# Patient Record
Sex: MALE | Race: BLACK OR AFRICAN AMERICAN | NOT HISPANIC OR LATINO | Employment: OTHER | URBAN - METROPOLITAN AREA
[De-identification: names, ages, dates, MRNs, and addresses within clinical notes are randomized per-mention and may not be internally consistent; named-entity substitution may affect disease eponyms.]

---

## 2019-01-02 PROBLEM — C91.10 CLL (CHRONIC LYMPHOCYTIC LEUKEMIA): Status: ACTIVE | Noted: 2019-01-02

## 2019-01-02 NOTE — PROGRESS NOTES
[facility] Hematolgy/Oncology  History & Physical    Subjective:      Patient ID:   NAME: Conrad Kuhn : 1940     78 y.o. male    Referring Doc: Dr Rony Victoria  Other Physicians: Kacey; David Cortes        Chief Complaint: CLL      HPI:  78 y.o. male with diagnosis of CLL who has been referred by Dr Rony Victoria for continuation of care by medical hematology/oncology. He had been on Ibrutinib and has been off therapy for several months. He had a bone marrow biopsy on 10/26/2018. His latest WBC was 4.6  With 39% lymphocytes on 2018. He is here by himself. He is feeling ok. He denies any CP, SOB, HA's or N/V. No current LN swelling. He was awaiting to have a procedure of a cervical disc but it has been cancelled. He is followed by Dr Ryan oquendo with pain management.               ROS:   GEN: normal without any fever, night sweats or weight loss  HEENT: normal with no HA's, sore throat, stiff neck, changes in vision  CV: normal with no CP, SOB, PND, MAYER or orthopnea  PULM: normal with no SOB, cough, hemoptysis, sputum or pleuritic pain  GI: normal with no abdominal pain, nausea, vomiting, constipation, diarrhea, melanotic stools, BRBPR, or hematemesis  : normal with no hematuria, dysuria  BREAST: normal with no mass, discharge, pain  SKIN: normal with no rash, erythema, bruising, or swelling       Past Medical/Surgical History:  Past Medical History:   Diagnosis Date    CLL (chronic lymphocytic leukemia) 2019     No past surgical history on file.      Allergies:  Review of patient's allergies indicates:  Allergies not on file    Social/Family History:  Social History     Socioeconomic History    Marital status:      Spouse name: Not on file    Number of children: Not on file    Years of education: Not on file    Highest education level: Not on file   Social Needs    Financial resource strain: Not on file    Food insecurity - worry: Not on file    Food insecurity -  inability: Not on file    Transportation needs - medical: Not on file    Transportation needs - non-medical: Not on file   Occupational History    Not on file   Tobacco Use    Smoking status: Not on file   Substance and Sexual Activity    Alcohol use: Not on file    Drug use: Not on file    Sexual activity: Not on file   Other Topics Concern    Not on file   Social History Narrative    Not on file     No family history on file.      Medications:  No current outpatient medications on file.      Pathology:  Cancer Staging  No matching staging information was found for the patient.      Objective:   Vitals:  There were no vitals taken for this visit.    Physical Examination:   GEN: no apparent distress, comfortable; AAOx3  HEAD: atraumatic and normocephalic  EYES: no pallor, no icterus, PERRLA  ENT: OMM, no pharyngeal erythema, external ears WNL; no nasal discharge; no thrush  NECK: no masses, thyroid normal, trachea midline, no LAD/LN's, supple  CV: RRR with no murmur; normal pulse; normal S1 and S2; no pedal edema  CHEST: Normal respiratory effort; CTAB; normal breath sounds; no wheeze or crackles  ABDOM: nontender and nondistended; soft; normal bowel sounds; no rebound/guarding  MUSC/Skeletal: ROM normal; no crepitus; joints normal; no deformities or arthropathy  EXTREM: no clubbing, cyanosis, inflammation or swelling  SKIN: no rashes, lesions, ulcers, petechiae or subcutaneous nodules  : no keith  NEURO: grossly intact; motor/sensory WNL; AAOx3; no tremors  PSYCH: normal mood, affect and behavior  LYMPH: normal cervical, supraclavicular, axillary and groin LN's      Labs:   12/12/2018 on chart    Radiology/Diagnostic Studies:          All lab results and imaging results have been reviewed and discussed with the patient    Assessment:   (1) 78 y.o. male diagnosis of CLL who has been referred by Dr Rony Victoria for continuation of care by medical hematology/oncology.   - BM biopsy  12/4/2015 with CLL  -  diagnosis of SLL in 2004 and s/p FCR x6 in 2007    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  - followed  By Dr Kee    (3) Iron deficiency anemia s/p IV iron couple weeks ago    (4) HTN - on BP meds    (6) Chronic neck and back issues - followed by Dr Ryan Rivero    (7) DM - currently off all meds    (8) Hypercholesterolemia - on meds        VISIT DIAGNOSES:              CLL (chronic lymphocytic leukemia)            Plan:     PLAN:  1. Check up to date labs incl. CEA and iron panel  2. F/u with PCP, pain management  3. CT Chest, Abdom/pelvis  4.f/u with PCP, Pulm, etc  RTC in 3-4 weeks   Fax note to Vidhya Victoria Culasso, Joel Berry        I have explained and the patient understands all of  the current recommendation(s). I have answered all of their questions to the best of my ability and to their complete satisfaction.             Thank you for allowing me to participate in this patient's care. Please call with any questions or concerns.    Electronically signed Drew Bai MD

## 2019-01-03 ENCOUNTER — OFFICE VISIT (OUTPATIENT)
Dept: HEMATOLOGY/ONCOLOGY | Facility: CLINIC | Age: 79
End: 2019-01-03
Payer: MEDICARE

## 2019-01-03 VITALS
HEART RATE: 70 BPM | RESPIRATION RATE: 20 BRPM | DIASTOLIC BLOOD PRESSURE: 76 MMHG | SYSTOLIC BLOOD PRESSURE: 171 MMHG | HEIGHT: 72 IN | BODY MASS INDEX: 29.78 KG/M2 | TEMPERATURE: 98 F | WEIGHT: 219.88 LBS

## 2019-01-03 DIAGNOSIS — Z85.118 HISTORY OF LUNG CANCER: ICD-10-CM

## 2019-01-03 DIAGNOSIS — D50.9 IRON DEFICIENCY ANEMIA, UNSPECIFIED IRON DEFICIENCY ANEMIA TYPE: Primary | ICD-10-CM

## 2019-01-03 DIAGNOSIS — C83.00 LYMPHOMA, SMALL LYMPHOCYTIC: ICD-10-CM

## 2019-01-03 DIAGNOSIS — C91.10 CLL (CHRONIC LYMPHOCYTIC LEUKEMIA): ICD-10-CM

## 2019-01-03 PROCEDURE — 99204 PR OFFICE/OUTPT VISIT, NEW, LEVL IV, 45-59 MIN: ICD-10-PCS | Mod: ,,, | Performed by: INTERNAL MEDICINE

## 2019-01-03 PROCEDURE — 99204 OFFICE O/P NEW MOD 45 MIN: CPT | Mod: ,,, | Performed by: INTERNAL MEDICINE

## 2019-01-03 RX ORDER — VALACYCLOVIR HYDROCHLORIDE 500 MG/1
1000 TABLET, FILM COATED ORAL 2 TIMES DAILY
COMMUNITY
End: 2019-10-21 | Stop reason: SDUPTHER

## 2019-01-03 RX ORDER — METOPROLOL SUCCINATE 100 MG/1
100 TABLET, EXTENDED RELEASE ORAL DAILY
COMMUNITY
End: 2019-07-22

## 2019-01-03 NOTE — LETTER
January 3, 2019      Johnson Erazo MD  [address] [facility]  [place] [address]           [facility] - Hematology Oncology  [address] [facility]  [address]  [place] [address]  Phone: [phone]  Fax: [phone]          Patient: Conrad Kuhn   MR Number: [id number]   YOB: 1940   Date of Visit: 1/3/2019       Dear Dr. Johnson Erazo:    Thank you for referring Conrad Kuhn to me for evaluation. Attached you will find relevant portions of my assessment and plan of care.    If you have questions, please do not hesitate to call me. I look forward to following Conrad Kuhn along with you.    Sincerely,    Drew Bai MD    Enclosure  CC:  No Recipients    If you would like to receive this communication electronically, please contact [email or web address] or [phone] to request more information on [employer] access.    For providers and/or their staff who would like to refer a patient to [facility], please contact us through our one-stop-shop provider referral line, [facility], at [phone].    If you feel you have received this communication in error or would no longer like to receive these types of communications, please e-mail [email or web address]

## 2019-01-15 ENCOUNTER — TELEPHONE (OUTPATIENT)
Dept: HEMATOLOGY/ONCOLOGY | Facility: CLINIC | Age: 79
End: 2019-01-15

## 2019-01-15 LAB — ISTAT CREATININE: 0.9 MG/DL (ref 0.6–1.4)

## 2019-01-15 NOTE — TELEPHONE ENCOUNTER
Patient coming in 01/31/2019    ----- Message from Drew Bai MD sent at 1/15/2019  8:24 AM CST -----  Call patient with good report

## 2019-01-30 NOTE — PROGRESS NOTES
[facility] Hematology/Oncology  PROGRESS NOTE - 2nd Follow-up Visit      Subjective:       Patient ID:   NAME: Conrad Kuhn : 1940     78 y.o. male    Referring Doc: Julien  Other Physicians: Ced Erazo Joubert, Srinivas    Chief Complaint:  CLL f/u    History of Present Illness:     Patient returns today for a 2nd regularly scheduled follow-up visit.  The patient is here today to go over the results of the recently ordered labs, tests and studies. He had some recent blood work and also had follow-up CT scan. He is here by himself. He has jack having some increase urin frequency, so I will refer him to urology and he is to talk to his PCP.             ROS:   GEN: normal without any fever, night sweats or weight loss  HEENT: normal with no HA's, sore throat, stiff neck, changes in vision  CV: normal with no CP, SOB, PND, MAYER or orthopnea  PULM: normal with no SOB, cough, hemoptysis, sputum or pleuritic pain  GI: normal with no abdominal pain, nausea, vomiting, constipation, diarrhea, melanotic stools, BRBPR, or hematemesis  : increase urine frequency  BREAST: normal with no mass, discharge, pain  SKIN: normal with no rash, erythema, bruising, or swelling    Allergies:  Review of patient's allergies indicates:  No Known Allergies    Medications:    Current Outpatient Medications:     metoprolol succinate (TOPROL-XL) 100 MG 24 hr tablet, Take 100 mg by mouth once daily., Disp: , Rfl:     valACYclovir (VALTREX) 1000 MG tablet, Take 1,000 mg by mouth 2 (two) times daily., Disp: , Rfl:     PMHx/PSHx Updates:  See patient's last visit with me on 1/3/2019.  See H&P on 1/3/2019        Pathology:  Cancer Staging  No matching staging information was found for the patient.          Objective:     Vitals:  Blood pressure (!) 175/112, pulse 74, temperature 98 °F (36.7 °C), resp. rate 20, weight 99.8 kg (220 lb 1.6 oz).    Physical Examination:   GEN: no apparent distress, comfortable; AAOx3  HEAD: atraumatic and  normocephalic  EYES: no pallor, no icterus, PERRLA  ENT: OMM, no pharyngeal erythema, external ears WNL; no nasal discharge; no thrush  NECK: no masses, thyroid normal, trachea midline, no LAD/LN's, supple  CV: RRR with no murmur; normal pulse; normal S1 and S2; no pedal edema  CHEST: Normal respiratory effort; CTAB; normal breath sounds; no wheeze or crackles  ABDOM: nontender and nondistended; soft; normal bowel sounds; no rebound/guarding  MUSC/Skeletal: ROM normal; no crepitus; joints normal; no deformities or arthropathy  EXTREM: no clubbing, cyanosis, inflammation or swelling  SKIN: no rashes, lesions, ulcers, petechiae or subcutaneous nodules  : no keith  NEURO: grossly intact; motor/sensory WNL; AAOx3; no tremors  PSYCH: normal mood, affect and behavior  LYMPH: normal cervical, supraclavicular, axillary and groin LN's            Labs:   1/28/2019  Lab Results   Component Value Date    WBC 3.2 (L) 01/28/2019    HGB 15.2 01/28/2019    HCT 43.7 01/28/2019     (L) 01/28/2019     BMP  Lab Results   Component Value Date     01/28/2019    K 4.2 01/28/2019    CL 97 (L) 01/28/2019    CO2 28.4 01/28/2019    BUN 12 01/28/2019    CREATININE 0.93 01/28/2019    CALCIUM 9.2 01/28/2019     Lab Results   Component Value Date    AST 29 01/28/2019    ALKPHOS 59 01/28/2019    BILITOT 0.9 01/28/2019     Lab Results   Component Value Date    CEA 1.2 01/28/2019     Lab Results   Component Value Date    IRON 125 01/28/2019    TIBC 331 01/28/2019    FERRITIN 55 01/28/2019             Radiology/Diagnostic Studies:    [facility] Unknown Rad Eap    Result Date: 1/14/2019  CMS MANDATED QUALITY DATA - CT RADIATION - 436 All CT scans at this facility utilize dose modulation, iterative reconstruction, and/or weight based dosing when appropriate to reduce radiation dose to as low as reasonably achievable. Reason:Lymphoid leukemia TECHNIQUE: CT thorax with, CT abdomen  with, and CT pelvis with 100 mL Omnipaque 350. COMPARISON: CT  chest dated 05/19/2018 and CT abdomen and pelvis dated 02/08/2017 CT THORAX: There is stable mediastinal, hilar and axillary adenopathy. There is coronary artery calcification. There is resolution of the groundglass opacities throughout the lungs. There are no confluent infiltrates, pulmonary nodules or pleural effusions. There are stable subcentimeter nodules in the left lobe of the thyroid gland. There are degenerative changes of the spine. CT ABDOMEN: The liver, pancreas, adrenal glands and gallbladder are normal. The spleen is enlarged. There is mesenteric and retroperitoneal adenopathy which is slightly smaller in size. -There is a portacaval node measuring 37 x 21 mm and previously measured 38 x 27 mm. -Periportal lymph node measuring 41 x 18 mm, previously measured 48 x 29 mm. -Left periaortic adenopathy measuring 34 x 23 mm and previously measured 40 x 42 mm- The kidneys enhance symmetrically without hydronephrosis or calculi. There are no thick-walled or dilated bowel loops. There is vascular calcification of aorta. There is a 3.0 x 3.0 cm infrarenal abdominal aortic aneurysm. CT PELVIS: There is adenopathy along the pelvic sidewalls bilaterally which has decreased in size. -The left common iliac adenopathy measuring 36 x 11 mm, previously measured 46 x 14 mm -the right common iliac adenopathy measuring 46 x 10 mm. Previously measured 53 x 15 mm. The bladder is normal. The prostate gland is enlarged. There are degenerative changes of the spine. There is grade 1 anterolisthesis of L5 on S1 with bilateral pars defects.     IMPRESSION: Stable mediastinal, hilar and axillary adenopathy with resolution of the airspace disease within the lungs Decrease in size of the mesenteric, retroperitoneal and pelvic adenopathy. Stable infrarenal abdominal aortic aneurysm Splenomegaly     Read and electronically signed by: Jeniffer Zhang MD on 1/14/2019 9:14 AM CST JENIFFER ZHANG MD      I have reviewed all available  lab results and radiology reports.    Assessment/Plan:   (1) 78 y.o. male with  diagnosis of CLL who has been referred by Dr Rony Victoria for continuation of care by medical hematology/oncology.   - BM biopsy  12/4/2015 with CLL  - diagnosis of SLL in 2004 and s/p FCR x6 in 2007  - s/p imbruvica in past (stopped in May 2018)  - latest wbc at 3.2; lymph 49%  - latest CT with decrease in LAD     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  - followed  By Dr Kee  - CEA 1.2  - CT scans on 1/14/2019 stable     (3) Iron deficiency anemia s/p IV iron couple weeks ago  - hgb 15.2 and iron panel currently wnl     (4) HTN - on BP meds     (6) Chronic neck and back issues - followed by Dr Ryan Rivero     (7) DM - currently off all meds     (8) Hypercholesterolemia - on meds        VISIT DIAGNOSES:      Lymphoma, small lymphocytic (2004)    Iron deficiency anemia, unspecified iron deficiency anemia type    History of lung cancer - ANDRES NSCLC 2004    CLL (chronic lymphocytic leukemia)          PLAN:  1. Check up to date labs incl. Iron monthly  2. F/u with PCP, pain management  3. refer to urology  4. f/u with PCP, Pulm, etc  RTC in 6-8 weeks   Fax note to Vidhya Victoria Culasso, Joel Berry        Discussion:       I spent over 25 mins of time with the patient. Reviewed results of the recently ordered labs, tests and studies; made directives with regards to the results. Over half of this time was spent couseling and coordinating care.    I have explained all of the above in detail and the patient understands all of the current recommendation(s). I have answered all of their questions to the best of my ability and to their complete satisfaction.   The patient is to continue with the current management plan.            Electronically signed by Drew Bai MD

## 2019-01-31 ENCOUNTER — OFFICE VISIT (OUTPATIENT)
Dept: HEMATOLOGY/ONCOLOGY | Facility: CLINIC | Age: 79
End: 2019-01-31
Payer: MEDICARE

## 2019-01-31 VITALS
HEART RATE: 74 BPM | SYSTOLIC BLOOD PRESSURE: 175 MMHG | RESPIRATION RATE: 20 BRPM | BODY MASS INDEX: 29.85 KG/M2 | TEMPERATURE: 98 F | DIASTOLIC BLOOD PRESSURE: 112 MMHG | WEIGHT: 220.13 LBS

## 2019-01-31 DIAGNOSIS — C91.10 CLL (CHRONIC LYMPHOCYTIC LEUKEMIA): ICD-10-CM

## 2019-01-31 DIAGNOSIS — C83.00 LYMPHOMA, SMALL LYMPHOCYTIC: Primary | ICD-10-CM

## 2019-01-31 DIAGNOSIS — Z85.118 HISTORY OF LUNG CANCER: ICD-10-CM

## 2019-01-31 DIAGNOSIS — D50.9 IRON DEFICIENCY ANEMIA, UNSPECIFIED IRON DEFICIENCY ANEMIA TYPE: ICD-10-CM

## 2019-01-31 DIAGNOSIS — R35.0 URINE FREQUENCY: ICD-10-CM

## 2019-01-31 PROCEDURE — 99215 PR OFFICE/OUTPT VISIT, EST, LEVL V, 40-54 MIN: ICD-10-PCS | Mod: ,,, | Performed by: INTERNAL MEDICINE

## 2019-01-31 PROCEDURE — 99215 OFFICE O/P EST HI 40 MIN: CPT | Mod: ,,, | Performed by: INTERNAL MEDICINE

## 2019-02-08 ENCOUNTER — TELEPHONE (OUTPATIENT)
Dept: UROLOGY | Facility: CLINIC | Age: 79
End: 2019-02-08

## 2019-02-08 NOTE — TELEPHONE ENCOUNTER
Spoke w pt regarding past urological hx pt stated that he has seen someone in the past but it was a long time ago 10- 15 yrs ago. Doesn't think the physician is in practice anymore that it has been to long.

## 2019-02-25 ENCOUNTER — OFFICE VISIT (OUTPATIENT)
Dept: UROLOGY | Facility: CLINIC | Age: 79
End: 2019-02-25
Payer: MEDICARE

## 2019-02-25 VITALS
HEART RATE: 79 BPM | SYSTOLIC BLOOD PRESSURE: 156 MMHG | DIASTOLIC BLOOD PRESSURE: 85 MMHG | HEIGHT: 72 IN | BODY MASS INDEX: 28.97 KG/M2 | WEIGHT: 213.88 LBS | RESPIRATION RATE: 18 BRPM

## 2019-02-25 DIAGNOSIS — R35.0 URINARY FREQUENCY: Primary | ICD-10-CM

## 2019-02-25 LAB
BILIRUB SERPL-MCNC: ABNORMAL MG/DL
BLOOD URINE, POC: ABNORMAL
COLOR, POC UA: YELLOW
GLUCOSE UR QL STRIP: 500
KETONES UR QL STRIP: ABNORMAL
LEUKOCYTE ESTERASE URINE, POC: ABNORMAL
NITRITE, POC UA: ABNORMAL
PH, POC UA: 7.5
PROTEIN, POC: ABNORMAL
SPECIFIC GRAVITY, POC UA: 1.01
UROBILINOGEN, POC UA: 0.2

## 2019-02-25 PROCEDURE — 99213 OFFICE O/P EST LOW 20 MIN: CPT | Mod: PBBFAC,PN | Performed by: UROLOGY

## 2019-02-25 PROCEDURE — 81002 URINALYSIS NONAUTO W/O SCOPE: CPT | Mod: PBBFAC,PN | Performed by: UROLOGY

## 2019-02-25 PROCEDURE — 99999 PR PBB SHADOW E&M-EST. PATIENT-LVL III: ICD-10-PCS | Mod: PBBFAC,,, | Performed by: UROLOGY

## 2019-02-25 PROCEDURE — 99204 PR OFFICE/OUTPT VISIT, NEW, LEVL IV, 45-59 MIN: ICD-10-PCS | Mod: S$PBB,,, | Performed by: UROLOGY

## 2019-02-25 PROCEDURE — 99999 PR PBB SHADOW E&M-EST. PATIENT-LVL III: CPT | Mod: PBBFAC,,, | Performed by: UROLOGY

## 2019-02-25 PROCEDURE — 99204 OFFICE O/P NEW MOD 45 MIN: CPT | Mod: S$PBB,,, | Performed by: UROLOGY

## 2019-02-25 RX ORDER — AZELASTINE 1 MG/ML
1 SPRAY, METERED NASAL 2 TIMES DAILY
Status: ON HOLD | COMMUNITY
Start: 2018-01-09 | End: 2023-01-01

## 2019-02-25 RX ORDER — FERROUS SULFATE 325(65) MG
325 TABLET ORAL
COMMUNITY
End: 2019-06-17 | Stop reason: SDUPTHER

## 2019-02-25 RX ORDER — LIDOCAINE HYDROCHLORIDE 20 MG/ML
SOLUTION OROPHARYNGEAL
COMMUNITY
Start: 2018-06-11 | End: 2019-07-22

## 2019-02-25 RX ORDER — HYDROCODONE BITARTRATE AND ACETAMINOPHEN 10; 325 MG/1; MG/1
1 TABLET ORAL EVERY 8 HOURS PRN
Refills: 0 | COMMUNITY
Start: 2019-02-18 | End: 2022-11-04

## 2019-02-25 RX ORDER — ATORVASTATIN CALCIUM 20 MG/1
20 TABLET, FILM COATED ORAL DAILY
COMMUNITY
Start: 2017-08-29 | End: 2022-06-22 | Stop reason: CLARIF

## 2019-02-25 RX ORDER — BUDESONIDE AND FORMOTEROL FUMARATE DIHYDRATE 160; 4.5 UG/1; UG/1
2 AEROSOL RESPIRATORY (INHALATION)
COMMUNITY
End: 2019-07-22

## 2019-02-25 RX ORDER — INSULIN PUMP SYRINGE, 3 ML
EACH MISCELLANEOUS
Status: ON HOLD | COMMUNITY
Start: 2018-08-22 | End: 2023-01-01

## 2019-02-25 RX ORDER — METOPROLOL SUCCINATE 25 MG/1
25 TABLET, EXTENDED RELEASE ORAL 2 TIMES DAILY
COMMUNITY
Start: 2017-08-30 | End: 2022-04-18

## 2019-02-25 RX ORDER — GABAPENTIN 250 MG/5ML
300 SOLUTION ORAL 2 TIMES DAILY
Refills: 0 | COMMUNITY
Start: 2019-02-19 | End: 2022-11-04

## 2019-02-25 RX ORDER — LEVALBUTEROL INHALATION SOLUTION 1.25 MG/3ML
3 SOLUTION RESPIRATORY (INHALATION)
COMMUNITY
Start: 2017-11-07 | End: 2023-01-01

## 2019-02-25 RX ORDER — TRAZODONE HYDROCHLORIDE 100 MG/1
TABLET ORAL
Refills: 0 | COMMUNITY
Start: 2019-02-19 | End: 2019-11-08 | Stop reason: SDUPTHER

## 2019-02-25 NOTE — LETTER
March 5, 2019        Johnson Erazo MD  [address]  [place] [address]             [place] [facility]  [address] [place] [address] [address]  [place] [address]  Phone: [phone]  Fax: [phone]   Patient: Conrad Kuhn   MR Number: [id number]   YOB: 1940   Date of Visit: 2/25/2019       Dear Dr. Erazo:    I had the pleasure of seeing your patient Conrad Kuhn today for evaluation of his urinary frequency. Attached you will find relevant portions of my assessment and plan of care.    Please follow-up with patient regarding glucose control given his recent elevated blood sugar of 280 on BMP 1/28/19, and his glucosuria on evaluation today, as well as his admittence to eating a lot of ice cream and cookies which I counseled him to stop    If you have questions, please do not hesitate to call me. I look forward to following Conrad Kuhn along with you.    Sincerely,        Carlin Whitney MD            CC  No Recipients    Enclosure

## 2019-02-25 NOTE — PROGRESS NOTES
[facility] Urology New Patient/H&P:    Conrad Kuhn is a 78 y.o. male who presents for evaluation of urinary frequency    Being actively treated for CLL (Dr Bai), and has hx of SCC lung with lobectomy in 2004, DM, HTN, chronic neck and back pain followed by pain mgmt.    He is concerned about increasing urinary frequency and urgency.  He does drink a yeti of water every day  He has 2 large cups of coffee, equivalent to 4 cups in the morning, then drinks milk, and does not drink much still 3:00 p.m..  He does drink a lot of water, an occ coke zero, and 3:00 p.m. Cocktail.  Daytime frequency 5 times, nighttime frequency 1-2 times, but he reports that he is not sleeping.  He did discontinue his evening whiskey. + spicy foods  Now he does not let get to the point of urgency and if he feels an early sensation of need to void he will go and not wait.  His hemoglobin A1c is down to 6 off of metformin and glimepiride.  He does put two scoops of sugar in his coffee and enjoys ice cream and cookies.  On 1/28 labs his gluc was 280  Denies urge incontinence or accidents.  Does have occasional backaches radiating to the groin.  No specific groin pain.    He does have cervical spine compression and L5 spondylolisthesis.  He had a cyst removed from this region of his lumbar spine 5/14/18 and had immediate relief from back pain, though his back pain has returned for 2 months.  Previously has had 50 years of lower back issues with radiation to the groin but have worsened recently.  He has an MRI pending from Monday ordered by pain management physician, Dr. Ryan Rivero with [facility].  He did have issues after anesthesia with pnia  He denies hematuria and dysuria.  He does take chronic pain medication though has regular daily bowel movements.    He reports a prostate biopsy at [facility] 6-7 years ago which was negative.  He has 2 family members with prostate cancer, 1 brother who was diagnosed in their 70s and 1 brother  who was diagnosed closer to 80.    The gentleman his 70s had surgery and the older gentleman was observed because he was on healthy and in his 80s and also had colon cancer.    AUA symptom score:  12/3, mixed (4:  Urgency; 3:  Emptying, frequency; 2:  Sleeping)  Urinalysis dipstick is negative except for trace protein and 500 of glucose.  Postvoid residual is 0 cc by bladder scan      Past Medical History:   Diagnosis Date    CLL (chronic lymphocytic leukemia) 1/2/2019    History of lung cancer - ANDRES NSCLC 2004 1/3/2019    Lymphoma, small lymphocytic (2004) 1/3/2019       History reviewed. No pertinent surgical history.    History reviewed. No pertinent family history.    Social History     Socioeconomic History    Marital status:      Spouse name: Not on file    Number of children: Not on file    Years of education: Not on file    Highest education level: Not on file   Social Needs    Financial resource strain: Not on file    Food insecurity - worry: Not on file    Food insecurity - inability: Not on file    Transportation needs - medical: Not on file    Transportation needs - non-medical: Not on file   Occupational History    Not on file   Tobacco Use    Smoking status: Never Smoker   Substance and Sexual Activity    Alcohol use: Yes    Drug use: No    Sexual activity: Not on file   Other Topics Concern    Not on file   Social History Narrative    Not on file       Review of patient's allergies indicates:  No Known Allergies    Medications Reviewed: see MAR    ROS:    Constitutional: denies fevers, chills, night sweats, fatigue, malaise  Respiratory: negative for cough, shortness of breath, wheezing, dyspnea.  Cardiovascular: + for high blood pressure, negative for chest pain, varicose veins, ankle swelling, palpitations, syncope.  GI: negative for abdominal pain, heartburn, indigestion, nausea, vomiting, constipation, diarrhea, blood in stool.   Urology: as noted above in  HPI  Endocrinology: negative for cold intolerance, excessive thirst, not feeling tired/sluggish, no heat intolerance.   Hematology/Lymph: negative for easy bleeding, easy bruising, swollen glands.  Musculoskeletal: + back pain, negative for joint pain, joint swelling  Allergy-Immunology: negative for seasonal allergies, negative for unusual infections.   Skin: negative for boils, breast lumps, hives, itching, rash.   Neurology: negative for, dizziness, headache, tingling/numbness, tremors.   Psych: satisfied with life; negative for, anxiety, depression, suicidal thoughts.     PHYSICAL EXAM:    Vitals:    02/25/19 1023   BP: (!) 156/85   Pulse: 79   Resp: 18     Body mass index is 29 kg/m². Weight: 97 kg (213 lb 13.5 oz) Height: 6' (182.9 cm)       General: Alert, cooperative, no distress, appears stated age  Head: Normocephalic, without obvious abnormality, atraumatic  Neck: no masses, no thyromegaly, no lymphadenopathy  Eyes: PERRL, conjunctiva/corneas clear  Lungs: Respirations unlabored, normal effort, no accessory muscle use  CV: Warm and well perfused extremities  Abdomen: Soft, non-tender, no CVA tenderness, no hepatosplenomegaly, no hernia  Penis: phallus normal, uncircumcised, well cared for, no plaques or lesions.   Scrotum: no cysts, no lesions, no rash, no hydrocele.   Epididymes: normal, nontender, symmetrical, no masses or cysts.   Testes: normal, both descended, no masses.   Urethra: palpably normal with orthotopic meatus of normal size    MATTY: normal sphincter tone, no masses, no hemmorrhoids   PROSTATE: 30g, no discreet nodules, though asymmetric L>R and mildly firm   Extremities: Extremities normal, atraumatic, no cyanosis or edema  Skin: Normal color, texture, and turgor, no rashes or lesions  Psych: Appropriate, well oriented, normal affect, normal mood  Neuro: Non-focal    LABS:    Recent Results (from the past 336 hour(s))   POCT URINE DIPSTICK WITHOUT MICROSCOPE    Collection Time: 02/25/19  10:29 AM   Result Value Ref Range    Color, UA yellow     Spec Grav UA 1.015     pH, UA 7.5     WBC, UA neg     Nitrite, UA neg     Protein trace     Glucose,      Ketones, UA neg     Urobilinogen, UA 0.2     Bilirubin neg     Blood, UA neg          Assessment/Diagnosis:    1. Urinary frequency  POCT URINE DIPSTICK WITHOUT MICROSCOPE    POCT Bladder Scan       Plans:  He has minimal prostate enlargement and an essential absence of bothersome obstructive symptoms.  His irritative symptoms are predominant with urgency and frequency.  Discussed conservative measures to control urgency and frequency including avoiding bladder irritants, timed voiding, not postponing voiding, and bowel regimen (as distended bowel has extrinsic compressive effect on bladder. Discussed bladder irritants include coffe (even decaf), tea, alcohol, soda, spicy foods, acidic juices (orange, tomato), vinegar, and artificial sweeteners.  Also discussed a the contribution to urgency and frequency of glucosuria in the importance of glucose control and diabetic control.  Part of this is dietary and he should very much limit his intake of dessert foods.  Also discussed limiting fluid intake 2 hr before bed.    Though urgency and frequency can be potentially the by longstanding prostatic obstruction, in the absence of obstructive urinary symptoms, deferred alpha-blocker therapy at this time.  As well, his significant history of back problems lumbar spine issues in surgery, may be contributing to urgency and frequency from underlying detrusor overactivity.  Will focus on conservative measures for urgency and frequency and follow-up with our nurse practitioner in 4 months for re-evaluation of lower urinary tract symptoms.  Could consider medical management at that time, and given age near 79 would avoid anticholinergics given the risk of dementia, and would use beta 3 agonist such as Myrbetriq.    Alternatively, if any obstructive urinary symptoms  are bothersome at that time could start with alpha-blocker and see if combination alpha-blocker and conservative measures for urgency and frequency provide better relief before considering OAB meds.  Should his irritative and OAB symptoms remain predominant, and medical therapy started, should return to see nurse practitioner 3-4 months after starting new medical regimen for re-evaluation, as if combination Myrbetriq and conservative measures do not control his urgency and frequency he may need urodynamic evaluation given his possible neurogenic etiology with his chronic back pain and back surgeries.    In the meantime, I will cc his primary care physician to re-evaluate his glucose control given his recently elevated blood sugar on lab work, glucosuria present today on exam.   the patient on dietary modifications and eliminating sugar in his diet.    45 mins spent in encounter, over half in counseling.  All questions patient had were answered and he is agreeable to treatment plan.

## 2019-02-25 NOTE — PATIENT INSTRUCTIONS
Discussed conservative measures to control urgency and frequency including   1. avoiding bladder irritants (see below) and INCREASE water intake    2. timed voiding - empty on a schedule (approx ~2-3 hours) in spite of need to urinate    3. dont postponing voiding - dont hold it on purpose     4. bowel regimen as distended bowel has extrinsic compressive effect on bladder.   - any or all of the following in any combination, titrate to soft daily bowel movement without pushing or straining  - colace/stool softener capsule - once to twice daily  - miralax - 1 capful daily to start, can increase to 2x daily (or decrease to 1/2 cap daily)  - increase dietary fibery  - fiber supplements, such as metamucil    Discussed bladder irritants include coffe (even decaf), tea, alcohol, soda, spicy foods, acidic juices (orange, tomato), vinegar, and artificial sweeteners/sugary beverages.

## 2019-03-13 LAB
% SATURATION: 54 %
ALBUMIN SERPL-MCNC: 3.9 G/DL (ref 3.1–4.7)
ALP SERPL-CCNC: 57 IU/L (ref 40–104)
ALT (SGPT): 27 IU/L (ref 3–33)
AST SERPL-CCNC: 23 IU/L (ref 10–40)
BASOPHILS NFR BLD: 0 K/UL (ref 0–0.2)
BASOPHILS NFR BLD: 0.2 %
BILIRUB SERPL-MCNC: 0.8 MG/DL (ref 0.3–1)
BUN SERPL-MCNC: 13 MG/DL (ref 8–20)
CALCIUM SERPL-MCNC: 8.9 MG/DL (ref 7.7–10.4)
CHLORIDE: 97 MMOL/L (ref 98–110)
CO2 SERPL-SCNC: 30.1 MMOL/L (ref 22.8–31.6)
CREATININE: 0.9 MG/DL (ref 0.6–1.4)
EOSINOPHIL NFR BLD: 0.1 K/UL (ref 0–0.7)
EOSINOPHIL NFR BLD: 1.4 %
ERYTHROCYTE [DISTWIDTH] IN BLOOD BY AUTOMATED COUNT: 13.8 % (ref 12.5–14.5)
FERRITIN SERPL-MCNC: 62 NG/ML (ref 37–201)
GLUCOSE: 282 MG/DL (ref 70–99)
GRAN #: 1.8 K/UL (ref 1.4–6.5)
GRAN%: 43.1 %
HCT VFR BLD AUTO: 44.2 % (ref 39–55)
HGB BLD-MCNC: 15.4 G/DL (ref 14–16)
IMMATURE GRANS (ABS): 0 K/UL (ref 0–1)
IMMATURE GRANULOCYTES: 0.2 %
IRON: 179 MCG/DL (ref 32–176)
LYMPH #: 1.6 K/UL (ref 1.2–3.4)
LYMPH%: 39.3 %
MCH RBC QN AUTO: 30.9 PG (ref 25–35)
MCHC RBC AUTO-ENTMCNC: 34.8 G/DL (ref 31–36)
MCV RBC AUTO: 88.6 FL (ref 80–100)
MONO #: 0.7 K/UL (ref 0.1–0.6)
MONO%: 15.8 %
NUCLEATED RBCS: 0 %
NUCLEATED RED BLOOD CELLS: 0 /100 WBC
PERFORMED BY:: ABNORMAL
PERFORMED BY:: NORMAL
PLATELET # BLD AUTO: 122 K/UL (ref 140–440)
PMV BLD AUTO: 10.4 FL (ref 8.8–12.7)
POTASSIUM SERPL-SCNC: 4.1 MMOL/L (ref 3.5–5)
PROT SERPL-MCNC: 6.9 G/DL (ref 6–8.2)
RBC # BLD AUTO: 4.99 M/UL (ref 4.3–5.9)
SODIUM: 134 MMOL/L (ref 134–144)
TOTAL IRON BINDING CAPACITY: 333 MCG/DL (ref 177–435)
WBC # BLD: 4.2 K/UL (ref 5–10)

## 2019-03-13 NOTE — PROGRESS NOTES
[facility] Hematology/Oncology  PROGRESS NOTE - Follow-up Visit      Subjective:       Patient ID:   NAME: Conrad Kuhn : 1940     78 y.o. male    Referring Doc: Julien  Other Physicians: Ced Erazo Joubert, Srinivas, Pinsky    Chief Complaint:  CLL f/u    History of Present Illness:     Patient returns today for a 3rd regularly scheduled follow-up visit.  The patient is here today to go over the results of the recently ordered labs, tests and studies.. He is here by himself. He previously had been having some increase urine frequency.he saw Dr Whitney with . He had back injection the other day.             ROS:   GEN: normal without any fever, night sweats or weight loss  HEENT: normal with no HA's, sore throat, stiff neck, changes in vision  CV: normal with no CP, SOB, PND, MAYER or orthopnea  PULM: normal with no SOB, cough, hemoptysis, sputum or pleuritic pain  GI: normal with no abdominal pain, nausea, vomiting, constipation, diarrhea, melanotic stools, BRBPR, or hematemesis  : increase urine frequency  BREAST: normal with no mass, discharge, pain  SKIN: normal with no rash, erythema, bruising, or swelling    Allergies:  Review of patient's allergies indicates:  No Known Allergies    Medications:    Current Outpatient Medications:     atorvastatin (LIPITOR) 20 MG tablet, TK 1 T PO QD, Disp: , Rfl:     azelastine (ASTELIN) 137 mcg (0.1 %) nasal spray, 1 spray by Nasal route., Disp: , Rfl:     blood sugar diagnostic (FREESTYLE LITE STRIPS) Strp, by Other route., Disp: , Rfl:     blood-glucose meter (FREESTYLE LITE METER) kit, Use as instructed, Disp: , Rfl:     budesonide-formoterol 160-4.5 mcg (SYMBICORT) 160-4.5 mcg/actuation HFAA, Inhale 2 puffs into the lungs., Disp: , Rfl:     ferrous sulfate (FEOSOL) 325 mg (65 mg iron) Tab tablet, Take 325 mg by mouth., Disp: , Rfl:     gabapentin (NEURONTIN) 300 MG capsule, TK ONE C PO  BID, Disp: , Rfl: 0    HYDROcodone-acetaminophen (NORCO)   mg per tablet, TK 1 T PO Q 8 H PRN P, Disp: , Rfl: 0    levalbuterol (XOPENEX) 1.25 mg/3 mL nebulizer solution, 3 mLs., Disp: , Rfl:     lidocaine HCl 2% (LIDOCAINE VISCOUS) 2 % Soln, TAKE 15 ML BY MOUTH EVERY 3 HOURS AS NEEDED, Disp: , Rfl:     metoprolol succinate (TOPROL-XL) 100 MG 24 hr tablet, Take 100 mg by mouth once daily., Disp: , Rfl:     metoprolol succinate (TOPROL-XL) 25 MG 24 hr tablet, TK 1 T PO BID, Disp: , Rfl:     traZODone (DESYREL) 100 MG tablet, TK 1 T PO  QHS, Disp: , Rfl: 0    valACYclovir (VALTREX) 1000 MG tablet, Take 1,000 mg by mouth 2 (two) times daily., Disp: , Rfl:     valacyclovir HCl (VALTREX ORAL), valacyclovir  1000mg, Disp: , Rfl:     PMHx/PSHx Updates:  See patient's last visit with me on 1/31/2019.  See H&P on 1/3/2019        Pathology:  Cancer Staging  No matching staging information was found for the patient.          Objective:     Vitals:  Blood pressure (!) 178/85, pulse (!) 58, temperature 97.9 °F (36.6 °C), resp. rate 20, weight 97.1 kg (214 lb).    Physical Examination:   GEN: no apparent distress, comfortable; AAOx3  HEAD: atraumatic and normocephalic  EYES: no pallor, no icterus, PERRLA  ENT: OMM, no pharyngeal erythema, external ears WNL; no nasal discharge; no thrush  NECK: no masses, thyroid normal, trachea midline, no LAD/LN's, supple  CV: RRR with no murmur; normal pulse; normal S1 and S2; no pedal edema  CHEST: Normal respiratory effort; CTAB; normal breath sounds; no wheeze or crackles  ABDOM: nontender and nondistended; soft; normal bowel sounds; no rebound/guarding  MUSC/Skeletal: ROM normal; no crepitus; joints normal; no deformities or arthropathy  EXTREM: no clubbing, cyanosis, inflammation or swelling  SKIN: no rashes, lesions, ulcers, petechiae or subcutaneous nodules  : no keith  NEURO: grossly intact; motor/sensory WNL; AAOx3; no tremors  PSYCH: normal mood, affect and behavior  LYMPH: normal cervical, supraclavicular, axillary and groin  LN's            Labs:   3/13/2019  Lab Results   Component Value Date    WBC 4.2 (L) 03/13/2019    HGB 15.4 03/13/2019    HCT 44.2 03/13/2019     (L) 03/13/2019     BMP  Lab Results   Component Value Date     03/13/2019    K 4.1 03/13/2019    CL 97 (L) 03/13/2019    CO2 30.1 03/13/2019    BUN 13 03/13/2019    CREATININE 0.90 03/13/2019    CALCIUM 8.9 03/13/2019     Lab Results   Component Value Date    AST 23 03/13/2019    ALKPHOS 57 03/13/2019    BILITOT 0.8 03/13/2019     Lab Results   Component Value Date    CEA 1.2 01/28/2019     Lab Results   Component Value Date    IRON 179 (H) 03/13/2019    TIBC 333 03/13/2019    FERRITIN 62 03/13/2019             Radiology/Diagnostic Studies:    [facility] Unknown Rad Eap    Result Date: 1/14/2019  CMS MANDATED QUALITY DATA - CT RADIATION - 436 All CT scans at this facility utilize dose modulation, iterative reconstruction, and/or weight based dosing when appropriate to reduce radiation dose to as low as reasonably achievable. Reason:Lymphoid leukemia TECHNIQUE: CT thorax with, CT abdomen  with, and CT pelvis with 100 mL Omnipaque 350. COMPARISON: CT chest dated 05/19/2018 and CT abdomen and pelvis dated 02/08/2017 CT THORAX: There is stable mediastinal, hilar and axillary adenopathy. There is coronary artery calcification. There is resolution of the groundglass opacities throughout the lungs. There are no confluent infiltrates, pulmonary nodules or pleural effusions. There are stable subcentimeter nodules in the left lobe of the thyroid gland. There are degenerative changes of the spine. CT ABDOMEN: The liver, pancreas, adrenal glands and gallbladder are normal. The spleen is enlarged. There is mesenteric and retroperitoneal adenopathy which is slightly smaller in size. -There is a portacaval node measuring 37 x 21 mm and previously measured 38 x 27 mm. -Periportal lymph node measuring 41 x 18 mm, previously measured 48 x 29 mm. -Left periaortic adenopathy measuring  34 x 23 mm and previously measured 40 x 42 mm- The kidneys enhance symmetrically without hydronephrosis or calculi. There are no thick-walled or dilated bowel loops. There is vascular calcification of aorta. There is a 3.0 x 3.0 cm infrarenal abdominal aortic aneurysm. CT PELVIS: There is adenopathy along the pelvic sidewalls bilaterally which has decreased in size. -The left common iliac adenopathy measuring 36 x 11 mm, previously measured 46 x 14 mm -the right common iliac adenopathy measuring 46 x 10 mm. Previously measured 53 x 15 mm. The bladder is normal. The prostate gland is enlarged. There are degenerative changes of the spine. There is grade 1 anterolisthesis of L5 on S1 with bilateral pars defects.     IMPRESSION: Stable mediastinal, hilar and axillary adenopathy with resolution of the airspace disease within the lungs Decrease in size of the mesenteric, retroperitoneal and pelvic adenopathy. Stable infrarenal abdominal aortic aneurysm Splenomegaly     Read and electronically signed by: Ivanna Arredondo MD on 1/14/2019 9:14 AM CST IVANNA ARREDONDO MD      I have reviewed all available lab results and radiology reports.    Assessment/Plan:   (1) 78 y.o. male with  diagnosis of CLL who has been referred by Dr Rony Victoria for continuation of care by medical hematology/oncology.   - BM biopsy  12/4/2015 with CLL  - diagnosis of SLL in 2004 and s/p FCR x6 in 2007  - s/p imbruvica in past (stopped in May 2018)  - latest wbc at 4.2; lymph 39.3%  - latest CT with decrease in LAD  - platelets are 122,000     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  - followed  By Dr Kee  - CEA 1.2  - CT scans on 1/14/2019 stable     (3) Iron deficiency anemia s/p IV iron couple weeks ago  - hgb 15.4 and adequate, and the iron panel currently wnl     (4) HTN - on BP meds     (6) Chronic neck and back issues - followed by Dr Ryan Rivero     (7) DM - currently off all meds     (8)  Hypercholesterolemia - on meds    (9) recommended urology evaluation at last visit and he saw Dr Whitney        VISIT DIAGNOSES:      Lymphoma, small lymphocytic (2004)    Iron deficiency anemia, unspecified iron deficiency anemia type    History of lung cancer - ANDRES NSCLC 2004    CLL (chronic lymphocytic leukemia)          PLAN:  1. Check up to date labs incl. Iron every other month  2. F/u with PCP, pain management  3. F/u with PCP, Pulm, , etc  RTC in 3-4 months  Fax note to Vidhya Victoria Culasso, Joel Berry, Pinsky        Discussion:       I spent over 25 mins of time with the patient. Reviewed results of the recently ordered labs, tests and studies; made directives with regards to the results. Over half of this time was spent couseling and coordinating care.    I have explained all of the above in detail and the patient understands all of the current recommendation(s). I have answered all of their questions to the best of my ability and to their complete satisfaction.   The patient is to continue with the current management plan.            Electronically signed by Drew Bai MD

## 2019-03-14 ENCOUNTER — OFFICE VISIT (OUTPATIENT)
Dept: HEMATOLOGY/ONCOLOGY | Facility: CLINIC | Age: 79
End: 2019-03-14
Payer: MEDICARE

## 2019-03-14 VITALS
DIASTOLIC BLOOD PRESSURE: 67 MMHG | WEIGHT: 214 LBS | RESPIRATION RATE: 20 BRPM | BODY MASS INDEX: 29.02 KG/M2 | HEART RATE: 58 BPM | SYSTOLIC BLOOD PRESSURE: 146 MMHG | TEMPERATURE: 98 F

## 2019-03-14 DIAGNOSIS — D50.9 IRON DEFICIENCY ANEMIA, UNSPECIFIED IRON DEFICIENCY ANEMIA TYPE: ICD-10-CM

## 2019-03-14 DIAGNOSIS — C83.00 LYMPHOMA, SMALL LYMPHOCYTIC: Primary | ICD-10-CM

## 2019-03-14 DIAGNOSIS — C91.10 CLL (CHRONIC LYMPHOCYTIC LEUKEMIA): ICD-10-CM

## 2019-03-14 DIAGNOSIS — Z85.118 HISTORY OF LUNG CANCER: ICD-10-CM

## 2019-03-14 PROCEDURE — 99214 PR OFFICE/OUTPT VISIT, EST, LEVL IV, 30-39 MIN: ICD-10-PCS | Mod: ,,, | Performed by: INTERNAL MEDICINE

## 2019-03-14 PROCEDURE — 99214 OFFICE O/P EST MOD 30 MIN: CPT | Mod: ,,, | Performed by: INTERNAL MEDICINE

## 2019-03-14 NOTE — LETTER
March 14, 2019      Johnson Erazo MD  [address] [facility]  [place] [address]           [facility] - Hematology Oncology  [address] [facility]  [address]  [place] [address]  Phone: [phone]  Fax: [phone]          Patient: Conrad Kuhn   MR Number: [id number]   YOB: 1940   Date of Visit: 3/14/2019       Dear Dr. Johnson Erazo:    Thank you for referring Conrad Kuhn to me for evaluation. Attached you will find relevant portions of my assessment and plan of care.    If you have questions, please do not hesitate to call me. I look forward to following Conrad Kuhn along with you.    Sincerely,    Drew Bai MD    Enclosure  CC:  No Recipients    If you would like to receive this communication electronically, please contact [email or web address] or [phone] to request more information on [employer] Link access.    For providers and/or their staff who would like to refer a patient to [facility], please contact us through our one-stop-shop provider referral line, [facility], at [phone].    If you feel you have received this communication in error or would no longer like to receive these types of communications, please e-mail [email or web address]

## 2019-06-12 LAB
% SATURATION: 24 %
ALBUMIN SERPL-MCNC: 3.9 G/DL (ref 3.1–4.7)
ALP SERPL-CCNC: 54 IU/L (ref 40–104)
ALT (SGPT): 20 IU/L (ref 3–33)
AST SERPL-CCNC: 25 IU/L (ref 10–40)
BASOPHILS NFR BLD: 0 K/UL (ref 0–0.2)
BASOPHILS NFR BLD: 0.3 %
BILIRUB SERPL-MCNC: 1.1 MG/DL (ref 0.3–1)
BUN SERPL-MCNC: 14 MG/DL (ref 8–20)
CALCIUM SERPL-MCNC: 9.3 MG/DL (ref 7.7–10.4)
CHLORIDE: 99 MMOL/L (ref 98–110)
CO2 SERPL-SCNC: 29.7 MMOL/L (ref 22.8–31.6)
CREATININE: 0.95 MG/DL (ref 0.6–1.4)
EOSINOPHIL NFR BLD: 0.1 K/UL (ref 0–0.7)
EOSINOPHIL NFR BLD: 1.9 %
ERYTHROCYTE [DISTWIDTH] IN BLOOD BY AUTOMATED COUNT: 13.2 % (ref 11.7–14.9)
FERRITIN SERPL-MCNC: 27 NG/ML (ref 37–201)
GLUCOSE: 179 MG/DL (ref 70–99)
GRAN #: 1.2 K/UL (ref 1.4–6.5)
GRAN%: 33.2 %
HCT VFR BLD AUTO: 41.5 % (ref 39–55)
HGB BLD-MCNC: 14.5 G/DL (ref 14–16)
IMMATURE GRANS (ABS): 0 K/UL (ref 0–1)
IMMATURE GRANULOCYTES: 0 %
IRON: 99 MCG/DL (ref 32–176)
LYMPH #: 1.8 K/UL (ref 1.2–3.4)
LYMPH%: 49.6 %
MCH RBC QN AUTO: 31 PG (ref 25–35)
MCHC RBC AUTO-ENTMCNC: 34.9 G/DL (ref 31–36)
MCV RBC AUTO: 88.9 FL (ref 80–100)
MONO #: 0.5 K/UL (ref 0.1–0.6)
MONO%: 15 %
NUCLEATED RBCS: 0 %
PLATELET # BLD AUTO: 132 K/UL (ref 140–440)
PMV BLD AUTO: 10.8 FL (ref 8.8–12.7)
POTASSIUM SERPL-SCNC: 3.9 MMOL/L (ref 3.5–5)
PROT SERPL-MCNC: 7.1 G/DL (ref 6–8.2)
RBC # BLD AUTO: 4.67 M/UL (ref 4.3–5.9)
SODIUM: 138 MMOL/L (ref 134–144)
TOTAL IRON BINDING CAPACITY: 410 MCG/DL (ref 177–435)
WBC # BLD AUTO: 3.6 K/UL (ref 5–10)

## 2019-06-17 ENCOUNTER — OFFICE VISIT (OUTPATIENT)
Dept: HEMATOLOGY/ONCOLOGY | Facility: CLINIC | Age: 79
End: 2019-06-17
Payer: MEDICARE

## 2019-06-17 VITALS
RESPIRATION RATE: 20 BRPM | SYSTOLIC BLOOD PRESSURE: 134 MMHG | DIASTOLIC BLOOD PRESSURE: 73 MMHG | WEIGHT: 212.13 LBS | TEMPERATURE: 98 F | BODY MASS INDEX: 28.77 KG/M2 | HEART RATE: 79 BPM

## 2019-06-17 DIAGNOSIS — Z85.118 HISTORY OF LUNG CANCER: ICD-10-CM

## 2019-06-17 DIAGNOSIS — D50.9 IRON DEFICIENCY ANEMIA, UNSPECIFIED IRON DEFICIENCY ANEMIA TYPE: ICD-10-CM

## 2019-06-17 DIAGNOSIS — C91.10 CLL (CHRONIC LYMPHOCYTIC LEUKEMIA): Primary | ICD-10-CM

## 2019-06-17 DIAGNOSIS — C83.00 LYMPHOMA, SMALL LYMPHOCYTIC: ICD-10-CM

## 2019-06-17 DIAGNOSIS — D50.8 IRON DEFICIENCY ANEMIA DUE TO DIETARY CAUSES: Primary | ICD-10-CM

## 2019-06-17 PROCEDURE — 99214 OFFICE O/P EST MOD 30 MIN: CPT | Mod: ,,, | Performed by: INTERNAL MEDICINE

## 2019-06-17 PROCEDURE — 99214 PR OFFICE/OUTPT VISIT, EST, LEVL IV, 30-39 MIN: ICD-10-PCS | Mod: ,,, | Performed by: INTERNAL MEDICINE

## 2019-06-17 RX ORDER — NEOMYCIN SULFATE, POLYMYXIN B SULFATE, AND DEXAMETHASONE 3.5; 10000; 1 MG/G; [USP'U]/G; MG/G
OINTMENT OPHTHALMIC DAILY PRN
COMMUNITY
Start: 2019-03-06 | End: 2021-06-29

## 2019-06-17 RX ORDER — LANCETS 28 GAUGE
EACH MISCELLANEOUS
Refills: 1 | COMMUNITY
Start: 2019-05-11 | End: 2022-08-03

## 2019-06-17 RX ORDER — MELOXICAM 15 MG/1
TABLET ORAL
Refills: 1 | COMMUNITY
Start: 2019-06-07 | End: 2019-07-22

## 2019-06-17 RX ORDER — FERROUS SULFATE 325(65) MG
325 TABLET ORAL DAILY
COMMUNITY
Start: 2019-06-17 | End: 2022-06-06 | Stop reason: SDUPTHER

## 2019-06-17 RX ORDER — METFORMIN HYDROCHLORIDE 500 MG/1
500 TABLET ORAL 2 TIMES DAILY WITH MEALS
Refills: 2 | COMMUNITY
Start: 2019-04-12 | End: 2022-07-02

## 2019-06-17 NOTE — LETTER
June 17, 2019      Johnson Erazo MD  [address] [facility]  [place] [address]           [facility] - Hematology Oncology  [address] [facility]  [address]  [place] [address]  Phone: [phone]  Fax: [phone]          Patient: Conrad Kuhn   MR Number: [id number]   YOB: 1940   Date of Visit: 6/17/2019       Dear Dr. Johnson Erazo:    Thank you for referring Conrad uKhn to me for evaluation. Attached you will find relevant portions of my assessment and plan of care.    If you have questions, please do not hesitate to call me. I look forward to following Conrad Kuhn along with you.    Sincerely,    Drew Bai MD    Enclosure  CC:  No Recipients    If you would like to receive this communication electronically, please contact [email or web address] or [phone] to request more information on [employer] Link access.    For providers and/or their staff who would like to refer a patient to [facility], please contact us through our one-stop-shop provider referral line, [facility], at [phone].    If you feel you have received this communication in error or would no longer like to receive these types of communications, please e-mail [email or web address]

## 2019-06-17 NOTE — TELEPHONE ENCOUNTER
----- Message from Cate Saab sent at 6/17/2019 10:59 AM CDT -----  The patient called and said he needs a new prescription for his iron 325mg. He went to the pharmacy but he doesn't have any refills left. Please call in to [facility] on [address]. Please let the patient know when done at [phone].

## 2019-06-17 NOTE — PROGRESS NOTES
[facility] Hematology/Oncology  PROGRESS NOTE - Follow-up Visit      Subjective:       Patient ID:   NAME: Conrad Kuhn : 1940     78 y.o. male    Referring Doc: Julien  Other Physicians: Ced Erazo Joubert, Srinivas, Pinsky    Chief Complaint:  CLL f/u    History of Present Illness:     Patient returns today for a regularly scheduled follow-up visit.  The patient is here today to go over the results of the recently ordered labs, tests and studies.. He is here by himself. He previously had been having some increase urine frequency for which he saw Dr Whitney with  and it has since resolved. He injured his LUE couple weeks ago and bruised it fairly good. He saw Dr Erazo and Dr Acosta with ortho for the arm and it has been improving.             ROS:   GEN: normal without any fever, night sweats or weight loss  HEENT: normal with no HA's, sore throat, stiff neck, changes in vision  CV: normal with no CP, SOB, PND, MAYER or orthopnea  PULM: normal with no SOB, cough, hemoptysis, sputum or pleuritic pain  GI: normal with no abdominal pain, nausea, vomiting, constipation, diarrhea, melanotic stools, BRBPR, or hematemesis  : increase urine frequency  BREAST: normal with no mass, discharge, pain  SKIN: normal with no rash, erythema, bruising, or swelling    Allergies:  Review of patient's allergies indicates:  No Known Allergies    Medications:    Current Outpatient Medications:     atorvastatin (LIPITOR) 20 MG tablet, TK 1 T PO QD, Disp: , Rfl:     azelastine (ASTELIN) 137 mcg (0.1 %) nasal spray, 1 spray by Nasal route., Disp: , Rfl:     blood sugar diagnostic (FREESTYLE LITE STRIPS) Strp, by Other route., Disp: , Rfl:     blood-glucose meter (FREESTYLE LITE METER) kit, Use as instructed, Disp: , Rfl:     budesonide-formoterol 160-4.5 mcg (SYMBICORT) 160-4.5 mcg/actuation HFAA, Inhale 2 puffs into the lungs., Disp: , Rfl:     ferrous sulfate (FEOSOL) 325 mg (65 mg iron) Tab tablet, Take 325 mg by  mouth., Disp: , Rfl:     FREESTYLE LANCETS 28 gauge lancets, TEST TWICE DAILY, Disp: , Rfl: 1    gabapentin (NEURONTIN) 300 MG capsule, TK ONE C PO  BID, Disp: , Rfl: 0    HYDROcodone-acetaminophen (NORCO)  mg per tablet, TK 1 T PO Q 8 H PRN P, Disp: , Rfl: 0    levalbuterol (XOPENEX) 1.25 mg/3 mL nebulizer solution, 3 mLs., Disp: , Rfl:     lidocaine HCl 2% (LIDOCAINE VISCOUS) 2 % Soln, TAKE 15 ML BY MOUTH EVERY 3 HOURS AS NEEDED, Disp: , Rfl:     meloxicam (MOBIC) 15 MG tablet, TK 1 T PO ON LEFT ARM D PRF PAIN, Disp: , Rfl: 1    metFORMIN (GLUCOPHAGE) 500 MG tablet, TK 1 T PO BID WC, Disp: , Rfl: 2    metoprolol succinate (TOPROL-XL) 100 MG 24 hr tablet, Take 100 mg by mouth once daily., Disp: , Rfl:     metoprolol succinate (TOPROL-XL) 25 MG 24 hr tablet, TK 1 T PO BID, Disp: , Rfl:     neomycin-polymyxin-dexamethasone (DEXACINE) 3.5 mg/g-10,000 unit/g-0.1 % Oint, Apply to eye daily as needed., Disp: , Rfl:     traZODone (DESYREL) 100 MG tablet, TK 1 T PO  QHS, Disp: , Rfl: 0    valACYclovir (VALTREX) 1000 MG tablet, Take 1,000 mg by mouth 2 (two) times daily., Disp: , Rfl:     valacyclovir HCl (VALTREX ORAL), valacyclovir  1000mg, Disp: , Rfl:     PMHx/PSHx Updates:  See patient's last visit with me on 3/14/2019.  See H&P on 1/3/2019        Pathology:  Cancer Staging  No matching staging information was found for the patient.          Objective:     Vitals:  Blood pressure 134/73, pulse 79, temperature 97.8 °F (36.6 °C), resp. rate 20, weight 96.2 kg (212 lb 1.6 oz).    Physical Examination:   GEN: no apparent distress, comfortable; AAOx3  HEAD: atraumatic and normocephalic  EYES: no pallor, no icterus, PERRLA  ENT: OMM, no pharyngeal erythema, external ears WNL; no nasal discharge; no thrush  NECK: no masses, thyroid normal, trachea midline, no LAD/LN's, supple  CV: RRR with no murmur; normal pulse; normal S1 and S2; no pedal edema  CHEST: Normal respiratory effort; CTAB; normal breath  sounds; no wheeze or crackles  ABDOM: nontender and nondistended; soft; normal bowel sounds; no rebound/guarding  MUSC/Skeletal: ROM normal; no crepitus; joints normal; no deformities or arthropathy  EXTREM: no clubbing, cyanosis, inflammation or swelling; bruise on LUE - improving  SKIN: no rashes, lesions, ulcers, petechiae or subcutaneous nodules  : no keith  NEURO: grossly intact; motor/sensory WNL; AAOx3; no tremors  PSYCH: normal mood, affect and behavior  LYMPH: normal cervical, supraclavicular, axillary and groin LN's            Labs:   6/12/2019  Lab Results   Component Value Date    WBC 3.6 (L) 06/12/2019    HGB 14.5 06/12/2019    HCT 41.5 06/12/2019    MCV 88.9 06/12/2019     (L) 06/12/2019     Gran% % 33.2  40.8    Lymph% % 49.6  37.9    Mono% % 15.0  19.7    Eosinophil% % 1.9  1.2    Basophil% % 0.3  0.2    Gran # (ANC) 1.4 - 6.5 K/uL 1.2Low   2.4 R   Lymph # 1.2 - 3.4 K/uL 1.8  2.2 R   Mono # 0.1 - 0.6 K/uL 0.5  1.2High  R   Eos # 0.0 - 0.7 K/uL 0.1  0.1 R   Baso # 0.0 - 0.2 K/uL 0.0  0.0 R           BMP  Lab Results   Component Value Date     06/12/2019    K 3.9 06/12/2019    CL 99 06/12/2019    CO2 29.7 06/12/2019    BUN 14 06/12/2019    CREATININE 0.95 06/12/2019    CALCIUM 9.3 06/12/2019     Lab Results   Component Value Date    AST 25 06/12/2019    ALKPHOS 54 06/12/2019    BILITOT 1.1 (H) 06/12/2019     Lab Results   Component Value Date    CEA 1.2 01/28/2019     Lab Results   Component Value Date    IRON 99 06/12/2019    TIBC 410 06/12/2019    FERRITIN 27 (L) 06/12/2019             Radiology/Diagnostic Studies:    [facility] Unknown Rad Eap    Result Date: 1/14/2019  CMS MANDATED QUALITY DATA - CT RADIATION - 436 All CT scans at this facility utilize dose modulation, iterative reconstruction, and/or weight based dosing when appropriate to reduce radiation dose to as low as reasonably achievable. Reason:Lymphoid leukemia TECHNIQUE: CT thorax with, CT abdomen  with, and CT pelvis with 100  mL Omnipaque 350. COMPARISON: CT chest dated 05/19/2018 and CT abdomen and pelvis dated 02/08/2017 CT THORAX: There is stable mediastinal, hilar and axillary adenopathy. There is coronary artery calcification. There is resolution of the groundglass opacities throughout the lungs. There are no confluent infiltrates, pulmonary nodules or pleural effusions. There are stable subcentimeter nodules in the left lobe of the thyroid gland. There are degenerative changes of the spine. CT ABDOMEN: The liver, pancreas, adrenal glands and gallbladder are normal. The spleen is enlarged. There is mesenteric and retroperitoneal adenopathy which is slightly smaller in size. -There is a portacaval node measuring 37 x 21 mm and previously measured 38 x 27 mm. -Periportal lymph node measuring 41 x 18 mm, previously measured 48 x 29 mm. -Left periaortic adenopathy measuring 34 x 23 mm and previously measured 40 x 42 mm- The kidneys enhance symmetrically without hydronephrosis or calculi. There are no thick-walled or dilated bowel loops. There is vascular calcification of aorta. There is a 3.0 x 3.0 cm infrarenal abdominal aortic aneurysm. CT PELVIS: There is adenopathy along the pelvic sidewalls bilaterally which has decreased in size. -The left common iliac adenopathy measuring 36 x 11 mm, previously measured 46 x 14 mm -the right common iliac adenopathy measuring 46 x 10 mm. Previously measured 53 x 15 mm. The bladder is normal. The prostate gland is enlarged. There are degenerative changes of the spine. There is grade 1 anterolisthesis of L5 on S1 with bilateral pars defects.     IMPRESSION: Stable mediastinal, hilar and axillary adenopathy with resolution of the airspace disease within the lungs Decrease in size of the mesenteric, retroperitoneal and pelvic adenopathy. Stable infrarenal abdominal aortic aneurysm Splenomegaly     Read and electronically signed by: Jeniffer Zhang MD on 1/14/2019 9:14 AM CST JENIFFER ZHANG   MD      I have reviewed all available lab results and radiology reports.    Assessment/Plan:   (1) 78 y.o. male with  diagnosis of CLL who has been referred by Dr Rony Victoria for continuation of care by medical hematology/oncology.   - BM biopsy  12/4/2015 with CLL  - diagnosis of SLL in 2004 and s/p FCR x6 in 2007  - s/p imbruvica in past (stopped in May 2018)  - latest wbc at 4.6; lymph 49.6%  - latest CT on 1/14/2019 with decrease in LAD  - platelets are 132,000     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  - followed  By Dr Kee  - CEA 1.2  - CT scans on 1/14/2019 stable     (3) Iron deficiency anemia s/p IV iron couple weeks ago  - hgb 14.5 and adequate, and the iron panel currently wnl     (4) HTN - on BP meds     (6) Chronic neck and back issues - followed by Dr Ryan Rivero     (7) DM - currently off all meds     (8) Hypercholesterolemia - on meds    (9) recommended urology evaluation at last visit and he saw Dr Whitney        VISIT DIAGNOSES:      CLL (chronic lymphocytic leukemia)    History of lung cancer - ANDRES NSCLC 2004    Lymphoma, small lymphocytic (2004)    Iron deficiency anemia, unspecified iron deficiency anemia type          PLAN:  1. Check up to date labs incl. iron every 3 months  2. F/u with PCP, pain management  3. F/u with PCP, Pulm, , etc  4. Schedule repeat CT scans  5. Resume oral iron  RTC in 3-4 months  Fax note to Kacey Kee Joel Berry, Pinsky, Devraj        Discussion:       I spent over 25 mins of time with the patient. Reviewed results of the recently ordered labs, tests and studies; made directives with regards to the results. Over half of this time was spent couseling and coordinating care.    I have explained all of the above in detail and the patient understands all of the current recommendation(s). I have answered all of their questions to the best of my ability and to their complete satisfaction.   The patient is to continue with the  current management plan.            Electronically signed by Drew Bai MD

## 2019-06-18 ENCOUNTER — TELEPHONE (OUTPATIENT)
Dept: HEMATOLOGY/ONCOLOGY | Facility: CLINIC | Age: 79
End: 2019-06-18

## 2019-06-18 DIAGNOSIS — D50.8 IRON DEFICIENCY ANEMIA DUE TO DIETARY CAUSES: ICD-10-CM

## 2019-07-05 ENCOUNTER — TELEPHONE (OUTPATIENT)
Dept: HEMATOLOGY/ONCOLOGY | Facility: CLINIC | Age: 79
End: 2019-07-05

## 2019-07-05 NOTE — TELEPHONE ENCOUNTER
Called patient let him know that prescription was called in   ----- Message from María Ho MA sent at 7/5/2019  2:37 PM CDT -----  Regarding: nausea  Contact: [phone]  Mr Kuhn would like a RX called in for his nausea.     # [phone]    Thank you    María

## 2019-07-22 ENCOUNTER — OFFICE VISIT (OUTPATIENT)
Dept: INFECTIOUS DISEASES | Facility: CLINIC | Age: 79
End: 2019-07-22
Payer: MEDICARE

## 2019-07-22 VITALS
OXYGEN SATURATION: 95 % | TEMPERATURE: 98 F | BODY MASS INDEX: 28.85 KG/M2 | WEIGHT: 213 LBS | DIASTOLIC BLOOD PRESSURE: 78 MMHG | SYSTOLIC BLOOD PRESSURE: 128 MMHG | HEIGHT: 72 IN | HEART RATE: 83 BPM

## 2019-07-22 DIAGNOSIS — C83.00 LYMPHOCYTIC LYMPHOSARCOMA: ICD-10-CM

## 2019-07-22 DIAGNOSIS — Z85.118 PERSONAL HISTORY OF MALIGNANT NEOPLASM OF BRONCHUS AND LUNG: ICD-10-CM

## 2019-07-22 DIAGNOSIS — D50.9 IRON DEFICIENCY ANEMIA, UNSPECIFIED: ICD-10-CM

## 2019-07-22 DIAGNOSIS — H69.90 DYSFUNCTION OF EUSTACHIAN TUBE, UNSPECIFIED LATERALITY: Primary | ICD-10-CM

## 2019-07-22 DIAGNOSIS — C91.10 CLL (CHRONIC LYMPHOCYTIC LEUKEMIA): ICD-10-CM

## 2019-07-22 DIAGNOSIS — R42 VERTIGO: ICD-10-CM

## 2019-07-22 DIAGNOSIS — C91.90 LEUKEMIA, LYMPHOCYTIC: Primary | ICD-10-CM

## 2019-07-22 PROCEDURE — 99214 PR OFFICE/OUTPT VISIT, EST, LEVL IV, 30-39 MIN: ICD-10-PCS | Mod: ,,, | Performed by: INTERNAL MEDICINE

## 2019-07-22 PROCEDURE — 99214 OFFICE O/P EST MOD 30 MIN: CPT | Mod: ,,, | Performed by: INTERNAL MEDICINE

## 2019-07-22 RX ORDER — ONDANSETRON 8 MG/1
TABLET, ORALLY DISINTEGRATING ORAL
Refills: 10 | COMMUNITY
Start: 2019-07-05 | End: 2021-06-29

## 2019-07-22 RX ORDER — PROMETHAZINE HYDROCHLORIDE 25 MG/1
TABLET ORAL
Refills: 10 | COMMUNITY
Start: 2019-07-05 | End: 2020-11-25

## 2019-07-22 NOTE — PATIENT INSTRUCTIONS
Would resume flonase(fluticasone) one spray in each nostril twice a day for 3-4 days then 1 spray in each nostril daily. Try to let it run down the back of your throat.     Please take a claritin (or zyrtec or allegra) once a day until your ears are no longer stuffy and then as needed.

## 2019-07-22 NOTE — PROGRESS NOTES
Subjective:       Patient ID: Conrad Kuhn is a 78 y.o. male.    Chief Complaint:: Otalgia    HPI urgent visit  Last seen 2018. He could not get into see Dr. Erazo.   He has stuffy ears, mild sore throat, no fever. Had a little vertigo last week as well. Stopped flonase about 3 months ago. No purulent nasal discharge, no significant cough, shortness of breath, sputum production.    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     Right-sided Bell's palsy 2009    Spondylolisthesis     Varicose veins of legs     Venous insufficiency      Past Surgical History:   Procedure Laterality Date    Athroscopic surgery      On Knee    BIOPSY OF TISSUE OF NECK Left 2015    Recuurence CLL/small cell lyphoma    ESOPHAGEAL DILATION      Hemorrhoid resection  1979    OTHER SURGICAL HISTORY  2006    Chemotherapy s/p lyphoma        Portacath implantation and removal       Social History     Tobacco Use    Smoking status: Never Smoker    Smokeless tobacco: Never Used   Substance Use Topics    Alcohol use: Yes     Social History     Occupational History    Not on file     Family History   Problem Relation Age of Onset    Stomach cancer Mother 80         at 95    Colon cancer Father          Review of Systems    Constitutional: No fever, chills, weight is stable    Eyes: No change in vision,      ENT: see HPI    Cardiovascular: No chest pain, MAYER, or increase in pedal edema    Respiratory: No shortness of breath, MAYER, cough, wheeze, sputum, or hemoptysis    Gastrointestinal: No abdominal  pain, but has intermittent nausea, no vomiting, diarrhea, . He takes zofran and promethazine prn, but about three times a week. He does not recognize if there is a medication causing his nausea    Genitourinary: No dysuria,   Musculoskeletal: No new pain, joint swelling, or injuries    Integumentary: No new rashes, skin lesions, or wounds    Neurological: no unusual headaches, neuropathy, loss of vision, falls    Psychiatric: No anxiety, depression    Endocrine:  BS had been high (220) and he resumed metformin for 8 months and most recently abou t 140    Lymphatic:  He has not required any treatment for his CLL/lymphoma in quite some time. He is now followed by Dr. Bai    VAD:     Objective:      Blood pressure 128/78, pulse 83, temperature 97.8 °F (36.6 °C), temperature source Temporal, height 6' (1.829 m), weight 96.6 kg (213 lb), SpO2 95 %. Body mass index is 28.89 kg/m².  Physical Exam      General: Alert and attentive, cooperative and in no distress    Eyes: Pupils equal, round, reactive to light, anicteric, EOMI    Neck: Supple, non-tender, no thyromegaly or masses    ENT: EAC patent, TM normal, nares patent, no oral lesions, edentulous, no thrush. Uvula is little bit red and puffy    Cardiovascular: Regular rate and rhythm, no murmurs, rubs, or gallop    Respiratory: Lungs clear without wheezes, rales, rubs or rhonci    Gastrointestinal:     Genitourinary:     Integumentary: Skin without rashes, lesions, or wounds       Vascular: Mild peripheral edema with severe varicosities bilateral lower extremities    Musculoskeletal: Ambulates without difficulty, no acute arthritis, synovitis or myositis.     Lymphatic: One small right posterior cervical node, shoddy nodes right axilla    Neurological: Normal LOC,  speech,   gait. Facial asymmetry since Bell's palsy \  Psychiatric: Normal mood, speech,  demeanor     Wound:    VAD:        Recent Diagnostics: Reviewed most recent CBC and CMP          Assessment and  Plan:           Dysfunction of Eustachian tube, unspecified laterality    Vertigo    CLL (chronic lymphocytic leukemia)      Would resume flonase(fluticasone) one spray in each nostril twice a day for 3-4 days then 1 spray in each nostril daily. Try to let it run down the back of your throat.     Please take a claritin (or zyrtec or allegra) once a day until your ears are no longer stuffy and then as needed.     This note was created using Dragon voice recognition software that occasionally misinterpreted phrases or words.

## 2019-08-07 ENCOUNTER — HOSPITAL ENCOUNTER (OUTPATIENT)
Dept: RADIOLOGY | Facility: HOSPITAL | Age: 79
Discharge: HOME | End: 2019-08-07
Attending: INTERNAL MEDICINE
Payer: MEDICARE

## 2019-08-07 DIAGNOSIS — Z85.118 PERSONAL HISTORY OF MALIGNANT NEOPLASM OF BRONCHUS AND LUNG: ICD-10-CM

## 2019-08-07 DIAGNOSIS — C83.00 LYMPHOCYTIC LYMPHOSARCOMA: ICD-10-CM

## 2019-08-07 DIAGNOSIS — C91.90 LEUKEMIA, LYMPHOCYTIC: ICD-10-CM

## 2019-08-07 DIAGNOSIS — D50.9 IRON DEFICIENCY ANEMIA, UNSPECIFIED: ICD-10-CM

## 2019-08-07 LAB
CREAT SERPL-MCNC: 1 MG/DL (ref 0.5–1.4)
SAMPLE: NORMAL

## 2019-08-07 PROCEDURE — 25500020 PHARM REV CODE 255: Performed by: INTERNAL MEDICINE

## 2019-08-07 PROCEDURE — 74177 CT ABD & PELVIS W/CONTRAST: CPT | Mod: TC

## 2019-08-07 RX ADMIN — IOHEXOL 100 ML: 350 INJECTION, SOLUTION INTRAVENOUS at 08:08

## 2019-09-04 ENCOUNTER — LAB VISIT (OUTPATIENT)
Dept: LAB | Facility: HOSPITAL | Age: 79
End: 2019-09-04
Attending: INTERNAL MEDICINE
Payer: MEDICARE

## 2019-09-04 DIAGNOSIS — D50.9 IRON DEFICIENCY ANEMIA, UNSPECIFIED IRON DEFICIENCY ANEMIA TYPE: ICD-10-CM

## 2019-09-04 DIAGNOSIS — C83.00 LYMPHOMA, SMALL LYMPHOCYTIC: ICD-10-CM

## 2019-09-04 DIAGNOSIS — Z85.118 HISTORY OF LUNG CANCER: ICD-10-CM

## 2019-09-04 DIAGNOSIS — C91.10 CLL (CHRONIC LYMPHOCYTIC LEUKEMIA): ICD-10-CM

## 2019-09-04 LAB
ALBUMIN SERPL BCP-MCNC: 4.1 G/DL (ref 3.5–5.2)
ALP SERPL-CCNC: 48 U/L (ref 55–135)
ALT SERPL W/O P-5'-P-CCNC: 28 U/L (ref 10–44)
ANION GAP SERPL CALC-SCNC: 8 MMOL/L (ref 8–16)
AST SERPL-CCNC: 29 U/L (ref 10–40)
BASOPHILS # BLD AUTO: 0.02 K/UL (ref 0–0.2)
BASOPHILS NFR BLD: 0.4 % (ref 0–1.9)
BILIRUB SERPL-MCNC: 0.9 MG/DL (ref 0.1–1)
BUN SERPL-MCNC: 17 MG/DL (ref 8–23)
CALCIUM SERPL-MCNC: 9.1 MG/DL (ref 8.7–10.5)
CHLORIDE SERPL-SCNC: 102 MMOL/L (ref 95–110)
CO2 SERPL-SCNC: 29 MMOL/L (ref 23–29)
CREAT SERPL-MCNC: 1 MG/DL (ref 0.5–1.4)
DIFFERENTIAL METHOD: ABNORMAL
EOSINOPHIL # BLD AUTO: 0.1 K/UL (ref 0–0.5)
EOSINOPHIL NFR BLD: 1 % (ref 0–8)
ERYTHROCYTE [DISTWIDTH] IN BLOOD BY AUTOMATED COUNT: 14 % (ref 11.5–14.5)
EST. GFR  (AFRICAN AMERICAN): >60 ML/MIN/1.73 M^2
EST. GFR  (NON AFRICAN AMERICAN): >60 ML/MIN/1.73 M^2
FERRITIN SERPL-MCNC: 41 NG/ML (ref 20–300)
GLUCOSE SERPL-MCNC: 120 MG/DL (ref 70–110)
HCT VFR BLD AUTO: 41 % (ref 40–54)
HGB BLD-MCNC: 14.6 G/DL (ref 14–18)
IMM GRANULOCYTES # BLD AUTO: 0.01 K/UL (ref 0–0.04)
IMM GRANULOCYTES NFR BLD AUTO: 0.2 % (ref 0–0.5)
IRON SERPL-MCNC: 165 UG/DL (ref 45–160)
LYMPHOCYTES # BLD AUTO: 2.7 K/UL (ref 1–4.8)
LYMPHOCYTES NFR BLD: 56.3 % (ref 18–48)
MCH RBC QN AUTO: 31.3 PG (ref 27–31)
MCHC RBC AUTO-ENTMCNC: 35.6 G/DL (ref 32–36)
MCV RBC AUTO: 88 FL (ref 82–98)
MONOCYTES # BLD AUTO: 0.6 K/UL (ref 0.3–1)
MONOCYTES NFR BLD: 13.3 % (ref 4–15)
NEUTROPHILS # BLD AUTO: 1.4 K/UL (ref 1.8–7.7)
NEUTROPHILS NFR BLD: 28.8 % (ref 38–73)
NRBC BLD-RTO: 0 /100 WBC
PLATELET # BLD AUTO: 111 K/UL (ref 150–350)
PMV BLD AUTO: 9.8 FL (ref 9.2–12.9)
POTASSIUM SERPL-SCNC: 4.4 MMOL/L (ref 3.5–5.1)
PROT SERPL-MCNC: 6.9 G/DL (ref 6–8.4)
RBC # BLD AUTO: 4.67 M/UL (ref 4.6–6.2)
SATURATED IRON: 46 % (ref 20–50)
SODIUM SERPL-SCNC: 139 MMOL/L (ref 136–145)
TOTAL IRON BINDING CAPACITY: 360 UG/DL (ref 250–450)
TRANSFERRIN SERPL-MCNC: 257 MG/DL (ref 200–375)
WBC # BLD AUTO: 4.8 K/UL (ref 3.9–12.7)

## 2019-09-04 PROCEDURE — 82728 ASSAY OF FERRITIN: CPT

## 2019-09-04 PROCEDURE — 80053 COMPREHEN METABOLIC PANEL: CPT

## 2019-09-04 PROCEDURE — 85025 COMPLETE CBC W/AUTO DIFF WBC: CPT

## 2019-09-04 PROCEDURE — 83540 ASSAY OF IRON: CPT

## 2019-09-09 NOTE — PROGRESS NOTES
[facility] Hematology/Oncology  PROGRESS NOTE - Follow-up Visit      Subjective:       Patient ID:   NAME: Conrad Kuhn : 1940     78 y.o. male    Referring Doc: Julien  Other Physicians: Ced Erazo Joubert, Srinivas, Pinsky    Chief Complaint:  CLL f/u    History of Present Illness:     Patient returns today for a regularly scheduled follow-up visit.  The patient is here today to go over the results of the recently ordered labs, tests and studies.. He is here by himself today. He is breathing ok. He denies any CP, SOB, HA's or N/V. He has had more fatigue. He is having some dysphagia. He had recent CT scan which is showing some increase in his LAD.           ROS:   GEN: normal without any fever, night sweats or weight loss  HEENT: normal with no HA's, sore throat, stiff neck, changes in vision  CV: normal with no CP, SOB, PND, MAYER or orthopnea  PULM: normal with no SOB, cough, hemoptysis, sputum or pleuritic pain  GI: normal with no abdominal pain, nausea, vomiting, constipation, diarrhea, melanotic stools, BRBPR, or hematemesis; some dysphagia  : increase urine frequency but stable  BREAST: normal with no mass, discharge, pain  SKIN: normal with no rash, erythema, bruising, or swelling    Allergies:  Review of patient's allergies indicates:  No Known Allergies    Medications:    Current Outpatient Medications:     atorvastatin (LIPITOR) 20 MG tablet, TK 1 T PO QD, Disp: , Rfl:     azelastine (ASTELIN) 137 mcg (0.1 %) nasal spray, 1 spray by Nasal route., Disp: , Rfl:     blood sugar diagnostic (FREESTYLE LITE STRIPS) Strp, by Other route., Disp: , Rfl:     ferrous sulfate (FEOSOL) 325 mg (65 mg iron) Tab tablet, Take 1 tablet (325 mg total) by mouth once daily., Disp: , Rfl:     FREESTYLE LANCETS 28 gauge lancets, TEST TWICE DAILY, Disp: , Rfl: 1    gabapentin (NEURONTIN) 300 MG capsule, TK ONE C PO  BID, Disp: , Rfl: 0    HYDROcodone-acetaminophen (NORCO)  mg per tablet, TK 1 T PO Q 8 H  PRN P, Disp: , Rfl: 0    levalbuterol (XOPENEX) 1.25 mg/3 mL nebulizer solution, 3 mLs., Disp: , Rfl:     metFORMIN (GLUCOPHAGE) 500 MG tablet, TK 1 T PO BID WC, Disp: , Rfl: 2    metoprolol succinate (TOPROL-XL) 25 MG 24 hr tablet, TK 1 T PO BID, Disp: , Rfl:     neomycin-polymyxin-dexamethasone (DEXACINE) 3.5 mg/g-10,000 unit/g-0.1 % Oint, Apply to eye daily as needed., Disp: , Rfl:     ondansetron (ZOFRAN-ODT) 8 MG TbDL, DIS ONE T PO Q 8 H PRN N, Disp: , Rfl: 10    promethazine (PHENERGAN) 25 MG tablet, TK 1 T PO Q 6 H PRN N, Disp: , Rfl: 10    traZODone (DESYREL) 100 MG tablet, TK 1 T PO  QHS, Disp: , Rfl: 0    valACYclovir (VALTREX) 1000 MG tablet, Take 1,000 mg by mouth 2 (two) times daily., Disp: , Rfl:     blood-glucose meter (FREESTYLE LITE METER) kit, Use as instructed, Disp: , Rfl:     PMHx/PSHx Updates:  See patient's last visit with me on 6/17/2019.  See H&P on 1/3/2019        Pathology:  Cancer Staging  No matching staging information was found for the patient.          Objective:     Vitals:  Blood pressure (!) 141/83, pulse 80, temperature 98.1 °F (36.7 °C), temperature source Oral, resp. rate 20, weight 97.2 kg (214 lb 3.2 oz).    Physical Examination:   GEN: no apparent distress, comfortable; AAOx3  HEAD: atraumatic and normocephalic  EYES: no pallor, no icterus, PERRLA  ENT: OMM, no pharyngeal erythema, external ears WNL; no nasal discharge; no thrush  NECK: no masses, thyroid normal, trachea midline, , supple; increase LN's on right neck  CV: RRR with no murmur; normal pulse; normal S1 and S2; no pedal edema  CHEST: Normal respiratory effort; CTAB; normal breath sounds; no wheeze or crackles  ABDOM: nontender and nondistended; soft; normal bowel sounds; no rebound/guarding  MUSC/Skeletal: ROM normal; no crepitus; joints normal; no deformities or arthropathy  EXTREM: no clubbing, cyanosis, inflammation or swelling; bruise on LUE resolved  SKIN: no rashes, lesions, ulcers, petechiae or  subcutaneous nodules  : no keith  NEURO: grossly intact; motor/sensory WNL; AAOx3; no tremors  PSYCH: normal mood, affect and behavior  LYMPH: normal cervical, supraclavicular, axillary and groin LN's            Labs:   9/4/2019  Lab Results   Component Value Date    WBC 4.80 09/04/2019    HGB 14.6 09/04/2019    HCT 41.0 09/04/2019    MCV 88 09/04/2019     (L) 09/04/2019     Gran # (ANC) 1.8 - 7.7 K/uL 1.4Low   1.2Low  R   Immature Grans (Abs) 0.00 - 0.04 K/uL 0.01  0.0 R   Comment: Mild elevation in immature granulocytes is non specific and   can be seen in a variety of conditions including stress response,   acute inflammation, trauma and pregnancy. Correlation with other   laboratory and clinical findings is essential.    Lymph # 1.0 - 4.8 K/uL 2.7  1.8 R   Mono # 0.3 - 1.0 K/uL 0.6  0.5 R   Eos # 0.0 - 0.5 K/uL 0.1  0.1 R   Baso # 0.00 - 0.20 K/uL 0.02  0.0 R   nRBC 0 /100 WBC 0     Gran% 38.0 - 73.0 % 28.8Low   33.2 R   Lymph% 18.0 - 48.0 % 56.3High   49.6 R   Mono% 4.0 - 15.0 % 13.3  15.0 R   Eosinophil% 0.0 - 8.0 % 1.0  1.9 R   Basophil% 0.0 - 1.9 % 0.4            BMP  Lab Results   Component Value Date     09/04/2019    K 4.4 09/04/2019     09/04/2019    CO2 29 09/04/2019    BUN 17 09/04/2019    CREATININE 1.0 09/04/2019    CALCIUM 9.1 09/04/2019    ANIONGAP 8 09/04/2019    ESTGFRAFRICA >60.0 09/04/2019    EGFRNONAA >60.0 09/04/2019     Lab Results   Component Value Date    ALT 28 09/04/2019    AST 29 09/04/2019    ALKPHOS 48 (L) 09/04/2019    BILITOT 0.9 09/04/2019     Lab Results   Component Value Date    IRON 165 (H) 09/04/2019    TIBC 360 09/04/2019    FERRITIN 41 09/04/2019             Radiology/Diagnostic Studies:    CT abdom/pelvis 8/7/2019:        Impression       1. Multifocal lymphadenopathy in the chest, abdomen, and pelvis compatible with provided clinical history of lymphocytic leukemia.  There is mixed interval change when compared to prior studies.  Pathologic  lymphadenopathy in the chest has increased significantly when compared to a CTA of the chest from May 2018.  Pathologic retroperitoneal lymphadenopathy has improved slightly when compared to a previous abdominal CT from February 2017.  Please see above details.  2. Mild aneurysmal dilation of the infrarenal abdominal aorta, with maximal transverse diameter of 3.7 cm.  Maximal transverse diameter on a prior study from 2017 was 2.5 cm.  3. Additional findings as above           [facility] Unknown Rad Eap    Result Date: 1/14/2019  CMS MANDATED QUALITY DATA - CT RADIATION - 436 All CT scans at this facility utilize dose modulation, iterative reconstruction, and/or weight based dosing when appropriate to reduce radiation dose to as low as reasonably achievable. Reason:Lymphoid leukemia TECHNIQUE: CT thorax with, CT abdomen  with, and CT pelvis with 100 mL Omnipaque 350. COMPARISON: CT chest dated 05/19/2018 and CT abdomen and pelvis dated 02/08/2017 CT THORAX: There is stable mediastinal, hilar and axillary adenopathy. There is coronary artery calcification. There is resolution of the groundglass opacities throughout the lungs. There are no confluent infiltrates, pulmonary nodules or pleural effusions. There are stable subcentimeter nodules in the left lobe of the thyroid gland. There are degenerative changes of the spine. CT ABDOMEN: The liver, pancreas, adrenal glands and gallbladder are normal. The spleen is enlarged. There is mesenteric and retroperitoneal adenopathy which is slightly smaller in size. -There is a portacaval node measuring 37 x 21 mm and previously measured 38 x 27 mm. -Periportal lymph node measuring 41 x 18 mm, previously measured 48 x 29 mm. -Left periaortic adenopathy measuring 34 x 23 mm and previously measured 40 x 42 mm- The kidneys enhance symmetrically without hydronephrosis or calculi. There are no thick-walled or dilated bowel loops. There is vascular calcification of aorta. There is a 3.0 x 3.0  cm infrarenal abdominal aortic aneurysm. CT PELVIS: There is adenopathy along the pelvic sidewalls bilaterally which has decreased in size. -The left common iliac adenopathy measuring 36 x 11 mm, previously measured 46 x 14 mm -the right common iliac adenopathy measuring 46 x 10 mm. Previously measured 53 x 15 mm. The bladder is normal. The prostate gland is enlarged. There are degenerative changes of the spine. There is grade 1 anterolisthesis of L5 on S1 with bilateral pars defects.     IMPRESSION: Stable mediastinal, hilar and axillary adenopathy with resolution of the airspace disease within the lungs Decrease in size of the mesenteric, retroperitoneal and pelvic adenopathy. Stable infrarenal abdominal aortic aneurysm Splenomegaly     Read and electronically signed by: Ivanna Arredondo MD on 1/14/2019 9:14 AM CST IVANNA ARREDONDO MD      I have reviewed all available lab results and radiology reports.    Assessment/Plan:   (1) 78 y.o. male with  diagnosis of CLL who has been referred by Dr Rony Victoria for continuation of care by medical hematology/oncology.   - BM biopsy  12/4/2015 with CLL  - diagnosis of SLL in 2004 and s/p FCR x6 in 2007  - s/p imbruvica in past (stopped in May 2018)  - latest wbc at 4.8; lymph 56%  - latest CT on 8/7/2019 showing increase in the LAD  - platelets are 111,000     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  - followed  By Dr Kee  - CEA 1.2  - CT scans on 1/14/2019 stable     (3) Iron deficiency anemia s/p IV iron couple weeks ago  - hgb 14.6 and adequate, and the iron panel currently wnl     (4) HTN - on BP meds     (6) Chronic neck and back issues - followed by Dr Ryan Rivero     (7) DM - currently off all meds     (8) Hypercholesterolemia - on meds    (9)  - followed by Dr Whitney        VISIT DIAGNOSES:      CLL (chronic lymphocytic leukemia)    History of lung cancer - ANDRES NSCLC 2004    Iron deficiency anemia, unspecified iron deficiency  anemia type    Lymphoma, small lymphocytic (2004)          PLAN:  1. Continue to check up to date labs incl. iron every 3 months  2. F/u with PCP, pain management  3. F/u with PCP, Pulm, , etc  4. Refer to Dr Don at [facility] for evaluation of the CT findings and CLL and recommendations for therapy options - possibly can just resume Imbruvica  5. Continue oral iron  6. Refer to Dr Yee about the dysphagia  RTC in 3-4 weeks  Fax note to Kacey Kee, Chelo Florence Devraj, Joselito Don        Discussion:       I spent over 25 mins of time with the patient. Reviewed results of the recently ordered labs, tests and studies; made directives with regards to the results. Over half of this time was spent couseling and coordinating care.    I have explained all of the above in detail and the patient understands all of the current recommendation(s). I have answered all of their questions to the best of my ability and to their complete satisfaction.   The patient is to continue with the current management plan.            Electronically signed by Drew Bai MD

## 2019-09-10 ENCOUNTER — OFFICE VISIT (OUTPATIENT)
Dept: HEMATOLOGY/ONCOLOGY | Facility: CLINIC | Age: 79
End: 2019-09-10
Payer: MEDICARE

## 2019-09-10 VITALS
DIASTOLIC BLOOD PRESSURE: 83 MMHG | RESPIRATION RATE: 20 BRPM | BODY MASS INDEX: 29.05 KG/M2 | TEMPERATURE: 98 F | WEIGHT: 214.19 LBS | HEART RATE: 80 BPM | SYSTOLIC BLOOD PRESSURE: 141 MMHG

## 2019-09-10 DIAGNOSIS — R13.10 DYSPHAGIA, UNSPECIFIED TYPE: ICD-10-CM

## 2019-09-10 DIAGNOSIS — C91.10 CLL (CHRONIC LYMPHOCYTIC LEUKEMIA): Primary | ICD-10-CM

## 2019-09-10 DIAGNOSIS — C83.00 LYMPHOMA, SMALL LYMPHOCYTIC: ICD-10-CM

## 2019-09-10 DIAGNOSIS — Z85.118 HISTORY OF LUNG CANCER: ICD-10-CM

## 2019-09-10 DIAGNOSIS — D50.9 IRON DEFICIENCY ANEMIA, UNSPECIFIED IRON DEFICIENCY ANEMIA TYPE: ICD-10-CM

## 2019-09-10 PROCEDURE — 99214 PR OFFICE/OUTPT VISIT, EST, LEVL IV, 30-39 MIN: ICD-10-PCS | Mod: S$GLB,,, | Performed by: INTERNAL MEDICINE

## 2019-09-10 PROCEDURE — 99214 OFFICE O/P EST MOD 30 MIN: CPT | Mod: S$GLB,,, | Performed by: INTERNAL MEDICINE

## 2019-09-30 ENCOUNTER — TELEPHONE (OUTPATIENT)
Dept: HEMATOLOGY/ONCOLOGY | Facility: CLINIC | Age: 79
End: 2019-09-30

## 2019-09-30 ENCOUNTER — LAB VISIT (OUTPATIENT)
Dept: LAB | Facility: HOSPITAL | Age: 79
End: 2019-09-30
Attending: INTERNAL MEDICINE
Payer: MEDICARE

## 2019-09-30 DIAGNOSIS — C91.10 CLL (CHRONIC LYMPHOCYTIC LEUKEMIA): Primary | ICD-10-CM

## 2019-09-30 DIAGNOSIS — C91.10 CLL (CHRONIC LYMPHOCYTIC LEUKEMIA): ICD-10-CM

## 2019-09-30 LAB
ALBUMIN SERPL BCP-MCNC: 4 G/DL (ref 3.5–5.2)
ALP SERPL-CCNC: 44 U/L (ref 55–135)
ALT SERPL W/O P-5'-P-CCNC: 20 U/L (ref 10–44)
ANION GAP SERPL CALC-SCNC: 6 MMOL/L (ref 8–16)
AST SERPL-CCNC: 21 U/L (ref 10–40)
BASOPHILS # BLD AUTO: 0.01 K/UL (ref 0–0.2)
BASOPHILS NFR BLD: 0.1 % (ref 0–1.9)
BILIRUB SERPL-MCNC: 0.7 MG/DL (ref 0.1–1)
BUN SERPL-MCNC: 17 MG/DL (ref 8–23)
CALCIUM SERPL-MCNC: 9.1 MG/DL (ref 8.7–10.5)
CHLORIDE SERPL-SCNC: 100 MMOL/L (ref 95–110)
CO2 SERPL-SCNC: 31 MMOL/L (ref 23–29)
CREAT SERPL-MCNC: 1 MG/DL (ref 0.5–1.4)
DIFFERENTIAL METHOD: ABNORMAL
EOSINOPHIL # BLD AUTO: 0.1 K/UL (ref 0–0.5)
EOSINOPHIL NFR BLD: 0.7 % (ref 0–8)
ERYTHROCYTE [DISTWIDTH] IN BLOOD BY AUTOMATED COUNT: 13.7 % (ref 11.5–14.5)
EST. GFR  (AFRICAN AMERICAN): >60 ML/MIN/1.73 M^2
EST. GFR  (NON AFRICAN AMERICAN): >60 ML/MIN/1.73 M^2
GLUCOSE SERPL-MCNC: 133 MG/DL (ref 70–110)
HCT VFR BLD AUTO: 41.5 % (ref 40–54)
HGB BLD-MCNC: 14.8 G/DL (ref 14–18)
IMM GRANULOCYTES # BLD AUTO: 0.01 K/UL (ref 0–0.04)
IMM GRANULOCYTES NFR BLD AUTO: 0.1 % (ref 0–0.5)
LYMPHOCYTES # BLD AUTO: 3.5 K/UL (ref 1–4.8)
LYMPHOCYTES NFR BLD: 49.2 % (ref 18–48)
MCH RBC QN AUTO: 31.4 PG (ref 27–31)
MCHC RBC AUTO-ENTMCNC: 35.7 G/DL (ref 32–36)
MCV RBC AUTO: 88 FL (ref 82–98)
MONOCYTES # BLD AUTO: 1.9 K/UL (ref 0.3–1)
MONOCYTES NFR BLD: 27.2 % (ref 4–15)
NEUTROPHILS # BLD AUTO: 1.6 K/UL (ref 1.8–7.7)
NEUTROPHILS NFR BLD: 22.7 % (ref 38–73)
NRBC BLD-RTO: 0 /100 WBC
PLATELET # BLD AUTO: 107 K/UL (ref 150–350)
PMV BLD AUTO: 9.8 FL (ref 9.2–12.9)
POTASSIUM SERPL-SCNC: 4.1 MMOL/L (ref 3.5–5.1)
PROT SERPL-MCNC: 7.3 G/DL (ref 6–8.4)
RBC # BLD AUTO: 4.72 M/UL (ref 4.6–6.2)
SODIUM SERPL-SCNC: 137 MMOL/L (ref 136–145)
WBC # BLD AUTO: 7.14 K/UL (ref 3.9–12.7)

## 2019-09-30 PROCEDURE — 85025 COMPLETE CBC W/AUTO DIFF WBC: CPT

## 2019-09-30 PROCEDURE — 80053 COMPREHEN METABOLIC PANEL: CPT

## 2019-09-30 NOTE — TELEPHONE ENCOUNTER
Returned call and the patient was out. Spoke with his wife and he has not been feeling good. He had swollen lymph nodes and has has night sweats and an upset stomach. Denies being sick or having sick exposure no fevers. Orders place for a cbc, cmp for  Patient to have drawn in the morning. He will call me after having labs drawn for results.

## 2019-09-30 NOTE — TELEPHONE ENCOUNTER
----- Message from Olimpia Denton sent at 9/30/2019  9:43 AM CDT -----  Pt called and stated his lymph nodes in his neck were swollen a couple nights ago and they have went down, but now he is dizzy and weak.  He has been having night sweats, no fever.  He is requesting an appt with Dr. Bai.    # [phone]

## 2019-10-01 ENCOUNTER — TELEPHONE (OUTPATIENT)
Dept: HEMATOLOGY/ONCOLOGY | Facility: CLINIC | Age: 79
End: 2019-10-01

## 2019-10-03 DIAGNOSIS — C83.00 LYMPHOMA, SMALL LYMPHOCYTIC: Primary | ICD-10-CM

## 2019-10-03 RX ORDER — SODIUM CHLORIDE 0.9 % (FLUSH) 0.9 %
10 SYRINGE (ML) INJECTION
Status: CANCELLED | OUTPATIENT
Start: 2019-10-07

## 2019-10-03 RX ORDER — HEPARIN 100 UNIT/ML
500 SYRINGE INTRAVENOUS
Status: CANCELLED | OUTPATIENT
Start: 2019-10-07

## 2019-10-03 RX ORDER — ACETAMINOPHEN 325 MG/1
650 TABLET ORAL
Status: CANCELLED | OUTPATIENT
Start: 2019-10-07

## 2019-10-03 RX ORDER — FAMOTIDINE 10 MG/ML
20 INJECTION INTRAVENOUS
Status: CANCELLED | OUTPATIENT
Start: 2019-10-07

## 2019-10-03 RX ORDER — MEPERIDINE HYDROCHLORIDE 50 MG/ML
25 INJECTION INTRAMUSCULAR; INTRAVENOUS; SUBCUTANEOUS
Status: CANCELLED | OUTPATIENT
Start: 2019-10-07

## 2019-10-03 NOTE — PROGRESS NOTES
Hematology/Oncology physician Note:    I communicated with Dr Wheeler today; patient is still inpatient at [facility] plans to get LN biopsy and will then be discharged. She wants us to start him on rituximab this Monday in the meantime.

## 2019-10-06 NOTE — PROGRESS NOTES
[facility] Hematology/Oncology  PROGRESS NOTE - Follow-up Visit      Subjective:       Patient ID:   NAME: Conrad Kuhn : 1940     78 y.o. male    Referring Doc: Julien  Other Physicians: Ced Erazo Joubert, Srinivas, Pinsky    Chief Complaint:  CLL f/u    History of Present Illness:     Patient returns today for a regularly scheduled follow-up visit.  The patient is here today to go over the results of the recently ordered labs, tests and studies.. He is here by himself today. He is breathing ok. He denies any CP, SOB, HA's or N/V. He has had more fatigue. He is having some dysphagia. He had recent CT scan which is showing some increase in his LAD. He was recently hospitalized at [facility] and seen by Dr Wheeler. Dr Wheeler has recommended Rituximab. He is starting tomorrow.           ROS:   GEN: normal without any fever, night sweats or weight loss  HEENT: normal with no HA's, sore throat, stiff neck, changes in vision  CV: normal with no CP, SOB, PND, MAYER or orthopnea  PULM: normal with no SOB, cough, hemoptysis, sputum or pleuritic pain  GI: normal with no abdominal pain, nausea, vomiting, constipation, diarrhea, melanotic stools, BRBPR, or hematemesis; some dysphagia  : increase urine frequency but stable  BREAST: normal with no mass, discharge, pain  SKIN: normal with no rash, erythema, bruising, or swelling    Allergies:  Review of patient's allergies indicates:  No Known Allergies    Medications:    Current Outpatient Medications:     atorvastatin (LIPITOR) 20 MG tablet, TK 1 T PO QD, Disp: , Rfl:     azelastine (ASTELIN) 137 mcg (0.1 %) nasal spray, 1 spray by Nasal route., Disp: , Rfl:     blood sugar diagnostic (FREESTYLE LITE STRIPS) Strp, by Other route., Disp: , Rfl:     ferrous sulfate (FEOSOL) 325 mg (65 mg iron) Tab tablet, Take 1 tablet (325 mg total) by mouth once daily., Disp: , Rfl:     FREESTYLE LANCETS 28 gauge lancets, TEST TWICE DAILY, Disp: , Rfl: 1    gabapentin (NEURONTIN) 300  MG capsule, TK ONE C PO  BID, Disp: , Rfl: 0    HYDROcodone-acetaminophen (NORCO)  mg per tablet, TK 1 T PO Q 8 H PRN P, Disp: , Rfl: 0    levalbuterol (XOPENEX) 1.25 mg/3 mL nebulizer solution, 3 mLs., Disp: , Rfl:     metFORMIN (GLUCOPHAGE) 500 MG tablet, TK 1 T PO BID WC, Disp: , Rfl: 2    metoprolol succinate (TOPROL-XL) 25 MG 24 hr tablet, TK 1 T PO BID, Disp: , Rfl:     neomycin-polymyxin-dexamethasone (DEXACINE) 3.5 mg/g-10,000 unit/g-0.1 % Oint, Apply to eye daily as needed., Disp: , Rfl:     ondansetron (ZOFRAN-ODT) 8 MG TbDL, DIS ONE T PO Q 8 H PRN N, Disp: , Rfl: 10    promethazine (PHENERGAN) 25 MG tablet, TK 1 T PO Q 6 H PRN N, Disp: , Rfl: 10    traZODone (DESYREL) 100 MG tablet, TK 1 T PO  QHS, Disp: , Rfl: 0    valACYclovir (VALTREX) 1000 MG tablet, Take 1,000 mg by mouth 2 (two) times daily., Disp: , Rfl:     blood-glucose meter (FREESTYLE LITE METER) kit, Use as instructed, Disp: , Rfl:     PMHx/PSHx Updates:  See patient's last visit with me on 6/17/2019.  See H&P on 1/3/2019        Pathology:  Cancer Staging  No matching staging information was found for the patient.          Objective:     Vitals:  Blood pressure (!) 90/57, pulse 81, temperature 97.9 °F (36.6 °C), resp. rate 20, weight 95 kg (209 lb 6.4 oz).    Physical Examination:   GEN: no apparent distress, comfortable; AAOx3  HEAD: atraumatic and normocephalic  EYES: no pallor, no icterus, PERRLA  ENT: OMM, no pharyngeal erythema, external ears WNL; no nasal discharge; no thrush  NECK: no masses, thyroid normal, trachea midline, , supple; increase LN's on right neck  CV: RRR with no murmur; normal pulse; normal S1 and S2; no pedal edema  CHEST: Normal respiratory effort; CTAB; normal breath sounds; no wheeze or crackles  ABDOM: nontender and nondistended; soft; normal bowel sounds; no rebound/guarding  MUSC/Skeletal: ROM normal; no crepitus; joints normal; no deformities or arthropathy  EXTREM: no clubbing, cyanosis,  inflammation or swelling; bruise on LUE resolved  SKIN: no rashes, lesions, ulcers, petechiae or subcutaneous nodules  : no keith  NEURO: grossly intact; motor/sensory WNL; AAOx3; no tremors  PSYCH: normal mood, affect and behavior  LYMPH: normal cervical, supraclavicular, axillary and groin LN's            Labs:   9/30/2019  Lab Results   Component Value Date    WBC 7.14 09/30/2019    HGB 14.8 09/30/2019    HCT 41.5 09/30/2019    MCV 88 09/30/2019     (L) 09/30/2019       BMP  Lab Results   Component Value Date     09/30/2019    K 4.1 09/30/2019     09/30/2019    CO2 31 (H) 09/30/2019    BUN 17 09/30/2019    CREATININE 1.0 09/30/2019    CALCIUM 9.1 09/30/2019    ANIONGAP 6 (L) 09/30/2019    ESTGFRAFRICA >60.0 09/30/2019    EGFRNONAA >60.0 09/30/2019     Lab Results   Component Value Date    ALT 20 09/30/2019    AST 21 09/30/2019    ALKPHOS 44 (L) 09/30/2019    BILITOT 0.7 09/30/2019     Lab Results   Component Value Date    IRON 165 (H) 09/04/2019    TIBC 360 09/04/2019    FERRITIN 41 09/04/2019             Radiology/Diagnostic Studies:    CT abdom/pelvis 8/7/2019:        Impression       1. Multifocal lymphadenopathy in the chest, abdomen, and pelvis compatible with provided clinical history of lymphocytic leukemia.  There is mixed interval change when compared to prior studies.  Pathologic lymphadenopathy in the chest has increased significantly when compared to a CTA of the chest from May 2018.  Pathologic retroperitoneal lymphadenopathy has improved slightly when compared to a previous abdominal CT from February 2017.  Please see above details.  2. Mild aneurysmal dilation of the infrarenal abdominal aorta, with maximal transverse diameter of 3.7 cm.  Maximal transverse diameter on a prior study from 2017 was 2.5 cm.  3. Additional findings as above           [facility] Unknown Rad Eap    Result Date: 1/14/2019  CMS MANDATED QUALITY DATA - CT RADIATION - 436 All CT scans at this facility utilize  dose modulation, iterative reconstruction, and/or weight based dosing when appropriate to reduce radiation dose to as low as reasonably achievable. Reason:Lymphoid leukemia TECHNIQUE: CT thorax with, CT abdomen  with, and CT pelvis with 100 mL Omnipaque 350. COMPARISON: CT chest dated 05/19/2018 and CT abdomen and pelvis dated 02/08/2017 CT THORAX: There is stable mediastinal, hilar and axillary adenopathy. There is coronary artery calcification. There is resolution of the groundglass opacities throughout the lungs. There are no confluent infiltrates, pulmonary nodules or pleural effusions. There are stable subcentimeter nodules in the left lobe of the thyroid gland. There are degenerative changes of the spine. CT ABDOMEN: The liver, pancreas, adrenal glands and gallbladder are normal. The spleen is enlarged. There is mesenteric and retroperitoneal adenopathy which is slightly smaller in size. -There is a portacaval node measuring 37 x 21 mm and previously measured 38 x 27 mm. -Periportal lymph node measuring 41 x 18 mm, previously measured 48 x 29 mm. -Left periaortic adenopathy measuring 34 x 23 mm and previously measured 40 x 42 mm- The kidneys enhance symmetrically without hydronephrosis or calculi. There are no thick-walled or dilated bowel loops. There is vascular calcification of aorta. There is a 3.0 x 3.0 cm infrarenal abdominal aortic aneurysm. CT PELVIS: There is adenopathy along the pelvic sidewalls bilaterally which has decreased in size. -The left common iliac adenopathy measuring 36 x 11 mm, previously measured 46 x 14 mm -the right common iliac adenopathy measuring 46 x 10 mm. Previously measured 53 x 15 mm. The bladder is normal. The prostate gland is enlarged. There are degenerative changes of the spine. There is grade 1 anterolisthesis of L5 on S1 with bilateral pars defects.     IMPRESSION: Stable mediastinal, hilar and axillary adenopathy with resolution of the airspace disease within the lungs  Decrease in size of the mesenteric, retroperitoneal and pelvic adenopathy. Stable infrarenal abdominal aortic aneurysm Splenomegaly     Read and electronically signed by: Ivanna Arredondo MD on 1/14/2019 9:14 AM CST IVANNA ARREDONDO MD      I have reviewed all available lab results and radiology reports.    Assessment/Plan:   (1) 78 y.o. male with  diagnosis of CLL who has been referred by Dr Rony Victoria for continuation of care by medical hematology/oncology.   - BM biopsy  12/4/2015 with CLL  - diagnosis of SLL in 2004 and s/p FCR x6 in 2007  - s/p imbruvica in past (stopped in May 2018)  - latest wbc at 4.8; lymph 56%  - latest CT on 8/7/2019 showing increase in the LAD  - platelets are 111,000  - He was recently hospitalized at [facility] and seen by Dr Wheeler. Dr Wheeler has recommended Rituximab. He is starting tomorrow.   - discussed the rituximab regimen and the potential side-effect profile; he is getting chemotherapy school today with [employer] [facility]  - he plans to see Dr Don on Oct 21st for follow-up visit       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  - followed  By Dr Kee  - CEA 1.2  - CT scans on 1/14/2019 stable     (3) Iron deficiency anemia s/p IV iron couple weeks ago  - hgb 14.6 and adequate, and the iron panel currently wnl     (4) HTN - on BP meds     (6) Chronic neck and back issues - followed by Dr Ryan Rivero     (7) DM - currently off all meds     (8) Hypercholesterolemia - on meds    (9)  - followed by Dr Whitney        VISIT DIAGNOSES:      CLL (chronic lymphocytic leukemia)    History of lung cancer - ANDRES NSCLC 2004    Iron deficiency anemia, unspecified iron deficiency anemia type    Lymphoma, small lymphocytic (2004)          PLAN:  1. check labs weekly while on therapy  2. F/u with PCP, pain management  3. F/u with PCP, Pulm, , etc  4. F/u with Dr Don at [facility] on Oct 21st  5. Continue oral iron  6. F/u with Dr Yee about the dysphagia  7. Starting  rituximab tomorrow as per Dr Wheeler's instructions; chemotherapy school is today  RTC in 3-4 weeks  Fax note to Kacey Kee, Chelo Florence Devraj, Saba/Joselito Wheeler        Discussion:       I spent over 25 mins of time with the patient. Reviewed results of the recently ordered labs, tests and studies; made directives with regards to the results. Over half of this time was spent couseling and coordinating care.    I have explained all of the above in detail and the patient understands all of the current recommendation(s). I have answered all of their questions to the best of my ability and to their complete satisfaction.   The patient is to continue with the current management plan.        Chemotherapy Discussion:      I discussed the available treatment option(s) in accordance with the latest/current national evidence-based guidelines (NCCN, UpToDate, NCI, ASCO, etc where applicable), their overall age/condition and their co-morbidities. I also went over the risks and benefits of the chemotherapy with regard to their particular cancer type, their cancer stage, their age/condition, and their co-morbidities. I provided literature on the chemotherapy regimen and discussed the chemotherapy side-effect profiles of the drug(s). I discussed the importance of compliance with obtaining and monitoring weekly lab work, and went over the potential hematopathology issues and risks with anemia, leucopenia and thrombocytopenia that can occur with chemotherapy. I discussed the potential risks of liver and kidney damage, which could be permanent and could necessitate dialysis long-term if kidney failure developed. I discussed the emetic and/or diarrheal potential of the regimen and the potential need for use of antiemetic and anti-diarrheal medications. I discussed the risk for development of anaphylactic shock, bronchospasm, dysrhythmia, and respiratory/cardiovascular arrest and/or failure. I discussed the potential  risks for development of alopecia, cold sensory issues, ringing in ears, vertigo, cataracts, glaucoma, and neuropathy, all of which could end up being chronic and life-long. Some chemotherpyI discussed the risks of hand-foot syndrome and rashes, and development of other autoimmune mediated processes such as pneumonitis, hepatitis, and colitis which could be life threatening. I discussed the risks of the potential development of a rare but fatal viral mediated disease known as PML (Progressive Multifocal Leukoencephalopathy), and risk of future development of leukemia and/or lymphoma from use of certain chemotherapy agents. I discussed the need for neutropenic precautions, basic hygiene/sanitation behaviors and dietary restrictions.    The patient's consent has been obtained to proceed with the chemotherapy.The patient will be referred to Chemotherapy School /[facility] for training and education on chemotherapy, use of antiemetics and/or anti-diarrheals, use of NSAID's, potential chemotherapy side-effects, and any specific recommendations and precautions with the particular chemotherapy agents.       Immunologic Therapy Discussion:    I discussed the available treatment option(s) in accordance with the latest/current national evidence-based guidelines (NCCN, UpToDate, NCI, ASCO, etc where applicable), their overall age/condition and their co-morbidities. I went over the risks and benefits of the immunotherapy with regard to their particular cancer type, their cancer stage, their age/condition, and their co-morbidities. I provided literature on the immunotherapy regimen and discussed the immunotherapy side-effect profiles of the drug(s). I discussed the importance of compliance with obtaining and monitoring weekly lab work, and went over the potential hematopathology issues and risks of hemato-pathologic issues with anemia, leucopenia and/or thrombocytopenia and effects on thyroid function that can occur with  immunotherapy. The patient will most likely need to have there thyroid functions monitored by their PCP and may need to take thyroid medication while on the immunotherapy. I discussed the potential risks of liver and kidney damage, which could be permanent and could necessitate dialysis long-term if kidney failure developed. I discussed the emetic and/or diarrheal potential of the regimen and the potential need for use of antiemetic and anti-diarrheal medications. I discussed the risk for development of anaphylactic shock, bronchospasm, dysrhythmia, and respiratory/cardiovascular arrest and/or failure. I discussed the potential risks for development of alopecia, cold sensory issues, ringing in ears, vertigo and neuropathy, all of which could end up being chronic and life-long. I discussed the risks of hand-foot syndrome and rashes, and development of other autoimmune mediated processes such as pneumonitis, hepatitis and colitis which could potentially be life threatening. I discussed the risks of the potential development of a rare but fatal viral mediated disease known as PML (Progressive Multifocal Leukoencephalopathy), and risk of future development of leukemia and/or lymphoma from use of certain immunotherapy agents. I discussed the possibility that immunologic therapy cold worsen or promote progression of any underlying autoimmune diseases such as sarcoidosis, ulcerative colitis, Crohn's disease, psoriasis, rheumatoid disorders, scleroderma, autoimmune nephritis disorders, Hashimoto's thyroiditis, and Lupus among others. I discussed the need for neutropenic precautions, basic hygiene/sanitation behaviors and dietary restrictions.    The patient's consent has been obtained to proceed with the immunotherapy.The patient will be referred to Immunotherapy School /[facility] for training and education on immunotherapy, use of antiemetics and/or anti-diarrheals, use of NSAID's, potential immunotherapy  side-effects, and any specific recommendations and precautions with the particular immunotherapy agents.      I answered all of the patient's (and family's, if applicable) questions to the best of my ability and to their complete satisfaction. The patient acknowledged full understanding of the risks, recommendations and plan(s).       I answered all of the patient's (and family's, if applicable) questions to the best of my ability and to their complete satisfaction. The patient acknowledged full understanding of the risks, recommendations and plan(s).         Electronically signed by Drew Bai MD

## 2019-10-07 ENCOUNTER — LAB VISIT (OUTPATIENT)
Dept: LAB | Facility: HOSPITAL | Age: 79
End: 2019-10-07
Attending: INTERNAL MEDICINE
Payer: MEDICARE

## 2019-10-07 ENCOUNTER — OFFICE VISIT (OUTPATIENT)
Dept: HEMATOLOGY/ONCOLOGY | Facility: CLINIC | Age: 79
End: 2019-10-07
Payer: MEDICARE

## 2019-10-07 ENCOUNTER — DOCUMENTATION ONLY (OUTPATIENT)
Dept: HEMATOLOGY/ONCOLOGY | Facility: CLINIC | Age: 79
End: 2019-10-07

## 2019-10-07 ENCOUNTER — TELEPHONE (OUTPATIENT)
Dept: HEMATOLOGY/ONCOLOGY | Facility: CLINIC | Age: 79
End: 2019-10-07

## 2019-10-07 ENCOUNTER — CLINICAL SUPPORT (OUTPATIENT)
Dept: HEMATOLOGY/ONCOLOGY | Facility: CLINIC | Age: 79
End: 2019-10-07
Payer: MEDICARE

## 2019-10-07 VITALS
WEIGHT: 209.38 LBS | HEART RATE: 81 BPM | SYSTOLIC BLOOD PRESSURE: 90 MMHG | BODY MASS INDEX: 28.4 KG/M2 | RESPIRATION RATE: 20 BRPM | TEMPERATURE: 98 F | DIASTOLIC BLOOD PRESSURE: 57 MMHG

## 2019-10-07 DIAGNOSIS — D50.9 IRON DEFICIENCY ANEMIA, UNSPECIFIED IRON DEFICIENCY ANEMIA TYPE: ICD-10-CM

## 2019-10-07 DIAGNOSIS — C83.00 LYMPHOMA, SMALL LYMPHOCYTIC: ICD-10-CM

## 2019-10-07 DIAGNOSIS — C91.10 CLL (CHRONIC LYMPHOCYTIC LEUKEMIA): Primary | ICD-10-CM

## 2019-10-07 DIAGNOSIS — Z85.118 HISTORY OF LUNG CANCER: ICD-10-CM

## 2019-10-07 DIAGNOSIS — C91.10 CLL (CHRONIC LYMPHOCYTIC LEUKEMIA): ICD-10-CM

## 2019-10-07 LAB
ALBUMIN SERPL BCP-MCNC: 4 G/DL (ref 3.5–5.2)
ALP SERPL-CCNC: 55 U/L (ref 55–135)
ALT SERPL W/O P-5'-P-CCNC: 17 U/L (ref 10–44)
ANION GAP SERPL CALC-SCNC: 5 MMOL/L (ref 8–16)
ANISOCYTOSIS BLD QL SMEAR: SLIGHT
AST SERPL-CCNC: 18 U/L (ref 10–40)
BASOPHILS NFR BLD: 0 % (ref 0–1.9)
BILIRUB SERPL-MCNC: 0.8 MG/DL (ref 0.1–1)
BLASTS NFR BLD MANUAL: 0 %
BUN SERPL-MCNC: 19 MG/DL (ref 8–23)
CALCIUM SERPL-MCNC: 9 MG/DL (ref 8.7–10.5)
CHLORIDE SERPL-SCNC: 101 MMOL/L (ref 95–110)
CO2 SERPL-SCNC: 29 MMOL/L (ref 23–29)
CREAT SERPL-MCNC: 1.2 MG/DL (ref 0.5–1.4)
DIFFERENTIAL METHOD: ABNORMAL
EOSINOPHIL NFR BLD: 0 % (ref 0–8)
ERYTHROCYTE [DISTWIDTH] IN BLOOD BY AUTOMATED COUNT: 13.6 % (ref 11.5–14.5)
EST. GFR  (AFRICAN AMERICAN): >60 ML/MIN/1.73 M^2
EST. GFR  (NON AFRICAN AMERICAN): 57.2 ML/MIN/1.73 M^2
FERRITIN SERPL-MCNC: 48 NG/ML (ref 20–300)
GLUCOSE SERPL-MCNC: 284 MG/DL (ref 70–110)
HCT VFR BLD AUTO: 41.4 % (ref 40–54)
HGB BLD-MCNC: 14.9 G/DL (ref 14–18)
IMM GRANULOCYTES # BLD AUTO: ABNORMAL K/UL (ref 0–0.04)
IMM GRANULOCYTES NFR BLD AUTO: ABNORMAL % (ref 0–0.5)
IRON SERPL-MCNC: 143 UG/DL (ref 45–160)
LYMPHOCYTES NFR BLD: 57 % (ref 18–48)
MCH RBC QN AUTO: 31.4 PG (ref 27–31)
MCHC RBC AUTO-ENTMCNC: 36 G/DL (ref 32–36)
MCV RBC AUTO: 87 FL (ref 82–98)
METAMYELOCYTES NFR BLD MANUAL: 0 %
MONOCYTES NFR BLD: 26 % (ref 4–15)
MYELOCYTES NFR BLD MANUAL: 0 %
NEUTROPHILS NFR BLD: 17 % (ref 38–73)
NEUTS BAND NFR BLD MANUAL: 0 %
NRBC BLD-RTO: 0 /100 WBC
PLATELET # BLD AUTO: 154 K/UL (ref 150–350)
PMV BLD AUTO: 10 FL (ref 9.2–12.9)
POTASSIUM SERPL-SCNC: 4.5 MMOL/L (ref 3.5–5.1)
PROMYELOCYTES NFR BLD MANUAL: 0 %
PROT SERPL-MCNC: 7 G/DL (ref 6–8.4)
RBC # BLD AUTO: 4.74 M/UL (ref 4.6–6.2)
SATURATED IRON: 39 % (ref 20–50)
SODIUM SERPL-SCNC: 135 MMOL/L (ref 136–145)
TOTAL IRON BINDING CAPACITY: 365 UG/DL (ref 250–450)
TRANSFERRIN SERPL-MCNC: 261 MG/DL (ref 200–375)
WBC # BLD AUTO: 17.94 K/UL (ref 3.9–12.7)

## 2019-10-07 PROCEDURE — 82728 ASSAY OF FERRITIN: CPT

## 2019-10-07 PROCEDURE — 99214 PR OFFICE/OUTPT VISIT, EST, LEVL IV, 30-39 MIN: ICD-10-PCS | Mod: S$GLB,,, | Performed by: INTERNAL MEDICINE

## 2019-10-07 PROCEDURE — 80053 COMPREHEN METABOLIC PANEL: CPT

## 2019-10-07 PROCEDURE — 83540 ASSAY OF IRON: CPT

## 2019-10-07 PROCEDURE — 85027 COMPLETE CBC AUTOMATED: CPT

## 2019-10-07 PROCEDURE — 85007 BL SMEAR W/DIFF WBC COUNT: CPT

## 2019-10-07 PROCEDURE — 99214 OFFICE O/P EST MOD 30 MIN: CPT | Mod: S$GLB,,, | Performed by: INTERNAL MEDICINE

## 2019-10-07 NOTE — PROGRESS NOTES
Conrad Kuhn  [id number]    [facility]    TITLE: PLAN OF CARE FOR THE CHEMOTHERAPY PATIENT / TEACHING PROTOCOL    PURPOSE: To involve the patient / significant other in the plan of care and to provide teaching to the significant other & patient receiving chemotherapy.    LEVEL: Independent.    CONTENT: The Plan of Care for the chemotherapy patient is individualized and appropriate to the patients needs, strengths, limitations, & goals.  Education includes information regarding chemotherapy side effects, the treatment itself, and self-care  Activities.    GOAL / OUTCOME STANDARDS    PHYSIOLOGIC: The client will remain free or experience minimal side effects or toxicities throughout the chemotherapy treatment period.     PSYCHOLOGIC: The client/significant others will demonstrate positive coping mechanisms in relation to chemotherapy and its side effects.      COGINITIVE: The client/significant others will verbalize understanding of self-care measure to avoid/minimize side effects of the chemotherapy regime.    EVALUATION / COMMENT KEY:    V = Audiovisual/Video  S = Successfully meets outcome  N = Needs further instruction  NA = Not applicable to the patient  P = Previous knowledge  U = Unable to comprehend  * = See progress notes          PLAN OF CARE  INFORMATION TO BE DELIVERED / NURSING INTERVENTIONS DATE EVALUATION   1. Assessment of client/caregiver,         knowledge of cancer diagnosis,         and chemotherapy as a treatment. 1a. Evaluate patient/caregiver learning ability    b. Plan teaching sessions with patient/caregiver according to needs and present anxiety level/ability to learn.    c. Provide Chemotherapy Education Packet,        Mouth Care Protocol,         Specific Patient Education Sheets. 10/07/2019 S   2. Individual chemotherapy treatment         plan. 2a. Review of Chemotherapy Education handout from [employer]            10/07/2019   S   3. Knowledge Deficit &  Self-Management of general side effects common to all chemotherapy:  a. Nausea/Vomiting  b.   Diarrhea  c. Mouth Care  d. Dental care  e. Constipation  f. Hair Loss  g. Potential for infection  h. Fatigue   3a. Reinforce that the majority of side effects from chemotherapy are reversible and are  controlled both in the hospital and at home        (blood counts recover, hair grows back).   b.  Refer to the following for reinforcement of         information post-treatment:  1. Mouth Care Protocol.  2. Bowel Protocol for constipation or diarrhea.  3.  Drug Specific Chemotherapy Information Sheets for each medication patient receiving.    10/07/2019     S     PLAN OF CARE  INFORMATION TO BE DELIVERED / NURSING INTERVENTIONS DATE EVALUATION   h. Potential for bleeding         i. Potential anemia/fatigue         j. Potential sunburn         k. Birth control measures  l. Safety measures post treatment 4.  Chemotherapy Home Care Instruction  and Safety Information Sheet.  A. patient/caregivers to thoroughly cook shellfish (shrimp, crab, etc) to decrease the chance of infection.    B.  Use sunscreen and protective clothing while in the sun.   10/07/2019      4. Knowledge deficit & Self Management of Drug Specific  Side Effects.    a. BLADDER EFFECTS        (Hemorrhagic Cystitis)                  Preventable with adequate hydration; occurs 2-3 days or more post treatment.   1.  Instruct patient to:  a.   Void at least every 2 hours; increase intake.  b.   DO NOT hold urine; go when urge is felt.  c.    Empty bladder at bedtime and on         awakening.  d.   Observe for color changes (red to tea           colored), amount and frequency changes.  e.   Notify oncologist of any abnormalities           in urine or voiding or if you cannot               drink adequate fluids.   10/07/2019   S   b.   CHANGES IN URINE        COLOR:      1.   Instruct patient:  a.   Most evident in first 2-3 voidings after            administration.  b. Lasts less than 24 hours.  c. If urine is discolored 2 or more days post- treatment, notify oncologist.      10/07/2019 S   c.    KIDNEY EFFECTS           (Nephrotoxicity)   1.  Instruct patient to:  a.   Drink 8-16 glasses of fluid/day the day   pre-treatment and 3-4 days post-treatment to maintain hydration; the best way to minimize kidney problems.  b.   Notify oncologist immediately if unable to drink fluids or if changes are noted in urinary elimination.     10/07/2019   S   a. PULMONARY TOXICITY    1. Instruct patient to report symptoms such as shortness of breath, chest pain, shallow breathing, or chest wall discomfort to physician.  2. Reinforce preventative measures used by the health care team.  a. Baseline and periodic PFT and chest x-ray.   10/07/2019   S     PLAN OF CARE INFORMATION TO BE DELIVERED / NURSING INTERVENTIONS DATE EVALUATION   b. NERVE & MUSCLE EFFECTS (neurotoxocity; neuropathy, possible visual/hearing changes)        3. Instruct patient to:    a. Report numbness or tingling of the hands/feet, loss of fine motor movement (buttoning shirt, tying shoelaces), or gait changes to your oncologist.  b. If numbness/tingling are present:  1. protect feet with shoes at all times.  2. Use gloves for washing dishes/gardening & potholders in kitchen.       10/07/2019   S   c. CARDIOTOXICITY  Decreased effectiveness of             cardiac function. Effective are                  cumulative and irreversible.                                    CARDIAC ARRYTHMIAS              4   Instruct:  a. Heart function may be tested before treatment and perdiocally during treatment.  b. Notify oncologist of irregular pulse, palpitations, shortness of breath, or swelling in lower extremities/feet.          Taxol and Taxotere can cause arrhythmias on infusion that resolve once infusion discontinued. Instruct nurse if any irregularity felt.    10/07/2019   S   d. EXTRAVASTION  Occurs when vesicants  leak outside of vein and cause damage to the skin and underlying tissues.   1. Reinforce preventive measures used to avoid complications.  a. Fresh IV site or central line monitored continuously with vesicant IVP.  b. Continuous infusion via central line site and blood return monitored periodically around the clock.  2. Instruct to:  a. Notify nurse of any discomfort, burning, stinging, etc. at IV site during chemotherapy administration.  b. Notify oncologist of any redness, pain, or swelling at IV site after discharge from hospital.   10/07/2019   S   e. HYPERSENSITIVITY can happen with any medication.   1. Instruct patient:  a. Nurse is with them during the initial part of treatment and will be close by to monitor.  b. Pre-medication ordered by the oncologist must be taken on time. If doses are missed, treatment will need to be re-scheduled.  c. Skin redness, itching, or hives appearing after discharge should be reported to oncologist. 10/07/2019   S       PLAN OF CARE INFORMATION TO BE DELIVERED / NURSING INTERVENTIONS DATE EVALUATION   f. FLU-LIKE SYNDROME      1. Instruct patient symptoms are hard to prevent and may include fever, shaking chills, muscle and body aches.  a. Taking prescribed medications from physician if needed.  b. Adequate fluids are important.    2. Reinforce the need to call if temperature is         elevated to 100.4 or more  10/07/2019   S   g. HAND-FOOT SYNDROME  causes painful, symmetric swelling and redness of palms and soles                  5. Instruct patient to report any numbness or tingling in the hands or feet.  6. Explain prevention techniques, such as     a. Use heavy moisturizers to lessen skin dryness and itching, but to avoid if skin is cracked or broken  b. Bathe in tepid water, use non-perfumed soap, and wash gently. Baths with oatmeal or diluted baking soda may be soothing.  c. Avoid tight fitting shoes and repetitive actions, such as rubbing hands or applying pressure to  hands/feet.  7. Review measures to take should syndrome occur:  a. Cold compresses and elevation for          edema  b. Pain medications and other measures as ordered by oncologist.   4.   Syndrome resolves few weeks after therapy. 10/07/2019   S   5. DISCHARGE PLANNING /        EDUCATION 1.    Explain importance of compliance with follow- up  tests (CBC, CMP).  2.    Verify patient/caregiver know:  a.    Oncologists office phone number.  b.    Dates of follow-up appointments.  c.    Prescriptions given for nausea  3.   Review side effects to monitor and notify          oncologist about.  4.   Reinforce the need for patient and caregivers to:  a.    Review information given.  b.    Call oncologists office with questions          or symptoms  5.   Provide [facility] [employer] make referrals if needed for financial or .   10/07/2019   S     PROGRESS NOTES:      I met with the patient and spouse today for chemotherapy education. he will be starting treatment with Rituxan . We discussed the mechanism of action, potential side effects of this treatment as well as ways he can manage them at home. Some of these side effects include but or not limited to fever, nausea, vomiting, decreased appetite, fatigue, weakness, cytopenias, myalgia/arthralgia, constipation, diarrhea, bleeding, headache, shortness of breath, nail changes, taste change, hair thinning/loss, mood disturbances, or edema. We also discussed dietary modifications he should make although this will be discussed in more detail with the dietician. he was provided with anti-emetic medication, a copy of all of the information we discussed today as well as our contact information. he will be provided a schedule on his first day of treatment. We will obtain labs on a weekly basis and the patient will follow-up with the physician for toxicity monitoring throughout treatment. All questions were answered and an informed consent was obtained.  he was reminded to certainly contact us sooner if needed.  Attached to the patients folder and discussed with the patient the 24 hour/ 7 days a week after hours telephone number for the physician.  Patient notified to call anytime 24/7 because their is a physician on call for any problems that may arise.  Patient also notified to report to [facility] if they can not get in touch with a physician after hours.  Discussed the five wishes booklet with the patient and their family.

## 2019-10-07 NOTE — PROGRESS NOTES
CHEMO SCHOOL- NUTRITION    Mr. 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 He will be receiving rituximab. I met with pt & his wife for chemo school today. CW: 209#. Pt reported no nutrition-related concerns at this time. Pt had previously met with RD during last round of chemotherapy; he did not have any questions regarding diet currently.     Plan:   1. Provided copy of chemo school packet.   2. Left pt with RD contact info & encouraged him to call with any questions/concerns.   3. Will follow-up prn.

## 2019-10-08 ENCOUNTER — INFUSION (OUTPATIENT)
Dept: INFUSION THERAPY | Facility: HOSPITAL | Age: 79
End: 2019-10-08
Attending: INTERNAL MEDICINE
Payer: MEDICARE

## 2019-10-08 VITALS
HEART RATE: 91 BPM | HEIGHT: 74 IN | OXYGEN SATURATION: 95 % | WEIGHT: 209.19 LBS | SYSTOLIC BLOOD PRESSURE: 110 MMHG | BODY MASS INDEX: 26.85 KG/M2 | RESPIRATION RATE: 20 BRPM | DIASTOLIC BLOOD PRESSURE: 68 MMHG | TEMPERATURE: 97 F

## 2019-10-08 DIAGNOSIS — C91.10 CLL (CHRONIC LYMPHOCYTIC LEUKEMIA): Primary | ICD-10-CM

## 2019-10-08 PROCEDURE — 63600175 PHARM REV CODE 636 W HCPCS: Mod: JW,JG | Performed by: INTERNAL MEDICINE

## 2019-10-08 PROCEDURE — 96413 CHEMO IV INFUSION 1 HR: CPT

## 2019-10-08 PROCEDURE — 96367 TX/PROPH/DG ADDL SEQ IV INF: CPT

## 2019-10-08 PROCEDURE — S0028 INJECTION, FAMOTIDINE, 20 MG: HCPCS | Performed by: INTERNAL MEDICINE

## 2019-10-08 PROCEDURE — 96375 TX/PRO/DX INJ NEW DRUG ADDON: CPT

## 2019-10-08 PROCEDURE — 25000003 PHARM REV CODE 250: Performed by: INTERNAL MEDICINE

## 2019-10-08 PROCEDURE — 96415 CHEMO IV INFUSION ADDL HR: CPT

## 2019-10-08 RX ORDER — FAMOTIDINE 10 MG/ML
20 INJECTION INTRAVENOUS
Status: COMPLETED | OUTPATIENT
Start: 2019-10-08 | End: 2019-10-08

## 2019-10-08 RX ORDER — HEPARIN 100 UNIT/ML
500 SYRINGE INTRAVENOUS
Status: DISCONTINUED | OUTPATIENT
Start: 2019-10-08 | End: 2019-10-08 | Stop reason: HOSPADM

## 2019-10-08 RX ORDER — MEPERIDINE HYDROCHLORIDE 25 MG/ML
25 INJECTION INTRAMUSCULAR; INTRAVENOUS; SUBCUTANEOUS
Status: DISCONTINUED | OUTPATIENT
Start: 2019-10-08 | End: 2019-10-08 | Stop reason: HOSPADM

## 2019-10-08 RX ORDER — SODIUM CHLORIDE 0.9 % (FLUSH) 0.9 %
10 SYRINGE (ML) INJECTION
Status: DISCONTINUED | OUTPATIENT
Start: 2019-10-08 | End: 2019-10-08 | Stop reason: HOSPADM

## 2019-10-08 RX ORDER — ACETAMINOPHEN 325 MG/1
650 TABLET ORAL
Status: COMPLETED | OUTPATIENT
Start: 2019-10-08 | End: 2019-10-08

## 2019-10-08 RX ADMIN — MEPERIDINE HYDROCHLORIDE 25 MG: 25 INJECTION INTRAMUSCULAR; INTRAVENOUS; SUBCUTANEOUS at 01:10

## 2019-10-08 RX ADMIN — RITUXIMAB 833 MG: 10 INJECTION, SOLUTION INTRAVENOUS at 08:10

## 2019-10-08 RX ADMIN — ACETAMINOPHEN 650 MG: 325 TABLET ORAL at 07:10

## 2019-10-08 RX ADMIN — DIPHENHYDRAMINE HYDROCHLORIDE 50 MG: 50 INJECTION INTRAMUSCULAR; INTRAVENOUS at 07:10

## 2019-10-08 RX ADMIN — FAMOTIDINE 20 MG: 10 INJECTION, SOLUTION INTRAVENOUS at 07:10

## 2019-10-08 NOTE — NURSING
1302- developed chill Dr Bai notified rituxan stopped, instructed to give another dose of demerol with relief of chill

## 2019-10-11 RX ORDER — SODIUM CHLORIDE 0.9 % (FLUSH) 0.9 %
10 SYRINGE (ML) INJECTION
Status: CANCELLED | OUTPATIENT
Start: 2019-10-14

## 2019-10-11 RX ORDER — FAMOTIDINE 10 MG/ML
20 INJECTION INTRAVENOUS
Status: CANCELLED | OUTPATIENT
Start: 2019-10-14

## 2019-10-11 RX ORDER — HEPARIN 100 UNIT/ML
500 SYRINGE INTRAVENOUS
Status: CANCELLED | OUTPATIENT
Start: 2019-10-14

## 2019-10-11 RX ORDER — ACETAMINOPHEN 325 MG/1
650 TABLET ORAL
Status: CANCELLED | OUTPATIENT
Start: 2019-10-14

## 2019-10-11 RX ORDER — MEPERIDINE HYDROCHLORIDE 50 MG/ML
25 INJECTION INTRAMUSCULAR; INTRAVENOUS; SUBCUTANEOUS
Status: CANCELLED | OUTPATIENT
Start: 2019-10-14

## 2019-10-14 RX ORDER — SODIUM CHLORIDE 0.9 % (FLUSH) 0.9 %
10 SYRINGE (ML) INJECTION
Status: CANCELLED | OUTPATIENT
Start: 2019-10-22

## 2019-10-14 RX ORDER — HEPARIN 100 UNIT/ML
500 SYRINGE INTRAVENOUS
Status: CANCELLED | OUTPATIENT
Start: 2019-10-22

## 2019-10-14 RX ORDER — FAMOTIDINE 10 MG/ML
20 INJECTION INTRAVENOUS
Status: CANCELLED | OUTPATIENT
Start: 2019-10-22

## 2019-10-14 RX ORDER — ACETAMINOPHEN 325 MG/1
650 TABLET ORAL
Status: CANCELLED | OUTPATIENT
Start: 2019-10-22

## 2019-10-14 RX ORDER — MEPERIDINE HYDROCHLORIDE 50 MG/ML
25 INJECTION INTRAMUSCULAR; INTRAVENOUS; SUBCUTANEOUS
Status: CANCELLED | OUTPATIENT
Start: 2019-10-22

## 2019-10-15 ENCOUNTER — TELEPHONE (OUTPATIENT)
Dept: HEMATOLOGY/ONCOLOGY | Facility: CLINIC | Age: 79
End: 2019-10-15

## 2019-10-15 ENCOUNTER — INFUSION (OUTPATIENT)
Dept: INFUSION THERAPY | Facility: HOSPITAL | Age: 79
End: 2019-10-15
Attending: INTERNAL MEDICINE
Payer: MEDICARE

## 2019-10-15 VITALS
RESPIRATION RATE: 18 BRPM | HEART RATE: 61 BPM | BODY MASS INDEX: 27.22 KG/M2 | DIASTOLIC BLOOD PRESSURE: 76 MMHG | WEIGHT: 212.13 LBS | TEMPERATURE: 98 F | SYSTOLIC BLOOD PRESSURE: 132 MMHG | OXYGEN SATURATION: 97 % | HEIGHT: 74 IN

## 2019-10-15 DIAGNOSIS — C91.10 CLL (CHRONIC LYMPHOCYTIC LEUKEMIA): Primary | ICD-10-CM

## 2019-10-15 PROCEDURE — 96375 TX/PRO/DX INJ NEW DRUG ADDON: CPT

## 2019-10-15 PROCEDURE — 96367 TX/PROPH/DG ADDL SEQ IV INF: CPT

## 2019-10-15 PROCEDURE — 96415 CHEMO IV INFUSION ADDL HR: CPT

## 2019-10-15 PROCEDURE — S0028 INJECTION, FAMOTIDINE, 20 MG: HCPCS | Performed by: INTERNAL MEDICINE

## 2019-10-15 PROCEDURE — 25000003 PHARM REV CODE 250: Performed by: INTERNAL MEDICINE

## 2019-10-15 PROCEDURE — 96413 CHEMO IV INFUSION 1 HR: CPT

## 2019-10-15 PROCEDURE — 63600175 PHARM REV CODE 636 W HCPCS: Performed by: INTERNAL MEDICINE

## 2019-10-15 PROCEDURE — 96376 TX/PRO/DX INJ SAME DRUG ADON: CPT

## 2019-10-15 RX ORDER — FAMOTIDINE 10 MG/ML
20 INJECTION INTRAVENOUS
Status: COMPLETED | OUTPATIENT
Start: 2019-10-15 | End: 2019-10-15

## 2019-10-15 RX ORDER — MEPERIDINE HYDROCHLORIDE 25 MG/ML
25 INJECTION INTRAMUSCULAR; INTRAVENOUS; SUBCUTANEOUS
Status: DISCONTINUED | OUTPATIENT
Start: 2019-10-15 | End: 2019-10-15 | Stop reason: HOSPADM

## 2019-10-15 RX ORDER — ACETAMINOPHEN 325 MG/1
650 TABLET ORAL
Status: COMPLETED | OUTPATIENT
Start: 2019-10-15 | End: 2019-10-15

## 2019-10-15 RX ORDER — AMOXICILLIN 875 MG/1
875 TABLET, FILM COATED ORAL 2 TIMES DAILY
COMMUNITY
End: 2020-07-26

## 2019-10-15 RX ORDER — HEPARIN 100 UNIT/ML
500 SYRINGE INTRAVENOUS
Status: DISCONTINUED | OUTPATIENT
Start: 2019-10-15 | End: 2019-10-15 | Stop reason: HOSPADM

## 2019-10-15 RX ORDER — SODIUM CHLORIDE 0.9 % (FLUSH) 0.9 %
10 SYRINGE (ML) INJECTION
Status: DISCONTINUED | OUTPATIENT
Start: 2019-10-15 | End: 2019-10-15 | Stop reason: HOSPADM

## 2019-10-15 RX ORDER — AMOXICILLIN 875 MG/1
875 TABLET, FILM COATED ORAL 2 TIMES DAILY
Qty: 20 TABLET | Refills: 3 | Status: CANCELLED | OUTPATIENT
Start: 2019-10-15 | End: 2019-10-25

## 2019-10-15 RX ADMIN — DIPHENHYDRAMINE HYDROCHLORIDE 50 MG: 50 INJECTION INTRAMUSCULAR; INTRAVENOUS at 07:10

## 2019-10-15 RX ADMIN — ACETAMINOPHEN 650 MG: 325 TABLET ORAL at 07:10

## 2019-10-15 RX ADMIN — FAMOTIDINE 20 MG: 10 INJECTION, SOLUTION INTRAVENOUS at 07:10

## 2019-10-15 RX ADMIN — RITUXIMAB 833 MG: 10 INJECTION, SOLUTION INTRAVENOUS at 07:10

## 2019-10-15 NOTE — TELEPHONE ENCOUNTER
RN called in Amoxicillin 875mg Bid for 10days, with 3 refills, and magic mouthwash per Dr. GONSALVES

## 2019-10-21 ENCOUNTER — LAB VISIT (OUTPATIENT)
Dept: LAB | Facility: HOSPITAL | Age: 79
End: 2019-10-21
Attending: INTERNAL MEDICINE
Payer: MEDICARE

## 2019-10-21 ENCOUNTER — TELEPHONE (OUTPATIENT)
Dept: HEMATOLOGY/ONCOLOGY | Facility: CLINIC | Age: 79
End: 2019-10-21

## 2019-10-21 DIAGNOSIS — C83.00 LYMPHOCYTIC LYMPHOSARCOMA: ICD-10-CM

## 2019-10-21 DIAGNOSIS — D50.9 IRON DEFICIENCY ANEMIA, UNSPECIFIED: ICD-10-CM

## 2019-10-21 DIAGNOSIS — Z85.118 PERSONAL HISTORY OF MALIGNANT NEOPLASM OF BRONCHUS AND LUNG: ICD-10-CM

## 2019-10-21 DIAGNOSIS — B00.9 HSV (HERPES SIMPLEX VIRUS) INFECTION: Primary | ICD-10-CM

## 2019-10-21 DIAGNOSIS — C91.10 LEUKEMIA, LYMPHOCYTIC, CHRONIC: Primary | ICD-10-CM

## 2019-10-21 LAB
ALBUMIN SERPL BCP-MCNC: 4.1 G/DL (ref 3.5–5.2)
ALP SERPL-CCNC: 63 U/L (ref 55–135)
ALT SERPL W/O P-5'-P-CCNC: 17 U/L (ref 10–44)
ANION GAP SERPL CALC-SCNC: 10 MMOL/L (ref 8–16)
AST SERPL-CCNC: 18 U/L (ref 10–40)
BASOPHILS # BLD AUTO: 0.01 K/UL (ref 0–0.2)
BASOPHILS NFR BLD: 0.4 % (ref 0–1.9)
BILIRUB SERPL-MCNC: 0.8 MG/DL (ref 0.1–1)
BUN SERPL-MCNC: 17 MG/DL (ref 8–23)
CALCIUM SERPL-MCNC: 8.8 MG/DL (ref 8.7–10.5)
CEA SERPL-MCNC: 1.4 NG/ML (ref 0–5)
CHLORIDE SERPL-SCNC: 96 MMOL/L (ref 95–110)
CO2 SERPL-SCNC: 28 MMOL/L (ref 23–29)
CREAT SERPL-MCNC: 1 MG/DL (ref 0.5–1.4)
DIFFERENTIAL METHOD: ABNORMAL
EOSINOPHIL # BLD AUTO: 0 K/UL (ref 0–0.5)
EOSINOPHIL NFR BLD: 0.7 % (ref 0–8)
ERYTHROCYTE [DISTWIDTH] IN BLOOD BY AUTOMATED COUNT: 13.2 % (ref 11.5–14.5)
EST. GFR  (AFRICAN AMERICAN): >60 ML/MIN/1.73 M^2
EST. GFR  (NON AFRICAN AMERICAN): >60 ML/MIN/1.73 M^2
FERRITIN SERPL-MCNC: 49 NG/ML (ref 20–300)
GLUCOSE SERPL-MCNC: 243 MG/DL (ref 70–110)
HCT VFR BLD AUTO: 42.4 % (ref 40–54)
HGB BLD-MCNC: 15.1 G/DL (ref 14–18)
IMM GRANULOCYTES # BLD AUTO: 0.01 K/UL (ref 0–0.04)
IMM GRANULOCYTES NFR BLD AUTO: 0.4 % (ref 0–0.5)
IRON SERPL-MCNC: 121 UG/DL (ref 45–160)
LYMPHOCYTES # BLD AUTO: 1.3 K/UL (ref 1–4.8)
LYMPHOCYTES NFR BLD: 47.4 % (ref 18–48)
MCH RBC QN AUTO: 31.1 PG (ref 27–31)
MCHC RBC AUTO-ENTMCNC: 35.6 G/DL (ref 32–36)
MCV RBC AUTO: 87 FL (ref 82–98)
MONOCYTES # BLD AUTO: 0.3 K/UL (ref 0.3–1)
MONOCYTES NFR BLD: 11.9 % (ref 4–15)
NEUTROPHILS # BLD AUTO: 1.1 K/UL (ref 1.8–7.7)
NEUTROPHILS NFR BLD: 39.2 % (ref 38–73)
NRBC BLD-RTO: 0 /100 WBC
PLATELET # BLD AUTO: 124 K/UL (ref 150–350)
PMV BLD AUTO: 10 FL (ref 9.2–12.9)
POTASSIUM SERPL-SCNC: 4.5 MMOL/L (ref 3.5–5.1)
PROT SERPL-MCNC: 7.3 G/DL (ref 6–8.4)
RBC # BLD AUTO: 4.85 M/UL (ref 4.6–6.2)
SATURATED IRON: 31 % (ref 20–50)
SODIUM SERPL-SCNC: 134 MMOL/L (ref 136–145)
TOTAL IRON BINDING CAPACITY: 388 UG/DL (ref 250–450)
TRANSFERRIN SERPL-MCNC: 277 MG/DL (ref 200–375)
WBC # BLD AUTO: 2.68 K/UL (ref 3.9–12.7)

## 2019-10-21 PROCEDURE — 82728 ASSAY OF FERRITIN: CPT

## 2019-10-21 PROCEDURE — 85025 COMPLETE CBC W/AUTO DIFF WBC: CPT

## 2019-10-21 PROCEDURE — 82378 CARCINOEMBRYONIC ANTIGEN: CPT

## 2019-10-21 PROCEDURE — 83540 ASSAY OF IRON: CPT

## 2019-10-21 PROCEDURE — 36415 COLL VENOUS BLD VENIPUNCTURE: CPT

## 2019-10-21 PROCEDURE — 80053 COMPREHEN METABOLIC PANEL: CPT

## 2019-10-21 NOTE — TELEPHONE ENCOUNTER
Says he is having difficulty swallowing the larger pills and wants to know if you can reduce him to the 500 mg tablets instead sent to [facility]

## 2019-10-21 NOTE — TELEPHONE ENCOUNTER
----- Message from Drew Bai MD sent at 10/21/2019  8:16 AM CDT -----  Glucose is high, please bring to attention of his PcP        Lab results sent to Dr. Erazo

## 2019-10-22 ENCOUNTER — INFUSION (OUTPATIENT)
Dept: INFUSION THERAPY | Facility: HOSPITAL | Age: 79
End: 2019-10-22
Attending: INTERNAL MEDICINE
Payer: MEDICARE

## 2019-10-22 VITALS
DIASTOLIC BLOOD PRESSURE: 76 MMHG | HEIGHT: 74 IN | RESPIRATION RATE: 18 BRPM | BODY MASS INDEX: 27.22 KG/M2 | OXYGEN SATURATION: 96 % | HEART RATE: 70 BPM | SYSTOLIC BLOOD PRESSURE: 144 MMHG | TEMPERATURE: 98 F | WEIGHT: 212.06 LBS

## 2019-10-22 DIAGNOSIS — C91.10 CLL (CHRONIC LYMPHOCYTIC LEUKEMIA): Primary | ICD-10-CM

## 2019-10-22 PROCEDURE — 25000003 PHARM REV CODE 250: Performed by: INTERNAL MEDICINE

## 2019-10-22 PROCEDURE — 96413 CHEMO IV INFUSION 1 HR: CPT

## 2019-10-22 PROCEDURE — 96375 TX/PRO/DX INJ NEW DRUG ADDON: CPT

## 2019-10-22 PROCEDURE — S0028 INJECTION, FAMOTIDINE, 20 MG: HCPCS | Performed by: INTERNAL MEDICINE

## 2019-10-22 PROCEDURE — 96415 CHEMO IV INFUSION ADDL HR: CPT

## 2019-10-22 PROCEDURE — 63600175 PHARM REV CODE 636 W HCPCS: Performed by: INTERNAL MEDICINE

## 2019-10-22 PROCEDURE — 96367 TX/PROPH/DG ADDL SEQ IV INF: CPT

## 2019-10-22 RX ORDER — ACETAMINOPHEN 325 MG/1
650 TABLET ORAL
Status: COMPLETED | OUTPATIENT
Start: 2019-10-22 | End: 2019-10-22

## 2019-10-22 RX ORDER — HEPARIN 100 UNIT/ML
500 SYRINGE INTRAVENOUS
Status: DISCONTINUED | OUTPATIENT
Start: 2019-10-22 | End: 2019-10-22 | Stop reason: HOSPADM

## 2019-10-22 RX ORDER — MEPERIDINE HYDROCHLORIDE 25 MG/ML
25 INJECTION INTRAMUSCULAR; INTRAVENOUS; SUBCUTANEOUS
Status: DISCONTINUED | OUTPATIENT
Start: 2019-10-22 | End: 2019-10-22 | Stop reason: HOSPADM

## 2019-10-22 RX ORDER — FAMOTIDINE 10 MG/ML
20 INJECTION INTRAVENOUS
Status: COMPLETED | OUTPATIENT
Start: 2019-10-22 | End: 2019-10-22

## 2019-10-22 RX ORDER — VALACYCLOVIR HYDROCHLORIDE 500 MG/1
1000 TABLET, FILM COATED ORAL 2 TIMES DAILY
Qty: 120 TABLET | Refills: 6 | Status: ON HOLD | OUTPATIENT
Start: 2019-10-22 | End: 2021-07-01 | Stop reason: SDUPTHER

## 2019-10-22 RX ORDER — SODIUM CHLORIDE 0.9 % (FLUSH) 0.9 %
10 SYRINGE (ML) INJECTION
Status: DISCONTINUED | OUTPATIENT
Start: 2019-10-22 | End: 2019-10-22 | Stop reason: HOSPADM

## 2019-10-22 RX ADMIN — SODIUM CHLORIDE: 0.9 INJECTION, SOLUTION INTRAVENOUS at 07:10

## 2019-10-22 RX ADMIN — ACETAMINOPHEN 650 MG: 325 TABLET ORAL at 07:10

## 2019-10-22 RX ADMIN — FAMOTIDINE 20 MG: 10 INJECTION, SOLUTION INTRAVENOUS at 07:10

## 2019-10-22 RX ADMIN — DIPHENHYDRAMINE HYDROCHLORIDE 50 MG: 50 INJECTION INTRAMUSCULAR; INTRAVENOUS at 07:10

## 2019-10-22 RX ADMIN — RITUXIMAB 833 MG: 10 INJECTION, SOLUTION INTRAVENOUS at 08:10

## 2019-10-22 NOTE — PLAN OF CARE
Problem: Fatigue  Goal: Improved Activity Tolerance  Outcome: Ongoing, Progressing  Intervention: Promote Energy Conservation  Flowsheets (Taken 10/22/2019 0753)  Fatigue Management: frequent rest breaks encouraged; activity assistance provided; fatigue-related activity identified; paced activity encouraged; activity schedule adjusted  Sleep/Rest Enhancement: consistent schedule promoted; family presence promoted; relaxation techniques promoted; therapeutic touch utilized; regular sleep/rest pattern promoted  Activity Management: activity clustered for rest period; activity encouraged

## 2019-10-23 NOTE — PROGRESS NOTES
[facility] Hematology/Oncology  PROGRESS NOTE - Follow-up Visit      Subjective:       Patient ID:   NAME: Conrad Kuhn : 1940     79 y.o. male    Referring Doc: Julien  Other Physicians: Ced Erazo Joubert, Srinivas, Pinsky    Chief Complaint:  CLL f/u    History of Present Illness:     Patient returns today for a regularly scheduled follow-up visit.  The patient is here today to go over the results of the recently ordered labs, tests and studies.. He is here by himself today. He is breathing ok. He denies any CP, SOB, HA's or N/V. He saw Dr Don at [facility] on 10/21/2019. He has been on the weekly rituximab and has had 3 weeks so far. His neck swelling and LAD has improved and reduced in size.         ROS:   GEN: normal without any fever, night sweats or weight loss  HEENT: normal with no HA's, sore throat, stiff neck, changes in vision  CV: normal with no CP, SOB, PND, MAYER or orthopnea  PULM: normal with no SOB, cough, hemoptysis, sputum or pleuritic pain  GI: normal with no abdominal pain, nausea, vomiting, constipation, diarrhea, melanotic stools, BRBPR, or hematemesis; some dysphagia  : increase urine frequency but stable  BREAST: normal with no mass, discharge, pain  SKIN: normal with no rash, erythema, bruising, or swelling    Allergies:  Review of patient's allergies indicates:  No Known Allergies    Medications:    Current Outpatient Medications:     amoxicillin (AMOXIL) 875 MG tablet, Take 875 mg by mouth 2 (two) times daily., Disp: , Rfl:     atorvastatin (LIPITOR) 20 MG tablet, TK 1 T PO QD, Disp: , Rfl:     azelastine (ASTELIN) 137 mcg (0.1 %) nasal spray, 1 spray by Nasal route., Disp: , Rfl:     blood sugar diagnostic (FREESTYLE LITE STRIPS) Strp, by Other route., Disp: , Rfl:     blood-glucose meter (FREESTYLE LITE METER) kit, Use as instructed, Disp: , Rfl:     ferrous sulfate (FEOSOL) 325 mg (65 mg iron) Tab tablet, Take 1 tablet (325 mg total) by mouth once daily., Disp: , Rfl:      FREESTYLE LANCETS 28 gauge lancets, TEST TWICE DAILY, Disp: , Rfl: 1    gabapentin (NEURONTIN) 300 MG capsule, TK ONE C PO  BID, Disp: , Rfl: 0    HYDROcodone-acetaminophen (NORCO)  mg per tablet, TK 1 T PO Q 8 H PRN P, Disp: , Rfl: 0    levalbuterol (XOPENEX) 1.25 mg/3 mL nebulizer solution, 3 mLs., Disp: , Rfl:     metFORMIN (GLUCOPHAGE) 500 MG tablet, TK 1 T PO BID WC, Disp: , Rfl: 2    metoprolol succinate (TOPROL-XL) 25 MG 24 hr tablet, TK 1 T PO BID, Disp: , Rfl:     neomycin-polymyxin-dexamethasone (DEXACINE) 3.5 mg/g-10,000 unit/g-0.1 % Oint, Apply to eye daily as needed., Disp: , Rfl:     ondansetron (ZOFRAN-ODT) 8 MG TbDL, DIS ONE T PO Q 8 H PRN N, Disp: , Rfl: 10    promethazine (PHENERGAN) 25 MG tablet, TK 1 T PO Q 6 H PRN N, Disp: , Rfl: 10    traZODone (DESYREL) 100 MG tablet, TK 1 T PO  QHS, Disp: , Rfl: 0    valACYclovir (VALTREX) 500 MG tablet, Take 2 tablets (1,000 mg total) by mouth 2 (two) times daily., Disp: 120 tablet, Rfl: 6    water Liqd 150 mL with MILK OF MAGNESIA 400 mg/5 mL Susp 400 mg, diphenhydrAMINE 12.5 mg/5 mL Elix 60 mg, nystatin 100,000 unit/mL Susp 500,000 Units, SWISH AND SPIT 30 ML PO Q 4 H PRN, Disp: , Rfl: 0    PMHx/PSHx Updates:  See patient's last visit with me on 10/7/2019.  See H&P on 1/3/2019        Pathology:  Cancer Staging  No matching staging information was found for the patient.          Objective:     Vitals:  Blood pressure 121/68, pulse 76, temperature 97.7 °F (36.5 °C), resp. rate 18, weight 97.3 kg (214 lb 9.6 oz).    Physical Examination:   GEN: no apparent distress, comfortable; AAOx3  HEAD: atraumatic and normocephalic  EYES: no pallor, no icterus, PERRLA  ENT: OMM, no pharyngeal erythema, external ears WNL; no nasal discharge; no thrush  NECK: no masses, thyroid normal, trachea midline, , supple; increase LN's on right neck reduced in size  CV: RRR with no murmur; normal pulse; normal S1 and S2; no pedal edema  CHEST: Normal  respiratory effort; CTAB; normal breath sounds; no wheeze or crackles  ABDOM: nontender and nondistended; soft; normal bowel sounds; no rebound/guarding  MUSC/Skeletal: ROM normal; no crepitus; joints normal; no deformities or arthropathy  EXTREM: no clubbing, cyanosis, inflammation or swelling; bruise on LUE resolved  SKIN: no rashes, lesions, ulcers, petechiae or subcutaneous nodules  : no keith  NEURO: grossly intact; motor/sensory WNL; AAOx3; no tremors  PSYCH: normal mood, affect and behavior  LYMPH: normal cervical, supraclavicular, axillary and groin LN's            Labs:   10/21/2019  Lab Results   Component Value Date    WBC 2.68 (L) 10/21/2019    HGB 15.1 10/21/2019    HCT 42.4 10/21/2019    MCV 87 10/21/2019     (L) 10/21/2019       BMP  Lab Results   Component Value Date     (L) 10/21/2019    K 4.5 10/21/2019    CL 96 10/21/2019    CO2 28 10/21/2019    BUN 17 10/21/2019    CREATININE 1.0 10/21/2019    CALCIUM 8.8 10/21/2019    ANIONGAP 10 10/21/2019    ESTGFRAFRICA >60.0 10/21/2019    EGFRNONAA >60.0 10/21/2019     Lab Results   Component Value Date    ALT 17 10/21/2019    AST 18 10/21/2019    ALKPHOS 63 10/21/2019    BILITOT 0.8 10/21/2019     Lab Results   Component Value Date    IRON 121 10/21/2019    TIBC 388 10/21/2019    FERRITIN 49 10/21/2019             Radiology/Diagnostic Studies:    CT abdom/pelvis 8/7/2019:        Impression       1. Multifocal lymphadenopathy in the chest, abdomen, and pelvis compatible with provided clinical history of lymphocytic leukemia.  There is mixed interval change when compared to prior studies.  Pathologic lymphadenopathy in the chest has increased significantly when compared to a CTA of the chest from May 2018.  Pathologic retroperitoneal lymphadenopathy has improved slightly when compared to a previous abdominal CT from February 2017.  Please see above details.  2. Mild aneurysmal dilation of the infrarenal abdominal aorta, with maximal transverse  diameter of 3.7 cm.  Maximal transverse diameter on a prior study from 2017 was 2.5 cm.  3. Additional findings as above           [facility] Unknown Rad Eap    Result Date: 1/14/2019  CMS MANDATED QUALITY DATA - CT RADIATION - 436 All CT scans at this facility utilize dose modulation, iterative reconstruction, and/or weight based dosing when appropriate to reduce radiation dose to as low as reasonably achievable. Reason:Lymphoid leukemia TECHNIQUE: CT thorax with, CT abdomen  with, and CT pelvis with 100 mL Omnipaque 350. COMPARISON: CT chest dated 05/19/2018 and CT abdomen and pelvis dated 02/08/2017 CT THORAX: There is stable mediastinal, hilar and axillary adenopathy. There is coronary artery calcification. There is resolution of the groundglass opacities throughout the lungs. There are no confluent infiltrates, pulmonary nodules or pleural effusions. There are stable subcentimeter nodules in the left lobe of the thyroid gland. There are degenerative changes of the spine. CT ABDOMEN: The liver, pancreas, adrenal glands and gallbladder are normal. The spleen is enlarged. There is mesenteric and retroperitoneal adenopathy which is slightly smaller in size. -There is a portacaval node measuring 37 x 21 mm and previously measured 38 x 27 mm. -Periportal lymph node measuring 41 x 18 mm, previously measured 48 x 29 mm. -Left periaortic adenopathy measuring 34 x 23 mm and previously measured 40 x 42 mm- The kidneys enhance symmetrically without hydronephrosis or calculi. There are no thick-walled or dilated bowel loops. There is vascular calcification of aorta. There is a 3.0 x 3.0 cm infrarenal abdominal aortic aneurysm. CT PELVIS: There is adenopathy along the pelvic sidewalls bilaterally which has decreased in size. -The left common iliac adenopathy measuring 36 x 11 mm, previously measured 46 x 14 mm -the right common iliac adenopathy measuring 46 x 10 mm. Previously measured 53 x 15 mm. The bladder is normal. The  prostate gland is enlarged. There are degenerative changes of the spine. There is grade 1 anterolisthesis of L5 on S1 with bilateral pars defects.     IMPRESSION: Stable mediastinal, hilar and axillary adenopathy with resolution of the airspace disease within the lungs Decrease in size of the mesenteric, retroperitoneal and pelvic adenopathy. Stable infrarenal abdominal aortic aneurysm Splenomegaly     Read and electronically signed by: Ivanna Arredondo MD on 1/14/2019 9:14 AM CST IVANNA ARREDONDO MD      I have reviewed all available lab results and radiology reports.    Assessment/Plan:   (1) 79 y.o. male with  diagnosis of CLL who has been referred by Dr Rony Victoria for continuation of care by medical hematology/oncology.   - BM biopsy  12/4/2015 with CLL  - diagnosis of SLL in 2004 and s/p FCR x6 in 2007  - s/p imbruvica in past (stopped in May 2018)  - latest wbc at 4.8; lymph 56%  - latest CT on 8/7/2019 showing increase in the LAD  - platelets are 111,000  - He was recently hospitalized at [facility] and seen by Dr Wheeler. Dr Wheeler has recommended Rituximab. He is starting tomorrow.   - discussed the rituximab regimen and the potential side-effect profile; he is getting chemotherapy school today with [facility]  - he saw Dr Don on Oct 21st 2019  - he has had 3 of the 4 weeks of rituximab so far; Dr Glass recommends him to eventually get rituximab monthly x6 with daily oral Venetoclax for two years total.   - next rituximab (4th) week of rituximab is on Oct 29th 2019       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  - followed  By Dr Kee  - CEA 1.2  - CT scans on 1/14/2019 stable     (3) Iron deficiency anemia s/p IV iron couple weeks ago  - hgb 14.6 and adequate, and the iron panel currently wnl     (4) HTN - on BP meds     (6) Chronic neck and back issues - followed by Dr Ryan Rivero     (7) DM - currently off all meds     (8) Hypercholesterolemia - on meds    (9)  - followed by   Chelo        VISIT DIAGNOSES:      CLL (chronic lymphocytic leukemia)    History of lung cancer - ADNRES NSCLC 2004    Iron deficiency anemia, unspecified iron deficiency anemia type    Lymphoma, small lymphocytic (2004)          PLAN:  1. check labs weekly while on therapy  2. F/u with PCP, pain management  3. F/u with PCP, Pulm, , etc  4. F/u with Dr Don at [facility] in Dec 2019  5. Continue oral iron  6. F/u with Dr Yee about the dysphagia  7. Week #4 of rituximab on 10/29 and then will change to monthly rituximab with daily Venetoclax  RTC on oct 29th    Fax note to Kacey Kee, Chelo Florence, Florencia Acosta/Joselito Wheeler        Discussion:       I spent over 25 mins of time with the patient. Reviewed results of the recently ordered labs, tests and studies; made directives with regards to the results. Over half of this time was spent couseling and coordinating care.    I have explained all of the above in detail and the patient understands all of the current recommendation(s). I have answered all of their questions to the best of my ability and to their complete satisfaction.   The patient is to continue with the current management plan.        Chemotherapy Discussion:      I discussed the available treatment option(s) in accordance with the latest/current national evidence-based guidelines (NCCN, UpToDate, NCI, ASCO, etc where applicable), their overall age/condition and their co-morbidities. I also went over the risks and benefits of the chemotherapy with regard to their particular cancer type, their cancer stage, their age/condition, and their co-morbidities. I provided literature on the chemotherapy regimen and discussed the chemotherapy side-effect profiles of the drug(s). I discussed the importance of compliance with obtaining and monitoring weekly lab work, and went over the potential hematopathology issues and risks with anemia, leucopenia and thrombocytopenia that can occur with  chemotherapy. I discussed the potential risks of liver and kidney damage, which could be permanent and could necessitate dialysis long-term if kidney failure developed. I discussed the emetic and/or diarrheal potential of the regimen and the potential need for use of antiemetic and anti-diarrheal medications. I discussed the risk for development of anaphylactic shock, bronchospasm, dysrhythmia, and respiratory/cardiovascular arrest and/or failure. I discussed the potential risks for development of alopecia, cold sensory issues, ringing in ears, vertigo, cataracts, glaucoma, and neuropathy, all of which could end up being chronic and life-long. Some chemotherpyI discussed the risks of hand-foot syndrome and rashes, and development of other autoimmune mediated processes such as pneumonitis, hepatitis, and colitis which could be life threatening. I discussed the risks of the potential development of a rare but fatal viral mediated disease known as PML (Progressive Multifocal Leukoencephalopathy), and risk of future development of leukemia and/or lymphoma from use of certain chemotherapy agents. I discussed the need for neutropenic precautions, basic hygiene/sanitation behaviors and dietary restrictions.    The patient's consent has been obtained to proceed with the chemotherapy.The patient will be referred to Chemotherapy School /[facility] for training and education on chemotherapy, use of antiemetics and/or anti-diarrheals, use of NSAID's, potential chemotherapy side-effects, and any specific recommendations and precautions with the particular chemotherapy agents.       Immunologic Therapy Discussion:    I discussed the available treatment option(s) in accordance with the latest/current national evidence-based guidelines (NCCN, UpToDate, NCI, ASCO, etc where applicable), their overall age/condition and their co-morbidities. I went over the risks and benefits of the immunotherapy with regard to their  particular cancer type, their cancer stage, their age/condition, and their co-morbidities. I provided literature on the immunotherapy regimen and discussed the immunotherapy side-effect profiles of the drug(s). I discussed the importance of compliance with obtaining and monitoring weekly lab work, and went over the potential hematopathology issues and risks of hemato-pathologic issues with anemia, leucopenia and/or thrombocytopenia and effects on thyroid function that can occur with immunotherapy. The patient will most likely need to have there thyroid functions monitored by their PCP and may need to take thyroid medication while on the immunotherapy. I discussed the potential risks of liver and kidney damage, which could be permanent and could necessitate dialysis long-term if kidney failure developed. I discussed the emetic and/or diarrheal potential of the regimen and the potential need for use of antiemetic and anti-diarrheal medications. I discussed the risk for development of anaphylactic shock, bronchospasm, dysrhythmia, and respiratory/cardiovascular arrest and/or failure. I discussed the potential risks for development of alopecia, cold sensory issues, ringing in ears, vertigo and neuropathy, all of which could end up being chronic and life-long. I discussed the risks of hand-foot syndrome and rashes, and development of other autoimmune mediated processes such as pneumonitis, hepatitis and colitis which could potentially be life threatening. I discussed the risks of the potential development of a rare but fatal viral mediated disease known as PML (Progressive Multifocal Leukoencephalopathy), and risk of future development of leukemia and/or lymphoma from use of certain immunotherapy agents. I discussed the possibility that immunologic therapy cold worsen or promote progression of any underlying autoimmune diseases such as sarcoidosis, ulcerative colitis, Crohn's disease, psoriasis, rheumatoid disorders,  scleroderma, autoimmune nephritis disorders, Hashimoto's thyroiditis, and Lupus among others. I discussed the need for neutropenic precautions, basic hygiene/sanitation behaviors and dietary restrictions.    The patient's consent has been obtained to proceed with the immunotherapy.The patient will be referred to Immunotherapy School /[facility] for training and education on immunotherapy, use of antiemetics and/or anti-diarrheals, use of NSAID's, potential immunotherapy side-effects, and any specific recommendations and precautions with the particular immunotherapy agents.      I answered all of the patient's (and family's, if applicable) questions to the best of my ability and to their complete satisfaction. The patient acknowledged full understanding of the risks, recommendations and plan(s).       I answered all of the patient's (and family's, if applicable) questions to the best of my ability and to their complete satisfaction. The patient acknowledged full understanding of the risks, recommendations and plan(s).         Electronically signed by Drew Bai MD

## 2019-10-24 ENCOUNTER — OFFICE VISIT (OUTPATIENT)
Dept: HEMATOLOGY/ONCOLOGY | Facility: CLINIC | Age: 79
End: 2019-10-24
Payer: MEDICARE

## 2019-10-24 VITALS
SYSTOLIC BLOOD PRESSURE: 121 MMHG | HEART RATE: 76 BPM | RESPIRATION RATE: 18 BRPM | BODY MASS INDEX: 27.55 KG/M2 | DIASTOLIC BLOOD PRESSURE: 68 MMHG | WEIGHT: 214.63 LBS | TEMPERATURE: 98 F

## 2019-10-24 DIAGNOSIS — D50.9 IRON DEFICIENCY ANEMIA, UNSPECIFIED IRON DEFICIENCY ANEMIA TYPE: ICD-10-CM

## 2019-10-24 DIAGNOSIS — Z85.118 HISTORY OF LUNG CANCER: ICD-10-CM

## 2019-10-24 DIAGNOSIS — C91.10 CLL (CHRONIC LYMPHOCYTIC LEUKEMIA): Primary | ICD-10-CM

## 2019-10-24 DIAGNOSIS — C83.00 LYMPHOMA, SMALL LYMPHOCYTIC: ICD-10-CM

## 2019-10-24 PROCEDURE — 99214 PR OFFICE/OUTPT VISIT, EST, LEVL IV, 30-39 MIN: ICD-10-PCS | Mod: S$GLB,,, | Performed by: INTERNAL MEDICINE

## 2019-10-24 PROCEDURE — 99214 OFFICE O/P EST MOD 30 MIN: CPT | Mod: S$GLB,,, | Performed by: INTERNAL MEDICINE

## 2019-10-27 RX ORDER — ACETAMINOPHEN 325 MG/1
650 TABLET ORAL
Status: CANCELLED | OUTPATIENT
Start: 2019-10-29

## 2019-10-27 RX ORDER — SODIUM CHLORIDE 0.9 % (FLUSH) 0.9 %
10 SYRINGE (ML) INJECTION
Status: CANCELLED | OUTPATIENT
Start: 2019-10-29

## 2019-10-27 RX ORDER — HEPARIN 100 UNIT/ML
500 SYRINGE INTRAVENOUS
Status: CANCELLED | OUTPATIENT
Start: 2019-10-29

## 2019-10-27 RX ORDER — MEPERIDINE HYDROCHLORIDE 50 MG/ML
25 INJECTION INTRAMUSCULAR; INTRAVENOUS; SUBCUTANEOUS
Status: CANCELLED | OUTPATIENT
Start: 2019-10-29

## 2019-10-27 RX ORDER — FAMOTIDINE 10 MG/ML
20 INJECTION INTRAVENOUS
Status: CANCELLED | OUTPATIENT
Start: 2019-10-29

## 2019-10-28 ENCOUNTER — LAB VISIT (OUTPATIENT)
Dept: LAB | Facility: HOSPITAL | Age: 79
End: 2019-10-28
Attending: INTERNAL MEDICINE
Payer: MEDICARE

## 2019-10-28 DIAGNOSIS — C91.10 CLL (CHRONIC LYMPHOCYTIC LEUKEMIA): ICD-10-CM

## 2019-10-28 DIAGNOSIS — C83.00 LYMPHOMA, SMALL LYMPHOCYTIC: ICD-10-CM

## 2019-10-28 LAB
ALBUMIN SERPL BCP-MCNC: 3.9 G/DL (ref 3.5–5.2)
ALP SERPL-CCNC: 53 U/L (ref 55–135)
ALT SERPL W/O P-5'-P-CCNC: 16 U/L (ref 10–44)
ANION GAP SERPL CALC-SCNC: 10 MMOL/L (ref 8–16)
AST SERPL-CCNC: 18 U/L (ref 10–40)
BASOPHILS # BLD AUTO: 0.01 K/UL (ref 0–0.2)
BASOPHILS NFR BLD: 0.4 % (ref 0–1.9)
BILIRUB SERPL-MCNC: 0.9 MG/DL (ref 0.1–1)
BUN SERPL-MCNC: 13 MG/DL (ref 8–23)
CALCIUM SERPL-MCNC: 8.9 MG/DL (ref 8.7–10.5)
CHLORIDE SERPL-SCNC: 97 MMOL/L (ref 95–110)
CO2 SERPL-SCNC: 28 MMOL/L (ref 23–29)
CREAT SERPL-MCNC: 1.1 MG/DL (ref 0.5–1.4)
DIFFERENTIAL METHOD: ABNORMAL
EOSINOPHIL # BLD AUTO: 0 K/UL (ref 0–0.5)
EOSINOPHIL NFR BLD: 1.5 % (ref 0–8)
ERYTHROCYTE [DISTWIDTH] IN BLOOD BY AUTOMATED COUNT: 12.7 % (ref 11.5–14.5)
EST. GFR  (AFRICAN AMERICAN): >60 ML/MIN/1.73 M^2
EST. GFR  (NON AFRICAN AMERICAN): >60 ML/MIN/1.73 M^2
GLUCOSE SERPL-MCNC: 260 MG/DL (ref 70–110)
HCT VFR BLD AUTO: 40.4 % (ref 40–54)
HGB BLD-MCNC: 14.6 G/DL (ref 14–18)
IMM GRANULOCYTES # BLD AUTO: 0.02 K/UL (ref 0–0.04)
IMM GRANULOCYTES NFR BLD AUTO: 0.7 % (ref 0–0.5)
LYMPHOCYTES # BLD AUTO: 1.1 K/UL (ref 1–4.8)
LYMPHOCYTES NFR BLD: 41.1 % (ref 18–48)
MCH RBC QN AUTO: 31.1 PG (ref 27–31)
MCHC RBC AUTO-ENTMCNC: 36.1 G/DL (ref 32–36)
MCV RBC AUTO: 86 FL (ref 82–98)
MONOCYTES # BLD AUTO: 0.5 K/UL (ref 0.3–1)
MONOCYTES NFR BLD: 18.5 % (ref 4–15)
NEUTROPHILS # BLD AUTO: 1 K/UL (ref 1.8–7.7)
NEUTROPHILS NFR BLD: 37.8 % (ref 38–73)
NRBC BLD-RTO: 0 /100 WBC
PLATELET # BLD AUTO: 140 K/UL (ref 150–350)
PMV BLD AUTO: 9.5 FL (ref 9.2–12.9)
POTASSIUM SERPL-SCNC: 4.3 MMOL/L (ref 3.5–5.1)
PROT SERPL-MCNC: 7 G/DL (ref 6–8.4)
RBC # BLD AUTO: 4.7 M/UL (ref 4.6–6.2)
SODIUM SERPL-SCNC: 135 MMOL/L (ref 136–145)
WBC # BLD AUTO: 2.7 K/UL (ref 3.9–12.7)

## 2019-10-28 PROCEDURE — 85025 COMPLETE CBC W/AUTO DIFF WBC: CPT

## 2019-10-28 PROCEDURE — 80053 COMPREHEN METABOLIC PANEL: CPT

## 2019-10-28 NOTE — PROGRESS NOTES
[facility] Hematology/Oncology  PROGRESS NOTE - Follow-up Visit      Subjective:       Patient ID:   NAME: Conrad Kuhn : 1940     79 y.o. male    Referring Doc: Julien  Other Physicians: Ced Erazo Joubert, Srinivas, Pinsky    Chief Complaint:  CLL f/u    History of Present Illness:     Patient returns today for a regularly scheduled follow-up visit.  The patient is here today to go over the results of the recently ordered labs, tests and studies.. He is here by himself today. He is breathing ok. He denies any CP, SOB, HA's or N/V. He saw Dr Don at [facility] on 10/21/2019. He has been on the weekly rituximab and is on the 4th week today. His neck swelling and LAD has improved and reduced in size.         ROS:   GEN: normal without any fever, night sweats or weight loss  HEENT: normal with no HA's, sore throat, stiff neck, changes in vision  CV: normal with no CP, SOB, PND, MAYER or orthopnea  PULM: normal with no SOB, cough, hemoptysis, sputum or pleuritic pain  GI: normal with no abdominal pain, nausea, vomiting, constipation, diarrhea, melanotic stools, BRBPR, or hematemesis; no dysphagia  : urine frequency stable  BREAST: normal with no mass, discharge, pain  SKIN: normal with no rash, erythema, bruising, or swelling    Allergies:  Review of patient's allergies indicates:  No Known Allergies    Medications:    Current Outpatient Medications:     amoxicillin (AMOXIL) 875 MG tablet, Take 875 mg by mouth 2 (two) times daily., Disp: , Rfl:     atorvastatin (LIPITOR) 20 MG tablet, TK 1 T PO QD, Disp: , Rfl:     azelastine (ASTELIN) 137 mcg (0.1 %) nasal spray, 1 spray by Nasal route., Disp: , Rfl:     blood sugar diagnostic (FREESTYLE LITE STRIPS) Strp, by Other route., Disp: , Rfl:     blood-glucose meter (FREESTYLE LITE METER) kit, Use as instructed, Disp: , Rfl:     ferrous sulfate (FEOSOL) 325 mg (65 mg iron) Tab tablet, Take 1 tablet (325 mg total) by mouth once daily., Disp: , Rfl:      FREESTYLE LANCETS 28 gauge lancets, TEST TWICE DAILY, Disp: , Rfl: 1    gabapentin (NEURONTIN) 300 MG capsule, TK ONE C PO  BID, Disp: , Rfl: 0    HYDROcodone-acetaminophen (NORCO)  mg per tablet, TK 1 T PO Q 8 H PRN P, Disp: , Rfl: 0    levalbuterol (XOPENEX) 1.25 mg/3 mL nebulizer solution, 3 mLs., Disp: , Rfl:     metFORMIN (GLUCOPHAGE) 500 MG tablet, TK 1 T PO BID WC, Disp: , Rfl: 2    metoprolol succinate (TOPROL-XL) 25 MG 24 hr tablet, TK 1 T PO BID, Disp: , Rfl:     neomycin-polymyxin-dexamethasone (DEXACINE) 3.5 mg/g-10,000 unit/g-0.1 % Oint, Apply to eye daily as needed., Disp: , Rfl:     ondansetron (ZOFRAN-ODT) 8 MG TbDL, DIS ONE T PO Q 8 H PRN N, Disp: , Rfl: 10    promethazine (PHENERGAN) 25 MG tablet, TK 1 T PO Q 6 H PRN N, Disp: , Rfl: 10    traZODone (DESYREL) 100 MG tablet, TK 1 T PO  QHS, Disp: , Rfl: 0    valACYclovir (VALTREX) 500 MG tablet, Take 2 tablets (1,000 mg total) by mouth 2 (two) times daily., Disp: 120 tablet, Rfl: 6    water Liqd 150 mL with MILK OF MAGNESIA 400 mg/5 mL Susp 400 mg, diphenhydrAMINE 12.5 mg/5 mL Elix 60 mg, nystatin 100,000 unit/mL Susp 500,000 Units, SWISH AND SPIT 30 ML PO Q 4 H PRN, Disp: , Rfl: 0  No current facility-administered medications for this visit.     Facility-Administered Medications Ordered in Other Visits:     alteplase injection 2 mg, 2 mg, Intra-Catheter, PRN, Drew Bai MD    heparin, porcine (PF) 100 unit/mL injection flush 500 Units, 500 Units, Intravenous, PRN, Drew Bai MD    meperidine (PF) injection 25 mg, 25 mg, Intravenous, PRN, Drew Bai MD    riTUXimab (RITUXAN) 375 mg/m2 = 833 mg in sodium chloride 0.9% 833 mL infusion (conc: 1 mg/mL), 375 mg/m2 (Treatment Plan Recorded), Intravenous, 1 time in Clinic/HOD, Drew Bai MD, 833 mg at 10/29/19 0800    sodium chloride 0.9% flush 10 mL, 10 mL, Intravenous, PRN, Drew Bai MD    PMHx/PSHx Updates:  See patient's last visit with  me on 10/24/2019.  See H&P on 1/3/2019        Pathology:  Cancer Staging  No matching staging information was found for the patient.          Objective:     Vitals:  Blood pressure (!) 158/78, pulse 71, temperature 97.9 °F (36.6 °C), resp. rate 18, weight 97.1 kg (214 lb).    Physical Examination:   GEN: no apparent distress, comfortable; AAOx3  HEAD: atraumatic and normocephalic  EYES: no pallor, no icterus, PERRLA  ENT: OMM, no pharyngeal erythema, external ears WNL; no nasal discharge; no thrush  NECK: no masses, thyroid normal, trachea midline, , supple; increase LN's on right neck reduced in size  CV: RRR with no murmur; normal pulse; normal S1 and S2; no pedal edema  CHEST: Normal respiratory effort; CTAB; normal breath sounds; no wheeze or crackles  ABDOM: nontender and nondistended; soft; normal bowel sounds; no rebound/guarding  MUSC/Skeletal: ROM normal; no crepitus; joints normal; no deformities or arthropathy  EXTREM: no clubbing, cyanosis, inflammation or swelling; bruise on LUE resolved  SKIN: no rashes, lesions, ulcers, petechiae or subcutaneous nodules  : no keith  NEURO: grossly intact; motor/sensory WNL; AAOx3; no tremors  PSYCH: normal mood, affect and behavior  LYMPH: normal cervical, supraclavicular, axillary and groin LN's            Labs:   10/28/2019  Lab Results   Component Value Date    WBC 2.70 (L) 10/28/2019    HGB 14.6 10/28/2019    HCT 40.4 10/28/2019    MCV 86 10/28/2019     (L) 10/28/2019       BMP  Lab Results   Component Value Date     (L) 10/28/2019    K 4.3 10/28/2019    CL 97 10/28/2019    CO2 28 10/28/2019    BUN 13 10/28/2019    CREATININE 1.1 10/28/2019    CALCIUM 8.9 10/28/2019    ANIONGAP 10 10/28/2019    ESTGFRAFRICA >60.0 10/28/2019    EGFRNONAA >60.0 10/28/2019     Lab Results   Component Value Date    ALT 16 10/28/2019    AST 18 10/28/2019    ALKPHOS 53 (L) 10/28/2019    BILITOT 0.9 10/28/2019     Lab Results   Component Value Date    IRON 121 10/21/2019     TIBC 388 10/21/2019    FERRITIN 49 10/21/2019             Radiology/Diagnostic Studies:    CT abdom/pelvis 8/7/2019:        Impression       1. Multifocal lymphadenopathy in the chest, abdomen, and pelvis compatible with provided clinical history of lymphocytic leukemia.  There is mixed interval change when compared to prior studies.  Pathologic lymphadenopathy in the chest has increased significantly when compared to a CTA of the chest from May 2018.  Pathologic retroperitoneal lymphadenopathy has improved slightly when compared to a previous abdominal CT from February 2017.  Please see above details.  2. Mild aneurysmal dilation of the infrarenal abdominal aorta, with maximal transverse diameter of 3.7 cm.  Maximal transverse diameter on a prior study from 2017 was 2.5 cm.  3. Additional findings as above           [facility] Unknown Rad Eap    Result Date: 1/14/2019  CMS MANDATED QUALITY DATA - CT RADIATION - 436 All CT scans at this facility utilize dose modulation, iterative reconstruction, and/or weight based dosing when appropriate to reduce radiation dose to as low as reasonably achievable. Reason:Lymphoid leukemia TECHNIQUE: CT thorax with, CT abdomen  with, and CT pelvis with 100 mL Omnipaque 350. COMPARISON: CT chest dated 05/19/2018 and CT abdomen and pelvis dated 02/08/2017 CT THORAX: There is stable mediastinal, hilar and axillary adenopathy. There is coronary artery calcification. There is resolution of the groundglass opacities throughout the lungs. There are no confluent infiltrates, pulmonary nodules or pleural effusions. There are stable subcentimeter nodules in the left lobe of the thyroid gland. There are degenerative changes of the spine. CT ABDOMEN: The liver, pancreas, adrenal glands and gallbladder are normal. The spleen is enlarged. There is mesenteric and retroperitoneal adenopathy which is slightly smaller in size. -There is a portacaval node measuring 37 x 21 mm and previously measured 38 x  27 mm. -Periportal lymph node measuring 41 x 18 mm, previously measured 48 x 29 mm. -Left periaortic adenopathy measuring 34 x 23 mm and previously measured 40 x 42 mm- The kidneys enhance symmetrically without hydronephrosis or calculi. There are no thick-walled or dilated bowel loops. There is vascular calcification of aorta. There is a 3.0 x 3.0 cm infrarenal abdominal aortic aneurysm. CT PELVIS: There is adenopathy along the pelvic sidewalls bilaterally which has decreased in size. -The left common iliac adenopathy measuring 36 x 11 mm, previously measured 46 x 14 mm -the right common iliac adenopathy measuring 46 x 10 mm. Previously measured 53 x 15 mm. The bladder is normal. The prostate gland is enlarged. There are degenerative changes of the spine. There is grade 1 anterolisthesis of L5 on S1 with bilateral pars defects.     IMPRESSION: Stable mediastinal, hilar and axillary adenopathy with resolution of the airspace disease within the lungs Decrease in size of the mesenteric, retroperitoneal and pelvic adenopathy. Stable infrarenal abdominal aortic aneurysm Splenomegaly     Read and electronically signed by: Jeniffer Arredondo MD on 1/14/2019 9:14 AM CST JENIFFER ARREDONDO MD      I have reviewed all available lab results and radiology reports.    Assessment/Plan:   (1) 79 y.o. male with  diagnosis of CLL who has been referred by Dr Rony Victoria for continuation of care by medical hematology/oncology.   - BM biopsy  12/4/2015 with CLL  - diagnosis of SLL in 2004 and s/p FCR x6 in 2007  - s/p imbruvica in past (stopped in May 2018)  - latest wbc at 4.8; lymph 56%  - latest CT on 8/7/2019 showing increase in the LAD  - platelets are 111,000  - He was recently hospitalized at [facility] and seen by Dr Wheeler. Dr Wheeler has recommended Rituximab. He is starting tomorrow.   - discussed the rituximab regimen and the potential side-effect profile; he is getting chemotherapy school today with [facility]  - he saw Dr Don on  Oct 21st 2019  - he has had 3 of the 4 weeks of rituximab so far; Dr Don recommends him to eventually get rituximab monthly x6 with daily oral Venetoclax for two years total.   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  - followed  By Dr Kee  - CEA 1.2  - CT scans on 1/14/2019 stable     (3) Iron deficiency anemia s/p IV iron couple weeks ago  - hgb 14.6 and adequate, and the iron panel currently wnl     (4) HTN - on BP meds     (6) Chronic neck and back issues - followed by Dr Ryan Rivero     (7) DM - currently off all meds     (8) Hypercholesterolemia - on meds    (9)  - followed by Dr Whitney        VISIT DIAGNOSES:      Lymphoma, small lymphocytic (2004)    Iron deficiency anemia, unspecified iron deficiency anemia type    History of lung cancer - ANDRES NSCLC 2004    CLL (chronic lymphocytic leukemia)          PLAN:  1. check labs weekly while on therapy  2. F/u with PCP, pain management  3. F/u with PCP, Pulm, , etc  4. F/u with Dr Don at [facility] in Dec 2019  5. Continue oral iron  6. F/u with Dr Yee about the dysphagia  7. Week #4 of rituximab today 10/29 and then will change to monthly rituximab with daily Venetoclax  8. Set up chemotherapy school and training on the Venetoclax    RTC in 2-3 weeks    Fax note to Kacey Kee Joel Berry, Pinsky, Florencia Acosta/Joselito Wheeler        Discussion:       I spent over 25 mins of time with the patient. Reviewed results of the recently ordered labs, tests and studies; made directives with regards to the results. Over half of this time was spent couseling and coordinating care.    I have explained all of the above in detail and the patient understands all of the current recommendation(s). I have answered all of their questions to the best of my ability and to their complete satisfaction.   The patient is to continue with the current management plan.        Chemotherapy  Discussion:      I discussed the available treatment option(s) in accordance with the latest/current national evidence-based guidelines (NCCN, UpToDate, NCI, ASCO, etc where applicable), their overall age/condition and their co-morbidities. I also went over the risks and benefits of the chemotherapy with regard to their particular cancer type, their cancer stage, their age/condition, and their co-morbidities. I provided literature on the chemotherapy regimen and discussed the chemotherapy side-effect profiles of the drug(s). I discussed the importance of compliance with obtaining and monitoring weekly lab work, and went over the potential hematopathology issues and risks with anemia, leucopenia and thrombocytopenia that can occur with chemotherapy. I discussed the potential risks of liver and kidney damage, which could be permanent and could necessitate dialysis long-term if kidney failure developed. I discussed the emetic and/or diarrheal potential of the regimen and the potential need for use of antiemetic and anti-diarrheal medications. I discussed the risk for development of anaphylactic shock, bronchospasm, dysrhythmia, and respiratory/cardiovascular arrest and/or failure. I discussed the potential risks for development of alopecia, cold sensory issues, ringing in ears, vertigo, cataracts, glaucoma, and neuropathy, all of which could end up being chronic and life-long. Some chemotherpyI discussed the risks of hand-foot syndrome and rashes, and development of other autoimmune mediated processes such as pneumonitis, hepatitis, and colitis which could be life threatening. I discussed the risks of the potential development of a rare but fatal viral mediated disease known as PML (Progressive Multifocal Leukoencephalopathy), and risk of future development of leukemia and/or lymphoma from use of certain chemotherapy agents. I discussed the need for neutropenic precautions, basic hygiene/sanitation behaviors and  dietary restrictions.    The patient's consent has been obtained to proceed with the chemotherapy.The patient will be referred to Chemotherapy School /[facility] for training and education on chemotherapy, use of antiemetics and/or anti-diarrheals, use of NSAID's, potential chemotherapy side-effects, and any specific recommendations and precautions with the particular chemotherapy agents.       Immunologic Therapy Discussion:    I discussed the available treatment option(s) in accordance with the latest/current national evidence-based guidelines (NCCN, UpToDate, NCI, ASCO, etc where applicable), their overall age/condition and their co-morbidities. I went over the risks and benefits of the immunotherapy with regard to their particular cancer type, their cancer stage, their age/condition, and their co-morbidities. I provided literature on the immunotherapy regimen and discussed the immunotherapy side-effect profiles of the drug(s). I discussed the importance of compliance with obtaining and monitoring weekly lab work, and went over the potential hematopathology issues and risks of hemato-pathologic issues with anemia, leucopenia and/or thrombocytopenia and effects on thyroid function that can occur with immunotherapy. The patient will most likely need to have there thyroid functions monitored by their PCP and may need to take thyroid medication while on the immunotherapy. I discussed the potential risks of liver and kidney damage, which could be permanent and could necessitate dialysis long-term if kidney failure developed. I discussed the emetic and/or diarrheal potential of the regimen and the potential need for use of antiemetic and anti-diarrheal medications. I discussed the risk for development of anaphylactic shock, bronchospasm, dysrhythmia, and respiratory/cardiovascular arrest and/or failure. I discussed the potential risks for development of alopecia, cold sensory issues, ringing in ears, vertigo  and neuropathy, all of which could end up being chronic and life-long. I discussed the risks of hand-foot syndrome and rashes, and development of other autoimmune mediated processes such as pneumonitis, hepatitis and colitis which could potentially be life threatening. I discussed the risks of the potential development of a rare but fatal viral mediated disease known as PML (Progressive Multifocal Leukoencephalopathy), and risk of future development of leukemia and/or lymphoma from use of certain immunotherapy agents. I discussed the possibility that immunologic therapy cold worsen or promote progression of any underlying autoimmune diseases such as sarcoidosis, ulcerative colitis, Crohn's disease, psoriasis, rheumatoid disorders, scleroderma, autoimmune nephritis disorders, Hashimoto's thyroiditis, and Lupus among others. I discussed the need for neutropenic precautions, basic hygiene/sanitation behaviors and dietary restrictions.    The patient's consent has been obtained to proceed with the immunotherapy.The patient will be referred to Immunotherapy School /[facility] for training and education on immunotherapy, use of antiemetics and/or anti-diarrheals, use of NSAID's, potential immunotherapy side-effects, and any specific recommendations and precautions with the particular immunotherapy agents.      I answered all of the patient's (and family's, if applicable) questions to the best of my ability and to their complete satisfaction. The patient acknowledged full understanding of the risks, recommendations and plan(s).       I answered all of the patient's (and family's, if applicable) questions to the best of my ability and to their complete satisfaction. The patient acknowledged full understanding of the risks, recommendations and plan(s).         Electronically signed by Drew Bai MD

## 2019-10-29 ENCOUNTER — INFUSION (OUTPATIENT)
Dept: INFUSION THERAPY | Facility: HOSPITAL | Age: 79
End: 2019-10-29
Attending: INTERNAL MEDICINE
Payer: MEDICARE

## 2019-10-29 ENCOUNTER — OFFICE VISIT (OUTPATIENT)
Dept: HEMATOLOGY/ONCOLOGY | Facility: CLINIC | Age: 79
End: 2019-10-29
Payer: MEDICARE

## 2019-10-29 VITALS
WEIGHT: 214.5 LBS | HEART RATE: 60 BPM | BODY MASS INDEX: 27.53 KG/M2 | TEMPERATURE: 98 F | DIASTOLIC BLOOD PRESSURE: 65 MMHG | RESPIRATION RATE: 18 BRPM | OXYGEN SATURATION: 97 % | SYSTOLIC BLOOD PRESSURE: 137 MMHG | HEIGHT: 74 IN

## 2019-10-29 VITALS
HEART RATE: 71 BPM | SYSTOLIC BLOOD PRESSURE: 158 MMHG | WEIGHT: 214 LBS | RESPIRATION RATE: 18 BRPM | BODY MASS INDEX: 27.48 KG/M2 | TEMPERATURE: 98 F | DIASTOLIC BLOOD PRESSURE: 78 MMHG

## 2019-10-29 DIAGNOSIS — C83.00 LYMPHOMA, SMALL LYMPHOCYTIC: Primary | ICD-10-CM

## 2019-10-29 DIAGNOSIS — C91.10 CLL (CHRONIC LYMPHOCYTIC LEUKEMIA): Primary | ICD-10-CM

## 2019-10-29 DIAGNOSIS — D50.9 IRON DEFICIENCY ANEMIA, UNSPECIFIED IRON DEFICIENCY ANEMIA TYPE: ICD-10-CM

## 2019-10-29 DIAGNOSIS — Z85.118 HISTORY OF LUNG CANCER: ICD-10-CM

## 2019-10-29 DIAGNOSIS — C91.10 CLL (CHRONIC LYMPHOCYTIC LEUKEMIA): ICD-10-CM

## 2019-10-29 PROCEDURE — 99214 OFFICE O/P EST MOD 30 MIN: CPT | Mod: S$GLB,,, | Performed by: INTERNAL MEDICINE

## 2019-10-29 PROCEDURE — 99214 PR OFFICE/OUTPT VISIT, EST, LEVL IV, 30-39 MIN: ICD-10-PCS | Mod: S$GLB,,, | Performed by: INTERNAL MEDICINE

## 2019-10-29 PROCEDURE — 25000003 PHARM REV CODE 250: Performed by: INTERNAL MEDICINE

## 2019-10-29 PROCEDURE — 63600175 PHARM REV CODE 636 W HCPCS: Performed by: INTERNAL MEDICINE

## 2019-10-29 PROCEDURE — 96375 TX/PRO/DX INJ NEW DRUG ADDON: CPT

## 2019-10-29 PROCEDURE — 96413 CHEMO IV INFUSION 1 HR: CPT

## 2019-10-29 PROCEDURE — 96367 TX/PROPH/DG ADDL SEQ IV INF: CPT

## 2019-10-29 PROCEDURE — S0028 INJECTION, FAMOTIDINE, 20 MG: HCPCS | Performed by: INTERNAL MEDICINE

## 2019-10-29 PROCEDURE — A4216 STERILE WATER/SALINE, 10 ML: HCPCS | Performed by: INTERNAL MEDICINE

## 2019-10-29 PROCEDURE — 96415 CHEMO IV INFUSION ADDL HR: CPT

## 2019-10-29 RX ORDER — SODIUM CHLORIDE 0.9 % (FLUSH) 0.9 %
10 SYRINGE (ML) INJECTION
Status: DISCONTINUED | OUTPATIENT
Start: 2019-10-29 | End: 2019-10-29 | Stop reason: HOSPADM

## 2019-10-29 RX ORDER — FAMOTIDINE 10 MG/ML
20 INJECTION INTRAVENOUS
Status: COMPLETED | OUTPATIENT
Start: 2019-10-29 | End: 2019-10-29

## 2019-10-29 RX ORDER — MEPERIDINE HYDROCHLORIDE 25 MG/ML
25 INJECTION INTRAMUSCULAR; INTRAVENOUS; SUBCUTANEOUS
Status: DISCONTINUED | OUTPATIENT
Start: 2019-10-29 | End: 2019-10-29 | Stop reason: HOSPADM

## 2019-10-29 RX ORDER — HEPARIN 100 UNIT/ML
500 SYRINGE INTRAVENOUS
Status: DISCONTINUED | OUTPATIENT
Start: 2019-10-29 | End: 2019-10-29 | Stop reason: HOSPADM

## 2019-10-29 RX ORDER — ACETAMINOPHEN 325 MG/1
650 TABLET ORAL
Status: COMPLETED | OUTPATIENT
Start: 2019-10-29 | End: 2019-10-29

## 2019-10-29 RX ADMIN — SODIUM CHLORIDE: 0.9 INJECTION, SOLUTION INTRAVENOUS at 07:10

## 2019-10-29 RX ADMIN — ACETAMINOPHEN 325 MG: 325 TABLET ORAL at 07:10

## 2019-10-29 RX ADMIN — DIPHENHYDRAMINE HYDROCHLORIDE 50 MG: 50 INJECTION INTRAMUSCULAR; INTRAVENOUS at 07:10

## 2019-10-29 RX ADMIN — RITUXIMAB 833 MG: 10 INJECTION, SOLUTION INTRAVENOUS at 08:10

## 2019-10-29 RX ADMIN — FAMOTIDINE 20 MG: 10 INJECTION, SOLUTION INTRAVENOUS at 07:10

## 2019-10-29 RX ADMIN — SODIUM CHLORIDE, PRESERVATIVE FREE 10 ML: 5 INJECTION INTRAVENOUS at 11:10

## 2019-11-04 ENCOUNTER — DOCUMENTATION ONLY (OUTPATIENT)
Dept: HEMATOLOGY/ONCOLOGY | Facility: CLINIC | Age: 79
End: 2019-11-04

## 2019-11-04 ENCOUNTER — TELEPHONE (OUTPATIENT)
Dept: HEMATOLOGY/ONCOLOGY | Facility: CLINIC | Age: 79
End: 2019-11-04

## 2019-11-04 NOTE — PROGRESS NOTES
Spoke to patient and spouse regarding new oral chemo Venetoclax. Pt states that have not heard from any pharmacy regarding ship date. Side effect profile as well as safe handling of chemo pills discussed with pt and spouse. Both verbalized understanding.  Also discussed ramp up dosing on medication. This nurse requested call back from pt/spouse once they hear from pharmacy regarding shipping date of med. Both verbalized understanding, call back number given.

## 2019-11-08 ENCOUNTER — TELEPHONE (OUTPATIENT)
Dept: HEMATOLOGY/ONCOLOGY | Facility: CLINIC | Age: 79
End: 2019-11-08

## 2019-11-08 DIAGNOSIS — G47.00 INSOMNIA, UNSPECIFIED TYPE: Primary | ICD-10-CM

## 2019-11-08 NOTE — TELEPHONE ENCOUNTER
----- Message from Cate Saab sent at 11/8/2019 10:31 AM CST -----  The patient called to ask if Dr Bai would write him a prescription for Trazadone 300mg because Dr Erazo usually writes the prescription for him [place] the patient. Please give him a call back at [phone].

## 2019-11-08 NOTE — TELEPHONE ENCOUNTER
Patient notified that we will call in a 1 month supply of his Trazodone.  We called [facility] to verify the dose.  He is taking Trazodone 100 mg PO every HS # 30 called in with no refills.

## 2019-11-11 RX ORDER — TRAZODONE HYDROCHLORIDE 100 MG/1
TABLET ORAL
Qty: 90 TABLET | Refills: 0 | Status: SHIPPED | OUTPATIENT
Start: 2019-11-11 | End: 2022-11-04

## 2019-11-12 ENCOUNTER — TELEPHONE (OUTPATIENT)
Dept: HEMATOLOGY/ONCOLOGY | Facility: CLINIC | Age: 79
End: 2019-11-12

## 2019-11-12 NOTE — TELEPHONE ENCOUNTER
Spoke to wife Peri, medication Venetoclax is scheduled to arrive to patient tomorrow. Wife requested call back tomorrow afternoon to discuss dosing.  Venetoclax side effects, as well as when to seek medical attention have been discussed with patient on previous calls.  This Nurse will reinforce on tomorrow's call.      ----- Message from Niki Islas sent at 11/12/2019 10:12 AM CST -----  Rosemary,    Patient called this morning with questions on Venetoclax- he states he doesn't remember getting chemo school for this medication and is requesting a call back after 12:30pm so his wife is present as he is hard of hearing. Thank you.

## 2019-11-13 ENCOUNTER — TELEPHONE (OUTPATIENT)
Dept: HEMATOLOGY/ONCOLOGY | Facility: CLINIC | Age: 79
End: 2019-11-13

## 2019-11-13 NOTE — TELEPHONE ENCOUNTER
Spoke to patient and spouse. They have received oral chemo Venetoclax today.  Wife says ramp-up  dosing instructions are included with meds. Reinforcement given on side-effects as well as contining to have labs draw. Both Patient and spouse verbalized understanding

## 2019-11-15 ENCOUNTER — TELEPHONE (OUTPATIENT)
Dept: HEMATOLOGY/ONCOLOGY | Facility: CLINIC | Age: 79
End: 2019-11-15

## 2019-11-15 NOTE — TELEPHONE ENCOUNTER
Called patient about the starting of Venclexta.  He is taking his Rituxan on Tuesday.  Instructed him that he needs to have labs drawn 2 times a week.  He will come in on Monday and Friday to have his blood work done.  Instructed the patient to drink lots of fluids.

## 2019-11-19 ENCOUNTER — TELEPHONE (OUTPATIENT)
Dept: HEMATOLOGY/ONCOLOGY | Facility: CLINIC | Age: 79
End: 2019-11-19

## 2019-11-19 DIAGNOSIS — C91.10 CLL (CHRONIC LYMPHOCYTIC LEUKEMIA): Primary | ICD-10-CM

## 2019-11-19 RX ORDER — ALLOPURINOL 100 MG/1
100 TABLET ORAL DAILY
Qty: 30 TABLET | Refills: 5 | Status: SHIPPED | OUTPATIENT
Start: 2019-11-19 | End: 2020-11-18

## 2019-11-19 NOTE — TELEPHONE ENCOUNTER
Notified pt that Alopurinol has been called into his pharmacy. He is to start that today or tomorrow. He was also informed to the the Venetaclax on the same day of his next chemo treatment which is 11/26/19. Pt verbalized understanding.

## 2019-11-25 ENCOUNTER — TELEPHONE (OUTPATIENT)
Dept: HEMATOLOGY/ONCOLOGY | Facility: CLINIC | Age: 79
End: 2019-11-25

## 2019-11-25 ENCOUNTER — LAB VISIT (OUTPATIENT)
Dept: LAB | Facility: HOSPITAL | Age: 79
End: 2019-11-25
Attending: INTERNAL MEDICINE
Payer: MEDICARE

## 2019-11-25 DIAGNOSIS — C83.00 LYMPHOMA, SMALL LYMPHOCYTIC: ICD-10-CM

## 2019-11-25 DIAGNOSIS — Z85.118 HISTORY OF LUNG CANCER: ICD-10-CM

## 2019-11-25 DIAGNOSIS — D50.9 IRON DEFICIENCY ANEMIA, UNSPECIFIED IRON DEFICIENCY ANEMIA TYPE: ICD-10-CM

## 2019-11-25 DIAGNOSIS — C91.10 CLL (CHRONIC LYMPHOCYTIC LEUKEMIA): ICD-10-CM

## 2019-11-25 LAB
ALBUMIN SERPL BCP-MCNC: 4 G/DL (ref 3.5–5.2)
ALP SERPL-CCNC: 39 U/L (ref 55–135)
ALT SERPL W/O P-5'-P-CCNC: 18 U/L (ref 10–44)
ANION GAP SERPL CALC-SCNC: 9 MMOL/L (ref 8–16)
AST SERPL-CCNC: 19 U/L (ref 10–40)
BASOPHILS # BLD AUTO: 0.01 K/UL (ref 0–0.2)
BASOPHILS NFR BLD: 0.3 % (ref 0–1.9)
BILIRUB SERPL-MCNC: 1 MG/DL (ref 0.1–1)
BUN SERPL-MCNC: 13 MG/DL (ref 8–23)
CALCIUM SERPL-MCNC: 8.9 MG/DL (ref 8.7–10.5)
CEA SERPL-MCNC: 1.1 NG/ML (ref 0–5)
CHLORIDE SERPL-SCNC: 99 MMOL/L (ref 95–110)
CO2 SERPL-SCNC: 27 MMOL/L (ref 23–29)
CREAT SERPL-MCNC: 1 MG/DL (ref 0.5–1.4)
DIFFERENTIAL METHOD: ABNORMAL
EOSINOPHIL # BLD AUTO: 0.1 K/UL (ref 0–0.5)
EOSINOPHIL NFR BLD: 1.5 % (ref 0–8)
ERYTHROCYTE [DISTWIDTH] IN BLOOD BY AUTOMATED COUNT: 13.2 % (ref 11.5–14.5)
EST. GFR  (AFRICAN AMERICAN): >60 ML/MIN/1.73 M^2
EST. GFR  (NON AFRICAN AMERICAN): >60 ML/MIN/1.73 M^2
FERRITIN SERPL-MCNC: 34 NG/ML (ref 20–300)
GLUCOSE SERPL-MCNC: 196 MG/DL (ref 70–110)
HCT VFR BLD AUTO: 41.5 % (ref 40–54)
HGB BLD-MCNC: 15 G/DL (ref 14–18)
IMM GRANULOCYTES # BLD AUTO: 0.01 K/UL (ref 0–0.04)
IMM GRANULOCYTES NFR BLD AUTO: 0.3 % (ref 0–0.5)
IRON SERPL-MCNC: 167 UG/DL (ref 45–160)
LYMPHOCYTES # BLD AUTO: 1.3 K/UL (ref 1–4.8)
LYMPHOCYTES NFR BLD: 38.4 % (ref 18–48)
MCH RBC QN AUTO: 31.2 PG (ref 27–31)
MCHC RBC AUTO-ENTMCNC: 36.1 G/DL (ref 32–36)
MCV RBC AUTO: 86 FL (ref 82–98)
MONOCYTES # BLD AUTO: 0.5 K/UL (ref 0.3–1)
MONOCYTES NFR BLD: 15.4 % (ref 4–15)
NEUTROPHILS # BLD AUTO: 1.5 K/UL (ref 1.8–7.7)
NEUTROPHILS NFR BLD: 44.1 % (ref 38–73)
NRBC BLD-RTO: 0 /100 WBC
PLATELET # BLD AUTO: 130 K/UL (ref 150–350)
PMV BLD AUTO: 9.9 FL (ref 9.2–12.9)
POTASSIUM SERPL-SCNC: 4 MMOL/L (ref 3.5–5.1)
PROT SERPL-MCNC: 7.4 G/DL (ref 6–8.4)
RBC # BLD AUTO: 4.81 M/UL (ref 4.6–6.2)
SATURATED IRON: 44 % (ref 20–50)
SODIUM SERPL-SCNC: 135 MMOL/L (ref 136–145)
TOTAL IRON BINDING CAPACITY: 381 UG/DL (ref 250–450)
TRANSFERRIN SERPL-MCNC: 272 MG/DL (ref 200–375)
WBC # BLD AUTO: 3.44 K/UL (ref 3.9–12.7)

## 2019-11-25 PROCEDURE — 82378 CARCINOEMBRYONIC ANTIGEN: CPT

## 2019-11-25 PROCEDURE — 36415 COLL VENOUS BLD VENIPUNCTURE: CPT

## 2019-11-25 PROCEDURE — 80053 COMPREHEN METABOLIC PANEL: CPT

## 2019-11-25 PROCEDURE — 82728 ASSAY OF FERRITIN: CPT

## 2019-11-25 PROCEDURE — 83540 ASSAY OF IRON: CPT

## 2019-11-25 PROCEDURE — 85025 COMPLETE CBC W/AUTO DIFF WBC: CPT

## 2019-11-25 NOTE — TELEPHONE ENCOUNTER
Called patient and spoke with wife Peri.  Instructed her that Mr Kuhn will have his chemo tomorrow.  Make sure he takes his Allopurinol and Venoclux tomorrow.  Instructed her that Dr. aBi will see patient while he is in the  infusion clinic

## 2019-11-25 NOTE — PROGRESS NOTES
[facility]ology/On[facility]  PROGRESS NOTE - Follow-up Visit      Subjective:       Patient ID:   NAME: Conrad Kuhn : 1940     79 y.o. male    Referring Doc: Julien  Other Physicians: Ced Erazo Joubert, Srinivas, Pinsky    Chief Complaint:  CLL f/u    History of Present Illness:     Patient returns today for a regularly scheduled follow-up visit.  The patient is here today to go over the results of the recently ordered labs, tests and studies.. He is here by himself today. He is breathing ok. He denies any CP, SOB, HA's or N/V. He last saw Dr Don at [facility] on 10/21/2019. He has been on the weekly rituximab and is now switching to the rituximab monthly with oral Venetoclax . His neck swelling and LAD has improved and reduced in size.         ROS:   GEN: normal without any fever, night sweats or weight loss  HEENT: normal with no HA's, sore throat, stiff neck, changes in vision  CV: normal with no CP, SOB, PND, MAYER or orthopnea  PULM: normal with no SOB, cough, hemoptysis, sputum or pleuritic pain  GI: normal with no abdominal pain, nausea, vomiting, constipation, diarrhea, melanotic stools, BRBPR, or hematemesis; no dysphagia  : urine frequency stable  BREAST: normal with no mass, discharge, pain  SKIN: normal with no rash, erythema, bruising, or swelling    Allergies:  Review of patient's allergies indicates:  No Known Allergies    Medications:    Current Outpatient Medications:     allopurinol (ZYLOPRIM) 100 MG tablet, Take 1 tablet (100 mg total) by mouth once daily., Disp: 30 tablet, Rfl: 5    amoxicillin (AMOXIL) 875 MG tablet, Take 875 mg by mouth 2 (two) times daily., Disp: , Rfl:     atorvastatin (LIPITOR) 20 MG tablet, TK 1 T PO QD, Disp: , Rfl:     azelastine (ASTELIN) 137 mcg (0.1 %) nasal spray, 1 spray by Nasal route., Disp: , Rfl:     blood sugar diagnostic (FREESTYLE LITE STRIPS) Strp, by Other route., Disp: , Rfl:     blood-glucose meter (FREESTYLE LITE METER) kit, Use as  instructed, Disp: , Rfl:     ferrous sulfate (FEOSOL) 325 mg (65 mg iron) Tab tablet, Take 1 tablet (325 mg total) by mouth once daily., Disp: , Rfl:     FREESTYLE LANCETS 28 gauge lancets, TEST TWICE DAILY, Disp: , Rfl: 1    gabapentin (NEURONTIN) 300 MG capsule, TK ONE C PO  BID, Disp: , Rfl: 0    HYDROcodone-acetaminophen (NORCO)  mg per tablet, TK 1 T PO Q 8 H PRN P, Disp: , Rfl: 0    levalbuterol (XOPENEX) 1.25 mg/3 mL nebulizer solution, 3 mLs., Disp: , Rfl:     metFORMIN (GLUCOPHAGE) 500 MG tablet, TK 1 T PO BID WC, Disp: , Rfl: 2    metoprolol succinate (TOPROL-XL) 25 MG 24 hr tablet, TK 1 T PO BID, Disp: , Rfl:     neomycin-polymyxin-dexamethasone (DEXACINE) 3.5 mg/g-10,000 unit/g-0.1 % Oint, Apply to eye daily as needed., Disp: , Rfl:     ondansetron (ZOFRAN-ODT) 8 MG TbDL, DIS ONE T PO Q 8 H PRN N, Disp: , Rfl: 10    promethazine (PHENERGAN) 25 MG tablet, TK 1 T PO Q 6 H PRN N, Disp: , Rfl: 10    traZODone (DESYREL) 100 MG tablet, TAKE 1 TABLET BY MOUTH EVERY NIGHT AT BEDTIME, Disp: 90 tablet, Rfl: 0    valACYclovir (VALTREX) 500 MG tablet, Take 2 tablets (1,000 mg total) by mouth 2 (two) times daily., Disp: 120 tablet, Rfl: 6    water Liqd 150 mL with MILK OF MAGNESIA 400 mg/5 mL Susp 400 mg, diphenhydrAMINE 12.5 mg/5 mL Elix 60 mg, nystatin 100,000 unit/mL Susp 500,000 Units, SWISH AND SPIT 30 ML PO Q 4 H PRN, Disp: , Rfl: 0  No current facility-administered medications for this visit.     Facility-Administered Medications Ordered in Other Visits:     meperidine (PF) injection 25 mg, 25 mg, Intravenous, PRN, Drew Bai MD    riTUXimab (RITUXAN) 375 mg/m2 = 833 mg in sodium chloride 0.9% 833 mL infusion (conc: 1 mg/mL), 375 mg/m2 (Treatment Plan Recorded), Intravenous, 1 time in Clinic/HOD, Drew Bai MD    sodium chloride 0.9% 250 mL flush bag, , Intravenous, 1 time in Clinic/HOD, Drew Bai MD    sodium chloride 0.9% flush 10 mL, 10 mL, Intravenous,  PRN, Drew Bai MD    PMHx/PSHx Updates:  See patient's last visit with me on 10/29/2019.  See H&P on 1/3/2019        Pathology:  Cancer Staging  No matching staging information was found for the patient.          Objective:     Vitals:  Blood pressure (!) 155/80, pulse 68, temperature 97.7 °F (36.5 °C), resp. rate 18, weight 98 kg (216 lb 0.8 oz).    Physical Examination:   GEN: no apparent distress, comfortable; AAOx3  HEAD: atraumatic and normocephalic  EYES: no pallor, no icterus, PERRLA  ENT: OMM, no pharyngeal erythema, external ears WNL; no nasal discharge; no thrush  NECK: no masses, thyroid normal, trachea midline, , supple; increase LN's on right neck reduced in size  CV: RRR with no murmur; normal pulse; normal S1 and S2; no pedal edema  CHEST: Normal respiratory effort; CTAB; normal breath sounds; no wheeze or crackles  ABDOM: nontender and nondistended; soft; normal bowel sounds; no rebound/guarding  MUSC/Skeletal: ROM normal; no crepitus; joints normal; no deformities or arthropathy  EXTREM: no clubbing, cyanosis, inflammation or swelling; bruise on LUE resolved  SKIN: no rashes, lesions, ulcers, petechiae or subcutaneous nodules  : no keith  NEURO: grossly intact; motor/sensory WNL; AAOx3; no tremors  PSYCH: normal mood, affect and behavior  LYMPH: normal cervical, supraclavicular, axillary and groin LN's            Labs:   11/25/2019  Lab Results   Component Value Date    WBC 3.44 (L) 11/25/2019    HGB 15.0 11/25/2019    HCT 41.5 11/25/2019    MCV 86 11/25/2019     (L) 11/25/2019       BMP  Lab Results   Component Value Date     (L) 11/25/2019    K 4.0 11/25/2019    CL 99 11/25/2019    CO2 27 11/25/2019    BUN 13 11/25/2019    CREATININE 1.0 11/25/2019    CALCIUM 8.9 11/25/2019    ANIONGAP 9 11/25/2019    ESTGFRAFRICA >60.0 11/25/2019    EGFRNONAA >60.0 11/25/2019     Lab Results   Component Value Date    ALT 18 11/25/2019    AST 19 11/25/2019    ALKPHOS 39 (L) 11/25/2019     BILITOT 1.0 11/25/2019     Lab Results   Component Value Date    IRON 167 (H) 11/25/2019    TIBC 381 11/25/2019    FERRITIN 34 11/25/2019             Radiology/Diagnostic Studies:    CT abdom/pelvis 8/7/2019:        Impression       1. Multifocal lymphadenopathy in the chest, abdomen, and pelvis compatible with provided clinical history of lymphocytic leukemia.  There is mixed interval change when compared to prior studies.  Pathologic lymphadenopathy in the chest has increased significantly when compared to a CTA of the chest from May 2018.  Pathologic retroperitoneal lymphadenopathy has improved slightly when compared to a previous abdominal CT from February 2017.  Please see above details.  2. Mild aneurysmal dilation of the infrarenal abdominal aorta, with maximal transverse diameter of 3.7 cm.  Maximal transverse diameter on a prior study from 2017 was 2.5 cm.  3. Additional findings as above           [facility] Unknown Rad Eap    Result Date: 1/14/2019  CMS MANDATED QUALITY DATA - CT RADIATION - 436 All CT scans at this facility utilize dose modulation, iterative reconstruction, and/or weight based dosing when appropriate to reduce radiation dose to as low as reasonably achievable. Reason:Lymphoid leukemia TECHNIQUE: CT thorax with, CT abdomen  with, and CT pelvis with 100 mL Omnipaque 350. COMPARISON: CT chest dated 05/19/2018 and CT abdomen and pelvis dated 02/08/2017 CT THORAX: There is stable mediastinal, hilar and axillary adenopathy. There is coronary artery calcification. There is resolution of the groundglass opacities throughout the lungs. There are no confluent infiltrates, pulmonary nodules or pleural effusions. There are stable subcentimeter nodules in the left lobe of the thyroid gland. There are degenerative changes of the spine. CT ABDOMEN: The liver, pancreas, adrenal glands and gallbladder are normal. The spleen is enlarged. There is mesenteric and retroperitoneal adenopathy which is slightly  smaller in size. -There is a portacaval node measuring 37 x 21 mm and previously measured 38 x 27 mm. -Periportal lymph node measuring 41 x 18 mm, previously measured 48 x 29 mm. -Left periaortic adenopathy measuring 34 x 23 mm and previously measured 40 x 42 mm- The kidneys enhance symmetrically without hydronephrosis or calculi. There are no thick-walled or dilated bowel loops. There is vascular calcification of aorta. There is a 3.0 x 3.0 cm infrarenal abdominal aortic aneurysm. CT PELVIS: There is adenopathy along the pelvic sidewalls bilaterally which has decreased in size. -The left common iliac adenopathy measuring 36 x 11 mm, previously measured 46 x 14 mm -the right common iliac adenopathy measuring 46 x 10 mm. Previously measured 53 x 15 mm. The bladder is normal. The prostate gland is enlarged. There are degenerative changes of the spine. There is grade 1 anterolisthesis of L5 on S1 with bilateral pars defects.     IMPRESSION: Stable mediastinal, hilar and axillary adenopathy with resolution of the airspace disease within the lungs Decrease in size of the mesenteric, retroperitoneal and pelvic adenopathy. Stable infrarenal abdominal aortic aneurysm Splenomegaly     Read and electronically signed by: Jeniffer Zhang MD on 1/14/2019 9:14 AM CST JENIFFER ZHANG MD      I have reviewed all available lab results and radiology reports.    Assessment/Plan:   (1) 79 y.o. male with  diagnosis of CLL who has been referred by Dr Rony Victoria for continuation of care by medical hematology/oncology.   - BM biopsy  12/4/2015 with CLL  - diagnosis of SLL in 2004 and s/p FCR x6 in 2007  - s/p imbruvica in past (stopped in May 2018)  - latest wbc at 4.8; lymph 56%  - latest CT on 8/7/2019 showing increase in the LAD  - platelets are 111,000  - He was recently hospitalized at [facility] and seen by Dr Wheeler. Dr Wheeler has recommended Rituximab. He is starting tomorrow.   - discussed the rituximab regimen and the potential  side-effect profile; he is getting chemotherapy school today with [facility]  - he saw Dr Don on Oct 21st 2019  - he has had 3 of the 4 weeks of rituximab so far; Dr Don recommends him to eventually get rituximab monthly x6 with daily oral Venetoclax for two years total.   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  - followed  By Dr Kee  - CEA 1.2  - CT scans on 1/14/2019 stable     (3) Iron deficiency anemia s/p IV iron couple weeks ago  - hgb 14.6 and adequate, and the iron panel currently wnl     (4) HTN - on BP meds     (6) Chronic neck and back issues - followed by Dr Ryan Rivero     (7) DM - currently off all meds     (8) Hypercholesterolemia - on meds    (9)  - followed by Dr Whitney        VISIT DIAGNOSES:      Lymphoma, small lymphocytic (2004)    Iron deficiency anemia, unspecified iron deficiency anemia type    History of lung cancer - ANDRES NSCLC 2004    CLL (chronic lymphocytic leukemia)          PLAN:  1. check labs weekly while on therapy  2. F/u with PCP, pain management  3. F/u with PCP, Pulm, , etc  4. F/u with Dr Don at [facility] in Dec 2019  5. Continue oral iron  6. F/u with Dr Yee about the dysphagia  7. Proceed with monthly rituximab with daily Venetoclax  8. S/p chemotherapy school and training on the Venetoclax    RTC in 3-4 weeks    Fax note to Kacey Kee Joel Berry, Pinsky, Devraj, Saba/Joselito Wheeler        Discussion:       I spent over 25 mins of time with the patient. Reviewed results of the recently ordered labs, tests and studies; made directives with regards to the results. Over half of this time was spent couseling and coordinating care.    I have explained all of the above in detail and the patient understands all of the current recommendation(s). I have answered all of their questions to the best of my ability and to their  complete satisfaction.   The patient is to continue with the current management plan.        Chemotherapy Discussion:      I discussed the available treatment option(s) in accordance with the latest/current national evidence-based guidelines (NCCN, UpToDate, NCI, ASCO, etc where applicable), their overall age/condition and their co-morbidities. I also went over the risks and benefits of the chemotherapy with regard to their particular cancer type, their cancer stage, their age/condition, and their co-morbidities. I provided literature on the chemotherapy regimen and discussed the chemotherapy side-effect profiles of the drug(s). I discussed the importance of compliance with obtaining and monitoring weekly lab work, and went over the potential hematopathology issues and risks with anemia, leucopenia and thrombocytopenia that can occur with chemotherapy. I discussed the potential risks of liver and kidney damage, which could be permanent and could necessitate dialysis long-term if kidney failure developed. I discussed the emetic and/or diarrheal potential of the regimen and the potential need for use of antiemetic and anti-diarrheal medications. I discussed the risk for development of anaphylactic shock, bronchospasm, dysrhythmia, and respiratory/cardiovascular arrest and/or failure. I discussed the potential risks for development of alopecia, cold sensory issues, ringing in ears, vertigo, cataracts, glaucoma, and neuropathy, all of which could end up being chronic and life-long. Some chemotherpyI discussed the risks of hand-foot syndrome and rashes, and development of other autoimmune mediated processes such as pneumonitis, hepatitis, and colitis which could be life threatening. I discussed the risks of the potential development of a rare but fatal viral mediated disease known as PML (Progressive Multifocal Leukoencephalopathy), and risk of future development of leukemia and/or lymphoma from use of certain  chemotherapy agents. I discussed the need for neutropenic precautions, basic hygiene/sanitation behaviors and dietary restrictions.    The patient's consent has been obtained to proceed with the chemotherapy.The patient will be referred to Chemotherapy School [facility] for training and education on chemotherapy, use of antiemetics and/or anti-diarrheals, use of NSAID's, potential chemotherapy side-effects, and any specific recommendations and precautions with the particular chemotherapy agents.       Immunologic Therapy Discussion:    I discussed the available treatment option(s) in accordance with the latest/current national evidence-based guidelines (NCCN, UpToDate, NCI, ASCO, etc where applicable), their overall age/condition and their co-morbidities. I went over the risks and benefits of the immunotherapy with regard to their particular cancer type, their cancer stage, their age/condition, and their co-morbidities. I provided literature on the immunotherapy regimen and discussed the immunotherapy side-effect profiles of the drug(s). I discussed the importance of compliance with obtaining and monitoring weekly lab work, and went over the potential hematopathology issues and risks of hemato-pathologic issues with anemia, leucopenia and/or thrombocytopenia and effects on thyroid function that can occur with immunotherapy. The patient will most likely need to have there thyroid functions monitored by their PCP and may need to take thyroid medication while on the immunotherapy. I discussed the potential risks of liver and kidney damage, which could be permanent and could necessitate dialysis long-term if kidney failure developed. I discussed the emetic and/or diarrheal potential of the regimen and the potential need for use of antiemetic and anti-diarrheal medications. I discussed the risk for development of anaphylactic shock, bronchospasm, dysrhythmia, and respiratory/cardiovascular arrest and/or failure.  I discussed the potential risks for development of alopecia, cold sensory issues, ringing in ears, vertigo and neuropathy, all of which could end up being chronic and life-long. I discussed the risks of hand-foot syndrome and rashes, and development of other autoimmune mediated processes such as pneumonitis, hepatitis and colitis which could potentially be life threatening. I discussed the risks of the potential development of a rare but fatal viral mediated disease known as PML (Progressive Multifocal Leukoencephalopathy), and risk of future development of leukemia and/or lymphoma from use of certain immunotherapy agents. I discussed the possibility that immunologic therapy cold worsen or promote progression of any underlying autoimmune diseases such as sarcoidosis, ulcerative colitis, Crohn's disease, psoriasis, rheumatoid disorders, scleroderma, autoimmune nephritis disorders, Hashimoto's thyroiditis, and Lupus among others. I discussed the need for neutropenic precautions, basic hygiene/sanitation behaviors and dietary restrictions.    The patient's consent has been obtained to proceed with the immunotherapy.The patient will be referred to Immunotherapy School /[facility] for training and education on immunotherapy, use of antiemetics and/or anti-diarrheals, use of NSAID's, potential immunotherapy side-effects, and any specific recommendations and precautions with the particular immunotherapy agents.      I answered all of the patient's (and family's, if applicable) questions to the best of my ability and to their complete satisfaction. The patient acknowledged full understanding of the risks, recommendations and plan(s).       I answered all of the patient's (and family's, if applicable) questions to the best of my ability and to their complete satisfaction. The patient acknowledged full understanding of the risks, recommendations and plan(s).         Electronically signed by Drew Bai  MD

## 2019-11-26 ENCOUNTER — INFUSION (OUTPATIENT)
Dept: INFUSION THERAPY | Facility: HOSPITAL | Age: 79
End: 2019-11-26
Attending: INTERNAL MEDICINE
Payer: MEDICARE

## 2019-11-26 ENCOUNTER — OFFICE VISIT (OUTPATIENT)
Dept: HEMATOLOGY/ONCOLOGY | Facility: CLINIC | Age: 79
End: 2019-11-26
Payer: MEDICARE

## 2019-11-26 VITALS
WEIGHT: 216.06 LBS | DIASTOLIC BLOOD PRESSURE: 80 MMHG | HEART RATE: 68 BPM | TEMPERATURE: 98 F | SYSTOLIC BLOOD PRESSURE: 155 MMHG | BODY MASS INDEX: 27.74 KG/M2 | RESPIRATION RATE: 18 BRPM

## 2019-11-26 VITALS
HEIGHT: 74 IN | SYSTOLIC BLOOD PRESSURE: 158 MMHG | OXYGEN SATURATION: 96 % | WEIGHT: 216.06 LBS | TEMPERATURE: 98 F | HEART RATE: 77 BPM | BODY MASS INDEX: 27.73 KG/M2 | RESPIRATION RATE: 18 BRPM | DIASTOLIC BLOOD PRESSURE: 72 MMHG

## 2019-11-26 DIAGNOSIS — C83.00 LYMPHOMA, SMALL LYMPHOCYTIC: Primary | ICD-10-CM

## 2019-11-26 DIAGNOSIS — D50.9 IRON DEFICIENCY ANEMIA, UNSPECIFIED IRON DEFICIENCY ANEMIA TYPE: ICD-10-CM

## 2019-11-26 DIAGNOSIS — C91.10 CLL (CHRONIC LYMPHOCYTIC LEUKEMIA): Primary | ICD-10-CM

## 2019-11-26 DIAGNOSIS — C91.10 CLL (CHRONIC LYMPHOCYTIC LEUKEMIA): ICD-10-CM

## 2019-11-26 DIAGNOSIS — Z85.118 HISTORY OF LUNG CANCER: ICD-10-CM

## 2019-11-26 PROCEDURE — 96367 TX/PROPH/DG ADDL SEQ IV INF: CPT

## 2019-11-26 PROCEDURE — 96413 CHEMO IV INFUSION 1 HR: CPT

## 2019-11-26 PROCEDURE — 25000003 PHARM REV CODE 250: Performed by: INTERNAL MEDICINE

## 2019-11-26 PROCEDURE — 1159F PR MEDICATION LIST DOCUMENTED IN MEDICAL RECORD: ICD-10-PCS | Mod: S$GLB,,, | Performed by: INTERNAL MEDICINE

## 2019-11-26 PROCEDURE — S0028 INJECTION, FAMOTIDINE, 20 MG: HCPCS | Performed by: INTERNAL MEDICINE

## 2019-11-26 PROCEDURE — 99214 PR OFFICE/OUTPT VISIT, EST, LEVL IV, 30-39 MIN: ICD-10-PCS | Mod: S$GLB,,, | Performed by: INTERNAL MEDICINE

## 2019-11-26 PROCEDURE — 63600175 PHARM REV CODE 636 W HCPCS: Mod: JW,JG | Performed by: INTERNAL MEDICINE

## 2019-11-26 PROCEDURE — 99214 OFFICE O/P EST MOD 30 MIN: CPT | Mod: S$GLB,,, | Performed by: INTERNAL MEDICINE

## 2019-11-26 PROCEDURE — 1159F MED LIST DOCD IN RCRD: CPT | Mod: S$GLB,,, | Performed by: INTERNAL MEDICINE

## 2019-11-26 PROCEDURE — 96375 TX/PRO/DX INJ NEW DRUG ADDON: CPT

## 2019-11-26 PROCEDURE — A4216 STERILE WATER/SALINE, 10 ML: HCPCS | Performed by: INTERNAL MEDICINE

## 2019-11-26 PROCEDURE — 96415 CHEMO IV INFUSION ADDL HR: CPT

## 2019-11-26 RX ORDER — SODIUM CHLORIDE 0.9 % (FLUSH) 0.9 %
10 SYRINGE (ML) INJECTION
Status: DISCONTINUED | OUTPATIENT
Start: 2019-11-26 | End: 2019-11-26 | Stop reason: HOSPADM

## 2019-11-26 RX ORDER — ACETAMINOPHEN 325 MG/1
650 TABLET ORAL
Status: COMPLETED | OUTPATIENT
Start: 2019-11-26 | End: 2019-11-26

## 2019-11-26 RX ORDER — HEPARIN 100 UNIT/ML
500 SYRINGE INTRAVENOUS
Status: CANCELLED | OUTPATIENT
Start: 2019-11-26

## 2019-11-26 RX ORDER — SODIUM CHLORIDE 0.9 % (FLUSH) 0.9 %
10 SYRINGE (ML) INJECTION
Status: CANCELLED | OUTPATIENT
Start: 2019-11-26

## 2019-11-26 RX ORDER — MEPERIDINE HYDROCHLORIDE 25 MG/ML
25 INJECTION INTRAMUSCULAR; INTRAVENOUS; SUBCUTANEOUS
Status: CANCELLED | OUTPATIENT
Start: 2019-11-26

## 2019-11-26 RX ORDER — MEPERIDINE HYDROCHLORIDE 25 MG/ML
25 INJECTION INTRAMUSCULAR; INTRAVENOUS; SUBCUTANEOUS
Status: DISCONTINUED | OUTPATIENT
Start: 2019-11-26 | End: 2019-11-26 | Stop reason: HOSPADM

## 2019-11-26 RX ORDER — ACETAMINOPHEN 325 MG/1
650 TABLET ORAL
Status: CANCELLED | OUTPATIENT
Start: 2019-11-26

## 2019-11-26 RX ORDER — FAMOTIDINE 10 MG/ML
20 INJECTION INTRAVENOUS
Status: CANCELLED | OUTPATIENT
Start: 2019-11-26

## 2019-11-26 RX ORDER — FAMOTIDINE 10 MG/ML
20 INJECTION INTRAVENOUS
Status: COMPLETED | OUTPATIENT
Start: 2019-11-26 | End: 2019-11-26

## 2019-11-26 RX ADMIN — RITUXIMAB 833 MG: 10 INJECTION, SOLUTION INTRAVENOUS at 08:11

## 2019-11-26 RX ADMIN — SODIUM CHLORIDE, PRESERVATIVE FREE 10 ML: 5 INJECTION INTRAVENOUS at 11:11

## 2019-11-26 RX ADMIN — ACETAMINOPHEN 650 MG: 325 TABLET ORAL at 07:11

## 2019-11-26 RX ADMIN — FAMOTIDINE 20 MG: 10 INJECTION, SOLUTION INTRAVENOUS at 07:11

## 2019-11-26 RX ADMIN — DIPHENHYDRAMINE HYDROCHLORIDE 50 MG: 50 INJECTION INTRAMUSCULAR; INTRAVENOUS at 07:11

## 2019-12-02 ENCOUNTER — LAB VISIT (OUTPATIENT)
Dept: LAB | Facility: HOSPITAL | Age: 79
End: 2019-12-02
Attending: INTERNAL MEDICINE
Payer: MEDICARE

## 2019-12-02 DIAGNOSIS — C91.10 LEUKEMIA, LYMPHOCYTIC, CHRONIC: Primary | ICD-10-CM

## 2019-12-02 DIAGNOSIS — Z85.118 PERSONAL HISTORY OF MALIGNANT NEOPLASM OF BRONCHUS AND LUNG: ICD-10-CM

## 2019-12-02 DIAGNOSIS — C83.00 LYMPHOCYTIC LYMPHOSARCOMA: ICD-10-CM

## 2019-12-02 DIAGNOSIS — D50.9 IRON DEFICIENCY ANEMIA, UNSPECIFIED: ICD-10-CM

## 2019-12-02 LAB
ALBUMIN SERPL BCP-MCNC: 4 G/DL (ref 3.5–5.2)
ALP SERPL-CCNC: 58 U/L (ref 55–135)
ALT SERPL W/O P-5'-P-CCNC: 16 U/L (ref 10–44)
ANION GAP SERPL CALC-SCNC: 9 MMOL/L (ref 8–16)
AST SERPL-CCNC: 18 U/L (ref 10–40)
BASOPHILS # BLD AUTO: 0.01 K/UL (ref 0–0.2)
BASOPHILS NFR BLD: 0.4 % (ref 0–1.9)
BILIRUB SERPL-MCNC: 0.7 MG/DL (ref 0.1–1)
BUN SERPL-MCNC: 15 MG/DL (ref 8–23)
CALCIUM SERPL-MCNC: 8.8 MG/DL (ref 8.7–10.5)
CEA SERPL-MCNC: 1.1 NG/ML (ref 0–5)
CHLORIDE SERPL-SCNC: 98 MMOL/L (ref 95–110)
CO2 SERPL-SCNC: 29 MMOL/L (ref 23–29)
CREAT SERPL-MCNC: 1 MG/DL (ref 0.5–1.4)
DIFFERENTIAL METHOD: ABNORMAL
EOSINOPHIL # BLD AUTO: 0 K/UL (ref 0–0.5)
EOSINOPHIL NFR BLD: 1.3 % (ref 0–8)
ERYTHROCYTE [DISTWIDTH] IN BLOOD BY AUTOMATED COUNT: 13.2 % (ref 11.5–14.5)
EST. GFR  (AFRICAN AMERICAN): >60 ML/MIN/1.73 M^2
EST. GFR  (NON AFRICAN AMERICAN): >60 ML/MIN/1.73 M^2
FERRITIN SERPL-MCNC: 34 NG/ML (ref 20–300)
GLUCOSE SERPL-MCNC: 318 MG/DL (ref 70–110)
HCT VFR BLD AUTO: 39.6 % (ref 40–54)
HGB BLD-MCNC: 14 G/DL (ref 14–18)
IMM GRANULOCYTES # BLD AUTO: 0 K/UL (ref 0–0.04)
IMM GRANULOCYTES NFR BLD AUTO: 0 % (ref 0–0.5)
IRON SERPL-MCNC: 99 UG/DL (ref 45–160)
LYMPHOCYTES # BLD AUTO: 0.8 K/UL (ref 1–4.8)
LYMPHOCYTES NFR BLD: 35.5 % (ref 18–48)
MCH RBC QN AUTO: 31 PG (ref 27–31)
MCHC RBC AUTO-ENTMCNC: 35.4 G/DL (ref 32–36)
MCV RBC AUTO: 88 FL (ref 82–98)
MONOCYTES # BLD AUTO: 0.4 K/UL (ref 0.3–1)
MONOCYTES NFR BLD: 15.8 % (ref 4–15)
NEUTROPHILS # BLD AUTO: 1.1 K/UL (ref 1.8–7.7)
NEUTROPHILS NFR BLD: 47 % (ref 38–73)
NRBC BLD-RTO: 0 /100 WBC
PLATELET # BLD AUTO: 135 K/UL (ref 150–350)
PMV BLD AUTO: 10.4 FL (ref 9.2–12.9)
POTASSIUM SERPL-SCNC: 4.4 MMOL/L (ref 3.5–5.1)
PROT SERPL-MCNC: 6.8 G/DL (ref 6–8.4)
RBC # BLD AUTO: 4.52 M/UL (ref 4.6–6.2)
SATURATED IRON: 27 % (ref 20–50)
SODIUM SERPL-SCNC: 136 MMOL/L (ref 136–145)
TOTAL IRON BINDING CAPACITY: 363 UG/DL (ref 250–450)
TRANSFERRIN SERPL-MCNC: 259 MG/DL (ref 200–375)
WBC # BLD AUTO: 2.28 K/UL (ref 3.9–12.7)

## 2019-12-02 PROCEDURE — 85025 COMPLETE CBC W/AUTO DIFF WBC: CPT

## 2019-12-02 PROCEDURE — 80053 COMPREHEN METABOLIC PANEL: CPT

## 2019-12-02 PROCEDURE — 83540 ASSAY OF IRON: CPT

## 2019-12-02 PROCEDURE — 82728 ASSAY OF FERRITIN: CPT

## 2019-12-02 PROCEDURE — 82378 CARCINOEMBRYONIC ANTIGEN: CPT

## 2019-12-02 PROCEDURE — 36415 COLL VENOUS BLD VENIPUNCTURE: CPT

## 2019-12-04 ENCOUNTER — TELEPHONE (OUTPATIENT)
Dept: HEMATOLOGY/ONCOLOGY | Facility: CLINIC | Age: 79
End: 2019-12-04

## 2019-12-04 NOTE — TELEPHONE ENCOUNTER
Called patient about his glucose is 318.  Patient's wife instructed me that he has been eating a lot of sweets because of the holiday and he is taking his medication.  Instructed her to tell him that he needs to watch what he is eating so his glucose level will come down.

## 2019-12-09 ENCOUNTER — LAB VISIT (OUTPATIENT)
Dept: LAB | Facility: HOSPITAL | Age: 79
End: 2019-12-09
Attending: INTERNAL MEDICINE
Payer: MEDICARE

## 2019-12-09 DIAGNOSIS — C83.00 LYMPHOMA, SMALL LYMPHOCYTIC: ICD-10-CM

## 2019-12-09 DIAGNOSIS — C91.10 CLL (CHRONIC LYMPHOCYTIC LEUKEMIA): ICD-10-CM

## 2019-12-09 LAB
ALBUMIN SERPL BCP-MCNC: 3.8 G/DL (ref 3.5–5.2)
ALP SERPL-CCNC: 38 U/L (ref 55–135)
ALT SERPL W/O P-5'-P-CCNC: 15 U/L (ref 10–44)
ANION GAP SERPL CALC-SCNC: 8 MMOL/L (ref 8–16)
AST SERPL-CCNC: 17 U/L (ref 10–40)
BASOPHILS # BLD AUTO: 0.01 K/UL (ref 0–0.2)
BASOPHILS NFR BLD: 0.4 % (ref 0–1.9)
BILIRUB SERPL-MCNC: 1 MG/DL (ref 0.1–1)
BUN SERPL-MCNC: 17 MG/DL (ref 8–23)
CALCIUM SERPL-MCNC: 8.9 MG/DL (ref 8.7–10.5)
CHLORIDE SERPL-SCNC: 100 MMOL/L (ref 95–110)
CO2 SERPL-SCNC: 29 MMOL/L (ref 23–29)
CREAT SERPL-MCNC: 1 MG/DL (ref 0.5–1.4)
DIFFERENTIAL METHOD: ABNORMAL
EOSINOPHIL # BLD AUTO: 0.1 K/UL (ref 0–0.5)
EOSINOPHIL NFR BLD: 2.5 % (ref 0–8)
ERYTHROCYTE [DISTWIDTH] IN BLOOD BY AUTOMATED COUNT: 13.1 % (ref 11.5–14.5)
EST. GFR  (AFRICAN AMERICAN): >60 ML/MIN/1.73 M^2
EST. GFR  (NON AFRICAN AMERICAN): >60 ML/MIN/1.73 M^2
GLUCOSE SERPL-MCNC: 188 MG/DL (ref 70–110)
HCT VFR BLD AUTO: 39.8 % (ref 40–54)
HGB BLD-MCNC: 14 G/DL (ref 14–18)
IMM GRANULOCYTES # BLD AUTO: 0.01 K/UL (ref 0–0.04)
IMM GRANULOCYTES NFR BLD AUTO: 0.4 % (ref 0–0.5)
LYMPHOCYTES # BLD AUTO: 1 K/UL (ref 1–4.8)
LYMPHOCYTES NFR BLD: 40.8 % (ref 18–48)
MCH RBC QN AUTO: 30.6 PG (ref 27–31)
MCHC RBC AUTO-ENTMCNC: 35.2 G/DL (ref 32–36)
MCV RBC AUTO: 87 FL (ref 82–98)
MONOCYTES # BLD AUTO: 0.4 K/UL (ref 0.3–1)
MONOCYTES NFR BLD: 17.5 % (ref 4–15)
NEUTROPHILS # BLD AUTO: 0.9 K/UL (ref 1.8–7.7)
NEUTROPHILS NFR BLD: 38.4 % (ref 38–73)
NRBC BLD-RTO: 0 /100 WBC
PLATELET # BLD AUTO: 153 K/UL (ref 150–350)
PMV BLD AUTO: 10.2 FL (ref 9.2–12.9)
POTASSIUM SERPL-SCNC: 4.4 MMOL/L (ref 3.5–5.1)
PROT SERPL-MCNC: 7.1 G/DL (ref 6–8.4)
RBC # BLD AUTO: 4.57 M/UL (ref 4.6–6.2)
SODIUM SERPL-SCNC: 137 MMOL/L (ref 136–145)
WBC # BLD AUTO: 2.4 K/UL (ref 3.9–12.7)

## 2019-12-09 PROCEDURE — 80053 COMPREHEN METABOLIC PANEL: CPT

## 2019-12-09 PROCEDURE — 36415 COLL VENOUS BLD VENIPUNCTURE: CPT

## 2019-12-09 PROCEDURE — 85025 COMPLETE CBC W/AUTO DIFF WBC: CPT

## 2019-12-15 NOTE — PROGRESS NOTES
[facility] Hematology/Oncology  PROGRESS NOTE - Follow-up Visit      Subjective:       Patient ID:   NAME: Conrad Kuhn : 1940     79 y.o. male    Referring Doc: Julien  Other Physicians: Ced Erazo Joubert, Srinivas, Pinsky    Chief Complaint:  CLL f/u    History of Present Illness:     Patient returns today for a regularly scheduled follow-up visit.  The patient is here today to go over the results of the recently ordered labs, tests and studies.. He is here by himself today. He is breathing ok. He denies any CP, SOB, HA's or N/V.     He sees Dr Don at [facility] again on 2019. He has been on the weekly rituximab and is now switched to the rituximab monthly with oral Venetoclax . His neck swelling and LAD has improved and reduced in size. He is doing ok except for a mouth sore.         ROS:   GEN: normal without any fever, night sweats or weight loss  HEENT: normal with no HA's, sore throat, stiff neck, changes in vision  CV: normal with no CP, SOB, PND, MAYER or orthopnea  PULM: normal with no SOB, cough, hemoptysis, sputum or pleuritic pain  GI: normal with no abdominal pain, nausea, vomiting, constipation, diarrhea, melanotic stools, BRBPR, or hematemesis; no dysphagia  : urine frequency stable  BREAST: normal with no mass, discharge, pain  SKIN: normal with no rash, erythema, bruising, or swelling    Allergies:  Review of patient's allergies indicates:  No Known Allergies    Medications:    Current Outpatient Medications:     allopurinol (ZYLOPRIM) 100 MG tablet, Take 1 tablet (100 mg total) by mouth once daily., Disp: 30 tablet, Rfl: 5    amoxicillin (AMOXIL) 875 MG tablet, Take 875 mg by mouth 2 (two) times daily., Disp: , Rfl:     atorvastatin (LIPITOR) 20 MG tablet, TK 1 T PO QD, Disp: , Rfl:     azelastine (ASTELIN) 137 mcg (0.1 %) nasal spray, 1 spray by Nasal route., Disp: , Rfl:     blood sugar diagnostic (FREESTYLE LITE STRIPS) Strp, by Other route., Disp: , Rfl:     ferrous  sulfate (FEOSOL) 325 mg (65 mg iron) Tab tablet, Take 1 tablet (325 mg total) by mouth once daily., Disp: , Rfl:     FREESTYLE LANCETS 28 gauge lancets, TEST TWICE DAILY, Disp: , Rfl: 1    gabapentin (NEURONTIN) 300 MG capsule, TK ONE C PO  BID, Disp: , Rfl: 0    glimepiride (AMARYL) 1 MG tablet, TK 1 T PO ONCE D WITH BIBI OR THE FIRST MAIN MEAL OF THE DAY, Disp: , Rfl: 0    HYDROcodone-acetaminophen (NORCO)  mg per tablet, TK 1 T PO Q 8 H PRN P, Disp: , Rfl: 0    levalbuterol (XOPENEX) 1.25 mg/3 mL nebulizer solution, 3 mLs., Disp: , Rfl:     metFORMIN (GLUCOPHAGE) 500 MG tablet, TK 1 T PO BID WC, Disp: , Rfl: 2    metoprolol succinate (TOPROL-XL) 25 MG 24 hr tablet, TK 1 T PO BID, Disp: , Rfl:     neomycin-polymyxin-dexamethasone (DEXACINE) 3.5 mg/g-10,000 unit/g-0.1 % Oint, Apply to eye daily as needed., Disp: , Rfl:     ondansetron (ZOFRAN-ODT) 8 MG TbDL, DIS ONE T PO Q 8 H PRN N, Disp: , Rfl: 10    promethazine (PHENERGAN) 25 MG tablet, TK 1 T PO Q 6 H PRN N, Disp: , Rfl: 10    traZODone (DESYREL) 100 MG tablet, TAKE 1 TABLET BY MOUTH EVERY NIGHT AT BEDTIME, Disp: 90 tablet, Rfl: 0    valACYclovir (VALTREX) 500 MG tablet, Take 2 tablets (1,000 mg total) by mouth 2 (two) times daily., Disp: 120 tablet, Rfl: 6    VENCLEXTA STARTING PACK 10 mg-50 mg- 100 mg DsPk, , Disp: , Rfl:     water Liqd 150 mL with MILK OF MAGNESIA 400 mg/5 mL Susp 400 mg, diphenhydrAMINE 12.5 mg/5 mL Elix 60 mg, nystatin 100,000 unit/mL Susp 500,000 Units, SWISH AND SPIT 30 ML PO Q 4 H PRN, Disp: , Rfl: 0    blood-glucose meter (FREESTYLE LITE METER) kit, Use as instructed, Disp: , Rfl:     PMHx/PSHx Updates:  See patient's last visit with me on 11/26/2019.  See H&P on 1/3/2019        Pathology:  Cancer Staging  No matching staging information was found for the patient.          Objective:     Vitals:  Blood pressure 135/66, pulse 76, temperature 98 °F (36.7 °C), resp. rate 18, weight 97.1 kg (214 lb).    Physical  Examination:   GEN: no apparent distress, comfortable; AAOx3  HEAD: atraumatic and normocephalic  EYES: no pallor, no icterus, PERRLA  ENT: OMM, no pharyngeal erythema, external ears WNL; no nasal discharge; no thrush  NECK: no masses, thyroid normal, trachea midline, , supple; LAD and LN's on right neck reduced in size  CV: RRR with no murmur; normal pulse; normal S1 and S2; no pedal edema  CHEST: Normal respiratory effort; CTAB; normal breath sounds; no wheeze or crackles  ABDOM: nontender and nondistended; soft; normal bowel sounds; no rebound/guarding  MUSC/Skeletal: ROM normal; no crepitus; joints normal; no deformities or arthropathy  EXTREM: no clubbing, cyanosis, inflammation or swelling; bruise on LUE resolved  SKIN: no rashes, lesions, ulcers, petechiae or subcutaneous nodules  : no keith  NEURO: grossly intact; motor/sensory WNL; AAOx3; no tremors  PSYCH: normal mood, affect and behavior  LYMPH: normal cervical, supraclavicular, axillary and groin LN's            Labs:   12/16/2019  Lab Results   Component Value Date    WBC 2.64 (L) 12/16/2019    HGB 14.1 12/16/2019    HCT 40.6 12/16/2019    MCV 87 12/16/2019     12/16/2019       BMP  Lab Results   Component Value Date     12/16/2019    K 4.1 12/16/2019     12/16/2019    CO2 29 12/16/2019    BUN 13 12/16/2019    CREATININE 1.0 12/16/2019    CALCIUM 9.0 12/16/2019    ANIONGAP 7 (L) 12/16/2019    ESTGFRAFRICA >60.0 12/16/2019    EGFRNONAA >60.0 12/16/2019     Lab Results   Component Value Date    ALT 16 12/16/2019    AST 19 12/16/2019    ALKPHOS 42 (L) 12/16/2019    BILITOT 1.0 12/16/2019     Lab Results   Component Value Date    IRON 99 12/02/2019    TIBC 363 12/02/2019    FERRITIN 34 12/02/2019             Radiology/Diagnostic Studies:    CT abdom/pelvis 8/7/2019:        Impression       1. Multifocal lymphadenopathy in the chest, abdomen, and pelvis compatible with provided clinical history of lymphocytic leukemia.  There is mixed  interval change when compared to prior studies.  Pathologic lymphadenopathy in the chest has increased significantly when compared to a CTA of the chest from May 2018.  Pathologic retroperitoneal lymphadenopathy has improved slightly when compared to a previous abdominal CT from February 2017.  Please see above details.  2. Mild aneurysmal dilation of the infrarenal abdominal aorta, with maximal transverse diameter of 3.7 cm.  Maximal transverse diameter on a prior study from 2017 was 2.5 cm.  3. Additional findings as above           [facility] Unknown Rad Eap    Result Date: 1/14/2019  CMS MANDATED QUALITY DATA - CT RADIATION - 436 All CT scans at this facility utilize dose modulation, iterative reconstruction, and/or weight based dosing when appropriate to reduce radiation dose to as low as reasonably achievable. Reason:Lymphoid leukemia TECHNIQUE: CT thorax with, CT abdomen  with, and CT pelvis with 100 mL Omnipaque 350. COMPARISON: CT chest dated 05/19/2018 and CT abdomen and pelvis dated 02/08/2017 CT THORAX: There is stable mediastinal, hilar and axillary adenopathy. There is coronary artery calcification. There is resolution of the groundglass opacities throughout the lungs. There are no confluent infiltrates, pulmonary nodules or pleural effusions. There are stable subcentimeter nodules in the left lobe of the thyroid gland. There are degenerative changes of the spine. CT ABDOMEN: The liver, pancreas, adrenal glands and gallbladder are normal. The spleen is enlarged. There is mesenteric and retroperitoneal adenopathy which is slightly smaller in size. -There is a portacaval node measuring 37 x 21 mm and previously measured 38 x 27 mm. -Periportal lymph node measuring 41 x 18 mm, previously measured 48 x 29 mm. -Left periaortic adenopathy measuring 34 x 23 mm and previously measured 40 x 42 mm- The kidneys enhance symmetrically without hydronephrosis or calculi. There are no thick-walled or dilated bowel loops.  There is vascular calcification of aorta. There is a 3.0 x 3.0 cm infrarenal abdominal aortic aneurysm. CT PELVIS: There is adenopathy along the pelvic sidewalls bilaterally which has decreased in size. -The left common iliac adenopathy measuring 36 x 11 mm, previously measured 46 x 14 mm -the right common iliac adenopathy measuring 46 x 10 mm. Previously measured 53 x 15 mm. The bladder is normal. The prostate gland is enlarged. There are degenerative changes of the spine. There is grade 1 anterolisthesis of L5 on S1 with bilateral pars defects.     IMPRESSION: Stable mediastinal, hilar and axillary adenopathy with resolution of the airspace disease within the lungs Decrease in size of the mesenteric, retroperitoneal and pelvic adenopathy. Stable infrarenal abdominal aortic aneurysm Splenomegaly     Read and electronically signed by: Jeniffer Arredondo MD on 1/14/2019 9:14 AM CST JENIFFER ARREDONDO MD      I have reviewed all available lab results and radiology reports.    Assessment/Plan:   (1) 79 y.o. male with  diagnosis of CLL who has been referred by Dr Rony Victoria for continuation of care by medical hematology/oncology.   - BM biopsy  12/4/2015 with CLL  - diagnosis of SLL in 2004 and s/p FCR x6 in 2007  - s/p imbruvica in past (stopped in May 2018)  - latest wbc at 4.8; lymph 56%  - latest CT on 8/7/2019 showing increase in the LAD  - platelets are 111,000  - He was recently hospitalized at [facility] and seen by Dr Wheeler. Dr Wheeler has recommended Rituximab. He is starting tomorrow.   - discussed the rituximab regimen and the potential side-effect profile; he is getting chemotherapy school today with [facility]  - he saw Dr Don on Oct 21st 2019  - he has had 3 of the 4 weeks of rituximab so far; Dr Don recommends him to eventually get rituximab monthly x6 with daily oral Venetoclax for two years total.   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  - followed  By Dr Kee  - CEA 1.2  - CT scans on 1/14/2019 stable     (3) Iron deficiency anemia s/p IV iron couple weeks ago  - hgb 14.6 and adequate, and the iron panel currently wnl     (4) HTN - on BP meds     (6) Chronic neck and back issues - followed by Dr Ryan Rivero     (7) DM - currently off all meds     (8) Hypercholesterolemia - on meds    (9)  - followed by Dr Whitney        VISIT DIAGNOSES:      Lymphoma, small lymphocytic (2004)    Iron deficiency anemia, unspecified iron deficiency anemia type    CLL (chronic lymphocytic leukemia)    History of lung cancer - ANDRES NSCLC 2004          PLAN:  1. check labs weekly while on therapy  2. F/u with PCP, pain management  3. F/u with PCP, Pulm, , etc  4. F/u with Dr Don at [facility] on Dec23rd 2019  5. Continue oral iron  6. F/u with Dr Yee about the dysphagia  7. continue with monthly rituximab with daily Venetoclax       RTC in 3-4 weeks    Fax note to Kacey Kee, Chelo Florence, Florencia Acosta/Joselito Wheeler        Discussion:       I spent over 25 mins of time with the patient. Reviewed results of the recently ordered labs, tests and studies; made directives with regards to the results. Over half of this time was spent couseling and coordinating care.    I have explained all of the above in detail and the patient understands all of the current recommendation(s). I have answered all of their questions to the best of my ability and to their complete satisfaction.   The patient is to continue with the current management plan.        Chemotherapy Discussion:      I discussed the available treatment option(s) in accordance with the latest/current national evidence-based guidelines (NCCN, UpToDate, NCI, ASCO, etc where applicable), their overall age/condition and their co-morbidities. I also went over the risks and benefits of the  chemotherapy with regard to their particular cancer type, their cancer stage, their age/condition, and their co-morbidities. I provided literature on the chemotherapy regimen and discussed the chemotherapy side-effect profiles of the drug(s). I discussed the importance of compliance with obtaining and monitoring weekly lab work, and went over the potential hematopathology issues and risks with anemia, leucopenia and thrombocytopenia that can occur with chemotherapy. I discussed the potential risks of liver and kidney damage, which could be permanent and could necessitate dialysis long-term if kidney failure developed. I discussed the emetic and/or diarrheal potential of the regimen and the potential need for use of antiemetic and anti-diarrheal medications. I discussed the risk for development of anaphylactic shock, bronchospasm, dysrhythmia, and respiratory/cardiovascular arrest and/or failure. I discussed the potential risks for development of alopecia, cold sensory issues, ringing in ears, vertigo, cataracts, glaucoma, and neuropathy, all of which could end up being chronic and life-long. Some chemotherpyI discussed the risks of hand-foot syndrome and rashes, and development of other autoimmune mediated processes such as pneumonitis, hepatitis, and colitis which could be life threatening. I discussed the risks of the potential development of a rare but fatal viral mediated disease known as PML (Progressive Multifocal Leukoencephalopathy), and risk of future development of leukemia and/or lymphoma from use of certain chemotherapy agents. I discussed the need for neutropenic precautions, basic hygiene/sanitation behaviors and dietary restrictions.    The patient's consent has been obtained to proceed with the chemotherapy.The patient will be referred to Chemotherapy School /[facility] for training and education on chemotherapy, use of antiemetics and/or anti-diarrheals, use of NSAID's, potential  chemotherapy side-effects, and any specific recommendations and precautions with the particular chemotherapy agents.       Immunologic Therapy Discussion:    I discussed the available treatment option(s) in accordance with the latest/current national evidence-based guidelines (NCCN, UpToDate, NCI, ASCO, etc where applicable), their overall age/condition and their co-morbidities. I went over the risks and benefits of the immunotherapy with regard to their particular cancer type, their cancer stage, their age/condition, and their co-morbidities. I provided literature on the immunotherapy regimen and discussed the immunotherapy side-effect profiles of the drug(s). I discussed the importance of compliance with obtaining and monitoring weekly lab work, and went over the potential hematopathology issues and risks of hemato-pathologic issues with anemia, leucopenia and/or thrombocytopenia and effects on thyroid function that can occur with immunotherapy. The patient will most likely need to have there thyroid functions monitored by their PCP and may need to take thyroid medication while on the immunotherapy. I discussed the potential risks of liver and kidney damage, which could be permanent and could necessitate dialysis long-term if kidney failure developed. I discussed the emetic and/or diarrheal potential of the regimen and the potential need for use of antiemetic and anti-diarrheal medications. I discussed the risk for development of anaphylactic shock, bronchospasm, dysrhythmia, and respiratory/cardiovascular arrest and/or failure. I discussed the potential risks for development of alopecia, cold sensory issues, ringing in ears, vertigo and neuropathy, all of which could end up being chronic and life-long. I discussed the risks of hand-foot syndrome and rashes, and development of other autoimmune mediated processes such as pneumonitis, hepatitis and colitis which could potentially be life threatening. I discussed the  risks of the potential development of a rare but fatal viral mediated disease known as PML (Progressive Multifocal Leukoencephalopathy), and risk of future development of leukemia and/or lymphoma from use of certain immunotherapy agents. I discussed the possibility that immunologic therapy cold worsen or promote progression of any underlying autoimmune diseases such as sarcoidosis, ulcerative colitis, Crohn's disease, psoriasis, rheumatoid disorders, scleroderma, autoimmune nephritis disorders, Hashimoto's thyroiditis, and Lupus among others. I discussed the need for neutropenic precautions, basic hygiene/sanitation behaviors and dietary restrictions.    The patient's consent has been obtained to proceed with the immunotherapy.The patient will be referred to Immunotherapy School /[facility] for training and education on immunotherapy, use of antiemetics and/or anti-diarrheals, use of NSAID's, potential immunotherapy side-effects, and any specific recommendations and precautions with the particular immunotherapy agents.      I answered all of the patient's (and family's, if applicable) questions to the best of my ability and to their complete satisfaction. The patient acknowledged full understanding of the risks, recommendations and plan(s).       I answered all of the patient's (and family's, if applicable) questions to the best of my ability and to their complete satisfaction. The patient acknowledged full understanding of the risks, recommendations and plan(s).         Electronically signed by Drew Bai MD

## 2019-12-16 ENCOUNTER — OFFICE VISIT (OUTPATIENT)
Dept: HEMATOLOGY/ONCOLOGY | Facility: CLINIC | Age: 79
End: 2019-12-16
Payer: MEDICARE

## 2019-12-16 ENCOUNTER — LAB VISIT (OUTPATIENT)
Dept: LAB | Facility: HOSPITAL | Age: 79
End: 2019-12-16
Attending: INTERNAL MEDICINE
Payer: MEDICARE

## 2019-12-16 VITALS
SYSTOLIC BLOOD PRESSURE: 135 MMHG | DIASTOLIC BLOOD PRESSURE: 66 MMHG | WEIGHT: 214 LBS | BODY MASS INDEX: 27.48 KG/M2 | RESPIRATION RATE: 18 BRPM | HEART RATE: 76 BPM | TEMPERATURE: 98 F

## 2019-12-16 DIAGNOSIS — C91.10 CLL (CHRONIC LYMPHOCYTIC LEUKEMIA): ICD-10-CM

## 2019-12-16 DIAGNOSIS — Z85.118 HISTORY OF LUNG CANCER: ICD-10-CM

## 2019-12-16 DIAGNOSIS — C83.00 LYMPHOMA, SMALL LYMPHOCYTIC: ICD-10-CM

## 2019-12-16 DIAGNOSIS — C83.00 LYMPHOMA, SMALL LYMPHOCYTIC: Primary | ICD-10-CM

## 2019-12-16 DIAGNOSIS — D50.9 IRON DEFICIENCY ANEMIA, UNSPECIFIED IRON DEFICIENCY ANEMIA TYPE: ICD-10-CM

## 2019-12-16 LAB
ALBUMIN SERPL BCP-MCNC: 3.9 G/DL (ref 3.5–5.2)
ALP SERPL-CCNC: 42 U/L (ref 55–135)
ALT SERPL W/O P-5'-P-CCNC: 16 U/L (ref 10–44)
ANION GAP SERPL CALC-SCNC: 7 MMOL/L (ref 8–16)
AST SERPL-CCNC: 19 U/L (ref 10–40)
BASOPHILS # BLD AUTO: 0 K/UL (ref 0–0.2)
BASOPHILS NFR BLD: 0 % (ref 0–1.9)
BILIRUB SERPL-MCNC: 1 MG/DL (ref 0.1–1)
BUN SERPL-MCNC: 13 MG/DL (ref 8–23)
CALCIUM SERPL-MCNC: 9 MG/DL (ref 8.7–10.5)
CHLORIDE SERPL-SCNC: 100 MMOL/L (ref 95–110)
CO2 SERPL-SCNC: 29 MMOL/L (ref 23–29)
CREAT SERPL-MCNC: 1 MG/DL (ref 0.5–1.4)
DIFFERENTIAL METHOD: ABNORMAL
EOSINOPHIL # BLD AUTO: 0 K/UL (ref 0–0.5)
EOSINOPHIL NFR BLD: 1.1 % (ref 0–8)
ERYTHROCYTE [DISTWIDTH] IN BLOOD BY AUTOMATED COUNT: 13.2 % (ref 11.5–14.5)
EST. GFR  (AFRICAN AMERICAN): >60 ML/MIN/1.73 M^2
EST. GFR  (NON AFRICAN AMERICAN): >60 ML/MIN/1.73 M^2
GLUCOSE SERPL-MCNC: 198 MG/DL (ref 70–110)
HCT VFR BLD AUTO: 40.6 % (ref 40–54)
HGB BLD-MCNC: 14.1 G/DL (ref 14–18)
IMM GRANULOCYTES # BLD AUTO: 0 K/UL (ref 0–0.04)
IMM GRANULOCYTES NFR BLD AUTO: 0 % (ref 0–0.5)
LYMPHOCYTES # BLD AUTO: 1.1 K/UL (ref 1–4.8)
LYMPHOCYTES NFR BLD: 40.2 % (ref 18–48)
MCH RBC QN AUTO: 30.1 PG (ref 27–31)
MCHC RBC AUTO-ENTMCNC: 34.7 G/DL (ref 32–36)
MCV RBC AUTO: 87 FL (ref 82–98)
MONOCYTES # BLD AUTO: 0.4 K/UL (ref 0.3–1)
MONOCYTES NFR BLD: 16.7 % (ref 4–15)
NEUTROPHILS # BLD AUTO: 1.1 K/UL (ref 1.8–7.7)
NEUTROPHILS NFR BLD: 42 % (ref 38–73)
NRBC BLD-RTO: 0 /100 WBC
PLATELET # BLD AUTO: 160 K/UL (ref 150–350)
PMV BLD AUTO: 10.5 FL (ref 9.2–12.9)
POTASSIUM SERPL-SCNC: 4.1 MMOL/L (ref 3.5–5.1)
PROT SERPL-MCNC: 7.1 G/DL (ref 6–8.4)
RBC # BLD AUTO: 4.68 M/UL (ref 4.6–6.2)
SODIUM SERPL-SCNC: 136 MMOL/L (ref 136–145)
WBC # BLD AUTO: 2.64 K/UL (ref 3.9–12.7)

## 2019-12-16 PROCEDURE — 85025 COMPLETE CBC W/AUTO DIFF WBC: CPT

## 2019-12-16 PROCEDURE — 99214 OFFICE O/P EST MOD 30 MIN: CPT | Mod: S$GLB,,, | Performed by: INTERNAL MEDICINE

## 2019-12-16 PROCEDURE — 1159F PR MEDICATION LIST DOCUMENTED IN MEDICAL RECORD: ICD-10-PCS | Mod: S$GLB,,, | Performed by: INTERNAL MEDICINE

## 2019-12-16 PROCEDURE — 99214 PR OFFICE/OUTPT VISIT, EST, LEVL IV, 30-39 MIN: ICD-10-PCS | Mod: S$GLB,,, | Performed by: INTERNAL MEDICINE

## 2019-12-16 PROCEDURE — 1159F MED LIST DOCD IN RCRD: CPT | Mod: S$GLB,,, | Performed by: INTERNAL MEDICINE

## 2019-12-16 PROCEDURE — 36415 COLL VENOUS BLD VENIPUNCTURE: CPT

## 2019-12-16 PROCEDURE — 80053 COMPREHEN METABOLIC PANEL: CPT

## 2019-12-16 RX ORDER — VENETOCLAX 10-50-100
KIT ORAL
COMMUNITY
Start: 2019-11-11 | End: 2019-12-18

## 2019-12-16 RX ORDER — GLIMEPIRIDE 2 MG/1
2 TABLET ORAL
Refills: 0 | COMMUNITY
Start: 2019-12-09 | End: 2022-08-11 | Stop reason: SDUPTHER

## 2019-12-18 ENCOUNTER — TELEPHONE (OUTPATIENT)
Dept: HEMATOLOGY/ONCOLOGY | Facility: CLINIC | Age: 79
End: 2019-12-18

## 2019-12-18 DIAGNOSIS — C91.10 CLL (CHRONIC LYMPHOCYTIC LEUKEMIA): Primary | ICD-10-CM

## 2019-12-18 DIAGNOSIS — K13.79 MOUTH SORES: Primary | ICD-10-CM

## 2019-12-18 NOTE — TELEPHONE ENCOUNTER
----- Message from Niki Islas sent at 12/18/2019  3:03 PM CST -----  Patient called to see if he can get a refill on Lidocaine and magic mouthwash. He has some mouth sores and he is going through it really fast. Please send rx to [facility].    CB # [phone]    Thank you

## 2019-12-20 NOTE — LETTER
January 31, 2019      Johnson Erazo MD  [address]  [place] [address]           [facility] - Hematology Oncology  [address]  [address]  [place] [address]  Phone: [phone]  Fax: [phone]          Patient: Conrad Kuhn   MR Number: [id number]   YOB: 1940   Date of Visit: 1/31/2019       Dear Dr. Johnson Erazo:    Thank you for referring Conrad Kuhn to me for evaluation. Attached you will find relevant portions of my assessment and plan of care.    If you have questions, please do not hesitate to call me. I look forward to following Conrad Kuhn along with you.    Sincerely,    Drew Bai MD    Enclosure  CC:  No Recipients    If you would like to receive this communication electronically, please contact [email or web address] or [phone] to request more information on [employer] Link access.    For providers and/or their staff who would like to refer a patient to [facility], please contact us through our one-stop-shop provider referral line, [facility], at [phone].    If you feel you have received this communication in error or would no longer like to receive these types of communications, please e-mail [email or web address]          Last refill: 11/19/19  Last OV: 12/5/19  Upcoming appointment:  none  Please advise on refill and sign if ok.

## 2019-12-22 RX ORDER — ACETAMINOPHEN 325 MG/1
650 TABLET ORAL
Status: CANCELLED | OUTPATIENT
Start: 2019-12-24

## 2019-12-22 RX ORDER — SODIUM CHLORIDE 0.9 % (FLUSH) 0.9 %
10 SYRINGE (ML) INJECTION
Status: CANCELLED | OUTPATIENT
Start: 2019-12-24

## 2019-12-22 RX ORDER — FAMOTIDINE 10 MG/ML
20 INJECTION INTRAVENOUS
Status: CANCELLED | OUTPATIENT
Start: 2019-12-24

## 2019-12-22 RX ORDER — MEPERIDINE HYDROCHLORIDE 25 MG/ML
25 INJECTION INTRAMUSCULAR; INTRAVENOUS; SUBCUTANEOUS
Status: CANCELLED | OUTPATIENT
Start: 2019-12-24

## 2019-12-22 RX ORDER — HEPARIN 100 UNIT/ML
500 SYRINGE INTRAVENOUS
Status: CANCELLED | OUTPATIENT
Start: 2019-12-24

## 2019-12-23 ENCOUNTER — LAB VISIT (OUTPATIENT)
Dept: LAB | Facility: HOSPITAL | Age: 79
End: 2019-12-23
Attending: INTERNAL MEDICINE
Payer: MEDICARE

## 2019-12-23 DIAGNOSIS — C91.10 CLL (CHRONIC LYMPHOCYTIC LEUKEMIA): ICD-10-CM

## 2019-12-23 DIAGNOSIS — C83.00 LYMPHOMA, SMALL LYMPHOCYTIC: ICD-10-CM

## 2019-12-23 LAB
ALBUMIN SERPL BCP-MCNC: 3.9 G/DL (ref 3.5–5.2)
ALP SERPL-CCNC: 42 U/L (ref 55–135)
ALT SERPL W/O P-5'-P-CCNC: 15 U/L (ref 10–44)
ANION GAP SERPL CALC-SCNC: 8 MMOL/L (ref 8–16)
AST SERPL-CCNC: 17 U/L (ref 10–40)
BASOPHILS # BLD AUTO: 0.01 K/UL (ref 0–0.2)
BASOPHILS NFR BLD: 0.5 % (ref 0–1.9)
BILIRUB SERPL-MCNC: 0.7 MG/DL (ref 0.1–1)
BUN SERPL-MCNC: 17 MG/DL (ref 8–23)
CALCIUM SERPL-MCNC: 8.9 MG/DL (ref 8.7–10.5)
CHLORIDE SERPL-SCNC: 100 MMOL/L (ref 95–110)
CO2 SERPL-SCNC: 29 MMOL/L (ref 23–29)
CREAT SERPL-MCNC: 0.9 MG/DL (ref 0.5–1.4)
DIFFERENTIAL METHOD: ABNORMAL
EOSINOPHIL # BLD AUTO: 0 K/UL (ref 0–0.5)
EOSINOPHIL NFR BLD: 0.5 % (ref 0–8)
ERYTHROCYTE [DISTWIDTH] IN BLOOD BY AUTOMATED COUNT: 13.2 % (ref 11.5–14.5)
EST. GFR  (AFRICAN AMERICAN): >60 ML/MIN/1.73 M^2
EST. GFR  (NON AFRICAN AMERICAN): >60 ML/MIN/1.73 M^2
GLUCOSE SERPL-MCNC: 191 MG/DL (ref 70–110)
HCT VFR BLD AUTO: 40 % (ref 40–54)
HGB BLD-MCNC: 13.8 G/DL (ref 14–18)
IMM GRANULOCYTES # BLD AUTO: 0 K/UL (ref 0–0.04)
IMM GRANULOCYTES NFR BLD AUTO: 0 % (ref 0–0.5)
LYMPHOCYTES # BLD AUTO: 1.1 K/UL (ref 1–4.8)
LYMPHOCYTES NFR BLD: 49.5 % (ref 18–48)
MCH RBC QN AUTO: 30.7 PG (ref 27–31)
MCHC RBC AUTO-ENTMCNC: 34.5 G/DL (ref 32–36)
MCV RBC AUTO: 89 FL (ref 82–98)
MONOCYTES # BLD AUTO: 0.4 K/UL (ref 0.3–1)
MONOCYTES NFR BLD: 19.3 % (ref 4–15)
NEUTROPHILS # BLD AUTO: 0.7 K/UL (ref 1.8–7.7)
NEUTROPHILS NFR BLD: 30.2 % (ref 38–73)
NRBC BLD-RTO: 0 /100 WBC
PLATELET # BLD AUTO: 124 K/UL (ref 150–350)
PMV BLD AUTO: 10.6 FL (ref 9.2–12.9)
POTASSIUM SERPL-SCNC: 4.2 MMOL/L (ref 3.5–5.1)
PROT SERPL-MCNC: 7 G/DL (ref 6–8.4)
RBC # BLD AUTO: 4.5 M/UL (ref 4.6–6.2)
SODIUM SERPL-SCNC: 137 MMOL/L (ref 136–145)
WBC # BLD AUTO: 2.18 K/UL (ref 3.9–12.7)

## 2019-12-23 PROCEDURE — 80053 COMPREHEN METABOLIC PANEL: CPT

## 2019-12-23 PROCEDURE — 85025 COMPLETE CBC W/AUTO DIFF WBC: CPT

## 2019-12-23 PROCEDURE — 36415 COLL VENOUS BLD VENIPUNCTURE: CPT

## 2019-12-26 ENCOUNTER — INFUSION (OUTPATIENT)
Dept: INFUSION THERAPY | Facility: HOSPITAL | Age: 79
End: 2019-12-26
Attending: INTERNAL MEDICINE
Payer: MEDICARE

## 2019-12-26 VITALS
TEMPERATURE: 98 F | OXYGEN SATURATION: 95 % | WEIGHT: 212.94 LBS | RESPIRATION RATE: 18 BRPM | DIASTOLIC BLOOD PRESSURE: 71 MMHG | BODY MASS INDEX: 27.33 KG/M2 | HEIGHT: 74 IN | SYSTOLIC BLOOD PRESSURE: 141 MMHG | HEART RATE: 63 BPM

## 2019-12-26 DIAGNOSIS — D50.8 IRON DEFICIENCY ANEMIA DUE TO DIETARY CAUSES: Primary | ICD-10-CM

## 2019-12-26 DIAGNOSIS — C91.10 CLL (CHRONIC LYMPHOCYTIC LEUKEMIA): Primary | ICD-10-CM

## 2019-12-26 PROCEDURE — 25000003 PHARM REV CODE 250: Performed by: INTERNAL MEDICINE

## 2019-12-26 PROCEDURE — 63600175 PHARM REV CODE 636 W HCPCS: Performed by: INTERNAL MEDICINE

## 2019-12-26 PROCEDURE — 96367 TX/PROPH/DG ADDL SEQ IV INF: CPT

## 2019-12-26 PROCEDURE — 96413 CHEMO IV INFUSION 1 HR: CPT

## 2019-12-26 PROCEDURE — 96375 TX/PRO/DX INJ NEW DRUG ADDON: CPT

## 2019-12-26 PROCEDURE — 96415 CHEMO IV INFUSION ADDL HR: CPT

## 2019-12-26 PROCEDURE — A4216 STERILE WATER/SALINE, 10 ML: HCPCS | Performed by: INTERNAL MEDICINE

## 2019-12-26 PROCEDURE — S0028 INJECTION, FAMOTIDINE, 20 MG: HCPCS | Performed by: INTERNAL MEDICINE

## 2019-12-26 RX ORDER — MEPERIDINE HYDROCHLORIDE 25 MG/ML
25 INJECTION INTRAMUSCULAR; INTRAVENOUS; SUBCUTANEOUS
Status: DISCONTINUED | OUTPATIENT
Start: 2019-12-26 | End: 2019-12-26 | Stop reason: HOSPADM

## 2019-12-26 RX ORDER — SODIUM CHLORIDE 0.9 % (FLUSH) 0.9 %
10 SYRINGE (ML) INJECTION
Status: DISCONTINUED | OUTPATIENT
Start: 2019-12-26 | End: 2019-12-26 | Stop reason: HOSPADM

## 2019-12-26 RX ORDER — FAMOTIDINE 10 MG/ML
20 INJECTION INTRAVENOUS
Status: COMPLETED | OUTPATIENT
Start: 2019-12-26 | End: 2019-12-26

## 2019-12-26 RX ORDER — ACETAMINOPHEN 325 MG/1
650 TABLET ORAL
Status: COMPLETED | OUTPATIENT
Start: 2019-12-26 | End: 2019-12-26

## 2019-12-26 RX ADMIN — FAMOTIDINE 20 MG: 10 INJECTION, SOLUTION INTRAVENOUS at 07:12

## 2019-12-26 RX ADMIN — ACETAMINOPHEN 650 MG: 325 TABLET ORAL at 07:12

## 2019-12-26 RX ADMIN — RITUXIMAB 1110 MG: 10 INJECTION, SOLUTION INTRAVENOUS at 07:12

## 2019-12-26 RX ADMIN — SODIUM CHLORIDE, PRESERVATIVE FREE 10 ML: 5 INJECTION INTRAVENOUS at 12:12

## 2019-12-26 RX ADMIN — DIPHENHYDRAMINE HYDROCHLORIDE 50 MG: 50 INJECTION INTRAMUSCULAR; INTRAVENOUS at 07:12

## 2019-12-30 ENCOUNTER — LAB VISIT (OUTPATIENT)
Dept: LAB | Facility: HOSPITAL | Age: 79
End: 2019-12-30
Attending: INTERNAL MEDICINE
Payer: MEDICARE

## 2019-12-30 DIAGNOSIS — C83.00 LYMPHOMA, SMALL LYMPHOCYTIC: ICD-10-CM

## 2019-12-30 DIAGNOSIS — C91.10 CLL (CHRONIC LYMPHOCYTIC LEUKEMIA): ICD-10-CM

## 2019-12-30 LAB
ALBUMIN SERPL BCP-MCNC: 3.7 G/DL (ref 3.5–5.2)
ALP SERPL-CCNC: 36 U/L (ref 55–135)
ALT SERPL W/O P-5'-P-CCNC: 17 U/L (ref 10–44)
ANION GAP SERPL CALC-SCNC: 7 MMOL/L (ref 8–16)
AST SERPL-CCNC: 22 U/L (ref 10–40)
BASOPHILS # BLD AUTO: 0 K/UL (ref 0–0.2)
BASOPHILS NFR BLD: 0 % (ref 0–1.9)
BILIRUB SERPL-MCNC: 0.8 MG/DL (ref 0.1–1)
BUN SERPL-MCNC: 12 MG/DL (ref 8–23)
CALCIUM SERPL-MCNC: 8.8 MG/DL (ref 8.7–10.5)
CHLORIDE SERPL-SCNC: 102 MMOL/L (ref 95–110)
CO2 SERPL-SCNC: 29 MMOL/L (ref 23–29)
CREAT SERPL-MCNC: 0.9 MG/DL (ref 0.5–1.4)
DIFFERENTIAL METHOD: ABNORMAL
EOSINOPHIL # BLD AUTO: 0 K/UL (ref 0–0.5)
EOSINOPHIL NFR BLD: 0 % (ref 0–8)
ERYTHROCYTE [DISTWIDTH] IN BLOOD BY AUTOMATED COUNT: 13.5 % (ref 11.5–14.5)
EST. GFR  (AFRICAN AMERICAN): >60 ML/MIN/1.73 M^2
EST. GFR  (NON AFRICAN AMERICAN): >60 ML/MIN/1.73 M^2
GLUCOSE SERPL-MCNC: 157 MG/DL (ref 70–110)
HCT VFR BLD AUTO: 41.1 % (ref 40–54)
HGB BLD-MCNC: 14.4 G/DL (ref 14–18)
IMM GRANULOCYTES # BLD AUTO: 0 K/UL (ref 0–0.04)
IMM GRANULOCYTES NFR BLD AUTO: 0 % (ref 0–0.5)
LYMPHOCYTES # BLD AUTO: 0.8 K/UL (ref 1–4.8)
LYMPHOCYTES NFR BLD: 37.8 % (ref 18–48)
MCH RBC QN AUTO: 30.7 PG (ref 27–31)
MCHC RBC AUTO-ENTMCNC: 35 G/DL (ref 32–36)
MCV RBC AUTO: 88 FL (ref 82–98)
MONOCYTES # BLD AUTO: 0.5 K/UL (ref 0.3–1)
MONOCYTES NFR BLD: 23.4 % (ref 4–15)
NEUTROPHILS # BLD AUTO: 0.8 K/UL (ref 1.8–7.7)
NEUTROPHILS NFR BLD: 38.8 % (ref 38–73)
NRBC BLD-RTO: 0 /100 WBC
PLATELET # BLD AUTO: 115 K/UL (ref 150–350)
PMV BLD AUTO: 9.7 FL (ref 9.2–12.9)
POTASSIUM SERPL-SCNC: 3.8 MMOL/L (ref 3.5–5.1)
PROT SERPL-MCNC: 7 G/DL (ref 6–8.4)
RBC # BLD AUTO: 4.69 M/UL (ref 4.6–6.2)
SODIUM SERPL-SCNC: 138 MMOL/L (ref 136–145)
WBC # BLD AUTO: 2.01 K/UL (ref 3.9–12.7)

## 2019-12-30 PROCEDURE — 80053 COMPREHEN METABOLIC PANEL: CPT

## 2019-12-30 PROCEDURE — 85025 COMPLETE CBC W/AUTO DIFF WBC: CPT

## 2019-12-30 PROCEDURE — 36415 COLL VENOUS BLD VENIPUNCTURE: CPT

## 2020-01-06 ENCOUNTER — LAB VISIT (OUTPATIENT)
Dept: LAB | Facility: HOSPITAL | Age: 80
End: 2020-01-06
Attending: INTERNAL MEDICINE
Payer: MEDICARE

## 2020-01-06 DIAGNOSIS — C91.10 LEUKEMIA, LYMPHOCYTIC, CHRONIC: Primary | ICD-10-CM

## 2020-01-06 LAB
ALBUMIN SERPL BCP-MCNC: 3.9 G/DL (ref 3.5–5.2)
ALP SERPL-CCNC: 46 U/L (ref 55–135)
ALT SERPL W/O P-5'-P-CCNC: 18 U/L (ref 10–44)
ANION GAP SERPL CALC-SCNC: 8 MMOL/L (ref 8–16)
AST SERPL-CCNC: 20 U/L (ref 10–40)
BASOPHILS # BLD AUTO: 0 K/UL (ref 0–0.2)
BASOPHILS NFR BLD: 0 % (ref 0–1.9)
BILIRUB SERPL-MCNC: 0.8 MG/DL (ref 0.1–1)
BUN SERPL-MCNC: 16 MG/DL (ref 8–23)
CALCIUM SERPL-MCNC: 9.3 MG/DL (ref 8.7–10.5)
CHLORIDE SERPL-SCNC: 102 MMOL/L (ref 95–110)
CO2 SERPL-SCNC: 29 MMOL/L (ref 23–29)
CREAT SERPL-MCNC: 1.1 MG/DL (ref 0.5–1.4)
DIFFERENTIAL METHOD: ABNORMAL
EOSINOPHIL # BLD AUTO: 0 K/UL (ref 0–0.5)
EOSINOPHIL NFR BLD: 0 % (ref 0–8)
ERYTHROCYTE [DISTWIDTH] IN BLOOD BY AUTOMATED COUNT: 13.5 % (ref 11.5–14.5)
EST. GFR  (AFRICAN AMERICAN): >60 ML/MIN/1.73 M^2
EST. GFR  (NON AFRICAN AMERICAN): >60 ML/MIN/1.73 M^2
GLUCOSE SERPL-MCNC: 87 MG/DL (ref 70–110)
HCT VFR BLD AUTO: 40.7 % (ref 40–54)
HGB BLD-MCNC: 14.4 G/DL (ref 14–18)
IMM GRANULOCYTES # BLD AUTO: 0.01 K/UL (ref 0–0.04)
IMM GRANULOCYTES NFR BLD AUTO: 0.3 % (ref 0–0.5)
LYMPHOCYTES # BLD AUTO: 1 K/UL (ref 1–4.8)
LYMPHOCYTES NFR BLD: 31.3 % (ref 18–48)
MCH RBC QN AUTO: 30.8 PG (ref 27–31)
MCHC RBC AUTO-ENTMCNC: 35.4 G/DL (ref 32–36)
MCV RBC AUTO: 87 FL (ref 82–98)
MONOCYTES # BLD AUTO: 0.6 K/UL (ref 0.3–1)
MONOCYTES NFR BLD: 19.1 % (ref 4–15)
NEUTROPHILS # BLD AUTO: 1.6 K/UL (ref 1.8–7.7)
NEUTROPHILS NFR BLD: 49.3 % (ref 38–73)
NRBC BLD-RTO: 0 /100 WBC
PLATELET # BLD AUTO: 101 K/UL (ref 150–350)
PMV BLD AUTO: 9.7 FL (ref 9.2–12.9)
POTASSIUM SERPL-SCNC: 4.5 MMOL/L (ref 3.5–5.1)
PROT SERPL-MCNC: 7.3 G/DL (ref 6–8.4)
RBC # BLD AUTO: 4.68 M/UL (ref 4.6–6.2)
SODIUM SERPL-SCNC: 139 MMOL/L (ref 136–145)
WBC # BLD AUTO: 3.19 K/UL (ref 3.9–12.7)

## 2020-01-06 PROCEDURE — 80053 COMPREHEN METABOLIC PANEL: CPT

## 2020-01-06 PROCEDURE — 36415 COLL VENOUS BLD VENIPUNCTURE: CPT

## 2020-01-06 PROCEDURE — 85025 COMPLETE CBC W/AUTO DIFF WBC: CPT

## 2020-01-14 ENCOUNTER — LAB VISIT (OUTPATIENT)
Dept: LAB | Facility: HOSPITAL | Age: 80
End: 2020-01-14
Attending: INTERNAL MEDICINE
Payer: MEDICARE

## 2020-01-14 DIAGNOSIS — D50.9 IRON DEFICIENCY ANEMIA, UNSPECIFIED IRON DEFICIENCY ANEMIA TYPE: ICD-10-CM

## 2020-01-14 DIAGNOSIS — C83.00 LYMPHOMA, SMALL LYMPHOCYTIC: ICD-10-CM

## 2020-01-14 DIAGNOSIS — C91.10 CLL (CHRONIC LYMPHOCYTIC LEUKEMIA): ICD-10-CM

## 2020-01-14 DIAGNOSIS — Z85.118 HISTORY OF LUNG CANCER: ICD-10-CM

## 2020-01-14 LAB
ALBUMIN SERPL BCP-MCNC: 4 G/DL (ref 3.5–5.2)
ALP SERPL-CCNC: 37 U/L (ref 55–135)
ALT SERPL W/O P-5'-P-CCNC: 20 U/L (ref 10–44)
ANION GAP SERPL CALC-SCNC: 9 MMOL/L (ref 8–16)
AST SERPL-CCNC: 21 U/L (ref 10–40)
BASOPHILS # BLD AUTO: 0 K/UL (ref 0–0.2)
BASOPHILS NFR BLD: 0 % (ref 0–1.9)
BILIRUB SERPL-MCNC: 1 MG/DL (ref 0.1–1)
BUN SERPL-MCNC: 13 MG/DL (ref 8–23)
CALCIUM SERPL-MCNC: 8.9 MG/DL (ref 8.7–10.5)
CEA SERPL-MCNC: 1.1 NG/ML (ref 0–5)
CHLORIDE SERPL-SCNC: 97 MMOL/L (ref 95–110)
CO2 SERPL-SCNC: 30 MMOL/L (ref 23–29)
CREAT SERPL-MCNC: 0.9 MG/DL (ref 0.5–1.4)
DIFFERENTIAL METHOD: ABNORMAL
EOSINOPHIL # BLD AUTO: 0 K/UL (ref 0–0.5)
EOSINOPHIL NFR BLD: 0 % (ref 0–8)
ERYTHROCYTE [DISTWIDTH] IN BLOOD BY AUTOMATED COUNT: 13.8 % (ref 11.5–14.5)
EST. GFR  (AFRICAN AMERICAN): >60 ML/MIN/1.73 M^2
EST. GFR  (NON AFRICAN AMERICAN): >60 ML/MIN/1.73 M^2
GLUCOSE SERPL-MCNC: 198 MG/DL (ref 70–110)
HCT VFR BLD AUTO: 39.9 % (ref 40–54)
HGB BLD-MCNC: 13.8 G/DL (ref 14–18)
IMM GRANULOCYTES # BLD AUTO: 0 K/UL (ref 0–0.04)
IMM GRANULOCYTES NFR BLD AUTO: 0 % (ref 0–0.5)
LYMPHOCYTES # BLD AUTO: 0.9 K/UL (ref 1–4.8)
LYMPHOCYTES NFR BLD: 46 % (ref 18–48)
MCH RBC QN AUTO: 30.7 PG (ref 27–31)
MCHC RBC AUTO-ENTMCNC: 34.6 G/DL (ref 32–36)
MCV RBC AUTO: 89 FL (ref 82–98)
MONOCYTES # BLD AUTO: 0.4 K/UL (ref 0.3–1)
MONOCYTES NFR BLD: 21 % (ref 4–15)
NEUTROPHILS # BLD AUTO: 0.7 K/UL (ref 1.8–7.7)
NEUTROPHILS NFR BLD: 33 % (ref 38–73)
NRBC BLD-RTO: 0 /100 WBC
PLATELET # BLD AUTO: 85 K/UL (ref 150–350)
PMV BLD AUTO: 10.8 FL (ref 9.2–12.9)
POTASSIUM SERPL-SCNC: 3.8 MMOL/L (ref 3.5–5.1)
PROT SERPL-MCNC: 7.1 G/DL (ref 6–8.4)
RBC # BLD AUTO: 4.49 M/UL (ref 4.6–6.2)
SODIUM SERPL-SCNC: 136 MMOL/L (ref 136–145)
WBC # BLD AUTO: 2 K/UL (ref 3.9–12.7)

## 2020-01-14 PROCEDURE — 36415 COLL VENOUS BLD VENIPUNCTURE: CPT

## 2020-01-14 PROCEDURE — 80053 COMPREHEN METABOLIC PANEL: CPT

## 2020-01-14 PROCEDURE — 82378 CARCINOEMBRYONIC ANTIGEN: CPT

## 2020-01-14 PROCEDURE — 85025 COMPLETE CBC W/AUTO DIFF WBC: CPT

## 2020-01-15 ENCOUNTER — OFFICE VISIT (OUTPATIENT)
Dept: HEMATOLOGY/ONCOLOGY | Facility: CLINIC | Age: 80
End: 2020-01-15
Payer: MEDICARE

## 2020-01-15 VITALS
HEART RATE: 83 BPM | RESPIRATION RATE: 18 BRPM | DIASTOLIC BLOOD PRESSURE: 74 MMHG | BODY MASS INDEX: 28.31 KG/M2 | TEMPERATURE: 98 F | WEIGHT: 220.5 LBS | SYSTOLIC BLOOD PRESSURE: 153 MMHG

## 2020-01-15 DIAGNOSIS — Z85.118 HISTORY OF LUNG CANCER: ICD-10-CM

## 2020-01-15 DIAGNOSIS — C83.00 LYMPHOMA, SMALL LYMPHOCYTIC: Primary | ICD-10-CM

## 2020-01-15 DIAGNOSIS — C91.10 CLL (CHRONIC LYMPHOCYTIC LEUKEMIA): ICD-10-CM

## 2020-01-15 DIAGNOSIS — D50.9 IRON DEFICIENCY ANEMIA, UNSPECIFIED IRON DEFICIENCY ANEMIA TYPE: ICD-10-CM

## 2020-01-15 PROCEDURE — 1125F AMNT PAIN NOTED PAIN PRSNT: CPT | Mod: S$GLB,,, | Performed by: INTERNAL MEDICINE

## 2020-01-15 PROCEDURE — 1159F MED LIST DOCD IN RCRD: CPT | Mod: S$GLB,,, | Performed by: INTERNAL MEDICINE

## 2020-01-15 PROCEDURE — 99214 OFFICE O/P EST MOD 30 MIN: CPT | Mod: S$GLB,,, | Performed by: INTERNAL MEDICINE

## 2020-01-15 PROCEDURE — 1159F PR MEDICATION LIST DOCUMENTED IN MEDICAL RECORD: ICD-10-PCS | Mod: S$GLB,,, | Performed by: INTERNAL MEDICINE

## 2020-01-15 PROCEDURE — 99214 PR OFFICE/OUTPT VISIT, EST, LEVL IV, 30-39 MIN: ICD-10-PCS | Mod: S$GLB,,, | Performed by: INTERNAL MEDICINE

## 2020-01-15 PROCEDURE — 1125F PR PAIN SEVERITY QUANTIFIED, PAIN PRESENT: ICD-10-PCS | Mod: S$GLB,,, | Performed by: INTERNAL MEDICINE

## 2020-01-15 NOTE — PROGRESS NOTES
[facility] Hematology/Oncology  PROGRESS NOTE - Follow-up Visit      Subjective:       Patient ID:   NAME: Conrad Kuhn : 1940     79 y.o. male    Referring Doc: Julien  Other Physicians: Ced Erazo Joubert, Srinivas, Pinsky    Chief Complaint:  CLL f/u    History of Present Illness:     Patient returns today for a regularly scheduled follow-up visit.  The patient is here today to go over the results of the recently ordered labs, tests and studies.. He is here by himself today. He is breathing ok. He denies any CP, SOB, HA's or N/V.     He saw Dr Don at [facility] again on 2019. he is now on rituximab monthly with oral Venetoclax but at 2 pills daily . His neck swelling and LAD has resolved.           ROS:   GEN: normal without any fever, night sweats or weight loss  HEENT: normal with no HA's, sore throat, stiff neck, changes in vision  CV: normal with no CP, SOB, PND, MAYER or orthopnea  PULM: normal with no SOB, cough, hemoptysis, sputum or pleuritic pain  GI: normal with no abdominal pain, nausea, vomiting, constipation, diarrhea, melanotic stools, BRBPR, or hematemesis; no dysphagia  : urine frequency stable  BREAST: normal with no mass, discharge, pain  SKIN: normal with no rash, erythema, bruising, or swelling    Allergies:  Review of patient's allergies indicates:  No Known Allergies    Medications:    Current Outpatient Medications:     allopurinol (ZYLOPRIM) 100 MG tablet, Take 1 tablet (100 mg total) by mouth once daily., Disp: 30 tablet, Rfl: 5    amoxicillin (AMOXIL) 875 MG tablet, Take 875 mg by mouth 2 (two) times daily., Disp: , Rfl:     atorvastatin (LIPITOR) 20 MG tablet, TK 1 T PO QD, Disp: , Rfl:     azelastine (ASTELIN) 137 mcg (0.1 %) nasal spray, 1 spray by Nasal route., Disp: , Rfl:     blood sugar diagnostic (FREESTYLE LITE STRIPS) Strp, by Other route., Disp: , Rfl:     diphenhydrAMINE-aluminum-magnesium hydroxide-simethicone-lidocaine HCl 2%, Swish and spit 15 mLs  every 4 (four) hours as needed., Disp: 240 mL, Rfl: 6    ferrous sulfate (FEOSOL) 325 mg (65 mg iron) Tab tablet, Take 1 tablet (325 mg total) by mouth once daily., Disp: , Rfl:     FREESTYLE LANCETS 28 gauge lancets, TEST TWICE DAILY, Disp: , Rfl: 1    gabapentin (NEURONTIN) 300 MG capsule, TK ONE C PO  BID, Disp: , Rfl: 0    glimepiride (AMARYL) 1 MG tablet, TK 1 T PO ONCE D WITH BIBI OR THE FIRST MAIN MEAL OF THE DAY, Disp: , Rfl: 0    HYDROcodone-acetaminophen (NORCO)  mg per tablet, TK 1 T PO Q 8 H PRN P, Disp: , Rfl: 0    levalbuterol (XOPENEX) 1.25 mg/3 mL nebulizer solution, 3 mLs., Disp: , Rfl:     metFORMIN (GLUCOPHAGE) 500 MG tablet, TK 1 T PO BID WC, Disp: , Rfl: 2    metoprolol succinate (TOPROL-XL) 25 MG 24 hr tablet, TK 1 T PO BID, Disp: , Rfl:     neomycin-polymyxin-dexamethasone (DEXACINE) 3.5 mg/g-10,000 unit/g-0.1 % Oint, Apply to eye daily as needed., Disp: , Rfl:     ondansetron (ZOFRAN-ODT) 8 MG TbDL, DIS ONE T PO Q 8 H PRN N, Disp: , Rfl: 10    promethazine (PHENERGAN) 25 MG tablet, TK 1 T PO Q 6 H PRN N, Disp: , Rfl: 10    traZODone (DESYREL) 100 MG tablet, TAKE 1 TABLET BY MOUTH EVERY NIGHT AT BEDTIME, Disp: 90 tablet, Rfl: 0    valACYclovir (VALTREX) 500 MG tablet, Take 2 tablets (1,000 mg total) by mouth 2 (two) times daily., Disp: 120 tablet, Rfl: 6    venetoclax (VENCLEXTA) 100 mg Tab, Take 400 mg by mouth once daily., Disp: 120 tablet, Rfl: 5    water Liqd 150 mL with MILK OF MAGNESIA 400 mg/5 mL Susp 400 mg, diphenhydrAMINE 12.5 mg/5 mL Elix 60 mg, nystatin 100,000 unit/mL Susp 500,000 Units, SWISH AND SPIT 30 ML PO Q 4 H PRN, Disp: , Rfl: 0    blood-glucose meter (FREESTYLE LITE METER) kit, Use as instructed, Disp: , Rfl:     PMHx/PSHx Updates:  See patient's last visit with me on 12/16/2019.  See H&P on 1/3/2019        Pathology:  Cancer Staging  No matching staging information was found for the patient.          Objective:     Vitals:  Blood pressure (!) 153/74,  pulse 83, temperature 97.9 °F (36.6 °C), temperature source Oral, resp. rate 18, weight 100 kg (220 lb 8 oz).    Physical Examination:   GEN: no apparent distress, comfortable; AAOx3  HEAD: atraumatic and normocephalic  EYES: no pallor, no icterus, PERRLA  ENT: OMM, no pharyngeal erythema, external ears WNL; no nasal discharge; no thrush  NECK: no masses, thyroid normal, trachea midline, , supple; LAD and LN's on right neck reduced in size  CV: RRR with no murmur; normal pulse; normal S1 and S2; no pedal edema  CHEST: Normal respiratory effort; CTAB; normal breath sounds; no wheeze or crackles  ABDOM: nontender and nondistended; soft; normal bowel sounds; no rebound/guarding  MUSC/Skeletal: ROM normal; no crepitus; joints normal; no deformities or arthropathy  EXTREM: no clubbing, cyanosis, inflammation or swelling; bruise on LUE resolved  SKIN: no rashes, lesions, ulcers, petechiae or subcutaneous nodules  : no keith  NEURO: grossly intact; motor/sensory WNL; AAOx3; no tremors  PSYCH: normal mood, affect and behavior  LYMPH: normal cervical, supraclavicular, axillary and groin LN's            Labs:   1/14/2020  Lab Results   Component Value Date    WBC 2.00 (L) 01/14/2020    HGB 13.8 (L) 01/14/2020    HCT 39.9 (L) 01/14/2020    MCV 89 01/14/2020    PLT 85 (L) 01/14/2020       BMP  Lab Results   Component Value Date     01/14/2020    K 3.8 01/14/2020    CL 97 01/14/2020    CO2 30 (H) 01/14/2020    BUN 13 01/14/2020    CREATININE 0.9 01/14/2020    CALCIUM 8.9 01/14/2020    ANIONGAP 9 01/14/2020    ESTGFRAFRICA >60.0 01/14/2020    EGFRNONAA >60.0 01/14/2020     Lab Results   Component Value Date    ALT 20 01/14/2020    AST 21 01/14/2020    ALKPHOS 37 (L) 01/14/2020    BILITOT 1.0 01/14/2020     Lab Results   Component Value Date    IRON 99 12/02/2019    TIBC 363 12/02/2019    FERRITIN 34 12/02/2019       CEA 0.0 - 5.0 ng/mL 1.1            Radiology/Diagnostic Studies:    CT abdom/pelvis  8/7/2019:        Impression       1. Multifocal lymphadenopathy in the chest, abdomen, and pelvis compatible with provided clinical history of lymphocytic leukemia.  There is mixed interval change when compared to prior studies.  Pathologic lymphadenopathy in the chest has increased significantly when compared to a CTA of the chest from May 2018.  Pathologic retroperitoneal lymphadenopathy has improved slightly when compared to a previous abdominal CT from February 2017.  Please see above details.  2. Mild aneurysmal dilation of the infrarenal abdominal aorta, with maximal transverse diameter of 3.7 cm.  Maximal transverse diameter on a prior study from 2017 was 2.5 cm.  3. Additional findings as above           [facility] Unknown Rad Eap    Result Date: 1/14/2019  CMS MANDATED QUALITY DATA - CT RADIATION - 436 All CT scans at this facility utilize dose modulation, iterative reconstruction, and/or weight based dosing when appropriate to reduce radiation dose to as low as reasonably achievable. Reason:Lymphoid leukemia TECHNIQUE: CT thorax with, CT abdomen  with, and CT pelvis with 100 mL Omnipaque 350. COMPARISON: CT chest dated 05/19/2018 and CT abdomen and pelvis dated 02/08/2017 CT THORAX: There is stable mediastinal, hilar and axillary adenopathy. There is coronary artery calcification. There is resolution of the groundglass opacities throughout the lungs. There are no confluent infiltrates, pulmonary nodules or pleural effusions. There are stable subcentimeter nodules in the left lobe of the thyroid gland. There are degenerative changes of the spine. CT ABDOMEN: The liver, pancreas, adrenal glands and gallbladder are normal. The spleen is enlarged. There is mesenteric and retroperitoneal adenopathy which is slightly smaller in size. -There is a portacaval node measuring 37 x 21 mm and previously measured 38 x 27 mm. -Periportal lymph node measuring 41 x 18 mm, previously measured 48 x 29 mm. -Left periaortic  adenopathy measuring 34 x 23 mm and previously measured 40 x 42 mm- The kidneys enhance symmetrically without hydronephrosis or calculi. There are no thick-walled or dilated bowel loops. There is vascular calcification of aorta. There is a 3.0 x 3.0 cm infrarenal abdominal aortic aneurysm. CT PELVIS: There is adenopathy along the pelvic sidewalls bilaterally which has decreased in size. -The left common iliac adenopathy measuring 36 x 11 mm, previously measured 46 x 14 mm -the right common iliac adenopathy measuring 46 x 10 mm. Previously measured 53 x 15 mm. The bladder is normal. The prostate gland is enlarged. There are degenerative changes of the spine. There is grade 1 anterolisthesis of L5 on S1 with bilateral pars defects.     IMPRESSION: Stable mediastinal, hilar and axillary adenopathy with resolution of the airspace disease within the lungs Decrease in size of the mesenteric, retroperitoneal and pelvic adenopathy. Stable infrarenal abdominal aortic aneurysm Splenomegaly     Read and electronically signed by: Jeniffer Arredondo MD on 1/14/2019 9:14 AM CST JENIFFER ARREDONDO MD      I have reviewed all available lab results and radiology reports.    Assessment/Plan:   (1) 79 y.o. male with  diagnosis of CLL who has been referred by Dr Rony Victoria for continuation of care by medical hematology/oncology.   - BM biopsy  12/4/2015 with CLL  - diagnosis of SLL in 2004 and s/p FCR x6 in 2007  - s/p imbruvica in past (stopped in May 2018)  - latest wbc at 4.8; lymph 56%  - latest CT on 8/7/2019 showing increase in the LAD  - platelets are 111,000  - He was recently hospitalized at [facility] and seen by Dr Wheeler. Dr Wheeler has recommended Rituximab. He is starting tomorrow.   - discussed the rituximab regimen and the potential side-effect profile; he is getting chemotherapy school today with [facility]  - he saw Dr Don on Oct 21st 2019  - he has had 3 of the 4 weeks of rituximab so far; Dr Don recommends him to eventually  get rituximab monthly x6 with daily oral Venetoclax for two years total.   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  - followed  By Dr Kee  - CEA 1.2  - CT scans on 1/14/2019 stable     (3) Iron deficiency anemia s/p IV iron couple weeks ago  - hgb 14.6 and adequate, and the iron panel currently wnl     (4) HTN - on BP meds     (6) Chronic neck and back issues - followed by Dr Ryan Rivero     (7) DM - currently off all meds     (8) Hypercholesterolemia - on meds    (9)  - followed by Dr Whitney        VISIT DIAGNOSES:      Lymphoma, small lymphocytic (2004)    Iron deficiency anemia, unspecified iron deficiency anemia type    CLL (chronic lymphocytic leukemia)    History of lung cancer - ANDRES NSCLC 2004          PLAN:  1. check labs every 2 weeks while on therapy  2. F/u with PCP, pain management  3. F/u with PCP, Pulm, , etc  4. F/u with Dr Don at [facility] in March 2020  5. Continue oral iron  6. continue with monthly rituximab with daily Venetoclax (taking only 2 pills daily due to counts)  7. schedule f/u PET       RTC in  4 weeks    Fax note to Kacey Kee, Chelo Florence, Florencia Acosta/Joselito Wheeler        Discussion:       I spent over 25 mins of time with the patient. Reviewed results of the recently ordered labs, tests and studies; made directives with regards to the results. Over half of this time was spent couseling and coordinating care.    I have explained all of the above in detail and the patient understands all of the current recommendation(s). I have answered all of their questions to the best of my ability and to their complete satisfaction.   The patient is to continue with the current management plan.        Chemotherapy  Discussion:      I discussed the available treatment option(s) in accordance with the latest/current national evidence-based guidelines (NCCN, UpToDate, NCI, ASCO, etc where applicable), their overall age/condition and their co-morbidities. I also went over the risks and benefits of the chemotherapy with regard to their particular cancer type, their cancer stage, their age/condition, and their co-morbidities. I provided literature on the chemotherapy regimen and discussed the chemotherapy side-effect profiles of the drug(s). I discussed the importance of compliance with obtaining and monitoring weekly lab work, and went over the potential hematopathology issues and risks with anemia, leucopenia and thrombocytopenia that can occur with chemotherapy. I discussed the potential risks of liver and kidney damage, which could be permanent and could necessitate dialysis long-term if kidney failure developed. I discussed the emetic and/or diarrheal potential of the regimen and the potential need for use of antiemetic and anti-diarrheal medications. I discussed the risk for development of anaphylactic shock, bronchospasm, dysrhythmia, and respiratory/cardiovascular arrest and/or failure. I discussed the potential risks for development of alopecia, cold sensory issues, ringing in ears, vertigo, cataracts, glaucoma, and neuropathy, all of which could end up being chronic and life-long. Some chemotherpyI discussed the risks of hand-foot syndrome and rashes, and development of other autoimmune mediated processes such as pneumonitis, hepatitis, and colitis which could be life threatening. I discussed the risks of the potential development of a rare but fatal viral mediated disease known as PML (Progressive Multifocal Leukoencephalopathy), and risk of future development of leukemia and/or lymphoma from use of certain chemotherapy agents. I discussed the need for neutropenic precautions, basic hygiene/sanitation behaviors and  dietary restrictions.    The patient's consent has been obtained to proceed with the chemotherapy.The patient will be referred to Chemotherapy School /[facility] for training and education on chemotherapy, use of antiemetics and/or anti-diarrheals, use of NSAID's, potential chemotherapy side-effects, and any specific recommendations and precautions with the particular chemotherapy agents.       Immunologic Therapy Discussion:    I discussed the available treatment option(s) in accordance with the latest/current national evidence-based guidelines (NCCN, UpToDate, NCI, ASCO, etc where applicable), their overall age/condition and their co-morbidities. I went over the risks and benefits of the immunotherapy with regard to their particular cancer type, their cancer stage, their age/condition, and their co-morbidities. I provided literature on the immunotherapy regimen and discussed the immunotherapy side-effect profiles of the drug(s). I discussed the importance of compliance with obtaining and monitoring weekly lab work, and went over the potential hematopathology issues and risks of hemato-pathologic issues with anemia, leucopenia and/or thrombocytopenia and effects on thyroid function that can occur with immunotherapy. The patient will most likely need to have there thyroid functions monitored by their PCP and may need to take thyroid medication while on the immunotherapy. I discussed the potential risks of liver and kidney damage, which could be permanent and could necessitate dialysis long-term if kidney failure developed. I discussed the emetic and/or diarrheal potential of the regimen and the potential need for use of antiemetic and anti-diarrheal medications. I discussed the risk for development of anaphylactic shock, bronchospasm, dysrhythmia, and respiratory/cardiovascular arrest and/or failure. I discussed the potential risks for development of alopecia, cold sensory issues, ringing in ears, vertigo  and neuropathy, all of which could end up being chronic and life-long. I discussed the risks of hand-foot syndrome and rashes, and development of other autoimmune mediated processes such as pneumonitis, hepatitis and colitis which could potentially be life threatening. I discussed the risks of the potential development of a rare but fatal viral mediated disease known as PML (Progressive Multifocal Leukoencephalopathy), and risk of future development of leukemia and/or lymphoma from use of certain immunotherapy agents. I discussed the possibility that immunologic therapy cold worsen or promote progression of any underlying autoimmune diseases such as sarcoidosis, ulcerative colitis, Crohn's disease, psoriasis, rheumatoid disorders, scleroderma, autoimmune nephritis disorders, Hashimoto's thyroiditis, and Lupus among others. I discussed the need for neutropenic precautions, basic hygiene/sanitation behaviors and dietary restrictions.    The patient's consent has been obtained to proceed with the immunotherapy.The patient will be referred to Immunotherapy School /[facility] for training and education on immunotherapy, use of antiemetics and/or anti-diarrheals, use of NSAID's, potential immunotherapy side-effects, and any specific recommendations and precautions with the particular immunotherapy agents.      I answered all of the patient's (and family's, if applicable) questions to the best of my ability and to their complete satisfaction. The patient acknowledged full understanding of the risks, recommendations and plan(s).       I answered all of the patient's (and family's, if applicable) questions to the best of my ability and to their complete satisfaction. The patient acknowledged full understanding of the risks, recommendations and plan(s).         Electronically signed by Drew Bai MD

## 2020-01-17 RX ORDER — FAMOTIDINE 10 MG/ML
20 INJECTION INTRAVENOUS
Status: CANCELLED | OUTPATIENT
Start: 2020-01-23

## 2020-01-17 RX ORDER — MEPERIDINE HYDROCHLORIDE 25 MG/ML
25 INJECTION INTRAMUSCULAR; INTRAVENOUS; SUBCUTANEOUS
Status: CANCELLED | OUTPATIENT
Start: 2020-01-23

## 2020-01-17 RX ORDER — SODIUM CHLORIDE 0.9 % (FLUSH) 0.9 %
10 SYRINGE (ML) INJECTION
Status: CANCELLED | OUTPATIENT
Start: 2020-01-23

## 2020-01-17 RX ORDER — HEPARIN 100 UNIT/ML
500 SYRINGE INTRAVENOUS
Status: CANCELLED | OUTPATIENT
Start: 2020-01-23

## 2020-01-17 RX ORDER — ACETAMINOPHEN 325 MG/1
650 TABLET ORAL
Status: CANCELLED | OUTPATIENT
Start: 2020-01-23

## 2020-01-20 ENCOUNTER — LAB VISIT (OUTPATIENT)
Dept: LAB | Facility: HOSPITAL | Age: 80
End: 2020-01-20
Attending: INTERNAL MEDICINE
Payer: MEDICARE

## 2020-01-20 DIAGNOSIS — C91.10 CLL (CHRONIC LYMPHOCYTIC LEUKEMIA): ICD-10-CM

## 2020-01-20 DIAGNOSIS — C83.00 LYMPHOMA, SMALL LYMPHOCYTIC: ICD-10-CM

## 2020-01-20 LAB
ALBUMIN SERPL BCP-MCNC: 4.2 G/DL (ref 3.5–5.2)
ALP SERPL-CCNC: 40 U/L (ref 55–135)
ALT SERPL W/O P-5'-P-CCNC: 21 U/L (ref 10–44)
ANION GAP SERPL CALC-SCNC: 10 MMOL/L (ref 8–16)
AST SERPL-CCNC: 24 U/L (ref 10–40)
BASOPHILS # BLD AUTO: 0 K/UL (ref 0–0.2)
BASOPHILS NFR BLD: 0 % (ref 0–1.9)
BILIRUB SERPL-MCNC: 0.7 MG/DL (ref 0.1–1)
BUN SERPL-MCNC: 12 MG/DL (ref 8–23)
CALCIUM SERPL-MCNC: 9 MG/DL (ref 8.7–10.5)
CHLORIDE SERPL-SCNC: 98 MMOL/L (ref 95–110)
CO2 SERPL-SCNC: 28 MMOL/L (ref 23–29)
CREAT SERPL-MCNC: 0.8 MG/DL (ref 0.5–1.4)
DIFFERENTIAL METHOD: ABNORMAL
EOSINOPHIL # BLD AUTO: 0 K/UL (ref 0–0.5)
EOSINOPHIL NFR BLD: 0.5 % (ref 0–8)
ERYTHROCYTE [DISTWIDTH] IN BLOOD BY AUTOMATED COUNT: 14.1 % (ref 11.5–14.5)
EST. GFR  (AFRICAN AMERICAN): >60 ML/MIN/1.73 M^2
EST. GFR  (NON AFRICAN AMERICAN): >60 ML/MIN/1.73 M^2
GLUCOSE SERPL-MCNC: 140 MG/DL (ref 70–110)
HCT VFR BLD AUTO: 40.5 % (ref 40–54)
HGB BLD-MCNC: 14.4 G/DL (ref 14–18)
IMM GRANULOCYTES # BLD AUTO: 0.01 K/UL (ref 0–0.04)
IMM GRANULOCYTES NFR BLD AUTO: 0.5 % (ref 0–0.5)
LYMPHOCYTES # BLD AUTO: 0.8 K/UL (ref 1–4.8)
LYMPHOCYTES NFR BLD: 39.8 % (ref 18–48)
MCH RBC QN AUTO: 31.3 PG (ref 27–31)
MCHC RBC AUTO-ENTMCNC: 35.6 G/DL (ref 32–36)
MCV RBC AUTO: 88 FL (ref 82–98)
MONOCYTES # BLD AUTO: 0.5 K/UL (ref 0.3–1)
MONOCYTES NFR BLD: 25.2 % (ref 4–15)
NEUTROPHILS # BLD AUTO: 0.7 K/UL (ref 1.8–7.7)
NEUTROPHILS NFR BLD: 34 % (ref 38–73)
NRBC BLD-RTO: 0 /100 WBC
PLATELET # BLD AUTO: 101 K/UL (ref 150–350)
PMV BLD AUTO: 9.4 FL (ref 9.2–12.9)
POTASSIUM SERPL-SCNC: 3.7 MMOL/L (ref 3.5–5.1)
PROT SERPL-MCNC: 7.5 G/DL (ref 6–8.4)
RBC # BLD AUTO: 4.6 M/UL (ref 4.6–6.2)
SODIUM SERPL-SCNC: 136 MMOL/L (ref 136–145)
WBC # BLD AUTO: 2.06 K/UL (ref 3.9–12.7)

## 2020-01-20 PROCEDURE — 36415 COLL VENOUS BLD VENIPUNCTURE: CPT

## 2020-01-20 PROCEDURE — 85025 COMPLETE CBC W/AUTO DIFF WBC: CPT

## 2020-01-20 PROCEDURE — 80053 COMPREHEN METABOLIC PANEL: CPT

## 2020-01-23 ENCOUNTER — INFUSION (OUTPATIENT)
Dept: INFUSION THERAPY | Facility: HOSPITAL | Age: 80
End: 2020-01-23
Attending: INTERNAL MEDICINE
Payer: MEDICARE

## 2020-01-23 ENCOUNTER — TELEPHONE (OUTPATIENT)
Dept: HEMATOLOGY/ONCOLOGY | Facility: CLINIC | Age: 80
End: 2020-01-23

## 2020-01-23 VITALS
RESPIRATION RATE: 18 BRPM | DIASTOLIC BLOOD PRESSURE: 76 MMHG | TEMPERATURE: 98 F | BODY MASS INDEX: 28.12 KG/M2 | OXYGEN SATURATION: 98 % | HEIGHT: 74 IN | WEIGHT: 219.13 LBS | HEART RATE: 65 BPM | SYSTOLIC BLOOD PRESSURE: 155 MMHG

## 2020-01-23 DIAGNOSIS — C91.10 CLL (CHRONIC LYMPHOCYTIC LEUKEMIA): Primary | ICD-10-CM

## 2020-01-23 PROCEDURE — 25000003 PHARM REV CODE 250: Performed by: INTERNAL MEDICINE

## 2020-01-23 PROCEDURE — 96367 TX/PROPH/DG ADDL SEQ IV INF: CPT

## 2020-01-23 PROCEDURE — 96375 TX/PRO/DX INJ NEW DRUG ADDON: CPT

## 2020-01-23 PROCEDURE — S0028 INJECTION, FAMOTIDINE, 20 MG: HCPCS | Performed by: INTERNAL MEDICINE

## 2020-01-23 PROCEDURE — 96413 CHEMO IV INFUSION 1 HR: CPT

## 2020-01-23 PROCEDURE — 63600175 PHARM REV CODE 636 W HCPCS: Performed by: INTERNAL MEDICINE

## 2020-01-23 PROCEDURE — 96415 CHEMO IV INFUSION ADDL HR: CPT

## 2020-01-23 RX ORDER — FAMOTIDINE 10 MG/ML
20 INJECTION INTRAVENOUS
Status: COMPLETED | OUTPATIENT
Start: 2020-01-23 | End: 2020-01-23

## 2020-01-23 RX ORDER — SODIUM CHLORIDE 0.9 % (FLUSH) 0.9 %
10 SYRINGE (ML) INJECTION
Status: DISCONTINUED | OUTPATIENT
Start: 2020-01-23 | End: 2020-01-23 | Stop reason: HOSPADM

## 2020-01-23 RX ORDER — ACETAMINOPHEN 325 MG/1
650 TABLET ORAL
Status: COMPLETED | OUTPATIENT
Start: 2020-01-23 | End: 2020-01-23

## 2020-01-23 RX ORDER — MEPERIDINE HYDROCHLORIDE 25 MG/ML
25 INJECTION INTRAMUSCULAR; INTRAVENOUS; SUBCUTANEOUS
Status: DISCONTINUED | OUTPATIENT
Start: 2020-01-23 | End: 2020-01-23 | Stop reason: HOSPADM

## 2020-01-23 RX ADMIN — RITUXIMAB 1110 MG: 10 INJECTION, SOLUTION INTRAVENOUS at 08:01

## 2020-01-23 RX ADMIN — FAMOTIDINE 20 MG: 10 INJECTION, SOLUTION INTRAVENOUS at 07:01

## 2020-01-23 RX ADMIN — DIPHENHYDRAMINE HYDROCHLORIDE 50 MG: 50 INJECTION INTRAMUSCULAR; INTRAVENOUS at 07:01

## 2020-01-23 RX ADMIN — ACETAMINOPHEN 650 MG: 325 TABLET ORAL at 07:01

## 2020-01-23 NOTE — TELEPHONE ENCOUNTER
New prescription for Venclexta 200 mg po daily sent to [facility].    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 He is now here for a xolair injection and is plagued with sinus issues. \par \par His shortness of breath is felt to be multifactorial due to:\par - Chronic sinusitis \par -asthma\par -poor breathing mechanics \par -cardiac disease\par -anemia\par -out of shape\par -overweight\par -TBM\par \par problem 1: asthma\par -can continue to use albuterol by the nebulizer up to QID though should use it first thing in the morning and PRN \par -continue to use Breo Ellipta 200 at 1 inhalation QD\par -continue to use Spiriva Handihaler 1 inhalation QD \par -continue to use QVar 80 2 puffs BID\par -continue Singulair 10 mg QHS \par -continue to use rescue inhaler, Ventolin, PRN and before exercise\par \par Asthma is believed to be caused by inherited (genetic) and environmental factor, but its exact cause is unknown. Asthma may be triggered by allergens, lung infections, or irritants in the air. Asthma triggers are different for each person \par -Inhaler technique reviewed as well as oral hygiene techniques reviewed with patient. Avoidance of cold air, extremes of temperature, rescue inhaler should be used before exercise. Order of medication reviewed with patient. Recommended use of a cool mist humidifier in the bedroom \par You have a clinical scenario most c/w acute bronchitis the etiology of which is unknown but empiric antibiotics are indicated. Hydration, mucolytics including Mucinex, Robitussin and the like are indicated. Cough controlling agents will be needed. \par \par problem 2: tracheomalacia (s/p PNA)\par -CTSX f/u with Dr. Bustos and Dr. Verde, f/u in 5 months\par -continue amitriptyline 10mg Q8H PRN\par Tracheomalacia is usually acquired in adults and common causes include damage by tracheostomy or endotracheal intubation damaging the tracheal cartilage with increase risk with multiple intubations, prolonged intubation, and concurrent high dose steroid therapy; external chest wall trauma and surgery; chronic compression of the trachea by benign etiologies (eg, benign mediastinal goiter) or malignancy; relapsing polychondritis; or recurrent infection. Tracheomalacia can be asymptomatic, however signs or symptoms can develop as the severity of the airway narrowing progresses with major symptoms include dyspnea, cough, and sputum retention. Other symptoms include severe paroxysms of coughing, wheezing or stridor, barking cough and may be exacerbated by forced expiration, cough, and Valsalva maneuver. Tracheomalacia is diagnosed by a bronchoscopic visualization of dynamic airway collapse on dynamic chest CT. Therapy is warranted in symptomatic patients with severe tracheomalacia and includes surgical repair as tracheobronchoplasty. The patient was referred to Dr. Everardo Jones or Dr. Johan Verde, at [facility] for a surgical consult.\par \par problem 3: allergic rhinitis\par - Add Pulmicort 0.5 mg BID\par -recommended to use "Navage" nasal rinse device \par -Olopatadine .6% 1 sniff/nostril BID\par -Clarinex 5 mg QD at breakfast \par -s/p allergic profile: blood work to include IgE level(+), eosinophil level(-), vitamin D level(-), asthma profile(-), and food IgE level (-)\par \par -Environmental measures for allergies were encouraged including mattress and pillow cover, air purifier, and environmental controls. \par \par problem 4:  GERD\par -continue to use Nexium 40 mg before breakfast\par -continue to use Zantac 300 mg QHS \par \par -Rule of 2s: avoid eating too much, eating too late, eating too spicy, eating two hours before bed\par -Things to avoid including overeating, spicy foods, tight clothing, eating within three hours of bed, this list is not all inclusive. \par -For treatment of reflux, possible options discussed including diet control, H2 blockers, PPIs, as well as coating motility agents discussed as treatment options. Timing of meals and proximity of last meal to sleep were discussed. If symptoms persist, a formal gastrointestinal evaluation is needed. 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 Xolair is produced by a Chinese hamster ovary cell suspension culture in nutrient medium containing the antibiotic gentamicin. Gentamicin is not detectable in the final product. Xolair is a sterile, white, preservative free, lyophilized powder contained in a single use vial that is reconstituted with sterile water for suspension. Side effects include: wheezing, tightness of the chest, trouble breathing, hives, skin rash, feeling anxious or light-headed, fainting, warmth or tingling under skin, or swelling of face, lips, or tongue \par \par problem 6: obesity\par -Weight loss, exercise, and diet control were discussed and are highly encouraged. Treatment options were given such as, aqua therapy, and contacting a nutritionist. Recommended to use the elliptical, stationary bike, less use of treadmill.  Obesity is associated with worsening asthma, shortness of breath, and potential for cardiac disease, diabetes, and other underlying medical conditions. \par \par problem 7: cardiac component\par -recommended to continue to f/u with cardiologist Dr. Jed Wilks\par \par problem 8: poor breathing mechanics\par -Proper breathing techniques were reviewed with an emphasis of exhalation. Patient instructed to breath in for 1 second and out for four seconds. Patient was encouraged to not talk while walking. \par \par problem 9: no OSAS\par -based on his fatigue, EDS, snoring, elevated mallampati class, increased leg size, and changes in memory\par -he is s/p home sleep study\par -recommended Provent in the meantime \par -Sleep apnea is associated with adverse clinical consequences which an affect most organ systems.  Cardiovascular disease risk includes arrhythmias, atrial fibrillation, hypertension, coronary artery disease, and stroke. Metabolic disorders include diabetes type 2, non-alcoholic fatty liver disease. Mood disorder especially depression; and cognitive decline especially in the elderly. Associations with  chronic reflux/Rodríguez’s esophagus some but not all inclusive. \par -Reasons to assess this include arousal consistent with hypopnea; respiratory events most prominent in REM sleep or supine position; therefore sleep staging and body position are important for accurate diagnosis and estimation of AHI.\par \par problem 10: abnormal chest CT\par -c/w TBM and PNA\par -repeat chest CT in 10/19\par \par problem 11: r/o immunodeficiency\par -s/p blood work: quantitative immunoglobulins, IgG subsets, strep pneumonia titers (all normal)\par \par -Due to the fact that this pt has had more infections than would be expected and immunological blood work is indicated this would include: IgG subclasses, quantitative immunoglobulins, Strep pneumoniae titers as well as Vitamin D levels. Based on this blood work we will be able to decide where the pt needs additional pneumococcal vaccine either Prevnar 13 or pneumovax. Immunology evaluation will also be potentially indicated.\par  \par problem 12: health maintenance\par - Recommended him to have a neurological evaluation with Dr. Jerrell Moore regarding his frequent falling. \par -recommended a yearly flu shot after October 15 -refused\par -recommended strep pneumonia vaccines: Prevnar-13 vaccine, followed by Pneumo vaccine 23 on year following\par -recommended early intervention for URIs\par -recommended osteoporosis evaluations\par -recommended early dermatological evaluations\par -recommended after the age of 50 to the age of 70, colonoscopy every 5 years\par \par F/U in 4 months\par He is encouraged to call with any changes, concerns, or questions.

## 2020-01-23 NOTE — TELEPHONE ENCOUNTER
----- Message from Cate Saab sent at 1/23/2020  2:31 PM CST -----  Evelyn from [facility] called and asked for an updated prescription for Venclexta for the patient. Please call her back at [phone].

## 2020-01-24 ENCOUNTER — HOSPITAL ENCOUNTER (OUTPATIENT)
Dept: RADIOLOGY | Facility: HOSPITAL | Age: 80
Discharge: HOME | End: 2020-01-24
Attending: INTERNAL MEDICINE
Payer: MEDICARE

## 2020-01-24 VITALS — HEIGHT: 74 IN | BODY MASS INDEX: 28.23 KG/M2 | WEIGHT: 220 LBS

## 2020-01-24 DIAGNOSIS — Z85.118 HISTORY OF LUNG CANCER: ICD-10-CM

## 2020-01-24 DIAGNOSIS — C83.00 LYMPHOMA, SMALL LYMPHOCYTIC: ICD-10-CM

## 2020-01-24 DIAGNOSIS — C91.10 CLL (CHRONIC LYMPHOCYTIC LEUKEMIA): ICD-10-CM

## 2020-01-24 LAB — GLUCOSE SERPL-MCNC: 131 MG/DL (ref 70–110)

## 2020-01-24 PROCEDURE — A9552 F18 FDG: HCPCS | Mod: PO

## 2020-01-24 PROCEDURE — 78815 PET IMAGE W/CT SKULL-THIGH: CPT | Mod: TC,PO

## 2020-02-03 ENCOUNTER — LAB VISIT (OUTPATIENT)
Dept: LAB | Facility: HOSPITAL | Age: 80
End: 2020-02-03
Attending: INTERNAL MEDICINE
Payer: MEDICARE

## 2020-02-03 DIAGNOSIS — C91.10 CLL (CHRONIC LYMPHOCYTIC LEUKEMIA): ICD-10-CM

## 2020-02-03 DIAGNOSIS — C83.00 LYMPHOMA, SMALL LYMPHOCYTIC: ICD-10-CM

## 2020-02-03 LAB
ALBUMIN SERPL BCP-MCNC: 4 G/DL (ref 3.5–5.2)
ALP SERPL-CCNC: 42 U/L (ref 55–135)
ALT SERPL W/O P-5'-P-CCNC: 20 U/L (ref 10–44)
ANION GAP SERPL CALC-SCNC: 8 MMOL/L (ref 8–16)
AST SERPL-CCNC: 22 U/L (ref 10–40)
BASOPHILS # BLD AUTO: 0.01 K/UL (ref 0–0.2)
BASOPHILS NFR BLD: 0.4 % (ref 0–1.9)
BILIRUB SERPL-MCNC: 0.9 MG/DL (ref 0.1–1)
BUN SERPL-MCNC: 14 MG/DL (ref 8–23)
CALCIUM SERPL-MCNC: 9.3 MG/DL (ref 8.7–10.5)
CHLORIDE SERPL-SCNC: 102 MMOL/L (ref 95–110)
CO2 SERPL-SCNC: 30 MMOL/L (ref 23–29)
CREAT SERPL-MCNC: 1.1 MG/DL (ref 0.5–1.4)
DIFFERENTIAL METHOD: ABNORMAL
EOSINOPHIL # BLD AUTO: 0 K/UL (ref 0–0.5)
EOSINOPHIL NFR BLD: 0 % (ref 0–8)
ERYTHROCYTE [DISTWIDTH] IN BLOOD BY AUTOMATED COUNT: 14.4 % (ref 11.5–14.5)
EST. GFR  (AFRICAN AMERICAN): >60 ML/MIN/1.73 M^2
EST. GFR  (NON AFRICAN AMERICAN): >60 ML/MIN/1.73 M^2
GLUCOSE SERPL-MCNC: 109 MG/DL (ref 70–110)
HCT VFR BLD AUTO: 39.4 % (ref 40–54)
HGB BLD-MCNC: 14.1 G/DL (ref 14–18)
IMM GRANULOCYTES # BLD AUTO: 0.01 K/UL (ref 0–0.04)
IMM GRANULOCYTES NFR BLD AUTO: 0.4 % (ref 0–0.5)
LYMPHOCYTES # BLD AUTO: 0.8 K/UL (ref 1–4.8)
LYMPHOCYTES NFR BLD: 33.9 % (ref 18–48)
MCH RBC QN AUTO: 31.3 PG (ref 27–31)
MCHC RBC AUTO-ENTMCNC: 35.8 G/DL (ref 32–36)
MCV RBC AUTO: 87 FL (ref 82–98)
MONOCYTES # BLD AUTO: 0.6 K/UL (ref 0.3–1)
MONOCYTES NFR BLD: 26.7 % (ref 4–15)
NEUTROPHILS # BLD AUTO: 0.9 K/UL (ref 1.8–7.7)
NEUTROPHILS NFR BLD: 38.6 % (ref 38–73)
NRBC BLD-RTO: 0 /100 WBC
PLATELET # BLD AUTO: 106 K/UL (ref 150–350)
PMV BLD AUTO: 10.4 FL (ref 9.2–12.9)
POTASSIUM SERPL-SCNC: 3.9 MMOL/L (ref 3.5–5.1)
PROT SERPL-MCNC: 7.2 G/DL (ref 6–8.4)
RBC # BLD AUTO: 4.51 M/UL (ref 4.6–6.2)
SODIUM SERPL-SCNC: 140 MMOL/L (ref 136–145)
WBC # BLD AUTO: 2.36 K/UL (ref 3.9–12.7)

## 2020-02-03 PROCEDURE — 85025 COMPLETE CBC W/AUTO DIFF WBC: CPT

## 2020-02-03 PROCEDURE — 80053 COMPREHEN METABOLIC PANEL: CPT

## 2020-02-03 PROCEDURE — 36415 COLL VENOUS BLD VENIPUNCTURE: CPT

## 2020-02-17 ENCOUNTER — LAB VISIT (OUTPATIENT)
Dept: LAB | Facility: HOSPITAL | Age: 80
End: 2020-02-17
Attending: INTERNAL MEDICINE
Payer: MEDICARE

## 2020-02-17 DIAGNOSIS — C91.10 CLL (CHRONIC LYMPHOCYTIC LEUKEMIA): ICD-10-CM

## 2020-02-17 DIAGNOSIS — C83.00 LYMPHOMA, SMALL LYMPHOCYTIC: ICD-10-CM

## 2020-02-17 LAB
ALBUMIN SERPL BCP-MCNC: 4.2 G/DL (ref 3.5–5.2)
ALP SERPL-CCNC: 42 U/L (ref 55–135)
ALT SERPL W/O P-5'-P-CCNC: 20 U/L (ref 10–44)
ANION GAP SERPL CALC-SCNC: 8 MMOL/L (ref 8–16)
AST SERPL-CCNC: 20 U/L (ref 10–40)
BASOPHILS # BLD AUTO: 0.01 K/UL (ref 0–0.2)
BASOPHILS NFR BLD: 0.5 % (ref 0–1.9)
BILIRUB SERPL-MCNC: 0.7 MG/DL (ref 0.1–1)
BUN SERPL-MCNC: 15 MG/DL (ref 8–23)
CALCIUM SERPL-MCNC: 9.2 MG/DL (ref 8.7–10.5)
CHLORIDE SERPL-SCNC: 99 MMOL/L (ref 95–110)
CO2 SERPL-SCNC: 30 MMOL/L (ref 23–29)
CREAT SERPL-MCNC: 1 MG/DL (ref 0.5–1.4)
DIFFERENTIAL METHOD: ABNORMAL
EOSINOPHIL # BLD AUTO: 0 K/UL (ref 0–0.5)
EOSINOPHIL NFR BLD: 0.5 % (ref 0–8)
ERYTHROCYTE [DISTWIDTH] IN BLOOD BY AUTOMATED COUNT: 14.1 % (ref 11.5–14.5)
EST. GFR  (AFRICAN AMERICAN): >60 ML/MIN/1.73 M^2
EST. GFR  (NON AFRICAN AMERICAN): >60 ML/MIN/1.73 M^2
GLUCOSE SERPL-MCNC: 198 MG/DL (ref 70–110)
HCT VFR BLD AUTO: 40.8 % (ref 40–54)
HGB BLD-MCNC: 14.6 G/DL (ref 14–18)
IMM GRANULOCYTES # BLD AUTO: 0 K/UL (ref 0–0.04)
IMM GRANULOCYTES NFR BLD AUTO: 0 % (ref 0–0.5)
LYMPHOCYTES # BLD AUTO: 0.7 K/UL (ref 1–4.8)
LYMPHOCYTES NFR BLD: 36.5 % (ref 18–48)
MCH RBC QN AUTO: 31.2 PG (ref 27–31)
MCHC RBC AUTO-ENTMCNC: 35.8 G/DL (ref 32–36)
MCV RBC AUTO: 87 FL (ref 82–98)
MONOCYTES # BLD AUTO: 0.4 K/UL (ref 0.3–1)
MONOCYTES NFR BLD: 21.2 % (ref 4–15)
NEUTROPHILS # BLD AUTO: 0.8 K/UL (ref 1.8–7.7)
NEUTROPHILS NFR BLD: 41.3 % (ref 38–73)
NRBC BLD-RTO: 0 /100 WBC
PLATELET # BLD AUTO: 105 K/UL (ref 150–350)
PMV BLD AUTO: 9.8 FL (ref 9.2–12.9)
POTASSIUM SERPL-SCNC: 4 MMOL/L (ref 3.5–5.1)
PROT SERPL-MCNC: 7.4 G/DL (ref 6–8.4)
RBC # BLD AUTO: 4.68 M/UL (ref 4.6–6.2)
SODIUM SERPL-SCNC: 137 MMOL/L (ref 136–145)
WBC # BLD AUTO: 2.03 K/UL (ref 3.9–12.7)

## 2020-02-17 PROCEDURE — 80053 COMPREHEN METABOLIC PANEL: CPT

## 2020-02-17 PROCEDURE — 36415 COLL VENOUS BLD VENIPUNCTURE: CPT

## 2020-02-17 PROCEDURE — 85025 COMPLETE CBC W/AUTO DIFF WBC: CPT

## 2020-02-17 NOTE — PROGRESS NOTES
[facility] Hematology/Oncology  PROGRESS NOTE - Follow-up Visit      Subjective:       Patient ID:   NAME: Conrad Kuhn : 1940     79 y.o. male    Referring Doc: Julien  Other Physicians: Ced Erazo Joubert, Srinivas, Pinsky    Chief Complaint:  CLL f/u    History of Present Illness:     Patient returns today for a regularly scheduled follow-up visit.  The patient is here today to go over the results of the recently ordered labs, tests and studies.. He is here with his wife today. He is breathing ok. He denies any CP, SOB, HA's or N/V.     He sees Dr Don at [facility] again on 3/16/2020. he is now on rituximab monthly with oral Venetoclax but at 2 pills daily . His neck swelling and LAD has resolved. Recent PEt on 2020 looks really good.     He plans to go on cruise in 2020              ROS:   GEN: normal without any fever, night sweats or weight loss  HEENT: normal with no HA's, sore throat, stiff neck, changes in vision  CV: normal with no CP, SOB, PND, MAYER or orthopnea  PULM: normal with no SOB, cough, hemoptysis, sputum or pleuritic pain  GI: normal with no abdominal pain, nausea, vomiting, constipation, diarrhea, melanotic stools, BRBPR, or hematemesis; no dysphagia  : urine frequency stable  BREAST: normal with no mass, discharge, pain  SKIN: normal with no rash, erythema, bruising, or swelling    Allergies:  Review of patient's allergies indicates:  No Known Allergies    Medications:    Current Outpatient Medications:     allopurinol (ZYLOPRIM) 100 MG tablet, Take 1 tablet (100 mg total) by mouth once daily., Disp: 30 tablet, Rfl: 5    amoxicillin (AMOXIL) 875 MG tablet, Take 875 mg by mouth 2 (two) times daily., Disp: , Rfl:     atorvastatin (LIPITOR) 20 MG tablet, TK 1 T PO QD, Disp: , Rfl:     azelastine (ASTELIN) 137 mcg (0.1 %) nasal spray, 1 spray by Nasal route., Disp: , Rfl:     blood sugar diagnostic (FREESTYLE LITE STRIPS) Strp, by Other route., Disp: , Rfl:      diphenhydrAMINE-aluminum-magnesium hydroxide-simethicone-lidocaine HCl 2%, Swish and spit 15 mLs every 4 (four) hours as needed., Disp: 240 mL, Rfl: 6    ferrous sulfate (FEOSOL) 325 mg (65 mg iron) Tab tablet, Take 1 tablet (325 mg total) by mouth once daily., Disp: , Rfl:     FREESTYLE LANCETS 28 gauge lancets, TEST TWICE DAILY, Disp: , Rfl: 1    gabapentin (NEURONTIN) 300 MG capsule, TK ONE C PO  BID, Disp: , Rfl: 0    glimepiride (AMARYL) 1 MG tablet, TK 1 T PO ONCE D WITH BIBI OR THE FIRST MAIN MEAL OF THE DAY, Disp: , Rfl: 0    HYDROcodone-acetaminophen (NORCO)  mg per tablet, TK 1 T PO Q 8 H PRN P, Disp: , Rfl: 0    levalbuterol (XOPENEX) 1.25 mg/3 mL nebulizer solution, 3 mLs., Disp: , Rfl:     metFORMIN (GLUCOPHAGE) 500 MG tablet, TK 1 T PO BID WC, Disp: , Rfl: 2    metoprolol succinate (TOPROL-XL) 25 MG 24 hr tablet, TK 1 T PO BID, Disp: , Rfl:     neomycin-polymyxin-dexamethasone (DEXACINE) 3.5 mg/g-10,000 unit/g-0.1 % Oint, Apply to eye daily as needed., Disp: , Rfl:     ondansetron (ZOFRAN-ODT) 8 MG TbDL, DIS ONE T PO Q 8 H PRN N, Disp: , Rfl: 10    promethazine (PHENERGAN) 25 MG tablet, TK 1 T PO Q 6 H PRN N, Disp: , Rfl: 10    traZODone (DESYREL) 100 MG tablet, TAKE 1 TABLET BY MOUTH EVERY NIGHT AT BEDTIME, Disp: 90 tablet, Rfl: 0    valACYclovir (VALTREX) 500 MG tablet, Take 2 tablets (1,000 mg total) by mouth 2 (two) times daily., Disp: 120 tablet, Rfl: 6    venetoclax (VENCLEXTA) 100 mg Tab, Take 200 mg by mouth once daily., Disp: 60 tablet, Rfl: 5    water Liqd 150 mL with MILK OF MAGNESIA 400 mg/5 mL Susp 400 mg, diphenhydrAMINE 12.5 mg/5 mL Elix 60 mg, nystatin 100,000 unit/mL Susp 500,000 Units, SWISH AND SPIT 30 ML PO Q 4 H PRN, Disp: , Rfl: 0    blood-glucose meter (FREESTYLE LITE METER) kit, Use as instructed, Disp: , Rfl:     PMHx/PSHx Updates:  See patient's last visit with me on 1/15/2020  See H&P on 1/3/2019        Pathology:  Cancer Staging  No matching staging  information was found for the patient.          Objective:     Vitals:  Blood pressure (!) 165/74, pulse 65, temperature 97.6 °F (36.4 °C), temperature source Oral, resp. rate 19, weight 100.6 kg (221 lb 11.2 oz).    Physical Examination:   GEN: no apparent distress, comfortable; AAOx3  HEAD: atraumatic and normocephalic  EYES: no pallor, no icterus, PERRLA  ENT: OMM, no pharyngeal erythema, external ears WNL; no nasal discharge; no thrush  NECK: no masses, thyroid normal, trachea midline, , supple; LAD and LN's on right neck reduced in size  CV: RRR with no murmur; normal pulse; normal S1 and S2; no pedal edema  CHEST: Normal respiratory effort; CTAB; normal breath sounds; no wheeze or crackles  ABDOM: nontender and nondistended; soft; normal bowel sounds; no rebound/guarding  MUSC/Skeletal: ROM normal; no crepitus; joints normal; no deformities or arthropathy  EXTREM: no clubbing, cyanosis, inflammation or swelling; bruise on LUE resolved  SKIN: no rashes, lesions, ulcers, petechiae or subcutaneous nodules  : no keith  NEURO: grossly intact; motor/sensory WNL; AAOx3; no tremors  PSYCH: normal mood, affect and behavior  LYMPH: normal cervical, supraclavicular, axillary and groin LN's            Labs:   2/17/2020  Lab Results   Component Value Date    WBC 2.03 (L) 02/17/2020    HGB 14.6 02/17/2020    HCT 40.8 02/17/2020    MCV 87 02/17/2020     (L) 02/17/2020       BMP  Lab Results   Component Value Date     02/17/2020    K 4.0 02/17/2020    CL 99 02/17/2020    CO2 30 (H) 02/17/2020    BUN 15 02/17/2020    CREATININE 1.0 02/17/2020    CALCIUM 9.2 02/17/2020    ANIONGAP 8 02/17/2020    ESTGFRAFRICA >60.0 02/17/2020    EGFRNONAA >60.0 02/17/2020     Lab Results   Component Value Date    ALT 20 02/17/2020    AST 20 02/17/2020    ALKPHOS 42 (L) 02/17/2020    BILITOT 0.7 02/17/2020     Lab Results   Component Value Date    IRON 99 12/02/2019    TIBC 363 12/02/2019    FERRITIN 34 12/02/2019          Radiology/Diagnostic Studies:    PET  1/24/2020:    Impression       Moderate decrease in size of the adenopathy within the neck, chest, abdomen and pelvis.  The spleen is smaller in size.  There is no significant FDG activity.    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 Multifocal lymphadenopathy in the chest, abdomen, and pelvis compatible with provided clinical history of lymphocytic leukemia.  There is mixed interval change when compared to prior studies.  Pathologic lymphadenopathy in the chest has increased significantly when compared to a CTA of the chest from May 2018.  Pathologic retroperitoneal lymphadenopathy has improved slightly when compared to a previous abdominal CT from February 2017.  Please see above details.  2. Mild aneurysmal dilation of the infrarenal abdominal aorta, with maximal transverse diameter of 3.7 cm.  Maximal transverse diameter on a prior study from 2017 was 2.5 cm.  3. Additional findings as above           hc Unknown Rad Eap    Result Date: 1/14/2019  CMS MANDATED QUALITY DATA - CT RADIATION - 436 All CT scans at this facility utilize dose modulation, iterative reconstruction, and/or weight based dosing when appropriate to reduce radiation dose to as low as reasonably achievable. Reason:Lymphoid leukemia TECHNIQUE: CT thorax with, CT abdomen  with, and CT pelvis with 100 mL Omnipaque 350. COMPARISON: CT chest dated 05/19/2018 and CT abdomen and pelvis dated 02/08/2017 CT THORAX: There is stable mediastinal, hilar and axillary adenopathy. There is coronary artery calcification. There is resolution of the groundglass opacities throughout the lungs. There are no confluent infiltrates, pulmonary nodules or pleural effusions. There are stable subcentimeter nodules in the left lobe of the thyroid gland. There are degenerative changes of the spine. CT ABDOMEN: The liver, pancreas, adrenal glands  and gallbladder are normal. The spleen is enlarged. There is mesenteric and retroperitoneal adenopathy which is slightly smaller in size. -There is a portacaval node measuring 37 x 21 mm and previously measured 38 x 27 mm. -Periportal lymph node measuring 41 x 18 mm, previously measured 48 x 29 mm. -Left periaortic adenopathy measuring 34 x 23 mm and previously measured 40 x 42 mm- The kidneys enhance symmetrically without hydronephrosis or calculi. There are no thick-walled or dilated bowel loops. There is vascular calcification of aorta. There is a 3.0 x 3.0 cm infrarenal abdominal aortic aneurysm. CT PELVIS: There is adenopathy along the pelvic sidewalls bilaterally which has decreased in size. -The left common iliac adenopathy measuring 36 x 11 mm, previously measured 46 x 14 mm -the right common iliac adenopathy measuring 46 x 10 mm. Previously measured 53 x 15 mm. The bladder is normal. The prostate gland is enlarged. There are degenerative changes of the spine. There is grade 1 anterolisthesis of L5 on S1 with bilateral pars defects.     IMPRESSION: Stable mediastinal, hilar and axillary adenopathy with resolution of the airspace disease within the lungs Decrease in size of the mesenteric, retroperitoneal and pelvic adenopathy. Stable infrarenal abdominal aortic aneurysm Splenomegaly     Read and electronically signed by: Jeniffer Zhang MD on 1/14/2019 9:14 AM CST JENIFFER ZHANG MD      I have reviewed all available lab results and radiology reports.    Assessment/Plan:   (1) 79 y.o. male with  diagnosis of CLL who has been referred by Dr Rony Victoria for continuation of care by medical hematology/oncology.   - BM biopsy  12/4/2015 with CLL  - diagnosis of SLL in 2004 and s/p FCR x6 in 2007  - s/p imbruvica in past (stopped in May 2018)  - latest wbc at 4.8; lymph 56%  - latest CT on 8/7/2019 showing increase in the LAD  - platelets are 111,000  - He was recently hospitalized at [facility] and seen by   Liam. Dr Wheeler has recommended Rituximab. He is starting tomorrow.   - discussed the rituximab regimen and the potential side-effect profile; he is getting chemotherapy school today with [facility]  - he saw Dr Don on Oct 21st 2019  - he has had 3 of the 4 weeks of rituximab so far; Dr Don recommends him to eventually get rituximab monthly x6 with daily oral Venetoclax for two years total.   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  - followed  By Dr Kee  - CEA 1.2  - CT scans on 1/14/2019 stable     (3) Iron deficiency anemia s/p IV iron couple weeks ago  - hgb 14.6 and adequate, and the iron panel currently wnl     (4) HTN - on BP meds     (6) Chronic neck and back issues - followed by Dr Ryan Rivero     (7) DM - currently off all meds     (8) Hypercholesterolemia - on meds    (9)  - followed by Dr Whitney        VISIT DIAGNOSES:      Lymphoma, small lymphocytic (2004)    Iron deficiency anemia, unspecified iron deficiency anemia type    History of lung cancer - ANDRES NSCLC 2004    CLL (chronic lymphocytic leukemia)          PLAN:  1. check labs every 2 weeks while on therapy  2. F/u with PCP, pain management  3. F/u with PCP, Pulm, , etc  4. F/u with Dr Don at [facility] in March 16th 2020  5. Continue oral iron  6. continue with monthly rituximab with daily Venetoclax (taking only 2 pills daily due to counts)  7. Repeat PEt in 6 months       RTC in  2 months    Fax note to Kacey Kee, Ryan Rivero, [place], Florencia Acosta/Joselito Wheeler        Discussion:       I spent over 25 mins of time with the patient. Reviewed results of the recently ordered labs, tests and studies; made directives with regards to the  results. Over half of this time was spent couseling and coordinating care.    I have explained all of the above in detail and the patient understands all of the current recommendation(s). I have answered all of their questions to the best of my ability and to their complete satisfaction.   The patient is to continue with the current management plan.        Chemotherapy Discussion:      I discussed the available treatment option(s) in accordance with the latest/current national evidence-based guidelines (NCCN, UpToDate, NCI, ASCO, etc where applicable), their overall age/condition and their co-morbidities. I also went over the risks and benefits of the chemotherapy with regard to their particular cancer type, their cancer stage, their age/condition, and their co-morbidities. I provided literature on the chemotherapy regimen and discussed the chemotherapy side-effect profiles of the drug(s). I discussed the importance of compliance with obtaining and monitoring weekly lab work, and went over the potential hematopathology issues and risks with anemia, leucopenia and thrombocytopenia that can occur with chemotherapy. I discussed the potential risks of liver and kidney damage, which could be permanent and could necessitate dialysis long-term if kidney failure developed. I discussed the emetic and/or diarrheal potential of the regimen and the potential need for use of antiemetic and anti-diarrheal medications. I discussed the risk for development of anaphylactic shock, bronchospasm, dysrhythmia, and respiratory/cardiovascular arrest and/or failure. I discussed the potential risks for development of alopecia, cold sensory issues, ringing in ears, vertigo, cataracts, glaucoma, and neuropathy, all of which could end up being chronic and life-long. Some chemotherpyI discussed the risks of hand-foot syndrome and rashes, and development of other autoimmune mediated processes such as pneumonitis, hepatitis, and colitis which  could be life threatening. I discussed the risks of the potential development of a rare but fatal viral mediated disease known as PML (Progressive Multifocal Leukoencephalopathy), and risk of future development of leukemia and/or lymphoma from use of certain chemotherapy agents. I discussed the need for neutropenic precautions, basic hygiene/sanitation behaviors and dietary restrictions.    The patient's consent has been obtained to proceed with the chemotherapy.The patient will be referred to Chemotherapy School [facility] for training and education on chemotherapy, use of antiemetics and/or anti-diarrheals, use of NSAID's, potential chemotherapy side-effects, and any specific recommendations and precautions with the particular chemotherapy agents.       Immunologic Therapy Discussion:    I discussed the available treatment option(s) in accordance with the latest/current national evidence-based guidelines (NCCN, UpToDate, NCI, ASCO, etc where applicable), their overall age/condition and their co-morbidities. I went over the risks and benefits of the immunotherapy with regard to their particular cancer type, their cancer stage, their age/condition, and their co-morbidities. I provided literature on the immunotherapy regimen and discussed the immunotherapy side-effect profiles of the drug(s). I discussed the importance of compliance with obtaining and monitoring weekly lab work, and went over the potential hematopathology issues and risks of hemato-pathologic issues with anemia, leucopenia and/or thrombocytopenia and effects on thyroid function that can occur with immunotherapy. The patient will most likely need to have there thyroid functions monitored by their PCP and may need to take thyroid medication while on the immunotherapy. I discussed the potential risks of liver and kidney damage, which could be permanent and could necessitate dialysis long-term if kidney failure developed. I discussed the emetic  and/or diarrheal potential of the regimen and the potential need for use of antiemetic and anti-diarrheal medications. I discussed the risk for development of anaphylactic shock, bronchospasm, dysrhythmia, and respiratory/cardiovascular arrest and/or failure. I discussed the potential risks for development of alopecia, cold sensory issues, ringing in ears, vertigo and neuropathy, all of which could end up being chronic and life-long. I discussed the risks of hand-foot syndrome and rashes, and development of other autoimmune mediated processes such as pneumonitis, hepatitis and colitis which could potentially be life threatening. I discussed the risks of the potential development of a rare but fatal viral mediated disease known as PML (Progressive Multifocal Leukoencephalopathy), and risk of future development of leukemia and/or lymphoma from use of certain immunotherapy agents. I discussed the possibility that immunologic therapy cold worsen or promote progression of any underlying autoimmune diseases such as sarcoidosis, ulcerative colitis, Crohn's disease, psoriasis, rheumatoid disorders, scleroderma, autoimmune nephritis disorders, Hashimoto's thyroiditis, and Lupus among others. I discussed the need for neutropenic precautions, basic hygiene/sanitation behaviors and dietary restrictions.    The patient's consent has been obtained to proceed with the immunotherapy.The patient will be referred to Immunotherapy School /[facility] for training and education on immunotherapy, use of antiemetics and/or anti-diarrheals, use of NSAID's, potential immunotherapy side-effects, and any specific recommendations and precautions with the particular immunotherapy agents.      I answered all of the patient's (and family's, if applicable) questions to the best of my ability and to their complete satisfaction. The patient acknowledged full understanding of the risks, recommendations and plan(s).       I answered all of the  patient's (and family's, if applicable) questions to the best of my ability and to their complete satisfaction. The patient acknowledged full understanding of the risks, recommendations and plan(s).         Electronically signed by Drew Bai MD

## 2020-02-18 ENCOUNTER — OFFICE VISIT (OUTPATIENT)
Dept: HEMATOLOGY/ONCOLOGY | Facility: CLINIC | Age: 80
End: 2020-02-18
Payer: MEDICARE

## 2020-02-18 VITALS
TEMPERATURE: 98 F | SYSTOLIC BLOOD PRESSURE: 165 MMHG | DIASTOLIC BLOOD PRESSURE: 74 MMHG | WEIGHT: 221.69 LBS | BODY MASS INDEX: 28.46 KG/M2 | RESPIRATION RATE: 19 BRPM | HEART RATE: 65 BPM

## 2020-02-18 DIAGNOSIS — Z85.118 HISTORY OF LUNG CANCER: ICD-10-CM

## 2020-02-18 DIAGNOSIS — C91.10 CLL (CHRONIC LYMPHOCYTIC LEUKEMIA): ICD-10-CM

## 2020-02-18 DIAGNOSIS — C83.00 LYMPHOMA, SMALL LYMPHOCYTIC: Primary | ICD-10-CM

## 2020-02-18 DIAGNOSIS — D50.9 IRON DEFICIENCY ANEMIA, UNSPECIFIED IRON DEFICIENCY ANEMIA TYPE: ICD-10-CM

## 2020-02-18 PROCEDURE — 99214 OFFICE O/P EST MOD 30 MIN: CPT | Mod: S$GLB,,, | Performed by: INTERNAL MEDICINE

## 2020-02-18 PROCEDURE — 99214 PR OFFICE/OUTPT VISIT, EST, LEVL IV, 30-39 MIN: ICD-10-PCS | Mod: S$GLB,,, | Performed by: INTERNAL MEDICINE

## 2020-02-20 ENCOUNTER — INFUSION (OUTPATIENT)
Dept: INFUSION THERAPY | Facility: HOSPITAL | Age: 80
End: 2020-02-20
Attending: INTERNAL MEDICINE
Payer: MEDICARE

## 2020-02-20 VITALS
BODY MASS INDEX: 28.32 KG/M2 | DIASTOLIC BLOOD PRESSURE: 75 MMHG | RESPIRATION RATE: 18 BRPM | HEART RATE: 58 BPM | OXYGEN SATURATION: 96 % | TEMPERATURE: 98 F | HEIGHT: 74 IN | WEIGHT: 220.69 LBS | SYSTOLIC BLOOD PRESSURE: 156 MMHG

## 2020-02-20 DIAGNOSIS — C91.10 CLL (CHRONIC LYMPHOCYTIC LEUKEMIA): Primary | ICD-10-CM

## 2020-02-20 PROCEDURE — 96367 TX/PROPH/DG ADDL SEQ IV INF: CPT

## 2020-02-20 PROCEDURE — 96413 CHEMO IV INFUSION 1 HR: CPT

## 2020-02-20 PROCEDURE — 63600175 PHARM REV CODE 636 W HCPCS: Performed by: INTERNAL MEDICINE

## 2020-02-20 PROCEDURE — 96415 CHEMO IV INFUSION ADDL HR: CPT

## 2020-02-20 PROCEDURE — 96375 TX/PRO/DX INJ NEW DRUG ADDON: CPT

## 2020-02-20 PROCEDURE — S0028 INJECTION, FAMOTIDINE, 20 MG: HCPCS | Performed by: INTERNAL MEDICINE

## 2020-02-20 PROCEDURE — 63600175 PHARM REV CODE 636 W HCPCS: Mod: JG | Performed by: NURSE PRACTITIONER

## 2020-02-20 PROCEDURE — 25000003 PHARM REV CODE 250: Performed by: INTERNAL MEDICINE

## 2020-02-20 RX ORDER — SODIUM CHLORIDE 0.9 % (FLUSH) 0.9 %
10 SYRINGE (ML) INJECTION
Status: DISCONTINUED | OUTPATIENT
Start: 2020-02-20 | End: 2020-02-20 | Stop reason: HOSPADM

## 2020-02-20 RX ORDER — FAMOTIDINE 10 MG/ML
20 INJECTION INTRAVENOUS
Status: CANCELLED | OUTPATIENT
Start: 2020-02-20

## 2020-02-20 RX ORDER — FAMOTIDINE 10 MG/ML
20 INJECTION INTRAVENOUS
Status: COMPLETED | OUTPATIENT
Start: 2020-02-20 | End: 2020-02-20

## 2020-02-20 RX ORDER — MEPERIDINE HYDROCHLORIDE 25 MG/ML
25 INJECTION INTRAMUSCULAR; INTRAVENOUS; SUBCUTANEOUS
Status: DISCONTINUED | OUTPATIENT
Start: 2020-02-20 | End: 2020-02-20 | Stop reason: HOSPADM

## 2020-02-20 RX ORDER — ACETAMINOPHEN 325 MG/1
650 TABLET ORAL
Status: CANCELLED | OUTPATIENT
Start: 2020-02-20

## 2020-02-20 RX ORDER — HEPARIN 100 UNIT/ML
500 SYRINGE INTRAVENOUS
Status: CANCELLED | OUTPATIENT
Start: 2020-02-20

## 2020-02-20 RX ORDER — SODIUM CHLORIDE 0.9 % (FLUSH) 0.9 %
10 SYRINGE (ML) INJECTION
Status: CANCELLED | OUTPATIENT
Start: 2020-02-20

## 2020-02-20 RX ORDER — ACETAMINOPHEN 325 MG/1
650 TABLET ORAL
Status: COMPLETED | OUTPATIENT
Start: 2020-02-20 | End: 2020-02-20

## 2020-02-20 RX ORDER — HEPARIN 100 UNIT/ML
500 SYRINGE INTRAVENOUS
Status: DISCONTINUED | OUTPATIENT
Start: 2020-02-20 | End: 2020-02-20 | Stop reason: HOSPADM

## 2020-02-20 RX ORDER — MEPERIDINE HYDROCHLORIDE 25 MG/ML
25 INJECTION INTRAMUSCULAR; INTRAVENOUS; SUBCUTANEOUS
Status: CANCELLED | OUTPATIENT
Start: 2020-02-20

## 2020-02-20 RX ADMIN — ACETAMINOPHEN 650 MG: 325 TABLET ORAL at 07:02

## 2020-02-20 RX ADMIN — DIPHENHYDRAMINE HYDROCHLORIDE 50 MG: 50 INJECTION INTRAMUSCULAR; INTRAVENOUS at 07:02

## 2020-02-20 RX ADMIN — RITUXIMAB 1110 MG: 10 INJECTION, SOLUTION INTRAVENOUS at 09:02

## 2020-02-20 RX ADMIN — SODIUM CHLORIDE: 0.9 INJECTION, SOLUTION INTRAVENOUS at 07:02

## 2020-02-20 RX ADMIN — MEPERIDINE HYDROCHLORIDE 25 MG: 25 INJECTION INTRAMUSCULAR; INTRAVENOUS; SUBCUTANEOUS at 09:02

## 2020-02-20 RX ADMIN — FAMOTIDINE 20 MG: 10 INJECTION, SOLUTION INTRAVENOUS at 07:02

## 2020-02-20 NOTE — NURSING
At 0955, pt stated that he was starting to feel cold and shaky.  Infusion was stopped and vitals at this time were /91, HR 66 and temp 97.4.  Osvaldo Garcia, RN manager notified and per the orders in Epic for hypersensitivity reaction, 25mg of Demerol was released.  25mg of Demerol via IVP administered at 1000.  At 1001 am, /76 and HR 58.  At 1020, pt stated that he was feeling better and was back to normal.  /71, HR 62.  Pt observed for an additional 10 min and vital check at this time was /69 and HR 61.  Infusion was restarted at 100 cc/hr and no additional reactions were noted.

## 2020-02-28 ENCOUNTER — TELEPHONE (OUTPATIENT)
Dept: HEMATOLOGY/ONCOLOGY | Facility: CLINIC | Age: 80
End: 2020-02-28

## 2020-02-28 NOTE — TELEPHONE ENCOUNTER
----- Message from Niki Islas sent at 2/28/2020  8:18 AM CST -----      ----- Message -----  From: Cate Saab  Sent: 2/27/2020   2:47 PM CST  To: Bhumika Russell Staff    The patient's wife called and wants to know if they can get a prescription for face mask for the patient to wear. [phone]

## 2020-02-28 NOTE — TELEPHONE ENCOUNTER
Patient's wife is requesting a mask for the patient to wear when he goes out in public.  Instructed her that she can pick one up at [employer], [employer] or any medical supply store.

## 2020-03-02 NOTE — PROGRESS NOTES
Patient had 4 weekly Rituxan's (1 at [facility]) and has had 5 monthly cycles of Rituxan. Due for Cycle #6 of the monthly dose on 3/19/2020. Patient is going out of town and requested to move to 3/17/2020.   normal... Well appearing, well nourished, awake, alert, oriented to person, place, time/situation and in no apparent distress.

## 2020-03-03 ENCOUNTER — LAB VISIT (OUTPATIENT)
Dept: LAB | Facility: HOSPITAL | Age: 80
End: 2020-03-03
Attending: INTERNAL MEDICINE
Payer: MEDICARE

## 2020-03-03 DIAGNOSIS — C91.10 CLL (CHRONIC LYMPHOCYTIC LEUKEMIA): ICD-10-CM

## 2020-03-03 DIAGNOSIS — R73.09 IMPAIRED GLUCOSE TOLERANCE TEST: Primary | ICD-10-CM

## 2020-03-03 DIAGNOSIS — C83.00 LYMPHOMA, SMALL LYMPHOCYTIC: ICD-10-CM

## 2020-03-03 LAB
ALBUMIN SERPL BCP-MCNC: 4.3 G/DL (ref 3.5–5.2)
ALP SERPL-CCNC: 43 U/L (ref 55–135)
ALT SERPL W/O P-5'-P-CCNC: 22 U/L (ref 10–44)
ANION GAP SERPL CALC-SCNC: 6 MMOL/L (ref 8–16)
AST SERPL-CCNC: 22 U/L (ref 10–40)
BASOPHILS # BLD AUTO: 0.01 K/UL (ref 0–0.2)
BASOPHILS NFR BLD: 0.4 % (ref 0–1.9)
BILIRUB SERPL-MCNC: 0.8 MG/DL (ref 0.1–1)
BUN SERPL-MCNC: 19 MG/DL (ref 8–23)
CALCIUM SERPL-MCNC: 9.2 MG/DL (ref 8.7–10.5)
CHLORIDE SERPL-SCNC: 100 MMOL/L (ref 95–110)
CO2 SERPL-SCNC: 31 MMOL/L (ref 23–29)
CREAT SERPL-MCNC: 1 MG/DL (ref 0.5–1.4)
DIFFERENTIAL METHOD: ABNORMAL
EOSINOPHIL # BLD AUTO: 0 K/UL (ref 0–0.5)
EOSINOPHIL NFR BLD: 0.4 % (ref 0–8)
ERYTHROCYTE [DISTWIDTH] IN BLOOD BY AUTOMATED COUNT: 13.9 % (ref 11.5–14.5)
EST. GFR  (AFRICAN AMERICAN): >60 ML/MIN/1.73 M^2
EST. GFR  (NON AFRICAN AMERICAN): >60 ML/MIN/1.73 M^2
GLUCOSE SERPL-MCNC: 131 MG/DL (ref 70–110)
HCT VFR BLD AUTO: 40 % (ref 40–54)
HGB BLD-MCNC: 14.3 G/DL (ref 14–18)
IMM GRANULOCYTES # BLD AUTO: 0.01 K/UL (ref 0–0.04)
IMM GRANULOCYTES NFR BLD AUTO: 0.4 % (ref 0–0.5)
LYMPHOCYTES # BLD AUTO: 1 K/UL (ref 1–4.8)
LYMPHOCYTES NFR BLD: 36.4 % (ref 18–48)
MCH RBC QN AUTO: 31.3 PG (ref 27–31)
MCHC RBC AUTO-ENTMCNC: 35.8 G/DL (ref 32–36)
MCV RBC AUTO: 88 FL (ref 82–98)
MONOCYTES # BLD AUTO: 0.6 K/UL (ref 0.3–1)
MONOCYTES NFR BLD: 20.5 % (ref 4–15)
NEUTROPHILS # BLD AUTO: 1.2 K/UL (ref 1.8–7.7)
NEUTROPHILS NFR BLD: 41.9 % (ref 38–73)
NRBC BLD-RTO: 0 /100 WBC
PLATELET # BLD AUTO: 100 K/UL (ref 150–350)
PMV BLD AUTO: 8.3 FL (ref 9.2–12.9)
POTASSIUM SERPL-SCNC: 4.1 MMOL/L (ref 3.5–5.1)
PROT SERPL-MCNC: 7.8 G/DL (ref 6–8.4)
RBC # BLD AUTO: 4.57 M/UL (ref 4.6–6.2)
SODIUM SERPL-SCNC: 137 MMOL/L (ref 136–145)
WBC # BLD AUTO: 2.83 K/UL (ref 3.9–12.7)

## 2020-03-03 PROCEDURE — 80053 COMPREHEN METABOLIC PANEL: CPT

## 2020-03-03 PROCEDURE — 83036 HEMOGLOBIN GLYCOSYLATED A1C: CPT

## 2020-03-03 PROCEDURE — 85025 COMPLETE CBC W/AUTO DIFF WBC: CPT

## 2020-03-03 PROCEDURE — 36415 COLL VENOUS BLD VENIPUNCTURE: CPT

## 2020-03-04 LAB
ESTIMATED AVG GLUCOSE: 134 MG/DL (ref 68–131)
HBA1C MFR BLD HPLC: 6.3 % (ref 4.5–6.2)

## 2020-03-09 ENCOUNTER — TELEPHONE (OUTPATIENT)
Dept: HEMATOLOGY/ONCOLOGY | Facility: CLINIC | Age: 80
End: 2020-03-09

## 2020-03-09 ENCOUNTER — LAB VISIT (OUTPATIENT)
Dept: LAB | Facility: HOSPITAL | Age: 80
End: 2020-03-09
Attending: INTERNAL MEDICINE
Payer: MEDICARE

## 2020-03-09 DIAGNOSIS — C83.00 LYMPHOMA, SMALL LYMPHOCYTIC: ICD-10-CM

## 2020-03-09 DIAGNOSIS — C91.10 CLL (CHRONIC LYMPHOCYTIC LEUKEMIA): ICD-10-CM

## 2020-03-09 LAB
ALBUMIN SERPL BCP-MCNC: 4.1 G/DL (ref 3.5–5.2)
ALP SERPL-CCNC: 49 U/L (ref 55–135)
ALT SERPL W/O P-5'-P-CCNC: 22 U/L (ref 10–44)
ANION GAP SERPL CALC-SCNC: 9 MMOL/L (ref 8–16)
AST SERPL-CCNC: 22 U/L (ref 10–40)
BASOPHILS # BLD AUTO: 0 K/UL (ref 0–0.2)
BASOPHILS NFR BLD: 0 % (ref 0–1.9)
BILIRUB SERPL-MCNC: 0.8 MG/DL (ref 0.1–1)
BUN SERPL-MCNC: 14 MG/DL (ref 8–23)
CALCIUM SERPL-MCNC: 9.1 MG/DL (ref 8.7–10.5)
CHLORIDE SERPL-SCNC: 100 MMOL/L (ref 95–110)
CO2 SERPL-SCNC: 28 MMOL/L (ref 23–29)
CREAT SERPL-MCNC: 1 MG/DL (ref 0.5–1.4)
DIFFERENTIAL METHOD: ABNORMAL
EOSINOPHIL # BLD AUTO: 0 K/UL (ref 0–0.5)
EOSINOPHIL NFR BLD: 0.4 % (ref 0–8)
ERYTHROCYTE [DISTWIDTH] IN BLOOD BY AUTOMATED COUNT: 14 % (ref 11.5–14.5)
EST. GFR  (AFRICAN AMERICAN): >60 ML/MIN/1.73 M^2
EST. GFR  (NON AFRICAN AMERICAN): >60 ML/MIN/1.73 M^2
GLUCOSE SERPL-MCNC: 134 MG/DL (ref 70–110)
HCT VFR BLD AUTO: 41.2 % (ref 40–54)
HGB BLD-MCNC: 14.5 G/DL (ref 14–18)
IMM GRANULOCYTES # BLD AUTO: 0 K/UL (ref 0–0.04)
IMM GRANULOCYTES NFR BLD AUTO: 0 % (ref 0–0.5)
LYMPHOCYTES # BLD AUTO: 0.6 K/UL (ref 1–4.8)
LYMPHOCYTES NFR BLD: 24.2 % (ref 18–48)
MCH RBC QN AUTO: 31 PG (ref 27–31)
MCHC RBC AUTO-ENTMCNC: 35.2 G/DL (ref 32–36)
MCV RBC AUTO: 88 FL (ref 82–98)
MONOCYTES # BLD AUTO: 0.6 K/UL (ref 0.3–1)
MONOCYTES NFR BLD: 22.3 % (ref 4–15)
NEUTROPHILS # BLD AUTO: 1.4 K/UL (ref 1.8–7.7)
NEUTROPHILS NFR BLD: 53.1 % (ref 38–73)
NRBC BLD-RTO: 0 /100 WBC
PLATELET # BLD AUTO: 113 K/UL (ref 150–350)
PMV BLD AUTO: 9.9 FL (ref 9.2–12.9)
POTASSIUM SERPL-SCNC: 3.9 MMOL/L (ref 3.5–5.1)
PROT SERPL-MCNC: 7.4 G/DL (ref 6–8.4)
RBC # BLD AUTO: 4.68 M/UL (ref 4.6–6.2)
SODIUM SERPL-SCNC: 137 MMOL/L (ref 136–145)
WBC # BLD AUTO: 2.6 K/UL (ref 3.9–12.7)

## 2020-03-09 PROCEDURE — 85025 COMPLETE CBC W/AUTO DIFF WBC: CPT

## 2020-03-09 PROCEDURE — 80053 COMPREHEN METABOLIC PANEL: CPT

## 2020-03-09 PROCEDURE — 36415 COLL VENOUS BLD VENIPUNCTURE: CPT

## 2020-03-09 NOTE — TELEPHONE ENCOUNTER
----- Message from Cate Saab sent at 3/6/2020  2:02 PM CST -----  Peri the patient's wife called and asked if the patient can fly to [place] on March 21st. She said it is a one hour flight and that she has masks for him. Please call her back at [phone] or cell [phone].

## 2020-03-11 RX ORDER — HEPARIN 100 UNIT/ML
500 SYRINGE INTRAVENOUS
Status: CANCELLED | OUTPATIENT
Start: 2020-03-17

## 2020-03-11 RX ORDER — FAMOTIDINE 10 MG/ML
20 INJECTION INTRAVENOUS
Status: CANCELLED | OUTPATIENT
Start: 2020-03-17

## 2020-03-11 RX ORDER — ACETAMINOPHEN 325 MG/1
650 TABLET ORAL
Status: CANCELLED | OUTPATIENT
Start: 2020-03-17

## 2020-03-11 RX ORDER — SODIUM CHLORIDE 0.9 % (FLUSH) 0.9 %
10 SYRINGE (ML) INJECTION
Status: CANCELLED | OUTPATIENT
Start: 2020-03-17

## 2020-03-11 RX ORDER — MEPERIDINE HYDROCHLORIDE 25 MG/ML
25 INJECTION INTRAMUSCULAR; INTRAVENOUS; SUBCUTANEOUS
Status: CANCELLED | OUTPATIENT
Start: 2020-03-17

## 2020-03-17 ENCOUNTER — TELEPHONE (OUTPATIENT)
Dept: INFUSION THERAPY | Facility: HOSPITAL | Age: 80
End: 2020-03-17

## 2020-03-17 ENCOUNTER — INFUSION (OUTPATIENT)
Dept: INFUSION THERAPY | Facility: HOSPITAL | Age: 80
End: 2020-03-17
Attending: INTERNAL MEDICINE
Payer: MEDICARE

## 2020-03-17 VITALS
TEMPERATURE: 98 F | BODY MASS INDEX: 28.29 KG/M2 | OXYGEN SATURATION: 97 % | DIASTOLIC BLOOD PRESSURE: 80 MMHG | HEART RATE: 65 BPM | HEIGHT: 74 IN | SYSTOLIC BLOOD PRESSURE: 174 MMHG | WEIGHT: 220.44 LBS | RESPIRATION RATE: 18 BRPM

## 2020-03-17 DIAGNOSIS — C91.10 CLL (CHRONIC LYMPHOCYTIC LEUKEMIA): Primary | ICD-10-CM

## 2020-03-17 PROCEDURE — S0028 INJECTION, FAMOTIDINE, 20 MG: HCPCS | Performed by: INTERNAL MEDICINE

## 2020-03-17 PROCEDURE — 96413 CHEMO IV INFUSION 1 HR: CPT

## 2020-03-17 PROCEDURE — 96375 TX/PRO/DX INJ NEW DRUG ADDON: CPT

## 2020-03-17 PROCEDURE — 25000003 PHARM REV CODE 250: Performed by: INTERNAL MEDICINE

## 2020-03-17 PROCEDURE — 96415 CHEMO IV INFUSION ADDL HR: CPT

## 2020-03-17 PROCEDURE — 63600175 PHARM REV CODE 636 W HCPCS: Performed by: INTERNAL MEDICINE

## 2020-03-17 PROCEDURE — 96367 TX/PROPH/DG ADDL SEQ IV INF: CPT

## 2020-03-17 RX ORDER — HEPARIN 100 UNIT/ML
500 SYRINGE INTRAVENOUS
Status: DISCONTINUED | OUTPATIENT
Start: 2020-03-17 | End: 2020-03-17 | Stop reason: HOSPADM

## 2020-03-17 RX ORDER — FAMOTIDINE 10 MG/ML
20 INJECTION INTRAVENOUS
Status: COMPLETED | OUTPATIENT
Start: 2020-03-17 | End: 2020-03-17

## 2020-03-17 RX ORDER — MEPERIDINE HYDROCHLORIDE 25 MG/ML
25 INJECTION INTRAMUSCULAR; INTRAVENOUS; SUBCUTANEOUS
Status: DISCONTINUED | OUTPATIENT
Start: 2020-03-17 | End: 2020-03-17 | Stop reason: HOSPADM

## 2020-03-17 RX ORDER — SODIUM CHLORIDE 0.9 % (FLUSH) 0.9 %
10 SYRINGE (ML) INJECTION
Status: DISCONTINUED | OUTPATIENT
Start: 2020-03-17 | End: 2020-03-17 | Stop reason: HOSPADM

## 2020-03-17 RX ORDER — ACETAMINOPHEN 325 MG/1
650 TABLET ORAL
Status: COMPLETED | OUTPATIENT
Start: 2020-03-17 | End: 2020-03-17

## 2020-03-17 RX ADMIN — DIPHENHYDRAMINE HYDROCHLORIDE 50 MG: 50 INJECTION INTRAMUSCULAR; INTRAVENOUS at 07:03

## 2020-03-17 RX ADMIN — FAMOTIDINE 20 MG: 10 INJECTION, SOLUTION INTRAVENOUS at 07:03

## 2020-03-17 RX ADMIN — SODIUM CHLORIDE: 0.9 INJECTION, SOLUTION INTRAVENOUS at 07:03

## 2020-03-17 RX ADMIN — RITUXIMAB 1110 MG: 10 INJECTION, SOLUTION INTRAVENOUS at 07:03

## 2020-03-17 RX ADMIN — ACETAMINOPHEN 650 MG: 325 TABLET ORAL at 07:03

## 2020-04-01 ENCOUNTER — LAB VISIT (OUTPATIENT)
Dept: LAB | Facility: HOSPITAL | Age: 80
End: 2020-04-01
Attending: INTERNAL MEDICINE
Payer: MEDICARE

## 2020-04-01 DIAGNOSIS — C83.00 LYMPHOMA, SMALL LYMPHOCYTIC: ICD-10-CM

## 2020-04-01 DIAGNOSIS — C91.10 CLL (CHRONIC LYMPHOCYTIC LEUKEMIA): ICD-10-CM

## 2020-04-01 LAB
ALBUMIN SERPL BCP-MCNC: 4.3 G/DL (ref 3.5–5.2)
ALP SERPL-CCNC: 60 U/L (ref 55–135)
ALT SERPL W/O P-5'-P-CCNC: 22 U/L (ref 10–44)
ANION GAP SERPL CALC-SCNC: 12 MMOL/L (ref 8–16)
AST SERPL-CCNC: 25 U/L (ref 10–40)
BASOPHILS # BLD AUTO: 0 K/UL (ref 0–0.2)
BASOPHILS NFR BLD: 0 % (ref 0–1.9)
BILIRUB SERPL-MCNC: 0.8 MG/DL (ref 0.1–1)
BUN SERPL-MCNC: 19 MG/DL (ref 8–23)
CALCIUM SERPL-MCNC: 9.3 MG/DL (ref 8.7–10.5)
CHLORIDE SERPL-SCNC: 103 MMOL/L (ref 95–110)
CO2 SERPL-SCNC: 23 MMOL/L (ref 23–29)
CREAT SERPL-MCNC: 1.1 MG/DL (ref 0.5–1.4)
DIFFERENTIAL METHOD: ABNORMAL
EOSINOPHIL # BLD AUTO: 0 K/UL (ref 0–0.5)
EOSINOPHIL NFR BLD: 0 % (ref 0–8)
ERYTHROCYTE [DISTWIDTH] IN BLOOD BY AUTOMATED COUNT: 13.4 % (ref 11.5–14.5)
EST. GFR  (AFRICAN AMERICAN): >60 ML/MIN/1.73 M^2
EST. GFR  (NON AFRICAN AMERICAN): >60 ML/MIN/1.73 M^2
GLUCOSE SERPL-MCNC: 191 MG/DL (ref 70–110)
HCT VFR BLD AUTO: 41.6 % (ref 40–54)
HGB BLD-MCNC: 14.8 G/DL (ref 14–18)
IMM GRANULOCYTES # BLD AUTO: 0.01 K/UL (ref 0–0.04)
IMM GRANULOCYTES NFR BLD AUTO: 0.4 % (ref 0–0.5)
LYMPHOCYTES # BLD AUTO: 0.7 K/UL (ref 1–4.8)
LYMPHOCYTES NFR BLD: 28.9 % (ref 18–48)
MCH RBC QN AUTO: 31 PG (ref 27–31)
MCHC RBC AUTO-ENTMCNC: 35.6 G/DL (ref 32–36)
MCV RBC AUTO: 87 FL (ref 82–98)
MONOCYTES # BLD AUTO: 0.5 K/UL (ref 0.3–1)
MONOCYTES NFR BLD: 19.3 % (ref 4–15)
NEUTROPHILS # BLD AUTO: 1.3 K/UL (ref 1.8–7.7)
NEUTROPHILS NFR BLD: 51.4 % (ref 38–73)
NRBC BLD-RTO: 0 /100 WBC
PLATELET # BLD AUTO: 118 K/UL (ref 150–350)
PMV BLD AUTO: 9.2 FL (ref 9.2–12.9)
POTASSIUM SERPL-SCNC: 4.1 MMOL/L (ref 3.5–5.1)
PROT SERPL-MCNC: 7.5 G/DL (ref 6–8.4)
RBC # BLD AUTO: 4.77 M/UL (ref 4.6–6.2)
SODIUM SERPL-SCNC: 138 MMOL/L (ref 136–145)
WBC # BLD AUTO: 2.49 K/UL (ref 3.9–12.7)

## 2020-04-01 PROCEDURE — 80053 COMPREHEN METABOLIC PANEL: CPT

## 2020-04-01 PROCEDURE — 85025 COMPLETE CBC W/AUTO DIFF WBC: CPT

## 2020-04-01 PROCEDURE — 36415 COLL VENOUS BLD VENIPUNCTURE: CPT

## 2020-04-17 ENCOUNTER — PATIENT MESSAGE (OUTPATIENT)
Dept: HEMATOLOGY/ONCOLOGY | Facility: CLINIC | Age: 80
End: 2020-04-17

## 2020-04-21 ENCOUNTER — TELEPHONE (OUTPATIENT)
Dept: HEMATOLOGY/ONCOLOGY | Facility: CLINIC | Age: 80
End: 2020-04-21

## 2020-04-21 ENCOUNTER — LAB VISIT (OUTPATIENT)
Dept: LAB | Facility: HOSPITAL | Age: 80
End: 2020-04-21
Attending: INTERNAL MEDICINE
Payer: MEDICARE

## 2020-04-21 DIAGNOSIS — C83.00 LYMPHOMA, SMALL LYMPHOCYTIC: Primary | ICD-10-CM

## 2020-04-21 DIAGNOSIS — C83.00 LYMPHOMA, SMALL LYMPHOCYTIC: ICD-10-CM

## 2020-04-21 LAB
ALBUMIN SERPL BCP-MCNC: 4.2 G/DL (ref 3.5–5.2)
ALP SERPL-CCNC: 49 U/L (ref 55–135)
ALT SERPL W/O P-5'-P-CCNC: 28 U/L (ref 10–44)
ANION GAP SERPL CALC-SCNC: 8 MMOL/L (ref 8–16)
AST SERPL-CCNC: 26 U/L (ref 10–40)
BASOPHILS # BLD AUTO: 0 K/UL (ref 0–0.2)
BASOPHILS NFR BLD: 0 % (ref 0–1.9)
BILIRUB SERPL-MCNC: 1.3 MG/DL (ref 0.1–1)
BUN SERPL-MCNC: 15 MG/DL (ref 8–23)
CALCIUM SERPL-MCNC: 9.2 MG/DL (ref 8.7–10.5)
CHLORIDE SERPL-SCNC: 103 MMOL/L (ref 95–110)
CO2 SERPL-SCNC: 26 MMOL/L (ref 23–29)
CREAT SERPL-MCNC: 1.1 MG/DL (ref 0.5–1.4)
DIFFERENTIAL METHOD: ABNORMAL
EOSINOPHIL # BLD AUTO: 0 K/UL (ref 0–0.5)
EOSINOPHIL NFR BLD: 0 % (ref 0–8)
ERYTHROCYTE [DISTWIDTH] IN BLOOD BY AUTOMATED COUNT: 13.4 % (ref 11.5–14.5)
EST. GFR  (AFRICAN AMERICAN): >60 ML/MIN/1.73 M^2
EST. GFR  (NON AFRICAN AMERICAN): >60 ML/MIN/1.73 M^2
GLUCOSE SERPL-MCNC: 217 MG/DL (ref 70–110)
HCT VFR BLD AUTO: 39.5 % (ref 40–54)
HGB BLD-MCNC: 14.1 G/DL (ref 14–18)
IMM GRANULOCYTES # BLD AUTO: 0.01 K/UL (ref 0–0.04)
IMM GRANULOCYTES NFR BLD AUTO: 0.4 % (ref 0–0.5)
LYMPHOCYTES # BLD AUTO: 0.6 K/UL (ref 1–4.8)
LYMPHOCYTES NFR BLD: 24.2 % (ref 18–48)
MCH RBC QN AUTO: 31.2 PG (ref 27–31)
MCHC RBC AUTO-ENTMCNC: 35.7 G/DL (ref 32–36)
MCV RBC AUTO: 87 FL (ref 82–98)
MONOCYTES # BLD AUTO: 0.5 K/UL (ref 0.3–1)
MONOCYTES NFR BLD: 21.2 % (ref 4–15)
NEUTROPHILS # BLD AUTO: 1.3 K/UL (ref 1.8–7.7)
NEUTROPHILS NFR BLD: 54.2 % (ref 38–73)
NRBC BLD-RTO: 0 /100 WBC
PLATELET # BLD AUTO: 123 K/UL (ref 150–350)
PMV BLD AUTO: 10.2 FL (ref 9.2–12.9)
POTASSIUM SERPL-SCNC: 3.8 MMOL/L (ref 3.5–5.1)
PROT SERPL-MCNC: 7.4 G/DL (ref 6–8.4)
RBC # BLD AUTO: 4.52 M/UL (ref 4.6–6.2)
SODIUM SERPL-SCNC: 137 MMOL/L (ref 136–145)
WBC # BLD AUTO: 2.31 K/UL (ref 3.9–12.7)

## 2020-04-21 PROCEDURE — 36415 COLL VENOUS BLD VENIPUNCTURE: CPT

## 2020-04-21 PROCEDURE — 85025 COMPLETE CBC W/AUTO DIFF WBC: CPT

## 2020-04-21 PROCEDURE — 80053 COMPREHEN METABOLIC PANEL: CPT

## 2020-05-04 DIAGNOSIS — C91.10 CLL (CHRONIC LYMPHOCYTIC LEUKEMIA): ICD-10-CM

## 2020-05-04 DIAGNOSIS — C83.00 LYMPHOMA, SMALL LYMPHOCYTIC: Primary | ICD-10-CM

## 2020-05-05 ENCOUNTER — LAB VISIT (OUTPATIENT)
Dept: LAB | Facility: HOSPITAL | Age: 80
End: 2020-05-05
Attending: INTERNAL MEDICINE
Payer: MEDICARE

## 2020-05-05 DIAGNOSIS — C83.00 LYMPHOMA, SMALL LYMPHOCYTIC: ICD-10-CM

## 2020-05-05 DIAGNOSIS — C91.10 CLL (CHRONIC LYMPHOCYTIC LEUKEMIA): ICD-10-CM

## 2020-05-05 LAB
ALBUMIN SERPL BCP-MCNC: 3.9 G/DL (ref 3.5–5.2)
ALP SERPL-CCNC: 52 U/L (ref 55–135)
ALT SERPL W/O P-5'-P-CCNC: 23 U/L (ref 10–44)
ANION GAP SERPL CALC-SCNC: 8 MMOL/L (ref 8–16)
AST SERPL-CCNC: 22 U/L (ref 10–40)
BASOPHILS # BLD AUTO: 0 K/UL (ref 0–0.2)
BASOPHILS NFR BLD: 0 % (ref 0–1.9)
BILIRUB SERPL-MCNC: 0.8 MG/DL (ref 0.1–1)
BUN SERPL-MCNC: 20 MG/DL (ref 8–23)
CALCIUM SERPL-MCNC: 9 MG/DL (ref 8.7–10.5)
CHLORIDE SERPL-SCNC: 100 MMOL/L (ref 95–110)
CO2 SERPL-SCNC: 29 MMOL/L (ref 23–29)
CREAT SERPL-MCNC: 1.2 MG/DL (ref 0.5–1.4)
DIFFERENTIAL METHOD: ABNORMAL
EOSINOPHIL # BLD AUTO: 0 K/UL (ref 0–0.5)
EOSINOPHIL NFR BLD: 0 % (ref 0–8)
ERYTHROCYTE [DISTWIDTH] IN BLOOD BY AUTOMATED COUNT: 13.6 % (ref 11.5–14.5)
EST. GFR  (AFRICAN AMERICAN): >60 ML/MIN/1.73 M^2
EST. GFR  (NON AFRICAN AMERICAN): 56.8 ML/MIN/1.73 M^2
GLUCOSE SERPL-MCNC: 168 MG/DL (ref 70–110)
HCT VFR BLD AUTO: 38.3 % (ref 40–54)
HGB BLD-MCNC: 13.5 G/DL (ref 14–18)
IMM GRANULOCYTES # BLD AUTO: 0.01 K/UL (ref 0–0.04)
IMM GRANULOCYTES NFR BLD AUTO: 0.4 % (ref 0–0.5)
LYMPHOCYTES # BLD AUTO: 0.7 K/UL (ref 1–4.8)
LYMPHOCYTES NFR BLD: 28.1 % (ref 18–48)
MCH RBC QN AUTO: 31.5 PG (ref 27–31)
MCHC RBC AUTO-ENTMCNC: 35.2 G/DL (ref 32–36)
MCV RBC AUTO: 90 FL (ref 82–98)
MONOCYTES # BLD AUTO: 0.6 K/UL (ref 0.3–1)
MONOCYTES NFR BLD: 22.3 % (ref 4–15)
NEUTROPHILS # BLD AUTO: 1.3 K/UL (ref 1.8–7.7)
NEUTROPHILS NFR BLD: 49.2 % (ref 38–73)
NRBC BLD-RTO: 0 /100 WBC
PLATELET # BLD AUTO: 113 K/UL (ref 150–350)
PMV BLD AUTO: 10.5 FL (ref 9.2–12.9)
POTASSIUM SERPL-SCNC: 4.2 MMOL/L (ref 3.5–5.1)
PROT SERPL-MCNC: 7.4 G/DL (ref 6–8.4)
RBC # BLD AUTO: 4.28 M/UL (ref 4.6–6.2)
SODIUM SERPL-SCNC: 137 MMOL/L (ref 136–145)
WBC # BLD AUTO: 2.6 K/UL (ref 3.9–12.7)

## 2020-05-05 PROCEDURE — 85025 COMPLETE CBC W/AUTO DIFF WBC: CPT

## 2020-05-05 PROCEDURE — 36415 COLL VENOUS BLD VENIPUNCTURE: CPT

## 2020-05-05 PROCEDURE — 80053 COMPREHEN METABOLIC PANEL: CPT

## 2020-05-26 ENCOUNTER — LAB VISIT (OUTPATIENT)
Dept: LAB | Facility: HOSPITAL | Age: 80
End: 2020-05-26
Attending: INTERNAL MEDICINE
Payer: MEDICARE

## 2020-05-26 DIAGNOSIS — C83.00 LYMPHOMA, SMALL LYMPHOCYTIC: ICD-10-CM

## 2020-05-26 DIAGNOSIS — C91.10 CLL (CHRONIC LYMPHOCYTIC LEUKEMIA): ICD-10-CM

## 2020-05-26 LAB
ALBUMIN SERPL BCP-MCNC: 4 G/DL (ref 3.5–5.2)
ALP SERPL-CCNC: 50 U/L (ref 55–135)
ALT SERPL W/O P-5'-P-CCNC: 21 U/L (ref 10–44)
ANION GAP SERPL CALC-SCNC: 9 MMOL/L (ref 8–16)
AST SERPL-CCNC: 19 U/L (ref 10–40)
BASOPHILS # BLD AUTO: 0.01 K/UL (ref 0–0.2)
BASOPHILS NFR BLD: 0.4 % (ref 0–1.9)
BILIRUB SERPL-MCNC: 0.7 MG/DL (ref 0.1–1)
BUN SERPL-MCNC: 15 MG/DL (ref 8–23)
CALCIUM SERPL-MCNC: 9.3 MG/DL (ref 8.7–10.5)
CHLORIDE SERPL-SCNC: 103 MMOL/L (ref 95–110)
CO2 SERPL-SCNC: 28 MMOL/L (ref 23–29)
CREAT SERPL-MCNC: 1 MG/DL (ref 0.5–1.4)
DIFFERENTIAL METHOD: ABNORMAL
EOSINOPHIL # BLD AUTO: 0 K/UL (ref 0–0.5)
EOSINOPHIL NFR BLD: 0 % (ref 0–8)
ERYTHROCYTE [DISTWIDTH] IN BLOOD BY AUTOMATED COUNT: 13.5 % (ref 11.5–14.5)
EST. GFR  (AFRICAN AMERICAN): >60 ML/MIN/1.73 M^2
EST. GFR  (NON AFRICAN AMERICAN): >60 ML/MIN/1.73 M^2
GLUCOSE SERPL-MCNC: 151 MG/DL (ref 70–110)
HCT VFR BLD AUTO: 40.2 % (ref 40–54)
HGB BLD-MCNC: 14.2 G/DL (ref 14–18)
IMM GRANULOCYTES # BLD AUTO: 0.01 K/UL (ref 0–0.04)
IMM GRANULOCYTES NFR BLD AUTO: 0.4 % (ref 0–0.5)
LYMPHOCYTES # BLD AUTO: 0.7 K/UL (ref 1–4.8)
LYMPHOCYTES NFR BLD: 24.3 % (ref 18–48)
MCH RBC QN AUTO: 31.1 PG (ref 27–31)
MCHC RBC AUTO-ENTMCNC: 35.3 G/DL (ref 32–36)
MCV RBC AUTO: 88 FL (ref 82–98)
MONOCYTES # BLD AUTO: 0.5 K/UL (ref 0.3–1)
MONOCYTES NFR BLD: 16.4 % (ref 4–15)
NEUTROPHILS # BLD AUTO: 1.6 K/UL (ref 1.8–7.7)
NEUTROPHILS NFR BLD: 58.5 % (ref 38–73)
NRBC BLD-RTO: 0 /100 WBC
PLATELET # BLD AUTO: 107 K/UL (ref 150–350)
PMV BLD AUTO: 9.5 FL (ref 9.2–12.9)
POTASSIUM SERPL-SCNC: 4 MMOL/L (ref 3.5–5.1)
PROT SERPL-MCNC: 7 G/DL (ref 6–8.4)
RBC # BLD AUTO: 4.57 M/UL (ref 4.6–6.2)
SODIUM SERPL-SCNC: 140 MMOL/L (ref 136–145)
WBC # BLD AUTO: 2.8 K/UL (ref 3.9–12.7)

## 2020-05-26 PROCEDURE — 80053 COMPREHEN METABOLIC PANEL: CPT

## 2020-05-26 PROCEDURE — 36415 COLL VENOUS BLD VENIPUNCTURE: CPT

## 2020-05-26 PROCEDURE — 85025 COMPLETE CBC W/AUTO DIFF WBC: CPT

## 2020-05-26 NOTE — PROGRESS NOTES
[facility] Hematology/Oncology  PROGRESS NOTE - Telemedicine Visit      Subjective:       Patient ID:   NAME: Conrad Kuhn : 1940     79 y.o. male    Referring Doc: Julien  Other Physicians: Ced Erazo Joubert, Srinivas, Pinsky    Chief Complaint:  CLL f/u    History of Present Illness:     Patient is being seen today via a telemedicine follow-up visit in lieu of a normal in-person visit due to the recent COVID19 outbreak. The patient is currently located at home. This visit type is a virtual visit with synchronous audio without video.        The patient is on today to go over the results of the recently ordered labs, tests and studies.. He is on with his wife today. He is breathing ok. He denies any CP, SOB, HA's or N/V.     He did telemed visit with Dr Don at [facility] on 3/16/2020. he is now on rituximab monthly with oral Venetoclax but at 2 pills daily . His neck swelling and LAD has resolved. Recent PEt on 2020 looks really good.     Discussed Covid19 precautions              ROS:   GEN: normal without any fever, night sweats or weight loss  HEENT: normal with no HA's, sore throat, stiff neck, changes in vision  CV: normal with no CP, SOB, PND, MAYER or orthopnea  PULM: normal with no SOB, cough, hemoptysis, sputum or pleuritic pain  GI: normal with no abdominal pain, nausea, vomiting, constipation, diarrhea, melanotic stools, BRBPR, or hematemesis; no dysphagia  : urine frequency stable  BREAST: normal with no mass, discharge, pain  SKIN: normal with no rash, erythema, bruising, or swelling    Allergies:  Review of patient's allergies indicates:  No Known Allergies    Medications:    Current Outpatient Medications:     allopurinol (ZYLOPRIM) 100 MG tablet, Take 1 tablet (100 mg total) by mouth once daily., Disp: 30 tablet, Rfl: 5    amoxicillin (AMOXIL) 875 MG tablet, Take 875 mg by mouth 2 (two) times daily., Disp: , Rfl:     atorvastatin (LIPITOR) 20 MG tablet, TK 1 T PO QD, Disp: , Rfl:      azelastine (ASTELIN) 137 mcg (0.1 %) nasal spray, 1 spray by Nasal route., Disp: , Rfl:     blood sugar diagnostic (FREESTYLE LITE STRIPS) Strp, by Other route., Disp: , Rfl:     blood-glucose meter (FREESTYLE LITE METER) kit, Use as instructed, Disp: , Rfl:     diphenhydrAMINE-aluminum-magnesium hydroxide-simethicone-lidocaine HCl 2%, Swish and spit 15 mLs every 4 (four) hours as needed., Disp: 240 mL, Rfl: 6    ferrous sulfate (FEOSOL) 325 mg (65 mg iron) Tab tablet, Take 1 tablet (325 mg total) by mouth once daily., Disp: , Rfl:     FREESTYLE LANCETS 28 gauge lancets, TEST TWICE DAILY, Disp: , Rfl: 1    gabapentin (NEURONTIN) 300 MG capsule, TK ONE C PO  BID, Disp: , Rfl: 0    glimepiride (AMARYL) 1 MG tablet, TK 1 T PO ONCE D WITH BIBI OR THE FIRST MAIN MEAL OF THE DAY, Disp: , Rfl: 0    HYDROcodone-acetaminophen (NORCO)  mg per tablet, TK 1 T PO Q 8 H PRN P, Disp: , Rfl: 0    levalbuterol (XOPENEX) 1.25 mg/3 mL nebulizer solution, 3 mLs., Disp: , Rfl:     metFORMIN (GLUCOPHAGE) 500 MG tablet, TK 1 T PO BID WC, Disp: , Rfl: 2    metoprolol succinate (TOPROL-XL) 25 MG 24 hr tablet, TK 1 T PO BID, Disp: , Rfl:     neomycin-polymyxin-dexamethasone (DEXACINE) 3.5 mg/g-10,000 unit/g-0.1 % Oint, Apply to eye daily as needed., Disp: , Rfl:     ondansetron (ZOFRAN-ODT) 8 MG TbDL, DIS ONE T PO Q 8 H PRN N, Disp: , Rfl: 10    promethazine (PHENERGAN) 25 MG tablet, TK 1 T PO Q 6 H PRN N, Disp: , Rfl: 10    traZODone (DESYREL) 100 MG tablet, TAKE 1 TABLET BY MOUTH EVERY NIGHT AT BEDTIME, Disp: 90 tablet, Rfl: 0    valACYclovir (VALTREX) 500 MG tablet, Take 2 tablets (1,000 mg total) by mouth 2 (two) times daily., Disp: 120 tablet, Rfl: 6    venetoclax (VENCLEXTA) 100 mg Tab, Take 200 mg by mouth once daily., Disp: 60 tablet, Rfl: 5    water Liqd 150 mL with MILK OF MAGNESIA 400 mg/5 mL Susp 400 mg, diphenhydrAMINE 12.5 mg/5 mL Elix 60 mg, nystatin 100,000 unit/mL Susp 500,000 Units, SWISH AND  SPIT 30 ML PO Q 4 H PRN, Disp: , Rfl: 0    PMHx/PSHx Updates:  See patient's last visit with me on 2/18/2020  See H&P on 1/3/2019        Pathology:  Cancer Staging  No matching staging information was found for the patient.          Objective:     Vitals:  afebrile    Physical Examination:   GEN: no apparent distress, comfortable; AAOx3  HEAD: atraumatic and normocephalic  EYES: no conjunctival pallor or muddiness, no icterus; normal pupil reaction to ambient light  ENT: OMM, no pharyngeal erythema, external bilateral ears WNL; no visible thrush or ulcers  NECK: no masses or swelling, trachea midline, no visible LAD/LN's ; LAD and LN's on right neck reduced in size  CV: no palpitations; no pedal edema; no noticeable JVD or neck vein distension  CHEST: Normal respiratory effort; chest wall breath movements symmetrical; no audible wheezing  ABDOM: non-distended; no bloating  MUSC/Skeletal: ROM normal; joints visibly normal; no deformities or arthropathy  EXTREM: no clubbing, cyanosis, inflammation or swelling  SKIN: no rashes, lesions, ulcers, petechiae or subcutaneous nodules  : no keith  NEURO: moving all 4 extremities; AAOx3; no tremors  PSYCH: normal mood, affect and behavior  LYMPH: no visible LN's or LAD; LAD and LN's on right neck reduced in size              Labs:   5/26/2020  Lab Results   Component Value Date    WBC 2.80 (L) 05/26/2020    HGB 14.2 05/26/2020    HCT 40.2 05/26/2020    MCV 88 05/26/2020     (L) 05/26/2020           BMP  Lab Results   Component Value Date     05/26/2020    K 4.0 05/26/2020     05/26/2020    CO2 28 05/26/2020    BUN 15 05/26/2020    CREATININE 1.0 05/26/2020    CALCIUM 9.3 05/26/2020    ANIONGAP 9 05/26/2020    ESTGFRAFRICA >60.0 05/26/2020    EGFRNONAA >60.0 05/26/2020     Lab Results   Component Value Date    ALT 21 05/26/2020    AST 19 05/26/2020    ALKPHOS 50 (L) 05/26/2020    BILITOT 0.7 05/26/2020     Lab Results   Component Value Date    IRON 99  12/02/2019    [facility] 363 12/02/2019    FERRITIN 34 12/02/2019         Radiology/Diagnostic Studies:    PET  1/24/2020:    Impression       Moderate decrease in size of the adenopathy within the neck, chest, abdomen and pelvis.  The spleen is smaller in size.  There is no significant FDG activity.    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 Multifocal lymphadenopathy in the chest, abdomen, and pelvis compatible with provided clinical history of lymphocytic leukemia.  There is mixed interval change when compared to prior studies.  Pathologic lymphadenopathy in the chest has increased significantly when compared to a CTA of the chest from May 2018.  Pathologic retroperitoneal lymphadenopathy has improved slightly when compared to a previous abdominal CT from February 2017.  Please see above details.  2. Mild aneurysmal dilation of the infrarenal abdominal aorta, with maximal transverse diameter of 3.7 cm.  Maximal transverse diameter on a prior study from 2017 was 2.5 cm.  3. Additional findings as above           [facility] Unknown Rad Eap    Result Date: 1/14/2019  CMS MANDATED QUALITY DATA - CT RADIATION - 436 All CT scans at this facility utilize dose modulation, iterative reconstruction, and/or weight based dosing when appropriate to reduce radiation dose to as low as reasonably achievable. Reason:Lymphoid leukemia TECHNIQUE: CT thorax with, CT abdomen  with, and CT pelvis with 100 mL Omnipaque 350. COMPARISON: CT chest dated 05/19/2018 and CT abdomen and pelvis dated 02/08/2017 CT THORAX: There is stable mediastinal, hilar and axillary adenopathy. There is coronary artery calcification. There is resolution of the groundglass opacities throughout the lungs. There are no confluent infiltrates, pulmonary nodules or pleural effusions. There are stable subcentimeter nodules in the left lobe of the thyroid gland. There are degenerative changes of  the spine. CT ABDOMEN: The liver, pancreas, adrenal glands and gallbladder are normal. The spleen is enlarged. There is mesenteric and retroperitoneal adenopathy which is slightly smaller in size. -There is a portacaval node measuring 37 x 21 mm and previously measured 38 x 27 mm. -Periportal lymph node measuring 41 x 18 mm, previously measured 48 x 29 mm. -Left periaortic adenopathy measuring 34 x 23 mm and previously measured 40 x 42 mm- The kidneys enhance symmetrically without hydronephrosis or calculi. There are no thick-walled or dilated bowel loops. There is vascular calcification of aorta. There is a 3.0 x 3.0 cm infrarenal abdominal aortic aneurysm. CT PELVIS: There is adenopathy along the pelvic sidewalls bilaterally which has decreased in size. -The left common iliac adenopathy measuring 36 x 11 mm, previously measured 46 x 14 mm -the right common iliac adenopathy measuring 46 x 10 mm. Previously measured 53 x 15 mm. The bladder is normal. The prostate gland is enlarged. There are degenerative changes of the spine. There is grade 1 anterolisthesis of L5 on S1 with bilateral pars defects.     IMPRESSION: Stable mediastinal, hilar and axillary adenopathy with resolution of the airspace disease within the lungs Decrease in size of the mesenteric, retroperitoneal and pelvic adenopathy. Stable infrarenal abdominal aortic aneurysm Splenomegaly     Read and electronically signed by: Jeniffer Zhang MD on 1/14/2019 9:14 AM CST JENIFFER ZHANG MD      I have reviewed all available lab results and radiology reports.    Assessment/Plan:   (1) 79 y.o. male with  diagnosis of CLL who has been referred by Dr Rony Victoria for continuation of care by medical hematology/oncology.   - BM biopsy  12/4/2015 with CLL  - diagnosis of SLL in 2004 and s/p FCR x6 in 2007  - s/p imbruvica in past (stopped in May 2018)  - latest wbc at 4.8; lymph 56%  - latest CT on 8/7/2019 showing increase in the LAD  - platelets are  111,000  - He was recently hospitalized at [facility] and seen by Dr Wheeler. Dr Wheeler has recommended Rituximab. He is starting tomorrow.   - discussed the rituximab regimen and the potential side-effect profile; he is getting chemotherapy school today with [facility]  - he saw Dr Don on Oct 21st 2019  - he has had 3 of the 4 weeks of rituximab so far; Dr Don recommends him to eventually get rituximab monthly x6 with daily oral Venetoclax for two years total.   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  - followed  By Dr Kee  - CEA 1.2  - CT scans on 1/14/2019 stable     (3) Iron deficiency anemia s/p IV iron couple weeks ago  - hgb 14.2 and adequate, and the iron panel currently wnl     (4) HTN - on BP meds     (6) Chronic neck and back issues - followed by Dr Ryan Rivero     (7) DM - currently off all meds     (8) Hypercholesterolemia - on meds    (9)  - followed by Dr Whitney    (10) Leucopenia - WBC 2.8 secondary to the current oral chemo        VISIT DIAGNOSES:      Lymphoma, small lymphocytic (2004)    Iron deficiency anemia, unspecified iron deficiency anemia type    History of lung cancer - ANDRES NSCLC 2004    CLL (chronic lymphocytic leukemia)          PLAN:  1. check labs every 2 weeks while on therapy  2. F/u with PCP, pain management  3. F/u with PCP, Pulm, , etc  4. F/u with Dr Don at [facility] June/july 2020  5. Continue oral iron  6. continue with daily Venetoclax (taking only 2 pills daily due to counts)  7. Repeat PEt in June/July 2020       RTC in  2-3 months in person    Fax note to Kacey Kee, Ryan Rivero, [place], Florencia Acosta/Joselito Wheeler        Discussion:        Total Time spent on patient:    I spent over 25 mins of time with the patient. Reviewed results of the recently ordered labs, tests, reports and studies; made directives with regards to the results. Over half of this time was spent couseling and coordinating care, making treatment and analytical decisions; ordering necessary labs, tests and studies; and discussing treatment options and setting up treatment plan(s) if indicated.        COVID-19 Discussion:    I had long discussion with patient and any applicable family about the COVID-19 coronavirus epidemic and the recommended precautions with regard to cancer and/or hematology patients. I have re-iterated the CDC recommendations for adequate hand washing, use of hand -like products, and coughing into elbow, etc. In addition, especially for our patients who are on chemotherapy and/or our otherwise immunocompromised patients, I have recommended avoidance of crowds, including movie theaters, restaurants, churches, etc. I have recommended avoidance of any sick or symptomatic family members and/or friends. I have also recommended avoidance of any raw and unwashed food products, and general avoidance of food items that have not been prepared by themselves. The patient has been asked to call us immediately with any symptom developments, issues, questions or other general concerns.       Telemedicine Statement:    The patient acknowledged and agreed to the audio/video encounter and the patient who is being provided medical services by telemedicine is:  (1) informed of the relationship between the physician and patient and the respective role of any other health care provider with respect to management of the patient; and (2) notified that he or she may decline to receive medical services by telemedicine and may withdraw from such care at any time.      I have explained all of the above in detail and the patient understands all of the current  recommendation(s). I have answered all of their questions to the best of my ability and to their complete satisfaction.   The patient is to continue with the current management plan.        Chemotherapy Discussion:      I discussed the available treatment option(s) in accordance with the latest/current national evidence-based guidelines (NCCN, UpToDate, NCI, ASCO, etc where applicable), their overall age/condition and their co-morbidities. I also went over the risks and benefits of the chemotherapy with regard to their particular cancer type, their cancer stage, their age/condition, and their co-morbidities. I provided literature on the chemotherapy regimen and discussed the chemotherapy side-effect profiles of the drug(s). I discussed the importance of compliance with obtaining and monitoring weekly lab work, and went over the potential hematopathology issues and risks with anemia, leucopenia and thrombocytopenia that can occur with chemotherapy. I discussed the potential risks of liver and kidney damage, which could be permanent and could necessitate dialysis long-term if kidney failure developed. I discussed the emetic and/or diarrheal potential of the regimen and the potential need for use of antiemetic and anti-diarrheal medications. I discussed the risk for development of anaphylactic shock, bronchospasm, dysrhythmia, and respiratory/cardiovascular arrest and/or failure. I discussed the potential risks for development of alopecia, cold sensory issues, ringing in ears, vertigo, cataracts, glaucoma, and neuropathy, all of which could end up being chronic and life-long. Some chemotherpyI discussed the risks of hand-foot syndrome and rashes, and development of other autoimmune mediated processes such as pneumonitis, hepatitis, and colitis which could be life threatening. I discussed the risks of the potential development of a rare but fatal viral mediated disease known as PML (Progressive Multifocal  Leukoencephalopathy), and risk of future development of leukemia and/or lymphoma from use of certain chemotherapy agents. I discussed the need for neutropenic precautions, basic hygiene/sanitation behaviors and dietary restrictions.    The patient's consent has been obtained to proceed with the chemotherapy.The patient will be referred to Chemotherapy School /[facility] for training and education on chemotherapy, use of antiemetics and/or anti-diarrheals, use of NSAID's, potential chemotherapy side-effects, and any specific recommendations and precautions with the particular chemotherapy agents.       Immunologic Therapy Discussion:    I discussed the available treatment option(s) in accordance with the latest/current national evidence-based guidelines (NCCN, UpToDate, NCI, ASCO, etc where applicable), their overall age/condition and their co-morbidities. I went over the risks and benefits of the immunotherapy with regard to their particular cancer type, their cancer stage, their age/condition, and their co-morbidities. I provided literature on the immunotherapy regimen and discussed the immunotherapy side-effect profiles of the drug(s). I discussed the importance of compliance with obtaining and monitoring weekly lab work, and went over the potential hematopathology issues and risks of hemato-pathologic issues with anemia, leucopenia and/or thrombocytopenia and effects on thyroid function that can occur with immunotherapy. The patient will most likely need to have there thyroid functions monitored by their PCP and may need to take thyroid medication while on the immunotherapy. I discussed the potential risks of liver and kidney damage, which could be permanent and could necessitate dialysis long-term if kidney failure developed. I discussed the emetic and/or diarrheal potential of the regimen and the potential need for use of antiemetic and anti-diarrheal medications. I discussed the risk for development of  anaphylactic shock, bronchospasm, dysrhythmia, and respiratory/cardiovascular arrest and/or failure. I discussed the potential risks for development of alopecia, cold sensory issues, ringing in ears, vertigo and neuropathy, all of which could end up being chronic and life-long. I discussed the risks of hand-foot syndrome and rashes, and development of other autoimmune mediated processes such as pneumonitis, hepatitis and colitis which could potentially be life threatening. I discussed the risks of the potential development of a rare but fatal viral mediated disease known as PML (Progressive Multifocal Leukoencephalopathy), and risk of future development of leukemia and/or lymphoma from use of certain immunotherapy agents. I discussed the possibility that immunologic therapy cold worsen or promote progression of any underlying autoimmune diseases such as sarcoidosis, ulcerative colitis, Crohn's disease, psoriasis, rheumatoid disorders, scleroderma, autoimmune nephritis disorders, Hashimoto's thyroiditis, and Lupus among others. I discussed the need for neutropenic precautions, basic hygiene/sanitation behaviors and dietary restrictions.    The patient's consent has been obtained to proceed with the immunotherapy.The patient will be referred to Immunotherapy School /[facility] for training and education on immunotherapy, use of antiemetics and/or anti-diarrheals, use of NSAID's, potential immunotherapy side-effects, and any specific recommendations and precautions with the particular immunotherapy agents.      I answered all of the patient's (and family's, if applicable) questions to the best of my ability and to their complete satisfaction. The patient acknowledged full understanding of the risks, recommendations and plan(s).       I answered all of the patient's (and family's, if applicable) questions to the best of my ability and to their complete satisfaction. The patient acknowledged full understanding of  the risks, recommendations and plan(s).         Electronically signed by Drew Bai MD        Answers for HPI/ROS submitted by the patient on 5/26/2020   appetite change : No  unexpected weight change: No  visual disturbance: Yes  cough: No  shortness of breath: No  chest pain: No  abdominal pain: No  diarrhea: No  frequency: No  back pain: Yes  rash: No  headaches: No  adenopathy: No  nervous/ anxious: No

## 2020-05-27 ENCOUNTER — OFFICE VISIT (OUTPATIENT)
Dept: HEMATOLOGY/ONCOLOGY | Facility: CLINIC | Age: 80
End: 2020-05-27
Payer: MEDICARE

## 2020-05-27 DIAGNOSIS — D50.9 IRON DEFICIENCY ANEMIA, UNSPECIFIED IRON DEFICIENCY ANEMIA TYPE: ICD-10-CM

## 2020-05-27 DIAGNOSIS — Z85.118 HISTORY OF LUNG CANCER: ICD-10-CM

## 2020-05-27 DIAGNOSIS — C91.10 CLL (CHRONIC LYMPHOCYTIC LEUKEMIA): ICD-10-CM

## 2020-05-27 DIAGNOSIS — C83.00 LYMPHOMA, SMALL LYMPHOCYTIC: Primary | ICD-10-CM

## 2020-05-27 PROCEDURE — 99443 PR PHYSICIAN TELEPHONE EVALUATION 21-30 MIN: CPT | Mod: 95,,, | Performed by: INTERNAL MEDICINE

## 2020-05-27 PROCEDURE — 99443 PR PHYSICIAN TELEPHONE EVALUATION 21-30 MIN: ICD-10-PCS | Mod: 95,,, | Performed by: INTERNAL MEDICINE

## 2020-06-15 ENCOUNTER — LAB VISIT (OUTPATIENT)
Dept: LAB | Facility: HOSPITAL | Age: 80
End: 2020-06-15
Attending: INTERNAL MEDICINE
Payer: MEDICARE

## 2020-06-15 DIAGNOSIS — C83.00 LYMPHOMA, SMALL LYMPHOCYTIC: ICD-10-CM

## 2020-06-15 DIAGNOSIS — C91.10 CLL (CHRONIC LYMPHOCYTIC LEUKEMIA): ICD-10-CM

## 2020-06-15 LAB
ALBUMIN SERPL BCP-MCNC: 3.9 G/DL (ref 3.5–5.2)
ALP SERPL-CCNC: 56 U/L (ref 55–135)
ALT SERPL W/O P-5'-P-CCNC: 22 U/L (ref 10–44)
ANION GAP SERPL CALC-SCNC: 11 MMOL/L (ref 8–16)
AST SERPL-CCNC: 19 U/L (ref 10–40)
BASOPHILS # BLD AUTO: 0.01 K/UL (ref 0–0.2)
BASOPHILS NFR BLD: 0.3 % (ref 0–1.9)
BILIRUB SERPL-MCNC: 0.8 MG/DL (ref 0.1–1)
BUN SERPL-MCNC: 16 MG/DL (ref 8–23)
CALCIUM SERPL-MCNC: 8.8 MG/DL (ref 8.7–10.5)
CHLORIDE SERPL-SCNC: 98 MMOL/L (ref 95–110)
CO2 SERPL-SCNC: 27 MMOL/L (ref 23–29)
CREAT SERPL-MCNC: 1.1 MG/DL (ref 0.5–1.4)
DIFFERENTIAL METHOD: ABNORMAL
EOSINOPHIL # BLD AUTO: 0 K/UL (ref 0–0.5)
EOSINOPHIL NFR BLD: 0.3 % (ref 0–8)
ERYTHROCYTE [DISTWIDTH] IN BLOOD BY AUTOMATED COUNT: 13.3 % (ref 11.5–14.5)
EST. GFR  (AFRICAN AMERICAN): >60 ML/MIN/1.73 M^2
EST. GFR  (NON AFRICAN AMERICAN): >60 ML/MIN/1.73 M^2
GLUCOSE SERPL-MCNC: 145 MG/DL (ref 70–110)
HCT VFR BLD AUTO: 38.5 % (ref 40–54)
HGB BLD-MCNC: 14 G/DL (ref 14–18)
IMM GRANULOCYTES # BLD AUTO: 0.01 K/UL (ref 0–0.04)
IMM GRANULOCYTES NFR BLD AUTO: 0.3 % (ref 0–0.5)
LYMPHOCYTES # BLD AUTO: 0.7 K/UL (ref 1–4.8)
LYMPHOCYTES NFR BLD: 23.7 % (ref 18–48)
MCH RBC QN AUTO: 31.6 PG (ref 27–31)
MCHC RBC AUTO-ENTMCNC: 36.4 G/DL (ref 32–36)
MCV RBC AUTO: 87 FL (ref 82–98)
MONOCYTES # BLD AUTO: 0.5 K/UL (ref 0.3–1)
MONOCYTES NFR BLD: 17.8 % (ref 4–15)
NEUTROPHILS # BLD AUTO: 1.7 K/UL (ref 1.8–7.7)
NEUTROPHILS NFR BLD: 57.6 % (ref 38–73)
NRBC BLD-RTO: 0 /100 WBC
PLATELET # BLD AUTO: 108 K/UL (ref 150–350)
PMV BLD AUTO: 10.1 FL (ref 9.2–12.9)
POTASSIUM SERPL-SCNC: 4.1 MMOL/L (ref 3.5–5.1)
PROT SERPL-MCNC: 7 G/DL (ref 6–8.4)
RBC # BLD AUTO: 4.43 M/UL (ref 4.6–6.2)
SODIUM SERPL-SCNC: 136 MMOL/L (ref 136–145)
WBC # BLD AUTO: 2.87 K/UL (ref 3.9–12.7)

## 2020-06-15 PROCEDURE — 85025 COMPLETE CBC W/AUTO DIFF WBC: CPT

## 2020-06-15 PROCEDURE — 36415 COLL VENOUS BLD VENIPUNCTURE: CPT

## 2020-06-15 PROCEDURE — 80053 COMPREHEN METABOLIC PANEL: CPT

## 2020-06-24 ENCOUNTER — HOSPITAL ENCOUNTER (OUTPATIENT)
Dept: RADIOLOGY | Facility: HOSPITAL | Age: 80
Discharge: HOME | End: 2020-06-24
Attending: INTERNAL MEDICINE
Payer: MEDICARE

## 2020-06-24 VITALS — HEIGHT: 74 IN | WEIGHT: 215 LBS | BODY MASS INDEX: 27.59 KG/M2

## 2020-06-24 DIAGNOSIS — Z85.118 HISTORY OF LUNG CANCER: ICD-10-CM

## 2020-06-24 DIAGNOSIS — C91.10 CLL (CHRONIC LYMPHOCYTIC LEUKEMIA): ICD-10-CM

## 2020-06-24 DIAGNOSIS — C83.00 LYMPHOMA, SMALL LYMPHOCYTIC: ICD-10-CM

## 2020-06-24 LAB — GLUCOSE SERPL-MCNC: 149 MG/DL (ref 70–110)

## 2020-06-24 PROCEDURE — 78815 PET IMAGE W/CT SKULL-THIGH: CPT | Mod: TC,PO,PS

## 2020-06-24 PROCEDURE — A9552 F18 FDG: HCPCS | Mod: PO

## 2020-06-25 ENCOUNTER — TELEPHONE (OUTPATIENT)
Dept: HEMATOLOGY/ONCOLOGY | Facility: CLINIC | Age: 80
End: 2020-06-25

## 2020-07-10 ENCOUNTER — LAB VISIT (OUTPATIENT)
Dept: LAB | Facility: HOSPITAL | Age: 80
End: 2020-07-10
Attending: INTERNAL MEDICINE
Payer: MEDICARE

## 2020-07-10 DIAGNOSIS — C91.10 CLL (CHRONIC LYMPHOCYTIC LEUKEMIA): ICD-10-CM

## 2020-07-10 DIAGNOSIS — C83.00 LYMPHOCYTIC LYMPHOSARCOMA: Primary | ICD-10-CM

## 2020-07-10 DIAGNOSIS — C91.10 LEUKEMIA, LYMPHOCYTIC, CHRONIC: ICD-10-CM

## 2020-07-10 DIAGNOSIS — C83.00 LYMPHOMA, SMALL LYMPHOCYTIC: ICD-10-CM

## 2020-07-10 LAB
ALBUMIN SERPL BCP-MCNC: 4 G/DL (ref 3.5–5.2)
ALP SERPL-CCNC: 54 U/L (ref 55–135)
ALT SERPL W/O P-5'-P-CCNC: 23 U/L (ref 10–44)
ANION GAP SERPL CALC-SCNC: 9 MMOL/L (ref 8–16)
AST SERPL-CCNC: 21 U/L (ref 10–40)
BASOPHILS # BLD AUTO: 0.01 K/UL (ref 0–0.2)
BASOPHILS NFR BLD: 0.4 % (ref 0–1.9)
BILIRUB SERPL-MCNC: 0.9 MG/DL (ref 0.1–1)
BUN SERPL-MCNC: 14 MG/DL (ref 8–23)
CALCIUM SERPL-MCNC: 8.9 MG/DL (ref 8.7–10.5)
CHLORIDE SERPL-SCNC: 101 MMOL/L (ref 95–110)
CO2 SERPL-SCNC: 26 MMOL/L (ref 23–29)
CREAT SERPL-MCNC: 0.9 MG/DL (ref 0.5–1.4)
DIFFERENTIAL METHOD: ABNORMAL
EOSINOPHIL # BLD AUTO: 0 K/UL (ref 0–0.5)
EOSINOPHIL NFR BLD: 0.4 % (ref 0–8)
ERYTHROCYTE [DISTWIDTH] IN BLOOD BY AUTOMATED COUNT: 13.2 % (ref 11.5–14.5)
EST. GFR  (AFRICAN AMERICAN): >60 ML/MIN/1.73 M^2
EST. GFR  (NON AFRICAN AMERICAN): >60 ML/MIN/1.73 M^2
GLUCOSE SERPL-MCNC: 187 MG/DL (ref 70–110)
HCT VFR BLD AUTO: 39.5 % (ref 40–54)
HGB BLD-MCNC: 14.4 G/DL (ref 14–18)
IMM GRANULOCYTES # BLD AUTO: 0.02 K/UL (ref 0–0.04)
IMM GRANULOCYTES NFR BLD AUTO: 0.8 % (ref 0–0.5)
LYMPHOCYTES # BLD AUTO: 0.5 K/UL (ref 1–4.8)
LYMPHOCYTES NFR BLD: 17.4 % (ref 18–48)
MCH RBC QN AUTO: 31.2 PG (ref 27–31)
MCHC RBC AUTO-ENTMCNC: 36.5 G/DL (ref 32–36)
MCV RBC AUTO: 86 FL (ref 82–98)
MONOCYTES # BLD AUTO: 0.4 K/UL (ref 0.3–1)
MONOCYTES NFR BLD: 15.8 % (ref 4–15)
NEUTROPHILS # BLD AUTO: 1.7 K/UL (ref 1.8–7.7)
NEUTROPHILS NFR BLD: 65.2 % (ref 38–73)
NRBC BLD-RTO: 0 /100 WBC
PLATELET # BLD AUTO: 114 K/UL (ref 150–350)
PMV BLD AUTO: 10.9 FL (ref 9.2–12.9)
POTASSIUM SERPL-SCNC: 3.8 MMOL/L (ref 3.5–5.1)
PROT SERPL-MCNC: 6.9 G/DL (ref 6–8.4)
RBC # BLD AUTO: 4.62 M/UL (ref 4.6–6.2)
SODIUM SERPL-SCNC: 136 MMOL/L (ref 136–145)
WBC # BLD AUTO: 2.65 K/UL (ref 3.9–12.7)

## 2020-07-10 PROCEDURE — 85025 COMPLETE CBC W/AUTO DIFF WBC: CPT

## 2020-07-10 PROCEDURE — 36415 COLL VENOUS BLD VENIPUNCTURE: CPT

## 2020-07-10 PROCEDURE — 80053 COMPREHEN METABOLIC PANEL: CPT

## 2020-08-06 DIAGNOSIS — C91.10 CLL (CHRONIC LYMPHOCYTIC LEUKEMIA): ICD-10-CM

## 2020-08-11 ENCOUNTER — LAB VISIT (OUTPATIENT)
Dept: LAB | Facility: HOSPITAL | Age: 80
End: 2020-08-11
Attending: PAIN MEDICINE
Payer: MEDICARE

## 2020-08-11 DIAGNOSIS — C91.90 LEUKEMIA, LYMPHOCYTIC: Primary | ICD-10-CM

## 2020-08-11 LAB
BASOPHILS # BLD AUTO: 0 K/UL (ref 0–0.2)
BASOPHILS NFR BLD: 0 % (ref 0–1.9)
DIFFERENTIAL METHOD: ABNORMAL
EOSINOPHIL # BLD AUTO: 0 K/UL (ref 0–0.5)
EOSINOPHIL NFR BLD: 0 % (ref 0–8)
ERYTHROCYTE [DISTWIDTH] IN BLOOD BY AUTOMATED COUNT: 13.9 % (ref 11.5–14.5)
HCT VFR BLD AUTO: 39.3 % (ref 40–54)
HGB BLD-MCNC: 14 G/DL (ref 14–18)
IMM GRANULOCYTES # BLD AUTO: 0.02 K/UL (ref 0–0.04)
IMM GRANULOCYTES NFR BLD AUTO: 0.8 % (ref 0–0.5)
LYMPHOCYTES # BLD AUTO: 0.7 K/UL (ref 1–4.8)
LYMPHOCYTES NFR BLD: 25.5 % (ref 18–48)
MCH RBC QN AUTO: 31 PG (ref 27–31)
MCHC RBC AUTO-ENTMCNC: 35.6 G/DL (ref 32–36)
MCV RBC AUTO: 87 FL (ref 82–98)
MONOCYTES # BLD AUTO: 0.4 K/UL (ref 0.3–1)
MONOCYTES NFR BLD: 16.2 % (ref 4–15)
NEUTROPHILS # BLD AUTO: 1.5 K/UL (ref 1.8–7.7)
NEUTROPHILS NFR BLD: 57.5 % (ref 38–73)
NRBC BLD-RTO: 0 /100 WBC
PLATELET # BLD AUTO: 98 K/UL (ref 150–350)
PMV BLD AUTO: 8.9 FL (ref 9.2–12.9)
RBC # BLD AUTO: 4.51 M/UL (ref 4.6–6.2)
WBC # BLD AUTO: 2.59 K/UL (ref 3.9–12.7)

## 2020-08-11 PROCEDURE — 85025 COMPLETE CBC W/AUTO DIFF WBC: CPT

## 2020-08-11 PROCEDURE — 36415 COLL VENOUS BLD VENIPUNCTURE: CPT

## 2020-08-25 ENCOUNTER — LAB VISIT (OUTPATIENT)
Dept: LAB | Facility: HOSPITAL | Age: 80
End: 2020-08-25
Attending: INTERNAL MEDICINE
Payer: MEDICARE

## 2020-08-25 DIAGNOSIS — C83.00 LYMPHOCYTIC LYMPHOSARCOMA: ICD-10-CM

## 2020-08-25 DIAGNOSIS — C91.10 LEUKEMIA, LYMPHOCYTIC, CHRONIC: ICD-10-CM

## 2020-08-25 DIAGNOSIS — C91.10 CLL (CHRONIC LYMPHOCYTIC LEUKEMIA): ICD-10-CM

## 2020-08-25 DIAGNOSIS — C83.00 LYMPHOMA, SMALL LYMPHOCYTIC: ICD-10-CM

## 2020-08-25 LAB
ALBUMIN SERPL BCP-MCNC: 4.3 G/DL (ref 3.5–5.2)
ALP SERPL-CCNC: 55 U/L (ref 55–135)
ALT SERPL W/O P-5'-P-CCNC: 27 U/L (ref 10–44)
ANION GAP SERPL CALC-SCNC: 10 MMOL/L (ref 8–16)
AST SERPL-CCNC: 24 U/L (ref 10–40)
BASOPHILS # BLD AUTO: 0.01 K/UL (ref 0–0.2)
BASOPHILS NFR BLD: 0.4 % (ref 0–1.9)
BILIRUB SERPL-MCNC: 0.9 MG/DL (ref 0.1–1)
BUN SERPL-MCNC: 15 MG/DL (ref 8–23)
CALCIUM SERPL-MCNC: 9 MG/DL (ref 8.7–10.5)
CHLORIDE SERPL-SCNC: 99 MMOL/L (ref 95–110)
CO2 SERPL-SCNC: 25 MMOL/L (ref 23–29)
CREAT SERPL-MCNC: 1 MG/DL (ref 0.5–1.4)
DIFFERENTIAL METHOD: ABNORMAL
EOSINOPHIL # BLD AUTO: 0 K/UL (ref 0–0.5)
EOSINOPHIL NFR BLD: 0.4 % (ref 0–8)
ERYTHROCYTE [DISTWIDTH] IN BLOOD BY AUTOMATED COUNT: 13.8 % (ref 11.5–14.5)
EST. GFR  (AFRICAN AMERICAN): >60 ML/MIN/1.73 M^2
EST. GFR  (NON AFRICAN AMERICAN): >60 ML/MIN/1.73 M^2
GLUCOSE SERPL-MCNC: 171 MG/DL (ref 70–110)
HCT VFR BLD AUTO: 38.9 % (ref 40–54)
HGB BLD-MCNC: 14.1 G/DL (ref 14–18)
IMM GRANULOCYTES # BLD AUTO: 0.01 K/UL (ref 0–0.04)
IMM GRANULOCYTES NFR BLD AUTO: 0.4 % (ref 0–0.5)
LYMPHOCYTES # BLD AUTO: 0.6 K/UL (ref 1–4.8)
LYMPHOCYTES NFR BLD: 23.7 % (ref 18–48)
MCH RBC QN AUTO: 31.4 PG (ref 27–31)
MCHC RBC AUTO-ENTMCNC: 36.2 G/DL (ref 32–36)
MCV RBC AUTO: 87 FL (ref 82–98)
MONOCYTES # BLD AUTO: 0.4 K/UL (ref 0.3–1)
MONOCYTES NFR BLD: 16.8 % (ref 4–15)
NEUTROPHILS # BLD AUTO: 1.5 K/UL (ref 1.8–7.7)
NEUTROPHILS NFR BLD: 58.3 % (ref 38–73)
NRBC BLD-RTO: 0 /100 WBC
PLATELET # BLD AUTO: 100 K/UL (ref 150–350)
PMV BLD AUTO: 9.2 FL (ref 9.2–12.9)
POTASSIUM SERPL-SCNC: 3.7 MMOL/L (ref 3.5–5.1)
PROT SERPL-MCNC: 7.3 G/DL (ref 6–8.4)
RBC # BLD AUTO: 4.49 M/UL (ref 4.6–6.2)
SODIUM SERPL-SCNC: 134 MMOL/L (ref 136–145)
WBC # BLD AUTO: 2.62 K/UL (ref 3.9–12.7)

## 2020-08-25 PROCEDURE — 36415 COLL VENOUS BLD VENIPUNCTURE: CPT

## 2020-08-25 PROCEDURE — 85025 COMPLETE CBC W/AUTO DIFF WBC: CPT

## 2020-08-25 PROCEDURE — 80053 COMPREHEN METABOLIC PANEL: CPT

## 2020-08-25 NOTE — PROGRESS NOTES
[facility] Hematology/Oncology  PROGRESS NOTE - Follow-up Visit      Subjective:       Patient ID:   NAME: Conrad Kuhn : 1940     79 y.o. male    Referring Doc: Julien  Other Physicians: Ced Erazo Joubert, Srinivas, Pinsky    Chief Complaint:  CLL f/u    History of Present Illness:     Patient is being seen today         The patient is on today to go over the results of the recently ordered labs, tests and studies.. He is here by himself. He is breathing ok. He denies any CP, SOB, HA's or N/V. He has been having some chronic back issues and is having procedure tomorrow with Dr Ryan Rivero.     He saw Dr Don at [facility] in . He is now off rituximab monthly and remains only on the oral Venetoclax but at 2 pills daily . His neck swelling and LAD has resolved. Recent PEt on 2020 looks stable.     Discussed Covid19 precautions              ROS:   GEN: normal without any fever, night sweats or weight loss  HEENT: normal with no HA's, sore throat, stiff neck, changes in vision  CV: normal with no CP, SOB, PND, MAYER or orthopnea  PULM: normal with no SOB, cough, hemoptysis, sputum or pleuritic pain  GI: normal with no abdominal pain, nausea, vomiting, constipation, diarrhea, melanotic stools, BRBPR, or hematemesis; no dysphagia  : urine frequency stable  BREAST: normal with no mass, discharge, pain  SKIN: normal with no rash, erythema, bruising, or swelling    Allergies:  Review of patient's allergies indicates:  No Known Allergies    Medications:    Current Outpatient Medications:     allopurinol (ZYLOPRIM) 100 MG tablet, Take 1 tablet (100 mg total) by mouth once daily., Disp: 30 tablet, Rfl: 5    amoxicillin (AMOXIL) 875 MG tablet, TAKE 1 TABLET BY MOUTH TWICE DAILY FOR 10 DAYS, Disp: 20 tablet, Rfl: 0    atorvastatin (LIPITOR) 20 MG tablet, TK 1 T PO QD, Disp: , Rfl:     azelastine (ASTELIN) 137 mcg (0.1 %) nasal spray, 1 spray by Nasal route., Disp: , Rfl:     blood sugar diagnostic  (FREESTYLE LITE STRIPS) Strp, by Other route., Disp: , Rfl:     blood-glucose meter (FREESTYLE LITE METER) kit, Use as instructed, Disp: , Rfl:     diphenhydrAMINE-aluminum-magnesium hydroxide-simethicone-lidocaine HCl 2%, Swish and spit 15 mLs every 4 (four) hours as needed., Disp: 240 mL, Rfl: 6    ferrous sulfate (FEOSOL) 325 mg (65 mg iron) Tab tablet, Take 1 tablet (325 mg total) by mouth once daily., Disp: , Rfl:     FREESTYLE LANCETS 28 gauge lancets, TEST TWICE DAILY, Disp: , Rfl: 1    gabapentin (NEURONTIN) 300 MG capsule, TK ONE C PO  BID, Disp: , Rfl: 0    glimepiride (AMARYL) 1 MG tablet, TK 1 T PO ONCE D WITH BIBI OR THE FIRST MAIN MEAL OF THE DAY, Disp: , Rfl: 0    HYDROcodone-acetaminophen (NORCO)  mg per tablet, TK 1 T PO Q 8 H PRN P, Disp: , Rfl: 0    levalbuterol (XOPENEX) 1.25 mg/3 mL nebulizer solution, 3 mLs., Disp: , Rfl:     metFORMIN (GLUCOPHAGE) 500 MG tablet, TK 1 T PO BID WC, Disp: , Rfl: 2    metoprolol succinate (TOPROL-XL) 25 MG 24 hr tablet, TK 1 T PO BID, Disp: , Rfl:     neomycin-polymyxin-dexamethasone (DEXACINE) 3.5 mg/g-10,000 unit/g-0.1 % Oint, Apply to eye daily as needed., Disp: , Rfl:     ondansetron (ZOFRAN-ODT) 8 MG TbDL, DIS ONE T PO Q 8 H PRN N, Disp: , Rfl: 10    promethazine (PHENERGAN) 25 MG tablet, TK 1 T PO Q 6 H PRN N, Disp: , Rfl: 10    traZODone (DESYREL) 100 MG tablet, TAKE 1 TABLET BY MOUTH EVERY NIGHT AT BEDTIME, Disp: 90 tablet, Rfl: 0    valACYclovir (VALTREX) 500 MG tablet, Take 2 tablets (1,000 mg total) by mouth 2 (two) times daily., Disp: 120 tablet, Rfl: 6    venetoclax (VENCLEXTA) 100 mg Tab, Take 200 mg by mouth once daily., Disp: 60 tablet, Rfl: 5    water Liqd 150 mL with MILK OF MAGNESIA 400 mg/5 mL Susp 400 mg, diphenhydrAMINE 12.5 mg/5 mL Elix 60 mg, nystatin 100,000 unit/mL Susp 500,000 Units, SWISH AND SPIT 30 ML PO Q 4 H PRN, Disp: , Rfl: 0    PMHx/PSHx Updates:  See patient's last visit with me on 2/18/2020  See H&P on  1/3/2019        Pathology:  Cancer Staging  No matching staging information was found for the patient.          Objective:     Vitals:  Blood pressure (!) 186/71, pulse 64, temperature 97.5 °F (36.4 °C), resp. rate (!) 22, weight 102.2 kg (225 lb 3.2 oz).        Physical Examination:   GEN: no apparent distress, comfortable; AAOx3  HEAD: atraumatic and normocephalic  EYES: no conjunctival pallor or muddiness, no icterus; normal pupil reaction to ambient light  ENT: OMM, no pharyngeal erythema, external bilateral ears WNL; no visible thrush or ulcers  NECK: no masses or swelling, trachea midline, no visible LAD/LN's ; LAD and LN's on right neck reduced in size  CV: no palpitations; no pedal edema; no noticeable JVD or neck vein distension  CHEST: Normal respiratory effort; chest wall breath movements symmetrical; no audible wheezing  ABDOM: non-distended; no bloating  MUSC/Skeletal: ROM normal; joints visibly normal; no deformities or arthropathy  EXTREM: no clubbing, cyanosis, inflammation or swelling  SKIN: no rashes, lesions, ulcers, petechiae or subcutaneous nodules  : no keith  NEURO: moving all 4 extremities; AAOx3; no tremors  PSYCH: normal mood, affect and behavior  LYMPH: no visible LN's or LAD; LAD and LN's on right neck reduced in size              Labs:   Lab Results   Component Value Date    WBC 2.62 (L) 08/25/2020    HGB 14.1 08/25/2020    HCT 38.9 (L) 08/25/2020    MCV 87 08/25/2020     (L) 08/25/2020     CMP  Sodium   Date Value Ref Range Status   08/25/2020 134 (L) 136 - 145 mmol/L Final   06/12/2019 138 134 - 144 mmol/L      Potassium   Date Value Ref Range Status   08/25/2020 3.7 3.5 - 5.1 mmol/L Final     Chloride   Date Value Ref Range Status   08/25/2020 99 95 - 110 mmol/L Final   06/12/2019 99 98 - 110 mmol/L      CO2   Date Value Ref Range Status   08/25/2020 25 23 - 29 mmol/L Final     Glucose   Date Value Ref Range Status   08/25/2020 171 (H) 70 - 110 mg/dL Final   06/12/2019 179  (H) 70 - 99 mg/dL      BUN, Bld   Date Value Ref Range Status   08/25/2020 15 8 - 23 mg/dL Final     Creatinine   Date Value Ref Range Status   08/25/2020 1.0 0.5 - 1.4 mg/dL Final   06/12/2019 0.95 0.60 - 1.40 mg/dL      Calcium   Date Value Ref Range Status   08/25/2020 9.0 8.7 - 10.5 mg/dL Final     Total Protein   Date Value Ref Range Status   08/25/2020 7.3 6.0 - 8.4 g/dL Final     Albumin   Date Value Ref Range Status   08/25/2020 4.3 3.5 - 5.2 g/dL Final   06/12/2019 3.9 3.1 - 4.7 g/dL      Total Bilirubin   Date Value Ref Range Status   08/25/2020 0.9 0.1 - 1.0 mg/dL Final     Comment:     For infants and newborns, interpretation of results should be based  on gestational age, weight and in agreement with clinical  observations.  Premature Infant recommended reference ranges:  Up to 24 hours.............<8.0 mg/dL  Up to 48 hours............<12.0 mg/dL  3-5 days..................<15.0 mg/dL  6-29 days.................<15.0 mg/dL       Alkaline Phosphatase   Date Value Ref Range Status   08/25/2020 55 55 - 135 U/L Final     AST   Date Value Ref Range Status   08/25/2020 24 10 - 40 U/L Final     ALT   Date Value Ref Range Status   08/25/2020 27 10 - 44 U/L Final     Anion Gap   Date Value Ref Range Status   08/25/2020 10 8 - 16 mmol/L Final     eGFR if    Date Value Ref Range Status   08/25/2020 >60.0 >60 mL/min/1.73 m^2 Final     eGFR if non    Date Value Ref Range Status   08/25/2020 >60.0 >60 mL/min/1.73 m^2 Final     Comment:     Calculation used to obtain the estimated glomerular filtration  rate (eGFR) is the CKD-EPI equation.              Lab Results   Component Value Date    IRON 99 12/02/2019    TIBC 363 12/02/2019    FERRITIN 34 12/02/2019         Radiology/Diagnostic Studies:    PET  6/24/2020:    Impression:     1. No findings of active lymphoproliferative disease.  2. Additional observations as above.      PET  1/24/2020:    Impression       Moderate decrease in  size of the adenopathy within the neck, chest, abdomen and pelvis.  The spleen is smaller in size.  There is no significant FDG activity.    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 Multifocal lymphadenopathy in the chest, abdomen, and pelvis compatible with provided clinical history of lymphocytic leukemia.  There is mixed interval change when compared to prior studies.  Pathologic lymphadenopathy in the chest has increased significantly when compared to a CTA of the chest from May 2018.  Pathologic retroperitoneal lymphadenopathy has improved slightly when compared to a previous abdominal CT from February 2017.  Please see above details.  2. Mild aneurysmal dilation of the infrarenal abdominal aorta, with maximal transverse diameter of 3.7 cm.  Maximal transverse diameter on a prior study from 2017 was 2.5 cm.  3. Additional findings as above           [facility] Unknown Rad Eap    Result Date: 1/14/2019  CMS MANDATED QUALITY DATA - CT RADIATION - 436 All CT scans at this facility utilize dose modulation, iterative reconstruction, and/or weight based dosing when appropriate to reduce radiation dose to as low as reasonably achievable. Reason:Lymphoid leukemia TECHNIQUE: CT thorax with, CT abdomen  with, and CT pelvis with 100 mL Omnipaque 350. COMPARISON: CT chest dated 05/19/2018 and CT abdomen and pelvis dated 02/08/2017 CT THORAX: There is stable mediastinal, hilar and axillary adenopathy. There is coronary artery calcification. There is resolution of the groundglass opacities throughout the lungs. There are no confluent infiltrates, pulmonary nodules or pleural effusions. There are stable subcentimeter nodules in the left lobe of the thyroid gland. There are degenerative changes of the spine. CT ABDOMEN: The liver, pancreas, adrenal glands and gallbladder are normal. The spleen is enlarged. There is mesenteric and retroperitoneal  adenopathy which is slightly smaller in size. -There is a portacaval node measuring 37 x 21 mm and previously measured 38 x 27 mm. -Periportal lymph node measuring 41 x 18 mm, previously measured 48 x 29 mm. -Left periaortic adenopathy measuring 34 x 23 mm and previously measured 40 x 42 mm- The kidneys enhance symmetrically without hydronephrosis or calculi. There are no thick-walled or dilated bowel loops. There is vascular calcification of aorta. There is a 3.0 x 3.0 cm infrarenal abdominal aortic aneurysm. CT PELVIS: There is adenopathy along the pelvic sidewalls bilaterally which has decreased in size. -The left common iliac adenopathy measuring 36 x 11 mm, previously measured 46 x 14 mm -the right common iliac adenopathy measuring 46 x 10 mm. Previously measured 53 x 15 mm. The bladder is normal. The prostate gland is enlarged. There are degenerative changes of the spine. There is grade 1 anterolisthesis of L5 on S1 with bilateral pars defects.     IMPRESSION: Stable mediastinal, hilar and axillary adenopathy with resolution of the airspace disease within the lungs Decrease in size of the mesenteric, retroperitoneal and pelvic adenopathy. Stable infrarenal abdominal aortic aneurysm Splenomegaly     Read and electronically signed by: Jeniffer Zhang MD on 1/14/2019 9:14 AM CST JENIFFER ZHANG MD      I have reviewed all available lab results and radiology reports.    Assessment/Plan:   (1) 79 y.o. male with  diagnosis of CLL who has been referred by Dr Rony Victoria for continuation of care by medical hematology/oncology.   - BM biopsy  12/4/2015 with CLL  - diagnosis of SLL in 2004 and s/p FCR x6 in 2007  - s/p imbruvica in past (stopped in May 2018)  - latest wbc at 4.8; lymph 56%  - latest CT on 8/7/2019 showing increase in the LAD  - platelets are 111,000  - He was recently hospitalized at [facility] and seen by Dr Wheeler. Dr Wheeler has recommended Rituximab. He is starting tomorrow.   - discussed the rituximab  regimen and the potential side-effect profile; he is getting chemotherapy school today with [facility]  - he saw Dr Don on Oct 21st 2019  - he has had 3 of the 4 weeks of rituximab so far; Dr Don recommends him to eventually get rituximab monthly x6 with daily oral Venetoclax for two years total.   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  - followed  By Dr Kee  - CEA 1.1  - CT scans on 1/14/2019 stable     (3) Iron deficiency anemia s/p IV iron couple weeks ago  - hgb 14.1 and adequate, and the iron panel currently wnl     (4) HTN - on BP meds     (6) Chronic neck and back issues - followed by Dr Ryan Rivero     (7) DM - currently off all meds     (8) Hypercholesterolemia - on meds    (9)  - followed by Dr Whitney    (10) Leucopenia - WBC 2.62 secondary to the current oral chemo        VISIT DIAGNOSES:      Lymphoma, small lymphocytic (2004)    Iron deficiency anemia, unspecified iron deficiency anemia type    History of lung cancer - ANDRES NSCLC 2004    CLL (chronic lymphocytic leukemia)          PLAN:  1. check labs every 2 weeks while on therapy  2. F/u with PCP, pain management  3. F/u with PCP, Pulm, , etc  4. F/u with Dr Don at [facility] Oct 2020  5. Continue oral iron  6. continue with daily Venetoclax (taking only 2 pills daily due to counts)  7. Repeat PEt in Dec 2020     RTC in  2-3 months in person    Fax note to Kacey Kee, Ryan Rivero, [place], Karla,  Florencia/Joselito Wheeler        Discussion:       Total Time spent on patient:    I spent over 25 mins of time with the patient. Reviewed results of the recently ordered labs, tests, reports and studies; made directives with regards to the results. Over half of this time was spent couseling and coordinating care, making treatment and analytical decisions; ordering necessary labs, tests and studies; and discussing treatment options and setting up treatment plan(s) if indicated.        COVID-19 Discussion:    I had long discussion with patient and any applicable family about the COVID-19 coronavirus epidemic and the recommended precautions with regard to cancer and/or hematology patients. I have re-iterated the CDC recommendations for adequate hand washing, use of hand -like products, and coughing into elbow, etc. In addition, especially for our patients who are on chemotherapy and/or our otherwise immunocompromised patients, I have recommended avoidance of crowds, including movie theaters, restaurants, churches, etc. I have recommended avoidance of any sick or symptomatic family members and/or friends. I have also recommended avoidance of any raw and unwashed food products, and general avoidance of food items that have not been prepared by themselves. The patient has been asked to call us immediately with any symptom developments, issues, questions or other general concerns.          I have explained all of the above in detail and the patient understands all of the current recommendation(s). I have answered all of their questions to the best of my ability and to their complete satisfaction.   The patient is to continue with the current management plan.        Chemotherapy Discussion:      I discussed the available treatment option(s) in accordance with the latest/current national evidence-based guidelines (NCCN, UpToDate, NCI, ASCO, etc where applicable), their overall age/condition and their co-morbidities. I  also went over the risks and benefits of the chemotherapy with regard to their particular cancer type, their cancer stage, their age/condition, and their co-morbidities. I provided literature on the chemotherapy regimen and discussed the chemotherapy side-effect profiles of the drug(s). I discussed the importance of compliance with obtaining and monitoring weekly lab work, and went over the potential hematopathology issues and risks with anemia, leucopenia and thrombocytopenia that can occur with chemotherapy. I discussed the potential risks of liver and kidney damage, which could be permanent and could necessitate dialysis long-term if kidney failure developed. I discussed the emetic and/or diarrheal potential of the regimen and the potential need for use of antiemetic and anti-diarrheal medications. I discussed the risk for development of anaphylactic shock, bronchospasm, dysrhythmia, and respiratory/cardiovascular arrest and/or failure. I discussed the potential risks for development of alopecia, cold sensory issues, ringing in ears, vertigo, cataracts, glaucoma, and neuropathy, all of which could end up being chronic and life-long. Some chemotherpyI discussed the risks of hand-foot syndrome and rashes, and development of other autoimmune mediated processes such as pneumonitis, hepatitis, and colitis which could be life threatening. I discussed the risks of the potential development of a rare but fatal viral mediated disease known as PML (Progressive Multifocal Leukoencephalopathy), and risk of future development of leukemia and/or lymphoma from use of certain chemotherapy agents. I discussed the need for neutropenic precautions, basic hygiene/sanitation behaviors and dietary restrictions.    The patient's consent has been obtained to proceed with the chemotherapy.The patient will be referred to Chemotherapy School /[facility] for training and education on chemotherapy, use of antiemetics and/or  anti-diarrheals, use of NSAID's, potential chemotherapy side-effects, and any specific recommendations and precautions with the particular chemotherapy agents.       Immunologic Therapy Discussion:    I discussed the available treatment option(s) in accordance with the latest/current national evidence-based guidelines (NCCN, UpToDate, NCI, ASCO, etc where applicable), their overall age/condition and their co-morbidities. I went over the risks and benefits of the immunotherapy with regard to their particular cancer type, their cancer stage, their age/condition, and their co-morbidities. I provided literature on the immunotherapy regimen and discussed the immunotherapy side-effect profiles of the drug(s). I discussed the importance of compliance with obtaining and monitoring weekly lab work, and went over the potential hematopathology issues and risks of hemato-pathologic issues with anemia, leucopenia and/or thrombocytopenia and effects on thyroid function that can occur with immunotherapy. The patient will most likely need to have there thyroid functions monitored by their PCP and may need to take thyroid medication while on the immunotherapy. I discussed the potential risks of liver and kidney damage, which could be permanent and could necessitate dialysis long-term if kidney failure developed. I discussed the emetic and/or diarrheal potential of the regimen and the potential need for use of antiemetic and anti-diarrheal medications. I discussed the risk for development of anaphylactic shock, bronchospasm, dysrhythmia, and respiratory/cardiovascular arrest and/or failure. I discussed the potential risks for development of alopecia, cold sensory issues, ringing in ears, vertigo and neuropathy, all of which could end up being chronic and life-long. I discussed the risks of hand-foot syndrome and rashes, and development of other autoimmune mediated processes such as pneumonitis, hepatitis and colitis which could  potentially be life threatening. I discussed the risks of the potential development of a rare but fatal viral mediated disease known as PML (Progressive Multifocal Leukoencephalopathy), and risk of future development of leukemia and/or lymphoma from use of certain immunotherapy agents. I discussed the possibility that immunologic therapy cold worsen or promote progression of any underlying autoimmune diseases such as sarcoidosis, ulcerative colitis, Crohn's disease, psoriasis, rheumatoid disorders, scleroderma, autoimmune nephritis disorders, Hashimoto's thyroiditis, and Lupus among others. I discussed the need for neutropenic precautions, basic hygiene/sanitation behaviors and dietary restrictions.    The patient's consent has been obtained to proceed with the immunotherapy.The patient will be referred to Immunotherapy School /[facility] for training and education on immunotherapy, use of antiemetics and/or anti-diarrheals, use of NSAID's, potential immunotherapy side-effects, and any specific recommendations and precautions with the particular immunotherapy agents.      I answered all of the patient's (and family's, if applicable) questions to the best of my ability and to their complete satisfaction. The patient acknowledged full understanding of the risks, recommendations and plan(s).       I answered all of the patient's (and family's, if applicable) questions to the best of my ability and to their complete satisfaction. The patient acknowledged full understanding of the risks, recommendations and plan(s).         Electronically signed by Drew Bai MD

## 2020-08-26 ENCOUNTER — HOSPITAL ENCOUNTER (OUTPATIENT)
Dept: RADIOLOGY | Facility: HOSPITAL | Age: 80
Discharge: HOME | End: 2020-08-26
Attending: INTERNAL MEDICINE
Payer: MEDICARE

## 2020-08-26 DIAGNOSIS — R06.00 DYSPNEA: ICD-10-CM

## 2020-08-26 DIAGNOSIS — R06.00 DYSPNEA: Primary | ICD-10-CM

## 2020-08-26 PROCEDURE — 71046 X-RAY EXAM CHEST 2 VIEWS: CPT | Mod: TC,PO

## 2020-08-27 ENCOUNTER — TELEPHONE (OUTPATIENT)
Dept: HEMATOLOGY/ONCOLOGY | Facility: CLINIC | Age: 80
End: 2020-08-27

## 2020-08-27 ENCOUNTER — OFFICE VISIT (OUTPATIENT)
Dept: HEMATOLOGY/ONCOLOGY | Facility: CLINIC | Age: 80
End: 2020-08-27
Payer: MEDICARE

## 2020-08-27 VITALS
SYSTOLIC BLOOD PRESSURE: 186 MMHG | RESPIRATION RATE: 22 BRPM | WEIGHT: 225.19 LBS | TEMPERATURE: 98 F | HEART RATE: 64 BPM | BODY MASS INDEX: 28.91 KG/M2 | DIASTOLIC BLOOD PRESSURE: 71 MMHG

## 2020-08-27 DIAGNOSIS — D50.9 IRON DEFICIENCY ANEMIA, UNSPECIFIED IRON DEFICIENCY ANEMIA TYPE: ICD-10-CM

## 2020-08-27 DIAGNOSIS — C83.00 LYMPHOMA, SMALL LYMPHOCYTIC: Primary | ICD-10-CM

## 2020-08-27 DIAGNOSIS — C91.10 CLL (CHRONIC LYMPHOCYTIC LEUKEMIA): ICD-10-CM

## 2020-08-27 DIAGNOSIS — Z85.118 HISTORY OF LUNG CANCER: ICD-10-CM

## 2020-08-27 PROCEDURE — 99214 PR OFFICE/OUTPT VISIT, EST, LEVL IV, 30-39 MIN: ICD-10-PCS | Mod: S$GLB,,, | Performed by: INTERNAL MEDICINE

## 2020-08-27 PROCEDURE — 99214 OFFICE O/P EST MOD 30 MIN: CPT | Mod: S$GLB,,, | Performed by: INTERNAL MEDICINE

## 2020-08-27 NOTE — TELEPHONE ENCOUNTER
Called into the patient's pharmacy [facility] [address] Amoxicillin 875 mg take 1 tablet PO BID #20 with no refills.

## 2020-09-23 ENCOUNTER — LAB VISIT (OUTPATIENT)
Dept: LAB | Facility: HOSPITAL | Age: 80
End: 2020-09-23
Attending: INTERNAL MEDICINE
Payer: MEDICARE

## 2020-09-23 DIAGNOSIS — D50.9 IRON DEFICIENCY ANEMIA, UNSPECIFIED IRON DEFICIENCY ANEMIA TYPE: ICD-10-CM

## 2020-09-23 DIAGNOSIS — C83.00 LYMPHOMA, SMALL LYMPHOCYTIC: ICD-10-CM

## 2020-09-23 LAB
ALBUMIN SERPL BCP-MCNC: 4.2 G/DL (ref 3.5–5.2)
ALP SERPL-CCNC: 55 U/L (ref 55–135)
ALT SERPL W/O P-5'-P-CCNC: 26 U/L (ref 10–44)
ANION GAP SERPL CALC-SCNC: 12 MMOL/L (ref 8–16)
AST SERPL-CCNC: 22 U/L (ref 10–40)
BASOPHILS # BLD AUTO: 0.01 K/UL (ref 0–0.2)
BASOPHILS NFR BLD: 0.2 % (ref 0–1.9)
BILIRUB SERPL-MCNC: 0.7 MG/DL (ref 0.1–1)
BUN SERPL-MCNC: 23 MG/DL (ref 8–23)
CALCIUM SERPL-MCNC: 9.7 MG/DL (ref 8.7–10.5)
CHLORIDE SERPL-SCNC: 97 MMOL/L (ref 95–110)
CO2 SERPL-SCNC: 27 MMOL/L (ref 23–29)
CREAT SERPL-MCNC: 1.1 MG/DL (ref 0.5–1.4)
DIFFERENTIAL METHOD: ABNORMAL
EOSINOPHIL # BLD AUTO: 0 K/UL (ref 0–0.5)
EOSINOPHIL NFR BLD: 0.2 % (ref 0–8)
ERYTHROCYTE [DISTWIDTH] IN BLOOD BY AUTOMATED COUNT: 12.9 % (ref 11.5–14.5)
EST. GFR  (AFRICAN AMERICAN): >60 ML/MIN/1.73 M^2
EST. GFR  (NON AFRICAN AMERICAN): >60 ML/MIN/1.73 M^2
FERRITIN SERPL-MCNC: 79 NG/ML (ref 20–300)
GLUCOSE SERPL-MCNC: 122 MG/DL (ref 70–110)
HCT VFR BLD AUTO: 37.3 % (ref 40–54)
HGB BLD-MCNC: 13.6 G/DL (ref 14–18)
IMM GRANULOCYTES # BLD AUTO: 0.02 K/UL (ref 0–0.04)
IMM GRANULOCYTES NFR BLD AUTO: 0.5 % (ref 0–0.5)
IRON SERPL-MCNC: 128 UG/DL (ref 45–160)
LYMPHOCYTES # BLD AUTO: 1 K/UL (ref 1–4.8)
LYMPHOCYTES NFR BLD: 25.1 % (ref 18–48)
MCH RBC QN AUTO: 31.6 PG (ref 27–31)
MCHC RBC AUTO-ENTMCNC: 36.5 G/DL (ref 32–36)
MCV RBC AUTO: 87 FL (ref 82–98)
MONOCYTES # BLD AUTO: 0.6 K/UL (ref 0.3–1)
MONOCYTES NFR BLD: 13.9 % (ref 4–15)
NEUTROPHILS # BLD AUTO: 2.4 K/UL (ref 1.8–7.7)
NEUTROPHILS NFR BLD: 60.1 % (ref 38–73)
NRBC BLD-RTO: 0 /100 WBC
PLATELET # BLD AUTO: 111 K/UL (ref 150–350)
PMV BLD AUTO: 10.1 FL (ref 9.2–12.9)
POTASSIUM SERPL-SCNC: 4 MMOL/L (ref 3.5–5.1)
PROT SERPL-MCNC: 7.6 G/DL (ref 6–8.4)
RBC # BLD AUTO: 4.31 M/UL (ref 4.6–6.2)
SATURATED IRON: 31 % (ref 20–50)
SODIUM SERPL-SCNC: 136 MMOL/L (ref 136–145)
TOTAL IRON BINDING CAPACITY: 407 UG/DL (ref 250–450)
TRANSFERRIN SERPL-MCNC: 291 MG/DL (ref 200–375)
WBC # BLD AUTO: 4.02 K/UL (ref 3.9–12.7)

## 2020-09-23 PROCEDURE — 36415 COLL VENOUS BLD VENIPUNCTURE: CPT

## 2020-09-23 PROCEDURE — 85025 COMPLETE CBC W/AUTO DIFF WBC: CPT

## 2020-09-23 PROCEDURE — 82728 ASSAY OF FERRITIN: CPT

## 2020-09-23 PROCEDURE — 83540 ASSAY OF IRON: CPT

## 2020-09-23 PROCEDURE — 80053 COMPREHEN METABOLIC PANEL: CPT

## 2020-10-20 ENCOUNTER — LAB VISIT (OUTPATIENT)
Dept: LAB | Facility: HOSPITAL | Age: 80
End: 2020-10-20
Attending: INTERNAL MEDICINE
Payer: MEDICARE

## 2020-10-20 DIAGNOSIS — D50.9 IRON DEFICIENCY ANEMIA, UNSPECIFIED IRON DEFICIENCY ANEMIA TYPE: ICD-10-CM

## 2020-10-20 DIAGNOSIS — C83.00 LYMPHOMA, SMALL LYMPHOCYTIC: ICD-10-CM

## 2020-10-20 LAB
ALBUMIN SERPL BCP-MCNC: 4.3 G/DL (ref 3.5–5.2)
ALP SERPL-CCNC: 48 U/L (ref 55–135)
ALT SERPL W/O P-5'-P-CCNC: 25 U/L (ref 10–44)
ANION GAP SERPL CALC-SCNC: 12 MMOL/L (ref 8–16)
AST SERPL-CCNC: 24 U/L (ref 10–40)
BASOPHILS # BLD AUTO: 0.01 K/UL (ref 0–0.2)
BASOPHILS NFR BLD: 0.3 % (ref 0–1.9)
BILIRUB SERPL-MCNC: 0.7 MG/DL (ref 0.1–1)
BUN SERPL-MCNC: 19 MG/DL (ref 8–23)
CALCIUM SERPL-MCNC: 9.6 MG/DL (ref 8.7–10.5)
CHLORIDE SERPL-SCNC: 99 MMOL/L (ref 95–110)
CO2 SERPL-SCNC: 29 MMOL/L (ref 23–29)
CREAT SERPL-MCNC: 0.9 MG/DL (ref 0.5–1.4)
DIFFERENTIAL METHOD: ABNORMAL
EOSINOPHIL # BLD AUTO: 0 K/UL (ref 0–0.5)
EOSINOPHIL NFR BLD: 0 % (ref 0–8)
ERYTHROCYTE [DISTWIDTH] IN BLOOD BY AUTOMATED COUNT: 13.6 % (ref 11.5–14.5)
EST. GFR  (AFRICAN AMERICAN): >60 ML/MIN/1.73 M^2
EST. GFR  (NON AFRICAN AMERICAN): >60 ML/MIN/1.73 M^2
FERRITIN SERPL-MCNC: 51 NG/ML (ref 20–300)
GLUCOSE SERPL-MCNC: 94 MG/DL (ref 70–110)
HCT VFR BLD AUTO: 37.3 % (ref 40–54)
HGB BLD-MCNC: 13.4 G/DL (ref 14–18)
IMM GRANULOCYTES # BLD AUTO: 0.03 K/UL (ref 0–0.04)
IMM GRANULOCYTES NFR BLD AUTO: 0.9 % (ref 0–0.5)
IRON SERPL-MCNC: 105 UG/DL (ref 45–160)
LYMPHOCYTES # BLD AUTO: 0.7 K/UL (ref 1–4.8)
LYMPHOCYTES NFR BLD: 21.8 % (ref 18–48)
MCH RBC QN AUTO: 31.9 PG (ref 27–31)
MCHC RBC AUTO-ENTMCNC: 35.9 G/DL (ref 32–36)
MCV RBC AUTO: 89 FL (ref 82–98)
MONOCYTES # BLD AUTO: 0.5 K/UL (ref 0.3–1)
MONOCYTES NFR BLD: 15.6 % (ref 4–15)
NEUTROPHILS # BLD AUTO: 2 K/UL (ref 1.8–7.7)
NEUTROPHILS NFR BLD: 61.4 % (ref 38–73)
NRBC BLD-RTO: 0 /100 WBC
PLATELET # BLD AUTO: 104 K/UL (ref 150–350)
PMV BLD AUTO: 9.6 FL (ref 9.2–12.9)
POTASSIUM SERPL-SCNC: 4.1 MMOL/L (ref 3.5–5.1)
PROT SERPL-MCNC: 7.3 G/DL (ref 6–8.4)
RBC # BLD AUTO: 4.2 M/UL (ref 4.6–6.2)
SATURATED IRON: 29 % (ref 20–50)
SODIUM SERPL-SCNC: 140 MMOL/L (ref 136–145)
TOTAL IRON BINDING CAPACITY: 360 UG/DL (ref 250–450)
TRANSFERRIN SERPL-MCNC: 257 MG/DL (ref 200–375)
WBC # BLD AUTO: 3.26 K/UL (ref 3.9–12.7)

## 2020-10-20 PROCEDURE — 83540 ASSAY OF IRON: CPT

## 2020-10-20 PROCEDURE — 82728 ASSAY OF FERRITIN: CPT

## 2020-10-20 PROCEDURE — 36415 COLL VENOUS BLD VENIPUNCTURE: CPT

## 2020-10-20 PROCEDURE — 85025 COMPLETE CBC W/AUTO DIFF WBC: CPT

## 2020-10-20 PROCEDURE — 80053 COMPREHEN METABOLIC PANEL: CPT

## 2020-10-22 ENCOUNTER — TELEPHONE (OUTPATIENT)
Dept: HEMATOLOGY/ONCOLOGY | Facility: CLINIC | Age: 80
End: 2020-10-22

## 2020-10-22 NOTE — TELEPHONE ENCOUNTER
----- Message from Cate Saab sent at 10/21/2020  1:47 PM CDT -----  The patient called to ask Dr. Bai if he would call out some amoxicillin for him. He said his back has been hurting a lot and he thinks he has an infection in his back and that he has a sore in his mouth. He also said he got the flu shot yesterday and was running a low grade fever last night. Please call in to [facility] on [address] and call him back at [phone].

## 2020-10-22 NOTE — TELEPHONE ENCOUNTER
Called the patient and instructed him that he will have to see his PCP for this. I instructed him to try to get an appointment with his PCP.  Patient verbalized understanding.

## 2020-11-16 ENCOUNTER — LAB VISIT (OUTPATIENT)
Dept: LAB | Facility: HOSPITAL | Age: 80
End: 2020-11-16
Attending: INTERNAL MEDICINE
Payer: MEDICARE

## 2020-11-16 DIAGNOSIS — D50.9 IRON DEFICIENCY ANEMIA, UNSPECIFIED IRON DEFICIENCY ANEMIA TYPE: ICD-10-CM

## 2020-11-16 DIAGNOSIS — C83.00 LYMPHOMA, SMALL LYMPHOCYTIC: ICD-10-CM

## 2020-11-16 LAB
ALBUMIN SERPL BCP-MCNC: 4.3 G/DL (ref 3.5–5.2)
ALP SERPL-CCNC: 75 U/L (ref 55–135)
ALT SERPL W/O P-5'-P-CCNC: 25 U/L (ref 10–44)
ANION GAP SERPL CALC-SCNC: 12 MMOL/L (ref 8–16)
AST SERPL-CCNC: 24 U/L (ref 10–40)
BASOPHILS # BLD AUTO: 0 K/UL (ref 0–0.2)
BASOPHILS NFR BLD: 0 % (ref 0–1.9)
BILIRUB SERPL-MCNC: 1 MG/DL (ref 0.1–1)
BUN SERPL-MCNC: 16 MG/DL (ref 8–23)
CALCIUM SERPL-MCNC: 9.7 MG/DL (ref 8.7–10.5)
CHLORIDE SERPL-SCNC: 100 MMOL/L (ref 95–110)
CO2 SERPL-SCNC: 27 MMOL/L (ref 23–29)
CREAT SERPL-MCNC: 0.9 MG/DL (ref 0.5–1.4)
DIFFERENTIAL METHOD: ABNORMAL
EOSINOPHIL # BLD AUTO: 0 K/UL (ref 0–0.5)
EOSINOPHIL NFR BLD: 0 % (ref 0–8)
ERYTHROCYTE [DISTWIDTH] IN BLOOD BY AUTOMATED COUNT: 13.7 % (ref 11.5–14.5)
EST. GFR  (AFRICAN AMERICAN): >60 ML/MIN/1.73 M^2
EST. GFR  (NON AFRICAN AMERICAN): >60 ML/MIN/1.73 M^2
FERRITIN SERPL-MCNC: 44 NG/ML (ref 20–300)
GLUCOSE SERPL-MCNC: 202 MG/DL (ref 70–110)
HCT VFR BLD AUTO: 38 % (ref 40–54)
HGB BLD-MCNC: 13.9 G/DL (ref 14–18)
IMM GRANULOCYTES # BLD AUTO: 0.03 K/UL (ref 0–0.04)
IMM GRANULOCYTES NFR BLD AUTO: 1.2 % (ref 0–0.5)
IRON SERPL-MCNC: 109 UG/DL (ref 45–160)
LYMPHOCYTES # BLD AUTO: 0.7 K/UL (ref 1–4.8)
LYMPHOCYTES NFR BLD: 27.8 % (ref 18–48)
MCH RBC QN AUTO: 32 PG (ref 27–31)
MCHC RBC AUTO-ENTMCNC: 36.6 G/DL (ref 32–36)
MCV RBC AUTO: 87 FL (ref 82–98)
MONOCYTES # BLD AUTO: 0.3 K/UL (ref 0.3–1)
MONOCYTES NFR BLD: 13.3 % (ref 4–15)
NEUTROPHILS # BLD AUTO: 1.4 K/UL (ref 1.8–7.7)
NEUTROPHILS NFR BLD: 57.7 % (ref 38–73)
NRBC BLD-RTO: 0 /100 WBC
PLATELET # BLD AUTO: 98 K/UL (ref 150–350)
PMV BLD AUTO: 10 FL (ref 9.2–12.9)
POTASSIUM SERPL-SCNC: 3.9 MMOL/L (ref 3.5–5.1)
PROT SERPL-MCNC: 7.3 G/DL (ref 6–8.4)
RBC # BLD AUTO: 4.35 M/UL (ref 4.6–6.2)
SATURATED IRON: 30 % (ref 20–50)
SODIUM SERPL-SCNC: 139 MMOL/L (ref 136–145)
TOTAL IRON BINDING CAPACITY: 367 UG/DL (ref 250–450)
TRANSFERRIN SERPL-MCNC: 262 MG/DL (ref 200–375)
WBC # BLD AUTO: 2.48 K/UL (ref 3.9–12.7)

## 2020-11-16 PROCEDURE — 36415 COLL VENOUS BLD VENIPUNCTURE: CPT

## 2020-11-16 PROCEDURE — 83540 ASSAY OF IRON: CPT

## 2020-11-16 PROCEDURE — 82728 ASSAY OF FERRITIN: CPT

## 2020-11-16 PROCEDURE — 85025 COMPLETE CBC W/AUTO DIFF WBC: CPT

## 2020-11-16 PROCEDURE — 80053 COMPREHEN METABOLIC PANEL: CPT

## 2020-11-17 NOTE — PROGRESS NOTES
[facility] Hematology/Oncology  PROGRESS NOTE - Follow-up Visit      Subjective:       Patient ID:   NAME: Conrad Kuhn : 1940     80 y.o. male    Referring Doc: Julien  Other Physicians: Ced Erazo Joubert, Srinivas, Pinsky    Chief Complaint:  CLL f/u    History of Present Illness:     Patient is being seen today in person in clinic. He is here by himself.       The patient is on today to go over the results of the recently ordered labs, tests and studies.. He is here by himself. He is breathing ok. He denies any CP, SOB, HA's or N/V.     He has been having some chronic back issues and had a procedure with Dr Ryan Rivero.     He saw Dr Don at [facility] in . He is now off rituximab monthly and remains only on the oral Venetoclax but at 2 pills daily . He sees Dr Don again on Dec 23rd 2020.     Recent PEt on 2020 looks stable.     Discussed Covid19 precautions              ROS:   GEN: normal without any fever, night sweats or weight loss  HEENT: normal with no HA's, sore throat, stiff neck, changes in vision  CV: normal with no CP, SOB, PND, MAYER or orthopnea  PULM: normal with no SOB, cough, hemoptysis, sputum or pleuritic pain  GI: normal with no abdominal pain, nausea, vomiting, constipation, diarrhea, melanotic stools, BRBPR, or hematemesis; no dysphagia  : urine frequency stable  BREAST: normal with no mass, discharge, pain  SKIN: normal with no rash, erythema, bruising, or swelling    Allergies:  Review of patient's allergies indicates:  No Known Allergies    Medications:    Current Outpatient Medications:     allopurinol (ZYLOPRIM) 100 MG tablet, Take 1 tablet (100 mg total) by mouth once daily., Disp: 30 tablet, Rfl: 5    atorvastatin (LIPITOR) 20 MG tablet, TK 1 T PO QD, Disp: , Rfl:     azelastine (ASTELIN) 137 mcg (0.1 %) nasal spray, 1 spray by Nasal route., Disp: , Rfl:     blood sugar diagnostic (FREESTYLE LITE STRIPS) Strp, by Other route., Disp: , Rfl:      diphenhydrAMINE-aluminum-magnesium hydroxide-simethicone-lidocaine HCl 2%, Swish and spit 15 mLs every 4 (four) hours as needed., Disp: 240 mL, Rfl: 6    ferrous sulfate (FEOSOL) 325 mg (65 mg iron) Tab tablet, Take 1 tablet (325 mg total) by mouth once daily., Disp: , Rfl:     FREESTYLE LANCETS 28 gauge lancets, TEST TWICE DAILY, Disp: , Rfl: 1    gabapentin (NEURONTIN) 300 MG capsule, TK ONE C PO  BID, Disp: , Rfl: 0    glimepiride (AMARYL) 1 MG tablet, TK 1 T PO ONCE D WITH BIBI OR THE FIRST MAIN MEAL OF THE DAY, Disp: , Rfl: 0    HYDROcodone-acetaminophen (NORCO)  mg per tablet, TK 1 T PO Q 8 H PRN P, Disp: , Rfl: 0    levalbuterol (XOPENEX) 1.25 mg/3 mL nebulizer solution, 3 mLs., Disp: , Rfl:     metFORMIN (GLUCOPHAGE) 500 MG tablet, TK 1 T PO BID WC, Disp: , Rfl: 2    metoprolol succinate (TOPROL-XL) 25 MG 24 hr tablet, TK 1 T PO BID, Disp: , Rfl:     neomycin-polymyxin-dexamethasone (DEXACINE) 3.5 mg/g-10,000 unit/g-0.1 % Oint, Apply to eye daily as needed., Disp: , Rfl:     ondansetron (ZOFRAN-ODT) 8 MG TbDL, DIS ONE T PO Q 8 H PRN N, Disp: , Rfl: 10    promethazine (PHENERGAN) 25 MG tablet, TK 1 T PO Q 6 H PRN N, Disp: , Rfl: 10    traZODone (DESYREL) 100 MG tablet, TAKE 1 TABLET BY MOUTH EVERY NIGHT AT BEDTIME, Disp: 90 tablet, Rfl: 0    valACYclovir (VALTREX) 500 MG tablet, Take 2 tablets (1,000 mg total) by mouth 2 (two) times daily., Disp: 120 tablet, Rfl: 6    venetoclax (VENCLEXTA) 100 mg Tab, Take 200 mg by mouth once daily., Disp: 60 tablet, Rfl: 5    amoxicillin (AMOXIL) 875 MG tablet, TAKE 1 TABLET BY MOUTH TWICE DAILY FOR 10 DAYS, Disp: 20 tablet, Rfl: 0    blood-glucose meter (FREESTYLE LITE METER) kit, Use as instructed, Disp: , Rfl:     water Liqd 150 mL with MILK OF MAGNESIA 400 mg/5 mL Susp 400 mg, diphenhydrAMINE 12.5 mg/5 mL Elix 60 mg, nystatin 100,000 unit/mL Susp 500,000 Units, SWISH AND SPIT 30 ML PO Q 4 H PRN, Disp: , Rfl: 0    PMHx/PSHx Updates:  See patient's  last visit with me on 8/27/2020  See H&P on 1/3/2019        Pathology:  Cancer Staging  No matching staging information was found for the patient.          Objective:     Vitals:  Blood pressure (!) 180/77, pulse 71, temperature 97.8 °F (36.6 °C), resp. rate 16, weight 101.6 kg (224 lb).        Physical Examination:   GEN: no apparent distress, comfortable; AAOx3  HEAD: atraumatic and normocephalic  EYES: no conjunctival pallor or muddiness, no icterus; normal pupil reaction to ambient light  ENT: OMM, no pharyngeal erythema, external bilateral ears WNL; no visible thrush or ulcers  NECK: no masses or swelling, trachea midline, no visible LAD/LN's ; LAD and LN's on right neck reduced in size  CV: no palpitations; no pedal edema; no noticeable JVD or neck vein distension  CHEST: Normal respiratory effort; chest wall breath movements symmetrical; no audible wheezing  ABDOM: non-distended; no bloating  MUSC/Skeletal: ROM normal; joints visibly normal; no deformities or arthropathy  EXTREM: no clubbing, cyanosis, inflammation or swelling  SKIN: no rashes, lesions, ulcers, petechiae or subcutaneous nodules  : no keith  NEURO: moving all 4 extremities; AAOx3; no tremors  PSYCH: normal mood, affect and behavior  LYMPH: no visible LN's or LAD; LAD and LN's on right neck reduced in size              Labs:   Lab Results   Component Value Date    WBC 2.48 (L) 11/16/2020    HGB 13.9 (L) 11/16/2020    HCT 38.0 (L) 11/16/2020    MCV 87 11/16/2020    PLT 98 (L) 11/16/2020     CMP  Sodium   Date Value Ref Range Status   11/16/2020 139 136 - 145 mmol/L Final   06/12/2019 138 134 - 144 mmol/L      Potassium   Date Value Ref Range Status   11/16/2020 3.9 3.5 - 5.1 mmol/L Final     Chloride   Date Value Ref Range Status   11/16/2020 100 95 - 110 mmol/L Final   06/12/2019 99 98 - 110 mmol/L      CO2   Date Value Ref Range Status   11/16/2020 27 23 - 29 mmol/L Final     Glucose   Date Value Ref Range Status   11/16/2020 202 (H) 70 -  110 mg/dL Final   06/12/2019 179 (H) 70 - 99 mg/dL      BUN   Date Value Ref Range Status   11/16/2020 16 8 - 23 mg/dL Final     Creatinine   Date Value Ref Range Status   11/16/2020 0.9 0.5 - 1.4 mg/dL Final   06/12/2019 0.95 0.60 - 1.40 mg/dL      Calcium   Date Value Ref Range Status   11/16/2020 9.7 8.7 - 10.5 mg/dL Final     Total Protein   Date Value Ref Range Status   11/16/2020 7.3 6.0 - 8.4 g/dL Final     Albumin   Date Value Ref Range Status   11/16/2020 4.3 3.5 - 5.2 g/dL Final   06/12/2019 3.9 3.1 - 4.7 g/dL      Total Bilirubin   Date Value Ref Range Status   11/16/2020 1.0 0.1 - 1.0 mg/dL Final     Comment:     For infants and newborns, interpretation of results should be based  on gestational age, weight and in agreement with clinical  observations.  Premature Infant recommended reference ranges:  Up to 24 hours.............<8.0 mg/dL  Up to 48 hours............<12.0 mg/dL  3-5 days..................<15.0 mg/dL  6-29 days.................<15.0 mg/dL       Alkaline Phosphatase   Date Value Ref Range Status   11/16/2020 75 55 - 135 U/L Final     AST   Date Value Ref Range Status   11/16/2020 24 10 - 40 U/L Final     ALT   Date Value Ref Range Status   11/16/2020 25 10 - 44 U/L Final     Anion Gap   Date Value Ref Range Status   11/16/2020 12 8 - 16 mmol/L Final     eGFR if    Date Value Ref Range Status   11/16/2020 >60.0 >60 mL/min/1.73 m^2 Final     eGFR if non    Date Value Ref Range Status   11/16/2020 >60.0 >60 mL/min/1.73 m^2 Final     Comment:     Calculation used to obtain the estimated glomerular filtration  rate (eGFR) is the CKD-EPI equation.              Lab Results   Component Value Date    IRON 109 11/16/2020    TIBC 367 11/16/2020    FERRITIN 44 11/16/2020         Radiology/Diagnostic Studies:    PET  6/24/2020:    Impression:     1. No findings of active lymphoproliferative disease.  2. Additional observations as above.      PET  1/24/2020:    Impression        Moderate decrease in size of the adenopathy within the neck, chest, abdomen and pelvis.  The spleen is smaller in size.  There is no significant FDG activity.    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 Multifocal lymphadenopathy in the chest, abdomen, and pelvis compatible with provided clinical history of lymphocytic leukemia.  There is mixed interval change when compared to prior studies.  Pathologic lymphadenopathy in the chest has increased significantly when compared to a CTA of the chest from May 2018.  Pathologic retroperitoneal lymphadenopathy has improved slightly when compared to a previous abdominal CT from February 2017.  Please see above details.  2. Mild aneurysmal dilation of the infrarenal abdominal aorta, with maximal transverse diameter of 3.7 cm.  Maximal transverse diameter on a prior study from 2017 was 2.5 cm.  3. Additional findings as above           [facility] Unknown Rad Eap    Result Date: 1/14/2019  CMS MANDATED QUALITY DATA - CT RADIATION - 436 All CT scans at this facility utilize dose modulation, iterative reconstruction, and/or weight based dosing when appropriate to reduce radiation dose to as low as reasonably achievable. Reason:Lymphoid leukemia TECHNIQUE: CT thorax with, CT abdomen  with, and CT pelvis with 100 mL Omnipaque 350. COMPARISON: CT chest dated 05/19/2018 and CT abdomen and pelvis dated 02/08/2017 CT THORAX: There is stable mediastinal, hilar and axillary adenopathy. There is coronary artery calcification. There is resolution of the groundglass opacities throughout the lungs. There are no confluent infiltrates, pulmonary nodules or pleural effusions. There are stable subcentimeter nodules in the left lobe of the thyroid gland. There are degenerative changes of the spine. CT ABDOMEN: The liver, pancreas, adrenal glands and gallbladder are normal. The spleen is enlarged. There is mesenteric  and retroperitoneal adenopathy which is slightly smaller in size. -There is a portacaval node measuring 37 x 21 mm and previously measured 38 x 27 mm. -Periportal lymph node measuring 41 x 18 mm, previously measured 48 x 29 mm. -Left periaortic adenopathy measuring 34 x 23 mm and previously measured 40 x 42 mm- The kidneys enhance symmetrically without hydronephrosis or calculi. There are no thick-walled or dilated bowel loops. There is vascular calcification of aorta. There is a 3.0 x 3.0 cm infrarenal abdominal aortic aneurysm. CT PELVIS: There is adenopathy along the pelvic sidewalls bilaterally which has decreased in size. -The left common iliac adenopathy measuring 36 x 11 mm, previously measured 46 x 14 mm -the right common iliac adenopathy measuring 46 x 10 mm. Previously measured 53 x 15 mm. The bladder is normal. The prostate gland is enlarged. There are degenerative changes of the spine. There is grade 1 anterolisthesis of L5 on S1 with bilateral pars defects.     IMPRESSION: Stable mediastinal, hilar and axillary adenopathy with resolution of the airspace disease within the lungs Decrease in size of the mesenteric, retroperitoneal and pelvic adenopathy. Stable infrarenal abdominal aortic aneurysm Splenomegaly     Read and electronically signed by: Jeniffer Zhang MD on 1/14/2019 9:14 AM CST JENIFFER ZHANG MD      I have reviewed all available lab results and radiology reports.    Assessment/Plan:   (1) 80 y.o. male with  diagnosis of CLL who has been referred by Dr Rony Victoria for continuation of care by medical hematology/oncology.   - BM biopsy  12/4/2015 with CLL  - diagnosis of SLL in 2004 and s/p FCR x6 in 2007  - s/p imbruvica in past (stopped in May 2018)  - latest wbc at 4.8; lymph 56%  - latest CT on 8/7/2019 showing increase in the LAD  - platelets are 111,000  - He was recently hospitalized at [facility] and seen by Dr Wheeler. Dr Wheeler has recommended Rituximab. He is starting tomorrow.   -  discussed the rituximab regimen and the potential side-effect profile; he is getting chemotherapy school today with [facility]  - he saw Dr Don on Oct 21st 2019  - he has had 3 of the 4 weeks of rituximab so far; Dr Don recommends him to eventually get rituximab monthly x6 with daily oral Venetoclax for two years total.   - completed rituximab (4th) week of rituximab and  S/p 6 monthly rituximab with Venetoclax oral but is taking only 2 pills daily due to the counts  - he is now just on the venetclax  - he saw Dr Don again on Dec 23rd 2019 and sees him again in March 16th 2020  - latest PEt on 1/24/2020 looks really good    8/27/2020:  - he saw Dr oDn last month at [facility]  - latest PEt from 6/24/2020 looks good  - he remains on just the oral venetoclax at 2 pills per day  - he sees Dr Don again in about 3 months (Oct 2020)    11/18/2020:  - he is doing well with the Venetoclax  - due for repeat PEt in Dec 2020  - he sees Dr Don again on Dec 23rd 2020     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  - followed  By Dr Kee  - CEA 1.1  - CT scans on 1/14/2019 stable     (3) Iron deficiency anemia s/p IV iron couple weeks ago  - hgb 13.9 and adequate, and the iron panel currently wnl     (4) HTN - on BP meds     (6) Chronic neck and back issues - followed by Dr Ryan Rivero     (7) DM - currently off all meds     (8) Hypercholesterolemia - on meds    (9)  - followed by Dr Whitney    (10) Leucopenia - WBC 2.48 secondary to the current oral chemo        VISIT DIAGNOSES:      Lymphoma, small lymphocytic (2004)    Iron deficiency anemia, unspecified iron deficiency anemia type    History of lung cancer - ANDRES NSCLC 2004    CLL (chronic lymphocytic leukemia)          PLAN:  1. check labs every 4 weeks while on therapy  2. F/u with PCP, pain management  3. F/u with PCP, Pulm, , etc  4. F/u with Dr Don at [facility] - Dec 23rd 2020  5. Continue oral iron  6. continue with daily Venetoclax (taking  only 2 pills daily due to counts)  7. Repeat PEt in Dec 2020     RTC in  2-3 months in person    Fax note to Kacey Kee, Chelo Florence Devraj, Saba/Joselito Wheeler        Discussion:       Total Time spent on patient:    I spent over 25 mins of time with the patient. Reviewed results of the recently ordered labs, tests, reports and studies; made directives with regards to the results. Over half of this time was spent couseling and coordinating care, making treatment and analytical decisions; ordering necessary labs, tests and studies; and discussing treatment options and setting up treatment plan(s) if indicated.        COVID-19 Discussion:    I had long discussion with patient and any applicable family about the COVID-19 coronavirus epidemic and the recommended precautions with regard to cancer and/or hematology patients. I have re-iterated the CDC recommendations for adequate hand washing, use of hand -like products, and coughing into elbow, etc. In addition, especially for our patients who are on chemotherapy and/or our otherwise immunocompromised patients, I have recommended avoidance of crowds, including movie theaters, restaurants, churches, etc. I have recommended avoidance of any sick or symptomatic family members and/or friends. I have also recommended avoidance of any raw and unwashed food products, and general avoidance of food items that have not been prepared by themselves. The patient has been asked to call us immediately with any symptom developments, issues, questions or other general concerns.          I have explained all of the above in detail and the patient understands all of the current recommendation(s). I have answered all of their questions to the best of my ability and to their complete satisfaction.   The patient is to continue with the current management plan.        Chemotherapy Discussion:      I discussed the available treatment option(s) in accordance with the  latest/current national evidence-based guidelines (NCCN, UpToDate, NCI, ASCO, etc where applicable), their overall age/condition and their co-morbidities. I also went over the risks and benefits of the chemotherapy with regard to their particular cancer type, their cancer stage, their age/condition, and their co-morbidities. I provided literature on the chemotherapy regimen and discussed the chemotherapy side-effect profiles of the drug(s). I discussed the importance of compliance with obtaining and monitoring weekly lab work, and went over the potential hematopathology issues and risks with anemia, leucopenia and thrombocytopenia that can occur with chemotherapy. I discussed the potential risks of liver and kidney damage, which could be permanent and could necessitate dialysis long-term if kidney failure developed. I discussed the emetic and/or diarrheal potential of the regimen and the potential need for use of antiemetic and anti-diarrheal medications. I discussed the risk for development of anaphylactic shock, bronchospasm, dysrhythmia, and respiratory/cardiovascular arrest and/or failure. I discussed the potential risks for development of alopecia, cold sensory issues, ringing in ears, vertigo, cataracts, glaucoma, and neuropathy, all of which could end up being chronic and life-long. Some chemotherpyI discussed the risks of hand-foot syndrome and rashes, and development of other autoimmune mediated processes such as pneumonitis, hepatitis, and colitis which could be life threatening. I discussed the risks of the potential development of a rare but fatal viral mediated disease known as PML (Progressive Multifocal Leukoencephalopathy), and risk of future development of leukemia and/or lymphoma from use of certain chemotherapy agents. I discussed the need for neutropenic precautions, basic hygiene/sanitation behaviors and dietary restrictions.    The patient's consent has been obtained to proceed with the  chemotherapy.The patient will be referred to Chemotherapy School [facility] for training and education on chemotherapy, use of antiemetics and/or anti-diarrheals, use of NSAID's, potential chemotherapy side-effects, and any specific recommendations and precautions with the particular chemotherapy agents.       Immunologic Therapy Discussion:    I discussed the available treatment option(s) in accordance with the latest/current national evidence-based guidelines (NCCN, UpToDate, NCI, ASCO, etc where applicable), their overall age/condition and their co-morbidities. I went over the risks and benefits of the immunotherapy with regard to their particular cancer type, their cancer stage, their age/condition, and their co-morbidities. I provided literature on the immunotherapy regimen and discussed the immunotherapy side-effect profiles of the drug(s). I discussed the importance of compliance with obtaining and monitoring weekly lab work, and went over the potential hematopathology issues and risks of hemato-pathologic issues with anemia, leucopenia and/or thrombocytopenia and effects on thyroid function that can occur with immunotherapy. The patient will most likely need to have there thyroid functions monitored by their PCP and may need to take thyroid medication while on the immunotherapy. I discussed the potential risks of liver and kidney damage, which could be permanent and could necessitate dialysis long-term if kidney failure developed. I discussed the emetic and/or diarrheal potential of the regimen and the potential need for use of antiemetic and anti-diarrheal medications. I discussed the risk for development of anaphylactic shock, bronchospasm, dysrhythmia, and respiratory/cardiovascular arrest and/or failure. I discussed the potential risks for development of alopecia, cold sensory issues, ringing in ears, vertigo and neuropathy, all of which could end up being chronic and life-long. I discussed the  risks of hand-foot syndrome and rashes, and development of other autoimmune mediated processes such as pneumonitis, hepatitis and colitis which could potentially be life threatening. I discussed the risks of the potential development of a rare but fatal viral mediated disease known as PML (Progressive Multifocal Leukoencephalopathy), and risk of future development of leukemia and/or lymphoma from use of certain immunotherapy agents. I discussed the possibility that immunologic therapy cold worsen or promote progression of any underlying autoimmune diseases such as sarcoidosis, ulcerative colitis, Crohn's disease, psoriasis, rheumatoid disorders, scleroderma, autoimmune nephritis disorders, Hashimoto's thyroiditis, and Lupus among others. I discussed the need for neutropenic precautions, basic hygiene/sanitation behaviors and dietary restrictions.    The patient's consent has been obtained to proceed with the immunotherapy.The patient will be referred to Immunotherapy School /[facility] for training and education on immunotherapy, use of antiemetics and/or anti-diarrheals, use of NSAID's, potential immunotherapy side-effects, and any specific recommendations and precautions with the particular immunotherapy agents.      I answered all of the patient's (and family's, if applicable) questions to the best of my ability and to their complete satisfaction. The patient acknowledged full understanding of the risks, recommendations and plan(s).       I answered all of the patient's (and family's, if applicable) questions to the best of my ability and to their complete satisfaction. The patient acknowledged full understanding of the risks, recommendations and plan(s).         Electronically signed by Drew Bai MD

## 2020-11-18 ENCOUNTER — OFFICE VISIT (OUTPATIENT)
Dept: HEMATOLOGY/ONCOLOGY | Facility: CLINIC | Age: 80
End: 2020-11-18
Payer: MEDICARE

## 2020-11-18 VITALS
WEIGHT: 224 LBS | SYSTOLIC BLOOD PRESSURE: 180 MMHG | HEART RATE: 71 BPM | BODY MASS INDEX: 28.76 KG/M2 | TEMPERATURE: 98 F | DIASTOLIC BLOOD PRESSURE: 77 MMHG | RESPIRATION RATE: 16 BRPM

## 2020-11-18 DIAGNOSIS — D50.9 IRON DEFICIENCY ANEMIA, UNSPECIFIED IRON DEFICIENCY ANEMIA TYPE: ICD-10-CM

## 2020-11-18 DIAGNOSIS — C91.10 CLL (CHRONIC LYMPHOCYTIC LEUKEMIA): ICD-10-CM

## 2020-11-18 DIAGNOSIS — C83.00 LYMPHOMA, SMALL LYMPHOCYTIC: Primary | ICD-10-CM

## 2020-11-18 DIAGNOSIS — Z85.118 HISTORY OF LUNG CANCER: ICD-10-CM

## 2020-11-18 DIAGNOSIS — R53.83 OTHER FATIGUE: ICD-10-CM

## 2020-11-18 PROCEDURE — 99214 OFFICE O/P EST MOD 30 MIN: CPT | Mod: S$GLB,,, | Performed by: INTERNAL MEDICINE

## 2020-11-18 PROCEDURE — 99214 PR OFFICE/OUTPT VISIT, EST, LEVL IV, 30-39 MIN: ICD-10-PCS | Mod: S$GLB,,, | Performed by: INTERNAL MEDICINE

## 2020-12-02 ENCOUNTER — TELEPHONE (OUTPATIENT)
Dept: HEMATOLOGY/ONCOLOGY | Facility: CLINIC | Age: 80
End: 2020-12-02

## 2020-12-02 NOTE — TELEPHONE ENCOUNTER
Called the patient and he instructed me that he needs his Zofran called into [facility].  I called in Zofran 8 mg take 1 tablet by mouth every 8 hours prn n/v #30 with 3 refills.

## 2020-12-02 NOTE — TELEPHONE ENCOUNTER
----- Message from Lore Suarez, Patient Care Assistant sent at 12/1/2020 11:23 AM CST -----  Regarding: Rx Request  Patient wife (Peri) called in for a prescription refill= Zofran 8 mg . She can be reached at [phone]

## 2020-12-18 DIAGNOSIS — C83.00 LYMPHOMA, SMALL LYMPHOCYTIC: Primary | ICD-10-CM

## 2020-12-18 RX ORDER — ALLOPURINOL 100 MG/1
100 TABLET ORAL DAILY
Qty: 30 TABLET | Refills: 2 | Status: SHIPPED | OUTPATIENT
Start: 2020-12-18 | End: 2021-12-18

## 2020-12-22 ENCOUNTER — LAB VISIT (OUTPATIENT)
Dept: LAB | Facility: HOSPITAL | Age: 80
End: 2020-12-22
Attending: INTERNAL MEDICINE
Payer: MEDICARE

## 2020-12-22 DIAGNOSIS — Z85.118 HISTORY OF LUNG CANCER: ICD-10-CM

## 2020-12-22 DIAGNOSIS — D50.9 IRON DEFICIENCY ANEMIA, UNSPECIFIED IRON DEFICIENCY ANEMIA TYPE: ICD-10-CM

## 2020-12-22 DIAGNOSIS — R53.83 OTHER FATIGUE: ICD-10-CM

## 2020-12-22 DIAGNOSIS — C83.00 LYMPHOMA, SMALL LYMPHOCYTIC: ICD-10-CM

## 2020-12-22 DIAGNOSIS — E11.42 DIABETIC POLYNEUROPATHY: Primary | ICD-10-CM

## 2020-12-22 DIAGNOSIS — C91.10 CLL (CHRONIC LYMPHOCYTIC LEUKEMIA): ICD-10-CM

## 2020-12-22 LAB
ALBUMIN SERPL BCP-MCNC: 4.6 G/DL (ref 3.5–5.2)
ALP SERPL-CCNC: 57 U/L (ref 55–135)
ALT SERPL W/O P-5'-P-CCNC: 21 U/L (ref 10–44)
ANION GAP SERPL CALC-SCNC: 9 MMOL/L (ref 8–16)
AST SERPL-CCNC: 22 U/L (ref 10–40)
BASOPHILS # BLD AUTO: 0.01 K/UL (ref 0–0.2)
BASOPHILS NFR BLD: 0.4 % (ref 0–1.9)
BILIRUB SERPL-MCNC: 0.8 MG/DL (ref 0.1–1)
BUN SERPL-MCNC: 16 MG/DL (ref 8–23)
CALCIUM SERPL-MCNC: 9.3 MG/DL (ref 8.7–10.5)
CEA SERPL-MCNC: 1.2 NG/ML (ref 0–5)
CHLORIDE SERPL-SCNC: 100 MMOL/L (ref 95–110)
CO2 SERPL-SCNC: 29 MMOL/L (ref 23–29)
CREAT SERPL-MCNC: 0.9 MG/DL (ref 0.5–1.4)
DIFFERENTIAL METHOD: ABNORMAL
EOSINOPHIL # BLD AUTO: 0 K/UL (ref 0–0.5)
EOSINOPHIL NFR BLD: 0.4 % (ref 0–8)
ERYTHROCYTE [DISTWIDTH] IN BLOOD BY AUTOMATED COUNT: 13.2 % (ref 11.5–14.5)
EST. GFR  (AFRICAN AMERICAN): >60 ML/MIN/1.73 M^2
EST. GFR  (NON AFRICAN AMERICAN): >60 ML/MIN/1.73 M^2
ESTIMATED AVG GLUCOSE: 143 MG/DL (ref 68–131)
FERRITIN SERPL-MCNC: 50 NG/ML (ref 20–300)
GLUCOSE SERPL-MCNC: 94 MG/DL (ref 70–110)
HBA1C MFR BLD HPLC: 6.6 % (ref 4.5–6.2)
HCT VFR BLD AUTO: 39.1 % (ref 40–54)
HGB BLD-MCNC: 13.9 G/DL (ref 14–18)
IMM GRANULOCYTES # BLD AUTO: 0.04 K/UL (ref 0–0.04)
IMM GRANULOCYTES NFR BLD AUTO: 1.5 % (ref 0–0.5)
IRON SERPL-MCNC: 89 UG/DL (ref 45–160)
LYMPHOCYTES # BLD AUTO: 0.7 K/UL (ref 1–4.8)
LYMPHOCYTES NFR BLD: 27.4 % (ref 18–48)
MCH RBC QN AUTO: 31.4 PG (ref 27–31)
MCHC RBC AUTO-ENTMCNC: 35.5 G/DL (ref 32–36)
MCV RBC AUTO: 88 FL (ref 82–98)
MONOCYTES # BLD AUTO: 0.5 K/UL (ref 0.3–1)
MONOCYTES NFR BLD: 20.5 % (ref 4–15)
NEUTROPHILS # BLD AUTO: 1.3 K/UL (ref 1.8–7.7)
NEUTROPHILS NFR BLD: 49.8 % (ref 38–73)
NRBC BLD-RTO: 0 /100 WBC
PLATELET # BLD AUTO: 93 K/UL (ref 150–350)
PMV BLD AUTO: 9.6 FL (ref 9.2–12.9)
POTASSIUM SERPL-SCNC: 3.8 MMOL/L (ref 3.5–5.1)
PROT SERPL-MCNC: 7.7 G/DL (ref 6–8.4)
RBC # BLD AUTO: 4.43 M/UL (ref 4.6–6.2)
SATURATED IRON: 23 % (ref 20–50)
SODIUM SERPL-SCNC: 138 MMOL/L (ref 136–145)
TOTAL IRON BINDING CAPACITY: 386 UG/DL (ref 250–450)
TRANSFERRIN SERPL-MCNC: 276 MG/DL (ref 200–375)
TSH SERPL DL<=0.005 MIU/L-ACNC: 1.05 UIU/ML (ref 0.34–5.6)
WBC # BLD AUTO: 2.63 K/UL (ref 3.9–12.7)

## 2020-12-22 PROCEDURE — 82378 CARCINOEMBRYONIC ANTIGEN: CPT

## 2020-12-22 PROCEDURE — 84443 ASSAY THYROID STIM HORMONE: CPT

## 2020-12-22 PROCEDURE — 85025 COMPLETE CBC W/AUTO DIFF WBC: CPT

## 2020-12-22 PROCEDURE — 36415 COLL VENOUS BLD VENIPUNCTURE: CPT

## 2020-12-22 PROCEDURE — 80053 COMPREHEN METABOLIC PANEL: CPT

## 2020-12-22 PROCEDURE — 83540 ASSAY OF IRON: CPT

## 2020-12-22 PROCEDURE — 83036 HEMOGLOBIN GLYCOSYLATED A1C: CPT

## 2020-12-22 PROCEDURE — 82728 ASSAY OF FERRITIN: CPT

## 2021-01-05 ENCOUNTER — HOSPITAL ENCOUNTER (OUTPATIENT)
Dept: RADIOLOGY | Facility: HOSPITAL | Age: 81
Discharge: HOME | End: 2021-01-05
Attending: FAMILY MEDICINE
Payer: MEDICARE

## 2021-01-05 DIAGNOSIS — M12.811 ROTATOR CUFF ARTHROPATHY, RIGHT: Primary | ICD-10-CM

## 2021-01-05 DIAGNOSIS — M12.811 ROTATOR CUFF ARTHROPATHY, RIGHT: ICD-10-CM

## 2021-01-05 PROCEDURE — 73030 X-RAY EXAM OF SHOULDER: CPT | Mod: TC,PO,RT

## 2021-01-12 ENCOUNTER — TELEPHONE (OUTPATIENT)
Dept: HEMATOLOGY/ONCOLOGY | Facility: CLINIC | Age: 81
End: 2021-01-12

## 2021-01-12 DIAGNOSIS — M12.811 ROTATOR CUFF ARTHROPATHY OF RIGHT SHOULDER: Primary | ICD-10-CM

## 2021-01-18 ENCOUNTER — HOSPITAL ENCOUNTER (OUTPATIENT)
Dept: RADIOLOGY | Facility: HOSPITAL | Age: 81
Discharge: HOME | End: 2021-01-18
Attending: INTERNAL MEDICINE
Payer: MEDICARE

## 2021-01-18 VITALS — HEIGHT: 74 IN | WEIGHT: 225 LBS | BODY MASS INDEX: 28.88 KG/M2

## 2021-01-18 DIAGNOSIS — D50.9 IRON DEFICIENCY ANEMIA, UNSPECIFIED IRON DEFICIENCY ANEMIA TYPE: ICD-10-CM

## 2021-01-18 DIAGNOSIS — Z85.118 HISTORY OF LUNG CANCER: ICD-10-CM

## 2021-01-18 DIAGNOSIS — C83.00 LYMPHOMA, SMALL LYMPHOCYTIC: ICD-10-CM

## 2021-01-18 DIAGNOSIS — C91.10 CLL (CHRONIC LYMPHOCYTIC LEUKEMIA): ICD-10-CM

## 2021-01-18 LAB — GLUCOSE SERPL-MCNC: 154 MG/DL (ref 70–110)

## 2021-01-18 PROCEDURE — 78815 PET IMAGE W/CT SKULL-THIGH: CPT | Mod: TC,PO,PS

## 2021-01-19 ENCOUNTER — CLINICAL SUPPORT (OUTPATIENT)
Dept: REHABILITATION | Facility: HOSPITAL | Age: 81
End: 2021-01-19
Attending: FAMILY MEDICINE
Payer: MEDICARE

## 2021-01-19 ENCOUNTER — TELEPHONE (OUTPATIENT)
Dept: HEMATOLOGY/ONCOLOGY | Facility: CLINIC | Age: 81
End: 2021-01-19

## 2021-01-19 DIAGNOSIS — M25.519 PAIN IN JOINT, SHOULDER REGION: Primary | ICD-10-CM

## 2021-01-19 DIAGNOSIS — M25.511 PAIN IN JOINT OF RIGHT SHOULDER: Primary | ICD-10-CM

## 2021-01-19 DIAGNOSIS — I71.40 ABDOMINAL AORTIC ANEURYSM (AAA) WITHOUT RUPTURE: ICD-10-CM

## 2021-01-19 DIAGNOSIS — C91.10 CLL (CHRONIC LYMPHOCYTIC LEUKEMIA): Primary | ICD-10-CM

## 2021-01-19 DIAGNOSIS — I71.43 ANEURYSM OF INFRARENAL ABDOMINAL AORTA: ICD-10-CM

## 2021-01-19 PROCEDURE — 97165 OT EVAL LOW COMPLEX 30 MIN: CPT

## 2021-01-20 ENCOUNTER — IMMUNIZATION (OUTPATIENT)
Dept: PRIMARY CARE CLINIC | Facility: CLINIC | Age: 81
End: 2021-01-20
Payer: MEDICARE

## 2021-01-20 ENCOUNTER — OFFICE VISIT (OUTPATIENT)
Dept: HEMATOLOGY/ONCOLOGY | Facility: CLINIC | Age: 81
End: 2021-01-20
Payer: MEDICARE

## 2021-01-20 VITALS
SYSTOLIC BLOOD PRESSURE: 171 MMHG | RESPIRATION RATE: 17 BRPM | DIASTOLIC BLOOD PRESSURE: 78 MMHG | BODY MASS INDEX: 28.22 KG/M2 | HEART RATE: 69 BPM | TEMPERATURE: 99 F | WEIGHT: 219.81 LBS

## 2021-01-20 DIAGNOSIS — Z23 NEED FOR VACCINATION: Primary | ICD-10-CM

## 2021-01-20 DIAGNOSIS — D50.9 IRON DEFICIENCY ANEMIA, UNSPECIFIED IRON DEFICIENCY ANEMIA TYPE: Primary | ICD-10-CM

## 2021-01-20 DIAGNOSIS — Z85.118 HISTORY OF LUNG CANCER: ICD-10-CM

## 2021-01-20 DIAGNOSIS — C91.10 CLL (CHRONIC LYMPHOCYTIC LEUKEMIA): ICD-10-CM

## 2021-01-20 PROCEDURE — 91300 COVID-19, MRNA, LNP-S, PF, 30 MCG/0.3 ML DOSE VACCINE: ICD-10-PCS | Mod: S$GLB,,, | Performed by: FAMILY MEDICINE

## 2021-01-20 PROCEDURE — 0001A COVID-19, MRNA, LNP-S, PF, 30 MCG/0.3 ML DOSE VACCINE: CPT | Mod: S$GLB,,, | Performed by: FAMILY MEDICINE

## 2021-01-20 PROCEDURE — 0001A COVID-19, MRNA, LNP-S, PF, 30 MCG/0.3 ML DOSE VACCINE: ICD-10-PCS | Mod: S$GLB,,, | Performed by: FAMILY MEDICINE

## 2021-01-20 PROCEDURE — 91300 COVID-19, MRNA, LNP-S, PF, 30 MCG/0.3 ML DOSE VACCINE: CPT | Mod: S$GLB,,, | Performed by: FAMILY MEDICINE

## 2021-01-20 PROCEDURE — 99214 PR OFFICE/OUTPT VISIT, EST, LEVL IV, 30-39 MIN: ICD-10-PCS | Mod: S$GLB,,, | Performed by: INTERNAL MEDICINE

## 2021-01-20 PROCEDURE — 99214 OFFICE O/P EST MOD 30 MIN: CPT | Mod: S$GLB,,, | Performed by: INTERNAL MEDICINE

## 2021-01-21 ENCOUNTER — CLINICAL SUPPORT (OUTPATIENT)
Dept: REHABILITATION | Facility: HOSPITAL | Age: 81
End: 2021-01-21
Attending: FAMILY MEDICINE
Payer: MEDICARE

## 2021-01-21 DIAGNOSIS — M25.511 PAIN IN JOINT OF RIGHT SHOULDER: Primary | ICD-10-CM

## 2021-01-21 PROCEDURE — 97110 THERAPEUTIC EXERCISES: CPT

## 2021-01-22 ENCOUNTER — HOSPITAL ENCOUNTER (OUTPATIENT)
Dept: RADIOLOGY | Facility: HOSPITAL | Age: 81
Discharge: HOME | End: 2021-01-22
Attending: PAIN MEDICINE
Payer: MEDICARE

## 2021-01-22 DIAGNOSIS — M25.519 PAIN IN JOINT, SHOULDER REGION: ICD-10-CM

## 2021-01-27 ENCOUNTER — CLINICAL SUPPORT (OUTPATIENT)
Dept: REHABILITATION | Facility: HOSPITAL | Age: 81
End: 2021-01-27
Attending: FAMILY MEDICINE
Payer: MEDICARE

## 2021-01-27 DIAGNOSIS — M25.511 PAIN IN JOINT OF RIGHT SHOULDER: Primary | ICD-10-CM

## 2021-01-27 PROCEDURE — 97110 THERAPEUTIC EXERCISES: CPT

## 2021-01-29 ENCOUNTER — CLINICAL SUPPORT (OUTPATIENT)
Dept: REHABILITATION | Facility: HOSPITAL | Age: 81
End: 2021-01-29
Attending: FAMILY MEDICINE
Payer: MEDICARE

## 2021-01-29 DIAGNOSIS — M25.511 PAIN IN JOINT OF RIGHT SHOULDER: Primary | ICD-10-CM

## 2021-01-29 PROCEDURE — 97110 THERAPEUTIC EXERCISES: CPT

## 2021-02-02 ENCOUNTER — CLINICAL SUPPORT (OUTPATIENT)
Dept: REHABILITATION | Facility: HOSPITAL | Age: 81
End: 2021-02-02
Attending: FAMILY MEDICINE
Payer: MEDICARE

## 2021-02-02 DIAGNOSIS — M25.511 PAIN IN JOINT OF RIGHT SHOULDER: Primary | ICD-10-CM

## 2021-02-02 PROCEDURE — 97110 THERAPEUTIC EXERCISES: CPT

## 2021-02-04 ENCOUNTER — CLINICAL SUPPORT (OUTPATIENT)
Dept: REHABILITATION | Facility: HOSPITAL | Age: 81
End: 2021-02-04
Attending: FAMILY MEDICINE
Payer: MEDICARE

## 2021-02-04 DIAGNOSIS — M25.511 PAIN IN JOINT OF RIGHT SHOULDER: Primary | ICD-10-CM

## 2021-02-04 PROCEDURE — 97110 THERAPEUTIC EXERCISES: CPT

## 2021-02-08 DIAGNOSIS — C91.10 CLL (CHRONIC LYMPHOCYTIC LEUKEMIA): Primary | ICD-10-CM

## 2021-02-08 RX ORDER — AMOXICILLIN AND CLAVULANATE POTASSIUM 875; 125 MG/1; MG/1
1 TABLET, FILM COATED ORAL 2 TIMES DAILY
Qty: 20 TABLET | Refills: 0 | Status: CANCELLED | OUTPATIENT
Start: 2021-02-08

## 2021-02-08 RX ORDER — VALACYCLOVIR HYDROCHLORIDE 1 G/1
TABLET, FILM COATED ORAL
Qty: 60 TABLET | Refills: 6 | Status: SHIPPED | OUTPATIENT
Start: 2021-02-08 | End: 2021-06-29

## 2021-02-08 RX ORDER — AMOXICILLIN 500 MG/1
500 TABLET, FILM COATED ORAL 3 TIMES DAILY
Qty: 30 TABLET | Refills: 1 | Status: SHIPPED | OUTPATIENT
Start: 2021-02-08 | End: 2021-06-29

## 2021-02-09 ENCOUNTER — CLINICAL SUPPORT (OUTPATIENT)
Dept: REHABILITATION | Facility: HOSPITAL | Age: 81
End: 2021-02-09
Attending: FAMILY MEDICINE
Payer: MEDICARE

## 2021-02-09 DIAGNOSIS — M25.511 PAIN IN JOINT OF RIGHT SHOULDER: Primary | ICD-10-CM

## 2021-02-09 PROCEDURE — 97110 THERAPEUTIC EXERCISES: CPT

## 2021-02-11 ENCOUNTER — IMMUNIZATION (OUTPATIENT)
Dept: PRIMARY CARE CLINIC | Facility: CLINIC | Age: 81
End: 2021-02-11
Payer: MEDICARE

## 2021-02-11 DIAGNOSIS — Z23 NEED FOR VACCINATION: Primary | ICD-10-CM

## 2021-02-11 PROCEDURE — 91300 COVID-19, MRNA, LNP-S, PF, 30 MCG/0.3 ML DOSE VACCINE: CPT | Mod: S$GLB,,, | Performed by: FAMILY MEDICINE

## 2021-02-11 PROCEDURE — 0002A COVID-19, MRNA, LNP-S, PF, 30 MCG/0.3 ML DOSE VACCINE: CPT | Mod: S$GLB,,, | Performed by: FAMILY MEDICINE

## 2021-02-11 PROCEDURE — 0002A COVID-19, MRNA, LNP-S, PF, 30 MCG/0.3 ML DOSE VACCINE: ICD-10-PCS | Mod: S$GLB,,, | Performed by: FAMILY MEDICINE

## 2021-02-11 PROCEDURE — 91300 COVID-19, MRNA, LNP-S, PF, 30 MCG/0.3 ML DOSE VACCINE: ICD-10-PCS | Mod: S$GLB,,, | Performed by: FAMILY MEDICINE

## 2021-02-22 ENCOUNTER — LAB VISIT (OUTPATIENT)
Dept: LAB | Facility: HOSPITAL | Age: 81
End: 2021-02-22
Attending: INTERNAL MEDICINE
Payer: MEDICARE

## 2021-02-22 DIAGNOSIS — Z85.118 HISTORY OF LUNG CANCER: ICD-10-CM

## 2021-02-22 DIAGNOSIS — C83.00 LYMPHOMA, SMALL LYMPHOCYTIC: ICD-10-CM

## 2021-02-22 DIAGNOSIS — C91.10 CLL (CHRONIC LYMPHOCYTIC LEUKEMIA): ICD-10-CM

## 2021-02-22 DIAGNOSIS — R53.83 OTHER FATIGUE: ICD-10-CM

## 2021-02-22 DIAGNOSIS — D50.9 IRON DEFICIENCY ANEMIA, UNSPECIFIED IRON DEFICIENCY ANEMIA TYPE: ICD-10-CM

## 2021-02-22 LAB
ALBUMIN SERPL BCP-MCNC: 4.1 G/DL (ref 3.5–5.2)
ALP SERPL-CCNC: 60 U/L (ref 55–135)
ALT SERPL W/O P-5'-P-CCNC: 24 U/L (ref 10–44)
ANION GAP SERPL CALC-SCNC: 8 MMOL/L (ref 8–16)
AST SERPL-CCNC: 18 U/L (ref 10–40)
BASOPHILS # BLD AUTO: 0.01 K/UL (ref 0–0.2)
BASOPHILS NFR BLD: 0.3 % (ref 0–1.9)
BILIRUB SERPL-MCNC: 0.4 MG/DL (ref 0.1–1)
BUN SERPL-MCNC: 21 MG/DL (ref 8–23)
CALCIUM SERPL-MCNC: 9.2 MG/DL (ref 8.7–10.5)
CHLORIDE SERPL-SCNC: 101 MMOL/L (ref 95–110)
CO2 SERPL-SCNC: 27 MMOL/L (ref 23–29)
CREAT SERPL-MCNC: 1 MG/DL (ref 0.5–1.4)
DIFFERENTIAL METHOD: ABNORMAL
EOSINOPHIL # BLD AUTO: 0 K/UL (ref 0–0.5)
EOSINOPHIL NFR BLD: 0.3 % (ref 0–8)
ERYTHROCYTE [DISTWIDTH] IN BLOOD BY AUTOMATED COUNT: 13.6 % (ref 11.5–14.5)
EST. GFR  (AFRICAN AMERICAN): >60 ML/MIN/1.73 M^2
EST. GFR  (NON AFRICAN AMERICAN): >60 ML/MIN/1.73 M^2
FERRITIN SERPL-MCNC: 68 NG/ML (ref 20–300)
GLUCOSE SERPL-MCNC: 127 MG/DL (ref 70–110)
HCT VFR BLD AUTO: 36 % (ref 40–54)
HGB BLD-MCNC: 12.8 G/DL (ref 14–18)
IMM GRANULOCYTES # BLD AUTO: 0.05 K/UL (ref 0–0.04)
IMM GRANULOCYTES NFR BLD AUTO: 1.5 % (ref 0–0.5)
IRON SERPL-MCNC: 107 UG/DL (ref 45–160)
LYMPHOCYTES # BLD AUTO: 0.9 K/UL (ref 1–4.8)
LYMPHOCYTES NFR BLD: 26.5 % (ref 18–48)
MCH RBC QN AUTO: 31.8 PG (ref 27–31)
MCHC RBC AUTO-ENTMCNC: 35.6 G/DL (ref 32–36)
MCV RBC AUTO: 89 FL (ref 82–98)
MONOCYTES # BLD AUTO: 0.6 K/UL (ref 0.3–1)
MONOCYTES NFR BLD: 16.3 % (ref 4–15)
NEUTROPHILS # BLD AUTO: 1.9 K/UL (ref 1.8–7.7)
NEUTROPHILS NFR BLD: 55.1 % (ref 38–73)
NRBC BLD-RTO: 0 /100 WBC
PLATELET # BLD AUTO: 90 K/UL (ref 150–350)
PMV BLD AUTO: 9.9 FL (ref 9.2–12.9)
POTASSIUM SERPL-SCNC: 3.8 MMOL/L (ref 3.5–5.1)
PROT SERPL-MCNC: 7.1 G/DL (ref 6–8.4)
RBC # BLD AUTO: 4.03 M/UL (ref 4.6–6.2)
SATURATED IRON: 29 % (ref 20–50)
SODIUM SERPL-SCNC: 136 MMOL/L (ref 136–145)
TOTAL IRON BINDING CAPACITY: 364 UG/DL (ref 250–450)
TRANSFERRIN SERPL-MCNC: 260 MG/DL (ref 200–375)
TSH SERPL DL<=0.005 MIU/L-ACNC: 0.73 UIU/ML (ref 0.34–5.6)
WBC # BLD AUTO: 3.43 K/UL (ref 3.9–12.7)

## 2021-02-22 PROCEDURE — 84443 ASSAY THYROID STIM HORMONE: CPT

## 2021-02-22 PROCEDURE — 85025 COMPLETE CBC W/AUTO DIFF WBC: CPT

## 2021-02-22 PROCEDURE — 80053 COMPREHEN METABOLIC PANEL: CPT

## 2021-02-22 PROCEDURE — 36415 COLL VENOUS BLD VENIPUNCTURE: CPT

## 2021-02-22 PROCEDURE — 83540 ASSAY OF IRON: CPT

## 2021-02-22 PROCEDURE — 82728 ASSAY OF FERRITIN: CPT

## 2021-02-25 ENCOUNTER — TELEPHONE (OUTPATIENT)
Dept: HEMATOLOGY/ONCOLOGY | Facility: CLINIC | Age: 81
End: 2021-02-25

## 2021-02-25 DIAGNOSIS — C91.10 CLL (CHRONIC LYMPHOCYTIC LEUKEMIA): ICD-10-CM

## 2021-02-26 ENCOUNTER — OFFICE VISIT (OUTPATIENT)
Dept: HEMATOLOGY/ONCOLOGY | Facility: CLINIC | Age: 81
End: 2021-02-26
Payer: MEDICARE

## 2021-02-26 VITALS
SYSTOLIC BLOOD PRESSURE: 156 MMHG | HEART RATE: 78 BPM | WEIGHT: 220 LBS | TEMPERATURE: 98 F | RESPIRATION RATE: 19 BRPM | BODY MASS INDEX: 28.25 KG/M2 | DIASTOLIC BLOOD PRESSURE: 74 MMHG

## 2021-02-26 DIAGNOSIS — S49.91XD INJURY OF RIGHT SHOULDER, SUBSEQUENT ENCOUNTER: ICD-10-CM

## 2021-02-26 DIAGNOSIS — D50.9 IRON DEFICIENCY ANEMIA, UNSPECIFIED IRON DEFICIENCY ANEMIA TYPE: ICD-10-CM

## 2021-02-26 DIAGNOSIS — K13.79 MOUTH SORES: ICD-10-CM

## 2021-02-26 DIAGNOSIS — C91.10 CLL (CHRONIC LYMPHOCYTIC LEUKEMIA): Primary | ICD-10-CM

## 2021-02-26 PROCEDURE — 99213 PR OFFICE/OUTPT VISIT, EST, LEVL III, 20-29 MIN: ICD-10-PCS | Mod: S$GLB,,, | Performed by: NURSE PRACTITIONER

## 2021-02-26 PROCEDURE — 99213 OFFICE O/P EST LOW 20 MIN: CPT | Mod: S$GLB,,, | Performed by: NURSE PRACTITIONER

## 2021-03-04 ENCOUNTER — TELEPHONE (OUTPATIENT)
Dept: HEMATOLOGY/ONCOLOGY | Facility: CLINIC | Age: 81
End: 2021-03-04

## 2021-03-10 DIAGNOSIS — Z01.818 PRE-OP EVALUATION: Primary | ICD-10-CM

## 2021-03-10 DIAGNOSIS — I10 HYPERTENSION, ESSENTIAL: ICD-10-CM

## 2021-03-10 DIAGNOSIS — Z01.818 PRE-OP EVALUATION: ICD-10-CM

## 2021-03-10 DIAGNOSIS — R94.31 ABNORMAL ELECTROCARDIOGRAM: Primary | ICD-10-CM

## 2021-03-16 ENCOUNTER — TELEPHONE (OUTPATIENT)
Dept: CARDIOLOGY | Facility: HOSPITAL | Age: 81
End: 2021-03-16

## 2021-03-17 ENCOUNTER — HOSPITAL ENCOUNTER (OUTPATIENT)
Dept: RADIOLOGY | Facility: HOSPITAL | Age: 81
Discharge: HOME | End: 2021-03-17
Attending: FAMILY MEDICINE
Payer: MEDICARE

## 2021-03-17 ENCOUNTER — CLINICAL SUPPORT (OUTPATIENT)
Dept: CARDIOLOGY | Facility: HOSPITAL | Age: 81
End: 2021-03-17
Attending: FAMILY MEDICINE
Payer: MEDICARE

## 2021-03-17 DIAGNOSIS — R94.31 ABNORMAL ELECTROCARDIOGRAM: ICD-10-CM

## 2021-03-17 DIAGNOSIS — I10 HYPERTENSION, ESSENTIAL: ICD-10-CM

## 2021-03-17 DIAGNOSIS — Z01.818 PRE-OP EVALUATION: ICD-10-CM

## 2021-03-17 LAB
CV PHARM DOSE: 0.4 MG
CV STRESS BASE HR: 62 BPM
DIASTOLIC BLOOD PRESSURE: 82 MMHG
OHS CV CPX 1 MINUTE RECOVERY HEART RATE: 79 BPM
OHS CV CPX 85 PERCENT MAX PREDICTED HEART RATE MALE: 118
OHS CV CPX MAX PREDICTED HEART RATE: 139
OHS CV CPX PATIENT IS FEMALE: 0
OHS CV CPX PATIENT IS MALE: 1
OHS CV CPX PEAK DIASTOLIC BLOOD PRESSURE: 72 MMHG
OHS CV CPX PEAK HEAR RATE: 81 BPM
OHS CV CPX PEAK RATE PRESSURE PRODUCT: NORMAL
OHS CV CPX PEAK SYSTOLIC BLOOD PRESSURE: 205 MMHG
OHS CV CPX PERCENT MAX PREDICTED HEART RATE ACHIEVED: 58
OHS CV CPX RATE PRESSURE PRODUCT PRESENTING: NORMAL
SYSTOLIC BLOOD PRESSURE: 163 MMHG

## 2021-03-17 PROCEDURE — A9502 TC99M TETROFOSMIN: HCPCS

## 2021-03-17 PROCEDURE — 93017 CV STRESS TEST TRACING ONLY: CPT

## 2021-03-17 PROCEDURE — 63600175 PHARM REV CODE 636 W HCPCS: Performed by: FAMILY MEDICINE

## 2021-03-17 PROCEDURE — 78452 HT MUSCLE IMAGE SPECT MULT: CPT | Mod: TC

## 2021-03-17 RX ORDER — REGADENOSON 0.08 MG/ML
0.4 INJECTION, SOLUTION INTRAVENOUS ONCE
Status: COMPLETED | OUTPATIENT
Start: 2021-03-17 | End: 2021-03-17

## 2021-03-17 RX ADMIN — REGADENOSON 0.4 MG: 0.08 INJECTION, SOLUTION INTRAVENOUS at 11:03

## 2021-04-08 ENCOUNTER — LAB VISIT (OUTPATIENT)
Dept: LAB | Facility: HOSPITAL | Age: 81
End: 2021-04-08
Attending: INTERNAL MEDICINE
Payer: MEDICARE

## 2021-04-08 DIAGNOSIS — C91.10 CLL (CHRONIC LYMPHOCYTIC LEUKEMIA): ICD-10-CM

## 2021-04-08 DIAGNOSIS — R53.83 OTHER FATIGUE: ICD-10-CM

## 2021-04-08 DIAGNOSIS — Z85.118 HISTORY OF LUNG CANCER: ICD-10-CM

## 2021-04-08 DIAGNOSIS — D50.9 IRON DEFICIENCY ANEMIA, UNSPECIFIED IRON DEFICIENCY ANEMIA TYPE: ICD-10-CM

## 2021-04-08 DIAGNOSIS — C83.00 LYMPHOMA, SMALL LYMPHOCYTIC: ICD-10-CM

## 2021-04-08 LAB
ALBUMIN SERPL BCP-MCNC: 3.6 G/DL (ref 3.5–5.2)
ALP SERPL-CCNC: 52 U/L (ref 55–135)
ALT SERPL W/O P-5'-P-CCNC: 17 U/L (ref 10–44)
ANION GAP SERPL CALC-SCNC: 12 MMOL/L (ref 8–16)
AST SERPL-CCNC: 17 U/L (ref 10–40)
BASOPHILS # BLD AUTO: 0.01 K/UL (ref 0–0.2)
BASOPHILS NFR BLD: 0.4 % (ref 0–1.9)
BILIRUB SERPL-MCNC: 1 MG/DL (ref 0.1–1)
BUN SERPL-MCNC: 14 MG/DL (ref 8–23)
CALCIUM SERPL-MCNC: 9.1 MG/DL (ref 8.7–10.5)
CEA SERPL-MCNC: 1.4 NG/ML (ref 0–5)
CHLORIDE SERPL-SCNC: 99 MMOL/L (ref 95–110)
CO2 SERPL-SCNC: 27 MMOL/L (ref 23–29)
CREAT SERPL-MCNC: 0.9 MG/DL (ref 0.5–1.4)
DIFFERENTIAL METHOD: ABNORMAL
EOSINOPHIL # BLD AUTO: 0.1 K/UL (ref 0–0.5)
EOSINOPHIL NFR BLD: 3.6 % (ref 0–8)
ERYTHROCYTE [DISTWIDTH] IN BLOOD BY AUTOMATED COUNT: 13.2 % (ref 11.5–14.5)
EST. GFR  (AFRICAN AMERICAN): >60 ML/MIN/1.73 M^2
EST. GFR  (NON AFRICAN AMERICAN): >60 ML/MIN/1.73 M^2
FERRITIN SERPL-MCNC: 95 NG/ML (ref 20–300)
GLUCOSE SERPL-MCNC: 143 MG/DL (ref 70–110)
HCT VFR BLD AUTO: 31.8 % (ref 40–54)
HGB BLD-MCNC: 11.3 G/DL (ref 14–18)
IMM GRANULOCYTES # BLD AUTO: 0.08 K/UL (ref 0–0.04)
IMM GRANULOCYTES NFR BLD AUTO: 2.9 % (ref 0–0.5)
IRON SERPL-MCNC: 84 UG/DL (ref 45–160)
LYMPHOCYTES # BLD AUTO: 0.5 K/UL (ref 1–4.8)
LYMPHOCYTES NFR BLD: 18.5 % (ref 18–48)
MCH RBC QN AUTO: 31.3 PG (ref 27–31)
MCHC RBC AUTO-ENTMCNC: 35.5 G/DL (ref 32–36)
MCV RBC AUTO: 88 FL (ref 82–98)
MONOCYTES # BLD AUTO: 0.4 K/UL (ref 0.3–1)
MONOCYTES NFR BLD: 16 % (ref 4–15)
NEUTROPHILS # BLD AUTO: 1.6 K/UL (ref 1.8–7.7)
NEUTROPHILS NFR BLD: 58.6 % (ref 38–73)
NRBC BLD-RTO: 0 /100 WBC
PLATELET # BLD AUTO: 121 K/UL (ref 150–450)
PMV BLD AUTO: 10.6 FL (ref 9.2–12.9)
POTASSIUM SERPL-SCNC: 4.1 MMOL/L (ref 3.5–5.1)
PROT SERPL-MCNC: 6.5 G/DL (ref 6–8.4)
RBC # BLD AUTO: 3.61 M/UL (ref 4.6–6.2)
SATURATED IRON: 26 % (ref 20–50)
SODIUM SERPL-SCNC: 138 MMOL/L (ref 136–145)
TOTAL IRON BINDING CAPACITY: 325 UG/DL (ref 250–450)
TRANSFERRIN SERPL-MCNC: 232 MG/DL (ref 200–375)
TSH SERPL DL<=0.005 MIU/L-ACNC: 0.44 UIU/ML (ref 0.34–5.6)
WBC # BLD AUTO: 2.75 K/UL (ref 3.9–12.7)

## 2021-04-08 PROCEDURE — 85025 COMPLETE CBC W/AUTO DIFF WBC: CPT | Performed by: INTERNAL MEDICINE

## 2021-04-08 PROCEDURE — 36415 COLL VENOUS BLD VENIPUNCTURE: CPT | Performed by: INTERNAL MEDICINE

## 2021-04-08 PROCEDURE — 83540 ASSAY OF IRON: CPT | Performed by: INTERNAL MEDICINE

## 2021-04-08 PROCEDURE — 84443 ASSAY THYROID STIM HORMONE: CPT | Performed by: INTERNAL MEDICINE

## 2021-04-08 PROCEDURE — 82378 CARCINOEMBRYONIC ANTIGEN: CPT | Performed by: INTERNAL MEDICINE

## 2021-04-08 PROCEDURE — 80053 COMPREHEN METABOLIC PANEL: CPT | Performed by: INTERNAL MEDICINE

## 2021-04-08 PROCEDURE — 82728 ASSAY OF FERRITIN: CPT | Performed by: INTERNAL MEDICINE

## 2021-04-20 ENCOUNTER — OFFICE VISIT (OUTPATIENT)
Dept: HEMATOLOGY/ONCOLOGY | Facility: CLINIC | Age: 81
End: 2021-04-20
Payer: MEDICARE

## 2021-04-20 VITALS
HEART RATE: 67 BPM | SYSTOLIC BLOOD PRESSURE: 141 MMHG | WEIGHT: 218 LBS | BODY MASS INDEX: 27.98 KG/M2 | RESPIRATION RATE: 20 BRPM | DIASTOLIC BLOOD PRESSURE: 65 MMHG | HEIGHT: 74 IN

## 2021-04-20 DIAGNOSIS — D50.9 IRON DEFICIENCY ANEMIA, UNSPECIFIED IRON DEFICIENCY ANEMIA TYPE: ICD-10-CM

## 2021-04-20 DIAGNOSIS — Z85.118 HISTORY OF LUNG CANCER: ICD-10-CM

## 2021-04-20 DIAGNOSIS — C91.10 CLL (CHRONIC LYMPHOCYTIC LEUKEMIA): Primary | ICD-10-CM

## 2021-04-20 PROCEDURE — 99214 OFFICE O/P EST MOD 30 MIN: CPT | Mod: S$GLB,,, | Performed by: INTERNAL MEDICINE

## 2021-04-20 PROCEDURE — 99214 PR OFFICE/OUTPT VISIT, EST, LEVL IV, 30-39 MIN: ICD-10-PCS | Mod: S$GLB,,, | Performed by: INTERNAL MEDICINE

## 2021-06-08 ENCOUNTER — TELEPHONE (OUTPATIENT)
Dept: HEMATOLOGY/ONCOLOGY | Facility: CLINIC | Age: 81
End: 2021-06-08

## 2021-06-08 ENCOUNTER — LAB VISIT (OUTPATIENT)
Dept: LAB | Facility: HOSPITAL | Age: 81
End: 2021-06-08
Attending: NURSE PRACTITIONER
Payer: MEDICARE

## 2021-06-08 DIAGNOSIS — R53.83 FATIGUE, UNSPECIFIED TYPE: ICD-10-CM

## 2021-06-08 DIAGNOSIS — D50.8 IRON DEFICIENCY ANEMIA DUE TO DIETARY CAUSES: ICD-10-CM

## 2021-06-08 DIAGNOSIS — C91.10 CLL (CHRONIC LYMPHOCYTIC LEUKEMIA): Primary | ICD-10-CM

## 2021-06-08 DIAGNOSIS — C91.10 CLL (CHRONIC LYMPHOCYTIC LEUKEMIA): ICD-10-CM

## 2021-06-08 LAB
ALBUMIN SERPL BCP-MCNC: 4.2 G/DL (ref 3.5–5.2)
ALP SERPL-CCNC: 69 U/L (ref 55–135)
ALT SERPL W/O P-5'-P-CCNC: 19 U/L (ref 10–44)
ANION GAP SERPL CALC-SCNC: 10 MMOL/L (ref 8–16)
AST SERPL-CCNC: 20 U/L (ref 10–40)
BASOPHILS # BLD AUTO: 0.01 K/UL (ref 0–0.2)
BASOPHILS NFR BLD: 0.4 % (ref 0–1.9)
BILIRUB SERPL-MCNC: 0.9 MG/DL (ref 0.1–1)
BUN SERPL-MCNC: 17 MG/DL (ref 8–23)
CALCIUM SERPL-MCNC: 9.1 MG/DL (ref 8.7–10.5)
CHLORIDE SERPL-SCNC: 100 MMOL/L (ref 95–110)
CO2 SERPL-SCNC: 28 MMOL/L (ref 23–29)
CREAT SERPL-MCNC: 1 MG/DL (ref 0.5–1.4)
DIFFERENTIAL METHOD: ABNORMAL
EOSINOPHIL # BLD AUTO: 0.1 K/UL (ref 0–0.5)
EOSINOPHIL NFR BLD: 4.7 % (ref 0–8)
ERYTHROCYTE [DISTWIDTH] IN BLOOD BY AUTOMATED COUNT: 14.4 % (ref 11.5–14.5)
EST. GFR  (AFRICAN AMERICAN): >60 ML/MIN/1.73 M^2
EST. GFR  (NON AFRICAN AMERICAN): >60 ML/MIN/1.73 M^2
FERRITIN SERPL-MCNC: 28 NG/ML (ref 20–300)
GLUCOSE SERPL-MCNC: 215 MG/DL (ref 70–110)
HCT VFR BLD AUTO: 37.2 % (ref 40–54)
HGB BLD-MCNC: 13.1 G/DL (ref 14–18)
IMM GRANULOCYTES # BLD AUTO: 0.03 K/UL (ref 0–0.04)
IMM GRANULOCYTES NFR BLD AUTO: 1.3 % (ref 0–0.5)
IRON SERPL-MCNC: 96 UG/DL (ref 45–160)
LYMPHOCYTES # BLD AUTO: 0.5 K/UL (ref 1–4.8)
LYMPHOCYTES NFR BLD: 20.5 % (ref 18–48)
MCH RBC QN AUTO: 29.8 PG (ref 27–31)
MCHC RBC AUTO-ENTMCNC: 35.2 G/DL (ref 32–36)
MCV RBC AUTO: 85 FL (ref 82–98)
MONOCYTES # BLD AUTO: 0.3 K/UL (ref 0.3–1)
MONOCYTES NFR BLD: 14.1 % (ref 4–15)
NEUTROPHILS # BLD AUTO: 1.4 K/UL (ref 1.8–7.7)
NEUTROPHILS NFR BLD: 59 % (ref 38–73)
NRBC BLD-RTO: 0 /100 WBC
PLATELET # BLD AUTO: 97 K/UL (ref 150–450)
PLATELET BLD QL SMEAR: ABNORMAL
PMV BLD AUTO: 10.5 FL (ref 9.2–12.9)
POTASSIUM SERPL-SCNC: 4.1 MMOL/L (ref 3.5–5.1)
PROT SERPL-MCNC: 7.1 G/DL (ref 6–8.4)
RBC # BLD AUTO: 4.39 M/UL (ref 4.6–6.2)
SATURATED IRON: 22 % (ref 20–50)
SODIUM SERPL-SCNC: 138 MMOL/L (ref 136–145)
TOTAL IRON BINDING CAPACITY: 445 UG/DL (ref 250–450)
TRANSFERRIN SERPL-MCNC: 318 MG/DL (ref 200–375)
TSH SERPL DL<=0.005 MIU/L-ACNC: 0.55 UIU/ML (ref 0.34–5.6)
WBC # BLD AUTO: 2.34 K/UL (ref 3.9–12.7)

## 2021-06-08 PROCEDURE — 84443 ASSAY THYROID STIM HORMONE: CPT | Performed by: NURSE PRACTITIONER

## 2021-06-08 PROCEDURE — 80053 COMPREHEN METABOLIC PANEL: CPT | Performed by: NURSE PRACTITIONER

## 2021-06-08 PROCEDURE — 82728 ASSAY OF FERRITIN: CPT | Performed by: NURSE PRACTITIONER

## 2021-06-08 PROCEDURE — 36415 COLL VENOUS BLD VENIPUNCTURE: CPT | Performed by: NURSE PRACTITIONER

## 2021-06-08 PROCEDURE — 83540 ASSAY OF IRON: CPT | Performed by: NURSE PRACTITIONER

## 2021-06-08 PROCEDURE — 85025 COMPLETE CBC W/AUTO DIFF WBC: CPT | Performed by: NURSE PRACTITIONER

## 2021-06-29 ENCOUNTER — HOSPITAL ENCOUNTER (INPATIENT)
Facility: HOSPITAL | Age: 81
LOS: 2 days | Discharge: HOME | DRG: 871 | End: 2021-07-01
Attending: EMERGENCY MEDICINE | Admitting: INTERNAL MEDICINE
Payer: MEDICARE

## 2021-06-29 DIAGNOSIS — D61.818 PANCYTOPENIA: ICD-10-CM

## 2021-06-29 DIAGNOSIS — D70.9 FEBRILE NEUTROPENIA: ICD-10-CM

## 2021-06-29 DIAGNOSIS — A41.9 SEVERE SEPSIS: ICD-10-CM

## 2021-06-29 DIAGNOSIS — C91.10 CLL (CHRONIC LYMPHOCYTIC LEUKEMIA): ICD-10-CM

## 2021-06-29 DIAGNOSIS — J18.9 PNEUMONIA OF LEFT LOWER LOBE DUE TO INFECTIOUS ORGANISM: ICD-10-CM

## 2021-06-29 DIAGNOSIS — R50.81 FEBRILE NEUTROPENIA: ICD-10-CM

## 2021-06-29 DIAGNOSIS — B00.2 HERPETIC GINGIVOSTOMATITIS: ICD-10-CM

## 2021-06-29 DIAGNOSIS — B00.9 HSV (HERPES SIMPLEX VIRUS) INFECTION: ICD-10-CM

## 2021-06-29 DIAGNOSIS — R41.0 DISORIENTATION: ICD-10-CM

## 2021-06-29 DIAGNOSIS — R65.20 SEVERE SEPSIS: ICD-10-CM

## 2021-06-29 DIAGNOSIS — A41.9 SEPSIS, DUE TO UNSPECIFIED ORGANISM, UNSPECIFIED WHETHER ACUTE ORGAN DYSFUNCTION PRESENT: Primary | ICD-10-CM

## 2021-06-29 DIAGNOSIS — R53.1 WEAKNESS: ICD-10-CM

## 2021-06-29 DIAGNOSIS — R07.9 CHEST PAIN: ICD-10-CM

## 2021-06-29 PROBLEM — G93.41 ENCEPHALOPATHY, METABOLIC: Status: ACTIVE | Noted: 2021-06-29

## 2021-06-29 LAB
ALBUMIN SERPL BCP-MCNC: 3.5 G/DL (ref 3.5–5.2)
ALP SERPL-CCNC: 52 U/L (ref 55–135)
ALT SERPL W/O P-5'-P-CCNC: 15 U/L (ref 10–44)
ANION GAP SERPL CALC-SCNC: 8 MMOL/L (ref 8–16)
AST SERPL-CCNC: 15 U/L (ref 10–40)
BACTERIA #/AREA URNS HPF: NEGATIVE /HPF
BASOPHILS # BLD AUTO: 0.01 K/UL (ref 0–0.2)
BASOPHILS NFR BLD: 0.4 % (ref 0–1.9)
BILIRUB SERPL-MCNC: 1.3 MG/DL (ref 0.1–1)
BILIRUB UR QL STRIP: NEGATIVE
BUN SERPL-MCNC: 11 MG/DL (ref 8–23)
CALCIUM SERPL-MCNC: 7.9 MG/DL (ref 8.7–10.5)
CHLORIDE SERPL-SCNC: 104 MMOL/L (ref 95–110)
CLARITY UR: CLEAR
CO2 SERPL-SCNC: 25 MMOL/L (ref 23–29)
COLOR UR: YELLOW
CREAT SERPL-MCNC: 0.8 MG/DL (ref 0.5–1.4)
DIFFERENTIAL METHOD: ABNORMAL
EOSINOPHIL # BLD AUTO: 0.2 K/UL (ref 0–0.5)
EOSINOPHIL NFR BLD: 7.7 % (ref 0–8)
ERYTHROCYTE [DISTWIDTH] IN BLOOD BY AUTOMATED COUNT: 15.1 % (ref 11.5–14.5)
EST. GFR  (AFRICAN AMERICAN): >60 ML/MIN/1.73 M^2
EST. GFR  (NON AFRICAN AMERICAN): >60 ML/MIN/1.73 M^2
GLUCOSE SERPL-MCNC: 173 MG/DL (ref 70–110)
GLUCOSE UR QL STRIP: ABNORMAL
HCT VFR BLD AUTO: 34.8 % (ref 40–54)
HGB BLD-MCNC: 12.2 G/DL (ref 14–18)
HGB UR QL STRIP: NEGATIVE
HYALINE CASTS #/AREA URNS LPF: 1 /LPF
IMM GRANULOCYTES # BLD AUTO: 0.04 K/UL (ref 0–0.04)
IMM GRANULOCYTES NFR BLD AUTO: 1.7 % (ref 0–0.5)
KETONES UR QL STRIP: NEGATIVE
LACTATE SERPL-SCNC: 1.8 MMOL/L (ref 0.5–1.9)
LEUKOCYTE ESTERASE UR QL STRIP: NEGATIVE
LIPASE SERPL-CCNC: 24 U/L (ref 4–60)
LYMPHOCYTES # BLD AUTO: 0.2 K/UL (ref 1–4.8)
LYMPHOCYTES NFR BLD: 6.4 % (ref 18–48)
MCH RBC QN AUTO: 29.9 PG (ref 27–31)
MCHC RBC AUTO-ENTMCNC: 35.1 G/DL (ref 32–36)
MCV RBC AUTO: 85 FL (ref 82–98)
MICROSCOPIC COMMENT: NORMAL
MONOCYTES # BLD AUTO: 0.3 K/UL (ref 0.3–1)
MONOCYTES NFR BLD: 14 % (ref 4–15)
NEUTROPHILS # BLD AUTO: 1.6 K/UL (ref 1.8–7.7)
NEUTROPHILS NFR BLD: 69.8 % (ref 38–73)
NITRITE UR QL STRIP: NEGATIVE
NRBC BLD-RTO: 0 /100 WBC
PH UR STRIP: 7 [PH] (ref 5–8)
PLATELET # BLD AUTO: 66 K/UL (ref 150–450)
PMV BLD AUTO: 11.1 FL (ref 9.2–12.9)
POTASSIUM SERPL-SCNC: 3.7 MMOL/L (ref 3.5–5.1)
PROT SERPL-MCNC: 6.1 G/DL (ref 6–8.4)
PROT UR QL STRIP: ABNORMAL
RBC # BLD AUTO: 4.08 M/UL (ref 4.6–6.2)
RBC #/AREA URNS HPF: 1 /HPF (ref 0–4)
SARS-COV-2 RDRP RESP QL NAA+PROBE: NEGATIVE
SODIUM SERPL-SCNC: 137 MMOL/L (ref 136–145)
SP GR UR STRIP: 1.02 (ref 1–1.03)
SQUAMOUS #/AREA URNS HPF: 1 /HPF
TROPONIN I SERPL DL<=0.01 NG/ML-MCNC: <0.03 NG/ML
URN SPEC COLLECT METH UR: ABNORMAL
UROBILINOGEN UR STRIP-ACNC: NEGATIVE EU/DL
WBC # BLD AUTO: 2.35 K/UL (ref 3.9–12.7)
WBC #/AREA URNS HPF: 0 /HPF (ref 0–5)
YEAST URNS QL MICRO: NORMAL

## 2021-06-29 PROCEDURE — U0002 COVID-19 LAB TEST NON-CDC: HCPCS | Performed by: INTERNAL MEDICINE

## 2021-06-29 PROCEDURE — 25000003 PHARM REV CODE 250: Performed by: INTERNAL MEDICINE

## 2021-06-29 PROCEDURE — 99223 PR INITIAL HOSPITAL CARE,LEVL III: ICD-10-PCS | Mod: ,,, | Performed by: INTERNAL MEDICINE

## 2021-06-29 PROCEDURE — 83605 ASSAY OF LACTIC ACID: CPT | Performed by: EMERGENCY MEDICINE

## 2021-06-29 PROCEDURE — 85025 COMPLETE CBC W/AUTO DIFF WBC: CPT | Performed by: EMERGENCY MEDICINE

## 2021-06-29 PROCEDURE — 63600175 PHARM REV CODE 636 W HCPCS: Performed by: INTERNAL MEDICINE

## 2021-06-29 PROCEDURE — 93005 ELECTROCARDIOGRAM TRACING: CPT | Performed by: INTERNAL MEDICINE

## 2021-06-29 PROCEDURE — 93010 ELECTROCARDIOGRAM REPORT: CPT | Mod: ,,, | Performed by: INTERNAL MEDICINE

## 2021-06-29 PROCEDURE — 96374 THER/PROPH/DIAG INJ IV PUSH: CPT | Mod: 59

## 2021-06-29 PROCEDURE — 80053 COMPREHEN METABOLIC PANEL: CPT | Performed by: EMERGENCY MEDICINE

## 2021-06-29 PROCEDURE — 36415 COLL VENOUS BLD VENIPUNCTURE: CPT | Performed by: EMERGENCY MEDICINE

## 2021-06-29 PROCEDURE — 25000003 PHARM REV CODE 250: Performed by: EMERGENCY MEDICINE

## 2021-06-29 PROCEDURE — 99223 1ST HOSP IP/OBS HIGH 75: CPT | Mod: ,,, | Performed by: INTERNAL MEDICINE

## 2021-06-29 PROCEDURE — 83690 ASSAY OF LIPASE: CPT | Performed by: INTERNAL MEDICINE

## 2021-06-29 PROCEDURE — 63600175 PHARM REV CODE 636 W HCPCS: Performed by: EMERGENCY MEDICINE

## 2021-06-29 PROCEDURE — 12000002 HC ACUTE/MED SURGE SEMI-PRIVATE ROOM

## 2021-06-29 PROCEDURE — 84484 ASSAY OF TROPONIN QUANT: CPT | Performed by: EMERGENCY MEDICINE

## 2021-06-29 PROCEDURE — 99291 CRITICAL CARE FIRST HOUR: CPT

## 2021-06-29 PROCEDURE — 87040 BLOOD CULTURE FOR BACTERIA: CPT | Mod: 59 | Performed by: EMERGENCY MEDICINE

## 2021-06-29 PROCEDURE — 93010 EKG 12-LEAD: ICD-10-PCS | Mod: ,,, | Performed by: INTERNAL MEDICINE

## 2021-06-29 PROCEDURE — 36415 COLL VENOUS BLD VENIPUNCTURE: CPT | Performed by: INTERNAL MEDICINE

## 2021-06-29 PROCEDURE — 96375 TX/PRO/DX INJ NEW DRUG ADDON: CPT

## 2021-06-29 PROCEDURE — 81001 URINALYSIS AUTO W/SCOPE: CPT | Performed by: EMERGENCY MEDICINE

## 2021-06-29 RX ORDER — IBUPROFEN 600 MG/1
600 TABLET ORAL EVERY 6 HOURS PRN
Status: ON HOLD | COMMUNITY
End: 2022-04-10

## 2021-06-29 RX ORDER — LOSARTAN POTASSIUM 25 MG/1
25 TABLET ORAL NIGHTLY
COMMUNITY
Start: 2021-01-24 | End: 2022-04-18

## 2021-06-29 RX ORDER — ALLOPURINOL 100 MG/1
100 TABLET ORAL DAILY
Status: DISCONTINUED | OUTPATIENT
Start: 2021-06-29 | End: 2021-07-01 | Stop reason: HOSPADM

## 2021-06-29 RX ORDER — METOPROLOL SUCCINATE 25 MG/1
25 TABLET, EXTENDED RELEASE ORAL 2 TIMES DAILY
Status: DISCONTINUED | OUTPATIENT
Start: 2021-06-29 | End: 2021-07-01 | Stop reason: HOSPADM

## 2021-06-29 RX ORDER — VANCOMYCIN HCL IN 5 % DEXTROSE 1G/250ML
1000 PLASTIC BAG, INJECTION (ML) INTRAVENOUS ONCE
Status: COMPLETED | OUTPATIENT
Start: 2021-06-29 | End: 2021-06-29

## 2021-06-29 RX ORDER — LOSARTAN POTASSIUM 25 MG/1
25 TABLET ORAL NIGHTLY
Status: DISCONTINUED | OUTPATIENT
Start: 2021-06-29 | End: 2021-07-01 | Stop reason: HOSPADM

## 2021-06-29 RX ORDER — SODIUM CHLORIDE, SODIUM LACTATE, POTASSIUM CHLORIDE, CALCIUM CHLORIDE 600; 310; 30; 20 MG/100ML; MG/100ML; MG/100ML; MG/100ML
INJECTION, SOLUTION INTRAVENOUS CONTINUOUS
Status: DISCONTINUED | OUTPATIENT
Start: 2021-06-29 | End: 2021-06-29

## 2021-06-29 RX ORDER — CEFEPIME HYDROCHLORIDE 1 G/50ML
1 INJECTION, SOLUTION INTRAVENOUS
Status: DISCONTINUED | OUTPATIENT
Start: 2021-06-29 | End: 2021-06-29

## 2021-06-29 RX ORDER — CELECOXIB 200 MG/1
200 CAPSULE ORAL 2 TIMES DAILY
Status: ON HOLD | COMMUNITY
Start: 2021-05-24 | End: 2022-04-10

## 2021-06-29 RX ORDER — VALACYCLOVIR HYDROCHLORIDE 500 MG/1
1000 TABLET, FILM COATED ORAL 2 TIMES DAILY
Status: DISCONTINUED | OUTPATIENT
Start: 2021-06-29 | End: 2021-06-29

## 2021-06-29 RX ORDER — LEVOFLOXACIN 5 MG/ML
750 INJECTION, SOLUTION INTRAVENOUS
Status: COMPLETED | OUTPATIENT
Start: 2021-06-29 | End: 2021-06-29

## 2021-06-29 RX ORDER — ATORVASTATIN CALCIUM 20 MG/1
20 TABLET, FILM COATED ORAL NIGHTLY
Status: DISCONTINUED | OUTPATIENT
Start: 2021-06-29 | End: 2021-07-01 | Stop reason: HOSPADM

## 2021-06-29 RX ORDER — ONDANSETRON 2 MG/ML
4 INJECTION INTRAMUSCULAR; INTRAVENOUS EVERY 8 HOURS PRN
Status: DISCONTINUED | OUTPATIENT
Start: 2021-06-29 | End: 2021-07-01 | Stop reason: HOSPADM

## 2021-06-29 RX ORDER — ONDANSETRON HYDROCHLORIDE 8 MG/1
TABLET, FILM COATED ORAL
COMMUNITY
Start: 2021-05-13 | End: 2021-06-29

## 2021-06-29 RX ORDER — ACETAMINOPHEN 500 MG
1000 TABLET ORAL
Status: COMPLETED | OUTPATIENT
Start: 2021-06-29 | End: 2021-06-29

## 2021-06-29 RX ORDER — ACETAMINOPHEN 325 MG/1
650 TABLET ORAL EVERY 6 HOURS PRN
Status: DISCONTINUED | OUTPATIENT
Start: 2021-06-29 | End: 2021-07-01 | Stop reason: HOSPADM

## 2021-06-29 RX ORDER — VANCOMYCIN HCL IN 5 % DEXTROSE 1G/250ML
1000 PLASTIC BAG, INJECTION (ML) INTRAVENOUS ONCE
Status: DISCONTINUED | OUTPATIENT
Start: 2021-06-29 | End: 2021-07-01 | Stop reason: HOSPADM

## 2021-06-29 RX ORDER — SODIUM CHLORIDE 9 MG/ML
INJECTION, SOLUTION INTRAVENOUS
Status: COMPLETED | OUTPATIENT
Start: 2021-06-29 | End: 2021-06-29

## 2021-06-29 RX ORDER — FERROUS SULFATE 325(65) MG
325 TABLET ORAL DAILY
Status: DISCONTINUED | OUTPATIENT
Start: 2021-06-29 | End: 2021-07-01 | Stop reason: HOSPADM

## 2021-06-29 RX ORDER — LEVALBUTEROL 1.25 MG/.5ML
3 SOLUTION, CONCENTRATE RESPIRATORY (INHALATION) EVERY 6 HOURS PRN
Status: DISCONTINUED | OUTPATIENT
Start: 2021-06-29 | End: 2021-07-01 | Stop reason: HOSPADM

## 2021-06-29 RX ADMIN — LOSARTAN POTASSIUM 25 MG: 25 TABLET, FILM COATED ORAL at 09:06

## 2021-06-29 RX ADMIN — ACETAMINOPHEN 650 MG: 325 TABLET ORAL at 05:06

## 2021-06-29 RX ADMIN — VANCOMYCIN HYDROCHLORIDE 1000 MG: 1 INJECTION, POWDER, LYOPHILIZED, FOR SOLUTION INTRAVENOUS at 11:06

## 2021-06-29 RX ADMIN — SODIUM CHLORIDE, SODIUM LACTATE, POTASSIUM CHLORIDE, AND CALCIUM CHLORIDE: .6; .31; .03; .02 INJECTION, SOLUTION INTRAVENOUS at 05:06

## 2021-06-29 RX ADMIN — FERROUS SULFATE TAB 325 MG (65 MG ELEMENTAL FE) 325 MG: 325 (65 FE) TAB at 05:06

## 2021-06-29 RX ADMIN — ATORVASTATIN CALCIUM 20 MG: 20 TABLET, FILM COATED ORAL at 09:06

## 2021-06-29 RX ADMIN — PIPERACILLIN AND TAZOBACTAM 4.5 G: 4; .5 INJECTION, POWDER, LYOPHILIZED, FOR SOLUTION INTRAVENOUS; PARENTERAL at 11:06

## 2021-06-29 RX ADMIN — LEVOFLOXACIN 750 MG: 750 INJECTION, SOLUTION INTRAVENOUS at 11:06

## 2021-06-29 RX ADMIN — PIPERACILLIN AND TAZOBACTAM 4.5 G: 4; .5 INJECTION, POWDER, LYOPHILIZED, FOR SOLUTION INTRAVENOUS; PARENTERAL at 09:06

## 2021-06-29 RX ADMIN — ACYCLOVIR SODIUM 410 MG: 500 INJECTION, SOLUTION INTRAVENOUS at 09:06

## 2021-06-29 RX ADMIN — ALLOPURINOL 100 MG: 100 TABLET ORAL at 05:06

## 2021-06-29 RX ADMIN — ACETAMINOPHEN 1000 MG: 500 TABLET, FILM COATED ORAL at 10:06

## 2021-06-29 RX ADMIN — ACETAMINOPHEN 650 MG: 325 TABLET ORAL at 11:06

## 2021-06-29 RX ADMIN — METOPROLOL SUCCINATE 25 MG: 25 TABLET, EXTENDED RELEASE ORAL at 09:06

## 2021-06-29 RX ADMIN — SODIUM CHLORIDE: 0.9 INJECTION, SOLUTION INTRAVENOUS at 11:06

## 2021-06-30 PROBLEM — R50.81 FEBRILE NEUTROPENIA: Status: ACTIVE | Noted: 2021-06-30

## 2021-06-30 PROBLEM — G93.41 ENCEPHALOPATHY, METABOLIC: Status: RESOLVED | Noted: 2021-06-29 | Resolved: 2021-06-30

## 2021-06-30 PROBLEM — D70.9 FEBRILE NEUTROPENIA: Status: ACTIVE | Noted: 2021-06-30

## 2021-06-30 LAB
ANION GAP SERPL CALC-SCNC: 12 MMOL/L (ref 8–16)
ANISOCYTOSIS BLD QL SMEAR: SLIGHT
BASOPHILS NFR BLD: 0 % (ref 0–1.9)
BUN SERPL-MCNC: 10 MG/DL (ref 8–23)
CALCIUM SERPL-MCNC: 9.1 MG/DL (ref 8.7–10.5)
CHLORIDE SERPL-SCNC: 97 MMOL/L (ref 95–110)
CO2 SERPL-SCNC: 28 MMOL/L (ref 23–29)
CREAT SERPL-MCNC: 0.9 MG/DL (ref 0.5–1.4)
DIFFERENTIAL METHOD: ABNORMAL
EOSINOPHIL NFR BLD: 2 % (ref 0–8)
ERYTHROCYTE [DISTWIDTH] IN BLOOD BY AUTOMATED COUNT: 15.4 % (ref 11.5–14.5)
EST. GFR  (AFRICAN AMERICAN): >60 ML/MIN/1.73 M^2
EST. GFR  (NON AFRICAN AMERICAN): >60 ML/MIN/1.73 M^2
GLUCOSE SERPL-MCNC: 184 MG/DL (ref 70–110)
HCT VFR BLD AUTO: 39.7 % (ref 40–54)
HGB BLD-MCNC: 13.7 G/DL (ref 14–18)
IMM GRANULOCYTES # BLD AUTO: ABNORMAL K/UL (ref 0–0.04)
IMM GRANULOCYTES NFR BLD AUTO: ABNORMAL % (ref 0–0.5)
LYMPHOCYTES NFR BLD: 16 % (ref 18–48)
MAGNESIUM SERPL-MCNC: 1.6 MG/DL (ref 1.6–2.6)
MCH RBC QN AUTO: 29.5 PG (ref 27–31)
MCHC RBC AUTO-ENTMCNC: 34.5 G/DL (ref 32–36)
MCV RBC AUTO: 85 FL (ref 82–98)
MONOCYTES NFR BLD: 10 % (ref 4–15)
NEUTROPHILS NFR BLD: 70 % (ref 38–73)
NEUTS BAND NFR BLD MANUAL: 2 %
NRBC BLD-RTO: 0 /100 WBC
PLATELET # BLD AUTO: 67 K/UL (ref 150–450)
PMV BLD AUTO: 11.3 FL (ref 9.2–12.9)
POTASSIUM SERPL-SCNC: 3.4 MMOL/L (ref 3.5–5.1)
POTASSIUM SERPL-SCNC: 3.9 MMOL/L (ref 3.5–5.1)
RBC # BLD AUTO: 4.65 M/UL (ref 4.6–6.2)
SODIUM SERPL-SCNC: 137 MMOL/L (ref 136–145)
WBC # BLD AUTO: 1.72 K/UL (ref 3.9–12.7)

## 2021-06-30 PROCEDURE — 63600175 PHARM REV CODE 636 W HCPCS: Performed by: INTERNAL MEDICINE

## 2021-06-30 PROCEDURE — 83735 ASSAY OF MAGNESIUM: CPT | Performed by: INTERNAL MEDICINE

## 2021-06-30 PROCEDURE — 99900035 HC TECH TIME PER 15 MIN (STAT)

## 2021-06-30 PROCEDURE — 25000003 PHARM REV CODE 250: Performed by: INTERNAL MEDICINE

## 2021-06-30 PROCEDURE — 12000002 HC ACUTE/MED SURGE SEMI-PRIVATE ROOM

## 2021-06-30 PROCEDURE — 84132 ASSAY OF SERUM POTASSIUM: CPT | Performed by: INTERNAL MEDICINE

## 2021-06-30 PROCEDURE — 99232 SBSQ HOSP IP/OBS MODERATE 35: CPT | Mod: ,,, | Performed by: INTERNAL MEDICINE

## 2021-06-30 PROCEDURE — 94760 N-INVAS EAR/PLS OXIMETRY 1: CPT

## 2021-06-30 PROCEDURE — 99232 PR SUBSEQUENT HOSPITAL CARE,LEVL II: ICD-10-PCS | Mod: ,,, | Performed by: INTERNAL MEDICINE

## 2021-06-30 PROCEDURE — 36415 COLL VENOUS BLD VENIPUNCTURE: CPT | Performed by: INTERNAL MEDICINE

## 2021-06-30 PROCEDURE — 80048 BASIC METABOLIC PNL TOTAL CA: CPT | Performed by: INTERNAL MEDICINE

## 2021-06-30 PROCEDURE — 99900031 HC PATIENT EDUCATION (STAT)

## 2021-06-30 PROCEDURE — 85007 BL SMEAR W/DIFF WBC COUNT: CPT | Performed by: INTERNAL MEDICINE

## 2021-06-30 PROCEDURE — 94761 N-INVAS EAR/PLS OXIMETRY MLT: CPT

## 2021-06-30 PROCEDURE — 85027 COMPLETE CBC AUTOMATED: CPT | Performed by: INTERNAL MEDICINE

## 2021-06-30 RX ORDER — POTASSIUM CHLORIDE 7.45 MG/ML
40 INJECTION INTRAVENOUS
Status: DISCONTINUED | OUTPATIENT
Start: 2021-06-30 | End: 2021-07-01 | Stop reason: HOSPADM

## 2021-06-30 RX ORDER — POTASSIUM CHLORIDE 20 MEQ/1
40 TABLET, EXTENDED RELEASE ORAL
Status: DISCONTINUED | OUTPATIENT
Start: 2021-06-30 | End: 2021-07-01 | Stop reason: HOSPADM

## 2021-06-30 RX ORDER — POTASSIUM CHLORIDE 7.45 MG/ML
20 INJECTION INTRAVENOUS
Status: DISCONTINUED | OUTPATIENT
Start: 2021-06-30 | End: 2021-07-01 | Stop reason: HOSPADM

## 2021-06-30 RX ORDER — HYDROCODONE BITARTRATE AND ACETAMINOPHEN 5; 325 MG/1; MG/1
1 TABLET ORAL EVERY 8 HOURS PRN
Status: DISCONTINUED | OUTPATIENT
Start: 2021-06-30 | End: 2021-07-01 | Stop reason: HOSPADM

## 2021-06-30 RX ORDER — LANOLIN ALCOHOL/MO/W.PET/CERES
800 CREAM (GRAM) TOPICAL
Status: DISCONTINUED | OUTPATIENT
Start: 2021-06-30 | End: 2021-07-01 | Stop reason: HOSPADM

## 2021-06-30 RX ORDER — MAGNESIUM SULFATE HEPTAHYDRATE 40 MG/ML
2 INJECTION, SOLUTION INTRAVENOUS
Status: DISCONTINUED | OUTPATIENT
Start: 2021-06-30 | End: 2021-07-01 | Stop reason: HOSPADM

## 2021-06-30 RX ORDER — POTASSIUM CHLORIDE 20 MEQ/1
20 TABLET, EXTENDED RELEASE ORAL
Status: DISCONTINUED | OUTPATIENT
Start: 2021-06-30 | End: 2021-07-01 | Stop reason: HOSPADM

## 2021-06-30 RX ORDER — MAGNESIUM SULFATE HEPTAHYDRATE 40 MG/ML
4 INJECTION, SOLUTION INTRAVENOUS
Status: DISCONTINUED | OUTPATIENT
Start: 2021-06-30 | End: 2021-07-01 | Stop reason: HOSPADM

## 2021-06-30 RX ORDER — MAGNESIUM SULFATE 1 G/100ML
1 INJECTION INTRAVENOUS
Status: DISCONTINUED | OUTPATIENT
Start: 2021-06-30 | End: 2021-07-01 | Stop reason: HOSPADM

## 2021-06-30 RX ORDER — TRAZODONE HYDROCHLORIDE 50 MG/1
50 TABLET ORAL NIGHTLY
Status: DISCONTINUED | OUTPATIENT
Start: 2021-06-30 | End: 2021-07-01 | Stop reason: HOSPADM

## 2021-06-30 RX ADMIN — MAGNESIUM OXIDE 800 MG: 400 TABLET ORAL at 01:06

## 2021-06-30 RX ADMIN — ONDANSETRON 4 MG: 2 INJECTION INTRAMUSCULAR; INTRAVENOUS at 04:06

## 2021-06-30 RX ADMIN — ALUMINUM HYDROXIDE, MAGNESIUM HYDROXIDE, AND DIMETHICONE 10 ML: 400; 400; 40 SUSPENSION ORAL at 01:06

## 2021-06-30 RX ADMIN — ACYCLOVIR SODIUM 410 MG: 500 INJECTION, SOLUTION INTRAVENOUS at 12:06

## 2021-06-30 RX ADMIN — ALUMINUM HYDROXIDE, MAGNESIUM HYDROXIDE, AND DIMETHICONE 10 ML: 400; 400; 40 SUSPENSION ORAL at 08:06

## 2021-06-30 RX ADMIN — PIPERACILLIN AND TAZOBACTAM 4.5 G: 4; .5 INJECTION, POWDER, LYOPHILIZED, FOR SOLUTION INTRAVENOUS; PARENTERAL at 01:06

## 2021-06-30 RX ADMIN — PIPERACILLIN AND TAZOBACTAM 4.5 G: 4; .5 INJECTION, POWDER, LYOPHILIZED, FOR SOLUTION INTRAVENOUS; PARENTERAL at 09:06

## 2021-06-30 RX ADMIN — MAGNESIUM OXIDE 800 MG: 400 TABLET ORAL at 11:06

## 2021-06-30 RX ADMIN — ALUMINUM HYDROXIDE, MAGNESIUM HYDROXIDE, AND DIMETHICONE 10 ML: 400; 400; 40 SUSPENSION ORAL at 11:06

## 2021-06-30 RX ADMIN — PIPERACILLIN AND TAZOBACTAM 4.5 G: 4; .5 INJECTION, POWDER, LYOPHILIZED, FOR SOLUTION INTRAVENOUS; PARENTERAL at 03:06

## 2021-06-30 RX ADMIN — ATORVASTATIN CALCIUM 20 MG: 20 TABLET, FILM COATED ORAL at 10:06

## 2021-06-30 RX ADMIN — TRAZODONE HYDROCHLORIDE 50 MG: 50 TABLET ORAL at 10:06

## 2021-06-30 RX ADMIN — ACETAMINOPHEN 650 MG: 325 TABLET ORAL at 05:06

## 2021-06-30 RX ADMIN — ACYCLOVIR SODIUM 410 MG: 500 INJECTION, SOLUTION INTRAVENOUS at 05:06

## 2021-06-30 RX ADMIN — ALLOPURINOL 100 MG: 100 TABLET ORAL at 08:06

## 2021-06-30 RX ADMIN — FERROUS SULFATE TAB 325 MG (65 MG ELEMENTAL FE) 325 MG: 325 (65 FE) TAB at 08:06

## 2021-06-30 RX ADMIN — METOPROLOL SUCCINATE 25 MG: 25 TABLET, EXTENDED RELEASE ORAL at 08:06

## 2021-06-30 RX ADMIN — HYDROCODONE BITARTRATE AND ACETAMINOPHEN 1 TABLET: 5; 325 TABLET ORAL at 11:06

## 2021-06-30 RX ADMIN — LOSARTAN POTASSIUM 25 MG: 25 TABLET, FILM COATED ORAL at 10:06

## 2021-06-30 RX ADMIN — METOPROLOL SUCCINATE 25 MG: 25 TABLET, EXTENDED RELEASE ORAL at 10:06

## 2021-06-30 RX ADMIN — ALUMINUM HYDROXIDE, MAGNESIUM HYDROXIDE, AND DIMETHICONE 10 ML: 400; 400; 40 SUSPENSION ORAL at 06:06

## 2021-06-30 RX ADMIN — ACYCLOVIR SODIUM 410 MG: 500 INJECTION, SOLUTION INTRAVENOUS at 10:06

## 2021-06-30 RX ADMIN — POTASSIUM CHLORIDE 40 MEQ: 20 TABLET, EXTENDED RELEASE ORAL at 11:06

## 2021-07-01 ENCOUNTER — TELEPHONE (OUTPATIENT)
Dept: HEMATOLOGY/ONCOLOGY | Facility: CLINIC | Age: 81
End: 2021-07-01

## 2021-07-01 VITALS
DIASTOLIC BLOOD PRESSURE: 92 MMHG | BODY MASS INDEX: 27.39 KG/M2 | WEIGHT: 213.38 LBS | HEIGHT: 74 IN | HEART RATE: 97 BPM | RESPIRATION RATE: 21 BRPM | TEMPERATURE: 98 F | SYSTOLIC BLOOD PRESSURE: 141 MMHG | OXYGEN SATURATION: 95 %

## 2021-07-01 PROBLEM — R65.20 SEVERE SEPSIS: Status: RESOLVED | Noted: 2021-06-29 | Resolved: 2021-07-01

## 2021-07-01 PROBLEM — A41.9 SEVERE SEPSIS: Status: RESOLVED | Noted: 2021-06-29 | Resolved: 2021-07-01

## 2021-07-01 PROBLEM — D70.9 FEBRILE NEUTROPENIA: Status: RESOLVED | Noted: 2021-06-30 | Resolved: 2021-07-01

## 2021-07-01 PROBLEM — R50.81 FEBRILE NEUTROPENIA: Status: RESOLVED | Noted: 2021-06-30 | Resolved: 2021-07-01

## 2021-07-01 PROBLEM — A41.9 SEPSIS: Status: RESOLVED | Noted: 2021-06-29 | Resolved: 2021-07-01

## 2021-07-01 PROBLEM — J18.9 LEFT LOWER LOBE PNEUMONIA: Status: RESOLVED | Noted: 2021-06-29 | Resolved: 2021-07-01

## 2021-07-01 LAB
ANION GAP SERPL CALC-SCNC: 8 MMOL/L (ref 8–16)
BASOPHILS # BLD AUTO: 0 K/UL (ref 0–0.2)
BASOPHILS NFR BLD: 0 % (ref 0–1.9)
BUN SERPL-MCNC: 12 MG/DL (ref 8–23)
CALCIUM SERPL-MCNC: 8.7 MG/DL (ref 8.7–10.5)
CHLORIDE SERPL-SCNC: 99 MMOL/L (ref 95–110)
CO2 SERPL-SCNC: 28 MMOL/L (ref 23–29)
CREAT SERPL-MCNC: 1 MG/DL (ref 0.5–1.4)
DIFFERENTIAL METHOD: ABNORMAL
EOSINOPHIL # BLD AUTO: 0.2 K/UL (ref 0–0.5)
EOSINOPHIL NFR BLD: 11.2 % (ref 0–8)
ERYTHROCYTE [DISTWIDTH] IN BLOOD BY AUTOMATED COUNT: 15 % (ref 11.5–14.5)
EST. GFR  (AFRICAN AMERICAN): >60 ML/MIN/1.73 M^2
EST. GFR  (NON AFRICAN AMERICAN): >60 ML/MIN/1.73 M^2
GLUCOSE SERPL-MCNC: 178 MG/DL (ref 70–110)
HCT VFR BLD AUTO: 36.6 % (ref 40–54)
HGB BLD-MCNC: 12.7 G/DL (ref 14–18)
IMM GRANULOCYTES # BLD AUTO: 0.04 K/UL (ref 0–0.04)
IMM GRANULOCYTES NFR BLD AUTO: 1.9 % (ref 0–0.5)
LYMPHOCYTES # BLD AUTO: 0.4 K/UL (ref 1–4.8)
LYMPHOCYTES NFR BLD: 20.5 % (ref 18–48)
MAGNESIUM SERPL-MCNC: 1.9 MG/DL (ref 1.6–2.6)
MCH RBC QN AUTO: 29.5 PG (ref 27–31)
MCHC RBC AUTO-ENTMCNC: 34.7 G/DL (ref 32–36)
MCV RBC AUTO: 85 FL (ref 82–98)
MONOCYTES # BLD AUTO: 0.6 K/UL (ref 0.3–1)
MONOCYTES NFR BLD: 28.8 % (ref 4–15)
NEUTROPHILS # BLD AUTO: 0.8 K/UL (ref 1.8–7.7)
NEUTROPHILS NFR BLD: 37.6 % (ref 38–73)
NRBC BLD-RTO: 0 /100 WBC
PLATELET # BLD AUTO: 66 K/UL (ref 150–450)
PMV BLD AUTO: 10.8 FL (ref 9.2–12.9)
POTASSIUM SERPL-SCNC: 3.8 MMOL/L (ref 3.5–5.1)
RBC # BLD AUTO: 4.31 M/UL (ref 4.6–6.2)
SODIUM SERPL-SCNC: 135 MMOL/L (ref 136–145)
WBC # BLD AUTO: 2.1 K/UL (ref 3.9–12.7)

## 2021-07-01 PROCEDURE — 25000003 PHARM REV CODE 250: Performed by: INTERNAL MEDICINE

## 2021-07-01 PROCEDURE — 85025 COMPLETE CBC W/AUTO DIFF WBC: CPT | Performed by: INTERNAL MEDICINE

## 2021-07-01 PROCEDURE — 87070 CULTURE OTHR SPECIMN AEROBIC: CPT | Performed by: INTERNAL MEDICINE

## 2021-07-01 PROCEDURE — 99900031 HC PATIENT EDUCATION (STAT)

## 2021-07-01 PROCEDURE — 87205 SMEAR GRAM STAIN: CPT | Performed by: INTERNAL MEDICINE

## 2021-07-01 PROCEDURE — 27000221 HC OXYGEN, UP TO 24 HOURS

## 2021-07-01 PROCEDURE — 99232 SBSQ HOSP IP/OBS MODERATE 35: CPT | Mod: ,,, | Performed by: INTERNAL MEDICINE

## 2021-07-01 PROCEDURE — 82784 ASSAY IGA/IGD/IGG/IGM EACH: CPT | Performed by: INTERNAL MEDICINE

## 2021-07-01 PROCEDURE — 63600175 PHARM REV CODE 636 W HCPCS: Performed by: INTERNAL MEDICINE

## 2021-07-01 PROCEDURE — 94761 N-INVAS EAR/PLS OXIMETRY MLT: CPT

## 2021-07-01 PROCEDURE — 99232 PR SUBSEQUENT HOSPITAL CARE,LEVL II: ICD-10-PCS | Mod: ,,, | Performed by: INTERNAL MEDICINE

## 2021-07-01 PROCEDURE — 99900035 HC TECH TIME PER 15 MIN (STAT)

## 2021-07-01 PROCEDURE — 80048 BASIC METABOLIC PNL TOTAL CA: CPT | Performed by: INTERNAL MEDICINE

## 2021-07-01 PROCEDURE — 82784 ASSAY IGA/IGD/IGG/IGM EACH: CPT | Mod: 91 | Performed by: INTERNAL MEDICINE

## 2021-07-01 PROCEDURE — 83735 ASSAY OF MAGNESIUM: CPT | Performed by: INTERNAL MEDICINE

## 2021-07-01 RX ORDER — VALACYCLOVIR HYDROCHLORIDE 1 G/1
TABLET, FILM COATED ORAL
Qty: 374 TABLET | Refills: 0 | Status: SHIPPED | OUTPATIENT
Start: 2021-07-01 | End: 2022-04-18

## 2021-07-01 RX ORDER — AMOXICILLIN AND CLAVULANATE POTASSIUM 500; 125 MG/1; MG/1
1 TABLET, FILM COATED ORAL 2 TIMES DAILY
Qty: 14 TABLET | Refills: 0 | Status: SHIPPED | OUTPATIENT
Start: 2021-07-01 | End: 2021-07-08

## 2021-07-01 RX ADMIN — ACYCLOVIR SODIUM 410 MG: 500 INJECTION, SOLUTION INTRAVENOUS at 05:07

## 2021-07-01 RX ADMIN — ALLOPURINOL 100 MG: 100 TABLET ORAL at 08:07

## 2021-07-01 RX ADMIN — PIPERACILLIN AND TAZOBACTAM 4.5 G: 4; .5 INJECTION, POWDER, LYOPHILIZED, FOR SOLUTION INTRAVENOUS; PARENTERAL at 11:07

## 2021-07-01 RX ADMIN — ACYCLOVIR SODIUM 410 MG: 500 INJECTION, SOLUTION INTRAVENOUS at 03:07

## 2021-07-01 RX ADMIN — ALUMINUM HYDROXIDE, MAGNESIUM HYDROXIDE, AND DIMETHICONE 10 ML: 400; 400; 40 SUSPENSION ORAL at 03:07

## 2021-07-01 RX ADMIN — PIPERACILLIN AND TAZOBACTAM 4.5 G: 4; .5 INJECTION, POWDER, LYOPHILIZED, FOR SOLUTION INTRAVENOUS; PARENTERAL at 03:07

## 2021-07-01 RX ADMIN — ALUMINUM HYDROXIDE, MAGNESIUM HYDROXIDE, AND DIMETHICONE 10 ML: 400; 400; 40 SUSPENSION ORAL at 01:07

## 2021-07-01 RX ADMIN — ALUMINUM HYDROXIDE, MAGNESIUM HYDROXIDE, AND DIMETHICONE 10 ML: 400; 400; 40 SUSPENSION ORAL at 08:07

## 2021-07-01 RX ADMIN — ACETAMINOPHEN 650 MG: 325 TABLET ORAL at 09:07

## 2021-07-01 RX ADMIN — FERROUS SULFATE TAB 325 MG (65 MG ELEMENTAL FE) 325 MG: 325 (65 FE) TAB at 08:07

## 2021-07-01 RX ADMIN — HYDROCODONE BITARTRATE AND ACETAMINOPHEN 1 TABLET: 5; 325 TABLET ORAL at 01:07

## 2021-07-01 RX ADMIN — ONDANSETRON 4 MG: 2 INJECTION INTRAMUSCULAR; INTRAVENOUS at 05:07

## 2021-07-01 RX ADMIN — ALUMINUM HYDROXIDE, MAGNESIUM HYDROXIDE, AND DIMETHICONE 10 ML: 400; 400; 40 SUSPENSION ORAL at 11:07

## 2021-07-01 RX ADMIN — METOPROLOL SUCCINATE 25 MG: 25 TABLET, EXTENDED RELEASE ORAL at 08:07

## 2021-07-02 LAB — IGA SERPL-MCNC: 134 MG/DL (ref 61–437)

## 2021-07-03 LAB
BACTERIA SPEC AEROBE CULT: NORMAL
GRAM STN SPEC: NORMAL

## 2021-07-04 LAB
BACTERIA BLD CULT: NORMAL
BACTERIA BLD CULT: NORMAL
IGG SERPL-MCNC: 817 MG/DL (ref 603–1613)
IGG SERPL-MCNC: 817 MG/DL (ref 603–1613)
IGG1 SER-MCNC: 513 MG/DL (ref 248–810)
IGG2 SER-MCNC: 181 MG/DL (ref 130–555)
IGG3 SER-MCNC: 57 MG/DL (ref 15–102)
IGG4 SER-MCNC: 8 MG/DL (ref 2–96)

## 2021-07-19 ENCOUNTER — HOSPITAL ENCOUNTER (OUTPATIENT)
Dept: RADIOLOGY | Facility: HOSPITAL | Age: 81
Discharge: HOME | End: 2021-07-19
Attending: INTERNAL MEDICINE
Payer: MEDICARE

## 2021-07-19 VITALS — WEIGHT: 215 LBS | HEIGHT: 74 IN | BODY MASS INDEX: 27.59 KG/M2

## 2021-07-19 DIAGNOSIS — Z85.118 HISTORY OF LUNG CANCER: ICD-10-CM

## 2021-07-19 DIAGNOSIS — C91.10 CLL (CHRONIC LYMPHOCYTIC LEUKEMIA): ICD-10-CM

## 2021-07-19 LAB — GLUCOSE SERPL-MCNC: 141 MG/DL (ref 70–110)

## 2021-07-19 PROCEDURE — 82962 GLUCOSE BLOOD TEST: CPT | Mod: PO

## 2021-07-19 PROCEDURE — 78815 PET IMAGE W/CT SKULL-THIGH: CPT | Mod: PS,TC,PO

## 2021-07-20 ENCOUNTER — OFFICE VISIT (OUTPATIENT)
Dept: HEMATOLOGY/ONCOLOGY | Facility: CLINIC | Age: 81
End: 2021-07-20
Payer: MEDICARE

## 2021-07-20 ENCOUNTER — LAB VISIT (OUTPATIENT)
Dept: LAB | Facility: HOSPITAL | Age: 81
End: 2021-07-20
Attending: INTERNAL MEDICINE
Payer: MEDICARE

## 2021-07-20 VITALS
HEIGHT: 74 IN | BODY MASS INDEX: 27.59 KG/M2 | DIASTOLIC BLOOD PRESSURE: 70 MMHG | SYSTOLIC BLOOD PRESSURE: 146 MMHG | HEART RATE: 63 BPM | RESPIRATION RATE: 18 BRPM | WEIGHT: 215 LBS

## 2021-07-20 DIAGNOSIS — D50.9 IRON DEFICIENCY ANEMIA, UNSPECIFIED IRON DEFICIENCY ANEMIA TYPE: ICD-10-CM

## 2021-07-20 DIAGNOSIS — Z85.118 HISTORY OF LUNG CANCER: ICD-10-CM

## 2021-07-20 DIAGNOSIS — C91.10 CLL (CHRONIC LYMPHOCYTIC LEUKEMIA): Primary | ICD-10-CM

## 2021-07-20 DIAGNOSIS — C91.10 CLL (CHRONIC LYMPHOCYTIC LEUKEMIA): ICD-10-CM

## 2021-07-20 DIAGNOSIS — D61.818 PANCYTOPENIA: ICD-10-CM

## 2021-07-20 DIAGNOSIS — R53.83 OTHER FATIGUE: ICD-10-CM

## 2021-07-20 DIAGNOSIS — C83.00 LYMPHOMA, SMALL LYMPHOCYTIC: ICD-10-CM

## 2021-07-20 LAB
ALBUMIN SERPL BCP-MCNC: 3.9 G/DL (ref 3.5–5.2)
ALP SERPL-CCNC: 53 U/L (ref 55–135)
ALT SERPL W/O P-5'-P-CCNC: 17 U/L (ref 10–44)
ANION GAP SERPL CALC-SCNC: 11 MMOL/L (ref 8–16)
AST SERPL-CCNC: 17 U/L (ref 10–40)
BASOPHILS # BLD AUTO: 0.01 K/UL (ref 0–0.2)
BASOPHILS NFR BLD: 0.5 % (ref 0–1.9)
BILIRUB SERPL-MCNC: 1.1 MG/DL (ref 0.1–1)
BUN SERPL-MCNC: 15 MG/DL (ref 8–23)
CALCIUM SERPL-MCNC: 9.1 MG/DL (ref 8.7–10.5)
CEA SERPL-MCNC: 1.2 NG/ML (ref 0–5)
CHLORIDE SERPL-SCNC: 99 MMOL/L (ref 95–110)
CO2 SERPL-SCNC: 26 MMOL/L (ref 23–29)
CREAT SERPL-MCNC: 1 MG/DL (ref 0.5–1.4)
DIFFERENTIAL METHOD: ABNORMAL
EOSINOPHIL # BLD AUTO: 0 K/UL (ref 0–0.5)
EOSINOPHIL NFR BLD: 1.5 % (ref 0–8)
ERYTHROCYTE [DISTWIDTH] IN BLOOD BY AUTOMATED COUNT: 15.7 % (ref 11.5–14.5)
EST. GFR  (AFRICAN AMERICAN): >60 ML/MIN/1.73 M^2
EST. GFR  (NON AFRICAN AMERICAN): >60 ML/MIN/1.73 M^2
FERRITIN SERPL-MCNC: 52 NG/ML (ref 20–300)
GLUCOSE SERPL-MCNC: 220 MG/DL (ref 70–110)
HCT VFR BLD AUTO: 35.9 % (ref 40–54)
HGB BLD-MCNC: 12.5 G/DL (ref 14–18)
IMM GRANULOCYTES # BLD AUTO: 0.04 K/UL (ref 0–0.04)
IMM GRANULOCYTES NFR BLD AUTO: 2 % (ref 0–0.5)
IRON SERPL-MCNC: 87 UG/DL (ref 45–160)
LYMPHOCYTES # BLD AUTO: 0.7 K/UL (ref 1–4.8)
LYMPHOCYTES NFR BLD: 35 % (ref 18–48)
MCH RBC QN AUTO: 30 PG (ref 27–31)
MCHC RBC AUTO-ENTMCNC: 34.8 G/DL (ref 32–36)
MCV RBC AUTO: 86 FL (ref 82–98)
MONOCYTES # BLD AUTO: 0.4 K/UL (ref 0.3–1)
MONOCYTES NFR BLD: 18.2 % (ref 4–15)
NEUTROPHILS # BLD AUTO: 0.9 K/UL (ref 1.8–7.7)
NEUTROPHILS NFR BLD: 42.8 % (ref 38–73)
NRBC BLD-RTO: 0 /100 WBC
PLATELET # BLD AUTO: 86 K/UL (ref 150–450)
PMV BLD AUTO: 11.3 FL (ref 9.2–12.9)
POTASSIUM SERPL-SCNC: 3.7 MMOL/L (ref 3.5–5.1)
PROT SERPL-MCNC: 6.7 G/DL (ref 6–8.4)
RBC # BLD AUTO: 4.17 M/UL (ref 4.6–6.2)
SATURATED IRON: 23 % (ref 20–50)
SODIUM SERPL-SCNC: 136 MMOL/L (ref 136–145)
TOTAL IRON BINDING CAPACITY: 377 UG/DL (ref 250–450)
TRANSFERRIN SERPL-MCNC: 269 MG/DL (ref 200–375)
TSH SERPL DL<=0.005 MIU/L-ACNC: 1.05 UIU/ML (ref 0.34–5.6)
WBC # BLD AUTO: 2.03 K/UL (ref 3.9–12.7)

## 2021-07-20 PROCEDURE — 85025 COMPLETE CBC W/AUTO DIFF WBC: CPT | Performed by: INTERNAL MEDICINE

## 2021-07-20 PROCEDURE — 99214 PR OFFICE/OUTPT VISIT, EST, LEVL IV, 30-39 MIN: ICD-10-PCS | Mod: S$GLB,,, | Performed by: INTERNAL MEDICINE

## 2021-07-20 PROCEDURE — 82728 ASSAY OF FERRITIN: CPT | Performed by: INTERNAL MEDICINE

## 2021-07-20 PROCEDURE — 84443 ASSAY THYROID STIM HORMONE: CPT | Performed by: INTERNAL MEDICINE

## 2021-07-20 PROCEDURE — 36415 COLL VENOUS BLD VENIPUNCTURE: CPT | Performed by: INTERNAL MEDICINE

## 2021-07-20 PROCEDURE — 80053 COMPREHEN METABOLIC PANEL: CPT | Performed by: INTERNAL MEDICINE

## 2021-07-20 PROCEDURE — 99214 OFFICE O/P EST MOD 30 MIN: CPT | Mod: S$GLB,,, | Performed by: INTERNAL MEDICINE

## 2021-07-20 PROCEDURE — 82378 CARCINOEMBRYONIC ANTIGEN: CPT | Performed by: INTERNAL MEDICINE

## 2021-07-20 PROCEDURE — 83540 ASSAY OF IRON: CPT | Performed by: INTERNAL MEDICINE

## 2021-08-27 ENCOUNTER — HOSPITAL ENCOUNTER (OUTPATIENT)
Dept: RADIOLOGY | Facility: HOSPITAL | Age: 81
Discharge: HOME | End: 2021-08-27
Attending: NURSE PRACTITIONER
Payer: MEDICARE

## 2021-08-27 ENCOUNTER — OFFICE VISIT (OUTPATIENT)
Dept: HEMATOLOGY/ONCOLOGY | Facility: CLINIC | Age: 81
End: 2021-08-27
Payer: MEDICARE

## 2021-08-27 ENCOUNTER — TELEPHONE (OUTPATIENT)
Dept: HEMATOLOGY/ONCOLOGY | Facility: CLINIC | Age: 81
End: 2021-08-27

## 2021-08-27 VITALS
SYSTOLIC BLOOD PRESSURE: 156 MMHG | RESPIRATION RATE: 18 BRPM | WEIGHT: 219.13 LBS | TEMPERATURE: 98 F | DIASTOLIC BLOOD PRESSURE: 84 MMHG | HEART RATE: 80 BPM | BODY MASS INDEX: 28.13 KG/M2

## 2021-08-27 DIAGNOSIS — C91.10 CLL (CHRONIC LYMPHOCYTIC LEUKEMIA): Primary | ICD-10-CM

## 2021-08-27 DIAGNOSIS — G44.201 ACUTE INTRACTABLE TENSION-TYPE HEADACHE: ICD-10-CM

## 2021-08-27 DIAGNOSIS — R51.9 ACUTE NONINTRACTABLE HEADACHE, UNSPECIFIED HEADACHE TYPE: ICD-10-CM

## 2021-08-27 DIAGNOSIS — G51.0 BELL'S PALSY: ICD-10-CM

## 2021-08-27 PROCEDURE — 70450 CT HEAD/BRAIN W/O DYE: CPT | Mod: TC

## 2021-08-27 PROCEDURE — 99214 PR OFFICE/OUTPT VISIT, EST, LEVL IV, 30-39 MIN: ICD-10-PCS | Mod: S$GLB,,, | Performed by: NURSE PRACTITIONER

## 2021-08-27 PROCEDURE — 99214 OFFICE O/P EST MOD 30 MIN: CPT | Mod: S$GLB,,, | Performed by: NURSE PRACTITIONER

## 2021-08-27 RX ORDER — BUTALBITAL, ACETAMINOPHEN AND CAFFEINE 50; 325; 40 MG/1; MG/1; MG/1
1 TABLET ORAL EVERY 6 HOURS PRN
Qty: 30 TABLET | Refills: 0 | Status: SHIPPED | OUTPATIENT
Start: 2021-08-27 | End: 2021-09-26

## 2021-08-27 RX ORDER — VALACYCLOVIR HYDROCHLORIDE 1 G/1
1000 TABLET, FILM COATED ORAL 3 TIMES DAILY
Qty: 15 TABLET | Refills: 0 | Status: SHIPPED | OUTPATIENT
Start: 2021-08-27 | End: 2021-09-01

## 2021-08-27 RX ORDER — PREDNISONE 10 MG/1
TABLET ORAL
Qty: 50 TABLET | Refills: 0 | Status: ON HOLD | OUTPATIENT
Start: 2021-08-27 | End: 2022-04-10

## 2021-09-02 ENCOUNTER — TELEPHONE (OUTPATIENT)
Dept: HEMATOLOGY/ONCOLOGY | Facility: CLINIC | Age: 81
End: 2021-09-02

## 2021-09-03 ENCOUNTER — TELEPHONE (OUTPATIENT)
Dept: HEMATOLOGY/ONCOLOGY | Facility: CLINIC | Age: 81
End: 2021-09-03

## 2021-09-15 ENCOUNTER — IMMUNIZATION (OUTPATIENT)
Dept: PRIMARY CARE CLINIC | Facility: CLINIC | Age: 81
End: 2021-09-15
Payer: MEDICARE

## 2021-09-15 DIAGNOSIS — Z23 NEED FOR VACCINATION: Primary | ICD-10-CM

## 2021-09-15 PROCEDURE — 0003A COVID-19, MRNA, LNP-S, PF, 30 MCG/0.3 ML DOSE VACCINE: CPT | Mod: CV19,S$GLB,, | Performed by: FAMILY MEDICINE

## 2021-09-15 PROCEDURE — 91300 COVID-19, MRNA, LNP-S, PF, 30 MCG/0.3 ML DOSE VACCINE: CPT | Mod: S$GLB,,, | Performed by: FAMILY MEDICINE

## 2021-09-15 PROCEDURE — 91300 COVID-19, MRNA, LNP-S, PF, 30 MCG/0.3 ML DOSE VACCINE: ICD-10-PCS | Mod: S$GLB,,, | Performed by: FAMILY MEDICINE

## 2021-09-15 PROCEDURE — 0003A COVID-19, MRNA, LNP-S, PF, 30 MCG/0.3 ML DOSE VACCINE: ICD-10-PCS | Mod: CV19,S$GLB,, | Performed by: FAMILY MEDICINE

## 2021-09-20 ENCOUNTER — LAB VISIT (OUTPATIENT)
Dept: LAB | Facility: HOSPITAL | Age: 81
End: 2021-09-20
Attending: INTERNAL MEDICINE
Payer: MEDICARE

## 2021-09-20 DIAGNOSIS — R53.83 OTHER FATIGUE: ICD-10-CM

## 2021-09-20 DIAGNOSIS — C83.00 LYMPHOMA, SMALL LYMPHOCYTIC: ICD-10-CM

## 2021-09-20 DIAGNOSIS — D50.9 IRON DEFICIENCY ANEMIA, UNSPECIFIED IRON DEFICIENCY ANEMIA TYPE: ICD-10-CM

## 2021-09-20 DIAGNOSIS — C91.10 CLL (CHRONIC LYMPHOCYTIC LEUKEMIA): ICD-10-CM

## 2021-09-20 DIAGNOSIS — Z85.118 HISTORY OF LUNG CANCER: ICD-10-CM

## 2021-09-20 LAB
ALBUMIN SERPL BCP-MCNC: 3.7 G/DL (ref 3.5–5.2)
ALP SERPL-CCNC: 53 U/L (ref 55–135)
ALT SERPL W/O P-5'-P-CCNC: 18 U/L (ref 10–44)
ANION GAP SERPL CALC-SCNC: 12 MMOL/L (ref 8–16)
AST SERPL-CCNC: 19 U/L (ref 10–40)
BASOPHILS # BLD AUTO: 0.01 K/UL (ref 0–0.2)
BASOPHILS NFR BLD: 0.5 % (ref 0–1.9)
BILIRUB SERPL-MCNC: 0.7 MG/DL (ref 0.1–1)
BUN SERPL-MCNC: 16 MG/DL (ref 8–23)
CALCIUM SERPL-MCNC: 8.8 MG/DL (ref 8.7–10.5)
CHLORIDE SERPL-SCNC: 98 MMOL/L (ref 95–110)
CO2 SERPL-SCNC: 26 MMOL/L (ref 23–29)
CREAT SERPL-MCNC: 1 MG/DL (ref 0.5–1.4)
DIFFERENTIAL METHOD: ABNORMAL
EOSINOPHIL # BLD AUTO: 0 K/UL (ref 0–0.5)
EOSINOPHIL NFR BLD: 0.9 % (ref 0–8)
ERYTHROCYTE [DISTWIDTH] IN BLOOD BY AUTOMATED COUNT: 14.6 % (ref 11.5–14.5)
EST. GFR  (AFRICAN AMERICAN): >60 ML/MIN/1.73 M^2
EST. GFR  (NON AFRICAN AMERICAN): >60 ML/MIN/1.73 M^2
FERRITIN SERPL-MCNC: 72 NG/ML (ref 20–300)
GLUCOSE SERPL-MCNC: 205 MG/DL (ref 70–110)
HCT VFR BLD AUTO: 33.4 % (ref 40–54)
HGB BLD-MCNC: 11.7 G/DL (ref 14–18)
IMM GRANULOCYTES # BLD AUTO: 0.07 K/UL (ref 0–0.04)
IMM GRANULOCYTES NFR BLD AUTO: 3.2 % (ref 0–0.5)
IRON SERPL-MCNC: 39 UG/DL (ref 45–160)
LYMPHOCYTES # BLD AUTO: 0.6 K/UL (ref 1–4.8)
LYMPHOCYTES NFR BLD: 26 % (ref 18–48)
MCH RBC QN AUTO: 30.6 PG (ref 27–31)
MCHC RBC AUTO-ENTMCNC: 35 G/DL (ref 32–36)
MCV RBC AUTO: 87 FL (ref 82–98)
MONOCYTES # BLD AUTO: 0.3 K/UL (ref 0.3–1)
MONOCYTES NFR BLD: 14.6 % (ref 4–15)
NEUTROPHILS # BLD AUTO: 1.2 K/UL (ref 1.8–7.7)
NEUTROPHILS NFR BLD: 54.8 % (ref 38–73)
NRBC BLD-RTO: 0 /100 WBC
PLATELET # BLD AUTO: 73 K/UL (ref 150–450)
PLATELET BLD QL SMEAR: ABNORMAL
PMV BLD AUTO: 11.2 FL (ref 9.2–12.9)
POTASSIUM SERPL-SCNC: 4 MMOL/L (ref 3.5–5.1)
PROT SERPL-MCNC: 7 G/DL (ref 6–8.4)
RBC # BLD AUTO: 3.82 M/UL (ref 4.6–6.2)
SATURATED IRON: 12 % (ref 20–50)
SODIUM SERPL-SCNC: 136 MMOL/L (ref 136–145)
TOTAL IRON BINDING CAPACITY: 332 UG/DL (ref 250–450)
TRANSFERRIN SERPL-MCNC: 237 MG/DL (ref 200–375)
TSH SERPL DL<=0.005 MIU/L-ACNC: 0.91 UIU/ML (ref 0.34–5.6)
WBC # BLD AUTO: 2.19 K/UL (ref 3.9–12.7)

## 2021-09-20 PROCEDURE — 84466 ASSAY OF TRANSFERRIN: CPT | Performed by: INTERNAL MEDICINE

## 2021-09-20 PROCEDURE — 80053 COMPREHEN METABOLIC PANEL: CPT | Performed by: INTERNAL MEDICINE

## 2021-09-20 PROCEDURE — 85025 COMPLETE CBC W/AUTO DIFF WBC: CPT | Performed by: INTERNAL MEDICINE

## 2021-09-20 PROCEDURE — 36415 COLL VENOUS BLD VENIPUNCTURE: CPT | Performed by: INTERNAL MEDICINE

## 2021-09-20 PROCEDURE — 82728 ASSAY OF FERRITIN: CPT | Performed by: INTERNAL MEDICINE

## 2021-09-20 PROCEDURE — 84443 ASSAY THYROID STIM HORMONE: CPT | Performed by: INTERNAL MEDICINE

## 2021-09-21 ENCOUNTER — OFFICE VISIT (OUTPATIENT)
Dept: HEMATOLOGY/ONCOLOGY | Facility: CLINIC | Age: 81
End: 2021-09-21
Payer: MEDICARE

## 2021-09-21 VITALS
WEIGHT: 220 LBS | HEART RATE: 78 BPM | SYSTOLIC BLOOD PRESSURE: 167 MMHG | RESPIRATION RATE: 20 BRPM | HEIGHT: 74 IN | BODY MASS INDEX: 28.23 KG/M2 | DIASTOLIC BLOOD PRESSURE: 72 MMHG

## 2021-09-21 DIAGNOSIS — C91.10 CLL (CHRONIC LYMPHOCYTIC LEUKEMIA): Primary | ICD-10-CM

## 2021-09-21 DIAGNOSIS — D50.9 IRON DEFICIENCY ANEMIA, UNSPECIFIED IRON DEFICIENCY ANEMIA TYPE: ICD-10-CM

## 2021-09-21 DIAGNOSIS — Z85.118 HISTORY OF LUNG CANCER: ICD-10-CM

## 2021-09-21 DIAGNOSIS — D61.818 PANCYTOPENIA: ICD-10-CM

## 2021-09-21 PROCEDURE — 99214 OFFICE O/P EST MOD 30 MIN: CPT | Mod: S$GLB,,, | Performed by: INTERNAL MEDICINE

## 2021-09-21 PROCEDURE — 99214 PR OFFICE/OUTPT VISIT, EST, LEVL IV, 30-39 MIN: ICD-10-PCS | Mod: S$GLB,,, | Performed by: INTERNAL MEDICINE

## 2021-09-21 RX ORDER — HEPARIN 100 UNIT/ML
5 SYRINGE INTRAVENOUS
Status: CANCELLED | OUTPATIENT
Start: 2021-09-28

## 2021-09-21 RX ORDER — METHYLPREDNISOLONE SOD SUCC 125 MG
125 VIAL (EA) INJECTION ONCE AS NEEDED
Status: CANCELLED | OUTPATIENT
Start: 2021-09-28

## 2021-09-21 RX ORDER — EPINEPHRINE 0.3 MG/.3ML
0.3 INJECTION SUBCUTANEOUS ONCE AS NEEDED
Status: CANCELLED | OUTPATIENT
Start: 2021-09-28

## 2021-09-21 RX ORDER — DIPHENHYDRAMINE HYDROCHLORIDE 50 MG/ML
25 INJECTION INTRAMUSCULAR; INTRAVENOUS
Status: CANCELLED
Start: 2021-09-28

## 2021-09-21 RX ORDER — DIPHENHYDRAMINE HYDROCHLORIDE 50 MG/ML
50 INJECTION INTRAMUSCULAR; INTRAVENOUS ONCE AS NEEDED
Status: CANCELLED | OUTPATIENT
Start: 2021-09-28

## 2021-09-21 RX ORDER — SODIUM CHLORIDE 0.9 % (FLUSH) 0.9 %
10 SYRINGE (ML) INJECTION
Status: CANCELLED | OUTPATIENT
Start: 2021-09-28

## 2021-09-21 RX ORDER — SODIUM CHLORIDE 9 MG/ML
INJECTION, SOLUTION INTRAVENOUS CONTINUOUS
Status: CANCELLED | OUTPATIENT
Start: 2021-09-28

## 2021-09-23 ENCOUNTER — INFUSION (OUTPATIENT)
Dept: INFUSION THERAPY | Facility: HOSPITAL | Age: 81
End: 2021-09-23
Attending: INTERNAL MEDICINE
Payer: MEDICARE

## 2021-09-23 ENCOUNTER — TELEPHONE (OUTPATIENT)
Dept: INFECTIOUS DISEASES | Facility: CLINIC | Age: 81
End: 2021-09-23

## 2021-09-23 VITALS
HEIGHT: 74 IN | TEMPERATURE: 97 F | RESPIRATION RATE: 18 BRPM | SYSTOLIC BLOOD PRESSURE: 141 MMHG | OXYGEN SATURATION: 98 % | BODY MASS INDEX: 27.96 KG/M2 | DIASTOLIC BLOOD PRESSURE: 73 MMHG | WEIGHT: 217.88 LBS | HEART RATE: 62 BPM

## 2021-09-23 DIAGNOSIS — D50.9 IRON DEFICIENCY ANEMIA, UNSPECIFIED IRON DEFICIENCY ANEMIA TYPE: Primary | ICD-10-CM

## 2021-09-23 PROCEDURE — 25000003 PHARM REV CODE 250: Performed by: INTERNAL MEDICINE

## 2021-09-23 PROCEDURE — 63600175 PHARM REV CODE 636 W HCPCS: Mod: JG | Performed by: INTERNAL MEDICINE

## 2021-09-23 PROCEDURE — 96365 THER/PROPH/DIAG IV INF INIT: CPT

## 2021-09-23 PROCEDURE — 96367 TX/PROPH/DG ADDL SEQ IV INF: CPT

## 2021-09-23 RX ORDER — SODIUM CHLORIDE 0.9 % (FLUSH) 0.9 %
10 SYRINGE (ML) INJECTION
Status: CANCELLED | OUTPATIENT
Start: 2021-09-30

## 2021-09-23 RX ORDER — SODIUM CHLORIDE 9 MG/ML
INJECTION, SOLUTION INTRAVENOUS CONTINUOUS
Status: CANCELLED | OUTPATIENT
Start: 2021-09-30

## 2021-09-23 RX ORDER — METHYLPREDNISOLONE SOD SUCC 125 MG
125 VIAL (EA) INJECTION ONCE AS NEEDED
Status: CANCELLED | OUTPATIENT
Start: 2021-09-30

## 2021-09-23 RX ORDER — SODIUM CHLORIDE 9 MG/ML
INJECTION, SOLUTION INTRAVENOUS CONTINUOUS
Status: DISCONTINUED | OUTPATIENT
Start: 2021-09-23 | End: 2021-09-23 | Stop reason: HOSPADM

## 2021-09-23 RX ORDER — DIPHENHYDRAMINE HYDROCHLORIDE 50 MG/ML
25 INJECTION INTRAMUSCULAR; INTRAVENOUS
Status: CANCELLED
Start: 2021-09-30

## 2021-09-23 RX ORDER — EPINEPHRINE 0.3 MG/.3ML
0.3 INJECTION SUBCUTANEOUS ONCE AS NEEDED
Status: CANCELLED | OUTPATIENT
Start: 2021-09-30

## 2021-09-23 RX ORDER — DIPHENHYDRAMINE HYDROCHLORIDE 50 MG/ML
50 INJECTION INTRAMUSCULAR; INTRAVENOUS ONCE AS NEEDED
Status: CANCELLED | OUTPATIENT
Start: 2021-09-30

## 2021-09-23 RX ORDER — AMOXICILLIN 500 MG/1
500 CAPSULE ORAL 3 TIMES DAILY
Qty: 30 CAPSULE | Refills: 0 | Status: SHIPPED | OUTPATIENT
Start: 2021-09-23 | End: 2021-10-03

## 2021-09-23 RX ORDER — HEPARIN 100 UNIT/ML
5 SYRINGE INTRAVENOUS
Status: CANCELLED | OUTPATIENT
Start: 2021-09-30

## 2021-09-23 RX ADMIN — DIPHENHYDRAMINE HYDROCHLORIDE 25 MG: 50 INJECTION INTRAMUSCULAR; INTRAVENOUS at 02:09

## 2021-09-23 RX ADMIN — FERRIC CARBOXYMALTOSE INJECTION 750 MG: 50 INJECTION, SOLUTION INTRAVENOUS at 02:09

## 2021-09-30 ENCOUNTER — INFUSION (OUTPATIENT)
Dept: INFUSION THERAPY | Facility: HOSPITAL | Age: 81
End: 2021-09-30
Attending: INTERNAL MEDICINE
Payer: MEDICARE

## 2021-09-30 VITALS
RESPIRATION RATE: 18 BRPM | HEART RATE: 68 BPM | HEIGHT: 74 IN | BODY MASS INDEX: 27.53 KG/M2 | SYSTOLIC BLOOD PRESSURE: 133 MMHG | TEMPERATURE: 97 F | OXYGEN SATURATION: 96 % | DIASTOLIC BLOOD PRESSURE: 66 MMHG | WEIGHT: 214.5 LBS

## 2021-09-30 DIAGNOSIS — M54.81 BILATERAL OCCIPITAL NEURALGIA: Primary | ICD-10-CM

## 2021-09-30 DIAGNOSIS — D50.9 IRON DEFICIENCY ANEMIA, UNSPECIFIED IRON DEFICIENCY ANEMIA TYPE: Primary | ICD-10-CM

## 2021-09-30 PROCEDURE — 25000003 PHARM REV CODE 250: Performed by: INTERNAL MEDICINE

## 2021-09-30 PROCEDURE — 96367 TX/PROPH/DG ADDL SEQ IV INF: CPT

## 2021-09-30 PROCEDURE — 96365 THER/PROPH/DIAG IV INF INIT: CPT

## 2021-09-30 PROCEDURE — 63600175 PHARM REV CODE 636 W HCPCS: Mod: JG | Performed by: INTERNAL MEDICINE

## 2021-09-30 RX ORDER — METHYLPREDNISOLONE SOD SUCC 125 MG
125 VIAL (EA) INJECTION ONCE AS NEEDED
Status: CANCELLED | OUTPATIENT
Start: 2021-10-07

## 2021-09-30 RX ORDER — SODIUM CHLORIDE 0.9 % (FLUSH) 0.9 %
10 SYRINGE (ML) INJECTION
Status: CANCELLED | OUTPATIENT
Start: 2021-10-07

## 2021-09-30 RX ORDER — DIPHENHYDRAMINE HYDROCHLORIDE 50 MG/ML
50 INJECTION INTRAMUSCULAR; INTRAVENOUS ONCE AS NEEDED
Status: CANCELLED | OUTPATIENT
Start: 2021-10-07

## 2021-09-30 RX ORDER — EPINEPHRINE 0.3 MG/.3ML
0.3 INJECTION SUBCUTANEOUS ONCE AS NEEDED
Status: CANCELLED | OUTPATIENT
Start: 2021-10-07

## 2021-09-30 RX ORDER — SODIUM CHLORIDE 9 MG/ML
INJECTION, SOLUTION INTRAVENOUS CONTINUOUS
Status: CANCELLED | OUTPATIENT
Start: 2021-10-07

## 2021-09-30 RX ORDER — HEPARIN 100 UNIT/ML
5 SYRINGE INTRAVENOUS
Status: CANCELLED | OUTPATIENT
Start: 2021-10-07

## 2021-09-30 RX ORDER — DIPHENHYDRAMINE HYDROCHLORIDE 50 MG/ML
25 INJECTION INTRAMUSCULAR; INTRAVENOUS
Status: CANCELLED
Start: 2021-10-07

## 2021-09-30 RX ADMIN — FERRIC CARBOXYMALTOSE INJECTION 750 MG: 50 INJECTION, SOLUTION INTRAVENOUS at 02:09

## 2021-09-30 RX ADMIN — DIPHENHYDRAMINE HYDROCHLORIDE 25 MG: 50 INJECTION INTRAMUSCULAR; INTRAVENOUS at 02:09

## 2021-10-01 ENCOUNTER — HOSPITAL ENCOUNTER (OUTPATIENT)
Dept: RADIOLOGY | Facility: HOSPITAL | Age: 81
Discharge: HOME | End: 2021-10-01
Attending: INTERNAL MEDICINE
Payer: MEDICARE

## 2021-10-01 DIAGNOSIS — M54.81 BILATERAL OCCIPITAL NEURALGIA: ICD-10-CM

## 2021-10-01 PROCEDURE — A9585 GADOBUTROL INJECTION: HCPCS | Performed by: INTERNAL MEDICINE

## 2021-10-01 PROCEDURE — 72141 MRI NECK SPINE W/O DYE: CPT | Mod: TC

## 2021-10-01 PROCEDURE — 70553 MRI BRAIN STEM W/O & W/DYE: CPT | Mod: TC

## 2021-10-01 PROCEDURE — 25500020 PHARM REV CODE 255: Performed by: INTERNAL MEDICINE

## 2021-10-01 RX ORDER — GADOBUTROL 604.72 MG/ML
10 INJECTION INTRAVENOUS
Status: COMPLETED | OUTPATIENT
Start: 2021-10-01 | End: 2021-10-01

## 2021-10-01 RX ADMIN — GADOBUTROL 10 ML: 604.72 INJECTION INTRAVENOUS at 04:10

## 2021-11-05 ENCOUNTER — LAB VISIT (OUTPATIENT)
Dept: LAB | Facility: HOSPITAL | Age: 81
End: 2021-11-05
Attending: INTERNAL MEDICINE
Payer: MEDICARE

## 2021-11-05 DIAGNOSIS — C91.10 CLL (CHRONIC LYMPHOCYTIC LEUKEMIA): ICD-10-CM

## 2021-11-05 DIAGNOSIS — R53.83 OTHER FATIGUE: ICD-10-CM

## 2021-11-05 DIAGNOSIS — D50.9 IRON DEFICIENCY ANEMIA, UNSPECIFIED IRON DEFICIENCY ANEMIA TYPE: ICD-10-CM

## 2021-11-05 DIAGNOSIS — Z85.118 HISTORY OF LUNG CANCER: ICD-10-CM

## 2021-11-05 DIAGNOSIS — C83.00 LYMPHOMA, SMALL LYMPHOCYTIC: ICD-10-CM

## 2021-11-05 LAB
FERRITIN SERPL-MCNC: 175 NG/ML (ref 20–300)
IRON SERPL-MCNC: 161 UG/DL (ref 45–160)
SATURATED IRON: 48 % (ref 20–50)
TOTAL IRON BINDING CAPACITY: 336 UG/DL (ref 250–450)
TRANSFERRIN SERPL-MCNC: 240 MG/DL (ref 200–375)
TSH SERPL DL<=0.005 MIU/L-ACNC: 1.08 UIU/ML (ref 0.34–5.6)

## 2021-11-05 PROCEDURE — 84466 ASSAY OF TRANSFERRIN: CPT | Performed by: INTERNAL MEDICINE

## 2021-11-05 PROCEDURE — 84443 ASSAY THYROID STIM HORMONE: CPT | Performed by: INTERNAL MEDICINE

## 2021-11-05 PROCEDURE — 36415 COLL VENOUS BLD VENIPUNCTURE: CPT | Performed by: INTERNAL MEDICINE

## 2021-11-05 PROCEDURE — 82728 ASSAY OF FERRITIN: CPT | Performed by: INTERNAL MEDICINE

## 2021-12-01 ENCOUNTER — LAB VISIT (OUTPATIENT)
Dept: LAB | Facility: HOSPITAL | Age: 81
End: 2021-12-01
Attending: INTERNAL MEDICINE
Payer: MEDICARE

## 2021-12-01 DIAGNOSIS — D50.9 IRON DEFICIENCY ANEMIA, UNSPECIFIED IRON DEFICIENCY ANEMIA TYPE: ICD-10-CM

## 2021-12-01 DIAGNOSIS — C83.00 LYMPHOMA, SMALL LYMPHOCYTIC: ICD-10-CM

## 2021-12-01 DIAGNOSIS — C91.10 CLL (CHRONIC LYMPHOCYTIC LEUKEMIA): ICD-10-CM

## 2021-12-01 DIAGNOSIS — Z85.118 HISTORY OF LUNG CANCER: ICD-10-CM

## 2021-12-01 LAB
ALBUMIN SERPL BCP-MCNC: 3.9 G/DL (ref 3.5–5.2)
ALP SERPL-CCNC: 49 U/L (ref 55–135)
ALT SERPL W/O P-5'-P-CCNC: 16 U/L (ref 10–44)
ANION GAP SERPL CALC-SCNC: 8 MMOL/L (ref 8–16)
AST SERPL-CCNC: 17 U/L (ref 10–40)
BASOPHILS # BLD AUTO: 0.02 K/UL (ref 0–0.2)
BASOPHILS NFR BLD: 0.6 % (ref 0–1.9)
BILIRUB SERPL-MCNC: 1.2 MG/DL (ref 0.1–1)
BUN SERPL-MCNC: 14 MG/DL (ref 8–23)
CALCIUM SERPL-MCNC: 9.1 MG/DL (ref 8.7–10.5)
CEA SERPL-MCNC: 0.9 NG/ML (ref 0–5)
CHLORIDE SERPL-SCNC: 99 MMOL/L (ref 95–110)
CO2 SERPL-SCNC: 28 MMOL/L (ref 23–29)
CREAT SERPL-MCNC: 0.8 MG/DL (ref 0.5–1.4)
DIFFERENTIAL METHOD: ABNORMAL
EOSINOPHIL # BLD AUTO: 0.2 K/UL (ref 0–0.5)
EOSINOPHIL NFR BLD: 4.8 % (ref 0–8)
ERYTHROCYTE [DISTWIDTH] IN BLOOD BY AUTOMATED COUNT: 14.1 % (ref 11.5–14.5)
EST. GFR  (AFRICAN AMERICAN): >60 ML/MIN/1.73 M^2
EST. GFR  (NON AFRICAN AMERICAN): >60 ML/MIN/1.73 M^2
GLUCOSE SERPL-MCNC: 117 MG/DL (ref 70–110)
HCT VFR BLD AUTO: 35.6 % (ref 40–54)
HGB BLD-MCNC: 12.5 G/DL (ref 14–18)
IMM GRANULOCYTES # BLD AUTO: 0.16 K/UL (ref 0–0.04)
IMM GRANULOCYTES NFR BLD AUTO: 4.8 % (ref 0–0.5)
LYMPHOCYTES # BLD AUTO: 0.7 K/UL (ref 1–4.8)
LYMPHOCYTES NFR BLD: 19.6 % (ref 18–48)
MCH RBC QN AUTO: 31.3 PG (ref 27–31)
MCHC RBC AUTO-ENTMCNC: 35.1 G/DL (ref 32–36)
MCV RBC AUTO: 89 FL (ref 82–98)
MONOCYTES # BLD AUTO: 0.5 K/UL (ref 0.3–1)
MONOCYTES NFR BLD: 14.8 % (ref 4–15)
NEUTROPHILS # BLD AUTO: 1.8 K/UL (ref 1.8–7.7)
NEUTROPHILS NFR BLD: 55.4 % (ref 38–73)
NRBC BLD-RTO: 0 /100 WBC
PLATELET # BLD AUTO: 87 K/UL (ref 150–450)
PLATELET BLD QL SMEAR: ABNORMAL
PMV BLD AUTO: 10.4 FL (ref 9.2–12.9)
POTASSIUM SERPL-SCNC: 3.9 MMOL/L (ref 3.5–5.1)
PROT SERPL-MCNC: 7.3 G/DL (ref 6–8.4)
RBC # BLD AUTO: 4 M/UL (ref 4.6–6.2)
SODIUM SERPL-SCNC: 135 MMOL/L (ref 136–145)
WBC # BLD AUTO: 3.32 K/UL (ref 3.9–12.7)

## 2021-12-01 PROCEDURE — 36415 COLL VENOUS BLD VENIPUNCTURE: CPT | Performed by: INTERNAL MEDICINE

## 2021-12-01 PROCEDURE — 80053 COMPREHEN METABOLIC PANEL: CPT | Performed by: INTERNAL MEDICINE

## 2021-12-01 PROCEDURE — 85025 COMPLETE CBC W/AUTO DIFF WBC: CPT | Performed by: INTERNAL MEDICINE

## 2021-12-01 PROCEDURE — 82378 CARCINOEMBRYONIC ANTIGEN: CPT | Performed by: INTERNAL MEDICINE

## 2021-12-15 ENCOUNTER — PATIENT MESSAGE (OUTPATIENT)
Dept: HEMATOLOGY/ONCOLOGY | Facility: CLINIC | Age: 81
End: 2021-12-15
Payer: MEDICARE

## 2022-01-01 ENCOUNTER — CLINICAL SUPPORT (OUTPATIENT)
Dept: FAMILY MEDICINE | Facility: CLINIC | Age: 82
End: 2022-01-01
Payer: MEDICARE

## 2022-01-01 ENCOUNTER — PATIENT MESSAGE (OUTPATIENT)
Dept: ADMINISTRATIVE | Facility: OTHER | Age: 82
End: 2022-01-01
Payer: MEDICARE

## 2022-01-01 ENCOUNTER — TELEPHONE (OUTPATIENT)
Dept: HEMATOLOGY/ONCOLOGY | Facility: CLINIC | Age: 82
End: 2022-01-01

## 2022-01-01 DIAGNOSIS — R05.9 COUGH: Primary | ICD-10-CM

## 2022-01-01 LAB — SARS-COV-2 RDRP RESP QL NAA+PROBE: NEGATIVE

## 2022-01-01 PROCEDURE — U0002 COVID-19 LAB TEST NON-CDC: HCPCS | Performed by: FAMILY MEDICINE

## 2022-01-01 NOTE — PROGRESS NOTES
Rapid COVID-19 test collected. Patient tolerated with no issues.  Test will be sent to [facility] lab to process.

## 2022-01-04 ENCOUNTER — CLINICAL SUPPORT (OUTPATIENT)
Dept: FAMILY MEDICINE | Facility: CLINIC | Age: 82
End: 2022-01-04
Payer: MEDICARE

## 2022-01-04 ENCOUNTER — TELEPHONE (OUTPATIENT)
Dept: FAMILY MEDICINE | Facility: CLINIC | Age: 82
End: 2022-01-04

## 2022-01-04 ENCOUNTER — PATIENT MESSAGE (OUTPATIENT)
Dept: FAMILY MEDICINE | Facility: CLINIC | Age: 82
End: 2022-01-04

## 2022-01-04 DIAGNOSIS — R05.9 COUGH: Primary | ICD-10-CM

## 2022-01-04 DIAGNOSIS — U07.1 COVID-19: Primary | ICD-10-CM

## 2022-01-04 LAB — SARS-COV-2 RDRP RESP QL NAA+PROBE: POSITIVE

## 2022-01-04 PROCEDURE — U0002 COVID-19 LAB TEST NON-CDC: HCPCS | Performed by: FAMILY MEDICINE

## 2022-01-04 NOTE — PROGRESS NOTES
Rapid COVID-19 test collected. Patient tolerated with no issues.  Test will be sent to [facility] lab to process.

## 2022-01-04 NOTE — TELEPHONE ENCOUNTER
Attempted to reach patient with positive Covid test result. No answer, left voice message that he will be called to schedule infusion.

## 2022-01-06 ENCOUNTER — INFUSION (OUTPATIENT)
Dept: INFECTIOUS DISEASES | Facility: HOSPITAL | Age: 82
End: 2022-01-06
Attending: NURSE PRACTITIONER
Payer: MEDICARE

## 2022-01-06 VITALS
DIASTOLIC BLOOD PRESSURE: 59 MMHG | HEART RATE: 75 BPM | RESPIRATION RATE: 18 BRPM | TEMPERATURE: 98 F | OXYGEN SATURATION: 95 % | SYSTOLIC BLOOD PRESSURE: 122 MMHG

## 2022-01-06 DIAGNOSIS — U07.1 COVID-19: Primary | ICD-10-CM

## 2022-01-06 PROCEDURE — 25000003 PHARM REV CODE 250: Performed by: NURSE PRACTITIONER

## 2022-01-06 PROCEDURE — M0243 CASIRIVI AND IMDEVI INFUSION: HCPCS | Performed by: NURSE PRACTITIONER

## 2022-01-06 PROCEDURE — 63600175 PHARM REV CODE 636 W HCPCS: Performed by: NURSE PRACTITIONER

## 2022-01-06 RX ORDER — ONDANSETRON 4 MG/1
4 TABLET, ORALLY DISINTEGRATING ORAL ONCE AS NEEDED
Status: DISCONTINUED | OUTPATIENT
Start: 2022-01-06 | End: 2022-04-18 | Stop reason: HOSPADM

## 2022-01-06 RX ORDER — EPINEPHRINE 0.3 MG/.3ML
0.3 INJECTION SUBCUTANEOUS
Status: DISCONTINUED | OUTPATIENT
Start: 2022-01-06 | End: 2022-11-29

## 2022-01-06 RX ORDER — ACETAMINOPHEN 325 MG/1
650 TABLET ORAL ONCE AS NEEDED
Status: DISCONTINUED | OUTPATIENT
Start: 2022-01-06 | End: 2022-04-18 | Stop reason: HOSPADM

## 2022-01-06 RX ORDER — ALBUTEROL SULFATE 90 UG/1
2 AEROSOL, METERED RESPIRATORY (INHALATION)
Status: DISCONTINUED | OUTPATIENT
Start: 2022-01-06 | End: 2022-04-18 | Stop reason: HOSPADM

## 2022-01-06 RX ORDER — DIPHENHYDRAMINE HYDROCHLORIDE 50 MG/ML
25 INJECTION INTRAMUSCULAR; INTRAVENOUS ONCE AS NEEDED
Status: DISCONTINUED | OUTPATIENT
Start: 2022-01-06 | End: 2022-04-18 | Stop reason: HOSPADM

## 2022-01-06 RX ORDER — SODIUM CHLORIDE 0.9 % (FLUSH) 0.9 %
10 SYRINGE (ML) INJECTION
Status: DISCONTINUED | OUTPATIENT
Start: 2022-01-06 | End: 2022-04-18 | Stop reason: HOSPADM

## 2022-01-06 RX ADMIN — CASIRIVIMAB AND IMDEVIMAB 600 MG: 600; 600 INJECTION, SOLUTION, CONCENTRATE INTRAVENOUS at 12:01

## 2022-01-06 RX ADMIN — SODIUM CHLORIDE: 0.9 INJECTION, SOLUTION INTRAVENOUS at 12:01

## 2022-01-21 ENCOUNTER — LAB VISIT (OUTPATIENT)
Dept: LAB | Facility: HOSPITAL | Age: 82
End: 2022-01-21
Attending: NURSE PRACTITIONER
Payer: MEDICARE

## 2022-01-21 DIAGNOSIS — C91.10 CLL (CHRONIC LYMPHOCYTIC LEUKEMIA): Primary | ICD-10-CM

## 2022-01-21 DIAGNOSIS — C91.10 CLL (CHRONIC LYMPHOCYTIC LEUKEMIA): ICD-10-CM

## 2022-01-21 LAB
ALBUMIN SERPL BCP-MCNC: 4.1 G/DL (ref 3.5–5.2)
ALP SERPL-CCNC: 52 U/L (ref 55–135)
ALT SERPL W/O P-5'-P-CCNC: 17 U/L (ref 10–44)
ANION GAP SERPL CALC-SCNC: 8 MMOL/L (ref 8–16)
ANISOCYTOSIS BLD QL SMEAR: SLIGHT
AST SERPL-CCNC: 18 U/L (ref 10–40)
BASOPHILS NFR BLD: 0 % (ref 0–1.9)
BILIRUB SERPL-MCNC: 0.8 MG/DL (ref 0.1–1)
BUN SERPL-MCNC: 18 MG/DL (ref 8–23)
CALCIUM SERPL-MCNC: 8.9 MG/DL (ref 8.7–10.5)
CHLORIDE SERPL-SCNC: 100 MMOL/L (ref 95–110)
CO2 SERPL-SCNC: 27 MMOL/L (ref 23–29)
CREAT SERPL-MCNC: 1 MG/DL (ref 0.5–1.4)
DIFFERENTIAL METHOD: ABNORMAL
EOSINOPHIL NFR BLD: 0 % (ref 0–8)
ERYTHROCYTE [DISTWIDTH] IN BLOOD BY AUTOMATED COUNT: 14.6 % (ref 11.5–14.5)
EST. GFR  (AFRICAN AMERICAN): >60 ML/MIN/1.73 M^2
EST. GFR  (NON AFRICAN AMERICAN): >60 ML/MIN/1.73 M^2
FERRITIN SERPL-MCNC: 157 NG/ML (ref 20–300)
GLUCOSE SERPL-MCNC: 283 MG/DL (ref 70–110)
HCT VFR BLD AUTO: 32.9 % (ref 40–54)
HGB BLD-MCNC: 11.4 G/DL (ref 14–18)
IMM GRANULOCYTES # BLD AUTO: ABNORMAL K/UL (ref 0–0.04)
IMM GRANULOCYTES NFR BLD AUTO: ABNORMAL % (ref 0–0.5)
IRON SERPL-MCNC: 141 UG/DL (ref 45–160)
LYMPHOCYTES NFR BLD: 13 % (ref 18–48)
MCH RBC QN AUTO: 31.3 PG (ref 27–31)
MCHC RBC AUTO-ENTMCNC: 34.7 G/DL (ref 32–36)
MCV RBC AUTO: 90 FL (ref 82–98)
MONOCYTES NFR BLD: 12 % (ref 4–15)
NEUTROPHILS NFR BLD: 75 % (ref 38–73)
NRBC BLD-RTO: 0 /100 WBC
PLATELET # BLD AUTO: 92 K/UL (ref 150–450)
PLATELET BLD QL SMEAR: ABNORMAL
PMV BLD AUTO: 12.1 FL (ref 9.2–12.9)
POIKILOCYTOSIS BLD QL SMEAR: SLIGHT
POTASSIUM SERPL-SCNC: 4.1 MMOL/L (ref 3.5–5.1)
PROT SERPL-MCNC: 7.5 G/DL (ref 6–8.4)
RBC # BLD AUTO: 3.64 M/UL (ref 4.6–6.2)
SATURATED IRON: 44 % (ref 20–50)
SODIUM SERPL-SCNC: 135 MMOL/L (ref 136–145)
TOTAL IRON BINDING CAPACITY: 321 UG/DL (ref 250–450)
TRANSFERRIN SERPL-MCNC: 229 MG/DL (ref 200–375)
WBC # BLD AUTO: 5.84 K/UL (ref 3.9–12.7)

## 2022-01-21 PROCEDURE — 85007 BL SMEAR W/DIFF WBC COUNT: CPT | Performed by: NURSE PRACTITIONER

## 2022-01-21 PROCEDURE — 80053 COMPREHEN METABOLIC PANEL: CPT | Performed by: NURSE PRACTITIONER

## 2022-01-21 PROCEDURE — 85027 COMPLETE CBC AUTOMATED: CPT | Performed by: NURSE PRACTITIONER

## 2022-01-21 PROCEDURE — 82728 ASSAY OF FERRITIN: CPT | Performed by: NURSE PRACTITIONER

## 2022-01-21 PROCEDURE — 36415 COLL VENOUS BLD VENIPUNCTURE: CPT | Performed by: NURSE PRACTITIONER

## 2022-01-21 PROCEDURE — 84466 ASSAY OF TRANSFERRIN: CPT | Performed by: NURSE PRACTITIONER

## 2022-02-16 NOTE — PROGRESS NOTES
[facility] Hematology/Oncology  PROGRESS NOTE - Follow-up Visit      Subjective:       Patient ID:   NAME: Conrad Kuhn : 1940     81 y.o. male    Referring Doc: Julien  Other Physicians: Ced Erazo Joubert, Srinivas, Pinsky    Chief Complaint:  CLL f/u    History of Present Illness:     Patient is being seen today in person in clinic for a regular follow-up viasit. He is here by himself.  The patient is on today to go over the results of the recently ordered labs, tests and studies. He is here by himself today. He last showed for oncology appointment in 2021    He had covid in 2022 and he received the infusion but did not require hospitalization    He saw Dr Erazo couple of weeks ago    He is planning to have cervical spine surgery with Dr Mai in the near future.     He is breathing ok. He denies any CP, SOB, or N/V.       He sees Dr Don at [facility] this coming Monday. He is now off rituximab monthly and he is also now off the oral Venetoclax (expect he will be getting a repeat bone marrow biopsy)      Discussed Covid19 precautions; he had his vaccinations              ROS:   GEN: normal without any fever, night sweats or weight loss  HEENT: normal with no HA's, sore throat, stiff neck, changes in vision  CV: normal with no CP, SOB, PND, MAYER or orthopnea  PULM: normal with no SOB, cough, hemoptysis, sputum or pleuritic pain  GI: normal with no abdominal pain, nausea, vomiting, constipation, diarrhea, melanotic stools, BRBPR, or hematemesis; no dysphagia  : urine frequency stable  BREAST: normal with no mass, discharge, pain  SKIN: normal with no rash, erythema, bruising, or swelling    Allergies:  Review of patient's allergies indicates:  No Known Allergies    Medications:    Current Outpatient Medications:     atorvastatin (LIPITOR) 20 MG tablet, Take 20 mg by mouth once daily. , Disp: , Rfl:     azelastine (ASTELIN) 137 mcg (0.1 %) nasal spray, 1 spray by Nasal route 2 (two) times  daily. , Disp: , Rfl:     blood sugar diagnostic Strp, by Other route., Disp: , Rfl:     celecoxib (CELEBREX) 200 MG capsule, Take 200 mg by mouth 2 (two) times daily., Disp: , Rfl:     duke's soln (benadryl 30 mL, mylanta 30 mL, LIDOcaine 30 mL, nystatin 30 mL) 120mL, Take 10 mLs by mouth every 4 (four) hours as needed (mouth sores)., Disp: 120 mL, Rfl: 0    ferrous sulfate (FEOSOL) 325 mg (65 mg iron) Tab tablet, Take 1 tablet (325 mg total) by mouth once daily., Disp: , Rfl:     FREESTYLE LANCETS 28 gauge lancets, TEST TWICE DAILY, Disp: , Rfl: 1    gabapentin (NEURONTIN) 300 MG capsule, Take 300 mg by mouth 2 (two) times daily. , Disp: , Rfl: 0    glimepiride (AMARYL) 1 MG tablet, Take 1 mg by mouth daily with breakfast. , Disp: , Rfl: 0    HYDROcodone-acetaminophen (NORCO)  mg per tablet, Take 1 tablet by mouth every 8 (eight) hours as needed. , Disp: , Rfl: 0    ibuprofen (ADVIL,MOTRIN) 600 MG tablet, Take 600 mg by mouth every 6 (six) hours as needed for Pain., Disp: , Rfl:     levalbuterol (XOPENEX) 1.25 mg/3 mL nebulizer solution, Take 3 mLs by nebulization every 4 to 6 hours as needed. , Disp: , Rfl:     losartan (COZAAR) 25 MG tablet, Take 25 mg by mouth every evening., Disp: , Rfl:     metFORMIN (GLUCOPHAGE) 500 MG tablet, Take 500 mg by mouth 2 (two) times daily with meals. , Disp: , Rfl: 2    metoprolol succinate (TOPROL-XL) 25 MG 24 hr tablet, Take 25 mg by mouth 2 (two) times daily. , Disp: , Rfl:     predniSONE (DELTASONE) 10 MG tablet, Take 60mg daily for five days, then 50mg for day 6, 40mg for day 7, 30mg day 8, 20mg day 9, and 10mg day 10., Disp: 50 tablet, Rfl: 0    traZODone (DESYREL) 100 MG tablet, TAKE 1 TABLET BY MOUTH EVERY NIGHT AT BEDTIME (Patient taking differently: Take 100 mg by mouth every evening.), Disp: 90 tablet, Rfl: 0    valACYclovir (VALTREX) 1000 MG tablet, Take 1 tablet (1,000 mg total) by mouth 2 (two) times daily for 7 days, THEN 1 tablet (1,000 mg  "total) once daily., Disp: 374 tablet, Rfl: 0    venetoclax (VENCLEXTA) 100 mg Tab, Take 200 mg by mouth once daily., Disp: 60 tablet, Rfl: 8    blood-glucose meter (FREESTYLE LITE METER) kit, Use as instructed, Disp: , Rfl:     Current Facility-Administered Medications:     acetaminophen tablet 650 mg, 650 mg, Oral, Once PRN, Kathy Strong NP-FATOUMATA    albuterol inhaler 2 puff, 2 puff, Inhalation, Q20 Min PRN, Kathy Strong NP-FATOUMATA    diphenhydrAMINE injection 25 mg, 25 mg, Intravenous, Once PRN, Kathy Strong NP-FATOUMATA    EPINEPHrine (EPIPEN) 0.3 mg/0.3 mL pen injection 0.3 mg, 0.3 mg, Intramuscular, PRN, Kathy Strong NP-FATOUMATA    methylPREDNISolone sodium succinate injection 40 mg, 40 mg, Intravenous, Once PRN, Kathy Strong NP-FATOUMATA    ondansetron disintegrating tablet 4 mg, 4 mg, Oral, Once PRN, Kathy Strong NP-FATOUMATA    sodium chloride 0.9% 500 mL flush bag, , Intravenous, PRN, MELLO Page, Stopped at 01/06/22 1330    sodium chloride 0.9% flush 10 mL, 10 mL, Intravenous, PRN, MELLO Page    PMHx/PSHx Updates:  See patient's last visit with me on 9/21/2021  See H&P on 1/3/2019        Pathology:  Cancer Staging  No matching staging information was found for the patient.          Objective:     Vitals:  Blood pressure (!) 168/68, pulse 66, temperature 98.3 °F (36.8 °C), resp. rate 20, height 6' 2" (1.88 m), weight 95.7 kg (211 lb).        Physical Examination:   GEN: no apparent distress, comfortable; AAOx3  HEAD: atraumatic and normocephalic  EYES: no conjunctival pallor or muddiness, no icterus; normal pupil reaction to ambient light  ENT: OMM, no pharyngeal erythema, external bilateral ears WNL; no visible thrush or ulcers  NECK: no masses or swelling, trachea midline, no visible LAD/LN's ; LAD and LN's on right neck reduced in size  CV: no palpitations; no pedal edema; no noticeable JVD or neck vein distension  CHEST: Normal respiratory effort; chest wall breath movements symmetrical; no audible " wheezing  ABDOM: non-distended; no bloating  MUSC/Skeletal: ROM normal; joints visibly normal; no deformities or arthropathy  EXTREM: no clubbing, cyanosis, inflammation or swelling; right arm in sling s/p right shoulder surgery  SKIN: no rashes, lesions, ulcers, petechiae or subcutaneous nodules  : no keith  NEURO: moving all 4 extremities; AAOx3; no tremors  PSYCH: normal mood, affect and behavior  LYMPH: no visible LN's or LAD; LAD and LN's on right neck reduced in size              Labs:   Lab Results   Component Value Date    WBC 5.84 01/21/2022    HGB 11.4 (L) 01/21/2022    HCT 32.9 (L) 01/21/2022    MCV 90 01/21/2022    PLT 92 (L) 01/21/2022     CMP  Sodium   Date Value Ref Range Status   01/21/2022 135 (L) 136 - 145 mmol/L Final   06/12/2019 138 134 - 144 mmol/L      Potassium   Date Value Ref Range Status   01/21/2022 4.1 3.5 - 5.1 mmol/L Final     Chloride   Date Value Ref Range Status   01/21/2022 100 95 - 110 mmol/L Final   06/12/2019 99 98 - 110 mmol/L      CO2   Date Value Ref Range Status   01/21/2022 27 23 - 29 mmol/L Final     Glucose   Date Value Ref Range Status   01/21/2022 283 (H) 70 - 110 mg/dL Final   06/12/2019 179 (H) 70 - 99 mg/dL      BUN   Date Value Ref Range Status   01/21/2022 18 8 - 23 mg/dL Final     Creatinine   Date Value Ref Range Status   01/21/2022 1.0 0.5 - 1.4 mg/dL Final   06/12/2019 0.95 0.60 - 1.40 mg/dL      Calcium   Date Value Ref Range Status   01/21/2022 8.9 8.7 - 10.5 mg/dL Final     Total Protein   Date Value Ref Range Status   01/21/2022 7.5 6.0 - 8.4 g/dL Final     Albumin   Date Value Ref Range Status   01/21/2022 4.1 3.5 - 5.2 g/dL Final   06/12/2019 3.9 3.1 - 4.7 g/dL      Total Bilirubin   Date Value Ref Range Status   01/21/2022 0.8 0.1 - 1.0 mg/dL Final     Comment:     For infants and newborns, interpretation of results should be based  on gestational age, weight and in agreement with clinical  observations.    Premature Infant recommended reference  ranges:  Up to 24 hours.............<8.0 mg/dL  Up to 48 hours............<12.0 mg/dL  3-5 days..................<15.0 mg/dL  6-29 days.................<15.0 mg/dL       Alkaline Phosphatase   Date Value Ref Range Status   01/21/2022 52 (L) 55 - 135 U/L Final     AST   Date Value Ref Range Status   01/21/2022 18 10 - 40 U/L Final     ALT   Date Value Ref Range Status   01/21/2022 17 10 - 44 U/L Final     Anion Gap   Date Value Ref Range Status   01/21/2022 8 8 - 16 mmol/L Final     eGFR if    Date Value Ref Range Status   01/21/2022 >60.0 >60 mL/min/1.73 m^2 Final     eGFR if non    Date Value Ref Range Status   01/21/2022 >60.0 >60 mL/min/1.73 m^2 Final     Comment:     Calculation used to obtain the estimated glomerular filtration  rate (eGFR) is the CKD-EPI equation.              Lab Results   Component Value Date    IRON 141 01/21/2022    TIBC 321 01/21/2022    FERRITIN 157 01/21/2022         Radiology/Diagnostic Studies:    MRI cervical spine  10/1/2021:    IMPRESSION:     Loss of normal cervical lordosis with mild kyphotic curvature.     Advanced multilevel cervical spondylosis, as outlined above. Spinal canal narrowing and foraminal narrowing is most severe at C5-6. 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     Congenital narrowing of cervical spinal canal, which exacerbates cervical spondylosis        CT head  8/27/2021:  IMPRESSION:     No CT evidence of acute intracranial pathology.     Stable senescent changes as above.         PET  7/19/2021:    IMPRESSION:  Prior left upper lobectomy without evidence of recurrent or metastatic disease  - 3 cm infrarenal abdominal aortic aneurysm      CT head 6/29/2021:  IMPRESSION:     No CT evidence of acute intracranial pathology      PET  1/18/2021:    Impression:     No evidence of FDG avid lymphoproliferative disease.     Aneurysmal dilation of infrarenal abdominal aorta  (3.4 cm) as well as aneurysmal dilation of right common iliac artery.     Additional incidental findings as above        PET  6/24/2020:    Impression:     1. No findings of active lymphoproliferative disease.  2. Additional observations as above.      PET  1/24/2020:    Impression       Moderate decrease in size of the adenopathy within the neck, chest, abdomen and pelvis.  The spleen is smaller in size.  There is no significant FDG activity.    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 Multifocal lymphadenopathy in the chest, abdomen, and pelvis compatible with provided clinical history of lymphocytic leukemia.  There is mixed interval change when compared to prior studies.  Pathologic lymphadenopathy in the chest has increased significantly when compared to a CTA of the chest from May 2018.  Pathologic retroperitoneal lymphadenopathy has improved slightly when compared to a previous abdominal CT from February 2017.  Please see above details.  2. Mild aneurysmal dilation of the infrarenal abdominal aorta, with maximal transverse diameter of 3.7 cm.  Maximal transverse diameter on a prior study from 2017 was 2.5 cm.  3. Additional findings as above           [facility] Unknown Rad Eap    Result Date: 1/14/2019  CMS MANDATED QUALITY DATA - CT RADIATION - 436 All CT scans at this facility utilize dose modulation, iterative reconstruction, and/or weight based dosing when appropriate to reduce radiation dose to as low as reasonably achievable. Reason:Lymphoid leukemia TECHNIQUE: CT thorax with, CT abdomen  with, and CT pelvis with 100 mL Omnipaque 350. COMPARISON: CT chest dated 05/19/2018 and CT abdomen and pelvis dated 02/08/2017 CT THORAX: There is stable mediastinal, hilar and axillary adenopathy. There is coronary artery calcification. There is resolution of the groundglass opacities throughout the lungs. There are no confluent infiltrates,  pulmonary nodules or pleural effusions. There are stable subcentimeter nodules in the left lobe of the thyroid gland. There are degenerative changes of the spine. CT ABDOMEN: The liver, pancreas, adrenal glands and gallbladder are normal. The spleen is enlarged. There is mesenteric and retroperitoneal adenopathy which is slightly smaller in size. -There is a portacaval node measuring 37 x 21 mm and previously measured 38 x 27 mm. -Periportal lymph node measuring 41 x 18 mm, previously measured 48 x 29 mm. -Left periaortic adenopathy measuring 34 x 23 mm and previously measured 40 x 42 mm- The kidneys enhance symmetrically without hydronephrosis or calculi. There are no thick-walled or dilated bowel loops. There is vascular calcification of aorta. There is a 3.0 x 3.0 cm infrarenal abdominal aortic aneurysm. CT PELVIS: There is adenopathy along the pelvic sidewalls bilaterally which has decreased in size. -The left common iliac adenopathy measuring 36 x 11 mm, previously measured 46 x 14 mm -the right common iliac adenopathy measuring 46 x 10 mm. Previously measured 53 x 15 mm. The bladder is normal. The prostate gland is enlarged. There are degenerative changes of the spine. There is grade 1 anterolisthesis of L5 on S1 with bilateral pars defects.     IMPRESSION: Stable mediastinal, hilar and axillary adenopathy with resolution of the airspace disease within the lungs Decrease in size of the mesenteric, retroperitoneal and pelvic adenopathy. Stable infrarenal abdominal aortic aneurysm Splenomegaly     Read and electronically signed by: Jeniffer Zhang MD on 1/14/2019 9:14 AM CST JENIFFER ZHANG MD      I have reviewed all available lab results and radiology reports.    Assessment/Plan:   (1) 81 y.o. male with  diagnosis of CLL who has been referred by Dr Rony Victoria for continuation of care by medical hematology/oncology.   - BM biopsy  12/4/2015 with CLL  - diagnosis of SLL in 2004 and s/p FCR x6 in 2007  - s/p  imbruvica in past (stopped in May 2018)  - latest wbc at 4.8; lymph 56%  - latest CT on 8/7/2019 showing increase in the LAD  - platelets are 111,000  - He was recently hospitalized at [facility] and seen by Dr Wheeler. Dr Wheeler has recommended Rituximab. He is starting tomorrow.   - discussed the rituximab regimen and the potential side-effect profile; he is getting chemotherapy school today with [facility]  - he saw Dr Don on Oct 21st 2019  - he has had 3 of the 4 weeks of rituximab so far; Dr Don recommends him to eventually get rituximab monthly x6 with daily oral Venetoclax for two years total.   - completed rituximab (4th) week of rituximab and  S/p 6 monthly rituximab with Venetoclax oral but is taking only 2 pills daily due to the counts  - he is now just on the venetclax  - he saw Dr Don again on Dec 23rd 2019 and sees him again in March 16th 2020  - latest PEt on 1/24/2020 looks really good    8/27/2020:  - he saw Dr Don last month at [facility]  - latest PEt from 6/24/2020 looks good  - he remains on just the oral venetoclax at 2 pills per day  - he sees Dr Don again in about 3 months (Oct 2020)    11/18/2020:  - he is doing well with the Venetoclax  - due for repeat PEt in Dec 2020  - he sees Dr Don again on Dec 23rd 2020    1/20/2021:  - he is doing well with the Venetoclax  - He saw Dr Don at [facility] in Dec 2020. He is now off rituximab monthly and remains only on the oral Venetoclax but at 2 pills daily . He sees Dr Don again in feb 2021  - Recent PEt on  1/18/2021 looks stable except for incidental aneurysm in aorta; so we are referring him to Dr Kapadia with vascualr    4/20/2021:  - he saw Dr Don in Feb 2021 and sees him again in Nov 2021  - repeat PEt in June/july 2021  - continued on Venetoclax and regular blood checks    7/20/2021:  - he continues on the Venetoclax  - mild leucopenia from the medication  - PEt on 7/19/2021 seems to be stable    9/21/2021:  - doing ok overall with some occasional HA's  and general lack of energy  - he sees Dr Don again on nov 1st and hopefully he can discontinue the venetoclax thereafter  - Head CT on 8/27/2021 was relatively negative    2/17/2022:  -  He last showed for oncology appointment in Sept 2021  - He had covid in Jan 2022 and he received the infusion but did not require hospitalization  - He saw Dr Erazo couple of weeks ago  - He is planning to have cervical spine surgery with Dr Mai in the near future.  - He sees Dr Don at [facility] this coming Monday. He is now off rituximab monthly and he is also now off the oral Venetoclax (expect he will be getting a repeat bone marrow biopsy)           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  - followed  By Dr Kee  - CEA 1.4 on 4/8/2021  - CT scans on 1/14/2019 stable  - PET done in jan 2021 on chart     (3) Iron deficiency anemia s/p IV iron couple weeks ago  - hgb 11.7  - iron 39 and a little lower  - set up two more IV irons    2/17/2022:  - latest hgb at 11.4 and iron panel is adequate     (4) HTN - on BP meds     (6) Chronic neck and back issues - followed by Dr Ryan Rivero     (7) DM - currently off all meds     (8) Hypercholesterolemia - on meds    (9)  - followed by Dr Whitney    (10) Chronic Leucopenia and thrombocytopenia - due to chemotherapy agents in past    2/17/2022:  - latest WBC at 5.84 and WNL  - latest plat at 92,000 and adequate and better since being off therapy        VISIT DIAGNOSES:      Encounter Diagnoses   Name Primary?    CLL (chronic lymphocytic leukemia) Yes    History of lung cancer - ANDRES NSCLC 2004     Iron deficiency anemia, unspecified iron deficiency anemia type     Pancytopenia            PLAN:  1. check labs every 4 weeks   2. F/u with PCP, pain management  3. F/u with PCP, Pulm, , etc  4. F/u with Dr Don at [facility] - next Monday  5. Set up PEt scan  6. Expect he will be getting BM biopsy at [facility] in near future  7. F/u with Dr Kapadia with vascular for  the aortic aneurysm       RTC in  1-2 months  Fax note to Kacey Kee, Chelo Florence Devraj, Saba/Kang Wheeler        Discussion:       Total Time spent on patient:    I spent over 25 mins of time with the patient. Reviewed results of the recently ordered labs, tests, reports and studies; made directives with regards to the results. Over half of this time was spent couseling and coordinating care, making treatment and analytical decisions; ordering necessary labs, tests and studies; and discussing treatment options and setting up treatment plan(s) if indicated.        COVID-19 Discussion:    I had long discussion with patient and any applicable family about the COVID-19 coronavirus epidemic and the recommended precautions with regard to cancer and/or hematology patients. I have re-iterated the CDC recommendations for adequate hand washing, use of hand -like products, and coughing into elbow, etc. In addition, especially for our patients who are on chemotherapy and/or our otherwise immunocompromised patients, I have recommended avoidance of crowds, including movie theaters, restaurants, churches, etc. I have recommended avoidance of any sick or symptomatic family members and/or friends. I have also recommended avoidance of any raw and unwashed food products, and general avoidance of food items that have not been prepared by themselves. The patient has been asked to call us immediately with any symptom developments, issues, questions or other general concerns.          I have explained all of the above in detail and the patient understands all of the current recommendation(s). I have answered all of their questions to the best of my ability and to their complete satisfaction.   The patient is to continue with the current management plan.        Chemotherapy Discussion:      I discussed the available treatment option(s) in accordance with the latest/current national evidence-based guidelines (NCCN,  UpToDate, NCI, ASCO, etc where applicable), their overall age/condition and their co-morbidities. I also went over the risks and benefits of the chemotherapy with regard to their particular cancer type, their cancer stage, their age/condition, and their co-morbidities. I provided literature on the chemotherapy regimen and discussed the chemotherapy side-effect profiles of the drug(s). I discussed the importance of compliance with obtaining and monitoring weekly lab work, and went over the potential hematopathology issues and risks with anemia, leucopenia and thrombocytopenia that can occur with chemotherapy. I discussed the potential risks of liver and kidney damage, which could be permanent and could necessitate dialysis long-term if kidney failure developed. I discussed the emetic and/or diarrheal potential of the regimen and the potential need for use of antiemetic and anti-diarrheal medications. I discussed the risk for development of anaphylactic shock, bronchospasm, dysrhythmia, and respiratory/cardiovascular arrest and/or failure. I discussed the potential risks for development of alopecia, cold sensory issues, ringing in ears, vertigo, cataracts, glaucoma, and neuropathy, all of which could end up being chronic and life-long. Some chemotherpyI discussed the risks of hand-foot syndrome and rashes, and development of other autoimmune mediated processes such as pneumonitis, hepatitis, and colitis which could be life threatening. I discussed the risks of the potential development of a rare but fatal viral mediated disease known as PML (Progressive Multifocal Leukoencephalopathy), and risk of future development of leukemia and/or lymphoma from use of certain chemotherapy agents. I discussed the need for neutropenic precautions, basic hygiene/sanitation behaviors and dietary restrictions.    The patient's consent has been obtained to proceed with the chemotherapy.The patient will be referred to Chemotherapy School  /[facility] for training and education on chemotherapy, use of antiemetics and/or anti-diarrheals, use of NSAID's, potential chemotherapy side-effects, and any specific recommendations and precautions with the particular chemotherapy agents.       Immunologic Therapy Discussion:    I discussed the available treatment option(s) in accordance with the latest/current national evidence-based guidelines (NCCN, UpToDate, NCI, ASCO, etc where applicable), their overall age/condition and their co-morbidities. I went over the risks and benefits of the immunotherapy with regard to their particular cancer type, their cancer stage, their age/condition, and their co-morbidities. I provided literature on the immunotherapy regimen and discussed the immunotherapy side-effect profiles of the drug(s). I discussed the importance of compliance with obtaining and monitoring weekly lab work, and went over the potential hematopathology issues and risks of hemato-pathologic issues with anemia, leucopenia and/or thrombocytopenia and effects on thyroid function that can occur with immunotherapy. The patient will most likely need to have there thyroid functions monitored by their PCP and may need to take thyroid medication while on the immunotherapy. I discussed the potential risks of liver and kidney damage, which could be permanent and could necessitate dialysis long-term if kidney failure developed. I discussed the emetic and/or diarrheal potential of the regimen and the potential need for use of antiemetic and anti-diarrheal medications. I discussed the risk for development of anaphylactic shock, bronchospasm, dysrhythmia, and respiratory/cardiovascular arrest and/or failure. I discussed the potential risks for development of alopecia, cold sensory issues, ringing in ears, vertigo and neuropathy, all of which could end up being chronic and life-long. I discussed the risks of hand-foot syndrome and rashes, and development of other  autoimmune mediated processes such as pneumonitis, hepatitis and colitis which could potentially be life threatening. I discussed the risks of the potential development of a rare but fatal viral mediated disease known as PML (Progressive Multifocal Leukoencephalopathy), and risk of future development of leukemia and/or lymphoma from use of certain immunotherapy agents. I discussed the possibility that immunologic therapy cold worsen or promote progression of any underlying autoimmune diseases such as sarcoidosis, ulcerative colitis, Crohn's disease, psoriasis, rheumatoid disorders, scleroderma, autoimmune nephritis disorders, Hashimoto's thyroiditis, and Lupus among others. I discussed the need for neutropenic precautions, basic hygiene/sanitation behaviors and dietary restrictions.    The patient's consent has been obtained to proceed with the immunotherapy.The patient will be referred to [facility] for training and education on immunotherapy, use of antiemetics and/or anti-diarrheals, use of NSAID's, potential immunotherapy side-effects, and any specific recommendations and precautions with the particular immunotherapy agents.      I answered all of the patient's (and family's, if applicable) questions to the best of my ability and to their complete satisfaction. The patient acknowledged full understanding of the risks, recommendations and plan(s).      Iron Infusion Therapy Discussion:     I provided literature/learning materials on the particular IV iron regimen and discussed the potential side-effect profiles of the drug(s). I discussed the importance of compliance with obtaining and monitoring requested lab work, and went over the potential risk for the development of anaphylactic shock, bronchospasm, dysrhythmia, liver and/or kidney damage, and respiratory/cardiovascular arrest and/or failure. I discussed the potential risks for development of alopecia, fevers, itching, chills  and/or rigors, cold sensory issues, ringing in ears, vertigo and neuropathy, all of which are usually acute but sometimes could end up being chronic and life-long. I discussed the risks of hand-foot syndrome and rashes, and development of other autoimmune mediated processes such as pneumonitis and colitis which could be life threatening.     The patient's consent has been obtained to proceed with the IV iron therapy.The patient will be referred to [facility] for training and education on IV iron therapy, use of antiemetics and/or anti-diarrheals, use of NSAID's, potential IV iron therapy side-effects, and any specific recommendations and precautions with the particular IV iron agents.      I answered all of the patient's (and family's, if applicable) questions to the best of my ability and to their complete satisfaction. The patient acknowledged full understanding of the risks, recommendations and plan(s).          I answered all of the patient's (and family's, if applicable) questions to the best of my ability and to their complete satisfaction. The patient acknowledged full understanding of the risks, recommendations and plan(s).         Electronically signed by Drew Bai MD

## 2022-02-17 ENCOUNTER — OFFICE VISIT (OUTPATIENT)
Dept: HEMATOLOGY/ONCOLOGY | Facility: CLINIC | Age: 82
End: 2022-02-17
Payer: MEDICARE

## 2022-02-17 VITALS
BODY MASS INDEX: 27.08 KG/M2 | TEMPERATURE: 98 F | DIASTOLIC BLOOD PRESSURE: 68 MMHG | WEIGHT: 211 LBS | HEIGHT: 74 IN | SYSTOLIC BLOOD PRESSURE: 168 MMHG | RESPIRATION RATE: 20 BRPM | HEART RATE: 66 BPM

## 2022-02-17 DIAGNOSIS — Z85.118 HISTORY OF LUNG CANCER: ICD-10-CM

## 2022-02-17 DIAGNOSIS — C91.10 CLL (CHRONIC LYMPHOCYTIC LEUKEMIA): Primary | ICD-10-CM

## 2022-02-17 DIAGNOSIS — D50.9 IRON DEFICIENCY ANEMIA, UNSPECIFIED IRON DEFICIENCY ANEMIA TYPE: ICD-10-CM

## 2022-02-17 DIAGNOSIS — D61.818 PANCYTOPENIA: ICD-10-CM

## 2022-02-17 PROCEDURE — 99214 OFFICE O/P EST MOD 30 MIN: CPT | Mod: S$GLB,,, | Performed by: INTERNAL MEDICINE

## 2022-02-17 PROCEDURE — 99214 PR OFFICE/OUTPT VISIT, EST, LEVL IV, 30-39 MIN: ICD-10-PCS | Mod: S$GLB,,, | Performed by: INTERNAL MEDICINE

## 2022-02-22 DIAGNOSIS — D84.9 IMMUNOSUPPRESSED STATUS: ICD-10-CM

## 2022-02-28 ENCOUNTER — TELEPHONE (OUTPATIENT)
Dept: HEMATOLOGY/ONCOLOGY | Facility: CLINIC | Age: 82
End: 2022-02-28
Payer: MEDICARE

## 2022-02-28 NOTE — TELEPHONE ENCOUNTER
Spoke to wife and informed her that imaging has been trying to contact patient to schedule PET but have been unsuccessful in reaching patient. Verbalized understanding and imaging contact given to wife to call to schedule appt. Scheduling made aware.

## 2022-03-07 ENCOUNTER — LAB VISIT (OUTPATIENT)
Dept: LAB | Facility: HOSPITAL | Age: 82
End: 2022-03-07
Attending: INTERNAL MEDICINE
Payer: MEDICARE

## 2022-03-07 ENCOUNTER — PATIENT MESSAGE (OUTPATIENT)
Dept: HEMATOLOGY/ONCOLOGY | Facility: CLINIC | Age: 82
End: 2022-03-07
Payer: MEDICARE

## 2022-03-07 ENCOUNTER — TELEPHONE (OUTPATIENT)
Dept: HEMATOLOGY/ONCOLOGY | Facility: CLINIC | Age: 82
End: 2022-03-07
Payer: MEDICARE

## 2022-03-07 DIAGNOSIS — Z85.118 HISTORY OF LUNG CANCER: ICD-10-CM

## 2022-03-07 DIAGNOSIS — C91.10 CLL (CHRONIC LYMPHOCYTIC LEUKEMIA): ICD-10-CM

## 2022-03-07 LAB
ALBUMIN SERPL BCP-MCNC: 4 G/DL (ref 3.5–5.2)
ALP SERPL-CCNC: 43 U/L (ref 55–135)
ALT SERPL W/O P-5'-P-CCNC: 10 U/L (ref 10–44)
ANION GAP SERPL CALC-SCNC: 11 MMOL/L (ref 8–16)
AST SERPL-CCNC: 17 U/L (ref 10–40)
BASOPHILS # BLD AUTO: 0.01 K/UL (ref 0–0.2)
BASOPHILS NFR BLD: 0.3 % (ref 0–1.9)
BILIRUB SERPL-MCNC: 1.2 MG/DL (ref 0.1–1)
BUN SERPL-MCNC: 15 MG/DL (ref 8–23)
CALCIUM SERPL-MCNC: 8.9 MG/DL (ref 8.7–10.5)
CHLORIDE SERPL-SCNC: 100 MMOL/L (ref 95–110)
CO2 SERPL-SCNC: 25 MMOL/L (ref 23–29)
CREAT SERPL-MCNC: 1.1 MG/DL (ref 0.5–1.4)
DIFFERENTIAL METHOD: ABNORMAL
EOSINOPHIL # BLD AUTO: 0.1 K/UL (ref 0–0.5)
EOSINOPHIL NFR BLD: 1.5 % (ref 0–8)
ERYTHROCYTE [DISTWIDTH] IN BLOOD BY AUTOMATED COUNT: 14.2 % (ref 11.5–14.5)
EST. GFR  (AFRICAN AMERICAN): >60 ML/MIN/1.73 M^2
EST. GFR  (NON AFRICAN AMERICAN): >60 ML/MIN/1.73 M^2
GLUCOSE SERPL-MCNC: 162 MG/DL (ref 70–110)
HCT VFR BLD AUTO: 31.5 % (ref 40–54)
HGB BLD-MCNC: 11.4 G/DL (ref 14–18)
IMM GRANULOCYTES # BLD AUTO: 0.13 K/UL (ref 0–0.04)
IMM GRANULOCYTES NFR BLD AUTO: 3.9 % (ref 0–0.5)
IRON SERPL-MCNC: 118 UG/DL (ref 45–160)
LYMPHOCYTES # BLD AUTO: 0.9 K/UL (ref 1–4.8)
LYMPHOCYTES NFR BLD: 28.3 % (ref 18–48)
MCH RBC QN AUTO: 32.7 PG (ref 27–31)
MCHC RBC AUTO-ENTMCNC: 36.2 G/DL (ref 32–36)
MCV RBC AUTO: 90 FL (ref 82–98)
MONOCYTES # BLD AUTO: 0.4 K/UL (ref 0.3–1)
MONOCYTES NFR BLD: 13 % (ref 4–15)
NEUTROPHILS # BLD AUTO: 1.8 K/UL (ref 1.8–7.7)
NEUTROPHILS NFR BLD: 53 % (ref 38–73)
NRBC BLD-RTO: 1 /100 WBC
PLATELET # BLD AUTO: 72 K/UL (ref 150–450)
PMV BLD AUTO: 10.9 FL (ref 9.2–12.9)
POTASSIUM SERPL-SCNC: 3.9 MMOL/L (ref 3.5–5.1)
PROT SERPL-MCNC: 6.7 G/DL (ref 6–8.4)
RBC # BLD AUTO: 3.49 M/UL (ref 4.6–6.2)
SATURATED IRON: 38 % (ref 20–50)
SODIUM SERPL-SCNC: 136 MMOL/L (ref 136–145)
TOTAL IRON BINDING CAPACITY: 307 UG/DL (ref 250–450)
TRANSFERRIN SERPL-MCNC: 219 MG/DL (ref 200–375)
WBC # BLD AUTO: 3.32 K/UL (ref 3.9–12.7)

## 2022-03-07 PROCEDURE — 80053 COMPREHEN METABOLIC PANEL: CPT | Performed by: INTERNAL MEDICINE

## 2022-03-07 PROCEDURE — 85025 COMPLETE CBC W/AUTO DIFF WBC: CPT | Performed by: INTERNAL MEDICINE

## 2022-03-07 PROCEDURE — 36415 COLL VENOUS BLD VENIPUNCTURE: CPT | Performed by: INTERNAL MEDICINE

## 2022-03-07 PROCEDURE — 84466 ASSAY OF TRANSFERRIN: CPT | Performed by: INTERNAL MEDICINE

## 2022-03-07 NOTE — TELEPHONE ENCOUNTER
Spoke to patient's wife and she states that the patient has been feeling more fatigued. Informed her we will wait for his lab results and the order IV iron if needed.

## 2022-03-07 NOTE — TELEPHONE ENCOUNTER
----- Message from Zhanna Holt sent at 3/7/2022  1:44 PM CST -----  Pt. Wanted to let you know he is feeling very weak and having dark stools. He was going to have lab work.     # [phone] wife cell  [phone]

## 2022-03-14 ENCOUNTER — HOSPITAL ENCOUNTER (OUTPATIENT)
Dept: RADIOLOGY | Facility: HOSPITAL | Age: 82
Discharge: HOME | End: 2022-03-14
Attending: INTERNAL MEDICINE
Payer: MEDICARE

## 2022-03-14 VITALS — WEIGHT: 212 LBS | BODY MASS INDEX: 27.21 KG/M2 | HEIGHT: 74 IN

## 2022-03-14 DIAGNOSIS — C91.10 CLL (CHRONIC LYMPHOCYTIC LEUKEMIA): ICD-10-CM

## 2022-03-14 DIAGNOSIS — Z85.118 HISTORY OF LUNG CANCER: ICD-10-CM

## 2022-03-14 LAB — GLUCOSE SERPL-MCNC: 158 MG/DL (ref 70–110)

## 2022-03-14 PROCEDURE — 78815 PET IMAGE W/CT SKULL-THIGH: CPT | Mod: TC,PO,PS

## 2022-03-18 ENCOUNTER — TELEPHONE (OUTPATIENT)
Dept: HEMATOLOGY/ONCOLOGY | Facility: CLINIC | Age: 82
End: 2022-03-18
Payer: MEDICARE

## 2022-03-18 NOTE — TELEPHONE ENCOUNTER
----- Message from Drew Bai MD sent at 3/14/2022 12:32 PM CDT -----  Please call him with the good report

## 2022-03-30 NOTE — PROGRESS NOTES
[facility] Hematology/Oncology  PROGRESS NOTE - Follow-up Visit      Subjective:       Patient ID:   NAME: Conrad Kuhn : 1940     81 y.o. male    Referring Doc: Julien  Other Physicians: Ced Erazo Joubert, Srinivas, Pinsky    Chief Complaint:  CLL f/u    History of Present Illness:     Patient is being seen today in person in clinic for a regular follow-up viasit. He is here by himself.  The patient is on today to go over the results of the recently ordered labs, tests and studies. He is here by himself today.    He had covid in 2022 and he received the infusion but did not require hospitalization    He is planning to have cervical spine surgery with Dr Mai in the near future.     He is breathing ok. He denies any CP, SOB, or N/V.       He had recent telemed visit Dr Don at [facility] and they were pleased with the latest bone marrow biopsy. He is now off rituximab monthly and he is also now off the oral Venetoclax        Discussed Covid19 precautions; he had his vaccinations              ROS:   GEN: normal without any fever, night sweats or weight loss  HEENT: normal with no HA's, sore throat, stiff neck, changes in vision  CV: normal with no CP, SOB, PND, MAYER or orthopnea  PULM: normal with no SOB, cough, hemoptysis, sputum or pleuritic pain  GI: normal with no abdominal pain, nausea, vomiting, constipation, diarrhea, melanotic stools, BRBPR, or hematemesis; no dysphagia  : urine frequency stable  BREAST: normal with no mass, discharge, pain  SKIN: normal with no rash, erythema, bruising, or swelling    Allergies:  Review of patient's allergies indicates:  No Known Allergies    Medications:    Current Outpatient Medications:     atorvastatin (LIPITOR) 20 MG tablet, Take 20 mg by mouth once daily. , Disp: , Rfl:     azelastine (ASTELIN) 137 mcg (0.1 %) nasal spray, 1 spray by Nasal route 2 (two) times daily. , Disp: , Rfl:     blood sugar diagnostic Strp, by Other route., Disp: , Rfl:      celecoxib (CELEBREX) 200 MG capsule, Take 200 mg by mouth 2 (two) times daily., Disp: , Rfl:     duke's soln (benadryl 30 mL, mylanta 30 mL, LIDOcaine 30 mL, nystatin 30 mL) 120mL, Take 10 mLs by mouth every 4 (four) hours as needed (mouth sores)., Disp: 120 mL, Rfl: 0    ferrous sulfate (FEOSOL) 325 mg (65 mg iron) Tab tablet, Take 1 tablet (325 mg total) by mouth once daily., Disp: , Rfl:     FREESTYLE LANCETS 28 gauge lancets, TEST TWICE DAILY, Disp: , Rfl: 1    gabapentin (NEURONTIN) 300 MG capsule, Take 300 mg by mouth 2 (two) times daily. , Disp: , Rfl: 0    glimepiride (AMARYL) 1 MG tablet, Take 1 mg by mouth daily with breakfast. , Disp: , Rfl: 0    HYDROcodone-acetaminophen (NORCO)  mg per tablet, Take 1 tablet by mouth every 8 (eight) hours as needed. , Disp: , Rfl: 0    ibuprofen (ADVIL,MOTRIN) 600 MG tablet, Take 600 mg by mouth every 6 (six) hours as needed for Pain., Disp: , Rfl:     levalbuterol (XOPENEX) 1.25 mg/3 mL nebulizer solution, Take 3 mLs by nebulization every 4 to 6 hours as needed. , Disp: , Rfl:     losartan (COZAAR) 25 MG tablet, Take 25 mg by mouth every evening., Disp: , Rfl:     metFORMIN (GLUCOPHAGE) 500 MG tablet, Take 500 mg by mouth 2 (two) times daily with meals. , Disp: , Rfl: 2    metoprolol succinate (TOPROL-XL) 25 MG 24 hr tablet, Take 25 mg by mouth 2 (two) times daily. , Disp: , Rfl:     predniSONE (DELTASONE) 10 MG tablet, Take 60mg daily for five days, then 50mg for day 6, 40mg for day 7, 30mg day 8, 20mg day 9, and 10mg day 10., Disp: 50 tablet, Rfl: 0    promethazine (PHENERGAN) 25 MG tablet, TAKE 1 TABLET BY MOUTH EVERY 6 HOURS AS NEEDED FOR NAUSEA, Disp: 30 tablet, Rfl: 3    traZODone (DESYREL) 100 MG tablet, TAKE 1 TABLET BY MOUTH EVERY NIGHT AT BEDTIME (Patient taking differently: Take 100 mg by mouth every evening.), Disp: 90 tablet, Rfl: 0    valACYclovir (VALTREX) 1000 MG tablet, Take 1 tablet (1,000 mg total) by mouth 2 (two) times daily for  "7 days, THEN 1 tablet (1,000 mg total) once daily., Disp: 374 tablet, Rfl: 0    venetoclax (VENCLEXTA) 100 mg Tab, Take 200 mg by mouth once daily., Disp: 60 tablet, Rfl: 8    blood-glucose meter (FREESTYLE LITE METER) kit, Use as instructed, Disp: , Rfl:     Current Facility-Administered Medications:     acetaminophen tablet 650 mg, 650 mg, Oral, Once PRN, MELLO Page    albuterol inhaler 2 puff, 2 puff, Inhalation, Q20 Min PRN, MELLO Page    diphenhydrAMINE injection 25 mg, 25 mg, Intravenous, Once PRN, MELLO Page    EPINEPHrine (EPIPEN) 0.3 mg/0.3 mL pen injection 0.3 mg, 0.3 mg, Intramuscular, PRN, MELLO Page    methylPREDNISolone sodium succinate injection 40 mg, 40 mg, Intravenous, Once PRN, MELLO Page    ondansetron disintegrating tablet 4 mg, 4 mg, Oral, Once PRN, MELLO Page    sodium chloride 0.9% 500 mL flush bag, , Intravenous, PRN, MELLO Page, Stopped at 01/06/22 1330    sodium chloride 0.9% flush 10 mL, 10 mL, Intravenous, PRN, MELLO Page    PMHx/PSHx Updates:  See patient's last visit with me on 2/17/2022  See H&P on 1/3/2019        Pathology:  Cancer Staging  No matching staging information was found for the patient.          Objective:     Vitals:  Blood pressure (!) 162/70, pulse 63, temperature 97.5 °F (36.4 °C), resp. rate 18, height 6' 2" (1.88 m), weight 95.7 kg (211 lb).        Physical Examination:   GEN: no apparent distress, comfortable; AAOx3  HEAD: atraumatic and normocephalic  EYES: no conjunctival pallor or muddiness, no icterus; normal pupil reaction to ambient light  ENT: OMM, no pharyngeal erythema, external bilateral ears WNL; no visible thrush or ulcers  NECK: no masses or swelling, trachea midline, no visible LAD/LN's ; LAD and LN's on right neck reduced in size  CV: no palpitations; no pedal edema; no noticeable JVD or neck vein distension  CHEST: Normal respiratory effort; chest wall breath " movements symmetrical; no audible wheezing  ABDOM: non-distended; no bloating  MUSC/Skeletal: ROM normal; joints visibly normal; no deformities or arthropathy  EXTREM: no clubbing, cyanosis, inflammation or swelling; right arm in sling s/p right shoulder surgery  SKIN: no rashes, lesions, ulcers, petechiae or subcutaneous nodules  : no keith  NEURO: moving all 4 extremities; AAOx3; no tremors  PSYCH: normal mood, affect and behavior  LYMPH: no visible LN's or LAD; LAD and LN's on right neck reduced in size              Labs:   Lab Results   Component Value Date    WBC 2.95 (L) 03/31/2022    HGB 12.4 (L) 03/31/2022    HCT 35.4 (L) 03/31/2022    MCV 91 03/31/2022    PLT 69 (L) 03/31/2022     CMP  Sodium   Date Value Ref Range Status   03/31/2022 138 136 - 145 mmol/L Final   06/12/2019 138 134 - 144 mmol/L      Potassium   Date Value Ref Range Status   03/31/2022 4.2 3.5 - 5.1 mmol/L Final     Chloride   Date Value Ref Range Status   03/31/2022 100 95 - 110 mmol/L Final   06/12/2019 99 98 - 110 mmol/L      CO2   Date Value Ref Range Status   03/31/2022 30 (H) 23 - 29 mmol/L Final     Glucose   Date Value Ref Range Status   03/31/2022 180 (H) 70 - 110 mg/dL Final   06/12/2019 179 (H) 70 - 99 mg/dL      BUN   Date Value Ref Range Status   03/31/2022 18 8 - 23 mg/dL Final     Creatinine   Date Value Ref Range Status   03/31/2022 1.2 0.5 - 1.4 mg/dL Final   06/12/2019 0.95 0.60 - 1.40 mg/dL      Calcium   Date Value Ref Range Status   03/31/2022 9.2 8.7 - 10.5 mg/dL Final     Total Protein   Date Value Ref Range Status   03/31/2022 7.2 6.0 - 8.4 g/dL Final     Albumin   Date Value Ref Range Status   03/31/2022 4.2 3.5 - 5.2 g/dL Final   06/12/2019 3.9 3.1 - 4.7 g/dL      Total Bilirubin   Date Value Ref Range Status   03/31/2022 1.2 (H) 0.1 - 1.0 mg/dL Final     Comment:     For infants and newborns, interpretation of results should be based  on gestational age, weight and in agreement with  clinical  observations.    Premature Infant recommended reference ranges:  Up to 24 hours.............<8.0 mg/dL  Up to 48 hours............<12.0 mg/dL  3-5 days..................<15.0 mg/dL  6-29 days.................<15.0 mg/dL       Alkaline Phosphatase   Date Value Ref Range Status   03/31/2022 49 (L) 55 - 135 U/L Final     AST   Date Value Ref Range Status   03/31/2022 15 10 - 40 U/L Final     ALT   Date Value Ref Range Status   03/31/2022 13 10 - 44 U/L Final     Anion Gap   Date Value Ref Range Status   03/31/2022 8 8 - 16 mmol/L Final     eGFR if    Date Value Ref Range Status   03/31/2022 >60.0 >60 mL/min/1.73 m^2 Final     eGFR if non    Date Value Ref Range Status   03/31/2022 56.0 (A) >60 mL/min/1.73 m^2 Final     Comment:     Calculation used to obtain the estimated glomerular filtration  rate (eGFR) is the CKD-EPI equation.              Lab Results   Component Value Date    IRON 100 03/31/2022    TIBC 361 03/31/2022    FERRITIN 54 03/31/2022         Radiology/Diagnostic Studies:    PET  3/14/2022:    IMPRESSION:  Left upper lobectomy without evidence of recurrent or metastatic disease      MRI cervical spine  10/1/2021:    IMPRESSION:     Loss of normal cervical lordosis with mild kyphotic curvature.     Advanced multilevel cervical spondylosis, as outlined above. Spinal canal narrowing and foraminal narrowing is most severe at C5-6. 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     Congenital narrowing of cervical spinal canal, which exacerbates cervical spondylosis        CT head  8/27/2021:  IMPRESSION:     No CT evidence of acute intracranial pathology.     Stable senescent changes as above.         PET  7/19/2021:    IMPRESSION:  Prior left upper lobectomy without evidence of recurrent or metastatic disease  - 3 cm infrarenal abdominal aortic aneurysm      CT head 6/29/2021:  IMPRESSION:     No CT evidence of  acute intracranial pathology      PET  1/18/2021:    Impression:     No evidence of FDG avid lymphoproliferative disease.     Aneurysmal dilation of infrarenal abdominal aorta (3.4 cm) as well as aneurysmal dilation of right common iliac artery.     Additional incidental findings as above        PET  6/24/2020:    Impression:     1. No findings of active lymphoproliferative disease.  2. Additional observations as above.      PET  1/24/2020:    Impression       Moderate decrease in size of the adenopathy within the neck, chest, abdomen and pelvis.  The spleen is smaller in size.  There is no significant FDG activity.    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 Multifocal lymphadenopathy in the chest, abdomen, and pelvis compatible with provided clinical history of lymphocytic leukemia.  There is mixed interval change when compared to prior studies.  Pathologic lymphadenopathy in the chest has increased significantly when compared to a CTA of the chest from May 2018.  Pathologic retroperitoneal lymphadenopathy has improved slightly when compared to a previous abdominal CT from February 2017.  Please see above details.  2. Mild aneurysmal dilation of the infrarenal abdominal aorta, with maximal transverse diameter of 3.7 cm.  Maximal transverse diameter on a prior study from 2017 was 2.5 cm.  3. Additional findings as above           [facility] Unknown Rad Eap    Result Date: 1/14/2019  CMS MANDATED QUALITY DATA - CT RADIATION - 436 All CT scans at this facility utilize dose modulation, iterative reconstruction, and/or weight based dosing when appropriate to reduce radiation dose to as low as reasonably achievable. Reason:Lymphoid leukemia TECHNIQUE: CT thorax with, CT abdomen  with, and CT pelvis with 100 mL Omnipaque 350. COMPARISON: CT chest dated 05/19/2018 and CT abdomen and pelvis dated 02/08/2017 CT THORAX: There is stable mediastinal,  hilar and axillary adenopathy. There is coronary artery calcification. There is resolution of the groundglass opacities throughout the lungs. There are no confluent infiltrates, pulmonary nodules or pleural effusions. There are stable subcentimeter nodules in the left lobe of the thyroid gland. There are degenerative changes of the spine. CT ABDOMEN: The liver, pancreas, adrenal glands and gallbladder are normal. The spleen is enlarged. There is mesenteric and retroperitoneal adenopathy which is slightly smaller in size. -There is a portacaval node measuring 37 x 21 mm and previously measured 38 x 27 mm. -Periportal lymph node measuring 41 x 18 mm, previously measured 48 x 29 mm. -Left periaortic adenopathy measuring 34 x 23 mm and previously measured 40 x 42 mm- The kidneys enhance symmetrically without hydronephrosis or calculi. There are no thick-walled or dilated bowel loops. There is vascular calcification of aorta. There is a 3.0 x 3.0 cm infrarenal abdominal aortic aneurysm. CT PELVIS: There is adenopathy along the pelvic sidewalls bilaterally which has decreased in size. -The left common iliac adenopathy measuring 36 x 11 mm, previously measured 46 x 14 mm -the right common iliac adenopathy measuring 46 x 10 mm. Previously measured 53 x 15 mm. The bladder is normal. The prostate gland is enlarged. There are degenerative changes of the spine. There is grade 1 anterolisthesis of L5 on S1 with bilateral pars defects.     IMPRESSION: Stable mediastinal, hilar and axillary adenopathy with resolution of the airspace disease within the lungs Decrease in size of the mesenteric, retroperitoneal and pelvic adenopathy. Stable infrarenal abdominal aortic aneurysm Splenomegaly     Read and electronically signed by: Jeniffer Zhang MD on 1/14/2019 9:14 AM CST JENIFFER ZHANG MD      I have reviewed all available lab results and radiology reports.    Assessment/Plan:   (1) 81 y.o. male with  diagnosis of CLL who has  been referred by Dr Rony iVctoria for continuation of care by medical hematology/oncology.   - BM biopsy  12/4/2015 with CLL  - diagnosis of SLL in 2004 and s/p FCR x6 in 2007  - s/p imbruvica in past (stopped in May 2018)  - latest wbc at 4.8; lymph 56%  - latest CT on 8/7/2019 showing increase in the LAD  - platelets are 111,000  - He was recently hospitalized at [facility] and seen by Dr Wheeler. Dr Wheeler has recommended Rituximab. He is starting tomorrow.   - discussed the rituximab regimen and the potential side-effect profile; he is getting chemotherapy school today with [facility]  - he saw Dr Don on Oct 21st 2019  - he has had 3 of the 4 weeks of rituximab so far; Dr Don recommends him to eventually get rituximab monthly x6 with daily oral Venetoclax for two years total.   - completed rituximab (4th) week of rituximab and  S/p 6 monthly rituximab with Venetoclax oral but is taking only 2 pills daily due to the counts  - he is now just on the venetclax  - he saw Dr Don again on Dec 23rd 2019 and sees him again in March 16th 2020  - latest PEt on 1/24/2020 looks really good    8/27/2020:  - he saw Dr Don last month at [facility]  - latest PEt from 6/24/2020 looks good  - he remains on just the oral venetoclax at 2 pills per day  - he sees Dr Don again in about 3 months (Oct 2020)    11/18/2020:  - he is doing well with the Venetoclax  - due for repeat PEt in Dec 2020  - he sees Dr Don again on Dec 23rd 2020    1/20/2021:  - he is doing well with the Venetoclax  - He saw Dr Don at [facility] in Dec 2020. He is now off rituximab monthly and remains only on the oral Venetoclax but at 2 pills daily . He sees Dr Don again in feb 2021  - Recent PEt on  1/18/2021 looks stable except for incidental aneurysm in aorta; so we are referring him to Dr Kapadia with vascualr    4/20/2021:  - he saw Dr Don in Feb 2021 and sees him again in Nov 2021  - repeat PEt in Iris/july 2021  - continued on Venetoclax and regular blood  checks    7/20/2021:  - he continues on the Venetoclax  - mild leucopenia from the medication  - PEt on 7/19/2021 seems to be stable    9/21/2021:  - doing ok overall with some occasional HA's and general lack of energy  - he sees Dr Don again on nov 1st and hopefully he can discontinue the venetoclax thereafter  - Head CT on 8/27/2021 was relatively negative    2/17/2022:  -  He last showed for oncology appointment in Sept 2021  - He had covid in Jan 2022 and he received the infusion but did not require hospitalization  - He saw Dr Erazo couple of weeks ago  - He is planning to have cervical spine surgery with Dr Mai in the near future.  - He sees Dr Don at [facility] this coming Monday. He is now off rituximab monthly and he is also now off the oral Venetoclax (expect he will be getting a repeat bone marrow biopsy)    3/31/2022:  - He had recent telemed visit Dr Don at [facility] and they were pleased with the latest bone marrow biopsy. He is now off rituximab monthly and he is also now off the oral Venetoclax             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  - followed  By Dr Kee  - CEA 1.4 on 4/8/2021  - CT scans on 1/14/2019 stable  - PET done in jan 2021 on chart    3/31/2022:  - CEA at 0.8  - PET on 3/14/2022 negative for recurrence     (3) Iron deficiency anemia s/p IV iron couple weeks ago  - hgb 11.7  - iron 39 and a little lower  - set up two more IV irons    2/17/2022:  - latest hgb at 11.4 and iron panel is adequate    3/31/2022:  - latest hgb at 11.4 and stable  - iron panel currently WNL     (4) HTN - on BP meds     (6) Chronic neck and back issues - followed by Dr Ryan Rivero     (7) DM - currently off all meds     (8) Hypercholesterolemia - on meds    (9)  - followed by Dr Whitney    (10) Chronic Leucopenia and thrombocytopenia - due to chemotherapy agents in past    2/17/2022:  - latest WBC at 5.84 and WNL  - latest plat at 92,000 and adequate and better since  being off therapy    3/31/2022:  - latest plats are a little lower at 69,000  - wbc at 3.32        VISIT DIAGNOSES:      Encounter Diagnoses   Name Primary?    CLL (chronic lymphocytic leukemia) Yes    History of lung cancer - ANDRES NSCLC 2004     Iron deficiency anemia, unspecified iron deficiency anemia type     Pancytopenia            PLAN:  1. check labs every 4 weeks   2. F/u with PCP, pain management  3. F/u with PCP, Pulm, , etc  4. F/u with Dr Don at [facility] as directed  5. Planned cervical vertebral surgery with Dr Mai in the near future (tentaively set for 4/8/2022) - he does have chronically low platelets, and if necessary, should get platelet transfusion prior to any surgery and perioperatively as needed at the discretion of the surgeon; he is considered a higher risk for bleeding with the chronically low platelets (I spoke to Dr Mai previously)  7. F/u with Dr Kapadia with vascular for the aortic aneurysm       RTC in  2-3 months  Fax note to Kacey Kee, Ryan Rivero, Karla Whitney Saba/Kang Wheeler; Primo        Discussion:       Total Time spent on patient:    I spent over 25 mins of time with the patient. Reviewed results of the recently ordered labs, tests, reports and studies; made directives with regards to the results. Over half of this time was spent couseling and coordinating care, making treatment and analytical decisions; ordering necessary labs, tests and studies; and discussing treatment options and setting up treatment plan(s) if indicated.        COVID-19 Discussion:    I had long discussion with patient and any applicable family about the COVID-19 coronavirus epidemic and the recommended precautions with regard to cancer and/or hematology patients. I have re-iterated the CDC recommendations for adequate hand washing, use of hand -like products, and coughing into elbow, etc. In addition, especially for our patients who are on chemotherapy and/or our otherwise  immunocompromised patients, I have recommended avoidance of crowds, including movie theaters, restaurants, churches, etc. I have recommended avoidance of any sick or symptomatic family members and/or friends. I have also recommended avoidance of any raw and unwashed food products, and general avoidance of food items that have not been prepared by themselves. The patient has been asked to call us immediately with any symptom developments, issues, questions or other general concerns.          I have explained all of the above in detail and the patient understands all of the current recommendation(s). I have answered all of their questions to the best of my ability and to their complete satisfaction.   The patient is to continue with the current management plan.        Chemotherapy Discussion:      I discussed the available treatment option(s) in accordance with the latest/current national evidence-based guidelines (NCCN, UpToDate, NCI, ASCO, etc where applicable), their overall age/condition and their co-morbidities. I also went over the risks and benefits of the chemotherapy with regard to their particular cancer type, their cancer stage, their age/condition, and their co-morbidities. I provided literature on the chemotherapy regimen and discussed the chemotherapy side-effect profiles of the drug(s). I discussed the importance of compliance with obtaining and monitoring weekly lab work, and went over the potential hematopathology issues and risks with anemia, leucopenia and thrombocytopenia that can occur with chemotherapy. I discussed the potential risks of liver and kidney damage, which could be permanent and could necessitate dialysis long-term if kidney failure developed. I discussed the emetic and/or diarrheal potential of the regimen and the potential need for use of antiemetic and anti-diarrheal medications. I discussed the risk for development of anaphylactic shock, bronchospasm, dysrhythmia, and  respiratory/cardiovascular arrest and/or failure. I discussed the potential risks for development of alopecia, cold sensory issues, ringing in ears, vertigo, cataracts, glaucoma, and neuropathy, all of which could end up being chronic and life-long. Some chemotherpyI discussed the risks of hand-foot syndrome and rashes, and development of other autoimmune mediated processes such as pneumonitis, hepatitis, and colitis which could be life threatening. I discussed the risks of the potential development of a rare but fatal viral mediated disease known as PML (Progressive Multifocal Leukoencephalopathy), and risk of future development of leukemia and/or lymphoma from use of certain chemotherapy agents. I discussed the need for neutropenic precautions, basic hygiene/sanitation behaviors and dietary restrictions.    The patient's consent has been obtained to proceed with the chemotherapy.The patient will be referred to Chemotherapy [facility] for training and education on chemotherapy, use of antiemetics and/or anti-diarrheals, use of NSAID's, potential chemotherapy side-effects, and any specific recommendations and precautions with the particular chemotherapy agents.       Immunologic Therapy Discussion:    I discussed the available treatment option(s) in accordance with the latest/current national evidence-based guidelines (NCCN, UpToDate, NCI, ASCO, etc where applicable), their overall age/condition and their co-morbidities. I went over the risks and benefits of the immunotherapy with regard to their particular cancer type, their cancer stage, their age/condition, and their co-morbidities. I provided literature on the immunotherapy regimen and discussed the immunotherapy side-effect profiles of the drug(s). I discussed the importance of compliance with obtaining and monitoring weekly lab work, and went over the potential hematopathology issues and risks of hemato-pathologic issues with anemia, leucopenia  and/or thrombocytopenia and effects on thyroid function that can occur with immunotherapy. The patient will most likely need to have there thyroid functions monitored by their PCP and may need to take thyroid medication while on the immunotherapy. I discussed the potential risks of liver and kidney damage, which could be permanent and could necessitate dialysis long-term if kidney failure developed. I discussed the emetic and/or diarrheal potential of the regimen and the potential need for use of antiemetic and anti-diarrheal medications. I discussed the risk for development of anaphylactic shock, bronchospasm, dysrhythmia, and respiratory/cardiovascular arrest and/or failure. I discussed the potential risks for development of alopecia, cold sensory issues, ringing in ears, vertigo and neuropathy, all of which could end up being chronic and life-long. I discussed the risks of hand-foot syndrome and rashes, and development of other autoimmune mediated processes such as pneumonitis, hepatitis and colitis which could potentially be life threatening. I discussed the risks of the potential development of a rare but fatal viral mediated disease known as PML (Progressive Multifocal Leukoencephalopathy), and risk of future development of leukemia and/or lymphoma from use of certain immunotherapy agents. I discussed the possibility that immunologic therapy cold worsen or promote progression of any underlying autoimmune diseases such as sarcoidosis, ulcerative colitis, Crohn's disease, psoriasis, rheumatoid disorders, scleroderma, autoimmune nephritis disorders, Hashimoto's thyroiditis, and Lupus among others. I discussed the need for neutropenic precautions, basic hygiene/sanitation behaviors and dietary restrictions.    The patient's consent has been obtained to proceed with the immunotherapy.The patient will be referred to [employer] [employer] /[facility] for training and education on immunotherapy, use of  antiemetics and/or anti-diarrheals, use of NSAID's, potential immunotherapy side-effects, and any specific recommendations and precautions with the particular immunotherapy agents.      I answered all of the patient's (and family's, if applicable) questions to the best of my ability and to their complete satisfaction. The patient acknowledged full understanding of the risks, recommendations and plan(s).      Iron Infusion Therapy Discussion:     I provided literature/learning materials on the particular IV iron regimen and discussed the potential side-effect profiles of the drug(s). I discussed the importance of compliance with obtaining and monitoring requested lab work, and went over the potential risk for the development of anaphylactic shock, bronchospasm, dysrhythmia, liver and/or kidney damage, and respiratory/cardiovascular arrest and/or failure. I discussed the potential risks for development of alopecia, fevers, itching, chills and/or rigors, cold sensory issues, ringing in ears, vertigo and neuropathy, all of which are usually acute but sometimes could end up being chronic and life-long. I discussed the risks of hand-foot syndrome and rashes, and development of other autoimmune mediated processes such as pneumonitis and colitis which could be life threatening.     The patient's consent has been obtained to proceed with the IV iron therapy.The patient will be referred to [employer] /[facility] for training and education on IV iron therapy, use of antiemetics and/or anti-diarrheals, use of NSAID's, potential IV iron therapy side-effects, and any specific recommendations and precautions with the particular IV iron agents.      I answered all of the patient's (and family's, if applicable) questions to the best of my ability and to their complete satisfaction. The patient acknowledged full understanding of the risks, recommendations and plan(s).          I answered all of the patient's (and  family's, if applicable) questions to the best of my ability and to their complete satisfaction. The patient acknowledged full understanding of the risks, recommendations and plan(s).         Electronically signed by Drew Bai MD

## 2022-03-31 ENCOUNTER — OFFICE VISIT (OUTPATIENT)
Dept: HEMATOLOGY/ONCOLOGY | Facility: CLINIC | Age: 82
End: 2022-03-31
Payer: MEDICARE

## 2022-03-31 ENCOUNTER — LAB VISIT (OUTPATIENT)
Dept: LAB | Facility: HOSPITAL | Age: 82
End: 2022-03-31
Attending: INTERNAL MEDICINE
Payer: MEDICARE

## 2022-03-31 VITALS
TEMPERATURE: 98 F | WEIGHT: 211 LBS | SYSTOLIC BLOOD PRESSURE: 162 MMHG | HEIGHT: 74 IN | HEART RATE: 63 BPM | BODY MASS INDEX: 27.08 KG/M2 | RESPIRATION RATE: 18 BRPM | DIASTOLIC BLOOD PRESSURE: 70 MMHG

## 2022-03-31 DIAGNOSIS — D61.818 PANCYTOPENIA: ICD-10-CM

## 2022-03-31 DIAGNOSIS — C91.10 CLL (CHRONIC LYMPHOCYTIC LEUKEMIA): Primary | ICD-10-CM

## 2022-03-31 DIAGNOSIS — Z85.118 HISTORY OF LUNG CANCER: ICD-10-CM

## 2022-03-31 DIAGNOSIS — D50.9 IRON DEFICIENCY ANEMIA, UNSPECIFIED IRON DEFICIENCY ANEMIA TYPE: ICD-10-CM

## 2022-03-31 DIAGNOSIS — C91.10 CLL (CHRONIC LYMPHOCYTIC LEUKEMIA): ICD-10-CM

## 2022-03-31 LAB
ALBUMIN SERPL BCP-MCNC: 4.2 G/DL (ref 3.5–5.2)
ALP SERPL-CCNC: 49 U/L (ref 55–135)
ALT SERPL W/O P-5'-P-CCNC: 13 U/L (ref 10–44)
ANION GAP SERPL CALC-SCNC: 8 MMOL/L (ref 8–16)
AST SERPL-CCNC: 15 U/L (ref 10–40)
BASOPHILS # BLD AUTO: 0.02 K/UL (ref 0–0.2)
BASOPHILS NFR BLD: 0.7 % (ref 0–1.9)
BILIRUB SERPL-MCNC: 1.2 MG/DL (ref 0.1–1)
BUN SERPL-MCNC: 18 MG/DL (ref 8–23)
CALCIUM SERPL-MCNC: 9.2 MG/DL (ref 8.7–10.5)
CEA SERPL-MCNC: 0.8 NG/ML (ref 0–5)
CHLORIDE SERPL-SCNC: 100 MMOL/L (ref 95–110)
CO2 SERPL-SCNC: 30 MMOL/L (ref 23–29)
CREAT SERPL-MCNC: 1.2 MG/DL (ref 0.5–1.4)
DIFFERENTIAL METHOD: ABNORMAL
EOSINOPHIL # BLD AUTO: 0.1 K/UL (ref 0–0.5)
EOSINOPHIL NFR BLD: 2 % (ref 0–8)
ERYTHROCYTE [DISTWIDTH] IN BLOOD BY AUTOMATED COUNT: 13.6 % (ref 11.5–14.5)
EST. GFR  (AFRICAN AMERICAN): >60 ML/MIN/1.73 M^2
EST. GFR  (NON AFRICAN AMERICAN): 56 ML/MIN/1.73 M^2
FERRITIN SERPL-MCNC: 54 NG/ML (ref 20–300)
GLUCOSE SERPL-MCNC: 180 MG/DL (ref 70–110)
HCT VFR BLD AUTO: 35.4 % (ref 40–54)
HGB BLD-MCNC: 12.4 G/DL (ref 14–18)
IMM GRANULOCYTES # BLD AUTO: 0.12 K/UL (ref 0–0.04)
IMM GRANULOCYTES NFR BLD AUTO: 4.1 % (ref 0–0.5)
IRON SERPL-MCNC: 100 UG/DL (ref 45–160)
LYMPHOCYTES # BLD AUTO: 0.9 K/UL (ref 1–4.8)
LYMPHOCYTES NFR BLD: 30.2 % (ref 18–48)
MCH RBC QN AUTO: 31.7 PG (ref 27–31)
MCHC RBC AUTO-ENTMCNC: 35 G/DL (ref 32–36)
MCV RBC AUTO: 91 FL (ref 82–98)
MONOCYTES # BLD AUTO: 0.5 K/UL (ref 0.3–1)
MONOCYTES NFR BLD: 15.6 % (ref 4–15)
NEUTROPHILS # BLD AUTO: 1.4 K/UL (ref 1.8–7.7)
NEUTROPHILS NFR BLD: 47.4 % (ref 38–73)
NRBC BLD-RTO: 1 /100 WBC
PLATELET # BLD AUTO: 69 K/UL (ref 150–450)
PMV BLD AUTO: 10.1 FL (ref 9.2–12.9)
POTASSIUM SERPL-SCNC: 4.2 MMOL/L (ref 3.5–5.1)
PROT SERPL-MCNC: 7.2 G/DL (ref 6–8.4)
RBC # BLD AUTO: 3.91 M/UL (ref 4.6–6.2)
SATURATED IRON: 28 % (ref 20–50)
SODIUM SERPL-SCNC: 138 MMOL/L (ref 136–145)
TOTAL IRON BINDING CAPACITY: 361 UG/DL (ref 250–450)
TRANSFERRIN SERPL-MCNC: 258 MG/DL (ref 200–375)
WBC # BLD AUTO: 2.95 K/UL (ref 3.9–12.7)

## 2022-03-31 PROCEDURE — 82728 ASSAY OF FERRITIN: CPT | Performed by: INTERNAL MEDICINE

## 2022-03-31 PROCEDURE — 99214 OFFICE O/P EST MOD 30 MIN: CPT | Mod: S$GLB,,, | Performed by: INTERNAL MEDICINE

## 2022-03-31 PROCEDURE — 85025 COMPLETE CBC W/AUTO DIFF WBC: CPT | Performed by: INTERNAL MEDICINE

## 2022-03-31 PROCEDURE — 82378 CARCINOEMBRYONIC ANTIGEN: CPT | Performed by: INTERNAL MEDICINE

## 2022-03-31 PROCEDURE — 84466 ASSAY OF TRANSFERRIN: CPT | Performed by: INTERNAL MEDICINE

## 2022-03-31 PROCEDURE — 36415 COLL VENOUS BLD VENIPUNCTURE: CPT | Performed by: INTERNAL MEDICINE

## 2022-03-31 PROCEDURE — 99214 PR OFFICE/OUTPT VISIT, EST, LEVL IV, 30-39 MIN: ICD-10-PCS | Mod: S$GLB,,, | Performed by: INTERNAL MEDICINE

## 2022-03-31 PROCEDURE — 80053 COMPREHEN METABOLIC PANEL: CPT | Performed by: INTERNAL MEDICINE

## 2022-04-10 ENCOUNTER — ANESTHESIA (OUTPATIENT)
Dept: INTENSIVE CARE | Facility: HOSPITAL | Age: 82
DRG: 208 | End: 2022-04-10
Payer: MEDICARE

## 2022-04-10 ENCOUNTER — HOSPITAL ENCOUNTER (INPATIENT)
Facility: HOSPITAL | Age: 82
LOS: 8 days | Discharge: HOME HEALTH | DRG: 208 | End: 2022-04-18
Attending: EMERGENCY MEDICINE | Admitting: INTERNAL MEDICINE
Payer: MEDICARE

## 2022-04-10 ENCOUNTER — ANESTHESIA EVENT (OUTPATIENT)
Dept: INTENSIVE CARE | Facility: HOSPITAL | Age: 82
DRG: 208 | End: 2022-04-10
Payer: MEDICARE

## 2022-04-10 DIAGNOSIS — J96.01 ACUTE HYPOXEMIC RESPIRATORY FAILURE: ICD-10-CM

## 2022-04-10 DIAGNOSIS — R07.9 CHEST PAIN: ICD-10-CM

## 2022-04-10 DIAGNOSIS — R79.89 ELEVATED TROPONIN: ICD-10-CM

## 2022-04-10 DIAGNOSIS — R06.02 SHORTNESS OF BREATH: ICD-10-CM

## 2022-04-10 DIAGNOSIS — R06.02 SOB (SHORTNESS OF BREATH): ICD-10-CM

## 2022-04-10 DIAGNOSIS — R63.39 FEEDING PROBLEM IN ADULT: ICD-10-CM

## 2022-04-10 DIAGNOSIS — Z98.1 S/P CERVICAL SPINAL FUSION: Primary | ICD-10-CM

## 2022-04-10 DIAGNOSIS — Z01.89 ENCOUNTER FOR IMAGING STUDY TO CONFIRM OROGASTRIC (OG) TUBE PLACEMENT: ICD-10-CM

## 2022-04-10 PROBLEM — D69.6 THROMBOCYTOPENIA: Status: ACTIVE | Noted: 2022-04-10

## 2022-04-10 PROBLEM — J69.0 ASPIRATION PNEUMONIA: Status: ACTIVE | Noted: 2022-04-10

## 2022-04-10 PROBLEM — R06.1 STRIDOR: Status: ACTIVE | Noted: 2022-04-10

## 2022-04-10 LAB
ABO + RH BLD: NORMAL
ALBUMIN SERPL BCP-MCNC: 4.3 G/DL (ref 3.5–5.2)
ALLENS TEST: ABNORMAL
ALLENS TEST: ABNORMAL
ALP SERPL-CCNC: 43 U/L (ref 55–135)
ALT SERPL W/O P-5'-P-CCNC: 17 U/L (ref 10–44)
ANION GAP SERPL CALC-SCNC: 16 MMOL/L (ref 8–16)
ANISOCYTOSIS BLD QL SMEAR: SLIGHT
APTT PPP: 28.5 SEC (ref 23.3–35.1)
AST SERPL-CCNC: 37 U/L (ref 10–40)
BACTERIA #/AREA URNS HPF: NEGATIVE /HPF
BASOPHILS NFR BLD: 0 % (ref 0–1.9)
BILIRUB SERPL-MCNC: 1.3 MG/DL (ref 0.1–1)
BILIRUB UR QL STRIP: NEGATIVE
BLD GP AB SCN CELLS X3 SERPL QL: NORMAL
BNP SERPL-MCNC: 134 PG/ML (ref 0–99)
BUN SERPL-MCNC: 18 MG/DL (ref 8–23)
CALCIUM SERPL-MCNC: 9.6 MG/DL (ref 8.7–10.5)
CHLORIDE SERPL-SCNC: 94 MMOL/L (ref 95–110)
CK MB SERPL-MCNC: 3.1 NG/ML (ref 0.1–6.5)
CK SERPL-CCNC: 308 U/L (ref 20–200)
CLARITY UR: CLEAR
CO2 SERPL-SCNC: 25 MMOL/L (ref 23–29)
COLOR UR: COLORLESS
CREAT SERPL-MCNC: 0.8 MG/DL (ref 0.5–1.4)
CREAT SERPL-MCNC: 0.8 MG/DL (ref 0.5–1.4)
DELSYS: ABNORMAL
DELSYS: ABNORMAL
DIFFERENTIAL METHOD: ABNORMAL
EOSINOPHIL NFR BLD: 0 % (ref 0–8)
ERYTHROCYTE [DISTWIDTH] IN BLOOD BY AUTOMATED COUNT: 13.2 % (ref 11.5–14.5)
ERYTHROCYTE [SEDIMENTATION RATE] IN BLOOD BY WESTERGREN METHOD: 18 MM/H
EST. GFR  (AFRICAN AMERICAN): >60 ML/MIN/1.73 M^2
EST. GFR  (NON AFRICAN AMERICAN): >60 ML/MIN/1.73 M^2
FIO2: 45
GLUCOSE SERPL-MCNC: 192 MG/DL (ref 70–110)
GLUCOSE SERPL-MCNC: 342 MG/DL (ref 70–110)
GLUCOSE SERPL-MCNC: 358 MG/DL (ref 70–110)
GLUCOSE UR QL STRIP: ABNORMAL
HCO3 UR-SCNC: 26.9 MMOL/L (ref 24–28)
HCO3 UR-SCNC: 28.1 MMOL/L (ref 24–28)
HCT VFR BLD AUTO: 35.5 % (ref 40–54)
HCT VFR BLD CALC: 29 %PCV (ref 36–54)
HGB BLD-MCNC: 12.4 G/DL (ref 14–18)
HGB UR QL STRIP: ABNORMAL
HYALINE CASTS #/AREA URNS LPF: 7 /LPF
IMM GRANULOCYTES # BLD AUTO: ABNORMAL K/UL (ref 0–0.04)
IMM GRANULOCYTES NFR BLD AUTO: ABNORMAL % (ref 0–0.5)
INR PPP: 1.1
KETONES UR QL STRIP: NEGATIVE
LACTATE SERPL-SCNC: 1.7 MMOL/L (ref 0.5–1.9)
LEUKOCYTE ESTERASE UR QL STRIP: NEGATIVE
LYMPHOCYTES NFR BLD: 6 % (ref 18–48)
MAGNESIUM SERPL-MCNC: 1.9 MG/DL (ref 1.6–2.6)
MCH RBC QN AUTO: 31.3 PG (ref 27–31)
MCHC RBC AUTO-ENTMCNC: 34.9 G/DL (ref 32–36)
MCV RBC AUTO: 90 FL (ref 82–98)
MICROSCOPIC COMMENT: ABNORMAL
MODE: ABNORMAL
MODE: ABNORMAL
MONOCYTES NFR BLD: 4 % (ref 4–15)
NEUTROPHILS NFR BLD: 41 % (ref 38–73)
NEUTS BAND NFR BLD MANUAL: 2 %
NITRITE UR QL STRIP: NEGATIVE
NRBC BLD-RTO: 0 /100 WBC
PCO2 BLDA: 36.5 MMHG (ref 35–45)
PCO2 BLDA: 42.4 MMHG (ref 35–45)
PEEP: 5
PH SMN: 7.43 [PH] (ref 7.35–7.45)
PH SMN: 7.47 [PH] (ref 7.35–7.45)
PH UR STRIP: 6 [PH] (ref 5–8)
PLATELET # BLD AUTO: 96 K/UL (ref 150–450)
PLATELET BLD QL SMEAR: ABNORMAL
PMV BLD AUTO: 11.5 FL (ref 9.2–12.9)
PO2 BLDA: 147 MMHG (ref 80–100)
PO2 BLDA: 95 MMHG (ref 80–100)
POC BE: 3 MMOL/L
POC BE: 4 MMOL/L
POC IONIZED CALCIUM: 1.15 MMOL/L (ref 1.06–1.42)
POC SATURATED O2: 98 % (ref 95–100)
POC SATURATED O2: 99 % (ref 95–100)
POC TCO2: 28 MMOL/L (ref 23–27)
POC TCO2: 29 MMOL/L (ref 23–27)
POIKILOCYTOSIS BLD QL SMEAR: SLIGHT
POTASSIUM BLD-SCNC: 3.9 MMOL/L (ref 3.5–5.1)
POTASSIUM SERPL-SCNC: 3.9 MMOL/L (ref 3.5–5.1)
PROCALCITONIN SERPL IA-MCNC: 0.29 NG/ML (ref 0–0.5)
PROT SERPL-MCNC: 7.5 G/DL (ref 6–8.4)
PROT UR QL STRIP: ABNORMAL
PROTHROMBIN TIME: 13.5 SEC (ref 11.4–13.7)
RBC # BLD AUTO: 3.96 M/UL (ref 4.6–6.2)
RBC #/AREA URNS HPF: 6 /HPF (ref 0–4)
SAMPLE: ABNORMAL
SAMPLE: ABNORMAL
SAMPLE: NORMAL
SARS-COV-2 RDRP RESP QL NAA+PROBE: NEGATIVE
SITE: ABNORMAL
SITE: ABNORMAL
SODIUM BLD-SCNC: 133 MMOL/L (ref 136–145)
SODIUM SERPL-SCNC: 135 MMOL/L (ref 136–145)
SP GR UR STRIP: 1.01 (ref 1–1.03)
SP02: 98
SQUAMOUS #/AREA URNS HPF: 1 /HPF
STOMATOCYTES BLD QL SMEAR: PRESENT
TROPONIN I SERPL DL<=0.01 NG/ML-MCNC: 0.07 NG/ML
URN SPEC COLLECT METH UR: ABNORMAL
UROBILINOGEN UR STRIP-ACNC: NEGATIVE EU/DL
VT: 550
WBC # BLD AUTO: 44.08 K/UL (ref 3.9–12.7)
WBC #/AREA URNS HPF: 1 /HPF (ref 0–5)
WBC OTHER NFR BLD MANUAL: 47 %

## 2022-04-10 PROCEDURE — 87040 BLOOD CULTURE FOR BACTERIA: CPT | Mod: 59 | Performed by: EMERGENCY MEDICINE

## 2022-04-10 PROCEDURE — 99900026 HC AIRWAY MAINTENANCE (STAT)

## 2022-04-10 PROCEDURE — 93005 ELECTROCARDIOGRAM TRACING: CPT | Performed by: INTERNAL MEDICINE

## 2022-04-10 PROCEDURE — 25000003 PHARM REV CODE 250: Performed by: EMERGENCY MEDICINE

## 2022-04-10 PROCEDURE — 84132 ASSAY OF SERUM POTASSIUM: CPT

## 2022-04-10 PROCEDURE — 80053 COMPREHEN METABOLIC PANEL: CPT | Performed by: EMERGENCY MEDICINE

## 2022-04-10 PROCEDURE — 36415 COLL VENOUS BLD VENIPUNCTURE: CPT | Performed by: INTERNAL MEDICINE

## 2022-04-10 PROCEDURE — 84295 ASSAY OF SERUM SODIUM: CPT

## 2022-04-10 PROCEDURE — 83880 ASSAY OF NATRIURETIC PEPTIDE: CPT | Performed by: EMERGENCY MEDICINE

## 2022-04-10 PROCEDURE — U0002 COVID-19 LAB TEST NON-CDC: HCPCS | Performed by: EMERGENCY MEDICINE

## 2022-04-10 PROCEDURE — 94761 N-INVAS EAR/PLS OXIMETRY MLT: CPT

## 2022-04-10 PROCEDURE — 87070 CULTURE OTHR SPECIMN AEROBIC: CPT | Performed by: EMERGENCY MEDICINE

## 2022-04-10 PROCEDURE — 25500020 PHARM REV CODE 255: Performed by: EMERGENCY MEDICINE

## 2022-04-10 PROCEDURE — 94002 VENT MGMT INPAT INIT DAY: CPT

## 2022-04-10 PROCEDURE — 27000221 HC OXYGEN, UP TO 24 HOURS

## 2022-04-10 PROCEDURE — 99900035 HC TECH TIME PER 15 MIN (STAT)

## 2022-04-10 PROCEDURE — 93010 ELECTROCARDIOGRAM REPORT: CPT | Mod: ,,, | Performed by: INTERNAL MEDICINE

## 2022-04-10 PROCEDURE — 31500 INSERT EMERGENCY AIRWAY: CPT

## 2022-04-10 PROCEDURE — 84484 ASSAY OF TROPONIN QUANT: CPT | Performed by: EMERGENCY MEDICINE

## 2022-04-10 PROCEDURE — 37799 UNLISTED PX VASCULAR SURGERY: CPT

## 2022-04-10 PROCEDURE — 85610 PROTHROMBIN TIME: CPT | Performed by: EMERGENCY MEDICINE

## 2022-04-10 PROCEDURE — 20000000 HC ICU ROOM

## 2022-04-10 PROCEDURE — 99900031 HC PATIENT EDUCATION (STAT)

## 2022-04-10 PROCEDURE — 36620 INSERTION CATHETER ARTERY: CPT

## 2022-04-10 PROCEDURE — 63600175 PHARM REV CODE 636 W HCPCS: Performed by: INTERNAL MEDICINE

## 2022-04-10 PROCEDURE — 81001 URINALYSIS AUTO W/SCOPE: CPT | Performed by: EMERGENCY MEDICINE

## 2022-04-10 PROCEDURE — 82803 BLOOD GASES ANY COMBINATION: CPT

## 2022-04-10 PROCEDURE — 96374 THER/PROPH/DIAG INJ IV PUSH: CPT

## 2022-04-10 PROCEDURE — 81003 URINALYSIS AUTO W/O SCOPE: CPT | Performed by: EMERGENCY MEDICINE

## 2022-04-10 PROCEDURE — 93010 EKG 12-LEAD: ICD-10-PCS | Mod: ,,, | Performed by: INTERNAL MEDICINE

## 2022-04-10 PROCEDURE — 82550 ASSAY OF CK (CPK): CPT | Performed by: EMERGENCY MEDICINE

## 2022-04-10 PROCEDURE — 25000003 PHARM REV CODE 250

## 2022-04-10 PROCEDURE — 85730 THROMBOPLASTIN TIME PARTIAL: CPT | Performed by: EMERGENCY MEDICINE

## 2022-04-10 PROCEDURE — 85027 COMPLETE CBC AUTOMATED: CPT | Performed by: EMERGENCY MEDICINE

## 2022-04-10 PROCEDURE — 86850 RBC ANTIBODY SCREEN: CPT | Performed by: EMERGENCY MEDICINE

## 2022-04-10 PROCEDURE — 85007 BL SMEAR W/DIFF WBC COUNT: CPT | Performed by: EMERGENCY MEDICINE

## 2022-04-10 PROCEDURE — 83735 ASSAY OF MAGNESIUM: CPT | Performed by: EMERGENCY MEDICINE

## 2022-04-10 PROCEDURE — 99291 CRITICAL CARE FIRST HOUR: CPT

## 2022-04-10 PROCEDURE — 25000003 PHARM REV CODE 250: Performed by: INTERNAL MEDICINE

## 2022-04-10 PROCEDURE — 85014 HEMATOCRIT: CPT

## 2022-04-10 PROCEDURE — 87205 SMEAR GRAM STAIN: CPT | Performed by: EMERGENCY MEDICINE

## 2022-04-10 PROCEDURE — 86900 BLOOD TYPING SEROLOGIC ABO: CPT | Performed by: EMERGENCY MEDICINE

## 2022-04-10 PROCEDURE — 63600175 PHARM REV CODE 636 W HCPCS: Performed by: EMERGENCY MEDICINE

## 2022-04-10 PROCEDURE — 82330 ASSAY OF CALCIUM: CPT

## 2022-04-10 PROCEDURE — 82553 CREATINE MB FRACTION: CPT | Performed by: EMERGENCY MEDICINE

## 2022-04-10 PROCEDURE — 83605 ASSAY OF LACTIC ACID: CPT | Performed by: EMERGENCY MEDICINE

## 2022-04-10 PROCEDURE — 36600 WITHDRAWAL OF ARTERIAL BLOOD: CPT

## 2022-04-10 PROCEDURE — 84145 PROCALCITONIN (PCT): CPT | Performed by: EMERGENCY MEDICINE

## 2022-04-10 PROCEDURE — 36415 COLL VENOUS BLD VENIPUNCTURE: CPT | Performed by: EMERGENCY MEDICINE

## 2022-04-10 RX ORDER — LEVALBUTEROL 1.25 MG/.5ML
3.75 SOLUTION, CONCENTRATE RESPIRATORY (INHALATION)
Status: ACTIVE | OUTPATIENT
Start: 2022-04-10 | End: 2022-04-11

## 2022-04-10 RX ORDER — POTASSIUM CHLORIDE 20 MEQ/1
20 TABLET, EXTENDED RELEASE ORAL
Status: DISCONTINUED | OUTPATIENT
Start: 2022-04-10 | End: 2022-04-18 | Stop reason: HOSPADM

## 2022-04-10 RX ORDER — NALOXONE HCL 0.4 MG/ML
0.02 VIAL (ML) INJECTION
Status: DISCONTINUED | OUTPATIENT
Start: 2022-04-10 | End: 2022-04-18 | Stop reason: HOSPADM

## 2022-04-10 RX ORDER — ONDANSETRON 2 MG/ML
4 INJECTION INTRAMUSCULAR; INTRAVENOUS EVERY 6 HOURS PRN
Status: DISCONTINUED | OUTPATIENT
Start: 2022-04-10 | End: 2022-04-18 | Stop reason: HOSPADM

## 2022-04-10 RX ORDER — ACETAMINOPHEN 325 MG/1
650 TABLET ORAL EVERY 4 HOURS PRN
Status: DISCONTINUED | OUTPATIENT
Start: 2022-04-10 | End: 2022-04-14

## 2022-04-10 RX ORDER — MAGNESIUM SULFATE HEPTAHYDRATE 40 MG/ML
4 INJECTION, SOLUTION INTRAVENOUS
Status: DISCONTINUED | OUTPATIENT
Start: 2022-04-10 | End: 2022-04-18 | Stop reason: HOSPADM

## 2022-04-10 RX ORDER — IBUPROFEN 200 MG
16 TABLET ORAL
Status: DISCONTINUED | OUTPATIENT
Start: 2022-04-10 | End: 2022-04-18 | Stop reason: HOSPADM

## 2022-04-10 RX ORDER — POTASSIUM CHLORIDE 7.45 MG/ML
20 INJECTION INTRAVENOUS
Status: DISCONTINUED | OUTPATIENT
Start: 2022-04-10 | End: 2022-04-18 | Stop reason: HOSPADM

## 2022-04-10 RX ORDER — LANOLIN ALCOHOL/MO/W.PET/CERES
800 CREAM (GRAM) TOPICAL
Status: DISCONTINUED | OUTPATIENT
Start: 2022-04-10 | End: 2022-04-18 | Stop reason: HOSPADM

## 2022-04-10 RX ORDER — ACETAMINOPHEN 650 MG/1
650 SUPPOSITORY RECTAL
Status: COMPLETED | OUTPATIENT
Start: 2022-04-10 | End: 2022-04-10

## 2022-04-10 RX ORDER — SUCCINYLCHOLINE CHLORIDE 20 MG/ML
INJECTION INTRAMUSCULAR; INTRAVENOUS CODE/TRAUMA/SEDATION MEDICATION
Status: COMPLETED | OUTPATIENT
Start: 2022-04-10 | End: 2022-04-10

## 2022-04-10 RX ORDER — ONDANSETRON HYDROCHLORIDE 8 MG/1
8 TABLET, FILM COATED ORAL EVERY 8 HOURS PRN
Status: ON HOLD | COMMUNITY
Start: 2022-03-30 | End: 2022-04-18 | Stop reason: HOSPADM

## 2022-04-10 RX ORDER — SODIUM CHLORIDE 0.9 % (FLUSH) 0.9 %
10 SYRINGE (ML) INJECTION EVERY 12 HOURS PRN
Status: DISCONTINUED | OUTPATIENT
Start: 2022-04-10 | End: 2022-04-18 | Stop reason: HOSPADM

## 2022-04-10 RX ORDER — MAGNESIUM SULFATE HEPTAHYDRATE 40 MG/ML
2 INJECTION, SOLUTION INTRAVENOUS
Status: DISCONTINUED | OUTPATIENT
Start: 2022-04-10 | End: 2022-04-18 | Stop reason: HOSPADM

## 2022-04-10 RX ORDER — MORPHINE SULFATE 2 MG/ML
2 INJECTION, SOLUTION INTRAMUSCULAR; INTRAVENOUS EVERY 4 HOURS PRN
Status: DISCONTINUED | OUTPATIENT
Start: 2022-04-10 | End: 2022-04-14

## 2022-04-10 RX ORDER — INSULIN ASPART 100 [IU]/ML
1-6 INJECTION, SOLUTION INTRAVENOUS; SUBCUTANEOUS
Status: DISCONTINUED | OUTPATIENT
Start: 2022-04-10 | End: 2022-04-18 | Stop reason: HOSPADM

## 2022-04-10 RX ORDER — MAGNESIUM SULFATE 1 G/100ML
1 INJECTION INTRAVENOUS
Status: DISCONTINUED | OUTPATIENT
Start: 2022-04-10 | End: 2022-04-18 | Stop reason: HOSPADM

## 2022-04-10 RX ORDER — POTASSIUM CHLORIDE 7.45 MG/ML
40 INJECTION INTRAVENOUS
Status: DISCONTINUED | OUTPATIENT
Start: 2022-04-10 | End: 2022-04-18 | Stop reason: HOSPADM

## 2022-04-10 RX ORDER — IPRATROPIUM BROMIDE AND ALBUTEROL SULFATE 2.5; .5 MG/3ML; MG/3ML
3 SOLUTION RESPIRATORY (INHALATION) EVERY 6 HOURS
Status: DISCONTINUED | OUTPATIENT
Start: 2022-04-11 | End: 2022-04-15

## 2022-04-10 RX ORDER — NEOMYCIN SULFATE, POLYMYXIN B SULFATE, AND DEXAMETHASONE 3.5; 10000; 1 MG/G; [USP'U]/G; MG/G
OINTMENT OPHTHALMIC
Status: ON HOLD | COMMUNITY
Start: 2022-03-12 | End: 2022-04-10

## 2022-04-10 RX ORDER — IBUPROFEN 200 MG
24 TABLET ORAL
Status: DISCONTINUED | OUTPATIENT
Start: 2022-04-10 | End: 2022-04-18 | Stop reason: HOSPADM

## 2022-04-10 RX ORDER — VALSARTAN 80 MG/1
80 TABLET ORAL NIGHTLY
COMMUNITY
Start: 2022-01-28 | End: 2022-06-22 | Stop reason: CLARIF

## 2022-04-10 RX ORDER — IPRATROPIUM BROMIDE 0.5 MG/2.5ML
1 SOLUTION RESPIRATORY (INHALATION)
Status: ACTIVE | OUTPATIENT
Start: 2022-04-10 | End: 2022-04-11

## 2022-04-10 RX ORDER — POLYETHYLENE GLYCOL 3350 17 G/17G
17 POWDER, FOR SOLUTION ORAL 2 TIMES DAILY PRN
Status: DISCONTINUED | OUTPATIENT
Start: 2022-04-10 | End: 2022-04-18 | Stop reason: HOSPADM

## 2022-04-10 RX ORDER — PROPOFOL 10 MG/ML
0-50 INJECTION, EMULSION INTRAVENOUS CONTINUOUS
Status: DISCONTINUED | OUTPATIENT
Start: 2022-04-10 | End: 2022-04-11

## 2022-04-10 RX ORDER — IPRATROPIUM BROMIDE AND ALBUTEROL SULFATE 2.5; .5 MG/3ML; MG/3ML
3 SOLUTION RESPIRATORY (INHALATION) EVERY 8 HOURS PRN
COMMUNITY
Start: 2022-04-09 | End: 2023-01-01

## 2022-04-10 RX ORDER — METHYLPREDNISOLONE SOD SUCC 125 MG
125 VIAL (EA) INJECTION
Status: COMPLETED | OUTPATIENT
Start: 2022-04-10 | End: 2022-04-10

## 2022-04-10 RX ORDER — POTASSIUM CHLORIDE 20 MEQ/1
40 TABLET, EXTENDED RELEASE ORAL
Status: DISCONTINUED | OUTPATIENT
Start: 2022-04-10 | End: 2022-04-18 | Stop reason: HOSPADM

## 2022-04-10 RX ORDER — GLUCAGON 1 MG
1 KIT INJECTION
Status: DISCONTINUED | OUTPATIENT
Start: 2022-04-10 | End: 2022-04-18 | Stop reason: HOSPADM

## 2022-04-10 RX ADMIN — PROPOFOL 15 MCG/KG/MIN: 10 INJECTION, EMULSION INTRAVENOUS at 08:04

## 2022-04-10 RX ADMIN — Medication 120 MG: at 03:04

## 2022-04-10 RX ADMIN — VANCOMYCIN HYDROCHLORIDE 1750 MG: 500 INJECTION, POWDER, LYOPHILIZED, FOR SOLUTION INTRAVENOUS at 07:04

## 2022-04-10 RX ADMIN — IOHEXOL 100 ML: 350 INJECTION, SOLUTION INTRAVENOUS at 04:04

## 2022-04-10 RX ADMIN — METHYLPREDNISOLONE SODIUM SUCCINATE 125 MG: 125 INJECTION, POWDER, FOR SOLUTION INTRAMUSCULAR; INTRAVENOUS at 03:04

## 2022-04-10 RX ADMIN — ACETAMINOPHEN 650 MG: 650 SUPPOSITORY RECTAL at 05:04

## 2022-04-10 RX ADMIN — SODIUM CHLORIDE, SODIUM LACTATE, POTASSIUM CHLORIDE, AND CALCIUM CHLORIDE 2859 ML: .6; .31; .03; .02 INJECTION, SOLUTION INTRAVENOUS at 05:04

## 2022-04-10 RX ADMIN — INSULIN ASPART 5 UNITS: 100 INJECTION, SOLUTION INTRAVENOUS; SUBCUTANEOUS at 09:04

## 2022-04-10 RX ADMIN — PROPOFOL 5 MCG/KG/MIN: 10 INJECTION, EMULSION INTRAVENOUS at 03:04

## 2022-04-10 RX ADMIN — PIPERACILLIN AND TAZOBACTAM 3.38 G: 3; .375 INJECTION, POWDER, LYOPHILIZED, FOR SOLUTION INTRAVENOUS; PARENTERAL at 05:04

## 2022-04-10 NOTE — CONSULTS
[facility] - Emergency Dept  History & Physical  Neurospine    SUBJECTIVE:     Chief Complaint/Reason for Admission: Difficulty with breathing    History of Present Illness:  Patient is a 81 y.o. male presents with difficulty in breathing.  He recently had a C3-7 anterior posterior cervical fusion.  He did well although yesterday and some difficulty with breathing that resolved relatively quickly.  He was coughing up sputum.  Chest x-ray however was clear.  Earlier today he had difficulty with breathing.  He had stridor.  It is of a difficulty with his blood pressure as well as oxygenation.  It was decided to transfer him [facility] for treatments.  He did not feel as though his throat was compressing him.  He did not feel as though he was choking..    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     Right-sided Bell's palsy 2009    Spondylolisthesis     Varicose veins of legs     Venous insufficiency      Past Surgical History:   Procedure Laterality Date    Athroscopic surgery      On Knee    BIOPSY OF TISSUE OF NECK Left 2015    Recuurence CLL/small cell lyphoma    ESOPHAGEAL DILATION      Hemorrhoid resection  1979    LUNG LOBECTOMY Left     OTHER SURGICAL HISTORY  2006    Chemotherapy s/p lyphoma        Portacath implantation and removal      reverse shoulder arthroplasty Right 2021     Family History   Problem Relation Age of Onset    Stomach cancer Mother 80         at 95    Colon cancer  Father      Social History     Tobacco Use    Smoking status: Never Smoker    Smokeless tobacco: Never Used   Substance Use Topics    Alcohol use: Yes    Drug use: No        Review of Systems:  10/13 obtained    OBJECTIVE:     Vital Signs (Most Recent):  Temp: (!) 100.7 °F (38.2 °C) (04/10/22 1705)  Pulse: 101 (04/10/22 1720)  Resp: (!) 24 (04/10/22 1551)  BP: (!) 101/53 (04/10/22 1720)  SpO2: 97 % (04/10/22 1720)    Physical Exam:  Hi was able to examine the patient today.  He is neurovascular status was normal.  His strength is normal sensation was normal.  We did see his incision and noted it was not overly swollen.  He denies fevers nose trachea was swollen.  The patient is thin enough to get a very good view of this.  Did not feel that open his incision would help this.  It sounded more as though this were respiratory problem.    Laboratory:  I have reviewed all pertinent lab results within the past 24 hours.  CBC:   Recent Labs   Lab 04/10/22  1457   WBC 44.08*   RBC 3.96*   HGB 12.4*   HCT 35.5*   PLT 96*   MCV 90   MCH 31.3*   MCHC 34.9       Diagnostic Results:  CT shows signs of early pneumonia    ASSESSMENT/PLAN:     Pt s/p C3-7 anterior posterior fusion now with respiratory difficulty    Do not think that this is a hematoma issue.  Have redressed the anterior incsion.  It will drain around the anterior and posterior drain sites.  Do not remove drains without my ok.  Neck is very stable.  The collar can come off for any care.  Will check daily    MARTY Tillman  [phone]

## 2022-04-10 NOTE — ED TRIAGE NOTES
Per [facility], at [facility] the pt was given ativan around noon. When pt was laid down flat for CT scan, o2 sats dropped to 70% on 3L via NC    Anterior & posterior hemovac.    Pt currently on 8L via NC w/ 96%    Pt given:  0.5mg Ativan IVP @ 1212  40mg Lasix IVP @ 1400  10mg Hydralazine IVP @ 1154  5mg Metoprolol IVP @1241

## 2022-04-10 NOTE — ED PROVIDER NOTES
Encounter Date: 4/10/2022       History     Chief Complaint   Patient presents with    Shortness of Breath     Patient with extensive past medical history including lymphoma.  Patient underwent cervical surgery approximately 2 days ago.  Patient was at other facility.  Patient has been having intermittent shortness of breath from unclear etiology.  Today symptoms worse.  Patient unable to lie flat.  Patient arrives with EMS.        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     Right-sided Bell's palsy 2009    Spondylolisthesis     Varicose veins of legs     Venous insufficiency      Past Surgical History:   Procedure Laterality Date    Athroscopic surgery      On Knee    BIOPSY OF TISSUE OF NECK Left 2015    Recuurence CLL/small cell lyphoma    ESOPHAGEAL DILATION      Hemorrhoid resection  1979    LUNG LOBECTOMY Left     OTHER SURGICAL HISTORY  2006    Chemotherapy s/p lyphoma        Portacath implantation and removal      reverse shoulder arthroplasty Right 2021     Family History   Problem Relation Age of Onset    Stomach cancer Mother 80         at 95    Colon cancer Father      Social History     Tobacco Use    Smoking status: Never Smoker    Smokeless tobacco: Never Used   Substance Use Topics    Alcohol use: Yes    Drug use: No     Review of Systems   Unable to perform ROS: Severe respiratory distress       Physical Exam     Initial Vitals   BP Pulse Resp Temp SpO2   04/10/22 1446 04/10/22 1444 04/10/22 1446 04/10/22 1448 04/10/22 1446    (!) 204/83 (!) 140 (!) 40 99.4 °F (37.4 °C) 96 %      MAP       --                Physical Exam    Nursing note and vitals reviewed.  Constitutional: He is not diaphoretic. No distress.   HENT:   Head: Normocephalic and atraumatic.   Eyes: Conjunctivae are normal.   Neck:   Patient with surgical bandage on with hard cervical collar.   Cardiovascular: Regular rhythm.   Pulmonary/Chest:   Patient with moderate air movement, tachypneic, inspiratory stridor.  Patient does have retractions.  Patient is nonverbal due to respiratory distress.   Abdominal: Abdomen is soft. There is no abdominal tenderness.   Musculoskeletal:         General: Normal range of motion.     Neurological: He is alert.   Patient is alert, anxious, he is moving all extremities purposely.   Skin: No rash noted.   Psychiatric:   Patient is nonverbal         ED Course   Critical Care    Date/Time: 4/10/2022 5:26 PM  Performed by: John Giang MD  Authorized by: John Giang MD   Direct patient critical care time: 15 minutes  Additional history critical care time: 5 minutes  Ordering / reviewing critical care time: 5 minutes  Documentation critical care time: 5 minutes  Consulting other physicians critical care time: 5 minutes  Total critical care time (exclusive of procedural time) : 35 minutes    Intubation    Date/Time: 4/10/2022 5:27 PM  Location procedure was performed: [facility] EMERGENCY DEPARTMENT  Performed by: John Giang MD  Authorized by: John Giang MD   Indications: respiratory distress  Intubation method: fiberoptic oral  Patient status: paralyzed (RSI)  Preoxygenation: nonrebreather mask  Sedatives: etomidate  Paralytic: succinylcholine  Laryngoscope size: Glide 4  Tube size: 8.0 mm  Tube type: cuffed  Number of attempts: 1  Cricoid pressure: yes  Cords visualized: yes  Post-procedure assessment: chest rise and CO2 detector  Breath sounds: clear  Cuff inflated: yes  ETT to lip: 24 cm        Labs Reviewed   CBC W/  AUTO DIFFERENTIAL - Abnormal; Notable for the following components:       Result Value    WBC 44.08 (*)     RBC 3.96 (*)     Hemoglobin 12.4 (*)     Hematocrit 35.5 (*)     MCH 31.3 (*)     Platelets 96 (*)     Lymph % 6.0 (*)     Platelet Estimate Decreased (*)     All other components within normal limits    Narrative:     WBC critical result(s) repeated. Called and verbal readback obtained   from Dr. Baird/ED by [id number] 04/10/2022 15:40   COMPREHENSIVE METABOLIC PANEL - Abnormal; Notable for the following components:    Sodium 135 (*)     Chloride 94 (*)     Glucose 192 (*)     Total Bilirubin 1.3 (*)     Alkaline Phosphatase 43 (*)     All other components within normal limits   TROPONIN I - Abnormal; Notable for the following components:    Troponin I 0.066 (*)     All other components within normal limits   B-TYPE NATRIURETIC PEPTIDE - Abnormal; Notable for the following components:     (*)     All other components within normal limits   CK - Abnormal; Notable for the following components:     (*)     All other components within normal limits   URINALYSIS, REFLEX TO URINE CULTURE - Abnormal; Notable for the following components:    Color, UA Colorless (*)     Protein, UA 1+ (*)     Glucose, UA Trace (*)     Occult Blood UA 1+ (*)     All other components within normal limits    Narrative:     Specimen Source->Urine   URINALYSIS MICROSCOPIC - Abnormal; Notable for the following components:    RBC, UA 6 (*)     Hyaline Casts, UA 7 (*)     All other components within normal limits    Narrative:     Specimen Source->Urine   ISTAT PROCEDURE - Abnormal; Notable for the following components:    POC PH 7.475 (*)     POC TCO2 28 (*)     All other components within normal limits   CULTURE, RESPIRATORY   CULTURE, BLOOD   CULTURE, BLOOD   PROTIME-INR   APTT   SARS-COV-2 RNA AMPLIFICATION, QUAL   CK-MB   MAGNESIUM   LACTIC ACID, PLASMA   PROCALCITONIN   PROCALCITONIN   TYPE & SCREEN   ISTAT CREATININE   POCT  CREATININE        ECG Results          EKG 12-lead (In process)  Result time 04/10/22 16:13:29    In process by Interface, Lab In [facility] (04/10/22 16:13:29)                 Narrative:    Test Reason : R06.02,    Vent. Rate : 143 BPM     Atrial Rate : 143 BPM     P-R Int : 124 ms          QRS Dur : 090 ms      QT Int : 282 ms       P-R-T Axes : 052 -81 089 degrees     QTc Int : 435 ms    Sinus tachycardia  Left anterior fascicular block  Minimal voltage criteria for LVH, may be normal variant ( Stalin product )  Septal infarct (cited on or before 29-JUN-2021)  Abnormal ECG  When compared with ECG of 29-JUN-2021 06:14,  Significant changes have occurred    Referred By: [email or web address]   SELF           Confirmed By:                             Imaging Results          CT Chest With Contrast (Final result)  Result time 04/10/22 17:05:17    Final result by Altaf Cross MD (04/10/22 17:05:17)                 Narrative:    CMS MANDATED QUALITY DATA - CT RADIATION  436    All CT scans at this facility utilize dose modulation, iterative reconstruction, and/or weight based dosing when appropriate to reduce radiation dose to as low as reasonably achievable.    CT CHEST WITH IV CONTRAST    CLINICAL HISTORY:  none provided    COMPARISON: None    FINDINGS: Endotracheal tube tip is located 5.6 cm above the bernard.    Status post partial pneumonectomy on the left. Patchy alveolar and groundglass opacities within the dependent lower lobes bilaterally, right greater than left. No pneumothorax. No pleural effusion. Upper lobe emphysematous changes.    Bone window images demonstrate degenerative changes of the spine. No acute or aggressive osseous abnormality.    Atherosclerotic calcification of the aorta which is normal in caliber. Atherosclerosis of the coronary arteries. Esophagus is unremarkable. Orogastric tube is in place. Prominent mediastinal lymph node nodes.    Images through the upper abdomen show no acute pathology. Alyssa  hepatis lymphadenopathy. For reference, enlarged alyssa hepatis lymph node on image 157 measures 16 mm in short axis.    IMPRESSION:    Patchy alveolar and groundglass opacities within the lower lobes bilaterally concerning for infectious/inflammatory process. Please correlate for symptoms of pneumonia, potentially aspiration.    Endotracheal tube tip is in place 5.6 cm above the bernard.    Alyssa hepatis lymphadenopathy.    Electronically signed by:  Altaf Cross MD  4/10/2022 5:05 PM CDT Workstation: [id number]                             CT Soft Tissue Neck With Contrast (Final result)  Result time 04/10/22 16:59:55   Procedure changed from CT Neck Chest With Contrast (XPD)     Final result by Altaf Cross MD (04/10/22 16:59:55)                 Narrative:    CMS MANDATED QUALITY DATA - CT RADIATION  436    All CT scans at this facility utilize dose modulation, iterative reconstruction, and/or weight based dosing when appropriate to reduce radiation dose to as low as reasonably achievable.    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 Atherosclerotic calcification of the intracranial carotid and vertebral arteries. Mastoid air cells are clear. Visualized paranasal sinuses are clear.    Orogastric and endotracheal tubes are in place. No evidence of subglottic stenosis.    Subcutaneous gas along the anterior aspect of the left neck, with small percutaneous drain in place, terminating within the prevertebral soft tissues.Patient is status post cervical ACDF extending from C3 through C7. No evidence of hardware complication. Mild associated prevertebral soft tissue swelling. No retropharyngeal fluid collection. Status post cervical laminectomy with posterior fixation hardware in place. There is associated postoperative gas at the operative site. There is a  small subcutaneous stranding in place within the laminectomy site. Prominent cervical lymph nodes bilaterally. Bilateral carotid artery atherosclerotic calcification. Carotid and jugular vessels appear patent.    IMPRESSION:    Recent cervical ACDF, laminectomy and posterior fixation - with percutaneous drains in place and soft tissue gas within the anterior and posterior aspect of the neck, presumably postoperative in nature.      Electronically signed by:  Altaf Cross MD  4/10/2022 4:59 PM CDT Workstation: [id number]                             X-Ray Chest AP Portable (Final result)  Result time 04/10/22 16:14:34    Final result by Altaf Cross MD (04/10/22 16:14:34)                 Narrative:    XR CHEST 1 VIEW    CLINICAL HISTORY:  81 years Male OG tube placement    COMPARISON: April 10, 2022    FINDINGS: Endotracheal tube is in stable position. Interval placement of enteric tube in appropriate position. Cardiopulmonary status is unchanged.    IMPRESSION:    Interval placement of enteric tube in appropriate position.    Otherwise stable exam.    Electronically signed by:  Altaf Cross MD  4/10/2022 4:14 PM CDT Workstation: [id number]                             X-Ray Chest AP Portable (Final result)  Result time 04/10/22 15:49:15    Final result by Altaf Cross MD (04/10/22 15:49:15)                 Narrative:    XR CHEST 1 VIEW    CLINICAL HISTORY:  81 years Male POST INTUBATION    COMPARISON: April 10, 2022    FINDINGS: Endotracheal tube has been placed. Tip is located 6.4 cm above the bernard. Cardiac silhouette size is stable. No alveolar or interstitial infiltrate. Stable postoperative changes of the left hemithorax. No large pleural effusion or pneumothorax.    IMPRESSION:    Interval placement of ET tube with tip located 6.4 cm above the bernard.    Otherwise stable chest radiograph.    Electronically signed by:  Altaf Cross MD  4/10/2022 3:49 PM CDT Workstation: [id number]                              X-Ray Chest AP Portable (Final result)  Result time 04/10/22 15:14:22    Final result by Altaf Cross MD (04/10/22 15:14:22)                 Narrative:    XR CHEST 1 VIEW    CLINICAL HISTORY:  81 years Male Shortness of breath    COMPARISON: June 29, 2021    FINDINGS: Cardiac silhouette size is stable compared to prior. Status post partial pneumonectomy on the left with left lung volume loss. Old left posterior rib fractures. Mild scarring at the left lung base. No confluent airspace disease. No pleural fluid or pneumothorax.    IMPRESSION:    Stable postoperative changes of the left lung.  No acute pulmonary pathology.    Electronically signed by:  Altaf Cross MD  4/10/2022 3:14 PM CDT Workstation: [id number]                               Medications   levalbuterol nebulizer solution 3.75 mg (0 mg Nebulization Hold 4/10/22 1515)   ipratropium 0.02 % nebulizer solution 1 mg (0 mg Nebulization Hold 4/10/22 1515)   propofol (DIPRIVAN) 10 mg/mL infusion (30 mcg/kg/min × 95.3 kg Intravenous Rate/Dose Change 4/10/22 1643)   vancomycin (VANCOCIN) 1,750 mg in dextrose 5 % 500 mL IVPB (1,750 mg Intravenous New Bag 4/10/22 1911)   sodium chloride 0.9% flush 10 mL (has no administration in time range)   naloxone 0.4 mg/mL injection 0.02 mg (has no administration in time range)   glucose chewable tablet 16 g (has no administration in time range)   glucose chewable tablet 24 g (has no administration in time range)   dextrose 50% injection 12.5 g (has no administration in time range)   dextrose 50% injection 25 g (has no administration in time range)   glucagon (human recombinant) injection 1 mg (has no administration in time range)   acetaminophen tablet 650 mg (has no administration in time range)   polyethylene glycol packet 17 g (has no administration in time range)   ondansetron injection 4 mg (has no administration in time range)   vancomycin - pharmacy to dose (has no administration in time range)    piperacillin-tazobactam 3.375 g in dextrose 5 % 50 mL IVPB (ready to mix system) (has no administration in time range)   lactated ringers bolus 2,859 mL (2,859 mLs Intravenous New Bag 4/10/22 1750)   vancomycin 1.5 g in dextrose 5 % 250 mL IVPB (ready to mix) (has no administration in time range)   methylPREDNISolone sodium succinate injection 125 mg (125 mg Intravenous Given 4/10/22 1503)   etomidate injection (25 mg Intravenous Given 4/10/22 1523)   succinylcholine injection (120 mg Intravenous Given 4/10/22 1523)   iohexoL (OMNIPAQUE 350) injection 100 mL (100 mLs Intravenous Given 4/10/22 1635)   piperacillin-tazobactam 3.375 g in dextrose 5 % 50 mL IVPB (ready to mix system) (0 g Intravenous Stopped 4/10/22 1819)   acetaminophen suppository 650 mg (650 mg Rectal Given 4/10/22 1749)     Medical Decision Making:   History:   Old Medical Records: I decided to obtain old medical records.  Clinical Tests:   Lab Tests: Reviewed  Radiological Study: Reviewed  Medical Tests: Reviewed  ED Management:  Patient presents with stridor and respiratory distress of unclear etiology.  During intubation patient had thick bloody secretions.  Secretions suctioned vigorously.  Patient intubated.  There was no trouble with ventilation after intubation.  Surgeon did evaluated in the emergency department.  He feels surgical site is doing well.  CT of the neck and chest obtained.  Patient does have evidence of aspiration pneumonitis with fever.  Broad-spectrum antibiotics initiated.  Hospitalist consulted for admission.                      Clinical Impression:   Final diagnoses:  [R06.02] SOB (shortness of breath)  [R06.02] Shortness of breath  [Z01.89] Encounter for imaging study to confirm orogastric (OG) tube placement          ED Disposition Condition    Admit               John Giang MD  04/10/22 0209

## 2022-04-11 LAB
ALBUMIN SERPL BCP-MCNC: 3.2 G/DL (ref 3.5–5.2)
ALLENS TEST: ABNORMAL
ALLENS TEST: ABNORMAL
ALP SERPL-CCNC: 29 U/L (ref 55–135)
ALT SERPL W/O P-5'-P-CCNC: 13 U/L (ref 10–44)
ANION GAP SERPL CALC-SCNC: 10 MMOL/L (ref 8–16)
AST SERPL-CCNC: 20 U/L (ref 10–40)
BASOPHILS NFR BLD: 0 % (ref 0–1.9)
BILIRUB SERPL-MCNC: 1.3 MG/DL (ref 0.1–1)
BUN SERPL-MCNC: 31 MG/DL (ref 8–23)
CALCIUM SERPL-MCNC: 8.4 MG/DL (ref 8.7–10.5)
CHLORIDE SERPL-SCNC: 97 MMOL/L (ref 95–110)
CK SERPL-CCNC: 112 U/L (ref 20–200)
CO2 SERPL-SCNC: 28 MMOL/L (ref 23–29)
CREAT SERPL-MCNC: 1.2 MG/DL (ref 0.5–1.4)
DELSYS: ABNORMAL
DELSYS: ABNORMAL
DIFFERENTIAL METHOD: ABNORMAL
EOSINOPHIL NFR BLD: 0 % (ref 0–8)
ERYTHROCYTE [DISTWIDTH] IN BLOOD BY AUTOMATED COUNT: 13.4 % (ref 11.5–14.5)
ERYTHROCYTE [SEDIMENTATION RATE] IN BLOOD BY WESTERGREN METHOD: 18 MM/H
ERYTHROCYTE [SEDIMENTATION RATE] IN BLOOD BY WESTERGREN METHOD: 22 MM/H
EST. GFR  (AFRICAN AMERICAN): >60 ML/MIN/1.73 M^2
EST. GFR  (NON AFRICAN AMERICAN): 56 ML/MIN/1.73 M^2
FIO2: 30
FIO2: 35
FLOW: 60
GLUCOSE SERPL-MCNC: 207 MG/DL (ref 70–110)
GLUCOSE SERPL-MCNC: 211 MG/DL (ref 70–110)
GLUCOSE SERPL-MCNC: 307 MG/DL (ref 70–110)
HCO3 UR-SCNC: 30.7 MMOL/L (ref 24–28)
HCO3 UR-SCNC: 31.1 MMOL/L (ref 24–28)
HCT VFR BLD AUTO: 27.4 % (ref 40–54)
HCT VFR BLD CALC: 28 %PCV (ref 36–54)
HGB BLD-MCNC: 9.6 G/DL (ref 14–18)
IMM GRANULOCYTES # BLD AUTO: ABNORMAL K/UL (ref 0–0.04)
IMM GRANULOCYTES NFR BLD AUTO: ABNORMAL % (ref 0–0.5)
LYMPHOCYTES NFR BLD: 12 % (ref 18–48)
MAGNESIUM SERPL-MCNC: 2.3 MG/DL (ref 1.6–2.6)
MCH RBC QN AUTO: 31 PG (ref 27–31)
MCHC RBC AUTO-ENTMCNC: 35 G/DL (ref 32–36)
MCV RBC AUTO: 88 FL (ref 82–98)
MIN VOL: 10.5
MODE: ABNORMAL
MODE: ABNORMAL
MONOCYTES NFR BLD: 0 % (ref 4–15)
NEUTROPHILS NFR BLD: 78 % (ref 38–73)
NEUTS BAND NFR BLD MANUAL: 10 %
NRBC BLD-RTO: 0 /100 WBC
PCO2 BLDA: 43 MMHG (ref 35–45)
PCO2 BLDA: 47.1 MMHG (ref 35–45)
PEEP: 5
PH SMN: 7.42 [PH] (ref 7.35–7.45)
PH SMN: 7.47 [PH] (ref 7.35–7.45)
PIP: 20
PLATELET # BLD AUTO: 68 K/UL (ref 150–450)
PLATELET BLD QL SMEAR: ABNORMAL
PMV BLD AUTO: 11.3 FL (ref 9.2–12.9)
PO2 BLDA: 104 MMHG (ref 80–100)
PO2 BLDA: 111 MMHG (ref 80–100)
POC BE: 6 MMOL/L
POC BE: 7 MMOL/L
POC IONIZED CALCIUM: 1.17 MMOL/L (ref 1.06–1.42)
POC SATURATED O2: 98 % (ref 95–100)
POC SATURATED O2: 99 % (ref 95–100)
POC TCO2: 32 MMOL/L (ref 23–27)
POC TCO2: 32 MMOL/L (ref 23–27)
POTASSIUM BLD-SCNC: 3.5 MMOL/L (ref 3.5–5.1)
POTASSIUM SERPL-SCNC: 3.5 MMOL/L (ref 3.5–5.1)
PROT SERPL-MCNC: 6.1 G/DL (ref 6–8.4)
RBC # BLD AUTO: 3.1 M/UL (ref 4.6–6.2)
SAMPLE: ABNORMAL
SAMPLE: ABNORMAL
SITE: ABNORMAL
SITE: ABNORMAL
SODIUM BLD-SCNC: 137 MMOL/L (ref 136–145)
SODIUM SERPL-SCNC: 135 MMOL/L (ref 136–145)
SP02: 96
SP02: 98
TROPONIN I SERPL DL<=0.01 NG/ML-MCNC: 0.06 NG/ML
VT: 550
WBC # BLD AUTO: 25.02 K/UL (ref 3.9–12.7)

## 2022-04-11 PROCEDURE — 85007 BL SMEAR W/DIFF WBC COUNT: CPT | Performed by: INTERNAL MEDICINE

## 2022-04-11 PROCEDURE — 63600175 PHARM REV CODE 636 W HCPCS: Performed by: INTERNAL MEDICINE

## 2022-04-11 PROCEDURE — 94003 VENT MGMT INPAT SUBQ DAY: CPT

## 2022-04-11 PROCEDURE — 97161 PT EVAL LOW COMPLEX 20 MIN: CPT

## 2022-04-11 PROCEDURE — 37799 UNLISTED PX VASCULAR SURGERY: CPT

## 2022-04-11 PROCEDURE — 25000003 PHARM REV CODE 250: Performed by: INTERNAL MEDICINE

## 2022-04-11 PROCEDURE — 27000221 HC OXYGEN, UP TO 24 HOURS

## 2022-04-11 PROCEDURE — 82803 BLOOD GASES ANY COMBINATION: CPT

## 2022-04-11 PROCEDURE — 84484 ASSAY OF TROPONIN QUANT: CPT | Performed by: INTERNAL MEDICINE

## 2022-04-11 PROCEDURE — 97165 OT EVAL LOW COMPLEX 30 MIN: CPT

## 2022-04-11 PROCEDURE — 85014 HEMATOCRIT: CPT

## 2022-04-11 PROCEDURE — 80053 COMPREHEN METABOLIC PANEL: CPT | Performed by: INTERNAL MEDICINE

## 2022-04-11 PROCEDURE — 94761 N-INVAS EAR/PLS OXIMETRY MLT: CPT

## 2022-04-11 PROCEDURE — 84295 ASSAY OF SERUM SODIUM: CPT

## 2022-04-11 PROCEDURE — 25000003 PHARM REV CODE 250

## 2022-04-11 PROCEDURE — 82550 ASSAY OF CK (CPK): CPT | Performed by: INTERNAL MEDICINE

## 2022-04-11 PROCEDURE — 99900035 HC TECH TIME PER 15 MIN (STAT)

## 2022-04-11 PROCEDURE — 99291 CRITICAL CARE FIRST HOUR: CPT | Mod: ,,, | Performed by: INTERNAL MEDICINE

## 2022-04-11 PROCEDURE — 94799 UNLISTED PULMONARY SVC/PX: CPT

## 2022-04-11 PROCEDURE — 94660 CPAP INITIATION&MGMT: CPT

## 2022-04-11 PROCEDURE — 83735 ASSAY OF MAGNESIUM: CPT | Performed by: INTERNAL MEDICINE

## 2022-04-11 PROCEDURE — 97535 SELF CARE MNGMENT TRAINING: CPT

## 2022-04-11 PROCEDURE — 94640 AIRWAY INHALATION TREATMENT: CPT

## 2022-04-11 PROCEDURE — 82330 ASSAY OF CALCIUM: CPT

## 2022-04-11 PROCEDURE — 25000242 PHARM REV CODE 250 ALT 637 W/ HCPCS: Performed by: INTERNAL MEDICINE

## 2022-04-11 PROCEDURE — 99900031 HC PATIENT EDUCATION (STAT)

## 2022-04-11 PROCEDURE — 99291 PR CRITICAL CARE, E/M 30-74 MINUTES: ICD-10-PCS | Mod: ,,, | Performed by: INTERNAL MEDICINE

## 2022-04-11 PROCEDURE — 84132 ASSAY OF SERUM POTASSIUM: CPT

## 2022-04-11 PROCEDURE — 20000000 HC ICU ROOM

## 2022-04-11 PROCEDURE — 97530 THERAPEUTIC ACTIVITIES: CPT

## 2022-04-11 PROCEDURE — 94150 VITAL CAPACITY TEST: CPT

## 2022-04-11 PROCEDURE — 92610 EVALUATE SWALLOWING FUNCTION: CPT

## 2022-04-11 PROCEDURE — 85027 COMPLETE CBC AUTOMATED: CPT | Performed by: INTERNAL MEDICINE

## 2022-04-11 PROCEDURE — 99900026 HC AIRWAY MAINTENANCE (STAT)

## 2022-04-11 RX ORDER — MUPIROCIN 20 MG/G
OINTMENT TOPICAL 2 TIMES DAILY
Status: DISCONTINUED | OUTPATIENT
Start: 2022-04-11 | End: 2022-04-12

## 2022-04-11 RX ORDER — HYDRALAZINE HYDROCHLORIDE 20 MG/ML
10 INJECTION INTRAMUSCULAR; INTRAVENOUS EVERY 4 HOURS PRN
Status: DISCONTINUED | OUTPATIENT
Start: 2022-04-11 | End: 2022-04-18 | Stop reason: HOSPADM

## 2022-04-11 RX ORDER — VALSARTAN 80 MG/1
80 TABLET ORAL DAILY
Status: DISCONTINUED | OUTPATIENT
Start: 2022-04-11 | End: 2022-04-18 | Stop reason: HOSPADM

## 2022-04-11 RX ORDER — CHLORHEXIDINE GLUCONATE ORAL RINSE 1.2 MG/ML
15 SOLUTION DENTAL 2 TIMES DAILY
Status: DISCONTINUED | OUTPATIENT
Start: 2022-04-11 | End: 2022-04-12

## 2022-04-11 RX ORDER — TRAZODONE HYDROCHLORIDE 50 MG/1
50 TABLET ORAL NIGHTLY PRN
Status: DISCONTINUED | OUTPATIENT
Start: 2022-04-11 | End: 2022-04-18 | Stop reason: HOSPADM

## 2022-04-11 RX ADMIN — IPRATROPIUM BROMIDE AND ALBUTEROL SULFATE 3 ML: .5; 3 SOLUTION RESPIRATORY (INHALATION) at 07:04

## 2022-04-11 RX ADMIN — PIPERACILLIN AND TAZOBACTAM 3.38 G: 3; .375 INJECTION, POWDER, LYOPHILIZED, FOR SOLUTION INTRAVENOUS; PARENTERAL at 05:04

## 2022-04-11 RX ADMIN — MORPHINE SULFATE 2 MG: 2 INJECTION, SOLUTION INTRAMUSCULAR; INTRAVENOUS at 12:04

## 2022-04-11 RX ADMIN — VANCOMYCIN HYDROCHLORIDE 1500 MG: 1.5 INJECTION, POWDER, LYOPHILIZED, FOR SOLUTION INTRAVENOUS at 08:04

## 2022-04-11 RX ADMIN — IPRATROPIUM BROMIDE AND ALBUTEROL SULFATE 3 ML: .5; 3 SOLUTION RESPIRATORY (INHALATION) at 01:04

## 2022-04-11 RX ADMIN — MORPHINE SULFATE 2 MG: 2 INJECTION, SOLUTION INTRAMUSCULAR; INTRAVENOUS at 06:04

## 2022-04-11 RX ADMIN — POTASSIUM CHLORIDE 40 MEQ: 7.46 INJECTION, SOLUTION INTRAVENOUS at 05:04

## 2022-04-11 RX ADMIN — IPRATROPIUM BROMIDE AND ALBUTEROL SULFATE 3 ML: .5; 3 SOLUTION RESPIRATORY (INHALATION) at 02:04

## 2022-04-11 RX ADMIN — MUPIROCIN: 20 OINTMENT TOPICAL at 09:04

## 2022-04-11 RX ADMIN — PIPERACILLIN AND TAZOBACTAM 3.38 G: 3; .375 INJECTION, POWDER, LYOPHILIZED, FOR SOLUTION INTRAVENOUS; PARENTERAL at 12:04

## 2022-04-11 RX ADMIN — CHLORHEXIDINE GLUCONATE 15 ML: 1.2 RINSE ORAL at 09:04

## 2022-04-11 RX ADMIN — PIPERACILLIN AND TAZOBACTAM 3.38 G: 3; .375 INJECTION, POWDER, LYOPHILIZED, FOR SOLUTION INTRAVENOUS; PARENTERAL at 01:04

## 2022-04-11 RX ADMIN — TRAZODONE HYDROCHLORIDE 50 MG: 50 TABLET ORAL at 09:04

## 2022-04-11 RX ADMIN — PROPOFOL 25 MCG/KG/MIN: 10 INJECTION, EMULSION INTRAVENOUS at 03:04

## 2022-04-11 RX ADMIN — VALSARTAN 80 MG: 80 TABLET, FILM COATED ORAL at 12:04

## 2022-04-11 RX ADMIN — VANCOMYCIN HYDROCHLORIDE 1500 MG: 1.5 INJECTION, POWDER, LYOPHILIZED, FOR SOLUTION INTRAVENOUS at 09:04

## 2022-04-11 NOTE — PT/OT/SLP EVAL
Speech Language Pathology Evaluation  Bedside Swallow    Patient Name:  Conrad Kuhn   MRN:  [id number]  Admitting Diagnosis: Acute hypoxemic respiratory failure    Recommendations:     General Recommendations:    · Modified barium swallow study: NPO and non oral medications in the interim     Diet recommendations:  NPO, NPO   Aspiration Precautions: Strict aspiration precautions   General Precautions: Standard, NPO, aspiration      History:       Per EMR:   HPI: 81 year old male transferred from [facility] secondary to acute hypoxic respiratory failure.  He is intubated at that time of my exam and his wife is at bedside to supplement history.  History also supplemented  by ED MD and chart.  Patient had  C3-7 anterior posterior cervical fusion approximately two days ago.  His wife noted that he was having SOB yesterday as well as shoulder pain and neck pain. He even expressed to her that he felt he may be getting pneumonia. She reported he did choke when trying to drink a milk shake yesterday.  Patient had return of SOB today.  EMS received report that he got some ativan around noon and was going for a CT scan when saturations dropped into the 70s.  He was transported to [facility].  Per ED MD,  patient sounded as if he had stridor.  He was electively intubated and thick, bloody secretions were seen. Dr. Tillman with surgery was called and evaluated the patient and the surgical incision.  It did not appear this was the etiology of his respiratory distress and hypoxia. CT soft tissue of the neck revealed expected post operative changes with drains in place and subcutaneous gas present.  CT chest with patchy bibasilar opacities with aspiration pneumonia a consideration.      In the ED, WBC is 44.  The surgeon did express that he thought these post surgical patients got perioperative Decadron but I cannot confirm if that's the case presently. Temp is 100.7.  He was started on IV Vancomycin and IV Zosyn.    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     Right-sided Bell's palsy 2009    Spondylolisthesis     Varicose veins of legs     Venous insufficiency        Past Surgical History:   Procedure Laterality Date    Athroscopic surgery      On Knee    BIOPSY OF TISSUE OF NECK Left 08/2015    Recuurence CLL/small cell lyphoma    ESOPHAGEAL DILATION      Hemorrhoid resection  1979    LUNG LOBECTOMY Left 2004    NECK SURGERY  04/08/2022    Anterior and Posterior C3-C7 fusion    OTHER SURGICAL HISTORY  2006    Chemotherapy s/p lyphoma        Portacath implantation and removal      reverse shoulder arthroplasty Right 03/2021     Imaging Results          CT Chest With Contrast (Final result)  Result time 04/10/22 17:05:17    Final result by Altaf Cross MD (04/10/22 17:05:17)                 Narrative:    CMS MANDATED QUALITY DATA - CT RADIATION  436    All CT scans at this facility utilize dose modulation, iterative reconstruction, and/or weight based dosing when appropriate to reduce radiation dose to as low as reasonably achievable.    CT CHEST WITH IV CONTRAST    CLINICAL HISTORY:  none provided    COMPARISON: None    FINDINGS: Endotracheal tube tip is located 5.6 cm above the bernard.    Status post partial pneumonectomy on the left. Patchy alveolar and groundglass opacities within the dependent lower lobes bilaterally, right greater than left. No pneumothorax. No pleural effusion. Upper lobe emphysematous changes.    Bone window images demonstrate  degenerative changes of the spine. No acute or aggressive osseous abnormality.    Atherosclerotic calcification of the aorta which is normal in caliber. Atherosclerosis of the coronary arteries. Esophagus is unremarkable. Orogastric tube is in place. Prominent mediastinal lymph node nodes.    Images through the upper abdomen show no acute pathology. Alyssa hepatis lymphadenopathy. For reference, enlarged alyssa hepatis lymph node on image 157 measures 16 mm in short axis.    IMPRESSION:    Patchy alveolar and groundglass opacities within the lower lobes bilaterally concerning for infectious/inflammatory process. Please correlate for symptoms of pneumonia, potentially aspiration.    Endotracheal tube tip is in place 5.6 cm above the bernard.    Alyssa hepatis lymphadenopathy.    Electronically signed by:  Altaf Cross MD  4/10/2022 5:05 PM CDT Workstation: [id number]                             CT Soft Tissue Neck With Contrast (Final result)  Result time 04/10/22 16:59:55   Procedure changed from CT Neck Chest With Contrast (XPD)     Final result by Altaf Cross MD (04/10/22 16:59:55)                 Narrative:    CMS MANDATED QUALITY DATA - CT RADIATION  436    All CT scans at this facility utilize dose modulation, iterative reconstruction, and/or weight based dosing when appropriate to reduce radiation dose to as low as reasonably achievable.    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 Atherosclerotic calcification of the intracranial carotid and vertebral arteries. Mastoid air cells are clear. Visualized paranasal sinuses are clear.    Orogastric and endotracheal tubes are in place. No evidence of subglottic stenosis.    Subcutaneous gas along the anterior aspect of the left neck, with small percutaneous drain in place, terminating within the prevertebral  soft tissues.Patient is status post cervical ACDF extending from C3 through C7. No evidence of hardware complication. Mild associated prevertebral soft tissue swelling. No retropharyngeal fluid collection. Status post cervical laminectomy with posterior fixation hardware in place. There is associated postoperative gas at the operative site. There is a small subcutaneous stranding in place within the laminectomy site. Prominent cervical lymph nodes bilaterally. Bilateral carotid artery atherosclerotic calcification. Carotid and jugular vessels appear patent.    IMPRESSION:    Recent cervical ACDF, laminectomy and posterior fixation - with percutaneous drains in place and soft tissue gas within the anterior and posterior aspect of the neck, presumably postoperative in nature.      Electronically signed by:  Altaf Cross MD  4/10/2022 4:59 PM CDT Workstation: [id number]                             X-Ray Chest AP Portable (Final result)  Result time 04/10/22 16:14:34    Final result by Altaf Cross MD (04/10/22 16:14:34)                 Narrative:    XR CHEST 1 VIEW    CLINICAL HISTORY:  81 years Male OG tube placement    COMPARISON: April 10, 2022    FINDINGS: Endotracheal tube is in stable position. Interval placement of enteric tube in appropriate position. Cardiopulmonary status is unchanged.    IMPRESSION:    Interval placement of enteric tube in appropriate position.    Otherwise stable exam.    Electronically signed by:  Altaf Cross MD  4/10/2022 4:14 PM CDT Workstation: [id number]                             X-Ray Chest AP Portable (Final result)  Result time 04/10/22 15:49:15    Final result by Altaf Cross MD (04/10/22 15:49:15)                 Narrative:    XR CHEST 1 VIEW    CLINICAL HISTORY:  81 years Male POST INTUBATION    COMPARISON: April 10, 2022    FINDINGS: Endotracheal tube has been placed. Tip is located 6.4 cm above the bernard. Cardiac silhouette size is stable. No alveolar or  interstitial infiltrate. Stable postoperative changes of the left hemithorax. No large pleural effusion or pneumothorax.    IMPRESSION:    Interval placement of ET tube with tip located 6.4 cm above the bernard.    Otherwise stable chest radiograph.    Electronically signed by:  Altaf Cross MD  4/10/2022 3:49 PM CDT Workstation: [id number]                             X-Ray Chest AP Portable (Final result)  Result time 04/10/22 15:14:22    Final result by Altaf Cross MD (04/10/22 15:14:22)                 Narrative:    XR CHEST 1 VIEW    CLINICAL HISTORY:  81 years Male Shortness of breath    COMPARISON: June 29, 2021    FINDINGS: Cardiac silhouette size is stable compared to prior. Status post partial pneumonectomy on the left with left lung volume loss. Old left posterior rib fractures. Mild scarring at the left lung base. No confluent airspace disease. No pleural fluid or pneumothorax.    IMPRESSION:    Stable postoperative changes of the left lung.  No acute pulmonary pathology.    Electronically signed by:  Altaf Cross MD  4/10/2022 3:14 PM CDT Workstation: [id number]                                Subjective     Pt alert, in bedside chair   Extubated this AM   Patient goals: none stated      Pain/Comfort:  · Pain Rating 1: 0/10    Respiratory Status: Nasal cannula, flow 3 L/min    Objective:     Oral Musculature Evaluation  · Oral Musculature: WNL  · Secretion Management: coughing on secretions, problems swallowing secretions  · Mandibular Strength and Mobility:  (restricted; c collar)  · Oral Labial Strength and Mobility: WNL  · Lingual Strength and Mobility: WNL  · Velar Elevation:  (unable to visualize)  · Volitional Cough: Present; does not appear effective for airway protection  · Volitional Swallow: Hyolaryngeal lift present  · Voice Prior to PO Intake: hoarse quality, reduced intensity and wet in nature    Bedside Swallow Eval:   · Coughing on secretions, using suction  · Wet vocal quality    Consistencies Assessed:  · Thin liquids ice chip x2     Oral Phase:   · Intact labial seal to retreive from teaspoon  · Active bolus manipulation  · Mandibular range of motion limited with c collar  · Prompt AP transit      Pharyngeal Phase:   · Immediate and continuous coughing and throat clearing  · Wet vocal quality   · Multiple swallows     Cognitive communication:   · Hard of hearing requiring increased loudness and repetition  · Redirection t/o encounter, distractible     Motor speech: rapid, imprecise production. ~80% intelligible within known context     Assessment:     Conrad Kuhn is a 81 y.o. male with an SLP diagnosis of signs of dysphagia following ACDF (C3-7, POD #3). Will proceed with MBS. Anticipate Pt to require temporary means of nutrition and hydration.     Goals:   Multidisciplinary Problems     SLP Goals        Problem: SLP    Goal Priority Disciplines Outcome   SLP Goal     SLP Ongoing, Progressing   Description: 1. Pt will tolerate least restrictive PO diet without acute dysphagia pulmonary complication.   2. PENDING: Pt will participate in VFSS to define swallow physiology; treatment goal to follow                        Plan:     · Plan of Care reviewed with:  patient   · SLP Follow-Up:  Yes ([facility])       Discharge recommendations:   (pending)     Time Tracking:     SLP Treatment Date:   04/11/22  Speech Start Time:  1158  Speech Stop Time:  1206     Speech Total Time (min):  8 min    Billable Minutes: Eval Swallow and Oral Function 8    04/11/2022

## 2022-04-11 NOTE — SUBJECTIVE & OBJECTIVE
Interval History:     Review of Systems   Constitutional:  Negative for activity change and appetite change.   HENT:  Negative for congestion and dental problem.    Eyes:  Negative for discharge and itching.   Respiratory:  Negative for shortness of breath.    Cardiovascular:  Negative for chest pain.   Gastrointestinal:  Negative for abdominal distention and abdominal pain.   Endocrine: Negative for cold intolerance.   Genitourinary:  Negative for difficulty urinating and dysuria.   Musculoskeletal:  Positive for arthralgias and back pain.   Skin:  Negative for color change.   Neurological:  Negative for dizziness and facial asymmetry.   Hematological:  Negative for adenopathy.   Psychiatric/Behavioral:  Negative for agitation and behavioral problems.    Objective:     Vital Signs (Most Recent):  Temp: 98.5 °F (36.9 °C) (04/11/22 1110)  Pulse: 81 (04/11/22 1100)  Resp: (!) 22 (04/11/22 1241)  BP: (!) 170/75 (04/11/22 1100)  SpO2: 97 % (04/11/22 1100)   Vital Signs (24h Range):  Temp:  [98.4 °F (36.9 °C)-100.7 °F (38.2 °C)] 98.5 °F (36.9 °C)  Pulse:  [] 81  Resp:  [18-40] 22  SpO2:  [94 %-99 %] 97 %  BP: (101-237)/() 170/75  Arterial Line BP: (104-247)/(36-58) 247/58     Weight: 94.1 kg (207 lb 7.3 oz)  Body mass index is 26.64 kg/m².    Intake/Output Summary (Last 24 hours) at 4/11/2022 1351  Last data filed at 4/11/2022 0800  Gross per 24 hour   Intake 1897.34 ml   Output 1310 ml   Net 587.34 ml      Physical Exam  Vitals and nursing note reviewed.   Constitutional:       Appearance: He is well-developed.   HENT:      Head: Atraumatic.      Right Ear: External ear normal.      Left Ear: External ear normal.      Nose: Nose normal.      Mouth/Throat:      Mouth: Mucous membranes are moist.   Eyes:      General: No scleral icterus.  Neck:      Comments: Neck Collar   Cardiovascular:      Rate and Rhythm: Normal rate.   Pulmonary:      Effort: Pulmonary effort is normal.   Abdominal:      General: There  is no distension.   Musculoskeletal:         General: Normal range of motion.      Cervical back: Full passive range of motion without pain.   Skin:     General: Skin is warm.   Neurological:      Mental Status: He is alert and oriented to person, place, and time.   Psychiatric:         Behavior: Behavior normal.       Significant Labs: All pertinent labs within the past 24 hours have been reviewed.  CBC:   Recent Labs   Lab 04/10/22  1457 04/10/22  2040 04/11/22  0405 04/11/22  0406   WBC 44.08*  --   --  25.02*   HGB 12.4*  --   --  9.6*   HCT 35.5* 29* 28* 27.4*   PLT 96*  --   --  68*     CMP:   Recent Labs   Lab 04/10/22  1457 04/11/22  0406   * 135*   K 3.9 3.5   CL 94* 97   CO2 25 28   * 207*   BUN 18 31*   CREATININE 0.8 1.2   CALCIUM 9.6 8.4*   PROT 7.5 6.1   ALBUMIN 4.3 3.2*   BILITOT 1.3* 1.3*   ALKPHOS 43* 29*   AST 37 20   ALT 17 13   ANIONGAP 16 10   EGFRNONAA >60.0 56.0*       Significant Imaging: I have reviewed all pertinent imaging results/findings within the past 24 hours.

## 2022-04-11 NOTE — PLAN OF CARE
04/11/22 0405   Patient Assessment/Suction   Level of Consciousness (AVPU) responds to voice   Respiratory Effort Normal;Unlabored   Expansion/Accessory Muscles/Retractions no use of accessory muscles   All Lung Fields Breath Sounds Anterior:;clear   Rhythm/Pattern, Respiratory assisted mechanically   PRE-TX-O2   O2 Device (Oxygen Therapy) ventilator   $ Is the patient on Low Flow Oxygen? Yes   Oxygen Concentration (%) 35   SpO2 96 %   Pulse Oximetry Type Continuous   $ Pulse Oximetry - Multiple Charge Pulse Oximetry - Multiple   Pulse 71   Resp 18   Vent Select   Conventional Vent Y   Charged w/in last 24h YES   Preset Conventional Ventilator Settings   Vent ID [id number]   Vent Type    Ventilation Type VC   Vent Mode A/C   Humidity HME   Set Rate 18 BPM   Vt Set 550 mL   PEEP/CPAP 5 cmH20   Pressure Support 0 cmH20   Waveform RAMP   Peak Flow 60 L/min   Plateau Set/Insp. Hold (sec) 0   Trigger Sensitivity Flow/I-Trigger 3 L/min   Patient Ventilator Parameters   Resp Rate Total 19 br/min   Peak Airway Pressure 21 cmH2O   Mean Airway Pressure 9.9 cmH20   Plateau Pressure 0 cmH20   Exhaled Vt 619 mL   Total Ve 10.9 mL   I:E Ratio Measured 1:2.30   Tubing ID (mm) 8 mm   Tube Type ET   Conventional Ventilator Alarms   Alarms On Y   Resp Rate High Alarm 40 br/min   Press High Alarm 60 cmH2O   Apnea Rate 10   Apnea Volume (mL) 1 mL   Apnea Oxygen Concentration  100   Apnea Flow Rate (L/min) 61   T Apnea 20 sec(s)   Ready to Wean/Extubation Screen   FIO2<=50 (chart decimal) 0.35   MV<16L (chart vol.) 10.9   PEEP <=8 (chart #) 5   Ready to Wean Parameters   F/VT Ratio<105 (RSBI) (!) 29.08   Education   $ Education Ventilator Oxygen;15 min  (blood gas)   Labs   $ Was an ABG obtained? A Line;ISTAT - Blood gas;ISTAT - Calcium;ISTAT - Hematocrit;ISTAT - Potassium;ISTAT - Sodium   $ Labs Tech Time 15 min   Respiratory Evaluation   $ Care Plan Tech Time 15 min

## 2022-04-11 NOTE — HPI
81 year old male transferred from [facility] secondary to acute hypoxic respiratory failure.  He is intubated at that time of my exam and his wife is at bedside to supplement history.  History also supplemented  by ED MD and chart.  Patient had  C3-7 anterior posterior cervical fusion approximately two days ago.  His wife noted that he was having SOB yesterday as well as shoulder pain and neck pain. He even expressed to her that he felt he may be getting pneumonia. She reported he did choke when trying to drink a milk shake yesterday.  Patient had return of SOB today.  EMS received report that he got some ativan around noon and was going for a CT scan when saturations dropped into the 70s.  He was transported to [facility].  Per ED MD,  patient sounded as if he had stridor.  He was electively intubated and thick, bloody secretions were seen. Dr. Tillman with surgery was called and evaluated the patient and the surgical incision.  It did not appear this was the etiology of his respiratory distress and hypoxia. CT soft tissue of the neck revealed expected post operative changes with drains in place and subcutaneous gas present.  CT chest with patchy bibasilar opacities with aspiration pneumonia a consideration.     In the ED, WBC is 44.  The surgeon did express that he thought these post surgical patients got perioperative Decadron but I cannot confirm if that's the case presently. Temp is 100.7.  He was started on IV Vancomycin and IV Zosyn.

## 2022-04-11 NOTE — PT/OT/SLP EVAL
Occupational Therapy   Evaluation    Name: Conrad Kuhn  MRN: [id number]  Admitting Diagnosis:  Acute hypoxemic respiratory failure  Recent Surgery: * No surgery found *      Recommendations:     Discharge Recommendations: home health OT  Discharge Equipment Recommendations:  none  Barriers to discharge:  None    Assessment:     Conrad Kuhn is a 81 y.o. male with a medical diagnosis of Acute hypoxemic respiratory failure.  He presents with improving respiratory problems s/p C3 to C7 anterior fusion. Performance deficits affecting function: weakness, impaired endurance, impaired self care skills, impaired functional mobilty, gait instability, impaired balance, decreased safety awareness, orthopedic precautions.      Rehab Prognosis: Fair; patient would benefit from acute skilled OT services to address these deficits and reach maximum level of function.       Plan:     Patient to be seen 5 x/week to address the above listed problems via self-care/home management, therapeutic activities, therapeutic exercises  · Plan of Care Expires: 05/11/22  · Plan of Care Reviewed with: patient    Subjective     Chief Complaint: None  Patient/Family Comments/goals: To go home.    Occupational Profile:  Living Environment: lives in a 1 story home with no step to enter.  Previous level of function: independent with ADLs, IADLs and driving.  Roles and Routines: primary homemaker, retired mailman  Equipment Used at Home:  rollator  Assistance upon Discharge: Spuse    Pain/Comfort:  · Pain Rating 1: 0/10  · Pain Rating Post-Intervention 1: 0/10    Patients cultural, spiritual, Pentec[employer]al conflicts given the current situation: no    Objective:     Communicated with: nurse prior to session.  Patient found up in chair with telemetry, peripheral IV upon OT entry to room.    General Precautions: Standard, fall   Orthopedic Precautions:spinal precautions   Braces:  ([place] Cervical Collar)  Respiratory Status: Nasal cannula, flow 4  L/min    Occupational Performance:    Functional Mobility/Transfers:  · Patient completed Sit <> Stand Transfer with contact guard assistance  with  no assistive device     Activities of Daily Living:  · Grooming: contact guard assistance to wash face sitting in chair.  · Lower Body Dressing: moderate assistance to don/doff socks sitting in chair.    Cognitive/Visual Perceptual:  Cognitive/Psychosocial Skills:     -       Oriented to: Person and Place   -       Follows Commands/attention:Follows one-step commands  -       Communication: clear/fluent  -       Memory: Impaired STM  -       Safety awareness/insight to disability: impaired   -       Mood/Affect/Coping skills/emotional control: Cooperative and Pleasant  Visual/Perceptual:      -Intact Acuity    Physical Exam:  Balance:    -       Sitting/Standing: Contact Guard  Upper Extremity Range of Motion:     -       Right Upper Extremity: WFL  -       Left Upper Extremity: WFL  Upper Extremity Strength:    -       Right Upper Extremity: 4/5  -       Left Upper Extremity: 4/5   Strength:    -       Right Upper Extremity: fair  -       Left Upper Extremity: fair  Fine Motor Coordination:    -       Intact    AMPAC 6 Click ADL:  AMPAC Total Score: 19    Treatment & Education:  Patient educated on cervical spine precautions while performing ADL activity and functional mobility. Further education provide on wearing schedule.   Education:    Patient left up in chair with all lines intact and call button in reach    GOALS:   Multidisciplinary Problems     Occupational Therapy Goals        Problem: Occupational Therapy    Goal Priority Disciplines Outcome Interventions   Occupational Therapy Goal     OT, PT/OT     Description: Goals to be met by: discharge     Patient will increase functional independence with ADLs by performing:    UE Dressing with Supervision.  LE Dressing with Supervision.  Grooming while standing at sink with Supervision.  Toileting from toilet  with Supervision for hygiene and clothing management.   Toilet transfer to toilet with Supervision.  Perform sitting/standing ADL activity with no verbal/tactile cues for cervical precautions.                     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     Right-sided Bell's palsy 2009    Spondylolisthesis     Varicose veins of legs     Venous insufficiency        Past Surgical History:   Procedure Laterality Date    Athroscopic surgery      On Knee    BIOPSY OF TISSUE OF NECK Left 08/2015    Recuurence CLL/small cell lyphoma    ESOPHAGEAL DILATION      Hemorrhoid resection  1979    LUNG LOBECTOMY Left 2004    NECK SURGERY  04/08/2022    Anterior and Posterior C3-C7 fusion    OTHER SURGICAL HISTORY  2006    Chemotherapy s/p lyphoma        Portacath implantation and removal      reverse shoulder arthroplasty Right 03/2021       Time Tracking:     OT Date of Treatment: 04/11/22  OT Start Time: 1141  OT Stop Time: 1157  OT Total Time (min): 16 min    Billable Minutes:Evaluation 4  Self Care/Home Management 12    4/11/2022

## 2022-04-11 NOTE — HOSPITAL COURSE
04/11  Pt got extubated  MBSS tmrw AM  On NPO status    04/12  MBSS confirmed severe aspiration  NG tube feeding will be started  Pt denies any issues  Pt got transferred out of ICU     04/13  Pt walking around  Still in need of 4 L oxygen  Breathing status much improved  Multiple attempts yesterday for NG tube was unsuccessful  Pt agreed with PEG tube insertion    04/14  Awaiting PEG tube and Home oxygen assessment  C/o pain on operative area  Otherwise stable to go home    04/15  Per Home o2 assessment pt doesn't need home o2  PEG tube placement got postponed because Anesthesia service was not available  Otherwise no new issues  Completed iv abx Rx today

## 2022-04-11 NOTE — ASSESSMENT & PLAN NOTE
His family supplements that he had a platelet transfusion prior to his surgery.  No present transfusion needs.  Will monitor.  AM labs ordered.

## 2022-04-11 NOTE — PLAN OF CARE
04/10/22 1940   Patient Assessment/Suction   Level of Consciousness (AVPU)   (sedated)   Respiratory Effort Normal;Unlabored   Expansion/Accessory Muscles/Retractions no use of accessory muscles   All Lung Fields Breath Sounds Anterior:;coarse   Rhythm/Pattern, Respiratory assisted mechanically   Cough Frequency with stimulation   Cough Type assisted   Suction Method tracheal   Suction Pressure (mmHg) -120 mmHg   $ Suction Charges Inline Suction Procedure Stat Charge   Secretions Amount moderate   Secretions Color clear   Secretions Characteristics thick   PRE-TX-O2   O2 Device (Oxygen Therapy) ventilator   $ Is the patient on Low Flow Oxygen? Yes   Oxygen Concentration (%) 45   SpO2 97 %   Pulse Oximetry Type Continuous   $ Pulse Oximetry - Multiple Charge Pulse Oximetry - Multiple   Pulse 89   Resp 18   /63   Skin Integrity   $ Wound Care Tech Time 15 min   Area Observed Upper lip;Lower lip;Corner lip   Skin Appearance without discoloration        Airway - Non-Surgical 04/10/22 1524 Endotracheal Tube   Placement Date/Time: 04/10/22 1524   Present Prior to Hospital Arrival?: No  Method of Intubation: Glidescope  Inserted by: MD  Staff/Resident Name(s): Dr. Giang  Airway Device: Endotracheal Tube  Airway Device Size: 7.5  Style: Cuffed  Cuff Inflate...   Secured at 25 cm   Measured At Lips   Secured Location Center   Secured by Commercial tube pan   Bite Block none   Site Condition Cool;Dry   Status Intact;Secured   Site Assessment Clean;Dry;No bleeding   Cuff Volume   (mlt)   Vent Select   Conventional Vent Y   Charged w/in last 24h YES   Preset Conventional Ventilator Settings   Vent ID [id number]   Vent Type    Ventilation Type VC   Vent Mode A/C   Humidity HME   Set Rate 18 BPM   Vt Set 550 mL   PEEP/CPAP 5 cmH20   Pressure Support 0 cmH20   Waveform RAMP   Peak Flow 60 L/min   Plateau Set/Insp. Hold (sec) 0   Trigger Sensitivity Flow/I-Trigger 3 L/min   Patient Ventilator Parameters   Resp Rate Total  18 br/min   Peak Airway Pressure 18 cmH2O   Mean Airway Pressure 9.7 cmH20   Plateau Pressure 0 cmH20   Exhaled Vt 541 mL   Total Ve 10.4 mL   I:E Ratio Measured 1:2.30   Tubing ID (mm) 8 mm   Tube Type ET   Conventional Ventilator Alarms   Alarms On Y   Resp Rate High Alarm 40 br/min   Press High Alarm 60 cmH2O   Apnea Rate 10   Apnea Volume (mL) 1 mL   Apnea Oxygen Concentration  100   Apnea Flow Rate (L/min) 61   T Apnea 20 sec(s)   Ready to Wean/Extubation Screen   FIO2<=50 (chart decimal) 0.45   MV<16L (chart vol.) 10.4   PEEP <=8 (chart #) 5   Ready to Wean Parameters   F/VT Ratio<105 (RSBI) (!) 33.27   Education   $ Education Ventilator Oxygen;15 min   Respiratory Evaluation   $ Care Plan Tech Time 15 min

## 2022-04-11 NOTE — ASSESSMENT & PLAN NOTE
Intubated and got extubated on 04/11 AM  Continue iv abx for presumed aspiration pneumonia  Maintain NPO status  MBSS tmrw AM   Transfer from ICU tmrw AM if stable

## 2022-04-11 NOTE — SUBJECTIVE & OBJECTIVE
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     Right-sided Bell's palsy 2009    Spondylolisthesis     Varicose veins of legs     Venous insufficiency        Past Surgical History:   Procedure Laterality Date    Athroscopic surgery      On Knee    BIOPSY OF TISSUE OF NECK Left 08/2015    Recuurence CLL/small cell lyphoma    ESOPHAGEAL DILATION      Hemorrhoid resection  1979    LUNG LOBECTOMY Left 2004    OTHER SURGICAL HISTORY  2006    Chemotherapy s/p lyphoma        Portacath implantation and removal      reverse shoulder arthroplasty Right 03/2021       Review of patient's allergies indicates:  No Known Allergies    No current facility-administered medications on file prior to encounter.     Current Outpatient Medications on File Prior to Encounter   Medication Sig    albuterol-ipratropium (DUO-NEB) 2.5 mg-0.5 mg/3 mL nebulizer solution Take 3 mLs by nebulization every 8 (eight) hours as needed.    atorvastatin (LIPITOR) 20 MG tablet Take 20 mg by mouth once daily.     azelastine (ASTELIN) 137 mcg (0.1 %) nasal spray 1 spray by Nasal route 2 (two) times daily.     ferrous sulfate (FEOSOL) 325 mg (65 mg iron) Tab tablet Take 1 tablet (325 mg total) by mouth once daily.    gabapentin (NEURONTIN) 300 MG capsule Take 300 mg by mouth 2 (two) times daily.     glimepiride (AMARYL) 1 MG tablet Take 1 mg by mouth daily with breakfast.     HYDROcodone-acetaminophen (NORCO)  mg per tablet Take 1 tablet by mouth every 8 (eight) hours as needed.     levalbuterol (XOPENEX) 1.25 mg/3 mL nebulizer solution Take 3 mLs by nebulization every 4 to 6 hours as  needed.     losartan (COZAAR) 25 MG tablet Take 25 mg by mouth every evening.    metFORMIN (GLUCOPHAGE) 500 MG tablet Take 500 mg by mouth 2 (two) times daily with meals.     metoprolol succinate (TOPROL-XL) 25 MG 24 hr tablet Take 25 mg by mouth 2 (two) times daily.     ondansetron (ZOFRAN) 8 MG tablet Take 8 mg by mouth every 8 (eight) hours as needed.    promethazine (PHENERGAN) 25 MG tablet TAKE 1 TABLET BY MOUTH EVERY 6 HOURS AS NEEDED FOR NAUSEA (Patient taking differently: Take 25 mg by mouth every 6 (six) hours as needed.)    traZODone (DESYREL) 100 MG tablet TAKE 1 TABLET BY MOUTH EVERY NIGHT AT BEDTIME (Patient taking differently: Take 100 mg by mouth every evening.)    valACYclovir (VALTREX) 1000 MG tablet Take 1 tablet (1,000 mg total) by mouth 2 (two) times daily for 7 days, THEN 1 tablet (1,000 mg total) once daily.    valsartan (DIOVAN) 80 MG tablet Take 80 mg by mouth every evening.    blood sugar diagnostic Strp by Other route.    blood-glucose meter kit Use as instructed    FREESTYLE LANCETS 28 gauge lancets TEST TWICE DAILY    [DISCONTINUED] celecoxib (CELEBREX) 200 MG capsule Take 200 mg by mouth 2 (two) times daily.    [DISCONTINUED] duke's soln (benadryl 30 mL, mylanta 30 mL, LIDOcaine 30 mL, nystatin 30 mL) 120mL Take 10 mLs by mouth every 4 (four) hours as needed (mouth sores).    [DISCONTINUED] ibuprofen (ADVIL,MOTRIN) 600 MG tablet Take 600 mg by mouth every 6 (six) hours as needed for Pain.    [DISCONTINUED] neomycin-polymyxin-dexamethasone (DEXACINE) 3.5 mg/g-10,000 unit/g-0.1 % Oint APPLY THIN LAYER INTO AFFECTED EYE FOUR TIMES DAILY FOR 7 DAYS    [DISCONTINUED] predniSONE (DELTASONE) 10 MG tablet Take 60mg daily for five days, then 50mg for day 6, 40mg for day 7, 30mg day 8, 20mg day 9, and 10mg day 10.    [DISCONTINUED] venetoclax (VENCLEXTA) 100 mg Tab Take 200 mg by mouth once daily.     Family History       Problem Relation (Age of Onset)    Colon cancer Father    Stomach cancer  Mother (80)          Tobacco Use    Smoking status: Never Smoker    Smokeless tobacco: Never Used   Substance and Sexual Activity    Alcohol use: Yes    Drug use: No    Sexual activity: Not on file     Review of Systems   Unable to perform ROS: Intubated   Objective:     Vital Signs (Most Recent):  Temp: 98.9 °F (37.2 °C) (04/10/22 1915)  Pulse: 92 (04/10/22 1915)  Resp: 19 (04/10/22 1915)  BP: (!) 109/53 (04/10/22 1915)  SpO2: 97 % (04/10/22 1915)   Vital Signs (24h Range):  Temp:  [98.9 °F (37.2 °C)-100.7 °F (38.2 °C)] 98.9 °F (37.2 °C)  Pulse:  [] 92  Resp:  [19-40] 19  SpO2:  [95 %-98 %] 97 %  BP: (101-237)/() 109/53     Weight: 95.3 kg (210 lb)  Body mass index is 26.96 kg/m².    Physical Exam  Vitals and nursing note reviewed.   Constitutional:       General: He is not in acute distress.     Appearance: He is well-developed.   HENT:      Head: Normocephalic and atraumatic.   Eyes:      Pupils: Pupils are equal, round, and reactive to light.   Neck:      Comments: Cervical collar in place  Cardiovascular:      Rate and Rhythm: Regular rhythm.      Heart sounds: No murmur heard.  Pulmonary:      Effort: Pulmonary effort is normal.      Breath sounds: Normal breath sounds. No wheezing.      Comments: ETT  Abdominal:      General: There is no distension.      Palpations: Abdomen is soft.      Tenderness: There is no abdominal tenderness. There is no guarding or rebound.   Genitourinary:     Comments: Manley  Musculoskeletal:         General: Normal range of motion.      Cervical back: Normal range of motion.   Skin:     Findings: No rash.   Neurological:      Comments: Sedated on vent         CRANIAL NERVES     CN III, IV, VI   Pupils are equal, round, and reactive to light.     Significant Labs: All pertinent labs within the past 24 hours have been reviewed.  CBC:   Recent Labs   Lab 04/10/22  1457   WBC 44.08*   HGB 12.4*   HCT 35.5*   PLT 96*     CMP:   Recent Labs   Lab 04/10/22  1457   *   K  3.9   CL 94*   CO2 25   *   BUN 18   CREATININE 0.8   CALCIUM 9.6   PROT 7.5   ALBUMIN 4.3   BILITOT 1.3*   ALKPHOS 43*   AST 37   ALT 17   ANIONGAP 16   EGFRNONAA >60.0     Cardiac Markers:   Recent Labs   Lab 04/10/22  1457   *     Troponin:   Recent Labs   Lab 04/10/22  1457   TROPONINI 0.066*       Significant Imaging: I have reviewed all pertinent imaging results/findings within the past 24 hours.  CT: I have reviewed all pertinent results/findings within the past 24 hours and my personal findings are:  CT chest with bibasilar opacities, CT soft tissue of the neck with post op changes, percutaneous drains and subcutaneous gas

## 2022-04-11 NOTE — H&P
[facility] Medicine  History & Physical    Patient Name: Conrad Kuhn  MRN: [id number]  Patient Class: IP- Inpatient  Admission Date: 4/10/2022  Attending Physician: Lcuy Lewis MD  Primary Care Provider: Johnson Erazo MD         Patient information was obtained from spouse/SO and ER records.     Subjective:     Principal Problem:Acute hypoxemic respiratory failure    Chief Complaint:   Chief Complaint   Patient presents with    Shortness of Breath        HPI: 81 year old male transferred from [facility] secondary to acute hypoxic respiratory failure.  He is intubated at that time of my exam and his wife is at bedside to supplement history.  History also supplemented  by ED MD and chart.  Patient had  C3-7 anterior posterior cervical fusion approximately two days ago.  His wife noted that he was having SOB yesterday as well as shoulder pain and neck pain. He even expressed to her that he felt he may be getting pneumonia. She reported he did choke when trying to drink a milk shake yesterday.  Patient had return of SOB today.  EMS received report that he got some ativan around noon and was going for a CT scan when saturations dropped into the 70s.  He was transported to [facility].  Per ED MD,  patient sounded as if he had stridor.  He was electively intubated and thick, bloody secretions were seen. Dr. Tillman with surgery was called and evaluated the patient and the surgical incision.  It did not appear this was the etiology of his respiratory distress and hypoxia. CT soft tissue of the neck revealed expected post operative changes with drains in place and subcutaneous gas present.  CT chest with patchy bibasilar opacities with aspiration pneumonia a consideration.     In the ED, WBC is 44.  The surgeon did express that he thought these post surgical patients got perioperative Decadron but I cannot confirm if that's the case presently. Temp is 100.7.  He was started on IV Vancomycin  and IV Zosyn.      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     Right-sided Bell's palsy 2009    Spondylolisthesis     Varicose veins of legs     Venous insufficiency        Past Surgical History:   Procedure Laterality Date    Athroscopic surgery      On Knee    BIOPSY OF TISSUE OF NECK Left 08/2015    Recuurence CLL/small cell lyphoma    ESOPHAGEAL DILATION      Hemorrhoid resection  1979    LUNG LOBECTOMY Left 2004    OTHER SURGICAL HISTORY  2006    Chemotherapy s/p lyphoma        Portacath implantation and removal      reverse shoulder arthroplasty Right 03/2021       Review of patient's allergies indicates:  No Known Allergies    No current facility-administered medications on file prior to encounter.     Current Outpatient Medications on File Prior to Encounter   Medication Sig    albuterol-ipratropium (DUO-NEB) 2.5 mg-0.5 mg/3 mL nebulizer solution Take 3 mLs by nebulization every 8 (eight) hours as needed.    atorvastatin (LIPITOR) 20 MG tablet Take 20 mg by mouth once daily.     azelastine (ASTELIN) 137 mcg (0.1 %) nasal spray 1 spray by Nasal route 2 (two) times daily.     ferrous sulfate (FEOSOL) 325 mg (65 mg iron) Tab tablet Take 1 tablet (325 mg total) by mouth once daily.    gabapentin (NEURONTIN) 300 MG capsule Take 300 mg by mouth 2 (two) times daily.     glimepiride (AMARYL) 1 MG tablet Take 1 mg by mouth daily with breakfast.     HYDROcodone-acetaminophen (NORCO)  mg per tablet Take 1 tablet by mouth every 8 (eight) hours as needed.     levalbuterol (XOPENEX) 1.25 mg/3 mL nebulizer solution  Take 3 mLs by nebulization every 4 to 6 hours as needed.     losartan (COZAAR) 25 MG tablet Take 25 mg by mouth every evening.    metFORMIN (GLUCOPHAGE) 500 MG tablet Take 500 mg by mouth 2 (two) times daily with meals.     metoprolol succinate (TOPROL-XL) 25 MG 24 hr tablet Take 25 mg by mouth 2 (two) times daily.     ondansetron (ZOFRAN) 8 MG tablet Take 8 mg by mouth every 8 (eight) hours as needed.    promethazine (PHENERGAN) 25 MG tablet TAKE 1 TABLET BY MOUTH EVERY 6 HOURS AS NEEDED FOR NAUSEA (Patient taking differently: Take 25 mg by mouth every 6 (six) hours as needed.)    traZODone (DESYREL) 100 MG tablet TAKE 1 TABLET BY MOUTH EVERY NIGHT AT BEDTIME (Patient taking differently: Take 100 mg by mouth every evening.)    valACYclovir (VALTREX) 1000 MG tablet Take 1 tablet (1,000 mg total) by mouth 2 (two) times daily for 7 days, THEN 1 tablet (1,000 mg total) once daily.    valsartan (DIOVAN) 80 MG tablet Take 80 mg by mouth every evening.    blood sugar diagnostic Strp by Other route.    blood-glucose meter kit Use as instructed    FREESTYLE LANCETS 28 gauge lancets TEST TWICE DAILY    [DISCONTINUED] celecoxib (CELEBREX) 200 MG capsule Take 200 mg by mouth 2 (two) times daily.    [DISCONTINUED] duke's soln (benadryl 30 mL, mylanta 30 mL, LIDOcaine 30 mL, nystatin 30 mL) 120mL Take 10 mLs by mouth every 4 (four) hours as needed (mouth sores).    [DISCONTINUED] ibuprofen (ADVIL,MOTRIN) 600 MG tablet Take 600 mg by mouth every 6 (six) hours as needed for Pain.    [DISCONTINUED] neomycin-polymyxin-dexamethasone (DEXACINE) 3.5 mg/g-10,000 unit/g-0.1 % Oint APPLY THIN LAYER INTO AFFECTED EYE FOUR TIMES DAILY FOR 7 DAYS    [DISCONTINUED] predniSONE (DELTASONE) 10 MG tablet Take 60mg daily for five days, then 50mg for day 6, 40mg for day 7, 30mg day 8, 20mg day 9, and 10mg day 10.    [DISCONTINUED] venetoclax (VENCLEXTA) 100 mg Tab Take 200 mg by mouth once daily.     Family History       Problem  Relation (Age of Onset)    Colon cancer Father    Stomach cancer Mother (80)          Tobacco Use    Smoking status: Never Smoker    Smokeless tobacco: Never Used   Substance and Sexual Activity    Alcohol use: Yes    Drug use: No    Sexual activity: Not on file     Review of Systems   Unable to perform ROS: Intubated   Objective:     Vital Signs (Most Recent):  Temp: 98.9 °F (37.2 °C) (04/10/22 1915)  Pulse: 92 (04/10/22 1915)  Resp: 19 (04/10/22 1915)  BP: (!) 109/53 (04/10/22 1915)  SpO2: 97 % (04/10/22 1915)   Vital Signs (24h Range):  Temp:  [98.9 °F (37.2 °C)-100.7 °F (38.2 °C)] 98.9 °F (37.2 °C)  Pulse:  [] 92  Resp:  [19-40] 19  SpO2:  [95 %-98 %] 97 %  BP: (101-237)/() 109/53     Weight: 95.3 kg (210 lb)  Body mass index is 26.96 kg/m².    Physical Exam  Vitals and nursing note reviewed.   Constitutional:       General: He is not in acute distress.     Appearance: He is well-developed.   HENT:      Head: Normocephalic and atraumatic.   Eyes:      Pupils: Pupils are equal, round, and reactive to light.   Neck:      Comments: Cervical collar in place  Cardiovascular:      Rate and Rhythm: Regular rhythm.      Heart sounds: No murmur heard.  Pulmonary:      Effort: Pulmonary effort is normal.      Breath sounds: Normal breath sounds. No wheezing.      Comments: ETT  Abdominal:      General: There is no distension.      Palpations: Abdomen is soft.      Tenderness: There is no abdominal tenderness. There is no guarding or rebound.   Genitourinary:     Comments: Manley  Musculoskeletal:         General: Normal range of motion.      Cervical back: Normal range of motion.   Skin:     Findings: No rash.   Neurological:      Comments: Sedated on vent         CRANIAL NERVES     CN III, IV, VI   Pupils are equal, round, and reactive to light.     Significant Labs: All pertinent labs within the past 24 hours have been reviewed.  CBC:   Recent Labs   Lab 04/10/22  1457   WBC 44.08*   HGB 12.4*   HCT  35.5*   PLT 96*     CMP:   Recent Labs   Lab 04/10/22  1457   *   K 3.9   CL 94*   CO2 25   *   BUN 18   CREATININE 0.8   CALCIUM 9.6   PROT 7.5   ALBUMIN 4.3   BILITOT 1.3*   ALKPHOS 43*   AST 37   ALT 17   ANIONGAP 16   EGFRNONAA >60.0     Cardiac Markers:   Recent Labs   Lab 04/10/22  1457   *     Troponin:   Recent Labs   Lab 04/10/22  1457   TROPONINI 0.066*       Significant Imaging: I have reviewed all pertinent imaging results/findings within the past 24 hours.  CT: I have reviewed all pertinent results/findings within the past 24 hours and my personal findings are:  CT chest with bibasilar opacities, CT soft tissue of the neck with post op changes, percutaneous drains and subcutaneous gas    Assessment/Plan:     * Acute hypoxemic respiratory failure  Patient has been admitted to the ICU on the vent.  Based on imaging and history, suspected aspiration pneumonia as the etiology.  Will need a swallow eval post extubation.  Pulmonary consulted.  Scheduled nebs.    Aspiration pneumonia  IV abx  Respiratory culture ordered  BCx in progress  S/p 30ml/kg bolus.  He was hypotensive but this improved when propofol was turned down and thus this is propofol induced, not septic shock.  He could decompensate from sepsis, however, and thus will continue with continuous hemodynamic monitoring.  LA is normal, Procal is normal  Repeat CXR ordered for in the AM.      Stridor  ED MD reported thick secretions seen with intubation.  Surgeon came out and evaluated his surgical cervical incisions and no need to open these up as not thought to be the etiology of his respiratory distress and hypoxia.  Monitoring.      S/P cervical spinal fusion  See surgeon's note. The C collar can be removed if necessary to care for the patient. The drains should not be removed without instructions from the surgeon.       Troponin level elevated  I suspect demand ischemia from hypoxia. Trending for completeness. Further  management pending clinical course.      Thrombocytopenia  His family supplements that he had a platelet transfusion prior to his surgery.  No present transfusion needs.  Will monitor.  AM labs ordered.      Iron deficiency anemia  Monitoring cell counts.  AM labs ordered.      History of lung cancer - ANDRES NSCLC 2004  Hx of ANDRES resection.  No acute issues.      CLL (chronic lymphocytic leukemia)  Chronic condition with no acute issues.        VTE Risk Mitigation (From admission, onward)         Ordered     IP VTE HIGH RISK PATIENT  Once         04/10/22 1905     Place sequential compression device  Until discontinued         04/10/22 1905                   Lucy Lewis MD  Department of Hospital Medicine   Formerly [facility], [facility]

## 2022-04-11 NOTE — PLAN OF CARE
04/11/22 1354   Patient Assessment/Suction   Level of Consciousness (AVPU) alert   Respiratory Effort Unlabored;Normal   Expansion/Accessory Muscles/Retractions no use of accessory muscles   All Lung Fields Breath Sounds coarse;clear   Rhythm/Pattern, Respiratory unlabored;pattern regular;depth regular   Cough Frequency infrequent   Cough Type nonproductive   PRE-TX-O2   O2 Device (Oxygen Therapy) nasal cannula   $ Is the patient on Low Flow Oxygen? Yes   Flow (L/min) 3   SpO2 99 %   Pulse Oximetry Type Continuous   $ Pulse Oximetry - Multiple Charge Pulse Oximetry - Multiple   Pulse 90   Resp (!) 23   Aerosol Therapy   $ Aerosol Therapy Charges Aerosol Treatment   Daily Review of Necessity (SVN) completed   Respiratory Treatment Status (SVN) continuous   Treatment Route (SVN) mask;oxygen   Patient Position (SVN) sitting in chair   Post Treatment Assessment (SVN) breath sounds improved   Signs of Intolerance (SVN) none   Breath Sounds Post-Respiratory Treatment   Throughout All Fields Post-Treatment All Fields   Throughout All Fields Post-Treatment aeration increased   Post-treatment Heart Rate (beats/min) 80   Post-treatment Resp Rate (breaths/min) 22

## 2022-04-11 NOTE — ASSESSMENT & PLAN NOTE
ED MD reported thick secretions seen with intubation.  Surgeon came out and evaluated his surgical cervical incisions and no need to open these up as not thought to be the etiology of his respiratory distress and hypoxia.  Monitoring.

## 2022-04-11 NOTE — PROGRESS NOTES
Progress Note  Neurospine    Admit Date: 4/10/2022  Post-operative Day: 3  Hospital Day: 2    SUBJECTIVE:     Follow-up For:  C3-7 anterior posterior cervical fusion  Pt extubated and calm.  Has not tried swallowing    Scheduled Meds:   albuterol-ipratropium  3 mL Nebulization Q6H    chlorhexidine  15 mL Mouth/Throat BID    lactated ringers  30 mL/kg Intravenous Once    mupirocin   Nasal BID    piperacillin-tazobactam (ZOSYN) IVPB  3.375 g Intravenous Q8H    valsartan  80 mg Oral Daily    vancomycin (VANCOCIN) IVPB  1,500 mg Intravenous Q12H     Continuous Infusions:  PRN Meds:acetaminophen, calcium chloride IVPB, calcium chloride IVPB, calcium chloride IVPB, dextrose 50%, dextrose 50%, dextrose 50%, dextrose 50%, glucagon (human recombinant), glucose, glucose, hydrALAZINE, insulin aspart U-100, magnesium oxide, magnesium sulfate IVPB, magnesium sulfate IVPB, magnesium sulfate IVPB, magnesium sulfate IVPB, morphine, naloxone, ondansetron, polyethylene glycol, potassium chloride in water, potassium chloride in water, potassium chloride in water, potassium chloride in water, potassium chloride, potassium chloride, potassium chloride, potassium chloride, sodium chloride 0.9%, sodium phosphate IVPB, sodium phosphate IVPB, sodium phosphate IVPB, sodium phosphate IVPB, sodium phosphate IVPB, Pharmacy to dose Vancomycin consult **AND** vancomycin - pharmacy to dose    Review of patient's allergies indicates:  No Known Allergies    OBJECTIVE:     Vital Signs (Most Recent)  Temp: 98.5 °F (36.9 °C) (04/11/22 1110)  Pulse: 81 (04/11/22 1100)  Resp: (!) 24 (04/11/22 1100)  BP: (!) 170/75 (04/11/22 1100)  SpO2: 97 % (04/11/22 1100)    Vital Signs Range (Last 24H):  Temp:  [98.4 °F (36.9 °C)-100.7 °F (38.2 °C)]   Pulse:  []   Resp:  [18-40]   BP: (101-237)/()   SpO2:  [94 %-99 %]   Arterial Line BP: (104-247)/(36-58)     I & O (Last 24H):    Intake/Output Summary (Last 24 hours) at 4/11/2022 1213  Last data  filed at 4/11/2022 0800  Gross per 24 hour   Intake 1897.34 ml   Output 1310 ml   Net 587.34 ml     Physical Exam:  Pat pain controlled 5/5 strength, sen I Dressing clean anteriorly    Lines/Drains:       Peripheral IV - Single Lumen 04/10/22 20 G Left Antecubital (Active)   Site Assessment Clean;Dry;Intact;No redness;No swelling 04/11/22 1101   Extremity Assessment Distal to IV No warmth;No swelling;No redness;No abnormal discoloration 04/11/22 0501   Line Status Flushed 04/11/22 1101   Dressing Status Clean;Dry;Intact 04/11/22 1101   Dressing Intervention Integrity maintained 04/11/22 1101   Dressing Change Due 04/14/22 04/11/22 0501   Site Change Due 04/14/22 04/11/22 0701   Reason Not Rotated Not due 04/11/22 1101   Number of days: 1            Peripheral IV - Single Lumen 04/08/22 20 G Left Forearm (Active)   Site Assessment Clean;Dry;Intact;No redness 04/11/22 1101   Extremity Assessment Distal to IV No abnormal discoloration;No redness;No swelling;No warmth 04/11/22 0501   Line Status Flushed 04/11/22 1101   Dressing Status Clean;Dry;Intact 04/11/22 1101   Dressing Intervention Integrity maintained 04/11/22 1101   Dressing Change Due 04/14/22 04/11/22 0501   Site Change Due 04/14/22 04/11/22 0701   Reason Not Rotated Not due 04/11/22 1101   Number of days: 3            Peripheral IV - Single Lumen 20 G Right Hand (Active)   Site Assessment Clean;Dry;Intact;No redness;No swelling 04/11/22 1101   Extremity Assessment Distal to IV No redness;No swelling;No warmth;No abnormal discoloration 04/11/22 0501   Line Status Flushed 04/11/22 1101   Dressing Status Clean;Dry;Intact 04/11/22 1101   Dressing Intervention Integrity maintained 04/11/22 1101   Dressing Change Due 04/14/22 04/11/22 0501   Site Change Due 04/14/22 04/11/22 0701   Reason Not Rotated Not due 04/11/22 1101   Number of days:             Closed/Suction Drain 04/08/22 1200 Posterior Neck Other (Comment) (Active)   Site Description Unable to view  "04/11/22 1101   Dressing Type Gauze 04/11/22 0501   Dressing Status Clean;Dry;Intact 04/11/22 1101   Dressing Intervention Integrity maintained 04/11/22 1101   Drainage Bright red 04/11/22 1101   Status Other (Comment) 04/11/22 1101   Number of days: 3            Closed/Suction Drain 04/08/22 1200 Midline;Posterior Back Other (Comment) (Active)   Site Description Unable to view 04/11/22 0501   Dressing Type Gauze 04/11/22 1101   Dressing Status Clean;Dry;Intact 04/11/22 1101   Dressing Intervention Integrity maintained 04/11/22 1101   Drainage Bright red 04/11/22 1101   Status Other (Comment) 04/11/22 1101   Number of days: 3            Urethral Catheter 04/10/22 1547 Straight-tip 16 Fr. (Active)   Site Assessment Clean;Intact 04/11/22 1101   Collection Container Urimeter 04/11/22 1101   Securement Method secured to top of thigh w/ adhesive device 04/11/22 1101   Catheter Care Performed yes 04/11/22 1101   Reason for Continuing Urinary Catheterization Critically ill in ICU and requiring hourly monitoring of intake/output 04/11/22 1101   CAUTI Prevention Bundle Securement Device in place with 1" slack;Intact seal between catheter & drainage tubing;Drainage bag/urimeter off the floor;Sheeting clip in use;No dependent loops or kinks;Drainage bag/urimeter not overfilled (<2/3 full);Drainage bag/urimeter below bladder 04/11/22 0701   Output (mL) 75 mL 04/11/22 0600   Number of days: 0       Wound/Incision:  clean, dry, intact    Laboratory:  I have reviewed all pertinent lab results within the past 24 hours.    Diagnostic Results:  none    ASSESSMENT/PLAN:     Assessment: Doing better s/p cervical fusion with repiratory distress.    Plan: Continue drains.  Out put should be 5 cc prior to removal.  May need a stitch.  Would reqommend swallow study.  Will follow.    Zurdo Tillman  "

## 2022-04-11 NOTE — PT/OT/SLP EVAL
Physical Therapy Evaluation    Patient Name:  Conrad Kuhn   MRN:  [id number]    Recommendations:     Discharge Recommendations:  home health PT   Discharge Equipment Recommendations: none   Barriers to discharge: increase assist with mobility    Assessment:     Conrad Kuhn is a 81 y.o. male admitted with a medical diagnosis of Acute hypoxemic respiratory failure.  He presents with the following impairments/functional limitations:  weakness, impaired endurance, impaired self care skills, impaired functional mobilty, gait instability, impaired balance, decreased lower extremity function, decreased safety awareness, pain, impaired cardiopulmonary response to activity.    Pt extubated this morning. Now on 4L o2 via NC. Pt eager to mobilize. Aspen collar applied with HOB elevated and educated on need for collar on with all mobility. Pt verbalizes understanding    Rehab Prognosis: Fair; patient would benefit from acute skilled PT services to address these deficits and reach maximum level of function.    Recent Surgery: * No surgery found *      Plan:     During this hospitalization, patient to be seen 6 x/week to address the identified rehab impairments via gait training, therapeutic activities, therapeutic exercises, neuromuscular re-education and progress toward the following goals:    · Plan of Care Expires:  05/11/22    Subjective     Chief Complaint: eager to mobilize  Patient/Family Comments/goals: return home with wife  Pain/Comfort:  · Pain Rating 1: other (see comments) (does not quantify)  · Location 1: cervical spine  · Pain Addressed 1: Reposition, Distraction, Cessation of Activity    Patients cultural, spiritual, Taoist conflicts given the current situation: no    Living Environment:  Pt lives with his wife in a one story home, no O2 at home, (+) ,   Prior to admission, patients level of function was no AD in the home, RW for long distance ambulation.  Equipment used at home: rollator.  DME  owned (not currently used): none.  Upon discharge, patient will have assistance from wife.    Objective:     Communicated with RN prior to session.  Patient found HOB elevated with peripheral IV, telemetry, pulse ox (continuous), blood pressure cuff, oxygen, MARCELLUS drain (aspen collar)  upon PT entry to room.    General Precautions: Standard, fall   Orthopedic Precautions:spinal precautions   Braces: Aspen collar  Respiratory Status: Nasal cannula, flow 4 L/min    Exams:  · Cognitive Exam:  Patient is oriented to Person, Place, Time and Situation  · RLE ROM: WFL  · RLE Strength: 4+/5  · LLE ROM: WFL  · LLE Strength: 4+/5    Functional Mobility:  · Bed Mobility:     · Supine to Sit: contact guard assistance  · Transfers:     · Sit to Stand:  contact guard assistance with rolling walker  · Gait: 75 ft wtih RW and min A    Therapeutic Activities and Exercises:   Pt educated on POC, discharge recommendation, need for O2 at this time, proper HP with transfers, RW management, need for assist with mobility, use of call bell to seek assistance as needed and fall prevention      AM-PAC 6 CLICK MOBILITY  Total Score:17     Patient left up in chair with all lines intact, call button in reach and OT present.    GOALS:   Multidisciplinary Problems     Physical Therapy Goals        Problem: Physical Therapy    Goal Priority Disciplines Outcome Goal Variances Interventions   Physical Therapy Goal     PT, PT/OT      Description: Goals to be met by: Discharge     Patient will increase functional independence with mobility by performin. Supine to sit with Independent  2. Sit to stand transfer with Supervision  3. Bed to chair transfer with Supervision using Rolling Walker  4. Gait  x 150 feet with Supervision using Rolling Walker.                          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     Right-sided Bell's palsy 2009    Spondylolisthesis     Varicose veins of legs     Venous insufficiency        Past Surgical History:   Procedure Laterality Date    Athroscopic surgery      On Knee    BIOPSY OF TISSUE OF NECK Left 08/2015    Recuurence CLL/small cell lyphoma    ESOPHAGEAL DILATION      Hemorrhoid resection  1979    LUNG LOBECTOMY Left 2004    NECK SURGERY  04/08/2022    Anterior and Posterior C3-C7 fusion    OTHER SURGICAL HISTORY  2006    Chemotherapy s/p lyphoma        Portacath implantation and removal      reverse shoulder arthroplasty Right 03/2021       Time Tracking:     PT Received On: 04/11/22  PT Start Time: 1115     PT Stop Time: 1140  PT Total Time (min): 25 min     Billable Minutes: Evaluation 11 and Therapeutic Activity 14      04/11/2022

## 2022-04-11 NOTE — ANESTHESIA PROCEDURE NOTES
Arterial    Diagnosis: Respiratory distress, possible aspiration pneumonitis, status post intubation.    Patient location during procedure: ICU  Procedure start time: 4/10/2022 7:50 PM  Timeout: 4/10/2022 7:49 PM  Procedure end time: 4/10/2022 7:56 PM    Staffing  Authorizing Provider: Oleksandr King MD  Performing Provider: Oleksandr King MD    Anesthesiologist was present at the time of the procedure.    Preanesthetic Checklist  Completed: patient identified, IV checked, site marked, risks and benefits discussed, monitors and equipment checked, timeout performed and anesthesia consent givenArterial  Skin Prep: chlorhexidine gluconate  Local Infiltration: lidocaine  Orientation: right  Location: radial    Catheter Size: 20 G  Catheter placement by Ultrasound guidance. Heme positive aspiration all ports.   Vessel Caliber: medium, patent, compressibility normal  Vascular Doppler:  not done  Needle advanced into vessel with real time Ultrasound guidance.  Sterile sheath used.Insertion Attempts: 1  Assessment  Dressing: sutured in place and taped, tegaderm and steri-strips  Patient: Tolerated well  Additional Notes  No apparent complications.

## 2022-04-11 NOTE — ASSESSMENT & PLAN NOTE
Patient has been admitted to the ICU on the vent.  Based on imaging and history, suspected aspiration pneumonia as the etiology.  Will need a swallow eval post extubation.  Pulmonary consulted.  Scheduled nebs.

## 2022-04-11 NOTE — PROGRESS NOTES
Prior to admit patient was ambulating with a walker at home. Ambulated without any devices at home. Recently had posterior and anterior C3-C7 fusion

## 2022-04-11 NOTE — PLAN OF CARE
[facility]  Initial Discharge Assessment       Primary Care Provider: Johnson Erazo MD    Admission Diagnosis: SOB (shortness of breath) [R06.02]    Admission Date: 4/10/2022  Expected Discharge Date: 4/14/2022    Discharge Barriers Identified: None     Assessment completed at bedside.  Patient states his advanced directives are with his wife.  Patient intends to discharge home with [facility].  Not opposed to [facility] if needed    Payor: MEDICARE / Plan: MEDICARE PART A & B / Product Type: Government /     Extended Emergency Contact Information  Primary Emergency Contact: Peri Kuhn  Address: [address] [place] [address] [id number] [place] [address]  Home Phone: [phone]  Mobile Phone: [phone]  Relation: Spouse    Discharge Plan A: Home with family, Home Health  Discharge Plan B: Rehab      [id number] - [place], [place] - [address] AT [facility] [address]  [address]  [facility] [address]  Phone: [phone] Fax: [phone]    [facility] - [place], [place] - [address] AT [facility]  [address]  [facility] [address]  Phone: [phone] Fax: [phone]    [facility] [address] [facility]  [address] [facility] [address]  Phone: [phone] Fax: [phone]      Initial Assessment (most recent)     Adult Discharge Assessment - 04/11/22 1509        Discharge Assessment    Assessment Type Discharge Planning Assessment     Confirmed/corrected address, phone number and insurance Yes     Confirmed Demographics Correct on Facesheet     Source of Information patient     When was your last doctors appointment? --   last week    Communicated MARTELL with patient/caregiver Date not available/Unable to determine     Reason For Admission SOB     Lives With spouse     Facility Arrived From: [facility]     Do you expect to  return to your current living situation? Yes     Do you have help at home or someone to help you manage your care at home? Yes     Who are your caregiver(s) and their phone number(s)? Peri Deweykristopherparag [phone]     Prior to hospitilization cognitive status: Alert/Oriented     Current cognitive status: Alert/Oriented     Walking or Climbing Stairs Difficulty ambulation difficulty, requires equipment     Mobility Management walker     Dressing/Bathing Difficulty bathing difficulty, requires equipment;dressing difficulty, assistance 1 person     Dressing/Bathing Management shower chair and wife helps as needed     Equipment Currently Used at Home nebulizer;rollator;walker, rolling;CPAP     Readmission within 30 days? No     Patient currently being followed by outpatient case management? No     Do you currently have service(s) that help you manage your care at home? Yes     Name and Contact number of agency Perri Bundy [phone]     Is the pt/caregiver preference to resume services with current agency Yes     Do you take prescription medications? Yes     Do you have prescription coverage? Yes     Coverage BCBS     Do you have any problems affording any of your prescribed medications? No     Is the patient taking medications as prescribed? yes     Who is going to help you get home at discharge? family     How do you get to doctors appointments? car, drives self;family or friend will provide     Are you on dialysis? No     Do you take coumadin? No     Discharge Plan A Home with family;Home Health     Discharge Plan B Rehab     DME Needed Upon Discharge  none     Discharge Plan discussed with: Patient     Discharge Barriers Identified None        Relationship/Environment    Name(s) of Who Lives With Patient Peri Patelu [phone]

## 2022-04-11 NOTE — PROGRESS NOTES
[facility]  Progress Note    Patient Name: Conrad Kuhn  MRN: [id number]  Patient Class: IP- Inpatient   Admission Date: 4/10/2022  Length of Stay: 1 days  Attending Physician: Efrain Cam MD  Primary Care Provider: Johnson Erazo MD        Subjective:     Principal Problem:Acute hypoxemic respiratory failure        HPI:  81 year old male transferred from [facility] secondary to acute hypoxic respiratory failure.  He is intubated at that time of my exam and his wife is at bedside to supplement history.  History also supplemented  by ED MD and chart.  Patient had  C3-7 anterior posterior cervical fusion approximately two days ago.  His wife noted that he was having SOB yesterday as well as shoulder pain and neck pain. He even expressed to her that he felt he may be getting pneumonia. She reported he did choke when trying to drink a milk shake yesterday.  Patient had return of SOB today.  EMS received report that he got some ativan around noon and was going for a CT scan when saturations dropped into the 70s.  He was transported to [facility].  Per ED MD,  patient sounded as if he had stridor.  He was electively intubated and thick, bloody secretions were seen. Dr. Tillman with surgery was called and evaluated the patient and the surgical incision.  It did not appear this was the etiology of his respiratory distress and hypoxia. CT soft tissue of the neck revealed expected post operative changes with drains in place and subcutaneous gas present.  CT chest with patchy bibasilar opacities with aspiration pneumonia a consideration.     In the ED, WBC is 44.  The surgeon did express that he thought these post surgical patients got perioperative Decadron but I cannot confirm if that's the case presently. Temp is 100.7.  He was started on IV Vancomycin and IV Zosyn.      Overview/Hospital Course:  04/11  Pt got extubated  MBSS tmrw AM  On NPO status          Interval History:     Review of  Systems   Constitutional:  Negative for activity change and appetite change.   HENT:  Negative for congestion and dental problem.    Eyes:  Negative for discharge and itching.   Respiratory:  Negative for shortness of breath.    Cardiovascular:  Negative for chest pain.   Gastrointestinal:  Negative for abdominal distention and abdominal pain.   Endocrine: Negative for cold intolerance.   Genitourinary:  Negative for difficulty urinating and dysuria.   Musculoskeletal:  Positive for arthralgias and back pain.   Skin:  Negative for color change.   Neurological:  Negative for dizziness and facial asymmetry.   Hematological:  Negative for adenopathy.   Psychiatric/Behavioral:  Negative for agitation and behavioral problems.    Objective:     Vital Signs (Most Recent):  Temp: 98.5 °F (36.9 °C) (04/11/22 1110)  Pulse: 81 (04/11/22 1100)  Resp: (!) 22 (04/11/22 1241)  BP: (!) 170/75 (04/11/22 1100)  SpO2: 97 % (04/11/22 1100)   Vital Signs (24h Range):  Temp:  [98.4 °F (36.9 °C)-100.7 °F (38.2 °C)] 98.5 °F (36.9 °C)  Pulse:  [] 81  Resp:  [18-40] 22  SpO2:  [94 %-99 %] 97 %  BP: (101-237)/() 170/75  Arterial Line BP: (104-247)/(36-58) 247/58     Weight: 94.1 kg (207 lb 7.3 oz)  Body mass index is 26.64 kg/m².    Intake/Output Summary (Last 24 hours) at 4/11/2022 1351  Last data filed at 4/11/2022 0800  Gross per 24 hour   Intake 1897.34 ml   Output 1310 ml   Net 587.34 ml      Physical Exam  Vitals and nursing note reviewed.   Constitutional:       Appearance: He is well-developed.   HENT:      Head: Atraumatic.      Right Ear: External ear normal.      Left Ear: External ear normal.      Nose: Nose normal.      Mouth/Throat:      Mouth: Mucous membranes are moist.   Eyes:      General: No scleral icterus.  Neck:      Comments: Neck Collar   Cardiovascular:      Rate and Rhythm: Normal rate.   Pulmonary:      Effort: Pulmonary effort is normal.   Abdominal:      General: There is no distension.    Musculoskeletal:         General: Normal range of motion.      Cervical back: Full passive range of motion without pain.   Skin:     General: Skin is warm.   Neurological:      Mental Status: He is alert and oriented to person, place, and time.   Psychiatric:         Behavior: Behavior normal.       Significant Labs: All pertinent labs within the past 24 hours have been reviewed.  CBC:   Recent Labs   Lab 04/10/22  1457 04/10/22  2040 04/11/22  0405 04/11/22  0406   WBC 44.08*  --   --  25.02*   HGB 12.4*  --   --  9.6*   HCT 35.5* 29* 28* 27.4*   PLT 96*  --   --  68*     CMP:   Recent Labs   Lab 04/10/22  1457 04/11/22  0406   * 135*   K 3.9 3.5   CL 94* 97   CO2 25 28   * 207*   BUN 18 31*   CREATININE 0.8 1.2   CALCIUM 9.6 8.4*   PROT 7.5 6.1   ALBUMIN 4.3 3.2*   BILITOT 1.3* 1.3*   ALKPHOS 43* 29*   AST 37 20   ALT 17 13   ANIONGAP 16 10   EGFRNONAA >60.0 56.0*       Significant Imaging: I have reviewed all pertinent imaging results/findings within the past 24 hours.      Assessment/Plan:      * Acute hypoxemic respiratory failure  Intubated and got extubated on 04/11 AM  Continue iv abx for presumed aspiration pneumonia  Maintain NPO status  MBSS tmrw AM   Transfer from ICU tmrw AM if stable     Aspiration pneumonia  Continue present iv abx       S/P cervical spinal fusion  C3-7 anterior posterior cervical fusion in [facility]   Stable       Troponin level elevated  Atypical   Denies chest pain   Mostly due to Demand ischemia from hypoxia.       Thrombocytopenia  Chronic issue  Monitor closely       Stridor  Resolved       Iron deficiency anemia  Stable       History of lung cancer - ANDRES NSCLC 2004  Hx of ANDRES resection.  No acute issues.      CLL (chronic lymphocytic leukemia)  Chronic condition with no acute issues.        VTE Risk Mitigation (From admission, onward)         Ordered     IP VTE HIGH RISK PATIENT  Once         04/10/22 1905     Place sequential compression device  Until  discontinued         04/10/22 1905                Discharge Planning   MARTELL: 4/14/2022     Code Status: Full Code   Is the patient medically ready for discharge?: No    Reason for patient still in hospital (select all that apply): Treatment                     Efrain Cam MD  Department of Hospital Medicine   [facility]

## 2022-04-11 NOTE — PROGRESS NOTES
VANCOMYCIN PHARMACOKINETIC NOTE:  Vancomycin Day # 1    Objective/Assessment:    Diagnosis/Indication for Vancomycin: Pneumonia     81 y.o., male; Actual Body Weight = 95.3 kg (210 lb).    The patient has the following labs:  4/10/2022 Estimated Creatinine Clearance: 82.8 mL/min (based on SCr of 0.8 mg/dL). Lab Results   Component Value Date    BUN 18 04/10/2022     Lab Results   Component Value Date    WBC 44.08 (HH) 04/10/2022        Plan:  Adjust vancomycin dose and/or frequency based on the patient's actual weight and renal function:  Initiate Vancomycin 1500 mg IV every 12 hours.  Orders have been entered into patient's chart.    Vancomycin dose = 15.7 mg/kg actual body weight    Vancomycin trough level has been ordered for 4/12/22 @ 0700.    Pharmacy will manage vancomycin therapy, monitor serum vancomycin levels, monitor renal function and adjust regimen as necessary.    Thank you for allowing us to participate in this patient's care.     Lucy Hollingsworth 4/10/2022 7:13 PM  Department of Pharmacy  Ext [phone]

## 2022-04-11 NOTE — ASSESSMENT & PLAN NOTE
I suspect demand ischemia from hypoxia. Trending for completeness. Further management pending clinical course.

## 2022-04-11 NOTE — PLAN OF CARE
Rec NPO with non oral medications and BMS 4/12     Problem: SLP  Goal: SLP Goal  Description: 1. Pt will tolerate least restrictive PO diet without acute dysphagia pulmonary complication.   2. PENDING: Pt will participate in VFSS to define swallow physiology; treatment goal to follow       Outcome: Ongoing, Progressing

## 2022-04-11 NOTE — PLAN OF CARE
04/11/22 1351   Patient Assessment/Suction   Level of Consciousness (AVPU) alert   Respiratory Effort Unlabored;Normal   Expansion/Accessory Muscles/Retractions no use of accessory muscles   Rhythm/Pattern, Respiratory unlabored;depth regular   Cough Frequency infrequent   Cough Type congested;loose;productive   Suction Method oral   Secretions Amount moderate   Secretions Color white;clear   PRE-TX-O2   O2 Device (Oxygen Therapy) nasal cannula   $ Is the patient on Low Flow Oxygen? Yes   Flow (L/min) 4   SpO2 98 %   Pulse Oximetry Type Intermittent   $ Pulse Oximetry - Multiple Charge Pulse Oximetry - Multiple   Pulse 88   Resp (!) 38   Aerosol Therapy   $ Aerosol Therapy Charges Aerosol Treatment   Daily Review of Necessity (SVN) completed   Respiratory Treatment Status (SVN) continuous;given   Treatment Route (SVN) mask;oxygen   Patient Position (SVN) sitting in chair   Post Treatment Assessment (SVN) breath sounds improved   Signs of Intolerance (SVN) none   Breath Sounds Post-Respiratory Treatment   Throughout All Fields Post-Treatment All Fields   Throughout All Fields Post-Treatment clear   Post-treatment Heart Rate (beats/min) 92   Post-treatment Resp Rate (breaths/min) 24

## 2022-04-11 NOTE — PLAN OF CARE
04/11/22 0814   PRE-TX-O2   SpO2 96 %   Pulse 80   Resp (!) 37   IHI Ventilator Associated Pneumonia Bundle (Required)   Daily Assessment of Readiness to Extubate Yes   Ready to Wean Parameters   $ Vital Capacity Charge Vital Capacity, Total (Peak Flow)   $ Extubation Tech Time Tech Time 15 min   SBT Safety Screen Pass   Spon. Breathing Trial Initiated? Initiated   Sedation Vacation Completed? Yes   Cough Reflex? Yes   SBT Results Pass   Extubated? Yes   Reason for Extubation Extubated   Ventilator Discontinued Yes

## 2022-04-11 NOTE — CONSULTS
Pulmonary/Critical Care Consult      Patient name: Conrad Kuhn  MRN: [id number]  Date: 04/11/2022    Admit Date: 4/10/2022  Consult Requested By: Efrain Cam MD    Reason for Consult: respiratory failure    HPI:    4/11/2022 - 82 yo male s/p cervical surgery (C3-7 anterior posterior fusion) at [place], sent to ER yesterday in respiratory distress, stridor was intubated.  I was not called about this pt until about 4-5 hours later when he was admitted to ICU.  He was stable on ventilator and in no distress.  This AM he is awake (despite being on sedatives), in no distress.  We proceeded with SBT which he tolerated with good ABG.  We did a leak test which was + (audible air leak and loss of about 200 ml of TV)  and will proceed with extubation.  There is a question of some aspiration while drinking yesterday.  Initial WBC was 44, platelets have decreased, troponin is minimally elevated, procalcitonin is normal.  CT chest reviewed with some fairly mild bibasilar infiltrates and elevation of left diaphragm (which looks to be new)    Review of Systems    Review of Systems   Unable to perform ROS: Intubated       Past Medical History    Past Medical History:   Diagnosis Date    Alcoholism     CLL (chronic lymphocytic leukemia) 01/02/2019    COPD (chronic obstructive pulmonary disease)     Diabetes mellitus     Non Insulin requiring    Encounter for blood transfusion     GI bleed due to NSAIDs     While on Eliquis and Imbruvica    History of lung cancer - ANDRES NSCLC 2004 01/03/2019    Hypertension     Iron deficiency 2017    Lymphoma, 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    MAYDA on CPAP     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     Right-sided Bell's palsy 2009    Spondylolisthesis     Varicose veins of legs     Venous insufficiency        Past Surgical History    Past Surgical History:   Procedure Laterality Date    Athroscopic surgery      On Knee    BIOPSY OF TISSUE OF NECK Left 08/2015     Recuurence CLL/small cell lyphoma    ESOPHAGEAL DILATION      Hemorrhoid resection  1979    LUNG LOBECTOMY Left 2004    NECK SURGERY  2022    Anterior and Posterior C3-C7 fusion    OTHER SURGICAL HISTORY  2006    Chemotherapy s/p lyphoma        Portacath implantation and removal      reverse shoulder arthroplasty Right 2021       Medications (scheduled):      albuterol-ipratropium  3 mL Nebulization Q6H    chlorhexidine  15 mL Mouth/Throat BID    lactated ringers  30 mL/kg Intravenous Once    mupirocin   Nasal BID    piperacillin-tazobactam (ZOSYN) IVPB  3.375 g Intravenous Q8H    vancomycin (VANCOCIN) IVPB  1,500 mg Intravenous Q12H       Medications (infusions):         Medications (prn):     acetaminophen, calcium chloride IVPB, calcium chloride IVPB, calcium chloride IVPB, dextrose 50%, dextrose 50%, dextrose 50%, dextrose 50%, glucagon (human recombinant), glucose, glucose, insulin aspart U-100, magnesium oxide, magnesium sulfate IVPB, magnesium sulfate IVPB, magnesium sulfate IVPB, magnesium sulfate IVPB, morphine, naloxone, ondansetron, polyethylene glycol, potassium chloride in water, potassium chloride in water, potassium chloride in water, potassium chloride in water, potassium chloride, potassium chloride, potassium chloride, potassium chloride, sodium chloride 0.9%, sodium phosphate IVPB, sodium phosphate IVPB, sodium phosphate IVPB, sodium phosphate IVPB, sodium phosphate IVPB, Pharmacy to dose Vancomycin consult **AND** vancomycin - pharmacy to dose    Family History:   Family History   Problem Relation Age of Onset    Stomach cancer Mother 80         at 95    Colon cancer Father        Social History: Tobacco:   Social History     Tobacco Use   Smoking Status Former Smoker   Smokeless Tobacco Never Used                                EtOH:   Social History     Substance and Sexual Activity   Alcohol Use Yes                                Drugs:   Social History  "    Substance and Sexual Activity   Drug Use No     Physical Exam    Vital signs:  Temp:  [98.4 °F (36.9 °C)-100.7 °F (38.2 °C)]   Pulse:  []   Resp:  [18-40]   BP: (101-237)/()   SpO2:  [94 %-99 %]   Arterial Line BP: (104-197)/(36-53)     Intake/Output:     Intake/Output Summary (Last 24 hours) at 4/11/2022 0844  Last data filed at 4/11/2022 0800  Gross per 24 hour   Intake 1897.34 ml   Output 1310 ml   Net 587.34 ml        BMI: Estimated body mass index is 26.64 kg/m² as calculated from the following:    Height as of this encounter: 6' 2" (1.88 m).    Weight as of this encounter: 94.1 kg (207 lb 7.3 oz).    Physical Exam  Vitals and nursing note reviewed.   Constitutional:       General: He is not in acute distress.     Appearance: Normal appearance. He is not ill-appearing, toxic-appearing or diaphoretic.      Comments: Intubated  Sedated but responsive   HENT:      Head: Normocephalic and atraumatic.      Right Ear: External ear normal.      Left Ear: External ear normal.      Nose: Nose normal.      Mouth/Throat:      Mouth: Mucous membranes are moist.      Pharynx: Oropharynx is clear. No oropharyngeal exudate.   Eyes:      General: No scleral icterus.        Right eye: No discharge.         Left eye: No discharge.      Extraocular Movements: Extraocular movements intact.      Conjunctiva/sclera: Conjunctivae normal.      Pupils: Pupils are equal, round, and reactive to light.   Neck:      Comments: c collar and drins  Cardiovascular:      Rate and Rhythm: Normal rate and regular rhythm.      Pulses: Normal pulses.      Heart sounds: Normal heart sounds. No murmur heard.    No friction rub. No gallop.   Pulmonary:      Effort: Pulmonary effort is normal. No respiratory distress.      Breath sounds: Normal breath sounds. No stridor. No wheezing, rhonchi or rales.      Comments: CTA anteriorly  No acc m use  Chest:      Chest wall: No tenderness.   Abdominal:      General: Bowel sounds are normal. " There is no distension.      Tenderness: There is no abdominal tenderness. There is no guarding.   Genitourinary:     Comments: Sindhu   Musculoskeletal:         General: No swelling. Normal range of motion.      Cervical back: Normal range of motion.      Right lower leg: No edema.      Left lower leg: No edema.   Skin:     General: Skin is warm and dry.      Capillary Refill: Capillary refill takes less than 2 seconds.      Coloration: Skin is not jaundiced.      Findings: No bruising.   Neurological:      General: No focal deficit present.      Mental Status: Mental status is at baseline.      Cranial Nerves: No cranial nerve deficit.      Sensory: No sensory deficit.      Motor: No weakness.      Comments: TAY   Psychiatric:      Comments: Calm          Laboratory    Recent Labs   Lab 04/11/22  0406   WBC 25.02*   RBC 3.10*   HGB 9.6*   HCT 27.4*   PLT 68*   MCV 88   MCH 31.0   MCHC 35.0       Recent Labs   Lab 04/11/22  0406   CALCIUM 8.4*   PROT 6.1   *   K 3.5   CO2 28   CL 97   BUN 31*   CREATININE 1.2   ALKPHOS 29*   ALT 13   AST 20   BILITOT 1.3*       Recent Labs   Lab 04/10/22  1457   INR 1.1   APTT 28.5       Recent Labs   Lab 04/10/22  1457 04/11/22  0406   * 112   CPKMB 3.1  --    TROPONINI 0.066* 0.061*       Additional labs: reviewed    Microbiology:       Microbiology Results (last 7 days)     Procedure Component Value Units Date/Time    Culture, Respiratory with Gram Stain [[id number]] Collected: 04/10/22 1541    Order Status: Completed Specimen: Respiratory from Endotracheal Aspirate Updated: 04/11/22 0740     Respiratory Culture No significant growth    Blood culture [[id number]] Collected: 04/10/22 1738    Order Status: Completed Specimen: Blood from Peripheral, Forearm, Left Updated: 04/10/22 2358     Blood Culture, Routine No Growth to date    Blood culture [[id number]] Collected: 04/10/22 1738    Order Status: Completed Specimen: Blood from Peripheral, Hand, Left Updated: 04/10/22  2358     Blood Culture, Routine No Growth to date    Culture, Respiratory with Gram Stain [[id number]]     Order Status: Sent Specimen: Sputum, Expectorated           Radiology    X-Ray Chest AP Portable  Chest single view    CLINICAL DATA: Respiratory failure    FINDINGS: AP view is compared to April 10. Heart size is normal. The mediastinum is unremarkable. Nasogastric and endotracheal tubes remain in place.    There is mild scarring or atelectasis at both lung bases with chronic elevation of the left hemidiaphragm. No significant effusion or pneumothorax is identified. Overall, chest is unchanged compared April 10.    IMPRESSION:  1. No interval change compared April 10.    Electronically signed by:  Bright Santos MD  4/11/2022 7:45 AM CDT Workstation: [id number]        Additional Studies    reviewed    Ventilator Information    Vent Mode: A/C  Oxygen Concentration (%):  [30-45] 30  Resp Rate Total:  [18 br/min-30 br/min] 29 br/min  Vt Set:  [550 mL] 550 mL  PEEP/CPAP:  [5 cmH20] 5 cmH20  Pressure Support:  [0 cmH20] 0 cmH20  Mean Airway Pressure:  [9.7 edU07-74 cmH20] 9.9 cmH20         Recent Labs     04/11/22  0757   PH 7.423   PCO2 47.1*   PO2 104*   HCO3 30.7*   POCSATURATED 98   BE 6         Impression    Active Hospital Problems    Diagnosis  POA    *Acute hypoxemic respiratory failure [J96.01]  Yes    Stridor [R06.1]  Yes    Aspiration pneumonia [J69.0]  Yes    Thrombocytopenia [D69.6]  Yes    Troponin level elevated [R77.8]  Yes    S/P cervical spinal fusion [Z98.1]  Not Applicable    History of lung cancer - ANDRES NSCLC 2004 [Z85.118]  Not Applicable    Iron deficiency anemia [D50.9]  Yes    CLL (chronic lymphocytic leukemia) [C91.10]  Yes      Resolved Hospital Problems   No resolved problems to display.       Plan    · Proceed with extubation  · Continue antibiotics for now, recheck procalcitonin in AM and if low will de-escalate or stop  · Findings may represent pneumonitis and not  pneumonia  · May need evaluation of left diaphragm (? Paresis)  · Follow leukocytosis - ? Stress related  · Watch platelets (they have been chronically low)  · Post op care per Dr Tillman  · Watch troponin but probably nonspecific finding, recheck EKG in AM  · Speech consult    Thank you for this consult.  I will follow with you while the patient is hospitalized.      Critical Care Time    I have spent > 35 minutes providing critical care services for this pt for the above diagnoses.  These services have included pt evaluation, pt exam, ventilator assessment, SBT assessment, decision to extubate, discussions with staff, chart review, data review, note preparation and .  Medications have been reviewed and adjusted as needed.  The patient has life threatening illness with a high risk of decompensation and/or death.      Norman Pope MD  [facility] Pulmonary/Critical Care

## 2022-04-11 NOTE — ASSESSMENT & PLAN NOTE
See surgeon's note. The C collar can be removed if necessary to care for the patient. The drains should not be removed without instructions from the surgeon.

## 2022-04-11 NOTE — ASSESSMENT & PLAN NOTE
IV abx  Respiratory culture ordered  BCx in progress  S/p 30ml/kg bolus.  He was hypotensive but this improved when propofol was turned down and thus this is propofol induced, not septic shock.  He could decompensate from sepsis, however, and thus will continue with continuous hemodynamic monitoring.  LA is normal, Procal is normal  Repeat CXR ordered for in the AM.

## 2022-04-11 NOTE — PROGRESS NOTES
"[facility]  Adult Nutrition   Progress Note (Initial Assessment)     SUMMARY     Recommendations/Interventions:  1. Advance diet as medically able per MD.  2. Hyponatremia (135), Hyperglycemia (207)-continue to monitor and manage blood glucose and electrolytes.   3. RD to monitor diet advancement, labs, plan of care, and make recommendations accordingly.  Goals: 1. Diet to advance as medically able.. 2. Blood glucose and electrolytes monitored and managed.  Nutrition Goal Status: new    Dietitian Rounds Brief:  · ICU Status. Patient admitted 2' aspiration pneumonia. Intubated/sedated. Difficulty swallowing prior to admit-will need SLP evaluation post extubation. Patient extubated this morning. NPO. Will monitor diet progression.   Reason for Assessment  Reason For Assessment: identified at risk by screening criteria (ICU Status-Intubated)  Diagnosis:  (suspected aspiration pneumonia)  Relevant Medical History: CLL, lung cancer, iron deficiency anemia, pancytopenia  Interdisciplinary Rounds: attended    Nutrition Risk Screen  Nutrition Risk Screen:  (NPO 2' intubation)     MST Score: 0  Have you recently lost weight without trying?: No (PER WIFE)  Weight loss score: 0  Have you been eating poorly because of a decreased appetite?: No  Appetite score: 0     Nutrition/Diet History  Spiritual, Cultural Beliefs, Congregation Practices, Values that Affect Care: no  Food Allergies: NKFA  Factors Affecting Nutritional Intake: NPO, on mechanical ventilation    Anthropometrics  Temp: 98.4 °F (36.9 °C)  Height Method: Stated  Height: 6' 2" (188 cm)  Height (inches): 74 in  Weight Method: Bed Scale  Weight: 94.1 kg (207 lb 7.3 oz)  Weight (lb): 207.45 lb  Ideal Body Weight (IBW), Male: 190 lb  % Ideal Body Weight, Male (lb): 108.84 %  BMI (Calculated): 26.6  BMI Grade: 25 - 29.9 - overweight     Weight History:  Wt Readings from Last 10 Encounters:   04/11/22 94.1 kg (207 lb 7.3 oz)   03/31/22 95.7 kg (211 lb) "   03/14/22 96.2 kg (212 lb)   02/17/22 95.7 kg (211 lb)   09/30/21 97.3 kg (214 lb 8 oz)   09/23/21 98.8 kg (217 lb 14.4 oz)   09/21/21 99.8 kg (220 lb)   08/27/21 99.4 kg (219 lb 1.6 oz)   07/20/21 97.5 kg (215 lb)   07/19/21 97.5 kg (215 lb)     Lab/Procedures/Meds: Pertinent Labs Reviewed  Clinical Chemistry:  Recent Labs   Lab 04/11/22  0406   *   K 3.5   CL 97   CO2 28   *   BUN 31*   CREATININE 1.2   CALCIUM 8.4*   PROT 6.1   ALBUMIN 3.2*   BILITOT 1.3*   ALKPHOS 29*   AST 20   ALT 13   ANIONGAP 10   ESTGFRAFRICA >60.0   EGFRNONAA 56.0*   MG 2.3     CBC:   Recent Labs   Lab 04/11/22  0406   WBC 25.02*   RBC 3.10*   HGB 9.6*   HCT 27.4*   PLT 68*   MCV 88   MCH 31.0   MCHC 35.0     Cardiac Profile:  Recent Labs   Lab 04/10/22  1457 04/11/22  0406   *  --    * 112   CPKMB 3.1  --    TROPONINI 0.066* 0.061*     Medications: Pertinent Medications reviewed  Scheduled Meds:   albuterol-ipratropium  3 mL Nebulization Q6H    lactated ringers  30 mL/kg Intravenous Once    piperacillin-tazobactam (ZOSYN) IVPB  3.375 g Intravenous Q8H    vancomycin (VANCOCIN) IVPB  1,500 mg Intravenous Q12H     Continuous Infusions:   propofoL 25 mcg/kg/min (04/11/22 0307)     PRN Meds:.acetaminophen, calcium chloride IVPB, calcium chloride IVPB, calcium chloride IVPB, dextrose 50%, dextrose 50%, dextrose 50%, dextrose 50%, glucagon (human recombinant), glucose, glucose, insulin aspart U-100, magnesium oxide, magnesium sulfate IVPB, magnesium sulfate IVPB, magnesium sulfate IVPB, magnesium sulfate IVPB, morphine, naloxone, ondansetron, polyethylene glycol, potassium chloride in water, potassium chloride in water, potassium chloride in water, potassium chloride in water, potassium chloride, potassium chloride, potassium chloride, potassium chloride, sodium chloride 0.9%, sodium phosphate IVPB, sodium phosphate IVPB, sodium phosphate IVPB, sodium phosphate IVPB, sodium phosphate IVPB, Pharmacy to dose  "Vancomycin consult **AND** vancomycin - pharmacy to dose    Antibiotics (From admission, onward)            Start     Stop Route Frequency Ordered    04/11/22 0800  vancomycin (VANCOCIN) 1,500 mg in dextrose 5 % 500 mL IVPB         -- IV Every 12 hours (non-standard times) 04/10/22 1952 04/11/22 0200  piperacillin-tazobactam 3.375 g in dextrose 5 % 50 mL IVPB (ready to mix system)         -- IV Every 8 hours (non-standard times) 04/10/22 1906    04/10/22 1906  vancomycin - pharmacy to dose  (vancomycin IVPB)        "And" Linked Group Details    -- IV pharmacy to manage frequency 04/10/22 1906        Estimated/Assessed Needs  Weight Used For Calorie Calculations: 94.1 kg (207 lb 7.3 oz)  Energy Calorie Requirements (kcal): 9432-0279 kcals/day (20-25 kcals/kg)  Energy Need Method: Kcal/kg  Protein Requirements: 113-141 g/day (1.2-1.5 g/kg)  Weight Used For Protein Calculations: 94.1 kg (207 lb 7.3 oz)  Fluid Requirements (mL): 1 mL/kcal or per MD  RDA Method (mL): 1882    Nutrition Prescription Ordered    Current Diet Order: NPO    Evaluation of Received Nutrient/Fluid Intake    Other Calories (kcal): 378 (Propofol infusing @ 14.3 mL/hr)  Energy Calories Required: not meeting needs  Protein Required: not meeting needs  Fluid Required: meeting needs  Tolerance:  (NPO)  % Intake of Estimated Energy Needs: 0%  % Meal Intake: NPO    Intake/Output Summary (Last 24 hours) at 4/11/2022 0818  Last data filed at 4/11/2022 0600  Gross per 24 hour   Intake 1868.74 ml   Output 1310 ml   Net 558.74 ml      Nutrition Risk    Level of Risk/Frequency of Follow-up: high   Monitor and Evaluation    Food and Nutrient Intake: energy intake  Food and Nutrient Adminstration: enteral and parenteral nutrition administration  Physical Activity and Function: nutrition-related ADLs and IADLs, factors affecting access to physical activity  Anthropometric Measurements: weight, body mass index, weight change  Biochemical Data, Medical Tests and " Procedures: lipid profile, electrolyte and renal panel, gastrointestinal profile, glucose/endocrine profile, inflammatory profile  Nutrition-Focused Physical Findings: overall appearance     Nutrition Follow-Up    RD Follow-up?: Yes  Rasheeda Riggs RD 04/11/2022 10:32 AM

## 2022-04-11 NOTE — PLAN OF CARE
04/11/22 0652   Patient Assessment/Suction   Level of Consciousness (AVPU) alert   Respiratory Effort Normal;Unlabored   Expansion/Accessory Muscles/Retractions no use of accessory muscles   All Lung Fields Breath Sounds aeration increased   Rhythm/Pattern, Respiratory apneic   Cough Frequency with stimulation   Cough Type assisted;bronchospastic;fair;productive   Suction Method tracheal   Suction Pressure (mmHg) -120 mmHg   $ Suction Charges Inline Suction Procedure Stat Charge   Secretions Amount moderate   Secretions Color clear;red-streaked   Secretions Characteristics thick   Sent to the lab? Yes   PRE-TX-O2   O2 Device (Oxygen Therapy) ventilator   $ Is the patient on Low Flow Oxygen? Yes   Oxygen Concentration (%) 30   SpO2 97 %   Pulse Oximetry Type Continuous   $ Pulse Oximetry - Multiple Charge Pulse Oximetry - Multiple   Oximetry Probe Site Assessed   Pulse 69   Resp (!) 22   Aerosol Therapy   Treatment Route (SVN)   (aerogen)   Skin Integrity   $ Wound Care Tech Time 15 min   Area Observed Upper lip   Skin Appearance without discoloration   [REMOVED]      Airway - Non-Surgical 04/10/22 1524 Endotracheal Tube   Removal Date/Time: 04/11/22 0815  Placement Date/Time: 04/10/22 1524   Present Prior to Hospital Arrival?: No  Method of Intubation: Glidescope  Inserted by: MD  Staff/Resident Name(s): Dr. Giang  Airway Device: Endotracheal Tube  Airway Device Size...   Secured at 25 cm   Measured At Lips   Secured by Commercial tube pan   Vent Select   Charged w/in last 24h YES   Preset Conventional Ventilator Settings   Vent ID [id number]   Vent Type    Ventilation Type VC   Vent Mode Spont   Humidity HME   Set Rate 18 BPM   Vt Set 550 mL   PEEP/CPAP 5 cmH20   Pressure Support 10 cmH20   Waveform RAMP   Peak Flow 60 L/min   Plateau Set/Insp. Hold (sec) 0   Insp Rise Time  50 %   Trigger Sensitivity Flow/I-Trigger 3 L/min   Patient Ventilator Parameters   Resp Rate Total 26 br/min   All Fields Breath Sounds  clear   Peak Airway Pressure 16 cmH2O   Mean Airway Pressure 9.2 cmH20   Plateau Pressure 0 cmH20   Exhaled Vt 346 mL   Total Ve 9.57 mL   Spont Ve 9.57 L   I:E Ratio Measured 1:1.60   Conventional Ventilator Alarms   Alarms On Y   Ve High Alarm 24 L/min   Ve Low Alarm 3 L/min   Vt High Alarm 1060 mL   Vt Low Alarm 200 mL   Resp Rate High Alarm 40 br/min   Press High Alarm 60 cmH2O   Apnea Rate 10   Apnea Volume (mL) 1 mL   Apnea Oxygen Concentration  100   Apnea Flow Rate (L/min) 61   T Apnea 20 sec(s)   IHI Ventilator Associated Pneumonia Bundle (Required)   Daily Awakening Trials Performed Yes   Daily Assessment of Readiness to Extubate Yes   Head of Bed Elevated  HOB 45   Oral Care Teeth brushed;Lip moisturizer applied   Vent Circut Breaks Minimized Yes   Ready to Wean/Extubation Screen   FIO2<=50 (chart decimal) 0.3   MV<16L (chart vol.) 9.57   PEEP <=8 (chart #) 5   Ready to Wean Parameters   F/VT Ratio<105 (RSBI) (!) 63.58   Education   $ Education 15 min;Bronchodilator   Respiratory Evaluation   $ Care Plan Tech Time 15 min   $ Eval/Re-eval Charges Evaluation   Evaluation For New Orders

## 2022-04-11 NOTE — NURSING
Patient arrived from the emergency department. Patient transferred from [facility] with  Respiratory distress and low sats. Full assessment completed - wife at bedside. Questions encouraged and answered.

## 2022-04-12 LAB
ALBUMIN SERPL BCP-MCNC: 3.3 G/DL (ref 3.5–5.2)
ALP SERPL-CCNC: 36 U/L (ref 55–135)
ALT SERPL W/O P-5'-P-CCNC: 13 U/L (ref 10–44)
ANION GAP SERPL CALC-SCNC: 11 MMOL/L (ref 8–16)
ANISOCYTOSIS BLD QL SMEAR: SLIGHT
AST SERPL-CCNC: 16 U/L (ref 10–40)
BACTERIA SPEC AEROBE CULT: NORMAL
BASOPHILS NFR BLD: 0 % (ref 0–1.9)
BILIRUB SERPL-MCNC: 1.8 MG/DL (ref 0.1–1)
BUN SERPL-MCNC: 19 MG/DL (ref 8–23)
CALCIUM SERPL-MCNC: 8.2 MG/DL (ref 8.7–10.5)
CHLORIDE SERPL-SCNC: 98 MMOL/L (ref 95–110)
CO2 SERPL-SCNC: 30 MMOL/L (ref 23–29)
CREAT SERPL-MCNC: 0.8 MG/DL (ref 0.5–1.4)
DIFFERENTIAL METHOD: ABNORMAL
EOSINOPHIL NFR BLD: 0 % (ref 0–8)
ERYTHROCYTE [DISTWIDTH] IN BLOOD BY AUTOMATED COUNT: 13.2 % (ref 11.5–14.5)
EST. GFR  (AFRICAN AMERICAN): >60 ML/MIN/1.73 M^2
EST. GFR  (NON AFRICAN AMERICAN): >60 ML/MIN/1.73 M^2
GLUCOSE SERPL-MCNC: 171 MG/DL (ref 70–110)
GLUCOSE SERPL-MCNC: 173 MG/DL (ref 70–110)
GLUCOSE SERPL-MCNC: 179 MG/DL (ref 70–110)
GLUCOSE SERPL-MCNC: 205 MG/DL (ref 70–110)
GRAM STN SPEC: NORMAL
HCT VFR BLD AUTO: 28.9 % (ref 40–54)
HGB BLD-MCNC: 10.2 G/DL (ref 14–18)
IMM GRANULOCYTES # BLD AUTO: ABNORMAL K/UL (ref 0–0.04)
IMM GRANULOCYTES NFR BLD AUTO: ABNORMAL % (ref 0–0.5)
LYMPHOCYTES NFR BLD: 36 % (ref 18–48)
MAGNESIUM SERPL-MCNC: 2 MG/DL (ref 1.6–2.6)
MCH RBC QN AUTO: 31.3 PG (ref 27–31)
MCHC RBC AUTO-ENTMCNC: 35.3 G/DL (ref 32–36)
MCV RBC AUTO: 89 FL (ref 82–98)
MONOCYTES NFR BLD: 6 % (ref 4–15)
NEUTROPHILS NFR BLD: 50 % (ref 38–73)
NEUTS BAND NFR BLD MANUAL: 8 %
NRBC BLD-RTO: 0 /100 WBC
PLATELET # BLD AUTO: 73 K/UL (ref 150–450)
PLATELET BLD QL SMEAR: ABNORMAL
PMV BLD AUTO: 10.2 FL (ref 9.2–12.9)
POTASSIUM SERPL-SCNC: 3.3 MMOL/L (ref 3.5–5.1)
PROCALCITONIN SERPL IA-MCNC: 0.64 NG/ML (ref 0–0.5)
PROT SERPL-MCNC: 6.4 G/DL (ref 6–8.4)
RBC # BLD AUTO: 3.26 M/UL (ref 4.6–6.2)
SODIUM SERPL-SCNC: 139 MMOL/L (ref 136–145)
TROPONIN I SERPL DL<=0.01 NG/ML-MCNC: 0.04 NG/ML
VANCOMYCIN TROUGH SERPL-MCNC: 16.6 UG/ML
WBC # BLD AUTO: 14.84 K/UL (ref 3.9–12.7)

## 2022-04-12 PROCEDURE — 93010 ELECTROCARDIOGRAM REPORT: CPT | Mod: ,,, | Performed by: INTERNAL MEDICINE

## 2022-04-12 PROCEDURE — 25000242 PHARM REV CODE 250 ALT 637 W/ HCPCS: Performed by: INTERNAL MEDICINE

## 2022-04-12 PROCEDURE — 25000003 PHARM REV CODE 250: Performed by: INTERNAL MEDICINE

## 2022-04-12 PROCEDURE — 63600175 PHARM REV CODE 636 W HCPCS: Performed by: INTERNAL MEDICINE

## 2022-04-12 PROCEDURE — 21400001 HC TELEMETRY ROOM

## 2022-04-12 PROCEDURE — 83735 ASSAY OF MAGNESIUM: CPT | Performed by: INTERNAL MEDICINE

## 2022-04-12 PROCEDURE — 85027 COMPLETE CBC AUTOMATED: CPT | Performed by: INTERNAL MEDICINE

## 2022-04-12 PROCEDURE — 80202 ASSAY OF VANCOMYCIN: CPT | Performed by: INTERNAL MEDICINE

## 2022-04-12 PROCEDURE — 99900031 HC PATIENT EDUCATION (STAT)

## 2022-04-12 PROCEDURE — 94640 AIRWAY INHALATION TREATMENT: CPT

## 2022-04-12 PROCEDURE — 80053 COMPREHEN METABOLIC PANEL: CPT | Performed by: INTERNAL MEDICINE

## 2022-04-12 PROCEDURE — 99900035 HC TECH TIME PER 15 MIN (STAT)

## 2022-04-12 PROCEDURE — 97535 SELF CARE MNGMENT TRAINING: CPT

## 2022-04-12 PROCEDURE — 93005 ELECTROCARDIOGRAM TRACING: CPT | Performed by: INTERNAL MEDICINE

## 2022-04-12 PROCEDURE — 94799 UNLISTED PULMONARY SVC/PX: CPT

## 2022-04-12 PROCEDURE — 93010 EKG 12-LEAD: ICD-10-PCS | Mod: ,,, | Performed by: INTERNAL MEDICINE

## 2022-04-12 PROCEDURE — 99233 PR SUBSEQUENT HOSPITAL CARE,LEVL III: ICD-10-PCS | Mod: ,,, | Performed by: INTERNAL MEDICINE

## 2022-04-12 PROCEDURE — 97530 THERAPEUTIC ACTIVITIES: CPT

## 2022-04-12 PROCEDURE — 84145 PROCALCITONIN (PCT): CPT | Performed by: INTERNAL MEDICINE

## 2022-04-12 PROCEDURE — 99233 SBSQ HOSP IP/OBS HIGH 50: CPT | Mod: ,,, | Performed by: INTERNAL MEDICINE

## 2022-04-12 PROCEDURE — 36415 COLL VENOUS BLD VENIPUNCTURE: CPT | Performed by: INTERNAL MEDICINE

## 2022-04-12 PROCEDURE — 85007 BL SMEAR W/DIFF WBC COUNT: CPT | Performed by: INTERNAL MEDICINE

## 2022-04-12 PROCEDURE — 27000221 HC OXYGEN, UP TO 24 HOURS

## 2022-04-12 PROCEDURE — 82962 GLUCOSE BLOOD TEST: CPT

## 2022-04-12 PROCEDURE — 21000000 HC CCU ICU ROOM CHARGE

## 2022-04-12 PROCEDURE — 97116 GAIT TRAINING THERAPY: CPT

## 2022-04-12 PROCEDURE — 25000003 PHARM REV CODE 250

## 2022-04-12 PROCEDURE — 84484 ASSAY OF TROPONIN QUANT: CPT | Performed by: INTERNAL MEDICINE

## 2022-04-12 PROCEDURE — 94761 N-INVAS EAR/PLS OXIMETRY MLT: CPT

## 2022-04-12 PROCEDURE — 92611 MOTION FLUOROSCOPY/SWALLOW: CPT

## 2022-04-12 RX ORDER — ALPRAZOLAM 0.5 MG/1
0.5 TABLET ORAL 3 TIMES DAILY PRN
Status: DISCONTINUED | OUTPATIENT
Start: 2022-04-12 | End: 2022-04-15

## 2022-04-12 RX ORDER — SODIUM CHLORIDE 9 MG/ML
INJECTION, SOLUTION INTRAVENOUS CONTINUOUS
Status: DISCONTINUED | OUTPATIENT
Start: 2022-04-12 | End: 2022-04-16

## 2022-04-12 RX ADMIN — TRAZODONE HYDROCHLORIDE 50 MG: 50 TABLET ORAL at 10:04

## 2022-04-12 RX ADMIN — MORPHINE SULFATE 2 MG: 2 INJECTION, SOLUTION INTRAMUSCULAR; INTRAVENOUS at 07:04

## 2022-04-12 RX ADMIN — CHLORHEXIDINE GLUCONATE 15 ML: 1.2 RINSE ORAL at 09:04

## 2022-04-12 RX ADMIN — IPRATROPIUM BROMIDE AND ALBUTEROL SULFATE 3 ML: .5; 3 SOLUTION RESPIRATORY (INHALATION) at 07:04

## 2022-04-12 RX ADMIN — MORPHINE SULFATE 2 MG: 2 INJECTION, SOLUTION INTRAMUSCULAR; INTRAVENOUS at 01:04

## 2022-04-12 RX ADMIN — PIPERACILLIN AND TAZOBACTAM 3.38 G: 3; .375 INJECTION, POWDER, LYOPHILIZED, FOR SOLUTION INTRAVENOUS; PARENTERAL at 09:04

## 2022-04-12 RX ADMIN — ALPRAZOLAM 0.5 MG: 0.5 TABLET ORAL at 05:04

## 2022-04-12 RX ADMIN — VALSARTAN 80 MG: 80 TABLET, FILM COATED ORAL at 09:04

## 2022-04-12 RX ADMIN — PIPERACILLIN AND TAZOBACTAM 3.38 G: 3; .375 INJECTION, POWDER, LYOPHILIZED, FOR SOLUTION INTRAVENOUS; PARENTERAL at 02:04

## 2022-04-12 RX ADMIN — SODIUM CHLORIDE: 0.9 INJECTION, SOLUTION INTRAVENOUS at 07:04

## 2022-04-12 RX ADMIN — MORPHINE SULFATE 2 MG: 2 INJECTION, SOLUTION INTRAMUSCULAR; INTRAVENOUS at 02:04

## 2022-04-12 RX ADMIN — IPRATROPIUM BROMIDE AND ALBUTEROL SULFATE 3 ML: .5; 3 SOLUTION RESPIRATORY (INHALATION) at 01:04

## 2022-04-12 RX ADMIN — PIPERACILLIN AND TAZOBACTAM 3.38 G: 3; .375 INJECTION, POWDER, LYOPHILIZED, FOR SOLUTION INTRAVENOUS; PARENTERAL at 07:04

## 2022-04-12 RX ADMIN — MUPIROCIN: 20 OINTMENT TOPICAL at 09:04

## 2022-04-12 RX ADMIN — MORPHINE SULFATE 2 MG: 2 INJECTION, SOLUTION INTRAMUSCULAR; INTRAVENOUS at 06:04

## 2022-04-12 RX ADMIN — ALPRAZOLAM 0.5 MG: 0.5 TABLET ORAL at 09:04

## 2022-04-12 RX ADMIN — POTASSIUM CHLORIDE 40 MEQ: 20 TABLET, EXTENDED RELEASE ORAL at 07:04

## 2022-04-12 NOTE — PLAN OF CARE
04/11/22 1908   Patient Assessment/Suction   Level of Consciousness (AVPU) alert   Respiratory Effort Normal;Unlabored   Expansion/Accessory Muscles/Retractions no use of accessory muscles   All Lung Fields Breath Sounds Anterior:;clear   Rhythm/Pattern, Respiratory unlabored   PRE-TX-O2   O2 Device (Oxygen Therapy) nasal cannula   $ Is the patient on Low Flow Oxygen? Yes   Flow (L/min) 3   SpO2 (!) 93 %   Pulse Oximetry Type Continuous   $ Pulse Oximetry - Multiple Charge Pulse Oximetry - Multiple   Pulse 89   Resp (!) 32   Aerosol Therapy   $ Aerosol Therapy Charges Aerosol Treatment   Daily Review of Necessity (SVN) completed   Respiratory Treatment Status (SVN) given   Treatment Route (SVN) mask;oxygen   Patient Position (SVN) semi-Zavaleta's   Post Treatment Assessment (SVN) breath sounds unchanged   Signs of Intolerance (SVN) none   Breath Sounds Post-Respiratory Treatment   Post-treatment Heart Rate (beats/min) 84   Post-treatment Resp Rate (breaths/min) 18   Education   $ Education Bronchodilator;15 min   Respiratory Evaluation   $ Care Plan Tech Time 15 min

## 2022-04-12 NOTE — PLAN OF CARE
04/11/22 2207   Patient Assessment/Suction   Level of Consciousness (AVPU) alert   Respiratory Effort Normal;Unlabored   Expansion/Accessory Muscles/Retractions no use of accessory muscles   All Lung Fields Breath Sounds Anterior:;clear   Rhythm/Pattern, Respiratory unlabored   PRE-TX-O2   O2 Device (Oxygen Therapy) CPAP   $ Is the patient on Low Flow Oxygen? Yes   SpO2 95 %   Pulse Oximetry Type Continuous   $ Pulse Oximetry - Multiple Charge Pulse Oximetry - Multiple   Pulse 89   Resp (!) 24   Skin Integrity   $ Wound Care Tech Time 15 min   Area Observed Cheek;Bridge of nose   Skin Appearance without discoloration   Preset CPAP/BiPAP Settings   Mode Of Delivery CPAP   $ CPAP/BiPAP Daily Charge BiPAP/CPAP Daily   $ Initial CPAP/BiPAP Setup? Yes   $ Is patient using? Yes   Size of Mask Large   Sized Appropriately? Yes   Equipment Type DeVilbiss   Airway Device Type large full face mask   CPAP (cm H2O) 17   Education   $ Education 15 min  (cpap)   Respiratory Evaluation   $ Care Plan Tech Time 15 min

## 2022-04-12 NOTE — PROGRESS NOTES
Pulmonary/Critical Care  Progress Note      Patient name: Conrad Kuhn  MRN: [id number]  Date: 04/12/2022    Admit Date: 4/10/2022  Consult Requested By: Efrain Cam MD    Reason for Consult: respiratory failure    HPI:    4/11/2022 - 80 yo male s/p cervical surgery (C3-7 anterior posterior fusion) at [place], sent to ER yesterday in respiratory distress, stridor was intubated.  I was not called about this pt until about 4-5 hours later when he was admitted to ICU.  He was stable on ventilator and in no distress.  This AM he is awake (despite being on sedatives), in no distress.  We proceeded with SBT which he tolerated with good ABG.  We did a leak test which was + (audible air leak and loss of about 200 ml of TV)  and will proceed with extubation.  There is a question of some aspiration while drinking yesterday.  Initial WBC was 44, platelets have decreased, troponin is minimally elevated, procalcitonin is normal.  CT chest reviewed with some fairly mild bibasilar infiltrates and elevation of left diaphragm (which looks to be new)    4/12/2022 - Pt extubated yesterday and has remained stable.  No new respiratory issues.  Has some cough and congestion, some chest pain with cough.  WBC better, platelets slightly better.  Troponin has decreased    Review of Systems    Review of Systems   Constitutional: Negative for chills and fever.   Respiratory: Positive for cough and shortness of breath.    Cardiovascular: Positive for chest pain.   Musculoskeletal: Positive for back pain and neck pain.        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     Right-sided Bell's palsy 2009    Spondylolisthesis     Varicose veins of legs     Venous insufficiency        Past Surgical History    Past Surgical History:   Procedure Laterality Date    Athroscopic surgery      On Knee    BIOPSY OF TISSUE OF NECK Left 2015    Recuurence CLL/small cell lyphoma    ESOPHAGEAL DILATION      Hemorrhoid resection  1979    LUNG LOBECTOMY Left     NECK SURGERY  2022    Anterior and Posterior C3-C7 fusion    OTHER SURGICAL HISTORY  2006    Chemotherapy s/p lyphoma        Portacath implantation and removal      reverse shoulder arthroplasty Right 2021       Medications (scheduled):      albuterol-ipratropium  3 mL Nebulization Q6H    chlorhexidine  15 mL Mouth/Throat BID    mupirocin   Nasal BID    piperacillin-tazobactam (ZOSYN) IVPB  3.375 g Intravenous Q8H    valsartan  80 mg Oral Daily       Medications (infusions):         Medications (prn):     acetaminophen, ALPRAZolam, calcium chloride IVPB, calcium chloride IVPB, calcium chloride IVPB, dextrose 50%, dextrose 50%, dextrose 50%, dextrose 50%, glucagon (human recombinant), glucose, glucose, hydrALAZINE, insulin aspart U-100, magnesium oxide, magnesium sulfate IVPB, magnesium sulfate IVPB, magnesium sulfate IVPB, magnesium sulfate IVPB, morphine, naloxone, ondansetron, polyethylene glycol, potassium chloride in water, potassium chloride in water, potassium chloride in water, potassium chloride in water, potassium chloride, potassium chloride, potassium chloride, potassium chloride, sodium chloride 0.9%, sodium phosphate IVPB, sodium phosphate IVPB, sodium phosphate IVPB, sodium phosphate IVPB, sodium phosphate IVPB, traZODone    Family History:   Family History   Problem Relation Age of Onset    Stomach cancer Mother 80         at 95    Colon cancer Father   "      Social History: Tobacco:   Social History     Tobacco Use   Smoking Status Former Smoker   Smokeless Tobacco Never Used                                EtOH:   Social History     Substance and Sexual Activity   Alcohol Use Yes                                Drugs:   Social History     Substance and Sexual Activity   Drug Use No     Physical Exam    Vital signs:  Temp:  [97.1 °F (36.2 °C)-99.3 °F (37.4 °C)]   Pulse:  []   Resp:  [19-54]   BP: (125-176)/(60-81)   SpO2:  [90 %-100 %]     Intake/Output:     Intake/Output Summary (Last 24 hours) at 4/12/2022 1310  Last data filed at 4/12/2022 0600  Gross per 24 hour   Intake 100 ml   Output 1480 ml   Net -1380 ml        BMI: Estimated body mass index is 27.43 kg/m² as calculated from the following:    Height as of this encounter: 6' 2" (1.88 m).    Weight as of this encounter: 96.9 kg (213 lb 10 oz).    Physical Exam  Vitals and nursing note reviewed.   Constitutional:       General: He is not in acute distress.     Appearance: Normal appearance. He is not ill-appearing, toxic-appearing or diaphoretic.   HENT:      Head: Normocephalic and atraumatic.      Right Ear: External ear normal.      Left Ear: External ear normal.      Nose: Nose normal.      Mouth/Throat:      Mouth: Mucous membranes are moist.      Pharynx: Oropharynx is clear. No oropharyngeal exudate.   Eyes:      General: No scleral icterus.        Right eye: No discharge.         Left eye: No discharge.      Extraocular Movements: Extraocular movements intact.      Conjunctiva/sclera: Conjunctivae normal.      Pupils: Pupils are equal, round, and reactive to light.   Neck:      Comments: c collar and drins  Cardiovascular:      Rate and Rhythm: Normal rate and regular rhythm.      Pulses: Normal pulses.      Heart sounds: Normal heart sounds. No murmur heard.    No friction rub. No gallop.   Pulmonary:      Effort: Pulmonary effort is normal. No respiratory distress.      Breath sounds: Normal " breath sounds. No stridor. No wheezing, rhonchi or rales.      Comments: CTA   No acc m use  Chest:      Chest wall: No tenderness.   Abdominal:      General: Bowel sounds are normal. There is no distension.      Tenderness: There is no abdominal tenderness. There is no guarding.   Genitourinary:     Comments: Sindhu   Musculoskeletal:         General: No swelling. Normal range of motion.      Cervical back: Normal range of motion.      Right lower leg: No edema.      Left lower leg: No edema.   Skin:     General: Skin is warm and dry.      Capillary Refill: Capillary refill takes less than 2 seconds.      Coloration: Skin is not jaundiced.      Findings: No bruising.   Neurological:      General: No focal deficit present.      Mental Status: He is oriented to person, place, and time. Mental status is at baseline.      Cranial Nerves: No cranial nerve deficit.      Sensory: No sensory deficit.      Motor: No weakness.      Comments: TAY   Psychiatric:         Mood and Affect: Mood normal.         Behavior: Behavior normal.         Thought Content: Thought content normal.         Judgment: Judgment normal.      Comments: Calm          Laboratory    Recent Labs   Lab 04/12/22  0550   WBC 14.84*   RBC 3.26*   HGB 10.2*   HCT 28.9*   PLT 73*   MCV 89   MCH 31.3*   MCHC 35.3       Recent Labs   Lab 04/12/22  0550   CALCIUM 8.2*   PROT 6.4      K 3.3*   CO2 30*   CL 98   BUN 19   CREATININE 0.8   ALKPHOS 36*   ALT 13   AST 16   BILITOT 1.8*       No results for input(s): PT, INR, APTT in the last 24 hours.    Recent Labs   Lab 04/12/22  0550   TROPONINI 0.039       Additional labs: reviewed    Microbiology:       Microbiology Results (last 7 days)     Procedure Component Value Units Date/Time    Culture, Respiratory with Gram Stain [[id number]] Collected: 04/10/22 1541    Order Status: Completed Specimen: Respiratory from Endotracheal Aspirate Updated: 04/12/22 0758     Respiratory Culture Reduced normal respiratory  annette     Gram Stain (Respiratory) No WBC's or organisms seen    Blood culture [[id number]] Collected: 04/10/22 1738    Order Status: Completed Specimen: Blood from Peripheral, Hand, Left Updated: 04/11/22 1832     Blood Culture, Routine No Growth to date      No Growth to date    Blood culture [[id number]] Collected: 04/10/22 1738    Order Status: Completed Specimen: Blood from Peripheral, Forearm, Left Updated: 04/11/22 1832     Blood Culture, Routine No Growth to date      No Growth to date    Culture, Respiratory with Gram Stain [[id number]] Collected: 04/10/22 1906    Order Status: Canceled Specimen: Sputum, Expectorated           Radiology    X-Ray Chest AP Portable  Chest single view    CLINICAL DATA: Respiratory failure    FINDINGS: AP view is compared to April 10. Heart size is normal. The mediastinum is unremarkable. Nasogastric and endotracheal tubes remain in place.    There is mild scarring or atelectasis at both lung bases with chronic elevation of the left hemidiaphragm. No significant effusion or pneumothorax is identified. Overall, chest is unchanged compared April 10.    IMPRESSION:  1. No interval change compared April 10.    Electronically signed by:  Bright Santos MD  4/11/2022 7:45 AM CDT Workstation: [id number]        Additional Studies    reviewed    Ventilator Information              Recent Labs     04/11/22  0757   PH 7.423   PCO2 47.1*   PO2 104*   HCO3 30.7*   POCSATURATED 98   BE 6         Impression    Active Hospital Problems    Diagnosis  POA    *Acute hypoxemic respiratory failure [J96.01]  Yes    Stridor [R06.1]  Yes    Aspiration pneumonia [J69.0]  Yes    Thrombocytopenia [D69.6]  Yes    Troponin level elevated [R77.8]  Yes    S/P cervical spinal fusion [Z98.1]  Not Applicable    History of lung cancer - ANDRES NSCLC 2004 [Z85.118]  Not Applicable    Iron deficiency anemia [D50.9]  Yes    CLL (chronic lymphocytic leukemia) [C91.10]  Yes      Resolved Hospital Problems   No  resolved problems to display.       Plan    · Stable extubated  · Continue antibiotics for now, recheck procalcitonin - pending and if low will de-escalate or stop  · Findings may represent pneumonitis and not pneumonia  · May need evaluation of left diaphragm (? Paresis)  · Follow leukocytosis - ? Stress related  · Watch platelets (they have been chronically low)  · Post op care per Dr Tillman  · Watch troponin but probably nonspecific finding, EKG reviewed  · Speech consult    Thank you for this consult.  I will follow with you while the patient is hospitalized.        Norman Pope MD  [facility] Pulmonary/Critical Care

## 2022-04-12 NOTE — PLAN OF CARE
MBS completed: recommend strict NPO (3-4 ice chips every few hours for oral comfort), non oral medications. Consider temporary means of nutrition/hydration/medication delivery. Repeat MBS as signs of dysphagia improve (Pt does not recognize difficulty).     Problem: SLP  Goal: SLP Goal  Description: 1. Pt will tolerate least restrictive PO diet without acute dysphagia pulmonary complication.   2. PENDING: Pt will participate in VFSS to define swallow physiology; treatment goal to follow       Outcome: Ongoing, Progressing

## 2022-04-12 NOTE — PLAN OF CARE
Problem: Feeding Intolerance (Enteral Nutrition)  Goal: Feeding Tolerance  Outcome: Ongoing, Progressing  Intervention: Prevent and Manage Feeding Intolerance  Flowsheets (Taken 4/12/2022 5072)  Nutrition Support Management: tube feeding initiated

## 2022-04-12 NOTE — NURSING
Drain to left throat was inadvertently removed by pt.  No blood present in drain.  No signs of bleeding.  Gauze on throat still intact.  Dr. Tillman contacted and voicemail was left, awaiting response.  Pt is AAO but is mildly anxious, claims due to only getting a couple of hours of sleep last night.  0.5mg of alprazolam given crushed in applesauce.  Tolerated well.  Will continue to monitor site for signs of bleeding.

## 2022-04-12 NOTE — PLAN OF CARE
Problem: Physical Therapy  Goal: Physical Therapy Goal  Description: Goals to be met by: Discharge     Patient will increase functional independence with mobility by performin. Supine to sit with Independent  2. Sit to stand transfer with Supervision  3. Bed to chair transfer with Supervision using Rolling Walker  4. Gait  x 150 feet with Supervision using Rolling Walker.         Outcome: Ongoing, Progressing

## 2022-04-12 NOTE — PLAN OF CARE
Problem: Occupational Therapy  Goal: Occupational Therapy Goal  Description: Goals to be met by: discharge     Patient will increase functional independence with ADLs by performing:    UE Dressing with Supervision.  LE Dressing with Supervision.  Grooming while standing at sink with Supervision.  Toileting from toilet with Supervision for hygiene and clothing management.   Toilet transfer to toilet with Supervision.  Perform sitting/standing ADL activity with no verbal/tactile cues for cervical precautions.    Outcome: Ongoing, Progressing

## 2022-04-12 NOTE — CONSULTS
"[facility]  Adult Nutrition   Consult Note (Nutrition Support Management)    SUMMARY     Recommendations  Recommendation/Intervention:   1) RD recommends Glucerna 1.5 at a goal rate of 60 ml/h starting at 15 ml/h and advancing by 15 mls every 4 h to the goal rate to provide 2160 kcals,119 g  protein and 1093 mls free water (patients fluid needs are 1900 to 2400 mls/day)  2) Flush tube with 30 mls free water every 4 h to prevent tube from clogging.    Goals:   1) Patient to tolerate enteral feedings and not aspirate.   2) Patient to meet his estimated nutritional needs from his enteral feeding of Glucerna.   3) Blood glucose and electrolytes managed and monitored.    Nutrition Goal Status: goal not met    Dietitian Rounds Brief  Consult for Enteral Feeding:     Diet order:   Current Diet Order: NPO      % Intake of Estimated Energy Needs: 0%  % Meal Intake: NPO    Energy Calories Required: not meeting needs  Protein Required: not meeting needs  Fluid Required: not meeting needs  Tolerance: not tolerating    Anthropometrics  Temp: 98.1 °F (36.7 °C)  Height Method: Stated  Height: 6' 2" (188 cm)  Height (inches): 74 in  Weight Method: Bed Scale  Weight: 96.9 kg (213 lb 10 oz)  Weight (lb): 213.63 lb  Ideal Body Weight (IBW), Male: 190 lb  % Ideal Body Weight, Male (lb): 108.84 %  BMI (Calculated): 27.4  BMI Grade: 25 - 29.9 - overweight       Estimated/Assessed Needs  Weight Used For Calorie Calculations: 96.9 kg (213 lb 10 oz)  Energy Calorie Requirements (kcal): 1135-3463 kcals/day (20-25 kcals/kg ABW)  Energy Need Method: Kcal/kg  Protein Requirements: 113-141 g/day (1.2-1.5 g/kg IBW)  Weight Used For Protein Calculations: 86 kg (189 lb 9.5 oz)  Fluid Requirements (mL): 1 mL/kcal or per MD     RDA Method (mL): 1938       Reason for Assessment  Reason For Assessment: consult  Diagnosis: cancer diagnosis/related complications, pulmonary disease, gastrointestinal disease, diabetes " diagnosis/complications, cardiac disease, other (see comments) (Acute hypoxemic respiratory failure)  Relevant Medical History: CLL (chronic lymphocytic leukemia), History of lung cancer, Lymphoma, small lymphocytic, 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, Diabetes mellitus, Non Insulin requiring, Alcoholism, MAYDA on CPAP, Right-sided Bell's palsy, Venous insufficiency, Varicose veins of legs, Spondylolisthesis, GI bleed due to NSAIDs While on Eliquis and Imbruvica, Iron deficiency, Hypertension, COPD (chronic obstructive pulmonary disease)  Interdisciplinary Rounds: attended    Nutrition/Diet History  Spiritual, Cultural Beliefs, Protestant Practices, Values that Affect Care: no  Food Allergies: NKFA  Factors Affecting Nutritional Intake: difficulty/impaired swallowing    Weight History:  Wt Readings from Last 5 Encounters:   04/12/22 96.9 kg (213 lb 10 oz)   03/31/22 95.7 kg (211 lb)   03/14/22 96.2 kg (212 lb)   02/17/22 95.7 kg (211 lb)   09/30/21 97.3 kg (214 lb 8 oz)        Lab/Procedures/Meds: Pertinent Labs/Meds Reviewed    Medications:Pertinent Medications Reviewed    Scheduled Meds:   albuterol-ipratropium  3 mL Nebulization Q6H    chlorhexidine  15 mL Mouth/Throat BID    mupirocin   Nasal BID    piperacillin-tazobactam (ZOSYN) IVPB  3.375 g Intravenous Q8H    valsartan  80 mg Oral Daily     Continuous Infusions:  PRN Meds:.acetaminophen, ALPRAZolam, calcium chloride IVPB, calcium chloride IVPB, calcium chloride IVPB, dextrose 50%, dextrose 50%, dextrose 50%, dextrose 50%, glucagon (human recombinant), glucose, glucose, hydrALAZINE, insulin aspart U-100, magnesium oxide, magnesium sulfate IVPB, magnesium sulfate IVPB, magnesium sulfate IVPB, magnesium sulfate IVPB, morphine, naloxone, ondansetron, polyethylene glycol, potassium chloride in water, potassium chloride in water, potassium chloride in water, potassium chloride in water, potassium chloride, potassium chloride, potassium chloride,  potassium chloride, sodium chloride 0.9%, sodium phosphate IVPB, sodium phosphate IVPB, sodium phosphate IVPB, sodium phosphate IVPB, sodium phosphate IVPB, traZODone    Labs: Pertinent Labs Reviewed    Clinical Chemistry:  Recent Labs   Lab 04/12/22  0550      K 3.3*   CL 98   CO2 30*   *   BUN 19   CREATININE 0.8   CALCIUM 8.2*   PROT 6.4   ALBUMIN 3.3*   BILITOT 1.8*   ALKPHOS 36*   AST 16   ALT 13   ANIONGAP 11   ESTGFRAFRICA >60.0   EGFRNONAA >60.0   MG 2.0     CBC:   Recent Labs   Lab 04/12/22  0550   WBC 14.84*   RBC 3.26*   HGB 10.2*   HCT 28.9*   PLT 73*   MCV 89   MCH 31.3*   MCHC 35.3     Cardiac Profile:  Recent Labs   Lab 04/10/22  1457 04/11/22  0406 04/12/22  0550   *  --   --    * 112  --    CPKMB 3.1  --   --    TROPONINI 0.066* 0.061* 0.039     Monitor and Evaluation  Food and Nutrient Intake: enteral nutrition intake  Food and Nutrient Adminstration: enteral and parenteral nutrition administration  Physical Activity and Function: nutrition-related ADLs and IADLs, factors affecting access to physical activity  Anthropometric Measurements: weight, weight change, body mass index  Biochemical Data, Medical Tests and Procedures: electrolyte and renal panel, gastrointestinal profile, glucose/endocrine profile, inflammatory profile, lipid profile  Nutrition-Focused Physical Findings: overall appearance     Nutrition Risk  Level of Risk/Frequency of Follow-up: high     Nutrition Follow-Up  RD Follow-up?: Yes      Bernice Tyler, CARLA 04/12/2022 4:25 PM

## 2022-04-12 NOTE — PT/OT/SLP PROGRESS
Occupational Therapy   Treatment    Name: Conrad Kuhn  MRN: [id number]  Admitting Diagnosis:  Acute hypoxemic respiratory failure       Recommendations:     Discharge Recommendations: home health OT  Discharge Equipment Recommendations:  none  Barriers to discharge:  None    Assessment:     Conrad Kuhn is a 81 y.o. male with a medical diagnosis of Acute hypoxemic respiratory failure.  He presents with improving medical acuity and functional mobility. Patient participated in grooming standing at sink and functional transfer. Performance deficits affecting function are weakness, impaired endurance, impaired self care skills, impaired functional mobilty, gait instability, impaired balance, decreased safety awareness.     Rehab Prognosis:  Fair; patient would benefit from acute skilled OT services to address these deficits and reach maximum level of function.       Plan:     Patient to be seen 5 x/week to address the above listed problems via self-care/home management, therapeutic activities, therapeutic exercises  · Plan of Care Expires: 05/11/22  · Plan of Care Reviewed with: patient    Subjective     Pain/Comfort:  · Pain Rating 1: 0/10  · Pain Rating Post-Intervention 1: 0/10    Objective:     Communicated with: nurse prior to session.  Patient found up in chair with telemetry, peripheral IV, oxygen upon OT entry to room.    General Precautions: Standard, fall   Orthopedic Precautions:spinal precautions   Braces: N/A (Aspen Cervical Collar)  Respiratory Status: Nasal cannula, flow 4 L/min     Occupational Performance:     Functional Mobility/Transfers:  · Patient completed Sit <> Stand Transfer with contact guard assistance  with  no assistive device     Activities of Daily Living:  · Grooming: contact guard assistance to wash hands standing at sink.      [facility] 6 Click ADL: 19    Treatment & Education:  Patient educated on cervical spine precautions while performing ADLs and functional mobility.    Patient  left up in chair with all lines intact, call button in reach and family  presentEducation:      GOALS:   Multidisciplinary Problems     Occupational Therapy Goals        Problem: Occupational Therapy    Goal Priority Disciplines Outcome Interventions   Occupational Therapy Goal     OT, PT/OT Ongoing, Progressing    Description: Goals to be met by: discharge     Patient will increase functional independence with ADLs by performing:    UE Dressing with Supervision.  LE Dressing with Supervision.  Grooming while standing at sink with Supervision.  Toileting from toilet with Supervision for hygiene and clothing management.   Toilet transfer to toilet with Supervision.  Perform sitting/standing ADL activity with no verbal/tactile cues for cervical precautions.                     Time Tracking:     OT Date of Treatment: 04/12/22  OT Start Time: 1059  OT Stop Time: 1114  OT Total Time (min): 15 min    Billable Minutes:Self Care/Home Management 15    OT/MK: OT          4/12/2022

## 2022-04-12 NOTE — PLAN OF CARE
Important Message from Medicare was sign, explained and given to patient/caregiver on 04/12/2022 at 2:11pm     addressed any questions or concerns.    Important Message from Medicare document will be scanned into patient's medical record

## 2022-04-12 NOTE — PROGRESS NOTES
Therapy with Vancomycin order discontinued byd Dr. Cam on 4/12 at 09:35.  Pharmacy will sign off, please re-consult as needed.  Thank you for allowing us to participate in this patient's care.  Bernice Meraz 4/12/2022 9:54 AM  Dept of Pharmacy  Ext [phone]

## 2022-04-12 NOTE — PROGRESS NOTES
[facility] Medicine  Progress Note    Patient Name: Conrad Kuhn  MRN: [id number]  Patient Class: IP- Inpatient   Admission Date: 4/10/2022  Length of Stay: 2 days  Attending Physician: Efrain Cam MD  Primary Care Provider: Johnson Erazo MD        Subjective:     Principal Problem:Acute hypoxemic respiratory failure        HPI:  81 year old male transferred from [facility] secondary to acute hypoxic respiratory failure.  He is intubated at that time of my exam and his wife is at bedside to supplement history.  History also supplemented  by ED MD and chart.  Patient had  C3-7 anterior posterior cervical fusion approximately two days ago.  His wife noted that he was having SOB yesterday as well as shoulder pain and neck pain. He even expressed to her that he felt he may be getting pneumonia. She reported he did choke when trying to drink a milk shake yesterday.  Patient had return of SOB today.  EMS received report that he got some ativan around noon and was going for a CT scan when saturations dropped into the 70s.  He was transported to [facility].  Per ED MD,  patient sounded as if he had stridor.  He was electively intubated and thick, bloody secretions were seen. Dr. Tillman with surgery was called and evaluated the patient and the surgical incision.  It did not appear this was the etiology of his respiratory distress and hypoxia. CT soft tissue of the neck revealed expected post operative changes with drains in place and subcutaneous gas present.  CT chest with patchy bibasilar opacities with aspiration pneumonia a consideration.     In the ED, WBC is 44.  The surgeon did express that he thought these post surgical patients got perioperative Decadron but I cannot confirm if that's the case presently. Temp is 100.7.  He was started on IV Vancomycin and IV Zosyn.      Overview/Hospital Course:  04/11  Pt got extubated  MBSS tmrw AM  On NPO status    04/12  MBSS confirmed severe  aspiration  NG tube feeding will be started  Pt denies any issues  Pt got transferred out of ICU       Interval History:     Review of Systems   Constitutional:  Negative for activity change and appetite change.   HENT:  Negative for congestion and dental problem.    Eyes:  Negative for discharge and itching.   Respiratory:  Negative for shortness of breath.    Cardiovascular:  Negative for chest pain.   Gastrointestinal:  Negative for abdominal distention and abdominal pain.   Endocrine: Negative for cold intolerance.   Genitourinary:  Negative for difficulty urinating and dysuria.   Musculoskeletal:  Negative for arthralgias and back pain.   Skin:  Negative for color change.   Neurological:  Negative for dizziness and facial asymmetry.   Hematological:  Negative for adenopathy.   Psychiatric/Behavioral:  Negative for agitation and behavioral problems.    Objective:     Vital Signs (Most Recent):  Temp: 98.1 °F (36.7 °C) (04/12/22 1501)  Pulse: 88 (04/12/22 1501)  Resp: (!) 25 (04/12/22 1501)  BP: (!) 151/66 (04/12/22 1501)  SpO2: 97 % (04/12/22 1646)   Vital Signs (24h Range):  Temp:  [97.1 °F (36.2 °C)-99.3 °F (37.4 °C)] 98.1 °F (36.7 °C)  Pulse:  [] 88  Resp:  [14-54] 25  SpO2:  [90 %-99 %] 97 %  BP: (132-176)/(61-81) 151/66     Weight: 96.9 kg (213 lb 10 oz)  Body mass index is 27.43 kg/m².    Intake/Output Summary (Last 24 hours) at 4/12/2022 1719  Last data filed at 4/12/2022 1400  Gross per 24 hour   Intake 50 ml   Output 1880 ml   Net -1830 ml      Physical Exam  Vitals and nursing note reviewed.   Constitutional:       Appearance: He is well-developed.   HENT:      Head: Atraumatic.      Right Ear: External ear normal.      Left Ear: External ear normal.      Nose: Nose normal.      Mouth/Throat:      Mouth: Mucous membranes are moist.   Eyes:      General: No scleral icterus.  Cardiovascular:      Rate and Rhythm: Normal rate.   Pulmonary:      Effort: Pulmonary effort is normal.   Abdominal:       General: There is no distension.   Musculoskeletal:         General: Normal range of motion.      Cervical back: Full passive range of motion without pain and normal range of motion.   Skin:     General: Skin is warm.   Neurological:      Mental Status: He is alert and oriented to person, place, and time.   Psychiatric:         Behavior: Behavior normal.       Significant Labs: All pertinent labs within the past 24 hours have been reviewed.  CBC:   Recent Labs   Lab 04/11/22  0405 04/11/22  0406 04/12/22  0550   WBC  --  25.02* 14.84*   HGB  --  9.6* 10.2*   HCT 28* 27.4* 28.9*   PLT  --  68* 73*     CMP:   Recent Labs   Lab 04/11/22  0406 04/12/22  0550   * 139   K 3.5 3.3*   CL 97 98   CO2 28 30*   * 171*   BUN 31* 19   CREATININE 1.2 0.8   CALCIUM 8.4* 8.2*   PROT 6.1 6.4   ALBUMIN 3.2* 3.3*   BILITOT 1.3* 1.8*   ALKPHOS 29* 36*   AST 20 16   ALT 13 13   ANIONGAP 10 11   EGFRNONAA 56.0* >60.0       Significant Imaging: I have reviewed all pertinent imaging results/findings within the past 24 hours.      Assessment/Plan:      * Acute hypoxemic respiratory failure  Intubated and got extubated on 04/11 AM  Continue iv abx for presumed aspiration pneumonia  Maintain NPO status because MBSS confirmed severe aspiration risk  Started on NG tube feeding    Aspiration pneumonia  Continue present iv abx       S/P cervical spinal fusion  C3-7 anterior posterior cervical fusion in [facility]   Stable       Troponin level elevated  Atypical   Denies chest pain   Due to Demand ischemia from hypoxia.       Thrombocytopenia  Chronic issue  Monitor closely       Stridor  Resolved       Iron deficiency anemia  Stable       History of lung cancer - ANDRES NSCLC 2004  Hx of ANDRES resection.  No acute issues.      CLL (chronic lymphocytic leukemia)  Chronic condition with no acute issues.        VTE Risk Mitigation (From admission, onward)         Ordered     IP VTE HIGH RISK PATIENT  Once         04/10/22 4595      Place sequential compression device  Until discontinued         04/10/22 5731                Discharge Planning   MARTELL: 4/17/2022     Code Status: Full Code   Is the patient medically ready for discharge?: No    Reason for patient still in hospital (select all that apply): Treatment  Discharge Plan A: Home with family, Home Health                  Efrain Cam MD  Department of Hospital Medicine   [facility]

## 2022-04-12 NOTE — PT/OT/SLP PROGRESS
Physical Therapy Treatment    Patient Name:  Conrad Kuhn   MRN:  [id number]    Recommendations:     Discharge Recommendations:  home health PT   Discharge Equipment Recommendations: none   Barriers to discharge: increase assist with mobility    Assessment:     Conrad Kuhn is a 81 y.o. male admitted with a medical diagnosis of Acute hypoxemic respiratory failure.  He presents with the following impairments/functional limitations:  weakness, impaired endurance, impaired self care skills, impaired functional mobilty, gait instability, impaired balance, decreased lower extremity function, decreased safety awareness, pain, impaired cardiopulmonary response to activity.    Pt report fatigue and didn't sleep well but agreeable to visit. Pt found with one drain attached c/s drain disconnect. RN notified and address. Pt presents with improve ambulation tolerance to 100 ft with RW and CGA.  At end of session patient left in bed with cpap on at patient request.     Rehab Prognosis: Fair; patient would benefit from acute skilled PT services to address these deficits and reach maximum level of function.    Recent Surgery: * No surgery found *      Plan:     During this hospitalization, patient to be seen 6 x/week to address the identified rehab impairments via gait training, therapeutic activities, therapeutic exercises, neuromuscular re-education and progress toward the following goals:    · Plan of Care Expires:  05/12/22    Subjective     Chief Complaint: fatigue  Patient/Family Comments/goals: return home  Pain/Comfort:  · Pain Rating 1: 6/10  · Location 1: cervical spine  · Pain Addressed 1: Reposition, Distraction, Cessation of Activity      Objective:     Communicated with RN prior to session.  Patient found up in chair with peripheral IV, telemetry, pulse ox (continuous), blood pressure cuff, oxygen, MARCELLUS drain upon PT entry to room.     General Precautions: Standard, fall   Orthopedic Precautions:spinal precautions    Braces: Aspen collar  Respiratory Status: Nasal cannula, flow 4 L/min     Functional Mobility:  · Bed Mobility:     · Sit to Supine: minimum assistance  · Transfers:     · Sit to Stand:  contact guard assistance with rolling walker  · Gait: 100 ft with RW and CGA      AM-PAC 6 CLICK MOBILITY          Therapeutic Activities and Exercises:   Pt educated on POC, discharge recommendation, importance of time OOB, proper form with mobility, need for assist with mobility, use of call bell to seek assistance as needed and fall prevention      Patient left right sidelying with all lines intact, call button in reach and bed alarm on..    GOALS:   Multidisciplinary Problems     Physical Therapy Goals        Problem: Physical Therapy    Goal Priority Disciplines Outcome Goal Variances Interventions   Physical Therapy Goal     PT, PT/OT Ongoing, Progressing     Description: Goals to be met by: Discharge     Patient will increase functional independence with mobility by performin. Supine to sit with Independent  2. Sit to stand transfer with Supervision  3. Bed to chair transfer with Supervision using Rolling Walker  4. Gait  x 150 feet with Supervision using Rolling Walker.                          Time Tracking:     PT Received On: 22  PT Start Time: 935     PT Stop Time: 958  PT Total Time (min): 23 min     Billable Minutes: Gait Training 11 and Therapeutic Activity 12    Treatment Type: Treatment  PT/PTA: PT     PTA Visit Number: 0     2022

## 2022-04-12 NOTE — PLAN OF CARE
04/12/22 0704   Patient Assessment/Suction   Level of Consciousness (AVPU) alert   Respiratory Effort Normal;Unlabored   Expansion/Accessory Muscles/Retractions no use of accessory muscles   Rhythm/Pattern, Respiratory unlabored;pattern regular   Cough Frequency infrequent   Cough Type productive   Suction Method oral   Suction Pressure (mmHg) -120 mmHg   Secretions Amount moderate   Secretions Color white;yellow   Secretions Characteristics thick   PRE-TX-O2   O2 Device (Oxygen Therapy) nasal cannula   Flow (L/min) 4   SpO2 95 %   Pulse Oximetry Type Intermittent   $ Pulse Oximetry - Multiple Charge Pulse Oximetry - Multiple   Pulse 78   Resp 20   Aerosol Therapy   $ Aerosol Therapy Charges Aerosol Treatment   Daily Review of Necessity (SVN) completed   Respiratory Treatment Status (SVN) given   Treatment Route (SVN) oxygen;mask   Patient Position (SVN) semi-Zavaleta's   Post Treatment Assessment (SVN) breath sounds improved   Signs of Intolerance (SVN) none   Breath Sounds Post-Respiratory Treatment   Throughout All Fields Post-Treatment All Fields   Throughout All Fields Post-Treatment aeration increased   Post-treatment Heart Rate (beats/min) 77   Post-treatment Resp Rate (breaths/min) 18   Education   $ Education Bronchodilator;15 min   Respiratory Evaluation   $ Care Plan Tech Time 15 min   $ Eval/Re-eval Charges Re-evaluation

## 2022-04-12 NOTE — PT/OT/SLP PROGRESS
Speech Language Pathology      Conrad Kuhn  MRN: [id number]    MBS ordered and scheduled for ~2PM per radiology availability. Discussed w/ RN.

## 2022-04-12 NOTE — PROGRESS NOTES
"VANCOMYCIN PHARMACOKINETIC NOTE:  Vancomycin Day #3    Objective:    81 y.o., male, Actual Body Weight = 96.9 kg (213 lb 10 oz)    Diagnosis/Indication for Vancomycin:  Pneumonia   Desired Vancomycin Peak:  30-35 mcg/ml; Desired Trough: 10-15 mcg/ml    Current Vancomycin Regimen:  1500 mg IV every 12 hours    Current Dosing History   Day of Thx Date Current Weight (kg) Laboratory  Doses    Vancomycin Level      Time SCr CrCl Scheduled Time Time Dose Dosage (mcg/ml) Peak,  Random,  Trough?   1 4/10/22 95.3 14:57 0.8 82.8 18:30 19:11 1 1750       2 4/11 94.1 04:06 1.2 55.2 08:00 09:24 2 1500              20:00 21:13 3 1500     3 4/12     07:00 07:44 - - 16.6 Trough     Receiving other antibiotics:    Antibiotics (From admission, onward)                Start     Stop Route Frequency Ordered    04/13/22 1200  vancomycin (VANCOCIN) 1,500 mg in dextrose 5 % 500 mL IVPB         -- IV Every 16 hours 04/12/22 0836    04/11/22 0945  mupirocin 2 % ointment  (MRSA Decolonization Orders [facility])         04/16 0859 Nasl 2 times daily 04/11/22 0833    04/11/22 0200  piperacillin-tazobactam 3.375 g in dextrose 5 % 50 mL IVPB (ready to mix system)         -- IV Every 8 hours (non-standard times) 04/10/22 1906    04/10/22 1906  vancomycin - pharmacy to dose  (vancomycin IVPB)        "And" Linked Group Details    -- IV pharmacy to manage frequency 04/10/22 1906             The patient has the following labs:     4/12/2022 Estimated Creatinine Clearance: 82.8 mL/min (based on SCr of 0.8 mg/dL). Lab Results   Component Value Date    BUN 19 04/12/2022     Lab Results   Component Value Date    WBC 14.84 (H) 04/12/2022        Vancomycin Trough:  16.6 mcg/mL collected 11 hours after Dose #3 (drawn correctly).    Microbiology Results (last 7 days)       Procedure Component Value Units Date/Time    Culture, Respiratory with Gram Stain [[id number]] Collected: 04/10/22 1541    Order Status: Completed Specimen: Respiratory from Endotracheal Aspirate " Updated: 04/12/22 0723     Respiratory Culture Reduced normal respiratory annette     Gram Stain (Respiratory) No WBC's or organisms seen    Culture, Respiratory with Gram Stain [[id number]] Collected: 04/10/22 1906    Order Status: Sent Specimen: Sputum, Expectorated Updated: 04/12/22 0638    Blood culture [[id number]] Collected: 04/10/22 1738    Order Status: Completed Specimen: Blood from Peripheral, Hand, Left Updated: 04/11/22 1832     Blood Culture, Routine No Growth to date      No Growth to date    Blood culture [[id number]] Collected: 04/10/22 1738    Order Status: Completed Specimen: Blood from Peripheral, Forearm, Left Updated: 04/11/22 1832     Blood Culture, Routine No Growth to date      No Growth to date             Assessment:  Vancomycin dose =  15.5 mg/kg  Renal function is: stable  Vancomycin trough is above goal range.    Plan:    Will change vancomycin to 1500 mg IV every 16 hours.  Vancomycin dose = 15.5 mg/kg actual body weight.  Will schedule next dose for 4/12/22 at 1200, approximately 16 hours since last dose  Will obtain vancomycin trough after 2 more dose(s), prior to dose due 4/13 at 1900    Pharmacy will continue to manage vancomycin therapy and adjust regimen as necessary.    Thank you for allowing us to participate in this patient's care.     Lucy Hollingsworth 4/12/2022 8:36 AM  Department of Pharmacy  Ext [phone]

## 2022-04-12 NOTE — ASSESSMENT & PLAN NOTE
Intubated and got extubated on 04/11 AM  Continue iv abx for presumed aspiration pneumonia  Maintain NPO status because MBSS confirmed severe aspiration risk  Started on NG tube feeding

## 2022-04-12 NOTE — SUBJECTIVE & OBJECTIVE
Interval History:     Review of Systems   Constitutional:  Negative for activity change and appetite change.   HENT:  Negative for congestion and dental problem.    Eyes:  Negative for discharge and itching.   Respiratory:  Negative for shortness of breath.    Cardiovascular:  Negative for chest pain.   Gastrointestinal:  Negative for abdominal distention and abdominal pain.   Endocrine: Negative for cold intolerance.   Genitourinary:  Negative for difficulty urinating and dysuria.   Musculoskeletal:  Negative for arthralgias and back pain.   Skin:  Negative for color change.   Neurological:  Negative for dizziness and facial asymmetry.   Hematological:  Negative for adenopathy.   Psychiatric/Behavioral:  Negative for agitation and behavioral problems.    Objective:     Vital Signs (Most Recent):  Temp: 98.1 °F (36.7 °C) (04/12/22 1501)  Pulse: 88 (04/12/22 1501)  Resp: (!) 25 (04/12/22 1501)  BP: (!) 151/66 (04/12/22 1501)  SpO2: 97 % (04/12/22 1646)   Vital Signs (24h Range):  Temp:  [97.1 °F (36.2 °C)-99.3 °F (37.4 °C)] 98.1 °F (36.7 °C)  Pulse:  [] 88  Resp:  [14-54] 25  SpO2:  [90 %-99 %] 97 %  BP: (132-176)/(61-81) 151/66     Weight: 96.9 kg (213 lb 10 oz)  Body mass index is 27.43 kg/m².    Intake/Output Summary (Last 24 hours) at 4/12/2022 1719  Last data filed at 4/12/2022 1400  Gross per 24 hour   Intake 50 ml   Output 1880 ml   Net -1830 ml      Physical Exam  Vitals and nursing note reviewed.   Constitutional:       Appearance: He is well-developed.   HENT:      Head: Atraumatic.      Right Ear: External ear normal.      Left Ear: External ear normal.      Nose: Nose normal.      Mouth/Throat:      Mouth: Mucous membranes are moist.   Eyes:      General: No scleral icterus.  Cardiovascular:      Rate and Rhythm: Normal rate.   Pulmonary:      Effort: Pulmonary effort is normal.   Abdominal:      General: There is no distension.   Musculoskeletal:         General: Normal range of motion.       Cervical back: Full passive range of motion without pain and normal range of motion.   Skin:     General: Skin is warm.   Neurological:      Mental Status: He is alert and oriented to person, place, and time.   Psychiatric:         Behavior: Behavior normal.       Significant Labs: All pertinent labs within the past 24 hours have been reviewed.  CBC:   Recent Labs   Lab 04/11/22  0405 04/11/22  0406 04/12/22  0550   WBC  --  25.02* 14.84*   HGB  --  9.6* 10.2*   HCT 28* 27.4* 28.9*   PLT  --  68* 73*     CMP:   Recent Labs   Lab 04/11/22  0406 04/12/22  0550   * 139   K 3.5 3.3*   CL 97 98   CO2 28 30*   * 171*   BUN 31* 19   CREATININE 1.2 0.8   CALCIUM 8.4* 8.2*   PROT 6.1 6.4   ALBUMIN 3.2* 3.3*   BILITOT 1.3* 1.8*   ALKPHOS 29* 36*   AST 20 16   ALT 13 13   ANIONGAP 10 11   EGFRNONAA 56.0* >60.0       Significant Imaging: I have reviewed all pertinent imaging results/findings within the past 24 hours.

## 2022-04-12 NOTE — PROCEDURES
Modified Barium Swallow    Patient Name:  Conrad Kuhn   MRN:  [id number]      Recommendations:     Recommendations:                General Recommendations:    · Temporary means of nutrition, hydration and medication delivery  · Medical management of prevertebral soft tissue edema  · Repeat MBS prior to initiation of oral diet and oral medications pending improvement in clinical signs of aspiration and dysphagia       Diet recommendations:  NPO, NPO   Aspiration Precautions: Strict aspiration precautions   · Ice chips every few hours (3-4) with diligent oral hygiene     General Precautions: Standard, NPO, aspiration    Referral     Reason for Referral  Patient was referred for a Modified Barium Swallow Study to assess the efficiency of his/her swallow function, rule out aspiration and make recommendations regarding safe dietary consistencies, effective compensatory strategies, and safe eating environment.     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 Pt with poor secretion management and overt signs of aspiration with ice chips + small volumes of thin liquid with strict NPO recommended in interim of MBS. RN this date promotes tolerance of medications crushed in applesauce.     Current Respiratory  Status: 04/12/22    Alert: yes    Cooperative: yes    Follows Directions: yes    Visualization  · Patient was seen in the lateral view  · [place] J cervical collar in place   · Cervical hardware and prevertebral wall thickening present  · Following swallow of 1/2 teaspoon puree   ·     Oral Peripheral Examination  · Oral Musculature: WNL  · Secretion Management: coughing on secretions, problems swallowing secretions  · Mandibular Strength and Mobility:  (restricted; c collar)  · Oral Labial Strength and Mobility: WNL  · Lingual Strength and Mobility: WNL  · Velar Elevation:  (unable to visualize)  · Volitional Cough: Present; does not appear effective for airway protection  · Volitional Swallow: Hyolaryngeal lift present  · Voice Prior to PO Intake: hoarse quality, reduced intensity and wet in nature    Consistencies Assessed  · Thin teaspoon  · Nectar thick teaspoon  · Puree 1/2 teaspoon     Oral Preparation/Oral Phase   Lip closure  0: no labial escape   1: interlabial escape; no progression to anterior lip    2: escape from interlabial space or lateral juncture; no extension beyond vermilion border   3: escape progressing to mid-chin    4: escape beyond mid-chin          Tongue control during bolus hold  0: cohesive bolus between tongue to palatal seal    1: escape to lateral buccal cavity/floor of mouth   2: posterior escape of less than half of bolus    3: posterior escape of greater than half of bolus       Bolus preparation/mastication   0: timely and efficient chewing and mashing   1: slow prolonged chewing/mashing with complete re-collection   2: disorganized chewing/mashing with solid pieces of bolus unchewed    3: minimal chewing/mashign with majority of bolus unchewed      solid not offered     Bolus transport/lingual motion   0: brisk tongue motion    1: delayed initiation of tongue motion    2: slowed tongue motion    3: repetitive/disorganized tongue motion    4: minimal to no tongue motion       Oral  residue  0: complete oral clearance   1: trace residue lining oral structures   2: residue collection on oral structures    3: majority of bolus remaining    4: minimal to no clearance      Pharyngeal Phase    Initiation of pharyngeal swallow   0: bolus head at posterior brent of ramus    1: bolus head in valleculae; puree solid    2: bolus head at posterior laryngeal surface of epiglottis    3: bolus head in pyriforms; thin and nectar thick liquid    4: No visible initiation at any location       Soft palate elevation   0: no bolus between soft palate and posterior pharyngeal wall    1: trace column of contrast or air between soft palate and posterior pharyngeal wall    2: escape to nasopharynx   3: escape to nasal cavity    4: escape to nostril with/without emission       Base of tongue retraction     0: no contrast between tongue base and posterior pharyngeal wall    1: trace column of contrast or air between tongue base and posterior pharyngeal wall    2: narrow column of contrast or air between tongue base and posterior pharyngeal wall     3: wide column of contrast or air between tongue base and posterior pharyngeal wall    4: no visible posterior motion of tongue base       Laryngeal elevation   0: complete superior movement of thyroid cartilage with complete approximation of arytenoids to epiglottic petiole     1: partial superior movement of thyroid cartilage/partial approximation of arytenoids to epiglottic petiole    2: minimal superior movement of thyroid cartilage with minimal approximation of arytenoids to epiglottic petiole    3: no superior movement of thyroid cartilage with complete approximation of arytenoids to epiglottic petiole       Anterior hyoid excursion   0: complete anterior movement    1: partial anterior movement    2: no anterior movement       Epiglottic movement   0: complete inversion    1: partial inversion    2: no inversion       Laryngeal vestibular closure   0: complete; no  air/contrast in laryngeal vestibule    1: incomplete; narrow column of air/contrast in laryngeal vestibule    2: none; side column of air/contrast in laryngeal vestibule     Pharyngeal stripping wave   0: present; complete    1: present; diminished   2: absent       Pharyngeal contraction     * in AP view at rest and throughout maximal movement of structures   0: complete   1: incomplete    2: unilateral bulging    3: bilateral bulging      AP view not completed     Pharyngeal residue   0: complete pharyngeal clearance    1: trace residue within or in pharyngeal structures   2: collection of residue within or on pharyngeal structures   3: majority of contrast within or on pharyngeal structures   4: minimal to no pharyngeal clearance       Pharyngoesophageal segment opening   0: complete distention and complete duration; no obstruction of flow    1: partial distension/partial duration; partial obstruction of flow    2: minimal distension/minimal duration; marked obstruction of flow    3: no distension with total obstruction of flow         8- point Penetration-Aspiration Scale (PAS)    Thin liquid   Best: 5) Material enters the airway, contacts the vocal folds, & is not ejected from the airway   Worst:       Mildly-thick liquid   Best: 5) Material enters the airway, contacts the vocal folds, & is not ejected from the airway   Worst:       Moderately-thick liquid   Best:    Worst:       Pudding   Best: 2) Material enters the airway, remains above the vocal folds, & is ejected from the airway   Worst:       Solid   Best:    Worst:      1) Material does not enter the airway    2) Material enters the airway, remains above the vocal folds, & is ejected from the airway    3) Material enters the airway, remains above the vocal folds, & is not ejected from the airway    4) Material enters the airway, contacts the vocal folds, & is ejected from the airway    5) Material enters the airway, contacts the vocal folds, & is  "not ejected from the airway    6) Material enters the airway, passes below the vocal folds, & is ejected into the larynx or out of the airway     7) Material enters the airway, passes below the vocal folds, & is not ejected from the trachea despite effort     8) Material enters the airway, passes below the vocal folds, & no effort is made to eject       · Dysphagia outcomes and severity scale (LORETA) level 1, severe dysphagia and inability to tolerate any consistency       Assessment:     Impressions  ·  Severe dysphagia POD #4 s/p C3-7 ACDF, largely related to prevertebral soft tissue edema. Swallow safety and efficiency impaired. Patient with limited passage of material through the UES with liquids and minimal to no passage with puree. Patient demonstrates sensation to retention, producing multiple spontaneous swallows that are not effective in reducing retention. Continued events of penetration resulting in throat clearing and coughing with re-swallows to liquid. Sensation to retention of puree is decreased with Pt producing 2 dry swallows ind'ly with majority of bolus remaining in pharynx before expressing sensation bolus was completely cleared. Patient demonstrates decreased awareness to degree of dysphagia rating difficulty stating effort with liquids was "fairly easy" after continuous coughing and throat clearing events, producing of 5-7 swallows per bolus and still with moderate to severe retention.     Prognosis: Fair    Barriers:  · Cognitive status  · Respiratory compromise  · Resolution of prevertebral wall edema     Education  Results were discussed with patient. Results were discussed with Medical Team (no response).     Goals:   Multidisciplinary Problems     SLP Goals        Problem: SLP    Goal Priority Disciplines Outcome   SLP Goal     SLP Ongoing, Progressing   Description: 1. Pt will tolerate least restrictive PO diet without acute dysphagia pulmonary complication.   2. PENDING: Pt will " participate in VFSS to define swallow physiology; treatment goal to follow                        Plan:   · Plan of Care reviewed with:  patient        Discharge recommendations:   (pending)     Time Tracking:   SLP Treatment Date:   04/12/22  Speech Start Time:  1440  Speech Stop Time:  1455     Speech Total Time (min):  15 min    04/12/2022

## 2022-04-13 ENCOUNTER — PATIENT MESSAGE (OUTPATIENT)
Dept: HEMATOLOGY/ONCOLOGY | Facility: CLINIC | Age: 82
End: 2022-04-13

## 2022-04-13 LAB
ALBUMIN SERPL BCP-MCNC: 3.2 G/DL (ref 3.5–5.2)
ALP SERPL-CCNC: 33 U/L (ref 55–135)
ALT SERPL W/O P-5'-P-CCNC: 12 U/L (ref 10–44)
ANION GAP SERPL CALC-SCNC: 11 MMOL/L (ref 8–16)
AST SERPL-CCNC: 13 U/L (ref 10–40)
BASOPHILS # BLD AUTO: 0.01 K/UL (ref 0–0.2)
BASOPHILS NFR BLD: 0.1 % (ref 0–1.9)
BILIRUB SERPL-MCNC: 1.6 MG/DL (ref 0.1–1)
BUN SERPL-MCNC: 17 MG/DL (ref 8–23)
CALCIUM SERPL-MCNC: 8.5 MG/DL (ref 8.7–10.5)
CHLORIDE SERPL-SCNC: 105 MMOL/L (ref 95–110)
CO2 SERPL-SCNC: 24 MMOL/L (ref 23–29)
CREAT SERPL-MCNC: 0.7 MG/DL (ref 0.5–1.4)
DIFFERENTIAL METHOD: ABNORMAL
EOSINOPHIL # BLD AUTO: 0 K/UL (ref 0–0.5)
EOSINOPHIL NFR BLD: 0.6 % (ref 0–8)
ERYTHROCYTE [DISTWIDTH] IN BLOOD BY AUTOMATED COUNT: 12.7 % (ref 11.5–14.5)
EST. GFR  (AFRICAN AMERICAN): >60 ML/MIN/1.73 M^2
EST. GFR  (NON AFRICAN AMERICAN): >60 ML/MIN/1.73 M^2
GLUCOSE SERPL-MCNC: 145 MG/DL (ref 70–110)
GLUCOSE SERPL-MCNC: 146 MG/DL (ref 70–110)
GLUCOSE SERPL-MCNC: 148 MG/DL (ref 70–110)
GLUCOSE SERPL-MCNC: 159 MG/DL (ref 70–110)
GLUCOSE SERPL-MCNC: 171 MG/DL (ref 70–110)
HCT VFR BLD AUTO: 27.5 % (ref 40–54)
HGB BLD-MCNC: 9.8 G/DL (ref 14–18)
IMM GRANULOCYTES # BLD AUTO: 0.31 K/UL (ref 0–0.04)
IMM GRANULOCYTES NFR BLD AUTO: 4.4 % (ref 0–0.5)
LYMPHOCYTES # BLD AUTO: 1.3 K/UL (ref 1–4.8)
LYMPHOCYTES NFR BLD: 18.5 % (ref 18–48)
MAGNESIUM SERPL-MCNC: 2.1 MG/DL (ref 1.6–2.6)
MCH RBC QN AUTO: 31.6 PG (ref 27–31)
MCHC RBC AUTO-ENTMCNC: 35.6 G/DL (ref 32–36)
MCV RBC AUTO: 89 FL (ref 82–98)
MONOCYTES # BLD AUTO: 2 K/UL (ref 0.3–1)
MONOCYTES NFR BLD: 28.7 % (ref 4–15)
NEUTROPHILS # BLD AUTO: 3.3 K/UL (ref 1.8–7.7)
NEUTROPHILS NFR BLD: 47.7 % (ref 38–73)
NRBC BLD-RTO: 0 /100 WBC
PLATELET # BLD AUTO: 66 K/UL (ref 150–450)
PMV BLD AUTO: 10.8 FL (ref 9.2–12.9)
POTASSIUM SERPL-SCNC: 3.7 MMOL/L (ref 3.5–5.1)
PROT SERPL-MCNC: 6 G/DL (ref 6–8.4)
RBC # BLD AUTO: 3.1 M/UL (ref 4.6–6.2)
SODIUM SERPL-SCNC: 140 MMOL/L (ref 136–145)
WBC # BLD AUTO: 6.97 K/UL (ref 3.9–12.7)

## 2022-04-13 PROCEDURE — 83735 ASSAY OF MAGNESIUM: CPT | Performed by: INTERNAL MEDICINE

## 2022-04-13 PROCEDURE — 85025 COMPLETE CBC W/AUTO DIFF WBC: CPT | Performed by: INTERNAL MEDICINE

## 2022-04-13 PROCEDURE — 99900031 HC PATIENT EDUCATION (STAT)

## 2022-04-13 PROCEDURE — 82962 GLUCOSE BLOOD TEST: CPT

## 2022-04-13 PROCEDURE — 25000242 PHARM REV CODE 250 ALT 637 W/ HCPCS: Performed by: INTERNAL MEDICINE

## 2022-04-13 PROCEDURE — 94640 AIRWAY INHALATION TREATMENT: CPT

## 2022-04-13 PROCEDURE — 99232 SBSQ HOSP IP/OBS MODERATE 35: CPT | Mod: ,,, | Performed by: INTERNAL MEDICINE

## 2022-04-13 PROCEDURE — 63600175 PHARM REV CODE 636 W HCPCS: Performed by: INTERNAL MEDICINE

## 2022-04-13 PROCEDURE — 80053 COMPREHEN METABOLIC PANEL: CPT | Performed by: INTERNAL MEDICINE

## 2022-04-13 PROCEDURE — 25000003 PHARM REV CODE 250: Performed by: INTERNAL MEDICINE

## 2022-04-13 PROCEDURE — 97116 GAIT TRAINING THERAPY: CPT | Mod: CQ

## 2022-04-13 PROCEDURE — 27000221 HC OXYGEN, UP TO 24 HOURS

## 2022-04-13 PROCEDURE — 94761 N-INVAS EAR/PLS OXIMETRY MLT: CPT

## 2022-04-13 PROCEDURE — 92526 ORAL FUNCTION THERAPY: CPT

## 2022-04-13 PROCEDURE — 97535 SELF CARE MNGMENT TRAINING: CPT

## 2022-04-13 PROCEDURE — 36415 COLL VENOUS BLD VENIPUNCTURE: CPT | Performed by: INTERNAL MEDICINE

## 2022-04-13 PROCEDURE — 97530 THERAPEUTIC ACTIVITIES: CPT

## 2022-04-13 PROCEDURE — 99232 PR SUBSEQUENT HOSPITAL CARE,LEVL II: ICD-10-PCS | Mod: ,,, | Performed by: INTERNAL MEDICINE

## 2022-04-13 PROCEDURE — 97530 THERAPEUTIC ACTIVITIES: CPT | Mod: CQ

## 2022-04-13 PROCEDURE — 21400001 HC TELEMETRY ROOM

## 2022-04-13 PROCEDURE — 99900035 HC TECH TIME PER 15 MIN (STAT)

## 2022-04-13 PROCEDURE — 21000000 HC CCU ICU ROOM CHARGE

## 2022-04-13 RX ORDER — TALC
6 POWDER (GRAM) TOPICAL NIGHTLY PRN
Status: DISCONTINUED | OUTPATIENT
Start: 2022-04-13 | End: 2022-04-18 | Stop reason: HOSPADM

## 2022-04-13 RX ADMIN — MORPHINE SULFATE 2 MG: 2 INJECTION, SOLUTION INTRAMUSCULAR; INTRAVENOUS at 03:04

## 2022-04-13 RX ADMIN — VALSARTAN 80 MG: 80 TABLET, FILM COATED ORAL at 08:04

## 2022-04-13 RX ADMIN — MORPHINE SULFATE 2 MG: 2 INJECTION, SOLUTION INTRAMUSCULAR; INTRAVENOUS at 09:04

## 2022-04-13 RX ADMIN — PIPERACILLIN AND TAZOBACTAM 3.38 G: 3; .375 INJECTION, POWDER, LYOPHILIZED, FOR SOLUTION INTRAVENOUS; PARENTERAL at 01:04

## 2022-04-13 RX ADMIN — MORPHINE SULFATE 2 MG: 2 INJECTION, SOLUTION INTRAMUSCULAR; INTRAVENOUS at 08:04

## 2022-04-13 RX ADMIN — PIPERACILLIN AND TAZOBACTAM 3.38 G: 3; .375 INJECTION, POWDER, LYOPHILIZED, FOR SOLUTION INTRAVENOUS; PARENTERAL at 06:04

## 2022-04-13 RX ADMIN — IPRATROPIUM BROMIDE AND ALBUTEROL SULFATE 3 ML: .5; 3 SOLUTION RESPIRATORY (INHALATION) at 08:04

## 2022-04-13 RX ADMIN — MORPHINE SULFATE 2 MG: 2 INJECTION, SOLUTION INTRAMUSCULAR; INTRAVENOUS at 01:04

## 2022-04-13 RX ADMIN — IPRATROPIUM BROMIDE AND ALBUTEROL SULFATE 3 ML: .5; 3 SOLUTION RESPIRATORY (INHALATION) at 07:04

## 2022-04-13 RX ADMIN — MELATONIN 6 MG: at 01:04

## 2022-04-13 RX ADMIN — PIPERACILLIN AND TAZOBACTAM 3.38 G: 3; .375 INJECTION, POWDER, LYOPHILIZED, FOR SOLUTION INTRAVENOUS; PARENTERAL at 10:04

## 2022-04-13 RX ADMIN — SODIUM CHLORIDE: 0.9 INJECTION, SOLUTION INTRAVENOUS at 09:04

## 2022-04-13 RX ADMIN — SODIUM CHLORIDE: 0.9 INJECTION, SOLUTION INTRAVENOUS at 07:04

## 2022-04-13 NOTE — NURSING
"Order to D/c keith received during end of day shift. Lasha Phan RN and Niya HAM LPN were planning to d/c keith family requested to have keith in a little longer. Daughter states " He is still feeling a little bad , so I would like to keep keith in for a little longer." I educated family on possible complications like and increase risk of infection. Notified Melody EMERSON of family and patient not wanting keith d/c. He stated "hope they are aware that is pose increase risk for infection and to notify Am MD and Am nurse tomorrow."  "

## 2022-04-13 NOTE — PT/OT/SLP PROGRESS
Occupational Therapy   Treatment    Name: Conrad Kuhn  MRN: [id number]  Admitting Diagnosis:  Acute hypoxemic respiratory failure       Recommendations:     Discharge Recommendations: home health OT  Discharge Equipment Recommendations:  none  Barriers to discharge:       Assessment:     Conrad Kuhn is a 81 y.o. male with a medical diagnosis of Acute hypoxemic respiratory failure.  Pt agreeable to OT therapy session this AM. Performance deficits affecting function are weakness, impaired endurance, impaired self care skills, impaired functional mobilty, gait instability, impaired balance, decreased safety awareness, impaired skin, impaired cardiopulmonary response to activity.     Rehab Prognosis:  Good; patient would benefit from acute skilled OT services to address these deficits and reach maximum level of function.       Plan:     Patient to be seen 5 x/week to address the above listed problems via self-care/home management, therapeutic activities, therapeutic exercises  · Plan of Care Expires: 05/11/22  · Plan of Care Reviewed with: patient, daughter    Subjective     Pain/Comfort:  · Pain Rating 1: 0/10    Objective:     Communicated with: nursing prior to session.  Patient found up in chair with peripheral IV, telemetry, blood pressure cuff, oxygen (suction drain) upon OT entry to room.    General Precautions: Standard, fall   Orthopedic Precautions:spinal precautions   Braces: Aspen collar  Respiratory Status: Nasal cannula, flow 4 L/min     Occupational Performance:     Functional Mobility/Transfers:  · Patient completed Sit <> Stand Transfer with contact guard assistance  with  rolling walker   · Patient completed Toilet Transfer Step Transfer technique with contact guard assistance with  rolling walker  · Functional Mobility: pt amb around room x 2 with CGA with RW with no loss of balance or shortness of breath    Activities of Daily Living:  · Grooming: contact guard assistance standing at sink to  wash face  · Toileting: stand by assistance seated on toilet to void    Treatment & Education:  Pt educated on role of OT/POC, importance of OOB/EOB activity, use of call bell, and safety during ADLs, transfers, and functional mobility.    Patient left up in chair with all lines intact, call button in reach, chair alarm on and RN notifiedEducation:      GOALS:   Multidisciplinary Problems     Occupational Therapy Goals        Problem: Occupational Therapy    Goal Priority Disciplines Outcome Interventions   Occupational Therapy Goal     OT, PT/OT Ongoing, Progressing    Description: Goals to be met by: discharge     Patient will increase functional independence with ADLs by performing:    UE Dressing with Supervision.  LE Dressing with Supervision.  Grooming while standing at sink with Supervision.  Toileting from toilet with Supervision for hygiene and clothing management.   Toilet transfer to toilet with Supervision.  Perform sitting/standing ADL activity with no verbal/tactile cues for cervical precautions.                     Time Tracking:     OT Date of Treatment: 04/13/22  OT Start Time: 1055  OT Stop Time: 1122  OT Total Time (min): 27 min    Billable Minutes:Self Care/Home Management 17  Therapeutic Activity 10    OT/MK: OT          4/13/2022

## 2022-04-13 NOTE — ASSESSMENT & PLAN NOTE
Intubated and got extubated on 04/11 AM  Continue iv abx for presumed aspiration pneumonia  Maintain NPO status because MBSS confirmed severe aspiration risk  Multiple attempts for NG tube insertion was unsuccessful  Pt agreed with PEG tube insertion

## 2022-04-13 NOTE — PLAN OF CARE
Problem: Infection  Goal: Absence of Infection Signs and Symptoms  Outcome: Ongoing, Progressing     Problem: Adult Inpatient Plan of Care  Goal: Readiness for Transition of Care  Outcome: Ongoing, Progressing     Problem: Communication Impairment (Mechanical Ventilation, Invasive)  Goal: Effective Communication  Outcome: Ongoing, Progressing     Problem: Device-Related Complication Risk (Mechanical Ventilation, Invasive)  Goal: Optimal Device Function  Outcome: Ongoing, Progressing     Problem: Skin and Tissue Injury (Mechanical Ventilation, Invasive)  Goal: Absence of Device-Related Skin and Tissue Injury  Outcome: Ongoing, Progressing     Problem: Fall Injury Risk  Goal: Absence of Fall and Fall-Related Injury  Outcome: Ongoing, Progressing

## 2022-04-13 NOTE — PLAN OF CARE
Problem: Infection  Goal: Absence of Infection Signs and Symptoms  Outcome: Ongoing, Progressing     Problem: Adult Inpatient Plan of Care  Goal: Plan of Care Review  Outcome: Ongoing, Progressing  Goal: Patient-Specific Goal (Individualized)  Outcome: Ongoing, Progressing  Goal: Absence of Hospital-Acquired Illness or Injury  Outcome: Ongoing, Progressing  Goal: Optimal Comfort and Wellbeing  Outcome: Ongoing, Progressing  Goal: Readiness for Transition of Care  Outcome: Ongoing, Progressing     Problem: Communication Impairment (Mechanical Ventilation, Invasive)  Goal: Effective Communication  Outcome: Ongoing, Progressing     Problem: Device-Related Complication Risk (Mechanical Ventilation, Invasive)  Goal: Optimal Device Function  Outcome: Ongoing, Progressing     Problem: Inability to Wean (Mechanical Ventilation, Invasive)  Goal: Mechanical Ventilation Liberation  Outcome: Ongoing, Progressing     Problem: Nutrition Impairment (Mechanical Ventilation, Invasive)  Goal: Optimal Nutrition Delivery  Outcome: Ongoing, Progressing     Problem: Skin and Tissue Injury (Mechanical Ventilation, Invasive)  Goal: Absence of Device-Related Skin and Tissue Injury  Outcome: Ongoing, Progressing     Problem: Ventilator-Induced Lung Injury (Mechanical Ventilation, Invasive)  Goal: Absence of Ventilator-Induced Lung Injury  Outcome: Ongoing, Progressing     Problem: Communication Impairment (Artificial Airway)  Goal: Effective Communication  Outcome: Ongoing, Progressing     Problem: Device-Related Complication Risk (Artificial Airway)  Goal: Optimal Device Function  Outcome: Ongoing, Progressing     Problem: Skin and Tissue Injury (Artificial Airway)  Goal: Absence of Device-Related Skin or Tissue Injury  Outcome: Ongoing, Progressing     Problem: Noninvasive Ventilation Acute  Goal: Effective Unassisted Ventilation and Oxygenation  Outcome: Ongoing, Progressing     Problem: Fall Injury Risk  Goal: Absence of Fall and  Fall-Related Injury  Outcome: Ongoing, Progressing     Problem: Restraint, Nonbehavioral (Nonviolent)  Goal: Absence of Harm or Injury  Outcome: Ongoing, Progressing     Problem: Skin Injury Risk Increased  Goal: Skin Health and Integrity  Outcome: Ongoing, Progressing     Problem: Oral Intake Inadequate  Goal: Improved Oral Intake  Outcome: Ongoing, Progressing     Problem: Aspiration (Enteral Nutrition)  Goal: Absence of Aspiration Signs and Symptoms  Outcome: Ongoing, Progressing     Problem: Device-Related Complication Risk (Enteral Nutrition)  Goal: Safe, Effective Therapy Delivery  Outcome: Ongoing, Progressing     Problem: Feeding Intolerance (Enteral Nutrition)  Goal: Feeding Tolerance  Outcome: Ongoing, Progressing

## 2022-04-13 NOTE — PROGRESS NOTES
[facility]  Adult Nutrition   Progress Note (Follow-Up)    SUMMARY         Goals:   Goals: 1) Patient to tolerate enteral feedings and not aspirate. 2) Patient to meet his estimated nutritional needs from his enteral feeding of Glucerna. 3) Blood glucose and electrolytes managed and monitored.    Dietitian Rounds Brief  F/U Nutrition Note: Saw patient today and he states that he is getting weak. Nursing tried 3 times to get an NG tube down with no success so they are going to opt for a PEG-tube and GI has been consulted. Will follow daily.    Diet order: NPO    % Intake of Estimated Energy Needs: 0%  % Meal Intake: NPO    Estimated/Assessed Needs  Weight Used For Calorie Calculations: 96.9 kg (213 lb 10 oz)  Energy Calorie Requirements (kcal): 8087-0596 kcals/day (20-25 kcals/kg ABW)  Energy Need Method: Kcal/kg  Protein Requirements: 113-141 g/day (1.2-1.5 g/kg IBW)  Weight Used For Protein Calculations: 86 kg (189 lb 9.5 oz)  Fluid Requirements (mL): 1 mL/kcal or per MD     RDA Method (mL): 1938       Weight History:  Wt Readings from Last 5 Encounters:   04/13/22 92.9 kg (204 lb 12.8 oz)   03/31/22 95.7 kg (211 lb)   03/14/22 96.2 kg (212 lb)   02/17/22 95.7 kg (211 lb)   09/30/21 97.3 kg (214 lb 8 oz)        Medications:Pertinent Medications Reviewed  Scheduled Meds:   albuterol-ipratropium  3 mL Nebulization Q6H    piperacillin-tazobactam (ZOSYN) IVPB  3.375 g Intravenous Q8H    valsartan  80 mg Oral Daily     Continuous Infusions:   sodium chloride 0.9% 75 mL/hr at 04/13/22 0756     PRN Meds:.acetaminophen, ALPRAZolam, calcium chloride IVPB, calcium chloride IVPB, calcium chloride IVPB, dextrose 50%, dextrose 50%, dextrose 50%, dextrose 50%, glucagon (human recombinant), glucose, glucose, hydrALAZINE, insulin aspart U-100, magnesium oxide, magnesium sulfate IVPB, magnesium sulfate IVPB, magnesium sulfate IVPB, magnesium sulfate IVPB, melatonin, morphine, naloxone, ondansetron, polyethylene  glycol, potassium chloride in water, potassium chloride in water, potassium chloride in water, potassium chloride in water, potassium chloride, potassium chloride, potassium chloride, potassium chloride, sodium chloride 0.9%, sodium phosphate IVPB, sodium phosphate IVPB, sodium phosphate IVPB, sodium phosphate IVPB, sodium phosphate IVPB, traZODone    Labs: Pertinent Labs Reviewed  Clinical Chemistry:  Recent Labs   Lab 04/13/22  0348      K 3.7      CO2 24   *   BUN 17   CREATININE 0.7   CALCIUM 8.5*   PROT 6.0   ALBUMIN 3.2*   BILITOT 1.6*   ALKPHOS 33*   AST 13   ALT 12   ANIONGAP 11   ESTGFRAFRICA >60.0   EGFRNONAA >60.0   MG 2.1     CBC:   Recent Labs   Lab 04/13/22  0348   WBC 6.97   RBC 3.10*   HGB 9.8*   HCT 27.5*   PLT 66*   MCV 89   MCH 31.6*   MCHC 35.6     Cardiac Profile:  Recent Labs   Lab 04/10/22  1457 04/11/22  0406 04/12/22  0550   *  --   --    * 112  --    CPKMB 3.1  --   --    TROPONINI 0.066* 0.061* 0.039     Nutrition Risk  Level of Risk/Frequency of Follow-up: high Bernice Tyler RD 04/13/2022 5:12 PM

## 2022-04-13 NOTE — CARE UPDATE
04/13/22 0825   Patient Assessment/Suction   Level of Consciousness (AVPU) alert   Respiratory Effort Normal;Mild   Expansion/Accessory Muscles/Retractions no retractions   All Lung Fields Breath Sounds Anterior:;Posterior:;coarse   Cough Frequency frequent   Cough Type loose  (Pt coughs after drinking)   Suction Method oral   Secretions Amount small   Secretions Color yellow;clear   Secretions Characteristics thick;thin   PRE-TX-O2   O2 Device (Oxygen Therapy) nasal cannula   $ Is the patient on Low Flow Oxygen? Yes   Flow (L/min) 4   SpO2 95 %   Pulse Oximetry Type Continuous   $ Pulse Oximetry - Multiple Charge Pulse Oximetry - Multiple   Pulse 83   Resp 20   Aerosol Therapy   $ Aerosol Therapy Charges Aerosol Treatment   Daily Review of Necessity (SVN) completed   Respiratory Treatment Status (SVN) given   Treatment Route (SVN) mask;oxygen   Patient Position (SVN) sitting in chair   Post Treatment Assessment (SVN) breath sounds unchanged   Signs of Intolerance (SVN) none   Breath Sounds Post-Respiratory Treatment   Throughout All Fields Post-Treatment All Fields   Throughout All Fields Post-Treatment Anterior:;Posterior:;no change   Post-treatment Heart Rate (beats/min) 81   Post-treatment Resp Rate (breaths/min) 20   Skin Integrity   $ Wound Care Tech Time 15 min   Area Observed Bridge of nose;Cheek;Upper lip   Skin Appearance without discoloration   Barrier used? Other (see comments)  (Pt home machine and mask)   Preset CPAP/BiPAP Settings   Mode Of Delivery other (see comments);Standby  (PT home CPAP)   Education   $ Education BiPAP;COPD;Bronchodilator;15 min

## 2022-04-13 NOTE — PT/OT/SLP PROGRESS
Physical Therapy Treatment    Patient Name:  Conrad Kuhn   MRN:  [id number]    Recommendations:     Discharge Recommendations:  home health PT   Discharge Equipment Recommendations: none   Barriers to discharge: increased assist with mobility    Assessment:     Conrad Kuhn is a 81 y.o. male admitted with a medical diagnosis of Acute hypoxemic respiratory failure.  He presents with the following impairments/functional limitations:  weakness, impaired endurance, impaired self care skills, impaired functional mobilty, gait instability, impaired balance, decreased lower extremity function, decreased safety awareness, pain, impaired cardiopulmonary response to activity.  Pt agreeable to visit. Pt eager to ambulate. RN asked to disconnect pt's IV's to make ambulation easier. Pt tolerated session well.    Rehab Prognosis: Fair; patient would benefit from acute skilled PT services to address these deficits and reach maximum level of function.    Recent Surgery: * No surgery found *      Plan:     During this hospitalization, patient to be seen 6 x/week to address the identified rehab impairments via gait training, therapeutic activities, therapeutic exercises, neuromuscular re-education and progress toward the following goals:    · Plan of Care Expires:  05/12/22    Subjective     Chief Complaint: ready to go home  Patient/Family Comments/goals: return home  Pain/Comfort:  · Pain Rating 1: 0/10      Objective:     Communicated with RN prior to session.  Patient found up in chair with peripheral IV, telemetry, blood pressure cuff, oxygen, MARCELLUS drain upon PT entry to room.     General Precautions: Standard, fall   Orthopedic Precautions:spinal precautions   Braces: Aspen collar  Respiratory Status: Nasal cannula, flow 3.5 L/min     Functional Mobility:  · Transfers:     · Sit to Stand:  contact guard assistance with rolling walker  · Gait: x 200' with RW and CGA      AM-PAC 6 CLICK MOBILITY          Therapeutic  Activities and Exercises:   Pt educated on importance of time OOB, importance of intermittent mobility, safe techniques for transfers/ambulation, discharge recommendations/options, and use of call light for assistance and fall prevention.      Patient left up in chair with all lines intact, call button in reach, chair alarm on, RN notified and daughter present..    GOALS:   Multidisciplinary Problems     Physical Therapy Goals        Problem: Physical Therapy    Goal Priority Disciplines Outcome Goal Variances Interventions   Physical Therapy Goal     PT, PT/OT Ongoing, Progressing     Description: Goals to be met by: Discharge     Patient will increase functional independence with mobility by performin. Supine to sit with Independent  2. Sit to stand transfer with Supervision  3. Bed to chair transfer with Supervision using Rolling Walker  4. Gait  x 150 feet with Supervision using Rolling Walker.                          Time Tracking:     PT Received On: 22  PT Start Time: 1133     PT Stop Time: 1200  PT Total Time (min): 27 min     Billable Minutes: Gait Training 17 and Therapeutic Activity 10    Treatment Type: Treatment  PT/PTA: PTA     PTA Visit Number: 1     2022

## 2022-04-13 NOTE — SUBJECTIVE & OBJECTIVE
Interval History:     Review of Systems   Constitutional:  Negative for activity change and appetite change.   HENT:  Negative for congestion and dental problem.    Eyes:  Negative for discharge and itching.   Respiratory:  Negative for shortness of breath.    Cardiovascular:  Negative for chest pain.   Gastrointestinal:  Negative for abdominal distention and abdominal pain.   Endocrine: Negative for cold intolerance.   Genitourinary:  Negative for difficulty urinating and dysuria.   Musculoskeletal:  Negative for arthralgias and back pain.   Skin:  Negative for color change.   Neurological:  Negative for dizziness and facial asymmetry.   Hematological:  Negative for adenopathy.   Psychiatric/Behavioral:  Negative for agitation and behavioral problems.    Objective:     Vital Signs (Most Recent):  Temp: 97.7 °F (36.5 °C) (04/13/22 1527)  Pulse: 85 (04/13/22 1608)  Resp: 18 (04/13/22 1608)  BP: (!) 165/77 (04/13/22 1608)  SpO2: 98 % (04/13/22 1527)   Vital Signs (24h Range):  Temp:  [96.2 °F (35.7 °C)-98 °F (36.7 °C)] 97.7 °F (36.5 °C)  Pulse:  [72-95] 85  Resp:  [18-28] 18  SpO2:  [92 %-99 %] 98 %  BP: (148-182)/(66-88) 165/77     Weight: 92.9 kg (204 lb 12.8 oz)  Body mass index is 26.29 kg/m².    Intake/Output Summary (Last 24 hours) at 4/13/2022 1720  Last data filed at 4/13/2022 1600  Gross per 24 hour   Intake 985 ml   Output 1850 ml   Net -865 ml      Physical Exam  Vitals and nursing note reviewed.   Constitutional:       Appearance: He is well-developed.   HENT:      Head: Atraumatic.      Right Ear: External ear normal.      Left Ear: External ear normal.      Nose: Nose normal.      Mouth/Throat:      Mouth: Mucous membranes are moist.   Eyes:      General: No scleral icterus.  Neck:      Comments: Neck collar insitu  Cardiovascular:      Rate and Rhythm: Normal rate.   Pulmonary:      Effort: Pulmonary effort is normal.   Abdominal:      General: There is no distension.   Musculoskeletal:         General:  Normal range of motion.      Cervical back: Full passive range of motion without pain and normal range of motion.   Skin:     General: Skin is warm.   Neurological:      Mental Status: He is alert and oriented to person, place, and time.   Psychiatric:         Behavior: Behavior normal.       Significant Labs: All pertinent labs within the past 24 hours have been reviewed.  CBC:   Recent Labs   Lab 04/12/22  0550 04/13/22  0348   WBC 14.84* 6.97   HGB 10.2* 9.8*   HCT 28.9* 27.5*   PLT 73* 66*     CMP:   Recent Labs   Lab 04/12/22  0550 04/13/22  0348    140   K 3.3* 3.7   CL 98 105   CO2 30* 24   * 159*   BUN 19 17   CREATININE 0.8 0.7   CALCIUM 8.2* 8.5*   PROT 6.4 6.0   ALBUMIN 3.3* 3.2*   BILITOT 1.8* 1.6*   ALKPHOS 36* 33*   AST 16 13   ALT 13 12   ANIONGAP 11 11   EGFRNONAA >60.0 >60.0       Significant Imaging: I have reviewed all pertinent imaging results/findings within the past 24 hours.

## 2022-04-13 NOTE — PROGRESS NOTES
Pulmonary/Critical Care  Progress Note      Patient name: Conrad Kuhn  MRN: [id number]  Date: 04/13/2022    Admit Date: 4/10/2022  Consult Requested By: Efrain Cam MD    Reason for Consult: respiratory failure    HPI:    4/11/2022 - 80 yo male s/p cervical surgery (C3-7 anterior posterior fusion) at [facility], sent to ER yesterday in respiratory distress, stridor was intubated.  I was not called about this pt until about 4-5 hours later when he was admitted to ICU.  He was stable on ventilator and in no distress.  This AM he is awake (despite being on sedatives), in no distress.  We proceeded with SBT which he tolerated with good ABG.  We did a leak test which was + (audible air leak and loss of about 200 ml of TV)  and will proceed with extubation.  There is a question of some aspiration while drinking yesterday.  Initial WBC was 44, platelets have decreased, troponin is minimally elevated, procalcitonin is normal.  CT chest reviewed with some fairly mild bibasilar infiltrates and elevation of left diaphragm (which looks to be new)    4/12/2022 - Pt extubated yesterday and has remained stable.  No new respiratory issues.  Has some cough and congestion, some chest pain with cough.  WBC better, platelets slightly better.  Troponin has decreased    4/13/2022 - Stable overnight, no new issues reported.  He states that they were not able to place a NG tube and there are plans for a possible PEG tube.  Dr Tillman's note has been reviewed.  Breathing is doing well.  Vocal quality is not right by my ear but he does not note any difference to him.    Review of Systems    Review of Systems   Constitutional: Negative for chills and fever.   Respiratory: Positive for cough and shortness of breath.    Cardiovascular: Positive for chest pain.   Musculoskeletal: Positive for back pain and neck pain.        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     Right-sided Bell's palsy 2009    Spondylolisthesis     Varicose veins of legs     Venous insufficiency        Past Surgical History    Past Surgical History:   Procedure Laterality Date    Athroscopic surgery      On Knee    BIOPSY OF TISSUE OF NECK Left 08/2015    Recuurence CLL/small cell lyphoma    ESOPHAGEAL DILATION      Hemorrhoid resection  1979    LUNG LOBECTOMY Left 2004    NECK SURGERY  04/08/2022    Anterior and Posterior C3-C7 fusion    OTHER SURGICAL HISTORY  2006    Chemotherapy s/p lyphoma        Portacath implantation and removal      reverse shoulder arthroplasty Right 03/2021       Medications (scheduled):      albuterol-ipratropium  3 mL Nebulization Q6H    piperacillin-tazobactam (ZOSYN) IVPB  3.375 g Intravenous Q8H    valsartan  80 mg Oral Daily       Medications (infusions):      sodium chloride 0.9% 75 mL/hr at 04/13/22 0756       Medications (prn):     acetaminophen, ALPRAZolam, calcium chloride IVPB, calcium chloride IVPB, calcium chloride IVPB, dextrose 50%, dextrose 50%, dextrose 50%, dextrose 50%, glucagon (human recombinant), glucose, glucose, hydrALAZINE, insulin aspart U-100, magnesium oxide, magnesium sulfate IVPB, magnesium sulfate IVPB, magnesium sulfate IVPB, magnesium sulfate IVPB, melatonin, morphine, naloxone, ondansetron, polyethylene glycol, potassium chloride in water, potassium chloride in water, potassium chloride in water, potassium chloride in water, potassium chloride, potassium chloride, potassium chloride, potassium  "chloride, sodium chloride 0.9%, sodium phosphate IVPB, sodium phosphate IVPB, sodium phosphate IVPB, sodium phosphate IVPB, sodium phosphate IVPB, traZODone    Family History:   Family History   Problem Relation Age of Onset    Stomach cancer Mother 80         at 95    Colon cancer Father        Social History: Tobacco:   Social History     Tobacco Use   Smoking Status Former Smoker   Smokeless Tobacco Never Used                                EtOH:   Social History     Substance and Sexual Activity   Alcohol Use Yes                                Drugs:   Social History     Substance and Sexual Activity   Drug Use No     Physical Exam    Vital signs:  Temp:  [96.2 °F (35.7 °C)-98 °F (36.7 °C)]   Pulse:  [72-95]   Resp:  [18-28]   BP: (148-182)/(66-88)   SpO2:  [92 %-99 %]     Intake/Output:     Intake/Output Summary (Last 24 hours) at 2022 1557  Last data filed at 2022 1529  Gross per 24 hour   Intake 985 ml   Output 1850 ml   Net -865 ml        BMI: Estimated body mass index is 26.29 kg/m² as calculated from the following:    Height as of this encounter: 6' 2" (1.88 m).    Weight as of this encounter: 92.9 kg (204 lb 12.8 oz).    Physical Exam  Vitals and nursing note reviewed.   Constitutional:       General: He is not in acute distress.     Appearance: Normal appearance. He is not ill-appearing, toxic-appearing or diaphoretic.   HENT:      Head: Normocephalic and atraumatic.      Right Ear: External ear normal.      Left Ear: External ear normal.      Nose: Nose normal.      Mouth/Throat:      Mouth: Mucous membranes are moist.      Pharynx: Oropharynx is clear. No oropharyngeal exudate.   Eyes:      General: No scleral icterus.        Right eye: No discharge.         Left eye: No discharge.      Extraocular Movements: Extraocular movements intact.      Conjunctiva/sclera: Conjunctivae normal.      Pupils: Pupils are equal, round, and reactive to light.   Neck:      Comments: c collar and " shayla  Cardiovascular:      Rate and Rhythm: Normal rate and regular rhythm.      Pulses: Normal pulses.      Heart sounds: Normal heart sounds. No murmur heard.    No friction rub. No gallop.   Pulmonary:      Effort: Pulmonary effort is normal. No respiratory distress.      Breath sounds: Normal breath sounds. No stridor. No wheezing, rhonchi or rales.      Comments: CTA   No acc m use  Chest:      Chest wall: No tenderness.   Abdominal:      General: Bowel sounds are normal. There is no distension.      Tenderness: There is no abdominal tenderness. There is no guarding.   Genitourinary:     Comments: Sindhu   Musculoskeletal:         General: No swelling. Normal range of motion.      Cervical back: Normal range of motion.      Right lower leg: No edema.      Left lower leg: No edema.   Skin:     General: Skin is warm and dry.      Capillary Refill: Capillary refill takes less than 2 seconds.      Coloration: Skin is not jaundiced.      Findings: No bruising.   Neurological:      General: No focal deficit present.      Mental Status: He is oriented to person, place, and time. Mental status is at baseline.      Cranial Nerves: No cranial nerve deficit.      Sensory: No sensory deficit.      Motor: No weakness.      Comments: TAY   Psychiatric:         Mood and Affect: Mood normal.         Behavior: Behavior normal.         Thought Content: Thought content normal.         Judgment: Judgment normal.      Comments: Calm          Laboratory    Recent Labs   Lab 04/13/22  0348   WBC 6.97   RBC 3.10*   HGB 9.8*   HCT 27.5*   PLT 66*   MCV 89   MCH 31.6*   MCHC 35.6       Recent Labs   Lab 04/13/22  0348   CALCIUM 8.5*   PROT 6.0      K 3.7   CO2 24      BUN 17   CREATININE 0.7   ALKPHOS 33*   ALT 12   AST 13   BILITOT 1.6*       No results for input(s): PT, INR, APTT in the last 24 hours.    No results for input(s): CPK, CPKMB, TROPONINI, MB in the last 24 hours.    Additional labs:  reviewed    Microbiology:       Microbiology Results (last 7 days)     Procedure Component Value Units Date/Time    Blood culture [[id number]] Collected: 04/10/22 1738    Order Status: Completed Specimen: Blood from Peripheral, Forearm, Left Updated: 04/12/22 1832     Blood Culture, Routine No Growth to date      No Growth to date      No Growth to date    Blood culture [[id number]] Collected: 04/10/22 1738    Order Status: Completed Specimen: Blood from Peripheral, Hand, Left Updated: 04/12/22 1832     Blood Culture, Routine No Growth to date      No Growth to date      No Growth to date    Culture, Respiratory with Gram Stain [[id number]] Collected: 04/10/22 1541    Order Status: Completed Specimen: Respiratory from Endotracheal Aspirate Updated: 04/12/22 0723     Respiratory Culture Reduced normal respiratory annette     Gram Stain (Respiratory) No WBC's or organisms seen    Culture, Respiratory with Gram Stain [[id number]] Collected: 04/10/22 1906    Order Status: Canceled Specimen: Sputum, Expectorated           Radiology    Fl Modified Barium Swallow Speech  CMS MANDATED QUALITY DATA-FLUOROSCOPY - 145    Fluoroscopy Time: 2.2 minutes  Number of Images:  4  Fluoroscopy Dose:  Unavailable    Modified barium swallow    HISTORY: Aspiration.    The patient is given barium of multiple viscosities to swallow under the direction of the speech pathologist and the swallowing mechanism is evaluated fluoroscopically.    There is moderate pharyngeal stasis with various barium preparations. Episodes of penetration are observed with thin and nectar thick barium liquids without lavern aspiration.    IMPRESSION:    See above. 4 report by speech pathology to follow.    Electronically signed by:  Sheila Buitrago MD  4/12/2022 3:58 PM CDT Workstation: [id number]        Additional Studies    reviewed    Ventilator Information    Oxygen Concentration (%):  [4] 4         Recent Labs     04/11/22  0757   PH 7.423   PCO2 47.1*   PO2 104*   HCO3  30.7*   POCSATURATED 98   BE 6         Impression    Active Hospital Problems    Diagnosis  POA    *Acute hypoxemic respiratory failure [J96.01]  Yes    Stridor [R06.1]  Yes    Aspiration pneumonia [J69.0]  Yes    Thrombocytopenia [D69.6]  Yes    Troponin level elevated [R77.8]  Yes    S/P cervical spinal fusion [Z98.1]  Not Applicable    History of lung cancer - ANDRES NSCLC 2004 [Z85.118]  Not Applicable    Iron deficiency anemia [D50.9]  Yes    CLL (chronic lymphocytic leukemia) [C91.10]  Yes      Resolved Hospital Problems   No resolved problems to display.       Plan    · Stable extubated  · Continue antibiotics for now, recheck procalcitonin - low - will continue antibiotics  · May need evaluation of left diaphragm (? Paresis)  · Follow leukocytosis - ? Stress related - better  · Watch platelets (they have been chronically low)  · Post op care per Dr Tillman  · Awaiting PEG  · Increase activity as able    Thank you for this consult.  I will follow with you while the patient is hospitalized.        Norman Pope MD  [facility] Pulmonary/Critical Care

## 2022-04-13 NOTE — NURSING
Manley Cath removed per MD orders.  16FR with 10ml water, urine yellow with 250mL.  Patient tolerated well.

## 2022-04-13 NOTE — PT/OT/SLP PROGRESS
"Speech Language Pathology Treatment    Patient Name:  Conrad Kuhn   MRN:  [id number]  Admitting Diagnosis: Acute hypoxemic respiratory failure    Recommendations:     General Recommendations:    · Alternative means of nutrition, hydration and medication delivery  · Medical management of prevertebral soft tissue edema  · Repeat MBS prior to initiation of oral diet and oral medications pending improvement in clinical signs of aspiration and dysphagia       Diet recommendations:  NPO, NPO   Aspiration Precautions: Strict aspiration precautions   · Ice chips every few hours (3-4) with diligent oral hygiene      General Precautions: Standard, NPO, aspiration       Subjective     Pt alert, cooperative   Unable to pass NG tube   Meds being given crushed in applesauce   Patient goals: return home      Pain/Comfort:  ·  None indicated     Respiratory Status: Nasal cannula, flow 4 L/min    Objective:     Has the patient been evaluated by SLP for swallowing?   Yes  Keep patient NPO? Yes     MBS compelted 4/12/2022  Impressions  ·  Severe dysphagia POD #4 s/p C3-7 ACDF, largely related to prevertebral soft tissue edema. Swallow safety and efficiency impaired. Patient with limited passage of material through the UES with liquids and minimal to no passage with puree. Patient demonstrates sensation to retention, producing multiple spontaneous swallows that are not effective in reducing retention. Continued events of penetration resulting in throat clearing and coughing with re-swallows to liquid. Sensation to retention of puree is decreased with Pt producing 2 dry swallows ind'ly with majority of bolus remaining in pharynx before expressing sensation bolus was completely cleared. Patient demonstrates decreased awareness to degree of dysphagia rating difficulty stating effort with liquids was "fairly easy" after continuous coughing and throat clearing events, producing of 5-7 swallows per bolus and still with moderate to severe " "retention.     Swallowing:   · Discussed results and recommendations from MBS with Pt and daughter  · Patient anxious to eat, "are you the one who tells me what I can eat?": discussed Pt right to decline recommendations and initiate oral diet however with understanding of continued aspiration, continued respiratory complications and potential for airway obstruction   · Visual feedback provided with review of MBS, education on anatomy, significant edema and noted impairments  · Understanding appeared to improve with education and visual feedback   · Able to complete exercise based therapy with effortful pitch glide to improve hyolaryngeal elevation and pharyngeal contraction: able to sustain high pitch for 10 seconds x7 w/ rest periods in between before onset of exertion and wish to defer  · Discussed recommended frequency and provided with written handout     Education: discussed recommendation for strict NPO with MD and RN; reinforced there is no passage of puree boli to the esophagus for medication delivery. Patient anxious to go home and wishes to consider PEG placement if it will decrease his length of stay.     Assessment:     Conrad Kuhn is a 81 y.o. male with an SLP diagnosis of Severe dysphagia POD #5 s/p C3-7 ACDF, largely related to prevertebral soft tissue edema. Swallow safety and efficiency impaired. Patient with limited passage of material through the UES with liquids and minimal to no passage with puree (see MBS above).       Goals:   Multidisciplinary Problems     SLP Goals        Problem: SLP    Goal Priority Disciplines Outcome   SLP Goal     SLP Ongoing, Progressing   Description: 1. Pt will tolerate least restrictive PO diet without acute dysphagia pulmonary complication.   2. Pt will participate in VFSS to define swallow physiology; MET   3. Pt will complete effortful pitch glides to improve hyolaryngeal lift and pharyngeal contraction                        Plan:     · Plan of Care " reviewed with:  patient   · SLP Follow-Up:  Yes (MBS)       Discharge recommendations: SLP at next level of care     Time Tracking:     SLP Treatment Date:   04/13/22  Speech Start Time:  1015  Speech Stop Time:  1037     Speech Total Time (min):  22 min    Billable Minutes: Treatment Swallowing Dysfunction 22 04/13/2022

## 2022-04-13 NOTE — PROGRESS NOTES
Progress Note  Neurospine    Admit Date: 4/10/2022  Post-operative Day:    Hospital Day: 4    SUBJECTIVE:     Follow-up For:  * No surgery found *  Pt is doing tami but swallowing difficulty    Scheduled Meds:   albuterol-ipratropium  3 mL Nebulization Q6H    piperacillin-tazobactam (ZOSYN) IVPB  3.375 g Intravenous Q8H    valsartan  80 mg Oral Daily     Continuous Infusions:   sodium chloride 0.9% 75 mL/hr at 04/13/22 0756     PRN Meds:acetaminophen, ALPRAZolam, calcium chloride IVPB, calcium chloride IVPB, calcium chloride IVPB, dextrose 50%, dextrose 50%, dextrose 50%, dextrose 50%, glucagon (human recombinant), glucose, glucose, hydrALAZINE, insulin aspart U-100, magnesium oxide, magnesium sulfate IVPB, magnesium sulfate IVPB, magnesium sulfate IVPB, magnesium sulfate IVPB, melatonin, morphine, naloxone, ondansetron, polyethylene glycol, potassium chloride in water, potassium chloride in water, potassium chloride in water, potassium chloride in water, potassium chloride, potassium chloride, potassium chloride, potassium chloride, sodium chloride 0.9%, sodium phosphate IVPB, sodium phosphate IVPB, sodium phosphate IVPB, sodium phosphate IVPB, sodium phosphate IVPB, traZODone    Review of patient's allergies indicates:  No Known Allergies    OBJECTIVE:     Vital Signs (Most Recent)  Temp: 97.9 °F (36.6 °C) (04/13/22 1141)  Pulse: 86 (04/13/22 1141)  Resp: 18 (04/13/22 1141)  BP: (!) 171/79 (04/13/22 1141)  SpO2: 99 % (04/13/22 1141)    Vital Signs Range (Last 24H):  Temp:  [96.2 °F (35.7 °C)-98.1 °F (36.7 °C)]   Pulse:  [72-88]   Resp:  [14-25]   BP: (148-174)/(66-79)   SpO2:  [92 %-99 %]     I & O (Last 24H):    Intake/Output Summary (Last 24 hours) at 4/13/2022 1238  Last data filed at 4/13/2022 0824  Gross per 24 hour   Intake 935 ml   Output 1900 ml   Net -965 ml     Physical Exam:  Motor 5/5 sen I Incsions clean    Lines/Drains:       Peripheral IV - Single Lumen 04/08/22 20 G Left Forearm (Active)    Site Assessment Clean;Dry;Intact 04/13/22 0800   Extremity Assessment Distal to IV No abnormal discoloration;No redness;No swelling;No warmth 04/12/22 1954   Line Status Flushed;Saline locked 04/12/22 1954   Dressing Status Clean;Dry;Intact 04/12/22 1954   Dressing Intervention Integrity maintained 04/12/22 1954   Dressing Change Due 04/14/22 04/12/22 1101   Site Change Due 04/14/22 04/12/22 0701   Reason Not Rotated Not due 04/12/22 1101   Number of days: 5            Peripheral IV - Single Lumen 20 G Right Hand (Active)   Site Assessment Clean;Dry;Intact 04/13/22 0800   Extremity Assessment Distal to IV No abnormal discoloration;No redness;No swelling;No warmth 04/12/22 1954   Line Status Flushed;Saline locked 04/12/22 1954   Dressing Status Clean;Dry;Intact 04/12/22 1954   Dressing Intervention Integrity maintained 04/12/22 1954   Dressing Change Due 04/14/22 04/12/22 1101   Site Change Due 04/14/22 04/12/22 0701   Reason Not Rotated Not due 04/11/22 1501   Number of days:             Closed/Suction Drain 04/08/22 1200 Posterior Neck Other (Comment) (Active)   Site Description Unable to view 04/12/22 1101   Dressing Type Gauze 04/12/22 1101   Dressing Status Dry;Intact;Clean 04/12/22 1101   Dressing Intervention Integrity maintained 04/12/22 1101   Drainage Dark red 04/12/22 1101   Status Other (Comment) 04/12/22 0301   Output (mL) 20 mL 04/12/22 0600   Number of days: 5            Closed/Suction Drain 04/08/22 1200 Midline;Posterior Back Other (Comment) (Active)   Site Description Unable to view 04/12/22 0701   Dressing Type Gauze 04/13/22 0916   Dressing Status Dry;Clean;Intact 04/13/22 0916   Dressing Intervention Integrity maintained 04/13/22 0916   Drainage Dark red 04/13/22 0916   Status Other (Comment) 04/12/22 0301   Output (mL) 5 mL 04/12/22 0600   Number of days: 5       Wound/Incision:  clean, dry, intact    Laboratory:  I have reviewed all pertinent lab results within the past 24  hours.  none    Diagnostic Results:  Echo: Reviewed  none    ASSESSMENT/PLAN:     Assessment: patient neurologically stable.    Plan: Agree with Peg tube.  swallowing will slowly recover hopefully soon.    CONNIE Tillman

## 2022-04-13 NOTE — CARE UPDATE
04/12/22 1934   Patient Assessment/Suction   Level of Consciousness (AVPU) alert   Respiratory Effort Normal   Expansion/Accessory Muscles/Retractions no use of accessory muscles   All Lung Fields Breath Sounds clear;diminished   Rhythm/Pattern, Respiratory unlabored   PRE-TX-O2   O2 Device (Oxygen Therapy) CPAP   $ Is the patient on Low Flow Oxygen? Yes   Oxygen Concentration (%) 4   SpO2 98 %   Pulse Oximetry Type Continuous   $ Pulse Oximetry - Multiple Charge Pulse Oximetry - Multiple   Pulse 79   Resp (!) 24   Aerosol Therapy   $ Aerosol Therapy Charges Aerosol Treatment   Daily Review of Necessity (SVN) completed   Respiratory Treatment Status (SVN) given   Treatment Route (SVN) in-line   Patient Position (SVN) semi-Zavaleta's   Post Treatment Assessment (SVN) breath sounds unchanged;vital signs unchanged   Signs of Intolerance (SVN) none   Ready to Wean/Extubation Screen   FIO2<=50 (chart decimal) 0.04   Preset CPAP/BiPAP Settings   Mode Of Delivery CPAP  (HOME)   Education   $ Education Bronchodilator;15 min   Respiratory Evaluation   $ Care Plan Tech Time 15 min   $ Eval/Re-eval Charges Evaluation

## 2022-04-13 NOTE — PROGRESS NOTES
[facility]  Progress Note    Patient Name: Conrad Kuhn  MRN: [id number]  Patient Class: IP- Inpatient   Admission Date: 4/10/2022  Length of Stay: 3 days  Attending Physician: Efrain Cam MD  Primary Care Provider: Johnson Erazo MD        Subjective:     Principal Problem:Acute hypoxemic respiratory failure        HPI:  81 year old male transferred from [facility] secondary to acute hypoxic respiratory failure.  He is intubated at that time of my exam and his wife is at bedside to supplement history.  History also supplemented  by ED MD and chart.  Patient had  C3-7 anterior posterior cervical fusion approximately two days ago.  His wife noted that he was having SOB yesterday as well as shoulder pain and neck pain. He even expressed to her that he felt he may be getting pneumonia. She reported he did choke when trying to drink a milk shake yesterday.  Patient had return of SOB today.  EMS received report that he got some ativan around noon and was going for a CT scan when saturations dropped into the 70s.  He was transported to [facility].  Per ED MD,  patient sounded as if he had stridor.  He was electively intubated and thick, bloody secretions were seen. Dr. Tillman with surgery was called and evaluated the patient and the surgical incision.  It did not appear this was the etiology of his respiratory distress and hypoxia. CT soft tissue of the neck revealed expected post operative changes with drains in place and subcutaneous gas present.  CT chest with patchy bibasilar opacities with aspiration pneumonia a consideration.     In the ED, WBC is 44.  The surgeon did express that he thought these post surgical patients got perioperative Decadron but I cannot confirm if that's the case presently. Temp is 100.7.  He was started on IV Vancomycin and IV Zosyn.      Overview/Hospital Course:  04/11  Pt got extubated  MBSS tmrw AM  On NPO status    04/12  MBSS confirmed severe  aspiration  NG tube feeding will be started  Pt denies any issues  Pt got transferred out of ICU     04/13  Pt walking around  Still in need of 4 L oxygen  Breathing status much improved  Multiple attempts yesterday for NG tube was unsuccessful  Pt agreed with PEG tube insertion      Interval History:     Review of Systems   Constitutional:  Negative for activity change and appetite change.   HENT:  Negative for congestion and dental problem.    Eyes:  Negative for discharge and itching.   Respiratory:  Negative for shortness of breath.    Cardiovascular:  Negative for chest pain.   Gastrointestinal:  Negative for abdominal distention and abdominal pain.   Endocrine: Negative for cold intolerance.   Genitourinary:  Negative for difficulty urinating and dysuria.   Musculoskeletal:  Negative for arthralgias and back pain.   Skin:  Negative for color change.   Neurological:  Negative for dizziness and facial asymmetry.   Hematological:  Negative for adenopathy.   Psychiatric/Behavioral:  Negative for agitation and behavioral problems.    Objective:     Vital Signs (Most Recent):  Temp: 97.7 °F (36.5 °C) (04/13/22 1527)  Pulse: 85 (04/13/22 1608)  Resp: 18 (04/13/22 1608)  BP: (!) 165/77 (04/13/22 1608)  SpO2: 98 % (04/13/22 1527)   Vital Signs (24h Range):  Temp:  [96.2 °F (35.7 °C)-98 °F (36.7 °C)] 97.7 °F (36.5 °C)  Pulse:  [72-95] 85  Resp:  [18-28] 18  SpO2:  [92 %-99 %] 98 %  BP: (148-182)/(66-88) 165/77     Weight: 92.9 kg (204 lb 12.8 oz)  Body mass index is 26.29 kg/m².    Intake/Output Summary (Last 24 hours) at 4/13/2022 1720  Last data filed at 4/13/2022 1600  Gross per 24 hour   Intake 985 ml   Output 1850 ml   Net -865 ml      Physical Exam  Vitals and nursing note reviewed.   Constitutional:       Appearance: He is well-developed.   HENT:      Head: Atraumatic.      Right Ear: External ear normal.      Left Ear: External ear normal.      Nose: Nose normal.      Mouth/Throat:      Mouth: Mucous membranes  are moist.   Eyes:      General: No scleral icterus.  Neck:      Comments: Neck collar insitu  Cardiovascular:      Rate and Rhythm: Normal rate.   Pulmonary:      Effort: Pulmonary effort is normal.   Abdominal:      General: There is no distension.   Musculoskeletal:         General: Normal range of motion.      Cervical back: Full passive range of motion without pain and normal range of motion.   Skin:     General: Skin is warm.   Neurological:      Mental Status: He is alert and oriented to person, place, and time.   Psychiatric:         Behavior: Behavior normal.       Significant Labs: All pertinent labs within the past 24 hours have been reviewed.  CBC:   Recent Labs   Lab 04/12/22  0550 04/13/22  0348   WBC 14.84* 6.97   HGB 10.2* 9.8*   HCT 28.9* 27.5*   PLT 73* 66*     CMP:   Recent Labs   Lab 04/12/22  0550 04/13/22  0348    140   K 3.3* 3.7   CL 98 105   CO2 30* 24   * 159*   BUN 19 17   CREATININE 0.8 0.7   CALCIUM 8.2* 8.5*   PROT 6.4 6.0   ALBUMIN 3.3* 3.2*   BILITOT 1.8* 1.6*   ALKPHOS 36* 33*   AST 16 13   ALT 13 12   ANIONGAP 11 11   EGFRNONAA >60.0 >60.0       Significant Imaging: I have reviewed all pertinent imaging results/findings within the past 24 hours.      Assessment/Plan:      * Acute hypoxemic respiratory failure  Intubated and got extubated on 04/11 AM  Continue iv abx for presumed aspiration pneumonia  Maintain NPO status because MBSS confirmed severe aspiration risk  Multiple attempts for NG tube insertion was unsuccessful  Pt agreed with PEG tube insertion     Aspiration pneumonia  Continue present iv abx       S/P cervical spinal fusion  C3-7 anterior posterior cervical fusion in [facility]   Stable       Troponin level elevated  Atypical   Denies chest pain   Due to Demand ischemia from hypoxia.       Thrombocytopenia  Chronic issue  Monitor closely       Stridor  Resolved       Iron deficiency anemia  Stable       History of lung cancer - ANDRES NSCLC 2004  Hx of  ANDRES resection.  No acute issues.      CLL (chronic lymphocytic leukemia)  Chronic condition with no acute issues.        VTE Risk Mitigation (From admission, onward)         Ordered     IP VTE HIGH RISK PATIENT  Once         04/10/22 1905     Place sequential compression device  Until discontinued         04/10/22 1905                Discharge Planning   MARTELL: 4/15/2022     Code Status: Full Code   Is the patient medically ready for discharge?: No    Reason for patient still in hospital (select all that apply): Treatment  Discharge Plan A: Home with family, Home Health                  Efrain Cam MD  Department of Hospital Medicine   Former[facility]

## 2022-04-13 NOTE — PLAN OF CARE
Continue to recommend strict NPO, non oral medications. Ice chips for comfort purposes only following oral hygiene (3-4 every few hours).     Problem: SLP  Goal: SLP Goal  Description: 1. Pt will tolerate least restrictive PO diet without acute dysphagia pulmonary complication.   2. Pt will participate in VFSS to define swallow physiology; MET   3. Pt will complete effortful pitch glides to improve hyolaryngeal lift and pharyngeal contraction       Outcome: Ongoing, Progressing

## 2022-04-14 LAB
ANISOCYTOSIS BLD QL SMEAR: SLIGHT
BASOPHILS NFR BLD: 0 % (ref 0–1.9)
DIFFERENTIAL METHOD: ABNORMAL
EOSINOPHIL NFR BLD: 0 % (ref 0–8)
ERYTHROCYTE [DISTWIDTH] IN BLOOD BY AUTOMATED COUNT: 12.8 % (ref 11.5–14.5)
GLUCOSE SERPL-MCNC: 139 MG/DL (ref 70–110)
GLUCOSE SERPL-MCNC: 146 MG/DL (ref 70–110)
GLUCOSE SERPL-MCNC: 158 MG/DL (ref 70–110)
GLUCOSE SERPL-MCNC: 176 MG/DL (ref 70–110)
HCT VFR BLD AUTO: 28.8 % (ref 40–54)
HGB BLD-MCNC: 10.2 G/DL (ref 14–18)
IMM GRANULOCYTES # BLD AUTO: ABNORMAL K/UL (ref 0–0.04)
IMM GRANULOCYTES NFR BLD AUTO: ABNORMAL % (ref 0–0.5)
LYMPHOCYTES NFR BLD: 37 % (ref 18–48)
MCH RBC QN AUTO: 31.6 PG (ref 27–31)
MCHC RBC AUTO-ENTMCNC: 35.4 G/DL (ref 32–36)
MCV RBC AUTO: 89 FL (ref 82–98)
MONOCYTES NFR BLD: 9 % (ref 4–15)
NEUTROPHILS NFR BLD: 48 % (ref 38–73)
NEUTS BAND NFR BLD MANUAL: 6 %
NRBC BLD-RTO: 0 /100 WBC
PLATELET # BLD AUTO: 67 K/UL (ref 150–450)
PMV BLD AUTO: 11 FL (ref 9.2–12.9)
RBC # BLD AUTO: 3.23 M/UL (ref 4.6–6.2)
WBC # BLD AUTO: 5.39 K/UL (ref 3.9–12.7)

## 2022-04-14 PROCEDURE — 21400001 HC TELEMETRY ROOM

## 2022-04-14 PROCEDURE — 94799 UNLISTED PULMONARY SVC/PX: CPT

## 2022-04-14 PROCEDURE — 99900031 HC PATIENT EDUCATION (STAT)

## 2022-04-14 PROCEDURE — 85027 COMPLETE CBC AUTOMATED: CPT | Performed by: INTERNAL MEDICINE

## 2022-04-14 PROCEDURE — 94761 N-INVAS EAR/PLS OXIMETRY MLT: CPT

## 2022-04-14 PROCEDURE — 97530 THERAPEUTIC ACTIVITIES: CPT | Mod: CQ

## 2022-04-14 PROCEDURE — 97116 GAIT TRAINING THERAPY: CPT | Mod: CQ

## 2022-04-14 PROCEDURE — 92526 ORAL FUNCTION THERAPY: CPT

## 2022-04-14 PROCEDURE — 99232 SBSQ HOSP IP/OBS MODERATE 35: CPT | Mod: ,,, | Performed by: INTERNAL MEDICINE

## 2022-04-14 PROCEDURE — 97535 SELF CARE MNGMENT TRAINING: CPT

## 2022-04-14 PROCEDURE — 36415 COLL VENOUS BLD VENIPUNCTURE: CPT | Performed by: INTERNAL MEDICINE

## 2022-04-14 PROCEDURE — 25000242 PHARM REV CODE 250 ALT 637 W/ HCPCS: Performed by: INTERNAL MEDICINE

## 2022-04-14 PROCEDURE — 63600175 PHARM REV CODE 636 W HCPCS: Performed by: INTERNAL MEDICINE

## 2022-04-14 PROCEDURE — 27000221 HC OXYGEN, UP TO 24 HOURS

## 2022-04-14 PROCEDURE — 25000003 PHARM REV CODE 250: Performed by: INTERNAL MEDICINE

## 2022-04-14 PROCEDURE — 83735 ASSAY OF MAGNESIUM: CPT | Performed by: INTERNAL MEDICINE

## 2022-04-14 PROCEDURE — 94640 AIRWAY INHALATION TREATMENT: CPT

## 2022-04-14 PROCEDURE — 80053 COMPREHEN METABOLIC PANEL: CPT | Performed by: INTERNAL MEDICINE

## 2022-04-14 PROCEDURE — 21000000 HC CCU ICU ROOM CHARGE

## 2022-04-14 PROCEDURE — 85007 BL SMEAR W/DIFF WBC COUNT: CPT | Performed by: INTERNAL MEDICINE

## 2022-04-14 PROCEDURE — 99900035 HC TECH TIME PER 15 MIN (STAT)

## 2022-04-14 PROCEDURE — 99232 PR SUBSEQUENT HOSPITAL CARE,LEVL II: ICD-10-PCS | Mod: ,,, | Performed by: INTERNAL MEDICINE

## 2022-04-14 RX ORDER — AZELASTINE 1 MG/ML
1 SPRAY, METERED NASAL 2 TIMES DAILY
Status: DISCONTINUED | OUTPATIENT
Start: 2022-04-14 | End: 2022-04-18 | Stop reason: HOSPADM

## 2022-04-14 RX ORDER — HYDROMORPHONE HYDROCHLORIDE 1 MG/ML
1 INJECTION, SOLUTION INTRAMUSCULAR; INTRAVENOUS; SUBCUTANEOUS EVERY 6 HOURS PRN
Status: DISCONTINUED | OUTPATIENT
Start: 2022-04-14 | End: 2022-04-18 | Stop reason: HOSPADM

## 2022-04-14 RX ORDER — TRAZODONE HYDROCHLORIDE 50 MG/1
100 TABLET ORAL NIGHTLY
Status: DISCONTINUED | OUTPATIENT
Start: 2022-04-14 | End: 2022-04-18 | Stop reason: HOSPADM

## 2022-04-14 RX ORDER — HYDROMORPHONE HYDROCHLORIDE 1 MG/ML
0.5 INJECTION, SOLUTION INTRAMUSCULAR; INTRAVENOUS; SUBCUTANEOUS
Status: DISCONTINUED | OUTPATIENT
Start: 2022-04-14 | End: 2022-04-18 | Stop reason: HOSPADM

## 2022-04-14 RX ORDER — HYDROMORPHONE HYDROCHLORIDE 1 MG/ML
0.5 INJECTION, SOLUTION INTRAMUSCULAR; INTRAVENOUS; SUBCUTANEOUS
Status: DISCONTINUED | OUTPATIENT
Start: 2022-04-14 | End: 2022-04-14

## 2022-04-14 RX ORDER — HYDROMORPHONE HYDROCHLORIDE 1 MG/ML
0.5 INJECTION, SOLUTION INTRAMUSCULAR; INTRAVENOUS; SUBCUTANEOUS ONCE
Status: COMPLETED | OUTPATIENT
Start: 2022-04-14 | End: 2022-04-14

## 2022-04-14 RX ORDER — FLUTICASONE PROPIONATE 50 MCG
2 SPRAY, SUSPENSION (ML) NASAL DAILY
Status: DISCONTINUED | OUTPATIENT
Start: 2022-04-14 | End: 2022-04-18 | Stop reason: HOSPADM

## 2022-04-14 RX ADMIN — IPRATROPIUM BROMIDE AND ALBUTEROL SULFATE 3 ML: .5; 3 SOLUTION RESPIRATORY (INHALATION) at 08:04

## 2022-04-14 RX ADMIN — FLUTICASONE PROPIONATE 100 MCG: 50 SPRAY, METERED NASAL at 08:04

## 2022-04-14 RX ADMIN — HYDROMORPHONE HYDROCHLORIDE 0.5 MG: 1 INJECTION, SOLUTION INTRAMUSCULAR; INTRAVENOUS; SUBCUTANEOUS at 08:04

## 2022-04-14 RX ADMIN — SODIUM CHLORIDE: 0.9 INJECTION, SOLUTION INTRAVENOUS at 12:04

## 2022-04-14 RX ADMIN — HYDRALAZINE HYDROCHLORIDE 10 MG: 20 INJECTION INTRAMUSCULAR; INTRAVENOUS at 06:04

## 2022-04-14 RX ADMIN — PIPERACILLIN AND TAZOBACTAM 3.38 G: 3; .375 INJECTION, POWDER, LYOPHILIZED, FOR SOLUTION INTRAVENOUS; PARENTERAL at 09:04

## 2022-04-14 RX ADMIN — IPRATROPIUM BROMIDE AND ALBUTEROL SULFATE 3 ML: .5; 3 SOLUTION RESPIRATORY (INHALATION) at 12:04

## 2022-04-14 RX ADMIN — AZELASTINE HYDROCHLORIDE 137 MCG: 137 SPRAY, METERED NASAL at 09:04

## 2022-04-14 RX ADMIN — HYDROMORPHONE HYDROCHLORIDE 1 MG: 1 INJECTION, SOLUTION INTRAMUSCULAR; INTRAVENOUS; SUBCUTANEOUS at 04:04

## 2022-04-14 RX ADMIN — PIPERACILLIN AND TAZOBACTAM 3.38 G: 3; .375 INJECTION, POWDER, LYOPHILIZED, FOR SOLUTION INTRAVENOUS; PARENTERAL at 01:04

## 2022-04-14 RX ADMIN — HYDROMORPHONE HYDROCHLORIDE 0.5 MG: 1 INJECTION, SOLUTION INTRAMUSCULAR; INTRAVENOUS; SUBCUTANEOUS at 12:04

## 2022-04-14 RX ADMIN — AZELASTINE HYDROCHLORIDE 137 MCG: 137 SPRAY, METERED NASAL at 08:04

## 2022-04-14 RX ADMIN — HYDROMORPHONE HYDROCHLORIDE 1 MG: 1 INJECTION, SOLUTION INTRAMUSCULAR; INTRAVENOUS; SUBCUTANEOUS at 10:04

## 2022-04-14 RX ADMIN — MORPHINE SULFATE 2 MG: 2 INJECTION, SOLUTION INTRAMUSCULAR; INTRAVENOUS at 01:04

## 2022-04-14 RX ADMIN — HYDRALAZINE HYDROCHLORIDE 10 MG: 20 INJECTION INTRAMUSCULAR; INTRAVENOUS at 07:04

## 2022-04-14 RX ADMIN — HYDROMORPHONE HYDROCHLORIDE 0.5 MG: 1 INJECTION, SOLUTION INTRAMUSCULAR; INTRAVENOUS; SUBCUTANEOUS at 09:04

## 2022-04-14 RX ADMIN — IPRATROPIUM BROMIDE AND ALBUTEROL SULFATE 3 ML: .5; 3 SOLUTION RESPIRATORY (INHALATION) at 01:04

## 2022-04-14 RX ADMIN — MORPHINE SULFATE 2 MG: 2 INJECTION, SOLUTION INTRAMUSCULAR; INTRAVENOUS at 07:04

## 2022-04-14 RX ADMIN — PIPERACILLIN AND TAZOBACTAM 3.38 G: 3; .375 INJECTION, POWDER, LYOPHILIZED, FOR SOLUTION INTRAVENOUS; PARENTERAL at 06:04

## 2022-04-14 NOTE — CONSULTS
GASTROENTEROLOGY INPATIENT CONSULT NOTE  Patient Name: Conrad Kuhn  Patient MRN: [id number]  Patient : 1940    Admit Date: 4/10/2022  Service date: 2022    Reason for Consult:  Feeding problem    PCP: Johnson Erazo MD    Chief Complaint   Patient presents with    Shortness of Breath       HPI: Patient is a 81 y.o. male with PMHx  CLL underwent cervical posterior fusion recently and developed respiratory distress and dysphagia.  Patient was extubated on  but has been unable to eat.  NG placement was unsuccessful yesterday and today.  Platelet count has remained stable.  Patient is hopeful for PEG tomorrow.      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     Right-sided Bell's palsy 2009    Spondylolisthesis     Varicose veins of legs     Venous insufficiency         Past Surgical History:  Past Surgical History:   Procedure Laterality Date    Athroscopic surgery      On Knee    BIOPSY OF TISSUE OF NECK Left 2015    Recuurence CLL/small cell lyphoma    ESOPHAGEAL DILATION      Hemorrhoid resection  1979    LUNG LOBECTOMY Left     NECK SURGERY  2022    Anterior and Posterior C3-C7 fusion    OTHER SURGICAL HISTORY  2006    Chemotherapy s/p lyphoma        Portacath implantation and removal      reverse shoulder arthroplasty Right 2021        Home Medications:  Facility-Administered Medications Prior to Admission   Medication Dose Route Frequency Provider Last Rate Last  Admin    acetaminophen tablet 650 mg  650 mg Oral Once PRN MELLO Page        albuterol inhaler 2 puff  2 puff Inhalation Q20 Min PRN MELLO Page        diphenhydrAMINE injection 25 mg  25 mg Intravenous Once PRN MELLO Page        EPINEPHrine (EPIPEN) 0.3 mg/0.3 mL pen injection 0.3 mg  0.3 mg Intramuscular PRN MELLO Page        methylPREDNISolone sodium succinate injection 40 mg  40 mg Intravenous Once PRN Kathy Strong NP-FATOUMATA        ondansetron disintegrating tablet 4 mg  4 mg Oral Once PRN MELLO Page        sodium chloride 0.9% 500 mL flush bag   Intravenous PRN MELLO Page   Stopped at 01/06/22 1330    sodium chloride 0.9% flush 10 mL  10 mL Intravenous PRN MELLO Page         Medications Prior to Admission   Medication Sig Dispense Refill Last Dose    albuterol-ipratropium (DUO-NEB) 2.5 mg-0.5 mg/3 mL nebulizer solution Take 3 mLs by nebulization every 8 (eight) hours as needed.   Past Week at Unknown time    atorvastatin (LIPITOR) 20 MG tablet Take 20 mg by mouth once daily.    4/10/2022 at Unknown time    azelastine (ASTELIN) 137 mcg (0.1 %) nasal spray 1 spray by Nasal route 2 (two) times daily.    Past Week at Unknown time    ferrous sulfate (FEOSOL) 325 mg (65 mg iron) Tab tablet Take 1 tablet (325 mg total) by mouth once daily.   4/10/2022 at Unknown time    gabapentin (NEURONTIN) 300 MG capsule Take 300 mg by mouth 2 (two) times daily.   0 4/10/2022 at Unknown time    glimepiride (AMARYL) 1 MG tablet Take 1 mg by mouth daily with breakfast.   0 4/10/2022 at Unknown time    HYDROcodone-acetaminophen (NORCO)  mg per tablet Take 1 tablet by mouth every 8 (eight) hours as needed.   0 4/10/2022 at Unknown time    levalbuterol (XOPENEX) 1.25 mg/3 mL nebulizer solution Take 3 mLs by nebulization every 4 to 6 hours as needed.    Past Week at Unknown time    losartan (COZAAR) 25 MG tablet Take 25 mg by mouth every evening.    4/9/2022 at Unknown time    metFORMIN (GLUCOPHAGE) 500 MG tablet Take 500 mg by mouth 2 (two) times daily with meals.   2 4/10/2022 at Unknown time    metoprolol succinate (TOPROL-XL) 25 MG 24 hr tablet Take 25 mg by mouth 2 (two) times daily.    4/10/2022 at Unknown time    ondansetron (ZOFRAN) 8 MG tablet Take 8 mg by mouth every 8 (eight) hours as needed.   4/10/2022 at Unknown time    promethazine (PHENERGAN) 25 MG tablet TAKE 1 TABLET BY MOUTH EVERY 6 HOURS AS NEEDED FOR NAUSEA (Patient taking differently: Take 25 mg by mouth every 6 (six) hours as needed.) 30 tablet 3 4/10/2022 at Unknown time    traZODone (DESYREL) 100 MG tablet TAKE 1 TABLET BY MOUTH EVERY NIGHT AT BEDTIME (Patient taking differently: Take 100 mg by mouth every evening.) 90 tablet 0 4/9/2022 at Unknown time    valACYclovir (VALTREX) 1000 MG tablet Take 1 tablet (1,000 mg total) by mouth 2 (two) times daily for 7 days, THEN 1 tablet (1,000 mg total) once daily. 374 tablet 0 4/10/2022 at Unknown time    valsartan (DIOVAN) 80 MG tablet Take 80 mg by mouth every evening.   4/9/2022 at Unknown time    blood sugar diagnostic Strp by Other route.   Unknown at Unknown time    blood-glucose meter kit Use as instructed   Unknown at Unknown time    FREESTYLE LANCETS 28 gauge lancets TEST TWICE DAILY  1     [DISCONTINUED] celecoxib (CELEBREX) 200 MG capsule Take 200 mg by mouth 2 (two) times daily.       [DISCONTINUED] duke's soln (benadryl 30 mL, mylanta 30 mL, LIDOcaine 30 mL, nystatin 30 mL) 120mL Take 10 mLs by mouth every 4 (four) hours as needed (mouth sores). 120 mL 0     [DISCONTINUED] ibuprofen (ADVIL,MOTRIN) 600 MG tablet Take 600 mg by mouth every 6 (six) hours as needed for Pain.       [DISCONTINUED] neomycin-polymyxin-dexamethasone (DEXACINE) 3.5 mg/g-10,000 unit/g-0.1 % Oint APPLY THIN LAYER INTO AFFECTED EYE FOUR TIMES DAILY FOR 7 DAYS       [DISCONTINUED] predniSONE (DELTASONE) 10 MG tablet Take 60mg daily for five days,  then 50mg for day 6, 40mg for day 7, 30mg day 8, 20mg day 9, and 10mg day 10. 50 tablet 0     [DISCONTINUED] venetoclax (VENCLEXTA) 100 mg Tab Take 200 mg by mouth once daily. 60 tablet 8        Inpatient Medications:   albuterol-ipratropium  3 mL Nebulization Q6H    azelastine  1 spray Nasal BID    fluticasone propionate  2 spray Each Nostril Daily    piperacillin-tazobactam (ZOSYN) IVPB  3.375 g Intravenous Q8H    traZODone  100 mg Oral Nightly    valsartan  80 mg Oral Daily     ALPRAZolam, calcium chloride IVPB, calcium chloride IVPB, calcium chloride IVPB, dextrose 50%, dextrose 50%, dextrose 50%, dextrose 50%, glucagon (human recombinant), glucose, glucose, hydrALAZINE, HYDROmorphone, HYDROmorphone, insulin aspart U-100, magnesium oxide, magnesium sulfate IVPB, magnesium sulfate IVPB, magnesium sulfate IVPB, magnesium sulfate IVPB, melatonin, naloxone, ondansetron, polyethylene glycol, potassium chloride in water, potassium chloride in water, potassium chloride in water, potassium chloride in water, potassium chloride, potassium chloride, potassium chloride, potassium chloride, sodium chloride 0.9%, sodium phosphate IVPB, sodium phosphate IVPB, sodium phosphate IVPB, sodium phosphate IVPB, sodium phosphate IVPB, traZODone    Review of patient's allergies indicates:  No Known Allergies    Social History:   Social History     Occupational History    Not on file   Tobacco Use    Smoking status: Former Smoker    Smokeless tobacco: Never Used   Substance and Sexual Activity    Alcohol use: Yes    Drug use: No    Sexual activity: Not Currently       Family History:   Family History   Problem Relation Age of Onset    Stomach cancer Mother 80         at 95    Colon cancer Father        Review of Systems:  A 10 point review of systems was performed and was normal, except as mentioned in the HPI, including constitutional, HEENT, heme, lymph, cardiovascular, respiratory, gastrointestinal, genitourinary,  "neurologic, endocrine, psychiatric and musculoskeletal.      OBJECTIVE:    Physical Exam:  24 Hour Vital Sign Ranges: Temp:  [96.5 °F (35.8 °C)-98 °F (36.7 °C)] 97.5 °F (36.4 °C)  Pulse:  [] 86  Resp:  [16-34] 18  SpO2:  [96 %-100 %] 96 %  BP: (153-176)/(71-78) 176/74  Most recent vitals: BP (!) 176/74   Pulse 86   Temp 97.5 °F (36.4 °C) (Oral)   Resp 18   Ht 6' 2" (1.88 m)   Wt 90.1 kg (198 lb 10.2 oz)   SpO2 96%   BMI 25.50 kg/m²    GEN: well-developed, well-nourished, awake and alert, non-toxic appearing adult  HEENT: PERRL, sclera anicteric, oral mucosa pink and moist without lesion  NECK: trachea midline; Good ROM  CV: regular rate and rhythm, no murmurs or gallops  RESP: clear to auscultation bilaterally, no wheezes, rhonci or rales  ABD: soft, non-tender, non-distended, normal bowel sounds  EXT: no swelling or edema, 2+ pulses distally  SKIN: no rashes or jaundice  PSYCH: normal affect    Labs:   Recent Labs     04/12/22  0550 04/13/22  0348 04/14/22  0405   WBC 14.84* 6.97 5.39   MCV 89 89 89   PLT 73* 66* 67*     Recent Labs     04/12/22  0550 04/13/22  0348    140   K 3.3* 3.7   CL 98 105   CO2 30* 24   BUN 19 17   * 159*     No results for input(s): ALB in the last 72 hours.    Invalid input(s): ALKP, SGOT, SGPT, TBIL, DBIL, TPRO  No results for input(s): PT, INR, PTT in the last 72 hours.      Radiology Review:  Fl Modified Barium Swallow Speech   Final Result      X-Ray Chest AP Portable   Final Result      CT Chest With Contrast   Final Result      CT Soft Tissue Neck With Contrast   Final Result      X-Ray Chest AP Portable   Final Result      X-Ray Chest AP Portable   Final Result      X-Ray Chest AP Portable   Final Result            IMPRESSION / RECOMMENDATIONS:  Feeding problem  -EGD PEG tomorrow    Thank you for this consult.    Jefferson Hyman  4/14/2022  6:39 PM        "

## 2022-04-14 NOTE — PROGRESS NOTES
[facility]  Adult Nutrition   Progress Note (Nutrition Support Management)    SUMMARY      Recommendations:   No new recommendations...still awaiting PEG placement.     Goals:   Goals: 1) Patient to tolerate enteral feedings and not aspirate. 2) Patient to meet his estimated nutritional needs from his enteral feeding of Glucerna. 3) Blood glucose and electrolytes managed and monitored.    Dietitian Rounds Brief  F/U Nutrition Note: [place] patient [place]. Surprised that he has the energy to walk with nothing to eat for several days. Will follow prn..    Diet order: NPO    % Intake of Estimated Energy Needs: 0%  % Meal Intake: NPO    Estimated/Assessed Needs  Weight Used For Calorie Calculations: 96.9 kg (213 lb 10 oz)  Energy Calorie Requirements (kcal): 4618-5973 kcals/day (20-25 kcals/kg ABW)  Energy Need Method: Kcal/kg  Protein Requirements: 113-141 g/day (1.2-1.5 g/kg IBW)  Weight Used For Protein Calculations: 86 kg (189 lb 9.5 oz)  Fluid Requirements (mL): 1 mL/kcal or per MD     RDA Method (mL): 1938       Weight History:  Wt Readings from Last 5 Encounters:   04/14/22 90.1 kg (198 lb 10.2 oz)   03/31/22 95.7 kg (211 lb)   03/14/22 96.2 kg (212 lb)   02/17/22 95.7 kg (211 lb)   09/30/21 97.3 kg (214 lb 8 oz)        Medications:Pertinent Medications Reviewed    Scheduled Meds:   albuterol-ipratropium  3 mL Nebulization Q6H    azelastine  1 spray Nasal BID    fluticasone propionate  2 spray Each Nostril Daily    piperacillin-tazobactam (ZOSYN) IVPB  3.375 g Intravenous Q8H    traZODone  100 mg Oral Nightly    valsartan  80 mg Oral Daily     Continuous Infusions:   sodium chloride 0.9% 75 mL/hr at 04/14/22 1220     PRN Meds:.ALPRAZolam, calcium chloride IVPB, calcium chloride IVPB, calcium chloride IVPB, dextrose 50%, dextrose 50%, dextrose 50%, dextrose 50%, glucagon (human recombinant), glucose, glucose, hydrALAZINE, HYDROmorphone, HYDROmorphone, insulin aspart U-100,  magnesium oxide, magnesium sulfate IVPB, magnesium sulfate IVPB, magnesium sulfate IVPB, magnesium sulfate IVPB, melatonin, naloxone, ondansetron, polyethylene glycol, potassium chloride in water, potassium chloride in water, potassium chloride in water, potassium chloride in water, potassium chloride, potassium chloride, potassium chloride, potassium chloride, sodium chloride 0.9%, sodium phosphate IVPB, sodium phosphate IVPB, sodium phosphate IVPB, sodium phosphate IVPB, sodium phosphate IVPB, traZODone    Labs: Pertinent Labs Reviewed    Clinical Chemistry:  Recent Labs   Lab 04/13/22  0348      K 3.7      CO2 24   *   BUN 17   CREATININE 0.7   CALCIUM 8.5*   PROT 6.0   ALBUMIN 3.2*   BILITOT 1.6*   ALKPHOS 33*   AST 13   ALT 12   ANIONGAP 11   ESTGFRAFRICA >60.0   EGFRNONAA >60.0   MG 2.1     CBC:   Recent Labs   Lab 04/14/22  0405   WBC 5.39   RBC 3.23*   HGB 10.2*   HCT 28.8*   PLT 67*   MCV 89   MCH 31.6*   MCHC 35.4     Cardiac Profile:  Recent Labs   Lab 04/10/22  1457 04/11/22  0406 04/12/22  0550   *  --   --    * 112  --    CPKMB 3.1  --   --    TROPONINI 0.066* 0.061* 0.039     Nutrition Risk  Level of Risk/Frequency of Follow-up: high Bernice Tyler, CARLA 04/14/2022 2:24 PM

## 2022-04-14 NOTE — PLAN OF CARE
Important Message from Medicare was sign, explained and given to patient/caregiver on 04/14/2022 at 2:35pm     addressed any questions or concerns.    Important Message from Medicare document will be scanned into patient's medical record

## 2022-04-14 NOTE — CARE UPDATE
04/13/22 1947   Patient Assessment/Suction   Level of Consciousness (AVPU) alert   Respiratory Effort Unlabored   Expansion/Accessory Muscles/Retractions no use of accessory muscles   All Lung Fields Breath Sounds clear;diminished   Rhythm/Pattern, Respiratory no shortness of breath reported   Cough Frequency infrequent   Cough Type no productive sputum   PRE-TX-O2   O2 Device (Oxygen Therapy) nasal cannula   $ Is the patient on Low Flow Oxygen? Yes   Flow (L/min) 4   SpO2 97 %   Pulse Oximetry Type Continuous   $ Pulse Oximetry - Multiple Charge Pulse Oximetry - Multiple   Pulse 81   Resp (!) 34   Positioning   Head of Bed (HOB) Positioning HOB at 90 degrees   Aerosol Therapy   $ Aerosol Therapy Charges Aerosol Treatment   Daily Review of Necessity (SVN) completed   Respiratory Treatment Status (SVN) given   Treatment Route (SVN) mask   Patient Position (SVN) sitting in chair   Post Treatment Assessment (SVN) breath sounds improved   Signs of Intolerance (SVN) none   Breath Sounds Post-Respiratory Treatment   Throughout All Fields Post-Treatment All Fields   Throughout All Fields Post-Treatment aeration increased   Post-treatment Heart Rate (beats/min) 86   Post-treatment Resp Rate (breaths/min) 20   Education   $ Education 15 min;Bronchodilator   Respiratory Evaluation   $ Care Plan Tech Time 15 min

## 2022-04-14 NOTE — PT/OT/SLP PROGRESS
Physical Therapy Treatment    Patient Name:  Conrad Kuhn   MRN:  [id number]    Recommendations:     Discharge Recommendations:  home health PT   Discharge Equipment Recommendations: none   Barriers to discharge: increased assist with mobility    Assessment:     Conrad Kuhn is a 81 y.o. male admitted with a medical diagnosis of Acute hypoxemic respiratory failure.  He presents with the following impairments/functional limitations:  weakness, impaired endurance, impaired self care skills, impaired functional mobilty, gait instability, impaired balance, decreased lower extremity function, decreased safety awareness, pain, impaired cardiopulmonary response to activity.  Pt agreeable to visit. Pt eager to ambulate. Pt stated that he feels better once he gets up and moving. RN asked to disconnect pt from IVs for ambulation to minimized line management. Pt needing to use the urinal before ambulation. Pt required CGA while standing using the urinal. Pt noted to be more SOB with ambulation. SaO2 WFL on 4 L/m O2. Pt tolerated session well.    Rehab Prognosis: Fair; patient would benefit from acute skilled PT services to address these deficits and reach maximum level of function.    Recent Surgery: * No surgery found *      Plan:     During this hospitalization, patient to be seen 6 x/week to address the identified rehab impairments via gait training, therapeutic activities, therapeutic exercises, neuromuscular re-education and progress toward the following goals:    · Plan of Care Expires:  05/12/22    Subjective     Chief Complaint: feeling more SOB today  Patient/Family Comments/goals: return home  Pain/Comfort:  · Pain Rating 1: 0/10      Objective:     Communicated with RN prior to session.  Patient found up in chair with peripheral IV, telemetry, blood pressure cuff, oxygen upon PT entry to room.     General Precautions: Standard, fall   Orthopedic Precautions:spinal precautions   Braces: Aspen collar  Respiratory  Status: Nasal cannula, flow 4 L/min     Functional Mobility:  · Transfers:     · Sit to Stand:  contact guard assistance with rolling walker  · Gait: x 200' with RW and CGA.  · Balance: fair static standing balance with CGA.      AM-PAC 6 CLICK MOBILITY          Therapeutic Activities and Exercises:   Pt educated on importance of time OOB, importance of intermittent mobility, safe techniques for transfers/ambulation, discharge recommendations/options, and use of call light for assistance and fall prevention.      Patient left up in chair with all lines intact, call button in reach, RN notified and daughter present..    GOALS:   Multidisciplinary Problems     Physical Therapy Goals        Problem: Physical Therapy    Goal Priority Disciplines Outcome Goal Variances Interventions   Physical Therapy Goal     PT, PT/OT Ongoing, Progressing     Description: Goals to be met by: Discharge     Patient will increase functional independence with mobility by performin. Supine to sit with Independent  2. Sit to stand transfer with Supervision  3. Bed to chair transfer with Supervision using Rolling Walker  4. Gait  x 150 feet with Supervision using Rolling Walker.                          Time Tracking:     PT Received On: 22  PT Start Time: 1039     PT Stop Time: 1106  PT Total Time (min): 27 min     Billable Minutes: Gait Training 12 and Therapeutic Activity 15    Treatment Type: Treatment  PT/PTA: PTA     PTA Visit Number: 2     2022

## 2022-04-14 NOTE — PLAN OF CARE
04/14/22 0802   Patient Assessment/Suction   Level of Consciousness (AVPU) alert   Respiratory Effort Unlabored   Expansion/Accessory Muscles/Retractions no use of accessory muscles;no retractions;expansion symmetric   All Lung Fields Breath Sounds Anterior:;clear;diminished   Rhythm/Pattern, Respiratory no shortness of breath reported   Cough Frequency no cough   Cough Type no productive sputum   PRE-TX-O2   O2 Device (Oxygen Therapy) nasal cannula   $ Is the patient on Low Flow Oxygen? Yes   Flow (L/min) 3   SpO2 98 %   Pulse Oximetry Type Continuous   $ Pulse Oximetry - Multiple Charge Pulse Oximetry - Multiple   Pulse 81   Resp 18   Aerosol Therapy   $ Aerosol Therapy Charges Aerosol Treatment   Daily Review of Necessity (SVN) completed   Respiratory Treatment Status (SVN) given   Treatment Route (SVN) mask;oxygen   Patient Position (SVN) sitting in chair   Post Treatment Assessment (SVN) increased aeration   Signs of Intolerance (SVN) none   Preset CPAP/BiPAP Settings   Mode Of Delivery other (see comments);Standby  (HOME UNIT)   Education   $ Education Bronchodilator;15 min

## 2022-04-14 NOTE — PROGRESS NOTES
[facility] Medicine  Progress Note    Patient Name: Conrad Kuhn  MRN: [id number]  Patient Class: IP- Inpatient   Admission Date: 4/10/2022  Length of Stay: 4 days  Attending Physician: Efrain Cam MD  Primary Care Provider: Johnson Erazo MD        Subjective:     Principal Problem:Acute hypoxemic respiratory failure        HPI:  81 year old male transferred from [facility] secondary to acute hypoxic respiratory failure.  He is intubated at that time of my exam and his wife is at bedside to supplement history.  History also supplemented  by ED MD and chart.  Patient had  C3-7 anterior posterior cervical fusion approximately two days ago.  His wife noted that he was having SOB yesterday as well as shoulder pain and neck pain. He even expressed to her that he felt he may be getting pneumonia. She reported he did choke when trying to drink a milk shake yesterday.  Patient had return of SOB today.  EMS received report that he got some ativan around noon and was going for a CT scan when saturations dropped into the 70s.  He was transported to [facility].  Per ED MD,  patient sounded as if he had stridor.  He was electively intubated and thick, bloody secretions were seen. Dr. Tillman with surgery was called and evaluated the patient and the surgical incision.  It did not appear this was the etiology of his respiratory distress and hypoxia. CT soft tissue of the neck revealed expected post operative changes with drains in place and subcutaneous gas present.  CT chest with patchy bibasilar opacities with aspiration pneumonia a consideration.     In the ED, WBC is 44.  The surgeon did express that he thought these post surgical patients got perioperative Decadron but I cannot confirm if that's the case presently. Temp is 100.7.  He was started on IV Vancomycin and IV Zosyn.      Overview/Hospital Course:  04/11  Pt got extubated  MBSS tmrw AM  On NPO status    04/12  MBSS confirmed severe  aspiration  NG tube feeding will be started  Pt denies any issues  Pt got transferred out of ICU     04/13  Pt walking around  Still in need of 4 L oxygen  Breathing status much improved  Multiple attempts yesterday for NG tube was unsuccessful  Pt agreed with PEG tube insertion    04/14  Awaiting PEG tube and Home oxygen assessment  C/o pain on operative area  Otherwise stable to go home      Interval History:     Review of Systems   Constitutional:  Negative for activity change and appetite change.   HENT:  Negative for congestion and dental problem.    Eyes:  Negative for discharge and itching.   Respiratory:  Negative for shortness of breath.    Cardiovascular:  Negative for chest pain.   Gastrointestinal:  Negative for abdominal distention and abdominal pain.   Endocrine: Negative for cold intolerance.   Genitourinary:  Negative for difficulty urinating and dysuria.   Musculoskeletal:  Positive for arthralgias. Negative for back pain.   Skin:  Negative for color change.   Neurological:  Negative for dizziness and facial asymmetry.   Hematological:  Negative for adenopathy.   Psychiatric/Behavioral:  Negative for agitation and behavioral problems.    Objective:     Vital Signs (Most Recent):  Temp: 96.5 °F (35.8 °C) (04/14/22 1221)  Pulse: 90 (04/14/22 1353)  Resp: 20 (04/14/22 1353)  BP: (!) 154/71 (04/14/22 1235)  SpO2: 99 % (04/14/22 1353)   Vital Signs (24h Range):  Temp:  [96.5 °F (35.8 °C)-98 °F (36.7 °C)] 96.5 °F (35.8 °C)  Pulse:  [] 90  Resp:  [16-34] 20  SpO2:  [97 %-100 %] 99 %  BP: (153-182)/(71-88) 154/71     Weight: 90.1 kg (198 lb 10.2 oz)  Body mass index is 25.5 kg/m².    Intake/Output Summary (Last 24 hours) at 4/14/2022 1358  Last data filed at 4/14/2022 0803  Gross per 24 hour   Intake 50 ml   Output 650 ml   Net -600 ml      Physical Exam  Vitals and nursing note reviewed.   Constitutional:       Appearance: He is well-developed.   HENT:      Head: Atraumatic.      Right Ear: External  ear normal.      Left Ear: External ear normal.      Nose: Nose normal.      Mouth/Throat:      Mouth: Mucous membranes are moist.   Eyes:      General: No scleral icterus.  Neck:      Comments: Neck collar   Cardiovascular:      Rate and Rhythm: Normal rate.   Pulmonary:      Effort: Pulmonary effort is normal.   Abdominal:      General: There is no distension.   Musculoskeletal:         General: Normal range of motion.      Cervical back: Full passive range of motion without pain.   Skin:     General: Skin is warm.   Neurological:      Mental Status: He is alert and oriented to person, place, and time.   Psychiatric:         Behavior: Behavior normal.       Significant Labs: All pertinent labs within the past 24 hours have been reviewed.  CBC:   Recent Labs   Lab 04/13/22 0348 04/14/22  0405   WBC 6.97 5.39   HGB 9.8* 10.2*   HCT 27.5* 28.8*   PLT 66* 67*     CMP:   Recent Labs   Lab 04/13/22  0348      K 3.7      CO2 24   *   BUN 17   CREATININE 0.7   CALCIUM 8.5*   PROT 6.0   ALBUMIN 3.2*   BILITOT 1.6*   ALKPHOS 33*   AST 13   ALT 12   ANIONGAP 11   EGFRNONAA >60.0       Significant Imaging: I have reviewed all pertinent imaging results/findings within the past 24 hours.      Assessment/Plan:      * Acute hypoxemic respiratory failure  Intubated and got extubated on 04/11 AM  Continue iv abx for presumed aspiration pneumonia which can be stopped tomorrow  Maintain NPO status because MBSS confirmed severe aspiration risk  Multiple attempts for NG tube insertion was unsuccessful  Pt agreed with PEG tube insertion     Aspiration pneumonia  Continue present iv abx which can be stopped tomorrow(04/15)      S/P cervical spinal fusion  C3-7 anterior posterior cervical fusion in [facility]   Stable       Troponin level elevated  Atypical   Denies chest pain   Due to Demand ischemia from hypoxia.       Thrombocytopenia  Chronic issue  Monitor closely       Stridor  Resolved       Iron  deficiency anemia  Stable       History of lung cancer - ANDRES NSCLC 2004  Hx of ANDRES resection.  No acute issues.      CLL (chronic lymphocytic leukemia)  Chronic condition with no acute issues.      VTE Risk Mitigation (From admission, onward)         Ordered     IP VTE HIGH RISK PATIENT  Once         04/10/22 1905     Place sequential compression device  Until discontinued         04/10/22 1905                Discharge Planning   MARTELL: 4/16/2022     Code Status: Full Code   Is the patient medically ready for discharge?: No    Reason for patient still in hospital (select all that apply): Treatment  Discharge Plan A: Home with family, Home Health                  Efrain Cam MD  Department of Hospital Medicine   [facility]

## 2022-04-14 NOTE — PT/OT/SLP PROGRESS
Occupational Therapy   Treatment    Name: Conrad Kuhn  MRN: [id number]  Admitting Diagnosis:  Acute hypoxemic respiratory failure       Recommendations:     Discharge Recommendations: home health OT  Discharge Equipment Recommendations:  none  Barriers to discharge:       Assessment:     Conrad Kuhn is a 81 y.o. male with a medical diagnosis of Acute hypoxemic respiratory failure. . Performance deficits affecting function are weakness, impaired endurance, impaired self care skills, impaired functional mobilty, gait instability, impaired balance, orthopedic precautions.     Rehab Prognosis:  Good; patient would benefit from acute skilled OT services to address these deficits and reach maximum level of function.       Plan:     Patient to be seen 5 x/week to address the above listed problems via self-care/home management, therapeutic activities, therapeutic exercises  · Plan of Care Expires: 05/11/22  · Plan of Care Reviewed with: patient    Subjective     Pain/Comfort:  · Pain Rating 1: 0/10  · Pain Rating Post-Intervention 1: 0/10    Objective:     Communicated with: nurse prior to session.  Patient found up in chair with telemetry, pulse ox (continuous), peripheral IV, blood pressure cuff upon OT entry to room.    General Precautions: Standard, fall   Orthopedic Precautions:spinal precautions   Braces: Aspen collar    Activities of Daily Living:  · Pt up in chair; reports concern about pads in neck brace being soiled; OT demonstrated how to remove collar/pads; instructed pt/daughter on hand washing of pads in warm water with gentle soap/laying out to dry; demonstrated to pt/daughter how to replace pads with clean set and how to don collar; pt/daughter verbalized understanding     Patient left up in chair with all lines intact, call button in reach and chair alarm onEducation:      GOALS:   Multidisciplinary Problems     Occupational Therapy Goals        Problem: Occupational Therapy    Goal Priority  Disciplines Outcome Interventions   Occupational Therapy Goal     OT, PT/OT Ongoing, Progressing    Description: Goals to be met by: discharge     Patient will increase functional independence with ADLs by performing:    UE Dressing with Supervision.  LE Dressing with Supervision.  Grooming while standing at sink with Supervision.  Toileting from toilet with Supervision for hygiene and clothing management.   Toilet transfer to toilet with Supervision.  Perform sitting/standing ADL activity with no verbal/tactile cues for cervical precautions.                     Time Tracking:     OT Date of Treatment: 04/14/22  OT Start Time: 1126  OT Stop Time: 1150  OT Total Time (min): 24 min    Billable Minutes:Self Care/Home Management 24    OT/MK: OT          4/14/2022

## 2022-04-14 NOTE — PT/OT/SLP PROGRESS
"Speech Language Pathology Treatment    Patient Name:  Conrad Kuhn   MRN:  [id number]  Admitting Diagnosis: Acute hypoxemic respiratory failure    Recommendations:     General Recommendations:    · Alternative means of nutrition, hydration and medication delivery  · Medical management of prevertebral soft tissue edema  · Repeat MBS prior to initiation of oral diet and oral medications pending improvement in clinical signs of aspiration and dysphagia       Diet recommendations:  NPO, NPO     Aspiration Precautions: Strict aspiration precautions   · Ice chips every few hours (3-4) with diligent oral hygiene      General Precautions: Standard, NPO, aspiration       Subjective     Pt alert, cooperative   Unable to pass NG tube   Meds being given crushed in applesauce   Patient goals: return home      Pain/Comfort:  ·  None indicated     Respiratory Status: Nasal cannula, flow 4 L/min    Objective:     Has the patient been evaluated by SLP for swallowing?   Yes  Keep patient NPO? Yes     MBS compelted 4/12/2022  Impressions  ·  Severe dysphagia POD #4 s/p C3-7 ACDF, largely related to prevertebral soft tissue edema. Swallow safety and efficiency impaired. Patient with limited passage of material through the UES with liquids and minimal to no passage with puree. Patient demonstrates sensation to retention, producing multiple spontaneous swallows that are not effective in reducing retention. Continued events of penetration resulting in throat clearing and coughing with re-swallows to liquid. Sensation to retention of puree is decreased with Pt producing 2 dry swallows ind'ly with majority of bolus remaining in pharynx before expressing sensation bolus was completely cleared. Patient demonstrates decreased awareness to degree of dysphagia rating difficulty stating effort with liquids was "fairly easy" after continuous coughing and throat clearing events, producing of 5-7 swallows per bolus and still with moderate to severe " retention.     Swallowing:   · Patient with questions re: dysphagia, reviewed results and recommendations from MBS   · Provided with picture from MBS and lateral view of typical individual for reference/comparision   · Education on swallowing anatomy, significant edema and noted impairments  · Education on appropriate oral hygiene (toothpaste and toothbrush) vs toothette currently being used provided; reinforced need for toothbrush/toothpaste to decolonize oral bacteria and reduce risk for aspiration related pneumonia   · Dicussed recommended frequency for oral hygiene, ice chips for comfort only after, 3-4 every few hours   · Able to complete exercise based therapy with effortful pitch glide to improve hyolaryngeal elevation and pharyngeal contraction: able to sustain high pitch for 10 seconds x3 w/ rest periods in between before onset of exertion and wish to defer  · Discussed recommended frequency for HEB, deferred to handout previously provided   · Oral trials: ice chip x2  · Patient continues with continuous coughing, throat clearing with multiple swallows per bolus ~5-7 per ice chip; quality wet and unproductive in nature       Assessment:     Conrad Kuhn is a 81 y.o. male with an SLP diagnosis of Severe dysphagia POD #6 s/p C3-7 ACDF, largely related to prevertebral soft tissue edema. Swallow safety and efficiency impaired. Patient with limited passage of material through the UES with liquids and minimal to no passage with puree (see MBS above).       Goals:   Multidisciplinary Problems     SLP Goals        Problem: SLP    Goal Priority Disciplines Outcome   SLP Goal     SLP Ongoing, Progressing   Description: 1. Pt will tolerate least restrictive PO diet without acute dysphagia pulmonary complication.   2. Pt will participate in VFSS to define swallow physiology; MET   3. Pt will complete effortful pitch glides to improve hyolaryngeal lift and pharyngeal contraction                        Plan:      · Plan of Care reviewed with:  patient   · SLP Follow-Up:  Yes (MBS)       Discharge recommendations: SLP at next level of care     Time Tracking:     SLP Treatment Date:   04/14/22  Speech Start Time:  0942  Speech Stop Time:  1024     Speech Total Time (min):  42 min    Billable Minutes: Treatment Swallowing Dysfunction 42    04/14/2022

## 2022-04-14 NOTE — PROGRESS NOTES
Pulmonary/Critical Care  Progress Note      Patient name: Conrad Kuhn  MRN: [id number]  Date: 04/14/2022    Admit Date: 4/10/2022  Consult Requested By: Efrain Cam MD    Reason for Consult: respiratory failure    HPI:    4/11/2022 - 80 yo male s/p cervical surgery (C3-7 anterior posterior fusion) at [facility], sent to ER yesterday in respiratory distress, stridor was intubated.  I was not called about this pt until about 4-5 hours later when he was admitted to ICU.  He was stable on ventilator and in no distress.  This AM he is awake (despite being on sedatives), in no distress.  We proceeded with SBT which he tolerated with good ABG.  We did a leak test which was + (audible air leak and loss of about 200 ml of TV)  and will proceed with extubation.  There is a question of some aspiration while drinking yesterday.  Initial WBC was 44, platelets have decreased, troponin is minimally elevated, procalcitonin is normal.  CT chest reviewed with some fairly mild bibasilar infiltrates and elevation of left diaphragm (which looks to be new)    4/12/2022 - Pt extubated yesterday and has remained stable.  No new respiratory issues.  Has some cough and congestion, some chest pain with cough.  WBC better, platelets slightly better.  Troponin has decreased    4/13/2022 - Stable overnight, no new issues reported.  He states that they were not able to place a NG tube and there are plans for a possible PEG tube.  Dr Tillman's note has been reviewed.  Breathing is doing well.  Vocal quality is not right by my ear but he does not note any difference to him.    4/14/2022 - Stable overnight, c/o insomnia due to bed, postop pain.  He tells me that he is supposed to get a PEG today and is hoping to go home tomorrow (his wife is at home to help him).  Platelets remain decreased but stable.  Sats  % on 4 LPM (he was on RA when I saw him).    Review of Systems    Review of Systems   Constitutional: Negative for chills and fever.    Respiratory: Positive for cough and shortness of breath.    Cardiovascular: Positive for chest pain.   Musculoskeletal: Positive for back pain and neck pain.        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     Right-sided Bell's palsy 2009    Spondylolisthesis     Varicose veins of legs     Venous insufficiency        Past Surgical History    Past Surgical History:   Procedure Laterality Date    Athroscopic surgery      On Knee    BIOPSY OF TISSUE OF NECK Left 08/2015    Recuurence CLL/small cell lyphoma    ESOPHAGEAL DILATION      Hemorrhoid resection  1979    LUNG LOBECTOMY Left 2004    NECK SURGERY  04/08/2022    Anterior and Posterior C3-C7 fusion    OTHER SURGICAL HISTORY  2006    Chemotherapy s/p lyphoma        Portacath implantation and removal      reverse shoulder arthroplasty Right 03/2021       Medications (scheduled):      albuterol-ipratropium  3 mL Nebulization Q6H    azelastine  1 spray Nasal BID    fluticasone propionate  2 spray Each Nostril Daily    HYDROmorphone  0.5 mg Intravenous Once    piperacillin-tazobactam (ZOSYN) IVPB  3.375 g Intravenous Q8H    valsartan  80 mg Oral Daily       Medications (infusions):      sodium chloride 0.9% 75 mL/hr at 04/13/22 2142       Medications (prn):     ALPRAZolam, calcium chloride IVPB, calcium chloride IVPB, calcium chloride IVPB, dextrose 50%, dextrose 50%, dextrose 50%, dextrose 50%, glucagon (human recombinant), glucose,  "glucose, hydrALAZINE, HYDROmorphone, insulin aspart U-100, magnesium oxide, magnesium sulfate IVPB, magnesium sulfate IVPB, magnesium sulfate IVPB, magnesium sulfate IVPB, melatonin, naloxone, ondansetron, polyethylene glycol, potassium chloride in water, potassium chloride in water, potassium chloride in water, potassium chloride in water, potassium chloride, potassium chloride, potassium chloride, potassium chloride, sodium chloride 0.9%, sodium phosphate IVPB, sodium phosphate IVPB, sodium phosphate IVPB, sodium phosphate IVPB, sodium phosphate IVPB, traZODone    Family History:   Family History   Problem Relation Age of Onset    Stomach cancer Mother 80         at 95    Colon cancer Father        Social History: Tobacco:   Social History     Tobacco Use   Smoking Status Former Smoker   Smokeless Tobacco Never Used                                EtOH:   Social History     Substance and Sexual Activity   Alcohol Use Yes                                Drugs:   Social History     Substance and Sexual Activity   Drug Use No     Physical Exam    Vital signs:  Temp:  [96.9 °F (36.1 °C)-98 °F (36.7 °C)]   Pulse:  [66-95]   Resp:  [16-34]   BP: (163-182)/(74-88)   SpO2:  [96 %-100 %]     Intake/Output:     Intake/Output Summary (Last 24 hours) at 2022 0903  Last data filed at 2022 0803  Gross per 24 hour   Intake 50 ml   Output 850 ml   Net -800 ml        BMI: Estimated body mass index is 25.5 kg/m² as calculated from the following:    Height as of this encounter: 6' 2" (1.88 m).    Weight as of this encounter: 90.1 kg (198 lb 10.2 oz).    Physical Exam  Vitals and nursing note reviewed.   Constitutional:       General: He is not in acute distress.     Appearance: Normal appearance. He is not ill-appearing, toxic-appearing or diaphoretic.   HENT:      Head: Normocephalic and atraumatic.      Right Ear: External ear normal.      Left Ear: External ear normal.      Nose: Nose normal.      Mouth/Throat:      " Mouth: Mucous membranes are moist.      Pharynx: Oropharynx is clear. No oropharyngeal exudate.   Eyes:      General: No scleral icterus.        Right eye: No discharge.         Left eye: No discharge.      Extraocular Movements: Extraocular movements intact.      Conjunctiva/sclera: Conjunctivae normal.      Pupils: Pupils are equal, round, and reactive to light.   Cardiovascular:      Rate and Rhythm: Normal rate and regular rhythm.      Pulses: Normal pulses.      Heart sounds: Normal heart sounds. No murmur heard.    No friction rub. No gallop.   Pulmonary:      Effort: Pulmonary effort is normal. No respiratory distress.      Breath sounds: Normal breath sounds. No stridor. No wheezing, rhonchi or rales.      Comments: CTA   No acc m use  Decreased left base posteriorly  Chest:      Chest wall: No tenderness.   Abdominal:      General: Bowel sounds are normal. There is no distension.      Tenderness: There is no abdominal tenderness. There is no guarding.   Genitourinary:     Comments: Manley   Musculoskeletal:         General: No swelling. Normal range of motion.      Cervical back: Normal range of motion.      Right lower leg: No edema.      Left lower leg: No edema.   Skin:     General: Skin is warm and dry.      Capillary Refill: Capillary refill takes less than 2 seconds.      Coloration: Skin is not jaundiced.      Findings: No bruising.   Neurological:      General: No focal deficit present.      Mental Status: He is oriented to person, place, and time. Mental status is at baseline.      Cranial Nerves: No cranial nerve deficit.      Sensory: No sensory deficit.      Motor: No weakness.      Comments: TAY   Psychiatric:         Mood and Affect: Mood normal.         Behavior: Behavior normal.         Thought Content: Thought content normal.         Judgment: Judgment normal.      Comments: Calm          Laboratory    Recent Labs   Lab 04/14/22  0405   WBC 5.39   RBC 3.23*   HGB 10.2*   HCT 28.8*   PLT 67*    MCV 89   MCH 31.6*   MCHC 35.4       No results for input(s): GLUCOSE, CALCIUM, PROT, NA, K, CO2, CL, BUN, CREATININE, ALKPHOS, ALT, AST, BILITOT in the last 24 hours.    Invalid input(s):  ALBUMIN    No results for input(s): PT, INR, APTT in the last 24 hours.    No results for input(s): CPK, CPKMB, TROPONINI, MB in the last 24 hours.    Additional labs: reviewed    Microbiology:       Microbiology Results (last 7 days)     Procedure Component Value Units Date/Time    Blood culture [[id number]] Collected: 04/10/22 1738    Order Status: Completed Specimen: Blood from Peripheral, Hand, Left Updated: 04/13/22 1832     Blood Culture, Routine No Growth to date      No Growth to date      No Growth to date      No Growth to date    Blood culture [[id number]] Collected: 04/10/22 1738    Order Status: Completed Specimen: Blood from Peripheral, Forearm, Left Updated: 04/13/22 1832     Blood Culture, Routine No Growth to date      No Growth to date      No Growth to date      No Growth to date    Culture, Respiratory with Gram Stain [[id number]] Collected: 04/10/22 1541    Order Status: Completed Specimen: Respiratory from Endotracheal Aspirate Updated: 04/12/22 0723     Respiratory Culture Reduced normal respiratory annette     Gram Stain (Respiratory) No WBC's or organisms seen    Culture, Respiratory with Gram Stain [[id number]] Collected: 04/10/22 1906    Order Status: Canceled Specimen: Sputum, Expectorated           Radiology    Fl Modified Barium Swallow Speech  CMS MANDATED QUALITY DATA-FLUOROSCOPY - 145    Fluoroscopy Time: 2.2 minutes  Number of Images:  4  Fluoroscopy Dose:  Unavailable    Modified barium swallow    HISTORY: Aspiration.    The patient is given barium of multiple viscosities to swallow under the direction of the speech pathologist and the swallowing mechanism is evaluated fluoroscopically.    There is moderate pharyngeal stasis with various barium preparations. Episodes of penetration are observed with  thin and nectar thick barium liquids without lavern aspiration.    IMPRESSION:    See above. 4 report by speech pathology to follow.    Electronically signed by:  Sheila Buitrago MD  4/12/2022 3:58 PM CDT Workstation: [id number]        Additional Studies    reviewed    Ventilator Information              No results for input(s): PH, PCO2, PO2, HCO3, POCSATURATED, BE in the last 72 hours.      Impression    Active Hospital Problems    Diagnosis  POA    *Acute hypoxemic respiratory failure [J96.01]  Yes    Stridor [R06.1]  Yes    Aspiration pneumonia [J69.0]  Yes    Thrombocytopenia [D69.6]  Yes    Troponin level elevated [R77.8]  Yes    S/P cervical spinal fusion [Z98.1]  Not Applicable    History of lung cancer - ANDRES NSCLC 2004 [Z85.118]  Not Applicable    Iron deficiency anemia [D50.9]  Yes    CLL (chronic lymphocytic leukemia) [C91.10]  Yes      Resolved Hospital Problems   No resolved problems to display.       Plan    · Stable respiratory status, wean O2 as able, will need to check O2 needs prior to DC  · Continue antibiotics for now  · May need evaluation of left diaphragm (? Paresis) - could be done as outpt  · Watch platelets (they have been chronically low)  · Post op care per Dr Tillman  · Awaiting PEG  · Increase activity as able    Dr. Wright is covering for tomorrow, please call if needed.  I will return next week.        Norman Pope MD  [facility] Pulmonary/Critical Care

## 2022-04-14 NOTE — SUBJECTIVE & OBJECTIVE
Interval History:     Review of Systems   Constitutional:  Negative for activity change and appetite change.   HENT:  Negative for congestion and dental problem.    Eyes:  Negative for discharge and itching.   Respiratory:  Negative for shortness of breath.    Cardiovascular:  Negative for chest pain.   Gastrointestinal:  Negative for abdominal distention and abdominal pain.   Endocrine: Negative for cold intolerance.   Genitourinary:  Negative for difficulty urinating and dysuria.   Musculoskeletal:  Positive for arthralgias. Negative for back pain.   Skin:  Negative for color change.   Neurological:  Negative for dizziness and facial asymmetry.   Hematological:  Negative for adenopathy.   Psychiatric/Behavioral:  Negative for agitation and behavioral problems.    Objective:     Vital Signs (Most Recent):  Temp: 96.5 °F (35.8 °C) (04/14/22 1221)  Pulse: 90 (04/14/22 1353)  Resp: 20 (04/14/22 1353)  BP: (!) 154/71 (04/14/22 1235)  SpO2: 99 % (04/14/22 1353)   Vital Signs (24h Range):  Temp:  [96.5 °F (35.8 °C)-98 °F (36.7 °C)] 96.5 °F (35.8 °C)  Pulse:  [] 90  Resp:  [16-34] 20  SpO2:  [97 %-100 %] 99 %  BP: (153-182)/(71-88) 154/71     Weight: 90.1 kg (198 lb 10.2 oz)  Body mass index is 25.5 kg/m².    Intake/Output Summary (Last 24 hours) at 4/14/2022 1358  Last data filed at 4/14/2022 0803  Gross per 24 hour   Intake 50 ml   Output 650 ml   Net -600 ml      Physical Exam  Vitals and nursing note reviewed.   Constitutional:       Appearance: He is well-developed.   HENT:      Head: Atraumatic.      Right Ear: External ear normal.      Left Ear: External ear normal.      Nose: Nose normal.      Mouth/Throat:      Mouth: Mucous membranes are moist.   Eyes:      General: No scleral icterus.  Neck:      Comments: Neck collar   Cardiovascular:      Rate and Rhythm: Normal rate.   Pulmonary:      Effort: Pulmonary effort is normal.   Abdominal:      General: There is no distension.   Musculoskeletal:          General: Normal range of motion.      Cervical back: Full passive range of motion without pain.   Skin:     General: Skin is warm.   Neurological:      Mental Status: He is alert and oriented to person, place, and time.   Psychiatric:         Behavior: Behavior normal.       Significant Labs: All pertinent labs within the past 24 hours have been reviewed.  CBC:   Recent Labs   Lab 04/13/22 0348 04/14/22  0405   WBC 6.97 5.39   HGB 9.8* 10.2*   HCT 27.5* 28.8*   PLT 66* 67*     CMP:   Recent Labs   Lab 04/13/22  0348      K 3.7      CO2 24   *   BUN 17   CREATININE 0.7   CALCIUM 8.5*   PROT 6.0   ALBUMIN 3.2*   BILITOT 1.6*   ALKPHOS 33*   AST 13   ALT 12   ANIONGAP 11   EGFRNONAA >60.0       Significant Imaging: I have reviewed all pertinent imaging results/findings within the past 24 hours.

## 2022-04-14 NOTE — PLAN OF CARE
04/14/22 1444   Home Oxygen Qualification   $ Home O2 Qualification Tech time 15 minutes   Room Air SpO2 At Rest 96 %   Room Air SpO2 During Ambulation 93 %   Home O2 Eval Comments   (home o2 not needed at this time)

## 2022-04-14 NOTE — PROGRESS NOTES
Progress Note  Neurospine    Admit Date: 4/10/2022   LOS: 4 days     SUBJECTIVE:     Follow-up For:  Cervical fusion     Pt doing well awaiting PEG tube.    Scheduled Meds:   albuterol-ipratropium  3 mL Nebulization Q6H    azelastine  1 spray Nasal BID    fluticasone propionate  2 spray Each Nostril Daily    piperacillin-tazobactam (ZOSYN) IVPB  3.375 g Intravenous Q8H    traZODone  100 mg Oral Nightly    valsartan  80 mg Oral Daily     Continuous Infusions:   sodium chloride 0.9% 75 mL/hr at 04/14/22 1220     PRN Meds:ALPRAZolam, calcium chloride IVPB, calcium chloride IVPB, calcium chloride IVPB, dextrose 50%, dextrose 50%, dextrose 50%, dextrose 50%, glucagon (human recombinant), glucose, glucose, hydrALAZINE, HYDROmorphone, HYDROmorphone, insulin aspart U-100, magnesium oxide, magnesium sulfate IVPB, magnesium sulfate IVPB, magnesium sulfate IVPB, magnesium sulfate IVPB, melatonin, naloxone, ondansetron, polyethylene glycol, potassium chloride in water, potassium chloride in water, potassium chloride in water, potassium chloride in water, potassium chloride, potassium chloride, potassium chloride, potassium chloride, sodium chloride 0.9%, sodium phosphate IVPB, sodium phosphate IVPB, sodium phosphate IVPB, sodium phosphate IVPB, sodium phosphate IVPB, traZODone    Review of patient's allergies indicates:  No Known Allergies    OBJECTIVE:     Vital Signs (Most Recent)  Temp: 96.5 °F (35.8 °C) (04/14/22 1221)  Pulse: 90 (04/14/22 1353)  Resp: 20 (04/14/22 1353)  BP: (!) 154/71 (04/14/22 1235)  SpO2: 99 % (04/14/22 1353)    Vital Signs Range (Last 24H):  Temp:  [96.5 °F (35.8 °C)-98 °F (36.7 °C)]   Pulse:  []   Resp:  [16-34]   BP: (153-182)/(71-88)   SpO2:  [97 %-100 %]     I & O (Last 24H):    Intake/Output Summary (Last 24 hours) at 4/14/2022 3629  Last data filed at 4/14/2022 0803  Gross per 24 hour   Intake 0 ml   Output 650 ml   Net -650 ml     Physical Exam:  jorge 5/5 sen I   Dressings  intact    Lines/Drains:       Peripheral IV - Single Lumen 04/08/22 20 G Left Forearm (Active)   Site Assessment Clean;Dry;Intact 04/14/22 1200   Extremity Assessment Distal to IV No abnormal discoloration;No redness;No warmth;No swelling 04/13/22 1925   Line Status Flushed;Saline locked 04/13/22 1925   Dressing Status Clean;Dry;Intact 04/13/22 1925   Dressing Intervention Integrity maintained 04/13/22 1925   Dressing Change Due 04/14/22 04/12/22 1101   Site Change Due 04/14/22 04/12/22 0701   Reason Not Rotated Not due 04/12/22 1101   Number of days: 6            Peripheral IV - Single Lumen 20 G Right Hand (Active)   Site Assessment Clean;Dry;Intact 04/14/22 1200   Extremity Assessment Distal to IV No abnormal discoloration;No redness;No swelling;No warmth 04/13/22 1925   Line Status Flushed;Saline locked 04/13/22 1925   Dressing Status Clean;Dry;Intact 04/13/22 1925   Dressing Intervention Integrity maintained 04/13/22 1925   Dressing Change Due 04/14/22 04/12/22 1101   Site Change Due 04/14/22 04/12/22 0701   Reason Not Rotated Not due 04/11/22 1501   Number of days:             Closed/Suction Drain 04/08/22 1200 Posterior Neck Other (Comment) (Active)   Site Description Unable to view 04/12/22 1101   Dressing Type Gauze 04/12/22 1101   Dressing Status Dry;Intact;Clean 04/12/22 1101   Dressing Intervention Integrity maintained 04/12/22 1101   Drainage Dark red 04/12/22 1101   Status Other (Comment) 04/12/22 0301   Output (mL) 20 mL 04/12/22 0600   Number of days: 6            Closed/Suction Drain 04/08/22 1200 Midline;Posterior Back Other (Comment) (Active)   Site Description Unable to view 04/12/22 0701   Dressing Type Gauze 04/13/22 0916   Dressing Status Dry;Clean;Intact 04/13/22 0916   Dressing Intervention Integrity maintained 04/13/22 0916   Drainage Dark red 04/13/22 0916   Status Other (Comment) 04/12/22 0301   Output (mL) 5 mL 04/12/22 0600   Number of days: 6       Laboratory:  I have reviewed all  pertinent lab results within the past 24 hours.  none    Diagnostic Results:  none    ASSESSMENT/PLAN:     Assessment: patient neurologically stable.    Plan: PEG today then likely discharge.      CONNIE Tillman

## 2022-04-14 NOTE — ASSESSMENT & PLAN NOTE
Intubated and got extubated on 04/11 AM  Continue iv abx for presumed aspiration pneumonia which can be stopped tomorrow  Maintain NPO status because MBSS confirmed severe aspiration risk  Multiple attempts for NG tube insertion was unsuccessful  Pt agreed with PEG tube insertion

## 2022-04-15 LAB
ALBUMIN SERPL BCP-MCNC: 3.3 G/DL (ref 3.5–5.2)
ALP SERPL-CCNC: 37 U/L (ref 55–135)
ALT SERPL W/O P-5'-P-CCNC: 14 U/L (ref 10–44)
ANION GAP SERPL CALC-SCNC: 10 MMOL/L (ref 8–16)
AST SERPL-CCNC: 12 U/L (ref 10–40)
BACTERIA BLD CULT: NORMAL
BACTERIA BLD CULT: NORMAL
BASOPHILS # BLD AUTO: 0.02 K/UL (ref 0–0.2)
BASOPHILS NFR BLD: 0.3 % (ref 0–1.9)
BILIRUB SERPL-MCNC: 1.6 MG/DL (ref 0.1–1)
BUN SERPL-MCNC: 12 MG/DL (ref 8–23)
CALCIUM SERPL-MCNC: 8.7 MG/DL (ref 8.7–10.5)
CHLORIDE SERPL-SCNC: 106 MMOL/L (ref 95–110)
CO2 SERPL-SCNC: 24 MMOL/L (ref 23–29)
CREAT SERPL-MCNC: 0.7 MG/DL (ref 0.5–1.4)
DIFFERENTIAL METHOD: ABNORMAL
EOSINOPHIL # BLD AUTO: 0.1 K/UL (ref 0–0.5)
EOSINOPHIL NFR BLD: 0.9 % (ref 0–8)
ERYTHROCYTE [DISTWIDTH] IN BLOOD BY AUTOMATED COUNT: 13 % (ref 11.5–14.5)
EST. GFR  (AFRICAN AMERICAN): >60 ML/MIN/1.73 M^2
EST. GFR  (NON AFRICAN AMERICAN): >60 ML/MIN/1.73 M^2
GLUCOSE SERPL-MCNC: 145 MG/DL (ref 70–110)
GLUCOSE SERPL-MCNC: 149 MG/DL (ref 70–110)
GLUCOSE SERPL-MCNC: 159 MG/DL (ref 70–110)
GLUCOSE SERPL-MCNC: 169 MG/DL (ref 70–110)
GLUCOSE SERPL-MCNC: 203 MG/DL (ref 70–110)
HCT VFR BLD AUTO: 29 % (ref 40–54)
HGB BLD-MCNC: 9.9 G/DL (ref 14–18)
IMM GRANULOCYTES # BLD AUTO: 0.28 K/UL (ref 0–0.04)
IMM GRANULOCYTES NFR BLD AUTO: 4.4 % (ref 0–0.5)
LYMPHOCYTES # BLD AUTO: 1.7 K/UL (ref 1–4.8)
LYMPHOCYTES NFR BLD: 26.9 % (ref 18–48)
MAGNESIUM SERPL-MCNC: 1.9 MG/DL (ref 1.6–2.6)
MCH RBC QN AUTO: 30.7 PG (ref 27–31)
MCHC RBC AUTO-ENTMCNC: 34.1 G/DL (ref 32–36)
MCV RBC AUTO: 90 FL (ref 82–98)
MONOCYTES # BLD AUTO: 1.2 K/UL (ref 0.3–1)
MONOCYTES NFR BLD: 18.8 % (ref 4–15)
NEUTROPHILS # BLD AUTO: 3.1 K/UL (ref 1.8–7.7)
NEUTROPHILS NFR BLD: 48.7 % (ref 38–73)
NRBC BLD-RTO: 0 /100 WBC
PLATELET # BLD AUTO: 80 K/UL (ref 150–450)
PMV BLD AUTO: 10.6 FL (ref 9.2–12.9)
POTASSIUM SERPL-SCNC: 3.4 MMOL/L (ref 3.5–5.1)
PROT SERPL-MCNC: 6.6 G/DL (ref 6–8.4)
RBC # BLD AUTO: 3.22 M/UL (ref 4.6–6.2)
SODIUM SERPL-SCNC: 140 MMOL/L (ref 136–145)
WBC # BLD AUTO: 6.39 K/UL (ref 3.9–12.7)

## 2022-04-15 PROCEDURE — 94799 UNLISTED PULMONARY SVC/PX: CPT

## 2022-04-15 PROCEDURE — 25000003 PHARM REV CODE 250: Performed by: INTERNAL MEDICINE

## 2022-04-15 PROCEDURE — 80053 COMPREHEN METABOLIC PANEL: CPT | Performed by: INTERNAL MEDICINE

## 2022-04-15 PROCEDURE — 21400001 HC TELEMETRY ROOM

## 2022-04-15 PROCEDURE — 25000242 PHARM REV CODE 250 ALT 637 W/ HCPCS: Performed by: INTERNAL MEDICINE

## 2022-04-15 PROCEDURE — 97116 GAIT TRAINING THERAPY: CPT | Mod: CQ

## 2022-04-15 PROCEDURE — 36415 COLL VENOUS BLD VENIPUNCTURE: CPT | Performed by: INTERNAL MEDICINE

## 2022-04-15 PROCEDURE — 99900035 HC TECH TIME PER 15 MIN (STAT)

## 2022-04-15 PROCEDURE — 63600175 PHARM REV CODE 636 W HCPCS: Performed by: INTERNAL MEDICINE

## 2022-04-15 PROCEDURE — 92526 ORAL FUNCTION THERAPY: CPT

## 2022-04-15 PROCEDURE — 27000221 HC OXYGEN, UP TO 24 HOURS

## 2022-04-15 PROCEDURE — 99900031 HC PATIENT EDUCATION (STAT)

## 2022-04-15 PROCEDURE — 85025 COMPLETE CBC W/AUTO DIFF WBC: CPT | Performed by: INTERNAL MEDICINE

## 2022-04-15 PROCEDURE — 94640 AIRWAY INHALATION TREATMENT: CPT

## 2022-04-15 PROCEDURE — 83735 ASSAY OF MAGNESIUM: CPT | Performed by: INTERNAL MEDICINE

## 2022-04-15 PROCEDURE — 94761 N-INVAS EAR/PLS OXIMETRY MLT: CPT

## 2022-04-15 RX ORDER — LORAZEPAM 2 MG/ML
0.25 INJECTION INTRAMUSCULAR EVERY 6 HOURS PRN
Status: DISCONTINUED | OUTPATIENT
Start: 2022-04-15 | End: 2022-04-18 | Stop reason: HOSPADM

## 2022-04-15 RX ORDER — IPRATROPIUM BROMIDE AND ALBUTEROL SULFATE 2.5; .5 MG/3ML; MG/3ML
3 SOLUTION RESPIRATORY (INHALATION) EVERY 6 HOURS PRN
Status: DISCONTINUED | OUTPATIENT
Start: 2022-04-15 | End: 2022-04-18 | Stop reason: HOSPADM

## 2022-04-15 RX ADMIN — IPRATROPIUM BROMIDE AND ALBUTEROL SULFATE 3 ML: .5; 3 SOLUTION RESPIRATORY (INHALATION) at 01:04

## 2022-04-15 RX ADMIN — HYDROMORPHONE HYDROCHLORIDE 1 MG: 1 INJECTION, SOLUTION INTRAMUSCULAR; INTRAVENOUS; SUBCUTANEOUS at 12:04

## 2022-04-15 RX ADMIN — INSULIN ASPART 1 UNITS: 100 INJECTION, SOLUTION INTRAVENOUS; SUBCUTANEOUS at 07:04

## 2022-04-15 RX ADMIN — HYDROMORPHONE HYDROCHLORIDE 1 MG: 1 INJECTION, SOLUTION INTRAMUSCULAR; INTRAVENOUS; SUBCUTANEOUS at 06:04

## 2022-04-15 RX ADMIN — FLUTICASONE PROPIONATE 100 MCG: 50 SPRAY, METERED NASAL at 10:04

## 2022-04-15 RX ADMIN — INSULIN ASPART 1 UNITS: 100 INJECTION, SOLUTION INTRAVENOUS; SUBCUTANEOUS at 12:04

## 2022-04-15 RX ADMIN — PIPERACILLIN AND TAZOBACTAM 3.38 G: 3; .375 INJECTION, POWDER, LYOPHILIZED, FOR SOLUTION INTRAVENOUS; PARENTERAL at 02:04

## 2022-04-15 RX ADMIN — ONDANSETRON 4 MG: 2 INJECTION INTRAMUSCULAR; INTRAVENOUS at 05:04

## 2022-04-15 RX ADMIN — IPRATROPIUM BROMIDE AND ALBUTEROL SULFATE 3 ML: .5; 3 SOLUTION RESPIRATORY (INHALATION) at 02:04

## 2022-04-15 RX ADMIN — HYDROMORPHONE HYDROCHLORIDE 0.5 MG: 1 INJECTION, SOLUTION INTRAMUSCULAR; INTRAVENOUS; SUBCUTANEOUS at 02:04

## 2022-04-15 RX ADMIN — ONDANSETRON 4 MG: 2 INJECTION INTRAMUSCULAR; INTRAVENOUS at 09:04

## 2022-04-15 RX ADMIN — LORAZEPAM 0.25 MG: 2 INJECTION INTRAMUSCULAR; INTRAVENOUS at 03:04

## 2022-04-15 RX ADMIN — HYDROMORPHONE HYDROCHLORIDE 0.5 MG: 1 INJECTION, SOLUTION INTRAMUSCULAR; INTRAVENOUS; SUBCUTANEOUS at 08:04

## 2022-04-15 RX ADMIN — HYDROMORPHONE HYDROCHLORIDE 0.5 MG: 1 INJECTION, SOLUTION INTRAMUSCULAR; INTRAVENOUS; SUBCUTANEOUS at 03:04

## 2022-04-15 RX ADMIN — ONDANSETRON 4 MG: 2 INJECTION INTRAMUSCULAR; INTRAVENOUS at 03:04

## 2022-04-15 RX ADMIN — HYDROMORPHONE HYDROCHLORIDE 0.5 MG: 1 INJECTION, SOLUTION INTRAMUSCULAR; INTRAVENOUS; SUBCUTANEOUS at 09:04

## 2022-04-15 RX ADMIN — IPRATROPIUM BROMIDE AND ALBUTEROL SULFATE 3 ML: .5; 3 SOLUTION RESPIRATORY (INHALATION) at 08:04

## 2022-04-15 RX ADMIN — AZELASTINE HYDROCHLORIDE 137 MCG: 137 SPRAY, METERED NASAL at 10:04

## 2022-04-15 RX ADMIN — HYDROMORPHONE HYDROCHLORIDE 1 MG: 1 INJECTION, SOLUTION INTRAMUSCULAR; INTRAVENOUS; SUBCUTANEOUS at 05:04

## 2022-04-15 RX ADMIN — PIPERACILLIN AND TAZOBACTAM 3.38 G: 3; .375 INJECTION, POWDER, LYOPHILIZED, FOR SOLUTION INTRAVENOUS; PARENTERAL at 10:04

## 2022-04-15 NOTE — ASSESSMENT & PLAN NOTE
Intubated and got extubated on 04/11 AM  Continue iv abx for presumed aspiration pneumonia which can be stopped tomorrow  Maintain NPO status because MBSS confirmed severe aspiration risk  Multiple attempts for NG tube insertion was unsuccessful  Pt agreed with PEG tube insertion   After PEG tube insertion and after initiation of feeds, pt can go home with resumption of HH care

## 2022-04-15 NOTE — SUBJECTIVE & OBJECTIVE
Interval History:     Review of Systems   Constitutional:  Negative for activity change and appetite change.   HENT:  Negative for congestion and dental problem.    Eyes:  Negative for discharge and itching.   Respiratory:  Negative for shortness of breath.    Cardiovascular:  Negative for chest pain.   Gastrointestinal:  Negative for abdominal distention and abdominal pain.   Endocrine: Negative for cold intolerance.   Genitourinary:  Negative for difficulty urinating and dysuria.   Musculoskeletal:  Negative for arthralgias and back pain.   Skin:  Negative for color change.   Neurological:  Negative for dizziness and facial asymmetry.   Hematological:  Negative for adenopathy.   Psychiatric/Behavioral:  Negative for agitation and behavioral problems.    Objective:     Vital Signs (Most Recent):  Temp: 98.1 °F (36.7 °C) (04/15/22 1515)  Pulse: 101 (04/15/22 1515)  Resp: 20 (04/15/22 1515)  BP: (!) 163/95 (04/15/22 1515)  SpO2: (!) 93 % (04/15/22 1515)   Vital Signs (24h Range):  Temp:  [96.5 °F (35.8 °C)-98.1 °F (36.7 °C)] 98.1 °F (36.7 °C)  Pulse:  [] 101  Resp:  [13-31] 20  SpO2:  [91 %-97 %] 93 %  BP: (120-163)/(58-95) 163/95     Weight: 90.1 kg (198 lb 10.2 oz)  Body mass index is 25.5 kg/m².    Intake/Output Summary (Last 24 hours) at 4/15/2022 1809  Last data filed at 4/15/2022 0400  Gross per 24 hour   Intake --   Output 402 ml   Net -402 ml      Physical Exam  Vitals and nursing note reviewed.   Constitutional:       Appearance: He is well-developed.   HENT:      Head: Atraumatic.      Right Ear: External ear normal.      Left Ear: External ear normal.      Nose: Nose normal.      Mouth/Throat:      Mouth: Mucous membranes are moist.   Eyes:      General: No scleral icterus.  Cardiovascular:      Rate and Rhythm: Normal rate.   Pulmonary:      Effort: Pulmonary effort is normal.   Abdominal:      General: There is no distension.   Musculoskeletal:         General: Normal range of motion.      Cervical  back: Full passive range of motion without pain and normal range of motion.   Skin:     General: Skin is warm.   Neurological:      Mental Status: He is alert and oriented to person, place, and time.   Psychiatric:         Behavior: Behavior normal.       Significant Labs: All pertinent labs within the past 24 hours have been reviewed.  CBC:   Recent Labs   Lab 04/14/22  0405 04/15/22  0557   WBC 5.39 6.39   HGB 10.2* 9.9*   HCT 28.8* 29.0*   PLT 67* 80*     CMP:   Recent Labs   Lab 04/15/22  0557      K 3.4*      CO2 24   *   BUN 12   CREATININE 0.7   CALCIUM 8.7   PROT 6.6   ALBUMIN 3.3*   BILITOT 1.6*   ALKPHOS 37*   AST 12   ALT 14   ANIONGAP 10   EGFRNONAA >60.0       Significant Imaging: I have reviewed all pertinent imaging results/findings within the past 24 hours.

## 2022-04-15 NOTE — CARE UPDATE
04/14/22 2220   Patient Assessment/Suction   Level of Consciousness (AVPU) alert   PRE-TX-O2   O2 Device (Oxygen Therapy) CPAP  (home unit)   Oxygen Concentration (%) 21   SpO2 95 %   Pulse Oximetry Type Intermittent   Pulse 102   Resp 20   Respiratory Interventions   NPPV/CPAP Maintenance home PAP equipment/settings used;mask adjusted;mask secure   Ready to Wean/Extubation Screen   FIO2<=50 (chart decimal) 0.21   Respiratory Evaluation   $ Care Plan Tech Time 15 min   $ Eval/Re-eval Charges Re-evaluation   Evaluation For   (care plan)   Pt ready for bed. Assisted pt from chair to bed. Assisted with home cpap unit.

## 2022-04-15 NOTE — PROGRESS NOTES
[facility] Medicine  Progress Note    Patient Name: Conrad Kuhn  MRN: [id number]  Patient Class: IP- Inpatient   Admission Date: 4/10/2022  Length of Stay: 5 days  Attending Physician: Efrain Cam MD  Primary Care Provider: Johnson Erazo MD        Subjective:     Principal Problem:Acute hypoxemic respiratory failure        HPI:  81 year old male transferred from [facility] secondary to acute hypoxic respiratory failure.  He is intubated at that time of my exam and his wife is at bedside to supplement history.  History also supplemented  by ED MD and chart.  Patient had  C3-7 anterior posterior cervical fusion approximately two days ago.  His wife noted that he was having SOB yesterday as well as shoulder pain and neck pain. He even expressed to her that he felt he may be getting pneumonia. She reported he did choke when trying to drink a milk shake yesterday.  Patient had return of SOB today.  EMS received report that he got some ativan around noon and was going for a CT scan when saturations dropped into the 70s.  He was transported to [facility].  Per ED MD,  patient sounded as if he had stridor.  He was electively intubated and thick, bloody secretions were seen. Dr. Tillman with surgery was called and evaluated the patient and the surgical incision.  It did not appear this was the etiology of his respiratory distress and hypoxia. CT soft tissue of the neck revealed expected post operative changes with drains in place and subcutaneous gas present.  CT chest with patchy bibasilar opacities with aspiration pneumonia a consideration.     In the ED, WBC is 44.  The surgeon did express that he thought these post surgical patients got perioperative Decadron but I cannot confirm if that's the case presently. Temp is 100.7.  He was started on IV Vancomycin and IV Zosyn.      Overview/Hospital Course:  04/11  Pt got extubated  MBSS tmrw AM  On NPO status    04/12  MBSS confirmed severe  aspiration  NG tube feeding will be started  Pt denies any issues  Pt got transferred out of ICU     04/13  Pt walking around  Still in need of 4 L oxygen  Breathing status much improved  Multiple attempts yesterday for NG tube was unsuccessful  Pt agreed with PEG tube insertion    04/14  Awaiting PEG tube and Home oxygen assessment  C/o pain on operative area  Otherwise stable to go home    04/15  Per Home o2 assessment pt doesn't need home o2  PEG tube placement got postponed because Anesthesia service was not available  Otherwise no new issues  Completed iv abx Rx today       Interval History:     Review of Systems   Constitutional:  Negative for activity change and appetite change.   HENT:  Negative for congestion and dental problem.    Eyes:  Negative for discharge and itching.   Respiratory:  Negative for shortness of breath.    Cardiovascular:  Negative for chest pain.   Gastrointestinal:  Negative for abdominal distention and abdominal pain.   Endocrine: Negative for cold intolerance.   Genitourinary:  Negative for difficulty urinating and dysuria.   Musculoskeletal:  Negative for arthralgias and back pain.   Skin:  Negative for color change.   Neurological:  Negative for dizziness and facial asymmetry.   Hematological:  Negative for adenopathy.   Psychiatric/Behavioral:  Negative for agitation and behavioral problems.    Objective:     Vital Signs (Most Recent):  Temp: 98.1 °F (36.7 °C) (04/15/22 1515)  Pulse: 101 (04/15/22 1515)  Resp: 20 (04/15/22 1515)  BP: (!) 163/95 (04/15/22 1515)  SpO2: (!) 93 % (04/15/22 1515)   Vital Signs (24h Range):  Temp:  [96.5 °F (35.8 °C)-98.1 °F (36.7 °C)] 98.1 °F (36.7 °C)  Pulse:  [] 101  Resp:  [13-31] 20  SpO2:  [91 %-97 %] 93 %  BP: (120-163)/(58-95) 163/95     Weight: 90.1 kg (198 lb 10.2 oz)  Body mass index is 25.5 kg/m².    Intake/Output Summary (Last 24 hours) at 4/15/2022 0253  Last data filed at 4/15/2022 0400  Gross per 24 hour   Intake --   Output 402 ml    Net -402 ml      Physical Exam  Vitals and nursing note reviewed.   Constitutional:       Appearance: He is well-developed.   HENT:      Head: Atraumatic.      Right Ear: External ear normal.      Left Ear: External ear normal.      Nose: Nose normal.      Mouth/Throat:      Mouth: Mucous membranes are moist.   Eyes:      General: No scleral icterus.  Cardiovascular:      Rate and Rhythm: Normal rate.   Pulmonary:      Effort: Pulmonary effort is normal.   Abdominal:      General: There is no distension.   Musculoskeletal:         General: Normal range of motion.      Cervical back: Full passive range of motion without pain and normal range of motion.   Skin:     General: Skin is warm.   Neurological:      Mental Status: He is alert and oriented to person, place, and time.   Psychiatric:         Behavior: Behavior normal.       Significant Labs: All pertinent labs within the past 24 hours have been reviewed.  CBC:   Recent Labs   Lab 04/14/22  0405 04/15/22  0557   WBC 5.39 6.39   HGB 10.2* 9.9*   HCT 28.8* 29.0*   PLT 67* 80*     CMP:   Recent Labs   Lab 04/15/22  0557      K 3.4*      CO2 24   *   BUN 12   CREATININE 0.7   CALCIUM 8.7   PROT 6.6   ALBUMIN 3.3*   BILITOT 1.6*   ALKPHOS 37*   AST 12   ALT 14   ANIONGAP 10   EGFRNONAA >60.0       Significant Imaging: I have reviewed all pertinent imaging results/findings within the past 24 hours.      Assessment/Plan:      * Acute hypoxemic respiratory failure  Intubated and got extubated on 04/11 AM  Continue iv abx for presumed aspiration pneumonia which can be stopped tomorrow  Maintain NPO status because MBSS confirmed severe aspiration risk  Multiple attempts for NG tube insertion was unsuccessful  Pt agreed with PEG tube insertion   After PEG tube insertion and after initiation of feeds, pt can go home with resumption of  care     Aspiration pneumonia  Completed total of 5 day course of iv zosyn today      S/P cervical spinal fusion  C3-7  anterior posterior cervical fusion in [facility]   Stable       Troponin level elevated  Atypical   Denies chest pain   Due to Demand ischemia from hypoxia.   Resolved       Thrombocytopenia  Chronic issue in the background of CLL  Monitor closely       Stridor  Resolved       Iron deficiency anemia  Stable       History of lung cancer - ANDRES NSCLC 2004  Hx of ANDRES resection.  No acute issues.      CLL (chronic lymphocytic leukemia)  Chronic condition with no acute issues.      VTE Risk Mitigation (From admission, onward)         Ordered     IP VTE HIGH RISK PATIENT  Once         04/10/22 1905     Place sequential compression device  Until discontinued         04/10/22 1905                Discharge Planning   MARTELL: 4/18/2022     Code Status: Full Code   Is the patient medically ready for discharge?: No    Reason for patient still in hospital (select all that apply): Treatment  Discharge Plan A: Home with family, Home Health                  Efrain Cam MD  Department of Hospital Medicine   [facility]

## 2022-04-15 NOTE — CARE UPDATE
04/14/22 2000   Patient Assessment/Suction   Level of Consciousness (AVPU) alert   Respiratory Effort Unlabored   Expansion/Accessory Muscles/Retractions expansion symmetric;no retractions;no use of accessory muscles   All Lung Fields Breath Sounds clear   ANDRES Breath Sounds absent   Rhythm/Pattern, Respiratory no shortness of breath reported;pattern regular;unlabored   PRE-TX-O2   O2 Device (Oxygen Therapy) room air   SpO2 (!) 94 %   Pulse Oximetry Type Continuous   $ Pulse Oximetry - Multiple Charge Pulse Oximetry - Multiple   Pulse 102   Resp 20   Positioning   Body Position position changed independently   Aerosol Therapy   $ Aerosol Therapy Charges Aerosol Treatment   Daily Review of Necessity (SVN) completed   Respiratory Treatment Status (SVN) given   Treatment Route (SVN) mask;oxygen   Patient Position (SVN) sitting in chair   Post Treatment Assessment (SVN) breath sounds unchanged   Signs of Intolerance (SVN) none   Breath Sounds Post-Respiratory Treatment   Throughout All Fields Post-Treatment All Fields   Throughout All Fields Post-Treatment no change   Post-treatment Heart Rate (beats/min) 98   Post-treatment Resp Rate (breaths/min) 18   Respiratory Interventions   Cough And Deep Breathing done with encouragement   Breathing Techniques/Airway Clearance deep/controlled cough encouraged;diaphragmatic breathing promoted;pursed-lip breathing encouraged   NPPV/CPAP Maintenance mask adjusted;mask secure;home PAP equipment/settings used   Preset CPAP/BiPAP Settings   Mode Of Delivery CPAP  (home unit)   Education   $ Education Bronchodilator;Other (see comment);15 min  (o2, pox,deep diaphragmatic breathing exercises)   Respiratory Evaluation   $ Care Plan Tech Time 15 min   $ Eval/Re-eval Charges Re-evaluation   Evaluation For   (care plan)   Home Oxygen   Has Home Oxygen? No   Home Aerosol, MDI, DPI, and Other Treatments/Therapies   Home Respiratory Therapy Per Patient/Review of Chart Yes   Aerosol Home  Meds/Freq albuterol/ prn

## 2022-04-15 NOTE — PT/OT/SLP PROGRESS
Physical Therapy Treatment    Patient Name:  Conrad Kuhn   MRN:  [id number]    Recommendations:     Discharge Recommendations:  home health PT   Discharge Equipment Recommendations: none   Barriers to discharge: increased assist with mobility    Assessment:     Conrad Kuhn is a 81 y.o. male admitted with a medical diagnosis of Acute hypoxemic respiratory failure.  He presents with the following impairments/functional limitations:  weakness, impaired endurance, impaired self care skills, impaired functional mobilty, gait instability, impaired balance, orthopedic precautions.  Pt agreeable to visit. Pt eager to ambulate. Pt on RA upon entry into room with sats 87%. Nasal cannula placed back on pt with 4 L/m O2 with sats 92%. Double checked sats with second pulse ox, sats 99%. Noted that continuous pulse ox was loose on pt, RN informed. Pt ambulated on 3 L/m O2 with sats 98% throughout. Pt tolerated session well.    Rehab Prognosis: Fair; patient would benefit from acute skilled PT services to address these deficits and reach maximum level of function.    Recent Surgery: Procedure(s) (LRB):  EGD (ESOPHAGOGASTRODUODENOSCOPY) (N/A)      Plan:     During this hospitalization, patient to be seen 6 x/week to address the identified rehab impairments via gait training, therapeutic activities, therapeutic exercises, neuromuscular re-education and progress toward the following goals:    · Plan of Care Expires:  05/13/22    Subjective     Chief Complaint: feeling tired today  Patient/Family Comments/goals: return home  Pain/Comfort:  · Pain Rating 1: 0/10      Objective:     Communicated with RN prior to session.  Patient found up in chair with telemetry, pulse ox (continuous), peripheral IV, blood pressure cuff, oxygen upon PT entry to room.     General Precautions: Standard, fall   Orthopedic Precautions:spinal precautions   Braces: Aspen collar  Respiratory Status: Nasal cannula, flow 3 L/min     Functional  Mobility:  · Transfers:     · Sit to Stand:  contact guard assistance with rolling walker  · Gait: x 200' with RW and CGA on 3 L/m O2 with sats 98%.      AM-PAC 6 CLICK MOBILITY          Therapeutic Activities and Exercises:   Pt educated on importance of time OOB, importance of intermittent mobility, safe techniques for transfers/ambulation, discharge recommendations/options, and use of call light for assistance and fall prevention.      Patient left up in chair with all lines intact, call button in reach and RN notified..    GOALS:   Multidisciplinary Problems     Physical Therapy Goals        Problem: Physical Therapy    Goal Priority Disciplines Outcome Goal Variances Interventions   Physical Therapy Goal     PT, PT/OT Ongoing, Progressing     Description: Goals to be met by: Discharge     Patient will increase functional independence with mobility by performin. Supine to sit with Independent  2. Sit to stand transfer with Supervision  3. Bed to chair transfer with Supervision using Rolling Walker  4. Gait  x 150 feet with Supervision using Rolling Walker.                          Time Tracking:     PT Received On: 04/15/22  PT Start Time: 937     PT Stop Time: 954  PT Total Time (min): 17 min     Billable Minutes: Gait Training 17    Treatment Type: Treatment  PT/PTA: PTA     PTA Visit Number: 3     04/15/2022

## 2022-04-15 NOTE — CARE UPDATE
CONTINUE THERAPY   04/15/22 1416   Patient Assessment/Suction   Level of Consciousness (AVPU) alert   Respiratory Effort Normal;Unlabored   Expansion/Accessory Muscles/Retractions no use of accessory muscles;expansion symmetric   All Lung Fields Breath Sounds clear;diminished   Rhythm/Pattern, Respiratory unlabored;depth regular   Cough Frequency no cough   PRE-TX-O2   O2 Device (Oxygen Therapy) room air   SpO2 97 %   Pulse Oximetry Type Continuous   $ Pulse Oximetry - Multiple Charge Pulse Oximetry - Multiple   Pulse 95   Resp 18   Aerosol Therapy   $ Aerosol Therapy Charges Aerosol Treatment   Daily Review of Necessity (SVN) completed   Respiratory Treatment Status (SVN) given   Treatment Route (SVN) oxygen;mask   Patient Position (SVN) semi-Zavaleta's   Post Treatment Assessment (SVN) breath sounds unchanged   Signs of Intolerance (SVN) none   Breath Sounds Post-Respiratory Treatment   Throughout All Fields Post-Treatment All Fields   Throughout All Fields Post-Treatment no change   Post-treatment Heart Rate (beats/min) 90   Post-treatment Resp Rate (breaths/min) 16

## 2022-04-15 NOTE — PT/OT/SLP PROGRESS
Speech Language Pathology Treatment    Patient Name:  Conrad Kuhn   MRN:  [id number]  Admitting Diagnosis: Acute hypoxemic respiratory failure    Recommendations:                 General Recommendations:  Dysphagia therapy  Diet recommendations:  NPO, Liquid Diet Level: NPO   Aspiration Precautions: Frequent oral care and Ice chips sparingly   General Precautions: Standard, NPO, aspiration  Communication strategies:  communication board    Subjective     · Pt eagerly awaiting PEG placement. He was oriented x4 and able to clearly communicate aspiration precautions.     Respiratory Status: Room air    Objective:     Has the patient been evaluated by SLP for swallowing?   Yes  Keep patient NPO? Yes   Current Respiratory Status:    Room Air    Pt instructed in use of incentive spirometer to improve airway clearance and cough strength; pt reported participation in pitch glide exercises during prior session. However, reported fatigue when encouraged to complete pitch glides, he was agreeable to use of incentive spirometer. He completed x3 sets of 3 and x1 set of 5 idependently.     Pt was knowledgeable of aspiration precautions. Education provided regarding necessity of ongoing ST services at next level of care.     Assessment:     Conrad Kuhn is a 81 y.o. male with an SLP diagnosis of Severe dysphagia s/p C3-7 ACDF negatively impacted by prevertebral soft tissue edema. Pt's swallow safety and efficiency impaired however, he demo good return for aspiration precaution training this date. Ongoing ST services recommended at next level of care.      Goals:   Multidisciplinary Problems     SLP Goals        Problem: SLP    Goal Priority Disciplines Outcome   SLP Goal     SLP Ongoing, Progressing   Description: 1. Pt will tolerate least restrictive PO diet without acute dysphagia pulmonary complication.   2. Pt will participate in VFSS to define swallow physiology; MET   3. Pt will complete effortful pitch glides to  improve hyolaryngeal lift and pharyngeal contraction                        Plan:     · Patient to be seen:  3 x/week   · Plan of Care expires:     · Plan of Care reviewed with:  patient   · SLP Follow-Up:  Yes       Discharge recommendations:  other (see comments) (Speech Therapy recommended at next level of care)   Barriers to Discharge:  None    Time Tracking:     SLP Treatment Date:   04/15/22  Speech Start Time:  1015  Speech Stop Time:  1028     Speech Total Time (min):  13 min    Billable Minutes: Treatment Swallowing Dysfunction 13min    04/15/2022

## 2022-04-15 NOTE — CARE UPDATE
04/15/22 0123   Patient Assessment/Suction   Level of Consciousness (AVPU)   (sleeping)   Respiratory Effort Unlabored   Expansion/Accessory Muscles/Retractions expansion symmetric;no retractions;no use of accessory muscles   All Lung Fields Breath Sounds clear   Rhythm/Pattern, Respiratory assisted mechanically   PRE-TX-O2   O2 Device (Oxygen Therapy) CPAP   SpO2 96 %   Pulse Oximetry Type Continuous   Pulse 86   Resp 16   Aerosol Therapy   $ Aerosol Therapy Charges Aerosol Treatment   Daily Review of Necessity (SVN) completed   Respiratory Treatment Status (SVN) given   Treatment Route (SVN) in-line;oxygen   Patient Position (SVN) semi-Zavaleta's   Post Treatment Assessment (SVN) breath sounds unchanged   Signs of Intolerance (SVN) none   Breath Sounds Post-Respiratory Treatment   Throughout All Fields Post-Treatment All Fields   Throughout All Fields Post-Treatment no change   Post-treatment Heart Rate (beats/min) 79   Post-treatment Resp Rate (breaths/min) 14   Respiratory Evaluation   $ Care Plan Tech Time 15 min   $ Eval/Re-eval Charges Re-evaluation   Evaluation For   (care plan)

## 2022-04-15 NOTE — PROGRESS NOTES
Progress Note  Gastroenterology/Hepatology    SUBJECTIVE:     Follow-up For:  dysphagia    HPI/Interval history: plan for PEG tube today but anesthesia unavailable due other surgical emergency cases.  PEG rescheduled for tomorrow morning.      PMH/FH/SH/Review of Systems: See H and P/Consult dated 4/10/2022    OBJECTIVE:     Vital Signs Range (Last 24H):  Temp:  [96.5 °F (35.8 °C)-97.5 °F (36.4 °C)]   Pulse:  []   Resp:  [13-31]   BP: (120-176)/(58-74)   SpO2:  [91 %-97 %]     I & O (Last 24H):  Intake/Output Summary (Last 24 hours) at 4/15/2022 1502  Last data filed at 4/15/2022 0400  Gross per 24 hour   Intake 610 ml   Output 902 ml   Net -292 ml       Physical Exam:  General: well developed, well nourished  Lungs:  clear to auscultation bilaterally and normal respiratory effort  Cardiovascular: Heart: regular rate and rhythm, S1, S2 normal, no murmur, click, rub or gallop. Chest Wall: no tenderness. Extremities: no cyanosis or edema, or clubbing. Pulses: 2+ and symmetric.  Abdomen/Rectal: Abdomen: soft, non-tender non-distented; bowel sounds normal; no masses,  no organomegaly. Rectal: not examined    Lab/X-ray Results:  CBC:   Recent Labs   Lab 04/15/22  0557   WBC 6.39   RBC 3.22*   HGB 9.9*   HCT 29.0*   PLT 80*   MCV 90   MCH 30.7   MCHC 34.1     BMP:   Recent Labs   Lab 04/15/22  0557   *      K 3.4*      CO2 24   BUN 12   CREATININE 0.7   CALCIUM 8.7   MG 1.9     CMP:   Recent Labs   Lab 04/15/22  0557   *   CALCIUM 8.7   ALBUMIN 3.3*   PROT 6.6      K 3.4*   CO2 24      BUN 12   CREATININE 0.7   ALKPHOS 37*   ALT 14   AST 12   BILITOT 1.6*     LFTs:   Recent Labs   Lab 04/15/22  0557   ALT 14   AST 12   ALKPHOS 37*   BILITOT 1.6*   PROT 6.6   ALBUMIN 3.3*     Coagulation:   Recent Labs   Lab 04/10/22  1457   INR 1.1   APTT 28.5       ASSESSMENT/PLAN:   This is a very pleasant 80yo M with recent neck surgery (C3-7 anterior posterior cervical fusion) admitted on  4/10 with shortness of breath and respiratory failure requiring intubation.  Also with damon-pharyngeal dysphagia since the surgery. Speech evaluation and MBSS confirmed severe OP dysphagia with aspiration.  Unable to place NG tube- GI consulted for placement of PEG.      1. OP dysphagia- plan was for PEG tube placement today but procedure has been rescheduled for tomorrow due to unavailability of anesthesia services today.  Will tentatively plan for PEG tube tomorrow morning.  Long discussion with patient and his wife, all questions answered.  Further recs regarding PEG tube management pending completion of procedure.  NPO at midnight, continue to hold blood thinners.         Thank you very much for the consult.  I will continue to follow.  Please do not hesitate to contact me with any questions or concerns.    Siddharth Garcia MD

## 2022-04-15 NOTE — PLAN OF CARE
04/15/22 0816   Patient Assessment/Suction   Level of Consciousness (AVPU) alert   Respiratory Effort Normal;Unlabored   Expansion/Accessory Muscles/Retractions no use of accessory muscles;no retractions;expansion symmetric   All Lung Fields Breath Sounds clear;diminished   Rhythm/Pattern, Respiratory unlabored;pattern regular;depth regular   Cough Frequency no cough   PRE-TX-O2   O2 Device (Oxygen Therapy) room air   SpO2 (!) 93 %   Pulse Oximetry Type Continuous   $ Pulse Oximetry - Multiple Charge Pulse Oximetry - Multiple   Pulse 93   Resp 18   Aerosol Therapy   $ Aerosol Therapy Charges Aerosol Treatment   Daily Review of Necessity (SVN) completed   Respiratory Treatment Status (SVN) given   Treatment Route (SVN) oxygen;mask   Patient Position (SVN) sitting in chair   Post Treatment Assessment (SVN) breath sounds unchanged   Signs of Intolerance (SVN) none   Breath Sounds Post-Respiratory Treatment   Throughout All Fields Post-Treatment All Fields   Throughout All Fields Post-Treatment no change   Post-treatment Heart Rate (beats/min) 78   Post-treatment Resp Rate (breaths/min) 18   Education   $ Education Bronchodilator;15 min

## 2022-04-15 NOTE — PLAN OF CARE
Problem: SLP  Goal: SLP Goal  Description: 1. Pt will tolerate least restrictive PO diet without acute dysphagia pulmonary complication.   2. Pt will participate in VFSS to define swallow physiology; MET   3. Pt will complete effortful pitch glides to improve hyolaryngeal lift and pharyngeal contraction       Outcome: Ongoing, Progressing     Pt progressing, eager to improve swallowing. Following aspiration precautions.

## 2022-04-15 NOTE — PROGRESS NOTES
[facility]  Adult Nutrition   Progress Note (Nutrition Support Management)    SUMMARY      Recommendations:   Continue current NPO status till patient has his PEG paced and after that start feedings immediately.       Goals:   Goals:   1) Patient to tolerate enteral feedings and not aspirate.   2) Patient to meet his estimated nutritional needs from his enteral feeding of Glucerna. 3) Blood glucose and electrolytes managed and monitored.    Dietitian Rounds Brief  F/U Nutrition Note: Saw patient today and he is eagerly awaiting his PEG placement...states he is really week and very thirsty and would really love to be able to enjoy some coffee. I messaged the MD about it if he is very careful. Will continue to follow prn.    Diet order: NPO    % Intake of Estimated Energy Needs: 0%  % Meal Intake: NPO    Estimated/Assessed Needs  Weight Used For Calorie Calculations: 96.9 kg (213 lb 10 oz)  Energy Calorie Requirements (kcal): 7662-2394 kcals/day (20-25 kcals/kg ABW)  Energy Need Method: Kcal/kg  Protein Requirements: 113-141 g/day (1.2-1.5 g/kg IBW)  Weight Used For Protein Calculations: 86 kg (189 lb 9.5 oz)  Fluid Requirements (mL): 1 mL/kcal or per MD     RDA Method (mL): 1938       Weight History:  Wt Readings from Last 5 Encounters:   04/15/22 90.1 kg (198 lb 10.2 oz)   03/31/22 95.7 kg (211 lb)   03/14/22 96.2 kg (212 lb)   02/17/22 95.7 kg (211 lb)   09/30/21 97.3 kg (214 lb 8 oz)        Medications:Pertinent Medications Reviewed    Scheduled Meds:   albuterol-ipratropium  3 mL Nebulization Q6H    azelastine  1 spray Nasal BID    fluticasone propionate  2 spray Each Nostril Daily    piperacillin-tazobactam (ZOSYN) IVPB  3.375 g Intravenous Q8H    traZODone  100 mg Oral Nightly    valsartan  80 mg Oral Daily     Continuous Infusions:   sodium chloride 0.9% 40 mL/hr at 04/14/22 1402     PRN Meds:.ALPRAZolam, calcium chloride IVPB, calcium chloride IVPB, calcium chloride IVPB, dextrose 50%,  dextrose 50%, dextrose 50%, dextrose 50%, glucagon (human recombinant), glucose, glucose, hydrALAZINE, HYDROmorphone, HYDROmorphone, insulin aspart U-100, magnesium oxide, magnesium sulfate IVPB, magnesium sulfate IVPB, magnesium sulfate IVPB, magnesium sulfate IVPB, melatonin, naloxone, ondansetron, polyethylene glycol, potassium chloride in water, potassium chloride in water, potassium chloride in water, potassium chloride in water, potassium chloride, potassium chloride, potassium chloride, potassium chloride, sodium chloride 0.9%, sodium phosphate IVPB, sodium phosphate IVPB, sodium phosphate IVPB, sodium phosphate IVPB, sodium phosphate IVPB, traZODone    Labs: Pertinent Labs Reviewed    Clinical Chemistry:  Recent Labs   Lab 04/15/22  0557      K 3.4*      CO2 24   *   BUN 12   CREATININE 0.7   CALCIUM 8.7   PROT 6.6   ALBUMIN 3.3*   BILITOT 1.6*   ALKPHOS 37*   AST 12   ALT 14   ANIONGAP 10   ESTGFRAFRICA >60.0   EGFRNONAA >60.0   MG 1.9     CBC:   Recent Labs   Lab 04/15/22  0557   WBC 6.39   RBC 3.22*   HGB 9.9*   HCT 29.0*   PLT 80*   MCV 90   MCH 30.7   MCHC 34.1     Cardiac Profile:  Recent Labs   Lab 04/10/22  1457 04/11/22  0406 04/12/22  0550   *  --   --    * 112  --    CPKMB 3.1  --   --    TROPONINI 0.066* 0.061* 0.039         Nutrition Risk  Level of Risk/Frequency of Follow-up: high Bernice Tyler, CARLA 04/15/2022 2:55 PM

## 2022-04-16 ENCOUNTER — ANESTHESIA EVENT (OUTPATIENT)
Dept: SURGERY | Facility: HOSPITAL | Age: 82
DRG: 208 | End: 2022-04-16
Payer: MEDICARE

## 2022-04-16 ENCOUNTER — ANESTHESIA (OUTPATIENT)
Dept: SURGERY | Facility: HOSPITAL | Age: 82
DRG: 208 | End: 2022-04-16
Payer: MEDICARE

## 2022-04-16 LAB
ALBUMIN SERPL BCP-MCNC: 3.6 G/DL (ref 3.5–5.2)
ALP SERPL-CCNC: 41 U/L (ref 55–135)
ALT SERPL W/O P-5'-P-CCNC: 15 U/L (ref 10–44)
ANION GAP SERPL CALC-SCNC: 13 MMOL/L (ref 8–16)
AST SERPL-CCNC: 14 U/L (ref 10–40)
BASOPHILS NFR BLD: 0 % (ref 0–1.9)
BILIRUB SERPL-MCNC: 1.7 MG/DL (ref 0.1–1)
BUN SERPL-MCNC: 11 MG/DL (ref 8–23)
CALCIUM SERPL-MCNC: 9 MG/DL (ref 8.7–10.5)
CHLORIDE SERPL-SCNC: 106 MMOL/L (ref 95–110)
CO2 SERPL-SCNC: 25 MMOL/L (ref 23–29)
CREAT SERPL-MCNC: 0.7 MG/DL (ref 0.5–1.4)
DIFFERENTIAL METHOD: ABNORMAL
EOSINOPHIL NFR BLD: 0 % (ref 0–8)
ERYTHROCYTE [DISTWIDTH] IN BLOOD BY AUTOMATED COUNT: 13.2 % (ref 11.5–14.5)
EST. GFR  (AFRICAN AMERICAN): >60 ML/MIN/1.73 M^2
EST. GFR  (NON AFRICAN AMERICAN): >60 ML/MIN/1.73 M^2
GLUCOSE SERPL-MCNC: 122 MG/DL (ref 70–110)
GLUCOSE SERPL-MCNC: 130 MG/DL (ref 70–110)
GLUCOSE SERPL-MCNC: 135 MG/DL (ref 70–110)
GLUCOSE SERPL-MCNC: 140 MG/DL (ref 70–110)
HCT VFR BLD AUTO: 30.2 % (ref 40–54)
HGB BLD-MCNC: 10.2 G/DL (ref 14–18)
IMM GRANULOCYTES # BLD AUTO: ABNORMAL K/UL (ref 0–0.04)
IMM GRANULOCYTES NFR BLD AUTO: ABNORMAL % (ref 0–0.5)
LYMPHOCYTES NFR BLD: 35 % (ref 18–48)
MAGNESIUM SERPL-MCNC: 1.9 MG/DL (ref 1.6–2.6)
MCH RBC QN AUTO: 30.8 PG (ref 27–31)
MCHC RBC AUTO-ENTMCNC: 33.8 G/DL (ref 32–36)
MCV RBC AUTO: 91 FL (ref 82–98)
MONOCYTES NFR BLD: 10 % (ref 4–15)
NEUTROPHILS NFR BLD: 50 % (ref 38–73)
NEUTS BAND NFR BLD MANUAL: 5 %
NRBC BLD-RTO: 0 /100 WBC
PHOSPHATE SERPL-MCNC: 2.3 MG/DL (ref 2.7–4.5)
PLATELET # BLD AUTO: 82 K/UL (ref 150–450)
PLATELET BLD QL SMEAR: ABNORMAL
PMV BLD AUTO: 11.3 FL (ref 9.2–12.9)
POTASSIUM SERPL-SCNC: 3.5 MMOL/L (ref 3.5–5.1)
PROT SERPL-MCNC: 6.6 G/DL (ref 6–8.4)
RBC # BLD AUTO: 3.31 M/UL (ref 4.6–6.2)
SODIUM SERPL-SCNC: 144 MMOL/L (ref 136–145)
WBC # BLD AUTO: 6.09 K/UL (ref 3.9–12.7)

## 2022-04-16 PROCEDURE — 97530 THERAPEUTIC ACTIVITIES: CPT

## 2022-04-16 PROCEDURE — 83735 ASSAY OF MAGNESIUM: CPT | Performed by: INTERNAL MEDICINE

## 2022-04-16 PROCEDURE — 94761 N-INVAS EAR/PLS OXIMETRY MLT: CPT

## 2022-04-16 PROCEDURE — 36415 COLL VENOUS BLD VENIPUNCTURE: CPT | Performed by: INTERNAL MEDICINE

## 2022-04-16 PROCEDURE — 80053 COMPREHEN METABOLIC PANEL: CPT | Performed by: INTERNAL MEDICINE

## 2022-04-16 PROCEDURE — 84100 ASSAY OF PHOSPHORUS: CPT | Performed by: INTERNAL MEDICINE

## 2022-04-16 PROCEDURE — 25000003 PHARM REV CODE 250: Performed by: INTERNAL MEDICINE

## 2022-04-16 PROCEDURE — 82962 GLUCOSE BLOOD TEST: CPT

## 2022-04-16 PROCEDURE — 25000003 PHARM REV CODE 250: Performed by: NURSE ANESTHETIST, CERTIFIED REGISTERED

## 2022-04-16 PROCEDURE — 27000221 HC OXYGEN, UP TO 24 HOURS

## 2022-04-16 PROCEDURE — 37000008 HC ANESTHESIA 1ST 15 MINUTES: Performed by: INTERNAL MEDICINE

## 2022-04-16 PROCEDURE — C9113 INJ PANTOPRAZOLE SODIUM, VIA: HCPCS | Performed by: INTERNAL MEDICINE

## 2022-04-16 PROCEDURE — 63600175 PHARM REV CODE 636 W HCPCS: Performed by: INTERNAL MEDICINE

## 2022-04-16 PROCEDURE — 43246 EGD PLACE GASTROSTOMY TUBE: CPT | Performed by: INTERNAL MEDICINE

## 2022-04-16 PROCEDURE — 99900035 HC TECH TIME PER 15 MIN (STAT)

## 2022-04-16 PROCEDURE — 92526 ORAL FUNCTION THERAPY: CPT

## 2022-04-16 PROCEDURE — 27201018 HC KIT, PEG (ANY): Performed by: INTERNAL MEDICINE

## 2022-04-16 PROCEDURE — 97116 GAIT TRAINING THERAPY: CPT

## 2022-04-16 PROCEDURE — 99900031 HC PATIENT EDUCATION (STAT)

## 2022-04-16 PROCEDURE — 63600175 PHARM REV CODE 636 W HCPCS: Performed by: NURSE ANESTHETIST, CERTIFIED REGISTERED

## 2022-04-16 PROCEDURE — 37000009 HC ANESTHESIA EA ADD 15 MINS: Performed by: INTERNAL MEDICINE

## 2022-04-16 PROCEDURE — 85027 COMPLETE CBC AUTOMATED: CPT | Performed by: INTERNAL MEDICINE

## 2022-04-16 PROCEDURE — 21400001 HC TELEMETRY ROOM

## 2022-04-16 PROCEDURE — 85007 BL SMEAR W/DIFF WBC COUNT: CPT | Performed by: INTERNAL MEDICINE

## 2022-04-16 RX ORDER — ESMOLOL HYDROCHLORIDE 10 MG/ML
INJECTION INTRAVENOUS
Status: DISCONTINUED | OUTPATIENT
Start: 2022-04-16 | End: 2022-04-16

## 2022-04-16 RX ORDER — PANTOPRAZOLE SODIUM 40 MG/10ML
40 INJECTION, POWDER, LYOPHILIZED, FOR SOLUTION INTRAVENOUS 2 TIMES DAILY
Status: DISCONTINUED | OUTPATIENT
Start: 2022-04-16 | End: 2022-04-18 | Stop reason: HOSPADM

## 2022-04-16 RX ORDER — PROPOFOL 10 MG/ML
VIAL (ML) INTRAVENOUS
Status: DISCONTINUED | OUTPATIENT
Start: 2022-04-16 | End: 2022-04-16

## 2022-04-16 RX ORDER — CEFAZOLIN SODIUM 1 G/3ML
INJECTION, POWDER, FOR SOLUTION INTRAMUSCULAR; INTRAVENOUS
Status: DISCONTINUED | OUTPATIENT
Start: 2022-04-16 | End: 2022-04-16

## 2022-04-16 RX ORDER — SODIUM CHLORIDE 9 MG/ML
INJECTION, SOLUTION INTRAVENOUS CONTINUOUS PRN
Status: DISCONTINUED | OUTPATIENT
Start: 2022-04-16 | End: 2022-04-16

## 2022-04-16 RX ADMIN — AZELASTINE HYDROCHLORIDE 137 MCG: 137 SPRAY, METERED NASAL at 08:04

## 2022-04-16 RX ADMIN — TRAZODONE HYDROCHLORIDE 100 MG: 50 TABLET ORAL at 08:04

## 2022-04-16 RX ADMIN — CEFAZOLIN 2 G: 330 INJECTION, POWDER, FOR SOLUTION INTRAMUSCULAR; INTRAVENOUS at 08:04

## 2022-04-16 RX ADMIN — HYDROMORPHONE HYDROCHLORIDE 1 MG: 1 INJECTION, SOLUTION INTRAMUSCULAR; INTRAVENOUS; SUBCUTANEOUS at 09:04

## 2022-04-16 RX ADMIN — PROPOFOL 40 MG: 10 INJECTION, EMULSION INTRAVENOUS at 08:04

## 2022-04-16 RX ADMIN — HYDROMORPHONE HYDROCHLORIDE 0.5 MG: 1 INJECTION, SOLUTION INTRAMUSCULAR; INTRAVENOUS; SUBCUTANEOUS at 12:04

## 2022-04-16 RX ADMIN — HYDROMORPHONE HYDROCHLORIDE 1 MG: 1 INJECTION, SOLUTION INTRAMUSCULAR; INTRAVENOUS; SUBCUTANEOUS at 08:04

## 2022-04-16 RX ADMIN — LORAZEPAM 0.25 MG: 2 INJECTION INTRAMUSCULAR; INTRAVENOUS at 05:04

## 2022-04-16 RX ADMIN — HYDROMORPHONE HYDROCHLORIDE 0.5 MG: 1 INJECTION, SOLUTION INTRAMUSCULAR; INTRAVENOUS; SUBCUTANEOUS at 04:04

## 2022-04-16 RX ADMIN — PROPOFOL 30 MG: 10 INJECTION, EMULSION INTRAVENOUS at 08:04

## 2022-04-16 RX ADMIN — PROPOFOL 60 MG: 10 INJECTION, EMULSION INTRAVENOUS at 08:04

## 2022-04-16 RX ADMIN — SODIUM CHLORIDE: 0.9 INJECTION, SOLUTION INTRAVENOUS at 08:04

## 2022-04-16 RX ADMIN — PROPOFOL 50 MG: 10 INJECTION, EMULSION INTRAVENOUS at 08:04

## 2022-04-16 RX ADMIN — VALSARTAN 80 MG: 80 TABLET, FILM COATED ORAL at 09:04

## 2022-04-16 RX ADMIN — PANTOPRAZOLE SODIUM 40 MG: 40 INJECTION, POWDER, LYOPHILIZED, FOR SOLUTION INTRAVENOUS at 08:04

## 2022-04-16 RX ADMIN — PANTOPRAZOLE SODIUM 40 MG: 40 INJECTION, POWDER, LYOPHILIZED, FOR SOLUTION INTRAVENOUS at 09:04

## 2022-04-16 RX ADMIN — HYDROMORPHONE HYDROCHLORIDE 1 MG: 1 INJECTION, SOLUTION INTRAMUSCULAR; INTRAVENOUS; SUBCUTANEOUS at 02:04

## 2022-04-16 RX ADMIN — ESMOLOL HYDROCHLORIDE 30 MG: 10 INJECTION, SOLUTION INTRAVENOUS at 08:04

## 2022-04-16 RX ADMIN — HYDRALAZINE HYDROCHLORIDE 10 MG: 20 INJECTION INTRAMUSCULAR; INTRAVENOUS at 05:04

## 2022-04-16 NOTE — TRANSFER OF CARE
"Anesthesia Transfer of Care Note    Patient: Conrad Kuhn    Procedure(s) Performed: Procedure(s) (LRB):  EGD (ESOPHAGOGASTRODUODENOSCOPY) (N/A)  EGD, WITH PEG TUBE INSERTION (N/A)    Patient location: GI    Anesthesia Type: MAC    Transport from OR: Transported from OR on 2-3 L/min O2 by NC with adequate spontaneous ventilation    Post pain: adequate analgesia    Post assessment: no apparent anesthetic complications    Post vital signs: stable    Level of consciousness: awake and alert    Nausea/Vomiting: no nausea/vomiting    Complications: none    Transfer of care protocol was followed      Last vitals:   Visit Vitals  BP (!) 153/66   Pulse 82   Temp 36.7 °C (98.1 °F)   Resp 19   Ht 6' 2" (1.88 m)   Wt (P) 88 kg (194 lb 0.1 oz)   SpO2 95%   BMI (P) 24.91 kg/m²     "

## 2022-04-16 NOTE — H&P
[facility]   History & Physical    SUBJECTIVE:     Chief Complaint/Reason for Admission: dysphagia    History of Present Illness:  dysphagia    Facility-Administered Medications Prior to Admission   Medication    acetaminophen tablet 650 mg    albuterol inhaler 2 puff    diphenhydrAMINE injection 25 mg    EPINEPHrine (EPIPEN) 0.3 mg/0.3 mL pen injection 0.3 mg    methylPREDNISolone sodium succinate injection 40 mg    ondansetron disintegrating tablet 4 mg    sodium chloride 0.9% 500 mL flush bag    sodium chloride 0.9% flush 10 mL     PTA Medications   Medication Sig    albuterol-ipratropium (DUO-NEB) 2.5 mg-0.5 mg/3 mL nebulizer solution Take 3 mLs by nebulization every 8 (eight) hours as needed.    atorvastatin (LIPITOR) 20 MG tablet Take 20 mg by mouth once daily.     azelastine (ASTELIN) 137 mcg (0.1 %) nasal spray 1 spray by Nasal route 2 (two) times daily.     ferrous sulfate (FEOSOL) 325 mg (65 mg iron) Tab tablet Take 1 tablet (325 mg total) by mouth once daily.    gabapentin (NEURONTIN) 300 MG capsule Take 300 mg by mouth 2 (two) times daily.     glimepiride (AMARYL) 1 MG tablet Take 1 mg by mouth daily with breakfast.     HYDROcodone-acetaminophen (NORCO)  mg per tablet Take 1 tablet by mouth every 8 (eight) hours as needed.     levalbuterol (XOPENEX) 1.25 mg/3 mL nebulizer solution Take 3 mLs by nebulization every 4 to 6 hours as needed.     losartan (COZAAR) 25 MG tablet Take 25 mg by mouth every evening.    metFORMIN (GLUCOPHAGE) 500 MG tablet Take 500 mg by mouth 2 (two) times daily with meals.     metoprolol succinate (TOPROL-XL) 25 MG 24 hr tablet Take 25 mg by mouth 2 (two) times daily.     ondansetron (ZOFRAN) 8 MG tablet Take 8 mg by mouth every 8 (eight) hours as needed.    promethazine (PHENERGAN) 25 MG tablet TAKE 1 TABLET BY MOUTH EVERY 6 HOURS AS NEEDED FOR NAUSEA (Patient taking differently: Take 25 mg by mouth every 6 (six) hours as needed.)     traZODone (DESYREL) 100 MG tablet TAKE 1 TABLET BY MOUTH EVERY NIGHT AT BEDTIME (Patient taking differently: Take 100 mg by mouth every evening.)    valACYclovir (VALTREX) 1000 MG tablet Take 1 tablet (1,000 mg total) by mouth 2 (two) times daily for 7 days, THEN 1 tablet (1,000 mg total) once daily.    valsartan (DIOVAN) 80 MG tablet Take 80 mg by mouth every evening.    blood sugar diagnostic Strp by Other route.    blood-glucose meter kit Use as instructed    FREESTYLE LANCETS 28 gauge lancets TEST TWICE DAILY    [DISCONTINUED] celecoxib (CELEBREX) 200 MG capsule Take 200 mg by mouth 2 (two) times daily.    [DISCONTINUED] duke's soln (benadryl 30 mL, mylanta 30 mL, LIDOcaine 30 mL, nystatin 30 mL) 120mL Take 10 mLs by mouth every 4 (four) hours as needed (mouth sores).    [DISCONTINUED] ibuprofen (ADVIL,MOTRIN) 600 MG tablet Take 600 mg by mouth every 6 (six) hours as needed for Pain.    [DISCONTINUED] neomycin-polymyxin-dexamethasone (DEXACINE) 3.5 mg/g-10,000 unit/g-0.1 % Oint APPLY THIN LAYER INTO AFFECTED EYE FOUR TIMES DAILY FOR 7 DAYS    [DISCONTINUED] predniSONE (DELTASONE) 10 MG tablet Take 60mg daily for five days, then 50mg for day 6, 40mg for day 7, 30mg day 8, 20mg day 9, and 10mg day 10.    [DISCONTINUED] venetoclax (VENCLEXTA) 100 mg Tab Take 200 mg by mouth once daily.       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     Right-sided Bell's palsy 2009     Spondylolisthesis     Varicose veins of legs     Venous insufficiency      Past Surgical History:   Procedure Laterality Date    Athroscopic surgery      On Knee    BIOPSY OF TISSUE OF NECK Left 2015    Recuurence CLL/small cell lyphoma    ESOPHAGEAL DILATION      Hemorrhoid resection  1979    LUNG LOBECTOMY Left     NECK SURGERY  2022    Anterior and Posterior C3-C7 fusion    OTHER SURGICAL HISTORY  2006    Chemotherapy s/p lyphoma        Portacath implantation and removal      reverse shoulder arthroplasty Right 2021     Family History   Problem Relation Age of Onset    Stomach cancer Mother 80         at 95    Colon cancer Father      Social History     Tobacco Use    Smoking status: Former Smoker    Smokeless tobacco: Never Used   Substance Use Topics    Alcohol use: Yes    Drug use: No        Review of Systems:     No fever or chills.     No chest pain.     No shortness of breath.        OBJECTIVE:     Vital Signs (Most Recent):  Temp: 98.1 °F (36.7 °C) (22)  Pulse: 97 (22)  Resp: 18 (22)  BP: (!) 175/76 (22)  SpO2: 98 % (2L) (22)    Physical Exam:  General:  Well nourished       Chest/Lungs:  Normal Respiratory Rate    Heart/Vascular:  Rate: Normal   Rhythm: Regular Rhythm    Abdomen: Normal, Soft       ASSESSMENT/PLAN:       Active Hospital Problems    Diagnosis    *Acute hypoxemic respiratory failure    Stridor    Aspiration pneumonia    Thrombocytopenia    Troponin level elevated    S/P cervical spinal fusion    History of lung cancer - ANDRES NSCLC     Iron deficiency anemia    CLL (chronic lymphocytic leukemia)           EGD with PEG tube placement

## 2022-04-16 NOTE — NURSING
"0915-pt arrived back to floor from OR, awake and alert. Pt is c/o pain "9" out of 10. PEG noted to abdomen- clamped. Site is clean and dry. Wife at bedside.   "

## 2022-04-16 NOTE — PROGRESS NOTES
"[facility] Medicine Progress Note  Patient Name: Conrad Kuhn MRN: [id number]   Patient Class: IP- Inpatient  Length of Stay: 6   Admission Date: 4/10/2022  2:39 PM Attending Physician: Rachel Morales MD   Primary Care Provider: Johnson rEazo MD Face-to-Face encounter date: 04/16/2022   Chief Complaint: Shortness of Breath      Subjective:    Interval History   Patient is doing fairly well  No issues post op PEG tube placement.  Denies chest pain, shortness of breath, palpitations, abdominal pain, nausea/vomiting.   No concerns/issues overnight reported by the patient or the nursing staff.  Reviewed the labs and discussed the plan of care.   No family present at bedside.     Review of Systems   All other Review of Systems were found to be negative expect for that mentioned already in HPI.     Hospital Course   04/11  Pt got extubated  MBSS tmrw AM  On NPO status     04/12  MBSS confirmed severe aspiration  NG tube feeding will be started  Pt denies any issues  Pt got transferred out of ICU      04/13  Pt walking around  Still in need of 4 L oxygen  Breathing status much improved  Multiple attempts yesterday for NG tube was unsuccessful  Pt agreed with PEG tube insertion     04/14  Awaiting PEG tube and Home oxygen assessment  C/o pain on operative area  Otherwise stable to go home     04/15  Per Home o2 assessment pt doesn't need home o2  PEG tube placement got postponed because Anesthesia service was not available  Otherwise no new issues  Completed iv abx Rx today     04/16  Peg tube placement today  Advancing feeds this evening  Discharge tomorrow    Objective:   Physical Exam  /63 (BP Location: Left arm, Patient Position: Sitting)   Pulse 92   Temp 98 °F (36.7 °C) (Oral)   Resp 18   Ht 6' 2" (1.88 m)   Wt (P) 88 kg (194 lb 0.1 oz)   SpO2 96%   BMI (P) 24.91 kg/m²   Constitutional: No distress.   HENT: Atraumatic.   Cardiovascular: Normal rate, regular rhythm and " normal heart sounds.   Pulmonary/Chest: Effort normal. Clear to auscultation bilaterally. No wheezes.   Abdominal: Soft. Bowel sounds are normal. Exhibits no distension and no mass. No tenderness  Neurological: Alert.   Skin: Skin is warm and dry.     Labs and Imaging  unremarkable CBC and CMP  I have reviewed the Vitals, labs and imaging as above.     Assessment & Plan:   Conrad Kuhn is a 81 y.o. male admitted for    * Acute hypoxemic respiratory failure  Intubated and got extubated on 04/11 AM  Continue iv abx for presumed aspiration pneumonia which can be stopped tomorrow  Maintain NPO status because MBSS confirmed severe aspiration risk  Multiple attempts for NG tube insertion was unsuccessful  Pt agreed with PEG tube insertion   S/p PEG insertion, restarted tube feeds     Aspiration pneumonia  Completed total of 5 day course of iv zosyn today        S/P cervical spinal fusion  C3-7 anterior posterior cervical fusion in [facility]   Stable         Troponin level elevated  Atypical   Denies chest pain   Due to Demand ischemia from hypoxia.   Resolved         Thrombocytopenia  Chronic issue in the background of CLL  Monitor closely         Stridor  Resolved         Iron deficiency anemia  Stable         History of lung cancer - ANDRES NSCLC 2004  Hx of ANDRES resection.  No acute issues.        CLL (chronic lymphocytic leukemia)  Chronic condition with no acute issues.         Discharge Planning:   Discharge Planning   MARTELL: 04/17/2022    Code Status: Full Code   Is the patient medically ready for discharge?: stable    Reason for patient still in hospital (select all that apply): Treatment  Discharge Plan A: Home with physical therapy and Home health      Above encounter included review of the medical records, interviewing and examining the patient face-to-face, discussion with family and other health care providers, ordering and interpreting lab/test results and formulating a plan of care.     Medical  Decision Making:  [] Low Complexity  [x] Moderate Complexity  [] High Complexity    Rachel Morales MD  [facility] Hospitalist  04/16/2022

## 2022-04-16 NOTE — CONSULTS
"[facility]  Adult Nutrition   Progress Note (Follow-Up)    SUMMARY     Recommendations/Interventions:  1. Until PEG can be placed, Initiating Clinimix-E @ 50 mL/hr over 24 hours. PPN at goal to provide: 406 kcals. 51 g protein). Discontinued IVF's 2' initiation of PPN.  2. Once PEG is placed, Start Glucerna 1.5 @ 10 mL/hr and advance by 10 mL Q4hrs as tolerated to goal rate of 55 mL/hr with 30 mL FWF's Q4hrs.   · TF + FWF's to provide: 1980 kcals, 109 g protein, and 1182 FW/day.   3. Monitor for signs and symptoms of refeedings syndrome (Hypophosphatemia, hypomagnesemia, hypokalemia, hyperglycemia). Ordered appropriate labs  4. RD to monitor nutrition plan of care, labs, and make recommendations accordingly.    Goals: 1. PPN to be initiated if PEG cannot be placed until next week. 2. Blood glucose and electrolytes monitored and managed.  Nutrition Goal Status: new  Communication of RD Recs: reviewed with physician    Dietitian Rounds Brief:  · Follow-up. PEG continues to be postponed. NPO x 6 days. Messaged MD regarding initiation of PPN-MD agreed. RD placed consult and started PPN. Discontinued IVF's 2' initiation of PPN. Will monitor timeline for PEG placement.    Reason for Assessment  Reason For Assessment: RD follow-up  Diagnosis: pulmonary disease  Relevant Medical History: CLL, lung cancer, iron deficiency anemia,Herpetic gingivostomatitis  Interdisciplinary Rounds: attended    Nutrition Risk Screen  Nutrition Risk Screen: dysphagia or difficulty swallowing     MST Score: 0  Have you recently lost weight without trying?: No (PER WIFE)  Weight loss score: 0  Have you been eating poorly because of a decreased appetite?: No  Appetite score: 0     Nutrition/Diet History  Spiritual, Cultural Beliefs, Rastafari Practices, Values that Affect Care: no  Food Allergies: NKFA  Factors Affecting Nutritional Intake: NPO    Anthropometrics  Temp: 98.1 °F (36.7 °C)  Height Method: Stated  Height: 6' 2" (188 " cm)  Height (inches): 74 in  Weight Method: (P) Standard Scale  Weight: (P) 88 kg (194 lb 0.1 oz)  Weight (lb): (P) 194.01 lb  Ideal Body Weight (IBW), Male: 190 lb  % Ideal Body Weight, Male (lb): 108.84 %  BMI (Calculated): (P) 24.9  BMI Grade: 18.5-24.9 - normal     Weight History:  Wt Readings from Last 10 Encounters:   04/16/22 (P) 88 kg (194 lb 0.1 oz)   03/31/22 95.7 kg (211 lb)   03/14/22 96.2 kg (212 lb)   02/17/22 95.7 kg (211 lb)   09/30/21 97.3 kg (214 lb 8 oz)   09/23/21 98.8 kg (217 lb 14.4 oz)   09/21/21 99.8 kg (220 lb)   08/27/21 99.4 kg (219 lb 1.6 oz)   07/20/21 97.5 kg (215 lb)   07/19/21 97.5 kg (215 lb)     Lab/Procedures/Meds: Pertinent Labs Reviewed  Clinical Chemistry:  Recent Labs   Lab 04/16/22  0417      K 3.5      CO2 25   *   BUN 11   CREATININE 0.7   CALCIUM 9.0   PROT 6.6   ALBUMIN 3.6   BILITOT 1.7*   ALKPHOS 41*   AST 14   ALT 15   ANIONGAP 13   ESTGFRAFRICA >60.0   EGFRNONAA >60.0   MG 1.9     CBC:   Recent Labs   Lab 04/16/22  0417   WBC 6.09   RBC 3.31*   HGB 10.2*   HCT 30.2*   PLT 82*   MCV 91   MCH 30.8   MCHC 33.8     Cardiac Profile:  Recent Labs   Lab 04/10/22  1457 04/11/22  0406 04/12/22  0550   *  --   --    * 112  --    CPKMB 3.1  --   --    TROPONINI 0.066* 0.061* 0.039     Medications: Pertinent Medications reviewed  Scheduled Meds:   azelastine  1 spray Nasal BID    fluticasone propionate  2 spray Each Nostril Daily    traZODone  100 mg Oral Nightly    valsartan  80 mg Oral Daily     Continuous Infusions:   sodium chloride 0.9% 40 mL/hr at 04/14/22 1402     PRN Meds:.albuterol-ipratropium, calcium chloride IVPB, calcium chloride IVPB, calcium chloride IVPB, dextrose 50%, dextrose 50%, dextrose 50%, dextrose 50%, glucagon (human recombinant), glucose, glucose, hydrALAZINE, HYDROmorphone, HYDROmorphone, insulin aspart U-100, lorazepam, magnesium oxide, magnesium sulfate IVPB, magnesium sulfate IVPB, magnesium sulfate IVPB,  magnesium sulfate IVPB, melatonin, naloxone, ondansetron, polyethylene glycol, potassium chloride in water, potassium chloride in water, potassium chloride in water, potassium chloride in water, potassium chloride, potassium chloride, potassium chloride, potassium chloride, sodium chloride 0.9%, sodium phosphate IVPB, sodium phosphate IVPB, sodium phosphate IVPB, sodium phosphate IVPB, sodium phosphate IVPB, traZODone    Antibiotics (From admission, onward)            None           Estimated/Assessed Needs  Weight Used For Calorie Calculations: 96.9 kg (213 lb 10 oz)  Energy Calorie Requirements (kcal): 6780-5651 kcals/day (20-25 kcals/kg)  Energy Need Method: Kcal/kg  Protein Requirements: 103-129 g/day (1.2-1.5 g/kg)  Weight Used For Protein Calculations: 86 kg (189 lb 9.5 oz)  Fluid Requirements (mL): 1 mL/kcal or per MD     RDA Method (mL): 1938       Nutrition Prescription Ordered    Current Diet Order: NPO    Evaluation of Received Nutrient/Fluid Intake    Other Calories (kcal): 378 (Propofol infusing @ 14.3 mL/hr)  Energy Calories Required: not meeting needs  Protein Required: not meeting needs  Fluid Required: meeting needs  Tolerance:  (NPO)  % Intake of Estimated Energy Needs: 0%  % Meal Intake: NPO    Intake/Output Summary (Last 24 hours) at 4/16/2022 0813  Last data filed at 4/16/2022 0400  Gross per 24 hour   Intake 480 ml   Output 225 ml   Net 255 ml      Nutrition Risk    Level of Risk/Frequency of Follow-up: high   Monitor and Evaluation    Food and Nutrient Intake: enteral nutrition intake, parenteral nutrition intake  Food and Nutrient Adminstration: enteral and parenteral nutrition administration  Physical Activity and Function: nutrition-related ADLs and IADLs, factors affecting access to physical activity  Anthropometric Measurements: weight, weight change, body mass index  Biochemical Data, Medical Tests and Procedures: electrolyte and renal panel, gastrointestinal profile, glucose/endocrine  profile, inflammatory profile  Nutrition-Focused Physical Findings: overall appearance     Nutrition Follow-Up    RD Follow-up?: Yes  Rasheeda Riggs RD 04/16/2022 8:37 AM

## 2022-04-16 NOTE — PT/OT/SLP PROGRESS
Physical Therapy Treatment    Patient Name:  Conrad Kuhn   MRN:  [id number]    Recommendations:     Discharge Recommendations:  home health PT   Discharge Equipment Recommendations: walker, rolling   Barriers to discharge: increased HR during amb    Assessment:     Conrad Kuhn is a 81 y.o. male admitted with a medical diagnosis of Acute hypoxemic respiratory failure.  He presents with the following impairments/functional limitations:  weakness, impaired endurance, impaired cardiopulmonary response to activity, gait instability, impaired functional mobilty, impaired balance, orthopedic precautions . Upon arrival, pt was up in chair on 2L/min O2 95%. Wife and daughter present w/in room. Pt was Mary sit<>stand w/ RW. Pt amb 65ft w/ RW, SpO2 96% increased . <1min Standing rest break needed due to increased HR. Spinal precautions was demonstrated and verbalized during session.    Rehab Prognosis: Good; patient would benefit from acute skilled PT services to address these deficits and reach maximum level of function.    Recent Surgery: Procedure(s) (LRB):  EGD (ESOPHAGOGASTRODUODENOSCOPY) (N/A)  EGD, WITH PEG TUBE INSERTION (N/A) Day of Surgery    Plan:     During this hospitalization, patient to be seen 6 x/week to address the identified rehab impairments via gait training, therapeutic activities, therapeutic exercises, neuromuscular re-education and progress toward the following goals:    · Plan of Care Expires:  05/13/22    Subjective     Chief Complaint: None stated  Patient/Family Comments/goals: Return home w/ wife  Pain/Comfort:  · Pain Rating 1: 0/10      Objective:     Communicated with Nubia TARANGO prior to session.  Patient found up in chair with telemetry, peripheral IV, blood pressure cuff, pulse ox (continuous), oxygen upon PT entry to room.     General Precautions: Standard, fall, NPO   Orthopedic Precautions:spinal precautions   Braces: Aspen collar  Respiratory Status: Nasal cannula, flow 2  L/min     Functional Mobility:  · Transfers:     · Sit to Stand:  minimum assistance with rolling walker  · Gait: 65ft w/ RW Mary  · Balance: Fair+ static and Fair+dynamic      AM-PAC 6 CLICK MOBILITY  Turning over in bed (including adjusting bedclothes, sheets and blankets)?: 3  Sitting down on and standing up from a chair with arms (e.g., wheelchair, bedside commode, etc.): 3  Moving from lying on back to sitting on the side of the bed?: 3  Moving to and from a bed to a chair (including a wheelchair)?: 3  Need to walk in hospital room?: 3  Climbing 3-5 steps with a railing?: 2  Basic Mobility Total Score: 17       Therapeutic Activities and Exercises:   Importance of mob, transfer training for increased OOB mob, gait training for increased functional mob, safety awareness, postural awareness, spinal precautions verbalized and demonstrated    Patient left up in chair with all lines intact, call button in reach, RN notified and Wife and daughter present..    GOALS:   Multidisciplinary Problems     Physical Therapy Goals        Problem: Physical Therapy    Goal Priority Disciplines Outcome Goal Variances Interventions   Physical Therapy Goal     PT, PT/OT Ongoing, Progressing     Description: Goals to be met by: Discharge     Patient will increase functional independence with mobility by performin. Supine to sit with Independent  2. Sit to stand transfer with Supervision  3. Bed to chair transfer with Supervision using Rolling Walker  4. Gait  x 150 feet with Supervision using Rolling Walker.                          Time Tracking:     PT Received On: 22  PT Start Time: 932     PT Stop Time: 1003  PT Total Time (min): 31 min     Billable Minutes: Gait Training 16 and Therapeutic Activity 15    Treatment Type: Treatment  PT/PTA: PT     PTA Visit Number: 0     2022

## 2022-04-16 NOTE — ANESTHESIA PREPROCEDURE EVALUATION
04/16/2022  Conrad Kuhn is a 81 y.o., male.      Patient Active Problem List   Diagnosis    CLL (chronic lymphocytic leukemia)    History of lung cancer - ANDRES NSCLC 2004    Iron deficiency anemia    Pain in joint of right shoulder    Pancytopenia    Herpetic gingivostomatitis    Stridor    Acute hypoxemic respiratory failure    Aspiration pneumonia    Thrombocytopenia    Troponin level elevated    S/P cervical spinal fusion       Past Surgical History:   Procedure Laterality Date    Athroscopic surgery      On Knee    BIOPSY OF TISSUE OF NECK Left 08/2015    Recuurence CLL/small cell lyphoma    ESOPHAGEAL DILATION      Hemorrhoid resection  1979    LUNG LOBECTOMY Left 2004    NECK SURGERY  04/08/2022    Anterior and Posterior C3-C7 fusion    OTHER SURGICAL HISTORY  2006    Chemotherapy s/p lyphoma        Portacath implantation and removal      reverse shoulder arthroplasty Right 03/2021        Tobacco Use:  The patient  reports that he has quit smoking. He has never used smokeless tobacco.     Results for orders placed or performed during the hospital encounter of 04/10/22   EKG 12-lead    Collection Time: 04/12/22  6:28 AM    Narrative    Test Reason : R77.8,    Vent. Rate : 082 BPM     Atrial Rate : 082 BPM     P-R Int : 178 ms          QRS Dur : 100 ms      QT Int : 394 ms       P-R-T Axes : 029 -12 053 degrees     QTc Int : 460 ms    Normal sinus rhythm with sinus arrhythmia  Minimal voltage criteria for LVH, may be normal variant ( [place] product )  Borderline Abnormal ECG  When compared with ECG of 10-APR-2022 14:41,  Vent. rate has decreased BY  61 BPM  Left anterior fascicular block is no longer Present  ST no longer depressed in Lateral leads  T wave amplitude has decreased in Inferior leads  T wave inversion no longer evident in Lateral leads  Confirmed by Alvin EMERSON,  Aneesh ([id number]) on 4/12/2022 6:25:02 PM    Referred By: DESIREE   SELF           Confirmed By:Aneesh Esquivel MD             Lab Results   Component Value Date    WBC 6.09 04/16/2022    HGB 10.2 (L) 04/16/2022    HCT 30.2 (L) 04/16/2022    MCV 91 04/16/2022    PLT 82 (L) 04/16/2022     BMP  Lab Results   Component Value Date     04/16/2022    K 3.5 04/16/2022     04/16/2022    CO2 25 04/16/2022    BUN 11 04/16/2022    CREATININE 0.7 04/16/2022    CALCIUM 9.0 04/16/2022    ANIONGAP 13 04/16/2022     (H) 04/16/2022     (H) 04/15/2022     (H) 04/13/2022       No results found for this or any previous visit.              Pre-op Assessment    I have reviewed the Patient Summary Reports.     I have reviewed the Nursing Notes.    I have reviewed the Medications.     Review of Systems  Social:  Former Smoker    Hematology/Oncology:         -- Anemia (Iron def anemia): Hematology Comments: CLL  Thrombocytopenia  --  Cancer in past history (hx lung CA, s/p surgical resection, Left partial pneumonectomy):    Cardiovascular:   Hypertension, well controlled    Pulmonary:   Pneumonia (hx recent aspiration pneumonia, resolving,) COPD, moderate Sleep Apnea, CPAP    Education provided regarding risk of obstructive sleep apnea     Hepatic/GI:   Esophageal dysphagia  Hx of GI bleeding in past   Musculoskeletal:   C3-7 anterior posterior cervical fusion 2 days prior to admit Spine Disorders: cervical    Neurological:   Neuromuscular Disease, (hx right sided Bell's palsy)    Endocrine:   Diabetes, type 2    Psych:   Psychiatric History (hx EtOH abuse)          Physical Exam  General: Well nourished, Cooperative, Alert and Oriented    Airway:  Mallampati: III / II  Mouth Opening: Normal  TM Distance: Normal  Tongue: Normal  Neck ROM: Normal ROM    Chest/Lungs:  Clear to auscultation    Heart:  Rate: Normal  Rhythm: Regular Rhythm  Sounds: Normal    Abdomen:  Normal, Soft, Nontender        Anesthesia  Plan  Type of Anesthesia, risks & benefits discussed:    Anesthesia Type: MAC  Intra-op Monitoring Plan: Standard ASA Monitors  Post Op Pain Control Plan:   (medical reason for not using multimodal pain management)  Induction:  IV  Informed Consent: Informed consent signed with the Patient and all parties understand the risks and agree with anesthesia plan.  All questions answered.   ASA Score: 3 Emergent  Anesthesia Plan Notes: MAC Propofol  POM      Ready For Surgery From Anesthesia Perspective.     .

## 2022-04-16 NOTE — ANESTHESIA POSTPROCEDURE EVALUATION
Anesthesia Post Evaluation    Patient: Conrad Kuhn    Procedure(s) Performed: Procedure(s) (LRB):  EGD (ESOPHAGOGASTRODUODENOSCOPY) (N/A)  EGD, WITH PEG TUBE INSERTION (N/A)    Final Anesthesia Type: MAC      Patient location: Room 1.  Patient participation: Yes- Able to Participate  Level of consciousness: awake and alert  Post-procedure vital signs: reviewed and stable  Pain management: adequate  Airway patency: patent    PONV status at discharge: No PONV  Anesthetic complications: no      Cardiovascular status: blood pressure returned to baseline and stable  Respiratory status: unassisted and nasal cannula  Hydration status: euvolemic  Follow-up not needed.          Vitals Value Taken Time   /61 04/16/22 0902   Temp 36.4 °C (97.5 °F) 04/16/22 0857   Pulse 86 04/16/22 0902   Resp 18 04/16/22 0902   SpO2 98 % 04/16/22 0902         Event Time   Out of Recovery 04/16/2022 09:02:16         Pain/Hannah Score: Pain Rating Prior to Med Admin: 8 (4/16/2022  4:05 AM)  Pain Rating Post Med Admin: 0 (4/16/2022  4:35 AM)  Hannah Score: 9 (4/16/2022  8:58 AM)

## 2022-04-16 NOTE — CONSULTS
Feedings to start this evening vs PPN.   1. Start Glucerna 1.5 @ 10 mL/hr and advance by 10 mL Q4hrs as tolerated to goal rate of 55 mL/hr with 30 mL FWF's Q4hrs.   3. Monitor for signs and symptoms of refeedings syndrome (Hypophosphatemia, hypomagnesemia, hypokalemia, hyperglycemia). Ordered appropriate labs      Will monitor TF tolerance and make recommendations accordingly.    Rasheeda Riggs RD 04/16/2022 10:11 AM

## 2022-04-16 NOTE — PLAN OF CARE
Problem: Parenteral Nutrition  Goal: Effective Intravenous Nutrition Therapy Delivery  Outcome: Ongoing, Progressing  Intervention: Optimize Intravenous Nutrition Delivery  Flowsheets (Taken 4/16/2022 0837)  Nutrition Support Management: parenteral nutrition initiated  Intervention: Optimize Nutrition, Fluid and Electrolyte Intake  Flowsheets (Taken 4/16/2022 0837)  Glycemic Management: blood glucose monitored

## 2022-04-16 NOTE — PLAN OF CARE
Problem: Infection  Goal: Absence of Infection Signs and Symptoms  Outcome: Ongoing, Progressing     Problem: Adult Inpatient Plan of Care  Goal: Plan of Care Review  Outcome: Ongoing, Progressing  Goal: Patient-Specific Goal (Individualized)  Outcome: Ongoing, Progressing  Goal: Absence of Hospital-Acquired Illness or Injury  Outcome: Ongoing, Progressing  Goal: Optimal Comfort and Wellbeing  Outcome: Ongoing, Progressing  Goal: Readiness for Transition of Care  Outcome: Ongoing, Progressing     Problem: Fall Injury Risk  Goal: Absence of Fall and Fall-Related Injury  Outcome: Ongoing, Progressing     Problem: Restraint, Nonbehavioral (Nonviolent)  Goal: Absence of Harm or Injury  Outcome: Ongoing, Progressing     Problem: Skin Injury Risk Increased  Goal: Skin Health and Integrity  Outcome: Ongoing, Progressing     Problem: Oral Intake Inadequate  Goal: Improved Oral Intake  Outcome: Ongoing, Progressing     Problem: Aspiration (Enteral Nutrition)  Goal: Absence of Aspiration Signs and Symptoms  Outcome: Ongoing, Progressing

## 2022-04-16 NOTE — PT/OT/SLP PROGRESS
"Speech Language Pathology Treatment    Patient Name:  Conrad Kuhn   MRN:  [id number]  Admitting Diagnosis: Acute hypoxemic respiratory failure    Recommendations:                 General Recommendations:  Dysphagia therapy via home health (at next level of care); repeat modified barium swallow when appropriate (prior to initiation of any oral diet or oral meds) pending improvement in clinician signs of aspiration/dysphagia    Diet recommendations/Aspiration precautions:  NPO (strict npo today due to peg placement today --  Moisten mouth with moist swabs today and/or swish/spit during oral care); thereafter allow ice chips sparingly every 3-4 hrs with diligent oral hygiene as tolerated;   Keep head of bed elevated as per nursing/MD instructions to reduce risk of aspiration of tube feeds.    General Precautions: Standard, aspiration, NPO, fall     Communication strategies: pt is alert and verbal at conversation level; speech is intelligible; however he reports difficulty retaining information; he requests that information be shared with spouse/daughter    Subjective     "Can I keep cleaning my mouth like this?"  (pt demonstrated how he has been taking small cup sips for swish and suction or spit;  He did so x 2 without problem)  "I just feel very tired."    Patient goals: looking forward to being able to drink water in future     Pain/Comfort:  · Pain Rating 1: 0/10 (pt denies pain from today's feeding tube placement)    Respiratory Status: Nasal cannula, flow 2 L/min    Objective:     Has the patient been evaluated by SLP for swallowing?   Yes  Keep patient NPO? Yes   Current Respiratory Status:    2L nasal cannula    Received call from nursing regarding patient questions regarding plan of care, rehab of swallow.  Went to room for education visit with patient; spouse and daughter present.  Pt back to room following today's peg tube placement.      Pt demonstrated functional voice quality, dry and clear; not " dysphonic.  He is communicating at conversation level with normal intelligibility.  He stated he has difficulty remembering new information.  Pt participated in visit, asking appropriate questions, along with family members.    Clinician palpated upward hyolaryngeal movement with dry swallow.  Pt demonstrated ability to moisten oral cavity with small sip of liquid and suction liquid with yankauer x 2 trials.  He expressed fatigue multiple times and stated that he wanted to sleep.  Clinician provided education regarding plan of care including reasons for spacing out modified barium swallows to give pt time for post-surgical swelling to decrease.  Discussed with pt/family that dysphagia therapy should continue in home health and that home health therapist/MD will let pt/family know when next videofluoroscopy will be appropriate, based on progress in home health swallow therapy.  All questions were answered.  Will message nursing/ to confirm pt will have orders for home health swallow therapy with SLP on discharge from acute care.  Chart reveals and pt confirmed that he has a printout of swallow exercise recommended by therapist performing pt's modified barium swallow, and that pt has been performing that task.      Clinician explained to pt/family that I am not comfortable providing ice chips at this time (today) due to this morning's peg placement.  They verbalized understanding.    Assessment:     Conrad Kuhn is a 81 y.o. male with an SLP diagnosis of Dysphagia.  He had peg placed today.  He/family are planning for patient to return home with home health services.  Recommendations as above.    Goals:   Multidisciplinary Problems     SLP Goals        Problem: SLP    Goal Priority Disciplines Outcome   SLP Goal     SLP Ongoing, Progressing   Description: 1. Pt will tolerate least restrictive PO diet without acute dysphagia pulmonary complication.   2. Pt will participate in VFSS to define swallow  physiology; MET   3. Pt will complete effortful pitch glides to improve hyolaryngeal lift and pharyngeal contraction                        Plan:     · Patient to be seen:  3 x/week   · Plan of Care expires:     · Plan of Care reviewed with:  patient   · SLP Follow-Up:  Yes       Discharge recommendations:  other (see comments), home health speech therapy (Speech pathology services for dysphagia at next level of care)   Barriers to Discharge:  to be determined -- peg just placed today    Time Tracking:     SLP Treatment Date:   04/16/22  Speech Start Time:  1028  Speech Stop Time:  1059     Speech Total Time (min):  31 min    Billable Minutes: Treatment Swallowing Dysfunction 31 04/16/2022

## 2022-04-16 NOTE — PROGRESS NOTES
Cheri calls to report she believes she now has \"inner ear infection\".  C/O ear pain, swelling  And ear feels plugged.  She asks if she should use the drops you had prescribed when she was in on 7/14/17.   PEG tube placed successfully.  12mm cratered duodenal ulcer noted incidentally.  Recommendations below-    -Prescribe PPI BID x 6 weeks to treat duodenal ulcer.  Can use Protonix 40mg PO BID if patient can swallow pills.  If unable to swallow pills, use Esomeprazole (Nexium) capsules 40mg twice daily to be opened and the granules flushed through the PEG tube   -Prescribe Lortab 7.5mg/325mg PO liquid to be used PRN q6 hours through PEG tube for pain related to incisional site from PEG.   - Ok to use PEG tube immediately for medications and water flushes.  If tolerates without any difficulties, ok to start tube feeds this evening.  Please consult nutritionist for recommendations on tube feeds.  Please keep PEG tube site uncovered and open to air.  Can clean around site if needed with gentle soap and water.   - RTC in 6 months or sooner for PEG tube removal/exchange

## 2022-04-16 NOTE — RESPIRATORY THERAPY
04/15/22 1921   Patient Assessment/Suction   Level of Consciousness (AVPU) alert   Respiratory Effort Normal;Unlabored   Expansion/Accessory Muscles/Retractions no use of accessory muscles   All Lung Fields Breath Sounds diminished   Rhythm/Pattern, Respiratory pattern regular;depth regular;unlabored   PRE-TX-O2   O2 Device (Oxygen Therapy) CPAP   $ Is the patient on Low Flow Oxygen? Yes   Flow (L/min) 3   SpO2 96 %   Pulse Oximetry Type Continuous   $ Pulse Oximetry - Multiple Charge Pulse Oximetry - Multiple   Pulse 81   Resp (!) 61   Aerosol Therapy   $ Aerosol Therapy Charges PRN treatment not required   Skin Integrity   $ Wound Care Tech Time 15 min   Area Observed Bridge of nose   Skin Appearance without discoloration   Preset CPAP/BiPAP Settings   Mode Of Delivery CPAP  (HOME UNIT)   Education   $ Education Bronchodilator;15 min   Respiratory Evaluation   $ Care Plan Tech Time 15 min

## 2022-04-17 LAB
ALBUMIN SERPL BCP-MCNC: 3.5 G/DL (ref 3.5–5.2)
ALP SERPL-CCNC: 45 U/L (ref 55–135)
ALT SERPL W/O P-5'-P-CCNC: 17 U/L (ref 10–44)
ANION GAP SERPL CALC-SCNC: 12 MMOL/L (ref 8–16)
ANISOCYTOSIS BLD QL SMEAR: SLIGHT
AST SERPL-CCNC: 13 U/L (ref 10–40)
BASOPHILS NFR BLD: 0 % (ref 0–1.9)
BILIRUB SERPL-MCNC: 1.4 MG/DL (ref 0.1–1)
BUN SERPL-MCNC: 14 MG/DL (ref 8–23)
CALCIUM SERPL-MCNC: 9.1 MG/DL (ref 8.7–10.5)
CHLORIDE SERPL-SCNC: 107 MMOL/L (ref 95–110)
CO2 SERPL-SCNC: 26 MMOL/L (ref 23–29)
CREAT SERPL-MCNC: 0.8 MG/DL (ref 0.5–1.4)
DIFFERENTIAL METHOD: ABNORMAL
EOSINOPHIL NFR BLD: 1 % (ref 0–8)
ERYTHROCYTE [DISTWIDTH] IN BLOOD BY AUTOMATED COUNT: 13.2 % (ref 11.5–14.5)
EST. GFR  (AFRICAN AMERICAN): >60 ML/MIN/1.73 M^2
EST. GFR  (NON AFRICAN AMERICAN): >60 ML/MIN/1.73 M^2
GLUCOSE SERPL-MCNC: 167 MG/DL (ref 70–110)
GLUCOSE SERPL-MCNC: 169 MG/DL (ref 70–110)
GLUCOSE SERPL-MCNC: 181 MG/DL (ref 70–110)
GLUCOSE SERPL-MCNC: 194 MG/DL (ref 70–110)
GLUCOSE SERPL-MCNC: 199 MG/DL (ref 70–110)
HCT VFR BLD AUTO: 29.1 % (ref 40–54)
HGB BLD-MCNC: 10.2 G/DL (ref 14–18)
IMM GRANULOCYTES # BLD AUTO: ABNORMAL K/UL (ref 0–0.04)
IMM GRANULOCYTES NFR BLD AUTO: ABNORMAL % (ref 0–0.5)
LYMPHOCYTES NFR BLD: 35 % (ref 18–48)
MAGNESIUM SERPL-MCNC: 2 MG/DL (ref 1.6–2.6)
MCH RBC QN AUTO: 31.1 PG (ref 27–31)
MCHC RBC AUTO-ENTMCNC: 35.1 G/DL (ref 32–36)
MCV RBC AUTO: 89 FL (ref 82–98)
MONOCYTES NFR BLD: 10 % (ref 4–15)
NEUTROPHILS NFR BLD: 50 % (ref 38–73)
NEUTS BAND NFR BLD MANUAL: 4 %
NRBC BLD-RTO: 0 /100 WBC
PHOSPHATE SERPL-MCNC: 2.4 MG/DL (ref 2.7–4.5)
PLATELET # BLD AUTO: 85 K/UL (ref 150–450)
PLATELET BLD QL SMEAR: ABNORMAL
PMV BLD AUTO: 10.4 FL (ref 9.2–12.9)
POTASSIUM SERPL-SCNC: 3.5 MMOL/L (ref 3.5–5.1)
PROT SERPL-MCNC: 6.8 G/DL (ref 6–8.4)
RBC # BLD AUTO: 3.28 M/UL (ref 4.6–6.2)
SODIUM SERPL-SCNC: 145 MMOL/L (ref 136–145)
WBC # BLD AUTO: 7.75 K/UL (ref 3.9–12.7)

## 2022-04-17 PROCEDURE — 82962 GLUCOSE BLOOD TEST: CPT

## 2022-04-17 PROCEDURE — 25000003 PHARM REV CODE 250: Performed by: INTERNAL MEDICINE

## 2022-04-17 PROCEDURE — 21400001 HC TELEMETRY ROOM

## 2022-04-17 PROCEDURE — C9113 INJ PANTOPRAZOLE SODIUM, VIA: HCPCS | Performed by: INTERNAL MEDICINE

## 2022-04-17 PROCEDURE — 94761 N-INVAS EAR/PLS OXIMETRY MLT: CPT

## 2022-04-17 PROCEDURE — 83735 ASSAY OF MAGNESIUM: CPT | Performed by: INTERNAL MEDICINE

## 2022-04-17 PROCEDURE — 63600175 PHARM REV CODE 636 W HCPCS: Performed by: INTERNAL MEDICINE

## 2022-04-17 PROCEDURE — 99900031 HC PATIENT EDUCATION (STAT)

## 2022-04-17 PROCEDURE — 85007 BL SMEAR W/DIFF WBC COUNT: CPT | Performed by: INTERNAL MEDICINE

## 2022-04-17 PROCEDURE — 84100 ASSAY OF PHOSPHORUS: CPT | Performed by: INTERNAL MEDICINE

## 2022-04-17 PROCEDURE — 85027 COMPLETE CBC AUTOMATED: CPT | Performed by: INTERNAL MEDICINE

## 2022-04-17 PROCEDURE — 36415 COLL VENOUS BLD VENIPUNCTURE: CPT | Performed by: INTERNAL MEDICINE

## 2022-04-17 PROCEDURE — 99900035 HC TECH TIME PER 15 MIN (STAT)

## 2022-04-17 PROCEDURE — 80053 COMPREHEN METABOLIC PANEL: CPT | Performed by: INTERNAL MEDICINE

## 2022-04-17 RX ORDER — METOPROLOL SUCCINATE 25 MG/1
25 TABLET, EXTENDED RELEASE ORAL 2 TIMES DAILY
Status: DISCONTINUED | OUTPATIENT
Start: 2022-04-17 | End: 2022-04-17

## 2022-04-17 RX ORDER — METOPROLOL TARTRATE 25 MG/1
25 TABLET, FILM COATED ORAL 2 TIMES DAILY
Status: DISCONTINUED | OUTPATIENT
Start: 2022-04-17 | End: 2022-04-18 | Stop reason: HOSPADM

## 2022-04-17 RX ADMIN — HYDROMORPHONE HYDROCHLORIDE 1 MG: 1 INJECTION, SOLUTION INTRAMUSCULAR; INTRAVENOUS; SUBCUTANEOUS at 10:04

## 2022-04-17 RX ADMIN — POTASSIUM CHLORIDE 40 MEQ: 20 TABLET, EXTENDED RELEASE ORAL at 09:04

## 2022-04-17 RX ADMIN — METOPROLOL TARTRATE 25 MG: 25 TABLET, FILM COATED ORAL at 08:04

## 2022-04-17 RX ADMIN — HYDROMORPHONE HYDROCHLORIDE 1 MG: 1 INJECTION, SOLUTION INTRAMUSCULAR; INTRAVENOUS; SUBCUTANEOUS at 03:04

## 2022-04-17 RX ADMIN — FLUTICASONE PROPIONATE 100 MCG: 50 SPRAY, METERED NASAL at 09:04

## 2022-04-17 RX ADMIN — METOPROLOL TARTRATE 25 MG: 25 TABLET, FILM COATED ORAL at 11:04

## 2022-04-17 RX ADMIN — TRAZODONE HYDROCHLORIDE 100 MG: 50 TABLET ORAL at 08:04

## 2022-04-17 RX ADMIN — AZELASTINE HYDROCHLORIDE 137 MCG: 137 SPRAY, METERED NASAL at 08:04

## 2022-04-17 RX ADMIN — AZELASTINE HYDROCHLORIDE 137 MCG: 137 SPRAY, METERED NASAL at 09:04

## 2022-04-17 RX ADMIN — PANTOPRAZOLE SODIUM 40 MG: 40 INJECTION, POWDER, LYOPHILIZED, FOR SOLUTION INTRAVENOUS at 08:04

## 2022-04-17 RX ADMIN — VALSARTAN 80 MG: 80 TABLET, FILM COATED ORAL at 09:04

## 2022-04-17 RX ADMIN — PANTOPRAZOLE SODIUM 40 MG: 40 INJECTION, POWDER, LYOPHILIZED, FOR SOLUTION INTRAVENOUS at 09:04

## 2022-04-17 RX ADMIN — HYDROMORPHONE HYDROCHLORIDE 1 MG: 1 INJECTION, SOLUTION INTRAMUSCULAR; INTRAVENOUS; SUBCUTANEOUS at 09:04

## 2022-04-17 RX ADMIN — LORAZEPAM 0.25 MG: 2 INJECTION INTRAMUSCULAR; INTRAVENOUS at 01:04

## 2022-04-17 RX ADMIN — INSULIN ASPART 1 UNITS: 100 INJECTION, SOLUTION INTRAVENOUS; SUBCUTANEOUS at 03:04

## 2022-04-17 NOTE — PROGRESS NOTES
"[facility] Medicine Progress Note  Patient Name: Conrad Kuhn MRN: [id number]   Patient Class: IP- Inpatient  Length of Stay: 7   Admission Date: 4/10/2022  2:39 PM Attending Physician: Rachel Morales MD   Primary Care Provider: Johnson Erazo MD Face-to-Face encounter date: 04/17/2022   Chief Complaint: Shortness of Breath      Subjective:    Interval History   Patient is doing fairly well  No issues post op PEG tube placement and tolerating feed  Denies chest pain, shortness of breath, palpitations, abdominal pain, nausea/vomiting.   No concerns/issues overnight reported by the patient or the nursing staff.  Reviewed the labs and discussed the plan of care.   No family present at bedside.     Review of Systems   All other Review of Systems were found to be negative expect for that mentioned already in HPI.     Hospital Course   04/11  Pt got extubated  MBSS tmrw AM  On NPO status     04/12  MBSS confirmed severe aspiration  NG tube feeding will be started  Pt denies any issues  Pt got transferred out of ICU      04/13  Pt walking around  Still in need of 4 L oxygen  Breathing status much improved  Multiple attempts yesterday for NG tube was unsuccessful  Pt agreed with PEG tube insertion     04/14  Awaiting PEG tube and Home oxygen assessment  C/o pain on operative area  Otherwise stable to go home     04/15  Per Home o2 assessment pt doesn't need home o2  PEG tube placement got postponed because Anesthesia service was not available  Otherwise no new issues  Completed iv abx Rx today     04/16  Peg tube placement today  Advancing feeds this evening    04/16  Peg tube placement today  Awaiting home health and tube feed set up  Discharge tomorrow      Objective:   Physical Exam  /82 (BP Location: Right arm, Patient Position: Sitting)   Pulse 93   Temp 98.3 °F (36.8 °C) (Axillary)   Resp (!) 21   Ht 6' 2" (1.88 m)   Wt 88 kg (194 lb 0.1 oz)   SpO2 96%   BMI 24.91 kg/m² "   Constitutional: No distress.   HENT: Atraumatic.   Cardiovascular: Normal rate, regular rhythm and normal heart sounds.   Pulmonary/Chest: Effort normal. Clear to auscultation bilaterally. No wheezes.   Abdominal: Soft. Bowel sounds are normal. Exhibits no distension and no mass. No tenderness  Neurological: Alert.   Skin: Skin is warm and dry.     Labs and Imaging  unremarkable CBC and CMP  I have reviewed the Vitals, labs and imaging as above.     Assessment & Plan:   Conrad Kuhn is a 81 y.o. male admitted for    * Acute hypoxemic respiratory failure  Intubated and got extubated on 04/11 AM  Continue iv abx for presumed aspiration pneumonia which can be stopped tomorrow  Maintain NPO status because MBSS confirmed severe aspiration risk  Multiple attempts for NG tube insertion was unsuccessful  Pt agreed with PEG tube insertion   S/p PEG insertion, restarted tube feeds     Aspiration pneumonia  Completed total of 5 day course of iv zosyn today        S/P cervical spinal fusion  C3-7 anterior posterior cervical fusion in [facility]   Stable         Troponin level elevated  Atypical   Denies chest pain   Due to Demand ischemia from hypoxia.   Resolved         Thrombocytopenia  Chronic issue in the background of CLL  Monitor closely         Stridor  Resolved         Iron deficiency anemia  Stable         History of lung cancer - ANDRES NSCLC 2004  Hx of ANDRES resection.  No acute issues.        CLL (chronic lymphocytic leukemia)  Chronic condition with no acute issues.         Discharge Planning:   Discharge Planning   MARTELL: 04/17/2022    Code Status: Full Code   Is the patient medically ready for discharge?: stable    Reason for patient still in hospital (select all that apply): Treatment and Pending disposition  Discharge Plan A: Home with physical therapy and Home health awaiting set up      Above encounter included review of the medical records, interviewing and examining the patient face-to-face,  discussion with family and other health care providers, ordering and interpreting lab/test results and formulating a plan of care.     Medical Decision Making:  [x] Low Complexity  [] Moderate Complexity  [] High Complexity    Rachel Morales MD  [facility] Hospitalist  04/17/2022

## 2022-04-17 NOTE — PLAN OF CARE
1200: Received call back from [facility] on-call representative stating the tube feeding orders are not processed on the weekend and she would follow up with me first thing tomorrow morning.  Dr. Morales notified via Secure Chat.     Home health resumption orders sent to Perri Stearns via Epic and liaison Janet notified of referral.     Message left with answering service for [facility] regarding home tube feedings.  Awaiting call back to ensure proper arrangement of services.        04/17/22 1145   Post-Acute Status   Post-Acute Authorization HME;Home Health   Home Health Status Set-up Complete/Auth obtained

## 2022-04-17 NOTE — CARE UPDATE
04/16/22 2013   PRE-TX-O2   O2 Device (Oxygen Therapy) room air   $ Is the patient on Low Flow Oxygen? Yes   SpO2 95 %   Pulse Oximetry Type Continuous   $ Pulse Oximetry - Multiple Charge Pulse Oximetry - Multiple   Pulse 94   Resp 19   Aerosol Therapy   $ Aerosol Therapy Charges PRN treatment not required   Education   $ Education 15 min   Respiratory Evaluation   $ Care Plan Tech Time 15 min

## 2022-04-17 NOTE — PLAN OF CARE
Problem: Infection  Goal: Absence of Infection Signs and Symptoms  Outcome: Ongoing, Progressing     Problem: Adult Inpatient Plan of Care  Goal: Plan of Care Review  Outcome: Ongoing, Progressing  Goal: Patient-Specific Goal (Individualized)  Outcome: Ongoing, Progressing  Goal: Absence of Hospital-Acquired Illness or Injury  Outcome: Ongoing, Progressing  Goal: Optimal Comfort and Wellbeing  Outcome: Ongoing, Progressing  Goal: Readiness for Transition of Care  Outcome: Ongoing, Progressing     Problem: Communication Impairment (Mechanical Ventilation, Invasive)  Goal: Effective Communication  Outcome: Ongoing, Progressing     Problem: Device-Related Complication Risk (Mechanical Ventilation, Invasive)  Goal: Optimal Device Function  Outcome: Ongoing, Progressing     Problem: Inability to Wean (Mechanical Ventilation, Invasive)  Goal: Mechanical Ventilation Liberation  Outcome: Ongoing, Progressing     Problem: Nutrition Impairment (Mechanical Ventilation, Invasive)  Goal: Optimal Nutrition Delivery  Outcome: Ongoing, Progressing     Problem: Skin and Tissue Injury (Mechanical Ventilation, Invasive)  Goal: Absence of Device-Related Skin and Tissue Injury  Outcome: Ongoing, Progressing     Problem: Ventilator-Induced Lung Injury (Mechanical Ventilation, Invasive)  Goal: Absence of Ventilator-Induced Lung Injury  Outcome: Ongoing, Progressing     Problem: Communication Impairment (Artificial Airway)  Goal: Effective Communication  Outcome: Ongoing, Progressing     Problem: Device-Related Complication Risk (Artificial Airway)  Goal: Optimal Device Function  Outcome: Ongoing, Progressing     Problem: Skin and Tissue Injury (Artificial Airway)  Goal: Absence of Device-Related Skin or Tissue Injury  Outcome: Ongoing, Progressing     Problem: Noninvasive Ventilation Acute  Goal: Effective Unassisted Ventilation and Oxygenation  Outcome: Ongoing, Progressing     Problem: Fall Injury Risk  Goal: Absence of Fall and  Fall-Related Injury  Outcome: Ongoing, Progressing     Problem: Restraint, Nonbehavioral (Nonviolent)  Goal: Absence of Harm or Injury  Outcome: Ongoing, Progressing     Problem: Skin Injury Risk Increased  Goal: Skin Health and Integrity  Outcome: Ongoing, Progressing     Problem: Oral Intake Inadequate  Goal: Improved Oral Intake  Outcome: Ongoing, Progressing     Problem: Aspiration (Enteral Nutrition)  Goal: Absence of Aspiration Signs and Symptoms  Outcome: Ongoing, Progressing     Problem: Device-Related Complication Risk (Enteral Nutrition)  Goal: Safe, Effective Therapy Delivery  Outcome: Ongoing, Progressing     Problem: Feeding Intolerance (Enteral Nutrition)  Goal: Feeding Tolerance  Outcome: Ongoing, Progressing     Problem: Parenteral Nutrition  Goal: Effective Intravenous Nutrition Therapy Delivery  Outcome: Ongoing, Progressing

## 2022-04-18 VITALS
HEIGHT: 74 IN | HEART RATE: 86 BPM | OXYGEN SATURATION: 93 % | TEMPERATURE: 97 F | BODY MASS INDEX: 24.95 KG/M2 | SYSTOLIC BLOOD PRESSURE: 155 MMHG | WEIGHT: 194.44 LBS | DIASTOLIC BLOOD PRESSURE: 80 MMHG | RESPIRATION RATE: 20 BRPM

## 2022-04-18 LAB
ALBUMIN SERPL BCP-MCNC: 3.3 G/DL (ref 3.5–5.2)
ALP SERPL-CCNC: 43 U/L (ref 55–135)
ALT SERPL W/O P-5'-P-CCNC: 15 U/L (ref 10–44)
ANION GAP SERPL CALC-SCNC: 9 MMOL/L (ref 8–16)
ANISOCYTOSIS BLD QL SMEAR: SLIGHT
AST SERPL-CCNC: 13 U/L (ref 10–40)
BASOPHILS NFR BLD: 0 % (ref 0–1.9)
BILIRUB SERPL-MCNC: 1.1 MG/DL (ref 0.1–1)
BUN SERPL-MCNC: 14 MG/DL (ref 8–23)
CALCIUM SERPL-MCNC: 8.9 MG/DL (ref 8.7–10.5)
CHLORIDE SERPL-SCNC: 108 MMOL/L (ref 95–110)
CO2 SERPL-SCNC: 29 MMOL/L (ref 23–29)
CREAT SERPL-MCNC: 0.8 MG/DL (ref 0.5–1.4)
DIFFERENTIAL METHOD: ABNORMAL
EOSINOPHIL NFR BLD: 1 % (ref 0–8)
ERYTHROCYTE [DISTWIDTH] IN BLOOD BY AUTOMATED COUNT: 13.2 % (ref 11.5–14.5)
EST. GFR  (AFRICAN AMERICAN): >60 ML/MIN/1.73 M^2
EST. GFR  (NON AFRICAN AMERICAN): >60 ML/MIN/1.73 M^2
GLUCOSE SERPL-MCNC: 160 MG/DL (ref 70–110)
GLUCOSE SERPL-MCNC: 162 MG/DL (ref 70–110)
GLUCOSE SERPL-MCNC: 180 MG/DL (ref 70–110)
HCT VFR BLD AUTO: 29.8 % (ref 40–54)
HGB BLD-MCNC: 10.2 G/DL (ref 14–18)
IMM GRANULOCYTES # BLD AUTO: ABNORMAL K/UL (ref 0–0.04)
IMM GRANULOCYTES NFR BLD AUTO: ABNORMAL % (ref 0–0.5)
LYMPHOCYTES NFR BLD: 31 % (ref 18–48)
MAGNESIUM SERPL-MCNC: 2 MG/DL (ref 1.6–2.6)
MCH RBC QN AUTO: 31.4 PG (ref 27–31)
MCHC RBC AUTO-ENTMCNC: 34.2 G/DL (ref 32–36)
MCV RBC AUTO: 92 FL (ref 82–98)
MONOCYTES NFR BLD: 3 % (ref 4–15)
NEUTROPHILS NFR BLD: 65 % (ref 38–73)
NRBC BLD-RTO: 0 /100 WBC
OVALOCYTES BLD QL SMEAR: ABNORMAL
PHOSPHATE SERPL-MCNC: 2.4 MG/DL (ref 2.7–4.5)
PLATELET # BLD AUTO: 79 K/UL (ref 150–450)
PLATELET BLD QL SMEAR: ABNORMAL
PMV BLD AUTO: 11.4 FL (ref 9.2–12.9)
POIKILOCYTOSIS BLD QL SMEAR: SLIGHT
POTASSIUM SERPL-SCNC: 3.5 MMOL/L (ref 3.5–5.1)
PROT SERPL-MCNC: 6.6 G/DL (ref 6–8.4)
RBC # BLD AUTO: 3.25 M/UL (ref 4.6–6.2)
SODIUM SERPL-SCNC: 146 MMOL/L (ref 136–145)
TARGETS BLD QL SMEAR: ABNORMAL
WBC # BLD AUTO: 7.07 K/UL (ref 3.9–12.7)

## 2022-04-18 PROCEDURE — 97535 SELF CARE MNGMENT TRAINING: CPT

## 2022-04-18 PROCEDURE — 63600175 PHARM REV CODE 636 W HCPCS: Performed by: INTERNAL MEDICINE

## 2022-04-18 PROCEDURE — 25000003 PHARM REV CODE 250: Performed by: INTERNAL MEDICINE

## 2022-04-18 PROCEDURE — 84100 ASSAY OF PHOSPHORUS: CPT | Performed by: INTERNAL MEDICINE

## 2022-04-18 PROCEDURE — 85027 COMPLETE CBC AUTOMATED: CPT | Performed by: INTERNAL MEDICINE

## 2022-04-18 PROCEDURE — 94760 N-INVAS EAR/PLS OXIMETRY 1: CPT

## 2022-04-18 PROCEDURE — C9113 INJ PANTOPRAZOLE SODIUM, VIA: HCPCS | Performed by: INTERNAL MEDICINE

## 2022-04-18 PROCEDURE — 92526 ORAL FUNCTION THERAPY: CPT

## 2022-04-18 PROCEDURE — 36415 COLL VENOUS BLD VENIPUNCTURE: CPT | Performed by: INTERNAL MEDICINE

## 2022-04-18 PROCEDURE — 85007 BL SMEAR W/DIFF WBC COUNT: CPT | Performed by: INTERNAL MEDICINE

## 2022-04-18 PROCEDURE — 99900035 HC TECH TIME PER 15 MIN (STAT)

## 2022-04-18 PROCEDURE — 83735 ASSAY OF MAGNESIUM: CPT | Performed by: INTERNAL MEDICINE

## 2022-04-18 PROCEDURE — 80053 COMPREHEN METABOLIC PANEL: CPT | Performed by: INTERNAL MEDICINE

## 2022-04-18 RX ORDER — METOPROLOL TARTRATE 25 MG/1
25 TABLET, FILM COATED ORAL 2 TIMES DAILY
Qty: 60 TABLET | Refills: 0 | Status: SHIPPED | OUTPATIENT
Start: 2022-04-18 | End: 2023-01-01

## 2022-04-18 RX ORDER — ESOMEPRAZOLE MAGNESIUM 40 MG/1
40 CAPSULE, DELAYED RELEASE ORAL 2 TIMES DAILY
Qty: 60 CAPSULE | Refills: 0 | Status: SHIPPED | OUTPATIENT
Start: 2022-04-18 | End: 2022-07-02

## 2022-04-18 RX ADMIN — POTASSIUM BICARBONATE 20 MEQ: 391 TABLET, EFFERVESCENT ORAL at 06:04

## 2022-04-18 RX ADMIN — VALSARTAN 80 MG: 80 TABLET, FILM COATED ORAL at 08:04

## 2022-04-18 RX ADMIN — HYDROMORPHONE HYDROCHLORIDE 1 MG: 1 INJECTION, SOLUTION INTRAMUSCULAR; INTRAVENOUS; SUBCUTANEOUS at 10:04

## 2022-04-18 RX ADMIN — PANTOPRAZOLE SODIUM 40 MG: 40 INJECTION, POWDER, LYOPHILIZED, FOR SOLUTION INTRAVENOUS at 08:04

## 2022-04-18 RX ADMIN — METOPROLOL TARTRATE 25 MG: 25 TABLET, FILM COATED ORAL at 08:04

## 2022-04-18 RX ADMIN — HYDROMORPHONE HYDROCHLORIDE 1 MG: 1 INJECTION, SOLUTION INTRAMUSCULAR; INTRAVENOUS; SUBCUTANEOUS at 04:04

## 2022-04-18 RX ADMIN — AZELASTINE HYDROCHLORIDE 137 MCG: 137 SPRAY, METERED NASAL at 08:04

## 2022-04-18 RX ADMIN — FLUTICASONE PROPIONATE 100 MCG: 50 SPRAY, METERED NASAL at 08:04

## 2022-04-18 NOTE — PT/OT/SLP PROGRESS
Occupational Therapy   Treatment    Name: Conrad Kuhn  MRN: [id number]  Admitting Diagnosis:  Acute hypoxemic respiratory failure  2 Days Post-Op    Recommendations:     Discharge Recommendations: home health OT  Discharge Equipment Recommendations:  none  Barriers to discharge:  None    Assessment:     Conrad Kuhn is a 81 y.o. male with a medical diagnosis of Acute hypoxemic respiratory failure.  He presents with improving medical acuity and functional mobility. Patient participated in chair transfer, toilet transfer, toilet hygiene, grooming standing at sink and ambulation in the room. Performance deficits affecting function are weakness, impaired endurance, impaired self care skills, impaired functional mobilty, gait instability, impaired balance, orthopedic precautions.     Rehab Prognosis:  Fair; patient would benefit from acute skilled OT services to address these deficits and reach maximum level of function.       Plan:     Patient to be seen 5 x/week to address the above listed problems via self-care/home management, therapeutic activities, therapeutic exercises  · Plan of Care Expires: 05/11/22  · Plan of Care Reviewed with: patient    Subjective     Pain/Comfort:  · Pain Rating 1: 0/10  · Pain Rating Post-Intervention 1: 0/10    Objective:     Communicated with: nurse prior to session.  Patient found up in chair with telemetry, peripheral IV, PEG Tube upon OT entry to room.    General Precautions: Standard, fall   Orthopedic Precautions:spinal precautions   Braces:  (Aspen Cervical Collar)  Respiratory Status: Room air     Occupational Performance:     Functional Mobility/Transfers:  · Patient completed Sit <> Stand Transfer with stand by assistance  with  no assistive device   · Patient completed Toilet Transfer Step Transfer technique with stand by assistance with  no AD  · Functional Mobility: ambulated 25 feet in the hospital room and bathroom with stand by assistance using no AD.    Activities  of Daily Living:  · Grooming: stand by assistance to wash hands standing at sink.  · Toileting: stand by assistance to perform toilet hygiene from commode.      [facility] 6 Click ADL:      Treatment & Education:  Patient demonstrated good safety awareness and cervical spinal precautions while performing ADL activity and during ambulation.    Patient left up in chair with all lines intact and call button in reachEducation:      GOALS:   Multidisciplinary Problems     Occupational Therapy Goals        Problem: Occupational Therapy    Goal Priority Disciplines Outcome Interventions   Occupational Therapy Goal     OT, PT/OT Ongoing, Progressing    Description: Goals to be met by: discharge     Patient will increase functional independence with ADLs by performing:    UE Dressing with Supervision.  LE Dressing with Supervision.  Grooming while standing at sink with Supervision.  Toileting from toilet with Supervision for hygiene and clothing management.   Toilet transfer to toilet with Supervision.  Perform sitting/standing ADL activity with no verbal/tactile cues for cervical precautions.                     Time Tracking:     OT Date of Treatment: 04/18/22  OT Start Time: 0919  OT Stop Time: 0942  OT Total Time (min): 23 min    Billable Minutes:Self Care/Home Management 23    OT/MK: OT          4/18/2022

## 2022-04-18 NOTE — DISCHARGE INSTRUCTIONS
Diet:  Resume diet through PEG tube    Physical Activity:  Activity as tolerated    Instructions:  1. Take all medications as prescribed  2. Keep all follow-up appointments  3. Return to the hospital or call your primary care physicians if any worsening symptoms such as chest pain, shorn drea of breath occur.  4. Please call your gastroenterologist to schedule appointment in 6 months for PEG removal    Follow-Up Appointments:  Please call your primary care physician to schedule an appointment in 1 week time.    Pending laboratory work/Tests to be performed/followed by the Primary care Physician: none    Tube Feedings:   Glucernia 1.5 360ml 4xday   Free water flush volume 70ml before and after each feed  Start feeding at 120ml for 1st feed, 240ml for 2nd feed, and 360ml for all other feeds.  Goal= 360ml 4xday

## 2022-04-18 NOTE — PLAN OF CARE
Problem: SLP  Goal: SLP Goal  Description: 1. Pt will tolerate least restrictive PO diet without acute dysphagia pulmonary complication. (Npo for now --SEE MODIFIED BARIUM SWALLOW RESULTS and recs)  2. Pt will participate in VFSS to define swallow physiology; MET   3. Pt will complete effortful pitch glides to improve hyolaryngeal lift and pharyngeal contraction (PROGRESSING BUT EFFORTFUL FOR PATIENT)  4. Pt will tolerate small single ice chips sparingly, 5-6 every several hours, without sign of aspiration, following cues for multiple swallows, effortful swallow, utilizing aspiration precautions, 80% accuracy.  5. Pt will participate in education regarding additional swallow exercises, potentially to be added to pt's exercises as post-surgical edema resolves and/or used in next modified barium swallow study 80% mod assist.      Outcome: Ongoing, Progressing     Problem: Oral Intake Inadequate  Goal: Improved Oral Intake  Outcome: Ongoing, Progressing

## 2022-04-18 NOTE — PT/OT/SLP PROGRESS
Physical Therapy      Patient Name:  Conrad Kuhn   MRN:  [id number]    Patient not seen today secondary to Other (Comment) (prepping for discharge). Pt with discharge today.

## 2022-04-18 NOTE — NURSING
Discharge instructions reviewed with pt. Questions answered. Copy of discharge instructions given to pt. Discharge home via wheelchair to vehicle. Accompanied by spouse. Home health set up and meeting pt at home.

## 2022-04-18 NOTE — CONSULTS
"[facility]  Adult Nutrition   Consult Note (Nutrition Support Management)    SUMMARY     Recommendations  Recommendation/Intervention:   1) Bolus feeds ordered: Glucerna 1.5 with 360ml bolus feeds 4x/day, water flushes: 70ml before and after each feed. Start feeds with 120ml, second feed: 240ml and then 360ml for all other feeds if patient tolerates it well.   2) Patient will need additional 350ml to meet fluid needs.   3) RD will continue to monitor.    Goals: Patient to meet >75% of needs via enteral feeds  Nutrition Goal Status: progressing towards goal  Communication of RD Recs: reviewed with RN    Dietitian Rounds Brief  Patient is tolerating TF at goal rate. TF to be switched to bolus feeds prior to patient being discharged.    Diet order:   Current Diet Order: NPO      Current Nutrition Support Formula Ordered: Glucerna 1.5  Current Nutrition Support Rate Ordered: 55 (ml)  Current Nutrition Support Frequency Ordered: ml/hr    Evaluation of Received Nutrient/Fluid Intake    Enteral Calories (kcal): 1980  Enteral Protein (gm): 109  Enteral (Free Water) Fluid (mL): 1093    % Intake of Estimated Energy Needs: 75 - 100 %  % Meal Intake: NPO    Energy Calories Required: meeting needs  Protein Required: meeting needs  Fluid Required: meeting needs  Tolerance: tolerating    Anthropometrics  Temp: 97.4 °F (36.3 °C)  Height Method: Stated  Height: 6' 2" (188 cm)  Height (inches): 74 in  Weight Method: Standard Scale  Weight: 88.2 kg (194 lb 7.1 oz)  Weight (lb): 194.45 lb  Ideal Body Weight (IBW), Male: 190 lb  % Ideal Body Weight, Male (lb): 102.11 %  BMI (Calculated): 25  BMI Grade: 18.5-24.9 - normal       Estimated/Assessed Needs  Weight Used For Calorie Calculations: 96.9 kg (213 lb 10 oz)  Energy Calorie Requirements (kcal): 6157-6630 kcals/day (20-25 kcals/kg)  Energy Need Method: Kcal/kg  Protein Requirements: 103-129 g/day (1.2-1.5 g/kg)  Weight Used For Protein Calculations: 86 kg (189 lb 9.5 " oz)  Fluid Requirements (mL): 1 mL/kcal or per MD     RDA Method (mL): 1938     Reason for Assessment  Reason For Assessment: consult, new tube feeding  Diagnosis: pulmonary disease  Relevant Medical History: CLL, lung cancer, iron deficiency anemia,Herpetic gingivostomatitis  Interdisciplinary Rounds: attended    Nutrition/Diet History  Spiritual, Cultural Beliefs, Mandaen Practices, Values that Affect Care: no  Food Allergies: NKFA  Factors Affecting Nutritional Intake: NPO    Nutrition Risk Screen  Nutrition Risk Screen: no indicators present     MST Score: 0  Have you recently lost weight without trying?: No (PER WIFE)  Weight loss score: 0  Have you been eating poorly because of a decreased appetite?: No  Appetite score: 0       Weight History:  Wt Readings from Last 5 Encounters:   04/18/22 88.2 kg (194 lb 7.1 oz)   03/31/22 95.7 kg (211 lb)   03/14/22 96.2 kg (212 lb)   02/17/22 95.7 kg (211 lb)   09/30/21 97.3 kg (214 lb 8 oz)        Lab/Procedures/Meds: Pertinent Labs/Meds Reviewed    Medications:Pertinent Medications Reviewed  Scheduled Meds:   azelastine  1 spray Nasal BID    fluticasone propionate  2 spray Each Nostril Daily    metoprolol tartrate  25 mg Per G Tube BID    pantoprazole  40 mg Intravenous BID    traZODone  100 mg Oral Nightly    valsartan  80 mg Oral Daily     Continuous Infusions:  PRN Meds:.albuterol-ipratropium, calcium chloride IVPB, calcium chloride IVPB, calcium chloride IVPB, dextrose 50%, dextrose 50%, dextrose 50%, dextrose 50%, glucagon (human recombinant), glucose, glucose, hydrALAZINE, HYDROmorphone, HYDROmorphone, insulin aspart U-100, lorazepam, magnesium oxide, magnesium sulfate IVPB, magnesium sulfate IVPB, magnesium sulfate IVPB, magnesium sulfate IVPB, melatonin, naloxone, ondansetron, polyethylene glycol, potassium chloride in water, potassium chloride in water, potassium chloride in water, potassium chloride in water, potassium chloride, potassium chloride,  potassium chloride, potassium chloride, sodium chloride 0.9%, sodium phosphate IVPB, sodium phosphate IVPB, sodium phosphate IVPB, sodium phosphate IVPB, sodium phosphate IVPB, traZODone    Labs: Pertinent Labs Reviewed  Clinical Chemistry:  Recent Labs   Lab 04/16/22  0417 04/17/22  0511 04/18/22  0450    145 146*   K 3.5 3.5 3.5    107 108   CO2 25 26 29   * 181* 160*   BUN 11 14 14   CREATININE 0.7 0.8 0.8   CALCIUM 9.0 9.1 8.9   PROT 6.6 6.8 6.6   ALBUMIN 3.6 3.5 3.3*   BILITOT 1.7* 1.4* 1.1*   ALKPHOS 41* 45* 43*   AST 14 13 13   ALT 15 17 15   ANIONGAP 13 12 9   ESTGFRAFRICA >60.0 >60.0 >60.0   EGFRNONAA >60.0 >60.0 >60.0   MG 1.9 2.0 2.0   PHOS 2.3* 2.4* 2.4*     CBC:   Recent Labs   Lab 04/18/22  0450   WBC 7.07   RBC 3.25*   HGB 10.2*   HCT 29.8*   PLT 79*   MCV 92   MCH 31.4*   MCHC 34.2     Cardiac Profile:  Recent Labs   Lab 04/12/22  0550   TROPONINI 0.039     Monitor and Evaluation  Food and Nutrient Intake: enteral nutrition intake, parenteral nutrition intake  Food and Nutrient Adminstration: enteral and parenteral nutrition administration  Physical Activity and Function: nutrition-related ADLs and IADLs, factors affecting access to physical activity  Anthropometric Measurements: weight, weight change, body mass index  Biochemical Data, Medical Tests and Procedures: electrolyte and renal panel, gastrointestinal profile, glucose/endocrine profile, inflammatory profile  Nutrition-Focused Physical Findings: overall appearance     Nutrition Risk  Level of Risk/Frequency of Follow-up: high     Nutrition Follow-Up  RD Follow-up?: Yes      Jemma iRzvi, CARLA 04/18/2022 11:51 AM

## 2022-04-18 NOTE — PT/OT/SLP PROGRESS
"Speech Language Pathology Treatment    Patient Name:  Conrad Kuhn   MRN:  [id number]  Admitting Diagnosis: Acute hypoxemic respiratory failure    Recommendations:   General Recommendations:  Dysphagia therapy via home health (at next level of care); repeat modified barium swallow when appropriate (prior to initiation of any oral diet or oral meds) pending improvement in clinician signs of aspiration/dysphagia     Diet recommendations/Aspiration precautions:  NPO except single, small ice chips sparingly every 3-4 hrs (suggest 5-6) for swallow practice with supervision; with diligent oral hygiene;   Keep head of bed elevated as per nursing/MD instructions to reduce risk of aspiration of tube feeds.     General Precautions: Standard, aspiration, NPO, fall      Communication strategies: pt is alert and verbal at conversation level; speech is intelligible               Subjective     "I couldn't swallow when I was on a cruise." (pt reports having acute onset of swallow difficulty in 2017 which resolved thereafter; he does not remember that cause of difficulty was ever determined)    Patient goals: wants to be able to drink water freely when safe to do so     Pain/Comfort:  · Pain Rating 1: 9/10 (upper  back/shoulders -- nursing notified and giving pain meds)    Respiratory Status: Room air    Objective:     Has the patient been evaluated by SLP for swallowing?   Yes  Keep patient NPO? Yes (except for single ice chips sparingly (suggest 5-6 small chips every few hrs) at 90 degrees as tolerated)   Current Respiratory Status:   room air     Pt followed for dysphagia.  Pt is npo (s/p cervical fusion with related edema; peg placed last week).  Pt sitting on side of bed when clinician entered room; cervical collar in place; alert and verbal. Voice quality clear. He demonstrated how he has been swishing and spitting water to keep mouth moist.    Clinician palpated upward laryngeal movement on dry swallow attempt.  Pt " "demonstrated functional sounding volitional cough.  Pt accepted education regarding performance of effortful swallows, Mendelsohn Maneuver, falsetto.  He demonstrated some upward laryngeal movement (based on palpation) with falsetto.  Also based on palpation, he demonstrated understanding of "swallow and hold"/effortful swallow.  Clinician explained that these exercises may be of benefit after or as pt's post-surgical swelling resolves.    Pt accepted 6 single, small ice chips for swallow practice individually, given cue for effortful swallow.  He demonstrated small throat clear after trials 2 and 6.  Pt requested pain meds for shoulder area; nurse came to room to provide.  Pt stated he wanted to rest.     Pt confirms diligent oral hygiene.    Assessment:     Conrad Kuhn is a 81 y.o. male with an SLP diagnosis of Dysphagia (see recent modified barium swallow).  He presents with throat clears after 2 of 6 swallows of single ice chips.  He is fully cooperative and looking forward to continued rehab.    Goals:   Multidisciplinary Problems     SLP Goals        Problem: SLP    Goal Priority Disciplines Outcome   SLP Goal     SLP Ongoing, Progressing   Description: 1. Pt will tolerate least restrictive PO diet without acute dysphagia pulmonary complication. (Npo for now --SEE MODIFIED BARIUM SWALLOW RESULTS and recs)  2. Pt will participate in VFSS to define swallow physiology; MET   3. Pt will complete effortful pitch glides to improve hyolaryngeal lift and pharyngeal contraction (PROGRESSING BUT EFFORTFUL FOR PATIENT)  4. Pt will tolerate small single ice chips sparingly, 5-6 every several hours, without sign of aspiration, following cues for multiple swallows, effortful swallow, utilizing aspiration precautions, 80% accuracy.  5. Pt will participate in education regarding additional swallow exercises, potentially to be added to pt's exercises as post-surgical edema resolves and/or used in next modified barium " swallow study 80% mod assist.                       Plan:     · Patient to be seen:  3 x/week   · Plan of Care expires:     · Plan of Care reviewed with:  patient   · SLP Follow-Up:  Yes       Discharge recommendations:  home health speech therapy   Barriers to Discharge:  staff reports likely discharge today    Time Tracking:     SLP Treatment Date:   04/18/22  Speech Start Time:  1012  Speech Stop Time:  1051     Speech Total Time (min):  39 min    Billable Minutes: Treatment Swallowing Dysfunction 39    04/18/2022

## 2022-04-18 NOTE — PLAN OF CARE
Patient to resume home health services with [facility].  Per liaison Janet, patient will be seen for resumption tomorrow, 4/19/22.     Tube feeding arranged with [facility].  Per liaaugusto Figueroa, patient will be set up today and she will coordinate setup with patient/family.        04/18/22 1038   Final Note   Assessment Type Final Discharge Note   Anticipated Discharge Disposition [facility]   Hospital Resources/Appts/Education Provided Post-Acute resouces added to AVS   Post-Acute Status   Post-Acute Authorization HME;Home Health   HME Status Set-up Complete/Auth obtained   Home Health Status Set-up Complete/Auth obtained   Discharge Delays None known at this time

## 2022-04-18 NOTE — PLAN OF CARE
Important Message from Medicare was sign, explained and given to patient/caregiver on 04/18/2022 at 9:46am     addressed any questions or concerns.    Important Message from Medicare document will be scanned into patient's medical record

## 2022-04-18 NOTE — DISCHARGE SUMMARY
[facility]  Discharge Summary  Patient Name: Conrad Kuhn MRN: [id number]   Patient Class: IP- Inpatient  Length of Stay: 8   Admission Date: 4/10/2022  2:39 PM Attending Physician: Rachel Morales MD   Primary Care Provider: Johnson Erazo MD Face-to-Face encounter date: 04/18/2022   Chief Complaint: Shortness of Breath    Date of Discharge: 4/18/2022  Discharge Disposition: Home with home health [facility]  Condition: Stable       Reason for Hospitalization   Acute hypoxic respiratory failure required intubation   Aspiration pneumonia  Aspiration s/p PEG placement   S/P cervical spinal fusion      Patient Active Problem List   Diagnosis    CLL (chronic lymphocytic leukemia)    History of lung cancer - ANDRES NSCLC 2004    Iron deficiency anemia    Pain in joint of right shoulder    Pancytopenia    Herpetic gingivostomatitis    Stridor    Acute hypoxemic respiratory failure    Aspiration pneumonia    Thrombocytopenia    Troponin level elevated    S/P cervical spinal fusion       Brief History of Present Illness    Conrad Kuhn is a 81 y.o.  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 The patient presented to [facility] on 4/10/2022 with a primary complaint of Shortness of Breath  .     For the full HPI please refer to the History & Physical from this admission.    Hospital Course By Problem with Pertinent Findings     [place] for acute hypoxic  "respiratory failure secondary to aspiration pneumonia. Patient was electively intubated in ER due to stridor and started on antibiotics. Next day he was extubated. MBSS confirmed aspiration. GI consulted and PEG was placed without any complications.  12mm cratered duodenal ulcer noted on endoscopy. Protonix was prescribed twice daily. Patient to follow up with GI doctor in 6 months for PEG removal.     Patient was seen and examined on the date of discharge and determined to be suitable for discharge.    Physical Exam  BP (!) 143/65 (BP Location: Left arm, Patient Position: Sitting)   Pulse 96   Temp 98.2 °F (36.8 °C) (Oral)   Resp 20   Ht 6' 2" (1.88 m)   Wt 88.2 kg (194 lb 7.1 oz)   SpO2 97%   BMI 24.97 kg/m²   Vitals reviewed.    Constitutional: No distress.   HENT: Atraumatic.   Cardiovascular: Normal rate, regular rhythm and normal heart sounds.   Pulmonary/Chest: Effort normal. Clear to auscultation bilaterally. No wheezes.   Abdominal: Soft. Bowel sounds are normal. Exhibits no distension and no mass. No tenderness, PEG noted  Neurological: Alert.   Skin: Skin is warm and dry.     Following labs were Reviewed   Recent Labs   Lab 04/18/22  0450   WBC 7.07   HGB 10.2*   HCT 29.8*   PLT 79*   CALCIUM 8.9   ALBUMIN 3.3*   PROT 6.6   *   K 3.5   CO2 29      BUN 14   CREATININE 0.8   ALKPHOS 43*   ALT 15   AST 13   BILITOT 1.1*     No results found for: POCTGLUCOSE     All labs within the past 24 hours have been reviewed    Microbiology Results (last 7 days)     Procedure Component Value Units Date/Time    Blood culture [[id number]] Collected: 04/10/22 1738    Order Status: Completed Specimen: Blood from Peripheral, Hand, Left Updated: 04/15/22 1832     Blood Culture, Routine No growth after 5 days.    Blood culture [[id number]] Collected: 04/10/22 1738    Order Status: Completed Specimen: Blood from Peripheral, Forearm, Left Updated: 04/15/22 1832     Blood Culture, Routine No growth after 5 days. "    Culture, Respiratory with Gram Stain [[id number]] Collected: 04/10/22 1541    Order Status: Completed Specimen: Respiratory from Endotracheal Aspirate Updated: 04/12/22 0723     Respiratory Culture Reduced normal respiratory annette     Gram Stain (Respiratory) No WBC's or organisms seen        Fl Modified Barium Swallow Speech   Final Result      X-Ray Chest AP Portable   Final Result      CT Chest With Contrast   Final Result      CT Soft Tissue Neck With Contrast   Final Result      X-Ray Chest AP Portable   Final Result      X-Ray Chest AP Portable   Final Result      X-Ray Chest AP Portable   Final Result          No results found for this or any previous visit.      Consultants and Procedures   Consultants:  Gastroenterology  Pulmonary medicine    Procedures:   PEG placement    Discharge Information:   Diet:  Resume diet through PEG tube    Physical Activity:  Activity as tolerated    Instructions:  1. Take all medications as prescribed  2. Keep all follow-up appointments  3. Return to the hospital or call your primary care physicians if any worsening symptoms such as chest pain, shorn drea of breath occur.    Follow-Up Appointments:  1. Please call your primary care physician to schedule an appointment in 1 week time.    Pending laboratory work/Tests to be performed/followed by the Primary care Physician: none    The patient was discharged in the care of her parents//wife/family/caregiver, with discharge instructions were reviewed in written and verbal form. All pertinent questions were discussed and prescriptions were provided. The importance of making follow up appointments and compliance of medications has been stressed repeatedly. The patient will follow up in 1 week or sooner as needed with the PCP, and the patient is on board with the plan. Upon discharge, patient needs to be on following medications.    Discharge Medications:     Medication List      START taking these medications     esomeprazole 40 MG capsule  Commonly known as: NEXIUM  Take 1 capsule (40 mg total) by mouth 2 (two) times daily.     metoprolol tartrate 25 MG tablet  Commonly known as: LOPRESSOR  1 tablet (25 mg total) by Per G Tube route 2 (two) times daily.        CHANGE how you take these medications    traZODone 100 MG tablet  Commonly known as: DESYREL  TAKE 1 TABLET BY MOUTH EVERY NIGHT AT BEDTIME  What changed: when to take this        CONTINUE taking these medications    albuterol-ipratropium 2.5 mg-0.5 mg/3 mL nebulizer solution  Commonly known as: DUO-NEB     atorvastatin 20 MG tablet  Commonly known as: LIPITOR     azelastine 137 mcg (0.1 %) nasal spray  Commonly known as: ASTELIN     blood sugar diagnostic Strp     blood-glucose meter kit     ferrous sulfate 325 mg (65 mg iron) Tab tablet  Commonly known as: FEOSOL  Take 1 tablet (325 mg total) by mouth once daily.     FREESTYLE LANCETS 28 gauge Misc  Generic drug: lancets     gabapentin 300 MG capsule  Commonly known as: NEURONTIN     glimepiride 1 MG tablet  Commonly known as: AMARYL     HYDROcodone-acetaminophen  mg per tablet  Commonly known as: NORCO     levalbuterol 1.25 mg/3 mL nebulizer solution  Commonly known as: XOPENEX     metFORMIN 500 MG tablet  Commonly known as: GLUCOPHAGE     promethazine 25 MG tablet  Commonly known as: PHENERGAN  TAKE 1 TABLET BY MOUTH EVERY 6 HOURS AS NEEDED FOR NAUSEA     valsartan 80 MG tablet  Commonly known as: DIOVAN        STOP taking these medications    celecoxib 200 MG capsule  Commonly known as: CeleBREX     DUKE'S SOLUTION (BENADRYL 30 ML, MYLANTA 30 ML, LIDOCAINE 30 ML, NYSTATIN 30 ML)     ibuprofen 600 MG tablet  Commonly known as: ADVIL,MOTRIN     losartan 25 MG tablet  Commonly known as: COZAAR     metoprolol succinate 25 MG 24 hr tablet  Commonly known as: TOPROL-XL     neomycin-polymyxin-dexamethasone 3.5 mg/g-10,000 unit/g-0.1 % Oint  Commonly known as: DEXACINE     ondansetron 8 MG tablet  Commonly known  as: ZOFRAN     predniSONE 10 MG tablet  Commonly known as: DELTASONE     valACYclovir 1000 MG tablet  Commonly known as: VALTREX     venetoclax 100 mg Tab  Commonly known as: VENCLEXTA           Where to Get Your Medications      These medications were sent to [id number] - [place], [place] - [address] AT [facility] [address]  [address], [place] [address]    Phone: [phone]   · esomeprazole 40 MG capsule  · metoprolol tartrate 25 MG tablet           I spent 40 minutes preparing the discharge including reviewing records from previous encounters, preparation of discharge summary, assessing and final examination of the patient, discharge medicine reconciliation, discussing plan of care, follow up and education and prescriptions.       Rachel Morales  [facility] Hospitalist  04/18/2022

## 2022-04-18 NOTE — [employer] PATIENT INSTRUCTIONS
Discharge Summary/Instructions after an Endoscopic Procedure  Patient Name: Conrad Kuhn  Patient MRN: [id number]  Patient YOB: 1940  Saturday, April 16, 2022  Siddharth Garcia MD  RESTRICTIONS:  During your procedure today, you received medications for sedation.  These   medications may affect your judgment, balance and coordination.  Therefore,   for 24 hours, you have the following restrictions:   - DO NOT drive a car, operate machinery, make legal/financial decisions,   sign important papers or drink alcohol.    ACTIVITY:  Today: no heavy lifting, straining or running due to procedural   sedation/anesthesia.  The following day: return to full activity including work.  DIET:  Eat and drink normally unless instructed otherwise.     TREATMENT FOR COMMON SIDE EFFECTS:  - Mild abdominal pain, nausea, belching, bloating or excessive gas:  rest,   eat lightly and use a heating pad.  - Sore Throat: treat with throat lozenges and/or gargle with warm salt   water.  - Because air was used during the procedure, expelling large amounts of air   from your rectum or belching is normal.  - If a bowel prep was taken, you may not have a bowel movement for 1-3 days.    This is normal.  SYMPTOMS TO WATCH FOR AND REPORT TO YOUR PHYSICIAN:  1. Abdominal pain or bloating, other than gas cramps.  2. Chest pain.  3. Back pain.  4. Signs of infection such as: chills or fever occurring within 24 hours   after the procedure.  5. Rectal bleeding, which would show as bright red, maroon, or black stools.   (A tablespoon of blood from the rectum is not serious, especially if   hemorrhoids are present.)  6. Vomiting.  7. Weakness or dizziness.  GO DIRECTLY TO THE NEAREST EMERGENCY ROOM IF YOU HAVE ANY OF THE FOLLOWING:      Difficulty breathing              Chills and/or fever over 101 F   Persistent vomiting and/or vomiting blood   Severe abdominal pain   Severe chest pain   Black, tarry stools   Bleeding- more than one  tablespoon   Any other symptom or condition that you feel may need urgent attention  Your doctor recommends these additional instructions:  If any biopsies were taken, your doctors clinic will contact you in 1 to 2   weeks with any results.  - Start Protonix 40mg BID x 6 weeks to treat duodenal ulcer  - Ok to use PEG tube immediately for medications and water flushes.  If   tolerates without any difficulties, ok to start tube feeds this evening.    Please consult nutritionist for recommendations on tube feeds.  Please keep   PEG tube site uncovered and open to air.  Can clean around site if needed   with gentle soap and water.   - RTC in 6 months or sooner for PEG tube removal/exchange  - Return patient to [facility] for ongoing care.  For questions, problems or results please call your physician - Siddharth Garcia MD at Work:  [phone].  [facility], EMERGENCY ROOM PHONE NUMBER: [phone]  IF A COMPLICATION OR EMERGENCY SITUATION ARISES AND YOU ARE UNABLE TO REACH   YOUR PHYSICIAN - GO DIRECTLY TO THE EMERGENCY ROOM.  Siddharth Garcia MD  4/16/2022 8:58:39 AM  This report has been verified and signed electronically.  Dear patient,  As a result of recent federal legislation (The Federal Cures Act), you may   receive lab or pathology results from your procedure in your [facility]   account before your physician is able to contact you. Your physician or   their representative will relay the results to you with their   recommendations at their soonest availability.  Thank you,  [employer]

## 2022-06-03 NOTE — PROGRESS NOTES
[facility] Hematology/Oncology  PROGRESS NOTE - Follow-up Visit      Subjective:       Patient ID:   NAME: Conrad Kuhn : 1940     81 y.o. male    Referring Doc: Julien  Other Physicians: Ced Erazo Joubert, Srinivas, Pinsky    Chief Complaint:  CLL f/u    History of Present Illness:     Patient is being seen today in person in clinic for a regular follow-up viasit. He is here by himself.  The patient is on today to go over the results of the recently ordered labs, tests and studies. He is here by himself today.      He had surgery on his cervical spine with Dr Mai on 2022 and had postoperative problems related to the intubation and anesthesia and was in the hospital for about 3 weeks. He had aspiration pneumonia and bout of respiratory failure requiring mech vent support.  He has residual swallowing issues and has a peg tube. He was seen Dr Garcia with GI while in hospital. He was discharged on . He has had home health coming to house couple times of week. He reports that he is doing better      He had covid in 2022 and he received the infusion but did not require hospitalization       He is breathing ok currently. He denies any CP, SOB, or N/V.       He has telemed visit Dr Don at [facility] in the near future. He had previous bone marrow biopsy with good report. He has since been off rituximab and the oral Venetoclax  .      Discussed Covid19 precautions; he had his vaccinations              ROS:   GEN: normal without any fever, night sweats or weight loss  HEENT: normal with no HA's, sore throat, stiff neck, changes in vision; residual swallowing difficulties  CV: normal with no CP, SOB, PND, MAYER or orthopnea  PULM: normal with no SOB, cough, hemoptysis, sputum or pleuritic pain  GI: normal with no abdominal pain, nausea, vomiting, constipation, diarrhea, melanotic stools, BRBPR, or hematemesis; no dysphagia; peg tube in place  : urine frequency stable  BREAST: normal with no mass,  discharge, pain  SKIN: normal with no rash, erythema, bruising, or swelling    Allergies:  Review of patient's allergies indicates:  No Known Allergies    Medications:    Current Outpatient Medications:     albuterol-ipratropium (DUO-NEB) 2.5 mg-0.5 mg/3 mL nebulizer solution, Take 3 mLs by nebulization every 8 (eight) hours as needed., Disp: , Rfl:     atorvastatin (LIPITOR) 20 MG tablet, Take 20 mg by mouth once daily. , Disp: , Rfl:     azelastine (ASTELIN) 137 mcg (0.1 %) nasal spray, 1 spray by Nasal route 2 (two) times daily. , Disp: , Rfl:     blood sugar diagnostic Strp, by Other route., Disp: , Rfl:     ferrous sulfate (FEOSOL) 325 mg (65 mg iron) Tab tablet, Take 1 tablet (325 mg total) by mouth once daily., Disp: , Rfl:     FREESTYLE LANCETS 28 gauge lancets, TEST TWICE DAILY, Disp: , Rfl: 1    gabapentin (NEURONTIN) 300 MG capsule, Take 300 mg by mouth 2 (two) times daily. , Disp: , Rfl: 0    glimepiride (AMARYL) 1 MG tablet, Take 1 mg by mouth daily with breakfast. , Disp: , Rfl: 0    HYDROcodone-acetaminophen (NORCO)  mg per tablet, Take 1 tablet by mouth every 8 (eight) hours as needed. , Disp: , Rfl: 0    levalbuterol (XOPENEX) 1.25 mg/3 mL nebulizer solution, Take 3 mLs by nebulization every 4 to 6 hours as needed. , Disp: , Rfl:     metFORMIN (GLUCOPHAGE) 500 MG tablet, Take 500 mg by mouth 2 (two) times daily with meals. , Disp: , Rfl: 2    promethazine (PHENERGAN) 25 MG tablet, TAKE 1 TABLET BY MOUTH EVERY 6 HOURS AS NEEDED FOR NAUSEA (Patient taking differently: Take 25 mg by mouth every 6 (six) hours as needed.), Disp: 30 tablet, Rfl: 3    traZODone (DESYREL) 100 MG tablet, TAKE 1 TABLET BY MOUTH EVERY NIGHT AT BEDTIME (Patient taking differently: Take 100 mg by mouth every evening.), Disp: 90 tablet, Rfl: 0    valsartan (DIOVAN) 80 MG tablet, Take 80 mg by mouth every evening., Disp: , Rfl:     blood-glucose meter kit, Use as instructed, Disp: , Rfl:     esomeprazole  "(NEXIUM) 40 MG capsule, Take 1 capsule (40 mg total) by mouth 2 (two) times daily., Disp: 60 capsule, Rfl: 0    metoprolol tartrate (LOPRESSOR) 25 MG tablet, 1 tablet (25 mg total) by Per G Tube route 2 (two) times daily., Disp: 60 tablet, Rfl: 0    Current Facility-Administered Medications:     EPINEPHrine (EPIPEN) 0.3 mg/0.3 mL pen injection 0.3 mg, 0.3 mg, Intramuscular, PRN, Kathy Strong, ENOC-C    PMHx/PSHx Updates:  See patient's last visit with me on 3/31/2022  See H&P on 1/3/2019        Pathology:  Cancer Staging  No matching staging information was found for the patient.          Objective:     Vitals:  Blood pressure (!) 151/57, pulse 79, temperature 97.7 °F (36.5 °C), resp. rate 16, height 6' 2" (1.88 m), weight 91.6 kg (202 lb).        Physical Examination:   GEN: no apparent distress, comfortable; AAOx3  HEAD: atraumatic and normocephalic  EYES: no conjunctival pallor or muddiness, no icterus; normal pupil reaction to ambient light  ENT: OMM, no pharyngeal erythema, external bilateral ears WNL; no visible thrush or ulcers  NECK: no masses or swelling, trachea midline, no visible LAD/LN's ; LAD and LN's on right neck reduced in size  CV: no palpitations; no pedal edema; no noticeable JVD or neck vein distension  CHEST: Normal respiratory effort; chest wall breath movements symmetrical; no audible wheezing  ABDOM: non-distended; no bloating;  peg tube in place  MUSC/Skeletal: ROM normal; joints visibly normal; no deformities or arthropathy  EXTREM: no clubbing, cyanosis, inflammation or swelling; right arm in sling s/p right shoulder surgery  SKIN: no rashes, lesions, ulcers, petechiae or subcutaneous nodules  : no keith  NEURO: moving all 4 extremities; AAOx3; no tremors  PSYCH: normal mood, affect and behavior  LYMPH: no visible LN's or LAD; LAD and LN's on right neck reduced in size              Labs:   Lab Results   Component Value Date    WBC 3.16 (L) 06/06/2022    HGB 12.0 (L) 06/06/2022    HCT " 34.3 (L) 06/06/2022    MCV 87 06/06/2022    PLT 70 (L) 06/06/2022     CMP  Sodium   Date Value Ref Range Status   05/11/2022 139 136 - 145 mmol/L Final   06/12/2019 138 134 - 144 mmol/L      Potassium   Date Value Ref Range Status   05/11/2022 4.0 3.5 - 5.1 mmol/L Final     Chloride   Date Value Ref Range Status   05/11/2022 104 95 - 110 mmol/L Final   06/12/2019 99 98 - 110 mmol/L      CO2   Date Value Ref Range Status   05/11/2022 29 22 - 31 mmol/L Final     Glucose   Date Value Ref Range Status   05/11/2022 110 70 - 110 mg/dL Final     Comment:     The ADA recommends the following guidelines for fasting glucose:    Normal:       less than 100 mg/dL    Prediabetes:  100 mg/dL to 125 mg/dL    Diabetes:     126 mg/dL or higher     06/12/2019 179 (H) 70 - 99 mg/dL      BUN   Date Value Ref Range Status   05/11/2022 17 9 - 21 mg/dL Final     Creatinine   Date Value Ref Range Status   05/11/2022 0.75 0.50 - 1.40 mg/dL Final   06/12/2019 0.95 0.60 - 1.40 mg/dL      Calcium   Date Value Ref Range Status   05/11/2022 8.8 8.4 - 10.2 mg/dL Final     Total Protein   Date Value Ref Range Status   05/11/2022 6.6 6.0 - 8.4 g/dL Final     Albumin   Date Value Ref Range Status   05/11/2022 3.8 3.5 - 5.2 g/dL Final   06/12/2019 3.9 3.1 - 4.7 g/dL      Total Bilirubin   Date Value Ref Range Status   05/11/2022 0.7 0.2 - 1.3 mg/dL Final     Alkaline Phosphatase   Date Value Ref Range Status   05/11/2022 77 38 - 145 U/L Final     AST   Date Value Ref Range Status   05/11/2022 26 17 - 59 U/L Final     ALT   Date Value Ref Range Status   05/11/2022 14 0 - 50 U/L Final     Anion Gap   Date Value Ref Range Status   05/11/2022 6 (L) 8 - 16 mmol/L Final     eGFR if    Date Value Ref Range Status   05/11/2022 >60 >60 mL/min/1.73 m^2 Final     eGFR if non    Date Value Ref Range Status   05/11/2022 >60 >60 mL/min/1.73 m^2 Final     Comment:     Calculation used to obtain the estimated glomerular  filtration  rate (eGFR) is the CKD-EPI equation.              Lab Results   Component Value Date    IRON 100 03/31/2022    TIBC 361 03/31/2022    FERRITIN 54 03/31/2022         Radiology/Diagnostic Studies:    PET  3/14/2022:    IMPRESSION:  Left upper lobectomy without evidence of recurrent or metastatic disease      MRI cervical spine  10/1/2021:    IMPRESSION:     Loss of normal cervical lordosis with mild kyphotic curvature.     Advanced multilevel cervical spondylosis, as outlined above. Spinal canal narrowing and foraminal narrowing is most severe at C5-6. 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     Congenital narrowing of cervical spinal canal, which exacerbates cervical spondylosis        CT head  8/27/2021:  IMPRESSION:     No CT evidence of acute intracranial pathology.     Stable senescent changes as above.         PET  7/19/2021:    IMPRESSION:  Prior left upper lobectomy without evidence of recurrent or metastatic disease  - 3 cm infrarenal abdominal aortic aneurysm      CT head 6/29/2021:  IMPRESSION:     No CT evidence of acute intracranial pathology      PET  1/18/2021:    Impression:     No evidence of FDG avid lymphoproliferative disease.     Aneurysmal dilation of infrarenal abdominal aorta (3.4 cm) as well as aneurysmal dilation of right common iliac artery.     Additional incidental findings as above        PET  6/24/2020:    Impression:     1. No findings of active lymphoproliferative disease.  2. Additional observations as above.      PET  1/24/2020:    Impression       Moderate decrease in size of the adenopathy within the neck, chest, abdomen and pelvis.  The spleen is smaller in size.  There is no significant FDG activity.    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 Multifocal lymphadenopathy in the chest, abdomen, and pelvis compatible with  provided clinical history of lymphocytic leukemia.  There is mixed interval change when compared to prior studies.  Pathologic lymphadenopathy in the chest has increased significantly when compared to a CTA of the chest from May 2018.  Pathologic retroperitoneal lymphadenopathy has improved slightly when compared to a previous abdominal CT from February 2017.  Please see above details.  2. Mild aneurysmal dilation of the infrarenal abdominal aorta, with maximal transverse diameter of 3.7 cm.  Maximal transverse diameter on a prior study from 2017 was 2.5 cm.  3. Additional findings as above           [facility] Unknown Rad Eap    Result Date: 1/14/2019  CMS MANDATED QUALITY DATA - CT RADIATION - 436 All CT scans at this facility utilize dose modulation, iterative reconstruction, and/or weight based dosing when appropriate to reduce radiation dose to as low as reasonably achievable. Reason:Lymphoid leukemia TECHNIQUE: CT thorax with, CT abdomen  with, and CT pelvis with 100 mL Omnipaque 350. COMPARISON: CT chest dated 05/19/2018 and CT abdomen and pelvis dated 02/08/2017 CT THORAX: There is stable mediastinal, hilar and axillary adenopathy. There is coronary artery calcification. There is resolution of the groundglass opacities throughout the lungs. There are no confluent infiltrates, pulmonary nodules or pleural effusions. There are stable subcentimeter nodules in the left lobe of the thyroid gland. There are degenerative changes of the spine. CT ABDOMEN: The liver, pancreas, adrenal glands and gallbladder are normal. The spleen is enlarged. There is mesenteric and retroperitoneal adenopathy which is slightly smaller in size. -There is a portacaval node measuring 37 x 21 mm and previously measured 38 x 27 mm. -Periportal lymph node measuring 41 x 18 mm, previously measured 48 x 29 mm. -Left periaortic adenopathy measuring 34 x 23 mm and previously measured 40 x 42 mm- The kidneys enhance symmetrically without  hydronephrosis or calculi. There are no thick-walled or dilated bowel loops. There is vascular calcification of aorta. There is a 3.0 x 3.0 cm infrarenal abdominal aortic aneurysm. CT PELVIS: There is adenopathy along the pelvic sidewalls bilaterally which has decreased in size. -The left common iliac adenopathy measuring 36 x 11 mm, previously measured 46 x 14 mm -the right common iliac adenopathy measuring 46 x 10 mm. Previously measured 53 x 15 mm. The bladder is normal. The prostate gland is enlarged. There are degenerative changes of the spine. There is grade 1 anterolisthesis of L5 on S1 with bilateral pars defects.     IMPRESSION: Stable mediastinal, hilar and axillary adenopathy with resolution of the airspace disease within the lungs Decrease in size of the mesenteric, retroperitoneal and pelvic adenopathy. Stable infrarenal abdominal aortic aneurysm Splenomegaly     Read and electronically signed by: Jeniffer Arredondo MD on 1/14/2019 9:14 AM CST JENIFFER ARREDONDO MD      I have reviewed all available lab results and radiology reports.    Assessment/Plan:   (1) 81 y.o. male with  diagnosis of CLL who has been referred by Dr Rony Victoria for continuation of care by medical hematology/oncology.   - BM biopsy  12/4/2015 with CLL  - diagnosis of SLL in 2004 and s/p FCR x6 in 2007  - s/p imbruvica in past (stopped in May 2018)  - latest wbc at 4.8; lymph 56%  - latest CT on 8/7/2019 showing increase in the LAD  - platelets are 111,000  - He was recently hospitalized at [facility] and seen by Dr Wheeler. Dr Wheeler has recommended Rituximab. He is starting tomorrow.   - discussed the rituximab regimen and the potential side-effect profile; he is getting chemotherapy school today with [facility]  - he saw Dr Don on Oct 21st 2019  - he has had 3 of the 4 weeks of rituximab so far; Dr Don recommends him to eventually get rituximab monthly x6 with daily oral Venetoclax for two years total.   - completed rituximab (4th) week  of rituximab and  S/p 6 monthly rituximab with Venetoclax oral but is taking only 2 pills daily due to the counts  - he is now just on the venetclax  - he saw Dr Don again on Dec 23rd 2019 and sees him again in March 16th 2020  - latest PEt on 1/24/2020 looks really good    8/27/2020:  - he saw Dr Don last month at [facility]  - latest PEt from 6/24/2020 looks good  - he remains on just the oral venetoclax at 2 pills per day  - he sees Dr Don again in about 3 months (Oct 2020)    11/18/2020:  - he is doing well with the Venetoclax  - due for repeat PEt in Dec 2020  - he sees Dr Don again on Dec 23rd 2020    1/20/2021:  - he is doing well with the Venetoclax  - He saw Dr Don at [facility] in Dec 2020. He is now off rituximab monthly and remains only on the oral Venetoclax but at 2 pills daily . He sees Dr Don again in feb 2021  - Recent PEt on  1/18/2021 looks stable except for incidental aneurysm in aorta; so we are referring him to Dr Kapadia with v[facility]    4/20/2021:  - he saw Dr Don in Feb 2021 and sees him again in Nov 2021  - repeat PEt in June/july 2021  - continued on Venetoclax and regular blood checks    7/20/2021:  - he continues on the Venetoclax  - mild leucopenia from the medication  - PEt on 7/19/2021 seems to be stable    9/21/2021:  - doing ok overall with some occasional HA's and general lack of energy  - he sees Dr Don again on nov 1st and hopefully he can discontinue the venetoclax thereafter  - Head CT on 8/27/2021 was relatively negative    2/17/2022:  -  He last showed for oncology appointment in Sept 2021  - He had covid in Jan 2022 and he received the infusion but did not require hospitalization  - He saw Dr Erazo couple of weeks ago  - He is planning to have cervical spine surgery with Dr Mai in the near future.  - He sees Dr Don at [facility] this coming Monday. He is now off rituximab monthly and he is also now off the oral Venetoclax (expect he will be getting a repeat bone marrow  biopsy)    3/31/2022:  - He had recent telemed visit Dr Don at [facility] and they were pleased with the latest bone marrow biopsy. He is now off rituximab monthly and he is also now off the oral Venetoclax      6/6/2022:  - He had surgery on his cervical spine with Dr Mai on April 8th 2022 and had postoperative problems related to the intubation and anesthesia and was in the hospital for about 3 weeks. He had aspiration pneumonia and bout of respiratory failure requiring mech vent support.  He has residual swallowing issues and has a peg tube. He was seen Dr Garcia with GI while in hospital. He was discharged on 4/18. He has had home health coming to house couple times of week. 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  - followed  By Dr Kee  - CEA 1.4 on 4/8/2021  - CT scans on 1/14/2019 stable  - PET done in jan 2021 on chart    3/31/2022:  - CEA at 0.8  - PET on 3/14/2022 negative for recurrence     (3) Iron deficiency anemia s/p IV iron couple weeks ago  - hgb 11.7  - iron 39 and a little lower  - set up two more IV irons    2/17/2022:  - latest hgb at 11.4 and iron panel is adequate    3/31/2022:  - latest hgb at 11.4 and stable  - iron panel currently WNL    6/6/2022:  - latest hgb 12.0 and better  - iron panel still in process from earlier today     (4) HTN - on BP meds     (6) Chronic neck and back issues - followed by Dr Ryan Rivero     (7) DM - currently off all meds     (8) Hypercholesterolemia - on meds    (9)  - followed by Dr Whitney    (10) Chronic Leucopenia and thrombocytopenia - due to chemotherapy agents in past    2/17/2022:  - latest WBC at 5.84 and WNL  - latest plat at 92,000 and adequate and better since being off therapy    3/31/2022:  - latest plats are a little lower at 69,000  - wbc at 3.32    6/6/2022:  - latest  wbc at 3.16  - platelets at 70,000 currently      VISIT DIAGNOSES:      Encounter Diagnoses   Name Primary?    CLL (chronic lymphocytic leukemia) Yes    History of lung cancer - ANDRES NSCLC 2004     Iron deficiency anemia, unspecified iron deficiency anemia type     Pancytopenia     Thrombocytopenia            PLAN:  1. check labs every 4 weeks   2. F/u with PCP, pain management  3. F/u with PCP, Pulm, , etc  4. F/u with Dr Don at [facility] as directed - due in near future  5. F/u with Dr Kapadia with vascular for the aortic aneurysm  6. F/u with Dr Garcia about the peg tube       RTC in  8 weeks    Fax note to Kacey Kee, Ryan Rivero, Chelo, Karla, Florencia/Liam, Kang; Gonzalo Garcia        Discussion:       Total Time spent on patient:    I spent over 25 mins of time with the patient. Reviewed results of the recently ordered labs, tests, reports and studies; made directives with regards to the results. Over half of this time was spent couseling and coordinating care, making treatment and analytical decisions; ordering necessary labs, tests and studies; and discussing treatment options and setting up treatment plan(s) if indicated.        COVID-19 Discussion:    I had long discussion with patient and any applicable family about the COVID-19 coronavirus epidemic and the recommended precautions with regard to cancer and/or hematology patients. I have re-iterated the CDC recommendations for adequate hand washing, use of hand -like products, and coughing into elbow, etc. In addition, especially for our patients who are on chemotherapy and/or our otherwise immunocompromised patients, I have recommended avoidance of crowds, including movie theaters, restaurants, churches, etc. I have recommended avoidance of any sick or symptomatic family members and/or friends. I have also recommended avoidance of any raw and unwashed food products, and general avoidance of food items that have not been prepared by  themselves. The patient has been asked to call us immediately with any symptom developments, issues, questions or other general concerns.          I have explained all of the above in detail and the patient understands all of the current recommendation(s). I have answered all of their questions to the best of my ability and to their complete satisfaction.   The patient is to continue with the current management plan.        Chemotherapy Discussion:      I discussed the available treatment option(s) in accordance with the latest/current national evidence-based guidelines (NCCN, UpToDate, NCI, ASCO, etc where applicable), their overall age/condition and their co-morbidities. I also went over the risks and benefits of the chemotherapy with regard to their particular cancer type, their cancer stage, their age/condition, and their co-morbidities. I provided literature on the chemotherapy regimen and discussed the chemotherapy side-effect profiles of the drug(s). I discussed the importance of compliance with obtaining and monitoring weekly lab work, and went over the potential hematopathology issues and risks with anemia, leucopenia and thrombocytopenia that can occur with chemotherapy. I discussed the potential risks of liver and kidney damage, which could be permanent and could necessitate dialysis long-term if kidney failure developed. I discussed the emetic and/or diarrheal potential of the regimen and the potential need for use of antiemetic and anti-diarrheal medications. I discussed the risk for development of anaphylactic shock, bronchospasm, dysrhythmia, and respiratory/cardiovascular arrest and/or failure. I discussed the potential risks for development of alopecia, cold sensory issues, ringing in ears, vertigo, cataracts, glaucoma, and neuropathy, all of which could end up being chronic and life-long. Some chemotherpyI discussed the risks of hand-foot syndrome and rashes, and development of other autoimmune  mediated processes such as pneumonitis, hepatitis, and colitis which could be life threatening. I discussed the risks of the potential development of a rare but fatal viral mediated disease known as PML (Progressive Multifocal Leukoencephalopathy), and risk of future development of leukemia and/or lymphoma from use of certain chemotherapy agents. I discussed the need for neutropenic precautions, basic hygiene/sanitation behaviors and dietary restrictions.    The patient's consent has been obtained to proceed with the chemotherapy.The patient will be referred to Chemotherapy [facility] for training and education on chemotherapy, use of antiemetics and/or anti-diarrheals, use of NSAID's, potential chemotherapy side-effects, and any specific recommendations and precautions with the particular chemotherapy agents.       Immunologic Therapy Discussion:    I discussed the available treatment option(s) in accordance with the latest/current national evidence-based guidelines (NCCN, UpToDate, NCI, ASCO, etc where applicable), their overall age/condition and their co-morbidities. I went over the risks and benefits of the immunotherapy with regard to their particular cancer type, their cancer stage, their age/condition, and their co-morbidities. I provided literature on the immunotherapy regimen and discussed the immunotherapy side-effect profiles of the drug(s). I discussed the importance of compliance with obtaining and monitoring weekly lab work, and went over the potential hematopathology issues and risks of hemato-pathologic issues with anemia, leucopenia and/or thrombocytopenia and effects on thyroid function that can occur with immunotherapy. The patient will most likely need to have there thyroid functions monitored by their PCP and may need to take thyroid medication while on the immunotherapy. I discussed the potential risks of liver and kidney damage, which could be permanent and could necessitate  dialysis long-term if kidney failure developed. I discussed the emetic and/or diarrheal potential of the regimen and the potential need for use of antiemetic and anti-diarrheal medications. I discussed the risk for development of anaphylactic shock, bronchospasm, dysrhythmia, and respiratory/cardiovascular arrest and/or failure. I discussed the potential risks for development of alopecia, cold sensory issues, ringing in ears, vertigo and neuropathy, all of which could end up being chronic and life-long. I discussed the risks of hand-foot syndrome and rashes, and development of other autoimmune mediated processes such as pneumonitis, hepatitis and colitis which could potentially be life threatening. I discussed the risks of the potential development of a rare but fatal viral mediated disease known as PML (Progressive Multifocal Leukoencephalopathy), and risk of future development of leukemia and/or lymphoma from use of certain immunotherapy agents. I discussed the possibility that immunologic therapy cold worsen or promote progression of any underlying autoimmune diseases such as sarcoidosis, ulcerative colitis, Crohn's disease, psoriasis, rheumatoid disorders, scleroderma, autoimmune nephritis disorders, Hashimoto's thyroiditis, and Lupus among others. I discussed the need for neutropenic precautions, basic hygiene/sanitation behaviors and dietary restrictions.    The patient's consent has been obtained to proceed with the immunotherapy.The patient will be referred to [facility] for training and education on immunotherapy, use of antiemetics and/or anti-diarrheals, use of NSAID's, potential immunotherapy side-effects, and any specific recommendations and precautions with the particular immunotherapy agents.      I answered all of the patient's (and family's, if applicable) questions to the best of my ability and to their complete satisfaction. The patient acknowledged full understanding  of the risks, recommendations and plan(s).      Iron Infusion Therapy Discussion:     I provided literature/learning materials on the particular IV iron regimen and discussed the potential side-effect profiles of the drug(s). I discussed the importance of compliance with obtaining and monitoring requested lab work, and went over the potential risk for the development of anaphylactic shock, bronchospasm, dysrhythmia, liver and/or kidney damage, and respiratory/cardiovascular arrest and/or failure. I discussed the potential risks for development of alopecia, fevers, itching, chills and/or rigors, cold sensory issues, ringing in ears, vertigo and neuropathy, all of which are usually acute but sometimes could end up being chronic and life-long. I discussed the risks of hand-foot syndrome and rashes, and development of other autoimmune mediated processes such as pneumonitis and colitis which could be life threatening.     The patient's consent has been obtained to proceed with the IV iron therapy.The patient will be referred to [facility] for training and education on IV iron therapy, use of antiemetics and/or anti-diarrheals, use of NSAID's, potential IV iron therapy side-effects, and any specific recommendations and precautions with the particular IV iron agents.      I answered all of the patient's (and family's, if applicable) questions to the best of my ability and to their complete satisfaction. The patient acknowledged full understanding of the risks, recommendations and plan(s).          I answered all of the patient's (and family's, if applicable) questions to the best of my ability and to their complete satisfaction. The patient acknowledged full understanding of the risks, recommendations and plan(s).         Electronically signed by Drew Bai MD                    Answers for HPI/ROS submitted by the patient on 6/4/2022  appetite change : Yes  unexpected weight change:  Yes  mouth sores: No  visual disturbance: No  cough: No  shortness of breath: No  chest pain: No  abdominal pain: No  diarrhea: No  frequency: No  back pain: No  rash: No  headaches: No  adenopathy: No  nervous/ anxious: No

## 2022-06-06 ENCOUNTER — LAB VISIT (OUTPATIENT)
Dept: LAB | Facility: HOSPITAL | Age: 82
End: 2022-06-06
Attending: INTERNAL MEDICINE
Payer: MEDICARE

## 2022-06-06 ENCOUNTER — OFFICE VISIT (OUTPATIENT)
Dept: HEMATOLOGY/ONCOLOGY | Facility: CLINIC | Age: 82
End: 2022-06-06
Payer: MEDICARE

## 2022-06-06 VITALS
WEIGHT: 202 LBS | BODY MASS INDEX: 25.93 KG/M2 | TEMPERATURE: 98 F | RESPIRATION RATE: 16 BRPM | HEART RATE: 79 BPM | DIASTOLIC BLOOD PRESSURE: 57 MMHG | HEIGHT: 74 IN | SYSTOLIC BLOOD PRESSURE: 151 MMHG

## 2022-06-06 DIAGNOSIS — D61.818 PANCYTOPENIA: ICD-10-CM

## 2022-06-06 DIAGNOSIS — Z85.118 HISTORY OF LUNG CANCER: ICD-10-CM

## 2022-06-06 DIAGNOSIS — C91.10 CLL (CHRONIC LYMPHOCYTIC LEUKEMIA): ICD-10-CM

## 2022-06-06 DIAGNOSIS — D50.9 IRON DEFICIENCY ANEMIA, UNSPECIFIED IRON DEFICIENCY ANEMIA TYPE: ICD-10-CM

## 2022-06-06 DIAGNOSIS — C91.10 CLL (CHRONIC LYMPHOCYTIC LEUKEMIA): Primary | ICD-10-CM

## 2022-06-06 DIAGNOSIS — D50.8 IRON DEFICIENCY ANEMIA DUE TO DIETARY CAUSES: ICD-10-CM

## 2022-06-06 DIAGNOSIS — D69.6 THROMBOCYTOPENIA: ICD-10-CM

## 2022-06-06 LAB
ALBUMIN SERPL BCP-MCNC: 4 G/DL (ref 3.5–5.2)
ALP SERPL-CCNC: 56 U/L (ref 55–135)
ALT SERPL W/O P-5'-P-CCNC: 12 U/L (ref 10–44)
ANION GAP SERPL CALC-SCNC: 8 MMOL/L (ref 8–16)
AST SERPL-CCNC: 17 U/L (ref 10–40)
BASOPHILS # BLD AUTO: 0.01 K/UL (ref 0–0.2)
BASOPHILS NFR BLD: 0.3 % (ref 0–1.9)
BILIRUB SERPL-MCNC: 0.9 MG/DL (ref 0.1–1)
BUN SERPL-MCNC: 13 MG/DL (ref 8–23)
CALCIUM SERPL-MCNC: 9 MG/DL (ref 8.7–10.5)
CHLORIDE SERPL-SCNC: 102 MMOL/L (ref 95–110)
CO2 SERPL-SCNC: 27 MMOL/L (ref 23–29)
CREAT SERPL-MCNC: 0.9 MG/DL (ref 0.5–1.4)
DIFFERENTIAL METHOD: ABNORMAL
EOSINOPHIL # BLD AUTO: 0.1 K/UL (ref 0–0.5)
EOSINOPHIL NFR BLD: 2.5 % (ref 0–8)
ERYTHROCYTE [DISTWIDTH] IN BLOOD BY AUTOMATED COUNT: 14.6 % (ref 11.5–14.5)
EST. GFR  (AFRICAN AMERICAN): >60 ML/MIN/1.73 M^2
EST. GFR  (NON AFRICAN AMERICAN): >60 ML/MIN/1.73 M^2
FERRITIN SERPL-MCNC: 30 NG/ML (ref 20–300)
GLUCOSE SERPL-MCNC: 175 MG/DL (ref 70–110)
HCT VFR BLD AUTO: 34.3 % (ref 40–54)
HGB BLD-MCNC: 12 G/DL (ref 14–18)
IMM GRANULOCYTES # BLD AUTO: 0.12 K/UL (ref 0–0.04)
IMM GRANULOCYTES NFR BLD AUTO: 3.8 % (ref 0–0.5)
IRON SERPL-MCNC: 55 UG/DL (ref 45–160)
LYMPHOCYTES # BLD AUTO: 1 K/UL (ref 1–4.8)
LYMPHOCYTES NFR BLD: 32 % (ref 18–48)
MCH RBC QN AUTO: 30.3 PG (ref 27–31)
MCHC RBC AUTO-ENTMCNC: 35 G/DL (ref 32–36)
MCV RBC AUTO: 87 FL (ref 82–98)
MONOCYTES # BLD AUTO: 0.6 K/UL (ref 0.3–1)
MONOCYTES NFR BLD: 19.9 % (ref 4–15)
NEUTROPHILS # BLD AUTO: 1.3 K/UL (ref 1.8–7.7)
NEUTROPHILS NFR BLD: 41.5 % (ref 38–73)
NRBC BLD-RTO: 0 /100 WBC
PLATELET # BLD AUTO: 70 K/UL (ref 150–450)
PMV BLD AUTO: 10.8 FL (ref 9.2–12.9)
POTASSIUM SERPL-SCNC: 4.1 MMOL/L (ref 3.5–5.1)
PROT SERPL-MCNC: 6.9 G/DL (ref 6–8.4)
RBC # BLD AUTO: 3.96 M/UL (ref 4.6–6.2)
SATURATED IRON: 14 % (ref 20–50)
SODIUM SERPL-SCNC: 137 MMOL/L (ref 136–145)
TOTAL IRON BINDING CAPACITY: 385 UG/DL (ref 250–450)
TRANSFERRIN SERPL-MCNC: 275 MG/DL (ref 200–375)
WBC # BLD AUTO: 3.16 K/UL (ref 3.9–12.7)

## 2022-06-06 PROCEDURE — 99214 PR OFFICE/OUTPT VISIT, EST, LEVL IV, 30-39 MIN: ICD-10-PCS | Mod: S$GLB,,, | Performed by: INTERNAL MEDICINE

## 2022-06-06 PROCEDURE — 84466 ASSAY OF TRANSFERRIN: CPT | Performed by: INTERNAL MEDICINE

## 2022-06-06 PROCEDURE — 36415 COLL VENOUS BLD VENIPUNCTURE: CPT | Performed by: INTERNAL MEDICINE

## 2022-06-06 PROCEDURE — 80053 COMPREHEN METABOLIC PANEL: CPT | Performed by: INTERNAL MEDICINE

## 2022-06-06 PROCEDURE — 82728 ASSAY OF FERRITIN: CPT | Performed by: INTERNAL MEDICINE

## 2022-06-06 PROCEDURE — 99214 OFFICE O/P EST MOD 30 MIN: CPT | Mod: S$GLB,,, | Performed by: INTERNAL MEDICINE

## 2022-06-06 PROCEDURE — 85025 COMPLETE CBC W/AUTO DIFF WBC: CPT | Performed by: INTERNAL MEDICINE

## 2022-06-06 RX ORDER — FERROUS SULFATE 325(65) MG
325 TABLET ORAL DAILY
COMMUNITY
Start: 2022-06-06 | End: 2022-06-22 | Stop reason: CLARIF

## 2022-06-22 ENCOUNTER — HOSPITAL ENCOUNTER (OUTPATIENT)
Dept: PREADMISSION TESTING | Facility: HOSPITAL | Age: 82
Discharge: HOME | End: 2022-06-22
Attending: INTERNAL MEDICINE
Payer: MEDICARE

## 2022-06-23 ENCOUNTER — HOSPITAL ENCOUNTER (OUTPATIENT)
Facility: HOSPITAL | Age: 82
Discharge: HOME | End: 2022-06-23
Attending: INTERNAL MEDICINE | Admitting: INTERNAL MEDICINE
Payer: MEDICARE

## 2022-06-23 ENCOUNTER — ANESTHESIA EVENT (OUTPATIENT)
Dept: SURGERY | Facility: HOSPITAL | Age: 82
End: 2022-06-23
Payer: MEDICARE

## 2022-06-23 ENCOUNTER — ANESTHESIA (OUTPATIENT)
Dept: SURGERY | Facility: HOSPITAL | Age: 82
End: 2022-06-23
Payer: MEDICARE

## 2022-06-23 VITALS
OXYGEN SATURATION: 97 % | SYSTOLIC BLOOD PRESSURE: 170 MMHG | TEMPERATURE: 97 F | RESPIRATION RATE: 15 BRPM | DIASTOLIC BLOOD PRESSURE: 89 MMHG | HEART RATE: 69 BPM

## 2022-06-23 VITALS
DIASTOLIC BLOOD PRESSURE: 71 MMHG | SYSTOLIC BLOOD PRESSURE: 149 MMHG | RESPIRATION RATE: 18 BRPM | HEART RATE: 56 BPM | OXYGEN SATURATION: 98 % | TEMPERATURE: 99 F

## 2022-06-23 DIAGNOSIS — R13.10 DYSPHAGIA: ICD-10-CM

## 2022-06-23 PROCEDURE — 25000003 PHARM REV CODE 250: Performed by: NURSE ANESTHETIST, CERTIFIED REGISTERED

## 2022-06-23 PROCEDURE — 43235 EGD DIAGNOSTIC BRUSH WASH: CPT | Performed by: INTERNAL MEDICINE

## 2022-06-23 PROCEDURE — 63600175 PHARM REV CODE 636 W HCPCS: Performed by: NURSE ANESTHETIST, CERTIFIED REGISTERED

## 2022-06-23 PROCEDURE — 27100019 HC AMBU BAG ADULT/PED: Performed by: STUDENT IN AN ORGANIZED HEALTH CARE EDUCATION/TRAINING PROGRAM

## 2022-06-23 PROCEDURE — 37000008 HC ANESTHESIA 1ST 15 MINUTES: Performed by: INTERNAL MEDICINE

## 2022-06-23 PROCEDURE — 27000671 HC TUBING MICROBORE EXT: Performed by: STUDENT IN AN ORGANIZED HEALTH CARE EDUCATION/TRAINING PROGRAM

## 2022-06-23 PROCEDURE — 27000675 HC TUBING MICRODRIP: Performed by: STUDENT IN AN ORGANIZED HEALTH CARE EDUCATION/TRAINING PROGRAM

## 2022-06-23 PROCEDURE — 27202103: Performed by: STUDENT IN AN ORGANIZED HEALTH CARE EDUCATION/TRAINING PROGRAM

## 2022-06-23 RX ORDER — PROPOFOL 10 MG/ML
VIAL (ML) INTRAVENOUS
Status: DISCONTINUED | OUTPATIENT
Start: 2022-06-23 | End: 2022-06-23

## 2022-06-23 RX ADMIN — PROPOFOL 50 MG: 10 INJECTION, EMULSION INTRAVENOUS at 10:06

## 2022-06-23 RX ADMIN — SODIUM CHLORIDE: 0.9 INJECTION, SOLUTION INTRAVENOUS at 10:06

## 2022-06-23 NOTE — TRANSFER OF CARE
Anesthesia Transfer of Care Note    Patient: Conrad Kuhn    Procedure(s) Performed: Procedure(s) (LRB):  EGD (ESOPHAGOGASTRODUODENOSCOPY) (N/A)    Patient location: GI    Anesthesia Type: MAC    Transport from OR: Transported from OR on room air with adequate spontaneous ventilation    Post pain: adequate analgesia    Post assessment: no apparent anesthetic complications    Post vital signs: stable    Level of consciousness: awake and alert    Nausea/Vomiting: no nausea/vomiting    Complications: none    Transfer of care protocol was followed      Last vitals:   Visit Vitals  BP (!) 170/89 (BP Location: Left arm, Patient Position: Lying)   Pulse 69   Temp 36 °C (96.8 °F) (Axillary)   Resp 16   SpO2 99%

## 2022-06-23 NOTE — ANESTHESIA POSTPROCEDURE EVALUATION
Anesthesia Post Evaluation    Patient: Conrad Kuhn    Procedure(s) Performed: Procedure(s) (LRB):  EGD (ESOPHAGOGASTRODUODENOSCOPY) (N/A)    Final Anesthesia Type: MAC      Patient location during evaluation: GI PACU  Patient participation: Yes- Able to Participate  Level of consciousness: awake and alert  Post-procedure vital signs: reviewed and stable  Pain management: adequate  Airway patency: patent    PONV status at discharge: No PONV  Anesthetic complications: no      Cardiovascular status: hemodynamically stable  Respiratory status: unassisted, spontaneous ventilation and room air  Hydration status: euvolemic  Follow-up not needed.          Vitals Value Taken Time   /72 06/23/22 1050   Temp 37 °C (98.6 °F) 06/23/22 1040   Pulse 57 06/23/22 1050   Resp 18 06/23/22 1050   SpO2 98 % 06/23/22 1050         No case tracking events are documented in the log.      Pain/Hannah Score: No data recorded

## 2022-06-23 NOTE — H&P
GASTROENTEROLOGY PRE-PROCEDURE H&P NOTE  Patient Name: Conrad Kuhn  Patient MRN: [id number]  Patient : 1940    Service date: 2022    PCP: Johnson Erazo MD    No chief complaint on file.      HPI: Patient is a 81 y.o. male with PMHx as below here for evaluation of     Conrad Kuhn  is a 81 year old   male  who is seen today for a follow-up visit. pt with chronic sternal notch dysphagia with prior dilation in years past.  had cervical fusion complicated by dysphagia a few months ago. had peg placement.  pt tolerating po and wants peg out but wants dilation. prior dilation  with [address] .     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     Right-sided Bell's palsy 2009    Spondylolisthesis     Varicose veins of legs     Venous insufficiency         Past Surgical History:  Past Surgical History:   Procedure Laterality Date    Athroscopic surgery      On Knee    BIOPSY OF TISSUE OF NECK Left 2015    Recuurence CLL/small cell lyphoma    ESOPHAGEAL DILATION      ESOPHAGOGASTRODUODENOSCOPY N/A 4/15/2022    Procedure: EGD (ESOPHAGOGASTRODUODENOSCOPY);  Surgeon: Siddharth Garcia MD;  Location: [facility];  Service: Endoscopy;  Laterality: N/A;    ESOPHAGOGASTRODUODENOSCOPY N/A 2022    Procedure: EGD (ESOPHAGOGASTRODUODENOSCOPY);  Surgeon: Siddharth Garcia MD;  Location: [facility];  Service: Endoscopy;  Laterality: N/A;    ESOPHAGOGASTRODUODENOSCOPY W/ PEG  N/A 4/16/2022    Procedure: EGD, WITH PEG TUBE INSERTION;  Surgeon: Siddharth Garcia MD;  Location: [facility];  Service: Endoscopy;  Laterality: N/A;    GASTROSTOMY TUBE PLACEMENT  04/16/2022    20 Fr (bumper initially at 3.5)    Hemorrhoid resection  1979    LUNG LOBECTOMY Left 2004    NECK SURGERY  04/08/2022    Anterior and Posterior C3-C7 fusion    OTHER SURGICAL HISTORY  2006    Chemotherapy s/p lyphoma        Portacath implantation and removal      reverse shoulder arthroplasty Right 03/2021        Home Medications:  Facility-Administered Medications Prior to Admission   Medication Dose Route Frequency Provider Last Rate Last Admin    EPINEPHrine (EPIPEN) 0.3 mg/0.3 mL pen injection 0.3 mg  0.3 mg Intramuscular PRN MELLO Page         Medications Prior to Admission   Medication Sig Dispense Refill Last Dose    albuterol-ipratropium (DUO-NEB) 2.5 mg-0.5 mg/3 mL nebulizer solution Take 3 mLs by nebulization every 8 (eight) hours as needed.       azelastine (ASTELIN) 137 mcg (0.1 %) nasal spray 1 spray by Nasal route 2 (two) times daily.        blood sugar diagnostic Strp by Other route.       blood-glucose meter kit Use as instructed       esomeprazole (NEXIUM) 40 MG capsule Take 1 capsule (40 mg total) by mouth 2 (two) times daily. 60 capsule 0     FREESTYLE LANCETS 28 gauge lancets TEST TWICE DAILY  1     gabapentin (NEURONTIN) 300 MG capsule Take 300 mg by mouth 2 (two) times daily.   0     glimepiride (AMARYL) 1 MG tablet Take 1 mg by mouth daily with breakfast.   0     HYDROcodone-acetaminophen (NORCO)  mg per tablet Take 1 tablet by mouth every 8 (eight) hours as needed.   0     levalbuterol (XOPENEX) 1.25 mg/3 mL nebulizer solution Take 3 mLs by nebulization every 4 to 6 hours as needed.        metFORMIN (GLUCOPHAGE) 500 MG tablet Take 500 mg by mouth 2 (two) times daily with meals.   2     metoprolol tartrate (LOPRESSOR) 25 MG tablet 1 tablet (25 mg total) by Per G  Tube route 2 (two) times daily. 60 tablet 0     promethazine (PHENERGAN) 25 MG tablet TAKE 1 TABLET BY MOUTH EVERY 6 HOURS AS NEEDED FOR NAUSEA (Patient taking differently: Take 25 mg by mouth every 6 (six) hours as needed.) 30 tablet 3     traZODone (DESYREL) 100 MG tablet TAKE 1 TABLET BY MOUTH EVERY NIGHT AT BEDTIME (Patient taking differently: Take 100 mg by mouth every evening.) 90 tablet 0        Inpatient Medications:        Review of patient's allergies indicates:  No Known Allergies    Social History:   Social History     Occupational History    Not on file   Tobacco Use    Smoking status: Former Smoker    Smokeless tobacco: Never Used   Substance and Sexual Activity    Alcohol use: Yes    Drug use: No    Sexual activity: Not Currently       Family History:   Family History   Problem Relation Age of Onset    Stomach cancer Mother 80         at 95    Colon cancer Father        Review of Systems:  A 10 point review of systems was performed and was normal, except as mentioned in the HPI, including constitutional, HEENT, heme, lymph, cardiovascular, respiratory, gastrointestinal, genitourinary, neurologic, endocrine, psychiatric and musculoskeletal.      OBJECTIVE:    Physical Exam:  24 Hour Vital Sign Ranges: Temp:  [98.1 °F (36.7 °C)] 98.1 °F (36.7 °C)  Pulse:  [58] 58  Resp:  [18] 18  SpO2:  [98 %] 98 %  BP: (152)/(74) 152/74  Most recent vitals: BP (!) 152/74 (BP Location: Left arm, Patient Position: Lying)   Pulse (!) 58   Temp 98.1 °F (36.7 °C)   Resp 18   SpO2 98% Comment: RA   GEN: well-developed, well-nourished, awake and alert, non-toxic appearing adult  HEENT: PERRL, sclera anicteric, oral mucosa pink and moist without lesion  NECK: trachea midline; Good ROM  CV: regular rate and rhythm, no murmurs or gallops  RESP: clear to auscultation bilaterally, no wheezes, rhonci or rales  ABD: soft, non-tender, non-distended, normal bowel sounds  EXT: no swelling or edema, 2+ pulses  distally  SKIN: no rashes or jaundice  PSYCH: normal affect    Labs:   No results for input(s): WBC, MCV, PLT in the last 72 hours.    Invalid input(s): HGBAU  No results for input(s): NA, K, CL, CO2, BUN, GLU in the last 72 hours.    Invalid input(s): CREA  No results for input(s): ALB in the last 72 hours.    Invalid input(s): ALKP, SGOT, SGPT, TBIL, DBIL, TPRO  No results for input(s): PT, INR, PTT in the last 72 hours.      IMPRESSION / RECOMMENDATIONS:  PEG removal  Dysphagia  -EGD with possible dilation and removal of peg    RIsks, benefits, alternatives discussed in detail regarding upcoming procedures and sedation. Some of the more common endoscopic complications include but not limited to immediate or delayed perforation, bleeding, infections, pain, inadvertent injury to surrounding tissue / organs and possible need for surgical evaluation. Patient expressed understanding, all questions answered and will proceed with procedure as planned.     Jefferson Hyman  6/23/2022  10:14 AM

## 2022-06-23 NOTE — [employer]OVATION PATIENT INSTRUCTIONS
Discharge Summary/Instructions after an Endoscopic Procedure  Patient Name: Conrad Kuhn  Patient MRN: [id number]  Patient YOB: 1940  Thursday, June 23, 2022  Jefferson Hyman MD  RESTRICTIONS:  During your procedure today, you received medications for sedation.  These   medications may affect your judgment, balance and coordination.  Therefore,   for 24 hours, you have the following restrictions:   - DO NOT drive a car, operate machinery, make legal/financial decisions,   sign important papers or drink alcohol.    ACTIVITY:  Today: no heavy lifting, straining or running due to procedural   sedation/anesthesia.  The following day: return to full activity including work.  DIET:  Eat and drink normally unless instructed otherwise.     TREATMENT FOR COMMON SIDE EFFECTS:  - Mild abdominal pain, nausea, belching, bloating or excessive gas:  rest,   eat lightly and use a heating pad.  - Sore Throat: treat with throat lozenges and/or gargle with warm salt   water.  - Because air was used during the procedure, expelling large amounts of air   from your rectum or belching is normal.  - If a bowel prep was taken, you may not have a bowel movement for 1-3 days.    This is normal.  SYMPTOMS TO WATCH FOR AND REPORT TO YOUR PHYSICIAN:  1. Abdominal pain or bloating, other than gas cramps.  2. Chest pain.  3. Back pain.  4. Signs of infection such as: chills or fever occurring within 24 hours   after the procedure.  5. Rectal bleeding, which would show as bright red, maroon, or black stools.   (A tablespoon of blood from the rectum is not serious, especially if   hemorrhoids are present.)  6. Vomiting.  7. Weakness or dizziness.  GO DIRECTLY TO THE NEAREST EMERGENCY ROOM IF YOU HAVE ANY OF THE FOLLOWING:      Difficulty breathing              Chills and/or fever over 101 F   Persistent vomiting and/or vomiting blood   Severe abdominal pain   Severe chest pain   Black, tarry stools   Bleeding- more than one  tablespoon   Any other symptom or condition that you feel may need urgent attention  Your doctor recommends these additional instructions:  If any biopsies were taken, your doctors clinic will contact you in 1 to 2   weeks with any results.  - Patient has a contact number available for emergencies.  The signs and   symptoms of potential delayed complications were discussed with the   patient.  Return to normal activities tomorrow.  Written discharge   instructions were provided to the patient.   - Resume previous diet.   - Continue present medications.   - Discharge patient to home (with escort).  For questions, problems or results please call your physician - Jefferson Hyman MD at Work:  [phone].  [facility], EMERGENCY ROOM PHONE NUMBER: [phone]  IF A COMPLICATION OR EMERGENCY SITUATION ARISES AND YOU ARE UNABLE TO REACH   YOUR PHYSICIAN - GO DIRECTLY TO THE EMERGENCY ROOM.  MD Jefferson Parra MD  6/23/2022 10:31:18 AM  This report has been verified and signed electronically.  Dear patient,  As a result of recent federal legislation (The Federal Cures Act), you may   receive lab or pathology results from your procedure in your [facility]   account before your physician is able to contact you. Your physician or   their representative will relay the results to you with their   recommendations at their soonest availability.  Thank you,  [employer]ATION

## 2022-06-23 NOTE — ANESTHESIA PREPROCEDURE EVALUATION
06/23/2022  Conrad Kuhn is a 81 y.o., male.      Patient Active Problem List   Diagnosis    CLL (chronic lymphocytic leukemia)    History of lung cancer - ANDRES NSCLC 2004    Iron deficiency anemia    Pain in joint of right shoulder    Pancytopenia    Herpetic gingivostomatitis    Stridor    Acute hypoxemic respiratory failure    Aspiration pneumonia    Thrombocytopenia    Troponin level elevated    S/P cervical spinal fusion       Past Surgical History:   Procedure Laterality Date    Athroscopic surgery      On Knee    BIOPSY OF TISSUE OF NECK Left 08/2015    Recuurence CLL/small cell lyphoma    ESOPHAGEAL DILATION      ESOPHAGOGASTRODUODENOSCOPY N/A 4/15/2022    Procedure: EGD (ESOPHAGOGASTRODUODENOSCOPY);  Surgeon: Siddharth Garcia MD;  Location: [facility];  Service: Endoscopy;  Laterality: N/A;    ESOPHAGOGASTRODUODENOSCOPY N/A 4/16/2022    Procedure: EGD (ESOPHAGOGASTRODUODENOSCOPY);  Surgeon: Siddharth Garcia MD;  Location: [facility];  Service: Endoscopy;  Laterality: N/A;    ESOPHAGOGASTRODUODENOSCOPY W/ PEG N/A 4/16/2022    Procedure: EGD, WITH PEG TUBE INSERTION;  Surgeon: Siddharth Garcia MD;  Location: [facility];  Service: Endoscopy;  Laterality: N/A;    GASTROSTOMY TUBE PLACEMENT  04/16/2022    20 Fr (bumper initially at 3.5)    Hemorrhoid resection  1979    LUNG LOBECTOMY Left 2004    NECK SURGERY  04/08/2022    Anterior and Posterior C3-C7 fusion    OTHER SURGICAL HISTORY  2006    Chemotherapy s/p lyphoma        Portacath implantation and removal      reverse shoulder arthroplasty Right 03/2021        Tobacco Use:  The patient  reports that he has quit smoking. He has never used smokeless tobacco.     Results for orders placed or performed during the hospital encounter of 04/10/22   EKG 12-lead    Collection Time: 04/12/22  6:28 AM    Narrative    Test  Reason : R77.8,    Vent. Rate : 082 BPM     Atrial Rate : 082 BPM     P-R Int : 178 ms          QRS Dur : 100 ms      QT Int : 394 ms       P-R-T Axes : 029 -12 053 degrees     QTc Int : 460 ms    Normal sinus rhythm with sinus arrhythmia  Minimal voltage criteria for LVH, may be normal variant ( [place] product )  Borderline Abnormal ECG  When compared with ECG of 10-APR-2022 14:41,  Vent. rate has decreased BY  61 BPM  Left anterior fascicular block is no longer Present  ST no longer depressed in Lateral leads  T wave amplitude has decreased in Inferior leads  T wave inversion no longer evident in Lateral leads  Confirmed by Aneesh Esquivel MD ([id number]) on 4/12/2022 6:25:02 PM    Referred By: DESIREE   SELF           Confirmed By:Aneesh Esquivel MD             Lab Results   Component Value Date    WBC 3.16 (L) 06/06/2022    HGB 12.0 (L) 06/06/2022    HCT 34.3 (L) 06/06/2022    MCV 87 06/06/2022    PLT 70 (L) 06/06/2022     BMP  Lab Results   Component Value Date     06/06/2022    K 4.1 06/06/2022     06/06/2022    CO2 27 06/06/2022    BUN 13 06/06/2022    CREATININE 0.9 06/06/2022    CALCIUM 9.0 06/06/2022    ANIONGAP 8 06/06/2022     (H) 06/06/2022     05/11/2022     (H) 04/28/2022       No results found for this or any previous visit.              Pre-op Assessment    I have reviewed the Patient Summary Reports.     I have reviewed the Nursing Notes. I have reviewed the NPO Status.   I have reviewed the Medications.     Review of Systems  Anesthesia Hx:  Personal Hx of Anesthesia complications  Difficult Intubation   Social:  Former Smoker    Hematology/Oncology:         -- Anemia (Iron def anemia): Hematology Comments: CLL  Thrombocytopenia  --  Cancer in past history (hx lung CA, s/p surgical resection, Left partial pneumonectomy):    Cardiovascular:   Hypertension, well controlled ECG has been reviewed.    Pulmonary:   Pneumonia (hx recent aspiration pneumonia, resolving,)  COPD, moderate Sleep Apnea, CPAP    Education provided regarding risk of obstructive sleep apnea     Hepatic/GI:   Esophageal dysphagia  Hx of GI bleeding in past   Musculoskeletal:   C3-7 anterior posterior cervical fusion  Spine Disorders: cervical    Neurological:   Neuromuscular Disease, (hx right sided Bell's palsy)    Endocrine:   Diabetes, type 2    Psych:   Psychiatric History (hx EtOH abuse)          Physical Exam  General: Well nourished, Cooperative, Alert and Oriented    Airway:  Mallampati: III / II  Mouth Opening: Normal  TM Distance: Normal  Tongue: Normal  Neck ROM: Extension Decreased    Chest/Lungs:  Clear to auscultation    Heart:  Rate: Normal  Rhythm: Regular Rhythm  Sounds: Normal    Abdomen:  Normal, Soft, Nontender        Anesthesia Plan  Type of Anesthesia, risks & benefits discussed:    Anesthesia Type: MAC  Intra-op Monitoring Plan: Standard ASA Monitors  Post Op Pain Control Plan:   (medical reason for not using multimodal pain management)  Induction:  IV  Informed Consent: Informed consent signed with the Patient and all parties understand the risks and agree with anesthesia plan.  All questions answered.   ASA Score: 3  Anesthesia Plan Notes: MAC Propofol  POM  VL and LMA available    Ready For Surgery From Anesthesia Perspective.     .

## 2022-07-02 ENCOUNTER — TELEPHONE (OUTPATIENT)
Dept: INFECTIOUS DISEASES | Facility: HOSPITAL | Age: 82
End: 2022-07-02

## 2022-07-02 ENCOUNTER — HOSPITAL ENCOUNTER (EMERGENCY)
Facility: HOSPITAL | Age: 82
Discharge: ANOTHER HOSPITAL | End: 2022-07-02
Attending: EMERGENCY MEDICINE
Payer: MEDICARE

## 2022-07-02 VITALS
BODY MASS INDEX: 25.67 KG/M2 | OXYGEN SATURATION: 100 % | TEMPERATURE: 101 F | SYSTOLIC BLOOD PRESSURE: 163 MMHG | WEIGHT: 200 LBS | RESPIRATION RATE: 24 BRPM | DIASTOLIC BLOOD PRESSURE: 80 MMHG | HEIGHT: 74 IN | HEART RATE: 75 BPM

## 2022-07-02 DIAGNOSIS — K04.7 ABSCESS, DENTAL: Primary | ICD-10-CM

## 2022-07-02 LAB
ALBUMIN SERPL BCP-MCNC: 4.1 G/DL (ref 3.5–5.2)
ALP SERPL-CCNC: 50 U/L (ref 55–135)
ALT SERPL W/O P-5'-P-CCNC: 13 U/L (ref 10–44)
ANION GAP SERPL CALC-SCNC: 8 MMOL/L (ref 8–16)
AST SERPL-CCNC: 18 U/L (ref 10–40)
BASOPHILS NFR BLD: 1 % (ref 0–1.9)
BILIRUB SERPL-MCNC: 1.7 MG/DL (ref 0.1–1)
BILIRUB UR QL STRIP: NEGATIVE
BUN SERPL-MCNC: 10 MG/DL (ref 8–23)
CALCIUM SERPL-MCNC: 8.6 MG/DL (ref 8.7–10.5)
CHLORIDE SERPL-SCNC: 97 MMOL/L (ref 95–110)
CLARITY UR: ABNORMAL
CO2 SERPL-SCNC: 26 MMOL/L (ref 23–29)
COLOR UR: YELLOW
CREAT SERPL-MCNC: 0.8 MG/DL (ref 0.5–1.4)
CREAT SERPL-MCNC: 0.8 MG/DL (ref 0.5–1.4)
DIFFERENTIAL METHOD: ABNORMAL
EOSINOPHIL NFR BLD: 3 % (ref 0–8)
ERYTHROCYTE [DISTWIDTH] IN BLOOD BY AUTOMATED COUNT: 15 % (ref 11.5–14.5)
EST. GFR  (AFRICAN AMERICAN): >60 ML/MIN/1.73 M^2
EST. GFR  (NON AFRICAN AMERICAN): >60 ML/MIN/1.73 M^2
GLUCOSE SERPL-MCNC: 167 MG/DL (ref 70–110)
GLUCOSE UR QL STRIP: NEGATIVE
HCT VFR BLD AUTO: 37.4 % (ref 40–54)
HGB BLD-MCNC: 13 G/DL (ref 14–18)
HGB UR QL STRIP: NEGATIVE
HYPOCHROMIA BLD QL SMEAR: ABNORMAL
IMM GRANULOCYTES # BLD AUTO: ABNORMAL K/UL (ref 0–0.04)
IMM GRANULOCYTES NFR BLD AUTO: ABNORMAL % (ref 0–0.5)
INR PPP: 1.1
KETONES UR QL STRIP: NEGATIVE
LACTATE SERPL-SCNC: 1.3 MMOL/L (ref 0.5–1.9)
LEUKOCYTE ESTERASE UR QL STRIP: NEGATIVE
LYMPHOCYTES NFR BLD: 30 % (ref 18–48)
MCH RBC QN AUTO: 29.5 PG (ref 27–31)
MCHC RBC AUTO-ENTMCNC: 34.8 G/DL (ref 32–36)
MCV RBC AUTO: 85 FL (ref 82–98)
MONOCYTES NFR BLD: 12 % (ref 4–15)
MYELOCYTES NFR BLD MANUAL: 1 %
NEUTROPHILS NFR BLD: 53 % (ref 38–73)
NITRITE UR QL STRIP: NEGATIVE
NRBC BLD-RTO: 1 /100 WBC
PH UR STRIP: >8 [PH] (ref 5–8)
PLATELET # BLD AUTO: 52 K/UL (ref 150–450)
PLATELET BLD QL SMEAR: ABNORMAL
PMV BLD AUTO: ABNORMAL FL (ref 9.2–12.9)
POIKILOCYTOSIS BLD QL SMEAR: SLIGHT
POLYCHROMASIA BLD QL SMEAR: ABNORMAL
POTASSIUM SERPL-SCNC: 3.8 MMOL/L (ref 3.5–5.1)
PROCALCITONIN SERPL IA-MCNC: <0.05 NG/ML (ref 0–0.5)
PROT SERPL-MCNC: 7 G/DL (ref 6–8.4)
PROT UR QL STRIP: ABNORMAL
PROTHROMBIN TIME: 13.5 SEC (ref 11.4–13.7)
RBC # BLD AUTO: 4.4 M/UL (ref 4.6–6.2)
SAMPLE: NORMAL
SARS-COV-2 RDRP RESP QL NAA+PROBE: NEGATIVE
SODIUM SERPL-SCNC: 131 MMOL/L (ref 136–145)
SP GR UR STRIP: 1.02 (ref 1–1.03)
STOMATOCYTES BLD QL SMEAR: PRESENT
TROPONIN I SERPL DL<=0.01 NG/ML-MCNC: <0.03 NG/ML
URN SPEC COLLECT METH UR: ABNORMAL
UROBILINOGEN UR STRIP-ACNC: NEGATIVE EU/DL
WBC # BLD AUTO: 6.38 K/UL (ref 3.9–12.7)

## 2022-07-02 PROCEDURE — 93010 EKG 12-LEAD: ICD-10-PCS | Mod: ,,, | Performed by: INTERNAL MEDICINE

## 2022-07-02 PROCEDURE — 25000003 PHARM REV CODE 250: Performed by: EMERGENCY MEDICINE

## 2022-07-02 PROCEDURE — 96365 THER/PROPH/DIAG IV INF INIT: CPT

## 2022-07-02 PROCEDURE — 85007 BL SMEAR W/DIFF WBC COUNT: CPT | Performed by: EMERGENCY MEDICINE

## 2022-07-02 PROCEDURE — 99285 EMERGENCY DEPT VISIT HI MDM: CPT | Mod: 25

## 2022-07-02 PROCEDURE — 25500020 PHARM REV CODE 255: Performed by: EMERGENCY MEDICINE

## 2022-07-02 PROCEDURE — U0002 COVID-19 LAB TEST NON-CDC: HCPCS | Performed by: EMERGENCY MEDICINE

## 2022-07-02 PROCEDURE — 84484 ASSAY OF TROPONIN QUANT: CPT | Performed by: EMERGENCY MEDICINE

## 2022-07-02 PROCEDURE — 96366 THER/PROPH/DIAG IV INF ADDON: CPT

## 2022-07-02 PROCEDURE — 84145 PROCALCITONIN (PCT): CPT | Performed by: EMERGENCY MEDICINE

## 2022-07-02 PROCEDURE — 96375 TX/PRO/DX INJ NEW DRUG ADDON: CPT | Mod: 59

## 2022-07-02 PROCEDURE — 93010 ELECTROCARDIOGRAM REPORT: CPT | Mod: ,,, | Performed by: INTERNAL MEDICINE

## 2022-07-02 PROCEDURE — 85610 PROTHROMBIN TIME: CPT | Performed by: EMERGENCY MEDICINE

## 2022-07-02 PROCEDURE — 85027 COMPLETE CBC AUTOMATED: CPT | Performed by: EMERGENCY MEDICINE

## 2022-07-02 PROCEDURE — 83605 ASSAY OF LACTIC ACID: CPT | Performed by: EMERGENCY MEDICINE

## 2022-07-02 PROCEDURE — 63600175 PHARM REV CODE 636 W HCPCS: Performed by: EMERGENCY MEDICINE

## 2022-07-02 PROCEDURE — 80053 COMPREHEN METABOLIC PANEL: CPT | Performed by: EMERGENCY MEDICINE

## 2022-07-02 PROCEDURE — 96367 TX/PROPH/DG ADDL SEQ IV INF: CPT

## 2022-07-02 PROCEDURE — 87040 BLOOD CULTURE FOR BACTERIA: CPT | Mod: 59 | Performed by: EMERGENCY MEDICINE

## 2022-07-02 PROCEDURE — 81003 URINALYSIS AUTO W/O SCOPE: CPT | Performed by: EMERGENCY MEDICINE

## 2022-07-02 PROCEDURE — 93005 ELECTROCARDIOGRAM TRACING: CPT | Performed by: INTERNAL MEDICINE

## 2022-07-02 RX ORDER — TRAZODONE HYDROCHLORIDE 50 MG/1
100 TABLET ORAL NIGHTLY
Status: DISCONTINUED | OUTPATIENT
Start: 2022-07-02 | End: 2022-07-02 | Stop reason: HOSPADM

## 2022-07-02 RX ORDER — VALACYCLOVIR HYDROCHLORIDE 500 MG/1
1000 TABLET, FILM COATED ORAL 2 TIMES DAILY
Status: DISCONTINUED | OUTPATIENT
Start: 2022-07-02 | End: 2022-07-02 | Stop reason: HOSPADM

## 2022-07-02 RX ORDER — GLIMEPIRIDE 1 MG/1
1 TABLET ORAL
Status: DISCONTINUED | OUTPATIENT
Start: 2022-07-03 | End: 2022-07-02 | Stop reason: HOSPADM

## 2022-07-02 RX ORDER — PANTOPRAZOLE SODIUM 40 MG/1
40 FOR SUSPENSION ORAL DAILY
COMMUNITY
Start: 2022-04-21 | End: 2022-08-03

## 2022-07-02 RX ORDER — VALACYCLOVIR HYDROCHLORIDE 1 G/1
1000 TABLET, FILM COATED ORAL 2 TIMES DAILY
COMMUNITY
End: 2022-11-04

## 2022-07-02 RX ORDER — SUCRALFATE 1 G/1
TABLET ORAL
COMMUNITY
Start: 2022-06-06 | End: 2022-08-03

## 2022-07-02 RX ORDER — METOPROLOL TARTRATE 25 MG/1
25 TABLET, FILM COATED ORAL 2 TIMES DAILY
Status: DISCONTINUED | OUTPATIENT
Start: 2022-07-02 | End: 2022-07-02 | Stop reason: HOSPADM

## 2022-07-02 RX ORDER — METHYLPREDNISOLONE SOD SUCC 125 MG
80 VIAL (EA) INJECTION ONCE
Status: COMPLETED | OUTPATIENT
Start: 2022-07-02 | End: 2022-07-02

## 2022-07-02 RX ORDER — ACETAMINOPHEN 650 MG/1
650 SUPPOSITORY RECTAL
Status: COMPLETED | OUTPATIENT
Start: 2022-07-02 | End: 2022-07-02

## 2022-07-02 RX ORDER — VANCOMYCIN HCL IN 5 % DEXTROSE 1G/250ML
1000 PLASTIC BAG, INJECTION (ML) INTRAVENOUS ONCE
Status: COMPLETED | OUTPATIENT
Start: 2022-07-02 | End: 2022-07-02

## 2022-07-02 RX ADMIN — METOPROLOL TARTRATE 25 MG: 25 TABLET, FILM COATED ORAL at 01:07

## 2022-07-02 RX ADMIN — METHYLPREDNISOLONE SODIUM SUCCINATE 80 MG: 125 INJECTION, POWDER, FOR SOLUTION INTRAMUSCULAR; INTRAVENOUS at 10:07

## 2022-07-02 RX ADMIN — SODIUM CHLORIDE, SODIUM LACTATE, POTASSIUM CHLORIDE, AND CALCIUM CHLORIDE 1000 ML: .6; .31; .03; .02 INJECTION, SOLUTION INTRAVENOUS at 06:07

## 2022-07-02 RX ADMIN — IOHEXOL 100 ML: 350 INJECTION, SOLUTION INTRAVENOUS at 07:07

## 2022-07-02 RX ADMIN — PIPERACILLIN SODIUM AND TAZOBACTAM SODIUM 4.5 G: 4; .5 INJECTION, POWDER, LYOPHILIZED, FOR SOLUTION INTRAVENOUS at 06:07

## 2022-07-02 RX ADMIN — VANCOMYCIN HYDROCHLORIDE 500 MG: 500 INJECTION, POWDER, LYOPHILIZED, FOR SOLUTION INTRAVENOUS at 07:07

## 2022-07-02 RX ADMIN — ACETAMINOPHEN 650 MG: 650 SUPPOSITORY RECTAL at 06:07

## 2022-07-02 RX ADMIN — VANCOMYCIN HYDROCHLORIDE 1000 MG: 1 INJECTION, POWDER, LYOPHILIZED, FOR SOLUTION INTRAVENOUS at 07:07

## 2022-07-02 NOTE — ED PROVIDER NOTES
Encounter Date: 7/2/2022       History     Chief Complaint   Patient presents with    Fever    Altered Mental Status     Since this AM per wife     Patient presents complaining of right cheek swelling and pain and fever.  Also EMS thought patient seems somewhat confused.  Symptoms started today.  On arrival patient is awake and alert and answering questions appropriately.  He complains of pain to the right side of the face and jaw.        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     Right-sided Bell's palsy 2009    Spondylolisthesis     Varicose veins of legs     Venous insufficiency      Past Surgical History:   Procedure Laterality Date    Athroscopic surgery      On Knee    BIOPSY OF TISSUE OF NECK Left 08/2015    Recuurence CLL/small cell lyphoma    ESOPHAGEAL DILATION      ESOPHAGOGASTRODUODENOSCOPY N/A 4/15/2022    Procedure: EGD (ESOPHAGOGASTRODUODENOSCOPY);  Surgeon: Siddharth Garcia MD;  Location: [facility];  Service: Endoscopy;  Laterality: N/A;    ESOPHAGOGASTRODUODENOSCOPY N/A 4/16/2022    Procedure: EGD (ESOPHAGOGASTRODUODENOSCOPY);  Surgeon: Siddharth Garcia MD;  Location: [facility];  Service: Endoscopy;  Laterality: N/A;    ESOPHAGOGASTRODUODENOSCOPY N/A 6/23/2022    Procedure: EGD (ESOPHAGOGASTRODUODENOSCOPY);  Surgeon: Jefferson Hyman MD;  Location: [facility];  Service: Endoscopy;  Laterality: N/A;     ESOPHAGOGASTRODUODENOSCOPY W/ PEG N/A 2022    Procedure: EGD, WITH PEG TUBE INSERTION;  Surgeon: Siddharth Garcia MD;  Location: [facility];  Service: Endoscopy;  Laterality: N/A;    GASTROSTOMY TUBE PLACEMENT  2022    20 Fr (bumper initially at 3.5)    Hemorrhoid resection      LUNG LOBECTOMY Left     NECK SURGERY  2022    Anterior and Posterior C3-C7 fusion    OTHER SURGICAL HISTORY      Chemotherapy s/p lyphoma        Portacath implantation and removal      reverse shoulder arthroplasty Right 2021     Family History   Problem Relation Age of Onset    Stomach cancer Mother 80         at 95    Colon cancer Father      Social History     Tobacco Use    Smoking status: Former Smoker    Smokeless tobacco: Never Used   Substance Use Topics    Alcohol use: Yes    Drug use: No     Review of Systems   All other systems reviewed and are negative.      Physical Exam     Initial Vitals [22 0615]   BP Pulse Resp Temp SpO2   (!) 177/78 90 (!) 24 (!) 103.2 °F (39.6 °C) 97 %      MAP       --         Physical Exam    Nursing note and vitals reviewed.  Constitutional: He appears well-developed and well-nourished. He is not diaphoretic. No distress.   Pleasant, polite.   HENT:   Head: Normocephalic and atraumatic.   There is no trismus.  There is erythema and fullness at the angle of the mandible extending up to the cheek.   Eyes: EOM are normal.   Neck: Neck supple.   Normal range of motion.  Cardiovascular: Intact distal pulses.   Pulmonary/Chest: No respiratory distress.   Musculoskeletal:      Cervical back: Normal range of motion and neck supple.     Neurological: He is alert.   Skin: Skin is warm and dry.   Psychiatric: He has a normal mood and affect. His behavior is normal. Judgment and thought content normal.         ED Course   Procedures  Labs Reviewed   CBC W/ AUTO DIFFERENTIAL - Abnormal; Notable for the following components:       Result Value    RBC 4.40 (*)      Hemoglobin 13.0 (*)     Hematocrit 37.4 (*)     RDW 15.0 (*)     Platelets 52 (*)     nRBC 1 (*)     Platelet Estimate Decreased (*)     All other components within normal limits   COMPREHENSIVE METABOLIC PANEL - Abnormal; Notable for the following components:    Sodium 131 (*)     Glucose 167 (*)     Calcium 8.6 (*)     Total Bilirubin 1.7 (*)     Alkaline Phosphatase 50 (*)     All other components within normal limits   URINALYSIS, REFLEX TO URINE CULTURE - Abnormal; Notable for the following components:    Appearance, UA Hazy (*)     pH, UA >8.0 (*)     Protein, UA Trace (*)     All other components within normal limits    Narrative:     Specimen Source->Urine   CULTURE, BLOOD    Narrative:     Aerobic and anaerobic   CULTURE, BLOOD    Narrative:     Aerobic and anaerobic   LACTIC ACID, PLASMA   PROTIME-INR   TROPONIN I   PROCALCITONIN   SARS-COV-2 RNA AMPLIFICATION, QUAL   ISTAT CREATININE   POCT CREATININE        ECG Results          EKG 12-lead (In process)  Result time 07/02/22 07:09:59    In process by Interface, Lab In [facility] (07/02/22 07:09:59)                 Narrative:    Test Reason : A41.9,    Vent. Rate : 085 BPM     Atrial Rate : 085 BPM     P-R Int : 192 ms          QRS Dur : 092 ms      QT Int : 346 ms       P-R-T Axes : 017 -38 052 degrees     QTc Int : 411 ms    Normal sinus rhythm  Left axis deviation  Abnormal ECG  When compared with ECG of 12-APR-2022 06:28,  No significant change was found    Referred By: [email or web address]   SELF           Confirmed By:                             Imaging Results          CT Maxillofacial With Contrast (Final result)  Result time 07/02/22 07:59:08    Final result by Colton Marrero MD (07/02/22 07:59:08)                 Narrative:    CMS MANDATED QUALITY DATA-CT RADIATION DOSE-436  All CT scans at this facility use dose modulation, iterative reconstruction, and or weight based dosing when appropriate, to reduce radiation dose to as low as reasonably  achievable.    HISTORY: Maxillary/facial abscess  fever, facial soft tissue swelling, CLL, left lung carcinoma.    FINDINGS: Thin axial imaging through the maxillofacial region was performed with 100 mL Omnipaque 350 IV contrast, with sagittal and coronal reformatted images reviewed.  Comparison to prior exams.    There is localized right facial soft tissue swelling and hyperenhancement overlying the right maxilla, with fusiform rim-enhancing fluid collection along the right maxillary alveolar ridge measuring 22 x 7 mm (image 39 series 1), potentially periodontal abscess. There are no additional rim-enhancing facial soft tissue fluid collections, with several markedly enlarged bilateral cervical chain lymph nodes, including right internal jugular chain lymph nodes measuring up to 29 x 17 mm.    The oropharynx, hypopharynx and larynx enhance normally, with the parotid and submandibular glands enhancing normally. The visualized intracranial compartment and orbits enhance normally.    There is paranasal sinus mucosal thickening, with no acute fractures or destructive osseous lesions. The patient is edentulous. There are carotid siphon vascular calcifications. The mastoid air cells are clear.    IMPRESSION:  1. Rim-enhancing fluid collection along the right maxillary alveolar ridge with overlying soft tissue swelling and enhancement as described, suggesting periodontal abscess.  2. Numerous enlarged bilateral cervical chain lymph nodes, suggesting lymphoproliferative disorder such as lymphoma and or CLL given patient's history, or less likely metastatic disease.    Electronically signed by:  Colton Marrero MD  7/2/2022 7:59 AM CDT Workstation: [id number]                             CT Chest Abdomen Pelvis With Contrast (xpd) (Final result)  Result time 07/02/22 08:09:06    Final result by Colton Marrero MD (07/02/22 08:09:06)                 Narrative:    CMS MANDATED QUALITY DATA-CT RADIATION DOSE-436  All CT scans at  this facility use dose modulation, iterative reconstruction, and or weight-based dosing when appropriate to reduce radiation dose to as low as reasonably achievable.    HISTORY: Sepsis  fever, CLL, left lung carcinoma.    FINDINGS: Axial postcontrast imaging was performed with 100 mL Omnipaque 350 IV contrast. Comparison to multiple prior exams.    CT CHEST: There are several enlarged bilateral supraclavicular, axillary, mediastinal and hilar lymph nodes, some which have enlarged in the interval. Index left tracheobronchial mediastinal lymph node measures 23 mm short axis dimension image 71 series 2. Mild scattered calcified plaque involves normal caliber thoracic aorta, with no aortic dissection. The central pulmonary arteries enhance normally, with the heart normal in size, and no pericardial effusion. There are coronary arterial calcifications and changes of prior coronary endovascular stenting.    Mild scattered reticular and groundglass densities in both lungs are nonspecific, with a few tiny centrilobular nodular opacities suggesting nonspecific bronchiolitis. There is no consolidation, pleural effusion, or pneumothorax. The central airways are patent.    There are no acute fractures. Reverse right glenohumeral arthroplasty is without complicating features.    CT ABDOMEN: There are numerous enlarged periportal, peripancreatic, gastrohepatic ligament and abdominal retroperitoneal lymph nodes, with increased size compared to prior CT. Index portacaval lymph node measures 39 x 23 mm image 168 series 2. The spleen has enlarged in the interval, measuring 15 cm maximum dimension, with normal enhancement.    The liver, gallbladder, pancreas, adrenal glands and kidneys enhance normally, with mild bilateral hydroureteronephrosis, without renal or ureteral calculi. Scattered calcified plaque involves the abdominal aorta and iliac arteries, with fusiform infrarenal abdominal aortic aneurysm measuring 33 mm in  diameter.    There is no bowel wall thickening, inflammation, or bowel obstruction. The appendix is normal, with a few scattered colon diverticula. No ascites or intraperitoneal free air.    CT PELVIS: The prostate gland is markedly enlarged, with the sigmoid colon and rectum enhancing normally. There is diffuse urinary bladder wall thickening and mucosal nodularity, with no bladder calculi. There is no pelvic free fluid, with several enlarged bilateral iliac chain lymph nodes.    The extraperitoneal soft tissues enhance normally. There are no acute fractures or destructive osseous lesions. The bones are diffusely osteopenic. There is chronic bilateral L5 spondylolysis.    IMPRESSION:  1. Interval worsening in numerous enlarged lymph nodes in the chest, abdomen and pelvis, as well as development of splenomegaly, compatible with lymphoma and or worsening CLL, given patient's history  2. Mild bilateral hydroureteronephrosis, with markedly enlarged prostate gland and appearance of the urinary bladder suggesting chronic bladder outlet obstruction. Consider correlation with urinalysis.  3. Infrarenal abdominal aortic aneurysm measuring 33 mm in diameter. Follow-up imaging in 3 years is recommended per best practice guidelines.  4. Additional observations as described.    Electronically signed by:  Colton Marrero MD  7/2/2022 8:09 AM CDT Workstation: [id number]                             CT Head Without Contrast (Final result)  Result time 07/02/22 07:20:53    Final result by Colton Marrero MD (07/02/22 07:20:53)                 Narrative:    CMS MANDATED QUALITY DATA-CT RADIATION DOSE-436  All CT scans at this facility dose modulation, iterative reconstruction, and or weight-based dosing when appropriate to reduce radiation dose to as low as reasonably achievable.    HISTORY: Mental status change, unknown cause    FINDINGS: Comparison to multiple prior exams. There is no acute intracranial hemorrhage, with no mass effect  or abnormal extra-axial fluid. Mild scattered areas of nonspecific hypoattenuation involve the deep periventricular white matter, with gray-white differentiation maintained.    There is mild generalized prominence of the cortical sulci and ventricles. The cerebellum and brainstem are unremarkable. There is paranasal sinus mucosal thickening, with dense carotid siphon vascular calcifications. The mastoid air cells are clear, with no acute calvarial fracture.    IMPRESSION:  1. No acute intracranial hemorrhage, mass effect, or loss of gray-white differentiation.  2. Mild scattered chronic small vessel ischemic changes in the white matter.    Electronically signed by:  Colton Marrero MD  7/2/2022 7:20 AM CDT Workstation: [id number]                             X-Ray Chest AP Portable (Final result)  Result time 07/02/22 06:50:27    Final result by Colton Marrero MD (07/02/22 06:50:27)                 Narrative:    HISTORY: Sepsis    FINDINGS: Portable chest radiograph at 0631 hours compared to 04/11/2022 shows the cardiomediastinal silhouette and pulmonary vasculature are within normal limits.    The lungs are normally expanded, with no consolidation, large pleural effusion, or evidence of pulmonary edema. No pneumothorax or acute fractures.    IMPRESSION: No evidence of acute cardiopulmonary disease.    Electronically signed by:  Colton Marrero MD  7/2/2022 6:50 AM CDT Workstation: [id number]                               Medications   sodium chloride 0.9% bolus 2,721 mL (2,721 mLs Intravenous Not Given 7/2/22 0615)   vancomycin - pharmacy to dose (has no administration in time range)   lactated ringers bolus 2,721 mL (0 mLs Intravenous Hold 7/2/22 0700)   metoprolol tartrate (LOPRESSOR) tablet 25 mg (25 mg Oral Given 7/2/22 1324)   valACYclovir tablet 1,000 mg (has no administration in time range)   traZODone tablet 100 mg (has no administration in time range)   glimepiride tablet 1 mg (has no administration in time  range)   piperacillin-tazobactam 4.5 g in dextrose 5 % 100 mL IVPB (ready to mix system) (0 g Intravenous Stopped 7/2/22 0753)   acetaminophen suppository 650 mg (650 mg Rectal Given 7/2/22 0642)   vancomycin in dextrose 5 % 1 gram/250 mL IVPB 1,000 mg (0 mg Intravenous Stopped 7/2/22 1317)     And   vancomycin 500 mg in dextrose 5 % 100 mL IVPB (ready to mix system) (0 mg Intravenous Stopped 7/2/22 1317)   iohexoL (OMNIPAQUE 350) injection 100 mL (100 mLs Intravenous Given 7/2/22 0724)   methylPREDNISolone sodium succinate injection 80 mg (80 mg Intravenous Given 7/2/22 1013)     Medical Decision Making:   Initial Assessment:   No apparent distress  Differential Diagnosis:   Considerations include but are not limited to abscess, sepsis, electrolyte abnormalities, dehydration  Clinical Tests:   Lab Tests: Reviewed and Ordered  Radiological Study: Ordered and Reviewed  Medical Tests: Reviewed and Ordered  ED Management:  Patient's labs a do not display any evidence of overwhelming sepsis.  Lactate and procalcitonin more.  White blood cell count is normal patient indeed does have a right maxillary abscess which is likely accounts for fever.  Other imaging is without acute abnormality.  Patient received vanc and Zosyn in the emergency department.  Fever is improving now at 1:01 a.m..  Mental status is excellent he is awake and alert conversing normally.  Patient will require transfer for OMFS consultation.  He remains stable.             ED Course as of 07/02/22 1445   Sat Jul 02, 2022   1034 Patient will have delay in transfer secondary to [facility] having no bed available [AP]      ED Course User Index  [AP] Kalin Pearson MD             Clinical Impression:   Final diagnoses:  [K04.7] Abscess, dental (Primary)          ED Disposition Condition    Transfer to Another Facility Stable              Kalin Pearson MD  07/02/22 1448

## 2022-07-02 NOTE — TELEPHONE ENCOUNTER
Wife called to let know Dr. Barr that patient is in the hospital, [facility] ER  It seems patient has a right maxillary abscess and is in process of being transferred out.

## 2022-07-06 ENCOUNTER — PATIENT MESSAGE (OUTPATIENT)
Dept: HEMATOLOGY/ONCOLOGY | Facility: CLINIC | Age: 82
End: 2022-07-06

## 2022-07-07 ENCOUNTER — TELEPHONE (OUTPATIENT)
Dept: INFECTIOUS DISEASES | Facility: CLINIC | Age: 82
End: 2022-07-07

## 2022-07-07 LAB
BACTERIA BLD CULT: NORMAL
BACTERIA BLD CULT: NORMAL

## 2022-07-07 NOTE — TELEPHONE ENCOUNTER
Patient's wife called   [phone]      She stated patient's lip is swollen and black x 1 day    Patient went to [facility] ED Saturday and was transferred to   [facility].  Where a biopsy was done    Patient saw PCP Dr Erazo on Tuesday     Patient is on amox clav 875 mg  Denies fever    Patient's wife is requesting an office visit and/or abx       Please advise

## 2022-07-08 NOTE — TELEPHONE ENCOUNTER
Both Samantha and myself spoke with patient's wife to confirm appointment time and date with her and advise her  Nothing else needed .    Appt. Mon 7/11/22 at noon ; per Dr Barr's orders     [facility]   7/08/22

## 2022-07-11 ENCOUNTER — OFFICE VISIT (OUTPATIENT)
Dept: INFECTIOUS DISEASES | Facility: CLINIC | Age: 82
End: 2022-07-11
Payer: MEDICARE

## 2022-07-11 VITALS
OXYGEN SATURATION: 95 % | TEMPERATURE: 98 F | HEART RATE: 58 BPM | BODY MASS INDEX: 25.45 KG/M2 | HEIGHT: 74 IN | DIASTOLIC BLOOD PRESSURE: 72 MMHG | WEIGHT: 198.31 LBS | SYSTOLIC BLOOD PRESSURE: 132 MMHG

## 2022-07-11 DIAGNOSIS — K04.7 DENTAL ABSCESS: ICD-10-CM

## 2022-07-11 DIAGNOSIS — R59.1 LYMPHADENOPATHY: ICD-10-CM

## 2022-07-11 DIAGNOSIS — B00.2 HERPES STOMATITIS: Primary | ICD-10-CM

## 2022-07-11 DIAGNOSIS — C91.10 CLL (CHRONIC LYMPHOCYTIC LEUKEMIA): ICD-10-CM

## 2022-07-11 PROCEDURE — 99214 OFFICE O/P EST MOD 30 MIN: CPT | Mod: S$GLB,,, | Performed by: INTERNAL MEDICINE

## 2022-07-11 PROCEDURE — 99214 PR OFFICE/OUTPT VISIT, EST, LEVL IV, 30-39 MIN: ICD-10-PCS | Mod: S$GLB,,, | Performed by: INTERNAL MEDICINE

## 2022-07-11 RX ORDER — ACYCLOVIR 200 MG/5ML
SUSPENSION ORAL
Qty: 473 ML | Refills: 6 | Status: SHIPPED | OUTPATIENT
Start: 2022-07-11 | End: 2022-07-26 | Stop reason: SDUPTHER

## 2022-07-11 RX ORDER — AMOXICILLIN 400 MG/5ML
400 POWDER, FOR SUSPENSION ORAL EVERY 8 HOURS
Qty: 100 ML | Refills: 2 | Status: SHIPPED | OUTPATIENT
Start: 2022-07-11 | End: 2022-08-03

## 2022-07-11 NOTE — PROGRESS NOTES
Subjective:       Patient ID: Conrad Kuhn is a 81 y.o. male.    Chief Complaint:: Follow-up    HPI urgent visit  Last seen 8/2018. He could not get into see Dr. Erazo.   He has stuffy ears, mild sore throat, no fever. Had a little vertigo last week as well. Stopped flonase about 3 months ago. No purulent nasal discharge, no significant cough, shortness of breath, sputum production.    7/11/22: last seen in the hospital 7/2021. He was hospitalized in April for aspiration pneumonia after cervical spine surgery. At that time he required a PEG tube for nourishment and the Valtrex was discontinued.   PEG was removed 6/23/22  He was in the ED on 7/2 for what he thought was recurrence of herpes, but also discovering dental abscess, right upper . Transferred to [facility] where in their ER the oral surgeons drained the abscess and he went to oral surgery clinic on 7/5 and they also took biopsies. The biopsy sites are painful.  He has been on augmentin and valtrex, which he has to break up and take by pieces.  He had been venetoclax and rituxan until 6 months ago. Had a bone marrow biopsy 3 months ago, was told he was in remission,  and was followed by Dr. Don at [facility]. CT of the head and neck, and thorax indicate worsening LAD worrisome for recurrence of lymphoma/leukemia      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     Right-sided Bell's palsy 2009     Spondylolisthesis     Varicose veins of legs     Venous insufficiency      Past Surgical History:   Procedure Laterality Date    Athroscopic surgery      On Knee    BIOPSY OF TISSUE OF NECK Left 2015    Recuurence CLL/small cell lyphoma    ESOPHAGEAL DILATION      ESOPHAGOGASTRODUODENOSCOPY N/A 4/15/2022    Procedure: EGD (ESOPHAGOGASTRODUODENOSCOPY);  Surgeon: Siddharth Garcia MD;  Location: [facility] ENDO;  Service: Endoscopy;  Laterality: N/A;    ESOPHAGOGASTRODUODENOSCOPY N/A 2022    Procedure: EGD (ESOPHAGOGASTRODUODENOSCOPY);  Surgeon: Siddharth Garcia MD;  Location: [facility] ENDO;  Service: Endoscopy;  Laterality: N/A;    ESOPHAGOGASTRODUODENOSCOPY N/A 2022    Procedure: EGD (ESOPHAGOGASTRODUODENOSCOPY);  Surgeon: Jefferson Hyman MD;  Location: [facility] ENDO;  Service: Endoscopy;  Laterality: N/A;    ESOPHAGOGASTRODUODENOSCOPY W/ PEG N/A 2022    Procedure: EGD, WITH PEG TUBE INSERTION;  Surgeon: Siddharth Garcia MD;  Location: [facility];  Service: Endoscopy;  Laterality: N/A;    GASTROSTOMY TUBE PLACEMENT  2022    20 Fr (bumper initially at 3.5)    Hemorrhoid resection      LUNG LOBECTOMY Left     NECK SURGERY  2022    Anterior and Posterior C3-C7 fusion    OTHER SURGICAL HISTORY  2006    Chemotherapy s/p lyphoma        Portacath implantation and removal      reverse shoulder arthroplasty Right 2021     Social History     Tobacco Use    Smoking status: Former Smoker    Smokeless tobacco: Never Used   Substance Use Topics    Alcohol use: Yes        Family History   Problem Relation Age of Onset    Stomach cancer Mother 80         at 95    Colon cancer Father          Review of Systems    Constitutional: No fever, chills,      Eyes: No change in vision,      ENT: see HPI    Cardiovascular: No chest pain, MAYER, or increase in pedal edema    Respiratory: No shortness of breath, MAYER, cough, wheeze, sputum, or hemoptysis    Gastrointestinal: No  "abdominal pain,and no diarrhea    Genitourinary: No dysuria,   Musculoskeletal: No new pain, joint swelling, or injuries    Integumentary: No new rashes, skin lesions, or wounds    Neurological: no unusual headaches, neuropathy, loss of vision, falls    Psychiatric: No anxiety, depression    Endocrine:       Lymphatic:  See hpi.     VAD:     Objective:      Blood pressure 132/72, pulse (!) 58, temperature 97.8 °F (36.6 °C), height 6' 2" (1.88 m), weight 89.9 kg (198 lb 4.8 oz), SpO2 95 %. Body mass index is 25.46 kg/m².  Physical Exam      General: Alert and attentive, cooperative and uncomfortable     Eyes: Pupils equal, round, reactive to light, anicteric, EOMI    Neck: Supple, non-tender, no thyromegaly or masses    ENT: EAC patent, TM normal, nares patent,  edentulous, no thrush. Large ulcer right upper gingival crease and largest ulcer left lower lip    Cardiovascular: Regular rate and rhythm, no murmurs, rubs, or gallop    Respiratory: Lungs clear without wheezes, rales, rubs or rhonci    Gastrointestinal: soft and non tender BS pos    Genitourinary:     Integumentary: Skin without rashes, lesions, or wounds     Vascular: Mild peripheral edema with severe varicosities bilateral lower extremities    Musculoskeletal: Ambulates without difficulty, no acute arthritis, synovitis or myositis.     Lymphatic: multiple shotty nodes in heck, SC fossae.    Neurological: Normal LOC,  speech,   gait. Facial asymmetry since Bell's palsy \  Psychiatric: Normal mood, speech,  demeanor     Wound:    VAD:        Recent Diagnostics: Reviewed records at [facility] and in care everywhere          Assessment and Plan:           Herpes stomatitis    CLL (chronic lymphocytic leukemia)    Lymphadenopathy    Dental abscess    Other orders  -     acyclovir (ZOVIRAX) 200 mg/5 mL suspension; Take 10 mL by mouth twice a day as prevention and 20 mL 3 times a day when needed for treatment  Dispense: 473 mL; Refill: 6  -     amoxicillin (AMOXIL) 400 " mg/5 mL suspension; Take 5 mLs (400 mg total) by mouth every 8 (eight) hours.  Dispense: 100 mL; Refill: 2      You can crush and liquefy the valtrex(valacyclovir) as usual  I have written a prescription for liquid acyclovir  I have sent a prescription for liquid amoxicillin for you to take until the stitches are out.    I would ask you to request a refill of pain medication from Dr. Rivero    This note was created using Dragon voice recognition software that occasionally misinterpreted phrases or words.

## 2022-07-11 NOTE — PATIENT INSTRUCTIONS
You can crush and liquefy the valtrex(valacyclovir) as usual  I have written a prescription for liquid acyclovir  I have sent a prescription for liquid amoxicillin for you to take until the stitches are out.    I would ask you to request a refill of pain medication from Dr. Rivero

## 2022-07-26 RX ORDER — ACYCLOVIR 200 MG/5ML
SUSPENSION ORAL
Qty: 473 ML | Refills: 6 | Status: SHIPPED | OUTPATIENT
Start: 2022-07-26 | End: 2022-08-03

## 2022-08-03 ENCOUNTER — HOSPITAL ENCOUNTER (OUTPATIENT)
Dept: RADIOLOGY | Facility: HOSPITAL | Age: 82
Discharge: HOME | DRG: 194 | End: 2022-08-03
Attending: FAMILY MEDICINE
Payer: MEDICARE

## 2022-08-03 ENCOUNTER — HOSPITAL ENCOUNTER (INPATIENT)
Facility: HOSPITAL | Age: 82
LOS: 2 days | Discharge: HOME HEALTH | DRG: 194 | End: 2022-08-05
Attending: EMERGENCY MEDICINE | Admitting: INTERNAL MEDICINE
Payer: MEDICARE

## 2022-08-03 DIAGNOSIS — R05.9 COUGH: ICD-10-CM

## 2022-08-03 DIAGNOSIS — J44.1 COPD EXACERBATION: Primary | ICD-10-CM

## 2022-08-03 DIAGNOSIS — J44.1 COPD WITH EXACERBATION: ICD-10-CM

## 2022-08-03 DIAGNOSIS — J18.9 BILATERAL PNEUMONIA: ICD-10-CM

## 2022-08-03 DIAGNOSIS — C91.10 CLL (CHRONIC LYMPHOCYTIC LEUKEMIA): Primary | ICD-10-CM

## 2022-08-03 DIAGNOSIS — R05.9 COUGH: Primary | ICD-10-CM

## 2022-08-03 LAB
BACTERIA #/AREA URNS HPF: NEGATIVE /HPF
BILIRUB UR QL STRIP: NEGATIVE
CLARITY UR: CLEAR
COLOR UR: YELLOW
GLUCOSE SERPL-MCNC: 131 MG/DL (ref 70–110)
GLUCOSE UR QL STRIP: NEGATIVE
HGB UR QL STRIP: NEGATIVE
HYALINE CASTS #/AREA URNS LPF: 11 /LPF
KETONES UR QL STRIP: NEGATIVE
LACTATE SERPL-SCNC: 1.1 MMOL/L (ref 0.5–1.9)
LEUKOCYTE ESTERASE UR QL STRIP: NEGATIVE
MAGNESIUM SERPL-MCNC: 1.7 MG/DL (ref 1.6–2.6)
MICROSCOPIC COMMENT: ABNORMAL
NITRITE UR QL STRIP: NEGATIVE
PH UR STRIP: 6 [PH] (ref 5–8)
PROT UR QL STRIP: ABNORMAL
RBC #/AREA URNS HPF: 1 /HPF (ref 0–4)
SARS-COV-2 RDRP RESP QL NAA+PROBE: NEGATIVE
SP GR UR STRIP: >1.03 (ref 1–1.03)
SQUAMOUS #/AREA URNS HPF: 1 /HPF
TROPONIN I SERPL DL<=0.01 NG/ML-MCNC: <0.03 NG/ML
URN SPEC COLLECT METH UR: ABNORMAL
UROBILINOGEN UR STRIP-ACNC: ABNORMAL EU/DL
WBC #/AREA URNS HPF: 3 /HPF (ref 0–5)

## 2022-08-03 PROCEDURE — U0002 COVID-19 LAB TEST NON-CDC: HCPCS | Performed by: EMERGENCY MEDICINE

## 2022-08-03 PROCEDURE — 87040 BLOOD CULTURE FOR BACTERIA: CPT | Mod: 59 | Performed by: EMERGENCY MEDICINE

## 2022-08-03 PROCEDURE — 25000003 PHARM REV CODE 250: Performed by: INTERNAL MEDICINE

## 2022-08-03 PROCEDURE — 99285 EMERGENCY DEPT VISIT HI MDM: CPT | Mod: 25

## 2022-08-03 PROCEDURE — 81001 URINALYSIS AUTO W/SCOPE: CPT | Performed by: INTERNAL MEDICINE

## 2022-08-03 PROCEDURE — 96365 THER/PROPH/DIAG IV INF INIT: CPT

## 2022-08-03 PROCEDURE — 83605 ASSAY OF LACTIC ACID: CPT | Performed by: EMERGENCY MEDICINE

## 2022-08-03 PROCEDURE — 63600175 PHARM REV CODE 636 W HCPCS: Performed by: STUDENT IN AN ORGANIZED HEALTH CARE EDUCATION/TRAINING PROGRAM

## 2022-08-03 PROCEDURE — 93010 ELECTROCARDIOGRAM REPORT: CPT | Mod: ,,, | Performed by: INTERNAL MEDICINE

## 2022-08-03 PROCEDURE — 63600175 PHARM REV CODE 636 W HCPCS: Performed by: INTERNAL MEDICINE

## 2022-08-03 PROCEDURE — 12000002 HC ACUTE/MED SURGE SEMI-PRIVATE ROOM

## 2022-08-03 PROCEDURE — 84484 ASSAY OF TROPONIN QUANT: CPT | Performed by: EMERGENCY MEDICINE

## 2022-08-03 PROCEDURE — 71046 X-RAY EXAM CHEST 2 VIEWS: CPT | Mod: TC,PO

## 2022-08-03 PROCEDURE — 83735 ASSAY OF MAGNESIUM: CPT | Performed by: EMERGENCY MEDICINE

## 2022-08-03 PROCEDURE — 93005 ELECTROCARDIOGRAM TRACING: CPT | Performed by: INTERNAL MEDICINE

## 2022-08-03 PROCEDURE — 25000003 PHARM REV CODE 250: Performed by: STUDENT IN AN ORGANIZED HEALTH CARE EDUCATION/TRAINING PROGRAM

## 2022-08-03 PROCEDURE — 70220 X-RAY EXAM OF SINUSES: CPT | Mod: TC,PO

## 2022-08-03 PROCEDURE — 93010 EKG 12-LEAD: ICD-10-PCS | Mod: ,,, | Performed by: INTERNAL MEDICINE

## 2022-08-03 PROCEDURE — 25000242 PHARM REV CODE 250 ALT 637 W/ HCPCS: Performed by: STUDENT IN AN ORGANIZED HEALTH CARE EDUCATION/TRAINING PROGRAM

## 2022-08-03 PROCEDURE — 96375 TX/PRO/DX INJ NEW DRUG ADDON: CPT

## 2022-08-03 RX ORDER — ACETAMINOPHEN 325 MG/1
650 TABLET ORAL EVERY 8 HOURS PRN
Status: DISCONTINUED | OUTPATIENT
Start: 2022-08-03 | End: 2022-08-05 | Stop reason: HOSPADM

## 2022-08-03 RX ORDER — GABAPENTIN 600 MG/1
600 TABLET ORAL 2 TIMES DAILY
Status: ON HOLD | COMMUNITY
End: 2022-08-05 | Stop reason: HOSPADM

## 2022-08-03 RX ORDER — POTASSIUM CHLORIDE 20 MEQ/1
40 TABLET, EXTENDED RELEASE ORAL
Status: DISCONTINUED | OUTPATIENT
Start: 2022-08-03 | End: 2022-08-05 | Stop reason: HOSPADM

## 2022-08-03 RX ORDER — MAGNESIUM SULFATE HEPTAHYDRATE 40 MG/ML
2 INJECTION, SOLUTION INTRAVENOUS
Status: DISCONTINUED | OUTPATIENT
Start: 2022-08-03 | End: 2022-08-05 | Stop reason: HOSPADM

## 2022-08-03 RX ORDER — TALC
6 POWDER (GRAM) TOPICAL NIGHTLY PRN
Status: DISCONTINUED | OUTPATIENT
Start: 2022-08-03 | End: 2022-08-05 | Stop reason: HOSPADM

## 2022-08-03 RX ORDER — VALACYCLOVIR HYDROCHLORIDE 500 MG/1
1000 TABLET, FILM COATED ORAL 2 TIMES DAILY
Status: DISCONTINUED | OUTPATIENT
Start: 2022-08-03 | End: 2022-08-05 | Stop reason: HOSPADM

## 2022-08-03 RX ORDER — LANOLIN ALCOHOL/MO/W.PET/CERES
800 CREAM (GRAM) TOPICAL
Status: DISCONTINUED | OUTPATIENT
Start: 2022-08-03 | End: 2022-08-05 | Stop reason: HOSPADM

## 2022-08-03 RX ORDER — POTASSIUM CHLORIDE 20 MEQ/1
20 TABLET, EXTENDED RELEASE ORAL
Status: DISCONTINUED | OUTPATIENT
Start: 2022-08-03 | End: 2022-08-05 | Stop reason: HOSPADM

## 2022-08-03 RX ORDER — SUCRALFATE 1 G/1
1 TABLET ORAL
Status: DISCONTINUED | OUTPATIENT
Start: 2022-08-03 | End: 2022-08-05 | Stop reason: HOSPADM

## 2022-08-03 RX ORDER — IPRATROPIUM BROMIDE AND ALBUTEROL SULFATE 2.5; .5 MG/3ML; MG/3ML
3 SOLUTION RESPIRATORY (INHALATION)
Status: COMPLETED | OUTPATIENT
Start: 2022-08-03 | End: 2022-08-03

## 2022-08-03 RX ORDER — ENOXAPARIN SODIUM 100 MG/ML
40 INJECTION SUBCUTANEOUS EVERY 24 HOURS
Status: DISCONTINUED | OUTPATIENT
Start: 2022-08-03 | End: 2022-08-05 | Stop reason: HOSPADM

## 2022-08-03 RX ORDER — AZELASTINE 1 MG/ML
1 SPRAY, METERED NASAL 2 TIMES DAILY
Status: DISCONTINUED | OUTPATIENT
Start: 2022-08-03 | End: 2022-08-05 | Stop reason: HOSPADM

## 2022-08-03 RX ORDER — GABAPENTIN 300 MG/1
300 CAPSULE ORAL 2 TIMES DAILY
Status: DISCONTINUED | OUTPATIENT
Start: 2022-08-03 | End: 2022-08-05 | Stop reason: HOSPADM

## 2022-08-03 RX ORDER — MAGNESIUM SULFATE HEPTAHYDRATE 40 MG/ML
4 INJECTION, SOLUTION INTRAVENOUS
Status: DISCONTINUED | OUTPATIENT
Start: 2022-08-03 | End: 2022-08-05 | Stop reason: HOSPADM

## 2022-08-03 RX ORDER — CEFTRIAXONE 1 G/1
1 INJECTION, POWDER, FOR SOLUTION INTRAMUSCULAR; INTRAVENOUS
Status: COMPLETED | OUTPATIENT
Start: 2022-08-03 | End: 2022-08-03

## 2022-08-03 RX ORDER — TRAZODONE HYDROCHLORIDE 50 MG/1
100 TABLET ORAL NIGHTLY
Status: DISCONTINUED | OUTPATIENT
Start: 2022-08-03 | End: 2022-08-05 | Stop reason: HOSPADM

## 2022-08-03 RX ORDER — METOPROLOL TARTRATE 25 MG/1
25 TABLET, FILM COATED ORAL 2 TIMES DAILY
Status: DISCONTINUED | OUTPATIENT
Start: 2022-08-03 | End: 2022-08-05 | Stop reason: HOSPADM

## 2022-08-03 RX ORDER — ONDANSETRON 2 MG/ML
4 INJECTION INTRAMUSCULAR; INTRAVENOUS EVERY 8 HOURS PRN
Status: DISCONTINUED | OUTPATIENT
Start: 2022-08-03 | End: 2022-08-05 | Stop reason: HOSPADM

## 2022-08-03 RX ORDER — IPRATROPIUM BROMIDE AND ALBUTEROL SULFATE 2.5; .5 MG/3ML; MG/3ML
3 SOLUTION RESPIRATORY (INHALATION) EVERY 8 HOURS PRN
Status: DISCONTINUED | OUTPATIENT
Start: 2022-08-03 | End: 2022-08-05 | Stop reason: HOSPADM

## 2022-08-03 RX ORDER — GLIMEPIRIDE 2 MG/1
2 TABLET ORAL DAILY
Status: ON HOLD | COMMUNITY
Start: 2021-11-23 | End: 2023-01-01

## 2022-08-03 RX ORDER — MAGNESIUM SULFATE 1 G/100ML
1 INJECTION INTRAVENOUS
Status: DISCONTINUED | OUTPATIENT
Start: 2022-08-03 | End: 2022-08-05 | Stop reason: HOSPADM

## 2022-08-03 RX ORDER — SODIUM CHLORIDE 0.9 % (FLUSH) 0.9 %
10 SYRINGE (ML) INJECTION
Status: DISCONTINUED | OUTPATIENT
Start: 2022-08-03 | End: 2022-08-05 | Stop reason: HOSPADM

## 2022-08-03 RX ORDER — INSULIN ASPART 100 [IU]/ML
1-6 INJECTION, SOLUTION INTRAVENOUS; SUBCUTANEOUS
Status: DISCONTINUED | OUTPATIENT
Start: 2022-08-03 | End: 2022-08-05 | Stop reason: HOSPADM

## 2022-08-03 RX ADMIN — METOPROLOL TARTRATE 25 MG: 25 TABLET, FILM COATED ORAL at 09:08

## 2022-08-03 RX ADMIN — CEFTRIAXONE SODIUM 1 G: 1 INJECTION, POWDER, FOR SOLUTION INTRAMUSCULAR; INTRAVENOUS at 06:08

## 2022-08-03 RX ADMIN — VALACYCLOVIR HYDROCHLORIDE 1000 MG: 500 TABLET, FILM COATED ORAL at 09:08

## 2022-08-03 RX ADMIN — AZITHROMYCIN 500 MG: 500 INJECTION, POWDER, LYOPHILIZED, FOR SOLUTION INTRAVENOUS at 06:08

## 2022-08-03 RX ADMIN — GABAPENTIN 300 MG: 300 CAPSULE ORAL at 09:08

## 2022-08-03 RX ADMIN — IPRATROPIUM BROMIDE AND ALBUTEROL SULFATE 3 ML: .5; 3 SOLUTION RESPIRATORY (INHALATION) at 05:08

## 2022-08-03 RX ADMIN — TRAZODONE HYDROCHLORIDE 100 MG: 50 TABLET ORAL at 09:08

## 2022-08-03 RX ADMIN — AZELASTINE HYDROCHLORIDE 137 MCG: 137 SPRAY, METERED NASAL at 09:08

## 2022-08-03 RX ADMIN — SUCRALFATE 1 G: 1 TABLET ORAL at 09:08

## 2022-08-03 RX ADMIN — PIPERACILLIN SODIUM AND TAZOBACTAM SODIUM 4.5 G: 4; .5 INJECTION, POWDER, LYOPHILIZED, FOR SOLUTION INTRAVENOUS at 09:08

## 2022-08-03 RX ADMIN — ENOXAPARIN SODIUM 40 MG: 40 INJECTION SUBCUTANEOUS at 09:08

## 2022-08-03 NOTE — FIRST PROVIDER EVALUATION
Medical screening exam completed.  I have conducted a focused provider triage encounter, findings are as follows:    Brief history of present illness:  SOB    Vitals:    08/03/22 1612   BP: 127/74   Pulse: 89   Resp: 17   Temp: 98 °F (36.7 °C)   TempSrc: Oral   SpO2: 95%   Weight: 89.8 kg (198 lb)       Pertinent physical exam:  Here for the complaint of shortness of breath denies chest pain or fevers.  Patient sent by his primary care provider Dr. Donald  earlier today for outpatient labs and a chest x-ray he was told to come to the emergency department for possible pneumonia    Brief workup plan:  Labs xray    Preliminary workup initiated; this workup will be continued and followed by the physician or advanced practice provider that is assigned to the patient when roomed.

## 2022-08-03 NOTE — HPI
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 .Denied any pain. No orthopnea, PND or pedal edema.    In ED: labs reviewed and noted below: no leukocytosis with minimal normocytic anemia; mild hypokalemia with normal renal function; troponin is normal; lactate is normal. COVID: negative. CXR reviewed: bilateral infiltrates. EKG reviewed: sinus with poor R wave, but no acute segments. Discussed with ED MD: admit to inpatient status for bilateral pneumonia; iv antibiotics, electrolyte replacement; AM labs for review

## 2022-08-03 NOTE — CARE UPDATE
08/03/22 1716   Patient Assessment/Suction   Level of Consciousness (AVPU) alert   All Lung Fields Breath Sounds clear;diminished   PRE-TX-O2   O2 Device (Oxygen Therapy) room air   SpO2 (!) 94 %   Pulse Oximetry Type Continuous   $ Pulse Oximetry - Multiple Charge Pulse Oximetry - Multiple   Pulse 77   Resp 15   Aerosol Therapy   $ Aerosol Therapy Charges Aerosol Treatment   Daily Review of Necessity (SVN) completed   Respiratory Treatment Status (SVN) given   Treatment Route (SVN) mask   Patient Position (SVN) semi-Zavaleta's   Post Treatment Assessment (SVN) increased aeration   Signs of Intolerance (SVN) none   Breath Sounds Post-Respiratory Treatment   Post-treatment Heart Rate (beats/min) 76   Post-treatment Resp Rate (breaths/min) 15   Education   $ Education Bronchodilator;15 min   Respiratory Evaluation   $ Care Plan Tech Time 15 min

## 2022-08-04 PROBLEM — E61.1 IRON DEFICIENCY: Status: ACTIVE | Noted: 2022-08-04

## 2022-08-04 LAB
ANION GAP SERPL CALC-SCNC: 8 MMOL/L (ref 8–16)
BASOPHILS # BLD AUTO: ABNORMAL K/UL (ref 0–0.2)
BASOPHILS NFR BLD: 0 % (ref 0–1.9)
BUN SERPL-MCNC: 15 MG/DL (ref 8–23)
CALCIUM SERPL-MCNC: 8.8 MG/DL (ref 8.7–10.5)
CHLORIDE SERPL-SCNC: 103 MMOL/L (ref 95–110)
CO2 SERPL-SCNC: 26 MMOL/L (ref 23–29)
CREAT SERPL-MCNC: 0.8 MG/DL (ref 0.5–1.4)
DIFFERENTIAL METHOD: ABNORMAL
EOSINOPHIL # BLD AUTO: ABNORMAL K/UL (ref 0–0.5)
EOSINOPHIL NFR BLD: 7 % (ref 0–8)
ERYTHROCYTE [DISTWIDTH] IN BLOOD BY AUTOMATED COUNT: 16.4 % (ref 11.5–14.5)
EST. GFR  (NO RACE VARIABLE): >60 ML/MIN/1.73 M^2
GLUCOSE SERPL-MCNC: 122 MG/DL (ref 70–110)
GLUCOSE SERPL-MCNC: 124 MG/DL (ref 70–110)
GLUCOSE SERPL-MCNC: 135 MG/DL (ref 70–110)
GLUCOSE SERPL-MCNC: 143 MG/DL (ref 70–110)
GLUCOSE SERPL-MCNC: 170 MG/DL (ref 70–110)
HCT VFR BLD AUTO: 33.7 % (ref 40–54)
HGB BLD-MCNC: 11.8 G/DL (ref 14–18)
IMM GRANULOCYTES # BLD AUTO: ABNORMAL K/UL (ref 0–0.04)
IMM GRANULOCYTES NFR BLD AUTO: ABNORMAL % (ref 0–0.5)
LYMPHOCYTES # BLD AUTO: ABNORMAL K/UL (ref 1–4.8)
LYMPHOCYTES NFR BLD: 26 % (ref 18–48)
MCH RBC QN AUTO: 30.2 PG (ref 27–31)
MCHC RBC AUTO-ENTMCNC: 35 G/DL (ref 32–36)
MCV RBC AUTO: 86 FL (ref 82–98)
MONOCYTES # BLD AUTO: ABNORMAL K/UL (ref 0.3–1)
MONOCYTES NFR BLD: 9 % (ref 4–15)
NEUTROPHILS NFR BLD: 54 % (ref 38–73)
NEUTS BAND NFR BLD MANUAL: 4 %
NRBC BLD-RTO: 0 /100 WBC
PLATELET # BLD AUTO: 83 K/UL (ref 150–450)
PLATELET BLD QL SMEAR: ABNORMAL
PMV BLD AUTO: 9.9 FL (ref 9.2–12.9)
POLYCHROMASIA BLD QL SMEAR: ABNORMAL
POTASSIUM SERPL-SCNC: 3.7 MMOL/L (ref 3.5–5.1)
RBC # BLD AUTO: 3.91 M/UL (ref 4.6–6.2)
SODIUM SERPL-SCNC: 137 MMOL/L (ref 136–145)
WBC # BLD AUTO: 7.91 K/UL (ref 3.9–12.7)

## 2022-08-04 PROCEDURE — 36415 COLL VENOUS BLD VENIPUNCTURE: CPT | Performed by: INTERNAL MEDICINE

## 2022-08-04 PROCEDURE — 63600175 PHARM REV CODE 636 W HCPCS: Performed by: INTERNAL MEDICINE

## 2022-08-04 PROCEDURE — 63600175 PHARM REV CODE 636 W HCPCS: Performed by: STUDENT IN AN ORGANIZED HEALTH CARE EDUCATION/TRAINING PROGRAM

## 2022-08-04 PROCEDURE — 80048 BASIC METABOLIC PNL TOTAL CA: CPT | Performed by: INTERNAL MEDICINE

## 2022-08-04 PROCEDURE — 97161 PT EVAL LOW COMPLEX 20 MIN: CPT

## 2022-08-04 PROCEDURE — 87070 CULTURE OTHR SPECIMN AEROBIC: CPT | Performed by: STUDENT IN AN ORGANIZED HEALTH CARE EDUCATION/TRAINING PROGRAM

## 2022-08-04 PROCEDURE — 12000002 HC ACUTE/MED SURGE SEMI-PRIVATE ROOM

## 2022-08-04 PROCEDURE — 97116 GAIT TRAINING THERAPY: CPT

## 2022-08-04 PROCEDURE — 25000242 PHARM REV CODE 250 ALT 637 W/ HCPCS: Performed by: INTERNAL MEDICINE

## 2022-08-04 PROCEDURE — 85027 COMPLETE CBC AUTOMATED: CPT | Performed by: INTERNAL MEDICINE

## 2022-08-04 PROCEDURE — 25000003 PHARM REV CODE 250: Performed by: STUDENT IN AN ORGANIZED HEALTH CARE EDUCATION/TRAINING PROGRAM

## 2022-08-04 PROCEDURE — 97535 SELF CARE MNGMENT TRAINING: CPT

## 2022-08-04 PROCEDURE — 87077 CULTURE AEROBIC IDENTIFY: CPT | Performed by: STUDENT IN AN ORGANIZED HEALTH CARE EDUCATION/TRAINING PROGRAM

## 2022-08-04 PROCEDURE — 97165 OT EVAL LOW COMPLEX 30 MIN: CPT

## 2022-08-04 PROCEDURE — 25000003 PHARM REV CODE 250: Performed by: INTERNAL MEDICINE

## 2022-08-04 PROCEDURE — 87186 SC STD MICRODIL/AGAR DIL: CPT | Performed by: STUDENT IN AN ORGANIZED HEALTH CARE EDUCATION/TRAINING PROGRAM

## 2022-08-04 PROCEDURE — 85007 BL SMEAR W/DIFF WBC COUNT: CPT | Performed by: INTERNAL MEDICINE

## 2022-08-04 PROCEDURE — 87205 SMEAR GRAM STAIN: CPT | Performed by: STUDENT IN AN ORGANIZED HEALTH CARE EDUCATION/TRAINING PROGRAM

## 2022-08-04 RX ORDER — HYDROCODONE BITARTRATE AND ACETAMINOPHEN 5; 325 MG/1; MG/1
1 TABLET ORAL EVERY 6 HOURS PRN
Status: DISCONTINUED | OUTPATIENT
Start: 2022-08-04 | End: 2022-08-05 | Stop reason: HOSPADM

## 2022-08-04 RX ORDER — LEVOFLOXACIN 5 MG/ML
750 INJECTION, SOLUTION INTRAVENOUS
Status: DISCONTINUED | OUTPATIENT
Start: 2022-08-05 | End: 2022-08-05 | Stop reason: HOSPADM

## 2022-08-04 RX ADMIN — VANCOMYCIN HYDROCHLORIDE 1500 MG: 1.5 INJECTION, POWDER, LYOPHILIZED, FOR SOLUTION INTRAVENOUS at 01:08

## 2022-08-04 RX ADMIN — GABAPENTIN 300 MG: 300 CAPSULE ORAL at 08:08

## 2022-08-04 RX ADMIN — SODIUM CHLORIDE 125 MG: 9 INJECTION, SOLUTION INTRAVENOUS at 10:08

## 2022-08-04 RX ADMIN — AZELASTINE HYDROCHLORIDE 137 MCG: 137 SPRAY, METERED NASAL at 08:08

## 2022-08-04 RX ADMIN — TRAZODONE HYDROCHLORIDE 100 MG: 50 TABLET ORAL at 08:08

## 2022-08-04 RX ADMIN — HYDROCODONE BITARTRATE AND ACETAMINOPHEN 1 TABLET: 5; 325 TABLET ORAL at 12:08

## 2022-08-04 RX ADMIN — IPRATROPIUM BROMIDE AND ALBUTEROL SULFATE 3 ML: .5; 3 SOLUTION RESPIRATORY (INHALATION) at 07:08

## 2022-08-04 RX ADMIN — SUCRALFATE 1 G: 1 TABLET ORAL at 05:08

## 2022-08-04 RX ADMIN — SUCRALFATE 1 G: 1 TABLET ORAL at 11:08

## 2022-08-04 RX ADMIN — SUCRALFATE 1 G: 1 TABLET ORAL at 06:08

## 2022-08-04 RX ADMIN — VALACYCLOVIR HYDROCHLORIDE 1000 MG: 500 TABLET, FILM COATED ORAL at 08:08

## 2022-08-04 RX ADMIN — ENOXAPARIN SODIUM 40 MG: 40 INJECTION SUBCUTANEOUS at 05:08

## 2022-08-04 RX ADMIN — POTASSIUM CHLORIDE 20 MEQ: 1500 TABLET, EXTENDED RELEASE ORAL at 08:08

## 2022-08-04 RX ADMIN — METOPROLOL TARTRATE 25 MG: 25 TABLET, FILM COATED ORAL at 08:08

## 2022-08-04 RX ADMIN — SUCRALFATE 1 G: 1 TABLET ORAL at 08:08

## 2022-08-04 RX ADMIN — HYDROCODONE BITARTRATE AND ACETAMINOPHEN 1 TABLET: 5; 325 TABLET ORAL at 07:08

## 2022-08-04 RX ADMIN — PIPERACILLIN SODIUM AND TAZOBACTAM SODIUM 4.5 G: 4; .5 INJECTION, POWDER, LYOPHILIZED, FOR SOLUTION INTRAVENOUS at 04:08

## 2022-08-04 NOTE — PROGRESS NOTES
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 .Denied any pain. No orthopnea, PND or pedal edema.  In ED: labs reviewed and noted below: no leukocytosis with minimal normocytic anemia; mild hypokalemia with normal renal function; troponin is normal; lactate is normal. COVID: negative. CXR reviewed: bilateral infiltrates. EKG reviewed: sinus with poor R wave, but no acute segments. Discussed with ED MD: admit to inpatient status for bilateral pneumonia; iv antibiotics, electrolyte replacement; AM labs for review    Interval History:   8/4: Patient is doing well and states that he feels better.  Will change IV antibiotics to IV Levaquin.  Replace low iron with IV Ferrlecit.  No concerns/issues overnight reported by the patient or the nursing staff.    Review of Systems All other Review of Systems were found to be negative expect for that mentioned already in HPI.     Objective:     Vitals:    08/03/22 2122 08/04/22 0453 08/04/22 0701 08/04/22 0839   BP: (!) 155/75 (!) 154/72 133/71 133/71   Pulse: 76 78 79    Resp:  18 18    Temp:  99.1 °F (37.3 °C) 98.2 °F (36.8 °C)    TempSrc:   Axillary    SpO2:  95% (!) 92%    Weight:       Height:            Vitals reviewed.  Constitutional: No distress.   HENT: NC  Head: Atraumatic.   Cardiovascular: Normal rate, regular rhythm and normal heart sounds.    Pulmonary/Chest: Effort normal. No wheezes.   Abdominal: Soft. Bowel sounds are normal. No distension and no mass. No tenderness  Neurological: Alert.   Skin: Skin is warm and dry.   Psych: Appropriate mood and affect    Following labs were Reviewed   CBC:  Recent Labs   Lab 08/04/22  0626   WBC 7.91   HGB 11.8*   HCT 33.7*   PLT 83*     CMP:  Recent Labs   Lab 08/04/22 0626   CALCIUM 8.8      K 3.7   CO2 26      BUN 15   CREATININE 0.8       Micro Results  Microbiology Results (last 7 days)     Procedure Component Value Units Date/Time    Culture, Respiratory with Gram Stain [[id number]]     Order Status: No result Specimen: Respiratory     Blood culture #1 **CANNOT BE ORDERED STAT** [[id number]] Collected: 08/03/22 1643    Order Status: Completed Specimen: Blood from Peripheral, Antecubital, Left Updated: 08/03/22 2358     Blood Culture, Routine No Growth to date    Blood culture #2 **CANNOT BE ORDERED STAT** [[id number]] Collected: 08/03/22 1651    Order Status: Completed Specimen: Blood from Peripheral, Hand, Left Updated: 08/03/22 2358     Blood Culture, Routine No Growth to date           Radiology Reports  X-Ray Sinuses 3 or more views  Result Date: 8/3/2022  IMPRESSION: Paranasal sinuses are grossly patent. Electronically signed by:  Barrera Castillo DO  8/3/2022 1:02 PM CDT Workstation: [id number]    X-Ray Chest PA And Lateral  Result Date: 8/3/2022  IMPRESSION: Bilateral mid to lower lung patchy linear and groundglass lung opacities may reflect bronchopneumonia. Atypical viral infection could have a similar appearance. Electronically signed by:  Barrera Castillo DO  8/3/2022 1:00 PM CDT Workstation: [id number]       Meds  Scheduled Meds:   azelastine  1 spray Nasal BID    enoxaparin  40 mg Subcutaneous Daily    gabapentin  300 mg Oral BID    [START ON 8/5/2022] levoFLOXacin  750 mg Intravenous Q24H    metoprolol tartrate  25 mg Per G Tube BID    sucralfate  1 g Oral QID (AC & HS)     traZODone  100 mg Oral QHS    valACYclovir  1,000 mg Oral BID     Continuous Infusions:  PRN Meds:.acetaminophen, albuterol-ipratropium, calcium chloride IVPB, calcium chloride IVPB, calcium chloride IVPB, dextrose 10%, dextrose 10%, HYDROcodone-acetaminophen, insulin aspart U-100, magnesium oxide, magnesium sulfate IVPB, magnesium sulfate IVPB, magnesium sulfate IVPB, magnesium sulfate IVPB, melatonin, ondansetron, potassium chloride, potassium chloride, potassium chloride, potassium chloride, sodium chloride 0.9%, sodium phosphate IVPB, sodium phosphate IVPB, sodium phosphate IVPB, sodium phosphate IVPB, sodium phosphate IVPB, Pharmacy to dose Vancomycin consult **AND** vancomycin - pharmacy to dose.    Assessment & Plan:     Active Hospital Problems    Diagnosis    *Pneumonia of both lungs due to infectious organism  Continue IV Levaquin  Sputum culture      Iron deficiency  Replaced to IV Ferrlecit      S/P cervical spinal fusion  PT/OT           Discharge Planning:   Is the patient medically ready for discharge?: no    Reason for patient still in hospital (select all that apply): Patient trending condition and Treatment    Above encounter included review of the medical records, interviewing and examining the patient face-to-face, discussion with family and other health care providers, ordering and interpreting lab/test results and formulating a plan of care.     Medical Decision Making:      [_] Low Complexity  [_] Moderate Complexity  [x] High Complexity      David Julien MD  Department of Hospital Medicine   [facility]

## 2022-08-04 NOTE — PROGRESS NOTES
Pharmacokinetic Initial Assessment: IV Vancomycin    Assessment/Plan:    Initiate intravenous vancomycin with loading dose of 1500 mg once followed by a maintenance dose of vancomycin 1500 mg IV every 18 hours for 5 days  Desired empiric serum trough concentration is 10 to 15 mcg/mL  Draw vancomycin trough level 60 min prior to fourth dose on 08/06 at approximately 0300  Pharmacy will continue to follow and monitor vancomycin.      Please contact pharmacy at extension [phone] with any questions regarding this assessment.     Thank you for the consult,   Manolo Sheppard       Patient brief summary:  Conrad Kuhn is a 81 y.o. male initiated on antimicrobial therapy with IV Vancomycin for treatment of suspected lower respiratory infection    Drug Allergies:   Review of patient's allergies indicates:  No Known Allergies    Actual Body Weight:   89.8 kg    Renal Function:   Estimated Creatinine Clearance: 73.6 mL/min (based on SCr of 0.9 mg/dL).,     CBC (last 72 hours):  Recent Labs   Lab Result Units 08/03/22  0801   WBC K/uL 9.79   Hemoglobin g/dL 12.5*   Hematocrit % 36.1*   Platelets K/uL 80*   Gran % % 67.0   Lymph % % 21.0   Mono % % 6.0   Eosinophil % % 2.0   Basophil % % 0.0   Differential Method  Manual       Metabolic Panel (last 72 hours):  Recent Labs   Lab Result Units 08/03/22  0801 08/03/22  1635   Sodium mmol/L 138  --    Potassium mmol/L 3.3*  --    Chloride mmol/L 103  --    CO2 mmol/L 26  --    Glucose mg/dL 184*  --    BUN mg/dL 14  --    Creatinine mg/dL 0.9  --    Albumin g/dL 4.0  --    Total Bilirubin mg/dL 1.7*  --    Alkaline Phosphatase U/L 51*  --    AST U/L 19  --    ALT U/L 14  --    Magnesium mg/dL  --  1.7       Drug levels (last 3 results):  No results for input(s): VANCOMYCINRA, VANCORANDOM, VANCOMYCINPE, VANCOPEAK, VANCOMYCINTR, VANCOTROUGH in the last 72 hours.    Microbiologic Results:  Microbiology Results (last 7 days)       Procedure Component Value Units Date/Time    Blood culture #1  **CANNOT BE ORDERED STAT** [[id number]] Collected: 08/03/22 1643    Order Status: Sent Specimen: Blood from Peripheral, Antecubital, Left Updated: 08/03/22 1710    Blood culture #2 **CANNOT BE ORDERED STAT** [[id number]] Collected: 08/03/22 1651    Order Status: Sent Specimen: Blood from Peripheral, Hand, Left Updated: 08/03/22 1710

## 2022-08-04 NOTE — ED PROVIDER NOTES
Encounter Date: 8/3/2022       History     Chief Complaint   Patient presents with    Shortness of Breath     Cough, fever for 3 days     HPI   81-year-old male with past medical history of leukemia, COPD, non-insulin diabetes who presents with 3 day history of nonproductive cough, fever, shortness of breath.  T-max 100.8° at home, wife also notes that patient was confused last night.  Denies chest pain, nausea, emesis, abdominal pain.  No orthopnea/PND.  Does note some dyspnea on exertion as well as orthostatic type symptoms.  Basic labs chest x-ray obtained by his primary care physician which are notable for findings consistent with pneumonia.  He was sent to the emergency department for further evaluation.    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     Right-sided Bell's palsy 2009    Spondylolisthesis     Varicose veins of legs     Venous insufficiency      Past Surgical History:   Procedure Laterality Date    Athroscopic surgery      On Knee    BIOPSY OF TISSUE OF NECK Left 08/2015    Recuurence CLL/small cell lyphoma    ESOPHAGEAL DILATION      ESOPHAGOGASTRODUODENOSCOPY N/A 4/15/2022    Procedure: EGD (ESOPHAGOGASTRODUODENOSCOPY);  Surgeon: Siddharth Garcia MD;  Location: [facility];  Service: Endoscopy;  Laterality: N/A;    ESOPHAGOGASTRODUODENOSCOPY N/A 4/16/2022     Procedure: EGD (ESOPHAGOGASTRODUODENOSCOPY);  Surgeon: Siddharth Garcia MD;  Location: [facility] ENDO;  Service: Endoscopy;  Laterality: N/A;    ESOPHAGOGASTRODUODENOSCOPY N/A 2022    Procedure: EGD (ESOPHAGOGASTRODUODENOSCOPY);  Surgeon: Jefferson Hyman MD;  Location: [facility] ENDO;  Service: Endoscopy;  Laterality: N/A;    ESOPHAGOGASTRODUODENOSCOPY W/ PEG N/A 2022    Procedure: EGD, WITH PEG TUBE INSERTION;  Surgeon: Siddharth Garcia MD;  Location: [facility] ENDO;  Service: Endoscopy;  Laterality: N/A;    GASTROSTOMY TUBE PLACEMENT  2022    20 Fr (bumper initially at 3.5)    Hemorrhoid resection      LUNG LOBECTOMY Left     NECK SURGERY  2022    Anterior and Posterior C3-C7 fusion    OTHER SURGICAL HISTORY  2006    Chemotherapy s/p lyphoma        Portacath implantation and removal      reverse shoulder arthroplasty Right 2021     Family History   Problem Relation Age of Onset    Stomach cancer Mother 80         at 95    Colon cancer Father      Social History     Tobacco Use    Smoking status: Former Smoker    Smokeless tobacco: Never Used   Substance Use Topics    Alcohol use: Yes    Drug use: No     Review of Systems   Constitutional: Positive for chills and fever.   HENT: Negative for congestion and sore throat.    Eyes: Negative for visual disturbance.   Respiratory: Positive for shortness of breath. Negative for chest tightness.    Cardiovascular: Negative for chest pain and leg swelling.   Gastrointestinal: Negative for abdominal pain, blood in stool, constipation, diarrhea, nausea and vomiting.   Endocrine: Negative for polyuria.   Genitourinary: Negative for dysuria.   Musculoskeletal: Negative for joint swelling.   Skin: Negative for rash.   Neurological: Negative for dizziness, seizures, weakness and headaches.   Psychiatric/Behavioral: Positive for agitation and confusion.       Physical Exam     Initial Vitals [22 1612]   BP Pulse Resp Temp SpO2    127/74 89 17 98 °F (36.7 °C) 95 %      MAP       --         Physical Exam    Constitutional: He appears well-developed.   HENT:   Head: Normocephalic and atraumatic.   Eyes: EOM are normal. Pupils are equal, round, and reactive to light.   Neck: Neck supple.   Normal range of motion.  Cardiovascular: Normal rate, regular rhythm and intact distal pulses. Exam reveals no gallop and no friction rub.    No murmur heard.  Pulmonary/Chest: No respiratory distress. He has no wheezes.   Faint crackles right lung field, no wheezing   Abdominal: Abdomen is soft. He exhibits no distension. There is no abdominal tenderness.   Musculoskeletal:         General: No tenderness. Normal range of motion.      Cervical back: Normal range of motion and neck supple.     Neurological: He is alert and oriented to person, place, and time. A cranial nerve deficit is present.   Mild right-sided facial droop; 5 out of 5 upper and lower extremities bilaterally   Skin: Skin is warm and dry. Capillary refill takes less than 2 seconds.   Psychiatric: He has a normal mood and affect.         ED Course   Procedures  Labs Reviewed   CULTURE, BLOOD   CULTURE, BLOOD   MAGNESIUM   TROPONIN I   LACTIC ACID, PLASMA   SARS-COV-2 RNA AMPLIFICATION, QUAL   POCT GLUCOSE MONITORING CONTINUOUS        ECG Results          EKG 12-lead (Final result)  Result time 08/03/22 16:42:37    Final result by Interface, Lab In [facility] (08/03/22 16:42:37)                 Narrative:    Test Reason : R05.9,    Vent. Rate : 076 BPM     Atrial Rate : 076 BPM     P-R Int : 192 ms          QRS Dur : 104 ms      QT Int : 382 ms       P-R-T Axes : 036 -30 058 degrees     QTc Int : 429 ms    Normal sinus rhythm  Left axis deviation  Possible Anterior infarct ,age undetermined  Abnormal ECG  When compared with ECG of 02-JUL-2022 06:54,  No significant change was found  Confirmed by Mikie Soto MD ([id number]) on 8/3/2022 4:42:19 PM    Referred By: [email or web address]   SELF            Confirmed By:Mikie Soto MD                            Imaging Results    None          Medications   albuterol-ipratropium 2.5 mg-0.5 mg/3 mL nebulizer solution 3 mL (has no administration in time range)   azelastine 137 mcg (0.1 %) nasal spray 137 mcg (137 mcg Nasal Given 8/3/22 2123)   gabapentin capsule 300 mg (300 mg Oral Given 8/3/22 2122)   metoprolol tartrate (LOPRESSOR) tablet 25 mg (25 mg Per G Tube Given 8/3/22 2122)   sucralfate tablet 1 g (1 g Oral Given 8/3/22 2122)   traZODone tablet 100 mg (100 mg Oral Given 8/3/22 2122)   valACYclovir tablet 1,000 mg (1,000 mg Oral Given 8/3/22 2122)   sodium chloride 0.9% flush 10 mL (has no administration in time range)   piperacillin-tazobactam 4.5 g in dextrose 5 % 100 mL IVPB (ready to mix system) (4.5 g Intravenous New Bag 8/3/22 2117)   vancomycin - pharmacy to dose (has no administration in time range)   melatonin tablet 6 mg (has no administration in time range)   enoxaparin injection 40 mg (40 mg Subcutaneous Given 8/3/22 2122)   acetaminophen tablet 650 mg (has no administration in time range)   ondansetron injection 4 mg (has no administration in time range)   potassium chloride SA CR tablet 20 mEq (has no administration in time range)   potassium chloride SA CR tablet 40 mEq (has no administration in time range)   potassium chloride SA CR tablet 20 mEq (has no administration in time range)   potassium chloride SA CR tablet 40 mEq (has no administration in time range)   magnesium oxide tablet 800 mg (has no administration in time range)   magnesium sulfate 2g in water 50mL IVPB (premix) (has no administration in time range)   magnesium sulfate in dextrose IVPB (premix) 1 g (has no administration in time range)   magnesium sulfate 2g in water 50mL IVPB (premix) (has no administration in time range)   magnesium sulfate 2g in water 50mL IVPB (premix) (has no administration in time range)   sodium phosphate 15 mmol in dextrose 5 % 250 mL IVPB (has no  "administration in time range)   sodium phosphate 20.01 mmol in dextrose 5 % 250 mL IVPB (has no administration in time range)   sodium phosphate 15 mmol in dextrose 5 % 250 mL IVPB (has no administration in time range)   sodium phosphate 30 mmol in dextrose 5 % 250 mL IVPB (has no administration in time range)   sodium phosphate 20.01 mmol in dextrose 5 % 250 mL IVPB (has no administration in time range)   calcium chloride 1 g in dextrose 5 % 100 mL IVPB (has no administration in time range)   calcium chloride 1 g in dextrose 5 % 100 mL IVPB (has no administration in time range)   calcium chloride 1 g in dextrose 5 % 100 mL IVPB (has no administration in time range)   insulin aspart U-100 pen 1-6 Units (has no administration in time range)   dextrose 10% bolus 125 mL (has no administration in time range)   dextrose 10% bolus 250 mL (has no administration in time range)   vancomycin 1,500 mg in dextrose 5 % 500 mL IVPB (has no administration in time range)   albuterol-ipratropium 2.5 mg-0.5 mg/3 mL nebulizer solution 3 mL (3 mLs Nebulization Given 8/3/22 1716)   cefTRIAXone injection 1 g (1 g Intravenous Given 8/3/22 1810)   azithromycin 500 mg in dextrose 5 % 250 mL IVPB (ready to mix system) (0 mg Intravenous Stopped 8/3/22 1928)     Medical Decision Making:   History:   I obtained history from: someone other than patient.  Old Medical Records: I decided to obtain old medical records.  Old Records Summarized: records from clinic visits.  Clinical Tests:   Lab Tests: Ordered and Reviewed  Radiological Study: Reviewed  Medical Tests: Ordered and Reviewed  Sepsis Perfusion Assessment: "I attest a sepsis perfusion exam was performed within 6 hours of sepsis, severe sepsis, or septic shock presentation, following fluid resuscitation."  ED Management:  81-year-old male with past medical history above presents with concern for pneumonia.  Afebrile, mildly hypertensive, otherwise stable vital signs.  Physical exam with " right-sided crackles on pulmonary exam other than the significant findings; right-sided facial droop at baseline per patient.  Labs in triage and he reviewed in which are within acceptable limits.  Given the moderate risk by curb 65 score, given Rocephin and zithromax and admitted to Hospital Medicine for further management.    April Espinosa  [employer] PGY-4  Emergency Medicine  10:31 PM 8/3/2022     I supervised the healthcare of the  patient.  I have reviewed and agree with the resident's findings, including all diagnostic interpretations and treatment plans as documented.     High risk PNA based on PSI. Medicine to admit        Carlos Cruz MD MPH               ED Course as of 08/03/22 2231   Wed Aug 03, 2022   2229 EKG 12-lead  EKG demonstrates normal sinus rhythm, possible left axis deviation, no ST elevations, early repolarization in the V2 through V6.  No significant changes from previous EKG dated July 2, 2022 [CC]      ED Course User Index  [CC] April Espinosa MD             Clinical Impression:   Final diagnoses:  [R05.9] Cough  [J18.9] Bilateral pneumonia          ED Disposition Condition    Admit               April Espinosa MD  Resident  08/03/22 2231       Carlos Cruz Jr., MD  08/04/22 6616

## 2022-08-04 NOTE — PT/OT/SLP EVAL
"Occupational Therapy   Evaluation    Name: Conrad Kuhn  MRN: [id number]  Admitting Diagnosis:  Pneumonia of both lungs due to infectious organism  Recent Surgery: * No surgery found *      Recommendations:     Discharge Recommendations: home health OT  Discharge Equipment Recommendations:  none  Barriers to discharge:  None    Assessment:     Conrad Kuhn is a 81 y.o. male with a medical diagnosis of Pneumonia of both lungs due to infectious organism.  Pt agreeable to OT evaluation this AM. Performance deficits affecting function: weakness, impaired endurance, impaired self care skills, impaired functional mobility, gait instability, impaired balance, impaired cardiopulmonary response to activity.      Rehab Prognosis: Good; patient would benefit from acute skilled OT services to address these deficits and reach maximum level of function.       Plan:     Patient to be seen 3 x/week to address the above listed problems via self-care/home management, therapeutic exercises, therapeutic activities  · Plan of Care Expires: 09/04/22  · Plan of Care Reviewed with: patient    Subjective     Chief Complaint: "I'm coughing too much"  Patient/Family Comments/goals: none stated    Occupational Profile:  Living Environment: Pt lives with wife in a 1 story home with no steps to enter. Pt has a tub/shower combo with a shower chair and grab bars present and a raised toilet  Previous level of function: Mod I with ADLs and mobility; wife cooks and cleans  Roles and Routines: [facility]  Equipment Used at Home:  walker, rolling, shower chair, raised toilet, grab bar, cane, straight (sock aid)  Assistance upon Discharge: yes, from wife     Pain/Comfort:  · Pain Rating 1: 0/10    Patients cultural, spiritual, Muslim conflicts given the current situation:      Objective:     Communicated with: nursing prior to session.  Patient found up in chair with peripheral IV, telemetry upon OT entry to room.    General Precautions: Standard, " fall   Orthopedic Precautions:N/A   Braces: N/A  Respiratory Status: Room air    Occupational Performance:    Functional Mobility/Transfers:  · Patient completed Sit <> Stand Transfer with contact guard assistance  with  rolling walker   · Functional Mobility: pt amb around room with CGA with RW with no LOB or SOB    Activities of Daily Living:  · Feeding:  independence    · Grooming: setup assist seated in chair       Cognitive/Visual Perceptual:  Cognitive/Psychosocial Skills:  -       Oriented to: Person, Place, Time and Situation   -       Follows Commands/attention:Follows two-step commands  -       Communication: clear/fluent  -       Memory: No Deficits noted  -       Safety awareness/insight to disability: intact   -       Mood/Affect/Coping skills/emotional control: Appropriate to situation, Cooperative and Pleasant    Physical Exam:  Balance:    -       SBA seated balance; CGA standing balance  Upper Extremity Range of Motion:     -       Right Upper Extremity: ~90 degrees shoulder flex; WFL distally  -       Left Upper Extremity: WFL  Upper Extremity Strength:    -       Right Upper Extremity: 2+/5 shoulder flex; WFL distally  -       Left Upper Extremity: WFL   Strength:    -       Right Upper Extremity: WFL  -       Left Upper Extremity: WFL  Fine Motor Coordination:    -       Intact  Gross motor coordination:   WFL    AMPAC 6 Click ADL:  AMPAC Total Score: 19    Treatment & Education:  Pt educated on role of OT/POC, importance of OOB/EOB activity, use of call bell, and safety during ADLs, transfers, and functional mobility  Education:    Patient left up in chair with all lines intact and call button in reach    GOALS:   Multidisciplinary Problems     Occupational Therapy Goals        Problem: Occupational Therapy    Goal Priority Disciplines Outcome Interventions   Occupational Therapy Goal     OT, PT/OT     Description: Goals to be met by: 9/4/22    Patient will increase functional independence  with ADLs by performing:    UE Dressing with Supervision.  LE Dressing with Supervision.  Grooming while standing at sink with Supervision.  Toileting from toilet with Supervision for hygiene and clothing management.   Toilet transfer to toilet with Supervision.                     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     Right-sided Bell's palsy 2009    Spondylolisthesis     Varicose veins of legs     Venous insufficiency        Past Surgical History:   Procedure Laterality Date    Athroscopic surgery      On Knee    BIOPSY OF TISSUE OF NECK Left 08/2015    Recuurence CLL/small cell lyphoma    ESOPHAGEAL DILATION      ESOPHAGOGASTRODUODENOSCOPY N/A 4/15/2022    Procedure: EGD (ESOPHAGOGASTRODUODENOSCOPY);  Surgeon: Siddharth Garcia MD;  Location: [facility];  Service: Endoscopy;  Laterality: N/A;    ESOPHAGOGASTRODUODENOSCOPY N/A 4/16/2022    Procedure: EGD (ESOPHAGOGASTRODUODENOSCOPY);  Surgeon: Siddharth Garcia MD;  Location: [facility];  Service: Endoscopy;  Laterality: N/A;    ESOPHAGOGASTRODUODENOSCOPY N/A 6/23/2022    Procedure: EGD (ESOPHAGOGASTRODUODENOSCOPY);  Surgeon: Jefferson Hyman MD;  Location: [facility];  Service: Endoscopy;  Laterality: N/A;    ESOPHAGOGASTRODUODENOSCOPY W/ PEG N/A 4/16/2022    Procedure: EGD, WITH PEG TUBE INSERTION;  Surgeon: Siddharth Garcia MD;  Location: [facility];  Service:  Endoscopy;  Laterality: N/A;    GASTROSTOMY TUBE PLACEMENT  04/16/2022    20 Fr (bumper initially at 3.5)    Hemorrhoid resection  1979    LUNG LOBECTOMY Left 2004    NECK SURGERY  04/08/2022    Anterior and Posterior C3-C7 fusion    OTHER SURGICAL HISTORY  2006    Chemotherapy s/p lyphoma        Portacath implantation and removal      reverse shoulder arthroplasty Right 03/2021       Time Tracking:     OT Date of Treatment: 08/04/22  OT Start Time: 1150  OT Stop Time: 1208  OT Total Time (min): 18 min    Billable Minutes:Evaluation 10  Self Care/Home Management 8    8/4/2022

## 2022-08-04 NOTE — H&P
[facility] - Emergency Dept  Hospital Medicine  History & Physical    Patient Name: Conrad Kuhn  MRN: [id number]  Patient Class: IP- Inpatient  Admission Date: 8/3/2022  Attending Physician: Usman Young MD  Primary Care Provider: Johnson Erazo MD         Patient information was obtained from patient, spouse/SO, past medical records, ER records and ED MD.     Subjective:     Principal Problem:Pneumonia of both lungs due to infectious organism    Chief Complaint:   Chief Complaint   Patient presents with    Shortness of Breath     Cough, fever for 3 days        HPI: 81 year old male with history of CLL, NSC Lung Ca (s/p left upper lobectomy), DM 2, HTN, Bell's, Recurrent Herpes Simplex (oral) and has had 2/3 COVID vaccinations presented to ED complaining of cough with brown sputum, SOBE and fever for three days. .Denied any pain. No orthopnea, PND or pedal edema.    In ED: labs reviewed and noted below: no leukocytosis with minimal normocytic anemia; mild hypokalemia with normal renal function; troponin is normal; lactate is normal. COVID: negative. CXR reviewed: bilateral infiltrates. EKG reviewed: sinus with poor R wave, but no acute segments. 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    MAYDA on CPAP     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     Right-sided Bell's palsy 2009    Spondylolisthesis     Varicose  veins of legs     Venous insufficiency        Past Surgical History:   Procedure Laterality Date    Athroscopic surgery      On Knee    BIOPSY OF TISSUE OF NECK Left 08/2015    Recuurence CLL/small cell lyphoma    ESOPHAGEAL DILATION      ESOPHAGOGASTRODUODENOSCOPY N/A 4/15/2022    Procedure: EGD (ESOPHAGOGASTRODUODENOSCOPY);  Surgeon: Siddharth Garcia MD;  Location: [facility];  Service: Endoscopy;  Laterality: N/A;    ESOPHAGOGASTRODUODENOSCOPY N/A 4/16/2022    Procedure: EGD (ESOPHAGOGASTRODUODENOSCOPY);  Surgeon: Siddharth Garcia MD;  Location: [facility];  Service: Endoscopy;  Laterality: N/A;    ESOPHAGOGASTRODUODENOSCOPY N/A 6/23/2022    Procedure: EGD (ESOPHAGOGASTRODUODENOSCOPY);  Surgeon: Jefferson Hyman MD;  Location: [facility];  Service: Endoscopy;  Laterality: N/A;    ESOPHAGOGASTRODUODENOSCOPY W/ PEG N/A 4/16/2022    Procedure: EGD, WITH PEG TUBE INSERTION;  Surgeon: Siddharth Garcia MD;  Location: [facility];  Service: Endoscopy;  Laterality: N/A;    GASTROSTOMY TUBE PLACEMENT  04/16/2022    20 Fr (bumper initially at 3.5)    Hemorrhoid resection  1979    LUNG LOBECTOMY Left 2004    NECK SURGERY  04/08/2022    Anterior and Posterior C3-C7 fusion    OTHER SURGICAL HISTORY  2006    Chemotherapy s/p lyphoma        Portacath implantation and removal      reverse shoulder arthroplasty Right 03/2021       Review of patient's allergies indicates:  No Known Allergies    Current Facility-Administered Medications on File Prior to Encounter   Medication    EPINEPHrine (EPIPEN) 0.3 mg/0.3 mL pen injection 0.3 mg     Current Outpatient Medications on File Prior to Encounter   Medication Sig    gabapentin (NEURONTIN) 600 MG tablet Take 600 mg by mouth 2 (two) times a day.    glimepiride (AMARYL) 2 MG tablet Take 2 mg by mouth once daily at 6am.    acyclovir (ZOVIRAX) 200 mg/5 mL suspension Take 10 mL by mouth twice a day as prevention and 20 mL 3 times a day when needed for treatment     albuterol-ipratropium (DUO-NEB) 2.5 mg-0.5 mg/3 mL nebulizer solution Take 3 mLs by nebulization every 8 (eight) hours as needed.    amoxicillin (AMOXIL) 400 mg/5 mL suspension Take 5 mLs (400 mg total) by mouth every 8 (eight) hours.    azelastine (ASTELIN) 137 mcg (0.1 %) nasal spray 1 spray by Nasal route 2 (two) times daily.     blood sugar diagnostic Strp by Other route.    blood-glucose meter kit Use as instructed    FREESTYLE LANCETS 28 gauge lancets TEST TWICE DAILY    gabapentin (NEURONTIN) 300 MG capsule Take 300 mg by mouth 2 (two) times daily.     glimepiride (AMARYL) 1 MG tablet Take 1 mg by mouth daily with breakfast.     HYDROcodone-acetaminophen (NORCO)  mg per tablet Take 1 tablet by mouth every 8 (eight) hours as needed.     levalbuterol (XOPENEX) 1.25 mg/3 mL nebulizer solution Take 3 mLs by nebulization every 4 to 6 hours as needed.     metoprolol tartrate (LOPRESSOR) 25 MG tablet 1 tablet (25 mg total) by Per G Tube route 2 (two) times daily.    sucralfate (CARAFATE) 1 gram tablet TAKE 1 TABLET BY MOUTH TWICE DAILY ON AN EMPTY STOMACH    traZODone (DESYREL) 100 MG tablet TAKE 1 TABLET BY MOUTH EVERY NIGHT AT BEDTIME (Patient taking differently: Take 100 mg by mouth every evening.)    valACYclovir (VALTREX) 1000 MG tablet Take 1,000 mg by mouth 2 (two) times daily.    [DISCONTINUED] esomeprazole (NEXIUM) 40 MG capsule Take 1 capsule (40 mg total) by mouth 2 (two) times daily.    [DISCONTINUED] losartan (COZAAR) 25 MG tablet Take 25 mg by mouth every evening.    [DISCONTINUED] metFORMIN (GLUCOPHAGE) 500 MG tablet Take 500 mg by mouth 2 (two) times daily with meals.     [DISCONTINUED] metoprolol succinate (TOPROL-XL) 25 MG 24 hr tablet Take 25 mg by mouth 2 (two) times daily.     [DISCONTINUED] pantoprazole (PROTONIX) 40 mg suspension Take 40 mg by mouth once daily.     Family History       Problem Relation (Age of Onset)    Colon cancer Father    Stomach cancer Mother (80)           Tobacco Use    Smoking status: Former Smoker    Smokeless tobacco: Never Used   Substance and Sexual Activity    Alcohol use: Yes    Drug use: No    Sexual activity: Not Currently     Review of Systems   Constitutional: Negative.    HENT: Negative.     Eyes: Negative.    Respiratory:  Positive for cough and shortness of breath.    Cardiovascular: Negative.    Gastrointestinal: Negative.    Endocrine: Negative.    Genitourinary: Negative.    Musculoskeletal: Negative.    Skin: Negative.    Allergic/Immunologic: Negative.    Neurological: Negative.    Hematological: Negative.    All other systems reviewed and are negative.  Objective:     Vital Signs (Most Recent):  Temp: 98 °F (36.7 °C) (08/03/22 1612)  Pulse: 79 (08/03/22 1800)  Resp: 20 (08/03/22 1800)  BP: (!) 152/70 (08/03/22 1800)  SpO2: (!) 92 % (08/03/22 1800)   Vital Signs (24h Range):  Temp:  [98 °F (36.7 °C)] 98 °F (36.7 °C)  Pulse:  [77-89] 79  Resp:  [15-20] 20  SpO2:  [92 %-95 %] 92 %  BP: (127-152)/(66-74) 152/70     Weight: 89.8 kg (198 lb)  Body mass index is 25.42 kg/m².    Physical Exam  Vitals and nursing note reviewed.   Constitutional:       Appearance: He is well-developed.   HENT:      Head: Normocephalic and atraumatic.      Right Ear: External ear normal.      Left Ear: External ear normal.      Nose: Nose normal.   Eyes:      Conjunctiva/sclera: Conjunctivae normal.      Pupils: Pupils are equal, round, and reactive to light.   Cardiovascular:      Rate and Rhythm: Normal rate and regular rhythm.      Heart sounds: Normal heart sounds.   Pulmonary:      Effort: Pulmonary effort is normal.      Breath sounds: Normal breath sounds.      Comments: Decreased entry with coarse wet large airway sounds; no wheezes, nor crepitations currently appreciated  Abdominal:      General: Bowel sounds are normal.      Palpations: Abdomen is soft.   Musculoskeletal:         General: Normal range of motion.      Cervical back: Normal range of  motion and neck supple.   Skin:     General: Skin is warm and dry.      Capillary Refill: Capillary refill takes less than 2 seconds.   Neurological:      Mental Status: He is alert and oriented to person, place, and time.   Psychiatric:         Behavior: Behavior normal.         Thought Content: Thought content normal.         Judgment: Judgment normal.         CRANIAL NERVES     CN III, IV, VI   Pupils are equal, round, and reactive to light.     Significant Labs: All pertinent labs within the past 24 hours have been reviewed.  CBC:   Recent Labs   Lab 08/03/22  0801   WBC 9.79   HGB 12.5*   HCT 36.1*   PLT 80*     CMP:   Recent Labs   Lab 08/03/22  0801      K 3.3*      CO2 26   *   BUN 14   CREATININE 0.9   CALCIUM 9.3   PROT 7.5   ALBUMIN 4.0   BILITOT 1.7*   ALKPHOS 51*   AST 19   ALT 14   ANIONGAP 9     Lactic Acid:   Recent Labs   Lab 08/03/22  1635   LACTATE 1.1       Significant Imaging: I have reviewed all pertinent imaging results/findings within the past 24 hours.    Assessment/Plan:     * Pneumonia of both lungs due to infectious organism  admit to inpatient status for bilateral pneumonia; iv antibiotics, nebs prn, electrolyte replacement; AM labs for review          VTE Risk Mitigation (From admission, onward)    None             Usman Young MD  Department of Hospital Medicine   [facility] - Emergency Dept

## 2022-08-04 NOTE — PT/OT/SLP EVAL
Physical Therapy Evaluation    Patient Name:  Conrad Kuhn   MRN:  [id number]    Recommendations:     Discharge Recommendations:  home health PT   Discharge Equipment Recommendations: none   Barriers to discharge: medical status    Assessment:     Conrad Kuhn is a 81 y.o. male admitted with a medical diagnosis of Pneumonia of both lungs due to infectious organism.  He presents with the following impairments/functional limitations:  weakness, impaired endurance, impaired self care skills, impaired functional mobility, gait instability, impaired balance, decreased lower extremity function, decreased safety awareness, pain, impaired cardiopulmonary response to activity.    Pt found sitting on EOB. Pt agreeable to visit. Pt reports fatigue after sitting up for a few hours. Pt agreeable to ambulation. Pt ambulated 100 ft and 75 ft with standing rest break between due to mild shortness of breath.     Rehab Prognosis: Fair; patient would benefit from acute skilled PT services to address these deficits and reach maximum level of function.    Recent Surgery: * No surgery found *      Plan:     During this hospitalization, patient to be seen 5 x/week to address the identified rehab impairments via gait training, therapeutic activities, therapeutic exercises, neuromuscular re-education and progress toward the following goals:    · Plan of Care Expires:  09/04/22    Subjective     Chief Complaint: fatigue  Patient/Family Comments/goals: home with HHPT  Pain/Comfort:  · Pain Rating 1: 5/10  · Location 1: neck  · Pain Addressed 1: Reposition, Distraction, Cessation of Activity    Patients cultural, spiritual, [employer]ybeterian conflicts given the current situation: no    Living Environment:  Pt lives with his wife in a one story home with wife. Wife is primary .   Prior to admission, patients level of function was MI RW.  Equipment used at home: walker, rolling, shower chair.  DME owned (not currently used): none.  Upon  discharge, patient will have assistance from wife.    Objective:     Communicated with RN prior to session.  Patient found sitting edge of bed with peripheral IV, telemetry  upon PT entry to room.    General Precautions: Standard, fall   Orthopedic Precautions:N/A   Braces: N/A  Respiratory Status: Room air    Exams:  · Cognitive Exam:  Patient is oriented to Person, Place, Time and Situation  · RLE ROM: WFL  · RLE Strength: 4/5  · LLE ROM: WFL  · LLE Strength: 4/5    Functional Mobility:  · Bed Mobility:     · Sit to Supine: supervision  · Transfers:     · Sit to Stand:  contact guard assistance with rolling walker  · Gait: 100 and 75 ft with RW and CGA limited due to mild shortness of breath requiring single standing rest break    Therapeutic Activities and Exercises:   Pt educated on POC, discharge recommendation, pacing/energy conservation, importance of time OOB, need for assist with mobility, use of call bell to seek assistance as needed and fall prevention      AM-PAC 6 CLICK MOBILITY  Total Score:19     Patient left HOB elevated with all lines intact, call button in reach and RN and nursing students present.    GOALS:   Multidisciplinary Problems     Physical Therapy Goals        Problem: Physical Therapy    Goal Priority Disciplines Outcome Goal Variances Interventions   Physical Therapy Goal     PT, PT/OT Ongoing, Progressing     Description: Goals to be met by: 22     Patient will increase functional independence with mobility by performin. Supine to sit with Independent  2. Sit to stand transfer with Modified Independent  3. Bed to chair transfer with Modified Independent using Rolling Walker  4. Gait  x 150 feet with Supervision using Rolling Walker.                          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    MAYDA on CPAP     Pneumonia of both lungs due to infectious organism 6/29/2021    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     Right-sided Bell's palsy 2009    Spondylolisthesis     Varicose veins of legs     Venous insufficiency        Past Surgical History:   Procedure Laterality Date    Athroscopic surgery      On Knee    BIOPSY OF TISSUE OF NECK Left 08/2015    Recuurence CLL/small cell lyphoma    ESOPHAGEAL DILATION      ESOPHAGOGASTRODUODENOSCOPY N/A 4/15/2022    Procedure: EGD (ESOPHAGOGASTRODUODENOSCOPY);  Surgeon: Siddharth Garcia MD;  Location: [facility];  Service: Endoscopy;  Laterality: N/A;    ESOPHAGOGASTRODUODENOSCOPY N/A 4/16/2022    Procedure: EGD (ESOPHAGOGASTRODUODENOSCOPY);  Surgeon: Siddharth Garcia MD;  Location: [facility];  Service: Endoscopy;  Laterality: N/A;    ESOPHAGOGASTRODUODENOSCOPY N/A 6/23/2022    Procedure: EGD (ESOPHAGOGASTRODUODENOSCOPY);  Surgeon: Jefferson Hyman MD;  Location: [facility];  Service: Endoscopy;  Laterality: N/A;    ESOPHAGOGASTRODUODENOSCOPY W/ PEG N/A 4/16/2022    Procedure: EGD, WITH PEG TUBE INSERTION;  Surgeon: Siddharth Garcia MD;  Location: [facility];  Service: Endoscopy;  Laterality: N/A;    GASTROSTOMY TUBE PLACEMENT  04/16/2022    20 Fr (bumper initially at 3.5)    Hemorrhoid resection  1979    LUNG LOBECTOMY Left 2004    NECK SURGERY  04/08/2022    Anterior and Posterior C3-C7 fusion    OTHER SURGICAL HISTORY  2006    Chemotherapy s/p lyphoma        Portacath implantation and removal      reverse shoulder arthroplasty Right 03/2021       Time Tracking:     PT Received On: 08/04/22  PT Start Time: 0953     PT Stop Time: 1007  PT Total Time (min): 14  min     Billable Minutes: Evaluation 6 and Gait Training 8      08/04/2022

## 2022-08-04 NOTE — PROGRESS NOTES
Therapy with Vancomycin complete and / or consult / order discontinued by Dr. Julien on 08/04/2022 @ 12:06   Pharmacy will sign off, please re-consult as needed.  Thank you for allowing us to participate in this patient's care.  Princess Sheppard 8/4/2022 12:09 PM  Dept of Pharmacy  Ext [phone]

## 2022-08-04 NOTE — SUBJECTIVE & OBJECTIVE
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     Right-sided Bell's palsy 2009    Spondylolisthesis     Varicose veins of legs     Venous insufficiency        Past Surgical History:   Procedure Laterality Date    Athroscopic surgery      On Knee    BIOPSY OF TISSUE OF NECK Left 08/2015    Recuurence CLL/small cell lyphoma    ESOPHAGEAL DILATION      ESOPHAGOGASTRODUODENOSCOPY N/A 4/15/2022    Procedure: EGD (ESOPHAGOGASTRODUODENOSCOPY);  Surgeon: Siddharth Garcia MD;  Location: [facility];  Service: Endoscopy;  Laterality: N/A;    ESOPHAGOGASTRODUODENOSCOPY N/A 4/16/2022    Procedure: EGD (ESOPHAGOGASTRODUODENOSCOPY);  Surgeon: Siddharth Garcai MD;  Location: [facility];  Service: Endoscopy;  Laterality: N/A;    ESOPHAGOGASTRODUODENOSCOPY N/A 6/23/2022    Procedure: EGD (ESOPHAGOGASTRODUODENOSCOPY);  Surgeon: Jefferson Hyman MD;  Location: [facility];  Service: Endoscopy;  Laterality: N/A;    ESOPHAGOGASTRODUODENOSCOPY W/ PEG N/A 4/16/2022    Procedure: EGD, WITH PEG TUBE INSERTION;  Surgeon: Siddharth Garcia MD;  Location: [facility];  Service: Endoscopy;  Laterality: N/A;    GASTROSTOMY TUBE PLACEMENT  04/16/2022    20 Fr (bumper initially at 3.5)    Hemorrhoid resection  1979    LUNG LOBECTOMY Left 2004    NECK SURGERY  04/08/2022    Anterior and Posterior C3-C7 fusion    OTHER SURGICAL HISTORY  2006    Chemotherapy s/p lyphoma        Portacath implantation and removal      reverse shoulder arthroplasty Right 03/2021        Review of patient's allergies indicates:  No Known Allergies    Current Facility-Administered Medications on File Prior to Encounter   Medication    EPINEPHrine (EPIPEN) 0.3 mg/0.3 mL pen injection 0.3 mg     Current Outpatient Medications on File Prior to Encounter   Medication Sig    gabapentin (NEURONTIN) 600 MG tablet Take 600 mg by mouth 2 (two) times a day.    glimepiride (AMARYL) 2 MG tablet Take 2 mg by mouth once daily at 6am.    acyclovir (ZOVIRAX) 200 mg/5 mL suspension Take 10 mL by mouth twice a day as prevention and 20 mL 3 times a day when needed for treatment    albuterol-ipratropium (DUO-NEB) 2.5 mg-0.5 mg/3 mL nebulizer solution Take 3 mLs by nebulization every 8 (eight) hours as needed.    amoxicillin (AMOXIL) 400 mg/5 mL suspension Take 5 mLs (400 mg total) by mouth every 8 (eight) hours.    azelastine (ASTELIN) 137 mcg (0.1 %) nasal spray 1 spray by Nasal route 2 (two) times daily.     blood sugar diagnostic Strp by Other route.    blood-glucose meter kit Use as instructed    FREESTYLE LANCETS 28 gauge lancets TEST TWICE DAILY    gabapentin (NEURONTIN) 300 MG capsule Take 300 mg by mouth 2 (two) times daily.     glimepiride (AMARYL) 1 MG tablet Take 1 mg by mouth daily with breakfast.     HYDROcodone-acetaminophen (NORCO)  mg per tablet Take 1 tablet by mouth every 8 (eight) hours as needed.     levalbuterol (XOPENEX) 1.25 mg/3 mL nebulizer solution Take 3 mLs by nebulization every 4 to 6 hours as needed.     metoprolol tartrate (LOPRESSOR) 25 MG tablet 1 tablet (25 mg total) by Per G Tube route 2 (two) times daily.    sucralfate (CARAFATE) 1 gram tablet TAKE 1 TABLET BY MOUTH TWICE DAILY ON AN EMPTY STOMACH    traZODone (DESYREL) 100 MG tablet TAKE 1 TABLET BY MOUTH EVERY NIGHT AT BEDTIME (Patient taking differently: Take 100 mg by mouth every evening.)    valACYclovir (VALTREX) 1000 MG tablet Take 1,000 mg by mouth 2 (two) times daily.    [DISCONTINUED] esomeprazole (NEXIUM) 40 MG  capsule Take 1 capsule (40 mg total) by mouth 2 (two) times daily.    [DISCONTINUED] losartan (COZAAR) 25 MG tablet Take 25 mg by mouth every evening.    [DISCONTINUED] metFORMIN (GLUCOPHAGE) 500 MG tablet Take 500 mg by mouth 2 (two) times daily with meals.     [DISCONTINUED] metoprolol succinate (TOPROL-XL) 25 MG 24 hr tablet Take 25 mg by mouth 2 (two) times daily.     [DISCONTINUED] pantoprazole (PROTONIX) 40 mg suspension Take 40 mg by mouth once daily.     Family History       Problem Relation (Age of Onset)    Colon cancer Father    Stomach cancer Mother (80)          Tobacco Use    Smoking status: Former Smoker    Smokeless tobacco: Never Used   Substance and Sexual Activity    Alcohol use: Yes    Drug use: No    Sexual activity: Not Currently     Review of Systems   Constitutional: Negative.    HENT: Negative.     Eyes: Negative.    Respiratory:  Positive for cough and shortness of breath.    Cardiovascular: Negative.    Gastrointestinal: Negative.    Endocrine: Negative.    Genitourinary: Negative.    Musculoskeletal: Negative.    Skin: Negative.    Allergic/Immunologic: Negative.    Neurological: Negative.    Hematological: Negative.    All other systems reviewed and are negative.  Objective:     Vital Signs (Most Recent):  Temp: 98 °F (36.7 °C) (08/03/22 1612)  Pulse: 79 (08/03/22 1800)  Resp: 20 (08/03/22 1800)  BP: (!) 152/70 (08/03/22 1800)  SpO2: (!) 92 % (08/03/22 1800)   Vital Signs (24h Range):  Temp:  [98 °F (36.7 °C)] 98 °F (36.7 °C)  Pulse:  [77-89] 79  Resp:  [15-20] 20  SpO2:  [92 %-95 %] 92 %  BP: (127-152)/(66-74) 152/70     Weight: 89.8 kg (198 lb)  Body mass index is 25.42 kg/m².    Physical Exam  Vitals and nursing note reviewed.   Constitutional:       Appearance: He is well-developed.   HENT:      Head: Normocephalic and atraumatic.      Right Ear: External ear normal.      Left Ear: External ear normal.      Nose: Nose normal.   Eyes:      Conjunctiva/sclera: Conjunctivae normal.       Pupils: Pupils are equal, round, and reactive to light.   Cardiovascular:      Rate and Rhythm: Normal rate and regular rhythm.      Heart sounds: Normal heart sounds.   Pulmonary:      Effort: Pulmonary effort is normal.      Breath sounds: Normal breath sounds.      Comments: Decreased entry with coarse wet large airway sounds; no wheezes, nor crepitations currently appreciated  Abdominal:      General: Bowel sounds are normal.      Palpations: Abdomen is soft.   Musculoskeletal:         General: Normal range of motion.      Cervical back: Normal range of motion and neck supple.   Skin:     General: Skin is warm and dry.      Capillary Refill: Capillary refill takes less than 2 seconds.   Neurological:      Mental Status: He is alert and oriented to person, place, and time.   Psychiatric:         Behavior: Behavior normal.         Thought Content: Thought content normal.         Judgment: Judgment normal.         CRANIAL NERVES     CN III, IV, VI   Pupils are equal, round, and reactive to light.     Significant Labs: All pertinent labs within the past 24 hours have been reviewed.  CBC:   Recent Labs   Lab 08/03/22  0801   WBC 9.79   HGB 12.5*   HCT 36.1*   PLT 80*     CMP:   Recent Labs   Lab 08/03/22  0801      K 3.3*      CO2 26   *   BUN 14   CREATININE 0.9   CALCIUM 9.3   PROT 7.5   ALBUMIN 4.0   BILITOT 1.7*   ALKPHOS 51*   AST 19   ALT 14   ANIONGAP 9     Lactic Acid:   Recent Labs   Lab 08/03/22  1635   LACTATE 1.1       Significant Imaging: I have reviewed all pertinent imaging results/findings within the past 24 hours.

## 2022-08-04 NOTE — ASSESSMENT & PLAN NOTE
admit to inpatient status for bilateral pneumonia; iv antibiotics, nebs prn, electrolyte replacement; AM labs for review

## 2022-08-04 NOTE — PLAN OF CARE
[facility]  Initial Discharge Assessment       Primary Care Provider: Johnson Erazo MD    Admission Diagnosis: Bilateral pneumonia [J18.9]    Admission Date: 8/3/2022  Expected Discharge Date:     Discharge Barriers Identified: None     Initial assessment completed at bedside. Patient manages medical care with assistance from spouse, Peri. Spouse arraanges appointments and provides transportation. Patient has a CPAP and nebulizer at home but has not required home O2. Patient anticipates discharge home when medically clear for discharge. Patient denies discharge needs.    Payor: MEDICARE / Plan: MEDICARE PART A & B / Product Type: Government /     Extended Emergency Contact Information  Primary Emergency Contact: Peri Kuhn  Address: [address] [place] [address] [id number] [place] [address] of [facility]  Home Phone: [phone]  Mobile Phone: [phone]  Relation: Spouse    Discharge Plan A: Home  Discharge Plan B: Home      [id number] - [place], [place]  [address] AT [facility] & [place] [address]  [address]  [facility] [address]  Phone: [phone] Fax: [phone]    [facility] - [place], [place] - [address] AT [place] [facility]  [address]  [facility] [address]  Phone: [phone] Fax: [phone]    [facility] [address] [facility]  [address] [facility] [address]  Phone: [phone] Fax: [phone]    [id number] - [place], [place] - [address] AT [facility] [address] & [facility]  [address] [facility] [address]  Phone: [phone] Fax: [phone]      Initial Assessment (most recent)     Adult Discharge Assessment - 08/04/22 1515        Discharge Assessment    Assessment Type Discharge Planning Assessment     Confirmed/corrected address, phone number and insurance Yes     Confirmed Demographics Correct on Facesheet     Source  of Information patient     When was your last doctors appointment? --   May 2022    Facility Arrived From: Home     Do you expect to return to your current living situation? Yes     Do you have help at home or someone to help you manage your care at home? Yes     Who are your caregiver(s) and their phone number(s)? Peri (spouse) [phone]     Prior to hospitilization cognitive status: Alert/Oriented     Current cognitive status: Alert/Oriented     Walking or Climbing Stairs Difficulty ambulation difficulty, requires equipment     Mobility Management walker, cane     Dressing/Bathing Difficulty none     Do you have any problems with: Needs other help;Errands/Grocery     Specify other help Peri (spouse) [phone]     Home Layout Able to live on 1st floor     Equipment Currently Used at Home walker, rolling;CPAP;nebulizer;cane, straight     Readmission within 30 days? No     Patient currently being followed by outpatient case management? No     Do you currently have service(s) that help you manage your care at home? No     Do you take prescription medications? Yes     Who is going to help you get home at discharge? [employer]     How do you get to doctors appointments? family or friend will provide     Are you on dialysis? No     Do you take coumadin? No     Discharge Plan A Home     Discharge Plan B Home     DME Needed Upon Discharge  none     Discharge Plan discussed with: Patient     Discharge Barriers Identified None        Relationship/Environment    Name(s) of Who Lives With Patient Peri (spouse) [phone]

## 2022-08-04 NOTE — PLAN OF CARE
Plan of care reviewed with patient including antibiotic therapy, safety measures, and monitoring respiratory status.  Questions answered.  Patient verbalizes understanding of plan of care.

## 2022-08-05 ENCOUNTER — PATIENT MESSAGE (OUTPATIENT)
Dept: HEMATOLOGY/ONCOLOGY | Facility: CLINIC | Age: 82
End: 2022-08-05

## 2022-08-05 VITALS
SYSTOLIC BLOOD PRESSURE: 129 MMHG | DIASTOLIC BLOOD PRESSURE: 79 MMHG | BODY MASS INDEX: 24.78 KG/M2 | TEMPERATURE: 98 F | HEIGHT: 74 IN | WEIGHT: 193.13 LBS | HEART RATE: 97 BPM | OXYGEN SATURATION: 97 % | RESPIRATION RATE: 18 BRPM

## 2022-08-05 LAB
ANION GAP SERPL CALC-SCNC: 7 MMOL/L (ref 8–16)
BASOPHILS NFR BLD: 0 % (ref 0–1.9)
BUN SERPL-MCNC: 11 MG/DL (ref 8–23)
CALCIUM SERPL-MCNC: 8.9 MG/DL (ref 8.7–10.5)
CHLORIDE SERPL-SCNC: 98 MMOL/L (ref 95–110)
CO2 SERPL-SCNC: 27 MMOL/L (ref 23–29)
CREAT SERPL-MCNC: 0.7 MG/DL (ref 0.5–1.4)
DIFFERENTIAL METHOD: ABNORMAL
EOSINOPHIL NFR BLD: 8 % (ref 0–8)
ERYTHROCYTE [DISTWIDTH] IN BLOOD BY AUTOMATED COUNT: 15.9 % (ref 11.5–14.5)
EST. GFR  (NO RACE VARIABLE): >60 ML/MIN/1.73 M^2
GLUCOSE SERPL-MCNC: 197 MG/DL (ref 70–110)
HCT VFR BLD AUTO: 33.9 % (ref 40–54)
HGB BLD-MCNC: 12 G/DL (ref 14–18)
IMM GRANULOCYTES # BLD AUTO: ABNORMAL K/UL (ref 0–0.04)
IMM GRANULOCYTES NFR BLD AUTO: ABNORMAL % (ref 0–0.5)
LYMPHOCYTES NFR BLD: 41 % (ref 18–48)
MAGNESIUM SERPL-MCNC: 1.5 MG/DL (ref 1.6–2.6)
MCH RBC QN AUTO: 30.5 PG (ref 27–31)
MCHC RBC AUTO-ENTMCNC: 35.4 G/DL (ref 32–36)
MCV RBC AUTO: 86 FL (ref 82–98)
MONOCYTES NFR BLD: 11 % (ref 4–15)
MYELOCYTES NFR BLD MANUAL: 3 %
NEUTROPHILS NFR BLD: 37 % (ref 38–73)
NRBC BLD-RTO: 0 /100 WBC
PLATELET # BLD AUTO: 94 K/UL (ref 150–450)
PLATELET BLD QL SMEAR: ABNORMAL
PMV BLD AUTO: 10.6 FL (ref 9.2–12.9)
POLYCHROMASIA BLD QL SMEAR: ABNORMAL
POTASSIUM SERPL-SCNC: 3.9 MMOL/L (ref 3.5–5.1)
RBC # BLD AUTO: 3.93 M/UL (ref 4.6–6.2)
SODIUM SERPL-SCNC: 132 MMOL/L (ref 136–145)
WBC # BLD AUTO: 5.39 K/UL (ref 3.9–12.7)

## 2022-08-05 PROCEDURE — 85027 COMPLETE CBC AUTOMATED: CPT | Performed by: INTERNAL MEDICINE

## 2022-08-05 PROCEDURE — 25000003 PHARM REV CODE 250: Performed by: INTERNAL MEDICINE

## 2022-08-05 PROCEDURE — 25000242 PHARM REV CODE 250 ALT 637 W/ HCPCS: Performed by: INTERNAL MEDICINE

## 2022-08-05 PROCEDURE — 99900035 HC TECH TIME PER 15 MIN (STAT)

## 2022-08-05 PROCEDURE — 85007 BL SMEAR W/DIFF WBC COUNT: CPT | Performed by: INTERNAL MEDICINE

## 2022-08-05 PROCEDURE — 83735 ASSAY OF MAGNESIUM: CPT | Performed by: STUDENT IN AN ORGANIZED HEALTH CARE EDUCATION/TRAINING PROGRAM

## 2022-08-05 PROCEDURE — 80048 BASIC METABOLIC PNL TOTAL CA: CPT | Performed by: STUDENT IN AN ORGANIZED HEALTH CARE EDUCATION/TRAINING PROGRAM

## 2022-08-05 PROCEDURE — 36415 COLL VENOUS BLD VENIPUNCTURE: CPT | Performed by: STUDENT IN AN ORGANIZED HEALTH CARE EDUCATION/TRAINING PROGRAM

## 2022-08-05 PROCEDURE — 63600175 PHARM REV CODE 636 W HCPCS: Performed by: STUDENT IN AN ORGANIZED HEALTH CARE EDUCATION/TRAINING PROGRAM

## 2022-08-05 PROCEDURE — 25000003 PHARM REV CODE 250: Performed by: STUDENT IN AN ORGANIZED HEALTH CARE EDUCATION/TRAINING PROGRAM

## 2022-08-05 RX ORDER — LEVOFLOXACIN 750 MG/1
750 TABLET ORAL DAILY
Qty: 5 TABLET | Refills: 0 | Status: SHIPPED | OUTPATIENT
Start: 2022-08-05 | End: 2022-08-11

## 2022-08-05 RX ADMIN — AZELASTINE HYDROCHLORIDE 137 MCG: 137 SPRAY, METERED NASAL at 08:08

## 2022-08-05 RX ADMIN — LEVOFLOXACIN 750 MG: 5 INJECTION, SOLUTION INTRAVENOUS at 12:08

## 2022-08-05 RX ADMIN — METOPROLOL TARTRATE 25 MG: 25 TABLET, FILM COATED ORAL at 08:08

## 2022-08-05 RX ADMIN — SUCRALFATE 1 G: 1 TABLET ORAL at 05:08

## 2022-08-05 RX ADMIN — GABAPENTIN 300 MG: 300 CAPSULE ORAL at 08:08

## 2022-08-05 RX ADMIN — HYDROCODONE BITARTRATE AND ACETAMINOPHEN 1 TABLET: 5; 325 TABLET ORAL at 08:08

## 2022-08-05 RX ADMIN — VALACYCLOVIR HYDROCHLORIDE 1000 MG: 500 TABLET, FILM COATED ORAL at 08:08

## 2022-08-05 RX ADMIN — IPRATROPIUM BROMIDE AND ALBUTEROL SULFATE 3 ML: .5; 3 SOLUTION RESPIRATORY (INHALATION) at 05:08

## 2022-08-05 NOTE — PLAN OF CARE
CM obtained Home health patient choice, referral [facility] via [facility].

## 2022-08-05 NOTE — CARE UPDATE
08/05/22 0014   Patient Assessment/Suction   Level of Consciousness (AVPU)   (ressting quietly with nadn)   Respiratory Effort Normal;Unlabored   PRE-TX-O2   O2 Device (Oxygen Therapy) CPAP  (home cpap in use at this time)   Oxygen Concentration (%) 21   Aerosol Therapy   $ Aerosol Therapy Charges PRN treatment not required   Daily Review of Necessity (SVN) completed   Respiratory Treatment Status (SVN) PRN treatment not required   Ready to Wean/Extubation Screen   FIO2<=50 (chart decimal) 0.21   Continue tx. As ordered

## 2022-08-05 NOTE — DISCHARGE SUMMARY
[facility] Medicine  Discharge Summary      Patient Name: Conrad Kuhn  MRN: [id number]  Patient Class: IP- Inpatient  Admission Date: 8/3/2022  Hospital Length of Stay: 2 days  Discharge Date and Time:  08/05/2022 11:01 AM  Attending Physician: David Julien MD   Discharging Provider: David Julien MD  Primary Care Provider: Johnson Erazo MD      HPI:   81 year old male with history of CLL, NSC Lung Ca (s/p left upper lobectomy), DM 2, HTN, Bell's, Recurrent Herpes Simplex (oral) and has had 2/3 COVID vaccinations presented to ED complaining of cough with brown sputum, SOBE and fever for three days. .Denied any pain. No orthopnea, PND or pedal edema.    In ED: labs reviewed and noted below: no leukocytosis with minimal normocytic anemia; mild hypokalemia with normal renal function; troponin is normal; lactate is normal. COVID: negative. CXR reviewed: bilateral infiltrates. EKG reviewed: sinus with poor R wave, but no acute segments. Discussed with ED MD: admit to inpatient status for bilateral pneumonia; iv antibiotics, electrolyte replacement; AM labs for review          * No surgery found *      Hospital Course:   8/4: Patient is doing well and states that he feels better.  Will change IV antibiotics to IV Levaquin.  Replace low iron with IV Ferrlecit.  No concerns/issues overnight reported by the patient or the nursing     8/5:  Patient doing significantly better and was subsequently discharged on oral Levaquin and is to follow-up with his primary care physician outpatient.    Physical examination on discharge:  Constitutional: No distress.   HENT: NC  Head: Atraumatic.   Cardiovascular: Normal rate, regular rhythm and normal heart sounds.   Pulmonary/Chest: Effort normal. No wheezes.   Abdominal: Soft. Bowel sounds are normal. No distension and no mass. No tenderness  Neurological: Alert.   Skin: Skin is warm and dry.   Psych: Appropriate mood and affect    I have seen the  patient on the day of discharge and reviewed the discharge instructions as outlined.         Goals of Care Treatment Preferences:  Code Status: Full Code      Consults:   Consults (From admission, onward)        Status Ordering Provider     Inpatient consult to Social Work/Case Management  Once        Provider:  (Not yet assigned)    Ordered DAT ASTORGA     Inpatient consult to Hospitalist  Once        Provider:  Casey Shannon MD    Acknowledged CASEY SHANNON          No new Assessment & Plan notes have been filed under this hospital service since the last note was generated.  Service: Hospital Medicine    Final Active Diagnoses:    Diagnosis Date Noted POA    PRINCIPAL PROBLEM:  Pneumonia of both lungs due to infectious organism [J18.9] 06/29/2021 Yes    Iron deficiency [E61.1] 08/04/2022 Yes    S/P cervical spinal fusion [Z98.1] 04/10/2022 Not Applicable      Problems Resolved During this Admission:       Discharged Condition: good    Disposition: Home-Health Care c    Follow Up:   Follow-up Information     Johnson Erazo MD. Go on 8/9/2022.    Specialty: Family Medicine  Why: 1:30 pm  Contact information:  [address] [place] [address]  [phone]                       Patient Instructions:      Ambulatory referral/consult to Home Health   Standing Status: Future   Referral Priority: Routine Referral Type: Home Health   Referral Reason: Specialty Services Required   Requested Specialty: Home Health Services   Number of Visits Requested: 1     Activity as tolerated       Significant Diagnostic Studies: Labs:   BMP:   Recent Labs   Lab 08/03/22  1635 08/04/22  0626 08/05/22  0820   GLU  --  124* 197*   NA  --  137 132*   K  --  3.7 3.9   CL  --  103 98   CO2  --  26 27   BUN  --  15 11   CREATININE  --  0.8 0.7   CALCIUM  --  8.8 8.9   MG 1.7  --  1.5*   , CMP   Recent Labs   Lab 08/04/22  0626 08/05/22  0820    132*   K 3.7 3.9    98   CO2 26 27   * 197*   BUN 15 11    CREATININE 0.8 0.7   CALCIUM 8.8 8.9   ANIONGAP 8 7*   , CBC   Recent Labs   Lab 08/04/22  0626 08/05/22  0820   WBC 7.91 5.39   HGB 11.8* 12.0*   HCT 33.7* 33.9*   PLT 83* 94*    and All labs within the past 24 hours have been reviewed  Microbiology:   Blood Culture   Lab Results   Component Value Date    LABBLOO No Growth to date 08/03/2022    LABBLOO No Growth to date 08/03/2022   , Sputum Culture   Lab Results   Component Value Date    GSRESP >10 epithelial cells per low power field. 08/04/2022    GSRESP Rare WBC's 08/04/2022    GSRESP Moderate Gram positive cocci 08/04/2022    GSRESP Rare Gram negative rods 08/04/2022    RESPIRATORYC Insufficient incubation, culture in progress 08/04/2022    and Urine Culture  No results found for: LABURIN  Radiology: X-Ray: CXR: X-Ray Chest 1 View (CXR): No results found for this visit on 08/03/22. and X-Ray Chest PA and Lateral (CXR): No results found for this visit on 08/03/22.  CT scan: CT ABDOMEN PELVIS WITH CONTRAST: No results found for this visit on 08/03/22. and CT ABDOMEN PELVIS WITHOUT CONTRAST: No results found for this visit on 08/03/22.    Pending Diagnostic Studies:     None         Medications:  Reconciled Home Medications:      Medication List      START taking these medications    levoFLOXacin 750 MG tablet  Commonly known as: LEVAQUIN  Take 1 tablet (750 mg total) by mouth once daily. for 5 days        CHANGE how you take these medications    gabapentin 250 mg/5 mL (5 mL) solution  Commonly known as: NEURONTIN  Take 300 mg by mouth 2 (two) times daily.  What changed: Another medication with the same name was removed. Continue taking this medication, and follow the directions you see here.     traZODone 100 MG tablet  Commonly known as: DESYREL  TAKE 1 TABLET BY MOUTH EVERY NIGHT AT BEDTIME  What changed: when to take this        CONTINUE taking these medications    albuterol-ipratropium 2.5 mg-0.5 mg/3 mL nebulizer solution  Commonly known as: DUO-NEB  Take  3 mLs by nebulization every 8 (eight) hours as needed.     azelastine 137 mcg (0.1 %) nasal spray  Commonly known as: ASTELIN  1 spray by Nasal route 2 (two) times daily.     blood sugar diagnostic Strp  1 strip by Misc.(Non-Drug; Combo Route) route 2 (two) times a day.     blood-glucose meter kit  Use as instructed     * glimepiride 2 MG tablet  Commonly known as: AMARYL  Take 2 mg by mouth daily with breakfast.     * glimepiride 2 MG tablet  Commonly known as: AMARYL  Take 2 mg by mouth once daily at 6am.     HYDROcodone-acetaminophen  mg per tablet  Commonly known as: NORCO  Take 1 tablet by mouth every 8 (eight) hours as needed.     levalbuterol 1.25 mg/3 mL nebulizer solution  Commonly known as: XOPENEX  Take 3 mLs by nebulization every 4 to 6 hours as needed.     metoprolol tartrate 25 MG tablet  Commonly known as: LOPRESSOR  1 tablet (25 mg total) by Per G Tube route 2 (two) times daily.     valACYclovir 1000 MG tablet  Commonly known as: VALTREX  Take 1,000 mg by mouth 2 (two) times daily.         * This list has 2 medication(s) that are the same as other medications prescribed for you. Read the directions carefully, and ask your doctor or other care provider to review them with you.            STOP taking these medications    esomeprazole 40 MG capsule  Commonly known as: NEXIUM     FREESTYLE LANCETS 28 gauge Misc  Generic drug: lancets     losartan 25 MG tablet  Commonly known as: COZAAR     metFORMIN 500 MG tablet  Commonly known as: GLUCOPHAGE     metoprolol succinate 25 MG 24 hr tablet  Commonly known as: TOPROL-XL     pantoprazole 40 mg suspension  Commonly known as: PROTONIX            Indwelling Lines/Drains at time of discharge:   Lines/Drains/Airways     None                 Time spent on the discharge of patient: 40 minutes         David Julien MD  Department of Hospital Medicine  [facility]

## 2022-08-05 NOTE — HOSPITAL COURSE
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 .Denied any pain. No orthopnea, PND or pedal edema.  In ED: labs reviewed and noted below: no leukocytosis with minimal normocytic anemia; mild hypokalemia with normal renal function; troponin is normal; lactate is normal. COVID: negative. CXR reviewed: bilateral infiltrates. EKG reviewed: sinus with poor R wave, but no acute segments. Discussed with ED MD: admit to inpatient status for bilateral pneumonia; iv antibiotics, electrolyte replacement; AM labs for review     Interval History:   8/4: Patient is doing well and states that he feels better.  Will change IV antibiotics to IV Levaquin.  Replace low iron with IV Ferrlecit.  No concerns/issues overnight reported by the patient or the nursing

## 2022-08-05 NOTE — PLAN OF CARE
Patient cleared for discharge from case management standpoint.    Follow up appointments scheduled and added to AVS.    CM sent discharge orders to [facility] via [employer]. [facility] home health liaison accepted patient, soc planned for 8/6/22.    Chart and discharge orders reviewed.  [facility] needs.       08/05/22 1108   Final Note   Assessment Type Final Discharge Note   Anticipated Discharge Disposition Home-Health   What phone number can be called within the next 1-3 days to see how you are doing after discharge? [phone]   Hospital Resources/Appts/Education Provided Provided patient/caregiver with written discharge plan information;Appointments scheduled and added to AVS   Post-Acute Status   Post-Acute Authorization Home [facility]   Home Health Status Set-up Complete/Auth obtained   Patient choice form signed by patient/caregiver List with quality metrics by geographic area provided   Discharge Delays None known at this time

## 2022-08-05 NOTE — PT/OT/SLP PROGRESS
Occupational Therapy      Patient Name:  Conrad Kuhn   MRN:  [id number]    Patient not seen today secondary to Other (Comment) (Pt getting ready to discharge).     8/5/2022

## 2022-08-05 NOTE — PT/OT/SLP DISCHARGE
Occupational Therapy Discharge Summary    Conrad Kuhn  MRN: [id number]   Principal Problem: Pneumonia of both lungs due to infectious organism      Patient Discharged from acute Occupational Therapy on 8/5/2022.  Please refer to prior OT note dated 8/4/2022 for functional status.    Assessment:      Goals partially met.    Objective:     GOALS:   Multidisciplinary Problems     Occupational Therapy Goals     Not on file          Multidisciplinary Problems (Resolved)        Problem: Occupational Therapy    Goal Priority Disciplines Outcome Interventions   Occupational Therapy Goal   (Resolved)     OT, PT/OT Met    Description: Goals to be met by: 9/4/22    Patient will increase functional independence with ADLs by performing:    UE Dressing with Supervision.  LE Dressing with Supervision.  Grooming while standing at sink with Supervision.  Toileting from toilet with Supervision for hygiene and clothing management.   Toilet transfer to toilet with Supervision.                     Reasons for Discontinuation of Therapy Services  Satisfactory goal achievement.      Plan:     Patient Discharged to: Home with Home Health Service    8/5/2022

## 2022-08-07 LAB
BACTERIA SPEC AEROBE CULT: ABNORMAL
BACTERIA SPEC AEROBE CULT: ABNORMAL
GRAM STN SPEC: ABNORMAL

## 2022-08-08 LAB
BACTERIA BLD CULT: NORMAL
BACTERIA BLD CULT: NORMAL

## 2022-08-10 NOTE — PROGRESS NOTES
[facility]  PROGRESS NOTE - Follow-up Visit      Subjective:       Patient ID:   NAME: Conrad Kuhn : 1940     81 y.o. male    Referring Doc: Julien  Other Physicians: Ced Erazo Joubert, Srinivas, Pinsky    Chief Complaint:  CLL f/u    History of Present Illness:     Patient is being seen today in person in clinic for a regular follow-up viasit. He is here by himself.  The patient is on today to go over the results of the recently ordered labs, tests and studies.     He was recently hospitalized with fever and pneumonia. He is feeling better      He is swallowing and eating better; peg tube since removed      He had covid in 2022 and he received the infusion but did not require hospitalization       He is breathing ok currently. He denies any CP, SOB, or N/V. He previously had bout of oral mucosal ulcerations for which he had biopsies at [facility] which were negative for any malignancy. This has since resolved.      He previously had telemed visit Dr Don at [facility] but does not know when he sees him again.. He had previous bone marrow biopsy with good report. He has been off rituximab and the oral Venetoclax since last 2021.      Discussed Covid19 precautions; he had his vaccinations              ROS:   GEN: normal without any fever, night sweats or weight loss  HEENT: normal with no HA's, sore throat, stiff neck, changes in vision; residual swallowing difficulties  CV: normal with no CP, SOB, PND, MAYER or orthopnea  PULM: normal with no SOB, cough, hemoptysis, sputum or pleuritic pain  GI: normal with no abdominal pain, nausea, vomiting, constipation, diarrhea, melanotic stools, BRBPR, or hematemesis; no dysphagia; peg tube in place  : urine frequency stable  BREAST: normal with no mass, discharge, pain  SKIN: normal with no rash, erythema, bruising, or swelling    Allergies:  Review of patient's allergies indicates:  No Known Allergies    Medications:    Current Outpatient  "Medications:     albuterol-ipratropium (DUO-NEB) 2.5 mg-0.5 mg/3 mL nebulizer solution, Take 3 mLs by nebulization every 8 (eight) hours as needed., Disp: , Rfl:     azelastine (ASTELIN) 137 mcg (0.1 %) nasal spray, 1 spray by Nasal route 2 (two) times daily. , Disp: , Rfl:     blood sugar diagnostic Strp, 1 strip by Misc.(Non-Drug; Combo Route) route 2 (two) times a day., Disp: , Rfl:     gabapentin (NEURONTIN) 250 mg/5 mL (5 mL) solution, Take 300 mg by mouth 2 (two) times daily. , Disp: , Rfl: 0    glimepiride (AMARYL) 2 MG tablet, Take 2 mg by mouth once daily at 6am., Disp: , Rfl:     HYDROcodone-acetaminophen (NORCO)  mg per tablet, Take 1 tablet by mouth every 8 (eight) hours as needed. , Disp: , Rfl: 0    levalbuterol (XOPENEX) 1.25 mg/3 mL nebulizer solution, Take 3 mLs by nebulization every 4 to 6 hours as needed. , Disp: , Rfl:     traZODone (DESYREL) 100 MG tablet, TAKE 1 TABLET BY MOUTH EVERY NIGHT AT BEDTIME (Patient taking differently: Take 100 mg by mouth every evening.), Disp: 90 tablet, Rfl: 0    valACYclovir (VALTREX) 1000 MG tablet, Take 1,000 mg by mouth 2 (two) times daily., Disp: , Rfl:     blood-glucose meter kit, Use as instructed, Disp: , Rfl:     metoprolol tartrate (LOPRESSOR) 25 MG tablet, 1 tablet (25 mg total) by Per G Tube route 2 (two) times daily., Disp: 60 tablet, Rfl: 0    Current Facility-Administered Medications:     EPINEPHrine (EPIPEN) 0.3 mg/0.3 mL pen injection 0.3 mg, 0.3 mg, Intramuscular, PRN, Kathy Strong NP-C    PMHx/PSHx Updates:  See patient's last visit with me on 6/6/2022  See H&P on 1/3/2019        Pathology:  Cancer Staging  No matching staging information was found for the patient.          Objective:     Vitals:  Blood pressure (!) 168/70, pulse 62, resp. rate 20, height 6' 2" (1.88 m), weight 88 kg (194 lb).        Physical Examination:   GEN: no apparent distress, comfortable; AAOx3  HEAD: atraumatic and normocephalic  EYES: no " conjunctival pallor or muddiness, no icterus; normal pupil reaction to ambient light  ENT: OMM, no pharyngeal erythema, external bilateral ears WNL; no visible thrush or ulcers  NECK: no masses or swelling, trachea midline, no visible LAD/LN's ; LAD and LN's on right neck reduced in size  CV: no palpitations; no pedal edema; no noticeable JVD or neck vein distension  CHEST: Normal respiratory effort; chest wall breath movements symmetrical; no audible wheezing  ABDOM: non-distended; no bloating;  peg tube since removed  MUSC/Skeletal: ROM normal; joints visibly normal; no deformities or arthropathy  EXTREM: no clubbing, cyanosis, inflammation or swelling; right arm with fair ROM  SKIN: no rashes, lesions, ulcers, petechiae or subcutaneous nodules  : no keith  NEURO: moving all 4 extremities; AAOx3; no tremors  PSYCH: normal mood, affect and behavior  LYMPH: no visible LN's or LAD; LAD and LN's on right neck reduced in size              Labs:   Lab Results   Component Value Date    WBC 5.39 08/05/2022    HGB 12.0 (L) 08/05/2022    HCT 33.9 (L) 08/05/2022    MCV 86 08/05/2022    PLT 94 (L) 08/05/2022     CMP  Sodium   Date Value Ref Range Status   08/05/2022 132 (L) 136 - 145 mmol/L Final   06/12/2019 138 134 - 144 mmol/L      Potassium   Date Value Ref Range Status   08/05/2022 3.9 3.5 - 5.1 mmol/L Final     Chloride   Date Value Ref Range Status   08/05/2022 98 95 - 110 mmol/L Final   06/12/2019 99 98 - 110 mmol/L      CO2   Date Value Ref Range Status   08/05/2022 27 23 - 29 mmol/L Final     Glucose   Date Value Ref Range Status   08/05/2022 197 (H) 70 - 110 mg/dL Final   06/12/2019 179 (H) 70 - 99 mg/dL      BUN   Date Value Ref Range Status   08/05/2022 11 8 - 23 mg/dL Final     Creatinine   Date Value Ref Range Status   08/05/2022 0.7 0.5 - 1.4 mg/dL Final   06/12/2019 0.95 0.60 - 1.40 mg/dL      Calcium   Date Value Ref Range Status   08/05/2022 8.9 8.7 - 10.5 mg/dL Final     Total Protein   Date Value Ref  Range Status   08/03/2022 7.5 6.0 - 8.4 g/dL Final     Albumin   Date Value Ref Range Status   08/03/2022 4.0 3.5 - 5.2 g/dL Final   06/12/2019 3.9 3.1 - 4.7 g/dL      Total Bilirubin   Date Value Ref Range Status   08/03/2022 1.7 (H) 0.1 - 1.0 mg/dL Final     Comment:     For infants and newborns, interpretation of results should be based  on gestational age, weight and in agreement with clinical  observations.    Premature Infant recommended reference ranges:  Up to 24 hours.............<8.0 mg/dL  Up to 48 hours............<12.0 mg/dL  3-5 days..................<15.0 mg/dL  6-29 days.................<15.0 mg/dL       Alkaline Phosphatase   Date Value Ref Range Status   08/03/2022 51 (L) 55 - 135 U/L Final     AST   Date Value Ref Range Status   08/03/2022 19 10 - 40 U/L Final     ALT   Date Value Ref Range Status   08/03/2022 14 10 - 44 U/L Final     Anion Gap   Date Value Ref Range Status   08/05/2022 7 (L) 8 - 16 mmol/L Final     eGFR if    Date Value Ref Range Status   07/02/2022 >60.0 >60 mL/min/1.73 m^2 Final     eGFR if non    Date Value Ref Range Status   07/02/2022 >60.0 >60 mL/min/1.73 m^2 Final     Comment:     Calculation used to obtain the estimated glomerular filtration  rate (eGFR) is the CKD-EPI equation.              Lab Results   Component Value Date    IRON 29 (L) 08/03/2022    TIBC 304 08/03/2022    FERRITIN 110 08/03/2022         Radiology/Diagnostic Studies:    CT 7/2/2022:    IMPRESSION:  1. Interval worsening in numerous enlarged lymph nodes in the chest, abdomen and pelvis, as well as development of splenomegaly, compatible with lymphoma and or worsening CLL, given patient's history  2. Mild bilateral hydroureteronephrosis, with markedly enlarged prostate gland and appearance of the urinary bladder suggesting chronic bladder outlet obstruction. Consider correlation with urinalysis.  3. Infrarenal abdominal aortic aneurysm measuring 33 mm in diameter.  Follow-up imaging in 3 years is recommended per best practice guidelines.  4. Additional observations as described.           PET  3/14/2022:    IMPRESSION:  Left upper lobectomy without evidence of recurrent or metastatic disease      MRI cervical spine  10/1/2021:    IMPRESSION:     Loss of normal cervical lordosis with mild kyphotic curvature.     Advanced multilevel cervical spondylosis, as outlined above. Spinal canal narrowing and foraminal narrowing is most severe at C5-6. 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     Congenital narrowing of cervical spinal canal, which exacerbates cervical spondylosis        CT head  8/27/2021:  IMPRESSION:     No CT evidence of acute intracranial pathology.     Stable senescent changes as above.         PET  7/19/2021:    IMPRESSION:  Prior left upper lobectomy without evidence of recurrent or metastatic disease  - 3 cm infrarenal abdominal aortic aneurysm      CT head 6/29/2021:  IMPRESSION:     No CT evidence of acute intracranial pathology      PET  1/18/2021:    Impression:     No evidence of FDG avid lymphoproliferative disease.     Aneurysmal dilation of infrarenal abdominal aorta (3.4 cm) as well as aneurysmal dilation of right common iliac artery.     Additional incidental findings as above        PET  6/24/2020:    Impression:     1. No findings of active lymphoproliferative disease.  2. Additional observations as above.      PET  1/24/2020:    Impression       Moderate decrease in size of the adenopathy within the neck, chest, abdomen and pelvis.  The spleen is smaller in size.  There is no significant FDG activity.    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 Multifocal lymphadenopathy in the chest, abdomen, and pelvis compatible with provided clinical history of lymphocytic leukemia.  There is mixed interval change when  compared to prior studies.  Pathologic lymphadenopathy in the chest has increased significantly when compared to a CTA of the chest from May 2018.  Pathologic retroperitoneal lymphadenopathy has improved slightly when compared to a previous abdominal CT from February 2017.  Please see above details.  2. Mild aneurysmal dilation of the infrarenal abdominal aorta, with maximal transverse diameter of 3.7 cm.  Maximal transverse diameter on a prior study from 2017 was 2.5 cm.  3. Additional findings as above           [facility] Unknown Rad Eap    Result Date: 1/14/2019  CMS MANDATED QUALITY DATA - CT RADIATION - 436 All CT scans at this facility utilize dose modulation, iterative reconstruction, and/or weight based dosing when appropriate to reduce radiation dose to as low as reasonably achievable. Reason:Lymphoid leukemia TECHNIQUE: CT thorax with, CT abdomen  with, and CT pelvis with 100 mL Omnipaque 350. COMPARISON: CT chest dated 05/19/2018 and CT abdomen and pelvis dated 02/08/2017 CT THORAX: There is stable mediastinal, hilar and axillary adenopathy. There is coronary artery calcification. There is resolution of the groundglass opacities throughout the lungs. There are no confluent infiltrates, pulmonary nodules or pleural effusions. There are stable subcentimeter nodules in the left lobe of the thyroid gland. There are degenerative changes of the spine. CT ABDOMEN: The liver, pancreas, adrenal glands and gallbladder are normal. The spleen is enlarged. There is mesenteric and retroperitoneal adenopathy which is slightly smaller in size. -There is a portacaval node measuring 37 x 21 mm and previously measured 38 x 27 mm. -Periportal lymph node measuring 41 x 18 mm, previously measured 48 x 29 mm. -Left periaortic adenopathy measuring 34 x 23 mm and previously measured 40 x 42 mm- The kidneys enhance symmetrically without hydronephrosis or calculi. There are no thick-walled or dilated bowel loops. There is vascular  calcification of aorta. There is a 3.0 x 3.0 cm infrarenal abdominal aortic aneurysm. CT PELVIS: There is adenopathy along the pelvic sidewalls bilaterally which has decreased in size. -The left common iliac adenopathy measuring 36 x 11 mm, previously measured 46 x 14 mm -the right common iliac adenopathy measuring 46 x 10 mm. Previously measured 53 x 15 mm. The bladder is normal. The prostate gland is enlarged. There are degenerative changes of the spine. There is grade 1 anterolisthesis of L5 on S1 with bilateral pars defects.     IMPRESSION: Stable mediastinal, hilar and axillary adenopathy with resolution of the airspace disease within the lungs Decrease in size of the mesenteric, retroperitoneal and pelvic adenopathy. Stable infrarenal abdominal aortic aneurysm Splenomegaly     Read and electronically signed by: Jeniffer Arredondo MD on 1/14/2019 9:14 AM CST JENIFFER ARREDONDO MD      I have reviewed all available lab results and radiology reports.    Assessment/Plan:   (1) 81 y.o. male with  diagnosis of CLL who has been referred by Dr Rony Victoria for continuation of care by medical hematology/oncology.   - BM biopsy  12/4/2015 with CLL  - diagnosis of SLL in 2004 and s/p FCR x6 in 2007  - s/p imbruvica in past (stopped in May 2018)  - latest wbc at 4.8; lymph 56%  - latest CT on 8/7/2019 showing increase in the LAD  - platelets are 111,000  - He was recently hospitalized at [facility] and seen by Dr Wheeler. Dr Wheeler has recommended Rituximab. He is starting tomorrow.   - discussed the rituximab regimen and the potential side-effect profile; he is getting chemotherapy school today with [facility]  - he saw Dr Don on Oct 21st 2019  - he has had 3 of the 4 weeks of rituximab so far; Dr Don recommends him to eventually get rituximab monthly x6 with daily oral Venetoclax for two years total.   - completed rituximab (4th) week of rituximab and  S/p 6 monthly rituximab with Venetoclax oral but is taking only 2 pills daily  due to the counts  - he is now just on the venetclax  - he saw Dr Don again on Dec 23rd 2019 and sees him again in March 16th 2020  - latest PEt on 1/24/2020 looks really good    8/27/2020:  - he saw Dr Don last month at [facility]  - latest PEt from 6/24/2020 looks good  - he remains on just the oral venetoclax at 2 pills per day  - he sees Dr Don again in about 3 months (Oct 2020)    11/18/2020:  - he is doing well with the Venetoclax  - due for repeat PEt in Dec 2020  - he sees Dr Don again on Dec 23rd 2020    1/20/2021:  - he is doing well with the Venetoclax  - He saw Dr Don at [facility] in Dec 2020. He is now off rituximab monthly and remains only on the oral Venetoclax but at 2 pills daily . He sees Dr Don again in feb 2021  - Recent PEt on  1/18/2021 looks stable except for incidental aneurysm in aorta; so we are referring him to Dr Kapadia with vascualr    4/20/2021:  - he saw Dr Don in Feb 2021 and sees him again in Nov 2021  - repeat PEt in June/july 2021  - continued on Venetoclax and regular blood checks    7/20/2021:  - he continues on the Venetoclax  - mild leucopenia from the medication  - PEt on 7/19/2021 seems to be stable    9/21/2021:  - doing ok overall with some occasional HA's and general lack of energy  - he sees Dr Don again on nov 1st and hopefully he can discontinue the venetoclax thereafter  - Head CT on 8/27/2021 was relatively negative    2/17/2022:  -  He last showed for oncology appointment in Sept 2021  - He had covid in Jan 2022 and he received the infusion but did not require hospitalization  - He saw Dr Erazo couple of weeks ago  - He is planning to have cervical spine surgery with Dr Mai in the near future.  - He sees Dr Don at [facility] this coming Monday. He is now off rituximab monthly and he is also now off the oral Venetoclax (expect he will be getting a repeat bone marrow biopsy)    3/31/2022:  - He had recent telemed visit Dr Don at [facility] and they were pleased with the  latest bone marrow biopsy. He is now off rituximab monthly and he is also now off the oral Venetoclax      6/6/2022:  - He had surgery on his cervical spine with Dr Mai on April 8th 2022 and had postoperative problems related to the intubation and anesthesia and was in the hospital for about 3 weeks. He had aspiration pneumonia and bout of respiratory failure requiring mech vent support.  He has residual swallowing issues and has a peg tube. He was seen Dr Garcia with GI while in hospital. He was discharged on 4/18. He has had home health coming to house couple times of week. He reports that he is doing better    - he is due to see Dr Don again at [facility] in the near future and he has since been off all therapy      8/11/2022:  - He was recently hospitalized with fever and pneumonia. He is feeling better  - He is swallowing and eating better; peg tube since removed  - He previously had bout of oral mucosal ulcerations for which he had biopsies at [facility] which were negative for any malignancy. This has since resolved.  - He previously had telemed visit Dr Don at [facility] but does not know when he sees him again.. He had previous bone marrow biopsy with good report. He has been off rituximab and the oral Venetoclax since last Nov 2021.  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  - followed  By Dr Kee  - CEA 1.4 on 4/8/2021  - CT scans on 1/14/2019 stable  - PET done in jan 2021 on chart    3/31/2022:  - CEA at 0.8  - PET on 3/14/2022 negative for recurrence     (3) Iron deficiency anemia s/p IV iron couple weeks ago  - hgb 11.7  - iron 39 and a little lower  - set up two more IV irons    2/17/2022:  - latest hgb at 11.4 and iron panel is adequate    3/31/2022:  - latest hgb at 11.4 and stable  - iron panel currently WNL    6/6/2022:  - latest hgb 12.0 and  better  - iron panel still in process from earlier today    8/11/2022:  - hgb stable at 12.0  - he received dose of IV iron while in hospital recently  - will resume IV iron as outpatient     (4) HTN - on BP meds     (6) Chronic neck and back issues - followed by Dr Ryan Rivero     (7) DM - currently off all meds     (8) Hypercholesterolemia - on meds    (9)  - followed by Dr Whitney    (10) Chronic Leucopenia and thrombocytopenia - due to chemotherapy agents in past    2/17/2022:  - latest WBC at 5.84 and WNL  - latest plat at 92,000 and adequate and better since being off therapy    3/31/2022:  - latest plats are a little lower at 69,000  - wbc at 3.32    6/6/2022:  - latest wbc at 3.16  - platelets at 70,000 currently    8/11/2022:  - latest wbc is adequate at 5.39 and plats 94,000 and better and adequate      VISIT DIAGNOSES:      Encounter Diagnoses   Name Primary?    CLL (chronic lymphocytic leukemia) Yes    History of lung cancer - ANDRES NSCLC 2004     Iron deficiency     Iron deficiency anemia, unspecified iron deficiency anemia type     Pancytopenia     Thrombocytopenia            PLAN:  1. check labs every monthy; resume IV iron  2. F/u with PCP, pain management  3. F/u with PCP, Pulm, , etc  4. F/u with Dr Don at [facility] as directed - check on the date he sees him again - need up to date PET and I need Dr Don to see him again  5. F/u with Dr Kapadia with vascular for the aortic aneurysm          RTC in  4 weeks  With Whit and 8 weeks with myself  Fax note to Kacey Kee, Chelo Florence, Florencia Acosta/Kang Wheeler; Gonzalo Garcia        Discussion:       Total Time spent on patient:    I spent over 25 mins of time with the patient. Reviewed results of the recently ordered labs, tests, reports and studies; made directives with regards to the results. Over half of this time was spent couseling and coordinating care, making treatment and analytical decisions; ordering necessary labs, tests and  studies; and discussing treatment options and setting up treatment plan(s) if indicated.        COVID-19 Discussion:    I had long discussion with patient and any applicable family about the COVID-19 coronavirus epidemic and the recommended precautions with regard to cancer and/or hematology patients. I have re-iterated the CDC recommendations for adequate hand washing, use of hand -like products, and coughing into elbow, etc. In addition, especially for our patients who are on chemotherapy and/or our otherwise immunocompromised patients, I have recommended avoidance of crowds, including movie theaters, restaurants, churches, etc. I have recommended avoidance of any sick or symptomatic family members and/or friends. I have also recommended avoidance of any raw and unwashed food products, and general avoidance of food items that have not been prepared by themselves. The patient has been asked to call us immediately with any symptom developments, issues, questions or other general concerns.          I have explained all of the above in detail and the patient understands all of the current recommendation(s). I have answered all of their questions to the best of my ability and to their complete satisfaction.   The patient is to continue with the current management plan.        Chemotherapy Discussion:      I discussed the available treatment option(s) in accordance with the latest/current national evidence-based guidelines (NCCN, UpToDate, NCI, ASCO, etc where applicable), their overall age/condition and their co-morbidities. I also went over the risks and benefits of the chemotherapy with regard to their particular cancer type, their cancer stage, their age/condition, and their co-morbidities. I provided literature on the chemotherapy regimen and discussed the chemotherapy side-effect profiles of the drug(s). I discussed the importance of compliance with obtaining and monitoring weekly lab work, and went over  the potential hematopathology issues and risks with anemia, leucopenia and thrombocytopenia that can occur with chemotherapy. I discussed the potential risks of liver and kidney damage, which could be permanent and could necessitate dialysis long-term if kidney failure developed. I discussed the emetic and/or diarrheal potential of the regimen and the potential need for use of antiemetic and anti-diarrheal medications. I discussed the risk for development of anaphylactic shock, bronchospasm, dysrhythmia, and respiratory/cardiovascular arrest and/or failure. I discussed the potential risks for development of alopecia, cold sensory issues, ringing in ears, vertigo, cataracts, glaucoma, and neuropathy, all of which could end up being chronic and life-long. Some chemotherpyI discussed the risks of hand-foot syndrome and rashes, and development of other autoimmune mediated processes such as pneumonitis, hepatitis, and colitis which could be life threatening. I discussed the risks of the potential development of a rare but fatal viral mediated disease known as PML (Progressive Multifocal Leukoencephalopathy), and risk of future development of leukemia and/or lymphoma from use of certain chemotherapy agents. I discussed the need for neutropenic precautions, basic hygiene/sanitation behaviors and dietary restrictions.    The patient's consent has been obtained to proceed with the chemotherapy.The patient will be referred to Chemotherapy School /[facility] for training and education on chemotherapy, use of antiemetics and/or anti-diarrheals, use of NSAID's, potential chemotherapy side-effects, and any specific recommendations and precautions with the particular chemotherapy agents.       Immunologic Therapy Discussion:    I discussed the available treatment option(s) in accordance with the latest/current national evidence-based guidelines (NCCN, UpToDate, NCI, ASCO, etc where applicable), their overall age/condition  and their co-morbidities. I went over the risks and benefits of the immunotherapy with regard to their particular cancer type, their cancer stage, their age/condition, and their co-morbidities. I provided literature on the immunotherapy regimen and discussed the immunotherapy side-effect profiles of the drug(s). I discussed the importance of compliance with obtaining and monitoring weekly lab work, and went over the potential hematopathology issues and risks of hemato-pathologic issues with anemia, leucopenia and/or thrombocytopenia and effects on thyroid function that can occur with immunotherapy. The patient will most likely need to have there thyroid functions monitored by their PCP and may need to take thyroid medication while on the immunotherapy. I discussed the potential risks of liver and kidney damage, which could be permanent and could necessitate dialysis long-term if kidney failure developed. I discussed the emetic and/or diarrheal potential of the regimen and the potential need for use of antiemetic and anti-diarrheal medications. I discussed the risk for development of anaphylactic shock, bronchospasm, dysrhythmia, and respiratory/cardiovascular arrest and/or failure. I discussed the potential risks for development of alopecia, cold sensory issues, ringing in ears, vertigo and neuropathy, all of which could end up being chronic and life-long. I discussed the risks of hand-foot syndrome and rashes, and development of other autoimmune mediated processes such as pneumonitis, hepatitis and colitis which could potentially be life threatening. I discussed the risks of the potential development of a rare but fatal viral mediated disease known as PML (Progressive Multifocal Leukoencephalopathy), and risk of future development of leukemia and/or lymphoma from use of certain immunotherapy agents. I discussed the possibility that immunologic therapy cold worsen or promote progression of any underlying autoimmune  diseases such as sarcoidosis, ulcerative colitis, Crohn's disease, psoriasis, rheumatoid disorders, scleroderma, autoimmune nephritis disorders, Hashimoto's thyroiditis, and Lupus among others. I discussed the need for neutropenic precautions, basic hygiene/sanitation behaviors and dietary restrictions.    The patient's consent has been obtained to proceed with the immunotherapy.The patient will be referred to [facility] for training and education on immunotherapy, use of antiemetics and/or anti-diarrheals, use of NSAID's, potential immunotherapy side-effects, and any specific recommendations and precautions with the particular immunotherapy agents.      I answered all of the patient's (and family's, if applicable) questions to the best of my ability and to their complete satisfaction. The patient acknowledged full understanding of the risks, recommendations and plan(s).      Iron Infusion Therapy Discussion:     I provided literature/learning materials on the particular IV iron regimen and discussed the potential side-effect profiles of the drug(s). I discussed the importance of compliance with obtaining and monitoring requested lab work, and went over the potential risk for the development of anaphylactic shock, bronchospasm, dysrhythmia, liver and/or kidney damage, and respiratory/cardiovascular arrest and/or failure. I discussed the potential risks for development of alopecia, fevers, itching, chills and/or rigors, cold sensory issues, ringing in ears, vertigo and neuropathy, all of which are usually acute but sometimes could end up being chronic and life-long. I discussed the risks of hand-foot syndrome and rashes, and development of other autoimmune mediated processes such as pneumonitis and colitis which could be life threatening.     The patient's consent has been obtained to proceed with the IV iron therapy.The patient will be referred to [facility]  for training and education on IV iron therapy, use of antiemetics and/or anti-diarrheals, use of NSAID's, potential IV iron therapy side-effects, and any specific recommendations and precautions with the particular IV iron agents.      I answered all of the patient's (and family's, if applicable) questions to the best of my ability and to their complete satisfaction. The patient acknowledged full understanding of the risks, recommendations and plan(s).          I answered all of the patient's (and family's, if applicable) questions to the best of my ability and to their complete satisfaction. The patient acknowledged full understanding of the risks, recommendations and plan(s).         Electronically signed by Drew Bai MD                           Answers for HPI/ROS submitted by the patient on 8/3/2022  appetite change : Yes  unexpected weight change: Yes  mouth sores: Yes  visual disturbance: No  cough: Yes  shortness of breath: Yes  chest pain: No  abdominal pain: No  diarrhea: No  frequency: No  rash: No  headaches: Yes  adenopathy: Yes  nervous/ anxious: No

## 2022-08-11 ENCOUNTER — OFFICE VISIT (OUTPATIENT)
Dept: HEMATOLOGY/ONCOLOGY | Facility: CLINIC | Age: 82
End: 2022-08-11
Payer: MEDICARE

## 2022-08-11 VITALS
WEIGHT: 194 LBS | DIASTOLIC BLOOD PRESSURE: 70 MMHG | HEIGHT: 74 IN | HEART RATE: 62 BPM | RESPIRATION RATE: 20 BRPM | SYSTOLIC BLOOD PRESSURE: 168 MMHG | BODY MASS INDEX: 24.9 KG/M2

## 2022-08-11 DIAGNOSIS — D61.818 PANCYTOPENIA: ICD-10-CM

## 2022-08-11 DIAGNOSIS — D69.6 THROMBOCYTOPENIA: ICD-10-CM

## 2022-08-11 DIAGNOSIS — D50.9 IRON DEFICIENCY ANEMIA, UNSPECIFIED IRON DEFICIENCY ANEMIA TYPE: ICD-10-CM

## 2022-08-11 DIAGNOSIS — R93.5 ABNORMAL CT OF THE ABDOMEN: ICD-10-CM

## 2022-08-11 DIAGNOSIS — C91.10 CLL (CHRONIC LYMPHOCYTIC LEUKEMIA): Primary | ICD-10-CM

## 2022-08-11 DIAGNOSIS — E61.1 IRON DEFICIENCY: ICD-10-CM

## 2022-08-11 DIAGNOSIS — Z85.118 HISTORY OF LUNG CANCER: ICD-10-CM

## 2022-08-11 PROCEDURE — 99214 PR OFFICE/OUTPT VISIT, EST, LEVL IV, 30-39 MIN: ICD-10-PCS | Mod: S$GLB,,, | Performed by: INTERNAL MEDICINE

## 2022-08-11 PROCEDURE — 99214 OFFICE O/P EST MOD 30 MIN: CPT | Mod: S$GLB,,, | Performed by: INTERNAL MEDICINE

## 2022-08-29 ENCOUNTER — HOSPITAL ENCOUNTER (OUTPATIENT)
Dept: RADIOLOGY | Facility: HOSPITAL | Age: 82
Discharge: HOME | End: 2022-08-29
Attending: INTERNAL MEDICINE
Payer: MEDICARE

## 2022-08-29 VITALS — WEIGHT: 198 LBS | BODY MASS INDEX: 25.41 KG/M2 | HEIGHT: 74 IN

## 2022-08-29 DIAGNOSIS — R93.5 ABNORMAL CT OF THE ABDOMEN: ICD-10-CM

## 2022-08-29 DIAGNOSIS — C91.10 CLL (CHRONIC LYMPHOCYTIC LEUKEMIA): ICD-10-CM

## 2022-08-29 DIAGNOSIS — Z85.118 HISTORY OF LUNG CANCER: ICD-10-CM

## 2022-08-29 LAB — GLUCOSE SERPL-MCNC: 139 MG/DL (ref 70–110)

## 2022-08-29 PROCEDURE — 78815 PET IMAGE W/CT SKULL-THIGH: CPT | Mod: TC,PO

## 2022-09-01 ENCOUNTER — TELEPHONE (OUTPATIENT)
Dept: HEMATOLOGY/ONCOLOGY | Facility: CLINIC | Age: 82
End: 2022-09-01

## 2022-09-01 NOTE — TELEPHONE ENCOUNTER
----- Message from Drew Bai MD sent at 8/29/2022  4:55 PM CDT -----  Please contact the patient and let them know that their results stable

## 2022-09-07 ENCOUNTER — TELEPHONE (OUTPATIENT)
Dept: HEMATOLOGY/ONCOLOGY | Facility: CLINIC | Age: 82
End: 2022-09-07

## 2022-09-07 NOTE — TELEPHONE ENCOUNTER
----- Message from Cate Saab sent at 9/7/2022 10:04 AM CDT -----  Peri the patient's wife called to ask Marion if it is necessary for the patient to be seen tomorrow. She said he saw Dr. Bai on 8/11 and wanted to know what this appointment is for. # [phone]

## 2022-09-07 NOTE — TELEPHONE ENCOUNTER
Patient is to see Dr. COY on 9/12/22.     He wants to see Dr. Bai after that on 9/29/22.     Will cancel the appt with me tomorrrow.

## 2022-09-27 ENCOUNTER — TELEPHONE (OUTPATIENT)
Dept: HEMATOLOGY/ONCOLOGY | Facility: CLINIC | Age: 82
End: 2022-09-27

## 2022-09-28 NOTE — PROGRESS NOTES
[facility] Hematology/Oncology  PROGRESS NOTE - Follow-up Visit      Subjective:       Patient ID:   NAME: Conrad Kuhn : 1940     81 y.o. male    Referring Doc: Julien  Other Physicians: Ced Erazo Joubert, Srinivas, Pinsky    Chief Complaint:  CLL f/u    History of Present Illness:     Patient is being seen today in person in clinic for a regular follow-up viasit. He is here by himself.  The patient is on today to go over the results of the recently ordered labs, tests and studies.     He is doing better and feeling better overall.    He saw Dr Don again at [facility] and they are considering giving him a regimen of chemotherapy over at [facility] but he reports that they have since decided against proceeding in that direction.       He is swallowing and eating better;   He is breathing ok currently. He denies any CP, SOB, or N/V.    Some chronic neck issues    He had covid in 2022 and he received the infusion but did not require hospitalization        Discussed Covid19 precautions; he had his vaccinations              ROS:   GEN: normal without any fever, night sweats or weight loss  HEENT: normal with no HA's, sore throat, stiff neck, changes in vision; no residual swallowing difficulties  CV: normal with no CP, SOB, PND, MAYER or orthopnea  PULM: normal with no SOB, cough, hemoptysis, sputum or pleuritic pain  GI: normal with no abdominal pain, nausea, vomiting, constipation, diarrhea, melanotic stools, BRBPR, or hematemesis; no dysphagia; peg tube in place  : urine frequency stable  BREAST: normal with no mass, discharge, pain  SKIN: normal with no rash, erythema, bruising, or swelling    Allergies:  Review of patient's allergies indicates:  No Known Allergies    Medications:    Current Outpatient Medications:     albuterol-ipratropium (DUO-NEB) 2.5 mg-0.5 mg/3 mL nebulizer solution, Take 3 mLs by nebulization every 8 (eight) hours as needed., Disp: , Rfl:     azelastine (ASTELIN) 137 mcg (0.1 %)  "nasal spray, 1 spray by Nasal route 2 (two) times daily. , Disp: , Rfl:     blood sugar diagnostic Strp, 1 strip by Misc.(Non-Drug; Combo Route) route 2 (two) times a day., Disp: , Rfl:     gabapentin (NEURONTIN) 250 mg/5 mL (5 mL) solution, Take 300 mg by mouth 2 (two) times daily. , Disp: , Rfl: 0    glimepiride (AMARYL) 2 MG tablet, Take 2 mg by mouth once daily at 6am., Disp: , Rfl:     HYDROcodone-acetaminophen (NORCO)  mg per tablet, Take 1 tablet by mouth every 8 (eight) hours as needed. , Disp: , Rfl: 0    levalbuterol (XOPENEX) 1.25 mg/3 mL nebulizer solution, Take 3 mLs by nebulization every 4 to 6 hours as needed. , Disp: , Rfl:     traZODone (DESYREL) 100 MG tablet, TAKE 1 TABLET BY MOUTH EVERY NIGHT AT BEDTIME (Patient taking differently: Take 100 mg by mouth every evening.), Disp: 90 tablet, Rfl: 0    valACYclovir (VALTREX) 1000 MG tablet, Take 1,000 mg by mouth 2 (two) times daily., Disp: , Rfl:     blood-glucose meter kit, Use as instructed, Disp: , Rfl:     metoprolol tartrate (LOPRESSOR) 25 MG tablet, 1 tablet (25 mg total) by Per G Tube route 2 (two) times daily., Disp: 60 tablet, Rfl: 0    Current Facility-Administered Medications:     EPINEPHrine (EPIPEN) 0.3 mg/0.3 mL pen injection 0.3 mg, 0.3 mg, Intramuscular, PRN, Kathy Strong, NP-C    PMHx/PSHx Updates:  See patient's last visit with me on 8/11/2022  See H&P on 1/3/2019        Pathology:  Cancer Staging  No matching staging information was found for the patient.          Objective:     Vitals:  Blood pressure (!) 164/69, pulse 66, temperature 97.6 °F (36.4 °C), resp. rate 20, height 6' 2" (1.88 m), weight 89.8 kg (198 lb).        Physical Examination:   GEN: no apparent distress, comfortable; AAOx3  HEAD: atraumatic and normocephalic  EYES: no conjunctival pallor or muddiness, no icterus; normal pupil reaction to ambient light  ENT: OMM, no pharyngeal erythema, external bilateral ears WNL; no visible thrush or ulcers  NECK: no " masses or swelling, trachea midline, no visible LAD/LN's ; LAD and LN's on right neck reduced in size  CV: no palpitations; no pedal edema; no noticeable JVD or neck vein distension  CHEST: Normal respiratory effort; chest wall breath movements symmetrical; no audible wheezing  ABDOM: non-distended; no bloating;  peg tube since removed  MUSC/Skeletal: ROM normal; joints visibly normal; no deformities or arthropathy  EXTREM: no clubbing, cyanosis, inflammation or swelling; right arm with fair ROM  SKIN: no rashes, lesions, ulcers, petechiae or subcutaneous nodules  : no keith  NEURO: moving all 4 extremities; AAOx3; no tremors  PSYCH: normal mood, affect and behavior  LYMPH: no visible LN's or LAD; LAD and LN's on right neck reduced in size              Labs:   Lab Results   Component Value Date    WBC 4.37 09/29/2022    HGB 13.1 (L) 09/29/2022    HCT 37.8 (L) 09/29/2022    MCV 89 09/29/2022    PLT 58 (L) 09/29/2022     CMP  Sodium   Date Value Ref Range Status   09/29/2022 139 136 - 145 mmol/L Final   06/12/2019 138 134 - 144 mmol/L      Potassium   Date Value Ref Range Status   09/29/2022 3.6 3.5 - 5.1 mmol/L Final     Chloride   Date Value Ref Range Status   09/29/2022 103 95 - 110 mmol/L Final   06/12/2019 99 98 - 110 mmol/L      CO2   Date Value Ref Range Status   09/29/2022 29 23 - 29 mmol/L Final     Glucose   Date Value Ref Range Status   09/29/2022 161 (H) 70 - 110 mg/dL Final   06/12/2019 179 (H) 70 - 99 mg/dL      BUN   Date Value Ref Range Status   09/29/2022 12 8 - 23 mg/dL Final     Creatinine   Date Value Ref Range Status   09/29/2022 0.9 0.5 - 1.4 mg/dL Final   06/12/2019 0.95 0.60 - 1.40 mg/dL      Calcium   Date Value Ref Range Status   09/29/2022 9.2 8.7 - 10.5 mg/dL Final     Total Protein   Date Value Ref Range Status   09/29/2022 7.0 6.0 - 8.4 g/dL Final     Albumin   Date Value Ref Range Status   09/29/2022 4.3 3.5 - 5.2 g/dL Final   06/12/2019 3.9 3.1 - 4.7 g/dL      Total Bilirubin   Date  Value Ref Range Status   09/29/2022 1.3 (H) 0.1 - 1.0 mg/dL Final     Comment:     For infants and newborns, interpretation of results should be based  on gestational age, weight and in agreement with clinical  observations.    Premature Infant recommended reference ranges:  Up to 24 hours.............<8.0 mg/dL  Up to 48 hours............<12.0 mg/dL  3-5 days..................<15.0 mg/dL  6-29 days.................<15.0 mg/dL       Alkaline Phosphatase   Date Value Ref Range Status   09/29/2022 58 55 - 135 U/L Final     AST   Date Value Ref Range Status   09/29/2022 18 10 - 40 U/L Final     ALT   Date Value Ref Range Status   09/29/2022 14 10 - 44 U/L Final     Anion Gap   Date Value Ref Range Status   09/29/2022 7 (L) 8 - 16 mmol/L Final     eGFR if    Date Value Ref Range Status   07/02/2022 >60.0 >60 mL/min/1.73 m^2 Final     eGFR if non    Date Value Ref Range Status   07/02/2022 >60.0 >60 mL/min/1.73 m^2 Final     Comment:     Calculation used to obtain the estimated glomerular filtration  rate (eGFR) is the CKD-EPI equation.              Lab Results   Component Value Date    IRON 84 09/29/2022    TIBC 414 09/29/2022    FERRITIN 19 (L) 09/29/2022         Radiology/Diagnostic Studies:    PET 8/29/2022:  IMPRESSION:  1. Numerous enlarged lymph nodes throughout the neck, chest, abdomen and pelvis are non hypermetabolic, and stable compared to CT of 07/02/2022.  2. Stable splenomegaly, with no abnormal focal splenic FDG hypermetabolism.  3. Additional observations as described.         CT 7/2/2022:    IMPRESSION:  1. Interval worsening in numerous enlarged lymph nodes in the chest, abdomen and pelvis, as well as development of splenomegaly, compatible with lymphoma and or worsening CLL, given patient's history  2. Mild bilateral hydroureteronephrosis, with markedly enlarged prostate gland and appearance of the urinary bladder suggesting chronic bladder outlet obstruction. Consider  correlation with urinalysis.  3. Infrarenal abdominal aortic aneurysm measuring 33 mm in diameter. Follow-up imaging in 3 years is recommended per best practice guidelines.  4. Additional observations as described.           PET  3/14/2022:    IMPRESSION:  Left upper lobectomy without evidence of recurrent or metastatic disease      MRI cervical spine  10/1/2021:    IMPRESSION:     Loss of normal cervical lordosis with mild kyphotic curvature.     Advanced multilevel cervical spondylosis, as outlined above. Spinal canal narrowing and foraminal narrowing is most severe at C5-6. 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     Congenital narrowing of cervical spinal canal, which exacerbates cervical spondylosis        CT head  8/27/2021:  IMPRESSION:     No CT evidence of acute intracranial pathology.     Stable senescent changes as above.         PET  7/19/2021:    IMPRESSION:  Prior left upper lobectomy without evidence of recurrent or metastatic disease  - 3 cm infrarenal abdominal aortic aneurysm      CT head 6/29/2021:  IMPRESSION:     No CT evidence of acute intracranial pathology      PET  1/18/2021:    Impression:     No evidence of FDG avid lymphoproliferative disease.     Aneurysmal dilation of infrarenal abdominal aorta (3.4 cm) as well as aneurysmal dilation of right common iliac artery.     Additional incidental findings as above        PET  6/24/2020:    Impression:     1. No findings of active lymphoproliferative disease.  2. Additional observations as above.      PET  1/24/2020:    Impression       Moderate decrease in size of the adenopathy within the neck, chest, abdomen and pelvis.  The spleen is smaller in size.  There is no significant FDG activity.    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 Multifocal lymphadenopathy in the chest, abdomen, and pelvis  compatible with provided clinical history of lymphocytic leukemia.  There is mixed interval change when compared to prior studies.  Pathologic lymphadenopathy in the chest has increased significantly when compared to a CTA of the chest from May 2018.  Pathologic retroperitoneal lymphadenopathy has improved slightly when compared to a previous abdominal CT from February 2017.  Please see above details.  2. Mild aneurysmal dilation of the infrarenal abdominal aorta, with maximal transverse diameter of 3.7 cm.  Maximal transverse diameter on a prior study from 2017 was 2.5 cm.  3. Additional findings as above           [facility] Unknown Rad Eap    Result Date: 1/14/2019  CMS MANDATED QUALITY DATA - CT RADIATION - 436 All CT scans at this facility utilize dose modulation, iterative reconstruction, and/or weight based dosing when appropriate to reduce radiation dose to as low as reasonably achievable. Reason:Lymphoid leukemia TECHNIQUE: CT thorax with, CT abdomen  with, and CT pelvis with 100 mL Omnipaque 350. COMPARISON: CT chest dated 05/19/2018 and CT abdomen and pelvis dated 02/08/2017 CT THORAX: There is stable mediastinal, hilar and axillary adenopathy. There is coronary artery calcification. There is resolution of the groundglass opacities throughout the lungs. There are no confluent infiltrates, pulmonary nodules or pleural effusions. There are stable subcentimeter nodules in the left lobe of the thyroid gland. There are degenerative changes of the spine. CT ABDOMEN: The liver, pancreas, adrenal glands and gallbladder are normal. The spleen is enlarged. There is mesenteric and retroperitoneal adenopathy which is slightly smaller in size. -There is a portacaval node measuring 37 x 21 mm and previously measured 38 x 27 mm. -Periportal lymph node measuring 41 x 18 mm, previously measured 48 x 29 mm. -Left periaortic adenopathy measuring 34 x 23 mm and previously measured 40 x 42 mm- The kidneys enhance symmetrically  without hydronephrosis or calculi. There are no thick-walled or dilated bowel loops. There is vascular calcification of aorta. There is a 3.0 x 3.0 cm infrarenal abdominal aortic aneurysm. CT PELVIS: There is adenopathy along the pelvic sidewalls bilaterally which has decreased in size. -The left common iliac adenopathy measuring 36 x 11 mm, previously measured 46 x 14 mm -the right common iliac adenopathy measuring 46 x 10 mm. Previously measured 53 x 15 mm. The bladder is normal. The prostate gland is enlarged. There are degenerative changes of the spine. There is grade 1 anterolisthesis of L5 on S1 with bilateral pars defects.     IMPRESSION: Stable mediastinal, hilar and axillary adenopathy with resolution of the airspace disease within the lungs Decrease in size of the mesenteric, retroperitoneal and pelvic adenopathy. Stable infrarenal abdominal aortic aneurysm Splenomegaly     Read and electronically signed by: Jeniffer Arredondo MD on 1/14/2019 9:14 AM CST JENIFFER ARREDONDO MD      I have reviewed all available lab results and radiology reports.    Assessment/Plan:   (1) 81 y.o. male with  diagnosis of CLL who has been referred by Dr Rony Victoria for continuation of care by medical hematology/oncology.   - BM biopsy  12/4/2015 with CLL  - diagnosis of SLL in 2004 and s/p FCR x6 in 2007  - s/p imbruvica in past (stopped in May 2018)  - latest wbc at 4.8; lymph 56%  - latest CT on 8/7/2019 showing increase in the LAD  - platelets are 111,000  - He was recently hospitalized at [facility] and seen by Dr Wheeler. Dr Wheeler has recommended Rituximab. He is starting tomorrow.   - discussed the rituximab regimen and the potential side-effect profile; he is getting chemotherapy school today with [facility]  - he saw Dr Don on Oct 21st 2019  - he has had 3 of the 4 weeks of rituximab so far; Dr Don recommends him to eventually get rituximab monthly x6 with daily oral Venetoclax for two years total.   - completed rituximab  (4th) week of rituximab and  S/p 6 monthly rituximab with Venetoclax oral but is taking only 2 pills daily due to the counts  - he is now just on the venetclax  - he saw Dr Don again on Dec 23rd 2019 and sees him again in March 16th 2020  - latest PEt on 1/24/2020 looks really good    8/27/2020:  - he saw Dr Don last month at [facility]  - latest PEt from 6/24/2020 looks good  - he remains on just the oral venetoclax at 2 pills per day  - he sees Dr Don again in about 3 months (Oct 2020)    11/18/2020:  - he is doing well with the Venetoclax  - due for repeat PEt in Dec 2020  - he sees Dr Don again on Dec 23rd 2020    1/20/2021:  - he is doing well with the Venetoclax  - He saw Dr Don at [facility] in Dec 2020. He is now off rituximab monthly and remains only on the oral Venetoclax but at 2 pills daily . He sees Dr Don again in feb 2021  - Recent PEt on  1/18/2021 looks stable except for incidental aneurysm in aorta; so we are referring him to Dr Kapadia with vascualr    4/20/2021:  - he saw Dr Don in Feb 2021 and sees him again in Nov 2021  - repeat PEt in June/july 2021  - continued on Venetoclax and regular blood checks    7/20/2021:  - he continues on the Venetoclax  - mild leucopenia from the medication  - PEt on 7/19/2021 seems to be stable    9/21/2021:  - doing ok overall with some occasional HA's and general lack of energy  - he sees Dr Don again on nov 1st and hopefully he can discontinue the venetoclax thereafter  - Head CT on 8/27/2021 was relatively negative    2/17/2022:  -  He last showed for oncology appointment in Sept 2021  - He had covid in Jan 2022 and he received the infusion but did not require hospitalization  - He saw Dr Erazo couple of weeks ago  - He is planning to have cervical spine surgery with Dr Mai in the near future.  - He sees Dr Don at [facility] this coming Monday. He is now off rituximab monthly and he is also now off the oral Venetoclax (expect he will be getting a repeat bone  marrow biopsy)    3/31/2022:  - He had recent telemed visit Dr Don at [facility] and they were pleased with the latest bone marrow biopsy. He is now off rituximab monthly and he is also now off the oral Venetoclax      6/6/2022:  - He had surgery on his cervical spine with Dr Mai on April 8th 2022 and had postoperative problems related to the intubation and anesthesia and was in the hospital for about 3 weeks. He had aspiration pneumonia and bout of respiratory failure requiring mech vent support.  He has residual swallowing issues and has a peg tube. He was seen Dr Garcia with GI while in hospital. He was discharged on 4/18. He has had home health coming to house couple times of week. He reports that he is doing better    - he is due to see Dr Don again at [facility] in the near future and he has since been off all therapy      8/11/2022:  - He was recently hospitalized with fever and pneumonia. He is feeling better  - He is swallowing and eating better; peg tube since removed  - He previously had bout of oral mucosal ulcerations for which he had biopsies at [facility] which were negative for any malignancy. This has since resolved.  - He previously had telemed visit Dr Don at [facility] but does not know when he sees him again.. He had previous bone marrow biopsy with good report. He has been off rituximab and the oral Venetoclax since last Nov 2021.  - CT scans on 7/2/2022 with some progression of the LAD in his body   - will get PEt and aslk Dr Don to see him again    9/29/2022:  - He saw Dr Don again at [facility] and they are considering giving him a regimen of chemotherapy (some program or clinical trial0 over at [facility] but he reports that they have since decided against proceeding in that direction.  - he was supposed to see Dr Don on 9/12 but that appointment was cancelled per patient   - he had PET on 8/29/2022 which was stable         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  - followed  By  Dr Kee  - CEA 1.4 on 4/8/2021  - CT scans on 1/14/2019 stable  - PET done in jan 2021 on chart    3/31/2022:  - CEA at 0.8  - PET on 3/14/2022 negative for recurrence    9/29/2022:  - CEA 0.9  - PET on chart      (3) Iron deficiency anemia s/p IV iron couple weeks ago  - hgb 11.7  - iron 39 and a little lower  - set up two more IV irons    2/17/2022:  - latest hgb at 11.4 and iron panel is adequate    3/31/2022:  - latest hgb at 11.4 and stable  - iron panel currently WNL    6/6/2022:  - latest hgb 12.0 and better  - iron panel still in process from earlier today    8/11/2022:  - hgb stable at 12.0  - he received dose of IV iron while in hospital recently  - will resume IV iron as outpatient    9/29/2022:  - ferritin was a little lower - he has not been taking the oral iron  - will resume oral iron and consider repeat IV iron if needed     (4) HTN - on BP meds     (6) Chronic neck and back issues - followed by Dr Ryan Rivero     (7) DM - currently off all meds     (8) Hypercholesterolemia - on meds    (9)  - followed by Dr Whitney    (10) Chronic Leucopenia and thrombocytopenia - due to chemotherapy agents in past    2/17/2022:  - latest WBC at 5.84 and WNL  - latest plat at 92,000 and adequate and better since being off therapy    3/31/2022:  - latest plats are a little lower at 69,000  - wbc at 3.32    6/6/2022:  - latest wbc at 3.16  - platelets at 70,000 currently    8/11/2022:  - latest wbc is adequate at 5.39 and plats 94,000 and better and adequate    9/29/2022:  - latest WBC at 4.37 and plats 58,000 (a little lower)      VISIT DIAGNOSES:      Encounter Diagnoses   Name Primary?    CLL (chronic lymphocytic leukemia) Yes    History of lung cancer - ANDRES NSCLC 2004     Iron deficiency     Iron deficiency anemia, unspecified iron deficiency anemia type     Thrombocytopenia     Pancytopenia            PLAN:  1. check labs every monthy; resume IV iron as needed; encouraged resumption of oral iron  2. F/u with  PCP, pain management  3. F/u with PCP, Pulm, , etc  4. F/u with Dr Don at [facility] as directed - reach out to Dr Don to see what are his recommendations  5. F/u with Dr Kapadia with vascular for the aortic aneurysm as directed          RTC in  4 weeks with Whit and 8 weeks with myself  Fax note to Kacey Kee, Ryan Rivero, Chelo, Karla, Florencia/Kang Wheeler; Gonzalo Garcia        Discussion:       Total Time spent on patient:    I spent over 25 mins of time with the patient. Reviewed results of the recently ordered labs, tests, reports and studies; made directives with regards to the results. Over half of this time was spent couseling and coordinating care, making treatment and analytical decisions; ordering necessary labs, tests and studies; and discussing treatment options and setting up treatment plan(s) if indicated.        COVID-19 Discussion:    I had long discussion with patient and any applicable family about the COVID-19 coronavirus epidemic and the recommended precautions with regard to cancer and/or hematology patients. I have re-iterated the CDC recommendations for adequate hand washing, use of hand -like products, and coughing into elbow, etc. In addition, especially for our patients who are on chemotherapy and/or our otherwise immunocompromised patients, I have recommended avoidance of crowds, including movie theaters, restaurants, churches, etc. I have recommended avoidance of any sick or symptomatic family members and/or friends. I have also recommended avoidance of any raw and unwashed food products, and general avoidance of food items that have not been prepared by themselves. The patient has been asked to call us immediately with any symptom developments, issues, questions or other general concerns.          I have explained all of the above in detail and the patient understands all of the current recommendation(s). I have answered all of their questions to the best of my ability and  to their complete satisfaction.   The patient is to continue with the current management plan.        Chemotherapy Discussion:      I discussed the available treatment option(s) in accordance with the latest/current national evidence-based guidelines (NCCN, UpToDate, NCI, ASCO, etc where applicable), their overall age/condition and their co-morbidities. I also went over the risks and benefits of the chemotherapy with regard to their particular cancer type, their cancer stage, their age/condition, and their co-morbidities. I provided literature on the chemotherapy regimen and discussed the chemotherapy side-effect profiles of the drug(s). I discussed the importance of compliance with obtaining and monitoring weekly lab work, and went over the potential hematopathology issues and risks with anemia, leucopenia and thrombocytopenia that can occur with chemotherapy. I discussed the potential risks of liver and kidney damage, which could be permanent and could necessitate dialysis long-term if kidney failure developed. I discussed the emetic and/or diarrheal potential of the regimen and the potential need for use of antiemetic and anti-diarrheal medications. I discussed the risk for development of anaphylactic shock, bronchospasm, dysrhythmia, and respiratory/cardiovascular arrest and/or failure. I discussed the potential risks for development of alopecia, cold sensory issues, ringing in ears, vertigo, cataracts, glaucoma, and neuropathy, all of which could end up being chronic and life-long. Some chemotherpyI discussed the risks of hand-foot syndrome and rashes, and development of other autoimmune mediated processes such as pneumonitis, hepatitis, and colitis which could be life threatening. I discussed the risks of the potential development of a rare but fatal viral mediated disease known as PML (Progressive Multifocal Leukoencephalopathy), and risk of future development of leukemia and/or lymphoma from use of certain  chemotherapy agents. I discussed the need for neutropenic precautions, basic hygiene/sanitation behaviors and dietary restrictions.    The patient's consent has been obtained to proceed with the chemotherapy.The patient will be referred to Chemotherapy [facility] for training and education on chemotherapy, use of antiemetics and/or anti-diarrheals, use of NSAID's, potential chemotherapy side-effects, and any specific recommendations and precautions with the particular chemotherapy agents.       Immunologic Therapy Discussion:    I discussed the available treatment option(s) in accordance with the latest/current national evidence-based guidelines (NCCN, UpToDate, NCI, ASCO, etc where applicable), their overall age/condition and their co-morbidities. I went over the risks and benefits of the immunotherapy with regard to their particular cancer type, their cancer stage, their age/condition, and their co-morbidities. I provided literature on the immunotherapy regimen and discussed the immunotherapy side-effect profiles of the drug(s). I discussed the importance of compliance with obtaining and monitoring weekly lab work, and went over the potential hematopathology issues and risks of hemato-pathologic issues with anemia, leucopenia and/or thrombocytopenia and effects on thyroid function that can occur with immunotherapy. The patient will most likely need to have there thyroid functions monitored by their PCP and may need to take thyroid medication while on the immunotherapy. I discussed the potential risks of liver and kidney damage, which could be permanent and could necessitate dialysis long-term if kidney failure developed. I discussed the emetic and/or diarrheal potential of the regimen and the potential need for use of antiemetic and anti-diarrheal medications. I discussed the risk for development of anaphylactic shock, bronchospasm, dysrhythmia, and respiratory/cardiovascular arrest and/or failure.  I discussed the potential risks for development of alopecia, cold sensory issues, ringing in ears, vertigo and neuropathy, all of which could end up being chronic and life-long. I discussed the risks of hand-foot syndrome and rashes, and development of other autoimmune mediated processes such as pneumonitis, hepatitis and colitis which could potentially be life threatening. I discussed the risks of the potential development of a rare but fatal viral mediated disease known as PML (Progressive Multifocal Leukoencephalopathy), and risk of future development of leukemia and/or lymphoma from use of certain immunotherapy agents. I discussed the possibility that immunologic therapy cold worsen or promote progression of any underlying autoimmune diseases such as sarcoidosis, ulcerative colitis, Crohn's disease, psoriasis, rheumatoid disorders, scleroderma, autoimmune nephritis disorders, Hashimoto's thyroiditis, and Lupus among others. I discussed the need for neutropenic precautions, basic hygiene/sanitation behaviors and dietary restrictions.    The patient's consent has been obtained to proceed with the immunotherapy.The patient will be referred to [facility] /[facility] for training and education on immunotherapy, use of antiemetics and/or anti-diarrheals, use of NSAID's, potential immunotherapy side-effects, and any specific recommendations and precautions with the particular immunotherapy agents.      I answered all of the patient's (and family's, if applicable) questions to the best of my ability and to their complete satisfaction. The patient acknowledged full understanding of the risks, recommendations and plan(s).      Iron Infusion Therapy Discussion:     I provided literature/learning materials on the particular IV iron regimen and discussed the potential side-effect profiles of the drug(s). I discussed the importance of compliance with obtaining and monitoring requested lab work, and went over  the potential risk for the development of anaphylactic shock, bronchospasm, dysrhythmia, liver and/or kidney damage, and respiratory/cardiovascular arrest and/or failure. I discussed the potential risks for development of alopecia, fevers, itching, chills and/or rigors, cold sensory issues, ringing in ears, vertigo and neuropathy, all of which are usually acute but sometimes could end up being chronic and life-long. I discussed the risks of hand-foot syndrome and rashes, and development of other autoimmune mediated processes such as pneumonitis and colitis which could be life threatening.     The patient's consent has been obtained to proceed with the IV iron therapy.The patient will be referred to [facility] /[facility] for training and education on IV iron therapy, use of antiemetics and/or anti-diarrheals, use of NSAID's, potential IV iron therapy side-effects, and any specific recommendations and precautions with the particular IV iron agents.      I answered all of the patient's (and family's, if applicable) questions to the best of my ability and to their complete satisfaction. The patient acknowledged full understanding of the risks, recommendations and plan(s).          I answered all of the patient's (and family's, if applicable) questions to the best of my ability and to their complete satisfaction. The patient acknowledged full understanding of the risks, recommendations and plan(s).         Electronically signed by Drew Bai MD

## 2022-09-29 ENCOUNTER — TELEPHONE (OUTPATIENT)
Dept: HEMATOLOGY/ONCOLOGY | Facility: CLINIC | Age: 82
End: 2022-09-29

## 2022-09-29 ENCOUNTER — LAB VISIT (OUTPATIENT)
Dept: LAB | Facility: HOSPITAL | Age: 82
End: 2022-09-29
Attending: INTERNAL MEDICINE
Payer: MEDICARE

## 2022-09-29 ENCOUNTER — OFFICE VISIT (OUTPATIENT)
Dept: HEMATOLOGY/ONCOLOGY | Facility: CLINIC | Age: 82
End: 2022-09-29
Payer: MEDICARE

## 2022-09-29 VITALS
BODY MASS INDEX: 25.41 KG/M2 | DIASTOLIC BLOOD PRESSURE: 69 MMHG | RESPIRATION RATE: 20 BRPM | TEMPERATURE: 98 F | HEIGHT: 74 IN | HEART RATE: 66 BPM | WEIGHT: 198 LBS | SYSTOLIC BLOOD PRESSURE: 164 MMHG

## 2022-09-29 DIAGNOSIS — Z85.118 HISTORY OF LUNG CANCER: ICD-10-CM

## 2022-09-29 DIAGNOSIS — D50.9 IRON DEFICIENCY ANEMIA, UNSPECIFIED IRON DEFICIENCY ANEMIA TYPE: ICD-10-CM

## 2022-09-29 DIAGNOSIS — D61.818 PANCYTOPENIA: ICD-10-CM

## 2022-09-29 DIAGNOSIS — C91.10 CLL (CHRONIC LYMPHOCYTIC LEUKEMIA): ICD-10-CM

## 2022-09-29 DIAGNOSIS — C91.10 CLL (CHRONIC LYMPHOCYTIC LEUKEMIA): Primary | ICD-10-CM

## 2022-09-29 DIAGNOSIS — E61.1 IRON DEFICIENCY: ICD-10-CM

## 2022-09-29 DIAGNOSIS — D69.6 THROMBOCYTOPENIA: ICD-10-CM

## 2022-09-29 LAB
ALBUMIN SERPL BCP-MCNC: 4.3 G/DL (ref 3.5–5.2)
ALP SERPL-CCNC: 58 U/L (ref 55–135)
ALT SERPL W/O P-5'-P-CCNC: 14 U/L (ref 10–44)
ANION GAP SERPL CALC-SCNC: 7 MMOL/L (ref 8–16)
AST SERPL-CCNC: 18 U/L (ref 10–40)
BASOPHILS NFR BLD: 1 % (ref 0–1.9)
BILIRUB SERPL-MCNC: 1.3 MG/DL (ref 0.1–1)
BUN SERPL-MCNC: 12 MG/DL (ref 8–23)
CALCIUM SERPL-MCNC: 9.2 MG/DL (ref 8.7–10.5)
CEA SERPL-MCNC: 0.9 NG/ML (ref 0–5)
CHLORIDE SERPL-SCNC: 103 MMOL/L (ref 95–110)
CO2 SERPL-SCNC: 29 MMOL/L (ref 23–29)
CREAT SERPL-MCNC: 0.9 MG/DL (ref 0.5–1.4)
DIFFERENTIAL METHOD: ABNORMAL
EOSINOPHIL NFR BLD: 5 % (ref 0–8)
ERYTHROCYTE [DISTWIDTH] IN BLOOD BY AUTOMATED COUNT: 14.5 % (ref 11.5–14.5)
EST. GFR  (NO RACE VARIABLE): >60 ML/MIN/1.73 M^2
FERRITIN SERPL-MCNC: 19 NG/ML (ref 20–300)
GLUCOSE SERPL-MCNC: 161 MG/DL (ref 70–110)
HCT VFR BLD AUTO: 37.8 % (ref 40–54)
HGB BLD-MCNC: 13.1 G/DL (ref 14–18)
IMM GRANULOCYTES # BLD AUTO: ABNORMAL K/UL (ref 0–0.04)
IMM GRANULOCYTES NFR BLD AUTO: ABNORMAL % (ref 0–0.5)
IRON SERPL-MCNC: 84 UG/DL (ref 45–160)
LYMPHOCYTES NFR BLD: 59 % (ref 18–48)
MCH RBC QN AUTO: 31 PG (ref 27–31)
MCHC RBC AUTO-ENTMCNC: 34.7 G/DL (ref 32–36)
MCV RBC AUTO: 89 FL (ref 82–98)
MONOCYTES NFR BLD: 12 % (ref 4–15)
NEUTROPHILS NFR BLD: 20 % (ref 38–73)
NEUTS BAND NFR BLD MANUAL: 3 %
NRBC BLD-RTO: 0 /100 WBC
PLATELET # BLD AUTO: 58 K/UL (ref 150–450)
PLATELET BLD QL SMEAR: ABNORMAL
PMV BLD AUTO: 10.6 FL (ref 9.2–12.9)
POTASSIUM SERPL-SCNC: 3.6 MMOL/L (ref 3.5–5.1)
PROT SERPL-MCNC: 7 G/DL (ref 6–8.4)
RBC # BLD AUTO: 4.23 M/UL (ref 4.6–6.2)
SATURATED IRON: 20 % (ref 20–50)
SODIUM SERPL-SCNC: 139 MMOL/L (ref 136–145)
TOTAL IRON BINDING CAPACITY: 414 UG/DL (ref 250–450)
TRANSFERRIN SERPL-MCNC: 296 MG/DL (ref 200–375)
WBC # BLD AUTO: 4.37 K/UL (ref 3.9–12.7)

## 2022-09-29 PROCEDURE — 80053 COMPREHEN METABOLIC PANEL: CPT | Performed by: INTERNAL MEDICINE

## 2022-09-29 PROCEDURE — 36415 COLL VENOUS BLD VENIPUNCTURE: CPT | Performed by: INTERNAL MEDICINE

## 2022-09-29 PROCEDURE — 84466 ASSAY OF TRANSFERRIN: CPT | Performed by: INTERNAL MEDICINE

## 2022-09-29 PROCEDURE — 82378 CARCINOEMBRYONIC ANTIGEN: CPT | Performed by: INTERNAL MEDICINE

## 2022-09-29 PROCEDURE — 99214 OFFICE O/P EST MOD 30 MIN: CPT | Mod: S$GLB,,, | Performed by: INTERNAL MEDICINE

## 2022-09-29 PROCEDURE — 85007 BL SMEAR W/DIFF WBC COUNT: CPT | Performed by: INTERNAL MEDICINE

## 2022-09-29 PROCEDURE — 82728 ASSAY OF FERRITIN: CPT | Performed by: INTERNAL MEDICINE

## 2022-09-29 PROCEDURE — 85027 COMPLETE CBC AUTOMATED: CPT | Performed by: INTERNAL MEDICINE

## 2022-09-29 PROCEDURE — 99214 PR OFFICE/OUTPT VISIT, EST, LEVL IV, 30-39 MIN: ICD-10-PCS | Mod: S$GLB,,, | Performed by: INTERNAL MEDICINE

## 2022-09-29 RX ORDER — IRON,CARBONYL/ASCORBIC ACID 100-250 MG
1 TABLET ORAL DAILY
Qty: 30 EACH | Refills: 6 | Status: ON HOLD | OUTPATIENT
Start: 2022-09-29 | End: 2023-01-01 | Stop reason: HOSPADM

## 2022-09-29 RX ORDER — ONDANSETRON HYDROCHLORIDE 8 MG/1
8 TABLET, FILM COATED ORAL EVERY 12 HOURS PRN
Qty: 30 TABLET | Refills: 2 | Status: SHIPPED | OUTPATIENT
Start: 2022-09-29 | End: 2023-01-01 | Stop reason: SDUPTHER

## 2022-09-29 NOTE — TELEPHONE ENCOUNTER
Per Dr. Don, check patient's labs regularly and set up scans every 3-6 months. No need to start treatment at this time.

## 2022-11-04 ENCOUNTER — HOSPITAL ENCOUNTER (INPATIENT)
Facility: HOSPITAL | Age: 82
LOS: 2 days | Discharge: HOME | DRG: 871 | End: 2022-11-06
Attending: STUDENT IN AN ORGANIZED HEALTH CARE EDUCATION/TRAINING PROGRAM | Admitting: STUDENT IN AN ORGANIZED HEALTH CARE EDUCATION/TRAINING PROGRAM
Payer: MEDICARE

## 2022-11-04 DIAGNOSIS — R50.9 FEVER: ICD-10-CM

## 2022-11-04 DIAGNOSIS — J18.9 PNEUMONIA DUE TO INFECTIOUS ORGANISM, UNSPECIFIED LATERALITY, UNSPECIFIED PART OF LUNG: ICD-10-CM

## 2022-11-04 DIAGNOSIS — R09.02 HYPOXIA: ICD-10-CM

## 2022-11-04 DIAGNOSIS — R05.9 COUGH, UNSPECIFIED TYPE: Primary | ICD-10-CM

## 2022-11-04 DIAGNOSIS — R00.0 TACHYCARDIA: ICD-10-CM

## 2022-11-04 LAB
ALBUMIN SERPL BCP-MCNC: 3.8 G/DL (ref 3.5–5.2)
ALLENS TEST: ABNORMAL
ALP SERPL-CCNC: 50 U/L (ref 55–135)
ALT SERPL W/O P-5'-P-CCNC: 11 U/L (ref 10–44)
ANION GAP SERPL CALC-SCNC: 9 MMOL/L (ref 8–16)
ANISOCYTOSIS BLD QL SMEAR: SLIGHT
AST SERPL-CCNC: 17 U/L (ref 10–40)
BACTERIA #/AREA URNS HPF: NEGATIVE /HPF
BASOPHILS NFR BLD: 0 % (ref 0–1.9)
BILIRUB SERPL-MCNC: 1.4 MG/DL (ref 0.1–1)
BILIRUB UR QL STRIP: NEGATIVE
BNP SERPL-MCNC: 176 PG/ML (ref 0–99)
BUN SERPL-MCNC: 12 MG/DL (ref 8–23)
CALCIUM SERPL-MCNC: 8.8 MG/DL (ref 8.7–10.5)
CHLORIDE SERPL-SCNC: 99 MMOL/L (ref 95–110)
CLARITY UR: CLEAR
CO2 SERPL-SCNC: 25 MMOL/L (ref 23–29)
COLOR UR: YELLOW
CREAT SERPL-MCNC: 0.9 MG/DL (ref 0.5–1.4)
DELSYS: ABNORMAL
DIFFERENTIAL METHOD: ABNORMAL
EOSINOPHIL NFR BLD: 1 % (ref 0–8)
ERYTHROCYTE [DISTWIDTH] IN BLOOD BY AUTOMATED COUNT: 14.3 % (ref 11.5–14.5)
EST. GFR  (NO RACE VARIABLE): >60 ML/MIN/1.73 M^2
FLOW: 3
GLUCOSE SERPL-MCNC: 133 MG/DL (ref 70–110)
GLUCOSE UR QL STRIP: NEGATIVE
HCO3 UR-SCNC: 25 MMOL/L (ref 24–28)
HCT VFR BLD AUTO: 36.3 % (ref 40–54)
HGB BLD-MCNC: 13.1 G/DL (ref 14–18)
HGB UR QL STRIP: NEGATIVE
HYALINE CASTS #/AREA URNS LPF: 4 /LPF
IMM GRANULOCYTES # BLD AUTO: ABNORMAL K/UL (ref 0–0.04)
IMM GRANULOCYTES NFR BLD AUTO: ABNORMAL % (ref 0–0.5)
INFLUENZA A, MOLECULAR: NEGATIVE
INFLUENZA B, MOLECULAR: NEGATIVE
KETONES UR QL STRIP: ABNORMAL
LACTATE SERPL-SCNC: 1.1 MMOL/L (ref 0.5–1.9)
LEUKOCYTE ESTERASE UR QL STRIP: NEGATIVE
LYMPHOCYTES NFR BLD: 44 % (ref 18–48)
MCH RBC QN AUTO: 31.1 PG (ref 27–31)
MCHC RBC AUTO-ENTMCNC: 36.1 G/DL (ref 32–36)
MCV RBC AUTO: 86 FL (ref 82–98)
MICROSCOPIC COMMENT: ABNORMAL
MODE: ABNORMAL
MONOCYTES NFR BLD: 12 % (ref 4–15)
NEUTROPHILS NFR BLD: 40 % (ref 38–73)
NEUTS BAND NFR BLD MANUAL: 3 %
NITRITE UR QL STRIP: NEGATIVE
NRBC BLD-RTO: 0 /100 WBC
PCO2 BLDA: 38.4 MMHG (ref 35–45)
PH SMN: 7.42 [PH] (ref 7.35–7.45)
PH UR STRIP: 6 [PH] (ref 5–8)
PLATELET # BLD AUTO: 56 K/UL (ref 150–450)
PLATELET BLD QL SMEAR: ABNORMAL
PMV BLD AUTO: 10.5 FL (ref 9.2–12.9)
PO2 BLDA: 33 MMHG (ref 40–60)
POC BE: 0 MMOL/L
POC SATURATED O2: 65 % (ref 95–100)
POC TCO2: 26 MMOL/L (ref 24–29)
POTASSIUM SERPL-SCNC: 3.9 MMOL/L (ref 3.5–5.1)
PROT SERPL-MCNC: 6.8 G/DL (ref 6–8.4)
PROT UR QL STRIP: ABNORMAL
RBC # BLD AUTO: 4.21 M/UL (ref 4.6–6.2)
RBC #/AREA URNS HPF: 1 /HPF (ref 0–4)
SAMPLE: ABNORMAL
SARS-COV-2 RDRP RESP QL NAA+PROBE: NEGATIVE
SITE: ABNORMAL
SODIUM SERPL-SCNC: 133 MMOL/L (ref 136–145)
SP GR UR STRIP: 1.02 (ref 1–1.03)
SP02: 93
SPECIMEN SOURCE: NORMAL
SQUAMOUS #/AREA URNS HPF: 1 /HPF
URN SPEC COLLECT METH UR: ABNORMAL
UROBILINOGEN UR STRIP-ACNC: NEGATIVE EU/DL
WBC # BLD AUTO: 11.52 K/UL (ref 3.9–12.7)
WBC #/AREA URNS HPF: 0 /HPF (ref 0–5)

## 2022-11-04 PROCEDURE — 12000002 HC ACUTE/MED SURGE SEMI-PRIVATE ROOM

## 2022-11-04 PROCEDURE — 63600175 PHARM REV CODE 636 W HCPCS: Performed by: STUDENT IN AN ORGANIZED HEALTH CARE EDUCATION/TRAINING PROGRAM

## 2022-11-04 PROCEDURE — 25000003 PHARM REV CODE 250: Performed by: STUDENT IN AN ORGANIZED HEALTH CARE EDUCATION/TRAINING PROGRAM

## 2022-11-04 PROCEDURE — 83605 ASSAY OF LACTIC ACID: CPT | Performed by: STUDENT IN AN ORGANIZED HEALTH CARE EDUCATION/TRAINING PROGRAM

## 2022-11-04 PROCEDURE — 82803 BLOOD GASES ANY COMBINATION: CPT

## 2022-11-04 PROCEDURE — 96367 TX/PROPH/DG ADDL SEQ IV INF: CPT

## 2022-11-04 PROCEDURE — 85027 COMPLETE CBC AUTOMATED: CPT | Performed by: STUDENT IN AN ORGANIZED HEALTH CARE EDUCATION/TRAINING PROGRAM

## 2022-11-04 PROCEDURE — 96365 THER/PROPH/DIAG IV INF INIT: CPT

## 2022-11-04 PROCEDURE — 87040 BLOOD CULTURE FOR BACTERIA: CPT | Mod: 59 | Performed by: STUDENT IN AN ORGANIZED HEALTH CARE EDUCATION/TRAINING PROGRAM

## 2022-11-04 PROCEDURE — 94799 UNLISTED PULMONARY SVC/PX: CPT

## 2022-11-04 PROCEDURE — 94760 N-INVAS EAR/PLS OXIMETRY 1: CPT

## 2022-11-04 PROCEDURE — 80053 COMPREHEN METABOLIC PANEL: CPT | Performed by: STUDENT IN AN ORGANIZED HEALTH CARE EDUCATION/TRAINING PROGRAM

## 2022-11-04 PROCEDURE — 87502 INFLUENZA DNA AMP PROBE: CPT | Performed by: STUDENT IN AN ORGANIZED HEALTH CARE EDUCATION/TRAINING PROGRAM

## 2022-11-04 PROCEDURE — 96375 TX/PRO/DX INJ NEW DRUG ADDON: CPT

## 2022-11-04 PROCEDURE — 83880 ASSAY OF NATRIURETIC PEPTIDE: CPT | Performed by: STUDENT IN AN ORGANIZED HEALTH CARE EDUCATION/TRAINING PROGRAM

## 2022-11-04 PROCEDURE — 25000242 PHARM REV CODE 250 ALT 637 W/ HCPCS: Performed by: STUDENT IN AN ORGANIZED HEALTH CARE EDUCATION/TRAINING PROGRAM

## 2022-11-04 PROCEDURE — 85007 BL SMEAR W/DIFF WBC COUNT: CPT | Performed by: STUDENT IN AN ORGANIZED HEALTH CARE EDUCATION/TRAINING PROGRAM

## 2022-11-04 PROCEDURE — 81001 URINALYSIS AUTO W/SCOPE: CPT | Performed by: STUDENT IN AN ORGANIZED HEALTH CARE EDUCATION/TRAINING PROGRAM

## 2022-11-04 PROCEDURE — 93010 EKG 12-LEAD: ICD-10-PCS | Mod: ,,, | Performed by: INTERNAL MEDICINE

## 2022-11-04 PROCEDURE — U0002 COVID-19 LAB TEST NON-CDC: HCPCS | Performed by: STUDENT IN AN ORGANIZED HEALTH CARE EDUCATION/TRAINING PROGRAM

## 2022-11-04 PROCEDURE — 93010 ELECTROCARDIOGRAM REPORT: CPT | Mod: ,,, | Performed by: INTERNAL MEDICINE

## 2022-11-04 PROCEDURE — 93005 ELECTROCARDIOGRAM TRACING: CPT | Performed by: INTERNAL MEDICINE

## 2022-11-04 PROCEDURE — 94640 AIRWAY INHALATION TREATMENT: CPT

## 2022-11-04 PROCEDURE — 99285 EMERGENCY DEPT VISIT HI MDM: CPT | Mod: 25

## 2022-11-04 PROCEDURE — 27000221 HC OXYGEN, UP TO 24 HOURS

## 2022-11-04 PROCEDURE — 99900035 HC TECH TIME PER 15 MIN (STAT)

## 2022-11-04 PROCEDURE — 94761 N-INVAS EAR/PLS OXIMETRY MLT: CPT

## 2022-11-04 PROCEDURE — 99900031 HC PATIENT EDUCATION (STAT)

## 2022-11-04 RX ORDER — TRAZODONE HYDROCHLORIDE 50 MG/1
150 TABLET ORAL NIGHTLY
Status: DISCONTINUED | OUTPATIENT
Start: 2022-11-04 | End: 2022-11-06 | Stop reason: HOSPADM

## 2022-11-04 RX ORDER — HYDROCODONE BITARTRATE AND ACETAMINOPHEN 5; 325 MG/1; MG/1
1 TABLET ORAL EVERY 4 HOURS PRN
Status: DISCONTINUED | OUTPATIENT
Start: 2022-11-04 | End: 2022-11-06 | Stop reason: HOSPADM

## 2022-11-04 RX ORDER — SODIUM CHLORIDE 0.9 % (FLUSH) 0.9 %
10 SYRINGE (ML) INJECTION EVERY 6 HOURS PRN
Status: DISCONTINUED | OUTPATIENT
Start: 2022-11-04 | End: 2022-11-06 | Stop reason: HOSPADM

## 2022-11-04 RX ORDER — ONDANSETRON 4 MG/1
8 TABLET, ORALLY DISINTEGRATING ORAL EVERY 12 HOURS PRN
Status: DISCONTINUED | OUTPATIENT
Start: 2022-11-04 | End: 2022-11-06 | Stop reason: HOSPADM

## 2022-11-04 RX ORDER — PROCHLORPERAZINE EDISYLATE 5 MG/ML
5 INJECTION INTRAMUSCULAR; INTRAVENOUS EVERY 6 HOURS PRN
Status: DISCONTINUED | OUTPATIENT
Start: 2022-11-04 | End: 2022-11-06 | Stop reason: HOSPADM

## 2022-11-04 RX ORDER — POLYETHYLENE GLYCOL 3350 17 G/17G
17 POWDER, FOR SOLUTION ORAL DAILY
Status: DISCONTINUED | OUTPATIENT
Start: 2022-11-05 | End: 2022-11-06 | Stop reason: HOSPADM

## 2022-11-04 RX ORDER — TALC
6 POWDER (GRAM) TOPICAL NIGHTLY PRN
Status: DISCONTINUED | OUTPATIENT
Start: 2022-11-04 | End: 2022-11-06 | Stop reason: HOSPADM

## 2022-11-04 RX ORDER — ONDANSETRON 2 MG/ML
4 INJECTION INTRAMUSCULAR; INTRAVENOUS EVERY 8 HOURS PRN
Status: DISCONTINUED | OUTPATIENT
Start: 2022-11-04 | End: 2022-11-06 | Stop reason: HOSPADM

## 2022-11-04 RX ORDER — METHYLPREDNISOLONE SOD SUCC 125 MG
125 VIAL (EA) INJECTION
Status: COMPLETED | OUTPATIENT
Start: 2022-11-04 | End: 2022-11-04

## 2022-11-04 RX ORDER — MORPHINE SULFATE 2 MG/ML
2 INJECTION, SOLUTION INTRAMUSCULAR; INTRAVENOUS EVERY 4 HOURS PRN
Status: DISCONTINUED | OUTPATIENT
Start: 2022-11-04 | End: 2022-11-06 | Stop reason: HOSPADM

## 2022-11-04 RX ORDER — METOPROLOL TARTRATE 25 MG/1
25 TABLET, FILM COATED ORAL 2 TIMES DAILY
Status: DISCONTINUED | OUTPATIENT
Start: 2022-11-04 | End: 2022-11-06 | Stop reason: HOSPADM

## 2022-11-04 RX ORDER — ENOXAPARIN SODIUM 100 MG/ML
40 INJECTION SUBCUTANEOUS EVERY 24 HOURS
Status: DISCONTINUED | OUTPATIENT
Start: 2022-11-04 | End: 2022-11-06 | Stop reason: HOSPADM

## 2022-11-04 RX ORDER — IPRATROPIUM BROMIDE AND ALBUTEROL SULFATE 2.5; .5 MG/3ML; MG/3ML
3 SOLUTION RESPIRATORY (INHALATION)
Status: COMPLETED | OUTPATIENT
Start: 2022-11-04 | End: 2022-11-04

## 2022-11-04 RX ORDER — TRAZODONE HYDROCHLORIDE 300 MG/1
300 TABLET ORAL NIGHTLY
Status: ON HOLD | COMMUNITY
Start: 2022-10-04 | End: 2023-01-01

## 2022-11-04 RX ORDER — ACETAMINOPHEN 325 MG/1
650 TABLET ORAL EVERY 8 HOURS PRN
Status: DISCONTINUED | OUTPATIENT
Start: 2022-11-04 | End: 2022-11-06 | Stop reason: HOSPADM

## 2022-11-04 RX ORDER — ACETAMINOPHEN 500 MG
1000 TABLET ORAL
Status: COMPLETED | OUTPATIENT
Start: 2022-11-04 | End: 2022-11-04

## 2022-11-04 RX ORDER — IBUPROFEN 400 MG/1
800 TABLET ORAL
Status: COMPLETED | OUTPATIENT
Start: 2022-11-04 | End: 2022-11-04

## 2022-11-04 RX ORDER — GABAPENTIN 600 MG/1
600 TABLET ORAL 2 TIMES DAILY
Status: ON HOLD | COMMUNITY
Start: 2022-10-30 | End: 2023-01-01

## 2022-11-04 RX ORDER — GABAPENTIN 300 MG/1
600 CAPSULE ORAL 2 TIMES DAILY
Status: DISCONTINUED | OUTPATIENT
Start: 2022-11-04 | End: 2022-11-06 | Stop reason: HOSPADM

## 2022-11-04 RX ADMIN — IPRATROPIUM BROMIDE AND ALBUTEROL SULFATE 3 ML: .5; 3 SOLUTION RESPIRATORY (INHALATION) at 12:11

## 2022-11-04 RX ADMIN — ACETAMINOPHEN 1000 MG: 500 TABLET ORAL at 10:11

## 2022-11-04 RX ADMIN — HYDROCODONE BITARTRATE AND ACETAMINOPHEN 1 TABLET: 5; 325 TABLET ORAL at 08:11

## 2022-11-04 RX ADMIN — METOPROLOL TARTRATE 25 MG: 25 TABLET, FILM COATED ORAL at 08:11

## 2022-11-04 RX ADMIN — IBUPROFEN 800 MG: 400 TABLET, FILM COATED ORAL at 12:11

## 2022-11-04 RX ADMIN — AMPICILLIN AND SULBACTAM 3 G: 2; 1 INJECTION, POWDER, FOR SOLUTION INTRAVENOUS at 08:11

## 2022-11-04 RX ADMIN — GABAPENTIN 600 MG: 300 CAPSULE ORAL at 08:11

## 2022-11-04 RX ADMIN — TRAZODONE HYDROCHLORIDE 150 MG: 50 TABLET ORAL at 08:11

## 2022-11-04 RX ADMIN — ENOXAPARIN SODIUM 40 MG: 100 INJECTION SUBCUTANEOUS at 06:11

## 2022-11-04 RX ADMIN — AZITHROMYCIN 500 MG: 500 INJECTION, POWDER, LYOPHILIZED, FOR SOLUTION INTRAVENOUS at 12:11

## 2022-11-04 RX ADMIN — CEFTRIAXONE 1 G: 1 INJECTION, SOLUTION INTRAVENOUS at 11:11

## 2022-11-04 RX ADMIN — METHYLPREDNISOLONE SODIUM SUCCINATE 125 MG: 125 INJECTION, POWDER, FOR SOLUTION INTRAMUSCULAR; INTRAVENOUS at 12:11

## 2022-11-04 NOTE — ASSESSMENT & PLAN NOTE
Meets with leukocytosis, tachycardia, fever  Due to aspiration pneumonia  Lactic acid normal  Treating infection as noted above  Gentle IV fluid hydration  Follow clinical symptoms for improvement

## 2022-11-04 NOTE — CARE UPDATE
11/04/22 1053   Patient Assessment/Suction   Level of Consciousness (AVPU) alert   Respiratory Effort Normal;Unlabored   Expansion/Accessory Muscles/Retractions no use of accessory muscles   Rhythm/Pattern, Respiratory unlabored   PRE-TX-O2   O2 Device (Oxygen Therapy) nasal cannula   $ Is the patient on Low Flow Oxygen? Yes   Flow (L/min) 3   SpO2 (!) 92 %   Pulse Oximetry Type Continuous   Pulse 94   Resp (!) 26   Education   $ Education Other (see comment);15 min  (vbg)   Labs   $ Was an ABG obtained? Venous Puncture;ISTAT - Blood gas   $ Labs Tech Time 15 min   Critical Value Communication   Date Result Received 11/04/22   Time Result Received 1054   Resulting Department of Critical Value resp   Who communicated critical value from resulting department? k nick   Critical Test #1 po2   Critical Test #1 Result 33   Critical Test #2 so2   Critical Test #2 Result 65   Name of Notified Physician/Designee Dr Hoffman   Date Notified 11/04/22   Time Notified 1056   Read Back Verification Yes   Physician Directive no new orders

## 2022-11-04 NOTE — ED NOTES
APPEARS ILL. REDDENED SCLERA. NO RD. NOTED DECREASED L THROAT MOVEMENT WITH SWALLOWING WATER BUT TOLERATES WELL.  OLD FINDING PER FAMILY.  NO RD.  BREATHING EVEN AND NON LABORED. FEBRILE. NON DISTENDED ABDOMEN. FAMILY STATES FREQUENT URINATION. HOB ELEVATED FOR COMFORT.  STATES THINKS HAS PNEUMONIA. MAEW.  SKIN WDI. FALLS PRECAUTIONS AND BAND. FAMILY AT BS.  CALL LIGHT IN REACH.

## 2022-11-04 NOTE — ASSESSMENT & PLAN NOTE
This was done early this year and he has had dysphagia since his surgery.  He had a PEG tube placed previously due to severe dysphagia and was ultimately able to come off of this but continues to have difficulty swallowing

## 2022-11-04 NOTE — ASSESSMENT & PLAN NOTE
Patient with symptoms of aspiration and has had recurrent pneumonias over the past several months.  He meets sepsis criteria with leukocytosis and tachycardia as well as fever  - treat with Unasyn for aspiration pneumonia  - obtain sputum culture  - gentle IV fluids  - check procalcitonin trend  - speech evaluation for dysphagia

## 2022-11-04 NOTE — HPI
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 Kapadia who is presenting with recurrent cough and shortness of breath consistent with pneumonia.  The patient has had several bouts of pneumonia ever since a cervical spinal fusion earlier this year development of dysphagia.  I suspect that he is having recurrent aspiration pneumonia.  He says that he coughs with his food many times that he tries to position his mouth in such a way that he does not have aspiration events.  He has mostly done well at home but over the past 24 hours he developed coughing and worsening fever and chills.  He has been coughing up brownish sputum.  He denies chest pain.  He denies nausea or vomiting.  No lower extremity swelling.  No sick contacts.  He has not traveled.  He has had no changes to his medication regimen.  He has no other updates to his past medical, family, surgical, social history.    In the ED:  T 101.1° > 103.3 > 99.1, P 98, R 18, /80, O2 sat 92 % on 3 L, x-rays read as negative however on personal view the patient has evidence of right and left lower lobe infiltrates.  WBC 11.5, hemoglobin 13.1, sodium 133, creatinine 0.9.  Influenza a and B and COVID are negative.  The patient has evidence of mild sepsis due to aspiration pneumonia.  We will admit the patient for treatment and further evaluation with speech therapy for his dysphagia.

## 2022-11-04 NOTE — H&P
[facility] - Emergency Dept  Hospital Medicine  History & Physical    Patient Name: Conrad Kuhn  MRN: [id number]  Patient Class: IP- Inpatient  Admission Date: 11/4/2022  Attending Physician: Wilfredo Rendon MD   Primary Care Provider: Johnson Erazo MD         Patient information was obtained from patient and ER records.     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 Kapadia who is presenting with recurrent cough and shortness of breath consistent with pneumonia.  The patient has had several bouts of pneumonia ever since a cervical spinal fusion earlier this year development of dysphagia.  I suspect that he is having recurrent aspiration pneumonia.  He says that he coughs with his food many times that he tries to position his mouth in such a way that he does not have aspiration events.  He has mostly done well at home but over the past 24 hours he developed coughing and worsening fever and chills.  He has been coughing up brownish sputum.  He denies chest pain.  He denies nausea or vomiting.  No lower extremity swelling.  No sick contacts.  He has not traveled.  He has had no changes to his medication regimen.  He has no other updates to his past medical, family, surgical, social history.    In the ED:  T 101.1° > 103.3 > 99.1, P 98, R 18, /80, O2 sat 92 % on 3 L, x-rays read as negative however on personal view the patient has evidence of right and left lower lobe infiltrates.  WBC 11.5, hemoglobin 13.1, sodium 133, creatinine 0.9.  Influenza a and B and COVID are negative.  The patient has evidence of mild sepsis due to aspiration pneumonia.  We will admit  the patient for treatment and further evaluation with speech therapy for his dysphagia.      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     Right-sided Bell's palsy 2009    Spondylolisthesis     Varicose veins of legs     Venous insufficiency        Past Surgical History:   Procedure Laterality Date    Athroscopic surgery      On Knee    BIOPSY OF TISSUE OF NECK Left 08/2015    Recuurence CLL/small cell lyphoma    ESOPHAGEAL DILATION      ESOPHAGOGASTRODUODENOSCOPY N/A 4/15/2022    Procedure: EGD (ESOPHAGOGASTRODUODENOSCOPY);  Surgeon: Siddharth Garcia MD;  Location: [facility];  Service: Endoscopy;  Laterality: N/A;    ESOPHAGOGASTRODUODENOSCOPY N/A 4/16/2022    Procedure: EGD (ESOPHAGOGASTRODUODENOSCOPY);  Surgeon: Siddharth Garcia MD;  Location: [facility];  Service: Endoscopy;  Laterality: N/A;    ESOPHAGOGASTRODUODENOSCOPY N/A 6/23/2022    Procedure: EGD (ESOPHAGOGASTRODUODENOSCOPY);  Surgeon: Jefferson Hyman MD;  Location: [facility];  Service: Endoscopy;  Laterality: N/A;    ESOPHAGOGASTRODUODENOSCOPY W/ PEG N/A 4/16/2022    Procedure: EGD, WITH PEG TUBE INSERTION;  Surgeon: Siddharth Garcia MD;  Location: [facility];  Service: Endoscopy;  Laterality: N/A;    GASTROSTOMY TUBE PLACEMENT  04/16/2022    20 Fr (bumper initially at 3.5)    Hemorrhoid resection  1979    LUNG LOBECTOMY Left 2004    NECK SURGERY  04/08/2022    Anterior  and Posterior C3-C7 fusion    OTHER SURGICAL HISTORY  2006    Chemotherapy s/p lyphoma        Portacath implantation and removal      reverse shoulder arthroplasty Right 03/2021       Review of patient's allergies indicates:  No Known Allergies    Current Facility-Administered Medications on File Prior to Encounter   Medication    EPINEPHrine (EPIPEN) 0.3 mg/0.3 mL pen injection 0.3 mg     Current Outpatient Medications on File Prior to Encounter   Medication Sig    albuterol-ipratropium (DUO-NEB) 2.5 mg-0.5 mg/3 mL nebulizer solution Take 3 mLs by nebulization every 8 (eight) hours as needed.    azelastine (ASTELIN) 137 mcg (0.1 %) nasal spray 1 spray by Nasal route 2 (two) times daily.     gabapentin (NEURONTIN) 600 MG tablet Take 600 mg by mouth 2 (two) times daily.    glimepiride (AMARYL) 2 MG tablet Take 2 mg by mouth once daily at 6am.    iron-vitamin C 100-250 mg, ICAR-C, (ICAR-C) 100-250 mg Tab Take 1 tablet by mouth once daily.    levalbuterol (XOPENEX) 1.25 mg/3 mL nebulizer solution Take 3 mLs by nebulization every 4 to 6 hours as needed.     metoprolol tartrate (LOPRESSOR) 25 MG tablet 1 tablet (25 mg total) by Per G Tube route 2 (two) times daily.    ondansetron (ZOFRAN) 8 MG tablet Take 1 tablet (8 mg total) by mouth every 12 (twelve) hours as needed for Nausea.    traZODone (DESYREL) 300 MG tablet Take 300 mg by mouth every evening.    blood sugar diagnostic Strp 1 strip by Misc.(Non-Drug; Combo Route) route 2 (two) times a day.    blood-glucose meter kit Use as instructed    [DISCONTINUED] esomeprazole (NEXIUM) 40 MG capsule Take 1 capsule (40 mg total) by mouth 2 (two) times daily.    [DISCONTINUED] gabapentin (NEURONTIN) 250 mg/5 mL (5 mL) solution Take 300 mg by mouth 2 (two) times daily.     [DISCONTINUED] HYDROcodone-acetaminophen (NORCO)  mg per tablet Take 1 tablet by mouth every 8 (eight) hours as needed.     [DISCONTINUED] losartan (COZAAR) 25 MG tablet Take 25 mg by  mouth every evening.    [DISCONTINUED] metFORMIN (GLUCOPHAGE) 500 MG tablet Take 500 mg by mouth 2 (two) times daily with meals.     [DISCONTINUED] metoprolol succinate (TOPROL-XL) 25 MG 24 hr tablet Take 25 mg by mouth 2 (two) times daily.     [DISCONTINUED] traZODone (DESYREL) 100 MG tablet TAKE 1 TABLET BY MOUTH EVERY NIGHT AT BEDTIME (Patient taking differently: Take 100 mg by mouth every evening.)    [DISCONTINUED] valACYclovir (VALTREX) 1000 MG tablet Take 1,000 mg by mouth 2 (two) times daily.     Family History       Problem Relation (Age of Onset)    Colon cancer Father    Stomach cancer Mother (80)          Tobacco Use    Smoking status: Former    Smokeless tobacco: Never   Substance and Sexual Activity    Alcohol use: Yes    Drug use: No    Sexual activity: Not Currently     Review of Systems   All other systems reviewed and are negative.  Per HPI otherwise 12 point review systems negative  Objective:     Vital Signs (Most Recent):  Temp: 99.1 °F (37.3 °C) (11/04/22 1258)  Pulse: 80 (11/04/22 1301)  Resp: (!) 26 (11/04/22 1301)  BP: (!) 141/65 (11/04/22 1300)  SpO2: 97 % (11/04/22 1240)   Vital Signs (24h Range):  Temp:  [99.1 °F (37.3 °C)-103.3 °F (39.6 °C)] 99.1 °F (37.3 °C)  Pulse:  [71-98] 80  Resp:  [18-31] 26  SpO2:  [91 %-97 %] 97 %  BP: (141-151)/(65-80) 141/65     Weight: 89.8 kg (198 lb)  Body mass index is 25.42 kg/m².    Physical Exam  Vitals reviewed.   Constitutional:       Appearance: Normal appearance.   HENT:      Head: Normocephalic and atraumatic.      Nose: Nose normal.      Mouth/Throat:      Mouth: Mucous membranes are moist.   Cardiovascular:      Rate and Rhythm: Normal rate and regular rhythm.      Pulses: Normal pulses.      Heart sounds: Normal heart sounds. No murmur heard.    No friction rub. No gallop.   Pulmonary:      Effort: Pulmonary effort is normal. No respiratory distress.      Comments: He has slight crackles and some wheezes in bilateral lower lobes.  3 L of  oxygen.  Abdominal:      General: Abdomen is flat. Bowel sounds are normal. There is no distension.      Palpations: Abdomen is soft.      Tenderness: There is no abdominal tenderness.   Musculoskeletal:         General: No swelling. Normal range of motion.      Cervical back: Normal range of motion and neck supple.   Skin:     General: Skin is warm and dry.   Neurological:      General: No focal deficit present.      Mental Status: He is alert and oriented to person, place, and time.   Psychiatric:         Mood and Affect: Mood normal.         Behavior: Behavior normal.         Thought Content: Thought content normal.         Judgment: Judgment normal.           Significant Labs: All pertinent labs within the past 24 hours have been reviewed.    Significant Imaging: I have reviewed all pertinent imaging results/findings within the past 24 hours.    Assessment/Plan:     * Aspiration pneumonia  Patient with symptoms of aspiration and has had recurrent pneumonias over the past several months.  He meets sepsis criteria with leukocytosis and tachycardia as well as fever  - treat with Unasyn for aspiration pneumonia  - obtain sputum culture  - gentle IV fluids  - check procalcitonin trend  - speech evaluation for dysphagia      Acute hypoxemic respiratory failure  On 3 L of oxygen      Sepsis  Meets with leukocytosis, tachycardia, fever  Due to aspiration pneumonia  Lactic acid normal  Treating infection as noted above  Gentle IV fluid hydration  Follow clinical symptoms for improvement      Iron deficiency anemia  Continue iron supplementation      S/P cervical spinal fusion  This was done early this year and he has had dysphagia since his surgery.  He had a PEG tube placed previously due to severe dysphagia and was ultimately able to come off of this but continues to have difficulty swallowing      CLL (chronic lymphocytic leukemia)  Follows with Dr. Bai.  Will continue to follow outpatient.      History of lung  cancer - ANDRES NSCLC 2004  Continue to follow Dr. Bai outpatient.      VTE Risk Mitigation (From admission, onward)    None             Wilfredo Rendon MD  Department of Hospital Medicine   [facility] - Emergency Dept

## 2022-11-04 NOTE — ED PROVIDER NOTES
"Encounter Date: 11/4/2022       History     Chief Complaint   Patient presents with    Fever     Patient reports fever, congestion x 3 days      82-year-old male with history of CLL, COPD, not on home oxygen presents for a 3 day history of ongoing, intermittent cough, productive of clear sputum associated with fever and intermittent confusion.  His wife reports that he has been "speaking gibberish "earlier today and so she brought him in for further evaluation.  Patient reports that this feels like he has pneumonia.     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     Right-sided Bell's palsy 2009    Spondylolisthesis     Varicose veins of legs     Venous insufficiency      Past Surgical History:   Procedure Laterality Date    Athroscopic surgery      On Knee    BIOPSY OF TISSUE OF NECK Left 08/2015    Recuurence CLL/small cell lyphoma    ESOPHAGEAL DILATION      ESOPHAGOGASTRODUODENOSCOPY N/A 4/15/2022    Procedure: EGD (ESOPHAGOGASTRODUODENOSCOPY);  Surgeon: Siddharth Garcia MD;  Location: [facility];  Service: Endoscopy;  Laterality: N/A;    ESOPHAGOGASTRODUODENOSCOPY N/A 4/16/2022    Procedure: EGD (ESOPHAGOGASTRODUODENOSCOPY);  Surgeon: Siddharth Garcia MD;  Location: [facility];  Service: Endoscopy;  Laterality: N/A;    ESOPHAGOGASTRODUODENOSCOPY N/A 6/23/2022    Procedure: EGD " (ESOPHAGOGASTRODUODENOSCOPY);  Surgeon: Jefferson Hyman MD;  Location: [facility] ENDO;  Service: Endoscopy;  Laterality: N/A;    ESOPHAGOGASTRODUODENOSCOPY W/ PEG N/A 2022    Procedure: EGD, WITH PEG TUBE INSERTION;  Surgeon: Siddharth Garcia MD;  Location: [facility] ENDO;  Service: Endoscopy;  Laterality: N/A;    GASTROSTOMY TUBE PLACEMENT  2022    20 Fr (bumper initially at 3.5)    Hemorrhoid resection      LUNG LOBECTOMY Left     NECK SURGERY  2022    Anterior and Posterior C3-C7 fusion    OTHER SURGICAL HISTORY  2006    Chemotherapy s/p lyphoma        Portacath implantation and removal      reverse shoulder arthroplasty Right 2021     Family History   Problem Relation Age of Onset    Stomach cancer Mother 80         at 95    Colon cancer Father      Social History     Tobacco Use    Smoking status: Former    Smokeless tobacco: Never   Substance Use Topics    Alcohol use: Yes    Drug use: No     Review of Systems   Constitutional:  Positive for fever. Negative for activity change and appetite change.   HENT:  Negative for congestion and drooling.    Eyes:  Negative for discharge and itching.   Respiratory:  Positive for cough. Negative for chest tightness.    Cardiovascular:  Negative for chest pain and leg swelling.   Gastrointestinal:  Negative for abdominal distention and abdominal pain.   Genitourinary:  Negative for difficulty urinating and dysuria.   Musculoskeletal:  Negative for arthralgias.   Skin:  Negative for color change and pallor.   Neurological:  Negative for dizziness and facial asymmetry.   Psychiatric/Behavioral:  Negative for agitation and behavioral problems.      Physical Exam     Initial Vitals [22 1003]   BP Pulse Resp Temp SpO2   (!) 142/80 98 18 (!) 101.1 °F (38.4 °C) 95 %      MAP       --         Physical Exam    Nursing note and vitals reviewed.  Constitutional: He appears well-developed and well-nourished.   Pleasant, mildly confused 82-year-old male  wearing nasal cannula to maintain oxygen saturation above 92%   HENT:   Head: Normocephalic and atraumatic.   Mouth/Throat: Oropharynx is clear and moist.   Eyes: Conjunctivae and EOM are normal. Pupils are equal, round, and reactive to light.   Neck: No thyromegaly present.   Normal range of motion.  Cardiovascular:  Normal rate, regular rhythm and intact distal pulses.           Pulmonary/Chest: Breath sounds normal. No respiratory distress.   Abdominal: Abdomen is soft. Bowel sounds are normal. He exhibits no distension. There is no abdominal tenderness.   Musculoskeletal:         General: No tenderness or edema. Normal range of motion.      Cervical back: Normal range of motion.     Neurological: He is alert and oriented to person, place, and time. He has normal strength. No cranial nerve deficit.   Skin: Skin is warm and dry. No rash noted.   Psychiatric: He has a normal mood and affect. His behavior is normal. Thought content normal.       ED Course   Procedures  Labs Reviewed   CBC W/ AUTO DIFFERENTIAL - Abnormal; Notable for the following components:       Result Value    RBC 4.21 (*)     Hemoglobin 13.1 (*)     Hematocrit 36.3 (*)     MCH 31.1 (*)     MCHC 36.1 (*)     Platelets 56 (*)     Platelet Estimate Decreased (*)     All other components within normal limits   COMPREHENSIVE METABOLIC PANEL - Abnormal; Notable for the following components:    Sodium 133 (*)     Glucose 133 (*)     Total Bilirubin 1.4 (*)     Alkaline Phosphatase 50 (*)     All other components within normal limits   B-TYPE NATRIURETIC PEPTIDE - Abnormal; Notable for the following components:     (*)     All other components within normal limits   ISTAT PROCEDURE - Abnormal; Notable for the following components:    POC PO2 33 (*)     POC SATURATED O2 65 (*)     All other components within normal limits   CULTURE, BLOOD   CULTURE, BLOOD   LACTIC ACID, PLASMA   INFLUENZA A AND B ANTIGEN    Narrative:     Specimen  Source->Nasopharyngeal Swab   SARS-COV-2 RNA AMPLIFICATION, QUAL   URINALYSIS, REFLEX TO URINE CULTURE     EKG Readings: (Independently Interpreted)   EKG shows sinus rhythm, regular rate, no acute ST elevations or depressions, normal VT QRS and QT intervals.  No STEMI.  Interpreted by me.   ECG Results              EKG 12-lead (In process)  Result time 11/04/22 10:27:32      In process by Interface, [facility] (11/04/22 10:27:32)                   Narrative:    Test Reason : R00.0,    Vent. Rate : 084 BPM     Atrial Rate : 084 BPM     P-R Int : 182 ms          QRS Dur : 096 ms      QT Int : 354 ms       P-R-T Axes : 062 -47 083 degrees     QTc Int : 418 ms    Sinus rhythm with Premature supraventricular complexes  Possible Left atrial enlargement  Left anterior fascicular block  Septal infarct (cited on or before 03-AUG-2022)  Abnormal ECG  When compared with ECG of 03-AUG-2022 16:37,  Premature supraventricular complexes are now Present  Left anterior fascicular block is now Present  Questionable change in initial forces of Anterior-septal leads  Non-specific change in ST segment in Lateral leads    Referred By:             Confirmed By:                                   Imaging Results              X-Ray Chest AP Portable (Final result)  Result time 11/04/22 10:56:37      Final result by Colton Marrero MD (11/04/22 10:56:37)                   Narrative:    HISTORY: Fever, headache, dyspnea.    FINDINGS: Portable chest radiograph at 1028 hours compared to 08/03/2022 shows the cardiomediastinal silhouette and pulmonary vasculature are within normal limits.    The lungs are normally expanded, with no consolidation, large pleural effusion, or evidence of pulmonary edema. No confluent infiltrates or pneumothorax. The bones are diffusely osteopenic, with remote healed rib fractures, and right reverse glenohumeral arthroplasty without complicating features.    IMPRESSION: No evidence of acute cardiopulmonary  disease.    Electronically signed by:  Colton Marrero MD  11/4/2022 10:56 AM CDT Workstation: [id number]                                     Medications   azithromycin 500 mg in dextrose 5 % 250 mL IVPB (ready to mix system) (has no administration in time range)   ibuprofen tablet 800 mg (has no administration in time range)   methylPREDNISolone sodium succinate injection 125 mg (has no administration in time range)   albuterol-ipratropium 2.5 mg-0.5 mg/3 mL nebulizer solution 3 mL (has no administration in time range)   acetaminophen tablet 1,000 mg (1,000 mg Oral Given 11/4/22 1022)   cefTRIAXone (ROCEPHIN) 1 g/50 mL D5W IVPB (1 g Intravenous New Bag 11/4/22 1122)     Medical Decision Making:   Initial Assessment:   82-year-old male presents for cough, fever, intermittent confusion.  Vitals notable for fever, tachypnea and hypoxia to the 90s.  Exam notable for mild bibasilar wheezing consistent with a COPD.  Differential Diagnosis:   COPD exacerbation, pneumonia, influenza, other viral illness, sepsis, urinary tract infection at  ED Management:  Patient given broad-spectrum antibiotics with Rocephin and azithromycin for pneumonia. Fluid bolus held on a setting of knee normal blood pressure, hypoxia, chronic lung disease, do not want to cause volume overload in this patient.  Chest x-ray read as no acute cardiopulmonary abnormality, but it seems that there is a hazy infiltrate in the right lung base.  Flu is negative, COVID is negative, less concern for viral pneumonia in this patient.  Patient does not require oxygen at home, placed on 2 L nasal cannula to maintain sats above 92%.  Doubt PE as the source given patient is having productive cough more consistent with pneumonia.  Patient's BNP is mildly elevated 176, he is not fluid overloaded on my exam, but will hold IV fluids given patient is also hypoxic and tachypneic.  EKG without acute ischemic changes, troponin within normal limits, doubt ACS.  Will admit to  hospital medicine for pneumonia with intermittent confusion and hypoxia.           ED Course as of 11/04/22 1231   Fri Nov 04, 2022   1104 Fluid bolus with held given the patient is hypoxic, on supplemental oxygen, not hypotensive and not tachycardic. [BS]   1225 Influenza antigen Nasopharyngeal Swab [BS]   1225 COVID-19 Rapid Screening [BS]      ED Course User Index  [BS] Dwight Hoffman MD                 Clinical Impression:   Final diagnoses:  [R50.9] Fever  [R00.0] Tachycardia  [R05.9] Cough, unspecified type (Primary)  [R09.02] Hypoxia  [J18.9] Pneumonia due to infectious organism, unspecified laterality, unspecified part of lung        ED Disposition Condition    Admit Stable                Dwight Hoffman MD  11/04/22 1233

## 2022-11-04 NOTE — SUBJECTIVE & OBJECTIVE
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     Right-sided Bell's palsy 2009    Spondylolisthesis     Varicose veins of legs     Venous insufficiency        Past Surgical History:   Procedure Laterality Date    Athroscopic surgery      On Knee    BIOPSY OF TISSUE OF NECK Left 08/2015    Recuurence CLL/small cell lyphoma    ESOPHAGEAL DILATION      ESOPHAGOGASTRODUODENOSCOPY N/A 4/15/2022    Procedure: EGD (ESOPHAGOGASTRODUODENOSCOPY);  Surgeon: Siddharth Garcia MD;  Location: [facility];  Service: Endoscopy;  Laterality: N/A;    ESOPHAGOGASTRODUODENOSCOPY N/A 4/16/2022    Procedure: EGD (ESOPHAGOGASTRODUODENOSCOPY);  Surgeon: Siddharth Garcia MD;  Location: [facility];  Service: Endoscopy;  Laterality: N/A;    ESOPHAGOGASTRODUODENOSCOPY N/A 6/23/2022    Procedure: EGD (ESOPHAGOGASTRODUODENOSCOPY);  Surgeon: Jefferson Hyman MD;  Location: [facility];  Service: Endoscopy;  Laterality: N/A;    ESOPHAGOGASTRODUODENOSCOPY W/ PEG N/A 4/16/2022    Procedure: EGD, WITH PEG TUBE INSERTION;  Surgeon: Siddharth Garcia MD;  Location: [facility];  Service: Endoscopy;  Laterality: N/A;    GASTROSTOMY TUBE PLACEMENT  04/16/2022    20 Fr (bumper initially at 3.5)    Hemorrhoid resection  1979    LUNG LOBECTOMY Left 2004    NECK SURGERY  04/08/2022    Anterior and Posterior C3-C7 fusion    OTHER SURGICAL HISTORY  2006    Chemotherapy s/p lyphoma        Portacath implantation and  removal      reverse shoulder arthroplasty Right 03/2021       Review of patient's allergies indicates:  No Known Allergies    Current Facility-Administered Medications on File Prior to Encounter   Medication    EPINEPHrine (EPIPEN) 0.3 mg/0.3 mL pen injection 0.3 mg     Current Outpatient Medications on File Prior to Encounter   Medication Sig    albuterol-ipratropium (DUO-NEB) 2.5 mg-0.5 mg/3 mL nebulizer solution Take 3 mLs by nebulization every 8 (eight) hours as needed.    azelastine (ASTELIN) 137 mcg (0.1 %) nasal spray 1 spray by Nasal route 2 (two) times daily.     gabapentin (NEURONTIN) 600 MG tablet Take 600 mg by mouth 2 (two) times daily.    glimepiride (AMARYL) 2 MG tablet Take 2 mg by mouth once daily at 6am.    iron-vitamin C 100-250 mg, ICAR-C, (ICAR-C) 100-250 mg Tab Take 1 tablet by mouth once daily.    levalbuterol (XOPENEX) 1.25 mg/3 mL nebulizer solution Take 3 mLs by nebulization every 4 to 6 hours as needed.     metoprolol tartrate (LOPRESSOR) 25 MG tablet 1 tablet (25 mg total) by Per G Tube route 2 (two) times daily.    ondansetron (ZOFRAN) 8 MG tablet Take 1 tablet (8 mg total) by mouth every 12 (twelve) hours as needed for Nausea.    traZODone (DESYREL) 300 MG tablet Take 300 mg by mouth every evening.    blood sugar diagnostic Strp 1 strip by Misc.(Non-Drug; Combo Route) route 2 (two) times a day.    blood-glucose meter kit Use as instructed    [DISCONTINUED] esomeprazole (NEXIUM) 40 MG capsule Take 1 capsule (40 mg total) by mouth 2 (two) times daily.    [DISCONTINUED] gabapentin (NEURONTIN) 250 mg/5 mL (5 mL) solution Take 300 mg by mouth 2 (two) times daily.     [DISCONTINUED] HYDROcodone-acetaminophen (NORCO)  mg per tablet Take 1 tablet by mouth every 8 (eight) hours as needed.     [DISCONTINUED] losartan (COZAAR) 25 MG tablet Take 25 mg by mouth every evening.    [DISCONTINUED] metFORMIN (GLUCOPHAGE) 500 MG tablet Take 500 mg by mouth 2 (two) times daily with meals.      [DISCONTINUED] metoprolol succinate (TOPROL-XL) 25 MG 24 hr tablet Take 25 mg by mouth 2 (two) times daily.     [DISCONTINUED] traZODone (DESYREL) 100 MG tablet TAKE 1 TABLET BY MOUTH EVERY NIGHT AT BEDTIME (Patient taking differently: Take 100 mg by mouth every evening.)    [DISCONTINUED] valACYclovir (VALTREX) 1000 MG tablet Take 1,000 mg by mouth 2 (two) times daily.     Family History       Problem Relation (Age of Onset)    Colon cancer Father    Stomach cancer Mother (80)          Tobacco Use    Smoking status: Former    Smokeless tobacco: Never   Substance and Sexual Activity    Alcohol use: Yes    Drug use: No    Sexual activity: Not Currently     Review of Systems   All other systems reviewed and are negative.  Per HPI otherwise 12 point review systems negative  Objective:     Vital Signs (Most Recent):  Temp: 99.1 °F (37.3 °C) (11/04/22 1258)  Pulse: 80 (11/04/22 1301)  Resp: (!) 26 (11/04/22 1301)  BP: (!) 141/65 (11/04/22 1300)  SpO2: 97 % (11/04/22 1240)   Vital Signs (24h Range):  Temp:  [99.1 °F (37.3 °C)-103.3 °F (39.6 °C)] 99.1 °F (37.3 °C)  Pulse:  [71-98] 80  Resp:  [18-31] 26  SpO2:  [91 %-97 %] 97 %  BP: (141-151)/(65-80) 141/65     Weight: 89.8 kg (198 lb)  Body mass index is 25.42 kg/m².    Physical Exam  Vitals reviewed.   Constitutional:       Appearance: Normal appearance.   HENT:      Head: Normocephalic and atraumatic.      Nose: Nose normal.      Mouth/Throat:      Mouth: Mucous membranes are moist.   Cardiovascular:      Rate and Rhythm: Normal rate and regular rhythm.      Pulses: Normal pulses.      Heart sounds: Normal heart sounds. No murmur heard.    No friction rub. No gallop.   Pulmonary:      Effort: Pulmonary effort is normal. No respiratory distress.      Comments: He has slight crackles and some wheezes in bilateral lower lobes.  3 L of oxygen.  Abdominal:      General: Abdomen is flat. Bowel sounds are normal. There is no distension.      Palpations: Abdomen is soft.       Tenderness: There is no abdominal tenderness.   Musculoskeletal:         General: No swelling. Normal range of motion.      Cervical back: Normal range of motion and neck supple.   Skin:     General: Skin is warm and dry.   Neurological:      General: No focal deficit present.      Mental Status: He is alert and oriented to person, place, and time.   Psychiatric:         Mood and Affect: Mood normal.         Behavior: Behavior normal.         Thought Content: Thought content normal.         Judgment: Judgment normal.           Significant Labs: All pertinent labs within the past 24 hours have been reviewed.    Significant Imaging: I have reviewed all pertinent imaging results/findings within the past 24 hours.

## 2022-11-04 NOTE — PROGRESS NOTES
Pt arrived on unit to Rm 1103 from ER with aspiration pneumonia. Pt AAOx4, NAD, awake watching tv.

## 2022-11-05 LAB
ANION GAP SERPL CALC-SCNC: 7 MMOL/L (ref 8–16)
BASOPHILS NFR BLD: 0 % (ref 0–1.9)
BUN SERPL-MCNC: 19 MG/DL (ref 8–23)
CALCIUM SERPL-MCNC: 8.8 MG/DL (ref 8.7–10.5)
CHLORIDE SERPL-SCNC: 104 MMOL/L (ref 95–110)
CO2 SERPL-SCNC: 26 MMOL/L (ref 23–29)
CREAT SERPL-MCNC: 0.8 MG/DL (ref 0.5–1.4)
DIFFERENTIAL METHOD: ABNORMAL
EOSINOPHIL NFR BLD: 0 % (ref 0–8)
ERYTHROCYTE [DISTWIDTH] IN BLOOD BY AUTOMATED COUNT: 14.1 % (ref 11.5–14.5)
EST. GFR  (NO RACE VARIABLE): >60 ML/MIN/1.73 M^2
GLUCOSE SERPL-MCNC: 180 MG/DL (ref 70–110)
HCT VFR BLD AUTO: 36.5 % (ref 40–54)
HGB BLD-MCNC: 13 G/DL (ref 14–18)
IMM GRANULOCYTES # BLD AUTO: ABNORMAL K/UL (ref 0–0.04)
IMM GRANULOCYTES NFR BLD AUTO: ABNORMAL % (ref 0–0.5)
LYMPHOCYTES NFR BLD: 64 % (ref 18–48)
MCH RBC QN AUTO: 31.3 PG (ref 27–31)
MCHC RBC AUTO-ENTMCNC: 35.6 G/DL (ref 32–36)
MCV RBC AUTO: 88 FL (ref 82–98)
MONOCYTES NFR BLD: 1 % (ref 4–15)
NEUTROPHILS NFR BLD: 23 % (ref 38–73)
NEUTS BAND NFR BLD MANUAL: 12 %
NRBC BLD-RTO: 0 /100 WBC
PLATELET # BLD AUTO: 64 K/UL (ref 150–450)
PLATELET BLD QL SMEAR: ABNORMAL
PMV BLD AUTO: 11.5 FL (ref 9.2–12.9)
POTASSIUM SERPL-SCNC: 4 MMOL/L (ref 3.5–5.1)
RBC # BLD AUTO: 4.15 M/UL (ref 4.6–6.2)
SODIUM SERPL-SCNC: 137 MMOL/L (ref 136–145)
WBC # BLD AUTO: 9.92 K/UL (ref 3.9–12.7)

## 2022-11-05 PROCEDURE — 12000002 HC ACUTE/MED SURGE SEMI-PRIVATE ROOM

## 2022-11-05 PROCEDURE — 97165 OT EVAL LOW COMPLEX 30 MIN: CPT

## 2022-11-05 PROCEDURE — 85007 BL SMEAR W/DIFF WBC COUNT: CPT | Performed by: STUDENT IN AN ORGANIZED HEALTH CARE EDUCATION/TRAINING PROGRAM

## 2022-11-05 PROCEDURE — 97535 SELF CARE MNGMENT TRAINING: CPT

## 2022-11-05 PROCEDURE — 85027 COMPLETE CBC AUTOMATED: CPT | Performed by: STUDENT IN AN ORGANIZED HEALTH CARE EDUCATION/TRAINING PROGRAM

## 2022-11-05 PROCEDURE — 99900035 HC TECH TIME PER 15 MIN (STAT)

## 2022-11-05 PROCEDURE — 99900031 HC PATIENT EDUCATION (STAT)

## 2022-11-05 PROCEDURE — 36415 COLL VENOUS BLD VENIPUNCTURE: CPT | Performed by: STUDENT IN AN ORGANIZED HEALTH CARE EDUCATION/TRAINING PROGRAM

## 2022-11-05 PROCEDURE — 94761 N-INVAS EAR/PLS OXIMETRY MLT: CPT

## 2022-11-05 PROCEDURE — 80048 BASIC METABOLIC PNL TOTAL CA: CPT | Performed by: STUDENT IN AN ORGANIZED HEALTH CARE EDUCATION/TRAINING PROGRAM

## 2022-11-05 PROCEDURE — 94660 CPAP INITIATION&MGMT: CPT

## 2022-11-05 PROCEDURE — 63600175 PHARM REV CODE 636 W HCPCS: Performed by: STUDENT IN AN ORGANIZED HEALTH CARE EDUCATION/TRAINING PROGRAM

## 2022-11-05 PROCEDURE — 94799 UNLISTED PULMONARY SVC/PX: CPT

## 2022-11-05 PROCEDURE — 97116 GAIT TRAINING THERAPY: CPT

## 2022-11-05 PROCEDURE — 97161 PT EVAL LOW COMPLEX 20 MIN: CPT

## 2022-11-05 PROCEDURE — 25000003 PHARM REV CODE 250: Performed by: STUDENT IN AN ORGANIZED HEALTH CARE EDUCATION/TRAINING PROGRAM

## 2022-11-05 PROCEDURE — 92611 MOTION FLUOROSCOPY/SWALLOW: CPT

## 2022-11-05 RX ADMIN — AMPICILLIN AND SULBACTAM 3 G: 2; 1 INJECTION, POWDER, FOR SOLUTION INTRAVENOUS at 01:11

## 2022-11-05 RX ADMIN — METOPROLOL TARTRATE 25 MG: 25 TABLET, FILM COATED ORAL at 08:11

## 2022-11-05 RX ADMIN — POLYETHYLENE GLYCOL 3350 17 G: 17 POWDER, FOR SOLUTION ORAL at 08:11

## 2022-11-05 RX ADMIN — HYDROCODONE BITARTRATE AND ACETAMINOPHEN 1 TABLET: 5; 325 TABLET ORAL at 08:11

## 2022-11-05 RX ADMIN — GABAPENTIN 600 MG: 300 CAPSULE ORAL at 08:11

## 2022-11-05 RX ADMIN — ACETAMINOPHEN 650 MG: 325 TABLET ORAL at 06:11

## 2022-11-05 RX ADMIN — AMPICILLIN AND SULBACTAM 3 G: 2; 1 INJECTION, POWDER, FOR SOLUTION INTRAVENOUS at 07:11

## 2022-11-05 RX ADMIN — TRAZODONE HYDROCHLORIDE 150 MG: 50 TABLET ORAL at 08:11

## 2022-11-05 RX ADMIN — HYDROCODONE BITARTRATE AND ACETAMINOPHEN 1 TABLET: 5; 325 TABLET ORAL at 12:11

## 2022-11-05 RX ADMIN — AMPICILLIN AND SULBACTAM 3 G: 2; 1 INJECTION, POWDER, FOR SOLUTION INTRAVENOUS at 08:11

## 2022-11-05 RX ADMIN — ENOXAPARIN SODIUM 40 MG: 100 INJECTION SUBCUTANEOUS at 04:11

## 2022-11-05 NOTE — PLAN OF CARE
[facility]  Initial Discharge Assessment       Primary Care Provider: Jhonson Erazo MD    Admission Diagnosis: Cough, unspecified type [R05.9]    Admission Date: 11/4/2022  Expected Discharge Date:     Discharge Barriers Identified: None    Payor: MEDICARE / Plan: MEDICARE PART A & B / Product Type: Government /     Extended Emergency Contact Information  Primary Emergency Contact: Peri Kuhn  Address: [id number] [place] [address] [id number] [place] [address]  Home Phone: [phone]  Mobile Phone: [phone]  Relation: Spouse    Discharge Plan A: Home with family  Discharge Plan B: Home with family      [id number] - [place], [place] - [address] AT [facility] [address]  [address]  [facility] [address]  Phone: [phone] Fax: [phone]      Initial Assessment (most recent)       Adult Discharge Assessment - 11/05/22 0909          Discharge Assessment    Assessment Type Discharge Planning Assessment     Confirmed/corrected address, phone number and insurance Yes     Confirmed Demographics Correct on Facesheet     Source of Information patient     When was your last doctors appointment? --   2 Weeks ago. 10/2022    Does patient/caregiver understand observation status Yes     Communicated MARTELL with patient/caregiver Date not available/Unable to determine     Reason For Admission Aspiration Pneumonia     Lives With spouse     Facility Arrived From: Home     Do you expect to return to your current living situation? Yes     Do you have help at home or someone to help you manage your care at home? Yes     Who are your caregiver(s) and their phone number(s)? Peri Kuhn, Wife [phone]     Prior to hospitilization cognitive status: Alert/Oriented     Current cognitive status: Alert/Oriented     Walking or Climbing Stairs Difficulty ambulation difficulty, requires equipment     Mobility Management Rolling Walker      Dressing/Bathing Difficulty bathing difficulty, requires equipment     Dressing/Bathing Management Shower Chair, Grab bars     Equipment Currently Used at Home CPAP;walker, rolling;shower chair     Readmission within 30 days? No     Patient currently being followed by outpatient case management? No     Do you currently have service(s) that help you manage your care at home? Yes     How Many hours does patient receive services 1     Name and Contact number of agency [facility] (OT/PT)     Is the pt/caregiver preference to resume services with current agency Yes     Do you take prescription medications? Yes     Do you have prescription coverage? Yes     Coverage Medicare Part A and B and [facility]     Do you have any problems affording any of your prescribed medications? No     Is the patient taking medications as prescribed? yes     Who is going to help you get home at discharge? Peri Glezeau, Wife     How do you get to doctors appointments? family or friend will provide;car, drives self     Are you on dialysis? No     Do you take coumadin? No     Discharge Plan A Home with family     Discharge Plan B Home with family     DME Needed Upon Discharge  none     Discharge Plan discussed with: Patient     Discharge Barriers Identified None        Physical Activity    On average, how many days per week do you engage in moderate to strenuous exercise (like a brisk walk)? 7 days     On average, how many minutes do you engage in exercise at this level? 30 min        Financial Resource Strain    How hard is it for you to pay for the very basics like food, housing, medical care, and heating? Not hard at all        Housing Stability    In the last 12 months, was there a time when you were not able to pay the mortgage or rent on time? No     In the last 12 months, how many places have you lived? 1     In the last 12 months, was there a time when you did not have a steady place to sleep or slept in a shelter  (including now)? No        Transportation Needs    In the past 12 months, has lack of transportation kept you from medical appointments or from getting medications? No     In the past 12 months, has lack of transportation kept you from meetings, work, or from getting things needed for daily living? No        Food Insecurity    Within the past 12 months, you worried that your food would run out before you got the money to buy more. Never true     Within the past 12 months, the food you bought just didn't last and you didn't have money to get more. Never true        Stress    Do you feel stress - tense, restless, nervous, or anxious, or unable to sleep at night because your mind is troubled all the time - these days? Not at all        Social Connections    In a typical week, how many times do you talk on the phone with family, friends, or neighbors? More than three times a week     How often do you get together with friends or relatives? More than three times a week     How often do you attend Roman Catholic or Worship services? Never     Do you belong to any clubs or organizations such as Roman Catholic groups, unions, fraternal or athletic groups, or school groups? No     How often do you attend meetings of the clubs or organizations you belong to? Never     Are you , , , , never , or living with a partner?         Alcohol Use    Q1: How often do you have a drink containing alcohol? Monthly or less     Q2: How many drinks containing alcohol do you have on a typical day when you are drinking? 1 or 2     Q3: How often do you have six or more drinks on one occasion? Less than monthly        Relationship/Environment    Name(s) of Who Lives With Patient Peri Kuhn, Wife                      Discharge planning assessment completed with patient at bedside. Patient confirmed having a living will in place. Patient's preferred pharmacy is [facility]. Patient receives home health  services from [facility] and would like to resume services upon discharge. Patient does not receive any other outpatient services and reports that he does not attend dialysis or take coumadin medications. Patient reports no financial or emotional instability and presented with happy and cooperative affect. Patient was alert and participated appropriately throughout assessment. Patient's preferred discharge is to return home with family. Patient's wife Peri will be picking patient up upon discharge. Patient is requesting prescription for multi-vitamin upon discharge.

## 2022-11-05 NOTE — PLAN OF CARE
1. Perform supraglottic swallow with chin tuck on every liquid swallow and bite of food with liquid component.  2. Follow swallowing guidelines (swallow 2x per bite/sip, SGS, perform chin tuck) 100% indep.

## 2022-11-05 NOTE — PROGRESS NOTES
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     Right-sided Bell's palsy 2009    Spondylolisthesis     Varicose veins of legs     Venous insufficiency      Past Surgical History:   Procedure Laterality Date    Athroscopic surgery      On Knee    BIOPSY OF TISSUE OF NECK Left 08/2015    Recuurence CLL/small cell lyphoma    ESOPHAGEAL DILATION      ESOPHAGOGASTRODUODENOSCOPY N/A 4/15/2022    Procedure: EGD (ESOPHAGOGASTRODUODENOSCOPY);  Surgeon: Siddharth Garcia MD;  Location: [facility];  Service: Endoscopy;  Laterality: N/A;    ESOPHAGOGASTRODUODENOSCOPY N/A 4/16/2022    Procedure: EGD (ESOPHAGOGASTRODUODENOSCOPY);  Surgeon: Siddharth Garcia MD;  Location: [facility];  Service: Endoscopy;  Laterality: N/A;    ESOPHAGOGASTRODUODENOSCOPY N/A 6/23/2022    Procedure: EGD (ESOPHAGOGASTRODUODENOSCOPY);  Surgeon: Jefferson Hyman MD;  Location: [facility];  Service: Endoscopy;  Laterality: N/A;    ESOPHAGOGASTRODUODENOSCOPY W/ PEG N/A 4/16/2022    Procedure: EGD, WITH PEG TUBE INSERTION;  Surgeon: Siddharth Garcia MD;  Location: [facility];  Service: Endoscopy;  Laterality: N/A;    GASTROSTOMY TUBE PLACEMENT  04/16/2022    20 Fr (bumper initially at 3.5)    Hemorrhoid resection  1979    LUNG LOBECTOMY Left 2004    NECK SURGERY  04/08/2022    Anterior and Posterior C3-C7 fusion    OTHER SURGICAL HISTORY  2006    Chemotherapy s/p lyphoma         Portacath implantation and removal      reverse shoulder arthroplasty Right 03/2021     Pt was seen for MBSS due to hx pharyngeal dysphagia, C-spine surgery, Peg tube placement (and removal), and repeated aspiration pneumonia.  He returned to oral diet after dysphagia tx without having a repeat MBSS, and apparently could have benefitted from one.     Today, pt was found to have poor head posture which allowed penetration when swallowing liquids; mild oral prep dysphagia due to edentulous status; no epiglottic inversion & micro-penetration on thin liquids and risk for penetration on residue from other bolus types due to prevertebral swelling;  and penetration & SILENT aspiration on thin liquids.  Chin tuck with supraglottic swallow eliminated or ejected material.  Multiple swallows reduced or eliminated residue.    Recommend dysphagia soft (IDDSI 6) textures with thin liquids, follow swallowing guidelines, crush pills.  Dysphagia tx thru home health to train supraglottic swallow and swallowing guidelines.  Pt & spouse were extensively educated re impressions & recommendations & written guidelines were presented.  Pt should return for repeat MBSS if aspiration pneumonia episodes continue.       11/05/22 1704   Speech Time Calculation   Speech Start Time 1408   Speech Stop Time 1445   Speech Total (min) 37 min   General Information   SLP Treatment Date 11/05/22   Chest X-ray results No evidence of acute cardiopulmonary disease.   Current Respiratory Status room air   General Precautions aspiration   Current Diet   Current Diet Textures Dysphagia Diet 2 (mechanical soft/ground)   Current Liquid Consistencies Thin   Subjective   Patient states I only have trouble eatng meat.   Oral Musculature Evaluation   Oral Musculature WFL  (slight asymmetry on right due to old Bell's palsy)   Dentition edentulous;rarely or never uses dentures to eat   Secretion Management adequate   Mucosal Quality good   Mandibular Strength and  Mobility WNL   Oral Labial Strength and Mobility WNL   Lingual Strength and Mobility WNL   Velar Elevation WNL   Buccal Strength and Mobility WNL   Volitional Cough strong   Volitional Swallow palpated rise & tilt   Voice Prior to PO Intake clear, no dysarthria        MBS Eval: Thin Liquid Trial   Mode of Presentation, Thin Liquid cup   Volume of Thin Liquid Presented 5ml, 10ml, cup sip, straw sip   Oral Prep/Phase, Thin Liquid WNL   Pharyngeal Phase, Thin Liquid impaired;cervical protrusion (comment);no epiglottic inversion;pyriform sinuses stasis;vallecular stasis  (HArdware C3-7)   Rosenbeck's 8-Point Penetration-Aspiration Scale, Thin Liquids (8) Material enters the airway, passes below the vocal folds, and no effort is made to eject.  (1x on trace penetration)   Penetration/Aspiration, Thin Liquid repeated micro penetration, eventually adding to a drop on the bottom of the cords   Esophageal Phase, Thin   (nothing which affected pharyngeal swallow)   Successful Strategies Trialed During Procedure, Thin Liquid chin tuck;multiple swallows;supraglottic swallow   Additional Comments Moderate pharygneal dysphagia with SILENT aspiration x`1        MBS Eval: Nectar Thick Liquid Trial   Mode of Presentation, Nectar cup   Volume of Nectar Presented 5ml, 10ml   Oral Prep/Phase, Nectar WNL   Pharyngeal Phase, Nectar impaired;cervical protrusion (comment);no epiglottic inversion;vallecular stasis;pyriform sinuses stasis   Rosenbeck's 8-Point Penetration-Aspiration Scale, Nectar Thick Liquids (1) Material does not enter airway.   Penetration/Aspiration, Nectar at risk due to residue   Successful Strategies Trialed During Procedure, Nectar multiple swallows   Diagnostic Statement mild pharyngela dysphagia        MBS Eval: Pureed Trial   Mode of Presentation, Puree spoon   Volume of Puree Presented 5ml   Oral Prep/Phase, Puree WNL   Pharyngeal Phase, Puree cervical protrusion (comment)   Rosenbeck's 8-Point  Penetration-Aspiration Scale, Pureed (1) Material does not enter airway.   Diagnostic Statement WNL        MBS Eval: Soft Solid Trial   Mode of Presentation, Semisolid spoon   Volume of Semisolid Food Presented 2 pcs peach in thin barium   Oral Prep/Phase, Semisolid prolonged chewing;piecemeal deglutition   Pharyngeal Phase, Semisolid impaired;cervical protrusion (comment);pyriform sinuses stasis;vallecular stasis;no epiglottic inversion   Rosenbeck's 8-Point Penetration-Aspiration Scale, Semisolid (1) Material does not enter airway.   Successful Strategies Trialed During Procedure, Semisolid chin tuck;multiple swallows   Diagnostic Statement mild oropharyngeal dysphagia        MBS Eval: Solid Food Texture Trial   Mode of Presentation, Solid spoon   Volume of Solid Food Presented 1/2 cracker with barium pudding   Oral Prep/Phase, Solid prolonged chewing;piecemeal deglutition   Pharyngeal Phase, Solid impaired;cervical protrusion (comment);no epiglottic inversion;multiple spontaneous swallows;pyriform sinuses stasis;vallecular stasis   Rosenbeck's 8-Point Penetration-Aspiration Scale, Solid (1) Material does not enter airway.   Successful Strategies Trialed During Procedure, Solid alternate bites/sips   Diagnostic Statement mild oral prep, mild pharyngeal dysphagia   Recommendations   Solid Diet Level Soft & Bite Sized Diet - IDDSI Level 6   Liquid Diet Level Thin   Plan   Plan Dysphagia Therapy   SLP Follow-up   SLP Follow-up? Yes   SLP - Next Visit Date 11/06/22   Treatment/Billable Minutes   Motion Fluoro Swallow, Cine/Vid 37   Total Time 37

## 2022-11-05 NOTE — CARE UPDATE
11/05/22 0848   Patient Assessment/Suction   Level of Consciousness (AVPU) alert   Respiratory Effort Normal;Unlabored   Expansion/Accessory Muscles/Retractions no use of accessory muscles   PRE-TX-O2   O2 Device (Oxygen Therapy) room air   SpO2 98 %   Pulse Oximetry Type Intermittent   $ Pulse Oximetry - Multiple Charge Pulse Oximetry - Multiple   Pulse 60   Resp 16   Preset CPAP/BiPAP Settings   Mode Of Delivery CPAP;Standby   $ CPAP/BiPAP Daily Charge BiPAP/CPAP Daily   Equipment Type Other (see comment)  (home unit)   Education   $ Education 15 min   Respiratory Evaluation   $ Care Plan Tech Time 15 min   $ Eval/Re-eval Charges Evaluation   Evaluation For   (care plan)

## 2022-11-05 NOTE — PT/OT/SLP EVAL
Physical Therapy Evaluation    Patient Name:  Conrad Kuhn   MRN:  [id number]    Recommendations:     Discharge Recommendations:  home   Discharge Equipment Recommendations: none   Barriers to discharge: None    Assessment:     Conrad Kuhn is a 82 y.o. male admitted with a medical diagnosis of Aspiration pneumonia.  He presents with the following impairments/functional limitations:  impaired endurance, gait instability, weakness Pt able to ambulate with rw and pulse ox at 96% on RA.    Rehab Prognosis: Good; patient would benefit from acute skilled PT services to address these deficits and reach maximum level of function.    Recent Surgery: * No surgery found *      Plan:     During this hospitalization, patient to be seen 3 x/week to address the identified rehab impairments via gait training, therapeutic activities, therapeutic exercises and progress toward the following goals:    Plan of Care Expires:  11/24/22    Subjective     Chief Complaint: reports needing a lot of tissue because his nose runs   Patient/Family Comments/goals: go home  Pain/Comfort:  Pain Rating 1: 0/10    Patients cultural, spiritual, Buddhist conflicts given the current situation: no    Living Environment:  Pt lives with wife in single story home. No steps to enter  Prior to admission, patients level of function was mod I. Used rollator when ambulating long distances.  Equipment used at home: walker, rolling, shower chair, cane, straight, grab bar.  DME owned (not currently used): rolling walker, single point cane, shower chair, and rollator .  Upon discharge, patient will have assistance from wife.    Objective:     Communicated with nurse prior to session.  Patient found supine with peripheral IV  upon PT entry to room.    General Precautions: Standard, aspiration, fall   Orthopedic Precautions:N/A   Braces: N/A  Respiratory Status: Room air    Exams:  Sensation:    -       Intact  RLE Strength: WFL  LLE Strength:  WFL    Functional Mobility:  Bed Mobility:     Supine to Sit: stand by assistance  Transfers:     Sit to Stand:  contact guard assistance with rolling walker  Gait: pt ambulated with rolling walker and CGA. Mild unteady gait but no loss of balance noted      AM-PAC 6 CLICK MOBILITY  Total Score:19       Treatment & Education:  Edcuated on importance of mobility and OOB to chair    Patient left up in chair with all lines intact, call button in reach, and chair alarm on.    GOALS:   Multidisciplinary Problems       Physical Therapy Goals          Problem: Physical Therapy    Goal Priority Disciplines Outcome Goal Variances Interventions   Physical Therapy Goal     PT, PT/OT      Description: Goals to be met by: 22     Patient will increase functional independence with mobility by performin. Supine to sit with Modified [place]  2. Sit to stand transfer with Modified [place]  3. Bed to chair transfer with Modified [place] using Rolling Walker  24. Gait  x 180 feet with Modified [place] using Rolling Walker.                          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     Right-sided Bell's palsy 2009    Spondylolisthesis     Varicose veins of legs     Venous insufficiency        Past Surgical History:   Procedure Laterality Date    Athroscopic surgery      On Knee    BIOPSY OF TISSUE OF NECK Left 2015    Recuurence  CLL/small cell lyphoma    ESOPHAGEAL DILATION      ESOPHAGOGASTRODUODENOSCOPY N/A 4/15/2022    Procedure: EGD (ESOPHAGOGASTRODUODENOSCOPY);  Surgeon: Siddharth Garcia MD;  Location: [facility];  Service: Endoscopy;  Laterality: N/A;    ESOPHAGOGASTRODUODENOSCOPY N/A 4/16/2022    Procedure: EGD (ESOPHAGOGASTRODUODENOSCOPY);  Surgeon: Siddharth Garcia MD;  Location: [facility];  Service: Endoscopy;  Laterality: N/A;    ESOPHAGOGASTRODUODENOSCOPY N/A 6/23/2022    Procedure: EGD (ESOPHAGOGASTRODUODENOSCOPY);  Surgeon: Jefferson Hyman MD;  Location: [facility];  Service: Endoscopy;  Laterality: N/A;    ESOPHAGOGASTRODUODENOSCOPY W/ PEG N/A 4/16/2022    Procedure: EGD, WITH PEG TUBE INSERTION;  Surgeon: Siddharth Garcia MD;  Location: [facility];  Service: Endoscopy;  Laterality: N/A;    GASTROSTOMY TUBE PLACEMENT  04/16/2022    20 Fr (bumper initially at 3.5)    Hemorrhoid resection  1979    LUNG LOBECTOMY Left 2004    NECK SURGERY  04/08/2022    Anterior and Posterior C3-C7 fusion    OTHER SURGICAL HISTORY  2006    Chemotherapy s/p lyphoma        Portacath implantation and removal      reverse shoulder arthroplasty Right 03/2021       Time Tracking:     PT Received On:    PT Start Time: 0921     PT Stop Time: 0933  PT Total Time (min): 12 min     Billable Minutes: Evaluation 12 min      11/05/2022

## 2022-11-05 NOTE — PLAN OF CARE
Problem: Adult Inpatient Plan of Care  Goal: Plan of Care Review  Outcome: Ongoing, Progressing  Goal: Patient-Specific Goal (Individualized)  Outcome: Ongoing, Progressing  Goal: Absence of Hospital-Acquired Illness or Injury  Outcome: Ongoing, Progressing  Goal: Optimal Comfort and Wellbeing  Outcome: Ongoing, Progressing  Goal: Readiness for Transition of Care  Outcome: Ongoing, Progressing     Problem: Adjustment to Illness (Sepsis/Septic Shock)  Goal: Optimal Coping  Outcome: Ongoing, Progressing     Problem: Bleeding (Sepsis/Septic Shock)  Goal: Absence of Bleeding  Outcome: Ongoing, Progressing     Problem: Glycemic Control Impaired (Sepsis/Septic Shock)  Goal: Blood Glucose Level Within Desired Range  Outcome: Ongoing, Progressing     Problem: Infection Progression (Sepsis/Septic Shock)  Goal: Absence of Infection Signs and Symptoms  Outcome: Ongoing, Progressing     Problem: Nutrition Impaired (Sepsis/Septic Shock)  Goal: Optimal Nutrition Intake  Outcome: Ongoing, Progressing     Problem: Fluid Imbalance (Pneumonia)  Goal: Fluid Balance  Outcome: Ongoing, Progressing     Problem: Infection (Pneumonia)  Goal: Resolution of Infection Signs and Symptoms  Outcome: Ongoing, Progressing     Problem: Respiratory Compromise (Pneumonia)  Goal: Effective Oxygenation and Ventilation  Outcome: Ongoing, Progressing     Problem: Skin Injury Risk Increased  Goal: Skin Health and Integrity  Outcome: Ongoing, Progressing     Problem: Fall Injury Risk  Goal: Absence of Fall and Fall-Related Injury  Outcome: Ongoing, Progressing

## 2022-11-05 NOTE — PLAN OF CARE
11/05/22 0908   Medicare Message   Important Message from Medicare regarding Discharge Appeal Rights Given to patient/caregiver;Explained to patient/caregiver;Signed/date by patient/caregiver   Date IMM was signed 11/05/22   Time IMM was signed 0908

## 2022-11-05 NOTE — ASSESSMENT & PLAN NOTE
Meets with leukocytosis, tachycardia, fever  Due to aspiration pneumonia  Sepsis resolving  Lactic acid normal  Treating infection as noted above  Gentle IV fluid hydration  Follow clinical symptoms for improvement

## 2022-11-05 NOTE — ASSESSMENT & PLAN NOTE
Patient with symptoms of aspiration and has had recurrent pneumonias over the past several months.  He meets sepsis criteria with leukocytosis and tachycardia as well as fever  - treat with Unasyn for aspiration pneumonia, now with significant improvement  - obtain sputum culture  - gentle IV fluids  - speech evaluation for dysphagia is pending

## 2022-11-05 NOTE — SUBJECTIVE & OBJECTIVE
Interval History:  Doing well since admission.  He says he is feeling much better.  Cough is improving.  Waiting for speech evaluation and working with physical therapy today.    Review of Systems   All other systems reviewed and are negative.  Objective:     Vital Signs (Most Recent):  Temp: 98.3 °F (36.8 °C) (11/05/22 0720)  Pulse: 60 (11/05/22 0848)  Resp: 16 (11/05/22 0848)  BP: (!) 159/76 (11/05/22 0720)  SpO2: 98 % (11/05/22 0848)   Vital Signs (24h Range):  Temp:  [97.7 °F (36.5 °C)-99.1 °F (37.3 °C)] 98.3 °F (36.8 °C)  Pulse:  [60-94] 60  Resp:  [15-37] 16  SpO2:  [90 %-99 %] 98 %  BP: ()/(58-80) 159/76     Weight: 89.8 kg (198 lb)  Body mass index is 25.42 kg/m².    Intake/Output Summary (Last 24 hours) at 11/5/2022 1040  Last data filed at 11/5/2022 0148  Gross per 24 hour   Intake 750 ml   Output 1650 ml   Net -900 ml      Physical Exam  Vitals reviewed.   Constitutional:       Appearance: Normal appearance.   HENT:      Head: Normocephalic and atraumatic.      Nose: Nose normal.      Mouth/Throat:      Mouth: Mucous membranes are moist.   Cardiovascular:      Rate and Rhythm: Normal rate and regular rhythm.      Pulses: Normal pulses.      Heart sounds: Normal heart sounds. No murmur heard.    No friction rub. No gallop.   Pulmonary:      Effort: Pulmonary effort is normal. No respiratory distress.      Comments: He has slight crackles and some wheezes in bilateral lower lobes now improving.  On room air  Abdominal:      General: Abdomen is flat. Bowel sounds are normal. There is no distension.      Palpations: Abdomen is soft.      Tenderness: There is no abdominal tenderness.   Musculoskeletal:         General: No swelling. Normal range of motion.      Cervical back: Normal range of motion and neck supple.   Skin:     General: Skin is warm and dry.   Neurological:      General: No focal deficit present.      Mental Status: He is alert and oriented to person, place, and time.   Psychiatric:          Mood and Affect: Mood normal.         Behavior: Behavior normal.         Thought Content: Thought content normal.         Judgment: Judgment normal.       Significant Labs: All pertinent labs within the past 24 hours have been reviewed.    Significant Imaging: I have reviewed all pertinent imaging results/findings within the past 24 hours.

## 2022-11-05 NOTE — PT/OT/SLP EVAL
Occupational Therapy   Evaluation and Discharge Note    Name: Conrad Kuhn  MRN: [id number]  Admitting Diagnosis:  Aspiration pneumonia   Recent Surgery: * No surgery found *      Recommendations:     Discharge Recommendations: home  Discharge Equipment Recommendations:  none  Barriers to discharge:  None    Assessment:     Conrad Kuhn is a 82 y.o. male with a medical diagnosis of Aspiration pneumonia. At this time, patient is functioning at their prior level of function and does not require further acute OT services.     Plan:     During this hospitalization, patient does not require further acute OT services.  Please re-consult if situation changes.    Plan of Care Reviewed with: patient    Subjective     Chief Complaint: difficulty swallowing  Patient/Family Comments/goals: Return home    Occupational Profile:  Living Environment: Pt lives with wife in a 1 story home with no steps to enter. Pt  uses a tub/shower combo and raised toilet.   Previous level of function: Mod I with ADLs and mobility  Roles and Routines: household chores,   Equipment Used at home:  walker, rolling, shower chair, cane, straight, grab bar  Assistance upon Discharge: family    Pain/Comfort:  Pain Rating 1: 0/10  Pain Rating Post-Intervention 1: 0/10    Patients cultural, spiritual, Uatsdin conflicts given the current situation: no    Objective:     Communicated with: nurse prior to session.  Patient found up in chair with peripheral IV upon OT entry to room.    General Precautions: Standard, fall, aspiration   Orthopedic Precautions:N/A   Braces: N/A  Respiratory Status: Room air     Occupational Performance:      Functional Mobility/Transfers:  Patient completed Sit <> Stand Transfer with stand by assistance  with  no assistive device   Functional Mobility: observed pt ambulating with PT with RW and SBA.     Activities of Daily Living:  Grooming: independence oral care  Upper Body Dressing: supervision    Lower Body  Dressing: supervision donning socks    Cognitive/Visual Perceptual:  Cognitive/Psychosocial Skills:     -       Oriented to: Person, Place, Time, and Situation   -       Follows Commands/attention:Follows multistep  commands  -       Safety awareness/insight to disability: intact   -       Mood/Affect/Coping skills/emotional control: Pleasant    Physical Exam:  Balance:    -       SPV standing and sitting balance  Upper Extremity Range of Motion:     -       Right Upper Extremity: WNL  -       Left Upper Extremity: WNL  Upper Extremity Strength:    -       Right Upper Extremity: WNL  -       Left Upper Extremity: WNL    AMPAC 6 Click ADL:  AMPAC Total Score: 24    Treatment & Education:  OT role and safety awareness, call bell use      Patient left up in chair with all lines intact, call button in reach, chair alarm off, and nurse notified    GOALS:   Multidisciplinary Problems       Occupational Therapy Goals       Not on file                    History:     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     Right-sided Bell's palsy 2009    Spondylolisthesis     Varicose veins of legs     Venous insufficiency          Past Surgical History:   Procedure Laterality Date    Athroscopic surgery      On Knee    BIOPSY OF TISSUE OF NECK Left 08/2015    Recuurence CLL/small cell lyphoma    ESOPHAGEAL DILATION      ESOPHAGOGASTRODUODENOSCOPY N/A 4/15/2022    Procedure: EGD  (ESOPHAGOGASTRODUODENOSCOPY);  Surgeon: Siddharth Garcia MD;  Location: [facility];  Service: Endoscopy;  Laterality: N/A;    ESOPHAGOGASTRODUODENOSCOPY N/A 4/16/2022    Procedure: EGD (ESOPHAGOGASTRODUODENOSCOPY);  Surgeon: Siddhrath Garcia MD;  Location: [facility];  Service: Endoscopy;  Laterality: N/A;    ESOPHAGOGASTRODUODENOSCOPY N/A 6/23/2022    Procedure: EGD (ESOPHAGOGASTRODUODENOSCOPY);  Surgeon: Jefferson Hyman MD;  Location: [facility];  Service: Endoscopy;  Laterality: N/A;    ESOPHAGOGASTRODUODENOSCOPY W/ PEG N/A 4/16/2022    Procedure: EGD, WITH PEG TUBE INSERTION;  Surgeon: Siddharth Garcia MD;  Location: [facility];  Service: Endoscopy;  Laterality: N/A;    GASTROSTOMY TUBE PLACEMENT  04/16/2022    20 Fr (bumper initially at 3.5)    Hemorrhoid resection  1979    LUNG LOBECTOMY Left 2004    NECK SURGERY  04/08/2022    Anterior and Posterior C3-C7 fusion    OTHER SURGICAL HISTORY  2006    Chemotherapy s/p lyphoma        Portacath implantation and removal      reverse shoulder arthroplasty Right 03/2021       Time Tracking:     OT Date of Treatment: 11/05/22  OT Start Time: 0934  OT Stop Time: 0947  OT Total Time (min): 13 min    Billable Minutes:Evaluation 5  Self Care/Home Management 8    11/5/2022

## 2022-11-05 NOTE — PROGRESS NOTES
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 Kapadia who is presenting with recurrent cough and shortness of breath consistent with pneumonia.  The patient has had several bouts of pneumonia ever since a cervical spinal fusion earlier this year development of dysphagia.  I suspect that he is having recurrent aspiration pneumonia.  He says that he coughs with his food many times that he tries to position his mouth in such a way that he does not have aspiration events.  He has mostly done well at home but over the past 24 hours he developed coughing and worsening fever and chills.  He has been coughing up brownish sputum.  He denies chest pain.  He denies nausea or vomiting.  No lower extremity swelling.  No sick contacts.  He has not traveled.  He has had no changes to his medication regimen.  He has no other updates to his past medical, family, surgical, social history.    In the ED:  T 101.1° > 103.3 > 99.1, P 98, R 18, /80, O2 sat 92 % on 3 L, x-rays read as negative however on personal view the patient has evidence of right and left lower lobe infiltrates.  WBC 11.5, hemoglobin 13.1, sodium 133, creatinine 0.9.  Influenza a and B and COVID are negative.  The patient has evidence of mild sepsis due to aspiration pneumonia.  We will admit the patient for treatment and further evaluation with speech therapy for his dysphagia.      Overview/Hospital Course:  No notes on file    Interval History:  Doing well since  admission.  He says he is feeling much better.  Cough is improving.  Waiting for speech evaluation and working with physical therapy today.    Review of Systems   All other systems reviewed and are negative.  Objective:     Vital Signs (Most Recent):  Temp: 98.3 °F (36.8 °C) (11/05/22 0720)  Pulse: 60 (11/05/22 0848)  Resp: 16 (11/05/22 0848)  BP: (!) 159/76 (11/05/22 0720)  SpO2: 98 % (11/05/22 0848)   Vital Signs (24h Range):  Temp:  [97.7 °F (36.5 °C)-99.1 °F (37.3 °C)] 98.3 °F (36.8 °C)  Pulse:  [60-94] 60  Resp:  [15-37] 16  SpO2:  [90 %-99 %] 98 %  BP: ()/(58-80) 159/76     Weight: 89.8 kg (198 lb)  Body mass index is 25.42 kg/m².    Intake/Output Summary (Last 24 hours) at 11/5/2022 1040  Last data filed at 11/5/2022 0148  Gross per 24 hour   Intake 750 ml   Output 1650 ml   Net -900 ml      Physical Exam  Vitals reviewed.   Constitutional:       Appearance: Normal appearance.   HENT:      Head: Normocephalic and atraumatic.      Nose: Nose normal.      Mouth/Throat:      Mouth: Mucous membranes are moist.   Cardiovascular:      Rate and Rhythm: Normal rate and regular rhythm.      Pulses: Normal pulses.      Heart sounds: Normal heart sounds. No murmur heard.    No friction rub. No gallop.   Pulmonary:      Effort: Pulmonary effort is normal. No respiratory distress.      Comments: He has slight crackles and some wheezes in bilateral lower lobes now improving.  On room air  Abdominal:      General: Abdomen is flat. Bowel sounds are normal. There is no distension.      Palpations: Abdomen is soft.      Tenderness: There is no abdominal tenderness.   Musculoskeletal:         General: No swelling. Normal range of motion.      Cervical back: Normal range of motion and neck supple.   Skin:     General: Skin is warm and dry.   Neurological:      General: No focal deficit present.      Mental Status: He is alert and oriented to person, place, and time.   Psychiatric:         Mood and Affect: Mood normal.          Behavior: Behavior normal.         Thought Content: Thought content normal.         Judgment: Judgment normal.       Significant Labs: All pertinent labs within the past 24 hours have been reviewed.    Significant Imaging: I have reviewed all pertinent imaging results/findings within the past 24 hours.      Assessment/Plan:      * Aspiration pneumonia  Patient with symptoms of aspiration and has had recurrent pneumonias over the past several months.  He meets sepsis criteria with leukocytosis and tachycardia as well as fever  - treat with Unasyn for aspiration pneumonia, now with significant improvement  - obtain sputum culture  - gentle IV fluids  - speech evaluation for dysphagia is pending      Acute hypoxemic respiratory failure  Resolved    Sepsis  Meets with leukocytosis, tachycardia, fever  Due to aspiration pneumonia  Sepsis resolving  Lactic acid normal  Treating infection as noted above  Gentle IV fluid hydration  Follow clinical symptoms for improvement      Iron deficiency anemia  Continue iron supplementation      S/P cervical spinal fusion  This was done early this year and he has had dysphagia since his surgery.  He had a PEG tube placed previously due to severe dysphagia and was ultimately able to come off of this but continues to have difficulty swallowing      CLL (chronic lymphocytic leukemia)  Follows with Dr. Bai.  Will continue to follow outpatient.      History of lung cancer - ANDRES NSCLC 2004  Continue to follow Dr. Bai outpatient.        VTE Risk Mitigation (From admission, onward)         Ordered     enoxaparin injection 40 mg  Daily         11/04/22 1758     IP VTE HIGH RISK PATIENT  Once         11/04/22 1758     Place sequential compression device  Until discontinued         11/04/22 1758                Discharge Planning   MARTELL:      Code Status: Full Code   Is the patient medically ready for discharge?:     Reason for patient still in hospital (select all that apply):  Treatment  Discharge Plan A: Home with family                  Wilfredo Rendon MD  Department of Hospital Medicine   [facility]

## 2022-11-06 VITALS
DIASTOLIC BLOOD PRESSURE: 94 MMHG | HEART RATE: 86 BPM | OXYGEN SATURATION: 98 % | SYSTOLIC BLOOD PRESSURE: 155 MMHG | TEMPERATURE: 98 F | HEIGHT: 74 IN | RESPIRATION RATE: 20 BRPM | WEIGHT: 198 LBS | BODY MASS INDEX: 25.41 KG/M2

## 2022-11-06 LAB
ANION GAP SERPL CALC-SCNC: 9 MMOL/L (ref 8–16)
ANISOCYTOSIS BLD QL SMEAR: SLIGHT
BASOPHILS NFR BLD: 0 % (ref 0–1.9)
BUN SERPL-MCNC: 16 MG/DL (ref 8–23)
CALCIUM SERPL-MCNC: 8.3 MG/DL (ref 8.7–10.5)
CHLORIDE SERPL-SCNC: 101 MMOL/L (ref 95–110)
CO2 SERPL-SCNC: 25 MMOL/L (ref 23–29)
CREAT SERPL-MCNC: 0.8 MG/DL (ref 0.5–1.4)
DIFFERENTIAL METHOD: ABNORMAL
EOSINOPHIL NFR BLD: 0 % (ref 0–8)
ERYTHROCYTE [DISTWIDTH] IN BLOOD BY AUTOMATED COUNT: 14.2 % (ref 11.5–14.5)
EST. GFR  (NO RACE VARIABLE): >60 ML/MIN/1.73 M^2
GLUCOSE SERPL-MCNC: 166 MG/DL (ref 70–110)
HCT VFR BLD AUTO: 35.3 % (ref 40–54)
HGB BLD-MCNC: 12.5 G/DL (ref 14–18)
IMM GRANULOCYTES # BLD AUTO: ABNORMAL K/UL (ref 0–0.04)
IMM GRANULOCYTES NFR BLD AUTO: ABNORMAL % (ref 0–0.5)
LYMPHOCYTES NFR BLD: 54 % (ref 18–48)
MCH RBC QN AUTO: 30.7 PG (ref 27–31)
MCHC RBC AUTO-ENTMCNC: 35.4 G/DL (ref 32–36)
MCV RBC AUTO: 87 FL (ref 82–98)
MONOCYTES NFR BLD: 3 % (ref 4–15)
NEUTROPHILS NFR BLD: 41 % (ref 38–73)
NEUTS BAND NFR BLD MANUAL: 2 %
NRBC BLD-RTO: 0 /100 WBC
PLATELET # BLD AUTO: 52 K/UL (ref 150–450)
PLATELET BLD QL SMEAR: ABNORMAL
PMV BLD AUTO: 12.7 FL (ref 9.2–12.9)
POTASSIUM SERPL-SCNC: 3.3 MMOL/L (ref 3.5–5.1)
RBC # BLD AUTO: 4.07 M/UL (ref 4.6–6.2)
SODIUM SERPL-SCNC: 135 MMOL/L (ref 136–145)
WBC # BLD AUTO: 10.47 K/UL (ref 3.9–12.7)

## 2022-11-06 PROCEDURE — 63600175 PHARM REV CODE 636 W HCPCS: Performed by: STUDENT IN AN ORGANIZED HEALTH CARE EDUCATION/TRAINING PROGRAM

## 2022-11-06 PROCEDURE — 87070 CULTURE OTHR SPECIMN AEROBIC: CPT | Performed by: STUDENT IN AN ORGANIZED HEALTH CARE EDUCATION/TRAINING PROGRAM

## 2022-11-06 PROCEDURE — 80048 BASIC METABOLIC PNL TOTAL CA: CPT | Performed by: STUDENT IN AN ORGANIZED HEALTH CARE EDUCATION/TRAINING PROGRAM

## 2022-11-06 PROCEDURE — 94761 N-INVAS EAR/PLS OXIMETRY MLT: CPT

## 2022-11-06 PROCEDURE — 36415 COLL VENOUS BLD VENIPUNCTURE: CPT | Performed by: STUDENT IN AN ORGANIZED HEALTH CARE EDUCATION/TRAINING PROGRAM

## 2022-11-06 PROCEDURE — 99900031 HC PATIENT EDUCATION (STAT)

## 2022-11-06 PROCEDURE — 94799 UNLISTED PULMONARY SVC/PX: CPT

## 2022-11-06 PROCEDURE — 85027 COMPLETE CBC AUTOMATED: CPT | Performed by: STUDENT IN AN ORGANIZED HEALTH CARE EDUCATION/TRAINING PROGRAM

## 2022-11-06 PROCEDURE — 85007 BL SMEAR W/DIFF WBC COUNT: CPT | Performed by: STUDENT IN AN ORGANIZED HEALTH CARE EDUCATION/TRAINING PROGRAM

## 2022-11-06 PROCEDURE — 87205 SMEAR GRAM STAIN: CPT | Performed by: STUDENT IN AN ORGANIZED HEALTH CARE EDUCATION/TRAINING PROGRAM

## 2022-11-06 PROCEDURE — 99900035 HC TECH TIME PER 15 MIN (STAT)

## 2022-11-06 PROCEDURE — 25000003 PHARM REV CODE 250: Performed by: STUDENT IN AN ORGANIZED HEALTH CARE EDUCATION/TRAINING PROGRAM

## 2022-11-06 RX ORDER — AMOXICILLIN AND CLAVULANATE POTASSIUM 875; 125 MG/1; MG/1
1 TABLET, FILM COATED ORAL 2 TIMES DAILY
Qty: 10 TABLET | Refills: 0 | Status: SHIPPED | OUTPATIENT
Start: 2022-11-06 | End: 2022-11-11

## 2022-11-06 RX ORDER — POTASSIUM CHLORIDE 20 MEQ/1
40 TABLET, EXTENDED RELEASE ORAL ONCE
Status: COMPLETED | OUTPATIENT
Start: 2022-11-06 | End: 2022-11-06

## 2022-11-06 RX ADMIN — AMPICILLIN AND SULBACTAM 3 G: 2; 1 INJECTION, POWDER, FOR SOLUTION INTRAVENOUS at 01:11

## 2022-11-06 RX ADMIN — AMPICILLIN AND SULBACTAM 3 G: 2; 1 INJECTION, POWDER, FOR SOLUTION INTRAVENOUS at 08:11

## 2022-11-06 RX ADMIN — METOPROLOL TARTRATE 25 MG: 25 TABLET, FILM COATED ORAL at 08:11

## 2022-11-06 RX ADMIN — POTASSIUM CHLORIDE 40 MEQ: 1500 TABLET, EXTENDED RELEASE ORAL at 08:11

## 2022-11-06 RX ADMIN — GABAPENTIN 600 MG: 300 CAPSULE ORAL at 08:11

## 2022-11-06 RX ADMIN — HYDROCODONE BITARTRATE AND ACETAMINOPHEN 1 TABLET: 5; 325 TABLET ORAL at 02:11

## 2022-11-06 RX ADMIN — HYDROCODONE BITARTRATE AND ACETAMINOPHEN 1 TABLET: 5; 325 TABLET ORAL at 08:11

## 2022-11-06 NOTE — CARE UPDATE
11/06/22 0754   Patient Assessment/Suction   Level of Consciousness (AVPU) alert   Respiratory Effort Normal;Unlabored   Expansion/Accessory Muscles/Retractions no use of accessory muscles   All Lung Fields Breath Sounds diminished   Rhythm/Pattern, Respiratory no shortness of breath reported   Cough Frequency infrequent   Cough Type good   PRE-TX-O2   O2 Device (Oxygen Therapy) room air   SpO2 98 %   Pulse Oximetry Type Intermittent   $ Pulse Oximetry - Multiple Charge Pulse Oximetry - Multiple   Pulse 86   Resp 18   Preset CPAP/BiPAP Settings   Mode Of Delivery CPAP;Standby  (home unit)   Education   $ Education Other (see comment);15 min  (sats)   Respiratory Evaluation   $ Care Plan Tech Time 15 min   $ Eval/Re-eval Charges Re-evaluation

## 2022-11-06 NOTE — DISCHARGE SUMMARY
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 Kapadia who is presenting with recurrent cough and shortness of breath consistent with pneumonia.  The patient has had several bouts of pneumonia ever since a cervical spinal fusion earlier this year development of dysphagia.  I suspect that he is having recurrent aspiration pneumonia.  He says that he coughs with his food many times that he tries to position his mouth in such a way that he does not have aspiration events.  He has mostly done well at home but over the past 24 hours he developed coughing and worsening fever and chills.  He has been coughing up brownish sputum.  He denies chest pain.  He denies nausea or vomiting.  No lower extremity swelling.  No sick contacts.  He has not traveled.  He has had no changes to his medication regimen.  He has no other updates to his past medical, family, surgical, social history.    In the ED:  T 101.1° > 103.3 > 99.1, P 98, R 18, /80, O2 sat 92 % on 3 L, x-rays read as negative however on personal view the patient has evidence of right and left lower lobe infiltrates.  WBC 11.5, hemoglobin 13.1, sodium 133, creatinine 0.9.  Influenza a and B and COVID are negative.  The patient has evidence of mild sepsis due to aspiration pneumonia.  We will admit the patient for treatment and further evaluation with speech therapy  for his dysphagia.      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 Kapadia.  He presented with acute onset worsening shortness of breath and cough with fever.  He was found to have aspiration pneumonia.  Patient has had dysphagia ever since his cervical spinal surgery earlier this year and was previously on a PEG tube due to his dysphagia.  He was able to recover most of his swallowing ability but has continued to have some difficulty at home and says that he is frequently coughing with swallowing and eating.  He has had recurrent pneumonias over the past few months.  His chest x-ray is consistent with aspiration pneumonia.  He was started on Unasyn and had excellent response to the antibiotic and quickly improved.  Speech therapy evaluated him here in found some continued mild dysphagia and recommends IDD SA level 6 diet.  They have instructed him on home management and he will follow-up with speech therapy as an outpatient for ongoing therapy.  Have deescalated his antibiotics to Augmentin to complete a 7 day course of antibiotics for pneumonia.  I reviewed the plan of care and discharge instructions with the patient on the day of discharge.  We discussed return precautions and home management.  He was discharged in good condition with plans for close outpatient follow-up with his primary physician.       Goals of Care Treatment Preferences:  Code Status: Full Code      Consults:   Consults (From admission, onward)        Status Ordering Provider     IP consult to case management  Once        Provider:  (Not yet assigned)    Acknowledged WILFREDO RENDON     Inpatient consult to Internal Medicine  Once        Provider:  Wilfredo Rendon MD    Acknowledged WILFREDO RENDON          No new Assessment & Plan notes have been  filed under this hospital service since the last note was generated.  Service: Hospital Medicine    Final Active Diagnoses:    Diagnosis Date Noted POA    PRINCIPAL PROBLEM:  Aspiration pneumonia [J69.0] 04/10/2022 Yes    Acute hypoxemic respiratory failure [J96.01] 04/10/2022 Yes    Sepsis [A41.9] 06/29/2021 Yes    Iron deficiency anemia [D50.9] 01/03/2019 Yes    S/P cervical spinal fusion [Z98.1] 04/10/2022 Not Applicable    CLL (chronic lymphocytic leukemia) [C91.10] 01/02/2019 Yes    History of lung cancer - ANDRES NSCLC 2004 [Z85.118] 01/03/2019 Not Applicable      Problems Resolved During this Admission:       Discharged Condition: good    Disposition: Home or Self Care    Follow Up:   Follow-up Information     Johnson Erazo MD. Schedule an appointment as soon as possible for a visit in 1 week(s).    Specialty: Family Medicine  Contact information:  [address] [place] [address] [id number] [phone]                       Patient Instructions:      Ambulatory referral/consult to Speech Therapy   Standing Status: Future   Referral Priority: Urgent Referral Type: Speech Therapy   Referral Reason: Specialty Services Required   Requested Specialty: Speech Pathology   Number of Visits Requested: 1     Diet Adult Regular     No dressing needed     Activity as tolerated       Significant Diagnostic Studies: Labs:   BMP:   Recent Labs   Lab 11/04/22  1053 11/05/22  0611 11/06/22  0553   * 180* 166*   * 137 135*   K 3.9 4.0 3.3*   CL 99 104 101   CO2 25 26 25   BUN 12 19 16   CREATININE 0.9 0.8 0.8   CALCIUM 8.8 8.8 8.3*   , CBC   Recent Labs   Lab 11/04/22  1053 11/05/22  0611 11/06/22  0553   WBC 11.52 9.92 10.47   HGB 13.1* 13.0* 12.5*   HCT 36.3* 36.5* 35.3*   PLT 56* 64* 52*    and All labs within the past 24 hours have been reviewed    Pending Diagnostic Studies:     None         Medications:  Reconciled Home Medications:      Medication List      START taking these medications     amoxicillin-clavulanate 875-125mg 875-125 mg per tablet  Commonly known as: AUGMENTIN  Take 1 tablet by mouth 2 (two) times daily. for 5 days        CONTINUE taking these medications    albuterol-ipratropium 2.5 mg-0.5 mg/3 mL nebulizer solution  Commonly known as: DUO-NEB  Take 3 mLs by nebulization every 8 (eight) hours as needed.     azelastine 137 mcg (0.1 %) nasal spray  Commonly known as: ASTELIN  1 spray by Nasal route 2 (two) times daily.     blood sugar diagnostic Strp  1 strip by Misc.(Non-Drug; Combo Route) route 2 (two) times a day.     blood-glucose meter kit  Use as instructed     gabapentin 600 MG tablet  Commonly known as: NEURONTIN  Take 600 mg by mouth 2 (two) times daily.     glimepiride 2 MG tablet  Commonly known as: AMARYL  Take 2 mg by mouth once daily at 6am.     iron-vitamin C 100-250 mg (ICAR-C) 100-250 mg Tab  Commonly known as: ICAR-C  Take 1 tablet by mouth once daily.     levalbuterol 1.25 mg/3 mL nebulizer solution  Commonly known as: XOPENEX  Take 3 mLs by nebulization every 4 to 6 hours as needed.     metoprolol tartrate 25 MG tablet  Commonly known as: LOPRESSOR  1 tablet (25 mg total) by Per G Tube route 2 (two) times daily.     ondansetron 8 MG tablet  Commonly known as: ZOFRAN  Take 1 tablet (8 mg total) by mouth every 12 (twelve) hours as needed for Nausea.     traZODone 300 MG tablet  Commonly known as: DESYREL  Take 300 mg by mouth every evening.            Indwelling Lines/Drains at time of discharge:   Lines/Drains/Airways     None                 Time spent on the discharge of patient: 55 minutes         Wilfredo Rendon MD  Department of Hospital Medicine  [facility]

## 2022-11-06 NOTE — PLAN OF CARE
11/06/22 0906   Final Note   Assessment Type Final Discharge Note   Anticipated Discharge Disposition Home   What phone number can be called within the next 1-3 days to see how you are doing after discharge? [phone]   Post-Acute Status   Discharge Delays None known at this time     Discharge orders and chart reviewed with no further post-acute discharge needs identified at this time.  Patient is cleared for discharge from Case Management standpoint.

## 2022-11-06 NOTE — HOSPITAL COURSE
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 Kapadia.  He presented with acute onset worsening shortness of breath and cough with fever.  He was found to have aspiration pneumonia.  Patient has had dysphagia ever since his cervical spinal surgery earlier this year and was previously on a PEG tube due to his dysphagia.  He was able to recover most of his swallowing ability but has continued to have some difficulty at home and says that he is frequently coughing with swallowing and eating.  He has had recurrent pneumonias over the past few months.  His chest x-ray is consistent with aspiration pneumonia.  He was started on Unasyn and had excellent response to the antibiotic and quickly improved.  Speech therapy evaluated him here in found some continued mild dysphagia and recommends IDD SA level 6 diet.  They have instructed him on home management and he will follow-up with speech therapy as an outpatient for ongoing therapy.  Have deescalated his antibiotics to Augmentin to complete a 7 day course of antibiotics for pneumonia.  I reviewed the plan of care and discharge instructions with the patient on the day of discharge.  We discussed return precautions and home management.  He was discharged in good condition with plans for close outpatient follow-up with his primary physician.

## 2022-11-06 NOTE — NURSING
0700: report received, sitting up in chair with no distress  1130: removed saline lock, went over all discharge instructions with patient and wife, answered all questions, pleasant and cooperative, taken out via wheelchair to private vehicle.

## 2022-11-07 ENCOUNTER — HOSPITAL ENCOUNTER (INPATIENT)
Facility: HOSPITAL | Age: 82
LOS: 1 days | Discharge: HOME HEALTH | DRG: 158 | End: 2022-11-09
Attending: EMERGENCY MEDICINE | Admitting: HOSPITALIST
Payer: MEDICARE

## 2022-11-07 DIAGNOSIS — R16.1 SPLENOMEGALY: ICD-10-CM

## 2022-11-07 DIAGNOSIS — R50.9 FEVER: ICD-10-CM

## 2022-11-07 DIAGNOSIS — B00.2 HERPES STOMATITIS: ICD-10-CM

## 2022-11-07 DIAGNOSIS — R07.9 CHEST PAIN: ICD-10-CM

## 2022-11-07 DIAGNOSIS — C91.10 CLL (CHRONIC LYMPHOCYTIC LEUKEMIA): ICD-10-CM

## 2022-11-07 DIAGNOSIS — R59.1 LYMPHADENOPATHY, GENERALIZED: ICD-10-CM

## 2022-11-07 DIAGNOSIS — R50.81 FEVER IN OTHER DISEASES: Primary | ICD-10-CM

## 2022-11-07 PROCEDURE — 96360 HYDRATION IV INFUSION INIT: CPT

## 2022-11-07 PROCEDURE — 63600175 PHARM REV CODE 636 W HCPCS: Performed by: EMERGENCY MEDICINE

## 2022-11-07 PROCEDURE — 86140 C-REACTIVE PROTEIN: CPT | Performed by: EMERGENCY MEDICINE

## 2022-11-07 PROCEDURE — 87040 BLOOD CULTURE FOR BACTERIA: CPT | Performed by: EMERGENCY MEDICINE

## 2022-11-07 PROCEDURE — 83605 ASSAY OF LACTIC ACID: CPT | Performed by: EMERGENCY MEDICINE

## 2022-11-07 PROCEDURE — 85027 COMPLETE CBC AUTOMATED: CPT | Performed by: EMERGENCY MEDICINE

## 2022-11-07 PROCEDURE — 80053 COMPREHEN METABOLIC PANEL: CPT | Performed by: EMERGENCY MEDICINE

## 2022-11-07 PROCEDURE — 99285 EMERGENCY DEPT VISIT HI MDM: CPT | Mod: 25

## 2022-11-07 PROCEDURE — 82784 ASSAY IGA/IGD/IGG/IGM EACH: CPT | Performed by: EMERGENCY MEDICINE

## 2022-11-07 PROCEDURE — 84145 PROCALCITONIN (PCT): CPT | Performed by: EMERGENCY MEDICINE

## 2022-11-07 PROCEDURE — 83615 LACTATE (LD) (LDH) ENZYME: CPT | Performed by: EMERGENCY MEDICINE

## 2022-11-07 PROCEDURE — 85007 BL SMEAR W/DIFF WBC COUNT: CPT | Performed by: EMERGENCY MEDICINE

## 2022-11-07 RX ADMIN — SODIUM CHLORIDE, SODIUM LACTATE, POTASSIUM CHLORIDE, AND CALCIUM CHLORIDE 1000 ML: .6; .31; .03; .02 INJECTION, SOLUTION INTRAVENOUS at 11:11

## 2022-11-08 PROBLEM — R16.1 SPLENOMEGALY: Status: ACTIVE | Noted: 2022-11-08

## 2022-11-08 PROBLEM — R59.0 LYMPHADENOPATHY, CERVICAL: Status: ACTIVE | Noted: 2022-11-08

## 2022-11-08 PROBLEM — R50.9 FEVER: Status: ACTIVE | Noted: 2022-11-08

## 2022-11-08 PROBLEM — R59.1 LYMPHADENOPATHY, GENERALIZED: Status: ACTIVE | Noted: 2022-11-08

## 2022-11-08 PROBLEM — R41.0 CONFUSION: Status: ACTIVE | Noted: 2022-11-08

## 2022-11-08 LAB
ADENOVIRUS: NOT DETECTED
ALBUMIN SERPL BCP-MCNC: 3.6 G/DL (ref 3.5–5.2)
ALP SERPL-CCNC: 52 U/L (ref 55–135)
ALT SERPL W/O P-5'-P-CCNC: 12 U/L (ref 10–44)
ANION GAP SERPL CALC-SCNC: 8 MMOL/L (ref 8–16)
ANISOCYTOSIS BLD QL SMEAR: SLIGHT
AST SERPL-CCNC: 15 U/L (ref 10–40)
BACTERIA SPEC AEROBE CULT: NORMAL
BASOPHILS # BLD AUTO: ABNORMAL K/UL (ref 0–0.2)
BASOPHILS NFR BLD: 0 % (ref 0–1.9)
BILIRUB SERPL-MCNC: 0.9 MG/DL (ref 0.1–1)
BILIRUB UR QL STRIP: NEGATIVE
BORDETELLA PARAPERTUSSIS (IS1001): NOT DETECTED
BORDETELLA PERTUSSIS (PTXP): NOT DETECTED
BUN SERPL-MCNC: 16 MG/DL (ref 8–23)
C DIFF GDH STL QL: NEGATIVE
C DIFF TOX A+B STL QL IA: NEGATIVE
CALCIUM SERPL-MCNC: 8.3 MG/DL (ref 8.7–10.5)
CHLAMYDIA PNEUMONIAE: NOT DETECTED
CHLORIDE SERPL-SCNC: 102 MMOL/L (ref 95–110)
CLARITY UR: CLEAR
CO2 SERPL-SCNC: 24 MMOL/L (ref 23–29)
COLOR UR: YELLOW
CORONAVIRUS 229E, COMMON COLD VIRUS: NOT DETECTED
CORONAVIRUS HKU1, COMMON COLD VIRUS: NOT DETECTED
CORONAVIRUS NL63, COMMON COLD VIRUS: NOT DETECTED
CORONAVIRUS OC43, COMMON COLD VIRUS: NOT DETECTED
CREAT SERPL-MCNC: 1.1 MG/DL (ref 0.5–1.4)
CRP SERPL-MCNC: 4.9 MG/DL
DIFFERENTIAL METHOD: ABNORMAL
EOSINOPHIL # BLD AUTO: ABNORMAL K/UL (ref 0–0.5)
EOSINOPHIL NFR BLD: 0 % (ref 0–8)
ERYTHROCYTE [DISTWIDTH] IN BLOOD BY AUTOMATED COUNT: 14.2 % (ref 11.5–14.5)
EST. GFR  (NO RACE VARIABLE): >60 ML/MIN/1.73 M^2
FLUBV RNA NPH QL NAA+NON-PROBE: NOT DETECTED
GLUCOSE SERPL-MCNC: 103 MG/DL (ref 70–110)
GLUCOSE UR QL STRIP: NEGATIVE
GRAM STN SPEC: NORMAL
HCT VFR BLD AUTO: 33.1 % (ref 40–54)
HGB BLD-MCNC: 11.8 G/DL (ref 14–18)
HGB UR QL STRIP: NEGATIVE
HPIV1 RNA NPH QL NAA+NON-PROBE: NOT DETECTED
HPIV2 RNA NPH QL NAA+NON-PROBE: NOT DETECTED
HPIV3 RNA NPH QL NAA+NON-PROBE: NOT DETECTED
HPIV4 RNA NPH QL NAA+NON-PROBE: NOT DETECTED
HUMAN METAPNEUMOVIRUS: NOT DETECTED
IMM GRANULOCYTES # BLD AUTO: ABNORMAL K/UL (ref 0–0.04)
IMM GRANULOCYTES NFR BLD AUTO: ABNORMAL % (ref 0–0.5)
INFLUENZA A (SUBTYPES H1,H1-2009,H3): NOT DETECTED
KETONES UR QL STRIP: NEGATIVE
LACTATE SERPL-SCNC: 0.7 MMOL/L (ref 0.5–1.9)
LDH SERPL L TO P-CCNC: 121 U/L (ref 110–260)
LEUKOCYTE ESTERASE UR QL STRIP: NEGATIVE
LYMPHOCYTES # BLD AUTO: ABNORMAL K/UL (ref 1–4.8)
LYMPHOCYTES NFR BLD: 35 % (ref 18–48)
MCH RBC QN AUTO: 30.7 PG (ref 27–31)
MCHC RBC AUTO-ENTMCNC: 35.6 G/DL (ref 32–36)
MCV RBC AUTO: 86 FL (ref 82–98)
MONOCYTES # BLD AUTO: ABNORMAL K/UL (ref 0.3–1)
MONOCYTES NFR BLD: 30 % (ref 4–15)
MYCOPLASMA PNEUMONIAE: NOT DETECTED
NEUTROPHILS # BLD AUTO: ABNORMAL K/UL (ref 1.8–7.7)
NEUTROPHILS NFR BLD: 31 % (ref 38–73)
NEUTS BAND NFR BLD MANUAL: 4 %
NITRITE UR QL STRIP: NEGATIVE
NRBC BLD-RTO: 0 /100 WBC
PH UR STRIP: 7 [PH] (ref 5–8)
PLATELET # BLD AUTO: 52 K/UL (ref 150–450)
PMV BLD AUTO: 10.1 FL (ref 9.2–12.9)
POTASSIUM SERPL-SCNC: 3.5 MMOL/L (ref 3.5–5.1)
PROCALCITONIN SERPL IA-MCNC: 0.05 NG/ML (ref 0–0.5)
PROT SERPL-MCNC: 6.4 G/DL (ref 6–8.4)
PROT UR QL STRIP: NEGATIVE
RBC # BLD AUTO: 3.84 M/UL (ref 4.6–6.2)
RESPIRATORY INFECTION PANEL SOURCE: ABNORMAL
RSV RNA NPH QL NAA+NON-PROBE: NOT DETECTED
RV+EV RNA NPH QL NAA+NON-PROBE: DETECTED
SARS-COV-2 RNA RESP QL NAA+PROBE: NOT DETECTED
SODIUM SERPL-SCNC: 134 MMOL/L (ref 136–145)
SP GR UR STRIP: 1.02 (ref 1–1.03)
URN SPEC COLLECT METH UR: NORMAL
UROBILINOGEN UR STRIP-ACNC: NEGATIVE EU/DL
WBC # BLD AUTO: 7.9 K/UL (ref 3.9–12.7)

## 2022-11-08 PROCEDURE — 63600175 PHARM REV CODE 636 W HCPCS: Performed by: INTERNAL MEDICINE

## 2022-11-08 PROCEDURE — 25000003 PHARM REV CODE 250: Performed by: HOSPITALIST

## 2022-11-08 PROCEDURE — 96361 HYDRATE IV INFUSION ADD-ON: CPT

## 2022-11-08 PROCEDURE — 87798 DETECT AGENT NOS DNA AMP: CPT | Performed by: INTERNAL MEDICINE

## 2022-11-08 PROCEDURE — 94761 N-INVAS EAR/PLS OXIMETRY MLT: CPT

## 2022-11-08 PROCEDURE — 21400001 HC TELEMETRY ROOM

## 2022-11-08 PROCEDURE — 93010 EKG 12-LEAD: ICD-10-PCS | Mod: ,,, | Performed by: INTERNAL MEDICINE

## 2022-11-08 PROCEDURE — 36415 COLL VENOUS BLD VENIPUNCTURE: CPT | Performed by: EMERGENCY MEDICINE

## 2022-11-08 PROCEDURE — 94799 UNLISTED PULMONARY SVC/PX: CPT

## 2022-11-08 PROCEDURE — 93010 ELECTROCARDIOGRAM REPORT: CPT | Mod: ,,, | Performed by: INTERNAL MEDICINE

## 2022-11-08 PROCEDURE — 25000003 PHARM REV CODE 250: Performed by: INTERNAL MEDICINE

## 2022-11-08 PROCEDURE — 94640 AIRWAY INHALATION TREATMENT: CPT

## 2022-11-08 PROCEDURE — 93005 ELECTROCARDIOGRAM TRACING: CPT | Performed by: INTERNAL MEDICINE

## 2022-11-08 PROCEDURE — 87449 NOS EACH ORGANISM AG IA: CPT | Performed by: INTERNAL MEDICINE

## 2022-11-08 PROCEDURE — 99223 PR INITIAL HOSPITAL CARE,LEVL III: ICD-10-PCS | Mod: ,,, | Performed by: INTERNAL MEDICINE

## 2022-11-08 PROCEDURE — 99223 1ST HOSP IP/OBS HIGH 75: CPT | Mod: ,,, | Performed by: INTERNAL MEDICINE

## 2022-11-08 PROCEDURE — 99900031 HC PATIENT EDUCATION (STAT)

## 2022-11-08 PROCEDURE — 25500020 PHARM REV CODE 255: Performed by: HOSPITALIST

## 2022-11-08 PROCEDURE — 63600175 PHARM REV CODE 636 W HCPCS: Performed by: HOSPITALIST

## 2022-11-08 PROCEDURE — 81003 URINALYSIS AUTO W/O SCOPE: CPT | Performed by: HOSPITALIST

## 2022-11-08 PROCEDURE — 99900035 HC TECH TIME PER 15 MIN (STAT)

## 2022-11-08 RX ORDER — SODIUM CHLORIDE, SODIUM LACTATE, POTASSIUM CHLORIDE, CALCIUM CHLORIDE 600; 310; 30; 20 MG/100ML; MG/100ML; MG/100ML; MG/100ML
INJECTION, SOLUTION INTRAVENOUS CONTINUOUS
Status: DISCONTINUED | OUTPATIENT
Start: 2022-11-08 | End: 2022-11-08

## 2022-11-08 RX ORDER — SODIUM,POTASSIUM PHOSPHATES 280-250MG
2 POWDER IN PACKET (EA) ORAL
Status: DISCONTINUED | OUTPATIENT
Start: 2022-11-08 | End: 2022-11-09 | Stop reason: HOSPADM

## 2022-11-08 RX ORDER — IPRATROPIUM BROMIDE AND ALBUTEROL SULFATE 2.5; .5 MG/3ML; MG/3ML
3 SOLUTION RESPIRATORY (INHALATION) EVERY 6 HOURS PRN
Status: DISCONTINUED | OUTPATIENT
Start: 2022-11-08 | End: 2022-11-09 | Stop reason: HOSPADM

## 2022-11-08 RX ORDER — NALOXONE HCL 0.4 MG/ML
0.02 VIAL (ML) INJECTION
Status: DISCONTINUED | OUTPATIENT
Start: 2022-11-08 | End: 2022-11-09 | Stop reason: HOSPADM

## 2022-11-08 RX ORDER — IBUPROFEN 200 MG
16 TABLET ORAL
Status: DISCONTINUED | OUTPATIENT
Start: 2022-11-08 | End: 2022-11-09 | Stop reason: HOSPADM

## 2022-11-08 RX ORDER — TRAZODONE HYDROCHLORIDE 50 MG/1
150 TABLET ORAL NIGHTLY
Status: DISCONTINUED | OUTPATIENT
Start: 2022-11-08 | End: 2022-11-09 | Stop reason: HOSPADM

## 2022-11-08 RX ORDER — LANOLIN ALCOHOL/MO/W.PET/CERES
800 CREAM (GRAM) TOPICAL
Status: DISCONTINUED | OUTPATIENT
Start: 2022-11-08 | End: 2022-11-09 | Stop reason: HOSPADM

## 2022-11-08 RX ORDER — ONDANSETRON 2 MG/ML
4 INJECTION INTRAMUSCULAR; INTRAVENOUS EVERY 8 HOURS PRN
Status: DISCONTINUED | OUTPATIENT
Start: 2022-11-08 | End: 2022-11-09 | Stop reason: HOSPADM

## 2022-11-08 RX ORDER — CEFEPIME HYDROCHLORIDE 1 G/50ML
1 INJECTION, SOLUTION INTRAVENOUS
Status: DISCONTINUED | OUTPATIENT
Start: 2022-11-08 | End: 2022-11-08

## 2022-11-08 RX ORDER — POLYETHYLENE GLYCOL 3350 17 G/17G
17 POWDER, FOR SOLUTION ORAL 2 TIMES DAILY
Status: DISCONTINUED | OUTPATIENT
Start: 2022-11-08 | End: 2022-11-09 | Stop reason: HOSPADM

## 2022-11-08 RX ORDER — HYDROCODONE BITARTRATE AND ACETAMINOPHEN 10; 325 MG/1; MG/1
1 TABLET ORAL EVERY 6 HOURS PRN
Status: DISCONTINUED | OUTPATIENT
Start: 2022-11-08 | End: 2022-11-09 | Stop reason: HOSPADM

## 2022-11-08 RX ORDER — SIMETHICONE 80 MG
1 TABLET,CHEWABLE ORAL 4 TIMES DAILY PRN
Status: DISCONTINUED | OUTPATIENT
Start: 2022-11-08 | End: 2022-11-09 | Stop reason: HOSPADM

## 2022-11-08 RX ORDER — GLUCAGON 1 MG
1 KIT INJECTION
Status: DISCONTINUED | OUTPATIENT
Start: 2022-11-08 | End: 2022-11-09 | Stop reason: HOSPADM

## 2022-11-08 RX ORDER — OXYCODONE AND ACETAMINOPHEN 5; 325 MG/1; MG/1
1 TABLET ORAL EVERY 4 HOURS PRN
Status: DISCONTINUED | OUTPATIENT
Start: 2022-11-08 | End: 2022-11-09 | Stop reason: HOSPADM

## 2022-11-08 RX ORDER — AZELASTINE 1 MG/ML
1 SPRAY, METERED NASAL 2 TIMES DAILY
Status: DISCONTINUED | OUTPATIENT
Start: 2022-11-08 | End: 2022-11-09 | Stop reason: HOSPADM

## 2022-11-08 RX ORDER — TALC
6 POWDER (GRAM) TOPICAL NIGHTLY PRN
Status: DISCONTINUED | OUTPATIENT
Start: 2022-11-08 | End: 2022-11-09 | Stop reason: HOSPADM

## 2022-11-08 RX ORDER — ACETAMINOPHEN 325 MG/1
650 TABLET ORAL EVERY 8 HOURS PRN
Status: DISCONTINUED | OUTPATIENT
Start: 2022-11-08 | End: 2022-11-09 | Stop reason: HOSPADM

## 2022-11-08 RX ORDER — IBUPROFEN 200 MG
24 TABLET ORAL
Status: DISCONTINUED | OUTPATIENT
Start: 2022-11-08 | End: 2022-11-09 | Stop reason: HOSPADM

## 2022-11-08 RX ORDER — LABETALOL HYDROCHLORIDE 5 MG/ML
10 INJECTION, SOLUTION INTRAVENOUS EVERY 6 HOURS PRN
Status: DISCONTINUED | OUTPATIENT
Start: 2022-11-08 | End: 2022-11-09 | Stop reason: HOSPADM

## 2022-11-08 RX ORDER — HYDROCODONE BITARTRATE AND ACETAMINOPHEN 5; 325 MG/1; MG/1
1 TABLET ORAL EVERY 6 HOURS PRN
Status: DISCONTINUED | OUTPATIENT
Start: 2022-11-08 | End: 2022-11-08

## 2022-11-08 RX ORDER — HYDRALAZINE HYDROCHLORIDE 20 MG/ML
10 INJECTION INTRAMUSCULAR; INTRAVENOUS EVERY 8 HOURS PRN
Status: DISCONTINUED | OUTPATIENT
Start: 2022-11-08 | End: 2022-11-09 | Stop reason: HOSPADM

## 2022-11-08 RX ORDER — SODIUM CHLORIDE 0.9 % (FLUSH) 0.9 %
3 SYRINGE (ML) INJECTION EVERY 12 HOURS PRN
Status: DISCONTINUED | OUTPATIENT
Start: 2022-11-08 | End: 2022-11-09 | Stop reason: HOSPADM

## 2022-11-08 RX ORDER — VALACYCLOVIR HYDROCHLORIDE 500 MG/1
1000 TABLET, FILM COATED ORAL 2 TIMES DAILY
Status: DISCONTINUED | OUTPATIENT
Start: 2022-11-08 | End: 2022-11-08

## 2022-11-08 RX ORDER — ACETAMINOPHEN 325 MG/1
650 TABLET ORAL EVERY 4 HOURS PRN
Status: DISCONTINUED | OUTPATIENT
Start: 2022-11-08 | End: 2022-11-09 | Stop reason: HOSPADM

## 2022-11-08 RX ADMIN — IOHEXOL 100 ML: 350 INJECTION, SOLUTION INTRAVENOUS at 04:11

## 2022-11-08 RX ADMIN — HYDROCODONE BITARTRATE AND ACETAMINOPHEN 1 TABLET: 10; 325 TABLET ORAL at 08:11

## 2022-11-08 RX ADMIN — AZELASTINE HYDROCHLORIDE 137 MCG: 137 SPRAY, METERED NASAL at 08:11

## 2022-11-08 RX ADMIN — ACYCLOVIR SODIUM 410 MG: 50 INJECTION, SOLUTION INTRAVENOUS at 08:11

## 2022-11-08 RX ADMIN — HYDRALAZINE HYDROCHLORIDE 10 MG: 20 INJECTION INTRAMUSCULAR; INTRAVENOUS at 11:11

## 2022-11-08 RX ADMIN — ACETAMINOPHEN 650 MG: 325 TABLET ORAL at 12:11

## 2022-11-08 RX ADMIN — TRAZODONE HYDROCHLORIDE 150 MG: 50 TABLET ORAL at 08:11

## 2022-11-08 RX ADMIN — CEFEPIME HYDROCHLORIDE 1 G: 1 INJECTION, SOLUTION INTRAVENOUS at 06:11

## 2022-11-08 RX ADMIN — OXYCODONE AND ACETAMINOPHEN 1 TABLET: 325; 5 TABLET ORAL at 01:11

## 2022-11-08 RX ADMIN — ACYCLOVIR SODIUM 410 MG: 50 INJECTION, SOLUTION INTRAVENOUS at 12:11

## 2022-11-08 RX ADMIN — HYDRALAZINE HYDROCHLORIDE 10 MG: 20 INJECTION INTRAMUSCULAR; INTRAVENOUS at 08:11

## 2022-11-08 RX ADMIN — SODIUM CHLORIDE, SODIUM LACTATE, POTASSIUM CHLORIDE, AND CALCIUM CHLORIDE: .6; .31; .03; .02 INJECTION, SOLUTION INTRAVENOUS at 07:11

## 2022-11-08 RX ADMIN — OXYCODONE AND ACETAMINOPHEN 1 TABLET: 325; 5 TABLET ORAL at 08:11

## 2022-11-08 RX ADMIN — AMPICILLIN SODIUM AND SULBACTAM SODIUM 3 G: 2; 1 INJECTION, POWDER, FOR SOLUTION INTRAMUSCULAR; INTRAVENOUS at 07:11

## 2022-11-08 RX ADMIN — AMPICILLIN SODIUM AND SULBACTAM SODIUM 3 G: 2; 1 INJECTION, POWDER, FOR SOLUTION INTRAMUSCULAR; INTRAVENOUS at 10:11

## 2022-11-08 RX ADMIN — HYDROCODONE BITARTRATE AND ACETAMINOPHEN 1 TABLET: 10; 325 TABLET ORAL at 04:11

## 2022-11-08 NOTE — CARE UPDATE
CM spoke with patient spouse at 6:01PM.  Spouse reported that HH never stopped and they are coming out on tomorrow 11/8/22.      MAEGAN sent  new Ambulatory HH orders to [facility] via care port.

## 2022-11-08 NOTE — H&P
[facility]  History & Physical    Patient Name: Conrad Kuhn  MRN: [id number]  Patient Class: IP- Inpatient  Admission Date: 11/7/2022  Attending Physician: Humphrey Bhakta MD   Primary Care Provider: Johnson Erazo MD         Patient information was obtained from patient, spouse/SO, past medical records and ER records.     Subjective:     Principal Problem:Fever    Chief Complaint:   Chief Complaint   Patient presents with    Fever     Discharged yesterday, dx'd with pneumonia.         HPI: 82-year-old very pleasant gentleman with prior history of CLL currently under observation, COPD, lung cancer, hypertension, frequent aspirations, recurrent gingival stomatitis due to herpes simplex who was just discharged from our service couple days ago when he was admitted for presumed aspiration pneumonia and went home on a prescription of Augmentin came back very next day of due to high-grade fever of 103F with confusion.  According to wife he spiked fever and became very confused prior to arrival to ED. she also mentioned that he is struggling with sinuses.  Otherwise denies any headache, dizziness, chest pain, palpitations, cough, nausea, vomiting, bladder or bowel symptoms.  Patient also has remarkable herpes simplex stomatitis involving upper lip with surrounding erythema.     In emergency room he was found to have mild hypoxia, thrombocytopenia.  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    MAYDA on CPAP      Pneumonia of both lungs due to infectious organism 6/29/2021    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     Right-sided Bell's palsy 2009    Spondylolisthesis     Varicose veins of legs     Venous insufficiency        Past Surgical History:   Procedure Laterality Date    Athroscopic surgery      On Knee    BIOPSY OF TISSUE OF NECK Left 08/2015    Recuurence CLL/small cell lyphoma    ESOPHAGEAL DILATION      ESOPHAGOGASTRODUODENOSCOPY N/A 4/15/2022    Procedure: EGD (ESOPHAGOGASTRODUODENOSCOPY);  Surgeon: Siddharth Garcia MD;  Location: [facility];  Service: Endoscopy;  Laterality: N/A;    ESOPHAGOGASTRODUODENOSCOPY N/A 4/16/2022    Procedure: EGD (ESOPHAGOGASTRODUODENOSCOPY);  Surgeon: Siddharth Garcia MD;  Location: [facility];  Service: Endoscopy;  Laterality: N/A;    ESOPHAGOGASTRODUODENOSCOPY N/A 6/23/2022    Procedure: EGD (ESOPHAGOGASTRODUODENOSCOPY);  Surgeon: Jefferson Hyman MD;  Location: [facility];  Service: Endoscopy;  Laterality: N/A;    ESOPHAGOGASTRODUODENOSCOPY W/ PEG N/A 4/16/2022    Procedure: EGD, WITH PEG TUBE INSERTION;  Surgeon: Siddharth Garcia MD;  Location: [facility];  Service: Endoscopy;  Laterality: N/A;    GASTROSTOMY TUBE PLACEMENT  04/16/2022    20 Fr (bumper initially at 3.5)    Hemorrhoid resection  1979    LUNG LOBECTOMY Left 2004    NECK SURGERY  04/08/2022    Anterior and Posterior C3-C7 fusion    OTHER SURGICAL HISTORY  2006    Chemotherapy s/p lyphoma        Portacath implantation and removal      reverse shoulder arthroplasty Right 03/2021       Review of patient's allergies indicates:  No Known Allergies    Current Facility-Administered Medications on File Prior to Encounter   Medication    EPINEPHrine (EPIPEN) 0.3 mg/0.3 mL pen injection 0.3 mg     Current Outpatient Medications on File Prior to Encounter   Medication Sig    albuterol-ipratropium (DUO-NEB) 2.5 mg-0.5 mg/3 mL nebulizer solution Take 3 mLs by nebulization every 8  (eight) hours as needed.    amoxicillin-clavulanate 875-125mg (AUGMENTIN) 875-125 mg per tablet Take 1 tablet by mouth 2 (two) times daily. for 5 days    azelastine (ASTELIN) 137 mcg (0.1 %) nasal spray 1 spray by Nasal route 2 (two) times daily.     blood sugar diagnostic Strp 1 strip by Misc.(Non-Drug; Combo Route) route 2 (two) times a day.    blood-glucose meter kit Use as instructed    gabapentin (NEURONTIN) 600 MG tablet Take 600 mg by mouth 2 (two) times daily.    glimepiride (AMARYL) 2 MG tablet Take 2 mg by mouth once daily at 6am.    iron-vitamin C 100-250 mg, ICAR-C, (ICAR-C) 100-250 mg Tab Take 1 tablet by mouth once daily.    levalbuterol (XOPENEX) 1.25 mg/3 mL nebulizer solution Take 3 mLs by nebulization every 4 to 6 hours as needed.     metoprolol tartrate (LOPRESSOR) 25 MG tablet 1 tablet (25 mg total) by Per G Tube route 2 (two) times daily.    ondansetron (ZOFRAN) 8 MG tablet Take 1 tablet (8 mg total) by mouth every 12 (twelve) hours as needed for Nausea.    traZODone (DESYREL) 300 MG tablet Take 300 mg by mouth every evening.    [DISCONTINUED] esomeprazole (NEXIUM) 40 MG capsule Take 1 capsule (40 mg total) by mouth 2 (two) times daily.    [DISCONTINUED] losartan (COZAAR) 25 MG tablet Take 25 mg by mouth every evening.    [DISCONTINUED] metFORMIN (GLUCOPHAGE) 500 MG tablet Take 500 mg by mouth 2 (two) times daily with meals.     [DISCONTINUED] metoprolol succinate (TOPROL-XL) 25 MG 24 hr tablet Take 25 mg by mouth 2 (two) times daily.      Family History       Problem Relation (Age of Onset)    Colon cancer Father    Stomach cancer Mother (80)          Tobacco Use    Smoking status: Former    Smokeless tobacco: Never   Substance and Sexual Activity    Alcohol use: Yes    Drug use: No    Sexual activity: Not Currently     Review of Systems   Constitutional:  Positive for chills, fatigue and fever. Negative for activity change and appetite change.   HENT:  Negative for  congestion and sore throat.    Eyes:  Negative for discharge and visual disturbance.   Respiratory:  Positive for shortness of breath. Negative for cough and chest tightness.    Cardiovascular:  Negative for chest pain and leg swelling.   Gastrointestinal:  Negative for abdominal pain and nausea.   Genitourinary:  Negative for dysuria and hematuria.   Musculoskeletal:  Positive for back pain. Negative for myalgias.   Skin:  Positive for rash. Negative for pallor.   Neurological:  Negative for dizziness and weakness.   Hematological:  Positive for adenopathy.   Psychiatric/Behavioral:  Negative for behavioral problems. The patient is not nervous/anxious.    All other systems reviewed and are negative.  Objective:     Vital Signs (Most Recent):  Temp: 99.9 °F (37.7 °C) (11/07/22 2211)  Pulse: 69 (11/08/22 0100)  Resp: 19 (11/08/22 0100)  BP: (!) 150/67 (11/08/22 0100)  SpO2: (!) 90 % (11/08/22 0100)   Vital Signs (24h Range):  Temp:  [99.9 °F (37.7 °C)] 99.9 °F (37.7 °C)  Pulse:  [69-89] 69  Resp:  [16-19] 19  SpO2:  [90 %-93 %] 90 %  BP: (122-150)/(59-67) 150/67     Weight: 88 kg (194 lb)  Body mass index is 24.91 kg/m².    Physical Exam  Vitals and nursing note reviewed.   Constitutional:       Appearance: He is well-developed. He is ill-appearing.   HENT:      Head: Atraumatic.      Mouth/Throat:      Mouth: Mucous membranes are dry.   Neck:      Vascular: No JVD.   Cardiovascular:      Rate and Rhythm: Normal rate and regular rhythm.      Heart sounds: Normal heart sounds. No murmur heard.    No gallop.   Pulmonary:      Effort: No respiratory distress.      Breath sounds: Normal breath sounds. No wheezing.      Comments: On supplemental oxygen  Abdominal:      General: Bowel sounds are normal. There is no distension.      Palpations: Abdomen is soft.      Tenderness: There is no guarding or rebound.   Musculoskeletal:         General: Normal range of motion.      Cervical back: Neck supple.   Lymphadenopathy:       Cervical: Cervical adenopathy present.   Skin:     General: Skin is warm.      Capillary Refill: Capillary refill takes less than 2 seconds.      Findings: No rash.      Comments: Remarkable stem otitis involving upper lip with surrounding erythema   Neurological:      General: No focal deficit present.      Mental Status: He is alert. Mental status is at baseline.      Cranial Nerves: No cranial nerve deficit.   Psychiatric:         Behavior: Behavior normal.           Significant Labs: All pertinent labs within the past 24 hours have been reviewed.    Significant Imaging: I have reviewed all pertinent imaging results/findings within the past 24 hours.    Assessment/Plan:     82-year-old gentleman with prior history of CLL, thrombocytopenia, lung cancer, recurrent aspiration who was just discharged from our service couple days ago and a prescription of Augmentin came back with high-grade fever and associated confusion.     Active Hospital Problems    Diagnosis  POA    *Fever [R50.9]  Yes     Priority: 1 - High    Confusion [R41.0]  Yes     Priority: 2     Thrombocytopenia [D69.6]  Yes     Priority: 3     Lymphadenopathy, cervical [R59.0]  Yes     Priority: 4     Herpetic gingivostomatitis [B00.2]  Yes    History of lung cancer - ANDRES NSCLC 2004 [Z85.118]  Not Applicable    CLL (chronic lymphocytic leukemia) [C91.10]  Yes      Resolved Hospital Problems   No resolved problems to display.       Plan:  Admit to med tele-inpatient  Patient has cervical lymphadenopathy, prior CT scan in July showed mediastinal lymphadenopathy as well, now has fever, I do not see any apparent pneumonia on chest x-ray, procalcitonin is negative.   Gentle IV hydration  Instructed ER physician to obtain CT chest abdomen and pelvis with contrast  Cefepime while inpatient(was discharged on Augmentin couple days ago)  Consulted Infectious Disease  Consult Oncology  Check CRP, LDH  Follow-up on cultures  Resume essential home  medications, hold gabapentin due to confusion  Further management as per clinical course  Fall precautions, aspiration precautions    VTE Risk Mitigation (From admission, onward)         Ordered     Reason for No Pharmacological VTE Prophylaxis  Once        Question:  Reasons:  Answer:  Risk of Bleeding    11/08/22 0357     IP VTE HIGH RISK PATIENT  Once         11/08/22 0357     Place sequential compression device  Until discontinued         11/08/22 0357                   Humphrey Bhakta MD  Department of Hospital Medicine   [facility]

## 2022-11-08 NOTE — H&P (VIEW-ONLY)
[facility] Medicine  History & Physical    Patient Name: Conrad Kuhn  MRN: [id number]  Patient Class: IP- Inpatient  Admission Date: 11/7/2022  Attending Physician: Humphrey Bhakta MD   Primary Care Provider: Johnson Erazo MD         Patient information was obtained from patient, spouse/SO, past medical records and ER records.     Subjective:     Principal Problem:Fever    Chief Complaint:   Chief Complaint   Patient presents with    Fever     Discharged yesterday, dx'd with pneumonia.         HPI: 82-year-old very pleasant gentleman with prior history of CLL currently under observation, COPD, lung cancer, hypertension, frequent aspirations, recurrent gingival stomatitis due to herpes simplex who was just discharged from our service couple days ago when he was admitted for presumed aspiration pneumonia and went home on a prescription of Augmentin came back very next day of due to high-grade fever of 103F with confusion.  According to wife he spiked fever and became very confused prior to arrival to ED. she also mentioned that he is struggling with sinuses.  Otherwise denies any headache, dizziness, chest pain, palpitations, cough, nausea, vomiting, bladder or bowel symptoms.  Patient also has remarkable herpes simplex stomatitis involving upper lip with surrounding erythema.     In emergency room he was found to have mild hypoxia, thrombocytopenia.  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    MAYDA on CPAP      Pneumonia of both lungs due to infectious organism 6/29/2021    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     Right-sided Bell's palsy 2009    Spondylolisthesis     Varicose veins of legs     Venous insufficiency        Past Surgical History:   Procedure Laterality Date    Athroscopic surgery      On Knee    BIOPSY OF TISSUE OF NECK Left 08/2015    Recuurence CLL/small cell lyphoma    ESOPHAGEAL DILATION      ESOPHAGOGASTRODUODENOSCOPY N/A 4/15/2022    Procedure: EGD (ESOPHAGOGASTRODUODENOSCOPY);  Surgeon: Siddharth Gacria MD;  Location: [facility];  Service: Endoscopy;  Laterality: N/A;    ESOPHAGOGASTRODUODENOSCOPY N/A 4/16/2022    Procedure: EGD (ESOPHAGOGASTRODUODENOSCOPY);  Surgeon: Siddharth Garcia MD;  Location: [facility];  Service: Endoscopy;  Laterality: N/A;    ESOPHAGOGASTRODUODENOSCOPY N/A 6/23/2022    Procedure: EGD (ESOPHAGOGASTRODUODENOSCOPY);  Surgeon: Jefferson Hyman MD;  Location: [facility];  Service: Endoscopy;  Laterality: N/A;    ESOPHAGOGASTRODUODENOSCOPY W/ PEG N/A 4/16/2022    Procedure: EGD, WITH PEG TUBE INSERTION;  Surgeon: Siddharth Garcia MD;  Location: [facility];  Service: Endoscopy;  Laterality: N/A;    GASTROSTOMY TUBE PLACEMENT  04/16/2022    20 Fr (bumper initially at 3.5)    Hemorrhoid resection  1979    LUNG LOBECTOMY Left 2004    NECK SURGERY  04/08/2022    Anterior and Posterior C3-C7 fusion    OTHER SURGICAL HISTORY  2006    Chemotherapy s/p lyphoma        Portacath implantation and removal      reverse shoulder arthroplasty Right 03/2021       Review of patient's allergies indicates:  No Known Allergies    Current Facility-Administered Medications on File Prior to Encounter   Medication    EPINEPHrine (EPIPEN) 0.3 mg/0.3 mL pen injection 0.3 mg     Current Outpatient Medications on File Prior to Encounter   Medication Sig    albuterol-ipratropium (DUO-NEB) 2.5 mg-0.5 mg/3 mL nebulizer solution Take 3 mLs by nebulization every 8  (eight) hours as needed.    amoxicillin-clavulanate 875-125mg (AUGMENTIN) 875-125 mg per tablet Take 1 tablet by mouth 2 (two) times daily. for 5 days    azelastine (ASTELIN) 137 mcg (0.1 %) nasal spray 1 spray by Nasal route 2 (two) times daily.     blood sugar diagnostic Strp 1 strip by Misc.(Non-Drug; Combo Route) route 2 (two) times a day.    blood-glucose meter kit Use as instructed    gabapentin (NEURONTIN) 600 MG tablet Take 600 mg by mouth 2 (two) times daily.    glimepiride (AMARYL) 2 MG tablet Take 2 mg by mouth once daily at 6am.    iron-vitamin C 100-250 mg, ICAR-C, (ICAR-C) 100-250 mg Tab Take 1 tablet by mouth once daily.    levalbuterol (XOPENEX) 1.25 mg/3 mL nebulizer solution Take 3 mLs by nebulization every 4 to 6 hours as needed.     metoprolol tartrate (LOPRESSOR) 25 MG tablet 1 tablet (25 mg total) by Per G Tube route 2 (two) times daily.    ondansetron (ZOFRAN) 8 MG tablet Take 1 tablet (8 mg total) by mouth every 12 (twelve) hours as needed for Nausea.    traZODone (DESYREL) 300 MG tablet Take 300 mg by mouth every evening.    [DISCONTINUED] esomeprazole (NEXIUM) 40 MG capsule Take 1 capsule (40 mg total) by mouth 2 (two) times daily.    [DISCONTINUED] losartan (COZAAR) 25 MG tablet Take 25 mg by mouth every evening.    [DISCONTINUED] metFORMIN (GLUCOPHAGE) 500 MG tablet Take 500 mg by mouth 2 (two) times daily with meals.     [DISCONTINUED] metoprolol succinate (TOPROL-XL) 25 MG 24 hr tablet Take 25 mg by mouth 2 (two) times daily.      Family History       Problem Relation (Age of Onset)    Colon cancer Father    Stomach cancer Mother (80)          Tobacco Use    Smoking status: Former    Smokeless tobacco: Never   Substance and Sexual Activity    Alcohol use: Yes    Drug use: No    Sexual activity: Not Currently     Review of Systems   Constitutional:  Positive for chills, fatigue and fever. Negative for activity change and appetite change.   HENT:  Negative for  congestion and sore throat.    Eyes:  Negative for discharge and visual disturbance.   Respiratory:  Positive for shortness of breath. Negative for cough and chest tightness.    Cardiovascular:  Negative for chest pain and leg swelling.   Gastrointestinal:  Negative for abdominal pain and nausea.   Genitourinary:  Negative for dysuria and hematuria.   Musculoskeletal:  Positive for back pain. Negative for myalgias.   Skin:  Positive for rash. Negative for pallor.   Neurological:  Negative for dizziness and weakness.   Hematological:  Positive for adenopathy.   Psychiatric/Behavioral:  Negative for behavioral problems. The patient is not nervous/anxious.    All other systems reviewed and are negative.  Objective:     Vital Signs (Most Recent):  Temp: 99.9 °F (37.7 °C) (11/07/22 2211)  Pulse: 69 (11/08/22 0100)  Resp: 19 (11/08/22 0100)  BP: (!) 150/67 (11/08/22 0100)  SpO2: (!) 90 % (11/08/22 0100)   Vital Signs (24h Range):  Temp:  [99.9 °F (37.7 °C)] 99.9 °F (37.7 °C)  Pulse:  [69-89] 69  Resp:  [16-19] 19  SpO2:  [90 %-93 %] 90 %  BP: (122-150)/(59-67) 150/67     Weight: 88 kg (194 lb)  Body mass index is 24.91 kg/m².    Physical Exam  Vitals and nursing note reviewed.   Constitutional:       Appearance: He is well-developed. He is ill-appearing.   HENT:      Head: Atraumatic.      Mouth/Throat:      Mouth: Mucous membranes are dry.   Neck:      Vascular: No JVD.   Cardiovascular:      Rate and Rhythm: Normal rate and regular rhythm.      Heart sounds: Normal heart sounds. No murmur heard.    No gallop.   Pulmonary:      Effort: No respiratory distress.      Breath sounds: Normal breath sounds. No wheezing.      Comments: On supplemental oxygen  Abdominal:      General: Bowel sounds are normal. There is no distension.      Palpations: Abdomen is soft.      Tenderness: There is no guarding or rebound.   Musculoskeletal:         General: Normal range of motion.      Cervical back: Neck supple.   Lymphadenopathy:       Cervical: Cervical adenopathy present.   Skin:     General: Skin is warm.      Capillary Refill: Capillary refill takes less than 2 seconds.      Findings: No rash.      Comments: Remarkable stem otitis involving upper lip with surrounding erythema   Neurological:      General: No focal deficit present.      Mental Status: He is alert. Mental status is at baseline.      Cranial Nerves: No cranial nerve deficit.   Psychiatric:         Behavior: Behavior normal.           Significant Labs: All pertinent labs within the past 24 hours have been reviewed.    Significant Imaging: I have reviewed all pertinent imaging results/findings within the past 24 hours.    Assessment/Plan:     82-year-old gentleman with prior history of CLL, thrombocytopenia, lung cancer, recurrent aspiration who was just discharged from our service couple days ago and a prescription of Augmentin came back with high-grade fever and associated confusion.     Active Hospital Problems    Diagnosis  POA    *Fever [R50.9]  Yes     Priority: 1 - High    Confusion [R41.0]  Yes     Priority: 2     Thrombocytopenia [D69.6]  Yes     Priority: 3     Lymphadenopathy, cervical [R59.0]  Yes     Priority: 4     Herpetic gingivostomatitis [B00.2]  Yes    History of lung cancer - ANDRES NSCLC 2004 [Z85.118]  Not Applicable    CLL (chronic lymphocytic leukemia) [C91.10]  Yes      Resolved Hospital Problems   No resolved problems to display.       Plan:  Admit to med tele-inpatient  Patient has cervical lymphadenopathy, prior CT scan in July showed mediastinal lymphadenopathy as well, now has fever, I do not see any apparent pneumonia on chest x-ray, procalcitonin is negative.   Gentle IV hydration  Instructed ER physician to obtain CT chest abdomen and pelvis with contrast  Cefepime while inpatient(was discharged on Augmentin couple days ago)  Consulted Infectious Disease  Consult Oncology  Check CRP, LDH  Follow-up on cultures  Resume essential home  medications, hold gabapentin due to confusion  Further management as per clinical course  Fall precautions, aspiration precautions    VTE Risk Mitigation (From admission, onward)         Ordered     Reason for No Pharmacological VTE Prophylaxis  Once        Question:  Reasons:  Answer:  Risk of Bleeding    11/08/22 0357     IP VTE HIGH RISK PATIENT  Once         11/08/22 0357     Place sequential compression device  Until discontinued         11/08/22 0357                   Humphrey Bhakta MD  Department of Hospital Medicine   [facility]

## 2022-11-08 NOTE — PLAN OF CARE
SW met with patient at bedside to complete discharge planning assessment.  Patient alert and oriented xs 4.  Patient verified all demographic information on facesheet is correct.  Patient verified PCP is Dr. Erazo.  Patient verified primary health insurance is Medicare and secondary BCBS.  Patient active "[employer] [facility] home health and listed DME.  Patient choice completed to resume home health with [facility].  Patient with NO POA but has listed Living Will.  Patient not on dialysis or medication coumadin.  Patient with 30 day admission.  Patient with no financial issues at this time.  Patient family will provide transportation upon discharge from facility.  Patient independent with ADLs, live with spouse, drives self.      [facility]  Initial Discharge Assessment       Primary Care Provider: Johnson Erazo MD    Admission Diagnosis: Fever [R50.9]    Admission Date: 11/7/2022  Expected Discharge Date: 11/10/2022    Discharge Barriers Identified: None    Payor: MEDICARE / Plan: MEDICARE PART A & B / Product Type: Government /     Extended Emergency Contact Information  Primary Emergency Contact: Peri Kuhn  Address: [address] [place] [address]  Home Phone: [phone]  Mobile Phone: [phone]  Relation: Spouse    Discharge Plan A: Home Health  Discharge Plan B: Home with family      [id number] - [place], [place] - [address] AT [facility] [address]  [address]  [facility] [address]  Phone: [phone] Fax: [phone]      Initial Assessment (most recent)       Adult Discharge Assessment - 11/08/22 1606          Discharge Assessment    Assessment Type Discharge Planning Assessment     Confirmed/corrected address, phone number and insurance Yes     Confirmed Demographics Correct on Facesheet     Source of Information family;patient     Communicated MARTELL with patient/caregiver Date not  available/Unable to determine     Lives With spouse     Facility Arrived From: home     Do you expect to return to your current living situation? Yes     Do you have help at home or someone to help you manage your care at home? Yes     Who are your caregiver(s) and their phone number(s)? spouse     Prior to hospitilization cognitive status: Alert/Oriented     Current cognitive status: Alert/Oriented     Walking or Climbing Stairs Difficulty ambulation difficulty, requires equipment;stair climbing difficulty, requires equipment     Dressing/Bathing Difficulty none     Equipment Currently Used at Home walker, rolling;CPAP;nebulizer;cane, straight     Readmission within 30 days? Yes     Patient currently being followed by outpatient case management? No     Do you currently have service(s) that help you manage your care at home? Yes     Name and Contact number of agency [facility]     Is the pt/caregiver preference to resume services with current agency Yes     Do you take prescription medications? Yes     Do you have prescription coverage? Yes     Do you have any problems affording any of your prescribed medications? No     Is the patient taking medications as prescribed? yes     Who is going to help you get home at discharge? spouse     How do you get to doctors appointments? car, drives self     Are you on dialysis? No     Do you take coumadin? No     Discharge Plan A Home Health     Discharge Plan B Home with family     DME Needed Upon Discharge  none     Discharge Plan discussed with: Patient     Discharge Barriers Identified None        Physical Activity    On average, how many days per week do you engage in moderate to strenuous exercise (like a brisk walk)? Patient refused     On average, how many minutes do you engage in exercise at this level? Patient refused        Financial Resource Strain    How hard is it for you to pay for the very basics like food, housing, medical care, and heating?  Patient refused        Housing Stability    In the last 12 months, was there a time when you were not able to pay the mortgage or rent on time? Patient refused     In the last 12 months, was there a time when you did not have a steady place to sleep or slept in a shelter (including now)? Patient refused        Transportation Needs    In the past 12 months, has lack of transportation kept you from medical appointments or from getting medications? Patient refused     In the past 12 months, has lack of transportation kept you from meetings, work, or from getting things needed for daily living? Patient refused        Food Insecurity    Within the past 12 months, you worried that your food would run out before you got the money to buy more. Patient refused     Within the past 12 months, the food you bought just didn't last and you didn't have money to get more. Patient refused        Stress    Do you feel stress - tense, restless, nervous, or anxious, or unable to sleep at night because your mind is troubled all the time - these days? Patient refused        Social Connections    In a typical week, how many times do you talk on the phone with family, friends, or neighbors? Patient refused     How often do you get together with friends or relatives? Patient refused     How often do you attend Zoroastrianism or Anabaptist services? Patient refused     Do you belong to any clubs or organizations such as Zoroastrianism groups, unions, fraternal or athletic groups, or school groups? Patient refused     How often do you attend meetings of the clubs or organizations you belong to? Patient refused     Are you , , , , never , or living with a partner? Patient refused        Alcohol Use    Q1: How often do you have a drink containing alcohol? Patient refused     Q2: How many drinks containing alcohol do you have on a typical day when you are drinking? Patient refused     Q3: How often do you have six or more  drinks on one occasion? Patient refused

## 2022-11-08 NOTE — CONSULTS
Consult Note  Infectious Disease    Reason for Consult:  Fever    HPI: Conrad Kuhn is a 82 y.o. male well known to me, last seen in July 2022, was admitted 11/4-6 for presumed aspiration pneumonia, responded to unasyn(but also received 125 mg of solumedrol), released on augmentin and returned last night with high fever(103, which he disputes based on his confidence in his own thermometer) and confusion and evolving herpetic stomatitis.  He was Cultured, and given cefepime. CT chest/abd/pelvis early this am did not reveal a focus of pneumonia or other abscess or inflammatory process, but did show worsening LAD and splenomegaly. PET/CT in late august was stable, after CT in July suggested progression. . Today max temp is 99.9, and blood cultures negative. CRP 4.9.  He believes that this herpetic outbreak was beginning during the last admission and he did not receive his usual Valtrex suppression during that admission.  He is followed here by Dr. Bai and at [facility] by Dr. Don.  He has obviously worsening cervical lymphadenopathy on physical exam.    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     Right-sided Bell's palsy 2009    Spondylolisthesis     Varicose veins of legs     Venous insufficiency      Past Surgical History:   Procedure Laterality Date    Athroscopic  surgery      On Knee    BIOPSY OF TISSUE OF NECK Left 08/2015    Recuurence CLL/small cell lyphoma    ESOPHAGEAL DILATION      ESOPHAGOGASTRODUODENOSCOPY N/A 4/15/2022    Procedure: EGD (ESOPHAGOGASTRODUODENOSCOPY);  Surgeon: Siddharht Garcia MD;  Location: [facility] ENDO;  Service: Endoscopy;  Laterality: N/A;    ESOPHAGOGASTRODUODENOSCOPY N/A 4/16/2022    Procedure: EGD (ESOPHAGOGASTRODUODENOSCOPY);  Surgeon: Siddharth Garcia MD;  Location: [facility] ENDO;  Service: Endoscopy;  Laterality: N/A;    ESOPHAGOGASTRODUODENOSCOPY N/A 6/23/2022    Procedure: EGD (ESOPHAGOGASTRODUODENOSCOPY);  Surgeon: Jefferson Hyman MD;  Location: [facility] ENDO;  Service: Endoscopy;  Laterality: N/A;    ESOPHAGOGASTRODUODENOSCOPY W/ PEG N/A 4/16/2022    Procedure: EGD, WITH PEG TUBE INSERTION;  Surgeon: Siddharth Garcia MD;  Location: [facility];  Service: Endoscopy;  Laterality: N/A;    GASTROSTOMY TUBE PLACEMENT  04/16/2022    20 Fr (bumper initially at 3.5)    Hemorrhoid resection  1979    LUNG LOBECTOMY Left 2004    NECK SURGERY  04/08/2022    Anterior and Posterior C3-C7 fusion    OTHER SURGICAL HISTORY  2006    Chemotherapy s/p lyphoma        Portacath implantation and removal      reverse shoulder arthroplasty Right 03/2021     Social History     Socioeconomic History    Marital status:    Tobacco Use    Smoking status: Former    Smokeless tobacco: Never   Substance and Sexual Activity    Alcohol use: Yes    Drug use: No    Sexual activity: Not Currently     Social Determinants of Health     Financial Resource Strain: Low Risk     Difficulty of Paying Living Expenses: Not hard at all   Food Insecurity: No Food Insecurity    Worried About Running Out of Food in the Last Year: Never true    Ran Out of Food in the Last Year: Never true   Transportation Needs: No Transportation Needs    Lack of Transportation (Medical): No    Lack of Transportation (Non-Medical): No   Physical Activity: Sufficiently Active    Days of Exercise per  Week: 7 days    Minutes of Exercise per Session: 30 min   Stress: No Stress Concern Present    Feeling of Stress : Not at all   Social Connections: Moderately Isolated    Frequency of Communication with Friends and Family: More than three times a week    Frequency of Social Gatherings with Friends and Family: More than three times a week    Attends [employer] Services: Never    Active Member of Clubs or Organizations: No    Attends Club or Organization Meetings: Never    Marital Status:    Housing Stability: Low Risk     Unable to Pay for Housing in the Last Year: No    Number of Places Lived in the Last Year: 1    Unstable Housing in the Last Year: No     Family History   Problem Relation Age of Onset    Stomach cancer Mother 80         at 95    Colon cancer Father          Review of Systems:          chills, fever, sweats,    No change in vision, loss of vision or diplopia  Mild sinus congestion of several days duration, without purulent nasal discharge, post nasal drip    Evolving upper lip herpetic outbreak with swelling out of proportion, worrisome for abscess  No chest pain, palpitations, syncope   mild cough, no sputum production, shortness of breath, dyspnea on exertion, pleurisy, hemoptysis and denies aspiration event after last discharge  No nausea, vomiting, but is having some diarrhea, c denies focal abd pain,  No dysphagia, odynophagia  No dysuria, hematuria, strangury,   No swelling of joints, redness of joints, injuries, or new focal pain  No unusual headaches, dizziness, vertigo,   falls  In remission from alcohol abuse  No diabetes,    No bleeding,   unusual bruising  No new rashes, lesions, or wounds        Outdoor activities:  Not applicable  Travel:   Implants:   Antibiotic History:  See history of present illness    EXAM & DIAGNOSTICS REVIEWED:   Vitals:     Temp:  [98.4 °F (36.9 °C)-99.9 °F (37.7 °C)]   Temp: 98.4 °F (36.9 °C) (22)  Pulse: 61 (22)  Resp: 16  (11/08/22 0816)  BP: (!) 164/69 (11/08/22 0711)  SpO2: 96 % (11/08/22 0711)    Intake/Output Summary (Last 24 hours) at 11/8/2022 1011  Last data filed at 11/8/2022 0820  Gross per 24 hour   Intake 1000 ml   Output 200 ml   Net 800 ml       General:  In NAD. Alert and attentive, cooperative, comfortable  Eyes:  Anicteric, PERRL, EOMI  ENT:  Upper lip is extremely swollen and indurated but there is no facial cellulitis.  There may be a tiny opening on the intraoral surface of the upper lip.  There is no fluctuance and no purulence can be expressed.  The remainder of the oral cavity is negative for ulcerations.  He is edentulous  Neck:  supple, no masses or adenopathy appreciated  Lungs: Clear, no consolidation, rales, wheezes, rub  Heart:  RRR, no gallop/murmur/rub noted  Abd:  Soft, NT, ND, normal BS, no masses or organomegaly appreciated.  :  Voids/ , no flank tenderness  Musc:  Joints without effusion, swelling, erythema, synovitis, muscle wasting.   Skin:  No rashes. No palmar or plantar lesions. No subungual petechiae  Neuro:             Alert, attentive, speech fluent, , moves all extremities, no focal weakness. Ambulatory  Psych: Calm, cooperative  Lymphatic:     Grossly enlarged cervical, supraclavicular nodes with mildly enlarged axillary nodes  Extrem: Chronic 1+ bilateral lower extremity edema, without erythema, phlebitis, cellulitis, warm and well perfused  VAD:   Peripheral    Isolation:  None  Wound:            General Labs reviewed:  Recent Labs   Lab 11/05/22  0611 11/06/22  0553 11/07/22  2344   WBC 9.92 10.47 7.90   HGB 13.0* 12.5* 11.8*   HCT 36.5* 35.3* 33.1*   PLT 64* 52* 52*       Recent Labs   Lab 11/04/22  1053 11/05/22  0611 11/06/22  0553 11/07/22  2344   * 137 135* 134*   K 3.9 4.0 3.3* 3.5   CL 99 104 101 102   CO2 25 26 25 24   BUN 12 19 16 16   CREATININE 0.9 0.8 0.8 1.1   CALCIUM 8.8 8.8 8.3* 8.3*   PROT 6.8  --   --  6.4   BILITOT 1.4*  --   --  0.9   ALKPHOS 50*  --   --  52*    ALT 11  --   --  12   AST 17  --   --  15     Recent Labs   Lab 11/07/22 2344   CRP 4.90*         Micro:  Microbiology Results (last 7 days)       Procedure Component Value Units Date/Time    Respiratory Infection Panel (PCR), Nasopharyngeal [[id number]]     Order Status: No result Specimen: Nasopharyngeal Swab     Blood culture x two cultures. Draw prior to antibiotics. [[id number]] Collected: 11/07/22 2343    Order Status: Completed Specimen: Blood from Peripheral, Upper Arm, Left Updated: 11/08/22 0717     Blood Culture, Routine No Growth to date    Narrative:      Aerobic and anaerobic    Blood culture x two cultures. Draw prior to antibiotics. [[id number]] Collected: 11/07/22 2358    Order Status: Completed Specimen: Blood from Peripheral, Forearm, Left Updated: 11/08/22 0717     Blood Culture, Routine No Growth to date    Narrative:      Aerobic and anaerobic            Imaging Reviewed:   CXR   CT C/A/P  Worsening splenomegaly with diffuse increase in size and number of essentially all of the main lymph node chains throughout the chest, abdomen and pelvis    Cardiology:    IMPRESSION & PLAN   1. Fever, possibly related to flare of herpes gingivostomatitis   No pneumonia on imaging   Sinusitis or other viral infection not excluded   Upper lip abscess not excluded    2. Advancing CLL, with worsening cervical (and generalized) LAD and splenomegaly. He believes he needs chemotherapy. Doubt tumor fever    3. Aspiration risk since cervical spine surgery, seen by SLP 11/5 with instruction      Recommendations:  IV acyclovir for today  Resume unasyn for mouth annette instead of cefepime  Warm compresses to upper lip  Sinus CT(may also image upper lip)  Respiratory viral panel  Check total IGG for deficiency    Continue lifelong valtrex suppression    D/w patient and nursing    Medical Decision Making during this encounter was  [_] Low Complexity  [_] Moderate Complexity  [  ] High Complexity

## 2022-11-08 NOTE — SUBJECTIVE & OBJECTIVE
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     Right-sided Bell's palsy 2009    Spondylolisthesis     Varicose veins of legs     Venous insufficiency        Past Surgical History:   Procedure Laterality Date    Athroscopic surgery      On Knee    BIOPSY OF TISSUE OF NECK Left 08/2015    Recuurence CLL/small cell lyphoma    ESOPHAGEAL DILATION      ESOPHAGOGASTRODUODENOSCOPY N/A 4/15/2022    Procedure: EGD (ESOPHAGOGASTRODUODENOSCOPY);  Surgeon: Siddharth Garcia MD;  Location: [facility];  Service: Endoscopy;  Laterality: N/A;    ESOPHAGOGASTRODUODENOSCOPY N/A 4/16/2022    Procedure: EGD (ESOPHAGOGASTRODUODENOSCOPY);  Surgeon: Siddharth Garcia MD;  Location: [facility];  Service: Endoscopy;  Laterality: N/A;    ESOPHAGOGASTRODUODENOSCOPY N/A 6/23/2022    Procedure: EGD (ESOPHAGOGASTRODUODENOSCOPY);  Surgeon: Jefferson Hyman MD;  Location: [facility];  Service: Endoscopy;  Laterality: N/A;    ESOPHAGOGASTRODUODENOSCOPY W/ PEG N/A 4/16/2022    Procedure: EGD, WITH PEG TUBE INSERTION;  Surgeon: Siddharth Garcia MD;  Location: [facility];  Service: Endoscopy;  Laterality: N/A;    GASTROSTOMY TUBE PLACEMENT  04/16/2022    20 Fr (bumper initially at 3.5)    Hemorrhoid resection  1979    LUNG LOBECTOMY Left 2004    NECK SURGERY  04/08/2022    Anterior and Posterior C3-C7 fusion    OTHER SURGICAL HISTORY  2006    Chemotherapy s/p lyphoma        Portacath implantation and  removal      reverse shoulder arthroplasty Right 03/2021       Review of patient's allergies indicates:  No Known Allergies    Current Facility-Administered Medications on File Prior to Encounter   Medication    EPINEPHrine (EPIPEN) 0.3 mg/0.3 mL pen injection 0.3 mg     Current Outpatient Medications on File Prior to Encounter   Medication Sig    albuterol-ipratropium (DUO-NEB) 2.5 mg-0.5 mg/3 mL nebulizer solution Take 3 mLs by nebulization every 8 (eight) hours as needed.    amoxicillin-clavulanate 875-125mg (AUGMENTIN) 875-125 mg per tablet Take 1 tablet by mouth 2 (two) times daily. for 5 days    azelastine (ASTELIN) 137 mcg (0.1 %) nasal spray 1 spray by Nasal route 2 (two) times daily.     blood sugar diagnostic Strp 1 strip by Misc.(Non-Drug; Combo Route) route 2 (two) times a day.    blood-glucose meter kit Use as instructed    gabapentin (NEURONTIN) 600 MG tablet Take 600 mg by mouth 2 (two) times daily.    glimepiride (AMARYL) 2 MG tablet Take 2 mg by mouth once daily at 6am.    iron-vitamin C 100-250 mg, ICAR-C, (ICAR-C) 100-250 mg Tab Take 1 tablet by mouth once daily.    levalbuterol (XOPENEX) 1.25 mg/3 mL nebulizer solution Take 3 mLs by nebulization every 4 to 6 hours as needed.     metoprolol tartrate (LOPRESSOR) 25 MG tablet 1 tablet (25 mg total) by Per G Tube route 2 (two) times daily.    ondansetron (ZOFRAN) 8 MG tablet Take 1 tablet (8 mg total) by mouth every 12 (twelve) hours as needed for Nausea.    traZODone (DESYREL) 300 MG tablet Take 300 mg by mouth every evening.    [DISCONTINUED] esomeprazole (NEXIUM) 40 MG capsule Take 1 capsule (40 mg total) by mouth 2 (two) times daily.    [DISCONTINUED] losartan (COZAAR) 25 MG tablet Take 25 mg by mouth every evening.    [DISCONTINUED] metFORMIN (GLUCOPHAGE) 500 MG tablet Take 500 mg by mouth 2 (two) times daily with meals.     [DISCONTINUED] metoprolol succinate (TOPROL-XL) 25 MG 24 hr tablet Take 25 mg by mouth 2 (two) times daily.      Family  History       Problem Relation (Age of Onset)    Colon cancer Father    Stomach cancer Mother (80)          Tobacco Use    Smoking status: Former    Smokeless tobacco: Never   Substance and Sexual Activity    Alcohol use: Yes    Drug use: No    Sexual activity: Not Currently     Review of Systems   Constitutional:  Positive for chills, fatigue and fever. Negative for activity change and appetite change.   HENT:  Negative for congestion and sore throat.    Eyes:  Negative for discharge and visual disturbance.   Respiratory:  Positive for shortness of breath. Negative for cough and chest tightness.    Cardiovascular:  Negative for chest pain and leg swelling.   Gastrointestinal:  Negative for abdominal pain and nausea.   Genitourinary:  Negative for dysuria and hematuria.   Musculoskeletal:  Positive for back pain. Negative for myalgias.   Skin:  Positive for rash. Negative for pallor.   Neurological:  Negative for dizziness and weakness.   Hematological:  Positive for adenopathy.   Psychiatric/Behavioral:  Negative for behavioral problems. The patient is not nervous/anxious.    All other systems reviewed and are negative.  Objective:     Vital Signs (Most Recent):  Temp: 99.9 °F (37.7 °C) (11/07/22 2211)  Pulse: 69 (11/08/22 0100)  Resp: 19 (11/08/22 0100)  BP: (!) 150/67 (11/08/22 0100)  SpO2: (!) 90 % (11/08/22 0100)   Vital Signs (24h Range):  Temp:  [99.9 °F (37.7 °C)] 99.9 °F (37.7 °C)  Pulse:  [69-89] 69  Resp:  [16-19] 19  SpO2:  [90 %-93 %] 90 %  BP: (122-150)/(59-67) 150/67     Weight: 88 kg (194 lb)  Body mass index is 24.91 kg/m².    Physical Exam  Vitals and nursing note reviewed.   Constitutional:       Appearance: He is well-developed. He is ill-appearing.   HENT:      Head: Atraumatic.      Mouth/Throat:      Mouth: Mucous membranes are dry.   Neck:      Vascular: No JVD.   Cardiovascular:      Rate and Rhythm: Normal rate and regular rhythm.      Heart sounds: Normal heart sounds. No murmur heard.     No gallop.   Pulmonary:      Effort: No respiratory distress.      Breath sounds: Normal breath sounds. No wheezing.      Comments: On supplemental oxygen  Abdominal:      General: Bowel sounds are normal. There is no distension.      Palpations: Abdomen is soft.      Tenderness: There is no guarding or rebound.   Musculoskeletal:         General: Normal range of motion.      Cervical back: Neck supple.   Lymphadenopathy:      Cervical: Cervical adenopathy present.   Skin:     General: Skin is warm.      Capillary Refill: Capillary refill takes less than 2 seconds.      Findings: No rash.      Comments: Remarkable stem otitis involving upper lip with surrounding erythema   Neurological:      General: No focal deficit present.      Mental Status: He is alert. Mental status is at baseline.      Cranial Nerves: No cranial nerve deficit.   Psychiatric:         Behavior: Behavior normal.           Significant Labs: All pertinent labs within the past 24 hours have been reviewed.    Significant Imaging: I have reviewed all pertinent imaging results/findings within the past 24 hours.

## 2022-11-08 NOTE — PROGRESS NOTES
Pharmacist Renal Dose Adjustment Note    Conrad Kuhn is a 82 y.o. male being treated with Cefepime.     Patient Data:    Vital Signs (Most Recent):  Temp: 99.9 °F (37.7 °C) (11/07/22 2211)  Pulse: 69 (11/08/22 0100)  Resp: 19 (11/08/22 0100)  BP: (!) 150/67 (11/08/22 0100)  SpO2: (!) 90 % (11/08/22 0100)   Vital Signs (72h Range):  Temp:  [97.8 °F (36.6 °C)-99.9 °F (37.7 °C)]   Pulse:  [58-89]   Resp:  [16-20]   BP: (122-169)/(59-94)   SpO2:  [90 %-99 %]      Recent Labs   Lab 11/05/22  0611 11/06/22  0553 11/07/22  2344   CREATININE 0.8 0.8 1.1     Serum creatinine: 1.1 mg/dL 11/07/22 2344  Estimated creatinine clearance: 59.2 mL/min    Cefepime 1 g every 8 hours will be changed to Cefepime 1 g every 12 hours per pharmacy protocol.    Pharmacist's Name: Angela Wiggins  Pharmacist's Extension: [phone]

## 2022-11-08 NOTE — CONSULTS
[facility]   Hematology/Oncology  Inpatient Consult Note          Patient Name: Conrad Kuhn  MRN: [id number]  Admission Date: 11/7/2022  Hospital Length of Stay: 0 days  Code Status: Full Code   Attending Provider: Rachel Morales MD  Referring Provider: Self, Aaareferral  Consulting Provider: Drew Bai MD  Primary Care Physician: Johnson Erazo MD  Principal Problem:Fever    Consults  Subjective:     Chief Complaint: Fever (Discharged yesterday, dx'd with pneumonia. )          History Present Illness:  82 y.o. male  known to my oncology service with diagnosis of CLL who has been followed and treated in conjunction with Dr Monik Don at [facility]. He was recently discharged from hospital with bout of pneumonia and presented again to the ED here at [facility] with febrile temp. He has been seen by Dr Barr with ID. He is awake and answering questions appropriately; no CP, SOB, HA's; some residual cough with yellow sputum; he has a herpetic outbreak on upper lip with swelling of upper lip; his wife is at bedside; I discussed with floor staff        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     Right-sided Bell's palsy 2009    Spondylolisthesis     Varicose veins of legs     Venous insufficiency      Past Surgical History:   Procedure Laterality Date    Athroscopic surgery      On Knee     BIOPSY OF TISSUE OF NECK Left 08/2015    Recuurence CLL/small cell lyphoma    ESOPHAGEAL DILATION      ESOPHAGOGASTRODUODENOSCOPY N/A 4/15/2022    Procedure: EGD (ESOPHAGOGASTRODUODENOSCOPY);  Surgeon: Siddharth Garcia MD;  Location: [facility] ENDO;  Service: Endoscopy;  Laterality: N/A;    ESOPHAGOGASTRODUODENOSCOPY N/A 4/16/2022    Procedure: EGD (ESOPHAGOGASTRODUODENOSCOPY);  Surgeon: Siddharth Garcia MD;  Location: [facility] ENDO;  Service: Endoscopy;  Laterality: N/A;    ESOPHAGOGASTRODUODENOSCOPY N/A 6/23/2022    Procedure: EGD (ESOPHAGOGASTRODUODENOSCOPY);  Surgeon: Jefferson Hyman MD;  Location: [facility];  Service: Endoscopy;  Laterality: N/A;    ESOPHAGOGASTRODUODENOSCOPY W/ PEG N/A 4/16/2022    Procedure: EGD, WITH PEG TUBE INSERTION;  Surgeon: Siddharth Garcia MD;  Location: [facility];  Service: Endoscopy;  Laterality: N/A;    GASTROSTOMY TUBE PLACEMENT  04/16/2022    20 Fr (bumper initially at 3.5)    Hemorrhoid resection  1979    LUNG LOBECTOMY Left 2004    NECK SURGERY  04/08/2022    Anterior and Posterior C3-C7 fusion    OTHER SURGICAL HISTORY  2006    Chemotherapy s/p lyphoma        Portacath implantation and removal      reverse shoulder arthroplasty Right 03/2021         Allergies:  Review of patient's allergies indicates:  No Known Allergies    Social/Family History:  Social History     Socioeconomic History    Marital status:    Tobacco Use    Smoking status: Former    Smokeless tobacco: Never   Substance and Sexual Activity    Alcohol use: Yes    Drug use: No    Sexual activity: Not Currently     Social Determinants of Health     Financial Resource Strain: Low Risk     Difficulty of Paying Living Expenses: Not hard at all   Food Insecurity: No Food Insecurity    Worried About Running Out of Food in the Last Year: Never true    Ran Out of Food in the Last Year: Never true   Transportation Needs: No Transportation Needs    Lack of Transportation (Medical): No    Lack of Transportation  (Non-Medical): No   Physical Activity: Sufficiently Active    Days of Exercise per Week: 7 days    Minutes of Exercise per Session: 30 min   Stress: No Stress Concern Present    Feeling of Stress : Not at all   Social Connections: Moderately Isolated    Frequency of Communication with Friends and Family: More than three times a week    Frequency of Social Gatherings with Friends and Family: More than three times a week    Attends Mosque Services: Never    Active Member of Clubs or Organizations: No    Attends Club or Organization Meetings: Never    Marital Status:    Housing Stability: Low Risk     Unable to Pay for Housing in the Last Year: No    Number of Places Lived in the Last Year: 1    Unstable Housing in the Last Year: No     Family History   Problem Relation Age of Onset    Stomach cancer Mother 80         at 95    Colon cancer Father          ROS:    GEN: general malaise, febrile temp  HEENT: normal with no HA's, sore throat, stiff neck, changes in vision; Herpetic outbreak on upper lip  CV: normal with no CP, SOB, PND, MAYER or orthopnea  PULM:  cough, w/ yellowish sputum; no pleuritic pain  GI: normal with no abdominal pain, nausea, vomiting, constipation, diarrhea, melanotic stools, BRBPR, or hematemesis  : normal with no hematuria, dysuria  BREAST: normal with no mass, discharge, pain  SKIN: normal with no rash, erythema, bruising, or swelling        Medications:  Continuous Infusions:  Scheduled Meds:   acyclovir  5 mg/kg (Ideal) Intravenous Q8H    ampicillin-sulbactim (UNASYN) IVPB  3 g Intravenous Q8H    azelastine  1 spray Nasal BID    polyethylene glycol  17 g Oral BID    traZODone  150 mg Oral Nightly     PRN Meds:acetaminophen, acetaminophen, albuterol-ipratropium, glucagon (human recombinant), glucose, glucose, hydrALAZINE, HYDROcodone-acetaminophen, HYDROcodone-acetaminophen, labetalol, magnesium oxide, magnesium oxide, melatonin, naloxone, ondansetron, potassium bicarbonate,  "potassium bicarbonate, potassium bicarbonate, potassium, sodium phosphates, potassium, sodium phosphates, potassium, sodium phosphates, simethicone, sodium chloride 0.9%         Objective:       Physical Exam:    Vitals:  Blood pressure (!) 164/69, pulse 61, temperature 98.4 °F (36.9 °C), temperature source Oral, resp. rate 16, height 6' 2" (1.88 m), weight 86.4 kg (190 lb 6.4 oz), SpO2 96 %.    GEN: no apparent distress, comfortable; AAOx3  HEAD: atraumatic and normocephalic  EYES: no conjunctival pallor or muddiness, no icterus; normal pupil reaction to ambient light  ENT: OMM, no pharyngeal erythema, external bilateral ears WNL; no visible thrush or ulcers; herpetic outbreak with swelling or upper lip and scabbing  NECK: no masses or swelling, trachea midline, no visible LAD/LN's ; stable LAD and LN's on right neck reduced in size  CV: no palpitations; mild pedal edema; no noticeable JVD or neck vein distension  CHEST: Normal respiratory effort; chest wall breath movements symmetrical; no audible wheezing  ABDOM: non-distended; no bloating;  peg tube since removed  MUSC/Skeletal: ROM normal; joints visibly normal; no deformities or arthropathy  EXTREM: no clubbing, cyanosis, inflammation or swelling;  fair ROM; IV RUE  SKIN: no rashes, lesions, ulcers, petechiae or subcutaneous nodules; chronic age related skin changes; dry skin and hyperpigmentation of the lower extremities  : no keith  NEURO: moving all 4 extremities; AAOx3; no tremors; generalized weakness  PSYCH: normal mood, affect and behavior  LYMPH: no visible LN's or LAD; LAD and LN's on right neck reduced in size      Lab Review:   Lab Results   Component Value Date    WBC 7.90 11/07/2022    HGB 11.8 (L) 11/07/2022    HCT 33.1 (L) 11/07/2022    MCV 86 11/07/2022    PLT 52 (L) 11/07/2022       CMP  Sodium   Date Value Ref Range Status   11/07/2022 134 (L) 136 - 145 mmol/L Final   06/12/2019 138 134 - 144 mmol/L      Potassium   Date Value Ref Range " Status   11/07/2022 3.5 3.5 - 5.1 mmol/L Final     Chloride   Date Value Ref Range Status   11/07/2022 102 95 - 110 mmol/L Final   06/12/2019 99 98 - 110 mmol/L      CO2   Date Value Ref Range Status   11/07/2022 24 23 - 29 mmol/L Final     Glucose   Date Value Ref Range Status   11/07/2022 103 70 - 110 mg/dL Final   06/12/2019 179 (H) 70 - 99 mg/dL      BUN   Date Value Ref Range Status   11/07/2022 16 8 - 23 mg/dL Final     Creatinine   Date Value Ref Range Status   11/07/2022 1.1 0.5 - 1.4 mg/dL Final   06/12/2019 0.95 0.60 - 1.40 mg/dL      Calcium   Date Value Ref Range Status   11/07/2022 8.3 (L) 8.7 - 10.5 mg/dL Final     Total Protein   Date Value Ref Range Status   11/07/2022 6.4 6.0 - 8.4 g/dL Final     Albumin   Date Value Ref Range Status   11/07/2022 3.6 3.5 - 5.2 g/dL Final   06/12/2019 3.9 3.1 - 4.7 g/dL      Total Bilirubin   Date Value Ref Range Status   11/07/2022 0.9 0.1 - 1.0 mg/dL Final     Comment:     For infants and newborns, interpretation of results should be based  on gestational age, weight and in agreement with clinical  observations.    Premature Infant recommended reference ranges:  Up to 24 hours.............<8.0 mg/dL  Up to 48 hours............<12.0 mg/dL  3-5 days..................<15.0 mg/dL  6-29 days.................<15.0 mg/dL       Alkaline Phosphatase   Date Value Ref Range Status   11/07/2022 52 (L) 55 - 135 U/L Final     AST   Date Value Ref Range Status   11/07/2022 15 10 - 40 U/L Final     ALT   Date Value Ref Range Status   11/07/2022 12 10 - 44 U/L Final     Anion Gap   Date Value Ref Range Status   11/07/2022 8 8 - 16 mmol/L Final     eGFR if    Date Value Ref Range Status   07/02/2022 >60.0 >60 mL/min/1.73 m^2 Final     eGFR if non    Date Value Ref Range Status   07/02/2022 >60.0 >60 mL/min/1.73 m^2 Final     Comment:     Calculation used to obtain the estimated glomerular filtration  rate (eGFR) is the CKD-EPI equation.             Diagnostic Results:  CT Chest Abdomen Pelvis With Contrast (xpd) [[id number]] Collected: 11/08/22 0359   Order Status: Completed Updated: 11/08/22 0559   Narrative:     CLINICAL HISTORY:  Metastatic disease evaluation     COMPARISON: 7/2/2022.     TECHNIQUE: CT CHEST ABDOMEN PELVIS WITH IV CONTRAST on 11/8/2022 3:59 AM CST. MIPS reconstructions were generated.     This exam was performed according to our departmental dose-optimization program, which includes automated exposure control, adjustment of the mA and/or kV according to patient size and/or use of iterative reconstruction technique.     FINDINGS:     Vascular: Thoracic aorta is normal in course and caliber without aneurysm or dissection. Pulmonary arteries are adequately opacified without acute or chronic filling defects. Abdominal aorta is mildly calcified measuring up to 3.7 cm. Pelvic arteries are patent without aneurysm or occlusion.     Chest: The heart is normal in size. There is no pericardial effusion. There are multiple enlarged mediastinal lymph nodes which have increased in size, with the largest subcarinal lymph node measuring 3.1 cm. Bilateral hilar and axillary lymph nodes are also mildly enlarged and has increased in size. Bilateral pelvic sidewall lymph nodes measure up to 2.9 cm.     There is no pleural effusion, pleural thickening or pneumothorax. Central airways are patent. There is mild scarring in the left lung base as well as in the anteromedial right lung base.     Abdomen: The liver is normal in appearance. There is no biliary dilatation. Gallbladder is normal in appearance. Spleen is enlarged measuring 15.2 cm there are multiple enlarged upper retroperitoneal as well as left and right upper quadrant lymph nodes, including the largest in the portal caval region measuring 3.2 cm. The adrenal glands and kidneys are unremarkable.     There is no free air. Lower retroperitoneal lymph nodes have also increased in size with the  largest left periaortic lymph node measuring 2.1 cm.     Pelvis: There is no bowel obstruction. Urinary bladder is diffusely thickened. There is no free fluid. Bilateral inguinal lymph nodes measure up to 1 cm. Appendix is normal in size.     Skeleton: There are no acute osseous findings. No suspicious bony lesions.     IMPRESSION:     Worsening splenomegaly with diffuse increase in size and number of essentially all of the main lymph node chains throughout the chest, abdomen and pelvis.      X-Ray Chest AP Portable [[id number]] Collected: 11/07/22 2330   Order Status: Completed Updated: 11/08/22 0136   Narrative:     CLINICAL HISTORY: Sepsis.     COMPARISON: Chest radiograph 11/4/2022.     TECHNIQUE: Portable AP view of the chest.     FINDINGS:   Heart size is within normal limits. The pulmonary vasculature is unremarkable. No evidence of focal airspace consolidation, pleural effusion, or pneumothorax. Chronic left-sided rib fracture. Partially visualized fusion hardware of the lower cervical spine an right shoulder arthroplasty.     IMPRESSION:   No acute cardiopulmonary process.        Assessment/Plan:     IMPRESSION:  (1) 82 y.o. male  known to my oncology service with diagnosis of CLL who has been followed and treated in conjunction with Dr Monik Don at [facility].  - He was recently discharged from hospital with bout of pneumonia and presented again to the ED here at [facility] with febrile temp. He has been seen by Dr Barr with ID.   - recent PEt in Aug 2022; he missed his appointment with Dr Don in Sept 2022  - [facility] had been considering him for a clinical trial    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  - followed  By Dr Kee    (3) Iron deficiency anemia s/p IV iron in past    (4) HTN       (6) Chronic neck and back issues - followed by Dr Ryan Rivero     (7) DM       (8) Hypercholesterolemia - on meds     (9) Urinary frequency - followed by Dr Whitney     (10) Chronic Leucopenia  and thrombocytopenia           Active Diagnoses:    Diagnosis Date Noted POA    PRINCIPAL PROBLEM:  Fever [R50.9] 11/08/2022 Yes    Confusion [R41.0] 11/08/2022 Yes    Lymphadenopathy, cervical [R59.0] 11/08/2022 Yes    Lymphadenopathy, generalized [R59.1] 11/08/2022 Yes    Splenomegaly [R16.1] 11/08/2022 Yes    Thrombocytopenia [D69.6] 04/10/2022 Yes    Herpes stomatitis [B00.2]  Yes    History of lung cancer - ANDRES NSCLC 2004 [Z85.118] 01/03/2019 Not Applicable    CLL (chronic lymphocytic leukemia) [C91.10] 01/02/2019 Yes      Problems Resolved During this Admission:           PLAN:   Needs repeat PET as outpatient and needs to f/u with Dr Don upon discharge  Monitor labs  IV antibiotics and infection workup, herpes management as per ID directives  Will follow with you           Thank you for your consult.      Drew Bai MD  Hematology/Oncology  [facility]

## 2022-11-08 NOTE — HPI
82-year-old very pleasant gentleman with prior history of CLL currently under observation, COPD, lung cancer, hypertension, frequent aspirations, recurrent gingival stomatitis due to herpes simplex who was just discharged from our service couple days ago when he was admitted for presumed aspiration pneumonia and went home on a prescription of Augmentin came back very next day of due to high-grade fever of 103F with confusion.  According to wife he spiked fever and became very confused prior to arrival to ED. she also mentioned that he is struggling with sinuses.  Otherwise denies any headache, dizziness, chest pain, palpitations, cough, nausea, vomiting, bladder or bowel symptoms.  Patient also has remarkable herpes simplex stomatitis involving upper lip with surrounding erythema.     In emergency room he was found to have mild hypoxia, thrombocytopenia.  Hospital medicine was consulted for admission

## 2022-11-08 NOTE — PLAN OF CARE
11/08/22 1647   Readmission   Why were you hospitalized in the last 30 days? cough   Why were you readmitted? Alarmed about signs/symptoms   When you left the hospital how did you feel? better but not great   When you left the hospital where did you go? Home with Home Health   Did patient/caregiver refused recommended DC plan? No   Tell me about what happened between when you left the hospital and the day you returned. fever   When did you start not feeling well? yesterday   Did you try to see or did see a doctor or nurse before you came? No   Why? came back to hospital due to fever   Did you have  a follow-up appointment on discharge? Yes   Was this a planned readmission? No

## 2022-11-09 VITALS
HEIGHT: 74 IN | BODY MASS INDEX: 23.98 KG/M2 | TEMPERATURE: 99 F | HEART RATE: 69 BPM | OXYGEN SATURATION: 95 % | WEIGHT: 186.88 LBS | RESPIRATION RATE: 18 BRPM | DIASTOLIC BLOOD PRESSURE: 72 MMHG | SYSTOLIC BLOOD PRESSURE: 155 MMHG

## 2022-11-09 PROBLEM — J18.9 PNEUMONIA: Status: RESOLVED | Noted: 2021-06-29 | Resolved: 2022-11-09

## 2022-11-09 LAB
ALBUMIN SERPL BCP-MCNC: 3.3 G/DL (ref 3.5–5.2)
ALP SERPL-CCNC: 46 U/L (ref 55–135)
ALT SERPL W/O P-5'-P-CCNC: 10 U/L (ref 10–44)
ANION GAP SERPL CALC-SCNC: 10 MMOL/L (ref 8–16)
ANISOCYTOSIS BLD QL SMEAR: SLIGHT
AST SERPL-CCNC: 15 U/L (ref 10–40)
BACTERIA BLD CULT: NORMAL
BACTERIA BLD CULT: NORMAL
BASOPHILS NFR BLD: 0 % (ref 0–1.9)
BILIRUB SERPL-MCNC: 1 MG/DL (ref 0.1–1)
BUN SERPL-MCNC: 9 MG/DL (ref 8–23)
CALCIUM SERPL-MCNC: 8.6 MG/DL (ref 8.7–10.5)
CHLORIDE SERPL-SCNC: 102 MMOL/L (ref 95–110)
CO2 SERPL-SCNC: 22 MMOL/L (ref 23–29)
CREAT SERPL-MCNC: 0.8 MG/DL (ref 0.5–1.4)
DIFFERENTIAL METHOD: ABNORMAL
EOSINOPHIL NFR BLD: 0 % (ref 0–8)
ERYTHROCYTE [DISTWIDTH] IN BLOOD BY AUTOMATED COUNT: 13.9 % (ref 11.5–14.5)
EST. GFR  (NO RACE VARIABLE): >60 ML/MIN/1.73 M^2
GLUCOSE SERPL-MCNC: 137 MG/DL (ref 70–110)
HCT VFR BLD AUTO: 33.2 % (ref 40–54)
HGB BLD-MCNC: 12.1 G/DL (ref 14–18)
IGG SERPL-MCNC: 702 MG/DL (ref 603–1613)
IMM GRANULOCYTES # BLD AUTO: ABNORMAL K/UL (ref 0–0.04)
IMM GRANULOCYTES NFR BLD AUTO: ABNORMAL % (ref 0–0.5)
LYMPHOCYTES NFR BLD: 34 % (ref 18–48)
MAGNESIUM SERPL-MCNC: 1.5 MG/DL (ref 1.6–2.6)
MCH RBC QN AUTO: 30.9 PG (ref 27–31)
MCHC RBC AUTO-ENTMCNC: 36.4 G/DL (ref 32–36)
MCV RBC AUTO: 85 FL (ref 82–98)
MONOCYTES NFR BLD: 34 % (ref 4–15)
NEUTROPHILS NFR BLD: 25 % (ref 38–73)
NEUTS BAND NFR BLD MANUAL: 7 %
NRBC BLD-RTO: 0 /100 WBC
PHOSPHATE SERPL-MCNC: 2.3 MG/DL (ref 2.7–4.5)
PLATELET # BLD AUTO: 46 K/UL (ref 150–450)
PMV BLD AUTO: 9.8 FL (ref 9.2–12.9)
POTASSIUM SERPL-SCNC: 3.5 MMOL/L (ref 3.5–5.1)
PROT SERPL-MCNC: 6 G/DL (ref 6–8.4)
RBC # BLD AUTO: 3.92 M/UL (ref 4.6–6.2)
SODIUM SERPL-SCNC: 134 MMOL/L (ref 136–145)
WBC # BLD AUTO: 7.34 K/UL (ref 3.9–12.7)

## 2022-11-09 PROCEDURE — 25000003 PHARM REV CODE 250: Performed by: INTERNAL MEDICINE

## 2022-11-09 PROCEDURE — 99900031 HC PATIENT EDUCATION (STAT)

## 2022-11-09 PROCEDURE — 84100 ASSAY OF PHOSPHORUS: CPT | Performed by: HOSPITALIST

## 2022-11-09 PROCEDURE — 99900035 HC TECH TIME PER 15 MIN (STAT)

## 2022-11-09 PROCEDURE — 63600175 PHARM REV CODE 636 W HCPCS: Performed by: INTERNAL MEDICINE

## 2022-11-09 PROCEDURE — 36415 COLL VENOUS BLD VENIPUNCTURE: CPT | Performed by: HOSPITALIST

## 2022-11-09 PROCEDURE — 85027 COMPLETE CBC AUTOMATED: CPT | Performed by: HOSPITALIST

## 2022-11-09 PROCEDURE — 85007 BL SMEAR W/DIFF WBC COUNT: CPT | Performed by: HOSPITALIST

## 2022-11-09 PROCEDURE — 25000003 PHARM REV CODE 250: Performed by: HOSPITALIST

## 2022-11-09 PROCEDURE — 94761 N-INVAS EAR/PLS OXIMETRY MLT: CPT

## 2022-11-09 PROCEDURE — 83735 ASSAY OF MAGNESIUM: CPT | Performed by: HOSPITALIST

## 2022-11-09 PROCEDURE — 80053 COMPREHEN METABOLIC PANEL: CPT | Performed by: HOSPITALIST

## 2022-11-09 RX ADMIN — HYDROCODONE BITARTRATE AND ACETAMINOPHEN 1 TABLET: 10; 325 TABLET ORAL at 08:11

## 2022-11-09 RX ADMIN — OXYCODONE AND ACETAMINOPHEN 1 TABLET: 325; 5 TABLET ORAL at 11:11

## 2022-11-09 RX ADMIN — ACYCLOVIR SODIUM 410 MG: 50 INJECTION, SOLUTION INTRAVENOUS at 04:11

## 2022-11-09 RX ADMIN — Medication 800 MG: at 08:11

## 2022-11-09 RX ADMIN — ACETAMINOPHEN 650 MG: 325 TABLET ORAL at 03:11

## 2022-11-09 RX ADMIN — AZELASTINE HYDROCHLORIDE 137 MCG: 137 SPRAY, METERED NASAL at 08:11

## 2022-11-09 RX ADMIN — ACYCLOVIR SODIUM 410 MG: 50 INJECTION, SOLUTION INTRAVENOUS at 12:11

## 2022-11-09 RX ADMIN — OXYCODONE AND ACETAMINOPHEN 1 TABLET: 325; 5 TABLET ORAL at 01:11

## 2022-11-09 RX ADMIN — POTASSIUM BICARBONATE 50 MEQ: 977.5 TABLET, EFFERVESCENT ORAL at 08:11

## 2022-11-09 RX ADMIN — AMPICILLIN SODIUM AND SULBACTAM SODIUM 3 G: 2; 1 INJECTION, POWDER, FOR SOLUTION INTRAMUSCULAR; INTRAVENOUS at 11:11

## 2022-11-09 RX ADMIN — AMPICILLIN SODIUM AND SULBACTAM SODIUM 3 G: 2; 1 INJECTION, POWDER, FOR SOLUTION INTRAMUSCULAR; INTRAVENOUS at 02:11

## 2022-11-09 NOTE — DISCHARGE SUMMARY
[facility]  Discharge Summary  Patient Name: Conrad Kuhn MRN: [id number]   Patient Class: IP- Inpatient  Length of Stay: 1   Admission Date: 11/7/2022 10:10 PM Attending Physician: Rachel Morales MD   Primary Care Provider: Johnson Erazo MD Face-to-Face encounter date: 11/09/2022   Chief Complaint: Fever (Discharged yesterday, dx'd with pneumonia. )    Date of Discharge: 11/9/2022  Discharge Disposition:Home or Self Care    Condition: Stable       Reason for Hospitalization     Active Hospital Problems    Diagnosis    *Fever    Confusion    Lymphadenopathy, cervical    Lymphadenopathy, generalized    Splenomegaly    Thrombocytopenia    Herpes stomatitis    History of lung cancer - ANDRES NSCLC 2004    CLL (chronic lymphocytic leukemia)         Brief History of Present Illness    Conrad Kuhn is a 82 y.o.  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 The patient presented to [facility] on 11/7/2022 with a primary complaint of Fever (Discharged yesterday, dx'd with pneumonia. )  .     For the full HPI please refer to the History & Physical from this admission.    Hospital Course By Problem with Pertinent Findings     Admitted for fever secondary to Flare of herpes gingivostomatitis and sinusitis likely due to Enterovirus and Rhonivurs. ID and Oncology evaluated the patient. Discharged  "home on Valtrex and Augmentin.    Patient was seen and examined on the date of discharge and determined to be suitable for discharge.    Physical Exam  BP (!) 155/72 (BP Location: Left arm, Patient Position: Sitting)   Pulse 69   Temp 98.8 °F (37.1 °C) (Oral)   Resp 18   Ht 6' 2" (1.88 m)   Wt 84.8 kg (186 lb 14.4 oz)   SpO2 95%   BMI 24.00 kg/m²   Vitals reviewed.    Constitutional: No distress.   HENT: Atraumatic.   Cardiovascular: Normal rate, regular rhythm and normal heart sounds.   Pulmonary/Chest: Effort normal. Clear to auscultation bilaterally. No wheezes.   Abdominal: Soft. Bowel sounds are normal. Exhibits no distension and no mass. No tenderness  Neurological: Alert.   Skin: Skin is warm and dry.     Following labs were Reviewed   Recent Labs   Lab 11/09/22  0314   WBC 7.34   HGB 12.1*   HCT 33.2*   PLT 46*   CALCIUM 8.6*   ALBUMIN 3.3*   PROT 6.0   *   K 3.5   CO2 22*      BUN 9   CREATININE 0.8   ALKPHOS 46*   ALT 10   AST 15   BILITOT 1.0     No results found for: POCTGLUCOSE     All labs within the past 24 hours have been reviewed    Microbiology Results (last 7 days)       Procedure Component Value Units Date/Time    Blood culture x two cultures. Draw prior to antibiotics. [[id number]] Collected: 11/07/22 2343    Order Status: Completed Specimen: Blood from Peripheral, Upper Arm, Left Updated: 11/09/22 0232     Blood Culture, Routine No Growth to date      No Growth to date    Narrative:      Aerobic and anaerobic    Blood culture x two cultures. Draw prior to antibiotics. [[id number]] Collected: 11/07/22 2358    Order Status: Completed Specimen: Blood from Peripheral, Forearm, Left Updated: 11/09/22 0232     Blood Culture, Routine No Growth to date      No Growth to date    Narrative:      Aerobic and anaerobic    Clostridium difficile EIA [[id number]] Collected: 11/08/22 1913    Order Status: Completed Specimen: Stool Updated: 11/08/22 2122     C. diff Antigen Negative     C " difficile Toxins A+B, EIA Negative     Comment: Testing not recommended for children <24 months old.       Respiratory Infection Panel (PCR), Nasopharyngeal [[id number]]  (Abnormal) Collected: 11/08/22 1131    Order Status: Completed Specimen: Nasopharyngeal Swab Updated: 11/08/22 2024     Respiratory Infection Panel Source NP swab     Adenovirus Not Detected     Coronavirus 229E, Common Cold Virus Not Detected     Coronavirus HKU1, Common Cold Virus Not Detected     Coronavirus NL63, Common Cold Virus Not Detected     Coronavirus OC43, Common Cold Virus Not Detected     Comment: The Coronavirus strains detected in this test cause the common cold.  These strains are not the COVID-19 (novel Coronavirus)strain   associated with the respiratory disease outbreak.          SARS-CoV2 (COVID-19) Qualitative PCR Not Detected     Human Metapneumovirus Not Detected     Human Rhinovirus/Enterovirus Detected     Influenza A (subtypes H1, H1-2009,H3) Not Detected     Influenza B Not Detected     Parainfluenza Virus 1 Not Detected     Parainfluenza Virus 2 Not Detected     Parainfluenza Virus 3 Not Detected     Parainfluenza Virus 4 Not Detected     Respiratory Syncytial Virus Not Detected     Bordetella Parapertussis (SQ0672) Not Detected     Bordetella pertussis (ptxP) Not Detected     Chlamydia pneumoniae Not Detected     Mycoplasma pneumoniae Not Detected     Comment: Respiratory Infection Panel testing performed by Multiplex PCR.       Narrative:      Respiratory Infection Panel source->NP Swab          CT Sinuses without Contrast   Final Result      CT Chest Abdomen Pelvis With Contrast (xpd)   Final Result      X-Ray Chest AP Portable   Final Result          No results found for this or any previous visit.      Consultants and Procedures   Consultants:  Consults (From admission, onward)          Status Ordering Provider     Inpatient consult to Infectious Diseases  Once        Provider:  Nila Barr MD    Completed  HUMPHREY BHAKTA     Inpatient consult to Hematology/Oncology  Once        Provider:  Drew Bai MD    Acknowledged HUMPHREY BHAKTA     Inpatient consult to Internal Medicine  Once        Provider:  Humphrey Bhakta MD    Acknowledged HUMPHREY BHAKTA            Procedures:   None    Discharge Information:   Diet:  Resume regular diet    Physical Activity:  Activity as tolerated    Instructions:  1. Take all medications as prescribed  2. Keep all follow-up appointments  3. Return to the hospital or call your primary care physicians if any worsening symptoms such as chest pain, high grade fever occur.    Follow-Up Appointments:  Please call your primary care physician to schedule an appointment in 1 week time.       Follow-up Information       [facility] Follow up.    Specialty: Home Health Services  Why: [facility]  Contact information:  [address] [facility] [address]  [phone]               Johnson Erazo MD Follow up in 1 week(s).    Specialty: Family Medicine  Why: Nov 15, 2022 at 1:30pm  Contact information:  [address]  [facility] [address]  [phone]               Drew Bai MD. Go to.    Specialties: Hematology, Hematology and Oncology, Oncology  Why: 11/23/2022 at 9:00am  Contact information:  [address]  [address]  [place] [address]  [phone]                               Pending laboratory work/Tests to be performed/followed by the Primary care Physician: none    The patient was discharged in the care of her parents//wife/family/caregiver, with discharge instructions were reviewed in written and verbal form. All pertinent questions were discussed and prescriptions were provided. The importance of making follow up appointments and compliance of medications has been stressed repeatedly. The patient will follow up in 1 week or sooner as needed with the PCP, and the patient is on board with the plan. Upon discharge, patient  needs to be on following medications.    Discharge Medications:     Medication List        CONTINUE taking these medications      albuterol-ipratropium 2.5 mg-0.5 mg/3 mL nebulizer solution  Commonly known as: DUO-NEB     amoxicillin-clavulanate 875-125mg 875-125 mg per tablet  Commonly known as: AUGMENTIN  Take 1 tablet by mouth 2 (two) times daily. for 5 days     azelastine 137 mcg (0.1 %) nasal spray  Commonly known as: ASTELIN     blood sugar diagnostic Strp     blood-glucose meter kit     gabapentin 600 MG tablet  Commonly known as: NEURONTIN     glimepiride 2 MG tablet  Commonly known as: AMARYL     iron-vitamin C 100-250 mg (ICAR-C) 100-250 mg Tab  Commonly known as: ICAR-C  Take 1 tablet by mouth once daily.     levalbuterol 1.25 mg/3 mL nebulizer solution  Commonly known as: XOPENEX     metoprolol tartrate 25 MG tablet  Commonly known as: LOPRESSOR  1 tablet (25 mg total) by Per G Tube route 2 (two) times daily.     ondansetron 8 MG tablet  Commonly known as: ZOFRAN  Take 1 tablet (8 mg total) by mouth every 12 (twelve) hours as needed for Nausea.     traZODone 300 MG tablet  Commonly known as: GIUSEPPE TERRY spent 40 minutes preparing the discharge including reviewing records from previous encounters, preparation of discharge summary, assessing and final examination of the patient, discharge medicine reconciliation, discussing plan of care, follow up and education and prescriptions.       Rachel Morales  [facility] Hospitalist  11/09/2022

## 2022-11-09 NOTE — PROGRESS NOTES
"[facility]   Hematology/Oncology  Inpatient Progress Note          Patient Name: Conrad Kuhn  MRN: [id number]  Admission Date: 11/7/2022  Hospital Length of Stay: 1 days  Code Status: Full Code   Attending Provider: Rachel Morales MD  Consulting Provider: Drew Bai MD  Primary Care Physician: Johnson Erazo MD  Principal Problem:Fever      Subjective:       Patient ID: Conrad Kuhn is a 82 y.o. male.    Chief Complaint: Fever (Discharged yesterday, dx'd with pneumonia. )        History Present Illness:    Patient sitting up in chair; he is awake and alert; he appears to be feeling much better; lip looks improved with less swelling; no Cp, SOB, HA's or N/V; wife is at bedside; I discussed with Dr Morales in person      Review of Systems:  GEN: general malaise improved; no current febrile temps  HEENT: normal with no HA's, sore throat, stiff neck, changes in vision; Herpetic outbreak on upper lip improved  CV: normal with no CP, SOB, PND, MAYER or orthopnea  PULM:  cough, w/ yellowish sputum; no pleuritic pain  GI: normal with no abdominal pain, nausea, vomiting, constipation, diarrhea, melanotic stools, BRBPR, or hematemesis  : normal with no hematuria, dysuria  BREAST: normal with no mass, discharge, pain  SKIN: normal with no rash, erythema, bruising, or swelling      Objective:     Vitals:  Blood pressure (!) 150/72, pulse 70, temperature 97 °F (36.1 °C), temperature source Oral, resp. rate 18, height 6' 2" (1.88 m), weight 84.8 kg (186 lb 14.4 oz), SpO2 97 %.    Physical Exam:  GEN: no apparent distress, comfortable; AAOx3  HEAD: atraumatic and normocephalic  EYES: no conjunctival pallor or muddiness, no icterus; normal pupil reaction to ambient light  ENT: OMM, no pharyngeal erythema, external bilateral ears WNL; no visible thrush or ulcers; herpetic outbreak with swelling or upper lip and scabbing improved  NECK: no masses or swelling, trachea midline, no visible LAD/LN's ; " stable LAD and LN's on right neck reduced in size  CV: no palpitations; mild pedal edema; no noticeable JVD or neck vein distension  CHEST: Normal respiratory effort; chest wall breath movements symmetrical; no audible wheezing  ABDOM: non-distended; no bloating;  peg tube since removed  MUSC/Skeletal: ROM normal; joints visibly normal; no deformities or arthropathy  EXTREM: no clubbing, cyanosis, inflammation or swelling;  fair ROM; IV RUE  SKIN: no rashes, lesions, ulcers, petechiae or subcutaneous nodules; chronic age related skin changes; dry skin and hyperpigmentation of the lower extremities  : no keith  NEURO: moving all 4 extremities; AAOx3; no tremors; generalized weakness  PSYCH: normal mood, affect and behavior  LYMPH: no visible LN's or LAD; LAD and LN's on right neck reduced in size       Lab Review:        Lab Results   Component Value Date    WBC 7.34 11/09/2022    HGB 12.1 (L) 11/09/2022    HCT 33.2 (L) 11/09/2022    MCV 85 11/09/2022    PLT 46 (LL) 11/09/2022       CMP  Sodium   Date Value Ref Range Status   11/09/2022 134 (L) 136 - 145 mmol/L Final   06/12/2019 138 134 - 144 mmol/L      Potassium   Date Value Ref Range Status   11/09/2022 3.5 3.5 - 5.1 mmol/L Final     Chloride   Date Value Ref Range Status   11/09/2022 102 95 - 110 mmol/L Final   06/12/2019 99 98 - 110 mmol/L      CO2   Date Value Ref Range Status   11/09/2022 22 (L) 23 - 29 mmol/L Final     Glucose   Date Value Ref Range Status   11/09/2022 137 (H) 70 - 110 mg/dL Final   06/12/2019 179 (H) 70 - 99 mg/dL      BUN   Date Value Ref Range Status   11/09/2022 9 8 - 23 mg/dL Final     Creatinine   Date Value Ref Range Status   11/09/2022 0.8 0.5 - 1.4 mg/dL Final   06/12/2019 0.95 0.60 - 1.40 mg/dL      Calcium   Date Value Ref Range Status   11/09/2022 8.6 (L) 8.7 - 10.5 mg/dL Final     Total Protein   Date Value Ref Range Status   11/09/2022 6.0 6.0 - 8.4 g/dL Final     Albumin   Date Value Ref Range Status   11/09/2022 3.3 (L) 3.5  - 5.2 g/dL Final   06/12/2019 3.9 3.1 - 4.7 g/dL      Total Bilirubin   Date Value Ref Range Status   11/09/2022 1.0 0.1 - 1.0 mg/dL Final     Comment:     For infants and newborns, interpretation of results should be based  on gestational age, weight and in agreement with clinical  observations.    Premature Infant recommended reference ranges:  Up to 24 hours.............<8.0 mg/dL  Up to 48 hours............<12.0 mg/dL  3-5 days..................<15.0 mg/dL  6-29 days.................<15.0 mg/dL       Alkaline Phosphatase   Date Value Ref Range Status   11/09/2022 46 (L) 55 - 135 U/L Final     AST   Date Value Ref Range Status   11/09/2022 15 10 - 40 U/L Final     ALT   Date Value Ref Range Status   11/09/2022 10 10 - 44 U/L Final     Anion Gap   Date Value Ref Range Status   11/09/2022 10 8 - 16 mmol/L Final     eGFR if    Date Value Ref Range Status   07/02/2022 >60.0 >60 mL/min/1.73 m^2 Final     eGFR if non    Date Value Ref Range Status   07/02/2022 >60.0 >60 mL/min/1.73 m^2 Final     Comment:     Calculation used to obtain the estimated glomerular filtration  rate (eGFR) is the CKD-EPI equation.              Radiology Diagnostic Studies:     CT Chest Abdomen Pelvis With Contrast (xpd) [[id number]] Collected: 11/08/22 0359   Order Status: Completed Updated: 11/08/22 0559   Narrative:     CLINICAL HISTORY:  Metastatic disease evaluation     COMPARISON: 7/2/2022.     TECHNIQUE: CT CHEST ABDOMEN PELVIS WITH IV CONTRAST on 11/8/2022 3:59 AM CST. MIPS reconstructions were generated.     This exam was performed according to our departmental dose-optimization program, which includes automated exposure control, adjustment of the mA and/or kV according to patient size and/or use of iterative reconstruction technique.     FINDINGS:     Vascular: Thoracic aorta is normal in course and caliber without aneurysm or dissection. Pulmonary arteries are adequately opacified without acute or  chronic filling defects. Abdominal aorta is mildly calcified measuring up to 3.7 cm. Pelvic arteries are patent without aneurysm or occlusion.     Chest: The heart is normal in size. There is no pericardial effusion. There are multiple enlarged mediastinal lymph nodes which have increased in size, with the largest subcarinal lymph node measuring 3.1 cm. Bilateral hilar and axillary lymph nodes are also mildly enlarged and has increased in size. Bilateral pelvic sidewall lymph nodes measure up to 2.9 cm.     There is no pleural effusion, pleural thickening or pneumothorax. Central airways are patent. There is mild scarring in the left lung base as well as in the anteromedial right lung base.     Abdomen: The liver is normal in appearance. There is no biliary dilatation. Gallbladder is normal in appearance. Spleen is enlarged measuring 15.2 cm there are multiple enlarged upper retroperitoneal as well as left and right upper quadrant lymph nodes, including the largest in the portal caval region measuring 3.2 cm. The adrenal glands and kidneys are unremarkable.     There is no free air. Lower retroperitoneal lymph nodes have also increased in size with the largest left periaortic lymph node measuring 2.1 cm.     Pelvis: There is no bowel obstruction. Urinary bladder is diffusely thickened. There is no free fluid. Bilateral inguinal lymph nodes measure up to 1 cm. Appendix is normal in size.     Skeleton: There are no acute osseous findings. No suspicious bony lesions.     IMPRESSION:     Worsening splenomegaly with diffuse increase in size and number of essentially all of the main lymph node chains throughout the chest, abdomen and pelvis.       X-Ray Chest AP Portable [[id number]] Collected: 11/07/22 2330   Order Status: Completed Updated: 11/08/22 0136   Narrative:     CLINICAL HISTORY: Sepsis.     COMPARISON: Chest radiograph 11/4/2022.     TECHNIQUE: Portable AP view of the chest.     FINDINGS:   Heart size is within  normal limits. The pulmonary vasculature is unremarkable. No evidence of focal airspace consolidation, pleural effusion, or pneumothorax. Chronic left-sided rib fracture. Partially visualized fusion hardware of the lower cervical spine an right shoulder arthroplasty.     IMPRESSION:   No acute cardiopulmonary process.                Assessment:     IMPRESSION:    (1) 82 y.o. male known to my oncology service with diagnosis of CLL who has been followed and treated in conjunction with Dr Monik Don at [facility].  - He was recently discharged from hospital with bout of pneumonia and presented again to the ED here at [facility] with febrile temp. He has been seen by Dr Barr with ID.   - recent PEt in Aug 2022; he missed his appointment with Dr Don in Sept 2022  - [facility] had been considering him for a clinical trial    11/9/2022:  - WBC at 7.34, hgb 12.1 and plats 46,000  - seems to be feeling better overall  - lip improved      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  - followed  By Dr Kee     (3) Iron deficiency anemia s/p IV iron in past     (4) HTN       (6) Chronic neck and back issues - followed by Dr Ryan Rivero     (7) DM       (8) Hypercholesterolemia - on meds     (9) Urinary frequency - followed by Dr Whitney     (10) Chronic Leucopenia and thrombocytopenia           1. Fever in other diseases    2. Fever    3. Chest pain    4. Herpes stomatitis    5. CLL (chronic lymphocytic leukemia)    6. Lymphadenopathy, generalized    7. Splenomegaly           Plan:     PLAN:          Needs repeat PET as outpatient and also needs to f/u with Dr Don upon discharge  Monitor labs  IV antibiotics, antivirals, and infection workup, herpes management as per ID directives  Will follow with you               Drew Bai MD  Hematology/Oncology  [facility]

## 2022-11-09 NOTE — DISCHARGE INSTRUCTIONS
Diet:  Resume regular diet    Physical Activity:  Activity as tolerated    Instructions:  1. Take all medications as prescribed  2. Keep all follow-up appointments  3. Return to the hospital or call your primary care physicians if any worsening symptoms such as chest pain, high grade fever occur.    Follow-Up Appointments:  Please call your primary care physician to schedule an appointment in 1 week time.

## 2022-11-09 NOTE — PLAN OF CARE
Patient cleared for discharge from case management standpoint.       11/09/22 1554   Final Note   Assessment Type Final Discharge Note   Anticipated Discharge Disposition Home-[facility]   Hospital Resources/Appts/Education Provided Post-Acute resouces added to AVS;Provided education on problems/symptoms using teachback;Appointments scheduled and added to AVS;Provided patient/caregiver with written discharge plan information

## 2022-11-09 NOTE — PLAN OF CARE
11/08/22 0900   Patient Assessment/Suction   Level of Consciousness (AVPU) alert   Respiratory Effort Unlabored;Normal   Expansion/Accessory Muscles/Retractions no use of accessory muscles   Rhythm/Pattern, Respiratory unlabored;pattern regular;depth regular   Cough Frequency infrequent   Cough Type good;nonproductive   PRE-TX-O2   O2 Device (Oxygen Therapy) room air   Pulse Oximetry Type Intermittent   $ Pulse Oximetry - Multiple Charge Pulse Oximetry - Multiple   Pulse 71   Resp 18   Aerosol Therapy   $ Aerosol Therapy Charges Aerosol Treatment;PRN treatment not required   Education   $ Education Other (see comment);15 min   Respiratory Evaluation   $ Care Plan Tech Time 15 min   $ Eval/Re-eval Charges Re-evaluation   Evaluation For New Orders

## 2022-11-09 NOTE — PLAN OF CARE
HH arranged.  Please see previous notes.       11/09/22 0855   Final Note   Assessment Type Final Discharge Note   Anticipated Discharge Disposition Home-Health   Post-Acute Status   Post-Acute Authorization Home Health   Home Health Status Set-up Complete/Auth obtained   Discharge Delays None known at this time

## 2022-11-09 NOTE — PROGRESS NOTES
Consult Note  Infectious Disease    Reason for Consult:  Fever    HPI: Conrad Kuhn is a 82 y.o. male well known to me, last seen in July 2022, was admitted 11/4-6 for presumed aspiration pneumonia, responded to unasyn(but also received 125 mg of solumedrol), released on augmentin and returned last night with high fever(103, which he disputes based on his confidence in his own thermometer) and confusion and evolving herpetic stomatitis.  He was Cultured, and given cefepime. CT chest/abd/pelvis early this am did not reveal a focus of pneumonia or other abscess or inflammatory process, but did show worsening LAD and splenomegaly. PET/CT in late august was stable, after CT in July suggested progression. . Today max temp is 99.9, and blood cultures negative. CRP 4.9.  He believes that this herpetic outbreak was beginning during the last admission and he did not receive his usual Valtrex suppression during that admission.  He is followed here by Dr. Bai and at [facility] by Dr. Don.  He has obviously worsening cervical lymphadenopathy on physical exam.    11/9: interim reviewed. Sinus CT with mucosal thickening throughout. Non contrasted study does not show any fluid collection in upper lip. RVP positive for enterovirus/rhinovirus. Low grade temp only. Blood cultures negative. He is anxious to go home, as he is uncomfortable in the bed, the food is bad (and he appears anxious)    EXAM & DIAGNOSTICS REVIEWED:   Vitals:     Temp:  [98.3 °F (36.8 °C)-99.6 °F (37.6 °C)]   Temp: 98.7 °F (37.1 °C) (11/09/22 0300)  Pulse: 78 (11/09/22 0300)  Resp: 17 (11/09/22 0300)  BP: (!) 160/71 (11/09/22 0300)  SpO2: 95 % (11/09/22 0300)    Intake/Output Summary (Last 24 hours) at 11/9/2022 0802  Last data filed at 11/9/2022 0410  Gross per 24 hour   Intake 480 ml   Output 1375 ml   Net -895 ml       General:  In NAD. Alert and attentive, cooperative, comfortable  Eyes:  Anicteric,   EOMI  ENT:  Upper lip is a little less swollen ,  but still indurated , there is no facial cellulitis.  There may be a tiny opening on the intraoral surface of the upper lip.  There is no fluctuance and no purulence can be expressed.tender.   The remainder of the oral cavity is negative for ulcerations.  He is edentulous  Neck:  supple,    Lungs: Clear, no consolidation, rales, wheezes, rub  Heart:  RRR, no gallop/murmur/rub noted  Abd:  Soft, NT, ND, normal BS, no masses or organomegaly appreciated.  :  Voids/ , no flank tenderness  Musc:  Joints without effusion, swelling, erythema, synovitis, muscle wasting.   Skin:  No rashes   Neuro:             Alert, attentive, speech fluent, , moves all extremities, no focal weakness. Ambulatory  Psych:   Calm, cooperative, but anxious(in my opinion)  Lymphatic:     Grossly enlarged cervical, supraclavicular nodes with mildly enlarged axillary nodes  Extrem: Chronic 1+ bilateral lower extremity edema, without erythema, phlebitis, cellulitis, warm and well perfused  VAD:   Peripheral    Isolation:  None  Wound:            General Labs reviewed:  Recent Labs   Lab 11/06/22  0553 11/07/22  2344 11/09/22  0314   WBC 10.47 7.90 7.34   HGB 12.5* 11.8* 12.1*   HCT 35.3* 33.1* 33.2*   PLT 52* 52* 46*       Recent Labs   Lab 11/04/22  1053 11/05/22  0611 11/06/22  0553 11/07/22  2344 11/09/22  0314   *   < > 135* 134* 134*   K 3.9   < > 3.3* 3.5 3.5   CL 99   < > 101 102 102   CO2 25   < > 25 24 22*   BUN 12   < > 16 16 9   CREATININE 0.9   < > 0.8 1.1 0.8   CALCIUM 8.8   < > 8.3* 8.3* 8.6*   PROT 6.8  --   --  6.4 6.0   BILITOT 1.4*  --   --  0.9 1.0   ALKPHOS 50*  --   --  52* 46*   ALT 11  --   --  12 10   AST 17  --   --  15 15    < > = values in this interval not displayed.     Recent Labs   Lab 11/07/22  2344   CRP 4.90*         Micro:  Microbiology Results (last 7 days)       Procedure Component Value Units Date/Time    Blood culture x two cultures. Draw prior to antibiotics. [[id number]] Collected: 11/07/22 1514     Order Status: Completed Specimen: Blood from Peripheral, Upper Arm, Left Updated: 11/09/22 0232     Blood Culture, Routine No Growth to date      No Growth to date    Narrative:      Aerobic and anaerobic    Blood culture x two cultures. Draw prior to antibiotics. [[id number]] Collected: 11/07/22 2358    Order Status: Completed Specimen: Blood from Peripheral, Forearm, Left Updated: 11/09/22 0232     Blood Culture, Routine No Growth to date      No Growth to date    Narrative:      Aerobic and anaerobic    Clostridium difficile EIA [[id number]] Collected: 11/08/22 1913    Order Status: Completed Specimen: Stool Updated: 11/08/22 2122     C. diff Antigen Negative     C difficile Toxins A+B, EIA Negative     Comment: Testing not recommended for children <24 months old.       Respiratory Infection Panel (PCR), Nasopharyngeal [[id number]]  (Abnormal) Collected: 11/08/22 1131    Order Status: Completed Specimen: Nasopharyngeal Swab Updated: 11/08/22 2024     Respiratory Infection Panel Source NP swab     Adenovirus Not Detected     Coronavirus 229E, Common Cold Virus Not Detected     Coronavirus HKU1, Common Cold Virus Not Detected     Coronavirus NL63, Common Cold Virus Not Detected     Coronavirus OC43, Common Cold Virus Not Detected     Comment: The Coronavirus strains detected in this test cause the common cold.  These strains are not the COVID-19 (novel Coronavirus)strain   associated with the respiratory disease outbreak.          SARS-CoV2 (COVID-19) Qualitative PCR Not Detected     Human Metapneumovirus Not Detected     Human Rhinovirus/Enterovirus Detected     Influenza A (subtypes H1, H1-2009,H3) Not Detected     Influenza B Not Detected     Parainfluenza Virus 1 Not Detected     Parainfluenza Virus 2 Not Detected     Parainfluenza Virus 3 Not Detected     Parainfluenza Virus 4 Not Detected     Respiratory Syncytial Virus Not Detected     Bordetella Parapertussis (RF2026) Not Detected     Bordetella pertussis  (ptxP) Not Detected     Chlamydia pneumoniae Not Detected     Mycoplasma pneumoniae Not Detected     Comment: Respiratory Infection Panel testing performed by Multiplex PCR.       Narrative:      Respiratory Infection Panel source->NP Swab            Imaging Reviewed:   CXR   CT C/A/P  Worsening splenomegaly with diffuse increase in size and number of essentially all of the main lymph node chains throughout the chest, abdomen and pelvis    Cardiology:    IMPRESSION & PLAN   1. Fever, possibly related to flare of herpes gingivostomatitis   No pneumonia on imaging   Sinusitis on CT and positive for enterovirus/rhinovirus on RVP   Upper lip abscess not excluded    2. Advancing CLL, with worsening cervical (and generalized) LAD and splenomegaly. He believes he needs chemotherapy. Doubt tumor fever   Total IgG is WNL    3. Aspiration risk since cervical spine surgery, seen by SLP 11/5 with instruction      Recommendations:  Ok to discharge this afternoon.  He will continue valtrex as previously advised  Ok to continue the augmentin that he already has  Warm compresses to upper lip       Continue lifelong valtrex suppression    D/w patient and nursing    Medical Decision Making during this encounter was  [_] Low Complexity  [_] Moderate Complexity  [  xx] High Complexity

## 2022-11-09 NOTE — PLAN OF CARE
SW sent patient information to [facility] for resumption of care via [employer] system.  They will resume care for patient.       11/09/22 2067   Post-Acute Status   Post-Acute Authorization Home Health   Home Health Status Set-up Complete/Auth obtained

## 2022-11-09 NOTE — CARE UPDATE
11/09/22 0814   Patient Assessment/Suction   Level of Consciousness (AVPU) alert   Respiratory Effort Normal;Unlabored   Expansion/Accessory Muscles/Retractions expansion symmetric   All Lung Fields Breath Sounds clear;diminished   Rhythm/Pattern, Respiratory unlabored   PRE-TX-O2   O2 Device (Oxygen Therapy) room air   SpO2 97 %   Pulse Oximetry Type Intermittent   $ Pulse Oximetry - Multiple Charge Pulse Oximetry - Multiple   Pulse 70   Resp 16   Preset CPAP/BiPAP Settings   Mode Of Delivery other (see comments)  (home unit)   Education   $ Education 15 min;DME BiPAP   Respiratory Evaluation   $ Care Plan Tech Time 15 min      2.09

## 2022-11-10 NOTE — ED PROVIDER NOTES
Encounter Date: 11/7/2022       History     Chief Complaint   Patient presents with    Fever     Discharged yesterday, dx'd with pneumonia.      HPI    Seen and evaluated.  Presents with a chief complaint of febrile illness.  Patient chest discharge for concerns of possible pneumonia.  He presents today with a herpetic lesion to his lips and some associated swelling, fever reported at home, but denies additional complaints.  No associated chest pain, of shortness of breath.  This is an acute episodic process.  The fever had improved with oral medications.  No additional alleviating factors noted.  No clear exacerbating factors related to his addition, he was recently discharged after being evaluated for possible pneumonia.    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     Right-sided Bell's palsy 2009    Spondylolisthesis     Varicose veins of legs     Venous insufficiency      Past Surgical History:   Procedure Laterality Date    Athroscopic surgery      On Knee    BIOPSY OF TISSUE OF NECK Left 08/2015    Recuurence CLL/small cell lyphoma    ESOPHAGEAL DILATION      ESOPHAGOGASTRODUODENOSCOPY N/A 4/15/2022    Procedure: EGD (ESOPHAGOGASTRODUODENOSCOPY);  Surgeon: Siddharth Garcia MD;  Location: [facility];  Service: Endoscopy;  Laterality: N/A;    ESOPHAGOGASTRODUODENOSCOPY  N/A 2022    Procedure: EGD (ESOPHAGOGASTRODUODENOSCOPY);  Surgeon: Siddharth Garcia MD;  Location: [facility];  Service: Endoscopy;  Laterality: N/A;    ESOPHAGOGASTRODUODENOSCOPY N/A 2022    Procedure: EGD (ESOPHAGOGASTRODUODENOSCOPY);  Surgeon: Jefferson Hyman MD;  Location: [facility] ENDO;  Service: Endoscopy;  Laterality: N/A;    ESOPHAGOGASTRODUODENOSCOPY W/ PEG N/A 2022    Procedure: EGD, WITH PEG TUBE INSERTION;  Surgeon: Siddharth Garcia MD;  Location: [facility] ENDO;  Service: Endoscopy;  Laterality: N/A;    GASTROSTOMY TUBE PLACEMENT  2022    20 Fr (bumper initially at 3.5)    Hemorrhoid resection      LUNG LOBECTOMY Left     NECK SURGERY  2022    Anterior and Posterior C3-C7 fusion    OTHER SURGICAL HISTORY  2006    Chemotherapy s/p lyphoma        Portacath implantation and removal      reverse shoulder arthroplasty Right 2021     Family History   Problem Relation Age of Onset    Stomach cancer Mother 80         at 95    Colon cancer Father      Social History     Tobacco Use    Smoking status: Former    Smokeless tobacco: Never   Substance Use Topics    Alcohol use: Yes    Drug use: No     Review of Systems   Constitutional:  Positive for fever.   HENT:  Negative for sore throat.    Respiratory:  Negative for shortness of breath.    Cardiovascular:  Negative for chest pain.   Gastrointestinal:  Negative for nausea.   Genitourinary:  Negative for difficulty urinating.   Musculoskeletal:  Negative for back pain.   Skin:  Positive for wound. Negative for rash.   Neurological:  Negative for weakness.   Psychiatric/Behavioral:  Negative for confusion.    All other systems reviewed and are negative.    Physical Exam     Initial Vitals [22]   BP Pulse Resp Temp SpO2   (!) 122/59 89 16 99.9 °F (37.7 °C) (!) 93 %      MAP       --         Physical Exam    Nursing note and vitals reviewed.  Constitutional: He appears well-developed and well-nourished.   HENT:    Head: Normocephalic and atraumatic.   Eyes: Conjunctivae are normal.   Cardiovascular:  Normal rate and regular rhythm.           Pulmonary/Chest: No respiratory distress.   Abdominal: Abdomen is soft.   Musculoskeletal:         General: Normal range of motion.     Neurological: He is alert and oriented to person, place, and time.   Skin: Skin is warm and dry.   Herpetic lesion to upper lip   Psychiatric: He has a normal mood and affect. His speech is normal.       ED Course   Procedures  Labs Reviewed   CBC W/ AUTO DIFFERENTIAL - Abnormal; Notable for the following components:       Result Value    RBC 3.84 (*)     Hemoglobin 11.8 (*)     Hematocrit 33.1 (*)     Platelets 52 (*)     Gran % 31.0 (*)     Mono % 30.0 (*)     All other components within normal limits   COMPREHENSIVE METABOLIC PANEL - Abnormal; Notable for the following components:    Sodium 134 (*)     Calcium 8.3 (*)     Alkaline Phosphatase 52 (*)     All other components within normal limits   C-REACTIVE PROTEIN - Abnormal; Notable for the following components:    CRP 4.90 (*)     All other components within normal limits   LACTIC ACID, PLASMA   PROCALCITONIN   PROCALCITONIN   C-REACTIVE PROTEIN   LACTATE DEHYDROGENASE   LACTATE DEHYDROGENASE        ECG Results              EKG 12-lead (In process)  Result time 11/08/22 05:23:12      In process by Interface, Lab In [facility] (11/08/22 05:23:12)                   Narrative:    Test Reason : R50.9,    Vent. Rate : 074 BPM     Atrial Rate : 074 BPM     P-R Int : 210 ms          QRS Dur : 112 ms      QT Int : 390 ms       P-R-T Axes : 079 -20 078 degrees     QTc Int : 432 ms    Sinus rhythm with 1st degree A-V block  Possible Left atrial enlargement  Borderline Abnormal ECG  When compared with ECG of 04-NOV-2022 10:19,  Premature supraventricular complexes are no longer Present  Left anterior fascicular block is no longer Present    Referred By: [email or web address]   SELF           Confirmed By:                                    Imaging Results              CT Chest Abdomen Pelvis With Contrast (xpd) (Final result)  Result time 11/08/22 05:57:25      Final result by Say Patel MD (11/08/22 05:57:25)                   Narrative:    CLINICAL HISTORY:  Metastatic disease evaluation    COMPARISON: 7/2/2022.    TECHNIQUE: CT CHEST ABDOMEN PELVIS WITH IV CONTRAST on 11/8/2022 3:59 AM CST. MIPS reconstructions were generated.    This exam was performed according to our departmental dose-optimization program, which includes automated exposure control, adjustment of the mA and/or kV according to patient size and/or use of iterative reconstruction technique.    FINDINGS:    Vascular: Thoracic aorta is normal in course and caliber without aneurysm or dissection. Pulmonary arteries are adequately opacified without acute or chronic filling defects. Abdominal aorta is mildly calcified measuring up to 3.7 cm. Pelvic arteries are patent without aneurysm or occlusion.    Chest: The heart is normal in size. There is no pericardial effusion. There are multiple enlarged mediastinal lymph nodes which have increased in size, with the largest subcarinal lymph node measuring 3.1 cm. Bilateral hilar and axillary lymph nodes are also mildly enlarged and has increased in size. Bilateral pelvic sidewall lymph nodes measure up to 2.9 cm.    There is no pleural effusion, pleural thickening or pneumothorax. Central airways are patent. There is mild scarring in the left lung base as well as in the anteromedial right lung base.    Abdomen: The liver is normal in appearance. There is no biliary dilatation. Gallbladder is normal in appearance. Spleen is enlarged measuring 15.2 cm there are multiple enlarged upper retroperitoneal as well as left and right upper quadrant lymph nodes, including the largest in the portal caval region measuring 3.2 cm. The adrenal glands and kidneys are unremarkable.    There is no free air. Lower retroperitoneal lymph nodes  have also increased in size with the largest left periaortic lymph node measuring 2.1 cm.    Pelvis: There is no bowel obstruction. Urinary bladder is diffusely thickened. There is no free fluid. Bilateral inguinal lymph nodes measure up to 1 cm. Appendix is normal in size.    Skeleton: There are no acute osseous findings. No suspicious bony lesions.    IMPRESSION:    Worsening splenomegaly with diffuse increase in size and number of essentially all of the main lymph node chains throughout the chest, abdomen and pelvis.    Electronically signed by:  Say Patel MD  11/8/2022 5:57 AM CST Workstation: [id number]                                     X-Ray Chest AP Portable (Final result)  Result time 11/08/22 01:34:27      Final result by Sanjay Quintero DO (11/08/22 01:34:27)                   Narrative:    CLINICAL HISTORY: Sepsis.    COMPARISON: Chest radiograph 11/4/2022.    TECHNIQUE: Portable AP view of the chest.    FINDINGS:  Heart size is within normal limits. The pulmonary vasculature is unremarkable. No evidence of focal airspace consolidation, pleural effusion, or pneumothorax. Chronic left-sided rib fracture. Partially visualized fusion hardware of the lower cervical spine an right shoulder arthroplasty.    IMPRESSION:  No acute cardiopulmonary process.    Electronically signed by:  Sanjay Quintero DO  11/8/2022 1:34 AM CST Workstation: [id number]                                     Medications   lactated ringers bolus 2,640 mL (0 mLs Intravenous Stopped 11/8/22 0347)   iohexoL (OMNIPAQUE 350) injection 100 mL (100 mLs Intravenous Given 11/8/22 0401)     Medical Decision Making:   Initial Assessment:   Seen and evaluated presented with chief complaint of recurrent febrile illness.  Given recent discharge, laboratory evaluation has been performed.  Patient is hemodynamically stable, but concerned given recent evaluation.  Laboratory evaluation has been collected, blood cultures pending.  Will observe with  Hospital Medicine                         Clinical Impression:   Final diagnoses:  [R50.9] Fever        ED Disposition Condition    Admit                 Carlos Cruz Jr., MD  11/09/22 2272

## 2022-11-13 LAB
BACTERIA BLD CULT: NORMAL
BACTERIA BLD CULT: NORMAL

## 2022-11-18 ENCOUNTER — TELEPHONE (OUTPATIENT)
Dept: HEMATOLOGY/ONCOLOGY | Facility: CLINIC | Age: 82
End: 2022-11-18

## 2022-11-22 ENCOUNTER — LAB VISIT (OUTPATIENT)
Dept: LAB | Facility: HOSPITAL | Age: 82
End: 2022-11-22
Attending: INTERNAL MEDICINE
Payer: MEDICARE

## 2022-11-22 DIAGNOSIS — C91.10 CLL (CHRONIC LYMPHOCYTIC LEUKEMIA): ICD-10-CM

## 2022-11-22 DIAGNOSIS — Z85.118 HISTORY OF LUNG CANCER: ICD-10-CM

## 2022-11-22 LAB
ALBUMIN SERPL BCP-MCNC: 3.8 G/DL (ref 3.5–5.2)
ALP SERPL-CCNC: 54 U/L (ref 55–135)
ALT SERPL W/O P-5'-P-CCNC: 12 U/L (ref 10–44)
ANION GAP SERPL CALC-SCNC: 6 MMOL/L (ref 8–16)
AST SERPL-CCNC: 16 U/L (ref 10–40)
BASOPHILS # BLD AUTO: 0.01 K/UL (ref 0–0.2)
BASOPHILS NFR BLD: 0.1 % (ref 0–1.9)
BILIRUB SERPL-MCNC: 0.9 MG/DL (ref 0.1–1)
BUN SERPL-MCNC: 13 MG/DL (ref 8–23)
CALCIUM SERPL-MCNC: 8.7 MG/DL (ref 8.7–10.5)
CEA SERPL-MCNC: 1.2 NG/ML (ref 0–5)
CHLORIDE SERPL-SCNC: 98 MMOL/L (ref 95–110)
CO2 SERPL-SCNC: 30 MMOL/L (ref 23–29)
CREAT SERPL-MCNC: 0.9 MG/DL (ref 0.5–1.4)
DIFFERENTIAL METHOD: ABNORMAL
EOSINOPHIL # BLD AUTO: 0.1 K/UL (ref 0–0.5)
EOSINOPHIL NFR BLD: 1.2 % (ref 0–8)
ERYTHROCYTE [DISTWIDTH] IN BLOOD BY AUTOMATED COUNT: 14.6 % (ref 11.5–14.5)
EST. GFR  (NO RACE VARIABLE): >60 ML/MIN/1.73 M^2
FERRITIN SERPL-MCNC: 40 NG/ML (ref 20–300)
GLUCOSE SERPL-MCNC: 89 MG/DL (ref 70–110)
HCT VFR BLD AUTO: 33.5 % (ref 40–54)
HGB BLD-MCNC: 11.8 G/DL (ref 14–18)
IMM GRANULOCYTES # BLD AUTO: 0.14 K/UL (ref 0–0.04)
IMM GRANULOCYTES NFR BLD AUTO: 1.9 % (ref 0–0.5)
IRON SERPL-MCNC: 82 UG/DL (ref 45–160)
LYMPHOCYTES # BLD AUTO: 3.9 K/UL (ref 1–4.8)
LYMPHOCYTES NFR BLD: 51.3 % (ref 18–48)
MCH RBC QN AUTO: 30.5 PG (ref 27–31)
MCHC RBC AUTO-ENTMCNC: 35.2 G/DL (ref 32–36)
MCV RBC AUTO: 87 FL (ref 82–98)
MONOCYTES # BLD AUTO: 1.9 K/UL (ref 0.3–1)
MONOCYTES NFR BLD: 25.4 % (ref 4–15)
NEUTROPHILS # BLD AUTO: 1.5 K/UL (ref 1.8–7.7)
NEUTROPHILS NFR BLD: 20.1 % (ref 38–73)
NRBC BLD-RTO: 0 /100 WBC
PLATELET # BLD AUTO: 60 K/UL (ref 150–450)
PMV BLD AUTO: 10.7 FL (ref 9.2–12.9)
POTASSIUM SERPL-SCNC: 3.9 MMOL/L (ref 3.5–5.1)
PROT SERPL-MCNC: 6.5 G/DL (ref 6–8.4)
RBC # BLD AUTO: 3.87 M/UL (ref 4.6–6.2)
SATURATED IRON: 23 % (ref 20–50)
SODIUM SERPL-SCNC: 134 MMOL/L (ref 136–145)
TOTAL IRON BINDING CAPACITY: 351 UG/DL (ref 250–450)
TRANSFERRIN SERPL-MCNC: 251 MG/DL (ref 200–375)
WBC # BLD AUTO: 7.52 K/UL (ref 3.9–12.7)

## 2022-11-22 PROCEDURE — 36415 COLL VENOUS BLD VENIPUNCTURE: CPT | Performed by: INTERNAL MEDICINE

## 2022-11-22 PROCEDURE — 84466 ASSAY OF TRANSFERRIN: CPT | Performed by: INTERNAL MEDICINE

## 2022-11-22 PROCEDURE — 82378 CARCINOEMBRYONIC ANTIGEN: CPT | Performed by: INTERNAL MEDICINE

## 2022-11-22 PROCEDURE — 85025 COMPLETE CBC W/AUTO DIFF WBC: CPT | Performed by: INTERNAL MEDICINE

## 2022-11-22 PROCEDURE — 82728 ASSAY OF FERRITIN: CPT | Performed by: INTERNAL MEDICINE

## 2022-11-22 PROCEDURE — 80053 COMPREHEN METABOLIC PANEL: CPT | Performed by: INTERNAL MEDICINE

## 2022-11-22 NOTE — H&P (VIEW-ONLY)
[facility] Hematology/Oncology  PROGRESS NOTE - Follow-up Visit      Subjective:       Patient ID:   NAME: Conrad Kuhn : 1940     82 y.o. male    Referring Doc: Julien  Other Physicians: Ced Erazo Joubert, Srinivas, Pinsky    Chief Complaint:  CLL f/u    History of Present Illness:     Patient is being seen today in person in clinic for a regular follow-up viasit. He is here by himself.  The patient is on today to go over the results of the recently ordered labs, tests and studies. He is here by himself today    He was recently hospitalized at [facility]; he is doing better and feeling better overall.    He was supposed to see Dr Don again at [facility] after discharge but does not see him till Dec 2022      He is swallowing and eating better;   He is breathing ok currently. He denies any CP, SOB, or N/V.    Some chronic neck issues; some increase in LAD             Discussed Covid19 precautions; he had his vaccinations              ROS:   GEN: normal without any fever, night sweats or weight loss  HEENT: normal with no HA's, sore throat, stiff neck, changes in vision; no residual swallowing difficulties; some increase in LAD  CV: normal with no CP, SOB, PND, MAYER or orthopnea  PULM: normal with no SOB, cough, hemoptysis, sputum or pleuritic pain  GI: normal with no abdominal pain, nausea, vomiting, constipation, diarrhea, melanotic stools, BRBPR, or hematemesis; no dysphagia; peg tube in place  : urine frequency stable  BREAST: normal with no mass, discharge, pain  SKIN: normal with no rash, erythema, bruising, or swelling    Allergies:  Review of patient's allergies indicates:  No Known Allergies    Medications:    Current Outpatient Medications:     azelastine (ASTELIN) 137 mcg (0.1 %) nasal spray, 1 spray by Nasal route 2 (two) times daily. , Disp: , Rfl:     blood sugar diagnostic Strp, 1 strip by Misc.(Non-Drug; Combo Route) route 2 (two) times a day., Disp: , Rfl:     gabapentin (NEURONTIN) 600 MG  "tablet, Take 600 mg by mouth 2 (two) times daily., Disp: , Rfl:     glimepiride (AMARYL) 2 MG tablet, Take 2 mg by mouth once daily at 6am., Disp: , Rfl:     iron-vitamin C 100-250 mg, ICAR-C, (ICAR-C) 100-250 mg Tab, Take 1 tablet by mouth once daily., Disp: 30 each, Rfl: 6    levalbuterol (XOPENEX) 1.25 mg/3 mL nebulizer solution, Take 3 mLs by nebulization every 4 to 6 hours as needed. , Disp: , Rfl:     ondansetron (ZOFRAN) 8 MG tablet, Take 1 tablet (8 mg total) by mouth every 12 (twelve) hours as needed for Nausea., Disp: 30 tablet, Rfl: 2    traZODone (DESYREL) 300 MG tablet, Take 300 mg by mouth every evening., Disp: , Rfl:     albuterol-ipratropium (DUO-NEB) 2.5 mg-0.5 mg/3 mL nebulizer solution, Take 3 mLs by nebulization every 8 (eight) hours as needed., Disp: , Rfl:     blood-glucose meter kit, Use as instructed, Disp: , Rfl:     metoprolol tartrate (LOPRESSOR) 25 MG tablet, 1 tablet (25 mg total) by Per G Tube route 2 (two) times daily., Disp: 60 tablet, Rfl: 0    Current Facility-Administered Medications:     EPINEPHrine (EPIPEN) 0.3 mg/0.3 mL pen injection 0.3 mg, 0.3 mg, Intramuscular, PRN, MANUEL PageC    PMHx/PSHx Updates:  See patient's last visit with me on 11/9/2022  See H&P on 1/3/2019        Pathology:  Cancer Staging  No matching staging information was found for the patient.          Objective:     Vitals:  Blood pressure 116/75, pulse 73, temperature 97.8 °F (36.6 °C), resp. rate 18, height 6' 2" (1.88 m), weight 88 kg (194 lb 1.6 oz).        Physical Examination:   GEN: no apparent distress, comfortable; AAOx3  HEAD: atraumatic and normocephalic  EYES: no conjunctival pallor or muddiness, no icterus; normal pupil reaction to ambient light  ENT: OMM, no pharyngeal erythema, external bilateral ears WNL; no visible thrush or ulcers  NECK: no masses or swelling, trachea midline, no visible LAD/LN's ; LAD and LN's with some slight increase in size  CV: no palpitations; no pedal edema; " no noticeable JVD or neck vein distension  CHEST: Normal respiratory effort; chest wall breath movements symmetrical; no audible wheezing  ABDOM: non-distended; no bloating;  peg tube since removed  MUSC/Skeletal: ROM normal; joints visibly normal; no deformities or arthropathy  EXTREM: no clubbing, cyanosis, inflammation or swelling; right arm with fair ROM  SKIN: no rashes, lesions, ulcers, petechiae or subcutaneous nodules  : no keith  NEURO: moving all 4 extremities; AAOx3; no tremors  PSYCH: normal mood, affect and behavior  LYMPH: no visible LN's or LAD; LAD and LN's on right neck reduced in size              Labs:   Lab Results   Component Value Date    WBC 7.52 11/22/2022    HGB 11.8 (L) 11/22/2022    HCT 33.5 (L) 11/22/2022    MCV 87 11/22/2022    PLT 60 (L) 11/22/2022     CMP  Sodium   Date Value Ref Range Status   11/22/2022 134 (L) 136 - 145 mmol/L Final   06/12/2019 138 134 - 144 mmol/L      Potassium   Date Value Ref Range Status   11/22/2022 3.9 3.5 - 5.1 mmol/L Final     Chloride   Date Value Ref Range Status   11/22/2022 98 95 - 110 mmol/L Final   06/12/2019 99 98 - 110 mmol/L      CO2   Date Value Ref Range Status   11/22/2022 30 (H) 23 - 29 mmol/L Final     Glucose   Date Value Ref Range Status   11/22/2022 89 70 - 110 mg/dL Final   06/12/2019 179 (H) 70 - 99 mg/dL      BUN   Date Value Ref Range Status   11/22/2022 13 8 - 23 mg/dL Final     Creatinine   Date Value Ref Range Status   11/22/2022 0.9 0.5 - 1.4 mg/dL Final   06/12/2019 0.95 0.60 - 1.40 mg/dL      Calcium   Date Value Ref Range Status   11/22/2022 8.7 8.7 - 10.5 mg/dL Final     Total Protein   Date Value Ref Range Status   11/22/2022 6.5 6.0 - 8.4 g/dL Final     Albumin   Date Value Ref Range Status   11/22/2022 3.8 3.5 - 5.2 g/dL Final   06/12/2019 3.9 3.1 - 4.7 g/dL      Total Bilirubin   Date Value Ref Range Status   11/22/2022 0.9 0.1 - 1.0 mg/dL Final     Comment:     For infants and newborns, interpretation of results should  be based  on gestational age, weight and in agreement with clinical  observations.    Premature Infant recommended reference ranges:  Up to 24 hours.............<8.0 mg/dL  Up to 48 hours............<12.0 mg/dL  3-5 days..................<15.0 mg/dL  6-29 days.................<15.0 mg/dL       Alkaline Phosphatase   Date Value Ref Range Status   11/22/2022 54 (L) 55 - 135 U/L Final     AST   Date Value Ref Range Status   11/22/2022 16 10 - 40 U/L Final     ALT   Date Value Ref Range Status   11/22/2022 12 10 - 44 U/L Final     Anion Gap   Date Value Ref Range Status   11/22/2022 6 (L) 8 - 16 mmol/L Final     eGFR if    Date Value Ref Range Status   07/02/2022 >60.0 >60 mL/min/1.73 m^2 Final     eGFR if non    Date Value Ref Range Status   07/02/2022 >60.0 >60 mL/min/1.73 m^2 Final     Comment:     Calculation used to obtain the estimated glomerular filtration  rate (eGFR) is the CKD-EPI equation.              Lab Results   Component Value Date    IRON 82 11/22/2022    TIBC 351 11/22/2022    FERRITIN 40 11/22/2022         Radiology/Diagnostic Studies:    CT 11/8/2022:  IMPRESSION:     Worsening splenomegaly with diffuse increase in size and number of essentially all of the main lymph node chains throughout the chest, abdomen and pelvis.           PET 8/29/2022:  IMPRESSION:  1. Numerous enlarged lymph nodes throughout the neck, chest, abdomen and pelvis are non hypermetabolic, and stable compared to CT of 07/02/2022.  2. Stable splenomegaly, with no abnormal focal splenic FDG hypermetabolism.  3. Additional observations as described.         CT 7/2/2022:    IMPRESSION:  1. Interval worsening in numerous enlarged lymph nodes in the chest, abdomen and pelvis, as well as development of splenomegaly, compatible with lymphoma and or worsening CLL, given patient's history  2. Mild bilateral hydroureteronephrosis, with markedly enlarged prostate gland and appearance of the urinary bladder  suggesting chronic bladder outlet obstruction. Consider correlation with urinalysis.  3. Infrarenal abdominal aortic aneurysm measuring 33 mm in diameter. Follow-up imaging in 3 years is recommended per best practice guidelines.  4. Additional observations as described.           PET  3/14/2022:    IMPRESSION:  Left upper lobectomy without evidence of recurrent or metastatic disease      MRI cervical spine  10/1/2021:    IMPRESSION:     Loss of normal cervical lordosis with mild kyphotic curvature.     Advanced multilevel cervical spondylosis, as outlined above. Spinal canal narrowing and foraminal narrowing is most severe at C5-6. 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     Congenital narrowing of cervical spinal canal, which exacerbates cervical spondylosis        CT head  8/27/2021:  IMPRESSION:     No CT evidence of acute intracranial pathology.     Stable senescent changes as above.         PET  7/19/2021:    IMPRESSION:  Prior left upper lobectomy without evidence of recurrent or metastatic disease  - 3 cm infrarenal abdominal aortic aneurysm      CT head 6/29/2021:  IMPRESSION:     No CT evidence of acute intracranial pathology      PET  1/18/2021:    Impression:     No evidence of FDG avid lymphoproliferative disease.     Aneurysmal dilation of infrarenal abdominal aorta (3.4 cm) as well as aneurysmal dilation of right common iliac artery.     Additional incidental findings as above        PET  6/24/2020:    Impression:     1. No findings of active lymphoproliferative disease.  2. Additional observations as above.      PET  1/24/2020:    Impression       Moderate decrease in size of the adenopathy within the neck, chest, abdomen and pelvis.  The spleen is smaller in size.  There is no significant FDG activity.    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 Multifocal  lymphadenopathy in the chest, abdomen, and pelvis compatible with provided clinical history of lymphocytic leukemia.  There is mixed interval change when compared to prior studies.  Pathologic lymphadenopathy in the chest has increased significantly when compared to a CTA of the chest from May 2018.  Pathologic retroperitoneal lymphadenopathy has improved slightly when compared to a previous abdominal CT from February 2017.  Please see above details.  2. Mild aneurysmal dilation of the infrarenal abdominal aorta, with maximal transverse diameter of 3.7 cm.  Maximal transverse diameter on a prior study from 2017 was 2.5 cm.  3. Additional findings as above           [facility] Unknown Rad Eap    Result Date: 1/14/2019  CMS MANDATED QUALITY DATA - CT RADIATION - 436 All CT scans at this facility utilize dose modulation, iterative reconstruction, and/or weight based dosing when appropriate to reduce radiation dose to as low as reasonably achievable. Reason:Lymphoid leukemia TECHNIQUE: CT thorax with, CT abdomen  with, and CT pelvis with 100 mL Omnipaque 350. COMPARISON: CT chest dated 05/19/2018 and CT abdomen and pelvis dated 02/08/2017 CT THORAX: There is stable mediastinal, hilar and axillary adenopathy. There is coronary artery calcification. There is resolution of the groundglass opacities throughout the lungs. There are no confluent infiltrates, pulmonary nodules or pleural effusions. There are stable subcentimeter nodules in the left lobe of the thyroid gland. There are degenerative changes of the spine. CT ABDOMEN: The liver, pancreas, adrenal glands and gallbladder are normal. The spleen is enlarged. There is mesenteric and retroperitoneal adenopathy which is slightly smaller in size. -There is a portacaval node measuring 37 x 21 mm and previously measured 38 x 27 mm. -Periportal lymph node measuring 41 x 18 mm, previously measured 48 x 29 mm. -Left periaortic adenopathy measuring 34 x 23 mm and previously measured  40 x 42 mm- The kidneys enhance symmetrically without hydronephrosis or calculi. There are no thick-walled or dilated bowel loops. There is vascular calcification of aorta. There is a 3.0 x 3.0 cm infrarenal abdominal aortic aneurysm. CT PELVIS: There is adenopathy along the pelvic sidewalls bilaterally which has decreased in size. -The left common iliac adenopathy measuring 36 x 11 mm, previously measured 46 x 14 mm -the right common iliac adenopathy measuring 46 x 10 mm. Previously measured 53 x 15 mm. The bladder is normal. The prostate gland is enlarged. There are degenerative changes of the spine. There is grade 1 anterolisthesis of L5 on S1 with bilateral pars defects.     IMPRESSION: Stable mediastinal, hilar and axillary adenopathy with resolution of the airspace disease within the lungs Decrease in size of the mesenteric, retroperitoneal and pelvic adenopathy. Stable infrarenal abdominal aortic aneurysm Splenomegaly     Read and electronically signed by: Jeniffer Arredondo MD on 1/14/2019 9:14 AM CST JENIFFER ARREDONDO MD      I have reviewed all available lab results and radiology reports.    Assessment/Plan:   (1) 82 y.o. male with  diagnosis of CLL who has been referred by Dr Rony Victoria for continuation of care by medical hematology/oncology.   - BM biopsy  12/4/2015 with CLL  - diagnosis of SLL in 2004 and s/p FCR x6 in 2007  - s/p imbruvica in past (stopped in May 2018)  - latest wbc at 4.8; lymph 56%  - latest CT on 8/7/2019 showing increase in the LAD  - platelets are 111,000  - He was recently hospitalized at [facility] and seen by Dr Wheeler. Dr Wheeler has recommended Rituximab. He is starting tomorrow.   - discussed the rituximab regimen and the potential side-effect profile; he is getting chemotherapy school today with Rosemary Montana  - he saw Dr Don on Oct 21st 2019  - he has had 3 of the 4 weeks of rituximab so far; Dr Don recommends him to eventually get rituximab monthly x6 with daily oral Venetoclax for  two years total.   - completed rituximab (4th) week of rituximab and  S/p 6 monthly rituximab with Venetoclax oral but is taking only 2 pills daily due to the counts  - he is now just on the venetclax  - he saw Dr Don again on Dec 23rd 2019 and sees him again in March 16th 2020  - latest PEt on 1/24/2020 looks really good    8/27/2020:  - he saw Dr Don last month at [facility]  - latest PEt from 6/24/2020 looks good  - he remains on just the oral venetoclax at 2 pills per day  - he sees Dr Don again in about 3 months (Oct 2020)    11/18/2020:  - he is doing well with the Venetoclax  - due for repeat PEt in Dec 2020  - he sees Dr Don again on Dec 23rd 2020    1/20/2021:  - he is doing well with the Venetoclax  - He saw Dr Don at [facility] in Dec 2020. He is now off rituximab monthly and remains only on the oral Venetoclax but at 2 pills daily . He sees Dr Don again in feb 2021  - Recent PEt on  1/18/2021 looks stable except for incidental aneurysm in aorta; so we are referring him to Dr Kapadia with v[facility]    4/20/2021:  - he saw Dr Don in Feb 2021 and sees him again in Nov 2021  - repeat PEt in June/july 2021  - continued on Venetoclax and regular blood checks    7/20/2021:  - he continues on the Venetoclax  - mild leucopenia from the medication  - PEt on 7/19/2021 seems to be stable    9/21/2021:  - doing ok overall with some occasional HA's and general lack of energy  - he sees Dr Don again on nov 1st and hopefully he can discontinue the venetoclax thereafter  - Head CT on 8/27/2021 was relatively negative    2/17/2022:  -  He last showed for oncology appointment in Sept 2021  - He had covid in Jan 2022 and he received the infusion but did not require hospitalization  - He saw Dr Erazo couple of weeks ago  - He is planning to have cervical spine surgery with Dr Mai in the near future.  - He sees Dr Don at [facility] this coming Monday. He is now off rituximab monthly and he is also now off the oral Venetoclax  (expect he will be getting a repeat bone marrow biopsy)    3/31/2022:  - He had recent telemed visit Dr Don at [facility] and they were pleased with the latest bone marrow biopsy. He is now off rituximab monthly and he is also now off the oral Venetoclax      6/6/2022:  - He had surgery on his cervical spine with Dr Mai on April 8th 2022 and had postoperative problems related to the intubation and anesthesia and was in the hospital for about 3 weeks. He had aspiration pneumonia and bout of respiratory failure requiring mech vent support.  He has residual swallowing issues and has a peg tube. He was seen Dr Garcia with GI while in hospital. He was discharged on 4/18. He has had home health coming to house couple times of week. He reports that he is doing better    - he is due to see Dr Don again at [facility] in the near future and he has since been off all therapy      8/11/2022:  - He was recently hospitalized with fever and pneumonia. He is feeling better  - He is swallowing and eating better; peg tube since removed  - He previously had bout of oral mucosal ulcerations for which he had biopsies at [facility] which were negative for any malignancy. This has since resolved.  - He previously had telemed visit Dr Don at [facility] but does not know when he sees him again.. He had previous bone marrow biopsy with good report. He has been off rituximab and the oral Venetoclax since last Nov 2021.  - CT scans on 7/2/2022 with some progression of the LAD in his body   - will get PEt and aslk Dr Don to see him again    9/29/2022:  - He saw Dr Don again at [facility] and they are considering giving him a regimen of chemotherapy (some program or clinical trial0 over at [facility] but he reports that they have since decided against proceeding in that direction.  - he was supposed to see Dr Don on 9/12 but that appointment was cancelled per patient   - he had PET on 8/29/2022 which was stable    11/23/2022:  - He was recently hospitalized at  [facility]; he is doing better and feeling better overall.  - He was supposed to see Dr Don again at [facility] after discharge but does not see him till Dec 2022  - CT on 11/8 with some increase in speel size and LAD  - will resume venetoclax in meantime and he needs to f/u with Dr Don for further directives  - get PEt         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  - followed  By Dr Kee  - CEA 1.4 on 4/8/2021  - CT scans on 1/14/2019 stable  - PET done in jan 2021 on chart    3/31/2022:  - CEA at 0.8  - PET on 3/14/2022 negative for recurrence    9/29/2022:  - CEA 0.9  - PET on chart     11/23/2022:  - latest CEA at 1.2     (3) Iron deficiency anemia s/p IV iron couple weeks ago  - hgb 11.7  - iron 39 and a little lower  - set up two more IV irons    2/17/2022:  - latest hgb at 11.4 and iron panel is adequate    3/31/2022:  - latest hgb at 11.4 and stable  - iron panel currently WNL    6/6/2022:  - latest hgb 12.0 and better  - iron panel still in process from earlier today    8/11/2022:  - hgb stable at 12.0  - he received dose of IV iron while in hospital recently  - will resume IV iron as outpatient    9/29/2022:  - ferritin was a little lower - he has not been taking the oral iron  - will resume oral iron and consider repeat IV iron if needed    11/23/2022:  - hgb at 11.8  - iron panel adequate currently     (4) HTN - on BP meds     (6) Chronic neck and back issues - followed by Dr Ryan Rivero     (7) DM - currently off all meds     (8) Hypercholesterolemia - on meds    (9)  - followed by Dr Whitney    (10) Chronic Leucopenia and thrombocytopenia - due to chemotherapy agents in past    2/17/2022:  - latest WBC at 5.84 and WNL  - latest plat at 92,000 and adequate and better since being off therapy    3/31/2022:  - latest plats are a little lower at 69,000  - wbc at 3.32    6/6/2022:  - latest wbc at 3.16  - platelets at 70,000 currently    8/11/2022:  - latest wbc is adequate at 5.39  and plats 94,000 and better and adequate    9/29/2022:  - latest WBC at 4.37 and plats 58,000 (a little lower)    11/23/2022:  - latest plat count at 60,000; wbc at 7.52      VISIT DIAGNOSES:      Encounter Diagnoses   Name Primary?    CLL (chronic lymphocytic leukemia) Yes    History of lung cancer - ANDRES NSCLC 2004     Iron deficiency     Iron deficiency anemia, unspecified iron deficiency anemia type     Pancytopenia     Thrombocytopenia     Splenomegaly     Lymphadenopathy, generalized     Lymphadenopathy, cervical            PLAN:  1. resume Venetoclax; check labs every monthy; resume IV iron as needed; encouraged resumption of oral iron; schedule repeat PET  2. F/u with PCP, pain management  3. F/u with PCP, Pulm, , etc  4. F/u with Dr Don at [facility] as directed - encouraged milvia to see Emmie in the near future  5. F/u with Dr Kapadia with vascular for the aortic aneurysm as directed          RTC in  4 weeks with Whit and 8 weeks with myself  Fax note to Kacey Kee, Ryan Rivero, Chelo, Karla, Florencia/Kang Wheeler; Primo; Radha        Discussion:       Total Time spent on patient:    I spent over 25 mins of time with the patient. Reviewed results of the recently ordered labs, tests, reports and studies; made directives with regards to the results. Over half of this time was spent couseling and coordinating care, making treatment and analytical decisions; ordering necessary labs, tests and studies; and discussing treatment options and setting up treatment plan(s) if indicated.        COVID-19 Discussion:    I had long discussion with patient and any applicable family about the COVID-19 coronavirus epidemic and the recommended precautions with regard to cancer and/or hematology patients. I have re-iterated the CDC recommendations for adequate hand washing, use of hand -like products, and coughing into elbow, etc. In addition, especially for our patients who are on chemotherapy and/or our otherwise  immunocompromised patients, I have recommended avoidance of crowds, including movie theaters, restaurants, churches, etc. I have recommended avoidance of any sick or symptomatic family members and/or friends. I have also recommended avoidance of any raw and unwashed food products, and general avoidance of food items that have not been prepared by themselves. The patient has been asked to call us immediately with any symptom developments, issues, questions or other general concerns.          I have explained all of the above in detail and the patient understands all of the current recommendation(s). I have answered all of their questions to the best of my ability and to their complete satisfaction.   The patient is to continue with the current management plan.        Chemotherapy Discussion:      I discussed the available treatment option(s) in accordance with the latest/current national evidence-based guidelines (NCCN, UpToDate, NCI, ASCO, etc where applicable), their overall age/condition and their co-morbidities. I also went over the risks and benefits of the chemotherapy with regard to their particular cancer type, their cancer stage, their age/condition, and their co-morbidities. I provided literature on the chemotherapy regimen and discussed the chemotherapy side-effect profiles of the drug(s). I discussed the importance of compliance with obtaining and monitoring weekly lab work, and went over the potential hematopathology issues and risks with anemia, leucopenia and thrombocytopenia that can occur with chemotherapy. I discussed the potential risks of liver and kidney damage, which could be permanent and could necessitate dialysis long-term if kidney failure developed. I discussed the emetic and/or diarrheal potential of the regimen and the potential need for use of antiemetic and anti-diarrheal medications. I discussed the risk for development of anaphylactic shock, bronchospasm, dysrhythmia, and  respiratory/cardiovascular arrest and/or failure. I discussed the potential risks for development of alopecia, cold sensory issues, ringing in ears, vertigo, cataracts, glaucoma, and neuropathy, all of which could end up being chronic and life-long. Some chemotherpyI discussed the risks of hand-foot syndrome and rashes, and development of other autoimmune mediated processes such as pneumonitis, hepatitis, and colitis which could be life threatening. I discussed the risks of the potential development of a rare but fatal viral mediated disease known as PML (Progressive Multifocal Leukoencephalopathy), and risk of future development of leukemia and/or lymphoma from use of certain chemotherapy agents. I discussed the need for neutropenic precautions, basic hygiene/sanitation behaviors and dietary restrictions.    The patient's consent has been obtained to proceed with the chemotherapy.The patient will be referred to [facility] for training and education on chemotherapy, use of antiemetics and/or anti-diarrheals, use of NSAID's, potential chemotherapy side-effects, and any specific recommendations and precautions with the particular chemotherapy agents.       Immunologic Therapy Discussion:    I discussed the available treatment option(s) in accordance with the latest/current national evidence-based guidelines (NCCN, UpToDate, NCI, ASCO, etc where applicable), their overall age/condition and their co-morbidities. I went over the risks and benefits of the immunotherapy with regard to their particular cancer type, their cancer stage, their age/condition, and their co-morbidities. I provided literature on the immunotherapy regimen and discussed the immunotherapy side-effect profiles of the drug(s). I discussed the importance of compliance with obtaining and monitoring weekly lab work, and went over the potential hematopathology issues and risks of hemato-pathologic issues with anemia, leucopenia  and/or thrombocytopenia and effects on thyroid function that can occur with immunotherapy. The patient will most likely need to have there thyroid functions monitored by their PCP and may need to take thyroid medication while on the immunotherapy. I discussed the potential risks of liver and kidney damage, which could be permanent and could necessitate dialysis long-term if kidney failure developed. I discussed the emetic and/or diarrheal potential of the regimen and the potential need for use of antiemetic and anti-diarrheal medications. I discussed the risk for development of anaphylactic shock, bronchospasm, dysrhythmia, and respiratory/cardiovascular arrest and/or failure. I discussed the potential risks for development of alopecia, cold sensory issues, ringing in ears, vertigo and neuropathy, all of which could end up being chronic and life-long. I discussed the risks of hand-foot syndrome and rashes, and development of other autoimmune mediated processes such as pneumonitis, hepatitis and colitis which could potentially be life threatening. I discussed the risks of the potential development of a rare but fatal viral mediated disease known as PML (Progressive Multifocal Leukoencephalopathy), and risk of future development of leukemia and/or lymphoma from use of certain immunotherapy agents. I discussed the possibility that immunologic therapy cold worsen or promote progression of any underlying autoimmune diseases such as sarcoidosis, ulcerative colitis, Crohn's disease, psoriasis, rheumatoid disorders, scleroderma, autoimmune nephritis disorders, Hashimoto's thyroiditis, and Lupus among others. I discussed the need for neutropenic precautions, basic hygiene/sanitation behaviors and dietary restrictions.    The patient's consent has been obtained to proceed with the immunotherapy.The patient will be referred to [facility] for training and education on immunotherapy, use of  antiemetics and/or anti-diarrheals, use of NSAID's, potential immunotherapy side-effects, and any specific recommendations and precautions with the particular immunotherapy agents.      I answered all of the patient's (and family's, if applicable) questions to the best of my ability and to their complete satisfaction. The patient acknowledged full understanding of the risks, recommendations and plan(s).      Iron Infusion Therapy Discussion:     I provided literature/learning materials on the particular IV iron regimen and discussed the potential side-effect profiles of the drug(s). I discussed the importance of compliance with obtaining and monitoring requested lab work, and went over the potential risk for the development of anaphylactic shock, bronchospasm, dysrhythmia, liver and/or kidney damage, and respiratory/cardiovascular arrest and/or failure. I discussed the potential risks for development of alopecia, fevers, itching, chills and/or rigors, cold sensory issues, ringing in ears, vertigo and neuropathy, all of which are usually acute but sometimes could end up being chronic and life-long. I discussed the risks of hand-foot syndrome and rashes, and development of other autoimmune mediated processes such as pneumonitis and colitis which could be life threatening.     The patient's consent has been obtained to proceed with the IV iron therapy.The patient will be referred to [facility] /[facility] for training and education on IV iron therapy, use of antiemetics and/or anti-diarrheals, use of NSAID's, potential IV iron therapy side-effects, and any specific recommendations and precautions with the particular IV iron agents.      I answered all of the patient's (and family's, if applicable) questions to the best of my ability and to their complete satisfaction. The patient acknowledged full understanding of the risks, recommendations and plan(s).          I answered all of the patient's (and  family's, if applicable) questions to the best of my ability and to their complete satisfaction. The patient acknowledged full understanding of the risks, recommendations and plan(s).         Electronically signed by Drew Bai MD                       Answers submitted by the patient for this visit:  Review of Systems Questionnaire (Submitted on 11/23/2022)  appetite change : No  unexpected weight change: Yes  mouth sores: Yes  visual disturbance: No  cough: Yes  shortness of breath: Yes  chest pain: No  abdominal pain: No  diarrhea: No  frequency: Yes  back pain: Yes  rash: No  headaches: Yes  adenopathy: Yes  nervous/ anxious: No

## 2022-11-22 NOTE — PROGRESS NOTES
[facility] Hematology/Oncology  PROGRESS NOTE - Follow-up Visit      Subjective:       Patient ID:   NAME: Conrad Kuhn : 1940     82 y.o. male    Referring Doc: Julien  Other Physicians: Ced Erazo Joubert, Srinivas, Pinsky    Chief Complaint:  CLL f/u    History of Present Illness:     Patient is being seen today in person in clinic for a regular follow-up viasit. He is here by himself.  The patient is on today to go over the results of the recently ordered labs, tests and studies. He is here by himself today    He was recently hospitalized at [facility]; he is doing better and feeling better overall.    He was supposed to see Dr Don again at [facility] after discharge but does not see him till Dec 2022      He is swallowing and eating better;   He is breathing ok currently. He denies any CP, SOB, or N/V.    Some chronic neck issues; some increase in LAD             Discussed Covid19 precautions; he had his vaccinations              ROS:   GEN: normal without any fever, night sweats or weight loss  HEENT: normal with no HA's, sore throat, stiff neck, changes in vision; no residual swallowing difficulties; some increase in LAD  CV: normal with no CP, SOB, PND, MAYER or orthopnea  PULM: normal with no SOB, cough, hemoptysis, sputum or pleuritic pain  GI: normal with no abdominal pain, nausea, vomiting, constipation, diarrhea, melanotic stools, BRBPR, or hematemesis; no dysphagia; peg tube in place  : urine frequency stable  BREAST: normal with no mass, discharge, pain  SKIN: normal with no rash, erythema, bruising, or swelling    Allergies:  Review of patient's allergies indicates:  No Known Allergies    Medications:    Current Outpatient Medications:     azelastine (ASTELIN) 137 mcg (0.1 %) nasal spray, 1 spray by Nasal route 2 (two) times daily. , Disp: , Rfl:     blood sugar diagnostic Strp, 1 strip by Misc.(Non-Drug; Combo Route) route 2 (two) times a day., Disp: , Rfl:     gabapentin (NEURONTIN) 600 MG  "tablet, Take 600 mg by mouth 2 (two) times daily., Disp: , Rfl:     glimepiride (AMARYL) 2 MG tablet, Take 2 mg by mouth once daily at 6am., Disp: , Rfl:     iron-vitamin C 100-250 mg, ICAR-C, (ICAR-C) 100-250 mg Tab, Take 1 tablet by mouth once daily., Disp: 30 each, Rfl: 6    levalbuterol (XOPENEX) 1.25 mg/3 mL nebulizer solution, Take 3 mLs by nebulization every 4 to 6 hours as needed. , Disp: , Rfl:     ondansetron (ZOFRAN) 8 MG tablet, Take 1 tablet (8 mg total) by mouth every 12 (twelve) hours as needed for Nausea., Disp: 30 tablet, Rfl: 2    traZODone (DESYREL) 300 MG tablet, Take 300 mg by mouth every evening., Disp: , Rfl:     albuterol-ipratropium (DUO-NEB) 2.5 mg-0.5 mg/3 mL nebulizer solution, Take 3 mLs by nebulization every 8 (eight) hours as needed., Disp: , Rfl:     blood-glucose meter kit, Use as instructed, Disp: , Rfl:     metoprolol tartrate (LOPRESSOR) 25 MG tablet, 1 tablet (25 mg total) by Per G Tube route 2 (two) times daily., Disp: 60 tablet, Rfl: 0    Current Facility-Administered Medications:     EPINEPHrine (EPIPEN) 0.3 mg/0.3 mL pen injection 0.3 mg, 0.3 mg, Intramuscular, PRN, MANUEL PageC    PMHx/PSHx Updates:  See patient's last visit with me on 11/9/2022  See H&P on 1/3/2019        Pathology:  Cancer Staging  No matching staging information was found for the patient.          Objective:     Vitals:  Blood pressure 116/75, pulse 73, temperature 97.8 °F (36.6 °C), resp. rate 18, height 6' 2" (1.88 m), weight 88 kg (194 lb 1.6 oz).        Physical Examination:   GEN: no apparent distress, comfortable; AAOx3  HEAD: atraumatic and normocephalic  EYES: no conjunctival pallor or muddiness, no icterus; normal pupil reaction to ambient light  ENT: OMM, no pharyngeal erythema, external bilateral ears WNL; no visible thrush or ulcers  NECK: no masses or swelling, trachea midline, no visible LAD/LN's ; LAD and LN's with some slight increase in size  CV: no palpitations; no pedal edema; " no noticeable JVD or neck vein distension  CHEST: Normal respiratory effort; chest wall breath movements symmetrical; no audible wheezing  ABDOM: non-distended; no bloating;  peg tube since removed  MUSC/Skeletal: ROM normal; joints visibly normal; no deformities or arthropathy  EXTREM: no clubbing, cyanosis, inflammation or swelling; right arm with fair ROM  SKIN: no rashes, lesions, ulcers, petechiae or subcutaneous nodules  : no keith  NEURO: moving all 4 extremities; AAOx3; no tremors  PSYCH: normal mood, affect and behavior  LYMPH: no visible LN's or LAD; LAD and LN's on right neck reduced in size              Labs:   Lab Results   Component Value Date    WBC 7.52 11/22/2022    HGB 11.8 (L) 11/22/2022    HCT 33.5 (L) 11/22/2022    MCV 87 11/22/2022    PLT 60 (L) 11/22/2022     CMP  Sodium   Date Value Ref Range Status   11/22/2022 134 (L) 136 - 145 mmol/L Final   06/12/2019 138 134 - 144 mmol/L      Potassium   Date Value Ref Range Status   11/22/2022 3.9 3.5 - 5.1 mmol/L Final     Chloride   Date Value Ref Range Status   11/22/2022 98 95 - 110 mmol/L Final   06/12/2019 99 98 - 110 mmol/L      CO2   Date Value Ref Range Status   11/22/2022 30 (H) 23 - 29 mmol/L Final     Glucose   Date Value Ref Range Status   11/22/2022 89 70 - 110 mg/dL Final   06/12/2019 179 (H) 70 - 99 mg/dL      BUN   Date Value Ref Range Status   11/22/2022 13 8 - 23 mg/dL Final     Creatinine   Date Value Ref Range Status   11/22/2022 0.9 0.5 - 1.4 mg/dL Final   06/12/2019 0.95 0.60 - 1.40 mg/dL      Calcium   Date Value Ref Range Status   11/22/2022 8.7 8.7 - 10.5 mg/dL Final     Total Protein   Date Value Ref Range Status   11/22/2022 6.5 6.0 - 8.4 g/dL Final     Albumin   Date Value Ref Range Status   11/22/2022 3.8 3.5 - 5.2 g/dL Final   06/12/2019 3.9 3.1 - 4.7 g/dL      Total Bilirubin   Date Value Ref Range Status   11/22/2022 0.9 0.1 - 1.0 mg/dL Final     Comment:     For infants and newborns, interpretation of results should  be based  on gestational age, weight and in agreement with clinical  observations.    Premature Infant recommended reference ranges:  Up to 24 hours.............<8.0 mg/dL  Up to 48 hours............<12.0 mg/dL  3-5 days..................<15.0 mg/dL  6-29 days.................<15.0 mg/dL       Alkaline Phosphatase   Date Value Ref Range Status   11/22/2022 54 (L) 55 - 135 U/L Final     AST   Date Value Ref Range Status   11/22/2022 16 10 - 40 U/L Final     ALT   Date Value Ref Range Status   11/22/2022 12 10 - 44 U/L Final     Anion Gap   Date Value Ref Range Status   11/22/2022 6 (L) 8 - 16 mmol/L Final     eGFR if    Date Value Ref Range Status   07/02/2022 >60.0 >60 mL/min/1.73 m^2 Final     eGFR if non    Date Value Ref Range Status   07/02/2022 >60.0 >60 mL/min/1.73 m^2 Final     Comment:     Calculation used to obtain the estimated glomerular filtration  rate (eGFR) is the CKD-EPI equation.              Lab Results   Component Value Date    IRON 82 11/22/2022    TIBC 351 11/22/2022    FERRITIN 40 11/22/2022         Radiology/Diagnostic Studies:    CT 11/8/2022:  IMPRESSION:     Worsening splenomegaly with diffuse increase in size and number of essentially all of the main lymph node chains throughout the chest, abdomen and pelvis.           PET 8/29/2022:  IMPRESSION:  1. Numerous enlarged lymph nodes throughout the neck, chest, abdomen and pelvis are non hypermetabolic, and stable compared to CT of 07/02/2022.  2. Stable splenomegaly, with no abnormal focal splenic FDG hypermetabolism.  3. Additional observations as described.         CT 7/2/2022:    IMPRESSION:  1. Interval worsening in numerous enlarged lymph nodes in the chest, abdomen and pelvis, as well as development of splenomegaly, compatible with lymphoma and or worsening CLL, given patient's history  2. Mild bilateral hydroureteronephrosis, with markedly enlarged prostate gland and appearance of the urinary bladder  suggesting chronic bladder outlet obstruction. Consider correlation with urinalysis.  3. Infrarenal abdominal aortic aneurysm measuring 33 mm in diameter. Follow-up imaging in 3 years is recommended per best practice guidelines.  4. Additional observations as described.           PET  3/14/2022:    IMPRESSION:  Left upper lobectomy without evidence of recurrent or metastatic disease      MRI cervical spine  10/1/2021:    IMPRESSION:     Loss of normal cervical lordosis with mild kyphotic curvature.     Advanced multilevel cervical spondylosis, as outlined above. Spinal canal narrowing and foraminal narrowing is most severe at C5-6. 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     Congenital narrowing of cervical spinal canal, which exacerbates cervical spondylosis        CT head  8/27/2021:  IMPRESSION:     No CT evidence of acute intracranial pathology.     Stable senescent changes as above.         PET  7/19/2021:    IMPRESSION:  Prior left upper lobectomy without evidence of recurrent or metastatic disease  - 3 cm infrarenal abdominal aortic aneurysm      CT head 6/29/2021:  IMPRESSION:     No CT evidence of acute intracranial pathology      PET  1/18/2021:    Impression:     No evidence of FDG avid lymphoproliferative disease.     Aneurysmal dilation of infrarenal abdominal aorta (3.4 cm) as well as aneurysmal dilation of right common iliac artery.     Additional incidental findings as above        PET  6/24/2020:    Impression:     1. No findings of active lymphoproliferative disease.  2. Additional observations as above.      PET  1/24/2020:    Impression       Moderate decrease in size of the adenopathy within the neck, chest, abdomen and pelvis.  The spleen is smaller in size.  There is no significant FDG activity.    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 Multifocal  lymphadenopathy in the chest, abdomen, and pelvis compatible with provided clinical history of lymphocytic leukemia.  There is mixed interval change when compared to prior studies.  Pathologic lymphadenopathy in the chest has increased significantly when compared to a CTA of the chest from May 2018.  Pathologic retroperitoneal lymphadenopathy has improved slightly when compared to a previous abdominal CT from February 2017.  Please see above details.  2. Mild aneurysmal dilation of the infrarenal abdominal aorta, with maximal transverse diameter of 3.7 cm.  Maximal transverse diameter on a prior study from 2017 was 2.5 cm.  3. Additional findings as above           [facility] Unknown Rad Eap    Result Date: 1/14/2019  CMS MANDATED QUALITY DATA - CT RADIATION - 436 All CT scans at this facility utilize dose modulation, iterative reconstruction, and/or weight based dosing when appropriate to reduce radiation dose to as low as reasonably achievable. Reason:Lymphoid leukemia TECHNIQUE: CT thorax with, CT abdomen  with, and CT pelvis with 100 mL Omnipaque 350. COMPARISON: CT chest dated 05/19/2018 and CT abdomen and pelvis dated 02/08/2017 CT THORAX: There is stable mediastinal, hilar and axillary adenopathy. There is coronary artery calcification. There is resolution of the groundglass opacities throughout the lungs. There are no confluent infiltrates, pulmonary nodules or pleural effusions. There are stable subcentimeter nodules in the left lobe of the thyroid gland. There are degenerative changes of the spine. CT ABDOMEN: The liver, pancreas, adrenal glands and gallbladder are normal. The spleen is enlarged. There is mesenteric and retroperitoneal adenopathy which is slightly smaller in size. -There is a portacaval node measuring 37 x 21 mm and previously measured 38 x 27 mm. -Periportal lymph node measuring 41 x 18 mm, previously measured 48 x 29 mm. -Left periaortic adenopathy measuring 34 x 23 mm and previously measured  40 x 42 mm- The kidneys enhance symmetrically without hydronephrosis or calculi. There are no thick-walled or dilated bowel loops. There is vascular calcification of aorta. There is a 3.0 x 3.0 cm infrarenal abdominal aortic aneurysm. CT PELVIS: There is adenopathy along the pelvic sidewalls bilaterally which has decreased in size. -The left common iliac adenopathy measuring 36 x 11 mm, previously measured 46 x 14 mm -the right common iliac adenopathy measuring 46 x 10 mm. Previously measured 53 x 15 mm. The bladder is normal. The prostate gland is enlarged. There are degenerative changes of the spine. There is grade 1 anterolisthesis of L5 on S1 with bilateral pars defects.     IMPRESSION: Stable mediastinal, hilar and axillary adenopathy with resolution of the airspace disease within the lungs Decrease in size of the mesenteric, retroperitoneal and pelvic adenopathy. Stable infrarenal abdominal aortic aneurysm Splenomegaly     Read and electronically signed by: Jeniffer Arredondo MD on 1/14/2019 9:14 AM CST JENIFFER ARREDONDO MD      I have reviewed all available lab results and radiology reports.    Assessment/Plan:   (1) 82 y.o. male with  diagnosis of CLL who has been referred by Dr Rony Victoria for continuation of care by medical hematology/oncology.   - BM biopsy  12/4/2015 with CLL  - diagnosis of SLL in 2004 and s/p FCR x6 in 2007  - s/p imbruvica in past (stopped in May 2018)  - latest wbc at 4.8; lymph 56%  - latest CT on 8/7/2019 showing increase in the LAD  - platelets are 111,000  - He was recently hospitalized at [facility] and seen by Dr Wheeler. Dr Wheeler has recommended Rituximab. He is starting tomorrow.   - discussed the rituximab regimen and the potential side-effect profile; he is getting chemotherapy school today with Rosemary [facility]  - he saw Dr Don on Oct 21st 2019  - he has had 3 of the 4 weeks of rituximab so far; Dr Don recommends him to eventually get rituximab monthly x6 with daily oral Venetoclax for  two years total.   - completed rituximab (4th) week of rituximab and  S/p 6 monthly rituximab with Venetoclax oral but is taking only 2 pills daily due to the counts  - he is now just on the venetclax  - he saw Dr Don again on Dec 23rd 2019 and sees him again in March 16th 2020  - latest PEt on 1/24/2020 looks really good    8/27/2020:  - he saw Dr Don last month at [facility]  - latest PEt from 6/24/2020 looks good  - he remains on just the oral venetoclax at 2 pills per day  - he sees Dr Don again in about 3 months (Oct 2020)    11/18/2020:  - he is doing well with the Venetoclax  - due for repeat PEt in Dec 2020  - he sees Dr Don again on Dec 23rd 2020    1/20/2021:  - he is doing well with the Venetoclax  - He saw Dr Don at [facility] in Dec 2020. He is now off rituximab monthly and remains only on the oral Venetoclax but at 2 pills daily . He sees Dr Don again in feb 2021  - Recent PEt on  1/18/2021 looks stable except for incidental aneurysm in aorta; so we are referring him to Dr Kapadia with v[facility]    4/20/2021:  - he saw Dr Don in Feb 2021 and sees him again in Nov 2021  - repeat PEt in June/july 2021  - continued on Venetoclax and regular blood checks    7/20/2021:  - he continues on the Venetoclax  - mild leucopenia from the medication  - PEt on 7/19/2021 seems to be stable    9/21/2021:  - doing ok overall with some occasional HA's and general lack of energy  - he sees Dr Don again on nov 1st and hopefully he can discontinue the venetoclax thereafter  - Head CT on 8/27/2021 was relatively negative    2/17/2022:  -  He last showed for oncology appointment in Sept 2021  - He had covid in Jan 2022 and he received the infusion but did not require hospitalization  - He saw Dr Erazo couple of weeks ago  - He is planning to have cervical spine surgery with Dr Mai in the near future.  - He sees Dr Don at [facility] this coming Monday. He is now off rituximab monthly and he is also now off the oral Venetoclax  (expect he will be getting a repeat bone marrow biopsy)    3/31/2022:  - He had recent telemed visit Dr Don at [facility] and they were pleased with the latest bone marrow biopsy. He is now off rituximab monthly and he is also now off the oral Venetoclax      6/6/2022:  - He had surgery on his cervical spine with Dr Mai on April 8th 2022 and had postoperative problems related to the intubation and anesthesia and was in the hospital for about 3 weeks. He had aspiration pneumonia and bout of respiratory failure requiring mech vent support.  He has residual swallowing issues and has a peg tube. He was seen Dr Garcia with GI while in hospital. He was discharged on 4/18. He has had home health coming to house couple times of week. He reports that he is doing better    - he is due to see Dr Don again at [facility] in the near future and he has since been off all therapy      8/11/2022:  - He was recently hospitalized with fever and pneumonia. He is feeling better  - He is swallowing and eating better; peg tube since removed  - He previously had bout of oral mucosal ulcerations for which he had biopsies at [facility] which were negative for any malignancy. This has since resolved.  - He previously had telemed visit Dr Don at [facility] but does not know when he sees him again.. He had previous bone marrow biopsy with good report. He has been off rituximab and the oral Venetoclax since last Nov 2021.  - CT scans on 7/2/2022 with some progression of the LAD in his body   - will get PEt and aslk Dr Don to see him again    9/29/2022:  - He saw Dr Don again at [facility] and they are considering giving him a regimen of chemotherapy (some program or clinical trial0 over at [facility] but he reports that they have since decided against proceeding in that direction.  - he was supposed to see Dr Don on 9/12 but that appointment was cancelled per patient   - he had PET on 8/29/2022 which was stable    11/23/2022:  - He was recently hospitalized at  [facility]; he is doing better and feeling better overall.  - He was supposed to see Dr Don again at [facility] after discharge but does not see him till Dec 2022  - CT on 11/8 with some increase in speel size and LAD  - will resume venetoclax in meantime and he needs to f/u with Dr Don for further directives  - get PEt         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  - followed  By Dr Kee  - CEA 1.4 on 4/8/2021  - CT scans on 1/14/2019 stable  - PET done in jan 2021 on chart    3/31/2022:  - CEA at 0.8  - PET on 3/14/2022 negative for recurrence    9/29/2022:  - CEA 0.9  - PET on chart     11/23/2022:  - latest CEA at 1.2     (3) Iron deficiency anemia s/p IV iron couple weeks ago  - hgb 11.7  - iron 39 and a little lower  - set up two more IV irons    2/17/2022:  - latest hgb at 11.4 and iron panel is adequate    3/31/2022:  - latest hgb at 11.4 and stable  - iron panel currently WNL    6/6/2022:  - latest hgb 12.0 and better  - iron panel still in process from earlier today    8/11/2022:  - hgb stable at 12.0  - he received dose of IV iron while in hospital recently  - will resume IV iron as outpatient    9/29/2022:  - ferritin was a little lower - he has not been taking the oral iron  - will resume oral iron and consider repeat IV iron if needed    11/23/2022:  - hgb at 11.8  - iron panel adequate currently     (4) HTN - on BP meds     (6) Chronic neck and back issues - followed by Dr Ryan Rivero     (7) DM - currently off all meds     (8) Hypercholesterolemia - on meds    (9)  - followed by Dr Whitney    (10) Chronic Leucopenia and thrombocytopenia - due to chemotherapy agents in past    2/17/2022:  - latest WBC at 5.84 and WNL  - latest plat at 92,000 and adequate and better since being off therapy    3/31/2022:  - latest plats are a little lower at 69,000  - wbc at 3.32    6/6/2022:  - latest wbc at 3.16  - platelets at 70,000 currently    8/11/2022:  - latest wbc is adequate at 5.39  and plats 94,000 and better and adequate    9/29/2022:  - latest WBC at 4.37 and plats 58,000 (a little lower)    11/23/2022:  - latest plat count at 60,000; wbc at 7.52      VISIT DIAGNOSES:      Encounter Diagnoses   Name Primary?    CLL (chronic lymphocytic leukemia) Yes    History of lung cancer - ANDRES NSCLC 2004     Iron deficiency     Iron deficiency anemia, unspecified iron deficiency anemia type     Pancytopenia     Thrombocytopenia     Splenomegaly     Lymphadenopathy, generalized     Lymphadenopathy, cervical            PLAN:  1. resume Venetoclax; check labs every monthy; resume IV iron as needed; encouraged resumption of oral iron; schedule repeat PET  2. F/u with PCP, pain management  3. F/u with PCP, Pulm, , etc  4. F[place] Don [place] as directed - encouraged milvia to see Emmie in the near future  5. F/u with Dr Kapadia with vascular for the aortic aneurysm as directed          RTC in  4 weeks with Whit and 8 weeks with myself  Fax note to Kacey Kee, Ryan Rivero, Chelo, Karla, Florencia/Kang Wheeler; Primo; Radha        Discussion:       Total Time spent on patient:    I spent over 25 mins of time with the patient. Reviewed results of the recently ordered labs, tests, reports and studies; made directives with regards to the results. Over half of this time was spent couseling and coordinating care, making treatment and analytical decisions; ordering necessary labs, tests and studies; and discussing treatment options and setting up treatment plan(s) if indicated.        COVID-19 Discussion:    I had long discussion with patient and any applicable family about the COVID-19 coronavirus epidemic and the recommended precautions with regard to cancer and/or hematology patients. I have re-iterated the CDC recommendations for adequate hand washing, use of hand -like products, and coughing into elbow, etc. In addition, especially for our patients who are on chemotherapy and/or our otherwise  immunocompromised patients, I have recommended avoidance of crowds, including movie theaters, restaurants, churches, etc. I have recommended avoidance of any sick or symptomatic family members and/or friends. I have also recommended avoidance of any raw and unwashed food products, and general avoidance of food items that have not been prepared by themselves. The patient has been asked to call us immediately with any symptom developments, issues, questions or other general concerns.          I have explained all of the above in detail and the patient understands all of the current recommendation(s). I have answered all of their questions to the best of my ability and to their complete satisfaction.   The patient is to continue with the current management plan.        Chemotherapy Discussion:      I discussed the available treatment option(s) in accordance with the latest/current national evidence-based guidelines (NCCN, UpToDate, NCI, ASCO, etc where applicable), their overall age/condition and their co-morbidities. I also went over the risks and benefits of the chemotherapy with regard to their particular cancer type, their cancer stage, their age/condition, and their co-morbidities. I provided literature on the chemotherapy regimen and discussed the chemotherapy side-effect profiles of the drug(s). I discussed the importance of compliance with obtaining and monitoring weekly lab work, and went over the potential hematopathology issues and risks with anemia, leucopenia and thrombocytopenia that can occur with chemotherapy. I discussed the potential risks of liver and kidney damage, which could be permanent and could necessitate dialysis long-term if kidney failure developed. I discussed the emetic and/or diarrheal potential of the regimen and the potential need for use of antiemetic and anti-diarrheal medications. I discussed the risk for development of anaphylactic shock, bronchospasm, dysrhythmia, and  respiratory/cardiovascular arrest and/or failure. I discussed the potential risks for development of alopecia, cold sensory issues, ringing in ears, vertigo, cataracts, glaucoma, and neuropathy, all of which could end up being chronic and life-long. Some chemotherpyI discussed the risks of hand-foot syndrome and rashes, and development of other autoimmune mediated processes such as pneumonitis, hepatitis, and colitis which could be life threatening. I discussed the risks of the potential development of a rare but fatal viral mediated disease known as PML (Progressive Multifocal Leukoencephalopathy), and risk of future development of leukemia and/or lymphoma from use of certain chemotherapy agents. I discussed the need for neutropenic precautions, basic hygiene/sanitation behaviors and dietary restrictions.    The patient's consent has been obtained to proceed with the chemotherapy.The patient will be referred to [facility] for training and education on chemotherapy, use of antiemetics and/or anti-diarrheals, use of NSAID's, potential chemotherapy side-effects, and any specific recommendations and precautions with the particular chemotherapy agents.       Immunologic Therapy Discussion:    I discussed the available treatment option(s) in accordance with the latest/current national evidence-based guidelines (NCCN, UpToDate, NCI, ASCO, etc where applicable), their overall age/condition and their co-morbidities. I went over the risks and benefits of the immunotherapy with regard to their particular cancer type, their cancer stage, their age/condition, and their co-morbidities. I provided literature on the immunotherapy regimen and discussed the immunotherapy side-effect profiles of the drug(s). I discussed the importance of compliance with obtaining and monitoring weekly lab work, and went over the potential hematopathology issues and risks of hemato-pathologic issues with anemia, leucopenia  and/or thrombocytopenia and effects on thyroid function that can occur with immunotherapy. The patient will most likely need to have there thyroid functions monitored by their PCP and may need to take thyroid medication while on the immunotherapy. I discussed the potential risks of liver and kidney damage, which could be permanent and could necessitate dialysis long-term if kidney failure developed. I discussed the emetic and/or diarrheal potential of the regimen and the potential need for use of antiemetic and anti-diarrheal medications. I discussed the risk for development of anaphylactic shock, bronchospasm, dysrhythmia, and respiratory/cardiovascular arrest and/or failure. I discussed the potential risks for development of alopecia, cold sensory issues, ringing in ears, vertigo and neuropathy, all of which could end up being chronic and life-long. I discussed the risks of hand-foot syndrome and rashes, and development of other autoimmune mediated processes such as pneumonitis, hepatitis and colitis which could potentially be life threatening. I discussed the risks of the potential development of a rare but fatal viral mediated disease known as PML (Progressive Multifocal Leukoencephalopathy), and risk of future development of leukemia and/or lymphoma from use of certain immunotherapy agents. I discussed the possibility that immunologic therapy cold worsen or promote progression of any underlying autoimmune diseases such as sarcoidosis, ulcerative colitis, Crohn's disease, psoriasis, rheumatoid disorders, scleroderma, autoimmune nephritis disorders, Hashimoto's thyroiditis, and Lupus among others. I discussed the need for neutropenic precautions, basic hygiene/sanitation behaviors and dietary restrictions.    The patient's consent has been obtained to proceed with the immunotherapy.The patient will be referred to [facility] /[facility] for training and education on immunotherapy, use of  antiemetics and/or anti-diarrheals, use of NSAID's, potential immunotherapy side-effects, and any specific recommendations and precautions with the particular immunotherapy agents.      I answered all of the patient's (and family's, if applicable) questions to the best of my ability and to their complete satisfaction. The patient acknowledged full understanding of the risks, recommendations and plan(s).      Iron Infusion Therapy Discussion:     I provided literature/learning materials on the particular IV iron regimen and discussed the potential side-effect profiles of the drug(s). I discussed the importance of compliance with obtaining and monitoring requested lab work, and went over the potential risk for the development of anaphylactic shock, bronchospasm, dysrhythmia, liver and/or kidney damage, and respiratory/cardiovascular arrest and/or failure. I discussed the potential risks for development of alopecia, fevers, itching, chills and/or rigors, cold sensory issues, ringing in ears, vertigo and neuropathy, all of which are usually acute but sometimes could end up being chronic and life-long. I discussed the risks of hand-foot syndrome and rashes, and development of other autoimmune mediated processes such as pneumonitis and colitis which could be life threatening.     The patient's consent has been obtained to proceed with the IV iron therapy.The patient will be referred to [facility] /[facility] for training and education on IV iron therapy, use of antiemetics and/or anti-diarrheals, use of NSAID's, potential IV iron therapy side-effects, and any specific recommendations and precautions with the particular IV iron agents.      I answered all of the patient's (and family's, if applicable) questions to the best of my ability and to their complete satisfaction. The patient acknowledged full understanding of the risks, recommendations and plan(s).          I answered all of the patient's (and  family's, if applicable) questions to the best of my ability and to their complete satisfaction. The patient acknowledged full understanding of the risks, recommendations and plan(s).         Electronically signed by Drew Bai MD                       Answers submitted by the patient for this visit:  Review of Systems Questionnaire (Submitted on 11/23/2022)  appetite change : No  unexpected weight change: Yes  mouth sores: Yes  visual disturbance: No  cough: Yes  shortness of breath: Yes  chest pain: No  abdominal pain: No  diarrhea: No  frequency: Yes  back pain: Yes  rash: No  headaches: Yes  adenopathy: Yes  nervous/ anxious: No

## 2022-11-23 ENCOUNTER — SPECIALTY PHARMACY (OUTPATIENT)
Dept: PHARMACY | Facility: CLINIC | Age: 82
End: 2022-11-23
Payer: MEDICARE

## 2022-11-23 ENCOUNTER — OFFICE VISIT (OUTPATIENT)
Dept: HEMATOLOGY/ONCOLOGY | Facility: CLINIC | Age: 82
End: 2022-11-23
Payer: MEDICARE

## 2022-11-23 VITALS
DIASTOLIC BLOOD PRESSURE: 75 MMHG | SYSTOLIC BLOOD PRESSURE: 116 MMHG | TEMPERATURE: 98 F | WEIGHT: 194.13 LBS | RESPIRATION RATE: 18 BRPM | HEART RATE: 73 BPM | BODY MASS INDEX: 24.92 KG/M2 | HEIGHT: 74 IN

## 2022-11-23 DIAGNOSIS — R59.1 LYMPHADENOPATHY, GENERALIZED: ICD-10-CM

## 2022-11-23 DIAGNOSIS — D69.6 THROMBOCYTOPENIA: ICD-10-CM

## 2022-11-23 DIAGNOSIS — R59.0 LYMPHADENOPATHY, CERVICAL: ICD-10-CM

## 2022-11-23 DIAGNOSIS — D50.9 IRON DEFICIENCY ANEMIA, UNSPECIFIED IRON DEFICIENCY ANEMIA TYPE: ICD-10-CM

## 2022-11-23 DIAGNOSIS — R16.1 SPLENOMEGALY: ICD-10-CM

## 2022-11-23 DIAGNOSIS — Z85.118 HISTORY OF LUNG CANCER: ICD-10-CM

## 2022-11-23 DIAGNOSIS — D61.818 PANCYTOPENIA: ICD-10-CM

## 2022-11-23 DIAGNOSIS — C91.10 CLL (CHRONIC LYMPHOCYTIC LEUKEMIA): Primary | ICD-10-CM

## 2022-11-23 DIAGNOSIS — E61.1 IRON DEFICIENCY: ICD-10-CM

## 2022-11-23 PROCEDURE — 99214 PR OFFICE/OUTPT VISIT, EST, LEVL IV, 30-39 MIN: ICD-10-PCS | Mod: S$GLB,,, | Performed by: INTERNAL MEDICINE

## 2022-11-23 PROCEDURE — 99214 OFFICE O/P EST MOD 30 MIN: CPT | Mod: S$GLB,,, | Performed by: INTERNAL MEDICINE

## 2022-11-25 NOTE — TELEPHONE ENCOUNTER
PA approved. Approved 10/24/22-11/23/23. Estimated copay is $65. Forwarding to BI/FA for further assistance.

## 2022-11-25 NOTE — TELEPHONE ENCOUNTER
Benefits investigation   Spoke with Mary at [employer].     Payor: [facility]   Annual Deductible Amount: $0   Annual Out of Packet (OOP) Maximum: $6,000   Estimated Copay: $65  Network Status: for one fill and then has to fill at [facility].         Outgoing call to inform pt that he is locked into filling at [facility]. Pt voiced understanding. Message sent to MDO to inform. Closing out at [facility].

## 2022-11-28 ENCOUNTER — TELEPHONE (OUTPATIENT)
Dept: HEMATOLOGY/ONCOLOGY | Facility: CLINIC | Age: 82
End: 2022-11-28

## 2022-11-28 DIAGNOSIS — C91.10 CLL (CHRONIC LYMPHOCYTIC LEUKEMIA): Primary | ICD-10-CM

## 2022-11-28 NOTE — TELEPHONE ENCOUNTER
"----- Message from Drew Bai MD sent at 11/28/2022  8:34 AM CST -----  Regarding: FW: Venclexta  Take care of this please      ----- Message -----  From: Nora Jean, KristinaD  Sent: 11/25/2022  10:37 AM CST  To: Drew Bai MD, Bhumika Russell Staff  Subject: Alba Bai and staff,    Patients insurance requires the patient to fill through [facility]. Patient has been informed.    Please send prescription to [facility].      To complete this in EPIC  The original order MUST be discontinued and re-typed as a new prescription with the updated pharmacy listed.     I have added [facility] to the patients preferred pharmacies.       Uncheck the box that says "send to [facility]" AND Check box with [facility]    #please do not  click "reorder" -- as it will continue to route the rx to [facility] even if the pharmacy is changed.     Please opt the patient out of [facility] when the BPA is fired.      Please inform us if there is anything further we can do to assist.          "

## 2022-11-29 ENCOUNTER — TELEPHONE (OUTPATIENT)
Dept: RADIOLOGY | Facility: HOSPITAL | Age: 82
End: 2022-11-29

## 2022-11-29 DIAGNOSIS — C91.10 CLL (CHRONIC LYMPHOCYTIC LEUKEMIA): Primary | ICD-10-CM

## 2022-11-29 RX ORDER — VALACYCLOVIR HYDROCHLORIDE 1 G/1
1000 TABLET, FILM COATED ORAL DAILY
COMMUNITY
End: 2023-01-01 | Stop reason: DRUGHIGH

## 2022-11-29 NOTE — NURSING
Bone marrow biopsy scheduled @ [facility] on 12/8 @ 9am with arrival @ 7am.  Pre-procedure instructions given to wife and understanding verbalized.

## 2022-12-02 ENCOUNTER — HOSPITAL ENCOUNTER (OUTPATIENT)
Dept: RADIOLOGY | Facility: HOSPITAL | Age: 82
Discharge: HOME | End: 2022-12-02
Attending: INTERNAL MEDICINE
Payer: MEDICARE

## 2022-12-02 VITALS — HEIGHT: 74 IN | WEIGHT: 196 LBS | BODY MASS INDEX: 25.15 KG/M2

## 2022-12-02 DIAGNOSIS — R59.0 LYMPHADENOPATHY, CERVICAL: ICD-10-CM

## 2022-12-02 DIAGNOSIS — Z85.118 HISTORY OF LUNG CANCER: ICD-10-CM

## 2022-12-02 DIAGNOSIS — R59.1 LYMPHADENOPATHY, GENERALIZED: ICD-10-CM

## 2022-12-02 DIAGNOSIS — C91.10 CLL (CHRONIC LYMPHOCYTIC LEUKEMIA): ICD-10-CM

## 2022-12-02 LAB — GLUCOSE SERPL-MCNC: 130 MG/DL (ref 70–110)

## 2022-12-02 PROCEDURE — 78815 PET IMAGE W/CT SKULL-THIGH: CPT | Mod: TC,PS,PO

## 2022-12-06 ENCOUNTER — LAB VISIT (OUTPATIENT)
Dept: LAB | Facility: HOSPITAL | Age: 82
End: 2022-12-06
Attending: NURSE PRACTITIONER
Payer: MEDICARE

## 2022-12-06 DIAGNOSIS — C91.10 CLL (CHRONIC LYMPHOCYTIC LEUKEMIA): ICD-10-CM

## 2022-12-06 DIAGNOSIS — C91.10 CLL (CHRONIC LYMPHOCYTIC LEUKEMIA): Primary | ICD-10-CM

## 2022-12-06 LAB
BASOPHILS # BLD AUTO: 0.02 K/UL (ref 0–0.2)
BASOPHILS NFR BLD: 0.3 % (ref 0–1.9)
DIFFERENTIAL METHOD: ABNORMAL
EOSINOPHIL # BLD AUTO: 0.1 K/UL (ref 0–0.5)
EOSINOPHIL NFR BLD: 1.2 % (ref 0–8)
ERYTHROCYTE [DISTWIDTH] IN BLOOD BY AUTOMATED COUNT: 15.4 % (ref 11.5–14.5)
HCT VFR BLD AUTO: 35.2 % (ref 40–54)
HGB BLD-MCNC: 12.3 G/DL (ref 14–18)
IMM GRANULOCYTES # BLD AUTO: 0.16 K/UL (ref 0–0.04)
IMM GRANULOCYTES NFR BLD AUTO: 2.4 % (ref 0–0.5)
LYMPHOCYTES # BLD AUTO: 2.7 K/UL (ref 1–4.8)
LYMPHOCYTES NFR BLD: 40 % (ref 18–48)
MCH RBC QN AUTO: 31.1 PG (ref 27–31)
MCHC RBC AUTO-ENTMCNC: 34.9 G/DL (ref 32–36)
MCV RBC AUTO: 89 FL (ref 82–98)
MONOCYTES # BLD AUTO: 2.5 K/UL (ref 0.3–1)
MONOCYTES NFR BLD: 36.3 % (ref 4–15)
NEUTROPHILS # BLD AUTO: 1.3 K/UL (ref 1.8–7.7)
NEUTROPHILS NFR BLD: 19.8 % (ref 38–73)
NRBC BLD-RTO: 0 /100 WBC
PLATELET # BLD AUTO: 53 K/UL (ref 150–450)
PMV BLD AUTO: 10.4 FL (ref 9.2–12.9)
RBC # BLD AUTO: 3.96 M/UL (ref 4.6–6.2)
WBC # BLD AUTO: 6.77 K/UL (ref 3.9–12.7)

## 2022-12-06 PROCEDURE — 36415 COLL VENOUS BLD VENIPUNCTURE: CPT | Performed by: NURSE PRACTITIONER

## 2022-12-06 PROCEDURE — 85025 COMPLETE CBC W/AUTO DIFF WBC: CPT | Performed by: NURSE PRACTITIONER

## 2022-12-08 ENCOUNTER — HOSPITAL ENCOUNTER (OUTPATIENT)
Dept: RADIOLOGY | Facility: HOSPITAL | Age: 82
Discharge: HOME | End: 2022-12-08
Attending: NURSE PRACTITIONER
Payer: MEDICARE

## 2022-12-08 VITALS
WEIGHT: 200 LBS | HEIGHT: 74 IN | DIASTOLIC BLOOD PRESSURE: 77 MMHG | SYSTOLIC BLOOD PRESSURE: 149 MMHG | OXYGEN SATURATION: 98 % | TEMPERATURE: 98 F | HEART RATE: 67 BPM | BODY MASS INDEX: 25.67 KG/M2 | RESPIRATION RATE: 15 BRPM

## 2022-12-08 DIAGNOSIS — C91.10 CLL (CHRONIC LYMPHOCYTIC LEUKEMIA): ICD-10-CM

## 2022-12-08 LAB
APTT PPP: 31.2 SEC (ref 23.3–35.1)
BASOPHILS NFR BLD: 0 % (ref 0–1.9)
DIFFERENTIAL METHOD: ABNORMAL
EOSINOPHIL NFR BLD: 2 % (ref 0–8)
ERYTHROCYTE [DISTWIDTH] IN BLOOD BY AUTOMATED COUNT: 15.7 % (ref 11.5–14.5)
GLUCOSE SERPL-MCNC: 124 MG/DL (ref 70–110)
HCT VFR BLD AUTO: 36.8 % (ref 40–54)
HGB BLD-MCNC: 12.9 G/DL (ref 14–18)
IMM GRANULOCYTES # BLD AUTO: ABNORMAL K/UL (ref 0–0.04)
IMM GRANULOCYTES NFR BLD AUTO: ABNORMAL % (ref 0–0.5)
INR PPP: 1.1
LYMPHOCYTES NFR BLD: 66 % (ref 18–48)
MCH RBC QN AUTO: 31.3 PG (ref 27–31)
MCHC RBC AUTO-ENTMCNC: 35.1 G/DL (ref 32–36)
MCV RBC AUTO: 89 FL (ref 82–98)
MONOCYTES NFR BLD: 14 % (ref 4–15)
MYELOCYTES NFR BLD MANUAL: 1 %
NEUTROPHILS NFR BLD: 16 % (ref 38–73)
NEUTS BAND NFR BLD MANUAL: 1 %
NRBC BLD-RTO: 0 /100 WBC
PLATELET # BLD AUTO: 50 K/UL (ref 150–450)
PLATELET BLD QL SMEAR: ABNORMAL
PMV BLD AUTO: 10.6 FL (ref 9.2–12.9)
PROTHROMBIN TIME: 13.3 SEC (ref 11.4–13.7)
RBC # BLD AUTO: 4.12 M/UL (ref 4.6–6.2)
WBC # BLD AUTO: 6.44 K/UL (ref 3.9–12.7)

## 2022-12-08 PROCEDURE — 63600175 PHARM REV CODE 636 W HCPCS: Performed by: RADIOLOGY

## 2022-12-08 PROCEDURE — 88305 TISSUE EXAM BY PATHOLOGIST: CPT | Mod: TC,59

## 2022-12-08 PROCEDURE — 85610 PROTHROMBIN TIME: CPT | Performed by: RADIOLOGY

## 2022-12-08 PROCEDURE — A4550 SURGICAL TRAYS: HCPCS

## 2022-12-08 PROCEDURE — 25000003 PHARM REV CODE 250: Performed by: RADIOLOGY

## 2022-12-08 PROCEDURE — 99152 MOD SED SAME PHYS/QHP 5/>YRS: CPT

## 2022-12-08 PROCEDURE — 85027 COMPLETE CBC AUTOMATED: CPT | Performed by: RADIOLOGY

## 2022-12-08 PROCEDURE — 88341 IMHCHEM/IMCYTCHM EA ADD ANTB: CPT | Mod: TC,59

## 2022-12-08 PROCEDURE — 82947 ASSAY GLUCOSE BLOOD QUANT: CPT | Performed by: RADIOLOGY

## 2022-12-08 PROCEDURE — 85730 THROMBOPLASTIN TIME PARTIAL: CPT | Performed by: RADIOLOGY

## 2022-12-08 PROCEDURE — 85007 BL SMEAR W/DIFF WBC COUNT: CPT | Performed by: RADIOLOGY

## 2022-12-08 RX ORDER — FENTANYL CITRATE 50 UG/ML
INJECTION, SOLUTION INTRAMUSCULAR; INTRAVENOUS
Status: DISCONTINUED | OUTPATIENT
Start: 2022-12-08 | End: 2022-12-09 | Stop reason: HOSPADM

## 2022-12-08 RX ORDER — MIDAZOLAM HYDROCHLORIDE 1 MG/ML
INJECTION INTRAMUSCULAR; INTRAVENOUS
Status: DISCONTINUED | OUTPATIENT
Start: 2022-12-08 | End: 2022-12-09 | Stop reason: HOSPADM

## 2022-12-08 RX ORDER — SODIUM CHLORIDE 9 MG/ML
INJECTION, SOLUTION INTRAVENOUS
Status: DISCONTINUED | OUTPATIENT
Start: 2022-12-08 | End: 2022-12-09 | Stop reason: HOSPADM

## 2022-12-08 RX ORDER — LIDOCAINE HYDROCHLORIDE 10 MG/ML
INJECTION INFILTRATION; PERINEURAL
Status: DISCONTINUED | OUTPATIENT
Start: 2022-12-08 | End: 2022-12-09 | Stop reason: HOSPADM

## 2022-12-08 RX ADMIN — SODIUM CHLORIDE 250 ML/HR: 900 INJECTION, SOLUTION INTRAVENOUS at 09:12

## 2022-12-08 RX ADMIN — FENTANYL CITRATE 50 MCG: 50 INJECTION INTRAMUSCULAR; INTRAVENOUS at 10:12

## 2022-12-08 RX ADMIN — MIDAZOLAM HYDROCHLORIDE 2 MG: 1 INJECTION, SOLUTION INTRAMUSCULAR; INTRAVENOUS at 10:12

## 2022-12-08 RX ADMIN — LIDOCAINE HYDROCHLORIDE 2 ML: 10 INJECTION, SOLUTION INFILTRATION; PERINEURAL at 10:12

## 2022-12-08 NOTE — INTERVAL H&P NOTE
The patient has been examined and the H&P has been reviewed:    I concur with the findings and changes have been noted since the H&P was written: Patient is at baseline.        There are no hospital problems to display for this patient.

## 2022-12-08 NOTE — PLAN OF CARE
Pt AAOX3. Vital signs stable, right lower back dressing clean, dry and intact. Pt ready for discharge

## 2022-12-12 ENCOUNTER — TELEPHONE (OUTPATIENT)
Dept: HEMATOLOGY/ONCOLOGY | Facility: CLINIC | Age: 82
End: 2022-12-12

## 2022-12-12 ENCOUNTER — PATIENT MESSAGE (OUTPATIENT)
Dept: HEMATOLOGY/ONCOLOGY | Facility: CLINIC | Age: 82
End: 2022-12-12

## 2022-12-12 DIAGNOSIS — C91.10 CLL (CHRONIC LYMPHOCYTIC LEUKEMIA): ICD-10-CM

## 2022-12-12 NOTE — TELEPHONE ENCOUNTER
Spoke with Peri who reports that patient has not received his Venclexta yet. Prescription pended to [facility]. Peri also reports that patient's lymph nodes have increased in size and he has been gagging at times. Dr Bai made aware of above. No new orders at this time.

## 2022-12-12 NOTE — TELEPHONE ENCOUNTER
Patient reports wanting to start their venclexta as they do not want to do the trial recommended by Dr Don. Prescription to be sent to [id number] as [facility] pharmacy is unable to dose this medication. Patient also reports that he has been having increasingly larger lymph nodes and some swallowing difficulty. Dr Bai made aware. No new orders at this time.

## 2022-12-13 ENCOUNTER — TELEPHONE (OUTPATIENT)
Dept: HEMATOLOGY/ONCOLOGY | Facility: CLINIC | Age: 82
End: 2022-12-13

## 2022-12-13 ENCOUNTER — LAB VISIT (OUTPATIENT)
Dept: LAB | Facility: HOSPITAL | Age: 82
End: 2022-12-13
Attending: NURSE PRACTITIONER
Payer: MEDICARE

## 2022-12-13 DIAGNOSIS — C91.10 CLL (CHRONIC LYMPHOCYTIC LEUKEMIA): ICD-10-CM

## 2022-12-13 LAB
BASOPHILS # BLD AUTO: 0.01 K/UL (ref 0–0.2)
BASOPHILS NFR BLD: 0.2 % (ref 0–1.9)
DIFFERENTIAL METHOD: ABNORMAL
EOSINOPHIL # BLD AUTO: 0 K/UL (ref 0–0.5)
EOSINOPHIL NFR BLD: 0.9 % (ref 0–8)
ERYTHROCYTE [DISTWIDTH] IN BLOOD BY AUTOMATED COUNT: 15.1 % (ref 11.5–14.5)
HCT VFR BLD AUTO: 33.5 % (ref 40–54)
HGB BLD-MCNC: 11.8 G/DL (ref 14–18)
IMM GRANULOCYTES # BLD AUTO: 0.11 K/UL (ref 0–0.04)
IMM GRANULOCYTES NFR BLD AUTO: 2.5 % (ref 0–0.5)
LYMPHOCYTES # BLD AUTO: 2.4 K/UL (ref 1–4.8)
LYMPHOCYTES NFR BLD: 54.3 % (ref 18–48)
MCH RBC QN AUTO: 31.1 PG (ref 27–31)
MCHC RBC AUTO-ENTMCNC: 35.2 G/DL (ref 32–36)
MCV RBC AUTO: 88 FL (ref 82–98)
MONOCYTES # BLD AUTO: 0.9 K/UL (ref 0.3–1)
MONOCYTES NFR BLD: 21.1 % (ref 4–15)
NEUTROPHILS # BLD AUTO: 0.9 K/UL (ref 1.8–7.7)
NEUTROPHILS NFR BLD: 21 % (ref 38–73)
NRBC BLD-RTO: 0 /100 WBC
PLATELET # BLD AUTO: 45 K/UL (ref 150–450)
PLATELET BLD QL SMEAR: ABNORMAL
PMV BLD AUTO: 10.4 FL (ref 9.2–12.9)
RBC # BLD AUTO: 3.8 M/UL (ref 4.6–6.2)
WBC # BLD AUTO: 4.35 K/UL (ref 3.9–12.7)

## 2022-12-13 PROCEDURE — 85025 COMPLETE CBC W/AUTO DIFF WBC: CPT | Performed by: NURSE PRACTITIONER

## 2022-12-13 PROCEDURE — 36415 COLL VENOUS BLD VENIPUNCTURE: CPT | Performed by: NURSE PRACTITIONER

## 2022-12-13 NOTE — TELEPHONE ENCOUNTER
Plt 45 reported by Nila at  lab. Marion made aware and per her verbal orders, lab results faxed to Dr. Don for review.

## 2022-12-15 ENCOUNTER — HOSPITAL ENCOUNTER (OUTPATIENT)
Dept: RADIOLOGY | Facility: HOSPITAL | Age: 82
Discharge: HOME | End: 2022-12-15
Attending: FAMILY MEDICINE
Payer: MEDICARE

## 2022-12-15 ENCOUNTER — TELEPHONE (OUTPATIENT)
Dept: HEMATOLOGY/ONCOLOGY | Facility: CLINIC | Age: 82
End: 2022-12-15

## 2022-12-15 DIAGNOSIS — R09.02 OXYGEN DECREASE: Primary | ICD-10-CM

## 2022-12-15 DIAGNOSIS — R09.02 OXYGEN DECREASE: ICD-10-CM

## 2022-12-15 PROCEDURE — 71046 X-RAY EXAM CHEST 2 VIEWS: CPT | Mod: TC,PO

## 2022-12-15 NOTE — TELEPHONE ENCOUNTER
Glenis from [facility] called to clarify venetoclax script, per Marion, I informed that patient will no longer need this script as he will be going to [facility] for trial. Verbalized understanding.

## 2022-12-21 NOTE — TELEPHONE ENCOUNTER
----- Message from Marion Rizo NP sent at 12/13/2022 12:39 PM CST -----  Please email to maria m   ----- Message -----  From: Interface, Lab In [facility]  Sent: 12/11/2022  11:42 PM CST  To: Marion Rizo NP

## 2023-01-01 ENCOUNTER — HOSPITAL ENCOUNTER (INPATIENT)
Facility: HOSPITAL | Age: 83
LOS: 1 days | Discharge: HOME | DRG: 194 | End: 2023-01-20
Attending: STUDENT IN AN ORGANIZED HEALTH CARE EDUCATION/TRAINING PROGRAM | Admitting: INTERNAL MEDICINE
Payer: MEDICARE

## 2023-01-01 ENCOUNTER — INFUSION (OUTPATIENT)
Dept: INFUSION THERAPY | Facility: HOSPITAL | Age: 83
End: 2023-01-01
Attending: INTERNAL MEDICINE
Payer: MEDICARE

## 2023-01-01 ENCOUNTER — TELEPHONE (OUTPATIENT)
Dept: HEMATOLOGY/ONCOLOGY | Facility: CLINIC | Age: 83
End: 2023-01-01

## 2023-01-01 ENCOUNTER — LAB VISIT (OUTPATIENT)
Dept: LAB | Facility: HOSPITAL | Age: 83
End: 2023-01-01
Attending: INTERNAL MEDICINE
Payer: MEDICARE

## 2023-01-01 ENCOUNTER — LAB VISIT (OUTPATIENT)
Dept: LAB | Facility: HOSPITAL | Age: 83
End: 2023-01-01
Attending: NURSE PRACTITIONER
Payer: MEDICARE

## 2023-01-01 ENCOUNTER — SPECIALTY PHARMACY (OUTPATIENT)
Dept: PHARMACY | Facility: CLINIC | Age: 83
End: 2023-01-01
Payer: MEDICARE

## 2023-01-01 ENCOUNTER — OFFICE VISIT (OUTPATIENT)
Dept: HEMATOLOGY/ONCOLOGY | Facility: CLINIC | Age: 83
End: 2023-01-01
Payer: MEDICARE

## 2023-01-01 ENCOUNTER — PATIENT MESSAGE (OUTPATIENT)
Dept: HEMATOLOGY/ONCOLOGY | Facility: CLINIC | Age: 83
End: 2023-01-01

## 2023-01-01 ENCOUNTER — HOSPITAL ENCOUNTER (OUTPATIENT)
Facility: HOSPITAL | Age: 83
Discharge: HOME | End: 2023-07-06
Attending: INTERNAL MEDICINE | Admitting: INTERNAL MEDICINE
Payer: MEDICARE

## 2023-01-01 ENCOUNTER — ANESTHESIA EVENT (OUTPATIENT)
Dept: SURGERY | Facility: HOSPITAL | Age: 83
End: 2023-01-01
Payer: MEDICARE

## 2023-01-01 ENCOUNTER — HOSPITAL ENCOUNTER (OUTPATIENT)
Dept: RADIOLOGY | Facility: HOSPITAL | Age: 83
Discharge: HOME | End: 2023-02-20
Attending: NURSE PRACTITIONER
Payer: MEDICARE

## 2023-01-01 ENCOUNTER — ANESTHESIA (OUTPATIENT)
Dept: SURGERY | Facility: HOSPITAL | Age: 83
DRG: 808 | End: 2023-01-01
Payer: MEDICARE

## 2023-01-01 ENCOUNTER — HOSPITAL ENCOUNTER (OUTPATIENT)
Dept: RADIOLOGY | Facility: HOSPITAL | Age: 83
Discharge: HOME | End: 2023-04-28
Attending: STUDENT IN AN ORGANIZED HEALTH CARE EDUCATION/TRAINING PROGRAM
Payer: MEDICARE

## 2023-01-01 ENCOUNTER — PATIENT MESSAGE (OUTPATIENT)
Dept: PHARMACY | Facility: CLINIC | Age: 83
End: 2023-01-01
Payer: MEDICARE

## 2023-01-01 ENCOUNTER — CLINICAL SUPPORT (OUTPATIENT)
Dept: CARDIOLOGY | Facility: HOSPITAL | Age: 83
DRG: 808 | End: 2023-01-01
Attending: INTERNAL MEDICINE
Payer: MEDICARE

## 2023-01-01 ENCOUNTER — DOCUMENTATION ONLY (OUTPATIENT)
Dept: HEMATOLOGY/ONCOLOGY | Facility: CLINIC | Age: 83
End: 2023-01-01

## 2023-01-01 ENCOUNTER — HOSPITAL ENCOUNTER (INPATIENT)
Facility: HOSPITAL | Age: 83
LOS: 2 days | Discharge: HOME | DRG: 864 | End: 2023-04-20
Attending: EMERGENCY MEDICINE | Admitting: HOSPITALIST
Payer: MEDICARE

## 2023-01-01 ENCOUNTER — HOSPITAL ENCOUNTER (INPATIENT)
Facility: HOSPITAL | Age: 83
LOS: 3 days | Discharge: HOME | DRG: 809 | End: 2023-11-12
Attending: EMERGENCY MEDICINE | Admitting: HOSPITALIST
Payer: MEDICARE

## 2023-01-01 ENCOUNTER — PATIENT MESSAGE (OUTPATIENT)
Dept: RESEARCH | Facility: HOSPITAL | Age: 83
End: 2023-01-01
Payer: MEDICARE

## 2023-01-01 ENCOUNTER — TELEPHONE (OUTPATIENT)
Dept: SURGERY | Facility: CLINIC | Age: 83
End: 2023-01-01
Payer: MEDICARE

## 2023-01-01 ENCOUNTER — DOCUMENTATION ONLY (OUTPATIENT)
Dept: INFUSION THERAPY | Facility: HOSPITAL | Age: 83
End: 2023-01-01

## 2023-01-01 ENCOUNTER — INFUSION (OUTPATIENT)
Dept: INFUSION THERAPY | Facility: HOSPITAL | Age: 83
DRG: 308 | End: 2023-01-01
Attending: INTERNAL MEDICINE
Payer: MEDICARE

## 2023-01-01 ENCOUNTER — TELEPHONE (OUTPATIENT)
Dept: INFUSION THERAPY | Facility: HOSPITAL | Age: 83
End: 2023-01-01

## 2023-01-01 ENCOUNTER — SOCIAL WORK (OUTPATIENT)
Dept: HEMATOLOGY/ONCOLOGY | Facility: CLINIC | Age: 83
End: 2023-01-01

## 2023-01-01 ENCOUNTER — HOSPITAL ENCOUNTER (OUTPATIENT)
Dept: RADIOLOGY | Facility: HOSPITAL | Age: 83
Discharge: HOME | End: 2023-09-13
Attending: INTERNAL MEDICINE
Payer: MEDICARE

## 2023-01-01 ENCOUNTER — OFFICE VISIT (OUTPATIENT)
Dept: PULMONOLOGY | Facility: CLINIC | Age: 83
End: 2023-01-01
Payer: MEDICARE

## 2023-01-01 ENCOUNTER — HOSPITAL ENCOUNTER (OUTPATIENT)
Dept: RADIOLOGY | Facility: HOSPITAL | Age: 83
Discharge: HOME | End: 2023-03-29
Attending: NURSE PRACTITIONER
Payer: MEDICARE

## 2023-01-01 ENCOUNTER — TELEPHONE (OUTPATIENT)
Dept: HEMATOLOGY/ONCOLOGY | Facility: CLINIC | Age: 83
End: 2023-01-01
Payer: MEDICARE

## 2023-01-01 ENCOUNTER — HOSPITAL ENCOUNTER (INPATIENT)
Facility: HOSPITAL | Age: 83
LOS: 1 days | Discharge: HOME | DRG: 308 | End: 2023-02-03
Attending: EMERGENCY MEDICINE | Admitting: INTERNAL MEDICINE
Payer: MEDICARE

## 2023-01-01 ENCOUNTER — PATIENT MESSAGE (OUTPATIENT)
Dept: INFECTIOUS DISEASES | Facility: CLINIC | Age: 83
End: 2023-01-01

## 2023-01-01 ENCOUNTER — PATIENT OUTREACH (OUTPATIENT)
Dept: ADMINISTRATIVE | Facility: CLINIC | Age: 83
End: 2023-01-01
Payer: MEDICARE

## 2023-01-01 ENCOUNTER — HOSPITAL ENCOUNTER (INPATIENT)
Facility: HOSPITAL | Age: 83
LOS: 5 days | Discharge: HOME HEALTH | DRG: 808 | End: 2023-11-25
Attending: EMERGENCY MEDICINE | Admitting: STUDENT IN AN ORGANIZED HEALTH CARE EDUCATION/TRAINING PROGRAM
Payer: MEDICARE

## 2023-01-01 ENCOUNTER — ANESTHESIA (OUTPATIENT)
Dept: SURGERY | Facility: HOSPITAL | Age: 83
End: 2023-01-01
Payer: MEDICARE

## 2023-01-01 ENCOUNTER — ANESTHESIA EVENT (OUTPATIENT)
Dept: SURGERY | Facility: HOSPITAL | Age: 83
DRG: 808 | End: 2023-01-01
Payer: MEDICARE

## 2023-01-01 ENCOUNTER — HOSPITAL ENCOUNTER (INPATIENT)
Facility: HOSPITAL | Age: 83
LOS: 1 days | DRG: 951 | End: 2023-12-17
Attending: EMERGENCY MEDICINE | Admitting: INTERNAL MEDICINE
Payer: MEDICARE

## 2023-01-01 VITALS
BODY MASS INDEX: 23.19 KG/M2 | WEIGHT: 180.69 LBS | DIASTOLIC BLOOD PRESSURE: 64 MMHG | BODY MASS INDEX: 22.6 KG/M2 | HEART RATE: 75 BPM | HEIGHT: 74 IN | RESPIRATION RATE: 18 BRPM | TEMPERATURE: 98 F | SYSTOLIC BLOOD PRESSURE: 144 MMHG | HEIGHT: 74 IN | SYSTOLIC BLOOD PRESSURE: 128 MMHG | DIASTOLIC BLOOD PRESSURE: 83 MMHG | WEIGHT: 176.13 LBS | OXYGEN SATURATION: 96 % | TEMPERATURE: 97 F | RESPIRATION RATE: 20 BRPM | OXYGEN SATURATION: 96 % | HEART RATE: 70 BPM

## 2023-01-01 VITALS
HEART RATE: 59 BPM | TEMPERATURE: 98 F | SYSTOLIC BLOOD PRESSURE: 133 MMHG | RESPIRATION RATE: 18 BRPM | WEIGHT: 187.19 LBS | HEIGHT: 74 IN | DIASTOLIC BLOOD PRESSURE: 55 MMHG | BODY MASS INDEX: 24.02 KG/M2

## 2023-01-01 VITALS
HEIGHT: 74 IN | BODY MASS INDEX: 22.91 KG/M2 | HEART RATE: 82 BPM | DIASTOLIC BLOOD PRESSURE: 66 MMHG | OXYGEN SATURATION: 98 % | TEMPERATURE: 98 F | SYSTOLIC BLOOD PRESSURE: 114 MMHG | RESPIRATION RATE: 18 BRPM | WEIGHT: 178.5 LBS

## 2023-01-01 VITALS
RESPIRATION RATE: 25 BRPM | OXYGEN SATURATION: 95 % | HEIGHT: 74 IN | OXYGEN SATURATION: 96 % | TEMPERATURE: 97 F | HEART RATE: 63 BPM | BODY MASS INDEX: 23.74 KG/M2 | WEIGHT: 185 LBS | DIASTOLIC BLOOD PRESSURE: 47 MMHG | RESPIRATION RATE: 18 BRPM | HEART RATE: 77 BPM | DIASTOLIC BLOOD PRESSURE: 66 MMHG | WEIGHT: 179 LBS | HEIGHT: 74 IN | TEMPERATURE: 98 F | SYSTOLIC BLOOD PRESSURE: 136 MMHG | BODY MASS INDEX: 22.97 KG/M2 | SYSTOLIC BLOOD PRESSURE: 116 MMHG

## 2023-01-01 VITALS
HEART RATE: 82 BPM | HEIGHT: 74 IN | TEMPERATURE: 98 F | DIASTOLIC BLOOD PRESSURE: 61 MMHG | SYSTOLIC BLOOD PRESSURE: 126 MMHG | RESPIRATION RATE: 16 BRPM | BODY MASS INDEX: 24.39 KG/M2 | WEIGHT: 190.06 LBS | OXYGEN SATURATION: 95 %

## 2023-01-01 VITALS
DIASTOLIC BLOOD PRESSURE: 78 MMHG | HEART RATE: 61 BPM | WEIGHT: 186.69 LBS | TEMPERATURE: 98 F | RESPIRATION RATE: 18 BRPM | OXYGEN SATURATION: 97 % | BODY MASS INDEX: 23.96 KG/M2 | HEIGHT: 74 IN | SYSTOLIC BLOOD PRESSURE: 190 MMHG

## 2023-01-01 VITALS
RESPIRATION RATE: 18 BRPM | TEMPERATURE: 98 F | HEART RATE: 70 BPM | OXYGEN SATURATION: 96 % | WEIGHT: 180.69 LBS | HEART RATE: 75 BPM | DIASTOLIC BLOOD PRESSURE: 65 MMHG | RESPIRATION RATE: 17 BRPM | WEIGHT: 176.13 LBS | DIASTOLIC BLOOD PRESSURE: 62 MMHG | SYSTOLIC BLOOD PRESSURE: 151 MMHG | BODY MASS INDEX: 23.19 KG/M2 | HEIGHT: 74 IN | OXYGEN SATURATION: 96 % | TEMPERATURE: 98 F | BODY MASS INDEX: 22.6 KG/M2 | HEIGHT: 74 IN | SYSTOLIC BLOOD PRESSURE: 114 MMHG

## 2023-01-01 VITALS
TEMPERATURE: 98 F | WEIGHT: 188.69 LBS | OXYGEN SATURATION: 95 % | HEART RATE: 67 BPM | RESPIRATION RATE: 18 BRPM | DIASTOLIC BLOOD PRESSURE: 65 MMHG | SYSTOLIC BLOOD PRESSURE: 176 MMHG | OXYGEN SATURATION: 97 % | DIASTOLIC BLOOD PRESSURE: 78 MMHG | RESPIRATION RATE: 12 BRPM | HEART RATE: 61 BPM | BODY MASS INDEX: 24.22 KG/M2 | TEMPERATURE: 99 F | SYSTOLIC BLOOD PRESSURE: 157 MMHG | HEIGHT: 74 IN

## 2023-01-01 VITALS
DIASTOLIC BLOOD PRESSURE: 67 MMHG | SYSTOLIC BLOOD PRESSURE: 130 MMHG | BODY MASS INDEX: 25.22 KG/M2 | TEMPERATURE: 98 F | HEART RATE: 80 BPM | WEIGHT: 196.5 LBS | HEIGHT: 74 IN | RESPIRATION RATE: 18 BRPM

## 2023-01-01 VITALS
DIASTOLIC BLOOD PRESSURE: 62 MMHG | WEIGHT: 189.19 LBS | OXYGEN SATURATION: 95 % | SYSTOLIC BLOOD PRESSURE: 116 MMHG | BODY MASS INDEX: 24.29 KG/M2 | TEMPERATURE: 98 F | HEART RATE: 76 BPM

## 2023-01-01 VITALS
WEIGHT: 188 LBS | OXYGEN SATURATION: 97 % | BODY MASS INDEX: 24.13 KG/M2 | SYSTOLIC BLOOD PRESSURE: 147 MMHG | TEMPERATURE: 98 F | HEIGHT: 74 IN | DIASTOLIC BLOOD PRESSURE: 67 MMHG | HEART RATE: 65 BPM | RESPIRATION RATE: 18 BRPM

## 2023-01-01 VITALS
DIASTOLIC BLOOD PRESSURE: 93 MMHG | RESPIRATION RATE: 18 BRPM | HEART RATE: 79 BPM | TEMPERATURE: 98 F | OXYGEN SATURATION: 96 % | WEIGHT: 195.38 LBS | BODY MASS INDEX: 25.07 KG/M2 | HEIGHT: 74 IN | SYSTOLIC BLOOD PRESSURE: 155 MMHG

## 2023-01-01 VITALS
SYSTOLIC BLOOD PRESSURE: 122 MMHG | RESPIRATION RATE: 18 BRPM | WEIGHT: 196.81 LBS | DIASTOLIC BLOOD PRESSURE: 58 MMHG | BODY MASS INDEX: 25.26 KG/M2 | TEMPERATURE: 98 F | HEIGHT: 74 IN | HEART RATE: 63 BPM

## 2023-01-01 VITALS
BODY MASS INDEX: 24.9 KG/M2 | HEART RATE: 71 BPM | RESPIRATION RATE: 20 BRPM | DIASTOLIC BLOOD PRESSURE: 56 MMHG | WEIGHT: 194 LBS | TEMPERATURE: 98 F | SYSTOLIC BLOOD PRESSURE: 111 MMHG | HEIGHT: 74 IN

## 2023-01-01 VITALS
SYSTOLIC BLOOD PRESSURE: 139 MMHG | OXYGEN SATURATION: 95 % | WEIGHT: 190.63 LBS | RESPIRATION RATE: 20 BRPM | HEART RATE: 69 BPM | HEIGHT: 74 IN | TEMPERATURE: 97 F | DIASTOLIC BLOOD PRESSURE: 63 MMHG | BODY MASS INDEX: 24.47 KG/M2

## 2023-01-01 VITALS
DIASTOLIC BLOOD PRESSURE: 60 MMHG | BODY MASS INDEX: 24.45 KG/M2 | SYSTOLIC BLOOD PRESSURE: 128 MMHG | HEIGHT: 74 IN | HEART RATE: 65 BPM | WEIGHT: 190.5 LBS | RESPIRATION RATE: 18 BRPM | TEMPERATURE: 98 F

## 2023-01-01 VITALS
OXYGEN SATURATION: 95 % | BODY MASS INDEX: 23.8 KG/M2 | DIASTOLIC BLOOD PRESSURE: 61 MMHG | WEIGHT: 185.44 LBS | HEART RATE: 115 BPM | HEIGHT: 74 IN | RESPIRATION RATE: 16 BRPM | SYSTOLIC BLOOD PRESSURE: 114 MMHG | TEMPERATURE: 99 F

## 2023-01-01 VITALS
DIASTOLIC BLOOD PRESSURE: 59 MMHG | TEMPERATURE: 97 F | HEART RATE: 77 BPM | WEIGHT: 182.13 LBS | HEIGHT: 74 IN | SYSTOLIC BLOOD PRESSURE: 128 MMHG | RESPIRATION RATE: 18 BRPM | BODY MASS INDEX: 23.37 KG/M2

## 2023-01-01 VITALS
TEMPERATURE: 98 F | HEIGHT: 74 IN | OXYGEN SATURATION: 98 % | WEIGHT: 186.5 LBS | SYSTOLIC BLOOD PRESSURE: 143 MMHG | RESPIRATION RATE: 17 BRPM | BODY MASS INDEX: 23.93 KG/M2 | HEART RATE: 75 BPM | DIASTOLIC BLOOD PRESSURE: 69 MMHG

## 2023-01-01 VITALS
DIASTOLIC BLOOD PRESSURE: 41 MMHG | TEMPERATURE: 98 F | RESPIRATION RATE: 18 BRPM | OXYGEN SATURATION: 95 % | SYSTOLIC BLOOD PRESSURE: 96 MMHG | BODY MASS INDEX: 22.97 KG/M2 | HEIGHT: 74 IN | WEIGHT: 179 LBS | HEART RATE: 81 BPM

## 2023-01-01 VITALS
SYSTOLIC BLOOD PRESSURE: 120 MMHG | DIASTOLIC BLOOD PRESSURE: 58 MMHG | OXYGEN SATURATION: 96 % | TEMPERATURE: 98 F | WEIGHT: 185 LBS | HEIGHT: 74 IN | RESPIRATION RATE: 16 BRPM | BODY MASS INDEX: 23.74 KG/M2 | HEART RATE: 72 BPM

## 2023-01-01 VITALS
HEART RATE: 85 BPM | BODY MASS INDEX: 24.27 KG/M2 | DIASTOLIC BLOOD PRESSURE: 55 MMHG | WEIGHT: 189 LBS | TEMPERATURE: 97 F | SYSTOLIC BLOOD PRESSURE: 146 MMHG

## 2023-01-01 VITALS
SYSTOLIC BLOOD PRESSURE: 165 MMHG | HEART RATE: 61 BPM | BODY MASS INDEX: 24.11 KG/M2 | WEIGHT: 187.88 LBS | TEMPERATURE: 98 F | HEIGHT: 74 IN | RESPIRATION RATE: 18 BRPM | DIASTOLIC BLOOD PRESSURE: 66 MMHG

## 2023-01-01 VITALS
SYSTOLIC BLOOD PRESSURE: 137 MMHG | OXYGEN SATURATION: 98 % | HEART RATE: 72 BPM | DIASTOLIC BLOOD PRESSURE: 69 MMHG | TEMPERATURE: 98 F | RESPIRATION RATE: 18 BRPM

## 2023-01-01 VITALS
OXYGEN SATURATION: 95 % | RESPIRATION RATE: 18 BRPM | WEIGHT: 187.38 LBS | BODY MASS INDEX: 24.05 KG/M2 | SYSTOLIC BLOOD PRESSURE: 145 MMHG | TEMPERATURE: 98 F | HEIGHT: 74 IN | DIASTOLIC BLOOD PRESSURE: 88 MMHG | HEART RATE: 74 BPM

## 2023-01-01 VITALS
OXYGEN SATURATION: 96 % | SYSTOLIC BLOOD PRESSURE: 161 MMHG | TEMPERATURE: 98 F | BODY MASS INDEX: 24.39 KG/M2 | DIASTOLIC BLOOD PRESSURE: 69 MMHG | RESPIRATION RATE: 17 BRPM | WEIGHT: 190 LBS | HEART RATE: 71 BPM

## 2023-01-01 VITALS
HEART RATE: 83 BPM | BODY MASS INDEX: 26.37 KG/M2 | WEIGHT: 202.81 LBS | RESPIRATION RATE: 34 BRPM | OXYGEN SATURATION: 100 % | HEIGHT: 74 IN | WEIGHT: 205.5 LBS | HEIGHT: 74 IN | TEMPERATURE: 99 F | BODY MASS INDEX: 26.03 KG/M2 | SYSTOLIC BLOOD PRESSURE: 145 MMHG | DIASTOLIC BLOOD PRESSURE: 65 MMHG

## 2023-01-01 VITALS
HEIGHT: 74 IN | SYSTOLIC BLOOD PRESSURE: 103 MMHG | TEMPERATURE: 98 F | WEIGHT: 201.88 LBS | BODY MASS INDEX: 25.91 KG/M2 | HEART RATE: 80 BPM | RESPIRATION RATE: 16 BRPM | DIASTOLIC BLOOD PRESSURE: 50 MMHG

## 2023-01-01 VITALS
DIASTOLIC BLOOD PRESSURE: 69 MMHG | BODY MASS INDEX: 23.89 KG/M2 | HEART RATE: 74 BPM | WEIGHT: 186.19 LBS | TEMPERATURE: 98 F | RESPIRATION RATE: 17 BRPM | OXYGEN SATURATION: 95 % | HEIGHT: 74 IN | SYSTOLIC BLOOD PRESSURE: 146 MMHG

## 2023-01-01 VITALS
TEMPERATURE: 98 F | HEIGHT: 74 IN | SYSTOLIC BLOOD PRESSURE: 188 MMHG | DIASTOLIC BLOOD PRESSURE: 74 MMHG | HEART RATE: 73 BPM | WEIGHT: 187.69 LBS | BODY MASS INDEX: 24.09 KG/M2 | RESPIRATION RATE: 20 BRPM

## 2023-01-01 VITALS
BODY MASS INDEX: 25.78 KG/M2 | WEIGHT: 200 LBS | HEART RATE: 66 BPM | SYSTOLIC BLOOD PRESSURE: 143 MMHG | HEIGHT: 74 IN | HEART RATE: 87 BPM | WEIGHT: 201.31 LBS | HEIGHT: 74 IN | RESPIRATION RATE: 18 BRPM | SYSTOLIC BLOOD PRESSURE: 148 MMHG | SYSTOLIC BLOOD PRESSURE: 148 MMHG | TEMPERATURE: 98 F | OXYGEN SATURATION: 95 % | BODY MASS INDEX: 25.67 KG/M2 | HEIGHT: 74 IN | TEMPERATURE: 98 F | WEIGHT: 200.88 LBS | DIASTOLIC BLOOD PRESSURE: 85 MMHG | OXYGEN SATURATION: 94 % | DIASTOLIC BLOOD PRESSURE: 55 MMHG | RESPIRATION RATE: 16 BRPM | BODY MASS INDEX: 25.83 KG/M2 | DIASTOLIC BLOOD PRESSURE: 46 MMHG | HEART RATE: 80 BPM | TEMPERATURE: 98 F | RESPIRATION RATE: 17 BRPM

## 2023-01-01 VITALS
HEART RATE: 39 BPM | RESPIRATION RATE: 18 BRPM | SYSTOLIC BLOOD PRESSURE: 133 MMHG | DIASTOLIC BLOOD PRESSURE: 66 MMHG | HEIGHT: 74 IN | OXYGEN SATURATION: 98 % | WEIGHT: 196.13 LBS | TEMPERATURE: 97 F | BODY MASS INDEX: 25.17 KG/M2

## 2023-01-01 VITALS
HEART RATE: 87 BPM | HEIGHT: 74 IN | BODY MASS INDEX: 24.17 KG/M2 | TEMPERATURE: 99 F | OXYGEN SATURATION: 93 % | WEIGHT: 188.31 LBS | RESPIRATION RATE: 18 BRPM | DIASTOLIC BLOOD PRESSURE: 49 MMHG | SYSTOLIC BLOOD PRESSURE: 116 MMHG

## 2023-01-01 VITALS
WEIGHT: 186.88 LBS | DIASTOLIC BLOOD PRESSURE: 43 MMHG | TEMPERATURE: 98 F | RESPIRATION RATE: 16 BRPM | OXYGEN SATURATION: 94 % | HEIGHT: 74 IN | BODY MASS INDEX: 23.98 KG/M2 | SYSTOLIC BLOOD PRESSURE: 120 MMHG | HEART RATE: 79 BPM

## 2023-01-01 VITALS
DIASTOLIC BLOOD PRESSURE: 63 MMHG | TEMPERATURE: 98 F | HEIGHT: 74 IN | RESPIRATION RATE: 17 BRPM | OXYGEN SATURATION: 95 % | SYSTOLIC BLOOD PRESSURE: 139 MMHG | WEIGHT: 187.81 LBS | BODY MASS INDEX: 24.1 KG/M2 | HEART RATE: 78 BPM

## 2023-01-01 VITALS
WEIGHT: 196 LBS | RESPIRATION RATE: 18 BRPM | HEART RATE: 63 BPM | BODY MASS INDEX: 25.16 KG/M2 | SYSTOLIC BLOOD PRESSURE: 122 MMHG | DIASTOLIC BLOOD PRESSURE: 58 MMHG | TEMPERATURE: 98 F

## 2023-01-01 VITALS
BODY MASS INDEX: 23.37 KG/M2 | HEIGHT: 74 IN | OXYGEN SATURATION: 97 % | DIASTOLIC BLOOD PRESSURE: 77 MMHG | HEART RATE: 72 BPM | RESPIRATION RATE: 18 BRPM | TEMPERATURE: 98 F | SYSTOLIC BLOOD PRESSURE: 178 MMHG | WEIGHT: 182.13 LBS

## 2023-01-01 VITALS
DIASTOLIC BLOOD PRESSURE: 52 MMHG | RESPIRATION RATE: 18 BRPM | BODY MASS INDEX: 24.28 KG/M2 | TEMPERATURE: 98 F | SYSTOLIC BLOOD PRESSURE: 131 MMHG | OXYGEN SATURATION: 94 % | WEIGHT: 189.13 LBS | HEART RATE: 78 BPM

## 2023-01-01 VITALS
BODY MASS INDEX: 23.77 KG/M2 | OXYGEN SATURATION: 97 % | SYSTOLIC BLOOD PRESSURE: 130 MMHG | DIASTOLIC BLOOD PRESSURE: 55 MMHG | WEIGHT: 185.19 LBS | TEMPERATURE: 98 F | HEIGHT: 74 IN | RESPIRATION RATE: 16 BRPM | HEART RATE: 79 BPM

## 2023-01-01 VITALS
WEIGHT: 185.88 LBS | TEMPERATURE: 98 F | DIASTOLIC BLOOD PRESSURE: 64 MMHG | RESPIRATION RATE: 16 BRPM | BODY MASS INDEX: 23.85 KG/M2 | OXYGEN SATURATION: 98 % | SYSTOLIC BLOOD PRESSURE: 157 MMHG | HEART RATE: 69 BPM | HEIGHT: 74 IN

## 2023-01-01 VITALS — HEIGHT: 74 IN | WEIGHT: 196 LBS | BODY MASS INDEX: 25.15 KG/M2

## 2023-01-01 VITALS — DIASTOLIC BLOOD PRESSURE: 64 MMHG | HEART RATE: 106 BPM | OXYGEN SATURATION: 95 % | SYSTOLIC BLOOD PRESSURE: 144 MMHG

## 2023-01-01 DIAGNOSIS — T45.1X5A CHEMOTHERAPY INDUCED NEUTROPENIA: Primary | ICD-10-CM

## 2023-01-01 DIAGNOSIS — D50.9 IRON DEFICIENCY ANEMIA, UNSPECIFIED IRON DEFICIENCY ANEMIA TYPE: ICD-10-CM

## 2023-01-01 DIAGNOSIS — Z51.5 END OF LIFE CARE: Primary | ICD-10-CM

## 2023-01-01 DIAGNOSIS — Z03.89 RULED OUT FOR MYOCARDIAL INFARCTION: ICD-10-CM

## 2023-01-01 DIAGNOSIS — D70.9 NEUTROPENIC FEVER: ICD-10-CM

## 2023-01-01 DIAGNOSIS — R50.81 NEUTROPENIC FEVER: ICD-10-CM

## 2023-01-01 DIAGNOSIS — R05.9 COUGH: ICD-10-CM

## 2023-01-01 DIAGNOSIS — E61.1 IRON DEFICIENCY: ICD-10-CM

## 2023-01-01 DIAGNOSIS — C91.10 CLL (CHRONIC LYMPHOCYTIC LEUKEMIA): ICD-10-CM

## 2023-01-01 DIAGNOSIS — C91.10 CLL (CHRONIC LYMPHOCYTIC LEUKEMIA): Primary | ICD-10-CM

## 2023-01-01 DIAGNOSIS — R59.0 LYMPHADENOPATHY, CERVICAL: ICD-10-CM

## 2023-01-01 DIAGNOSIS — D61.818 PANCYTOPENIA: ICD-10-CM

## 2023-01-01 DIAGNOSIS — Z87.09 HISTORY OF TRACHEAL STENOSIS: Primary | ICD-10-CM

## 2023-01-01 DIAGNOSIS — R53.1 WEAKNESS: ICD-10-CM

## 2023-01-01 DIAGNOSIS — R59.1 LYMPHADENOPATHY, GENERALIZED: ICD-10-CM

## 2023-01-01 DIAGNOSIS — Z85.118 HISTORY OF LUNG CANCER: ICD-10-CM

## 2023-01-01 DIAGNOSIS — D70.1 CHEMOTHERAPY INDUCED NEUTROPENIA: Primary | ICD-10-CM

## 2023-01-01 DIAGNOSIS — K13.79 MOUTH SORES: Primary | ICD-10-CM

## 2023-01-01 DIAGNOSIS — J44.9 CHRONIC OBSTRUCTIVE PULMONARY DISEASE, UNSPECIFIED COPD TYPE: ICD-10-CM

## 2023-01-01 DIAGNOSIS — C85.90 LYMPHOMA, UNSPECIFIED BODY REGION, UNSPECIFIED LYMPHOMA TYPE: ICD-10-CM

## 2023-01-01 DIAGNOSIS — D69.6 THROMBOCYTOPENIA: ICD-10-CM

## 2023-01-01 DIAGNOSIS — R50.9 FEVER, UNSPECIFIED FEVER CAUSE: ICD-10-CM

## 2023-01-01 DIAGNOSIS — C91.90 LEUKEMIA, LYMPHOCYTIC: ICD-10-CM

## 2023-01-01 DIAGNOSIS — D70.9 NEUTROPENIA, UNSPECIFIED TYPE: ICD-10-CM

## 2023-01-01 DIAGNOSIS — D69.6 THROMBOCYTOPENIA: Primary | ICD-10-CM

## 2023-01-01 DIAGNOSIS — D70.1 CHEMOTHERAPY INDUCED NEUTROPENIA: ICD-10-CM

## 2023-01-01 DIAGNOSIS — D64.9 ANEMIA, UNSPECIFIED: ICD-10-CM

## 2023-01-01 DIAGNOSIS — T45.1X5A CHEMOTHERAPY INDUCED NEUTROPENIA: ICD-10-CM

## 2023-01-01 DIAGNOSIS — E78.5 HYPERLIPEMIA: Primary | ICD-10-CM

## 2023-01-01 DIAGNOSIS — R07.9 CHEST PAIN: ICD-10-CM

## 2023-01-01 DIAGNOSIS — R05.9 COUGH, UNSPECIFIED TYPE: ICD-10-CM

## 2023-01-01 DIAGNOSIS — A41.9 SEPSIS: ICD-10-CM

## 2023-01-01 DIAGNOSIS — R05.2 SUBACUTE COUGH: ICD-10-CM

## 2023-01-01 DIAGNOSIS — R17 ELEVATED BILIRUBIN: ICD-10-CM

## 2023-01-01 DIAGNOSIS — R00.1 BRADYCARDIA: ICD-10-CM

## 2023-01-01 DIAGNOSIS — J40 BRONCHITIS: ICD-10-CM

## 2023-01-01 DIAGNOSIS — R06.02 SHORTNESS OF BREATH: ICD-10-CM

## 2023-01-01 DIAGNOSIS — D64.9 SYMPTOMATIC ANEMIA: Primary | ICD-10-CM

## 2023-01-01 DIAGNOSIS — R79.89 ELEVATED TROPONIN: ICD-10-CM

## 2023-01-01 DIAGNOSIS — I15.9 SECONDARY HYPERTENSION: Primary | ICD-10-CM

## 2023-01-01 DIAGNOSIS — R16.1 SPLENOMEGALY: ICD-10-CM

## 2023-01-01 DIAGNOSIS — I10 ESSENTIAL HYPERTENSION, MALIGNANT: ICD-10-CM

## 2023-01-01 DIAGNOSIS — R05.9 COUGH, UNSPECIFIED TYPE: Primary | ICD-10-CM

## 2023-01-01 DIAGNOSIS — R50.81 NEUTROPENIC FEVER: Primary | ICD-10-CM

## 2023-01-01 DIAGNOSIS — R50.9 FEVER: ICD-10-CM

## 2023-01-01 DIAGNOSIS — E11.42 DIABETIC POLYNEUROPATHY: ICD-10-CM

## 2023-01-01 DIAGNOSIS — K31.811 ANGIODYSPLASIA OF DUODENUM WITH HEMORRHAGE: ICD-10-CM

## 2023-01-01 DIAGNOSIS — J18.9 PNEUMONIA: ICD-10-CM

## 2023-01-01 DIAGNOSIS — C91.10 CLL (CHRONIC LYMPHOCYTIC LEUKEMIA): Chronic | ICD-10-CM

## 2023-01-01 DIAGNOSIS — J69.0 ASPIRATION PNEUMONIA OF RIGHT LOWER LOBE, UNSPECIFIED ASPIRATION PNEUMONIA TYPE: Primary | ICD-10-CM

## 2023-01-01 DIAGNOSIS — D53.9 NUTRITIONAL ANEMIA, UNSPECIFIED: ICD-10-CM

## 2023-01-01 DIAGNOSIS — D70.9 NEUTROPENIC FEVER: Primary | ICD-10-CM

## 2023-01-01 DIAGNOSIS — J15.8 PNEUMONIA DUE TO AEROBIC BACTERIA: ICD-10-CM

## 2023-01-01 DIAGNOSIS — B00.2 HERPES STOMATITIS: ICD-10-CM

## 2023-01-01 DIAGNOSIS — R13.10 DYSPHAGIA, UNSPECIFIED TYPE: ICD-10-CM

## 2023-01-01 DIAGNOSIS — R53.81 DEBILITY: ICD-10-CM

## 2023-01-01 DIAGNOSIS — K13.79 MOUTH SORES: ICD-10-CM

## 2023-01-01 DIAGNOSIS — R13.12 OROPHARYNGEAL DYSPHAGIA: ICD-10-CM

## 2023-01-01 DIAGNOSIS — R13.10 DYSPHAGIA: ICD-10-CM

## 2023-01-01 DIAGNOSIS — J18.9 COMMUNITY ACQUIRED PNEUMONIA, UNSPECIFIED LATERALITY: ICD-10-CM

## 2023-01-01 DIAGNOSIS — R05.2 SUBACUTE COUGH: Primary | ICD-10-CM

## 2023-01-01 DIAGNOSIS — J39.8 TRACHEAL STENOSIS: ICD-10-CM

## 2023-01-01 DIAGNOSIS — Z85.6 PERSONAL HISTORY OF CLL (CHRONIC LYMPHOCYTIC LEUKEMIA): ICD-10-CM

## 2023-01-01 DIAGNOSIS — D50.8 IRON DEFICIENCY ANEMIA DUE TO DIETARY CAUSES: Primary | ICD-10-CM

## 2023-01-01 DIAGNOSIS — R50.9 FEVER: Primary | ICD-10-CM

## 2023-01-01 DIAGNOSIS — J18.9 PNEUMONIA OF RIGHT LOWER LOBE DUE TO INFECTIOUS ORGANISM: ICD-10-CM

## 2023-01-01 LAB
ABO + RH BLD: NORMAL
ABO + RH BLD: NORMAL
ADENOVIRUS: NOT DETECTED
ALBUMIN SERPL BCP-MCNC: 2.8 G/DL (ref 3.5–5.2)
ALBUMIN SERPL BCP-MCNC: 2.9 G/DL (ref 3.5–5.2)
ALBUMIN SERPL BCP-MCNC: 3 G/DL (ref 3.5–5.2)
ALBUMIN SERPL BCP-MCNC: 3 G/DL (ref 3.5–5.2)
ALBUMIN SERPL BCP-MCNC: 3.1 G/DL (ref 3.5–5.2)
ALBUMIN SERPL BCP-MCNC: 3.2 G/DL (ref 3.5–5.2)
ALBUMIN SERPL BCP-MCNC: 3.3 G/DL (ref 3.5–5.2)
ALBUMIN SERPL BCP-MCNC: 3.3 G/DL (ref 3.5–5.2)
ALBUMIN SERPL BCP-MCNC: 3.4 G/DL (ref 3.5–5.2)
ALBUMIN SERPL BCP-MCNC: 3.5 G/DL (ref 3.5–5.2)
ALBUMIN SERPL BCP-MCNC: 3.6 G/DL (ref 3.5–5.2)
ALBUMIN SERPL BCP-MCNC: 3.7 G/DL (ref 3.5–5.2)
ALBUMIN SERPL BCP-MCNC: 3.8 G/DL (ref 3.5–5.2)
ALBUMIN SERPL BCP-MCNC: 3.9 G/DL (ref 3.5–5.2)
ALBUMIN SERPL BCP-MCNC: 4 G/DL (ref 3.5–5.2)
ALBUMIN SERPL BCP-MCNC: 4.1 G/DL (ref 3.5–5.2)
ALBUMIN SERPL BCP-MCNC: 4.2 G/DL (ref 3.5–5.2)
ALBUMIN SERPL BCP-MCNC: 4.3 G/DL (ref 3.5–5.2)
ALBUMIN SERPL BCP-MCNC: 4.5 G/DL (ref 3.5–5.2)
ALLENS TEST: ABNORMAL
ALP SERPL-CCNC: 103 U/L (ref 55–135)
ALP SERPL-CCNC: 26 U/L (ref 55–135)
ALP SERPL-CCNC: 28 U/L (ref 55–135)
ALP SERPL-CCNC: 29 U/L (ref 55–135)
ALP SERPL-CCNC: 30 U/L (ref 55–135)
ALP SERPL-CCNC: 32 U/L (ref 55–135)
ALP SERPL-CCNC: 36 U/L (ref 55–135)
ALP SERPL-CCNC: 36 U/L (ref 55–135)
ALP SERPL-CCNC: 39 U/L (ref 55–135)
ALP SERPL-CCNC: 40 U/L (ref 55–135)
ALP SERPL-CCNC: 40 U/L (ref 55–135)
ALP SERPL-CCNC: 41 U/L (ref 55–135)
ALP SERPL-CCNC: 42 U/L (ref 55–135)
ALP SERPL-CCNC: 42 U/L (ref 55–135)
ALP SERPL-CCNC: 43 U/L (ref 55–135)
ALP SERPL-CCNC: 44 U/L (ref 55–135)
ALP SERPL-CCNC: 45 U/L (ref 55–135)
ALP SERPL-CCNC: 46 U/L (ref 55–135)
ALP SERPL-CCNC: 46 U/L (ref 55–135)
ALP SERPL-CCNC: 47 U/L (ref 55–135)
ALP SERPL-CCNC: 47 U/L (ref 55–135)
ALP SERPL-CCNC: 48 U/L (ref 55–135)
ALP SERPL-CCNC: 49 U/L (ref 55–135)
ALP SERPL-CCNC: 50 U/L (ref 55–135)
ALP SERPL-CCNC: 51 U/L (ref 55–135)
ALP SERPL-CCNC: 51 U/L (ref 55–135)
ALP SERPL-CCNC: 52 U/L (ref 55–135)
ALP SERPL-CCNC: 52 U/L (ref 55–135)
ALP SERPL-CCNC: 53 U/L (ref 55–135)
ALP SERPL-CCNC: 53 U/L (ref 55–135)
ALP SERPL-CCNC: 54 U/L (ref 55–135)
ALP SERPL-CCNC: 55 U/L (ref 55–135)
ALP SERPL-CCNC: 56 U/L (ref 55–135)
ALP SERPL-CCNC: 56 U/L (ref 55–135)
ALP SERPL-CCNC: 58 U/L (ref 55–135)
ALP SERPL-CCNC: 58 U/L (ref 55–135)
ALP SERPL-CCNC: 61 U/L (ref 55–135)
ALP SERPL-CCNC: 69 U/L (ref 55–135)
ALP SERPL-CCNC: 74 U/L (ref 55–135)
ALP SERPL-CCNC: 78 U/L (ref 55–135)
ALP SERPL-CCNC: 90 U/L (ref 55–135)
ALT SERPL W/O P-5'-P-CCNC: 10 U/L (ref 10–44)
ALT SERPL W/O P-5'-P-CCNC: 11 U/L (ref 10–44)
ALT SERPL W/O P-5'-P-CCNC: 12 U/L (ref 10–44)
ALT SERPL W/O P-5'-P-CCNC: 13 U/L (ref 10–44)
ALT SERPL W/O P-5'-P-CCNC: 14 U/L (ref 10–44)
ALT SERPL W/O P-5'-P-CCNC: 15 U/L (ref 10–44)
ALT SERPL W/O P-5'-P-CCNC: 16 U/L (ref 10–44)
ALT SERPL W/O P-5'-P-CCNC: 17 U/L (ref 10–44)
ALT SERPL W/O P-5'-P-CCNC: 8 U/L (ref 10–44)
ANION GAP SERPL CALC-SCNC: 10 MMOL/L (ref 8–16)
ANION GAP SERPL CALC-SCNC: 13 MMOL/L (ref 8–16)
ANION GAP SERPL CALC-SCNC: 2 MMOL/L (ref 8–16)
ANION GAP SERPL CALC-SCNC: 3 MMOL/L (ref 8–16)
ANION GAP SERPL CALC-SCNC: 4 MMOL/L (ref 8–16)
ANION GAP SERPL CALC-SCNC: 5 MMOL/L (ref 8–16)
ANION GAP SERPL CALC-SCNC: 6 MMOL/L (ref 8–16)
ANION GAP SERPL CALC-SCNC: 7 MMOL/L (ref 8–16)
ANION GAP SERPL CALC-SCNC: 8 MMOL/L (ref 8–16)
ANION GAP SERPL CALC-SCNC: 9 MMOL/L (ref 8–16)
ANISOCYTOSIS BLD QL SMEAR: ABNORMAL
ANISOCYTOSIS BLD QL SMEAR: SLIGHT
AORTIC ROOT ANNULUS: 3.4 CM
AORTIC VALVE CUSP SEPERATION: 1.8 CM
APTT PPP: 26.3 SEC (ref 21–32)
APTT PPP: 27.2 SEC (ref 21–32)
ASCENDING AORTA: 4 CM
AST SERPL-CCNC: 11 U/L (ref 10–40)
AST SERPL-CCNC: 12 U/L (ref 10–40)
AST SERPL-CCNC: 12 U/L (ref 10–40)
AST SERPL-CCNC: 13 U/L (ref 10–40)
AST SERPL-CCNC: 14 U/L (ref 10–40)
AST SERPL-CCNC: 15 U/L (ref 10–40)
AST SERPL-CCNC: 16 U/L (ref 10–40)
AST SERPL-CCNC: 17 U/L (ref 10–40)
AST SERPL-CCNC: 18 U/L (ref 10–40)
AST SERPL-CCNC: 19 U/L (ref 10–40)
AST SERPL-CCNC: 21 U/L (ref 10–40)
AST SERPL-CCNC: 23 U/L (ref 10–40)
AV INDEX (PROSTH): 0.82
AV MEAN GRADIENT: 7 MMHG
AV PEAK GRADIENT: 14 MMHG
AV VALVE AREA BY VELOCITY RATIO: 2.77 CM²
AV VALVE AREA: 2.85 CM²
AV VELOCITY RATIO: 0.8
BACTERIA #/AREA URNS HPF: NORMAL /HPF
BACTERIA BLD CULT: NORMAL
BACTERIA UR CULT: NO GROWTH
BACTERIA UR CULT: NO GROWTH
BACTERIA UR CULT: NORMAL
BACTERIA UR CULT: NORMAL
BASOPHILS # BLD AUTO: 0 K/UL (ref 0–0.2)
BASOPHILS # BLD AUTO: 0.01 K/UL (ref 0–0.2)
BASOPHILS # BLD AUTO: 0.02 K/UL (ref 0–0.2)
BASOPHILS # BLD AUTO: 0.03 K/UL (ref 0–0.2)
BASOPHILS # BLD AUTO: 0.03 K/UL (ref 0–0.2)
BASOPHILS # BLD AUTO: 0.06 K/UL (ref 0–0.2)
BASOPHILS # BLD AUTO: ABNORMAL K/UL (ref 0–0.2)
BASOPHILS NFR BLD: 0 % (ref 0–1.9)
BASOPHILS NFR BLD: 0.1 % (ref 0–1.9)
BASOPHILS NFR BLD: 0.1 % (ref 0–1.9)
BASOPHILS NFR BLD: 0.2 % (ref 0–1.9)
BASOPHILS NFR BLD: 0.3 % (ref 0–1.9)
BASOPHILS NFR BLD: 0.4 % (ref 0–1.9)
BASOPHILS NFR BLD: 0.5 % (ref 0–1.9)
BASOPHILS NFR BLD: 0.9 % (ref 0–1.9)
BASOPHILS NFR BLD: 1 % (ref 0–1.9)
BASOPHILS NFR BLD: 1 % (ref 0–1.9)
BASOPHILS NFR BLD: 2 % (ref 0–1.9)
BASOPHILS NFR BLD: 3 % (ref 0–1.9)
BASOPHILS NFR BLD: 4 % (ref 0–1.9)
BASOPHILS NFR BLD: 4 % (ref 0–1.9)
BILIRUB SERPL-MCNC: 0.2 MG/DL (ref 0.1–1)
BILIRUB SERPL-MCNC: 0.4 MG/DL (ref 0.1–1)
BILIRUB SERPL-MCNC: 0.5 MG/DL (ref 0.1–1)
BILIRUB SERPL-MCNC: 0.6 MG/DL (ref 0.1–1)
BILIRUB SERPL-MCNC: 0.7 MG/DL (ref 0.1–1)
BILIRUB SERPL-MCNC: 0.8 MG/DL (ref 0.1–1)
BILIRUB SERPL-MCNC: 0.9 MG/DL (ref 0.1–1)
BILIRUB SERPL-MCNC: 1 MG/DL (ref 0.1–1)
BILIRUB SERPL-MCNC: 1.1 MG/DL (ref 0.1–1)
BILIRUB SERPL-MCNC: 1.8 MG/DL (ref 0.1–1)
BILIRUB SERPL-MCNC: 2.1 MG/DL (ref 0.1–1)
BILIRUB SERPL-MCNC: 2.3 MG/DL (ref 0.1–1)
BILIRUB UR QL STRIP: NEGATIVE
BLASTS NFR BLD MANUAL: 14 %
BLASTS NFR BLD MANUAL: 16 %
BLASTS NFR BLD MANUAL: 17 %
BLASTS NFR BLD MANUAL: 24 %
BLASTS NFR BLD MANUAL: 25 %
BLD GP AB SCN CELLS X3 SERPL QL: NORMAL
BLD GP AB SCN CELLS X3 SERPL QL: NORMAL
BLD PROD TYP BPU: NORMAL
BLOOD UNIT EXPIRATION DATE: NORMAL
BLOOD UNIT TYPE CODE: 5100
BLOOD UNIT TYPE CODE: 6200
BLOOD UNIT TYPE CODE: 9500
BLOOD UNIT TYPE: NORMAL
BNP SERPL-MCNC: 127 PG/ML (ref 0–99)
BNP SERPL-MCNC: 29 PG/ML (ref 0–99)
BNP SERPL-MCNC: 33 PG/ML (ref 0–99)
BNP SERPL-MCNC: 42 PG/ML (ref 0–99)
BNP SERPL-MCNC: 76 PG/ML (ref 0–99)
BNP SERPL-MCNC: 86 PG/ML (ref 0–99)
BORDETELLA PARAPERTUSSIS (IS1001): NOT DETECTED
BORDETELLA PERTUSSIS (PTXP): NOT DETECTED
BSA FOR ECHO PROCEDURE: 2.19 M2
BUN SERPL-MCNC: 10 MG/DL (ref 8–23)
BUN SERPL-MCNC: 10 MG/DL (ref 8–23)
BUN SERPL-MCNC: 11 MG/DL (ref 8–23)
BUN SERPL-MCNC: 11 MG/DL (ref 8–23)
BUN SERPL-MCNC: 12 MG/DL (ref 8–23)
BUN SERPL-MCNC: 14 MG/DL (ref 8–23)
BUN SERPL-MCNC: 15 MG/DL (ref 8–23)
BUN SERPL-MCNC: 16 MG/DL (ref 8–23)
BUN SERPL-MCNC: 17 MG/DL (ref 8–23)
BUN SERPL-MCNC: 18 MG/DL (ref 8–23)
BUN SERPL-MCNC: 19 MG/DL (ref 8–23)
BUN SERPL-MCNC: 20 MG/DL (ref 8–23)
BUN SERPL-MCNC: 21 MG/DL (ref 8–23)
BUN SERPL-MCNC: 21 MG/DL (ref 8–23)
BUN SERPL-MCNC: 22 MG/DL (ref 8–23)
BUN SERPL-MCNC: 23 MG/DL (ref 8–23)
BUN SERPL-MCNC: 24 MG/DL (ref 8–23)
BUN SERPL-MCNC: 27 MG/DL (ref 8–23)
BUN SERPL-MCNC: 29 MG/DL (ref 8–23)
BUN SERPL-MCNC: 29 MG/DL (ref 8–23)
BUN SERPL-MCNC: 34 MG/DL (ref 8–23)
BUN SERPL-MCNC: 38 MG/DL (ref 8–23)
BUN SERPL-MCNC: 56 MG/DL (ref 8–23)
CALCIUM SERPL-MCNC: 8.1 MG/DL (ref 8.7–10.5)
CALCIUM SERPL-MCNC: 8.2 MG/DL (ref 8.7–10.5)
CALCIUM SERPL-MCNC: 8.3 MG/DL (ref 8.7–10.5)
CALCIUM SERPL-MCNC: 8.4 MG/DL (ref 8.7–10.5)
CALCIUM SERPL-MCNC: 8.5 MG/DL (ref 8.7–10.5)
CALCIUM SERPL-MCNC: 8.6 MG/DL (ref 8.7–10.5)
CALCIUM SERPL-MCNC: 8.6 MG/DL (ref 8.7–10.5)
CALCIUM SERPL-MCNC: 8.7 MG/DL (ref 8.7–10.5)
CALCIUM SERPL-MCNC: 8.8 MG/DL (ref 8.7–10.5)
CALCIUM SERPL-MCNC: 8.9 MG/DL (ref 8.7–10.5)
CALCIUM SERPL-MCNC: 9 MG/DL (ref 8.7–10.5)
CALCIUM SERPL-MCNC: 9.1 MG/DL (ref 8.7–10.5)
CALCIUM SERPL-MCNC: 9.2 MG/DL (ref 8.7–10.5)
CALCIUM SERPL-MCNC: 9.4 MG/DL (ref 8.7–10.5)
CALCIUM SERPL-MCNC: 9.4 MG/DL (ref 8.7–10.5)
CALCIUM SERPL-MCNC: 9.5 MG/DL (ref 8.7–10.5)
CEA SERPL-MCNC: 0.8 NG/ML (ref 0–5)
CHLAMYDIA PNEUMONIAE: NOT DETECTED
CHLORIDE SERPL-SCNC: 100 MMOL/L (ref 95–110)
CHLORIDE SERPL-SCNC: 101 MMOL/L (ref 95–110)
CHLORIDE SERPL-SCNC: 102 MMOL/L (ref 95–110)
CHLORIDE SERPL-SCNC: 103 MMOL/L (ref 95–110)
CHLORIDE SERPL-SCNC: 104 MMOL/L (ref 95–110)
CHLORIDE SERPL-SCNC: 105 MMOL/L (ref 95–110)
CHLORIDE SERPL-SCNC: 106 MMOL/L (ref 95–110)
CHLORIDE SERPL-SCNC: 107 MMOL/L (ref 95–110)
CHLORIDE SERPL-SCNC: 108 MMOL/L (ref 95–110)
CHLORIDE SERPL-SCNC: 96 MMOL/L (ref 95–110)
CHLORIDE SERPL-SCNC: 96 MMOL/L (ref 95–110)
CHLORIDE SERPL-SCNC: 97 MMOL/L (ref 95–110)
CHLORIDE SERPL-SCNC: 98 MMOL/L (ref 95–110)
CHLORIDE SERPL-SCNC: 98 MMOL/L (ref 95–110)
CHOLEST SERPL-MCNC: 138 MG/DL (ref 120–199)
CHOLEST/HDLC SERPL: 2.7 {RATIO} (ref 2–5)
CLARITY UR: CLEAR
CO2 SERPL-SCNC: 22 MMOL/L (ref 23–29)
CO2 SERPL-SCNC: 23 MMOL/L (ref 23–29)
CO2 SERPL-SCNC: 24 MMOL/L (ref 23–29)
CO2 SERPL-SCNC: 25 MMOL/L (ref 23–29)
CO2 SERPL-SCNC: 26 MMOL/L (ref 23–29)
CO2 SERPL-SCNC: 27 MMOL/L (ref 23–29)
CO2 SERPL-SCNC: 27 MMOL/L (ref 23–29)
CO2 SERPL-SCNC: 28 MMOL/L (ref 23–29)
CO2 SERPL-SCNC: 29 MMOL/L (ref 23–29)
CO2 SERPL-SCNC: 30 MMOL/L (ref 23–29)
CO2 SERPL-SCNC: 31 MMOL/L (ref 23–29)
CO2 SERPL-SCNC: 31 MMOL/L (ref 23–29)
CO2 SERPL-SCNC: 32 MMOL/L (ref 23–29)
CO2 SERPL-SCNC: 32 MMOL/L (ref 23–29)
CODING SYSTEM: NORMAL
COLOR UR: YELLOW
CORONAVIRUS 229E, COMMON COLD VIRUS: NOT DETECTED
CORONAVIRUS HKU1, COMMON COLD VIRUS: NOT DETECTED
CORONAVIRUS NL63, COMMON COLD VIRUS: NOT DETECTED
CORONAVIRUS OC43, COMMON COLD VIRUS: NOT DETECTED
CREAT SERPL-MCNC: 0.6 MG/DL (ref 0.5–1.4)
CREAT SERPL-MCNC: 0.6 MG/DL (ref 0.5–1.4)
CREAT SERPL-MCNC: 0.7 MG/DL (ref 0.5–1.4)
CREAT SERPL-MCNC: 0.7 MG/DL (ref 0.5–1.4)
CREAT SERPL-MCNC: 0.8 MG/DL (ref 0.5–1.4)
CREAT SERPL-MCNC: 0.9 MG/DL (ref 0.5–1.4)
CREAT SERPL-MCNC: 1 MG/DL (ref 0.5–1.4)
CREAT SERPL-MCNC: 1.1 MG/DL (ref 0.5–1.4)
CREAT SERPL-MCNC: 1.2 MG/DL (ref 0.5–1.4)
CREAT SERPL-MCNC: 1.3 MG/DL (ref 0.5–1.4)
CREAT SERPL-MCNC: 1.4 MG/DL (ref 0.5–1.4)
CROSSMATCH INTERPRETATION: NORMAL
CRP SERPL-MCNC: 5.88 MG/DL
CV ECHO LV RWT: 0.62 CM
DACRYOCYTES BLD QL SMEAR: ABNORMAL
DELSYS: ABNORMAL
DIFFERENTIAL METHOD BLD: ABNORMAL
DIFFERENTIAL METHOD: ABNORMAL
DISPENSE STATUS: NORMAL
DOP CALC AO PEAK VEL: 1.84 M/S
DOP CALC AO VTI: 35.5 CM
DOP CALC LVOT AREA: 3.5 CM2
DOP CALC LVOT DIAMETER: 2.1 CM
DOP CALC LVOT PEAK VEL: 1.47 M/S
DOP CALC LVOT STROKE VOLUME: 101.09 CM3
DOP CALC MV VTI: 30.8 CM
DOP CALCLVOT PEAK VEL VTI: 29.2 CM
E WAVE DECELERATION TIME: 225 MSEC
E/A RATIO: 0.87
E/E' RATIO: 8.32 M/S
ECHO LV POSTERIOR WALL: 1.21 CM (ref 0.6–1.1)
EOSINOPHIL # BLD AUTO: 0 K/UL (ref 0–0.5)
EOSINOPHIL # BLD AUTO: 0.1 K/UL (ref 0–0.5)
EOSINOPHIL # BLD AUTO: 0.2 K/UL (ref 0–0.5)
EOSINOPHIL # BLD AUTO: ABNORMAL K/UL (ref 0–0.5)
EOSINOPHIL NFR BLD: 0 % (ref 0–8)
EOSINOPHIL NFR BLD: 0.2 % (ref 0–8)
EOSINOPHIL NFR BLD: 0.2 % (ref 0–8)
EOSINOPHIL NFR BLD: 0.3 % (ref 0–8)
EOSINOPHIL NFR BLD: 0.4 % (ref 0–8)
EOSINOPHIL NFR BLD: 0.5 % (ref 0–8)
EOSINOPHIL NFR BLD: 0.5 % (ref 0–8)
EOSINOPHIL NFR BLD: 0.6 % (ref 0–8)
EOSINOPHIL NFR BLD: 0.7 % (ref 0–8)
EOSINOPHIL NFR BLD: 0.7 % (ref 0–8)
EOSINOPHIL NFR BLD: 1 % (ref 0–8)
EOSINOPHIL NFR BLD: 1.1 % (ref 0–8)
EOSINOPHIL NFR BLD: 1.2 % (ref 0–8)
EOSINOPHIL NFR BLD: 1.3 % (ref 0–8)
EOSINOPHIL NFR BLD: 1.4 % (ref 0–8)
EOSINOPHIL NFR BLD: 1.4 % (ref 0–8)
EOSINOPHIL NFR BLD: 1.6 % (ref 0–8)
EOSINOPHIL NFR BLD: 1.8 % (ref 0–8)
EOSINOPHIL NFR BLD: 1.9 % (ref 0–8)
EOSINOPHIL NFR BLD: 2.2 % (ref 0–8)
EOSINOPHIL NFR BLD: 2.3 % (ref 0–8)
EOSINOPHIL NFR BLD: 2.8 % (ref 0–8)
EOSINOPHIL NFR BLD: 3 % (ref 0–8)
EOSINOPHIL NFR BLD: 3.4 % (ref 0–8)
EOSINOPHIL NFR BLD: 3.4 % (ref 0–8)
EOSINOPHIL NFR BLD: 4 % (ref 0–8)
EOSINOPHIL NFR BLD: 5 % (ref 0–8)
EOSINOPHIL NFR BLD: 5.2 % (ref 0–8)
EP: 6
ERYTHROCYTE [DISTWIDTH] IN BLOOD BY AUTOMATED COUNT: 13.7 % (ref 11.5–14.5)
ERYTHROCYTE [DISTWIDTH] IN BLOOD BY AUTOMATED COUNT: 13.8 % (ref 11.5–14.5)
ERYTHROCYTE [DISTWIDTH] IN BLOOD BY AUTOMATED COUNT: 13.9 % (ref 11.5–14.5)
ERYTHROCYTE [DISTWIDTH] IN BLOOD BY AUTOMATED COUNT: 13.9 % (ref 11.5–14.5)
ERYTHROCYTE [DISTWIDTH] IN BLOOD BY AUTOMATED COUNT: 14 % (ref 11.5–14.5)
ERYTHROCYTE [DISTWIDTH] IN BLOOD BY AUTOMATED COUNT: 14.1 % (ref 11.5–14.5)
ERYTHROCYTE [DISTWIDTH] IN BLOOD BY AUTOMATED COUNT: 14.3 % (ref 11.5–14.5)
ERYTHROCYTE [DISTWIDTH] IN BLOOD BY AUTOMATED COUNT: 14.3 % (ref 11.5–14.5)
ERYTHROCYTE [DISTWIDTH] IN BLOOD BY AUTOMATED COUNT: 14.4 % (ref 11.5–14.5)
ERYTHROCYTE [DISTWIDTH] IN BLOOD BY AUTOMATED COUNT: 14.6 % (ref 11.5–14.5)
ERYTHROCYTE [DISTWIDTH] IN BLOOD BY AUTOMATED COUNT: 14.7 % (ref 11.5–14.5)
ERYTHROCYTE [DISTWIDTH] IN BLOOD BY AUTOMATED COUNT: 14.7 % (ref 11.5–14.5)
ERYTHROCYTE [DISTWIDTH] IN BLOOD BY AUTOMATED COUNT: 14.8 % (ref 11.5–14.5)
ERYTHROCYTE [DISTWIDTH] IN BLOOD BY AUTOMATED COUNT: 14.8 % (ref 11.5–14.5)
ERYTHROCYTE [DISTWIDTH] IN BLOOD BY AUTOMATED COUNT: 15 % (ref 11.5–14.5)
ERYTHROCYTE [DISTWIDTH] IN BLOOD BY AUTOMATED COUNT: 15.1 % (ref 11.5–14.5)
ERYTHROCYTE [DISTWIDTH] IN BLOOD BY AUTOMATED COUNT: 15.3 % (ref 11.5–14.5)
ERYTHROCYTE [DISTWIDTH] IN BLOOD BY AUTOMATED COUNT: 15.4 % (ref 11.5–14.5)
ERYTHROCYTE [DISTWIDTH] IN BLOOD BY AUTOMATED COUNT: 15.4 % (ref 11.5–14.5)
ERYTHROCYTE [DISTWIDTH] IN BLOOD BY AUTOMATED COUNT: 15.6 % (ref 11.5–14.5)
ERYTHROCYTE [DISTWIDTH] IN BLOOD BY AUTOMATED COUNT: 15.6 % (ref 11.5–14.5)
ERYTHROCYTE [DISTWIDTH] IN BLOOD BY AUTOMATED COUNT: 15.7 % (ref 11.5–14.5)
ERYTHROCYTE [DISTWIDTH] IN BLOOD BY AUTOMATED COUNT: 15.8 % (ref 11.5–14.5)
ERYTHROCYTE [DISTWIDTH] IN BLOOD BY AUTOMATED COUNT: 15.8 % (ref 11.5–14.5)
ERYTHROCYTE [DISTWIDTH] IN BLOOD BY AUTOMATED COUNT: 15.9 % (ref 11.5–14.5)
ERYTHROCYTE [DISTWIDTH] IN BLOOD BY AUTOMATED COUNT: 16 % (ref 11.5–14.5)
ERYTHROCYTE [DISTWIDTH] IN BLOOD BY AUTOMATED COUNT: 16.1 % (ref 11.5–14.5)
ERYTHROCYTE [DISTWIDTH] IN BLOOD BY AUTOMATED COUNT: 16.1 % (ref 11.5–14.5)
ERYTHROCYTE [DISTWIDTH] IN BLOOD BY AUTOMATED COUNT: 16.2 % (ref 11.5–14.5)
ERYTHROCYTE [DISTWIDTH] IN BLOOD BY AUTOMATED COUNT: 16.4 % (ref 11.5–14.5)
ERYTHROCYTE [DISTWIDTH] IN BLOOD BY AUTOMATED COUNT: 16.5 % (ref 11.5–14.5)
ERYTHROCYTE [DISTWIDTH] IN BLOOD BY AUTOMATED COUNT: 16.6 % (ref 11.5–14.5)
ERYTHROCYTE [DISTWIDTH] IN BLOOD BY AUTOMATED COUNT: 16.6 % (ref 11.5–14.5)
ERYTHROCYTE [DISTWIDTH] IN BLOOD BY AUTOMATED COUNT: 16.7 % (ref 11.5–14.5)
ERYTHROCYTE [DISTWIDTH] IN BLOOD BY AUTOMATED COUNT: 16.9 % (ref 11.5–14.5)
ERYTHROCYTE [DISTWIDTH] IN BLOOD BY AUTOMATED COUNT: 17.1 % (ref 11.5–14.5)
ERYTHROCYTE [DISTWIDTH] IN BLOOD BY AUTOMATED COUNT: 17.1 % (ref 11.5–14.5)
ERYTHROCYTE [DISTWIDTH] IN BLOOD BY AUTOMATED COUNT: 17.3 % (ref 11.5–14.5)
ERYTHROCYTE [DISTWIDTH] IN BLOOD BY AUTOMATED COUNT: 17.4 % (ref 11.5–14.5)
ERYTHROCYTE [DISTWIDTH] IN BLOOD BY AUTOMATED COUNT: 17.7 % (ref 11.5–14.5)
ERYTHROCYTE [DISTWIDTH] IN BLOOD BY AUTOMATED COUNT: 17.8 % (ref 11.5–14.5)
ERYTHROCYTE [DISTWIDTH] IN BLOOD BY AUTOMATED COUNT: 17.9 % (ref 11.5–14.5)
ERYTHROCYTE [DISTWIDTH] IN BLOOD BY AUTOMATED COUNT: 18.3 % (ref 11.5–14.5)
ERYTHROCYTE [DISTWIDTH] IN BLOOD BY AUTOMATED COUNT: 18.7 % (ref 11.5–14.5)
ERYTHROCYTE [SEDIMENTATION RATE] IN BLOOD BY WESTERGREN METHOD: 2 MM/HR (ref 0–10)
ERYTHROCYTE [SEDIMENTATION RATE] IN BLOOD BY WESTERGREN METHOD: 20 MM/H
EST. GFR  (NO RACE VARIABLE): 49.9 ML/MIN/1.73 M^2
EST. GFR  (NO RACE VARIABLE): 54.2 ML/MIN/1.73 M^2
EST. GFR  (NO RACE VARIABLE): 59.6 ML/MIN/1.73 M^2
EST. GFR  (NO RACE VARIABLE): >60 ML/MIN/1.73 M^2
ESTIMATED AVG GLUCOSE: 111 MG/DL (ref 68–131)
FERRITIN SERPL-MCNC: 122 NG/ML (ref 20–300)
FERRITIN SERPL-MCNC: 28 NG/ML (ref 20–300)
FERRITIN SERPL-MCNC: 52.9 NG/ML (ref 20–300)
FERRITIN SERPL-MCNC: 55.4 NG/ML (ref 20–300)
FERRITIN SERPL-MCNC: 67 NG/ML (ref 20–300)
FIO2: 40
FLUBV RNA NPH QL NAA+NON-PROBE: NOT DETECTED
FRACTIONAL SHORTENING: 34 % (ref 28–44)
GLUCOSE SERPL-MCNC: 103 MG/DL (ref 70–110)
GLUCOSE SERPL-MCNC: 108 MG/DL (ref 70–110)
GLUCOSE SERPL-MCNC: 109 MG/DL (ref 70–110)
GLUCOSE SERPL-MCNC: 109 MG/DL (ref 70–110)
GLUCOSE SERPL-MCNC: 114 MG/DL (ref 70–110)
GLUCOSE SERPL-MCNC: 114 MG/DL (ref 70–110)
GLUCOSE SERPL-MCNC: 117 MG/DL (ref 70–110)
GLUCOSE SERPL-MCNC: 118 MG/DL (ref 70–110)
GLUCOSE SERPL-MCNC: 123 MG/DL (ref 70–110)
GLUCOSE SERPL-MCNC: 125 MG/DL (ref 70–110)
GLUCOSE SERPL-MCNC: 126 MG/DL (ref 70–110)
GLUCOSE SERPL-MCNC: 126 MG/DL (ref 70–110)
GLUCOSE SERPL-MCNC: 127 MG/DL (ref 70–110)
GLUCOSE SERPL-MCNC: 127 MG/DL (ref 70–110)
GLUCOSE SERPL-MCNC: 129 MG/DL (ref 70–110)
GLUCOSE SERPL-MCNC: 131 MG/DL (ref 70–110)
GLUCOSE SERPL-MCNC: 132 MG/DL (ref 70–110)
GLUCOSE SERPL-MCNC: 133 MG/DL (ref 70–110)
GLUCOSE SERPL-MCNC: 134 MG/DL (ref 70–110)
GLUCOSE SERPL-MCNC: 136 MG/DL (ref 70–110)
GLUCOSE SERPL-MCNC: 137 MG/DL (ref 70–110)
GLUCOSE SERPL-MCNC: 138 MG/DL (ref 70–110)
GLUCOSE SERPL-MCNC: 139 MG/DL (ref 70–110)
GLUCOSE SERPL-MCNC: 139 MG/DL (ref 70–110)
GLUCOSE SERPL-MCNC: 140 MG/DL (ref 70–110)
GLUCOSE SERPL-MCNC: 141 MG/DL (ref 70–110)
GLUCOSE SERPL-MCNC: 142 MG/DL (ref 70–110)
GLUCOSE SERPL-MCNC: 142 MG/DL (ref 70–110)
GLUCOSE SERPL-MCNC: 144 MG/DL (ref 70–110)
GLUCOSE SERPL-MCNC: 148 MG/DL (ref 70–110)
GLUCOSE SERPL-MCNC: 149 MG/DL (ref 70–110)
GLUCOSE SERPL-MCNC: 149 MG/DL (ref 70–110)
GLUCOSE SERPL-MCNC: 150 MG/DL (ref 70–110)
GLUCOSE SERPL-MCNC: 151 MG/DL (ref 70–110)
GLUCOSE SERPL-MCNC: 153 MG/DL (ref 70–110)
GLUCOSE SERPL-MCNC: 153 MG/DL (ref 70–110)
GLUCOSE SERPL-MCNC: 154 MG/DL (ref 70–110)
GLUCOSE SERPL-MCNC: 154 MG/DL (ref 70–110)
GLUCOSE SERPL-MCNC: 155 MG/DL (ref 70–110)
GLUCOSE SERPL-MCNC: 156 MG/DL (ref 70–110)
GLUCOSE SERPL-MCNC: 158 MG/DL (ref 70–110)
GLUCOSE SERPL-MCNC: 158 MG/DL (ref 70–110)
GLUCOSE SERPL-MCNC: 159 MG/DL (ref 70–110)
GLUCOSE SERPL-MCNC: 167 MG/DL (ref 70–110)
GLUCOSE SERPL-MCNC: 168 MG/DL (ref 70–110)
GLUCOSE SERPL-MCNC: 168 MG/DL (ref 70–110)
GLUCOSE SERPL-MCNC: 169 MG/DL (ref 70–110)
GLUCOSE SERPL-MCNC: 170 MG/DL (ref 70–110)
GLUCOSE SERPL-MCNC: 171 MG/DL (ref 70–110)
GLUCOSE SERPL-MCNC: 174 MG/DL (ref 70–110)
GLUCOSE SERPL-MCNC: 174 MG/DL (ref 70–110)
GLUCOSE SERPL-MCNC: 175 MG/DL (ref 70–110)
GLUCOSE SERPL-MCNC: 177 MG/DL (ref 70–110)
GLUCOSE SERPL-MCNC: 178 MG/DL (ref 70–110)
GLUCOSE SERPL-MCNC: 180 MG/DL (ref 70–110)
GLUCOSE SERPL-MCNC: 180 MG/DL (ref 70–110)
GLUCOSE SERPL-MCNC: 182 MG/DL (ref 70–110)
GLUCOSE SERPL-MCNC: 187 MG/DL (ref 70–110)
GLUCOSE SERPL-MCNC: 191 MG/DL (ref 70–110)
GLUCOSE SERPL-MCNC: 194 MG/DL (ref 70–110)
GLUCOSE SERPL-MCNC: 195 MG/DL (ref 70–110)
GLUCOSE SERPL-MCNC: 198 MG/DL (ref 70–110)
GLUCOSE SERPL-MCNC: 203 MG/DL (ref 70–110)
GLUCOSE SERPL-MCNC: 208 MG/DL (ref 70–110)
GLUCOSE SERPL-MCNC: 209 MG/DL (ref 70–110)
GLUCOSE SERPL-MCNC: 211 MG/DL (ref 70–110)
GLUCOSE SERPL-MCNC: 218 MG/DL (ref 70–110)
GLUCOSE SERPL-MCNC: 220 MG/DL (ref 70–110)
GLUCOSE SERPL-MCNC: 220 MG/DL (ref 70–110)
GLUCOSE SERPL-MCNC: 223 MG/DL (ref 70–110)
GLUCOSE SERPL-MCNC: 226 MG/DL (ref 70–110)
GLUCOSE SERPL-MCNC: 232 MG/DL (ref 70–110)
GLUCOSE SERPL-MCNC: 235 MG/DL (ref 70–110)
GLUCOSE SERPL-MCNC: 256 MG/DL (ref 70–110)
GLUCOSE SERPL-MCNC: 259 MG/DL (ref 70–110)
GLUCOSE SERPL-MCNC: 262 MG/DL (ref 70–110)
GLUCOSE SERPL-MCNC: 273 MG/DL (ref 70–110)
GLUCOSE SERPL-MCNC: 284 MG/DL (ref 70–110)
GLUCOSE SERPL-MCNC: 301 MG/DL (ref 70–110)
GLUCOSE SERPL-MCNC: 353 MG/DL (ref 70–110)
GLUCOSE SERPL-MCNC: 65 MG/DL (ref 70–110)
GLUCOSE SERPL-MCNC: 73 MG/DL (ref 70–110)
GLUCOSE SERPL-MCNC: 74 MG/DL (ref 70–110)
GLUCOSE SERPL-MCNC: 89 MG/DL (ref 70–110)
GLUCOSE SERPL-MCNC: 89 MG/DL (ref 70–110)
GLUCOSE SERPL-MCNC: 92 MG/DL (ref 70–110)
GLUCOSE SERPL-MCNC: 93 MG/DL (ref 70–110)
GLUCOSE SERPL-MCNC: 95 MG/DL (ref 70–110)
GLUCOSE SERPL-MCNC: 97 MG/DL (ref 70–110)
GLUCOSE SERPL-MCNC: 98 MG/DL (ref 70–110)
GLUCOSE UR QL STRIP: ABNORMAL
GLUCOSE UR QL STRIP: NEGATIVE
HBA1C MFR BLD: 5.5 % (ref 4.5–6.2)
HBV CORE AB SERPL QL IA: NEGATIVE
HBV SURFACE AB SER QL: NON REACTIVE
HCO3 UR-SCNC: 23.3 MMOL/L (ref 24–28)
HCT VFR BLD AUTO: 10.8 % (ref 40–54)
HCT VFR BLD AUTO: 18.4 % (ref 40–54)
HCT VFR BLD AUTO: 19.8 % (ref 40–54)
HCT VFR BLD AUTO: 20.2 % (ref 40–54)
HCT VFR BLD AUTO: 20.7 % (ref 40–54)
HCT VFR BLD AUTO: 21.5 % (ref 40–54)
HCT VFR BLD AUTO: 22.4 % (ref 40–54)
HCT VFR BLD AUTO: 24.2 % (ref 40–54)
HCT VFR BLD AUTO: 24.6 % (ref 40–54)
HCT VFR BLD AUTO: 25 % (ref 40–54)
HCT VFR BLD AUTO: 25.2 % (ref 40–54)
HCT VFR BLD AUTO: 25.4 % (ref 40–54)
HCT VFR BLD AUTO: 25.6 % (ref 40–54)
HCT VFR BLD AUTO: 25.8 % (ref 40–54)
HCT VFR BLD AUTO: 26.3 % (ref 40–54)
HCT VFR BLD AUTO: 26.3 % (ref 40–54)
HCT VFR BLD AUTO: 26.4 % (ref 40–54)
HCT VFR BLD AUTO: 26.8 % (ref 40–54)
HCT VFR BLD AUTO: 26.9 % (ref 40–54)
HCT VFR BLD AUTO: 27 % (ref 40–54)
HCT VFR BLD AUTO: 27.1 % (ref 40–54)
HCT VFR BLD AUTO: 27.3 % (ref 40–54)
HCT VFR BLD AUTO: 27.5 % (ref 40–54)
HCT VFR BLD AUTO: 27.7 % (ref 40–54)
HCT VFR BLD AUTO: 27.9 % (ref 40–54)
HCT VFR BLD AUTO: 28.1 % (ref 40–54)
HCT VFR BLD AUTO: 28.7 % (ref 40–54)
HCT VFR BLD AUTO: 29.1 % (ref 40–54)
HCT VFR BLD AUTO: 29.7 % (ref 40–54)
HCT VFR BLD AUTO: 29.8 % (ref 40–54)
HCT VFR BLD AUTO: 30.2 % (ref 40–54)
HCT VFR BLD AUTO: 30.5 % (ref 40–54)
HCT VFR BLD AUTO: 30.6 % (ref 40–54)
HCT VFR BLD AUTO: 30.7 % (ref 40–54)
HCT VFR BLD AUTO: 30.9 % (ref 40–54)
HCT VFR BLD AUTO: 31.1 % (ref 40–54)
HCT VFR BLD AUTO: 31.6 % (ref 40–54)
HCT VFR BLD AUTO: 31.6 % (ref 40–54)
HCT VFR BLD AUTO: 31.9 % (ref 40–54)
HCT VFR BLD AUTO: 32.1 % (ref 40–54)
HCT VFR BLD AUTO: 32.2 % (ref 40–54)
HCT VFR BLD AUTO: 32.3 % (ref 40–54)
HCT VFR BLD AUTO: 32.5 % (ref 40–54)
HCT VFR BLD AUTO: 32.5 % (ref 40–54)
HCT VFR BLD AUTO: 32.8 % (ref 40–54)
HCT VFR BLD AUTO: 33 % (ref 40–54)
HCT VFR BLD AUTO: 33.2 % (ref 40–54)
HCT VFR BLD AUTO: 33.5 % (ref 40–54)
HCT VFR BLD AUTO: 33.7 % (ref 40–54)
HCT VFR BLD AUTO: 33.8 % (ref 40–54)
HCT VFR BLD AUTO: 33.9 % (ref 40–54)
HCT VFR BLD AUTO: 34.1 % (ref 40–54)
HCT VFR BLD AUTO: 34.1 % (ref 40–54)
HCT VFR BLD AUTO: 34.6 % (ref 40–54)
HCT VFR BLD AUTO: 34.7 % (ref 40–54)
HCT VFR BLD AUTO: 34.8 % (ref 40–54)
HCT VFR BLD AUTO: 35.1 % (ref 40–54)
HCT VFR BLD AUTO: 35.2 % (ref 40–54)
HCT VFR BLD AUTO: 35.7 % (ref 40–54)
HCT VFR BLD AUTO: 37.1 % (ref 40–54)
HCT VFR BLD AUTO: 37.5 % (ref 40–54)
HCT VFR BLD AUTO: 37.7 % (ref 40–54)
HDLC SERPL-MCNC: 51 MG/DL (ref 40–75)
HDLC SERPL: 37 % (ref 20–50)
HGB BLD-MCNC: 10.1 G/DL (ref 14–18)
HGB BLD-MCNC: 10.2 G/DL (ref 14–18)
HGB BLD-MCNC: 10.2 G/DL (ref 14–18)
HGB BLD-MCNC: 10.3 G/DL (ref 14–18)
HGB BLD-MCNC: 10.4 G/DL (ref 14–18)
HGB BLD-MCNC: 10.5 G/DL (ref 14–18)
HGB BLD-MCNC: 10.6 G/DL (ref 14–18)
HGB BLD-MCNC: 10.7 G/DL (ref 14–18)
HGB BLD-MCNC: 10.7 G/DL (ref 14–18)
HGB BLD-MCNC: 10.9 G/DL (ref 14–18)
HGB BLD-MCNC: 11 G/DL (ref 14–18)
HGB BLD-MCNC: 11.1 G/DL (ref 14–18)
HGB BLD-MCNC: 11.1 G/DL (ref 14–18)
HGB BLD-MCNC: 11.3 G/DL (ref 14–18)
HGB BLD-MCNC: 11.3 G/DL (ref 14–18)
HGB BLD-MCNC: 11.4 G/DL (ref 14–18)
HGB BLD-MCNC: 11.5 G/DL (ref 14–18)
HGB BLD-MCNC: 11.5 G/DL (ref 14–18)
HGB BLD-MCNC: 11.6 G/DL (ref 14–18)
HGB BLD-MCNC: 11.7 G/DL (ref 14–18)
HGB BLD-MCNC: 11.7 G/DL (ref 14–18)
HGB BLD-MCNC: 12 G/DL (ref 14–18)
HGB BLD-MCNC: 12.1 G/DL (ref 14–18)
HGB BLD-MCNC: 12.1 G/DL (ref 14–18)
HGB BLD-MCNC: 12.2 G/DL (ref 14–18)
HGB BLD-MCNC: 12.5 G/DL (ref 14–18)
HGB BLD-MCNC: 12.6 G/DL (ref 14–18)
HGB BLD-MCNC: 12.7 G/DL (ref 14–18)
HGB BLD-MCNC: 13 G/DL (ref 14–18)
HGB BLD-MCNC: 13.2 G/DL (ref 14–18)
HGB BLD-MCNC: 13.5 G/DL (ref 14–18)
HGB BLD-MCNC: 3.8 G/DL (ref 14–18)
HGB BLD-MCNC: 6.4 G/DL (ref 14–18)
HGB BLD-MCNC: 6.7 G/DL (ref 14–18)
HGB BLD-MCNC: 7 G/DL (ref 14–18)
HGB BLD-MCNC: 7.2 G/DL (ref 14–18)
HGB BLD-MCNC: 7.8 G/DL (ref 14–18)
HGB BLD-MCNC: 8 G/DL (ref 14–18)
HGB BLD-MCNC: 8 G/DL (ref 14–18)
HGB BLD-MCNC: 8.4 G/DL (ref 14–18)
HGB BLD-MCNC: 8.6 G/DL (ref 14–18)
HGB BLD-MCNC: 8.8 G/DL (ref 14–18)
HGB BLD-MCNC: 8.9 G/DL (ref 14–18)
HGB BLD-MCNC: 9.2 G/DL (ref 14–18)
HGB BLD-MCNC: 9.2 G/DL (ref 14–18)
HGB BLD-MCNC: 9.3 G/DL (ref 14–18)
HGB BLD-MCNC: 9.4 G/DL (ref 14–18)
HGB BLD-MCNC: 9.5 G/DL (ref 14–18)
HGB BLD-MCNC: 9.6 G/DL (ref 14–18)
HGB BLD-MCNC: 9.6 G/DL (ref 14–18)
HGB BLD-MCNC: 9.8 G/DL (ref 14–18)
HGB UR QL STRIP: NEGATIVE
HPIV1 RNA NPH QL NAA+NON-PROBE: NOT DETECTED
HPIV2 RNA NPH QL NAA+NON-PROBE: NOT DETECTED
HPIV3 RNA NPH QL NAA+NON-PROBE: NOT DETECTED
HPIV4 RNA NPH QL NAA+NON-PROBE: NOT DETECTED
HUMAN METAPNEUMOVIRUS: NOT DETECTED
HYPOCHROMIA BLD QL SMEAR: ABNORMAL
IMM GRANULOCYTES # BLD AUTO: 0 K/UL (ref 0–0.04)
IMM GRANULOCYTES # BLD AUTO: 0 K/UL (ref 0–0.04)
IMM GRANULOCYTES # BLD AUTO: 0.01 K/UL (ref 0–0.04)
IMM GRANULOCYTES # BLD AUTO: 0.02 K/UL (ref 0–0.04)
IMM GRANULOCYTES # BLD AUTO: 0.02 K/UL (ref 0–0.04)
IMM GRANULOCYTES # BLD AUTO: 0.03 K/UL (ref 0–0.04)
IMM GRANULOCYTES # BLD AUTO: 0.04 K/UL (ref 0–0.04)
IMM GRANULOCYTES # BLD AUTO: 0.05 K/UL (ref 0–0.04)
IMM GRANULOCYTES # BLD AUTO: 0.06 K/UL (ref 0–0.04)
IMM GRANULOCYTES # BLD AUTO: 0.09 K/UL (ref 0–0.04)
IMM GRANULOCYTES # BLD AUTO: 0.09 K/UL (ref 0–0.04)
IMM GRANULOCYTES # BLD AUTO: 0.1 K/UL (ref 0–0.04)
IMM GRANULOCYTES # BLD AUTO: 0.11 K/UL (ref 0–0.04)
IMM GRANULOCYTES # BLD AUTO: 0.14 K/UL (ref 0–0.04)
IMM GRANULOCYTES # BLD AUTO: 0.15 K/UL (ref 0–0.04)
IMM GRANULOCYTES # BLD AUTO: 0.19 K/UL (ref 0–0.04)
IMM GRANULOCYTES # BLD AUTO: 0.21 K/UL (ref 0–0.04)
IMM GRANULOCYTES # BLD AUTO: 0.22 K/UL (ref 0–0.04)
IMM GRANULOCYTES # BLD AUTO: 0.22 K/UL (ref 0–0.04)
IMM GRANULOCYTES # BLD AUTO: 0.27 K/UL (ref 0–0.04)
IMM GRANULOCYTES # BLD AUTO: 0.31 K/UL (ref 0–0.04)
IMM GRANULOCYTES # BLD AUTO: 0.39 K/UL (ref 0–0.04)
IMM GRANULOCYTES # BLD AUTO: 0.48 K/UL (ref 0–0.04)
IMM GRANULOCYTES # BLD AUTO: ABNORMAL K/UL (ref 0–0.04)
IMM GRANULOCYTES NFR BLD AUTO: 0 % (ref 0–0.5)
IMM GRANULOCYTES NFR BLD AUTO: 0 % (ref 0–0.5)
IMM GRANULOCYTES NFR BLD AUTO: 0.4 % (ref 0–0.5)
IMM GRANULOCYTES NFR BLD AUTO: 0.4 % (ref 0–0.5)
IMM GRANULOCYTES NFR BLD AUTO: 0.5 % (ref 0–0.5)
IMM GRANULOCYTES NFR BLD AUTO: 0.7 % (ref 0–0.5)
IMM GRANULOCYTES NFR BLD AUTO: 0.7 % (ref 0–0.5)
IMM GRANULOCYTES NFR BLD AUTO: 0.8 % (ref 0–0.5)
IMM GRANULOCYTES NFR BLD AUTO: 1.1 % (ref 0–0.5)
IMM GRANULOCYTES NFR BLD AUTO: 1.2 % (ref 0–0.5)
IMM GRANULOCYTES NFR BLD AUTO: 1.3 % (ref 0–0.5)
IMM GRANULOCYTES NFR BLD AUTO: 1.3 % (ref 0–0.5)
IMM GRANULOCYTES NFR BLD AUTO: 1.4 % (ref 0–0.5)
IMM GRANULOCYTES NFR BLD AUTO: 1.6 % (ref 0–0.5)
IMM GRANULOCYTES NFR BLD AUTO: 1.6 % (ref 0–0.5)
IMM GRANULOCYTES NFR BLD AUTO: 1.8 % (ref 0–0.5)
IMM GRANULOCYTES NFR BLD AUTO: 1.8 % (ref 0–0.5)
IMM GRANULOCYTES NFR BLD AUTO: 1.9 % (ref 0–0.5)
IMM GRANULOCYTES NFR BLD AUTO: 1.9 % (ref 0–0.5)
IMM GRANULOCYTES NFR BLD AUTO: 2 % (ref 0–0.5)
IMM GRANULOCYTES NFR BLD AUTO: 2.1 % (ref 0–0.5)
IMM GRANULOCYTES NFR BLD AUTO: 2.6 % (ref 0–0.5)
IMM GRANULOCYTES NFR BLD AUTO: 3 % (ref 0–0.5)
IMM GRANULOCYTES NFR BLD AUTO: 4.1 % (ref 0–0.5)
IMM GRANULOCYTES NFR BLD AUTO: 4.5 % (ref 0–0.5)
IMM GRANULOCYTES NFR BLD AUTO: 4.7 % (ref 0–0.5)
IMM GRANULOCYTES NFR BLD AUTO: 4.9 % (ref 0–0.5)
IMM GRANULOCYTES NFR BLD AUTO: 5 % (ref 0–0.5)
IMM GRANULOCYTES NFR BLD AUTO: 5.2 % (ref 0–0.5)
IMM GRANULOCYTES NFR BLD AUTO: ABNORMAL % (ref 0–0.5)
INFLUENZA A (SUBTYPES H1,H1-2009,H3): NOT DETECTED
INFLUENZA A, MOLECULAR: NEGATIVE
INFLUENZA B, MOLECULAR: NEGATIVE
INR PPP: 0.9 (ref 0.8–1.2)
INR PPP: 1 (ref 0.8–1.2)
INTERVENTRICULAR SEPTUM: 1.48 CM (ref 0.6–1.1)
IP: 16
IRON SERPL-MCNC: 114 UG/DL (ref 45–160)
IRON SERPL-MCNC: 129 UG/DL (ref 45–160)
IRON SERPL-MCNC: 132 UG/DL (ref 45–160)
IRON SERPL-MCNC: 51 UG/DL (ref 45–160)
IRON SERPL-MCNC: 59 UG/DL (ref 45–160)
IVC DIAMETER: 2.63 CM
KETONES UR QL STRIP: ABNORMAL
KETONES UR QL STRIP: NEGATIVE
L PNEUMO1 AG UR QL IA: NEGATIVE
L PNEUMO1 AG UR QL IA: NEGATIVE
LACTATE SERPL-SCNC: 0.5 MMOL/L (ref 0.5–1.9)
LACTATE SERPL-SCNC: 0.6 MMOL/L (ref 0.5–1.9)
LACTATE SERPL-SCNC: 0.7 MMOL/L (ref 0.5–1.9)
LACTATE SERPL-SCNC: 0.9 MMOL/L (ref 0.5–1.9)
LACTATE SERPL-SCNC: 1 MMOL/L (ref 0.5–1.9)
LACTATE SERPL-SCNC: 1 MMOL/L (ref 0.5–1.9)
LACTATE SERPL-SCNC: 1.2 MMOL/L (ref 0.5–1.9)
LACTATE SERPL-SCNC: 1.3 MMOL/L (ref 0.5–1.9)
LACTATE SERPL-SCNC: 1.4 MMOL/L (ref 0.5–1.9)
LACTATE SERPL-SCNC: 1.5 MMOL/L (ref 0.5–1.9)
LACTATE SERPL-SCNC: 1.9 MMOL/L (ref 0.5–1.9)
LACTATE SERPL-SCNC: 2.3 MMOL/L (ref 0.5–1.9)
LACTATE SERPL-SCNC: 2.5 MMOL/L (ref 0.5–1.9)
LACTATE SERPL-SCNC: 2.6 MMOL/L (ref 0.5–1.9)
LACTATE SERPL-SCNC: 2.6 MMOL/L (ref 0.5–1.9)
LDLC SERPL CALC-MCNC: 67.4 MG/DL (ref 63–159)
LEFT ATRIUM SIZE: 4 CM
LEFT INTERNAL DIMENSION IN SYSTOLE: 2.59 CM (ref 2.1–4)
LEFT VENTRICLE DIASTOLIC VOLUME INDEX: 30.64 ML/M2
LEFT VENTRICLE DIASTOLIC VOLUME: 67.1 ML
LEFT VENTRICLE MASS INDEX: 87 G/M2
LEFT VENTRICLE SYSTOLIC VOLUME INDEX: 11.1 ML/M2
LEFT VENTRICLE SYSTOLIC VOLUME: 24.4 ML
LEFT VENTRICULAR INTERNAL DIMENSION IN DIASTOLE: 3.93 CM (ref 3.5–6)
LEFT VENTRICULAR MASS: 191.48 G
LEGIONELLA SPECIMEN SOURCE: NORMAL
LEGIONELLA SPECIMEN SOURCE: NORMAL
LEUKOCYTE ESTERASE UR QL STRIP: NEGATIVE
LV LATERAL E/E' RATIO: 7.9 M/S
LV SEPTAL E/E' RATIO: 8.78 M/S
LVOT MG: 4 MMHG
LVOT MV: 0.96 CM/S
LYMPHOCYTES # BLD AUTO: 0.3 K/UL (ref 1–4.8)
LYMPHOCYTES # BLD AUTO: 0.3 K/UL (ref 1–4.8)
LYMPHOCYTES # BLD AUTO: 0.4 K/UL (ref 1–4.8)
LYMPHOCYTES # BLD AUTO: 0.5 K/UL (ref 1–4.8)
LYMPHOCYTES # BLD AUTO: 0.6 K/UL (ref 1–4.8)
LYMPHOCYTES # BLD AUTO: 0.6 K/UL (ref 1–4.8)
LYMPHOCYTES # BLD AUTO: 0.7 K/UL (ref 1–4.8)
LYMPHOCYTES # BLD AUTO: 0.8 K/UL (ref 1–4.8)
LYMPHOCYTES # BLD AUTO: 0.9 K/UL (ref 1–4.8)
LYMPHOCYTES # BLD AUTO: 1 K/UL (ref 1–4.8)
LYMPHOCYTES # BLD AUTO: 1 K/UL (ref 1–4.8)
LYMPHOCYTES # BLD AUTO: 1.1 K/UL (ref 1–4.8)
LYMPHOCYTES # BLD AUTO: 1.1 K/UL (ref 1–4.8)
LYMPHOCYTES # BLD AUTO: 3.7 K/UL (ref 1–4.8)
LYMPHOCYTES # BLD AUTO: ABNORMAL K/UL (ref 1–4.8)
LYMPHOCYTES NFR BLD: 10.6 % (ref 18–48)
LYMPHOCYTES NFR BLD: 10.7 % (ref 18–48)
LYMPHOCYTES NFR BLD: 11.5 % (ref 18–48)
LYMPHOCYTES NFR BLD: 11.6 % (ref 18–48)
LYMPHOCYTES NFR BLD: 12 % (ref 18–48)
LYMPHOCYTES NFR BLD: 12 % (ref 18–48)
LYMPHOCYTES NFR BLD: 12.9 % (ref 18–48)
LYMPHOCYTES NFR BLD: 13 % (ref 18–48)
LYMPHOCYTES NFR BLD: 14 % (ref 18–48)
LYMPHOCYTES NFR BLD: 14.4 % (ref 18–48)
LYMPHOCYTES NFR BLD: 15.2 % (ref 18–48)
LYMPHOCYTES NFR BLD: 15.2 % (ref 18–48)
LYMPHOCYTES NFR BLD: 16 % (ref 18–48)
LYMPHOCYTES NFR BLD: 16 % (ref 18–48)
LYMPHOCYTES NFR BLD: 17 % (ref 18–48)
LYMPHOCYTES NFR BLD: 17 % (ref 18–48)
LYMPHOCYTES NFR BLD: 17.7 % (ref 18–48)
LYMPHOCYTES NFR BLD: 17.8 % (ref 18–48)
LYMPHOCYTES NFR BLD: 18.7 % (ref 18–48)
LYMPHOCYTES NFR BLD: 19 % (ref 18–48)
LYMPHOCYTES NFR BLD: 19.5 % (ref 18–48)
LYMPHOCYTES NFR BLD: 20 % (ref 18–48)
LYMPHOCYTES NFR BLD: 20 % (ref 18–48)
LYMPHOCYTES NFR BLD: 21 % (ref 18–48)
LYMPHOCYTES NFR BLD: 22.6 % (ref 18–48)
LYMPHOCYTES NFR BLD: 23 % (ref 18–48)
LYMPHOCYTES NFR BLD: 24 % (ref 18–48)
LYMPHOCYTES NFR BLD: 24.4 % (ref 18–48)
LYMPHOCYTES NFR BLD: 25 % (ref 18–48)
LYMPHOCYTES NFR BLD: 25.5 % (ref 18–48)
LYMPHOCYTES NFR BLD: 25.7 % (ref 18–48)
LYMPHOCYTES NFR BLD: 26.2 % (ref 18–48)
LYMPHOCYTES NFR BLD: 27 % (ref 18–48)
LYMPHOCYTES NFR BLD: 30.5 % (ref 18–48)
LYMPHOCYTES NFR BLD: 30.7 % (ref 18–48)
LYMPHOCYTES NFR BLD: 33 % (ref 18–48)
LYMPHOCYTES NFR BLD: 34.6 % (ref 18–48)
LYMPHOCYTES NFR BLD: 35 % (ref 18–48)
LYMPHOCYTES NFR BLD: 35.2 % (ref 18–48)
LYMPHOCYTES NFR BLD: 36 % (ref 18–48)
LYMPHOCYTES NFR BLD: 37 % (ref 18–48)
LYMPHOCYTES NFR BLD: 37.6 % (ref 18–48)
LYMPHOCYTES NFR BLD: 45 % (ref 18–48)
LYMPHOCYTES NFR BLD: 46 % (ref 18–48)
LYMPHOCYTES NFR BLD: 46.5 % (ref 18–48)
LYMPHOCYTES NFR BLD: 5.8 % (ref 18–48)
LYMPHOCYTES NFR BLD: 55.1 % (ref 18–48)
LYMPHOCYTES NFR BLD: 57 % (ref 18–48)
LYMPHOCYTES NFR BLD: 57.9 % (ref 18–48)
LYMPHOCYTES NFR BLD: 6.6 % (ref 18–48)
LYMPHOCYTES NFR BLD: 68 % (ref 18–48)
LYMPHOCYTES NFR BLD: 8.1 % (ref 18–48)
LYMPHOCYTES NFR BLD: 9 % (ref 18–48)
LYMPHOCYTES NFR BLD: 9.3 % (ref 18–48)
MAGNESIUM SERPL-MCNC: 1.4 MG/DL (ref 1.6–2.6)
MAGNESIUM SERPL-MCNC: 1.6 MG/DL (ref 1.6–2.6)
MAGNESIUM SERPL-MCNC: 1.7 MG/DL (ref 1.6–2.6)
MAGNESIUM SERPL-MCNC: 1.8 MG/DL (ref 1.6–2.6)
MAGNESIUM SERPL-MCNC: 1.9 MG/DL (ref 1.6–2.6)
MAGNESIUM SERPL-MCNC: 2.1 MG/DL (ref 1.6–2.6)
MCH RBC QN AUTO: 28.4 PG (ref 27–31)
MCH RBC QN AUTO: 29 PG (ref 27–31)
MCH RBC QN AUTO: 29.1 PG (ref 27–31)
MCH RBC QN AUTO: 29.2 PG (ref 27–31)
MCH RBC QN AUTO: 29.2 PG (ref 27–31)
MCH RBC QN AUTO: 29.4 PG (ref 27–31)
MCH RBC QN AUTO: 29.5 PG (ref 27–31)
MCH RBC QN AUTO: 29.6 PG (ref 27–31)
MCH RBC QN AUTO: 29.6 PG (ref 27–31)
MCH RBC QN AUTO: 29.7 PG (ref 27–31)
MCH RBC QN AUTO: 29.7 PG (ref 27–31)
MCH RBC QN AUTO: 29.9 PG (ref 27–31)
MCH RBC QN AUTO: 29.9 PG (ref 27–31)
MCH RBC QN AUTO: 30 PG (ref 27–31)
MCH RBC QN AUTO: 30.1 PG (ref 27–31)
MCH RBC QN AUTO: 30.3 PG (ref 27–31)
MCH RBC QN AUTO: 30.5 PG (ref 27–31)
MCH RBC QN AUTO: 30.7 PG (ref 27–31)
MCH RBC QN AUTO: 30.7 PG (ref 27–31)
MCH RBC QN AUTO: 30.8 PG (ref 27–31)
MCH RBC QN AUTO: 30.8 PG (ref 27–31)
MCH RBC QN AUTO: 30.9 PG (ref 27–31)
MCH RBC QN AUTO: 31 PG (ref 27–31)
MCH RBC QN AUTO: 31.1 PG (ref 27–31)
MCH RBC QN AUTO: 31.1 PG (ref 27–31)
MCH RBC QN AUTO: 31.2 PG (ref 27–31)
MCH RBC QN AUTO: 31.2 PG (ref 27–31)
MCH RBC QN AUTO: 31.3 PG (ref 27–31)
MCH RBC QN AUTO: 31.4 PG (ref 27–31)
MCH RBC QN AUTO: 31.4 PG (ref 27–31)
MCH RBC QN AUTO: 31.6 PG (ref 27–31)
MCH RBC QN AUTO: 31.7 PG (ref 27–31)
MCH RBC QN AUTO: 31.8 PG (ref 27–31)
MCH RBC QN AUTO: 32 PG (ref 27–31)
MCH RBC QN AUTO: 32 PG (ref 27–31)
MCH RBC QN AUTO: 32.2 PG (ref 27–31)
MCH RBC QN AUTO: 32.3 PG (ref 27–31)
MCH RBC QN AUTO: 32.4 PG (ref 27–31)
MCH RBC QN AUTO: 32.4 PG (ref 27–31)
MCH RBC QN AUTO: 32.5 PG (ref 27–31)
MCH RBC QN AUTO: 32.5 PG (ref 27–31)
MCH RBC QN AUTO: 33 PG (ref 27–31)
MCH RBC QN AUTO: 33.3 PG (ref 27–31)
MCH RBC QN AUTO: 33.6 PG (ref 27–31)
MCH RBC QN AUTO: 33.9 PG (ref 27–31)
MCH RBC QN AUTO: 33.9 PG (ref 27–31)
MCH RBC QN AUTO: 34.2 PG (ref 27–31)
MCH RBC QN AUTO: 34.4 PG (ref 27–31)
MCH RBC QN AUTO: 34.5 PG (ref 27–31)
MCHC RBC AUTO-ENTMCNC: 33.1 G/DL (ref 32–36)
MCHC RBC AUTO-ENTMCNC: 33.8 G/DL (ref 32–36)
MCHC RBC AUTO-ENTMCNC: 33.8 G/DL (ref 32–36)
MCHC RBC AUTO-ENTMCNC: 34 G/DL (ref 32–36)
MCHC RBC AUTO-ENTMCNC: 34.1 G/DL (ref 32–36)
MCHC RBC AUTO-ENTMCNC: 34.2 G/DL (ref 32–36)
MCHC RBC AUTO-ENTMCNC: 34.3 G/DL (ref 32–36)
MCHC RBC AUTO-ENTMCNC: 34.3 G/DL (ref 32–36)
MCHC RBC AUTO-ENTMCNC: 34.4 G/DL (ref 32–36)
MCHC RBC AUTO-ENTMCNC: 34.5 G/DL (ref 32–36)
MCHC RBC AUTO-ENTMCNC: 34.6 G/DL (ref 32–36)
MCHC RBC AUTO-ENTMCNC: 34.7 G/DL (ref 32–36)
MCHC RBC AUTO-ENTMCNC: 34.8 G/DL (ref 32–36)
MCHC RBC AUTO-ENTMCNC: 34.9 G/DL (ref 32–36)
MCHC RBC AUTO-ENTMCNC: 35 G/DL (ref 32–36)
MCHC RBC AUTO-ENTMCNC: 35.1 G/DL (ref 32–36)
MCHC RBC AUTO-ENTMCNC: 35.1 G/DL (ref 32–36)
MCHC RBC AUTO-ENTMCNC: 35.2 G/DL (ref 32–36)
MCHC RBC AUTO-ENTMCNC: 35.3 G/DL (ref 32–36)
MCHC RBC AUTO-ENTMCNC: 35.4 G/DL (ref 32–36)
MCHC RBC AUTO-ENTMCNC: 35.6 G/DL (ref 32–36)
MCHC RBC AUTO-ENTMCNC: 35.7 G/DL (ref 32–36)
MCHC RBC AUTO-ENTMCNC: 35.8 G/DL (ref 32–36)
MCHC RBC AUTO-ENTMCNC: 36 G/DL (ref 32–36)
MCHC RBC AUTO-ENTMCNC: 36 G/DL (ref 32–36)
MCHC RBC AUTO-ENTMCNC: 36.2 G/DL (ref 32–36)
MCHC RBC AUTO-ENTMCNC: 36.3 G/DL (ref 32–36)
MCV RBC AUTO: 102 FL (ref 82–98)
MCV RBC AUTO: 83 FL (ref 82–98)
MCV RBC AUTO: 84 FL (ref 82–98)
MCV RBC AUTO: 85 FL (ref 82–98)
MCV RBC AUTO: 86 FL (ref 82–98)
MCV RBC AUTO: 87 FL (ref 82–98)
MCV RBC AUTO: 88 FL (ref 82–98)
MCV RBC AUTO: 89 FL (ref 82–98)
MCV RBC AUTO: 90 FL (ref 82–98)
MCV RBC AUTO: 91 FL (ref 82–98)
MCV RBC AUTO: 92 FL (ref 82–98)
MCV RBC AUTO: 93 FL (ref 82–98)
MCV RBC AUTO: 94 FL (ref 82–98)
MCV RBC AUTO: 94 FL (ref 82–98)
MCV RBC AUTO: 95 FL (ref 82–98)
MCV RBC AUTO: 96 FL (ref 82–98)
MCV RBC AUTO: 97 FL (ref 82–98)
METAMYELOCYTES NFR BLD MANUAL: 2 %
METAMYELOCYTES NFR BLD MANUAL: 3 %
MICROSCOPIC COMMENT: NORMAL
MIN VOL: 16.5
MODE: ABNORMAL
MONOCYTES # BLD AUTO: 0.1 K/UL (ref 0.3–1)
MONOCYTES # BLD AUTO: 0.1 K/UL (ref 0.3–1)
MONOCYTES # BLD AUTO: 0.2 K/UL (ref 0.3–1)
MONOCYTES # BLD AUTO: 0.3 K/UL (ref 0.3–1)
MONOCYTES # BLD AUTO: 0.4 K/UL (ref 0.3–1)
MONOCYTES # BLD AUTO: 0.5 K/UL (ref 0.3–1)
MONOCYTES # BLD AUTO: 0.6 K/UL (ref 0.3–1)
MONOCYTES # BLD AUTO: 0.6 K/UL (ref 0.3–1)
MONOCYTES # BLD AUTO: 0.7 K/UL (ref 0.3–1)
MONOCYTES # BLD AUTO: 0.8 K/UL (ref 0.3–1)
MONOCYTES # BLD AUTO: 1.1 K/UL (ref 0.3–1)
MONOCYTES # BLD AUTO: 1.6 K/UL (ref 0.3–1)
MONOCYTES # BLD AUTO: 2.5 K/UL (ref 0.3–1)
MONOCYTES # BLD AUTO: 6.2 K/UL (ref 0.3–1)
MONOCYTES # BLD AUTO: ABNORMAL K/UL (ref 0.3–1)
MONOCYTES NFR BLD: 1 % (ref 4–15)
MONOCYTES NFR BLD: 10 % (ref 4–15)
MONOCYTES NFR BLD: 10.4 % (ref 4–15)
MONOCYTES NFR BLD: 11 % (ref 4–15)
MONOCYTES NFR BLD: 11.5 % (ref 4–15)
MONOCYTES NFR BLD: 11.9 % (ref 4–15)
MONOCYTES NFR BLD: 12 % (ref 4–15)
MONOCYTES NFR BLD: 12 % (ref 4–15)
MONOCYTES NFR BLD: 12.5 % (ref 4–15)
MONOCYTES NFR BLD: 12.8 % (ref 4–15)
MONOCYTES NFR BLD: 12.9 % (ref 4–15)
MONOCYTES NFR BLD: 13 % (ref 4–15)
MONOCYTES NFR BLD: 13 % (ref 4–15)
MONOCYTES NFR BLD: 13.3 % (ref 4–15)
MONOCYTES NFR BLD: 13.7 % (ref 4–15)
MONOCYTES NFR BLD: 14.1 % (ref 4–15)
MONOCYTES NFR BLD: 14.4 % (ref 4–15)
MONOCYTES NFR BLD: 15 % (ref 4–15)
MONOCYTES NFR BLD: 15 % (ref 4–15)
MONOCYTES NFR BLD: 15.8 % (ref 4–15)
MONOCYTES NFR BLD: 16 % (ref 4–15)
MONOCYTES NFR BLD: 16 % (ref 4–15)
MONOCYTES NFR BLD: 17.6 % (ref 4–15)
MONOCYTES NFR BLD: 18 % (ref 4–15)
MONOCYTES NFR BLD: 19 % (ref 4–15)
MONOCYTES NFR BLD: 19.6 % (ref 4–15)
MONOCYTES NFR BLD: 19.6 % (ref 4–15)
MONOCYTES NFR BLD: 19.7 % (ref 4–15)
MONOCYTES NFR BLD: 19.9 % (ref 4–15)
MONOCYTES NFR BLD: 2 % (ref 4–15)
MONOCYTES NFR BLD: 20 % (ref 4–15)
MONOCYTES NFR BLD: 20 % (ref 4–15)
MONOCYTES NFR BLD: 21 % (ref 4–15)
MONOCYTES NFR BLD: 21.6 % (ref 4–15)
MONOCYTES NFR BLD: 24 % (ref 4–15)
MONOCYTES NFR BLD: 3.2 % (ref 4–15)
MONOCYTES NFR BLD: 35.4 % (ref 4–15)
MONOCYTES NFR BLD: 36 % (ref 4–15)
MONOCYTES NFR BLD: 38.2 % (ref 4–15)
MONOCYTES NFR BLD: 4 % (ref 4–15)
MONOCYTES NFR BLD: 4.9 % (ref 4–15)
MONOCYTES NFR BLD: 5 % (ref 4–15)
MONOCYTES NFR BLD: 6 % (ref 4–15)
MONOCYTES NFR BLD: 6.8 % (ref 4–15)
MONOCYTES NFR BLD: 6.9 % (ref 4–15)
MONOCYTES NFR BLD: 6.9 % (ref 4–15)
MONOCYTES NFR BLD: 60.5 % (ref 4–15)
MONOCYTES NFR BLD: 7 % (ref 4–15)
MONOCYTES NFR BLD: 7 % (ref 4–15)
MONOCYTES NFR BLD: 7.4 % (ref 4–15)
MONOCYTES NFR BLD: 7.5 % (ref 4–15)
MONOCYTES NFR BLD: 7.7 % (ref 4–15)
MONOCYTES NFR BLD: 8 % (ref 4–15)
MONOCYTES NFR BLD: 9 % (ref 4–15)
MONOCYTES NFR BLD: 9.2 % (ref 4–15)
MRSA SCREEN BY PCR: NEGATIVE
MRSA SCREEN BY PCR: NEGATIVE
MV MEAN GRADIENT: 3 MMHG
MV PEAK A VEL: 0.91 M/S
MV PEAK E VEL: 0.79 M/S
MV PEAK GRADIENT: 6 MMHG
MV STENOSIS PRESSURE HALF TIME: 57 MS
MV VALVE AREA BY CONTINUITY EQUATION: 3.28 CM2
MV VALVE AREA P 1/2 METHOD: 3.86 CM2
MYCOPLASMA PNEUMONIAE: NOT DETECTED
MYELOCYTES NFR BLD MANUAL: 1 %
MYELOCYTES NFR BLD MANUAL: 2 %
NEUTROPHILS # BLD AUTO: 0.2 K/UL (ref 1.8–7.7)
NEUTROPHILS # BLD AUTO: 0.3 K/UL (ref 1.8–7.7)
NEUTROPHILS # BLD AUTO: 0.4 K/UL (ref 1.8–7.7)
NEUTROPHILS # BLD AUTO: 0.5 K/UL (ref 1.8–7.7)
NEUTROPHILS # BLD AUTO: 0.8 K/UL (ref 1.8–7.7)
NEUTROPHILS # BLD AUTO: 0.9 K/UL (ref 1.8–7.7)
NEUTROPHILS # BLD AUTO: 1 K/UL (ref 1.8–7.7)
NEUTROPHILS # BLD AUTO: 1.1 K/UL (ref 1.8–7.7)
NEUTROPHILS # BLD AUTO: 1.4 K/UL (ref 1.8–7.7)
NEUTROPHILS # BLD AUTO: 1.5 K/UL (ref 1.8–7.7)
NEUTROPHILS # BLD AUTO: 1.7 K/UL (ref 1.8–7.7)
NEUTROPHILS # BLD AUTO: 1.7 K/UL (ref 1.8–7.7)
NEUTROPHILS # BLD AUTO: 11.9 K/UL (ref 1.8–7.7)
NEUTROPHILS # BLD AUTO: 2.1 K/UL (ref 1.8–7.7)
NEUTROPHILS # BLD AUTO: 2.4 K/UL (ref 1.8–7.7)
NEUTROPHILS # BLD AUTO: 2.6 K/UL (ref 1.8–7.7)
NEUTROPHILS # BLD AUTO: 2.8 K/UL (ref 1.8–7.7)
NEUTROPHILS # BLD AUTO: 2.9 K/UL (ref 1.8–7.7)
NEUTROPHILS # BLD AUTO: 3 K/UL (ref 1.8–7.7)
NEUTROPHILS # BLD AUTO: 3 K/UL (ref 1.8–7.7)
NEUTROPHILS # BLD AUTO: 3.8 K/UL (ref 1.8–7.7)
NEUTROPHILS # BLD AUTO: 3.9 K/UL (ref 1.8–7.7)
NEUTROPHILS # BLD AUTO: 3.9 K/UL (ref 1.8–7.7)
NEUTROPHILS # BLD AUTO: 4.3 K/UL (ref 1.8–7.7)
NEUTROPHILS # BLD AUTO: 5.7 K/UL (ref 1.8–7.7)
NEUTROPHILS # BLD AUTO: 5.7 K/UL (ref 1.8–7.7)
NEUTROPHILS # BLD AUTO: 5.8 K/UL (ref 1.8–7.7)
NEUTROPHILS # BLD AUTO: 6.1 K/UL (ref 1.8–7.7)
NEUTROPHILS # BLD AUTO: 6.9 K/UL (ref 1.8–7.7)
NEUTROPHILS # BLD AUTO: 8.4 K/UL (ref 1.8–7.7)
NEUTROPHILS NFR BLD: 11.2 % (ref 38–73)
NEUTROPHILS NFR BLD: 16 % (ref 38–73)
NEUTROPHILS NFR BLD: 17.3 % (ref 38–73)
NEUTROPHILS NFR BLD: 26 % (ref 38–73)
NEUTROPHILS NFR BLD: 26.3 % (ref 38–73)
NEUTROPHILS NFR BLD: 27 % (ref 38–73)
NEUTROPHILS NFR BLD: 28 % (ref 38–73)
NEUTROPHILS NFR BLD: 29 % (ref 38–73)
NEUTROPHILS NFR BLD: 31 % (ref 38–73)
NEUTROPHILS NFR BLD: 32.1 % (ref 38–73)
NEUTROPHILS NFR BLD: 35 % (ref 38–73)
NEUTROPHILS NFR BLD: 35.6 % (ref 38–73)
NEUTROPHILS NFR BLD: 36 % (ref 38–73)
NEUTROPHILS NFR BLD: 38 % (ref 38–73)
NEUTROPHILS NFR BLD: 39 % (ref 38–73)
NEUTROPHILS NFR BLD: 40 % (ref 38–73)
NEUTROPHILS NFR BLD: 40 % (ref 38–73)
NEUTROPHILS NFR BLD: 41.9 % (ref 38–73)
NEUTROPHILS NFR BLD: 42 % (ref 38–73)
NEUTROPHILS NFR BLD: 43 % (ref 38–73)
NEUTROPHILS NFR BLD: 44 % (ref 38–73)
NEUTROPHILS NFR BLD: 44 % (ref 38–73)
NEUTROPHILS NFR BLD: 44.3 % (ref 38–73)
NEUTROPHILS NFR BLD: 47.2 % (ref 38–73)
NEUTROPHILS NFR BLD: 48 % (ref 38–73)
NEUTROPHILS NFR BLD: 50.1 % (ref 38–73)
NEUTROPHILS NFR BLD: 52 % (ref 38–73)
NEUTROPHILS NFR BLD: 52 % (ref 38–73)
NEUTROPHILS NFR BLD: 53.1 % (ref 38–73)
NEUTROPHILS NFR BLD: 53.2 % (ref 38–73)
NEUTROPHILS NFR BLD: 54 % (ref 38–73)
NEUTROPHILS NFR BLD: 56 % (ref 38–73)
NEUTROPHILS NFR BLD: 56 % (ref 38–73)
NEUTROPHILS NFR BLD: 62 % (ref 38–73)
NEUTROPHILS NFR BLD: 63 % (ref 38–73)
NEUTROPHILS NFR BLD: 63.2 % (ref 38–73)
NEUTROPHILS NFR BLD: 64 % (ref 38–73)
NEUTROPHILS NFR BLD: 65 % (ref 38–73)
NEUTROPHILS NFR BLD: 65.2 % (ref 38–73)
NEUTROPHILS NFR BLD: 68.4 % (ref 38–73)
NEUTROPHILS NFR BLD: 68.4 % (ref 38–73)
NEUTROPHILS NFR BLD: 68.8 % (ref 38–73)
NEUTROPHILS NFR BLD: 7 % (ref 38–73)
NEUTROPHILS NFR BLD: 70.4 % (ref 38–73)
NEUTROPHILS NFR BLD: 71.7 % (ref 38–73)
NEUTROPHILS NFR BLD: 72 % (ref 38–73)
NEUTROPHILS NFR BLD: 73 % (ref 38–73)
NEUTROPHILS NFR BLD: 74.2 % (ref 38–73)
NEUTROPHILS NFR BLD: 74.9 % (ref 38–73)
NEUTROPHILS NFR BLD: 75 % (ref 38–73)
NEUTROPHILS NFR BLD: 75.5 % (ref 38–73)
NEUTROPHILS NFR BLD: 76 % (ref 38–73)
NEUTROPHILS NFR BLD: 76.2 % (ref 38–73)
NEUTROPHILS NFR BLD: 77 % (ref 38–73)
NEUTROPHILS NFR BLD: 78.4 % (ref 38–73)
NEUTROPHILS NFR BLD: 79.5 % (ref 38–73)
NEUTROPHILS NFR BLD: 80.2 % (ref 38–73)
NEUTROPHILS NFR BLD: 81 % (ref 38–73)
NEUTROPHILS NFR BLD: 82.3 % (ref 38–73)
NEUTROPHILS NFR BLD: 83.6 % (ref 38–73)
NEUTROPHILS NFR BLD: 83.6 % (ref 38–73)
NEUTROPHILS NFR BLD: 84 % (ref 38–73)
NEUTS BAND NFR BLD MANUAL: 0 %
NEUTS BAND NFR BLD MANUAL: 1 %
NEUTS BAND NFR BLD MANUAL: 10 %
NEUTS BAND NFR BLD MANUAL: 11 %
NEUTS BAND NFR BLD MANUAL: 12 %
NEUTS BAND NFR BLD MANUAL: 16 %
NEUTS BAND NFR BLD MANUAL: 2 %
NEUTS BAND NFR BLD MANUAL: 3 %
NEUTS BAND NFR BLD MANUAL: 3 %
NEUTS BAND NFR BLD MANUAL: 4 %
NEUTS BAND NFR BLD MANUAL: 5 %
NEUTS BAND NFR BLD MANUAL: 6 %
NEUTS BAND NFR BLD MANUAL: 8 %
NITRITE UR QL STRIP: NEGATIVE
NONHDLC SERPL-MCNC: 87 MG/DL
NRBC BLD-RTO: 0 /100 WBC
NRBC BLD-RTO: 1 /100 WBC
NUM UNITS TRANS PACKED RBC: NORMAL
OB PNL STL: POSITIVE
OVALOCYTES BLD QL SMEAR: ABNORMAL
PCO2 BLDA: 34.6 MMHG (ref 35–45)
PH SMN: 7.44 [PH] (ref 7.35–7.45)
PH UR STRIP: 5 [PH] (ref 5–8)
PH UR STRIP: 6 [PH] (ref 5–8)
PH UR STRIP: 7 [PH] (ref 5–8)
PHOSPHATE SERPL-MCNC: 1.4 MG/DL (ref 2.7–4.5)
PHOSPHATE SERPL-MCNC: 1.6 MG/DL (ref 2.7–4.5)
PHOSPHATE SERPL-MCNC: 3 MG/DL (ref 2.7–4.5)
PHOSPHATE SERPL-MCNC: 3.5 MG/DL (ref 2.7–4.5)
PHOSPHATE SERPL-MCNC: 3.6 MG/DL (ref 2.7–4.5)
PISA MRMAX VEL: 5.16 M/S
PISA TR MAX VEL: 3.1 M/S
PLATELET # BLD AUTO: 103 K/UL (ref 150–450)
PLATELET # BLD AUTO: 16 K/UL (ref 150–450)
PLATELET # BLD AUTO: 18 K/UL (ref 150–450)
PLATELET # BLD AUTO: 19 K/UL (ref 150–450)
PLATELET # BLD AUTO: 20 K/UL (ref 150–450)
PLATELET # BLD AUTO: 21 K/UL (ref 150–450)
PLATELET # BLD AUTO: 24 K/UL (ref 150–450)
PLATELET # BLD AUTO: 26 K/UL (ref 150–450)
PLATELET # BLD AUTO: 26 K/UL (ref 150–450)
PLATELET # BLD AUTO: 27 K/UL (ref 150–450)
PLATELET # BLD AUTO: 28 K/UL (ref 150–450)
PLATELET # BLD AUTO: 31 K/UL (ref 150–450)
PLATELET # BLD AUTO: 32 K/UL (ref 150–450)
PLATELET # BLD AUTO: 32 K/UL (ref 150–450)
PLATELET # BLD AUTO: 33 K/UL (ref 150–450)
PLATELET # BLD AUTO: 35 K/UL (ref 150–450)
PLATELET # BLD AUTO: 36 K/UL (ref 150–450)
PLATELET # BLD AUTO: 36 K/UL (ref 150–450)
PLATELET # BLD AUTO: 37 K/UL (ref 150–450)
PLATELET # BLD AUTO: 37 K/UL (ref 150–450)
PLATELET # BLD AUTO: 38 K/UL (ref 150–450)
PLATELET # BLD AUTO: 42 K/UL (ref 150–450)
PLATELET # BLD AUTO: 43 K/UL (ref 150–450)
PLATELET # BLD AUTO: 44 K/UL (ref 150–450)
PLATELET # BLD AUTO: 44 K/UL (ref 150–450)
PLATELET # BLD AUTO: 46 K/UL (ref 150–450)
PLATELET # BLD AUTO: 48 K/UL (ref 150–450)
PLATELET # BLD AUTO: 48 K/UL (ref 150–450)
PLATELET # BLD AUTO: 5 K/UL (ref 150–450)
PLATELET # BLD AUTO: 50 K/UL (ref 150–450)
PLATELET # BLD AUTO: 54 K/UL (ref 150–450)
PLATELET # BLD AUTO: 55 K/UL (ref 150–450)
PLATELET # BLD AUTO: 55 K/UL (ref 150–450)
PLATELET # BLD AUTO: 56 K/UL (ref 150–450)
PLATELET # BLD AUTO: 57 K/UL (ref 150–450)
PLATELET # BLD AUTO: 60 K/UL (ref 150–450)
PLATELET # BLD AUTO: 61 K/UL (ref 150–450)
PLATELET # BLD AUTO: 62 K/UL (ref 150–450)
PLATELET # BLD AUTO: 63 K/UL (ref 150–450)
PLATELET # BLD AUTO: 65 K/UL (ref 150–450)
PLATELET # BLD AUTO: 65 K/UL (ref 150–450)
PLATELET # BLD AUTO: 69 K/UL (ref 150–450)
PLATELET # BLD AUTO: 70 K/UL (ref 150–450)
PLATELET # BLD AUTO: 71 K/UL (ref 150–450)
PLATELET # BLD AUTO: 73 K/UL (ref 150–450)
PLATELET # BLD AUTO: 75 K/UL (ref 150–450)
PLATELET # BLD AUTO: 77 K/UL (ref 150–450)
PLATELET # BLD AUTO: 77 K/UL (ref 150–450)
PLATELET # BLD AUTO: 78 K/UL (ref 150–450)
PLATELET # BLD AUTO: 79 K/UL (ref 150–450)
PLATELET # BLD AUTO: 8 K/UL (ref 150–450)
PLATELET # BLD AUTO: 80 K/UL (ref 150–450)
PLATELET # BLD AUTO: 81 K/UL (ref 150–450)
PLATELET # BLD AUTO: 83 K/UL (ref 150–450)
PLATELET # BLD AUTO: 85 K/UL (ref 150–450)
PLATELET # BLD AUTO: 87 K/UL (ref 150–450)
PLATELET # BLD AUTO: 90 K/UL (ref 150–450)
PLATELET # BLD AUTO: 98 K/UL (ref 150–450)
PLATELET # BLD AUTO: 98 K/UL (ref 150–450)
PLATELET # BLD AUTO: 99 K/UL (ref 150–450)
PLATELET BLD QL SMEAR: ABNORMAL
PMV BLD AUTO: 10 FL (ref 9.2–12.9)
PMV BLD AUTO: 10.2 FL (ref 9.2–12.9)
PMV BLD AUTO: 10.3 FL (ref 9.2–12.9)
PMV BLD AUTO: 10.3 FL (ref 9.2–12.9)
PMV BLD AUTO: 10.5 FL (ref 9.2–12.9)
PMV BLD AUTO: 10.6 FL (ref 9.2–12.9)
PMV BLD AUTO: 10.6 FL (ref 9.2–12.9)
PMV BLD AUTO: 10.7 FL (ref 9.2–12.9)
PMV BLD AUTO: 10.8 FL (ref 9.2–12.9)
PMV BLD AUTO: 10.9 FL (ref 9.2–12.9)
PMV BLD AUTO: 11 FL (ref 9.2–12.9)
PMV BLD AUTO: 11.1 FL (ref 9.2–12.9)
PMV BLD AUTO: 11.4 FL (ref 9.2–12.9)
PMV BLD AUTO: 11.5 FL (ref 9.2–12.9)
PMV BLD AUTO: 11.6 FL (ref 9.2–12.9)
PMV BLD AUTO: 11.6 FL (ref 9.2–12.9)
PMV BLD AUTO: 11.7 FL (ref 9.2–12.9)
PMV BLD AUTO: 11.7 FL (ref 9.2–12.9)
PMV BLD AUTO: 11.8 FL (ref 9.2–12.9)
PMV BLD AUTO: 11.8 FL (ref 9.2–12.9)
PMV BLD AUTO: 11.9 FL (ref 9.2–12.9)
PMV BLD AUTO: 12 FL (ref 9.2–12.9)
PMV BLD AUTO: 12.2 FL (ref 9.2–12.9)
PMV BLD AUTO: 12.3 FL (ref 9.2–12.9)
PMV BLD AUTO: 12.5 FL (ref 9.2–12.9)
PMV BLD AUTO: 12.5 FL (ref 9.2–12.9)
PMV BLD AUTO: 12.6 FL (ref 9.2–12.9)
PMV BLD AUTO: 12.7 FL (ref 9.2–12.9)
PMV BLD AUTO: 12.9 FL (ref 9.2–12.9)
PMV BLD AUTO: 12.9 FL (ref 9.2–12.9)
PMV BLD AUTO: 13.2 FL (ref 9.2–12.9)
PMV BLD AUTO: 13.6 FL (ref 9.2–12.9)
PMV BLD AUTO: 13.8 FL (ref 9.2–12.9)
PMV BLD AUTO: 8.8 FL (ref 9.2–12.9)
PMV BLD AUTO: 9 FL (ref 9.2–12.9)
PMV BLD AUTO: 9.3 FL (ref 9.2–12.9)
PMV BLD AUTO: 9.5 FL (ref 9.2–12.9)
PMV BLD AUTO: 9.6 FL (ref 9.2–12.9)
PMV BLD AUTO: 9.6 FL (ref 9.2–12.9)
PMV BLD AUTO: ABNORMAL FL (ref 9.2–12.9)
PO2 BLDA: 145 MMHG (ref 80–100)
POC BE: -1 MMOL/L
POC SATURATED O2: 99 % (ref 95–100)
POC TCO2: 24 MMOL/L (ref 23–27)
POIKILOCYTOSIS BLD QL SMEAR: ABNORMAL
POIKILOCYTOSIS BLD QL SMEAR: SLIGHT
POLYCHROMASIA BLD QL SMEAR: ABNORMAL
POTASSIUM SERPL-SCNC: 3.1 MMOL/L (ref 3.5–5.1)
POTASSIUM SERPL-SCNC: 3.1 MMOL/L (ref 3.5–5.1)
POTASSIUM SERPL-SCNC: 3.2 MMOL/L (ref 3.5–5.1)
POTASSIUM SERPL-SCNC: 3.3 MMOL/L (ref 3.5–5.1)
POTASSIUM SERPL-SCNC: 3.4 MMOL/L (ref 3.5–5.1)
POTASSIUM SERPL-SCNC: 3.6 MMOL/L (ref 3.5–5.1)
POTASSIUM SERPL-SCNC: 3.7 MMOL/L (ref 3.5–5.1)
POTASSIUM SERPL-SCNC: 3.8 MMOL/L (ref 3.5–5.1)
POTASSIUM SERPL-SCNC: 3.9 MMOL/L (ref 3.5–5.1)
POTASSIUM SERPL-SCNC: 4 MMOL/L (ref 3.5–5.1)
POTASSIUM SERPL-SCNC: 4.1 MMOL/L (ref 3.5–5.1)
POTASSIUM SERPL-SCNC: 4.2 MMOL/L (ref 3.5–5.1)
POTASSIUM SERPL-SCNC: 4.3 MMOL/L (ref 3.5–5.1)
POTASSIUM SERPL-SCNC: 4.4 MMOL/L (ref 3.5–5.1)
POTASSIUM SERPL-SCNC: 4.5 MMOL/L (ref 3.5–5.1)
POTASSIUM SERPL-SCNC: 4.6 MMOL/L (ref 3.5–5.1)
POTASSIUM SERPL-SCNC: 4.7 MMOL/L (ref 3.5–5.1)
POTASSIUM SERPL-SCNC: 4.7 MMOL/L (ref 3.5–5.1)
PROCALCITONIN SERPL IA-MCNC: 0.09 NG/ML (ref 0–0.5)
PROCALCITONIN SERPL IA-MCNC: 0.61 NG/ML (ref 0–0.5)
PROCALCITONIN SERPL IA-MCNC: 1.16 NG/ML (ref 0–0.5)
PROCALCITONIN SERPL IA-MCNC: <0.05 NG/ML (ref 0–0.5)
PROCALCITONIN SERPL IA-MCNC: <0.05 NG/ML (ref 0–0.5)
PROMYELOCYTES NFR BLD MANUAL: 3 %
PROT SERPL-MCNC: 4.4 G/DL (ref 6–8.4)
PROT SERPL-MCNC: 5.2 G/DL (ref 6–8.4)
PROT SERPL-MCNC: 5.3 G/DL (ref 6–8.4)
PROT SERPL-MCNC: 5.4 G/DL (ref 6–8.4)
PROT SERPL-MCNC: 5.6 G/DL (ref 6–8.4)
PROT SERPL-MCNC: 5.6 G/DL (ref 6–8.4)
PROT SERPL-MCNC: 5.7 G/DL (ref 6–8.4)
PROT SERPL-MCNC: 5.8 G/DL (ref 6–8.4)
PROT SERPL-MCNC: 5.8 G/DL (ref 6–8.4)
PROT SERPL-MCNC: 6 G/DL (ref 6–8.4)
PROT SERPL-MCNC: 6.1 G/DL (ref 6–8.4)
PROT SERPL-MCNC: 6.2 G/DL (ref 6–8.4)
PROT SERPL-MCNC: 6.3 G/DL (ref 6–8.4)
PROT SERPL-MCNC: 6.4 G/DL (ref 6–8.4)
PROT SERPL-MCNC: 6.5 G/DL (ref 6–8.4)
PROT SERPL-MCNC: 6.5 G/DL (ref 6–8.4)
PROT SERPL-MCNC: 6.6 G/DL (ref 6–8.4)
PROT SERPL-MCNC: 6.8 G/DL (ref 6–8.4)
PROT SERPL-MCNC: 6.8 G/DL (ref 6–8.4)
PROT UR QL STRIP: ABNORMAL
PROT UR QL STRIP: NEGATIVE
PROTHROMBIN TIME: 10.2 SEC (ref 9–12.5)
PROTHROMBIN TIME: 10.6 SEC (ref 9–12.5)
PROTHROMBIN TIME: 10.6 SEC (ref 9–12.5)
PROTHROMBIN TIME: 11.2 SEC (ref 9–12.5)
PV MV: 0.9 M/S
PV PEAK GRADIENT: 7 MMHG
PV PEAK VELOCITY: 1.33 M/S
RA PRESSURE ESTIMATED: 3 MMHG
RBC # BLD AUTO: 1.29 M/UL (ref 4.6–6.2)
RBC # BLD AUTO: 1.89 M/UL (ref 4.6–6.2)
RBC # BLD AUTO: 1.95 M/UL (ref 4.6–6.2)
RBC # BLD AUTO: 2.1 M/UL (ref 4.6–6.2)
RBC # BLD AUTO: 2.26 M/UL (ref 4.6–6.2)
RBC # BLD AUTO: 2.38 M/UL (ref 4.6–6.2)
RBC # BLD AUTO: 2.6 M/UL (ref 4.6–6.2)
RBC # BLD AUTO: 2.76 M/UL (ref 4.6–6.2)
RBC # BLD AUTO: 2.77 M/UL (ref 4.6–6.2)
RBC # BLD AUTO: 2.78 M/UL (ref 4.6–6.2)
RBC # BLD AUTO: 2.8 M/UL (ref 4.6–6.2)
RBC # BLD AUTO: 2.82 M/UL (ref 4.6–6.2)
RBC # BLD AUTO: 2.83 M/UL (ref 4.6–6.2)
RBC # BLD AUTO: 2.9 M/UL (ref 4.6–6.2)
RBC # BLD AUTO: 2.93 M/UL (ref 4.6–6.2)
RBC # BLD AUTO: 2.94 M/UL (ref 4.6–6.2)
RBC # BLD AUTO: 2.96 M/UL (ref 4.6–6.2)
RBC # BLD AUTO: 2.97 M/UL (ref 4.6–6.2)
RBC # BLD AUTO: 3 M/UL (ref 4.6–6.2)
RBC # BLD AUTO: 3.02 M/UL (ref 4.6–6.2)
RBC # BLD AUTO: 3.05 M/UL (ref 4.6–6.2)
RBC # BLD AUTO: 3.13 M/UL (ref 4.6–6.2)
RBC # BLD AUTO: 3.16 M/UL (ref 4.6–6.2)
RBC # BLD AUTO: 3.16 M/UL (ref 4.6–6.2)
RBC # BLD AUTO: 3.18 M/UL (ref 4.6–6.2)
RBC # BLD AUTO: 3.26 M/UL (ref 4.6–6.2)
RBC # BLD AUTO: 3.29 M/UL (ref 4.6–6.2)
RBC # BLD AUTO: 3.34 M/UL (ref 4.6–6.2)
RBC # BLD AUTO: 3.35 M/UL (ref 4.6–6.2)
RBC # BLD AUTO: 3.49 M/UL (ref 4.6–6.2)
RBC # BLD AUTO: 3.51 M/UL (ref 4.6–6.2)
RBC # BLD AUTO: 3.53 M/UL (ref 4.6–6.2)
RBC # BLD AUTO: 3.59 M/UL (ref 4.6–6.2)
RBC # BLD AUTO: 3.62 M/UL (ref 4.6–6.2)
RBC # BLD AUTO: 3.64 M/UL (ref 4.6–6.2)
RBC # BLD AUTO: 3.64 M/UL (ref 4.6–6.2)
RBC # BLD AUTO: 3.65 M/UL (ref 4.6–6.2)
RBC # BLD AUTO: 3.66 M/UL (ref 4.6–6.2)
RBC # BLD AUTO: 3.66 M/UL (ref 4.6–6.2)
RBC # BLD AUTO: 3.68 M/UL (ref 4.6–6.2)
RBC # BLD AUTO: 3.69 M/UL (ref 4.6–6.2)
RBC # BLD AUTO: 3.69 M/UL (ref 4.6–6.2)
RBC # BLD AUTO: 3.7 M/UL (ref 4.6–6.2)
RBC # BLD AUTO: 3.7 M/UL (ref 4.6–6.2)
RBC # BLD AUTO: 3.71 M/UL (ref 4.6–6.2)
RBC # BLD AUTO: 3.74 M/UL (ref 4.6–6.2)
RBC # BLD AUTO: 3.75 M/UL (ref 4.6–6.2)
RBC # BLD AUTO: 3.76 M/UL (ref 4.6–6.2)
RBC # BLD AUTO: 3.77 M/UL (ref 4.6–6.2)
RBC # BLD AUTO: 3.78 M/UL (ref 4.6–6.2)
RBC # BLD AUTO: 3.8 M/UL (ref 4.6–6.2)
RBC # BLD AUTO: 3.81 M/UL (ref 4.6–6.2)
RBC # BLD AUTO: 3.95 M/UL (ref 4.6–6.2)
RBC # BLD AUTO: 3.97 M/UL (ref 4.6–6.2)
RBC # BLD AUTO: 4.07 M/UL (ref 4.6–6.2)
RBC # BLD AUTO: 4.22 M/UL (ref 4.6–6.2)
RBC # BLD AUTO: 4.24 M/UL (ref 4.6–6.2)
RBC # BLD AUTO: 4.3 M/UL (ref 4.6–6.2)
RBC #/AREA URNS HPF: 1 /HPF (ref 0–4)
RESPIRATORY INFECTION PANEL SOURCE: NORMAL
RIGHT VENTRICULAR END-DIASTOLIC DIMENSION: 3.33 CM
RSV RNA NPH QL NAA+NON-PROBE: NOT DETECTED
RV TB RVSP: 6 MMHG
RV TISSUE DOPPLER FREE WALL SYSTOLIC VELOCITY 1 (APICAL 4 CHAMBER VIEW): 14.4 CM/S
RV+EV RNA NPH QL NAA+NON-PROBE: NOT DETECTED
SAMPLE: ABNORMAL
SARS-COV-2 RDRP RESP QL NAA+PROBE: NEGATIVE
SARS-COV-2 RNA RESP QL NAA+PROBE: NOT DETECTED
SATURATED IRON: 17 % (ref 20–50)
SATURATED IRON: 18 % (ref 20–50)
SATURATED IRON: 37 % (ref 20–50)
SATURATED IRON: 38 % (ref 20–50)
SATURATED IRON: 42 % (ref 20–50)
SCHISTOCYTES BLD QL SMEAR: PRESENT
SITE: ABNORMAL
SODIUM SERPL-SCNC: 132 MMOL/L (ref 136–145)
SODIUM SERPL-SCNC: 133 MMOL/L (ref 136–145)
SODIUM SERPL-SCNC: 134 MMOL/L (ref 136–145)
SODIUM SERPL-SCNC: 135 MMOL/L (ref 136–145)
SODIUM SERPL-SCNC: 136 MMOL/L (ref 136–145)
SODIUM SERPL-SCNC: 137 MMOL/L (ref 136–145)
SODIUM SERPL-SCNC: 138 MMOL/L (ref 136–145)
SODIUM SERPL-SCNC: 139 MMOL/L (ref 136–145)
SODIUM SERPL-SCNC: 140 MMOL/L (ref 136–145)
SODIUM SERPL-SCNC: 141 MMOL/L (ref 136–145)
SODIUM SERPL-SCNC: 141 MMOL/L (ref 136–145)
SODIUM SERPL-SCNC: 142 MMOL/L (ref 136–145)
SP GR UR STRIP: 1.01 (ref 1–1.03)
SP GR UR STRIP: 1.02 (ref 1–1.03)
SP GR UR STRIP: 1.02 (ref 1–1.03)
SP GR UR STRIP: >1.03 (ref 1–1.03)
SP02: 100
SPECIMEN OUTDATE: NORMAL
SPECIMEN OUTDATE: NORMAL
SPECIMEN SOURCE: NORMAL
SPONT RATE: 32
T4 FREE SERPL-MCNC: 0.74 NG/DL (ref 0.71–1.51)
TDI LATERAL: 0.1 M/S
TDI SEPTAL: 0.09 M/S
TDI: 0.1 M/S
TOTAL IRON BINDING CAPACITY: 290 UG/DL (ref 250–450)
TOTAL IRON BINDING CAPACITY: 297 UG/DL (ref 250–450)
TOTAL IRON BINDING CAPACITY: 318 UG/DL (ref 250–450)
TOTAL IRON BINDING CAPACITY: 342 UG/DL (ref 250–450)
TOTAL IRON BINDING CAPACITY: 353 UG/DL (ref 250–450)
TR MAX PG: 38 MMHG
TRANSFERRIN SERPL-MCNC: 207 MG/DL (ref 200–375)
TRANSFERRIN SERPL-MCNC: 212 MG/DL (ref 200–375)
TRANSFERRIN SERPL-MCNC: 227 MG/DL (ref 200–375)
TRANSFERRIN SERPL-MCNC: 244 MG/DL (ref 200–375)
TRANSFERRIN SERPL-MCNC: 252 MG/DL (ref 200–375)
TRICUSPID ANNULAR PLANE SYSTOLIC EXCURSION: 2.79 CM
TRIGL SERPL-MCNC: 98 MG/DL (ref 30–150)
TROPONIN I SERPL HS-MCNC: 10.3 PG/ML (ref 0–14.9)
TROPONIN I SERPL HS-MCNC: 145.5 PG/ML (ref 0–14.9)
TROPONIN I SERPL HS-MCNC: 2.9 PG/ML (ref 0–14.9)
TROPONIN I SERPL HS-MCNC: 233.6 PG/ML (ref 0–14.9)
TROPONIN I SERPL HS-MCNC: 283.6 PG/ML (ref 0–14.9)
TROPONIN I SERPL HS-MCNC: 318.2 PG/ML (ref 0–14.9)
TROPONIN I SERPL HS-MCNC: 346.2 PG/ML (ref 0–14.9)
TROPONIN I SERPL HS-MCNC: 4 PG/ML (ref 0–14.9)
TROPONIN I SERPL HS-MCNC: 60.2 PG/ML (ref 0–14.9)
TROPONIN I SERPL HS-MCNC: 7.9 PG/ML (ref 0–14.9)
TROPONIN I SERPL HS-MCNC: 8.9 PG/ML (ref 0–14.9)
TROPONIN I SERPL HS-MCNC: 90.5 PG/ML (ref 0–14.9)
TSH SERPL DL<=0.005 MIU/L-ACNC: 0.08 UIU/ML (ref 0.34–5.6)
TV REST PULMONARY ARTERY PRESSURE: 41 MMHG
UNIT NUMBER: NORMAL
URATE SERPL-MCNC: 2.1 MG/DL (ref 3.4–7)
URATE SERPL-MCNC: 2.2 MG/DL (ref 3.4–7)
URATE SERPL-MCNC: 2.4 MG/DL (ref 3.4–7)
URATE SERPL-MCNC: 2.4 MG/DL (ref 3.4–7)
URATE SERPL-MCNC: 2.5 MG/DL (ref 3.4–7)
URATE SERPL-MCNC: 2.6 MG/DL (ref 3.4–7)
URATE SERPL-MCNC: 2.7 MG/DL (ref 3.4–7)
URATE SERPL-MCNC: 2.7 MG/DL (ref 3.4–7)
URATE SERPL-MCNC: 2.8 MG/DL (ref 3.4–7)
URATE SERPL-MCNC: 2.9 MG/DL (ref 3.4–7)
URATE SERPL-MCNC: 2.9 MG/DL (ref 3.4–7)
URATE SERPL-MCNC: 3 MG/DL (ref 3.4–7)
URATE SERPL-MCNC: 3.1 MG/DL (ref 3.4–7)
URATE SERPL-MCNC: 3.3 MG/DL (ref 3.4–7)
URATE SERPL-MCNC: 3.4 MG/DL (ref 3.4–7)
URATE SERPL-MCNC: 3.4 MG/DL (ref 3.4–7)
URATE SERPL-MCNC: 3.5 MG/DL (ref 3.4–7)
URATE SERPL-MCNC: 3.7 MG/DL (ref 3.4–7)
URATE SERPL-MCNC: 3.7 MG/DL (ref 3.4–7)
URATE SERPL-MCNC: 3.8 MG/DL (ref 3.4–7)
URATE SERPL-MCNC: 3.9 MG/DL (ref 3.4–7)
URATE SERPL-MCNC: 4.3 MG/DL (ref 3.4–7)
URATE SERPL-MCNC: 4.3 MG/DL (ref 3.4–7)
URN SPEC COLLECT METH UR: ABNORMAL
URN SPEC COLLECT METH UR: NORMAL
URN SPEC COLLECT METH UR: NORMAL
UROBILINOGEN UR STRIP-ACNC: ABNORMAL EU/DL
UROBILINOGEN UR STRIP-ACNC: NEGATIVE EU/DL
VANCOMYCIN TROUGH SERPL-MCNC: 19.9 UG/ML
VANCOMYCIN TROUGH SERPL-MCNC: 7.6 UG/ML
VANCOMYCIN TROUGH SERPL-MCNC: 9.7 UG/ML
WBC # BLD AUTO: 0.56 K/UL (ref 3.9–12.7)
WBC # BLD AUTO: 0.76 K/UL (ref 3.9–12.7)
WBC # BLD AUTO: 0.78 K/UL (ref 3.9–12.7)
WBC # BLD AUTO: 0.87 K/UL (ref 3.9–12.7)
WBC # BLD AUTO: 1.49 K/UL (ref 3.9–12.7)
WBC # BLD AUTO: 1.5 K/UL (ref 3.9–12.7)
WBC # BLD AUTO: 1.5 K/UL (ref 3.9–12.7)
WBC # BLD AUTO: 1.56 K/UL (ref 3.9–12.7)
WBC # BLD AUTO: 1.64 K/UL (ref 3.9–12.7)
WBC # BLD AUTO: 1.65 K/UL (ref 3.9–12.7)
WBC # BLD AUTO: 1.76 K/UL (ref 3.9–12.7)
WBC # BLD AUTO: 1.81 K/UL (ref 3.9–12.7)
WBC # BLD AUTO: 1.93 K/UL (ref 3.9–12.7)
WBC # BLD AUTO: 1.95 K/UL (ref 3.9–12.7)
WBC # BLD AUTO: 10.08 K/UL (ref 3.9–12.7)
WBC # BLD AUTO: 14.86 K/UL (ref 3.9–12.7)
WBC # BLD AUTO: 16.22 K/UL (ref 3.9–12.7)
WBC # BLD AUTO: 18.39 K/UL (ref 3.9–12.7)
WBC # BLD AUTO: 2.05 K/UL (ref 3.9–12.7)
WBC # BLD AUTO: 2.05 K/UL (ref 3.9–12.7)
WBC # BLD AUTO: 2.06 K/UL (ref 3.9–12.7)
WBC # BLD AUTO: 2.18 K/UL (ref 3.9–12.7)
WBC # BLD AUTO: 2.19 K/UL (ref 3.9–12.7)
WBC # BLD AUTO: 2.23 K/UL (ref 3.9–12.7)
WBC # BLD AUTO: 2.23 K/UL (ref 3.9–12.7)
WBC # BLD AUTO: 2.26 K/UL (ref 3.9–12.7)
WBC # BLD AUTO: 2.3 K/UL (ref 3.9–12.7)
WBC # BLD AUTO: 2.56 K/UL (ref 3.9–12.7)
WBC # BLD AUTO: 2.71 K/UL (ref 3.9–12.7)
WBC # BLD AUTO: 2.71 K/UL (ref 3.9–12.7)
WBC # BLD AUTO: 2.78 K/UL (ref 3.9–12.7)
WBC # BLD AUTO: 2.94 K/UL (ref 3.9–12.7)
WBC # BLD AUTO: 3.01 K/UL (ref 3.9–12.7)
WBC # BLD AUTO: 3.07 K/UL (ref 3.9–12.7)
WBC # BLD AUTO: 3.07 K/UL (ref 3.9–12.7)
WBC # BLD AUTO: 3.14 K/UL (ref 3.9–12.7)
WBC # BLD AUTO: 3.14 K/UL (ref 3.9–12.7)
WBC # BLD AUTO: 3.18 K/UL (ref 3.9–12.7)
WBC # BLD AUTO: 3.32 K/UL (ref 3.9–12.7)
WBC # BLD AUTO: 3.76 K/UL (ref 3.9–12.7)
WBC # BLD AUTO: 3.96 K/UL (ref 3.9–12.7)
WBC # BLD AUTO: 3.99 K/UL (ref 3.9–12.7)
WBC # BLD AUTO: 4.07 K/UL (ref 3.9–12.7)
WBC # BLD AUTO: 4.2 K/UL (ref 3.9–12.7)
WBC # BLD AUTO: 4.21 K/UL (ref 3.9–12.7)
WBC # BLD AUTO: 4.22 K/UL (ref 3.9–12.7)
WBC # BLD AUTO: 4.22 K/UL (ref 3.9–12.7)
WBC # BLD AUTO: 4.28 K/UL (ref 3.9–12.7)
WBC # BLD AUTO: 4.31 K/UL (ref 3.9–12.7)
WBC # BLD AUTO: 4.69 K/UL (ref 3.9–12.7)
WBC # BLD AUTO: 4.97 K/UL (ref 3.9–12.7)
WBC # BLD AUTO: 4.99 K/UL (ref 3.9–12.7)
WBC # BLD AUTO: 5.05 K/UL (ref 3.9–12.7)
WBC # BLD AUTO: 5.46 K/UL (ref 3.9–12.7)
WBC # BLD AUTO: 5.5 K/UL (ref 3.9–12.7)
WBC # BLD AUTO: 5.72 K/UL (ref 3.9–12.7)
WBC # BLD AUTO: 7.11 K/UL (ref 3.9–12.7)
WBC # BLD AUTO: 7.21 K/UL (ref 3.9–12.7)
WBC # BLD AUTO: 7.28 K/UL (ref 3.9–12.7)
WBC # BLD AUTO: 7.36 K/UL (ref 3.9–12.7)
WBC # BLD AUTO: 9.57 K/UL (ref 3.9–12.7)
WBC # BLD AUTO: 9.86 K/UL (ref 3.9–12.7)
WBC #/AREA URNS HPF: 0 /HPF (ref 0–5)
WBC OTHER NFR BLD MANUAL: 13 %
WBC OTHER NFR BLD MANUAL: 2 %
WBC OTHER NFR BLD MANUAL: 20 %
WBC OTHER NFR BLD MANUAL: 24 %
WBC OTHER NFR BLD MANUAL: 42 %
WBC OTHER NFR BLD MANUAL: 9 %
YEAST URNS QL MICRO: NORMAL
Z-SCORE OF LEFT VENTRICULAR DIMENSION IN END DIASTOLE: -6.33
Z-SCORE OF LEFT VENTRICULAR DIMENSION IN END SYSTOLE: -4.36

## 2023-01-01 PROCEDURE — 99214 OFFICE O/P EST MOD 30 MIN: CPT | Mod: S$GLB,,, | Performed by: NURSE PRACTITIONER

## 2023-01-01 PROCEDURE — 85007 BL SMEAR W/DIFF WBC COUNT: CPT | Performed by: NURSE PRACTITIONER

## 2023-01-01 PROCEDURE — 25000242 PHARM REV CODE 250 ALT 637 W/ HCPCS: Performed by: HOSPITALIST

## 2023-01-01 PROCEDURE — 80053 COMPREHEN METABOLIC PANEL: CPT | Performed by: STUDENT IN AN ORGANIZED HEALTH CARE EDUCATION/TRAINING PROGRAM

## 2023-01-01 PROCEDURE — 36415 COLL VENOUS BLD VENIPUNCTURE: CPT | Performed by: NURSE PRACTITIONER

## 2023-01-01 PROCEDURE — 93010 EKG 12-LEAD: ICD-10-PCS | Mod: 76,,, | Performed by: INTERNAL MEDICINE

## 2023-01-01 PROCEDURE — 93010 EKG 12-LEAD: ICD-10-PCS | Mod: ,,, | Performed by: SPECIALIST

## 2023-01-01 PROCEDURE — 94761 N-INVAS EAR/PLS OXIMETRY MLT: CPT

## 2023-01-01 PROCEDURE — 84484 ASSAY OF TROPONIN QUANT: CPT | Performed by: EMERGENCY MEDICINE

## 2023-01-01 PROCEDURE — 25000003 PHARM REV CODE 250: Performed by: INTERNAL MEDICINE

## 2023-01-01 PROCEDURE — 27000221 HC OXYGEN, UP TO 24 HOURS

## 2023-01-01 PROCEDURE — 96367 TX/PROPH/DG ADDL SEQ IV INF: CPT

## 2023-01-01 PROCEDURE — 96361 HYDRATE IV INFUSION ADD-ON: CPT

## 2023-01-01 PROCEDURE — 85027 COMPLETE CBC AUTOMATED: CPT | Performed by: INTERNAL MEDICINE

## 2023-01-01 PROCEDURE — 27202049 HC PROBE, APC ERBE: Performed by: INTERNAL MEDICINE

## 2023-01-01 PROCEDURE — 80048 BASIC METABOLIC PNL TOTAL CA: CPT | Performed by: HOSPITALIST

## 2023-01-01 PROCEDURE — 12000002 HC ACUTE/MED SURGE SEMI-PRIVATE ROOM

## 2023-01-01 PROCEDURE — 85007 BL SMEAR W/DIFF WBC COUNT: CPT | Performed by: HOSPITALIST

## 2023-01-01 PROCEDURE — 99285 EMERGENCY DEPT VISIT HI MDM: CPT | Mod: 25

## 2023-01-01 PROCEDURE — 99223 PR INITIAL HOSPITAL CARE,LEVL III: ICD-10-PCS | Mod: ,,, | Performed by: INTERNAL MEDICINE

## 2023-01-01 PROCEDURE — 85025 COMPLETE CBC W/AUTO DIFF WBC: CPT | Performed by: EMERGENCY MEDICINE

## 2023-01-01 PROCEDURE — 63600175 PHARM REV CODE 636 W HCPCS: Performed by: INTERNAL MEDICINE

## 2023-01-01 PROCEDURE — 82962 GLUCOSE BLOOD TEST: CPT

## 2023-01-01 PROCEDURE — 25000003 PHARM REV CODE 250: Performed by: EMERGENCY MEDICINE

## 2023-01-01 PROCEDURE — 80053 COMPREHEN METABOLIC PANEL: CPT | Performed by: NURSE PRACTITIONER

## 2023-01-01 PROCEDURE — 96415 CHEMO IV INFUSION ADDL HR: CPT

## 2023-01-01 PROCEDURE — 99900031 HC PATIENT EDUCATION (STAT)

## 2023-01-01 PROCEDURE — 93010 ELECTROCARDIOGRAM REPORT: CPT | Mod: ,,, | Performed by: SPECIALIST

## 2023-01-01 PROCEDURE — 82378 CARCINOEMBRYONIC ANTIGEN: CPT | Performed by: INTERNAL MEDICINE

## 2023-01-01 PROCEDURE — 80053 COMPREHEN METABOLIC PANEL: CPT

## 2023-01-01 PROCEDURE — U0002 COVID-19 LAB TEST NON-CDC: HCPCS | Performed by: EMERGENCY MEDICINE

## 2023-01-01 PROCEDURE — 99214 PR OFFICE/OUTPT VISIT, EST, LEVL IV, 30-39 MIN: ICD-10-PCS | Mod: S$GLB,,, | Performed by: INTERNAL MEDICINE

## 2023-01-01 PROCEDURE — 96413 CHEMO IV INFUSION 1 HR: CPT

## 2023-01-01 PROCEDURE — 36415 COLL VENOUS BLD VENIPUNCTURE: CPT | Performed by: INTERNAL MEDICINE

## 2023-01-01 PROCEDURE — 85025 COMPLETE CBC W/AUTO DIFF WBC: CPT | Performed by: NURSE PRACTITIONER

## 2023-01-01 PROCEDURE — A9698 NON-RAD CONTRAST MATERIALNOC: HCPCS

## 2023-01-01 PROCEDURE — 83880 ASSAY OF NATRIURETIC PEPTIDE: CPT | Performed by: EMERGENCY MEDICINE

## 2023-01-01 PROCEDURE — 25000003 PHARM REV CODE 250: Performed by: STUDENT IN AN ORGANIZED HEALTH CARE EDUCATION/TRAINING PROGRAM

## 2023-01-01 PROCEDURE — 83605 ASSAY OF LACTIC ACID: CPT | Performed by: HOSPITALIST

## 2023-01-01 PROCEDURE — 83735 ASSAY OF MAGNESIUM: CPT | Performed by: STUDENT IN AN ORGANIZED HEALTH CARE EDUCATION/TRAINING PROGRAM

## 2023-01-01 PROCEDURE — 99232 PR SUBSEQUENT HOSPITAL CARE,LEVL II: ICD-10-PCS | Mod: ,,, | Performed by: INTERNAL MEDICINE

## 2023-01-01 PROCEDURE — 85007 BL SMEAR W/DIFF WBC COUNT: CPT | Performed by: STUDENT IN AN ORGANIZED HEALTH CARE EDUCATION/TRAINING PROGRAM

## 2023-01-01 PROCEDURE — 85007 BL SMEAR W/DIFF WBC COUNT: CPT | Performed by: INTERNAL MEDICINE

## 2023-01-01 PROCEDURE — 63600175 PHARM REV CODE 636 W HCPCS: Mod: JZ,JG | Performed by: STUDENT IN AN ORGANIZED HEALTH CARE EDUCATION/TRAINING PROGRAM

## 2023-01-01 PROCEDURE — 94660 CPAP INITIATION&MGMT: CPT

## 2023-01-01 PROCEDURE — 94760 N-INVAS EAR/PLS OXIMETRY 1: CPT

## 2023-01-01 PROCEDURE — 44799 UNLISTED PX SMALL INTESTINE: CPT | Performed by: INTERNAL MEDICINE

## 2023-01-01 PROCEDURE — 25500020 PHARM REV CODE 255

## 2023-01-01 PROCEDURE — 25000003 PHARM REV CODE 250: Performed by: NURSE PRACTITIONER

## 2023-01-01 PROCEDURE — 84550 ASSAY OF BLOOD/URIC ACID: CPT | Performed by: NURSE PRACTITIONER

## 2023-01-01 PROCEDURE — 84132 ASSAY OF SERUM POTASSIUM: CPT | Performed by: STUDENT IN AN ORGANIZED HEALTH CARE EDUCATION/TRAINING PROGRAM

## 2023-01-01 PROCEDURE — 63600175 PHARM REV CODE 636 W HCPCS: Performed by: STUDENT IN AN ORGANIZED HEALTH CARE EDUCATION/TRAINING PROGRAM

## 2023-01-01 PROCEDURE — 87040 BLOOD CULTURE FOR BACTERIA: CPT | Performed by: EMERGENCY MEDICINE

## 2023-01-01 PROCEDURE — 96366 THER/PROPH/DIAG IV INF ADDON: CPT

## 2023-01-01 PROCEDURE — 36415 COLL VENOUS BLD VENIPUNCTURE: CPT | Performed by: STUDENT IN AN ORGANIZED HEALTH CARE EDUCATION/TRAINING PROGRAM

## 2023-01-01 PROCEDURE — 63600175 PHARM REV CODE 636 W HCPCS: Performed by: HOSPITALIST

## 2023-01-01 PROCEDURE — 96365 THER/PROPH/DIAG IV INF INIT: CPT

## 2023-01-01 PROCEDURE — 99900035 HC TECH TIME PER 15 MIN (STAT)

## 2023-01-01 PROCEDURE — 85025 COMPLETE CBC W/AUTO DIFF WBC: CPT | Performed by: STUDENT IN AN ORGANIZED HEALTH CARE EDUCATION/TRAINING PROGRAM

## 2023-01-01 PROCEDURE — 85007 BL SMEAR W/DIFF WBC COUNT: CPT | Mod: 59,XB | Performed by: NURSE PRACTITIONER

## 2023-01-01 PROCEDURE — 93005 ELECTROCARDIOGRAM TRACING: CPT | Performed by: INTERNAL MEDICINE

## 2023-01-01 PROCEDURE — 94799 UNLISTED PULMONARY SVC/PX: CPT | Mod: XB

## 2023-01-01 PROCEDURE — 25000003 PHARM REV CODE 250: Performed by: HOSPITALIST

## 2023-01-01 PROCEDURE — 25000242 PHARM REV CODE 250 ALT 637 W/ HCPCS: Performed by: STUDENT IN AN ORGANIZED HEALTH CARE EDUCATION/TRAINING PROGRAM

## 2023-01-01 PROCEDURE — A4216 STERILE WATER/SALINE, 10 ML: HCPCS | Performed by: NURSE PRACTITIONER

## 2023-01-01 PROCEDURE — 63600175 PHARM REV CODE 636 W HCPCS: Performed by: NURSE ANESTHETIST, CERTIFIED REGISTERED

## 2023-01-01 PROCEDURE — 86900 BLOOD TYPING SEROLOGIC ABO: CPT | Performed by: HOSPITALIST

## 2023-01-01 PROCEDURE — 63600175 PHARM REV CODE 636 W HCPCS: Performed by: NURSE PRACTITIONER

## 2023-01-01 PROCEDURE — 85007 BL SMEAR W/DIFF WBC COUNT: CPT | Mod: 59

## 2023-01-01 PROCEDURE — 96374 THER/PROPH/DIAG INJ IV PUSH: CPT

## 2023-01-01 PROCEDURE — 87502 INFLUENZA DNA AMP PROBE: CPT | Performed by: EMERGENCY MEDICINE

## 2023-01-01 PROCEDURE — 80053 COMPREHEN METABOLIC PANEL: CPT | Performed by: INTERNAL MEDICINE

## 2023-01-01 PROCEDURE — 63600175 PHARM REV CODE 636 W HCPCS: Mod: JZ,JG | Performed by: INTERNAL MEDICINE

## 2023-01-01 PROCEDURE — 83880 ASSAY OF NATRIURETIC PEPTIDE: CPT

## 2023-01-01 PROCEDURE — P9040 RBC LEUKOREDUCED IRRADIATED: HCPCS | Performed by: STUDENT IN AN ORGANIZED HEALTH CARE EDUCATION/TRAINING PROGRAM

## 2023-01-01 PROCEDURE — 93010 ELECTROCARDIOGRAM REPORT: CPT | Mod: ,,, | Performed by: INTERNAL MEDICINE

## 2023-01-01 PROCEDURE — 80202 ASSAY OF VANCOMYCIN: CPT | Performed by: INTERNAL MEDICINE

## 2023-01-01 PROCEDURE — 99223 1ST HOSP IP/OBS HIGH 75: CPT | Mod: ,,, | Performed by: INTERNAL MEDICINE

## 2023-01-01 PROCEDURE — 83735 ASSAY OF MAGNESIUM: CPT

## 2023-01-01 PROCEDURE — 21000000 HC CCU ICU ROOM CHARGE

## 2023-01-01 PROCEDURE — 36415 COLL VENOUS BLD VENIPUNCTURE: CPT | Performed by: EMERGENCY MEDICINE

## 2023-01-01 PROCEDURE — 96372 THER/PROPH/DIAG INJ SC/IM: CPT

## 2023-01-01 PROCEDURE — 84484 ASSAY OF TROPONIN QUANT: CPT

## 2023-01-01 PROCEDURE — 85027 COMPLETE CBC AUTOMATED: CPT | Performed by: NURSE PRACTITIONER

## 2023-01-01 PROCEDURE — 99232 SBSQ HOSP IP/OBS MODERATE 35: CPT | Mod: ,,, | Performed by: INTERNAL MEDICINE

## 2023-01-01 PROCEDURE — 87040 BLOOD CULTURE FOR BACTERIA: CPT | Mod: 59 | Performed by: EMERGENCY MEDICINE

## 2023-01-01 PROCEDURE — C9113 INJ PANTOPRAZOLE SODIUM, VIA: HCPCS | Performed by: INTERNAL MEDICINE

## 2023-01-01 PROCEDURE — 74210 X-RAY XM PHRNX&/CRV ESOPH C+: CPT | Mod: TC

## 2023-01-01 PROCEDURE — D9220A PRA ANESTHESIA: Mod: ANES,,, | Performed by: ANESTHESIOLOGY

## 2023-01-01 PROCEDURE — 83735 ASSAY OF MAGNESIUM: CPT | Performed by: HOSPITALIST

## 2023-01-01 PROCEDURE — 94640 AIRWAY INHALATION TREATMENT: CPT

## 2023-01-01 PROCEDURE — 37000009 HC ANESTHESIA EA ADD 15 MINS: Performed by: INTERNAL MEDICINE

## 2023-01-01 PROCEDURE — 80053 COMPREHEN METABOLIC PANEL: CPT | Performed by: EMERGENCY MEDICINE

## 2023-01-01 PROCEDURE — P9040 RBC LEUKOREDUCED IRRADIATED: HCPCS | Performed by: INTERNAL MEDICINE

## 2023-01-01 PROCEDURE — 85730 THROMBOPLASTIN TIME PARTIAL: CPT | Performed by: EMERGENCY MEDICINE

## 2023-01-01 PROCEDURE — 21400001 HC TELEMETRY ROOM

## 2023-01-01 PROCEDURE — D9220A PRA ANESTHESIA: ICD-10-PCS | Mod: CRNA,,, | Performed by: NURSE ANESTHETIST, CERTIFIED REGISTERED

## 2023-01-01 PROCEDURE — 87798 DETECT AGENT NOS DNA AMP: CPT | Performed by: EMERGENCY MEDICINE

## 2023-01-01 PROCEDURE — 84439 ASSAY OF FREE THYROXINE: CPT

## 2023-01-01 PROCEDURE — 99214 PR OFFICE/OUTPT VISIT, EST, LEVL IV, 30-39 MIN: ICD-10-PCS | Mod: S$GLB,,, | Performed by: NURSE PRACTITIONER

## 2023-01-01 PROCEDURE — 71046 X-RAY EXAM CHEST 2 VIEWS: CPT | Mod: TC,PO

## 2023-01-01 PROCEDURE — 99284 EMERGENCY DEPT VISIT MOD MDM: CPT | Mod: 25

## 2023-01-01 PROCEDURE — 99214 OFFICE O/P EST MOD 30 MIN: CPT | Mod: S$GLB,,, | Performed by: INTERNAL MEDICINE

## 2023-01-01 PROCEDURE — 63700000 PHARM REV CODE 250 ALT 637 W/O HCPCS: Performed by: STUDENT IN AN ORGANIZED HEALTH CARE EDUCATION/TRAINING PROGRAM

## 2023-01-01 PROCEDURE — 86140 C-REACTIVE PROTEIN: CPT | Performed by: STUDENT IN AN ORGANIZED HEALTH CARE EDUCATION/TRAINING PROGRAM

## 2023-01-01 PROCEDURE — 36430 TRANSFUSION BLD/BLD COMPNT: CPT

## 2023-01-01 PROCEDURE — 82728 ASSAY OF FERRITIN: CPT | Performed by: INTERNAL MEDICINE

## 2023-01-01 PROCEDURE — 81003 URINALYSIS AUTO W/O SCOPE: CPT | Performed by: EMERGENCY MEDICINE

## 2023-01-01 PROCEDURE — A4216 STERILE WATER/SALINE, 10 ML: HCPCS | Performed by: INTERNAL MEDICINE

## 2023-01-01 PROCEDURE — 84100 ASSAY OF PHOSPHORUS: CPT | Performed by: INTERNAL MEDICINE

## 2023-01-01 PROCEDURE — 86920 COMPATIBILITY TEST SPIN: CPT | Performed by: STUDENT IN AN ORGANIZED HEALTH CARE EDUCATION/TRAINING PROGRAM

## 2023-01-01 PROCEDURE — 36415 COLL VENOUS BLD VENIPUNCTURE: CPT | Performed by: HOSPITALIST

## 2023-01-01 PROCEDURE — 84484 ASSAY OF TROPONIN QUANT: CPT | Mod: 91 | Performed by: STUDENT IN AN ORGANIZED HEALTH CARE EDUCATION/TRAINING PROGRAM

## 2023-01-01 PROCEDURE — 84466 ASSAY OF TRANSFERRIN: CPT | Performed by: INTERNAL MEDICINE

## 2023-01-01 PROCEDURE — 96375 TX/PRO/DX INJ NEW DRUG ADDON: CPT

## 2023-01-01 PROCEDURE — 85027 COMPLETE CBC AUTOMATED: CPT | Performed by: STUDENT IN AN ORGANIZED HEALTH CARE EDUCATION/TRAINING PROGRAM

## 2023-01-01 PROCEDURE — 94799 UNLISTED PULMONARY SVC/PX: CPT

## 2023-01-01 PROCEDURE — 85610 PROTHROMBIN TIME: CPT | Performed by: HOSPITALIST

## 2023-01-01 PROCEDURE — 25000242 PHARM REV CODE 250 ALT 637 W/ HCPCS: Performed by: EMERGENCY MEDICINE

## 2023-01-01 PROCEDURE — 86704 HEP B CORE ANTIBODY TOTAL: CPT | Performed by: NURSE PRACTITIONER

## 2023-01-01 PROCEDURE — 85027 COMPLETE CBC AUTOMATED: CPT | Performed by: HOSPITALIST

## 2023-01-01 PROCEDURE — 96374 THER/PROPH/DIAG INJ IV PUSH: CPT | Performed by: INTERNAL MEDICINE

## 2023-01-01 PROCEDURE — D9220A PRA ANESTHESIA: Mod: CRNA,,, | Performed by: NURSE ANESTHETIST, CERTIFIED REGISTERED

## 2023-01-01 PROCEDURE — 99215 PR OFFICE/OUTPT VISIT, EST, LEVL V, 40-54 MIN: ICD-10-PCS | Mod: S$GLB,,, | Performed by: NURSE PRACTITIONER

## 2023-01-01 PROCEDURE — 63600175 PHARM REV CODE 636 W HCPCS: Performed by: EMERGENCY MEDICINE

## 2023-01-01 PROCEDURE — 85025 COMPLETE CBC W/AUTO DIFF WBC: CPT | Performed by: INTERNAL MEDICINE

## 2023-01-01 PROCEDURE — 85027 COMPLETE CBC AUTOMATED: CPT | Performed by: EMERGENCY MEDICINE

## 2023-01-01 PROCEDURE — 27201089 HC SNARE, DISP (ANY): Performed by: INTERNAL MEDICINE

## 2023-01-01 PROCEDURE — 82728 ASSAY OF FERRITIN: CPT | Performed by: HOSPITALIST

## 2023-01-01 PROCEDURE — 85007 BL SMEAR W/DIFF WBC COUNT: CPT | Mod: 91 | Performed by: INTERNAL MEDICINE

## 2023-01-01 PROCEDURE — 63700000 PHARM REV CODE 250 ALT 637 W/O HCPCS: Performed by: INTERNAL MEDICINE

## 2023-01-01 PROCEDURE — 87449 NOS EACH ORGANISM AG IA: CPT | Performed by: HOSPITALIST

## 2023-01-01 PROCEDURE — 83735 ASSAY OF MAGNESIUM: CPT | Mod: 91 | Performed by: HOSPITALIST

## 2023-01-01 PROCEDURE — 83880 ASSAY OF NATRIURETIC PEPTIDE: CPT | Performed by: STUDENT IN AN ORGANIZED HEALTH CARE EDUCATION/TRAINING PROGRAM

## 2023-01-01 PROCEDURE — 83540 ASSAY OF IRON: CPT | Performed by: INTERNAL MEDICINE

## 2023-01-01 PROCEDURE — 84466 ASSAY OF TRANSFERRIN: CPT | Performed by: HOSPITALIST

## 2023-01-01 PROCEDURE — 85610 PROTHROMBIN TIME: CPT | Performed by: EMERGENCY MEDICINE

## 2023-01-01 PROCEDURE — 84100 ASSAY OF PHOSPHORUS: CPT | Performed by: HOSPITALIST

## 2023-01-01 PROCEDURE — P9037 PLATE PHERES LEUKOREDU IRRAD: HCPCS | Performed by: INTERNAL MEDICINE

## 2023-01-01 PROCEDURE — 93010 EKG 12-LEAD: ICD-10-PCS | Mod: ,,, | Performed by: INTERNAL MEDICINE

## 2023-01-01 PROCEDURE — 86920 COMPATIBILITY TEST SPIN: CPT | Performed by: INTERNAL MEDICINE

## 2023-01-01 PROCEDURE — 80053 COMPREHEN METABOLIC PANEL: CPT | Mod: 91 | Performed by: NURSE PRACTITIONER

## 2023-01-01 PROCEDURE — 80048 BASIC METABOLIC PNL TOTAL CA: CPT | Performed by: NURSE PRACTITIONER

## 2023-01-01 PROCEDURE — 93005 ELECTROCARDIOGRAM TRACING: CPT | Performed by: SPECIALIST

## 2023-01-01 PROCEDURE — 87641 MR-STAPH DNA AMP PROBE: CPT | Performed by: HOSPITALIST

## 2023-01-01 PROCEDURE — D9220A PRA ANESTHESIA: ICD-10-PCS | Mod: ANES,,, | Performed by: ANESTHESIOLOGY

## 2023-01-01 PROCEDURE — 63600175 PHARM REV CODE 636 W HCPCS: Mod: TB,JG | Performed by: NURSE PRACTITIONER

## 2023-01-01 PROCEDURE — 84484 ASSAY OF TROPONIN QUANT: CPT | Mod: 91 | Performed by: INTERNAL MEDICINE

## 2023-01-01 PROCEDURE — 84443 ASSAY THYROID STIM HORMONE: CPT

## 2023-01-01 PROCEDURE — 43450 DILATE ESOPHAGUS 1/MULT PASS: CPT | Performed by: INTERNAL MEDICINE

## 2023-01-01 PROCEDURE — 44364 SMALL BOWEL ENDOSCOPY: CPT | Performed by: INTERNAL MEDICINE

## 2023-01-01 PROCEDURE — 84466 ASSAY OF TRANSFERRIN: CPT | Performed by: NURSE PRACTITIONER

## 2023-01-01 PROCEDURE — A9552 F18 FDG: HCPCS | Mod: PO

## 2023-01-01 PROCEDURE — 85018 HEMOGLOBIN: CPT | Performed by: STUDENT IN AN ORGANIZED HEALTH CARE EDUCATION/TRAINING PROGRAM

## 2023-01-01 PROCEDURE — 74210 FL ESOPHAGRAM PHARYNX AND/OR CERVICAL: ICD-10-PCS | Mod: 26,,, | Performed by: RADIOLOGY

## 2023-01-01 PROCEDURE — 27200635: Performed by: INTERNAL MEDICINE

## 2023-01-01 PROCEDURE — 37000008 HC ANESTHESIA 1ST 15 MINUTES: Performed by: INTERNAL MEDICINE

## 2023-01-01 PROCEDURE — 27200997: Performed by: INTERNAL MEDICINE

## 2023-01-01 PROCEDURE — 85014 HEMATOCRIT: CPT | Performed by: STUDENT IN AN ORGANIZED HEALTH CARE EDUCATION/TRAINING PROGRAM

## 2023-01-01 PROCEDURE — P9073 PLATELETS PHERESIS PATH REDU: HCPCS | Performed by: HOSPITALIST

## 2023-01-01 PROCEDURE — 83036 HEMOGLOBIN GLYCOSYLATED A1C: CPT | Performed by: NURSE PRACTITIONER

## 2023-01-01 PROCEDURE — 25000242 PHARM REV CODE 250 ALT 637 W/ HCPCS: Performed by: INTERNAL MEDICINE

## 2023-01-01 PROCEDURE — 81003 URINALYSIS AUTO W/O SCOPE: CPT | Performed by: STUDENT IN AN ORGANIZED HEALTH CARE EDUCATION/TRAINING PROGRAM

## 2023-01-01 PROCEDURE — 86901 BLOOD TYPING SEROLOGIC RH(D): CPT | Performed by: STUDENT IN AN ORGANIZED HEALTH CARE EDUCATION/TRAINING PROGRAM

## 2023-01-01 PROCEDURE — 85027 COMPLETE CBC AUTOMATED: CPT | Mod: 91 | Performed by: INTERNAL MEDICINE

## 2023-01-01 PROCEDURE — 83605 ASSAY OF LACTIC ACID: CPT | Mod: 91 | Performed by: INTERNAL MEDICINE

## 2023-01-01 PROCEDURE — 82272 OCCULT BLD FECES 1-3 TESTS: CPT | Performed by: INTERNAL MEDICINE

## 2023-01-01 PROCEDURE — 83735 ASSAY OF MAGNESIUM: CPT | Mod: 91 | Performed by: INTERNAL MEDICINE

## 2023-01-01 PROCEDURE — 80061 LIPID PANEL: CPT | Performed by: NURSE PRACTITIONER

## 2023-01-01 PROCEDURE — 87633 RESP VIRUS 12-25 TARGETS: CPT | Performed by: HOSPITALIST

## 2023-01-01 PROCEDURE — 81003 URINALYSIS AUTO W/O SCOPE: CPT | Performed by: NURSE PRACTITIONER

## 2023-01-01 PROCEDURE — 83735 ASSAY OF MAGNESIUM: CPT | Performed by: EMERGENCY MEDICINE

## 2023-01-01 PROCEDURE — 85007 BL SMEAR W/DIFF WBC COUNT: CPT | Performed by: EMERGENCY MEDICINE

## 2023-01-01 PROCEDURE — 25000003 PHARM REV CODE 250: Performed by: NURSE ANESTHETIST, CERTIFIED REGISTERED

## 2023-01-01 PROCEDURE — 99215 OFFICE O/P EST HI 40 MIN: CPT | Mod: S$GLB,,, | Performed by: NURSE PRACTITIONER

## 2023-01-01 PROCEDURE — 88305 TISSUE EXAM BY PATHOLOGIST: CPT | Mod: TC | Performed by: PATHOLOGY

## 2023-01-01 PROCEDURE — 44378 SMALL BOWEL ENDOSCOPY: CPT | Performed by: INTERNAL MEDICINE

## 2023-01-01 PROCEDURE — 85014 HEMATOCRIT: CPT | Performed by: INTERNAL MEDICINE

## 2023-01-01 PROCEDURE — 82962 GLUCOSE BLOOD TEST: CPT | Performed by: INTERNAL MEDICINE

## 2023-01-01 PROCEDURE — 84484 ASSAY OF TROPONIN QUANT: CPT | Performed by: INTERNAL MEDICINE

## 2023-01-01 PROCEDURE — 85651 RBC SED RATE NONAUTOMATED: CPT | Performed by: STUDENT IN AN ORGANIZED HEALTH CARE EDUCATION/TRAINING PROGRAM

## 2023-01-01 PROCEDURE — 87798 DETECT AGENT NOS DNA AMP: CPT | Performed by: STUDENT IN AN ORGANIZED HEALTH CARE EDUCATION/TRAINING PROGRAM

## 2023-01-01 PROCEDURE — 82962 GLUCOSE BLOOD TEST: CPT | Mod: 91

## 2023-01-01 PROCEDURE — 85025 COMPLETE CBC W/AUTO DIFF WBC: CPT | Performed by: HOSPITALIST

## 2023-01-01 PROCEDURE — 84132 ASSAY OF SERUM POTASSIUM: CPT | Performed by: HOSPITALIST

## 2023-01-01 PROCEDURE — 36600 WITHDRAWAL OF ARTERIAL BLOOD: CPT

## 2023-01-01 PROCEDURE — 84145 PROCALCITONIN (PCT): CPT | Performed by: EMERGENCY MEDICINE

## 2023-01-01 PROCEDURE — P9073 PLATELETS PHERESIS PATH REDU: HCPCS | Performed by: INTERNAL MEDICINE

## 2023-01-01 PROCEDURE — 85018 HEMOGLOBIN: CPT | Performed by: INTERNAL MEDICINE

## 2023-01-01 PROCEDURE — 93306 ECHO (CUPID ONLY): ICD-10-PCS | Mod: 26,,, | Performed by: GENERAL PRACTICE

## 2023-01-01 PROCEDURE — 36415 COLL VENOUS BLD VENIPUNCTURE: CPT

## 2023-01-01 PROCEDURE — 27202438 HC MUCOSAL LIFTING AGENT: Performed by: INTERNAL MEDICINE

## 2023-01-01 PROCEDURE — 87086 URINE CULTURE/COLONY COUNT: CPT | Performed by: STUDENT IN AN ORGANIZED HEALTH CARE EDUCATION/TRAINING PROGRAM

## 2023-01-01 PROCEDURE — 93306 TTE W/DOPPLER COMPLETE: CPT | Mod: 26,,, | Performed by: GENERAL PRACTICE

## 2023-01-01 PROCEDURE — 94761 N-INVAS EAR/PLS OXIMETRY MLT: CPT | Mod: XB

## 2023-01-01 PROCEDURE — U0002 COVID-19 LAB TEST NON-CDC: HCPCS | Performed by: STUDENT IN AN ORGANIZED HEALTH CARE EDUCATION/TRAINING PROGRAM

## 2023-01-01 PROCEDURE — 43255 EGD CONTROL BLEEDING ANY: CPT | Performed by: INTERNAL MEDICINE

## 2023-01-01 PROCEDURE — 83540 ASSAY OF IRON: CPT | Performed by: HOSPITALIST

## 2023-01-01 PROCEDURE — 81001 URINALYSIS AUTO W/SCOPE: CPT | Performed by: EMERGENCY MEDICINE

## 2023-01-01 PROCEDURE — 80069 RENAL FUNCTION PANEL: CPT | Performed by: NURSE PRACTITIONER

## 2023-01-01 PROCEDURE — 83605 ASSAY OF LACTIC ACID: CPT | Mod: 91 | Performed by: EMERGENCY MEDICINE

## 2023-01-01 PROCEDURE — 27200043 HC FORCEPS, BIOPSY: Performed by: INTERNAL MEDICINE

## 2023-01-01 PROCEDURE — 27201114 HC TRAP (ANY): Performed by: INTERNAL MEDICINE

## 2023-01-01 PROCEDURE — 83605 ASSAY OF LACTIC ACID: CPT | Performed by: EMERGENCY MEDICINE

## 2023-01-01 PROCEDURE — A4216 STERILE WATER/SALINE, 10 ML: HCPCS | Performed by: STUDENT IN AN ORGANIZED HEALTH CARE EDUCATION/TRAINING PROGRAM

## 2023-01-01 PROCEDURE — 87502 INFLUENZA DNA AMP PROBE: CPT | Performed by: STUDENT IN AN ORGANIZED HEALTH CARE EDUCATION/TRAINING PROGRAM

## 2023-01-01 PROCEDURE — 87641 MR-STAPH DNA AMP PROBE: CPT | Performed by: STUDENT IN AN ORGANIZED HEALTH CARE EDUCATION/TRAINING PROGRAM

## 2023-01-01 PROCEDURE — 87040 BLOOD CULTURE FOR BACTERIA: CPT | Performed by: STUDENT IN AN ORGANIZED HEALTH CARE EDUCATION/TRAINING PROGRAM

## 2023-01-01 PROCEDURE — 63600175 PHARM REV CODE 636 W HCPCS: Mod: TB,JG | Performed by: STUDENT IN AN ORGANIZED HEALTH CARE EDUCATION/TRAINING PROGRAM

## 2023-01-01 PROCEDURE — 85027 COMPLETE CBC AUTOMATED: CPT

## 2023-01-01 PROCEDURE — 80202 ASSAY OF VANCOMYCIN: CPT | Performed by: HOSPITALIST

## 2023-01-01 PROCEDURE — 82803 BLOOD GASES ANY COMBINATION: CPT

## 2023-01-01 PROCEDURE — 86706 HEP B SURFACE ANTIBODY: CPT | Performed by: NURSE PRACTITIONER

## 2023-01-01 PROCEDURE — 74210 X-RAY XM PHRNX&/CRV ESOPH C+: CPT | Mod: 26,,, | Performed by: RADIOLOGY

## 2023-01-01 PROCEDURE — 80053 COMPREHEN METABOLIC PANEL: CPT | Mod: 91 | Performed by: INTERNAL MEDICINE

## 2023-01-01 PROCEDURE — 93010 ELECTROCARDIOGRAM REPORT: CPT | Mod: 76,,, | Performed by: INTERNAL MEDICINE

## 2023-01-01 PROCEDURE — 25000242 PHARM REV CODE 250 ALT 637 W/ HCPCS: Performed by: NURSE PRACTITIONER

## 2023-01-01 PROCEDURE — 5A09357 ASSISTANCE WITH RESPIRATORY VENTILATION, LESS THAN 24 CONSECUTIVE HOURS, CONTINUOUS POSITIVE AIRWAY PRESSURE: ICD-10-PCS | Performed by: INTERNAL MEDICINE

## 2023-01-01 PROCEDURE — 63600175 PHARM REV CODE 636 W HCPCS: Mod: JB | Performed by: HOSPITALIST

## 2023-01-01 PROCEDURE — 83605 ASSAY OF LACTIC ACID: CPT | Performed by: STUDENT IN AN ORGANIZED HEALTH CARE EDUCATION/TRAINING PROGRAM

## 2023-01-01 PROCEDURE — 82728 ASSAY OF FERRITIN: CPT | Performed by: NURSE PRACTITIONER

## 2023-01-01 PROCEDURE — 80202 ASSAY OF VANCOMYCIN: CPT | Performed by: STUDENT IN AN ORGANIZED HEALTH CARE EDUCATION/TRAINING PROGRAM

## 2023-01-01 PROCEDURE — 87086 URINE CULTURE/COLONY COUNT: CPT | Performed by: HOSPITALIST

## 2023-01-01 PROCEDURE — 27201028 HC NEEDLE, SCLERO: Performed by: INTERNAL MEDICINE

## 2023-01-01 PROCEDURE — 93306 TTE W/DOPPLER COMPLETE: CPT

## 2023-01-01 PROCEDURE — 87449 NOS EACH ORGANISM AG IA: CPT | Performed by: STUDENT IN AN ORGANIZED HEALTH CARE EDUCATION/TRAINING PROGRAM

## 2023-01-01 PROCEDURE — 84145 PROCALCITONIN (PCT): CPT | Performed by: STUDENT IN AN ORGANIZED HEALTH CARE EDUCATION/TRAINING PROGRAM

## 2023-01-01 PROCEDURE — 84295 ASSAY OF SERUM SODIUM: CPT | Performed by: HOSPITALIST

## 2023-01-01 RX ORDER — SODIUM CHLORIDE 0.9 % (FLUSH) 0.9 %
10 SYRINGE (ML) INJECTION
Status: DISCONTINUED | OUTPATIENT
Start: 2023-01-01 | End: 2023-01-01 | Stop reason: HOSPADM

## 2023-01-01 RX ORDER — VALACYCLOVIR HYDROCHLORIDE 500 MG/1
1000 TABLET, FILM COATED ORAL DAILY
Status: DISCONTINUED | OUTPATIENT
Start: 2023-01-01 | End: 2023-01-01 | Stop reason: HOSPADM

## 2023-01-01 RX ORDER — HYDROMORPHONE HYDROCHLORIDE 1 MG/ML
0.5 INJECTION, SOLUTION INTRAMUSCULAR; INTRAVENOUS; SUBCUTANEOUS EVERY 6 HOURS PRN
Status: DISCONTINUED | OUTPATIENT
Start: 2023-01-01 | End: 2023-01-01 | Stop reason: HOSPADM

## 2023-01-01 RX ORDER — IBUPROFEN 200 MG
16 TABLET ORAL
Status: DISCONTINUED | OUTPATIENT
Start: 2023-01-01 | End: 2023-01-01 | Stop reason: HOSPADM

## 2023-01-01 RX ORDER — SODIUM CHLORIDE 0.9 % (FLUSH) 0.9 %
10 SYRINGE (ML) INJECTION
Status: CANCELLED | OUTPATIENT
Start: 2023-01-01

## 2023-01-01 RX ORDER — EPINEPHRINE 0.3 MG/.3ML
0.3 INJECTION SUBCUTANEOUS ONCE AS NEEDED
Status: CANCELLED | OUTPATIENT
Start: 2023-01-01

## 2023-01-01 RX ORDER — ACETAMINOPHEN 325 MG/1
650 TABLET ORAL
Status: CANCELLED | OUTPATIENT
Start: 2023-01-01

## 2023-01-01 RX ORDER — INSULIN ASPART 100 [IU]/ML
0-5 INJECTION, SOLUTION INTRAVENOUS; SUBCUTANEOUS EVERY 6 HOURS PRN
Status: DISCONTINUED | OUTPATIENT
Start: 2023-01-01 | End: 2023-01-01 | Stop reason: HOSPADM

## 2023-01-01 RX ORDER — HYDROCODONE BITARTRATE AND ACETAMINOPHEN 5; 325 MG/1; MG/1
1 TABLET ORAL EVERY 6 HOURS PRN
Status: DISCONTINUED | OUTPATIENT
Start: 2023-01-01 | End: 2023-01-01 | Stop reason: HOSPADM

## 2023-01-01 RX ORDER — INSULIN ASPART 100 [IU]/ML
0-5 INJECTION, SOLUTION INTRAVENOUS; SUBCUTANEOUS
Status: DISCONTINUED | OUTPATIENT
Start: 2023-01-01 | End: 2023-01-01 | Stop reason: HOSPADM

## 2023-01-01 RX ORDER — SODIUM,POTASSIUM PHOSPHATES 280-250MG
2 POWDER IN PACKET (EA) ORAL
Status: DISCONTINUED | OUTPATIENT
Start: 2023-01-01 | End: 2023-01-01 | Stop reason: HOSPADM

## 2023-01-01 RX ORDER — HEPARIN 100 UNIT/ML
500 SYRINGE INTRAVENOUS
Status: CANCELLED | OUTPATIENT
Start: 2023-01-01

## 2023-01-01 RX ORDER — ENOXAPARIN SODIUM 100 MG/ML
40 INJECTION SUBCUTANEOUS EVERY 24 HOURS
Status: DISCONTINUED | OUTPATIENT
Start: 2023-01-01 | End: 2023-01-01 | Stop reason: HOSPADM

## 2023-01-01 RX ORDER — DIPHENHYDRAMINE HYDROCHLORIDE 50 MG/ML
50 INJECTION INTRAMUSCULAR; INTRAVENOUS ONCE AS NEEDED
Status: DISCONTINUED | OUTPATIENT
Start: 2023-01-01 | End: 2023-01-01 | Stop reason: HOSPADM

## 2023-01-01 RX ORDER — DEXAMETHASONE 4 MG/1
20 TABLET ORAL DAILY
Status: DISCONTINUED | OUTPATIENT
Start: 2023-01-01 | End: 2023-01-01 | Stop reason: HOSPADM

## 2023-01-01 RX ORDER — TALC
6 POWDER (GRAM) TOPICAL NIGHTLY PRN
Status: DISCONTINUED | OUTPATIENT
Start: 2023-01-01 | End: 2023-01-01 | Stop reason: HOSPADM

## 2023-01-01 RX ORDER — EPINEPHRINE 0.3 MG/.3ML
0.3 INJECTION SUBCUTANEOUS ONCE AS NEEDED
Status: DISCONTINUED | OUTPATIENT
Start: 2023-01-01 | End: 2023-01-01 | Stop reason: HOSPADM

## 2023-01-01 RX ORDER — PROMETHAZINE HYDROCHLORIDE 12.5 MG/1
12.5 TABLET ORAL EVERY 6 HOURS PRN
Status: DISCONTINUED | OUTPATIENT
Start: 2023-01-01 | End: 2023-01-01 | Stop reason: HOSPADM

## 2023-01-01 RX ORDER — ACETAMINOPHEN 325 MG/1
650 TABLET ORAL EVERY 4 HOURS PRN
Status: DISCONTINUED | OUTPATIENT
Start: 2023-01-01 | End: 2023-01-01 | Stop reason: HOSPADM

## 2023-01-01 RX ORDER — LOPERAMIDE HYDROCHLORIDE 2 MG/1
4 CAPSULE ORAL ONCE
Status: COMPLETED | OUTPATIENT
Start: 2023-01-01 | End: 2023-01-01

## 2023-01-01 RX ORDER — ACETAMINOPHEN 325 MG/10.15ML
650 SUSPENSION ORAL
Status: COMPLETED | OUTPATIENT
Start: 2023-01-01 | End: 2023-01-01

## 2023-01-01 RX ORDER — ATENOLOL 25 MG/1
25 TABLET ORAL DAILY
COMMUNITY
End: 2023-01-01 | Stop reason: ALTCHOICE

## 2023-01-01 RX ORDER — TRAZODONE HYDROCHLORIDE 50 MG/1
300 TABLET ORAL NIGHTLY
Status: CANCELLED | OUTPATIENT
Start: 2023-01-01

## 2023-01-01 RX ORDER — SODIUM CHLORIDE 0.9 % (FLUSH) 0.9 %
10 SYRINGE (ML) INJECTION EVERY 12 HOURS PRN
Status: DISCONTINUED | OUTPATIENT
Start: 2023-01-01 | End: 2023-01-01 | Stop reason: HOSPADM

## 2023-01-01 RX ORDER — HYDROCODONE BITARTRATE AND ACETAMINOPHEN 500; 5 MG/1; MG/1
TABLET ORAL
Status: DISCONTINUED | OUTPATIENT
Start: 2023-01-01 | End: 2023-01-01 | Stop reason: HOSPADM

## 2023-01-01 RX ORDER — IPRATROPIUM BROMIDE AND ALBUTEROL SULFATE 2.5; .5 MG/3ML; MG/3ML
3 SOLUTION RESPIRATORY (INHALATION)
Status: COMPLETED | OUTPATIENT
Start: 2023-01-01 | End: 2023-01-01

## 2023-01-01 RX ORDER — IBUPROFEN 200 MG
24 TABLET ORAL
Status: DISCONTINUED | OUTPATIENT
Start: 2023-01-01 | End: 2023-01-01 | Stop reason: HOSPADM

## 2023-01-01 RX ORDER — ONDANSETRON 2 MG/ML
4 INJECTION INTRAMUSCULAR; INTRAVENOUS EVERY 8 HOURS PRN
Status: DISCONTINUED | OUTPATIENT
Start: 2023-01-01 | End: 2023-01-01 | Stop reason: HOSPADM

## 2023-01-01 RX ORDER — ATORVASTATIN CALCIUM 20 MG/1
20 TABLET, FILM COATED ORAL DAILY
Status: DISCONTINUED | OUTPATIENT
Start: 2023-01-01 | End: 2023-01-01 | Stop reason: HOSPADM

## 2023-01-01 RX ORDER — HYDROCODONE BITARTRATE AND ACETAMINOPHEN 500; 5 MG/1; MG/1
TABLET ORAL
Status: DISCONTINUED | OUTPATIENT
Start: 2023-01-01 | End: 2023-01-01 | Stop reason: SDUPTHER

## 2023-01-01 RX ORDER — PROCHLORPERAZINE EDISYLATE 5 MG/ML
5 INJECTION INTRAMUSCULAR; INTRAVENOUS EVERY 6 HOURS PRN
Status: DISCONTINUED | OUTPATIENT
Start: 2023-01-01 | End: 2023-01-01 | Stop reason: HOSPADM

## 2023-01-01 RX ORDER — CEFDINIR 300 MG/1
300 CAPSULE ORAL 2 TIMES DAILY
Qty: 14 CAPSULE | Refills: 0 | Status: SHIPPED | OUTPATIENT
Start: 2023-01-01 | End: 2023-01-01

## 2023-01-01 RX ORDER — FUROSEMIDE 10 MG/ML
20 INJECTION INTRAMUSCULAR; INTRAVENOUS
Status: DISCONTINUED | OUTPATIENT
Start: 2023-01-01 | End: 2023-01-01 | Stop reason: HOSPADM

## 2023-01-01 RX ORDER — VALACYCLOVIR HYDROCHLORIDE 500 MG/1
1000 TABLET, FILM COATED ORAL DAILY
Status: ON HOLD | COMMUNITY
Start: 2023-01-01 | End: 2023-01-01

## 2023-01-01 RX ORDER — ACETAMINOPHEN 325 MG/1
650 TABLET ORAL
Status: COMPLETED | OUTPATIENT
Start: 2023-01-01 | End: 2023-01-01

## 2023-01-01 RX ORDER — DIPHENHYDRAMINE HYDROCHLORIDE 50 MG/ML
50 INJECTION INTRAMUSCULAR; INTRAVENOUS ONCE AS NEEDED
Status: CANCELLED | OUTPATIENT
Start: 2023-01-01

## 2023-01-01 RX ORDER — GLUCAGON 1 MG
1 KIT INJECTION
Status: DISCONTINUED | OUTPATIENT
Start: 2023-01-01 | End: 2023-01-01 | Stop reason: HOSPADM

## 2023-01-01 RX ORDER — LOPERAMIDE HYDROCHLORIDE 2 MG/1
2 CAPSULE ORAL 4 TIMES DAILY PRN
Qty: 20 CAPSULE | Refills: 0 | Status: ON HOLD | OUTPATIENT
Start: 2023-01-01 | End: 2023-01-01 | Stop reason: HOSPADM

## 2023-01-01 RX ORDER — MAG HYDROX/ALUMINUM HYD/SIMETH 200-200-20
30 SUSPENSION, ORAL (FINAL DOSE FORM) ORAL 4 TIMES DAILY PRN
Status: DISCONTINUED | OUTPATIENT
Start: 2023-01-01 | End: 2023-01-01 | Stop reason: HOSPADM

## 2023-01-01 RX ORDER — HYDROCODONE BITARTRATE AND ACETAMINOPHEN 5; 325 MG/1; MG/1
1 TABLET ORAL EVERY 4 HOURS PRN
Status: DISCONTINUED | OUTPATIENT
Start: 2023-01-01 | End: 2023-01-01 | Stop reason: HOSPADM

## 2023-01-01 RX ORDER — FAMOTIDINE 20 MG/1
20 TABLET, FILM COATED ORAL 2 TIMES DAILY
Status: DISCONTINUED | OUTPATIENT
Start: 2023-01-01 | End: 2023-01-01 | Stop reason: HOSPADM

## 2023-01-01 RX ORDER — ACETAMINOPHEN 325 MG/1
650 TABLET ORAL EVERY 8 HOURS PRN
Status: DISCONTINUED | OUTPATIENT
Start: 2023-01-01 | End: 2023-01-01 | Stop reason: HOSPADM

## 2023-01-01 RX ORDER — IPRATROPIUM BROMIDE AND ALBUTEROL SULFATE 2.5; .5 MG/3ML; MG/3ML
3 SOLUTION RESPIRATORY (INHALATION) EVERY 4 HOURS PRN
Status: DISCONTINUED | OUTPATIENT
Start: 2023-01-01 | End: 2023-01-01 | Stop reason: HOSPADM

## 2023-01-01 RX ORDER — HYDROCODONE BITARTRATE AND ACETAMINOPHEN 10; 325 MG/1; MG/1
1 TABLET ORAL EVERY 8 HOURS PRN
Status: ON HOLD | COMMUNITY
End: 2023-01-01

## 2023-01-01 RX ORDER — METOPROLOL TARTRATE 25 MG/1
25 TABLET, FILM COATED ORAL 2 TIMES DAILY
Status: CANCELLED | OUTPATIENT
Start: 2023-01-01

## 2023-01-01 RX ORDER — SODIUM CHLORIDE 9 MG/ML
INJECTION, SOLUTION INTRAVENOUS CONTINUOUS
Status: DISCONTINUED | OUTPATIENT
Start: 2023-01-01 | End: 2023-01-01

## 2023-01-01 RX ORDER — TRAZODONE HYDROCHLORIDE 50 MG/1
300 TABLET ORAL NIGHTLY
Status: DISCONTINUED | OUTPATIENT
Start: 2023-01-01 | End: 2023-01-01 | Stop reason: HOSPADM

## 2023-01-01 RX ORDER — LEVALBUTEROL INHALATION SOLUTION 1.25 MG/3ML
3 SOLUTION RESPIRATORY (INHALATION) EVERY 6 HOURS
Status: DISCONTINUED | OUTPATIENT
Start: 2023-01-01 | End: 2023-01-01 | Stop reason: HOSPADM

## 2023-01-01 RX ORDER — CLONIDINE HYDROCHLORIDE 0.1 MG/1
0.1 TABLET ORAL ONCE
Status: COMPLETED | OUTPATIENT
Start: 2023-01-01 | End: 2023-01-01

## 2023-01-01 RX ORDER — GABAPENTIN 300 MG/1
600 CAPSULE ORAL 2 TIMES DAILY
Status: DISCONTINUED | OUTPATIENT
Start: 2023-01-01 | End: 2023-01-01 | Stop reason: HOSPADM

## 2023-01-01 RX ORDER — ONDANSETRON HYDROCHLORIDE 2 MG/ML
4 INJECTION, SOLUTION INTRAVENOUS EVERY 6 HOURS PRN
Status: DISCONTINUED | OUTPATIENT
Start: 2023-01-01 | End: 2023-01-01 | Stop reason: HOSPADM

## 2023-01-01 RX ORDER — PROMETHAZINE HYDROCHLORIDE 12.5 MG/1
TABLET ORAL
Qty: 30 TABLET | Refills: 3 | Status: ON HOLD | OUTPATIENT
Start: 2023-01-01 | End: 2023-01-01

## 2023-01-01 RX ORDER — ALLOPURINOL 300 MG/1
300 TABLET ORAL DAILY
Status: DISCONTINUED | OUTPATIENT
Start: 2023-01-01 | End: 2023-01-01 | Stop reason: HOSPADM

## 2023-01-01 RX ORDER — VANCOMYCIN HCL IN 5 % DEXTROSE 1G/250ML
1000 PLASTIC BAG, INJECTION (ML) INTRAVENOUS ONCE
Status: COMPLETED | OUTPATIENT
Start: 2023-01-01 | End: 2023-01-01

## 2023-01-01 RX ORDER — CLONAZEPAM 1 MG/1
1 TABLET ORAL NIGHTLY
Status: DISCONTINUED | OUTPATIENT
Start: 2023-01-01 | End: 2023-01-01 | Stop reason: HOSPADM

## 2023-01-01 RX ORDER — FUROSEMIDE 10 MG/ML
20 INJECTION INTRAMUSCULAR; INTRAVENOUS ONCE
Status: COMPLETED | OUTPATIENT
Start: 2023-01-01 | End: 2023-01-01

## 2023-01-01 RX ORDER — METHYLPREDNISOLONE SOD SUCC 125 MG
125 VIAL (EA) INJECTION
Status: COMPLETED | OUTPATIENT
Start: 2023-01-01 | End: 2023-01-01

## 2023-01-01 RX ORDER — ACETAMINOPHEN 160 MG/5ML
650 SOLUTION ORAL
Status: CANCELLED
Start: 2023-01-01 | End: 2023-01-01

## 2023-01-01 RX ORDER — BENZONATATE 100 MG/1
100 CAPSULE ORAL EVERY 6 HOURS PRN
Status: DISCONTINUED | OUTPATIENT
Start: 2023-01-01 | End: 2023-01-01 | Stop reason: HOSPADM

## 2023-01-01 RX ORDER — FERROUS SULFATE 325(65) MG
325 TABLET ORAL
Status: ON HOLD | COMMUNITY
End: 2023-01-01

## 2023-01-01 RX ORDER — CEFEPIME HYDROCHLORIDE 1 G/50ML
1 INJECTION, SOLUTION INTRAVENOUS
Status: DISCONTINUED | OUTPATIENT
Start: 2023-01-01 | End: 2023-01-01 | Stop reason: HOSPADM

## 2023-01-01 RX ORDER — EPINEPHRINE 0.1 MG/ML
INJECTION INTRAVENOUS
Status: COMPLETED | OUTPATIENT
Start: 2023-01-01 | End: 2023-01-01

## 2023-01-01 RX ORDER — TAMSULOSIN HYDROCHLORIDE 0.4 MG/1
0.4 CAPSULE ORAL DAILY
Status: DISCONTINUED | OUTPATIENT
Start: 2023-01-01 | End: 2023-01-01 | Stop reason: HOSPADM

## 2023-01-01 RX ORDER — HYDROCODONE BITARTRATE AND ACETAMINOPHEN 500; 5 MG/1; MG/1
TABLET ORAL
Status: DISCONTINUED | OUTPATIENT
Start: 2023-01-01 | End: 2023-01-01 | Stop reason: ALTCHOICE

## 2023-01-01 RX ORDER — ALLOPURINOL 300 MG/1
300 TABLET ORAL DAILY
Qty: 30 TABLET | Refills: 6 | Status: SHIPPED | OUTPATIENT
Start: 2023-01-01 | End: 2023-01-01

## 2023-01-01 RX ORDER — PROMETHAZINE HYDROCHLORIDE 12.5 MG/1
12.5 TABLET ORAL EVERY 6 HOURS PRN
Qty: 30 TABLET | Refills: 3 | Status: SHIPPED | OUTPATIENT
Start: 2023-01-01 | End: 2023-01-01

## 2023-01-01 RX ORDER — LEVALBUTEROL INHALATION SOLUTION 1.25 MG/3ML
3 SOLUTION RESPIRATORY (INHALATION) EVERY 6 HOURS
Status: CANCELLED | OUTPATIENT
Start: 2023-01-01

## 2023-01-01 RX ORDER — HYDROCODONE BITARTRATE AND ACETAMINOPHEN 5; 325 MG/1; MG/1
1 TABLET ORAL EVERY 4 HOURS PRN
Status: CANCELLED | OUTPATIENT
Start: 2023-01-01

## 2023-01-01 RX ORDER — LANOLIN ALCOHOL/MO/W.PET/CERES
800 CREAM (GRAM) TOPICAL
Status: DISCONTINUED | OUTPATIENT
Start: 2023-01-01 | End: 2023-01-01 | Stop reason: HOSPADM

## 2023-01-01 RX ORDER — LORAZEPAM 2 MG/ML
1 INJECTION INTRAMUSCULAR
Status: DISCONTINUED | OUTPATIENT
Start: 2023-01-01 | End: 2023-01-01 | Stop reason: HOSPADM

## 2023-01-01 RX ORDER — DEXAMETHASONE 4 MG/1
TABLET ORAL
Qty: 60 TABLET | Refills: 0 | Status: SHIPPED | OUTPATIENT
Start: 2023-01-01 | End: 2023-01-01 | Stop reason: ALTCHOICE

## 2023-01-01 RX ORDER — MUPIROCIN 20 MG/G
OINTMENT TOPICAL 2 TIMES DAILY
Status: DISCONTINUED | OUTPATIENT
Start: 2023-01-01 | End: 2023-01-01 | Stop reason: HOSPADM

## 2023-01-01 RX ORDER — MORPHINE SULFATE 4 MG/ML
4 INJECTION, SOLUTION INTRAMUSCULAR; INTRAVENOUS ONCE
Status: DISCONTINUED | OUTPATIENT
Start: 2023-01-01 | End: 2023-01-01

## 2023-01-01 RX ORDER — METOPROLOL TARTRATE 25 MG/1
25 TABLET, FILM COATED ORAL 2 TIMES DAILY
Qty: 60 TABLET | Refills: 2 | Status: SHIPPED | OUTPATIENT
Start: 2023-01-01 | End: 2024-02-23

## 2023-01-01 RX ORDER — LOSARTAN POTASSIUM 25 MG/1
50 TABLET ORAL DAILY
Status: DISCONTINUED | OUTPATIENT
Start: 2023-01-01 | End: 2023-01-01 | Stop reason: HOSPADM

## 2023-01-01 RX ORDER — SODIUM CHLORIDE 0.9 % (FLUSH) 0.9 %
3 SYRINGE (ML) INJECTION EVERY 6 HOURS
Status: DISCONTINUED | OUTPATIENT
Start: 2023-01-01 | End: 2023-01-01 | Stop reason: HOSPADM

## 2023-01-01 RX ORDER — ONDANSETRON HYDROCHLORIDE 2 MG/ML
4 INJECTION, SOLUTION INTRAVENOUS
Status: COMPLETED | OUTPATIENT
Start: 2023-01-01 | End: 2023-01-01

## 2023-01-01 RX ORDER — POLYETHYLENE GLYCOL 3350 17 G/17G
17 POWDER, FOR SOLUTION ORAL 2 TIMES DAILY PRN
Status: DISCONTINUED | OUTPATIENT
Start: 2023-01-01 | End: 2023-01-01 | Stop reason: HOSPADM

## 2023-01-01 RX ORDER — LOSARTAN POTASSIUM 50 MG/1
50 TABLET ORAL DAILY
Qty: 90 TABLET | Refills: 0 | Status: ON HOLD | OUTPATIENT
Start: 2023-01-01 | End: 2023-01-01

## 2023-01-01 RX ORDER — TALC
6 POWDER (GRAM) TOPICAL NIGHTLY PRN
Status: CANCELLED | OUTPATIENT
Start: 2023-01-01

## 2023-01-01 RX ORDER — ONDANSETRON HYDROCHLORIDE 8 MG/1
8 TABLET, FILM COATED ORAL EVERY 8 HOURS PRN
Qty: 30 TABLET | Refills: 2 | Status: ON HOLD | OUTPATIENT
Start: 2023-01-01 | End: 2023-01-01

## 2023-01-01 RX ORDER — MORPHINE SULFATE 4 MG/ML
4 INJECTION, SOLUTION INTRAMUSCULAR; INTRAVENOUS
Status: DISCONTINUED | OUTPATIENT
Start: 2023-01-01 | End: 2023-01-01 | Stop reason: HOSPADM

## 2023-01-01 RX ORDER — PANTOPRAZOLE SODIUM 40 MG/1
40 TABLET, DELAYED RELEASE ORAL
COMMUNITY
End: 2023-01-01

## 2023-01-01 RX ORDER — PROPOFOL 10 MG/ML
VIAL (ML) INTRAVENOUS
Status: DISCONTINUED | OUTPATIENT
Start: 2023-01-01 | End: 2023-01-01

## 2023-01-01 RX ORDER — AZITHROMYCIN 250 MG/1
500 TABLET, FILM COATED ORAL
Status: COMPLETED | OUTPATIENT
Start: 2023-01-01 | End: 2023-01-01

## 2023-01-01 RX ORDER — TRAZODONE HYDROCHLORIDE 50 MG/1
150 TABLET ORAL ONCE
Status: COMPLETED | OUTPATIENT
Start: 2023-01-01 | End: 2023-01-01

## 2023-01-01 RX ORDER — HYDROCODONE BITARTRATE AND ACETAMINOPHEN 10; 325 MG/1; MG/1
1 TABLET ORAL EVERY 8 HOURS PRN
Status: DISCONTINUED | OUTPATIENT
Start: 2023-01-01 | End: 2023-01-01 | Stop reason: HOSPADM

## 2023-01-01 RX ORDER — AMOXICILLIN 875 MG/1
875 TABLET, FILM COATED ORAL EVERY 12 HOURS
Qty: 14 TABLET | Refills: 0 | Status: ON HOLD | OUTPATIENT
Start: 2023-01-01 | End: 2023-01-01 | Stop reason: HOSPADM

## 2023-01-01 RX ORDER — ENOXAPARIN SODIUM 100 MG/ML
40 INJECTION SUBCUTANEOUS EVERY 24 HOURS
Status: DISCONTINUED | OUTPATIENT
Start: 2023-01-01 | End: 2023-01-01

## 2023-01-01 RX ORDER — LANOLIN ALCOHOL/MO/W.PET/CERES
1 CREAM (GRAM) TOPICAL DAILY
Status: DISCONTINUED | OUTPATIENT
Start: 2023-01-01 | End: 2023-01-01

## 2023-01-01 RX ORDER — AZITHROMYCIN 250 MG/1
250 TABLET, FILM COATED ORAL DAILY
Status: DISCONTINUED | OUTPATIENT
Start: 2023-01-01 | End: 2023-01-01 | Stop reason: HOSPADM

## 2023-01-01 RX ORDER — LORATADINE 10 MG/1
10 TABLET ORAL DAILY
Status: ON HOLD | COMMUNITY
End: 2023-01-01

## 2023-01-01 RX ORDER — NICARDIPINE HYDROCHLORIDE 0.2 MG/ML
0-15 INJECTION INTRAVENOUS CONTINUOUS
Status: DISCONTINUED | OUTPATIENT
Start: 2023-01-01 | End: 2023-01-01 | Stop reason: HOSPADM

## 2023-01-01 RX ORDER — VORICONAZOLE 200 MG/1
200 TABLET, FILM COATED ORAL 2 TIMES DAILY
COMMUNITY
Start: 2023-01-01 | End: 2023-01-01

## 2023-01-01 RX ORDER — LANOLIN ALCOHOL/MO/W.PET/CERES
1 CREAM (GRAM) TOPICAL DAILY
Status: DISCONTINUED | OUTPATIENT
Start: 2023-01-01 | End: 2023-01-01 | Stop reason: HOSPADM

## 2023-01-01 RX ORDER — LOSARTAN POTASSIUM 50 MG/1
50 TABLET ORAL DAILY
Status: DISCONTINUED | OUTPATIENT
Start: 2023-01-01 | End: 2023-01-01 | Stop reason: HOSPADM

## 2023-01-01 RX ORDER — ONDANSETRON 4 MG/1
8 TABLET, ORALLY DISINTEGRATING ORAL EVERY 8 HOURS PRN
Status: DISCONTINUED | OUTPATIENT
Start: 2023-01-01 | End: 2023-01-01 | Stop reason: HOSPADM

## 2023-01-01 RX ORDER — GABAPENTIN 300 MG/1
600 CAPSULE ORAL 2 TIMES DAILY
Status: CANCELLED | OUTPATIENT
Start: 2023-01-01

## 2023-01-01 RX ORDER — SODIUM CHLORIDE 9 MG/ML
1000 INJECTION, SOLUTION INTRAVENOUS CONTINUOUS
Status: ACTIVE | OUTPATIENT
Start: 2023-01-01 | End: 2023-01-01

## 2023-01-01 RX ORDER — ACETAMINOPHEN 500 MG
1000 TABLET ORAL
Status: COMPLETED | OUTPATIENT
Start: 2023-01-01 | End: 2023-01-01

## 2023-01-01 RX ORDER — HYDRALAZINE HYDROCHLORIDE 20 MG/ML
10 INJECTION INTRAMUSCULAR; INTRAVENOUS EVERY 6 HOURS PRN
Status: DISCONTINUED | OUTPATIENT
Start: 2023-01-01 | End: 2023-01-01 | Stop reason: HOSPADM

## 2023-01-01 RX ORDER — IRON,CARBONYL/ASCORBIC ACID 100-250 MG
1 TABLET ORAL DAILY
Status: DISCONTINUED | OUTPATIENT
Start: 2023-01-01 | End: 2023-01-01 | Stop reason: HOSPADM

## 2023-01-01 RX ORDER — AZELASTINE 1 MG/ML
1 SPRAY, METERED NASAL 2 TIMES DAILY
Status: DISCONTINUED | OUTPATIENT
Start: 2023-01-01 | End: 2023-01-01 | Stop reason: HOSPADM

## 2023-01-01 RX ORDER — BENZONATATE 100 MG/1
100 CAPSULE ORAL 3 TIMES DAILY PRN
COMMUNITY
Start: 2023-01-01 | End: 2023-01-01

## 2023-01-01 RX ORDER — BENZONATATE 100 MG/1
100 CAPSULE ORAL 3 TIMES DAILY PRN
Status: DISCONTINUED | OUTPATIENT
Start: 2023-01-01 | End: 2023-01-01 | Stop reason: HOSPADM

## 2023-01-01 RX ORDER — INSULIN ASPART 100 [IU]/ML
0-10 INJECTION, SOLUTION INTRAVENOUS; SUBCUTANEOUS
Status: DISCONTINUED | OUTPATIENT
Start: 2023-01-01 | End: 2023-01-01 | Stop reason: HOSPADM

## 2023-01-01 RX ORDER — LEVOFLOXACIN 750 MG/1
750 TABLET ORAL DAILY
Status: ON HOLD | COMMUNITY
End: 2023-01-01 | Stop reason: HOSPADM

## 2023-01-01 RX ORDER — LIDOCAINE HYDROCHLORIDE 20 MG/ML
15 SOLUTION ORAL; TOPICAL EVERY 4 HOURS PRN
COMMUNITY
Start: 2023-01-01 | End: 2023-01-01 | Stop reason: ALTCHOICE

## 2023-01-01 RX ORDER — MUPIROCIN 20 MG/G
OINTMENT TOPICAL 2 TIMES DAILY
Status: COMPLETED | OUTPATIENT
Start: 2023-01-01 | End: 2023-01-01

## 2023-01-01 RX ORDER — POTASSIUM CHLORIDE 20 MEQ/1
40 TABLET, EXTENDED RELEASE ORAL ONCE
Status: COMPLETED | OUTPATIENT
Start: 2023-01-01 | End: 2023-01-01

## 2023-01-01 RX ORDER — POLYETHYLENE GLYCOL 3350 17 G/17G
17 POWDER, FOR SOLUTION ORAL DAILY
Status: DISCONTINUED | OUTPATIENT
Start: 2023-01-01 | End: 2023-01-01 | Stop reason: HOSPADM

## 2023-01-01 RX ORDER — METOPROLOL TARTRATE 25 MG/1
25 TABLET, FILM COATED ORAL 2 TIMES DAILY
Status: DISCONTINUED | OUTPATIENT
Start: 2023-01-01 | End: 2023-01-01

## 2023-01-01 RX ORDER — METOPROLOL TARTRATE 25 MG/1
25 TABLET, FILM COATED ORAL 2 TIMES DAILY
Status: DISCONTINUED | OUTPATIENT
Start: 2023-01-01 | End: 2023-01-01 | Stop reason: HOSPADM

## 2023-01-01 RX ORDER — CEFEPIME HYDROCHLORIDE 1 G/50ML
1 INJECTION, SOLUTION INTRAVENOUS
Status: DISCONTINUED | OUTPATIENT
Start: 2023-01-01 | End: 2023-01-01

## 2023-01-01 RX ORDER — METFORMIN HYDROCHLORIDE 500 MG/1
500 TABLET ORAL 2 TIMES DAILY WITH MEALS
COMMUNITY
End: 2023-01-01 | Stop reason: ALTCHOICE

## 2023-01-01 RX ORDER — ACETAMINOPHEN 325 MG/1
650 TABLET ORAL EVERY 8 HOURS PRN
Status: CANCELLED | OUTPATIENT
Start: 2023-01-01

## 2023-01-01 RX ORDER — AZITHROMYCIN 250 MG/1
250 TABLET, FILM COATED ORAL DAILY
Qty: 4 TABLET | Refills: 0 | Status: SHIPPED | OUTPATIENT
Start: 2023-01-01 | End: 2023-01-01

## 2023-01-01 RX ORDER — FLUTICASONE PROPIONATE 50 MCG
1 SPRAY, SUSPENSION (ML) NASAL DAILY
Status: DISCONTINUED | OUTPATIENT
Start: 2023-01-01 | End: 2023-01-01 | Stop reason: HOSPADM

## 2023-01-01 RX ORDER — ONDANSETRON 8 MG/1
TABLET, ORALLY DISINTEGRATING ORAL
Status: ON HOLD | COMMUNITY
End: 2023-01-01 | Stop reason: HOSPADM

## 2023-01-01 RX ORDER — ONDANSETRON 2 MG/ML
4 INJECTION INTRAMUSCULAR; INTRAVENOUS EVERY 8 HOURS PRN
Status: CANCELLED | OUTPATIENT
Start: 2023-01-01

## 2023-01-01 RX ORDER — PANTOPRAZOLE SODIUM 40 MG/1
40 TABLET, DELAYED RELEASE ORAL 2 TIMES DAILY
Qty: 60 TABLET | Refills: 1 | Status: ON HOLD | OUTPATIENT
Start: 2023-01-01 | End: 2023-01-01

## 2023-01-01 RX ORDER — PROMETHAZINE HYDROCHLORIDE 12.5 MG/1
TABLET ORAL
Qty: 30 TABLET | Refills: 3 | Status: SHIPPED | OUTPATIENT
Start: 2023-01-01 | End: 2023-01-01 | Stop reason: SDUPTHER

## 2023-01-01 RX ORDER — CEFEPIME HYDROCHLORIDE 1 G/50ML
2 INJECTION, SOLUTION INTRAVENOUS
Status: COMPLETED | OUTPATIENT
Start: 2023-01-01 | End: 2023-01-01

## 2023-01-01 RX ORDER — HYDROCODONE BITARTRATE AND ACETAMINOPHEN 10; 325 MG/1; MG/1
1 TABLET ORAL EVERY 6 HOURS PRN
Status: DISCONTINUED | OUTPATIENT
Start: 2023-01-01 | End: 2023-01-01 | Stop reason: HOSPADM

## 2023-01-01 RX ORDER — BENZONATATE 100 MG/1
100 CAPSULE ORAL EVERY 6 HOURS PRN
Qty: 30 CAPSULE | Refills: 1 | Status: SHIPPED | OUTPATIENT
Start: 2023-01-01 | End: 2023-01-01 | Stop reason: ALTCHOICE

## 2023-01-01 RX ORDER — TAMSULOSIN HYDROCHLORIDE 0.4 MG/1
0.4 CAPSULE ORAL NIGHTLY
Status: DISCONTINUED | OUTPATIENT
Start: 2023-01-01 | End: 2023-01-01 | Stop reason: HOSPADM

## 2023-01-01 RX ORDER — OXYCODONE AND ACETAMINOPHEN 10; 325 MG/1; MG/1
1 TABLET ORAL EVERY 4 HOURS PRN
Status: DISCONTINUED | OUTPATIENT
Start: 2023-01-01 | End: 2023-01-01 | Stop reason: HOSPADM

## 2023-01-01 RX ORDER — ACETAMINOPHEN 650 MG/1
650 SUPPOSITORY RECTAL
Status: COMPLETED | OUTPATIENT
Start: 2023-01-01 | End: 2023-01-01

## 2023-01-01 RX ORDER — ONDANSETRON HYDROCHLORIDE 8 MG/1
8 TABLET, FILM COATED ORAL EVERY 8 HOURS PRN
Qty: 30 TABLET | Refills: 2 | Status: SHIPPED | OUTPATIENT
Start: 2023-01-01 | End: 2023-01-01 | Stop reason: SDUPTHER

## 2023-01-01 RX ORDER — ACETAMINOPHEN 325 MG/1
650 TABLET ORAL EVERY 8 HOURS PRN
Status: DISCONTINUED | OUTPATIENT
Start: 2023-01-01 | End: 2023-01-01

## 2023-01-01 RX ORDER — LEVOFLOXACIN 750 MG/1
750 TABLET ORAL DAILY
Qty: 7 TABLET | Refills: 0 | Status: SHIPPED | OUTPATIENT
Start: 2023-01-01 | End: 2023-01-01

## 2023-01-01 RX ORDER — MORPHINE SULFATE 2 MG/ML
2 INJECTION, SOLUTION INTRAMUSCULAR; INTRAVENOUS EVERY 4 HOURS PRN
Status: DISCONTINUED | OUTPATIENT
Start: 2023-01-01 | End: 2023-01-01 | Stop reason: HOSPADM

## 2023-01-01 RX ORDER — PROMETHAZINE HYDROCHLORIDE 25 MG/1
25 TABLET ORAL EVERY 6 HOURS PRN
Status: DISCONTINUED | OUTPATIENT
Start: 2023-01-01 | End: 2023-01-01 | Stop reason: HOSPADM

## 2023-01-01 RX ORDER — ATORVASTATIN CALCIUM 40 MG/1
80 TABLET, FILM COATED ORAL DAILY
Status: DISCONTINUED | OUTPATIENT
Start: 2023-01-01 | End: 2023-01-01 | Stop reason: HOSPADM

## 2023-01-01 RX ORDER — VALACYCLOVIR HYDROCHLORIDE 500 MG/1
1000 TABLET, FILM COATED ORAL DAILY
Status: DISCONTINUED | OUTPATIENT
Start: 2023-01-01 | End: 2023-01-01

## 2023-01-01 RX ORDER — IRON,CARBONYL/ASCORBIC ACID 100-250 MG
1 TABLET ORAL DAILY
Status: ON HOLD | COMMUNITY
Start: 2023-01-01 | End: 2023-01-01

## 2023-01-01 RX ORDER — NALOXONE HCL 0.4 MG/ML
0.02 VIAL (ML) INJECTION
Status: DISCONTINUED | OUTPATIENT
Start: 2023-01-01 | End: 2023-01-01 | Stop reason: HOSPADM

## 2023-01-01 RX ORDER — MORPHINE SULFATE 4 MG/ML
4 INJECTION, SOLUTION INTRAMUSCULAR; INTRAVENOUS ONCE
Status: COMPLETED | OUTPATIENT
Start: 2023-01-01 | End: 2023-01-01

## 2023-01-01 RX ORDER — FAMOTIDINE 20 MG/1
20 TABLET, FILM COATED ORAL DAILY
Status: DISCONTINUED | OUTPATIENT
Start: 2023-01-01 | End: 2023-01-01 | Stop reason: DRUGHIGH

## 2023-01-01 RX ORDER — MEPERIDINE HYDROCHLORIDE 25 MG/ML
12.5 INJECTION INTRAMUSCULAR; INTRAVENOUS; SUBCUTANEOUS ONCE
Status: COMPLETED | OUTPATIENT
Start: 2023-01-01 | End: 2023-01-01

## 2023-01-01 RX ORDER — ATORVASTATIN CALCIUM 20 MG/1
20 TABLET, FILM COATED ORAL DAILY
Status: ON HOLD | COMMUNITY
End: 2023-01-01

## 2023-01-01 RX ORDER — PANTOPRAZOLE SODIUM 40 MG/10ML
40 INJECTION, POWDER, LYOPHILIZED, FOR SOLUTION INTRAVENOUS 2 TIMES DAILY
Status: DISCONTINUED | OUTPATIENT
Start: 2023-01-01 | End: 2023-01-01 | Stop reason: HOSPADM

## 2023-01-01 RX ORDER — FLUTICASONE PROPIONATE 50 MCG
1 SPRAY, SUSPENSION (ML) NASAL DAILY
Status: ON HOLD | COMMUNITY
Start: 2022-11-15 | End: 2023-01-01

## 2023-01-01 RX ORDER — HYDRALAZINE HYDROCHLORIDE 20 MG/ML
10 INJECTION INTRAMUSCULAR; INTRAVENOUS
Status: COMPLETED | OUTPATIENT
Start: 2023-01-01 | End: 2023-01-01

## 2023-01-01 RX ORDER — ISAVUCONAZONIUM SULFATE 186 MG/1
CAPSULE ORAL
COMMUNITY
Start: 2023-01-01 | End: 2023-01-01 | Stop reason: SDUPTHER

## 2023-01-01 RX ORDER — CLONAZEPAM 1 MG/1
1 TABLET ORAL NIGHTLY PRN
Qty: 7 TABLET | Refills: 0 | Status: ON HOLD | OUTPATIENT
Start: 2023-01-01 | End: 2023-01-01

## 2023-01-01 RX ORDER — TAMSULOSIN HYDROCHLORIDE 0.4 MG/1
0.4 CAPSULE ORAL DAILY
Qty: 90 CAPSULE | Refills: 0 | Status: ON HOLD | OUTPATIENT
Start: 2023-01-01 | End: 2023-01-01

## 2023-01-01 RX ORDER — ACETAMINOPHEN 325 MG/1
650 TABLET ORAL EVERY 6 HOURS PRN
Status: DISCONTINUED | OUTPATIENT
Start: 2023-01-01 | End: 2023-01-01

## 2023-01-01 RX ORDER — ATORVASTATIN CALCIUM 20 MG/1
20 TABLET, FILM COATED ORAL DAILY
Status: DISCONTINUED | OUTPATIENT
Start: 2023-01-01 | End: 2023-01-01

## 2023-01-01 RX ORDER — ISAVUCONAZONIUM SULFATE 186 MG/1
CAPSULE ORAL
Qty: 72 CAPSULE | Refills: 2 | Status: ON HOLD | OUTPATIENT
Start: 2023-01-01 | End: 2023-01-01

## 2023-01-01 RX ORDER — BUDESONIDE AND FORMOTEROL FUMARATE DIHYDRATE 160; 4.5 UG/1; UG/1
AEROSOL RESPIRATORY (INHALATION)
COMMUNITY
End: 2023-01-01 | Stop reason: ALTCHOICE

## 2023-01-01 RX ADMIN — INSULIN ASPART 2 UNITS: 100 INJECTION, SOLUTION INTRAVENOUS; SUBCUTANEOUS at 05:11

## 2023-01-01 RX ADMIN — HYDROCODONE BITARTRATE AND ACETAMINOPHEN 1 TABLET: 10; 325 TABLET ORAL at 02:04

## 2023-01-01 RX ADMIN — INSULIN ASPART 3 UNITS: 100 INJECTION, SOLUTION INTRAVENOUS; SUBCUTANEOUS at 08:11

## 2023-01-01 RX ADMIN — ACETAMINOPHEN 650 MG: 650 SUPPOSITORY RECTAL at 02:11

## 2023-01-01 RX ADMIN — CEFEPIME HYDROCHLORIDE 2 G: 2 INJECTION, POWDER, FOR SOLUTION INTRAVENOUS at 07:11

## 2023-01-01 RX ADMIN — PIPERACILLIN SODIUM,TAZOBACTAM SODIUM 3.38 G: 3; .375 INJECTION, POWDER, FOR SOLUTION INTRAVENOUS at 08:11

## 2023-01-01 RX ADMIN — SODIUM CHLORIDE: 0.9 INJECTION, SOLUTION INTRAVENOUS at 06:04

## 2023-01-01 RX ADMIN — Medication 10 MG: at 02:11

## 2023-01-01 RX ADMIN — ONDANSETRON 4 MG: 2 INJECTION INTRAMUSCULAR; INTRAVENOUS at 07:04

## 2023-01-01 RX ADMIN — ACETAMINOPHEN 650 MG: 325 SUSPENSION ORAL at 08:06

## 2023-01-01 RX ADMIN — FAMOTIDINE 20 MG: 20 TABLET ORAL at 08:11

## 2023-01-01 RX ADMIN — POLYETHYLENE GLYCOL 3350 17 G: 17 POWDER, FOR SOLUTION ORAL at 09:02

## 2023-01-01 RX ADMIN — FERROUS SULFATE TAB 325 MG (65 MG ELEMENTAL FE) 1 EACH: 325 (65 FE) TAB at 09:11

## 2023-01-01 RX ADMIN — METOPROLOL TARTRATE 25 MG: 25 TABLET, FILM COATED ORAL at 08:11

## 2023-01-01 RX ADMIN — Medication 800 MG: at 07:11

## 2023-01-01 RX ADMIN — MUPIROCIN 1 G: 20 OINTMENT TOPICAL at 08:11

## 2023-01-01 RX ADMIN — FILGRASTIM-SNDZ 300 MCG: 300 INJECTION, SOLUTION INTRAVENOUS; SUBCUTANEOUS at 10:11

## 2023-01-01 RX ADMIN — TRAZODONE HYDROCHLORIDE 300 MG: 50 TABLET ORAL at 09:11

## 2023-01-01 RX ADMIN — FAMOTIDINE 20 MG: 20 TABLET ORAL at 09:11

## 2023-01-01 RX ADMIN — HYDROMORPHONE HYDROCHLORIDE 0.5 MG: 0.5 INJECTION, SOLUTION INTRAMUSCULAR; INTRAVENOUS; SUBCUTANEOUS at 03:11

## 2023-01-01 RX ADMIN — VANCOMYCIN HYDROCHLORIDE 1250 MG: 1.25 INJECTION, POWDER, LYOPHILIZED, FOR SOLUTION INTRAVENOUS at 05:11

## 2023-01-01 RX ADMIN — DIPHENHYDRAMINE HYDROCHLORIDE 50 MG: 50 INJECTION INTRAMUSCULAR; INTRAVENOUS at 08:06

## 2023-01-01 RX ADMIN — VANCOMYCIN HYDROCHLORIDE 1000 MG: 1 INJECTION, POWDER, LYOPHILIZED, FOR SOLUTION INTRAVENOUS at 03:11

## 2023-01-01 RX ADMIN — DEXAMETHASONE SODIUM PHOSPHATE 20 MG: 4 INJECTION, SOLUTION INTRA-ARTICULAR; INTRALESIONAL; INTRAMUSCULAR; INTRAVENOUS; SOFT TISSUE at 07:02

## 2023-01-01 RX ADMIN — DEXAMETHASONE SODIUM PHOSPHATE 20 MG: 4 INJECTION, SOLUTION INTRA-ARTICULAR; INTRALESIONAL; INTRAMUSCULAR; INTRAVENOUS; SOFT TISSUE at 09:06

## 2023-01-01 RX ADMIN — HYDROCODONE BITARTRATE AND ACETAMINOPHEN 1 TABLET: 5; 325 TABLET ORAL at 05:11

## 2023-01-01 RX ADMIN — VANCOMYCIN HYDROCHLORIDE 2000 MG: 500 INJECTION, POWDER, LYOPHILIZED, FOR SOLUTION INTRAVENOUS at 12:11

## 2023-01-01 RX ADMIN — METOPROLOL TARTRATE 25 MG: 25 TABLET, FILM COATED ORAL at 11:01

## 2023-01-01 RX ADMIN — HYDROCODONE BITARTRATE AND ACETAMINOPHEN 1 TABLET: 10; 325 TABLET ORAL at 07:11

## 2023-01-01 RX ADMIN — ONDANSETRON 4 MG: 2 INJECTION INTRAMUSCULAR; INTRAVENOUS at 02:12

## 2023-01-01 RX ADMIN — SODIUM CHLORIDE 1000 ML: 0.9 INJECTION, SOLUTION INTRAVENOUS at 04:01

## 2023-01-01 RX ADMIN — PIPERACILLIN SODIUM,TAZOBACTAM SODIUM 3.38 G: 3; .375 INJECTION, POWDER, FOR SOLUTION INTRAVENOUS at 07:11

## 2023-01-01 RX ADMIN — IPRATROPIUM BROMIDE AND ALBUTEROL SULFATE 3 ML: 2.5; .5 SOLUTION RESPIRATORY (INHALATION) at 05:01

## 2023-01-01 RX ADMIN — OBINUTUZUMAB 1000 MG: 1000 INJECTION, SOLUTION, CONCENTRATE INTRAVENOUS at 08:02

## 2023-01-01 RX ADMIN — FERROUS SULFATE TAB 325 MG (65 MG ELEMENTAL FE) 1 EACH: 325 (65 FE) TAB at 08:11

## 2023-01-01 RX ADMIN — GABAPENTIN 600 MG: 300 CAPSULE ORAL at 09:11

## 2023-01-01 RX ADMIN — ONDANSETRON 4 MG: 2 INJECTION INTRAMUSCULAR; INTRAVENOUS at 01:11

## 2023-01-01 RX ADMIN — DEXAMETHASONE SODIUM PHOSPHATE 20 MG: 4 INJECTION, SOLUTION INTRA-ARTICULAR; INTRALESIONAL; INTRAMUSCULAR; INTRAVENOUS; SOFT TISSUE at 08:05

## 2023-01-01 RX ADMIN — POTASSIUM BICARBONATE 35 MEQ: 391 TABLET, EFFERVESCENT ORAL at 11:11

## 2023-01-01 RX ADMIN — SODIUM CHLORIDE 1000 ML: 0.9 INJECTION, SOLUTION INTRAVENOUS at 07:11

## 2023-01-01 RX ADMIN — FERROUS SULFATE TAB 325 MG (65 MG ELEMENTAL FE) 1 EACH: 325 (65 FE) TAB at 09:02

## 2023-01-01 RX ADMIN — IPRATROPIUM BROMIDE AND ALBUTEROL SULFATE 3 ML: 2.5; .5 SOLUTION RESPIRATORY (INHALATION) at 07:11

## 2023-01-01 RX ADMIN — NICARDIPINE HYDROCHLORIDE 5 MG/HR: 0.2 INJECTION INTRAVENOUS at 09:02

## 2023-01-01 RX ADMIN — AZELASTINE HYDROCHLORIDE 137 MCG: 137 SPRAY, METERED NASAL at 08:11

## 2023-01-01 RX ADMIN — VALACYCLOVIR 1000 MG: 500 TABLET, FILM COATED ORAL at 09:04

## 2023-01-01 RX ADMIN — ALLOPURINOL 300 MG: 300 TABLET ORAL at 09:04

## 2023-01-01 RX ADMIN — LOSARTAN POTASSIUM 50 MG: 50 TABLET, FILM COATED ORAL at 09:11

## 2023-01-01 RX ADMIN — GABAPENTIN 600 MG: 300 CAPSULE ORAL at 08:11

## 2023-01-01 RX ADMIN — ATORVASTATIN CALCIUM 20 MG: 20 TABLET, FILM COATED ORAL at 12:11

## 2023-01-01 RX ADMIN — FLUTICASONE PROPIONATE 50 MCG: 50 SPRAY, METERED NASAL at 09:04

## 2023-01-01 RX ADMIN — METHYLPREDNISOLONE SODIUM SUCCINATE 125 MG: 125 INJECTION, POWDER, FOR SOLUTION INTRAMUSCULAR; INTRAVENOUS at 02:11

## 2023-01-01 RX ADMIN — CEFEPIME HYDROCHLORIDE 2 G: 2 INJECTION, POWDER, FOR SOLUTION INTRAVENOUS at 02:11

## 2023-01-01 RX ADMIN — TRAZODONE HYDROCHLORIDE 300 MG: 50 TABLET ORAL at 08:11

## 2023-01-01 RX ADMIN — FILGRASTIM-SNDZ 480 MCG: 480 INJECTION, SOLUTION INTRAVENOUS; SUBCUTANEOUS at 01:06

## 2023-01-01 RX ADMIN — DIPHENHYDRAMINE HYDROCHLORIDE 50 MG: 50 INJECTION, SOLUTION INTRAMUSCULAR; INTRAVENOUS at 08:03

## 2023-01-01 RX ADMIN — TAMSULOSIN HYDROCHLORIDE 0.4 MG: 0.4 CAPSULE ORAL at 05:04

## 2023-01-01 RX ADMIN — LOSARTAN POTASSIUM 50 MG: 50 TABLET, FILM COATED ORAL at 08:11

## 2023-01-01 RX ADMIN — POTASSIUM BICARBONATE 35 MEQ: 391 TABLET, EFFERVESCENT ORAL at 05:11

## 2023-01-01 RX ADMIN — HYDRALAZINE HYDROCHLORIDE 10 MG: 20 INJECTION, SOLUTION INTRAMUSCULAR; INTRAVENOUS at 05:04

## 2023-01-01 RX ADMIN — TAMSULOSIN HYDROCHLORIDE 0.4 MG: 0.4 CAPSULE ORAL at 09:11

## 2023-01-01 RX ADMIN — OBINUTUZUMAB 100 MG: 1000 INJECTION, SOLUTION, CONCENTRATE INTRAVENOUS at 08:02

## 2023-01-01 RX ADMIN — AZITHROMYCIN MONOHYDRATE 250 MG: 250 TABLET ORAL at 09:04

## 2023-01-01 RX ADMIN — ATORVASTATIN CALCIUM 20 MG: 20 TABLET, FILM COATED ORAL at 10:11

## 2023-01-01 RX ADMIN — HYDROCODONE BITARTRATE AND ACETAMINOPHEN 1 TABLET: 5; 325 TABLET ORAL at 08:11

## 2023-01-01 RX ADMIN — SODIUM CHLORIDE: 0.9 INJECTION, SOLUTION INTRAVENOUS at 08:05

## 2023-01-01 RX ADMIN — HYDROCODONE BITARTRATE AND ACETAMINOPHEN 1 TABLET: 5; 325 TABLET ORAL at 11:11

## 2023-01-01 RX ADMIN — HYDROCODONE BITARTRATE AND ACETAMINOPHEN 1 TABLET: 5; 325 TABLET ORAL at 03:11

## 2023-01-01 RX ADMIN — PIPERACILLIN SODIUM,TAZOBACTAM SODIUM 3.38 G: 3; .375 INJECTION, POWDER, FOR SOLUTION INTRAVENOUS at 12:11

## 2023-01-01 RX ADMIN — VANCOMYCIN HYDROCHLORIDE 1250 MG: 1.25 INJECTION, POWDER, LYOPHILIZED, FOR SOLUTION INTRAVENOUS at 01:11

## 2023-01-01 RX ADMIN — DIPHENHYDRAMINE HYDROCHLORIDE 50 MG: 50 INJECTION INTRAMUSCULAR; INTRAVENOUS at 08:05

## 2023-01-01 RX ADMIN — CEFEPIME HYDROCHLORIDE 2 G: 2 INJECTION, POWDER, FOR SOLUTION INTRAVENOUS at 08:11

## 2023-01-01 RX ADMIN — VANCOMYCIN HYDROCHLORIDE 500 MG: 500 INJECTION, POWDER, LYOPHILIZED, FOR SOLUTION INTRAVENOUS at 01:02

## 2023-01-01 RX ADMIN — INSULIN ASPART 1 UNITS: 100 INJECTION, SOLUTION INTRAVENOUS; SUBCUTANEOUS at 08:11

## 2023-01-01 RX ADMIN — SODIUM CHLORIDE: 0.9 INJECTION, SOLUTION INTRAVENOUS at 04:04

## 2023-01-01 RX ADMIN — OBINUTUZUMAB 1000 MG: 1000 INJECTION, SOLUTION, CONCENTRATE INTRAVENOUS at 09:05

## 2023-01-01 RX ADMIN — POTASSIUM BICARBONATE 35 MEQ: 391 TABLET, EFFERVESCENT ORAL at 08:11

## 2023-01-01 RX ADMIN — GABAPENTIN 600 MG: 300 CAPSULE ORAL at 12:11

## 2023-01-01 RX ADMIN — LORAZEPAM 1 MG: 2 INJECTION, SOLUTION INTRAMUSCULAR; INTRAVENOUS at 05:12

## 2023-01-01 RX ADMIN — SODIUM CHLORIDE: 0.9 INJECTION, SOLUTION INTRAVENOUS at 08:03

## 2023-01-01 RX ADMIN — TAMSULOSIN HYDROCHLORIDE 0.4 MG: 0.4 CAPSULE ORAL at 09:04

## 2023-01-01 RX ADMIN — CEFEPIME HYDROCHLORIDE 2 G: 2 INJECTION, SOLUTION INTRAVENOUS at 05:01

## 2023-01-01 RX ADMIN — FILGRASTIM-SNDZ 480 MCG: 480 INJECTION, SOLUTION INTRAVENOUS; SUBCUTANEOUS at 01:09

## 2023-01-01 RX ADMIN — FILGRASTIM-SNDZ 480 MCG: 480 INJECTION, SOLUTION INTRAVENOUS; SUBCUTANEOUS at 09:09

## 2023-01-01 RX ADMIN — ONDANSETRON 4 MG: 2 INJECTION INTRAMUSCULAR; INTRAVENOUS at 04:11

## 2023-01-01 RX ADMIN — VANCOMYCIN HYDROCHLORIDE 1250 MG: 1.25 INJECTION, POWDER, LYOPHILIZED, FOR SOLUTION INTRAVENOUS at 03:11

## 2023-01-01 RX ADMIN — FILGRASTIM-SNDZ 480 MCG: 480 INJECTION, SOLUTION INTRAVENOUS; SUBCUTANEOUS at 01:05

## 2023-01-01 RX ADMIN — HYDROCODONE BITARTRATE AND ACETAMINOPHEN 1 TABLET: 10; 325 TABLET ORAL at 09:04

## 2023-01-01 RX ADMIN — POTASSIUM CHLORIDE 40 MEQ: 1500 TABLET, EXTENDED RELEASE ORAL at 11:02

## 2023-01-01 RX ADMIN — CEFEPIME HYDROCHLORIDE 2 G: 2 INJECTION, POWDER, FOR SOLUTION INTRAVENOUS at 03:11

## 2023-01-01 RX ADMIN — PANTOPRAZOLE SODIUM 40 MG: 40 INJECTION, POWDER, LYOPHILIZED, FOR SOLUTION INTRAVENOUS at 08:11

## 2023-01-01 RX ADMIN — FUROSEMIDE 20 MG: 10 INJECTION, SOLUTION INTRAMUSCULAR; INTRAVENOUS at 05:11

## 2023-01-01 RX ADMIN — LOSARTAN POTASSIUM 50 MG: 50 TABLET, FILM COATED ORAL at 09:04

## 2023-01-01 RX ADMIN — CEFEPIME HYDROCHLORIDE 1 G: 1 INJECTION, SOLUTION INTRAVENOUS at 10:02

## 2023-01-01 RX ADMIN — AZITHROMYCIN MONOHYDRATE 500 MG: 500 INJECTION, POWDER, LYOPHILIZED, FOR SOLUTION INTRAVENOUS at 12:04

## 2023-01-01 RX ADMIN — CEFEPIME HYDROCHLORIDE 1 G: 1 INJECTION, SOLUTION INTRAVENOUS at 07:02

## 2023-01-01 RX ADMIN — SODIUM CHLORIDE: 0.9 INJECTION, SOLUTION INTRAVENOUS at 02:11

## 2023-01-01 RX ADMIN — VALACYCLOVIR 1000 MG: 500 TABLET, FILM COATED ORAL at 09:02

## 2023-01-01 RX ADMIN — ACETAMINOPHEN 650 MG: 325 SUSPENSION ORAL at 07:02

## 2023-01-01 RX ADMIN — TRAZODONE HYDROCHLORIDE 300 MG: 50 TABLET ORAL at 09:04

## 2023-01-01 RX ADMIN — VANCOMYCIN HYDROCHLORIDE 500 MG: 500 INJECTION, POWDER, LYOPHILIZED, FOR SOLUTION INTRAVENOUS at 08:01

## 2023-01-01 RX ADMIN — MUPIROCIN 1 G: 20 OINTMENT TOPICAL at 09:11

## 2023-01-01 RX ADMIN — ATORVASTATIN CALCIUM 80 MG: 40 TABLET, FILM COATED ORAL at 08:11

## 2023-01-01 RX ADMIN — METOPROLOL TARTRATE 25 MG: 25 TABLET, FILM COATED ORAL at 12:11

## 2023-01-01 RX ADMIN — ATORVASTATIN CALCIUM 20 MG: 20 TABLET, FILM COATED ORAL at 09:04

## 2023-01-01 RX ADMIN — INSULIN ASPART 2 UNITS: 100 INJECTION, SOLUTION INTRAVENOUS; SUBCUTANEOUS at 10:11

## 2023-01-01 RX ADMIN — MORPHINE SULFATE 4 MG: 4 INJECTION, SOLUTION INTRAMUSCULAR; INTRAVENOUS at 05:12

## 2023-01-01 RX ADMIN — VALACYCLOVIR HYDROCHLORIDE 1000 MG: 500 TABLET, FILM COATED ORAL at 08:11

## 2023-01-01 RX ADMIN — ALLOPURINOL 300 MG: 300 TABLET ORAL at 09:02

## 2023-01-01 RX ADMIN — GABAPENTIN 600 MG: 300 CAPSULE ORAL at 11:01

## 2023-01-01 RX ADMIN — HYDROCODONE BITARTRATE AND ACETAMINOPHEN 1 TABLET: 5; 325 TABLET ORAL at 09:11

## 2023-01-01 RX ADMIN — FERROUS SULFATE TAB 325 MG (65 MG ELEMENTAL FE) 1 EACH: 325 (65 FE) TAB at 12:11

## 2023-01-01 RX ADMIN — LEVALBUTEROL HYDROCHLORIDE 3 ML: 1.25 SOLUTION RESPIRATORY (INHALATION) at 01:01

## 2023-01-01 RX ADMIN — SODIUM CHLORIDE, SODIUM LACTATE, POTASSIUM CHLORIDE, AND CALCIUM CHLORIDE: .6; .31; .03; .02 INJECTION, SOLUTION INTRAVENOUS at 07:07

## 2023-01-01 RX ADMIN — HYDROMORPHONE HYDROCHLORIDE 0.5 MG: 0.5 INJECTION, SOLUTION INTRAMUSCULAR; INTRAVENOUS; SUBCUTANEOUS at 05:11

## 2023-01-01 RX ADMIN — VANCOMYCIN HYDROCHLORIDE 1000 MG: 1 INJECTION, POWDER, LYOPHILIZED, FOR SOLUTION INTRAVENOUS at 01:02

## 2023-01-01 RX ADMIN — DEXAMETHASONE 20 MG: 4 TABLET ORAL at 09:02

## 2023-01-01 RX ADMIN — ENOXAPARIN SODIUM 40 MG: 100 INJECTION SUBCUTANEOUS at 04:04

## 2023-01-01 RX ADMIN — OBINUTUZUMAB 900 MG: 1000 INJECTION, SOLUTION, CONCENTRATE INTRAVENOUS at 08:02

## 2023-01-01 RX ADMIN — FILGRASTIM-SNDZ 300 MCG: 300 INJECTION, SOLUTION INTRAVENOUS; SUBCUTANEOUS at 10:04

## 2023-01-01 RX ADMIN — AZELASTINE HYDROCHLORIDE 137 MCG: 137 SPRAY, METERED NASAL at 10:02

## 2023-01-01 RX ADMIN — AZELASTINE HYDROCHLORIDE 137 MCG: 137 SPRAY, METERED NASAL at 09:04

## 2023-01-01 RX ADMIN — VALACYCLOVIR HYDROCHLORIDE 1000 MG: 500 TABLET, FILM COATED ORAL at 09:11

## 2023-01-01 RX ADMIN — INSULIN ASPART 2 UNITS: 100 INJECTION, SOLUTION INTRAVENOUS; SUBCUTANEOUS at 04:11

## 2023-01-01 RX ADMIN — ACETAMINOPHEN 325 MG: 325 TABLET ORAL at 01:11

## 2023-01-01 RX ADMIN — CEFTRIAXONE 1 G: 1 INJECTION, POWDER, FOR SOLUTION INTRAMUSCULAR; INTRAVENOUS at 12:04

## 2023-01-01 RX ADMIN — SODIUM CHLORIDE, SODIUM LACTATE, POTASSIUM CHLORIDE, AND CALCIUM CHLORIDE 2586 ML: .6; .31; .03; .02 INJECTION, SOLUTION INTRAVENOUS at 02:11

## 2023-01-01 RX ADMIN — ACETAMINOPHEN 650 MG: 325 SUSPENSION ORAL at 08:07

## 2023-01-01 RX ADMIN — FILGRASTIM-SNDZ 480 MCG: 480 INJECTION, SOLUTION INTRAVENOUS; SUBCUTANEOUS at 02:06

## 2023-01-01 RX ADMIN — INSULIN ASPART 6 UNITS: 100 INJECTION, SOLUTION INTRAVENOUS; SUBCUTANEOUS at 05:11

## 2023-01-01 RX ADMIN — Medication 800 MG: at 05:11

## 2023-01-01 RX ADMIN — DIPHENHYDRAMINE HYDROCHLORIDE 50 MG: 50 INJECTION INTRAMUSCULAR; INTRAVENOUS at 08:02

## 2023-01-01 RX ADMIN — MEPERIDINE HYDROCHLORIDE 12.5 MG: 25 INJECTION INTRAMUSCULAR; INTRAVENOUS; SUBCUTANEOUS at 10:02

## 2023-01-01 RX ADMIN — CEFEPIME HYDROCHLORIDE 2 G: 2 INJECTION, POWDER, FOR SOLUTION INTRAVENOUS at 11:11

## 2023-01-01 RX ADMIN — Medication 6 MG: at 08:11

## 2023-01-01 RX ADMIN — ONDANSETRON 4 MG: 2 INJECTION INTRAMUSCULAR; INTRAVENOUS at 02:11

## 2023-01-01 RX ADMIN — POTASSIUM BICARBONATE 40 MEQ: 391 TABLET, EFFERVESCENT ORAL at 11:04

## 2023-01-01 RX ADMIN — INSULIN ASPART 2 UNITS: 100 INJECTION, SOLUTION INTRAVENOUS; SUBCUTANEOUS at 03:11

## 2023-01-01 RX ADMIN — ACETAMINOPHEN 650 MG: 325 TABLET ORAL at 08:11

## 2023-01-01 RX ADMIN — DEXAMETHASONE SODIUM PHOSPHATE 20 MG: 4 INJECTION, SOLUTION INTRA-ARTICULAR; INTRALESIONAL; INTRAMUSCULAR; INTRAVENOUS; SOFT TISSUE at 08:03

## 2023-01-01 RX ADMIN — HYDROCODONE BITARTRATE AND ACETAMINOPHEN 1 TABLET: 10; 325 TABLET ORAL at 06:04

## 2023-01-01 RX ADMIN — PROPOFOL 30 MG: 10 INJECTION, EMULSION INTRAVENOUS at 08:07

## 2023-01-01 RX ADMIN — DIPHENHYDRAMINE 12.5 MG/5 ML ORAL LIQUID 10 ML: at 04:04

## 2023-01-01 RX ADMIN — DIPHENHYDRAMINE 12.5 MG/5 ML ORAL LIQUID 10 ML: at 09:04

## 2023-01-01 RX ADMIN — METOPROLOL TARTRATE 25 MG: 25 TABLET, FILM COATED ORAL at 09:11

## 2023-01-01 RX ADMIN — PANTOPRAZOLE SODIUM 40 MG: 40 INJECTION, POWDER, LYOPHILIZED, FOR SOLUTION INTRAVENOUS at 04:11

## 2023-01-01 RX ADMIN — INSULIN ASPART 4 UNITS: 100 INJECTION, SOLUTION INTRAVENOUS; SUBCUTANEOUS at 12:11

## 2023-01-01 RX ADMIN — SODIUM CHLORIDE, SODIUM LACTATE, POTASSIUM CHLORIDE, AND CALCIUM CHLORIDE 2478 ML: .6; .31; .03; .02 INJECTION, SOLUTION INTRAVENOUS at 12:04

## 2023-01-01 RX ADMIN — ACETAMINOPHEN 650 MG: 325 TABLET ORAL at 07:02

## 2023-01-01 RX ADMIN — BARIUM SULFATE 340 ML: 0.6 SUSPENSION ORAL at 01:04

## 2023-01-01 RX ADMIN — MORPHINE SULFATE 4 MG: 4 INJECTION, SOLUTION INTRAMUSCULAR; INTRAVENOUS at 08:12

## 2023-01-01 RX ADMIN — ONDANSETRON 4 MG: 2 INJECTION INTRAMUSCULAR; INTRAVENOUS at 12:12

## 2023-01-01 RX ADMIN — SODIUM CHLORIDE, PRESERVATIVE FREE 3 ML: 5 INJECTION INTRAVENOUS at 06:02

## 2023-01-01 RX ADMIN — GABAPENTIN 600 MG: 300 CAPSULE ORAL at 09:04

## 2023-01-01 RX ADMIN — HYDROCODONE BITARTRATE AND ACETAMINOPHEN 1 TABLET: 5; 325 TABLET ORAL at 07:11

## 2023-01-01 RX ADMIN — Medication 800 MG: at 11:11

## 2023-01-01 RX ADMIN — Medication 800 MG: at 10:11

## 2023-01-01 RX ADMIN — DIPHENHYDRAMINE HYDROCHLORIDE 50 MG: 50 INJECTION INTRAMUSCULAR; INTRAVENOUS at 09:03

## 2023-01-01 RX ADMIN — DIPHENHYDRAMINE 12.5 MG/5 ML ORAL LIQUID 10 ML: at 12:04

## 2023-01-01 RX ADMIN — ATORVASTATIN CALCIUM 20 MG: 20 TABLET, FILM COATED ORAL at 09:02

## 2023-01-01 RX ADMIN — POTASSIUM BICARBONATE 35 MEQ: 391 TABLET, EFFERVESCENT ORAL at 10:11

## 2023-01-01 RX ADMIN — AZITHROMYCIN 500 MG: 500 INJECTION, POWDER, LYOPHILIZED, FOR SOLUTION INTRAVENOUS at 01:04

## 2023-01-01 RX ADMIN — ACETAMINOPHEN 650 MG: 325 TABLET ORAL at 01:11

## 2023-01-01 RX ADMIN — SODIUM CHLORIDE: 0.9 INJECTION, SOLUTION INTRAVENOUS at 08:06

## 2023-01-01 RX ADMIN — MORPHINE SULFATE 4 MG: 4 INJECTION, SOLUTION INTRAMUSCULAR; INTRAVENOUS at 01:12

## 2023-01-01 RX ADMIN — ATORVASTATIN CALCIUM 20 MG: 20 TABLET, FILM COATED ORAL at 08:11

## 2023-01-01 RX ADMIN — INSULIN ASPART 2 UNITS: 100 INJECTION, SOLUTION INTRAVENOUS; SUBCUTANEOUS at 08:11

## 2023-01-01 RX ADMIN — HYDROCODONE BITARTRATE AND ACETAMINOPHEN 1 TABLET: 10; 325 TABLET ORAL at 04:04

## 2023-01-01 RX ADMIN — SODIUM CHLORIDE, SODIUM LACTATE, POTASSIUM CHLORIDE, AND CALCIUM CHLORIDE 2178 ML: .6; .31; .03; .02 INJECTION, SOLUTION INTRAVENOUS at 12:12

## 2023-01-01 RX ADMIN — ACETAMINOPHEN 1000 MG: 500 TABLET ORAL at 07:11

## 2023-01-01 RX ADMIN — VALACYCLOVIR HYDROCHLORIDE 1000 MG: 500 TABLET, FILM COATED ORAL at 01:11

## 2023-01-01 RX ADMIN — Medication 6 MG: at 09:11

## 2023-01-01 RX ADMIN — FAMOTIDINE 20 MG: 20 TABLET ORAL at 12:11

## 2023-01-01 RX ADMIN — VALACYCLOVIR HYDROCHLORIDE 1000 MG: 500 TABLET, FILM COATED ORAL at 10:11

## 2023-01-01 RX ADMIN — OBINUTUZUMAB 1000 MG: 1000 INJECTION, SOLUTION, CONCENTRATE INTRAVENOUS at 09:06

## 2023-01-01 RX ADMIN — FILGRASTIM-SNDZ 480 MCG: 480 INJECTION, SOLUTION INTRAVENOUS; SUBCUTANEOUS at 11:09

## 2023-01-01 RX ADMIN — SODIUM CHLORIDE: 0.9 INJECTION, SOLUTION INTRAVENOUS at 12:11

## 2023-01-01 RX ADMIN — OBINUTUZUMAB 1000 MG: 1000 INJECTION, SOLUTION, CONCENTRATE INTRAVENOUS at 09:07

## 2023-01-01 RX ADMIN — VANCOMYCIN HYDROCHLORIDE 1000 MG: 1 INJECTION, POWDER, LYOPHILIZED, FOR SOLUTION INTRAVENOUS at 08:01

## 2023-01-01 RX ADMIN — SODIUM CHLORIDE: 9 INJECTION, SOLUTION INTRAVENOUS at 07:02

## 2023-01-01 RX ADMIN — PIPERACILLIN SODIUM,TAZOBACTAM SODIUM 3.38 G: 3; .375 INJECTION, POWDER, FOR SOLUTION INTRAVENOUS at 09:11

## 2023-01-01 RX ADMIN — SODIUM CHLORIDE, PRESERVATIVE FREE 3 ML: 5 INJECTION INTRAVENOUS at 12:02

## 2023-01-01 RX ADMIN — PIPERACILLIN SODIUM,TAZOBACTAM SODIUM 3.38 G: 3; .375 INJECTION, POWDER, FOR SOLUTION INTRAVENOUS at 04:11

## 2023-01-01 RX ADMIN — LEVALBUTEROL HYDROCHLORIDE 3 ML: 1.25 SOLUTION RESPIRATORY (INHALATION) at 01:02

## 2023-01-01 RX ADMIN — LOSARTAN POTASSIUM 50 MG: 25 TABLET, FILM COATED ORAL at 10:02

## 2023-01-01 RX ADMIN — ATORVASTATIN CALCIUM 80 MG: 40 TABLET, FILM COATED ORAL at 09:11

## 2023-01-01 RX ADMIN — DIPHENHYDRAMINE HYDROCHLORIDE 50 MG: 50 INJECTION INTRAMUSCULAR; INTRAVENOUS at 07:02

## 2023-01-01 RX ADMIN — OBINUTUZUMAB 1000 MG: 1000 INJECTION, SOLUTION, CONCENTRATE INTRAVENOUS at 09:03

## 2023-01-01 RX ADMIN — FILGRASTIM-SNDZ 300 MCG: 300 INJECTION, SOLUTION INTRAVENOUS; SUBCUTANEOUS at 11:11

## 2023-01-01 RX ADMIN — LOSARTAN POTASSIUM 50 MG: 50 TABLET, FILM COATED ORAL at 12:11

## 2023-01-01 RX ADMIN — HYDROCODONE BITARTRATE AND ACETAMINOPHEN 1 TABLET: 5; 325 TABLET ORAL at 02:11

## 2023-01-01 RX ADMIN — PEGFILGRASTIM-CBQV 6 MG: 6 INJECTION, SOLUTION SUBCUTANEOUS at 10:06

## 2023-01-01 RX ADMIN — ACETAMINOPHEN 650 MG: 325 TABLET ORAL at 12:11

## 2023-01-01 RX ADMIN — SODIUM CHLORIDE, PRESERVATIVE FREE 10 ML: 5 INJECTION INTRAVENOUS at 12:03

## 2023-01-01 RX ADMIN — CEFEPIME 2 G: 2 INJECTION, POWDER, FOR SOLUTION INTRAVENOUS at 01:04

## 2023-01-01 RX ADMIN — LOPERAMIDE HYDROCHLORIDE 4 MG: 2 CAPSULE ORAL at 11:11

## 2023-01-01 RX ADMIN — POTASSIUM BICARBONATE 35 MEQ: 391 TABLET, EFFERVESCENT ORAL at 07:11

## 2023-01-01 RX ADMIN — PANTOPRAZOLE SODIUM 40 MG: 40 INJECTION, POWDER, LYOPHILIZED, FOR SOLUTION INTRAVENOUS at 09:11

## 2023-01-01 RX ADMIN — ONDANSETRON 4 MG: 2 INJECTION INTRAMUSCULAR; INTRAVENOUS at 06:11

## 2023-01-01 RX ADMIN — Medication 50 MG: at 02:11

## 2023-01-01 RX ADMIN — FILGRASTIM 480 MCG: 480 INJECTION, SOLUTION INTRAVENOUS; SUBCUTANEOUS at 02:10

## 2023-01-01 RX ADMIN — VANCOMYCIN HYDROCHLORIDE 2000 MG: 500 INJECTION, POWDER, LYOPHILIZED, FOR SOLUTION INTRAVENOUS at 11:11

## 2023-01-01 RX ADMIN — HYDROCODONE BITARTRATE AND ACETAMINOPHEN 1 TABLET: 10; 325 TABLET ORAL at 01:11

## 2023-01-01 RX ADMIN — CEFEPIME 2 G: 2 INJECTION, POWDER, FOR SOLUTION INTRAVENOUS at 04:04

## 2023-01-01 RX ADMIN — AZITHROMYCIN MONOHYDRATE 500 MG: 250 TABLET ORAL at 07:01

## 2023-01-01 RX ADMIN — POTASSIUM BICARBONATE 50 MEQ: 977.5 TABLET, EFFERVESCENT ORAL at 10:11

## 2023-01-01 RX ADMIN — ONDANSETRON 4 MG: 2 INJECTION INTRAMUSCULAR; INTRAVENOUS at 03:11

## 2023-01-01 RX ADMIN — ONDANSETRON 4 MG: 2 INJECTION INTRAMUSCULAR; INTRAVENOUS at 10:11

## 2023-01-01 RX ADMIN — SODIUM CHLORIDE: 0.9 INJECTION, SOLUTION INTRAVENOUS at 08:07

## 2023-01-01 RX ADMIN — ACETAMINOPHEN 650 MG: 325 TABLET ORAL at 04:11

## 2023-01-01 RX ADMIN — AZELASTINE HYDROCHLORIDE 137 MCG: 137 SPRAY, METERED NASAL at 09:11

## 2023-01-01 RX ADMIN — LOSARTAN POTASSIUM 50 MG: 50 TABLET, FILM COATED ORAL at 10:11

## 2023-01-01 RX ADMIN — NICARDIPINE HYDROCHLORIDE 5 MG/HR: 0.2 INJECTION INTRAVENOUS at 04:02

## 2023-01-01 RX ADMIN — TRAZODONE HYDROCHLORIDE 150 MG: 50 TABLET ORAL at 02:04

## 2023-01-01 RX ADMIN — GABAPENTIN 600 MG: 300 CAPSULE ORAL at 11:02

## 2023-01-01 RX ADMIN — CLONAZEPAM 1 MG: 1 TABLET ORAL at 05:11

## 2023-01-01 RX ADMIN — VANCOMYCIN HYDROCHLORIDE 1250 MG: 1.25 INJECTION, POWDER, LYOPHILIZED, FOR SOLUTION INTRAVENOUS at 12:11

## 2023-01-01 RX ADMIN — GABAPENTIN 600 MG: 300 CAPSULE ORAL at 09:02

## 2023-01-01 RX ADMIN — ACETAMINOPHEN 650 MG: 325 TABLET ORAL at 12:04

## 2023-01-01 RX ADMIN — DIPHENHYDRAMINE 12.5 MG/5 ML ORAL LIQUID 10 ML: at 08:04

## 2023-01-01 RX ADMIN — IPRATROPIUM BROMIDE AND ALBUTEROL SULFATE 3 ML: 2.5; .5 SOLUTION RESPIRATORY (INHALATION) at 02:11

## 2023-01-01 RX ADMIN — ACETAMINOPHEN 650 MG: 325 TABLET ORAL at 03:04

## 2023-01-01 RX ADMIN — Medication 800 MG: at 04:11

## 2023-01-01 RX ADMIN — DEXAMETHASONE SODIUM PHOSPHATE 20 MG: 4 INJECTION, SOLUTION INTRA-ARTICULAR; INTRALESIONAL; INTRAMUSCULAR; INTRAVENOUS; SOFT TISSUE at 09:03

## 2023-01-01 RX ADMIN — VANCOMYCIN HYDROCHLORIDE 1500 MG: 1.5 INJECTION, POWDER, LYOPHILIZED, FOR SOLUTION INTRAVENOUS at 05:04

## 2023-01-01 RX ADMIN — FLUTICASONE PROPIONATE 50 MCG: 50 SPRAY, METERED NASAL at 09:02

## 2023-01-01 RX ADMIN — VANCOMYCIN HYDROCHLORIDE 1500 MG: 1.5 INJECTION, POWDER, LYOPHILIZED, FOR SOLUTION INTRAVENOUS at 06:04

## 2023-01-01 RX ADMIN — CEFEPIME 2 G: 2 INJECTION, POWDER, FOR SOLUTION INTRAVENOUS at 05:04

## 2023-01-01 RX ADMIN — FERROUS SULFATE TAB 325 MG (65 MG ELEMENTAL FE) 1 EACH: 325 (65 FE) TAB at 10:11

## 2023-01-01 RX ADMIN — DEXAMETHASONE SODIUM PHOSPHATE 20 MG: 4 INJECTION, SOLUTION INTRA-ARTICULAR; INTRALESIONAL; INTRAMUSCULAR; INTRAVENOUS; SOFT TISSUE at 08:07

## 2023-01-01 RX ADMIN — CEFEPIME 2 G: 2 INJECTION, POWDER, FOR SOLUTION INTRAVENOUS at 09:04

## 2023-01-01 RX ADMIN — SODIUM CHLORIDE: 0.9 INJECTION, SOLUTION INTRAVENOUS at 08:11

## 2023-01-01 RX ADMIN — Medication 800 MG: at 09:11

## 2023-01-01 RX ADMIN — INSULIN ASPART 2 UNITS: 100 INJECTION, SOLUTION INTRAVENOUS; SUBCUTANEOUS at 12:11

## 2023-01-01 RX ADMIN — GABAPENTIN 600 MG: 300 CAPSULE ORAL at 08:04

## 2023-01-01 RX ADMIN — TAMSULOSIN HYDROCHLORIDE 0.4 MG: 0.4 CAPSULE ORAL at 08:11

## 2023-01-01 RX ADMIN — HYDROCODONE BITARTRATE AND ACETAMINOPHEN 1 TABLET: 5; 325 TABLET ORAL at 11:01

## 2023-01-01 RX ADMIN — CEFEPIME 2 G: 2 INJECTION, POWDER, FOR SOLUTION INTRAVENOUS at 03:04

## 2023-01-01 RX ADMIN — DIPHENHYDRAMINE HYDROCHLORIDE 50 MG: 50 INJECTION INTRAMUSCULAR; INTRAVENOUS at 09:07

## 2023-01-01 RX ADMIN — VANCOMYCIN HYDROCHLORIDE 1000 MG: 1 INJECTION, POWDER, LYOPHILIZED, FOR SOLUTION INTRAVENOUS at 04:11

## 2023-01-01 RX ADMIN — FILGRASTIM-SNDZ 300 MCG: 300 INJECTION, SOLUTION INTRAVENOUS; SUBCUTANEOUS at 02:04

## 2023-01-01 RX ADMIN — PEGFILGRASTIM-CBQV 6 MG: 6 INJECTION, SOLUTION SUBCUTANEOUS at 09:05

## 2023-01-01 RX ADMIN — CLONIDINE HYDROCHLORIDE 0.1 MG: 0.1 TABLET ORAL at 01:02

## 2023-01-01 RX ADMIN — Medication 800 MG: at 08:11

## 2023-01-01 RX ADMIN — IPRATROPIUM BROMIDE AND ALBUTEROL SULFATE 3 ML: 2.5; .5 SOLUTION RESPIRATORY (INHALATION) at 12:11

## 2023-01-01 RX ADMIN — PROPOFOL 20 MG: 10 INJECTION, EMULSION INTRAVENOUS at 08:07

## 2023-01-01 RX ADMIN — SODIUM CHLORIDE: 0.9 INJECTION, SOLUTION INTRAVENOUS at 07:02

## 2023-01-01 RX ADMIN — HYDRALAZINE HYDROCHLORIDE 10 MG: 20 INJECTION INTRAMUSCULAR; INTRAVENOUS at 06:02

## 2023-01-01 RX ADMIN — HYDROCODONE BITARTRATE AND ACETAMINOPHEN 1 TABLET: 5; 325 TABLET ORAL at 01:11

## 2023-01-01 RX ADMIN — ONDANSETRON 4 MG: 2 INJECTION INTRAMUSCULAR; INTRAVENOUS at 08:11

## 2023-01-01 RX ADMIN — SODIUM CHLORIDE, PRESERVATIVE FREE 10 ML: 5 INJECTION INTRAVENOUS at 12:02

## 2023-01-01 RX ADMIN — TRAZODONE HYDROCHLORIDE 300 MG: 50 TABLET ORAL at 08:04

## 2023-01-01 RX ADMIN — ACETAMINOPHEN 650 MG: 325 SUSPENSION ORAL at 08:05

## 2023-01-01 RX ADMIN — AZELASTINE HYDROCHLORIDE 137 MCG: 137 SPRAY, METERED NASAL at 08:04

## 2023-01-01 RX ADMIN — IPRATROPIUM BROMIDE AND ALBUTEROL SULFATE 3 ML: 2.5; .5 SOLUTION RESPIRATORY (INHALATION) at 03:04

## 2023-01-01 RX ADMIN — VANCOMYCIN HYDROCHLORIDE 1250 MG: 1.25 INJECTION, POWDER, LYOPHILIZED, FOR SOLUTION INTRAVENOUS at 09:11

## 2023-01-01 RX ADMIN — MORPHINE SULFATE 4 MG: 4 INJECTION, SOLUTION INTRAMUSCULAR; INTRAVENOUS at 02:12

## 2023-01-01 RX ADMIN — PROPOFOL 100 MG: 10 INJECTION, EMULSION INTRAVENOUS at 08:07

## 2023-01-01 RX ADMIN — FILGRASTIM-SNDZ 300 MCG: 300 INJECTION, SOLUTION INTRAVENOUS; SUBCUTANEOUS at 09:04

## 2023-01-01 RX ADMIN — CLONIDINE HYDROCHLORIDE 0.1 MG: 0.1 TABLET ORAL at 02:02

## 2023-01-01 RX ADMIN — PEGFILGRASTIM-CBQV 6 MG: 6 INJECTION, SOLUTION SUBCUTANEOUS at 09:07

## 2023-01-01 RX ADMIN — ACETAMINOPHEN 650 MG: 325 SUSPENSION ORAL at 08:03

## 2023-01-01 RX ADMIN — SODIUM PHOSPHATE, MONOBASIC, MONOHYDRATE AND SODIUM PHOSPHATE, DIBASIC, ANHYDROUS 30 MMOL: 276; 142 INJECTION, SOLUTION INTRAVENOUS at 03:11

## 2023-01-01 RX ADMIN — SODIUM CHLORIDE 10 ML: 9 INJECTION INTRAMUSCULAR; INTRAVENOUS; SUBCUTANEOUS at 02:11

## 2023-01-01 RX ADMIN — MORPHINE SULFATE 4 MG: 4 INJECTION, SOLUTION INTRAMUSCULAR; INTRAVENOUS at 12:12

## 2023-01-01 RX ADMIN — INSULIN ASPART 3 UNITS: 100 INJECTION, SOLUTION INTRAVENOUS; SUBCUTANEOUS at 05:11

## 2023-01-01 RX ADMIN — MUPIROCIN 1 G: 20 OINTMENT TOPICAL at 12:11

## 2023-01-01 RX ADMIN — TRAZODONE HYDROCHLORIDE 300 MG: 50 TABLET ORAL at 11:02

## 2023-01-01 RX ADMIN — HYDROCODONE BITARTRATE AND ACETAMINOPHEN 1 TABLET: 5; 325 TABLET ORAL at 10:11

## 2023-01-06 NOTE — PROGRESS NOTES
[facility] Hematology/Oncology  PROGRESS NOTE - Follow-up Visit      Subjective:       Patient ID:   NAME: Conrad Kuhn : 1940     82 y.o. male    Referring Doc: Julien  Other Physicians: Ced Erazo Joubert, Srinivas, Pinsky    Chief Complaint:  CLL f/u    History of Present Illness:     Patient is being seen today in person in clinic for a regular follow-up viasit. He is here by himself.  The patient is on today to go over the results of the recently ordered labs, tests and studies. He is here by himself today    He talked to Dr Don via telemed in Dec 2022 and was supposed to start venetoclax but has not done so as of yet. He reports that the specialty pharmacy had called him but he has not received the drug as of yet    Need Dr Don's last notes      His swallowing is a little more of a struggle with certain foods; good appetite  He is breathing ok currently. He denies any CP, SOB, or N/V.      Some residual chronic neck issues; some increase in LAD    He had PEt on 2022 with some mild enlargement of the LN's  He had bone marrow biopsy on 2022 with pathology report still unavailable        Discussed Covid19 precautions; he had his vaccinations              ROS:   GEN: normal without any fever, night sweats or weight loss  HEENT: normal with no HA's, sore throat, stiff neck, changes in vision; some residual swallowing difficulties; some increase in LAD  CV: normal with no CP, SOB, PND, MAYER or orthopnea  PULM: normal with no SOB, cough, hemoptysis, sputum or pleuritic pain  GI: normal with no abdominal pain, nausea, vomiting, constipation, diarrhea, melanotic stools, BRBPR, or hematemesis; no dysphagia; peg tube in place  : urine frequency fine  BREAST: normal with no mass, discharge, pain  SKIN: normal with no rash, erythema, bruising, or swelling    Allergies:  Review of patient's allergies indicates:  No Known Allergies    Medications:    Current Outpatient Medications:     azelastine  (ASTELIN) 137 mcg (0.1 %) nasal spray, 1 spray by Nasal route 2 (two) times daily. , Disp: , Rfl:     gabapentin (NEURONTIN) 600 MG tablet, Take 600 mg by mouth 2 (two) times daily., Disp: , Rfl:     glimepiride (AMARYL) 2 MG tablet, Take 2 mg by mouth once daily at 6am., Disp: , Rfl:     iron-vitamin C 100-250 mg, ICAR-C, (ICAR-C) 100-250 mg Tab, Take 1 tablet by mouth once daily., Disp: 30 each, Rfl: 6    levalbuterol (XOPENEX) 1.25 mg/3 mL nebulizer solution, Take 3 mLs by nebulization every 4 to 6 hours as needed. , Disp: , Rfl:     ondansetron (ZOFRAN) 8 MG tablet, Take 1 tablet (8 mg total) by mouth every 12 (twelve) hours as needed for Nausea., Disp: 30 tablet, Rfl: 2    traZODone (DESYREL) 300 MG tablet, Take 300 mg by mouth every evening., Disp: , Rfl:     valACYclovir (VALTREX) 1000 MG tablet, Take 1,000 mg by mouth once daily., Disp: , Rfl:     albuterol-ipratropium (DUO-NEB) 2.5 mg-0.5 mg/3 mL nebulizer solution, Take 3 mLs by nebulization every 8 (eight) hours as needed., Disp: , Rfl:     blood sugar diagnostic Strp, 1 strip by Misc.(Non-Drug; Combo Route) route 2 (two) times a day., Disp: , Rfl:     blood-glucose meter kit, Use as instructed, Disp: , Rfl:     metoprolol tartrate (LOPRESSOR) 25 MG tablet, 1 tablet (25 mg total) by Per G Tube route 2 (two) times daily., Disp: 60 tablet, Rfl: 0    venetoclax 50 mg Tab, Take 50 mg by mouth once daily., Disp: 30 tablet, Rfl: 6    PMHx/PSHx Updates:  See patient's last visit with me on 11/23/2022  See H&P on 1/3/2019        Pathology:  Cancer Staging  No matching staging information was found for the patient.          Objective:     Vitals:  Blood pressure (!) 146/55, pulse 85, temperature 97.4 °F (36.3 °C), weight 85.7 kg (189 lb).        Physical Examination:   GEN: no apparent distress, comfortable; AAOx3  HEAD: atraumatic and normocephalic  EYES: no conjunctival pallor or muddiness, no icterus; normal pupil reaction to ambient light  ENT: OMM, no  pharyngeal erythema, external bilateral ears WNL; no visible thrush or ulcers  NECK: no masses or swelling, trachea midline, no visible LAD/LN's ; LAD and LN's with some slight increase in size  CV: no palpitations; no pedal edema; no noticeable JVD or neck vein distension  CHEST: Normal respiratory effort; chest wall breath movements symmetrical; no audible wheezing  ABDOM: non-distended; no bloating;  peg tube since removed  MUSC/Skeletal: ROM normal; joints visibly normal; no deformities or arthropathy  EXTREM: no clubbing, cyanosis, inflammation or swelling; right arm with fair ROM  SKIN: no rashes, lesions, ulcers, petechiae or subcutaneous nodules  : no keith  NEURO: moving all 4 extremities; AAOx3; no tremors  PSYCH: normal mood, affect and behavior  LYMPH: no visible LN's or LAD; LAD and LN's on right neck reduced in size              Labs:   Lab Results   Component Value Date    WBC 18.39 (H) 01/05/2023    HGB 13.0 (L) 01/05/2023    HCT 37.1 (L) 01/05/2023    MCV 88 01/05/2023    PLT 69 (L) 01/05/2023     CMP  Sodium   Date Value Ref Range Status   11/22/2022 134 (L) 136 - 145 mmol/L Final   06/12/2019 138 134 - 144 mmol/L      Potassium   Date Value Ref Range Status   11/22/2022 3.9 3.5 - 5.1 mmol/L Final     Chloride   Date Value Ref Range Status   11/22/2022 98 95 - 110 mmol/L Final   06/12/2019 99 98 - 110 mmol/L      CO2   Date Value Ref Range Status   11/22/2022 30 (H) 23 - 29 mmol/L Final     Glucose   Date Value Ref Range Status   11/22/2022 89 70 - 110 mg/dL Final   06/12/2019 179 (H) 70 - 99 mg/dL      BUN   Date Value Ref Range Status   11/22/2022 13 8 - 23 mg/dL Final     Creatinine   Date Value Ref Range Status   11/22/2022 0.9 0.5 - 1.4 mg/dL Final   06/12/2019 0.95 0.60 - 1.40 mg/dL      Calcium   Date Value Ref Range Status   11/22/2022 8.7 8.7 - 10.5 mg/dL Final     Total Protein   Date Value Ref Range Status   11/22/2022 6.5 6.0 - 8.4 g/dL Final     Albumin   Date Value Ref Range Status    11/22/2022 3.8 3.5 - 5.2 g/dL Final   06/12/2019 3.9 3.1 - 4.7 g/dL      Total Bilirubin   Date Value Ref Range Status   11/22/2022 0.9 0.1 - 1.0 mg/dL Final     Comment:     For infants and newborns, interpretation of results should be based  on gestational age, weight and in agreement with clinical  observations.    Premature Infant recommended reference ranges:  Up to 24 hours.............<8.0 mg/dL  Up to 48 hours............<12.0 mg/dL  3-5 days..................<15.0 mg/dL  6-29 days.................<15.0 mg/dL       Alkaline Phosphatase   Date Value Ref Range Status   11/22/2022 54 (L) 55 - 135 U/L Final     AST   Date Value Ref Range Status   11/22/2022 16 10 - 40 U/L Final     ALT   Date Value Ref Range Status   11/22/2022 12 10 - 44 U/L Final     Anion Gap   Date Value Ref Range Status   11/22/2022 6 (L) 8 - 16 mmol/L Final     eGFR if    Date Value Ref Range Status   07/02/2022 >60.0 >60 mL/min/1.73 m^2 Final     eGFR if non    Date Value Ref Range Status   07/02/2022 >60.0 >60 mL/min/1.73 m^2 Final     Comment:     Calculation used to obtain the estimated glomerular filtration  rate (eGFR) is the CKD-EPI equation.              Lab Results   Component Value Date    IRON 82 11/22/2022    TIBC 351 11/22/2022    FERRITIN 40 11/22/2022         Radiology/Diagnostic Studies:    PET  12/2/2022:    IMPRESSION: Mild enlargement of the extensive adenopathy within the neck, chest, abdomen and pelvis with faint increased FDG activity     Stable splenomegaly         CT 11/8/2022:  IMPRESSION:     Worsening splenomegaly with diffuse increase in size and number of essentially all of the main lymph node chains throughout the chest, abdomen and pelvis.           PET 8/29/2022:  IMPRESSION:  1. Numerous enlarged lymph nodes throughout the neck, chest, abdomen and pelvis are non hypermetabolic, and stable compared to CT of 07/02/2022.  2. Stable splenomegaly, with no abnormal focal splenic  FDG hypermetabolism.  3. Additional observations as described.         CT 7/2/2022:    IMPRESSION:  1. Interval worsening in numerous enlarged lymph nodes in the chest, abdomen and pelvis, as well as development of splenomegaly, compatible with lymphoma and or worsening CLL, given patient's history  2. Mild bilateral hydroureteronephrosis, with markedly enlarged prostate gland and appearance of the urinary bladder suggesting chronic bladder outlet obstruction. Consider correlation with urinalysis.  3. Infrarenal abdominal aortic aneurysm measuring 33 mm in diameter. Follow-up imaging in 3 years is recommended per best practice guidelines.  4. Additional observations as described.           PET  3/14/2022:    IMPRESSION:  Left upper lobectomy without evidence of recurrent or metastatic disease      MRI cervical spine  10/1/2021:    IMPRESSION:     Loss of normal cervical lordosis with mild kyphotic curvature.     Advanced multilevel cervical spondylosis, as outlined above. Spinal canal narrowing and foraminal narrowing is most severe at C5-6. 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     Congenital narrowing of cervical spinal canal, which exacerbates cervical spondylosis        CT head  8/27/2021:  IMPRESSION:     No CT evidence of acute intracranial pathology.     Stable senescent changes as above.         PET  7/19/2021:    IMPRESSION:  Prior left upper lobectomy without evidence of recurrent or metastatic disease  - 3 cm infrarenal abdominal aortic aneurysm      CT head 6/29/2021:  IMPRESSION:     No CT evidence of acute intracranial pathology      PET  1/18/2021:    Impression:     No evidence of FDG avid lymphoproliferative disease.     Aneurysmal dilation of infrarenal abdominal aorta (3.4 cm) as well as aneurysmal dilation of right common iliac artery.     Additional incidental findings as above        PET  6/24/2020:    Impression:     1. No  findings of active lymphoproliferative disease.  2. Additional observations as above.      PET  1/24/2020:    Impression       Moderate decrease in size of the adenopathy within the neck, chest, abdomen and pelvis.  The spleen is smaller in size.  There is no significant FDG activity.    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 Multifocal lymphadenopathy in the chest, abdomen, and pelvis compatible with provided clinical history of lymphocytic leukemia.  There is mixed interval change when compared to prior studies.  Pathologic lymphadenopathy in the chest has increased significantly when compared to a CTA of the chest from May 2018.  Pathologic retroperitoneal lymphadenopathy has improved slightly when compared to a previous abdominal CT from February 2017.  Please see above details.  2. Mild aneurysmal dilation of the infrarenal abdominal aorta, with maximal transverse diameter of 3.7 cm.  Maximal transverse diameter on a prior study from 2017 was 2.5 cm.  3. Additional findings as above           [facility] Unknown Rad Eap    Result Date: 1/14/2019  CMS MANDATED QUALITY DATA - CT RADIATION - 436 All CT scans at this facility utilize dose modulation, iterative reconstruction, and/or weight based dosing when appropriate to reduce radiation dose to as low as reasonably achievable. Reason:Lymphoid leukemia TECHNIQUE: CT thorax with, CT abdomen  with, and CT pelvis with 100 mL Omnipaque 350. COMPARISON: CT chest dated 05/19/2018 and CT abdomen and pelvis dated 02/08/2017 CT THORAX: There is stable mediastinal, hilar and axillary adenopathy. There is coronary artery calcification. There is resolution of the groundglass opacities throughout the lungs. There are no confluent infiltrates, pulmonary nodules or pleural effusions. There are stable subcentimeter nodules in the left lobe of the thyroid gland. There are degenerative changes of the spine.  CT ABDOMEN: The liver, pancreas, adrenal glands and gallbladder are normal. The spleen is enlarged. There is mesenteric and retroperitoneal adenopathy which is slightly smaller in size. -There is a portacaval node measuring 37 x 21 mm and previously measured 38 x 27 mm. -Periportal lymph node measuring 41 x 18 mm, previously measured 48 x 29 mm. -Left periaortic adenopathy measuring 34 x 23 mm and previously measured 40 x 42 mm- The kidneys enhance symmetrically without hydronephrosis or calculi. There are no thick-walled or dilated bowel loops. There is vascular calcification of aorta. There is a 3.0 x 3.0 cm infrarenal abdominal aortic aneurysm. CT PELVIS: There is adenopathy along the pelvic sidewalls bilaterally which has decreased in size. -The left common iliac adenopathy measuring 36 x 11 mm, previously measured 46 x 14 mm -the right common iliac adenopathy measuring 46 x 10 mm. Previously measured 53 x 15 mm. The bladder is normal. The prostate gland is enlarged. There are degenerative changes of the spine. There is grade 1 anterolisthesis of L5 on S1 with bilateral pars defects.     IMPRESSION: Stable mediastinal, hilar and axillary adenopathy with resolution of the airspace disease within the lungs Decrease in size of the mesenteric, retroperitoneal and pelvic adenopathy. Stable infrarenal abdominal aortic aneurysm Splenomegaly     Read and electronically signed by: Jeniffer Zhang MD on 1/14/2019 9:14 AM CST JENIFFER ZHANG MD      I have reviewed all available lab results and radiology reports.    Assessment/Plan:   (1) 82 y.o. male with  diagnosis of CLL who has been referred by Dr Rony Victoria for continuation of care by medical hematology/oncology.   - BM biopsy  12/4/2015 with CLL  - diagnosis of SLL in 2004 and s/p FCR x6 in 2007  - s/p imbruvica in past (stopped in May 2018)  - latest wbc at 4.8; lymph 56%  - latest CT on 8/7/2019 showing increase in the LAD  - platelets are 111,000  - He was  recently hospitalized at [facility] and seen by Dr Wheeler. Dr Wheeler has recommended Rituximab. He is starting tomorrow.   - discussed the rituximab regimen and the potential side-effect profile; he is getting chemotherapy school today with Rosemary [facility]  - he saw Dr Don on Oct 21st 2019  - he has had 3 of the 4 weeks of rituximab so far; Dr Don recommends him to eventually get rituximab monthly x6 with daily oral Venetoclax for two years total.   - completed rituximab (4th) week of rituximab and  S/p 6 monthly rituximab with Venetoclax oral but is taking only 2 pills daily due to the counts  - he is now just on the venetclax  - he saw Dr Don again on Dec 23rd 2019 and sees him again in March 16th 2020  - latest PEt on 1/24/2020 looks really good    8/27/2020:  - he saw Dr Don last month at [facility]  - latest PEt from 6/24/2020 looks good  - he remains on just the oral venetoclax at 2 pills per day  - he sees Dr Don again in about 3 months (Oct 2020)    11/18/2020:  - he is doing well with the Venetoclax  - due for repeat PEt in Dec 2020  - he sees Dr Don again on Dec 23rd 2020    1/20/2021:  - he is doing well with the Venetoclax  - He saw Dr Don at [facility] in Dec 2020. He is now off rituximab monthly and remains only on the oral Venetoclax but at 2 pills daily . He sees Dr Don again in feb 2021  - Recent PEt on  1/18/2021 looks stable except for incidental aneurysm in aorta; so we are referring him to Dr Kapadia with vascualr    4/20/2021:  - he saw Dr Don in Feb 2021 and sees him again in Nov 2021  - repeat PEt in June/july 2021  - continued on Venetoclax and regular blood checks    7/20/2021:  - he continues on the Venetoclax  - mild leucopenia from the medication  - PEt on 7/19/2021 seems to be stable    9/21/2021:  - doing ok overall with some occasional HA's and general lack of energy  - he sees Dr Don again on nov 1st and hopefully he can discontinue the venetoclax thereafter  - Head CT on 8/27/2021 was  relatively negative    2/17/2022:  -  He last showed for oncology appointment in Sept 2021  - He had covid in Jan 2022 and he received the infusion but did not require hospitalization  - He saw Dr Erazo couple of weeks ago  - He is planning to have cervical spine surgery with Dr Mai in the near future.  - He sees Dr Don at [facility] this coming Monday. He is now off rituximab monthly and he is also now off the oral Venetoclax (expect he will be getting a repeat bone marrow biopsy)    3/31/2022:  - He had recent telemed visit Dr Don at [facility] and they were pleased with the latest bone marrow biopsy. He is now off rituximab monthly and he is also now off the oral Venetoclax      6/6/2022:  - He had surgery on his cervical spine with Dr Mai on April 8th 2022 and had postoperative problems related to the intubation and anesthesia and was in the hospital for about 3 weeks. He had aspiration pneumonia and bout of respiratory failure requiring mech vent support.  He has residual swallowing issues and has a peg tube. He was seen Dr Garcia with GI while in hospital. He was discharged on 4/18. He has had home health coming to house couple times of week. He reports that he is doing better    - he is due to see Dr Don again at [facility] in the near future and he has since been off all therapy      8/11/2022:  - He was recently hospitalized with fever and pneumonia. He is feeling better  - He is swallowing and eating better; peg tube since removed  - He previously had bout of oral mucosal ulcerations for which he had biopsies at [facility] which were negative for any malignancy. This has since resolved.  - He previously had telemed visit Dr Don at [facility] but does not know when he sees him again.. He had previous bone marrow biopsy with good report. He has been off rituximab and the oral Venetoclax since last Nov 2021.  - CT scans on 7/2/2022 with some progression of the LAD in his body   - will get PEt and aslk Dr Don to see  him again    9/29/2022:  - He saw Dr Don again at [facility] and they are considering giving him a regimen of chemotherapy (some program or clinical trial0 over at [facility] but he reports that they have since decided against proceeding in that direction.  - he was supposed to see Dr Don on 9/12 but that appointment was cancelled per patient   - he had PET on 8/29/2022 which was stable    11/23/2022:  - He was recently hospitalized at [facility]; he is doing better and feeling better overall.  - He was supposed to see Dr Don again at [facility] after discharge but does not see him till Dec 2022  - CT on 11/8 with some increase in speel size and LAD  - will resume venetoclax in meantime and he needs to f/u with Dr Don for further directives  - get PEt    1/9/2023:  - He talked to Dr Don via telemed in Dec 2022 and was supposed to start venetoclax but has not done so as of yet. He reports that the specialty pharmacy had called him but he has not received the drug as of yet  - Dr Don was evaluating him for clinical trial but patient is not inclined to proceed in that direction  - Need Dr Don's last notes  - he had PEt on 12/2/2022 with some mild enlargement of the LN's  - he had bone marrow biopsy on 12/8/2022 with pathology report still unavailable but per review looks like he has about 14-17% involvement with NHL  - will reach out to Dr Don and the speciality pharmacy  - need the assistance of the patient navigator  - WBC has increased to 18.39            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  - followed  By Dr Kee  - CEA 1.4 on 4/8/2021  - CT scans on 1/14/2019 stable  - PET done in jan 2021 on chart    3/31/2022:  - CEA at 0.8  - PET on 3/14/2022 negative for recurrence    9/29/2022:  - CEA 0.9  - PET on chart     11/23/2022:  - latest CEA at 1.2     (3) Iron deficiency anemia s/p IV iron couple weeks ago  - hgb 11.7  - iron 39 and a little lower  - set up two more IV irons    2/17/2022:  -  latest hgb at 11.4 and iron panel is adequate    3/31/2022:  - latest hgb at 11.4 and stable  - iron panel currently WNL    6/6/2022:  - latest hgb 12.0 and better  - iron panel still in process from earlier today    8/11/2022:  - hgb stable at 12.0  - he received dose of IV iron while in hospital recently  - will resume IV iron as outpatient    9/29/2022:  - ferritin was a little lower - he has not been taking the oral iron  - will resume oral iron and consider repeat IV iron if needed    11/23/2022:  - hgb at 11.8  - iron panel adequate currently    1/9/2023:  - latest hgb at 13.0     (4) HTN - on BP meds     (6) Chronic neck and back issues - followed by Dr Ryan Rivero     (7) DM - currently off all meds     (8) Hypercholesterolemia - on meds    (9)  - followed by Dr Whitney    (10) Chronic Leucopenia and thrombocytopenia - due to chemotherapy agents in past    2/17/2022:  - latest WBC at 5.84 and WNL  - latest plat at 92,000 and adequate and better since being off therapy    3/31/2022:  - latest plats are a little lower at 69,000  - wbc at 3.32    6/6/2022:  - latest wbc at 3.16  - platelets at 70,000 currently    8/11/2022:  - latest wbc is adequate at 5.39 and plats 94,000 and better and adequate    9/29/2022:  - latest WBC at 4.37 and plats 58,000 (a little lower)    11/23/2022:  - latest plat count at 60,000; wbc at 7.52      VISIT DIAGNOSES:      Encounter Diagnoses   Name Primary?    CLL (chronic lymphocytic leukemia) Yes    History of lung cancer - ANDRES NSCLC 2004     Iron deficiency     Iron deficiency anemia, unspecified iron deficiency anemia type     Pancytopenia     Thrombocytopenia     Lymphadenopathy, generalized     Lymphadenopathy, cervical     Splenomegaly            PLAN:  1.check on the status of the Venetoclax; check labs every monthy; resume IV iron as needed; encouraged resumption of oral iron  2. Need Dr Don's latest notes and will reach out to him - patient does not want to proceed with  clinical trial at this time  3. fax bone marrow biopsy and PEt to Dr Don's attention  4. F/u with PCP, Pulm, , etc  5. F/u with Dr Don at [facility] as directed 5. F/u with Dr Kapadia with vascular for the aortic aneurysm as directed          RTC in  2 weeks with Whit; 4 weeks with myself  Fax note to Kacey Kee, Ryan Rivero, Chelo, Karla, Florencia/Liam, Kang; Primo; Radha        Discussion:       Total Time spent on patient:    I spent over 25 mins of time with the patient. Reviewed results of the recently ordered labs, tests, reports and studies; made directives with regards to the results. Over half of this time was spent couseling and coordinating care, making treatment and analytical decisions; ordering necessary labs, tests and studies; and discussing treatment options and setting up treatment plan(s) if indicated.        COVID-19 Discussion:    I had long discussion with patient and any applicable family about the COVID-19 coronavirus epidemic and the recommended precautions with regard to cancer and/or hematology patients. I have re-iterated the CDC recommendations for adequate hand washing, use of hand -like products, and coughing into elbow, etc. In addition, especially for our patients who are on chemotherapy and/or our otherwise immunocompromised patients, I have recommended avoidance of crowds, including movie theaters, restaurants, churches, etc. I have recommended avoidance of any sick or symptomatic family members and/or friends. I have also recommended avoidance of any raw and unwashed food products, and general avoidance of food items that have not been prepared by themselves. The patient has been asked to call us immediately with any symptom developments, issues, questions or other general concerns.          I have explained all of the above in detail and the patient understands all of the current recommendation(s). I have answered all of their questions to the best of my ability  and to their complete satisfaction.   The patient is to continue with the current management plan.        Chemotherapy Discussion:      I discussed the available treatment option(s) in accordance with the latest/current national evidence-based guidelines (NCCN, UpToDate, NCI, ASCO, etc where applicable), their overall age/condition and their co-morbidities. I also went over the risks and benefits of the chemotherapy with regard to their particular cancer type, their cancer stage, their age/condition, and their co-morbidities. I provided literature on the chemotherapy regimen and discussed the chemotherapy side-effect profiles of the drug(s). I discussed the importance of compliance with obtaining and monitoring weekly lab work, and went over the potential hematopathology issues and risks with anemia, leucopenia and thrombocytopenia that can occur with chemotherapy. I discussed the potential risks of liver and kidney damage, which could be permanent and could necessitate dialysis long-term if kidney failure developed. I discussed the emetic and/or diarrheal potential of the regimen and the potential need for use of antiemetic and anti-diarrheal medications. I discussed the risk for development of anaphylactic shock, bronchospasm, dysrhythmia, and respiratory/cardiovascular arrest and/or failure. I discussed the potential risks for development of alopecia, cold sensory issues, ringing in ears, vertigo, cataracts, glaucoma, and neuropathy, all of which could end up being chronic and life-long. Some chemotherpyI discussed the risks of hand-foot syndrome and rashes, and development of other autoimmune mediated processes such as pneumonitis, hepatitis, and colitis which could be life threatening. I discussed the risks of the potential development of a rare but fatal viral mediated disease known as PML (Progressive Multifocal Leukoencephalopathy), and risk of future development of leukemia and/or lymphoma from use of  certain chemotherapy agents. I discussed the need for neutropenic precautions, basic hygiene/sanitation behaviors and dietary restrictions.    The patient's consent has been obtained to proceed with the chemotherapy.The patient will be referred to [facility] for training and education on chemotherapy, use of antiemetics and/or anti-diarrheals, use of NSAID's, potential chemotherapy side-effects, and any specific recommendations and precautions with the particular chemotherapy agents.       Immunologic Therapy Discussion:    I discussed the available treatment option(s) in accordance with the latest/current national evidence-based guidelines (NCCN, UpToDate, NCI, ASCO, etc where applicable), their overall age/condition and their co-morbidities. I went over the risks and benefits of the immunotherapy with regard to their particular cancer type, their cancer stage, their age/condition, and their co-morbidities. I provided literature on the immunotherapy regimen and discussed the immunotherapy side-effect profiles of the drug(s). I discussed the importance of compliance with obtaining and monitoring weekly lab work, and went over the potential hematopathology issues and risks of hemato-pathologic issues with anemia, leucopenia and/or thrombocytopenia and effects on thyroid function that can occur with immunotherapy. The patient will most likely need to have there thyroid functions monitored by their PCP and may need to take thyroid medication while on the immunotherapy. I discussed the potential risks of liver and kidney damage, which could be permanent and could necessitate dialysis long-term if kidney failure developed. I discussed the emetic and/or diarrheal potential of the regimen and the potential need for use of antiemetic and anti-diarrheal medications. I discussed the risk for development of anaphylactic shock, bronchospasm, dysrhythmia, and respiratory/cardiovascular arrest and/or  failure. I discussed the potential risks for development of alopecia, cold sensory issues, ringing in ears, vertigo and neuropathy, all of which could end up being chronic and life-long. I discussed the risks of hand-foot syndrome and rashes, and development of other autoimmune mediated processes such as pneumonitis, hepatitis and colitis which could potentially be life threatening. I discussed the risks of the potential development of a rare but fatal viral mediated disease known as PML (Progressive Multifocal Leukoencephalopathy), and risk of future development of leukemia and/or lymphoma from use of certain immunotherapy agents. I discussed the possibility that immunologic therapy cold worsen or promote progression of any underlying autoimmune diseases such as sarcoidosis, ulcerative colitis, Crohn's disease, psoriasis, rheumatoid disorders, scleroderma, autoimmune nephritis disorders, Hashimoto's thyroiditis, and Lupus among others. I discussed the need for neutropenic precautions, basic hygiene/sanitation behaviors and dietary restrictions.    The patient's consent has been obtained to proceed with the immunotherapy.The patient will be referred to [facility] for training and education on immunotherapy, use of antiemetics and/or anti-diarrheals, use of NSAID's, potential immunotherapy side-effects, and any specific recommendations and precautions with the particular immunotherapy agents.      I answered all of the patient's (and family's, if applicable) questions to the best of my ability and to their complete satisfaction. The patient acknowledged full understanding of the risks, recommendations and plan(s).      Iron Infusion Therapy Discussion:     I provided literature/learning materials on the particular IV iron regimen and discussed the potential side-effect profiles of the drug(s). I discussed the importance of compliance with obtaining and monitoring requested lab work, and  went over the potential risk for the development of anaphylactic shock, bronchospasm, dysrhythmia, liver and/or kidney damage, and respiratory/cardiovascular arrest and/or failure. I discussed the potential risks for development of alopecia, fevers, itching, chills and/or rigors, cold sensory issues, ringing in ears, vertigo and neuropathy, all of which are usually acute but sometimes could end up being chronic and life-long. I discussed the risks of hand-foot syndrome and rashes, and development of other autoimmune mediated processes such as pneumonitis and colitis which could be life threatening.     The patient's consent has been obtained to proceed with the IV iron therapy.The patient will be referred to [facility] for training and education on IV iron therapy, use of antiemetics and/or anti-diarrheals, use of NSAID's, potential IV iron therapy side-effects, and any specific recommendations and precautions with the particular IV iron agents.      I answered all of the patient's (and family's, if applicable) questions to the best of my ability and to their complete satisfaction. The patient acknowledged full understanding of the risks, recommendations and plan(s).          I answered all of the patient's (and family's, if applicable) questions to the best of my ability and to their complete satisfaction. The patient acknowledged full understanding of the risks, recommendations and plan(s).         Electronically signed by Drew Bai MD

## 2023-01-20 PROBLEM — J39.8 TRACHEAL STENOSIS: Status: ACTIVE | Noted: 2023-01-01

## 2023-01-20 NOTE — H&P
Formerly [facility], later [facility] - Emergency Dept  Hospital Medicine  History & Physical    Patient Name: Conrad Kuhn  MRN: [id number]  Patient Class: IP- Inpatient  Admission Date: 1/20/2023  Attending Physician: Usman Young MD  Primary Care Provider: Johnson Erazo MD         Patient information was obtained from patient, past medical records, ER records and ED MD.     Subjective:     Principal Problem:<principal problem not specified>    Chief Complaint:   Chief Complaint   Patient presents with    Fever     Fever, cough, nasal congestion x 1 day.  Per wife, patient has had intermittent confusion.         HPI: 82 year old male with history of COPD, CLL, DM 2, HTN Bell's (right), Non-sm cell Lung Ca, Tracheal Stenosis presented to ED with wife stating to ED MD that she thought he was altered. The patient recognized that earlier in the day he had been confused. Currently he is A/O X 3 (after receiving volume and antibiotics in ED). He has chronic cough without sputum.. His lymph nodes have been increasing in size (seen on recent PET/CT). He has an appointment on 1/30 at [facility] with his Oncologist for follow-up of lymphadenopathy. He feels that some of his respiratory issues may be related to his lymphadenopathy. He denies any CP. No orthopnea, PND or edema.    In ED Labs reviewed and noted below: leukocytosis with minimal normocytic anemia; trivial hyponatremia, mild hyperbilirubinemia otherwise fairly bland CMP; BNP normal, lactate normal, procalcitonin below threshold. Flu and COVID are negative. Cultured in ED. CXR reviewed: RM:, RLL infiltrate. EKG reviewed: sinus without acute segments (paper--not scanned in yet)    Discussed with ED MD; Inpatient admission for pneumonia; with his complicated history (legionella and intubation followed by tracheal stenosis) will cover broadly to cover; Pulmonology consultation (also to establish--his has retired); continuing home regimen for chronic maladies      Past  Medical History:   Diagnosis Date    Alcoholism     CLL (chronic lymphocytic leukemia) 01/02/2019    COPD (chronic obstructive pulmonary disease)     Diabetes mellitus     Non Insulin requiring    Difficult intubation     Encounter for blood transfusion     GI bleed due to NSAIDs     While on Eliquis and Imbruvica    Herpetic gingivostomatitis     History of lung cancer - ANDRES NSCLC 2004 01/03/2019    Hypertension     Iron deficiency 2017    Lymphoma, 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    MAYDA on CPAP     Pneumonia of both lungs due to infectious organism 6/29/2021    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     Right-sided Bell's palsy 2009    Spondylolisthesis     Varicose veins of legs     Venous insufficiency        Past Surgical History:   Procedure Laterality Date    Athroscopic surgery      On Knee    BIOPSY OF TISSUE OF NECK Left 08/2015    Recuurence CLL/small cell lyphoma    ESOPHAGEAL DILATION      ESOPHAGOGASTRODUODENOSCOPY N/A 4/15/2022    Procedure: EGD (ESOPHAGOGASTRODUODENOSCOPY);  Surgeon: Siddharth Garcia MD;  Location: [facility];  Service: Endoscopy;  Laterality: N/A;    ESOPHAGOGASTRODUODENOSCOPY N/A 4/16/2022    Procedure: EGD (ESOPHAGOGASTRODUODENOSCOPY);  Surgeon: Siddharth Garcia MD;  Location: [facility];  Service: Endoscopy;  Laterality: N/A;    ESOPHAGOGASTRODUODENOSCOPY N/A 6/23/2022    Procedure: EGD (ESOPHAGOGASTRODUODENOSCOPY);  Surgeon: Jefferson Hyman MD;  Location: [facility];  Service: Endoscopy;  Laterality: N/A;    ESOPHAGOGASTRODUODENOSCOPY W/ PEG N/A 4/16/2022    Procedure: EGD, WITH PEG TUBE INSERTION;  Surgeon: Siddharth Garcia MD;  Location: [facility];  Service: Endoscopy;  Laterality: N/A;    GASTROSTOMY TUBE PLACEMENT  04/16/2022    20 Fr (bumper initially at 3.5)    Hemorrhoid resection  1979    LUNG LOBECTOMY Left 2004    NECK SURGERY  04/08/2022    Anterior and Posterior C3-C7 fusion    OTHER SURGICAL HISTORY  2006     Chemotherapy s/p lyphoma        Portacath implantation and removal      reverse shoulder arthroplasty Right 03/2021       Review of patient's allergies indicates:  No Known Allergies    No current facility-administered medications on file prior to encounter.     Current Outpatient Medications on File Prior to Encounter   Medication Sig    azelastine (ASTELIN) 137 mcg (0.1 %) nasal spray 1 spray by Nasal route 2 (two) times daily.     blood sugar diagnostic Strp 1 strip by Misc.(Non-Drug; Combo Route) route 2 (two) times a day.    blood-glucose meter kit Use as instructed    gabapentin (NEURONTIN) 600 MG tablet Take 600 mg by mouth 2 (two) times daily.    glimepiride (AMARYL) 2 MG tablet Take 2 mg by mouth once daily at 6am.    iron-vitamin C 100-250 mg, ICAR-C, (ICAR-C) 100-250 mg Tab Take 1 tablet by mouth once daily.    levalbuterol (XOPENEX) 1.25 mg/3 mL nebulizer solution Take 3 mLs by nebulization every 4 to 6 hours as needed.     metoprolol tartrate (LOPRESSOR) 25 MG tablet 1 tablet (25 mg total) by Per G Tube route 2 (two) times daily.    ondansetron (ZOFRAN) 8 MG tablet Take 1 tablet (8 mg total) by mouth every 12 (twelve) hours as needed for Nausea.    promethazine (PHENERGAN) 12.5 MG Tab Take 1 tablet (12.5 mg total) by mouth every 6 (six) hours as needed.    traZODone (DESYREL) 300 MG tablet Take 300 mg by mouth every evening.    valACYclovir (VALTREX) 1000 MG tablet Take 1,000 mg by mouth once daily.    venetoclax 50 mg Tab Take 50 mg by mouth once daily.    [DISCONTINUED] albuterol-ipratropium (DUO-NEB) 2.5 mg-0.5 mg/3 mL nebulizer solution Take 3 mLs by nebulization every 8 (eight) hours as needed.    [DISCONTINUED] esomeprazole (NEXIUM) 40 MG capsule Take 1 capsule (40 mg total) by mouth 2 (two) times daily.    [DISCONTINUED] losartan (COZAAR) 25 MG tablet Take 25 mg by mouth every evening.    [DISCONTINUED] metFORMIN (GLUCOPHAGE) 500 MG tablet Take 500 mg by mouth 2 (two) times  daily with meals.     [DISCONTINUED] metoprolol succinate (TOPROL-XL) 25 MG 24 hr tablet Take 25 mg by mouth 2 (two) times daily.      Family History       Problem Relation (Age of Onset)    Colon cancer Father    Stomach cancer Mother (80)          Tobacco Use    Smoking status: Former    Smokeless tobacco: Never   Substance and Sexual Activity    Alcohol use: Yes    Drug use: No    Sexual activity: Not Currently     Review of Systems   Constitutional:  Positive for fever.   HENT: Negative.     Eyes: Negative.    Respiratory:  Positive for cough and shortness of breath.    Cardiovascular: Negative.    Gastrointestinal: Negative.    Endocrine: Negative.    Genitourinary: Negative.    Musculoskeletal: Negative.    Skin: Negative.    Allergic/Immunologic: Negative.    Neurological: Negative.    Hematological: Negative.    All other systems reviewed and are negative.  Objective:     Vital Signs (Most Recent):  Temp: 98.7 °F (37.1 °C) (01/20/23 0233)  Pulse: 73 (01/20/23 0600)  Resp: (!) 22 (01/20/23 0600)  BP: (!) 154/70 (01/20/23 0600)  SpO2: 96 % (01/20/23 0600)   Vital Signs (24h Range):  Temp:  [98.7 °F (37.1 °C)] 98.7 °F (37.1 °C)  Pulse:  [70-83] 73  Resp:  [14-24] 22  SpO2:  [92 %-96 %] 96 %  BP: (137-175)/(65-81) 154/70     Weight: 86.2 kg (190 lb)  Body mass index is 24.39 kg/m².    Physical Exam  Vitals and nursing note reviewed.   Constitutional:       Appearance: He is well-developed.   HENT:      Head: Normocephalic and atraumatic.      Right Ear: External ear normal.      Left Ear: External ear normal.      Nose: Nose normal.   Eyes:      Conjunctiva/sclera: Conjunctivae normal.      Pupils: Pupils are equal, round, and reactive to light.   Cardiovascular:      Rate and Rhythm: Normal rate and regular rhythm.      Heart sounds: Normal heart sounds.   Pulmonary:      Effort: Pulmonary effort is normal.      Breath sounds: Normal breath sounds. Stridor present.      Comments: Decreased entry bases  with coarse large airway sounds clearing with cough; no opening crepitations nor expiratory wheezes  Abdominal:      General: Bowel sounds are normal.      Palpations: Abdomen is soft.   Musculoskeletal:         General: Normal range of motion.      Cervical back: Normal range of motion and neck supple.   Skin:     General: Skin is warm and dry.      Capillary Refill: Capillary refill takes less than 2 seconds.   Neurological:      Mental Status: He is alert and oriented to person, place, and time.   Psychiatric:         Behavior: Behavior normal.         Thought Content: Thought content normal.         Judgment: Judgment normal.         CRANIAL NERVES     CN III, IV, VI   Pupils are equal, round, and reactive to light.     Significant Labs: All pertinent labs within the past 24 hours have been reviewed.  CBC:   Recent Labs   Lab 01/20/23  0306   WBC 16.22*   HGB 13.2*   HCT 37.5*   PLT 55*     CMP:   Recent Labs   Lab 01/20/23  0346   *   K 4.1   CL 98   CO2 28   *   BUN 10   CREATININE 0.9   CALCIUM 8.8   PROT 6.8   ALBUMIN 3.9   BILITOT 2.1*   ALKPHOS 48*   AST 17   ALT 12   ANIONGAP 6*     Cardiac Markers:   Recent Labs   Lab 01/20/23  0346   BNP 86     Troponin: No results for input(s): TROPONINI, TROPONINIHS in the last 48 hours.    Significant Imaging: I have reviewed all pertinent imaging results/findings within the past 24 hours.    Assessment/Plan:     Tracheal stenosis  Inpatient admission for pneumonia; with his complicated history (legionella and intubation followed by tracheal stenosis) will cover broadly to cover; Pulmonology consultation (also to establish--his has retired); continuing home regimen for chronic maladies      Aspiration pneumonia  Inpatient admission for pneumonia; with his complicated history (legionella and intubation followed by tracheal stenosis) will cover broadly to cover; Pulmonology consultation (also to establish--his has retired); continuing home regimen for  chronic maladies        VTE Risk Mitigation (From admission, onward)    None             Usman Young MD  Department of Hospital Medicine   [facility] - Emergency Dept

## 2023-01-20 NOTE — PROGRESS NOTES
"   01/20/23 0622   Sepsis Timer   Sepsis Perfusion Assessment "I attest a sepsis perfusion exam was performed within 6 hours of sepsis, severe sepsis, or septic shock presentation, following fluid resuscitation."       "

## 2023-01-20 NOTE — HPI
82 year old male with history of COPD, CLL, DM 2, HTN Bell's (right), Non-sm cell Lung Ca, Tracheal Stenosis presented to ED with wife stating to ED MD that she thought he was altered. The patient recognized that earlier in the day he had been confused. Currently he is A/O X 3 (after receiving volume and antibiotics in ED). He has chronic cough without sputum.. His lymph nodes have been increasing in size (seen on recent PET/CT). He has an appointment on 1/30 at [facility] with his Oncologist for follow-up of lymphadenopathy. He feels that some of his respiratory issues may be related to his lymphadenopathy. He denies any CP. No orthopnea, PND or edema.    In ED Labs reviewed and noted below: leukocytosis with minimal normocytic anemia; trivial hyponatremia, mild hyperbilirubinemia otherwise fairly bland CMP; BNP normal, lactate normal, procalcitonin below threshold. Flu and COVID are negative. Cultured in ED. CXR reviewed: RM:, RLL infiltrate. EKG reviewed: sinus without acute segments (paper--not scanned in yet)    Discussed with ED MD; Inpatient admission for pneumonia; with his complicated history (legionella and intubation followed by tracheal stenosis) will cover broadly to cover; Pulmonology consultation (also to establish--his has retired); continuing home regimen for chronic maladies

## 2023-01-20 NOTE — CONSULTS
Pulmonary/Critical Care Consult      PATIENT NAME: Conrad Kuhn  MRN: [id number]  TODAY'S DATE: 2023  3:12 PM  ADMIT DATE: 2023  AGE: 82 y.o. : 1940    CONSULT REQUESTED BY: Rachel Morales MD    REASON FOR CONSULT:   Pneumonia    HPI:  Patient is an 82-year-old male with lymphoma who presents with fever and altered mental status.  His wife states every time he gets a fever gets confused.  He began running temps of around 100 and was confused last night and she brought him to the emergency room.  He is now had some cefepime and vancomycin in his back to his baseline.  He would like to go home.  His chest x-ray is fine.  There is a mild increase in infiltrate in the right lower lobe from his baseline.  The patient is eating a pureed diet because his lymphadenopathy in his neck is significant and he has difficulty swallowing with this.  Today he is swallowing well with no signs of aspiration.    REVIEW OF SYSTEMS  GENERAL: Feeling better  EYES: Vision is good.  ENT: No sinusitis or pharyngitis.   HEART: No chest pain or palpitations.  LUNGS:  He has his usual dry cough.  GI: No Nausea, vomiting, constipation, diarrhea, or reflux.  : No dysuria, hesitancy, or nocturia.  SKIN: No lesions or rashes.  MUSCULOSKELETAL: No joint pain or myalgias.  NEURO: No headaches or neuropathy.  LYMPH: No edema or adenopathy.  PSYCH: No anxiety or depression.  ENDO: No weight change.    ALLERGIES  Review of patient's allergies indicates:  No Known Allergies    INPATIENT SCHEDULED MEDICATIONS   azelastine  1 spray Nasal BID    ceFEPime (MAXIPIME) IVPB  1 g Intravenous Q8H    enoxaparin  40 mg Subcutaneous Daily    gabapentin  600 mg Oral BID    levalbuterol  3 mL Nebulization Q6H    metoprolol tartrate  25 mg Oral BID    traZODone  300 mg Oral QHS    valACYclovir  1,000 mg Oral Daily    [START ON 2023] vancomycin (VANCOCIN) IVPB  1,500 mg Intravenous Q18H         MEDICAL AND SURGICAL HISTORY  Past Medical  History:   Diagnosis Date    Alcoholism     CLL (chronic lymphocytic leukemia) 01/02/2019    COPD (chronic obstructive pulmonary disease)     Diabetes mellitus     Non Insulin requiring    Difficult intubation     Encounter for blood transfusion     GI bleed due to NSAIDs     While on Eliquis and Imbruvica    Herpetic gingivostomatitis     History of lung cancer - ANDRES NSCLC 2004 01/03/2019    Hypertension     Iron deficiency 2017    Lymphoma, 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    MAYDA on CPAP     Pneumonia of both lungs due to infectious organism 6/29/2021    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     Right-sided Bell's palsy 2009    Spondylolisthesis     Varicose veins of legs     Venous insufficiency      Past Surgical History:   Procedure Laterality Date    Athroscopic surgery      On Knee    BIOPSY OF TISSUE OF NECK Left 08/2015    Recuurence CLL/small cell lyphoma    ESOPHAGEAL DILATION      ESOPHAGOGASTRODUODENOSCOPY N/A 4/15/2022    Procedure: EGD (ESOPHAGOGASTRODUODENOSCOPY);  Surgeon: Siddharth Garcia MD;  Location: [facility];  Service: Endoscopy;  Laterality: N/A;    ESOPHAGOGASTRODUODENOSCOPY N/A 4/16/2022    Procedure: EGD (ESOPHAGOGASTRODUODENOSCOPY);  Surgeon: Siddharth Garcia MD;  Location: [facility];  Service: Endoscopy;  Laterality: N/A;    ESOPHAGOGASTRODUODENOSCOPY N/A 6/23/2022    Procedure: EGD (ESOPHAGOGASTRODUODENOSCOPY);  Surgeon: Jefferson Hyman MD;  Location: [facility];  Service: Endoscopy;  Laterality: N/A;    ESOPHAGOGASTRODUODENOSCOPY W/ PEG N/A 4/16/2022    Procedure: EGD, WITH PEG TUBE INSERTION;  Surgeon: Siddharth Garcia MD;  Location: [facility];  Service: Endoscopy;  Laterality: N/A;    GASTROSTOMY TUBE PLACEMENT  04/16/2022    20 Fr (bumper initially at 3.5)    Hemorrhoid resection  1979    LUNG LOBECTOMY Left 2004    NECK SURGERY  04/08/2022    Anterior and Posterior C3-C7 fusion    OTHER SURGICAL HISTORY  2006    Chemotherapy s/p lyphoma        Portacath  implantation and removal      reverse shoulder arthroplasty Right 2021       ALCOHOL, TOBACCO AND DRUG USE  Social History     Tobacco Use   Smoking Status Former   Smokeless Tobacco Never     Social History     Substance and Sexual Activity   Alcohol Use Yes     Social History     Substance and Sexual Activity   Drug Use No       FAMILY HISTORY  Family History   Problem Relation Age of Onset    Stomach cancer Mother 80         at 95    Colon cancer Father        VITAL SIGNS (MOST RECENT)  Temp: 98.7 °F (37.1 °C) (23 0233)  Pulse: 69 (23 1401)  Resp: 19 (23 1401)  BP: (!) 151/70 (23 1401)  SpO2: 97 % (23 1401)    INTAKE AND OUTPUT (LAST 24 HOURS):No intake or output data in the 24 hours ending 23 1512    WEIGHT  Wt Readings from Last 1 Encounters:   23 86.2 kg (190 lb)       PHYSICAL EXAM  GENERAL: Older patient in no distress.  HEENT: Pupils equal and reactive. Extraocular movements intact. Nose intact. Pharynx moist.  Significant lymphadenopathy predominantly on the right side of the anterior cervical chain.  NECK: Supple.   HEART: Regular rate and rhythm. No murmur or gallop auscultated.  LUNGS: Clear to auscultation and percussion. Lung excursion symmetrical. No change in fremitus. No adventitial noises.  ABDOMEN: Bowel sounds present. Non-tender, no masses palpated.  : Normal anatomy.  EXTREMITIES: Normal muscle tone and joint movement, no cyanosis or clubbing.   LYMPHATICS: No adenopathy palpated, no edema.  SKIN: Dry, intact, no lesions.   NEURO: Cranial nerves II-XII intact. Motor strength 5/5 bilaterally, upper and lower extremities.  PSYCH: Appropriate affect        CBC LAST (LAST 24 HOURS)  Recent Labs   Lab 23  0306   WBC 16.22*   RBC 4.22*   HGB 13.2*   HCT 37.5*   MCV 89   MCH 31.3*   MCHC 35.2   RDW 14.4   PLT 55*   MPV 11.8   GRAN 35.6*  5.8   LYMPH 22.6  3.7   MONO 38.2*  6.2*   BASO 0.03   NRBC 0       CHEMISTRY LAST (LAST 24  HOURS)  Recent Labs   Lab 01/20/23  0346   *   K 4.1   CL 98   CO2 28   ANIONGAP 6*   BUN 10   CREATININE 0.9   *   CALCIUM 8.8   ALBUMIN 3.9   PROT 6.8   ALKPHOS 48*   ALT 12   AST 17   BILITOT 2.1*         CARDIAC PROFILE (LAST 24 HOURS)  Recent Labs   Lab 01/20/23 0346   BNP 86       LAST 7 DAYS MICROBIOLOGY   Microbiology Results (last 7 days)       Procedure Component Value Units Date/Time    Blood culture x two cultures. Draw prior to antibiotics. [[id number]] Collected: 01/20/23 0407    Order Status: Completed Specimen: Blood from Peripheral, Antecubital, Right Updated: 01/20/23 1158     Blood Culture, Routine No Growth to date    Narrative:      Aerobic and anaerobic    Blood culture x two cultures. Draw prior to antibiotics. [[id number]] Collected: 01/20/23 0346    Order Status: Completed Specimen: Blood from Peripheral, Antecubital, Right Updated: 01/20/23 1158     Blood Culture, Routine No Growth to date    Narrative:      Aerobic and anaerobic            MOST RECENT IMAGING  X-Ray Chest AP Portable  HISTORY: Cough.    FINDINGS: Portable chest radiograph at 0322 hours compared to prior exams shows the cardiomediastinal silhouette and pulmonary vasculature are within normal limits.    The lungs are normally expanded, with no consolidation, large pleural effusion, or evidence of pulmonary edema. There are right perihilar and lower lung opacities suggesting localized pneumonitis and/or infiltrate.    IMPRESSION: Right perihilar and lower lung opacities suggesting localized pneumonitis and/or infiltrate.    Electronically signed by:  Colton Marrero MD  1/20/2023 7:30 AM [facility] Workstation: [id number]      CURRENT VISIT EKG  Results for orders placed or performed during the hospital encounter of 01/20/23   EKG 12-lead    Narrative    Test Reason : R06.02,    Vent. Rate : 069 BPM     Atrial Rate : 069 BPM     P-R Int : 206 ms          QRS Dur : 102 ms      QT Int : 388 ms       P-R-T Axes : 077 -79 119  degrees     QTc Int : 415 ms    Normal sinus rhythm  Possible Left atrial enlargement  Borderline Abnormal ECG  When compared with ECG of 08-NOV-2022 00:07,  No significant change was found    Referred By: [email or web address]   SELF           Confirmed By:        ECHOCARDIOGRAM RESULTS  No results found for this or any previous visit.    Room air saturations 98%    IMPRESSION AND PLAN  Aspiration event with leukocytosis  Right lower lobe pneumonia  Tracheal stenosis  Lymphoma  Lymphadenopathy leading to recurrent aspiration    Home with a week of Levaquin  Follow-up in the office on Thursday      Dinorah Ocasio MD  Count [facility] the [facility]  Department of Pulmonology  Date of Service: 01/20/2023  3:12 PM

## 2023-01-20 NOTE — ED NOTES
Pt seen at bedside, in NAD. Requesting to sit up at edge of bed. Pt tolerated well, without assistance. No complaints at this time.

## 2023-01-20 NOTE — ED NOTES
Patient states wife blends food at home for him. Diet modified to pureed per request. Kitchen notified to send tray

## 2023-01-20 NOTE — DISCHARGE INSTRUCTIONS
Diet:  Resume regular    Physical Activity:  Activity as tolerated    Instructions:  1. Take all medications as prescribed  2. Keep all follow-up appointments  3. Return to the hospital or call your primary care physicians if any worsening symptoms such as chest pain, shortness of breath occur.    Follow-Up Appointments:  Please call your primary care physician to schedule an appointment in 1 week time.

## 2023-01-20 NOTE — DISCHARGE SUMMARY
[facility]  Discharge Summary  Patient Name: Conrad Kuhn MRN: [id number]   Patient Class: IP- Inpatient  Length of Stay: 0   Admission Date: 1/20/2023  2:36 AM Attending Physician: Rachel Morales MD   Primary Care Provider: Johnson Erazo MD Face-to-Face encounter date: 01/20/2023   Chief Complaint: Fever (Fever, cough, nasal congestion x 1 day.  Per wife, patient has had intermittent confusion. )    Date of Discharge: 1/20/2023  Discharge Disposition:Home or Self Care    Condition: Stable       Reason for Hospitalization     Active Hospital Problems    Diagnosis    *Community acquired pneumonia    Tracheal stenosis         Brief History of Present Illness    Conrad Kuhn is a 82 y.o.  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 The patient presented to [facility] on 1/20/2023 with a primary complaint of Fever (Fever, cough, nasal congestion x 1 day.  Per wife, patient has had intermittent confusion. )  .     For the full HPI please refer to the History & Physical from this admission.    Hospital Course By Problem with Pertinent Findings     Admitted for pneumonia and confusion. Treated with Vancomycin and cefepime with improvement.  Seen by Pulmonary medicine and recommended transition to PO levofloxacin and outpatient follow up. Patient would like to go home. Stable to discharge.      Patient was seen and examined on the date of discharge and determined to be suitable for discharge.    Physical Exam  BP (!) 151/70   Pulse 69   Temp 98.7 °F (37.1 °C) (Oral)   Resp 19   Wt 86.2 kg (190 lb)   SpO2 97%   BMI 24.39 kg/m²   Vitals reviewed.    Constitutional: No distress.   HENT: Atraumatic.   Cardiovascular: Normal rate, regular rhythm and normal heart sounds.   Pulmonary/Chest: Effort normal. Clear to auscultation bilaterally. No wheezes.   Abdominal: Soft. Bowel sounds are normal. Exhibits no distension and no mass. No tenderness  Neurological: Alert.   Skin: Skin is warm and dry.     Following labs were Reviewed   Recent Labs   Lab 01/20/23 0306 01/20/23 0346   WBC 16.22*  --    HGB 13.2*  --    HCT 37.5*  --    PLT 55*  --    CALCIUM  --  8.8   ALBUMIN  --  3.9   PROT  --  6.8   NA  --  132*   K  --  4.1   CO2  --  28   CL  --  98   BUN  --  10   CREATININE  --  0.9   ALKPHOS  --  48*   ALT  --  12   AST  --  17   BILITOT  --  2.1*     No results found for: POCTGLUCOSE     All labs within the past 24 hours have been reviewed    Microbiology Results (last 7 days)       Procedure Component Value Units Date/Time    Blood culture x two cultures. Draw prior to antibiotics. [[id number]] Collected: 01/20/23 0407    Order Status: Completed Specimen: Blood from Peripheral, Antecubital, Right Updated: 01/20/23 1158     Blood Culture, Routine No Growth to date    Narrative:      Aerobic and anaerobic    Blood culture x two cultures. Draw prior to antibiotics. [[id number]] Collected: 01/20/23 0346    Order Status: Completed Specimen: Blood from Peripheral, Antecubital, Right Updated: 01/20/23 1158     Blood Culture, Routine No Growth to date    Narrative:      Aerobic and anaerobic          X-Ray Chest AP Portable   Final Result          No results found for this or any previous visit.      Consultants and Procedures   Consultants:  Consults (From admission, onward)          Status Ordering Provider      Pharmacy to dose Vancomycin consult  Once        Provider:  (Not yet assigned)   See Hyperspace for full Linked Orders Report.    Acknowledged CASEY SHANNON     Inpatient consult to Pulmonology  Once        Provider:  Jay Martinez MD    Completed CASEY SHANNON     Inpatient consult to Internal Medicine  Once        Provider:  Casey Shannon MD    Acknowledged CASEY SHANNON            Procedures:   * No surgery found *     Discharge Information:   Diet:  Resume regular    Physical Activity:  Activity as tolerated    Instructions:  1. Take all medications as prescribed  2. Keep all follow-up appointments  3. Return to the hospital or call your primary care physicians if any worsening symptoms such as chest pain, shortness of breath occur.    Follow-Up Appointments:  Please call your primary care physician to schedule an appointment in 1 week time.     Follow-up Information       Dinorah Ocasio MD Follow up in 1 week(s).    Specialties: Pulmonary Disease, Sleep Medicine  Contact information:  [address] [place] [id number] [phone]                               Pending laboratory work/Tests to be performed/followed by the Primary care Physician: None    The patient was discharged in the care of her parents//wife/family/caregiver, with discharge instructions were reviewed in written and verbal form. All pertinent questions were discussed and prescriptions were provided. The importance of making follow up appointments and compliance of medications has been stressed repeatedly. The patient will follow up in 1 week or sooner as needed with the PCP, and the patient is on board with the plan. Upon discharge, patient needs to be on following medications.    Discharge Medications:     Medication List        START taking these medications      levoFLOXacin 750 MG tablet  Commonly known as: LEVAQUIN  Take 1 tablet (750 mg total) by mouth once daily. for 7 days             CONTINUE taking these medications      azelastine 137 mcg (0.1 %) nasal spray  Commonly known as: ASTELIN     blood sugar diagnostic Strp     blood-glucose meter kit     gabapentin 600 MG tablet  Commonly known as: NEURONTIN     glimepiride 2 MG tablet  Commonly known as: AMARYL     iron-vitamin C 100-250 mg (ICAR-C) 100-250 mg Tab  Commonly known as: ICAR-C  Take 1 tablet by mouth once daily.     levalbuterol 1.25 mg/3 mL nebulizer solution  Commonly known as: XOPENEX     metoprolol tartrate 25 MG tablet  Commonly known as: LOPRESSOR  1 tablet (25 mg total) by Per G Tube route 2 (two) times daily.     ondansetron 8 MG tablet  Commonly known as: ZOFRAN  Take 1 tablet (8 mg total) by mouth every 12 (twelve) hours as needed for Nausea.     promethazine 12.5 MG Tab  Commonly known as: PHENERGAN  Take 1 tablet (12.5 mg total) by mouth every 6 (six) hours as needed.     traZODone 300 MG tablet  Commonly known as: DESYREL     valACYclovir 1000 MG tablet  Commonly known as: VALTREX     venetoclax 50 mg Tab  Take 50 mg by mouth once daily.               Where to Get Your Medications        These medications were sent to [id number] - [place], [place] - [address] AT [facility] [address]  [address] [place] [address]      Phone: [phone]   levoFLOXacin 750 MG tablet           I spent 40 minutes preparing the discharge including reviewing records from previous encounters, preparation of discharge summary, assessing and final examination of the patient, discharge medicine reconciliation, discussing plan of care, follow up and education and prescriptions.       Rachel Morales  [facility] Hospitalist  01/20/2023

## 2023-01-20 NOTE — ED PROVIDER NOTES
Encounter Date: 1/20/2023       History     Chief Complaint   Patient presents with    Fever     Fever, cough, nasal congestion x 1 day.  Per wife, patient has had intermittent confusion.      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 Brought in by wife who was concerned because patient also seemed confused and states he mentates that way when he is sick. Wife also reports that he has been sleeping all day today.    Patient states that he has been having mild SOB, productive cough, congestion, body aches for the past several days. Had 1 episode of nonbloody diarrhea a day or so ago, took Pepto and reported improvement.     He denies chest pain, calf pain, extremity swelling, abdominal pain, urinary changes, dysuria, back/flank pain, N/V.    Reports chronic neck pain and trouble swallowing (but denies odynophagia), states he has been eating/drinking less due to the difficulty.  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     Right-sided Bell's palsy 2009    Spondylolisthesis     Varicose veins of legs     Venous insufficiency      Past Surgical History:   Procedure Laterality Date     Athroscopic surgery      On Knee    BIOPSY OF TISSUE OF NECK Left 2015    Recuurence CLL/small cell lyphoma    ESOPHAGEAL DILATION      ESOPHAGOGASTRODUODENOSCOPY N/A 4/15/2022    Procedure: EGD (ESOPHAGOGASTRODUODENOSCOPY);  Surgeon: Siddharth Garcia MD;  Location: [facility] ENDO;  Service: Endoscopy;  Laterality: N/A;    ESOPHAGOGASTRODUODENOSCOPY N/A 2022    Procedure: EGD (ESOPHAGOGASTRODUODENOSCOPY);  Surgeon: Siddharth Garcia MD;  Location: [facility] ENDO;  Service: Endoscopy;  Laterality: N/A;    ESOPHAGOGASTRODUODENOSCOPY N/A 2022    Procedure: EGD (ESOPHAGOGASTRODUODENOSCOPY);  Surgeon: Jefferson Hyman MD;  Location: [facility] ENDO;  Service: Endoscopy;  Laterality: N/A;    ESOPHAGOGASTRODUODENOSCOPY W/ PEG N/A 2022    Procedure: EGD, WITH PEG TUBE INSERTION;  Surgeon: Siddharth Garcia MD;  Location: [facility];  Service: Endoscopy;  Laterality: N/A;    GASTROSTOMY TUBE PLACEMENT  2022    20 Fr (bumper initially at 3.5)    Hemorrhoid resection      LUNG LOBECTOMY Left     NECK SURGERY  2022    Anterior and Posterior C3-C7 fusion    OTHER SURGICAL HISTORY      Chemotherapy s/p lyphoma        Portacath implantation and removal      reverse shoulder arthroplasty Right 2021     Family History   Problem Relation Age of Onset    Stomach cancer Mother 80         at 95    Colon cancer Father      Social History     Tobacco Use    Smoking status: Former    Smokeless tobacco: Never   Substance Use Topics    Alcohol use: Yes    Drug use: No     Review of Systems   Constitutional:  Positive for fatigue and fever.   HENT:  Positive for trouble swallowing. Negative for sore throat.    Respiratory:  Positive for cough and shortness of breath.    Cardiovascular:  Negative for chest pain and leg swelling.   Gastrointestinal:  Negative for abdominal pain, nausea and vomiting.   Endocrine: Negative for polyuria.   Genitourinary:  Negative for decreased urine volume and dysuria.    Musculoskeletal:  Positive for myalgias. Negative for back pain.   Skin:  Negative for rash and wound.   Neurological:  Negative for light-headedness and headaches.     Physical Exam     Initial Vitals [01/20/23 0233]   BP Pulse Resp Temp SpO2   137/79 83 14 98.7 °F (37.1 °C) 95 %      MAP       --         Physical Exam    Nursing note and vitals reviewed.  Constitutional: He appears well-developed. He is not intubated.   HENT:   Head: Normocephalic and atraumatic.   Mouth/Throat: Mucous membranes are dry.   Eyes: EOM are normal. Right conjunctiva is injected. Left conjunctiva is injected.   Cardiovascular:  Normal rate.           Pulmonary/Chest: Tachypnea noted. No bradypnea. He is not intubated. No respiratory distress. He has no wheezes.   Mild inspiratory stridor    Abdominal: Abdomen is soft. He exhibits no distension. There is no abdominal tenderness.   Musculoskeletal:         General: No tenderness or edema.     Neurological: He is alert and oriented to person, place, and time.   Skin: Skin is dry. No rash noted.   Psychiatric: He has a normal mood and affect. His behavior is normal.     PGY-3 MDM:  Conrad Kuhn is a 82 y.o. year old male with hx COPD, CLL, HTN, hx lung cancer s/p lobectomy (2004), tracheal stenosis s/p intubation presenting for several days of fatigue, malaise, productive cough, mild SOB, and fever today.    On arrival patient vitals were   Vitals:    01/20/23 0530   BP: (!) 143/65   Pulse: 76   Resp: (!) 22   Temp:         On examination today, patient with mild tachypnea and inspiratory stridor, lungs without wheezes or crackles, abdomen soft. Mucous membranes appear very dry.    Previous notes were reviewed from heme oncol clinic (1/9/2023) which discuss the patient dysphagia and chronic neck issues. These notes also report on PET scan from 12/2/2022 that showed enlargement of neck lymph nodes.    Patient's previous PET scan of skull to mid-thigh (11/23/2022) reviewed which showed  extensive diffuse adenopathy throughout patient's neck, chest, abdomen, and pelvis. It is likely that this extensive adenopathy continues to worsen and is contributing to the patient's current clinical condition.    Patient had modified barium swallow study conducted on 11/5/2022, notable for evidence of aspiration of the barium. Given the patient's current fever, SOB, and productive cough, it is possible that he could have developed a aspiration pneumonia given his chronic dysphagia.    My independent analysis of EKG is Normal rate and rhythm, No ST elevations/depressions, no T-wave inversions, Normal CT interval, normal QRS duration, normal QTC, Unchanged from previous EKG from 11/8/2022.    My independent analysis of the patient's chest xray is Adequate exposure. , Trachea, bernard, and mainstem bronchi visible without significant deviation, No apparent bony abnormality, Cardiac silhouette within normal limits, No evidence of pneumothorax, No evidence of pleural effusion, costophrenic angles sharp, Infiltrates present!    Labs ordered include: CBC, CMP, lactic acid, procacitonin, influenza swab, Covid swab, UA. CBC notable for elevated WBC 16k, platelets decreased 55k (similar when compared to previous lab values). CMP notable for new elevated total bilirubin 2.1, unclear etiology. Flu/covid negative. BNP/troponin wnl. Procalcitonin wnl. Lactic acid wnl.    Given the patient's symptoms and bilateral infiltrates on CXR, decision to cover for CAP made. Patient denies recent hospitalizations or antibiotic use. Previous micro lab results reviewed and patient has history of klebsiella in sputum culture that was resistant to ceftriaxone (8/4/2022), so cefepime given instead. Cefepime should cover for most pathogens excluding legionella, and as patient has hyponatremia and reported diarrhea/confusion the decision to add azithromycin was made. Urine legionella test ordered, currently pending.    Patient was given the  following in the emergency department.  Medications   azithromycin tablet 500 mg (has no administration in time range)   sodium chloride 0.9% bolus 1,000 mL 1,000 mL (1,000 mLs Intravenous New Bag 1/20/23 5944)   cefepime in dextrose 5 % IVPB 2 g (2 g Intravenous New Bag 1/20/23 8810)   albuterol-ipratropium 2.5 mg-0.5 mg/3 mL nebulizer solution 3 mL (3 mLs Nebulization Given 1/20/23 6621)      Patient case was discussed with hospital medicine and given his past medical history and borderline hypoxia, the decision to admit the patient for pneumonia was made. Patient was consulted to hospital medicine for admission.      Nba Beasley MD  [facility], PGY-3  3:23 AM  1/20/2023    ED Course   Procedures  Labs Reviewed   URINALYSIS, REFLEX TO URINE CULTURE - Abnormal; Notable for the following components:       Result Value    Urobilinogen, UA 2.0-3.0 (*)     All other components within normal limits    Narrative:     Specimen Source->Urine   CBC W/ AUTO DIFFERENTIAL - Abnormal; Notable for the following components:    WBC 16.22 (*)     RBC 4.22 (*)     Hemoglobin 13.2 (*)     Hematocrit 37.5 (*)     MCH 31.3 (*)     Platelets 55 (*)     Immature Granulocytes 3.0 (*)     Immature Grans (Abs) 0.48 (*)     Mono # 6.2 (*)     Gran % 35.6 (*)     Mono % 38.2 (*)     Platelet Estimate Decreased (*)     All other components within normal limits   COMPREHENSIVE METABOLIC PANEL - Abnormal; Notable for the following components:    Sodium 132 (*)     Glucose 137 (*)     Total Bilirubin 2.1 (*)     Alkaline Phosphatase 48 (*)     Anion Gap 6 (*)     All other components within normal limits   URINALYSIS, REFLEX TO URINE CULTURE - Abnormal; Notable for the following components:    Urobilinogen, UA 2.0-3.0 (*)     All other components within normal limits    Narrative:     Specimen Source->Urine   CULTURE, BLOOD   CULTURE, BLOOD   LACTIC ACID, PLASMA   INFLUENZA A AND B ANTIGEN    Narrative:     Specimen  Source->Nasopharyngeal Swab   PROCALCITONIN   B-TYPE NATRIURETIC PEPTIDE   SARS-COV-2 RNA AMPLIFICATION, QUAL   LEGIONELLA ANTIGEN, URINE RANDOM   LEGIONELLA ANTIGEN, URINE RANDOM   INFLUENZA A AND B ANTIGEN          Imaging Results              X-Ray Chest AP Portable (In process)                      Medications   azithromycin tablet 500 mg (has no administration in time range)   sodium chloride 0.9% bolus 1,000 mL 1,000 mL (1,000 mLs Intravenous New Bag 1/20/23 0408)   cefepime in dextrose 5 % IVPB 2 g (2 g Intravenous New Bag 1/20/23 0591)   albuterol-ipratropium 2.5 mg-0.5 mg/3 mL nebulizer solution 3 mL (3 mLs Nebulization Given 1/20/23 0507)                 ED Course as of 01/20/23 0602 Fri Jan 20, 2023   0401 EKG with regular rate, regular rhythm, normal axis, no acute ST elevations or depressions, normal VT, QRS and QT interval. Interpreted by me.   [BS]   0556 Comprehensive metabolic panel(!) [BS]   0556 Lactate, Ramiro: 0.9 [BS]   0556 Blood culture x two cultures. Draw prior to antibiotics. [BS]   0556 CBC auto differential(!) [BS]   0556 Influenza antigen Nasopharyngeal Swab [BS]   0556 Brain natriuretic peptide [BS]   0556 Procalcitonin [BS]   0556 Urinalysis, Reflex to Urine Culture Urine, Clean Catch(!) [BS]   0556 Legionella antigen, urine [BS]   0556 Blood culture x two cultures. Draw prior to antibiotics. [BS]      ED Course User Index  [BS] Dwight Hoffman MD                   Agree with the resident's documentation above, except as below:     82-year-old male with history of tracheal stenosis and CLL, recurrent aspiration pneumonia, presents for 1 day of malaise, cough which is productive and fever at home.  Patient received Tylenol just prior to arrival.  Vitals normal on my exam aside from hypoxia to low 90s.  Patient has mild inspiratory stridor on exam consistent with his known tracheal stenosis.  He is protecting his airway, tolerating secretions and has no voice changes to suggest deep  space infection.  Sepsis workup initiated with broad-spectrum antibiotics, lactic, chest x-ray, urinalysis, urine culture.  Fluid bolus with held due to the patient's hypoxia and unknown cardiac history.  Chest x-ray with right lower lobe pulmonary infiltrate consistent with pneumonia, patient treated with cefepime given he has a history of Klebsiella sputum culture with resistance to Rocephin and Augmentin.  Lactic within normal limits, patient does have mild leukocytosis consistent with pneumonia.  Will admit to hospital medicine for further management.    Dwight Hoffman        Attending Critical Care:   Critical Care Times:   Direct Patient Care (initial evaluation, reassessments, and time considering the case)................................................................10 minutes.   Additional History from reviewing old medical records or taking additional history from the family, EMS, PCP, etc.......................5 minutes.   Ordering, Reviewing, and Interpreting Diagnostic Studies...............................................................................................................5 minutes.   Documentation..................................................................................................................................................................................5 minutes.   ==============================================================  · Total Critical Care Time - exclusive of procedural time: 25 minutes.  ==============================================================  Critical care was necessary to treat or prevent imminent or life-threatening deterioration of the following conditions: pneumoina, hypoxia, sepsis workup.   Critical care was time spent personally by me on the following activities: obtaining history from patient or relative, examination of patient, review of x-rays / CT sent with the patient, ordering lab, x-rays, and/or EKG, development of treatment plan with  patient or relative, ordering and performing treatments and interventions, interpretation of cardiac measurements and re-evaluation of patient's conition.   Critical Care Condition: potentially life-threatening       Clinical Impression:   Final diagnoses:  [R06.02] Shortness of breath  [R05.9] Cough  [Z87.09] History of tracheal stenosis (Primary)  [R17] Elevated bilirubin  [J18.9] Pneumonia of right lower lobe due to infectious organism        ED Disposition Condition    Admit Stable                   Dwight Hoffman MD  01/20/23 0603       Dwight Hoffman MD  01/20/23 0603       Dwight Hoffman MD  02/14/23 7617

## 2023-01-20 NOTE — SUBJECTIVE & OBJECTIVE
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     Right-sided Bell's palsy 2009    Spondylolisthesis     Varicose veins of legs     Venous insufficiency        Past Surgical History:   Procedure Laterality Date    Athroscopic surgery      On Knee    BIOPSY OF TISSUE OF NECK Left 08/2015    Recuurence CLL/small cell lyphoma    ESOPHAGEAL DILATION      ESOPHAGOGASTRODUODENOSCOPY N/A 4/15/2022    Procedure: EGD (ESOPHAGOGASTRODUODENOSCOPY);  Surgeon: Siddharth Garcia MD;  Location: [facility];  Service: Endoscopy;  Laterality: N/A;    ESOPHAGOGASTRODUODENOSCOPY N/A 4/16/2022    Procedure: EGD (ESOPHAGOGASTRODUODENOSCOPY);  Surgeon: Siddharth Garcia MD;  Location: [facility];  Service: Endoscopy;  Laterality: N/A;    ESOPHAGOGASTRODUODENOSCOPY N/A 6/23/2022    Procedure: EGD (ESOPHAGOGASTRODUODENOSCOPY);  Surgeon: Jefferson Hyman MD;  Location: [facility];  Service: Endoscopy;  Laterality: N/A;    ESOPHAGOGASTRODUODENOSCOPY W/ PEG N/A 4/16/2022    Procedure: EGD, WITH PEG TUBE INSERTION;  Surgeon: Siddharth Garcia MD;  Location: [facility];  Service: Endoscopy;  Laterality: N/A;    GASTROSTOMY TUBE PLACEMENT  04/16/2022    20 Fr (bumper initially at 3.5)    Hemorrhoid resection  1979    LUNG LOBECTOMY Left 2004    NECK SURGERY  04/08/2022    Anterior and Posterior C3-C7 fusion    OTHER SURGICAL HISTORY  2006    Chemotherapy s/p lyphoma         Portacath implantation and removal      reverse shoulder arthroplasty Right 03/2021       Review of patient's allergies indicates:  No Known Allergies    No current facility-administered medications on file prior to encounter.     Current Outpatient Medications on File Prior to Encounter   Medication Sig    azelastine (ASTELIN) 137 mcg (0.1 %) nasal spray 1 spray by Nasal route 2 (two) times daily.     blood sugar diagnostic Strp 1 strip by Misc.(Non-Drug; Combo Route) route 2 (two) times a day.    blood-glucose meter kit Use as instructed    gabapentin (NEURONTIN) 600 MG tablet Take 600 mg by mouth 2 (two) times daily.    glimepiride (AMARYL) 2 MG tablet Take 2 mg by mouth once daily at 6am.    iron-vitamin C 100-250 mg, ICAR-C, (ICAR-C) 100-250 mg Tab Take 1 tablet by mouth once daily.    levalbuterol (XOPENEX) 1.25 mg/3 mL nebulizer solution Take 3 mLs by nebulization every 4 to 6 hours as needed.     metoprolol tartrate (LOPRESSOR) 25 MG tablet 1 tablet (25 mg total) by Per G Tube route 2 (two) times daily.    ondansetron (ZOFRAN) 8 MG tablet Take 1 tablet (8 mg total) by mouth every 12 (twelve) hours as needed for Nausea.    promethazine (PHENERGAN) 12.5 MG Tab Take 1 tablet (12.5 mg total) by mouth every 6 (six) hours as needed.    traZODone (DESYREL) 300 MG tablet Take 300 mg by mouth every evening.    valACYclovir (VALTREX) 1000 MG tablet Take 1,000 mg by mouth once daily.    venetoclax 50 mg Tab Take 50 mg by mouth once daily.    [DISCONTINUED] albuterol-ipratropium (DUO-NEB) 2.5 mg-0.5 mg/3 mL nebulizer solution Take 3 mLs by nebulization every 8 (eight) hours as needed.    [DISCONTINUED] esomeprazole (NEXIUM) 40 MG capsule Take 1 capsule (40 mg total) by mouth 2 (two) times daily.    [DISCONTINUED] losartan (COZAAR) 25 MG tablet Take 25 mg by mouth every evening.    [DISCONTINUED] metFORMIN (GLUCOPHAGE) 500 MG tablet Take 500 mg by mouth 2 (two) times daily with meals.     [DISCONTINUED] metoprolol  succinate (TOPROL-XL) 25 MG 24 hr tablet Take 25 mg by mouth 2 (two) times daily.      Family History       Problem Relation (Age of Onset)    Colon cancer Father    Stomach cancer Mother (80)          Tobacco Use    Smoking status: Former    Smokeless tobacco: Never   Substance and Sexual Activity    Alcohol use: Yes    Drug use: No    Sexual activity: Not Currently     Review of Systems   Constitutional:  Positive for fever.   HENT: Negative.     Eyes: Negative.    Respiratory:  Positive for cough and shortness of breath.    Cardiovascular: Negative.    Gastrointestinal: Negative.    Endocrine: Negative.    Genitourinary: Negative.    Musculoskeletal: Negative.    Skin: Negative.    Allergic/Immunologic: Negative.    Neurological: Negative.    Hematological: Negative.    All other systems reviewed and are negative.  Objective:     Vital Signs (Most Recent):  Temp: 98.7 °F (37.1 °C) (01/20/23 0233)  Pulse: 73 (01/20/23 0600)  Resp: (!) 22 (01/20/23 0600)  BP: (!) 154/70 (01/20/23 0600)  SpO2: 96 % (01/20/23 0600)   Vital Signs (24h Range):  Temp:  [98.7 °F (37.1 °C)] 98.7 °F (37.1 °C)  Pulse:  [70-83] 73  Resp:  [14-24] 22  SpO2:  [92 %-96 %] 96 %  BP: (137-175)/(65-81) 154/70     Weight: 86.2 kg (190 lb)  Body mass index is 24.39 kg/m².    Physical Exam  Vitals and nursing note reviewed.   Constitutional:       Appearance: He is well-developed.   HENT:      Head: Normocephalic and atraumatic.      Right Ear: External ear normal.      Left Ear: External ear normal.      Nose: Nose normal.   Eyes:      Conjunctiva/sclera: Conjunctivae normal.      Pupils: Pupils are equal, round, and reactive to light.   Cardiovascular:      Rate and Rhythm: Normal rate and regular rhythm.      Heart sounds: Normal heart sounds.   Pulmonary:      Effort: Pulmonary effort is normal.      Breath sounds: Normal breath sounds. Stridor present.      Comments: Decreased entry bases with coarse large airway sounds clearing with cough; no  opening crepitations nor expiratory wheezes  Abdominal:      General: Bowel sounds are normal.      Palpations: Abdomen is soft.   Musculoskeletal:         General: Normal range of motion.      Cervical back: Normal range of motion and neck supple.   Skin:     General: Skin is warm and dry.      Capillary Refill: Capillary refill takes less than 2 seconds.   Neurological:      Mental Status: He is alert and oriented to person, place, and time.   Psychiatric:         Behavior: Behavior normal.         Thought Content: Thought content normal.         Judgment: Judgment normal.         CRANIAL NERVES     CN III, IV, VI   Pupils are equal, round, and reactive to light.     Significant Labs: All pertinent labs within the past 24 hours have been reviewed.  CBC:   Recent Labs   Lab 01/20/23  0306   WBC 16.22*   HGB 13.2*   HCT 37.5*   PLT 55*     CMP:   Recent Labs   Lab 01/20/23  0346   *   K 4.1   CL 98   CO2 28   *   BUN 10   CREATININE 0.9   CALCIUM 8.8   PROT 6.8   ALBUMIN 3.9   BILITOT 2.1*   ALKPHOS 48*   AST 17   ALT 12   ANIONGAP 6*     Cardiac Markers:   Recent Labs   Lab 01/20/23  0346   BNP 86     Troponin: No results for input(s): TROPONINI, TROPONINIHS in the last 48 hours.    Significant Imaging: I have reviewed all pertinent imaging results/findings within the past 24 hours.

## 2023-01-20 NOTE — ASSESSMENT & PLAN NOTE
Inpatient admission for pneumonia; with his complicated history (legionella and intubation followed by tracheal stenosis) will cover broadly to cover; Pulmonology consultation (also to establish--his has retired); continuing home regimen for chronic maladies

## 2023-01-20 NOTE — PROGRESS NOTES
Pharmacokinetic Initial Assessment: IV Vancomycin    Assessment/Plan:    Initiate intravenous vancomycin with loading dose of 1500 mg once followed by a maintenance dose of vancomycin 1500 mg IV every 18 hours  Desired empiric serum trough concentration is 10 to 15 mcg/mL  Draw vancomycin trough level 60 min prior to fourth dose on 1/22 at approximately 14:00  Pharmacy will continue to follow and monitor vancomycin.      Please contact pharmacy at extension [phone] with any questions regarding this assessment.     Thank you for the consult,   Lm Lyle       Patient brief summary:  Conrad Kuhn is a 82 y.o. male initiated on antimicrobial therapy with IV Vancomycin for treatment of suspected lower respiratory infection    Drug Allergies:   Review of patient's allergies indicates:  No Known Allergies    Actual Body Weight:   86.2 kg    Renal Function:   Estimated Creatinine Clearance: 72.3 mL/min (based on SCr of 0.9 mg/dL).,     Dialysis Method (if applicable):  N/A    CBC (last 72 hours):  Recent Labs   Lab Result Units 01/17/23  1422 01/20/23  0306   WBC K/uL 8.02 16.22*   Hemoglobin g/dL 12.4* 13.2*   Hematocrit % 35.5* 37.5*   Platelets K/uL 59* 55*   Gran % % 21.1* 35.6*   Lymph % % 40.8 22.6   Mono % % 35.7* 38.2*   Eosinophil % % 0.7 0.4   Basophil % % 0.1 0.2   Differential Method  Automated Automated       Metabolic Panel (last 72 hours):  Recent Labs   Lab Result Units 01/20/23  0346 01/20/23  0441   Sodium mmol/L 132*  --    Potassium mmol/L 4.1  --    Chloride mmol/L 98  --    CO2 mmol/L 28  --    Glucose mg/dL 137*  --    Glucose, UA   --  Negative  Negative   BUN mg/dL 10  --    Creatinine mg/dL 0.9  --    Albumin g/dL 3.9  --    Total Bilirubin mg/dL 2.1*  --    Alkaline Phosphatase U/L 48*  --    AST U/L 17  --    ALT U/L 12  --        Drug levels (last 3 results):  No results for input(s): VANCOMYCINRA, VANCORANDOM, VANCOMYCINPE, VANCOPEAK, VANCOMYCINTR, VANCOTROUGH in the last 72  hours.    Microbiologic Results:  Microbiology Results (last 7 days)       Procedure Component Value Units Date/Time    Blood culture x two cultures. Draw prior to antibiotics. [[id number]] Collected: 01/20/23 0407    Order Status: Sent Specimen: Blood from Peripheral, Antecubital, Right Updated: 01/20/23 0413    Blood culture x two cultures. Draw prior to antibiotics. [[id number]] Collected: 01/20/23 0346    Order Status: Sent Specimen: Blood from Peripheral, Antecubital, Right Updated: 01/20/23 0354

## 2023-01-20 NOTE — PLAN OF CARE
[facility] - Emergency Dept  Initial Discharge Assessment       Primary Care Provider: Johnson Erazo MD    Admission Diagnosis: Pneumonia [J18.9]    Admission Date: 1/20/2023  Expected Discharge Date:     Social work intern met with Pt at bedside to complete discharge assessment. Pt AAOx4s. Demographics, PCP, and insurance verified. No home health. No dialysis. Pt states to have home health services through [facility]. Pt reports ability to complete ADLs with assistance of rollator, grab bars, glucometer, and nebulizer. Pt verbalized plan to discharge home via family transport. Pt has no other needs to be addressed at this time.      Discharge Barriers Identified: None    Payor: MEDICARE / Plan: MEDICARE PART A & B / Product Type: Government /     Extended Emergency Contact Information  Primary Emergency Contact: Peri Kuhn  Address: [address] [place] [address]  Home Phone: [phone]  Mobile Phone: [phone]  Relation: Spouse    Discharge Plan A: Home with family  Discharge Plan B: Home with family      [id number] - [place], [place] - [address] AT [facility] [address]  [address]  [facility] [address]  Phone: [phone] Fax: [phone]    [facility] [place]  [place] - [address]  [address] [facility] [address]  Phone: [phone] Fax: [phone]      Initial Assessment (most recent)       Adult Discharge Assessment - 01/20/23 1035          Discharge Assessment    Assessment Type Discharge Planning Assessment     Confirmed/corrected address, phone number and insurance Yes     Confirmed Demographics Correct on Facesheet     Source of Information patient     Reason For Admission tracheal stenosis     People in Home spouse     Facility Arrived From: home     Do you expect to return to your current living situation? Yes     Do you have help at home or someone to help  you manage your care at home? Yes     Who are your caregiver(s) and their phone number(s)? Peri Kuhn (Spouse)   [phone] (Mobile)     Prior to hospitilization cognitive status: Alert/Oriented     Current cognitive status: Alert/Oriented     Walking or Climbing Stairs ambulation difficulty, requires equipment     Mobility Management rollator     Dressing/Bathing bathing difficulty, requires equipment     Dressing/Bathing Management grab bars     Home Accessibility wheelchair accessible     Home Layout Able to live on 1st floor     Equipment Currently Used at Home grab bar;nebulizer;glucometer;rollator     Readmission within 30 days? No     Do you currently have service(s) that help you manage your care at home? Yes     How Many hours does patient receive services 5     Name and Contact number of agency [facility]     Is the pt/caregiver preference to resume services with current agency Yes     Do you take prescription medications? Yes     Do you have prescription coverage? Yes     Coverage MEDICARE - MEDICARE PART A & B     Do you have any problems affording any of your prescribed medications? No     Is the patient taking medications as prescribed? yes     Who is going to help you get home at discharge? Peri Kuhn (Spouse)   [phone] (Mobile)     How do you get to doctors appointments? family or friend will provide     Are you on dialysis? No     Do you take coumadin? No     Discharge Plan A Home with family     Discharge Plan B Home with family     Discharge Plan discussed with: Patient     Discharge Barriers Identified None        OTHER    Name(s) of People in Home Peri Kuhn (Spouse)   [phone] (Mobile)

## 2023-01-26 PROBLEM — J69.0 ASPIRATION PNEUMONIA OF RIGHT LOWER LOBE: Status: ACTIVE | Noted: 2023-01-01

## 2023-01-26 PROBLEM — J44.9 CHRONIC OBSTRUCTIVE PULMONARY DISEASE: Status: ACTIVE | Noted: 2023-01-01

## 2023-01-26 PROBLEM — C85.90 LYMPHOMA: Status: ACTIVE | Noted: 2023-01-01

## 2023-01-26 NOTE — PROGRESS NOTES
SUBJECTIVE:    Patient ID: Conrad Kuhn is a 82 y.o. male.    Chief Complaint: Follow-up (Follow up Hospital Visit/Cough is better)    HPI the patient was in the emergency room on the 20th with a right lower lobe aspiration pneumonia.  The patient has lymphoma and has bulky adenopathy in his right cervical chain.  The patient had been brought to the hospital because he had fever and had an altered mental status.  By the time I saw him the next day his fever was gone in his mentation was back to baseline.  The patient is developing increasing inspiratory noise at his neck which appears to be tracheal compromise secondary to his lymphadenopathy.  The patient also has COPD.  He had been seeing Dr. Kee.  He would like to change to me. He uses levalbuterol as needed.   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;  Service: Endoscopy;  Laterality: N/A;    ESOPHAGOGASTRODUODENOSCOPY N/A 4/16/2022     Procedure: EGD (ESOPHAGOGASTRODUODENOSCOPY);  Surgeon: Siddharht Garcia MD;  Location: [facility] ENDO;  Service: Endoscopy;  Laterality: N/A;    ESOPHAGOGASTRODUODENOSCOPY N/A 2022    Procedure: EGD (ESOPHAGOGASTRODUODENOSCOPY);  Surgeon: Jefferson Hyman MD;  Location: [facility] ENDO;  Service: Endoscopy;  Laterality: N/A;    ESOPHAGOGASTRODUODENOSCOPY W/ PEG N/A 2022    Procedure: EGD, WITH PEG TUBE INSERTION;  Surgeon: Siddharth Garcia MD;  Location: [facility] ENDO;  Service: Endoscopy;  Laterality: N/A;    GASTROSTOMY TUBE PLACEMENT  2022    20 Fr (bumper initially at 3.5)    Hemorrhoid resection      LUNG LOBECTOMY Left     NECK SURGERY  2022    Anterior and Posterior C3-C7 fusion    OTHER SURGICAL HISTORY  2006    Chemotherapy s/p lyphoma        Portacath implantation and removal      reverse shoulder arthroplasty Right 2021     Family History   Problem Relation Age of Onset    Stomach cancer Mother 80         at 95    Colon cancer Father         Social History:   Marital Status:   Occupation: Data Unavailable  Alcohol History:  reports current alcohol use.  Tobacco History:  reports that he has quit smoking. He has never used smokeless tobacco.  Drug History:  reports no history of drug use.    Review of patient's allergies indicates:  No Known Allergies    Current Outpatient Medications   Medication Sig Dispense Refill    azelastine (ASTELIN) 137 mcg (0.1 %) nasal spray 1 spray by Nasal route 2 (two) times daily.       gabapentin (NEURONTIN) 600 MG tablet Take 600 mg by mouth 2 (two) times daily.      glimepiride (AMARYL) 2 MG tablet Take 2 mg by mouth once daily at 6am.      iron-vitamin C 100-250 mg, ICAR-C, (ICAR-C) 100-250 mg Tab Take 1 tablet by mouth once daily. 30 each 6    levalbuterol (XOPENEX) 1.25 mg/3 mL nebulizer solution Take 3 mLs by nebulization every 4 to 6 hours as needed.       ondansetron (ZOFRAN) 8 MG tablet Take 1 tablet (8 mg total) by mouth every  12 (twelve) hours as needed for Nausea. 30 tablet 2    promethazine (PHENERGAN) 12.5 MG Tab Take 1 tablet (12.5 mg total) by mouth every 6 (six) hours as needed. 30 tablet 3    traZODone (DESYREL) 300 MG tablet Take 300 mg by mouth every evening.      valACYclovir (VALTREX) 1000 MG tablet Take 1,000 mg by mouth once daily.      blood sugar diagnostic Strp 1 strip by Misc.(Non-Drug; Combo Route) route 2 (two) times a day.      blood-glucose meter kit Use as instructed      levoFLOXacin (LEVAQUIN) 750 MG tablet Take 1 tablet (750 mg total) by mouth once daily. for 7 days (Patient not taking: Reported on 1/26/2023) 7 tablet 0    metoprolol tartrate (LOPRESSOR) 25 MG tablet 1 tablet (25 mg total) by Per G Tube route 2 (two) times daily. 60 tablet 0    venetoclax 50 mg Tab Take 50 mg by mouth once daily. 30 tablet 6     No current facility-administered medications for this visit.       Alpha-1 Antitrypsin:  Last PFT:   Last CT:    Review of Systems  General: Feeling Well.  Eyes: Vision is good.  ENT:  No sinusitis or pharyngitis.   Heart:: No chest pain or palpitations.  Lungs: No cough, sputum, or wheezing.  GI: No Nausea, vomiting, constipation, diarrhea, or reflux.  : No dysuria, hesitancy, or nocturia.  Musculoskeletal: No joint pain or myalgias.  Skin: No lesions or rashes.  Neuro: No headaches or neuropathy.  Lymph: No edema or adenopathy.  Psych: No anxiety or depression.  Endo: No weight change.    OBJECTIVE:      /62 (BP Location: Right arm, Patient Position: Sitting, BP Method: Medium (Manual))   Pulse 76   Temp 97.5 °F (36.4 °C)   Wt 85.8 kg (189 lb 3.2 oz)   SpO2 95%   BMI 24.29 kg/m²     Physical Exam  GENERAL: Older patient in no distress.  HEENT: Pupils equal and reactive. Extraocular movements intact. Nose intact.      Pharynx moist.  NECK: Supple.  There is bulky adenopathy in the right cervical chain.  HEART: Regular rate and rhythm. No murmur or gallop auscultated.  LUNGS:  There is an  inspiratory noise with each breath emanating from his neck. Lung excursion symmetrical. No change in fremitus. No adventitial noises.  ABDOMEN: Bowel sounds present. Non-tender, no masses palpated.  EXTREMITIES: Normal muscle tone and joint movement, no cyanosis or clubbing.   LYMPHATICS: No adenopathy palpated, no edema.  SKIN: Dry, intact, no lesions.   NEURO: Cranial nerves II-XII intact. Motor strength 5/5 bilaterally, upper and lower extremities.  PSYCH: Appropriate affect.    Assessment:       1. Aspiration pneumonia of right lower lobe, unspecified aspiration pneumonia type    2. Chronic obstructive pulmonary disease, unspecified COPD type    3. Lymphoma, unspecified body region, unspecified lymphoma type          Plan:       Aspiration pneumonia of right lower lobe, unspecified aspiration pneumonia type  -     X-Ray Chest PA And Lateral; Future    Chronic obstructive pulmonary disease, unspecified COPD type    Lymphoma, unspecified body region, unspecified lymphoma type         Follow up in about 3 months (around 4/26/2023).  CXR in 3 weeks  Will get PFTs after his lymphadenopathy decreases so that we can get her true picture of his pulmonary disease.

## 2023-01-27 NOTE — TELEPHONE ENCOUNTER
----- Message from Shanel Salmeron sent at 1/27/2023 12:47 PM CST -----  Pt is scheduled on 2/1 @7am.  Please let me know if I need to call him.  Thanks!  ----- Message -----  From: Esther Marie  Sent: 1/27/2023  12:13 PM CST  To: Marion Rizo NP, #    Ready to schedule, no auth req.  Chemo      ----- Message -----  From: Marion Rizo NP  Sent: 1/27/2023  11:29 AM CST  To: Esther Marie, [facility] Scheduling Pool    Need stat auth for patient to start next week, patient is symptomatic

## 2023-01-27 NOTE — PROGRESS NOTES
[facility] Hematology/Oncology  PROGRESS NOTE - Follow-up Visit      Subjective:       Patient ID:   NAME: Conrad Kuhn : 1940     82 y.o. male    Referring Doc: Julien  Other Physicians: Ced Erazo Joubert, Srinivas, Pinsky    Chief Complaint:  CLL f/u    History of Present Illness:     Patient is being seen today in person in clinic for a regular follow-up viasit. He is here by himself.  The patient is on today to go over the results of the recently ordered labs, tests and studies. He is here with his wife today.     Recent discharge from the hospital for pneumonia.  He did finish his course of Levaquin.     He is currently having a hard time swallowing due to significant cervical adenopathy.   He is due to start on a new regimen of therapy, however he has been on many forms of past therapy.      - FCR - Fludarabine, Cytoxan, and Rituximab    - started Imbruvica   2017 - GI bleed patient was found to have AVMs and was on Eliquis and Imbruvica at the time   May 2018 spinal surgery, bleeding from intubation after the Imbruvica was held for a week before   Imbruica was held from May 2018 until 2018  - Care switched to Dr. Bai   10/2019 - Rituximab and venetoclax  10/2019-10/2021 was on venetoclax   - no medications since        His swallowing is a little more of a struggle with certain foods; good appetite  He is breathing ok currently. He denies any CP, SOB, or N/V.      Discussed Covid19 precautions; he had his vaccinations              ROS:   GEN: normal without any fever, night sweats or weight loss  HEENT: normal with no HA's, sore throat, stiff neck, changes in vision; some residual swallowing difficulties; some increase in LAD  CV: normal with no CP, SOB, PND, MAYER or orthopnea  PULM: normal with no SOB, cough, hemoptysis, sputum or pleuritic pain  GI: normal with no abdominal pain, nausea, vomiting, constipation, diarrhea, melanotic stools, BRBPR, or hematemesis; no  dysphagia; peg tube in place  : urine frequency fine  BREAST: normal with no mass, discharge, pain  SKIN: normal with no rash, erythema, bruising, or swelling    Allergies:  Review of patient's allergies indicates:  No Known Allergies    Medications:    Current Outpatient Medications:     azelastine (ASTELIN) 137 mcg (0.1 %) nasal spray, 1 spray by Nasal route 2 (two) times daily. , Disp: , Rfl:     blood sugar diagnostic Strp, 1 strip by Misc.(Non-Drug; Combo Route) route 2 (two) times a day., Disp: , Rfl:     gabapentin (NEURONTIN) 600 MG tablet, Take 600 mg by mouth 2 (two) times daily., Disp: , Rfl:     glimepiride (AMARYL) 2 MG tablet, Take 2 mg by mouth once daily at 6am., Disp: , Rfl:     iron-vitamin C 100-250 mg, ICAR-C, (ICAR-C) 100-250 mg Tab, Take 1 tablet by mouth once daily., Disp: 30 each, Rfl: 6    levalbuterol (XOPENEX) 1.25 mg/3 mL nebulizer solution, Take 3 mLs by nebulization every 4 to 6 hours as needed. , Disp: , Rfl:     levoFLOXacin (LEVAQUIN) 750 MG tablet, Take 1 tablet (750 mg total) by mouth once daily. for 7 days, Disp: 7 tablet, Rfl: 0    ondansetron (ZOFRAN) 8 MG tablet, Take 1 tablet (8 mg total) by mouth every 12 (twelve) hours as needed for Nausea., Disp: 30 tablet, Rfl: 2    promethazine (PHENERGAN) 12.5 MG Tab, Take 1 tablet (12.5 mg total) by mouth every 6 (six) hours as needed., Disp: 30 tablet, Rfl: 3    traZODone (DESYREL) 300 MG tablet, Take 300 mg by mouth every evening., Disp: , Rfl:     valACYclovir (VALTREX) 1000 MG tablet, Take 1,000 mg by mouth once daily., Disp: , Rfl:     blood-glucose meter kit, Use as instructed, Disp: , Rfl:     dexAMETHasone (DECADRON) 4 MG Tab, Take 20mg the Sunday, Monday and Tuesday, Disp: 60 tablet, Rfl: 0    metoprolol tartrate (LOPRESSOR) 25 MG tablet, 1 tablet (25 mg total) by Per G Tube route 2 (two) times daily., Disp: 60 tablet, Rfl: 0    PMHx/PSHx Updates:  See patient's last visit with Dr. Bai on 1/9/2023  See H&P on  "1/3/2019        Pathology:  Cancer Staging  No matching staging information was found for the patient.          Objective:     Vitals:  Blood pressure 139/63, pulse 69, temperature 97.2 °F (36.2 °C), resp. rate 20, height 6' 2" (1.88 m), weight 86.5 kg (190 lb 9.6 oz), SpO2 95 %.        Physical Examination:   GEN: no apparent distress, comfortable; AAOx3  HEAD: atraumatic and normocephalic  EYES: no conjunctival pallor or muddiness, no icterus; normal pupil reaction to ambient light  ENT: OMM, no pharyngeal erythema, external bilateral ears WNL; no visible thrush or ulcers  NECK: no masses or swelling, trachea midline, no visible LAD/LN's ; LAD and LN's with some increase in size   CV: no palpitations; no pedal edema; no noticeable JVD or neck vein distension  CHEST: Normal respiratory effort; chest wall breath movements symmetrical; no audible wheezing  ABDOM: non-distended; no bloating;  peg tube since removed  MUSC/Skeletal: ROM normal; joints visibly normal; no deformities or arthropathy  EXTREM: no clubbing, cyanosis, inflammation or swelling; right arm with fair ROM  SKIN: no rashes, lesions, ulcers, petechiae or subcutaneous nodules  : no keith  NEURO: moving all 4 extremities; AAOx3; no tremors  PSYCH: normal mood, affect and behavior  LYMPH: no visible LN's or LAD; LAD and LN's on right neck enlarged              Labs:   Lab Results   Component Value Date    WBC 16.22 (H) 01/20/2023    HGB 13.2 (L) 01/20/2023    HCT 37.5 (L) 01/20/2023    MCV 89 01/20/2023    PLT 55 (L) 01/20/2023     CMP  Sodium   Date Value Ref Range Status   01/20/2023 132 (L) 136 - 145 mmol/L Final   06/12/2019 138 134 - 144 mmol/L      Potassium   Date Value Ref Range Status   01/20/2023 4.1 3.5 - 5.1 mmol/L Final     Chloride   Date Value Ref Range Status   01/20/2023 98 95 - 110 mmol/L Final   06/12/2019 99 98 - 110 mmol/L      CO2   Date Value Ref Range Status   01/20/2023 28 23 - 29 mmol/L Final     Glucose   Date Value Ref " Range Status   01/20/2023 137 (H) 70 - 110 mg/dL Final   06/12/2019 179 (H) 70 - 99 mg/dL      BUN   Date Value Ref Range Status   01/20/2023 10 8 - 23 mg/dL Final     Creatinine   Date Value Ref Range Status   01/20/2023 0.9 0.5 - 1.4 mg/dL Final   06/12/2019 0.95 0.60 - 1.40 mg/dL      Calcium   Date Value Ref Range Status   01/20/2023 8.8 8.7 - 10.5 mg/dL Final     Total Protein   Date Value Ref Range Status   01/20/2023 6.8 6.0 - 8.4 g/dL Final     Albumin   Date Value Ref Range Status   01/20/2023 3.9 3.5 - 5.2 g/dL Final   06/12/2019 3.9 3.1 - 4.7 g/dL      Total Bilirubin   Date Value Ref Range Status   01/20/2023 2.1 (H) 0.1 - 1.0 mg/dL Final     Comment:     For infants and newborns, interpretation of results should be based  on gestational age, weight and in agreement with clinical  observations.    Premature Infant recommended reference ranges:  Up to 24 hours.............<8.0 mg/dL  Up to 48 hours............<12.0 mg/dL  3-5 days..................<15.0 mg/dL  6-29 days.................<15.0 mg/dL       Alkaline Phosphatase   Date Value Ref Range Status   01/20/2023 48 (L) 55 - 135 U/L Final     AST   Date Value Ref Range Status   01/20/2023 17 10 - 40 U/L Final     ALT   Date Value Ref Range Status   01/20/2023 12 10 - 44 U/L Final     Anion Gap   Date Value Ref Range Status   01/20/2023 6 (L) 8 - 16 mmol/L Final     eGFR if    Date Value Ref Range Status   07/02/2022 >60.0 >60 mL/min/1.73 m^2 Final     eGFR if non    Date Value Ref Range Status   07/02/2022 >60.0 >60 mL/min/1.73 m^2 Final     Comment:     Calculation used to obtain the estimated glomerular filtration  rate (eGFR) is the CKD-EPI equation.              Lab Results   Component Value Date    IRON 82 11/22/2022    TIBC 351 11/22/2022    FERRITIN 40 11/22/2022         Radiology/Diagnostic Studies:    PET  12/2/2022:    IMPRESSION: Mild enlargement of the extensive adenopathy within the neck, chest, abdomen and  pelvis with faint increased FDG activity     Stable splenomegaly         CT 11/8/2022:  IMPRESSION:     Worsening splenomegaly with diffuse increase in size and number of essentially all of the main lymph node chains throughout the chest, abdomen and pelvis.           PET 8/29/2022:  IMPRESSION:  1. Numerous enlarged lymph nodes throughout the neck, chest, abdomen and pelvis are non hypermetabolic, and stable compared to CT of 07/02/2022.  2. Stable splenomegaly, with no abnormal focal splenic FDG hypermetabolism.  3. Additional observations as described.         CT 7/2/2022:    IMPRESSION:  1. Interval worsening in numerous enlarged lymph nodes in the chest, abdomen and pelvis, as well as development of splenomegaly, compatible with lymphoma and or worsening CLL, given patient's history  2. Mild bilateral hydroureteronephrosis, with markedly enlarged prostate gland and appearance of the urinary bladder suggesting chronic bladder outlet obstruction. Consider correlation with urinalysis.  3. Infrarenal abdominal aortic aneurysm measuring 33 mm in diameter. Follow-up imaging in 3 years is recommended per best practice guidelines.  4. Additional observations as described.           PET  3/14/2022:    IMPRESSION:  Left upper lobectomy without evidence of recurrent or metastatic disease      MRI cervical spine  10/1/2021:    IMPRESSION:     Loss of normal cervical lordosis with mild kyphotic curvature.     Advanced multilevel cervical spondylosis, as outlined above. Spinal canal narrowing and foraminal narrowing is most severe at C5-6. 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     Congenital narrowing of cervical spinal canal, which exacerbates cervical spondylosis        CT head  8/27/2021:  IMPRESSION:     No CT evidence of acute intracranial pathology.     Stable senescent changes as above.         PET  7/19/2021:    IMPRESSION:  Prior left upper lobectomy  without evidence of recurrent or metastatic disease  - 3 cm infrarenal abdominal aortic aneurysm      CT head 6/29/2021:  IMPRESSION:     No CT evidence of acute intracranial pathology      PET  1/18/2021:    Impression:     No evidence of FDG avid lymphoproliferative disease.     Aneurysmal dilation of infrarenal abdominal aorta (3.4 cm) as well as aneurysmal dilation of right common iliac artery.     Additional incidental findings as above        PET  6/24/2020:    Impression:     1. No findings of active lymphoproliferative disease.  2. Additional observations as above.      PET  1/24/2020:    Impression       Moderate decrease in size of the adenopathy within the neck, chest, abdomen and pelvis.  The spleen is smaller in size.  There is no significant FDG activity.    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 Multifocal lymphadenopathy in the chest, abdomen, and pelvis compatible with provided clinical history of lymphocytic leukemia.  There is mixed interval change when compared to prior studies.  Pathologic lymphadenopathy in the chest has increased significantly when compared to a CTA of the chest from May 2018.  Pathologic retroperitoneal lymphadenopathy has improved slightly when compared to a previous abdominal CT from February 2017.  Please see above details.  2. Mild aneurysmal dilation of the infrarenal abdominal aorta, with maximal transverse diameter of 3.7 cm.  Maximal transverse diameter on a prior study from 2017 was 2.5 cm.  3. Additional findings as above           [facility] Unknown Rad Eap    Result Date: 1/14/2019  CMS MANDATED QUALITY DATA - CT RADIATION - 436 All CT scans at this facility utilize dose modulation, iterative reconstruction, and/or weight based dosing when appropriate to reduce radiation dose to as low as reasonably achievable. Reason:Lymphoid leukemia TECHNIQUE: CT thorax with, CT abdomen  with, and CT  pelvis with 100 mL Omnipaque 350. COMPARISON: CT chest dated 05/19/2018 and CT abdomen and pelvis dated 02/08/2017 CT THORAX: There is stable mediastinal, hilar and axillary adenopathy. There is coronary artery calcification. There is resolution of the groundglass opacities throughout the lungs. There are no confluent infiltrates, pulmonary nodules or pleural effusions. There are stable subcentimeter nodules in the left lobe of the thyroid gland. There are degenerative changes of the spine. CT ABDOMEN: The liver, pancreas, adrenal glands and gallbladder are normal. The spleen is enlarged. There is mesenteric and retroperitoneal adenopathy which is slightly smaller in size. -There is a portacaval node measuring 37 x 21 mm and previously measured 38 x 27 mm. -Periportal lymph node measuring 41 x 18 mm, previously measured 48 x 29 mm. -Left periaortic adenopathy measuring 34 x 23 mm and previously measured 40 x 42 mm- The kidneys enhance symmetrically without hydronephrosis or calculi. There are no thick-walled or dilated bowel loops. There is vascular calcification of aorta. There is a 3.0 x 3.0 cm infrarenal abdominal aortic aneurysm. CT PELVIS: There is adenopathy along the pelvic sidewalls bilaterally which has decreased in size. -The left common iliac adenopathy measuring 36 x 11 mm, previously measured 46 x 14 mm -the right common iliac adenopathy measuring 46 x 10 mm. Previously measured 53 x 15 mm. The bladder is normal. The prostate gland is enlarged. There are degenerative changes of the spine. There is grade 1 anterolisthesis of L5 on S1 with bilateral pars defects.     IMPRESSION: Stable mediastinal, hilar and axillary adenopathy with resolution of the airspace disease within the lungs Decrease in size of the mesenteric, retroperitoneal and pelvic adenopathy. Stable infrarenal abdominal aortic aneurysm Splenomegaly     Read and electronically signed by: Jeniffer Arredondo MD on 1/14/2019 9:14 AM RADHA GONCALVES  BRINA ZHANG MD      I have reviewed all available lab results and radiology reports.    Assessment/Plan:   (1) 82 y.o. male with  diagnosis of CLL who has been referred by Dr Rony Victoria for continuation of care by medical hematology/oncology.   - BM biopsy  12/4/2015 with CLL  - diagnosis of SLL in 2004 and s/p FCR x6 in 2007  - s/p imbruvica in past (stopped in May 2018)  - latest wbc at 4.8; lymph 56%  - latest CT on 8/7/2019 showing increase in the LAD  - platelets are 111,000  - He was recently [place] and seen by Dr Wheeler. Dr Wheeler has recommended Rituximab. He is starting tomorrow.   - discussed the rituximab regimen and the potential side-effect profile; he is getting chemotherapy school today with [facility]  - he saw Dr Don on Oct 21st 2019  - he has had 3 of the 4 weeks of rituximab so far; Dr Don recommends him to eventually get rituximab monthly x6 with daily oral Venetoclax for two years total.   - completed rituximab (4th) week of rituximab and  S/p 6 monthly rituximab with Venetoclax oral but is taking only 2 pills daily due to the counts  - he is now just on the venetclax  - he saw Dr Don again on Dec 23rd 2019 and sees him again in March 16th 2020  - latest PEt on 1/24/2020 looks really good    8/27/2020:  - he saw Dr Don last month at [place]  - latest PEt from 6/24/2020 looks good  - he remains on just the oral venetoclax at 2 pills per day  - he sees Dr Don again in about 3 months (Oct 2020)    11/18/2020:  - he is doing well with the Venetoclax  - due for repeat PEt in Dec 2020  - he sees Dr Don again on Dec 23rd 2020    1/20/2021:  - he is doing well with the Venetoclax  - He saw Dr Don at [place] in Dec 2020. He is now off rituximab monthly and remains only on the oral Venetoclax but at 2 pills daily . He sees Dr Don again in feb 2021  - Recent PEt on  1/18/2021 looks stable except for incidental aneurysm in aorta; so we are referring him to Dr Kapadia with  prosper    4/20/2021:  - he saw Dr Don in Feb 2021 and sees him again in Nov 2021  - repeat PEt in June/july 2021  - continued on Venetoclax and regular blood checks    7/20/2021:  - he continues on the Venetoclax  - mild leucopenia from the medication  - PEt on 7/19/2021 seems to be stable    9/21/2021:  - doing ok overall with some occasional HA's and general lack of energy  - he sees Dr Don again on nov 1st and hopefully he can discontinue the venetoclax thereafter  - Head CT on 8/27/2021 was relatively negative    2/17/2022:  -  He last showed for oncology appointment in Sept 2021  - He had covid in Jan 2022 and he received the infusion but did not require hospitalization  - He saw Dr Erazo couple of weeks ago  - He is planning to have cervical spine surgery with Dr Mai in the near future.  - He sees Dr Don at [facility] this coming Monday. He is now off rituximab monthly and he is also now off the oral Venetoclax (expect he will be getting a repeat bone marrow biopsy)    3/31/2022:  - He had recent telemed visit Dr Don at [facility] and they were pleased with the latest bone marrow biopsy. He is now off rituximab monthly and he is also now off the oral Venetoclax      6/6/2022:  - He had surgery on his cervical spine with Dr Mai on April 8th 2022 and had postoperative problems related to the intubation and anesthesia and was in the hospital for about 3 weeks. He had aspiration pneumonia and bout of respiratory failure requiring mech vent support.  He has residual swallowing issues and has a peg tube. He was seen Dr Garcia with GI while in hospital. He was discharged on 4/18. He has had home health coming to house couple times of week. He reports that he is doing better    - he is due to see Dr Don again at [facility] in the near future and he has since been off all therapy      8/11/2022:  - He was recently hospitalized with fever and pneumonia. He is feeling better  - He is swallowing and eating better; peg  tube since removed  - He previously had bout of oral mucosal ulcerations for which he had biopsies at [facility] which were negative for any malignancy. This has since resolved.  - He previously had telemed visit Dr Don at [facility] but does not know when he sees him again.. He had previous bone marrow biopsy with good report. He has been off rituximab and the oral Venetoclax since last Nov 2021.  - CT scans on 7/2/2022 with some progression of the LAD in his body   - will get PEt and aslk Dr Don to see him again    9/29/2022:  - He saw Dr Don again at [facility] and they are considering giving him a regimen of chemotherapy (some program or clinical trial0 over at [facility] but he reports that they have since decided against proceeding in that direction.  - he was supposed to see Dr Don on 9/12 but that appointment was cancelled per patient   - he had PET on 8/29/2022 which was stable    11/23/2022:  - He was recently hospitalized at [facility]; he is doing better and feeling better overall.  - He was supposed to see Dr Don again at [facility] after discharge but does not see him till Dec 2022  - CT on 11/8 with some increase in speel size and LAD  - will resume venetoclax in meantime and he needs to f/u with Dr Don for further directives  - get PEt    1/9/2023:  - He talked to Dr Don via telemed in Dec 2022 and was supposed to start venetoclax but has not done so as of yet. He reports that the specialty pharmacy had called him but he has not received the drug as of yet  - Dr Don was evaluating him for clinical trial but patient is not inclined to proceed in that direction  - Need Dr Don's last notes  - he had PEt on 12/2/2022 with some mild enlargement of the LN's  - he had bone marrow biopsy on 12/8/2022 with pathology report still unavailable but per review looks like he has about 14-17% involvement with NHL  - will reach out to Dr Don and the speciality pharmacy  - need the assistance of the patient navigator  - WBC has  increased to 18.39    1/27/2023:   2007 - FCR - Fludarabine, Cytoxan, and Rituximab   2017 - started Imbruvica   August 2017 - GI bleed patient was found to have AVMs and was on Eliquis and Imbruvica at the time   May 2018 spinal surgery, bleeding from intubation after the Imbruvica was held for a week before   Imbruica was held from May 2018 until 12/2018  - Care switched to Dr. Bai   10/2019 - Rituximab and venetoclax  10/2019-10/2021 was on venetoclax   - no medications since   - after a very long review with patient and wife and long discussion with , patient is to start Gazyva and Venetoclax next week   - chemo school to be done on Monday               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  - followed  By Dr Kee  - CEA 1.4 on 4/8/2021  - CT scans on 1/14/2019 stable  - PET done in jan 2021 on chart    3/31/2022:  - CEA at 0.8  - PET on 3/14/2022 negative for recurrence    9/29/2022:  - CEA 0.9  - PET on chart     11/23/2022:  - latest CEA at 1.2     (3) Iron deficiency anemia s/p IV iron couple weeks ago  - hgb 11.7  - iron 39 and a little lower  - set up two more IV irons    2/17/2022:  - latest hgb at 11.4 and iron panel is adequate    3/31/2022:  - latest hgb at 11.4 and stable  - iron panel currently WNL    6/6/2022:  - latest hgb 12.0 and better  - iron panel still in process from earlier today    8/11/2022:  - hgb stable at 12.0  - he received dose of IV iron while in hospital recently  - will resume IV iron as outpatient    9/29/2022:  - ferritin was a little lower - he has not been taking the oral iron  - will resume oral iron and consider repeat IV iron if needed    11/23/2022:  - hgb at 11.8  - iron panel adequate currently    1/9/2023:  - latest hgb at 13.0     (4) HTN - on BP meds     (6) Chronic neck and back issues - followed by Dr Ryan Rivero     (7) DM - currently off all meds     (8) Hypercholesterolemia - on meds    (9)  - followed by   Chelo    (10) Chronic Leucopenia and thrombocytopenia - due to chemotherapy agents in past    2/17/2022:  - latest WBC at 5.84 and WNL  - latest plat at 92,000 and adequate and better since being off therapy    3/31/2022:  - latest plats are a little lower at 69,000  - wbc at 3.32    6/6/2022:  - latest wbc at 3.16  - platelets at 70,000 currently    8/11/2022:  - latest wbc is adequate at 5.39 and plats 94,000 and better and adequate    9/29/2022:  - latest WBC at 4.37 and plats 58,000 (a little lower)    11/23/2022:  - latest plat count at 60,000; wbc at 7.52      VISIT DIAGNOSES:      Encounter Diagnoses   Name Primary?    CLL (chronic lymphocytic leukemia) Yes    Community acquired pneumonia, unspecified laterality     Lymphadenopathy, cervical              PLAN:  Long discussion with patient and Dr. Don, patient is to start on Gazyva and Venetoclax, starting next week   2. Chemo school on Monday   3. patient will needs labs on Monday and 24 hours post Gazyva next week   4. F/u with PCP, Pulm, , etc  5. F/u with Dr Don at [facility] as directed        RTC in 1 week with Marion and 2 weeks with Bhumika      Discussion:       COVID-19 Discussion:    I had long discussion with patient and any applicable family about the COVID-19 coronavirus epidemic and the recommended precautions with regard to cancer and/or hematology patients. I have re-iterated the CDC recommendations for adequate hand washing, use of hand -like products, and coughing into elbow, etc. In addition, especially for our patients who are on chemotherapy and/or our otherwise immunocompromised patients, I have recommended avoidance of crowds, including movie theaters, restaurants, churches, etc. I have recommended avoidance of any sick or symptomatic family members and/or friends. I have also recommended avoidance of any raw and unwashed food products, and general avoidance of food items that have not been prepared by themselves. The  patient has been asked to call us immediately with any symptom developments, issues, questions or other general concerns.          I have explained all of the above in detail and the patient understands all of the current recommendation(s). I have answered all of their questions to the best of my ability and to their complete satisfaction.   The patient is to continue with the current management plan.        Chemotherapy Discussion:      I discussed the available treatment option(s) in accordance with the latest/current national evidence-based guidelines (NCCN, UpToDate, NCI, ASCO, etc where applicable), their overall age/condition and their co-morbidities. I also went over the risks and benefits of the chemotherapy with regard to their particular cancer type, their cancer stage, their age/condition, and their co-morbidities. I provided literature on the chemotherapy regimen and discussed the chemotherapy side-effect profiles of the drug(s). I discussed the importance of compliance with obtaining and monitoring weekly lab work, and went over the potential hematopathology issues and risks with anemia, leucopenia and thrombocytopenia that can occur with chemotherapy. I discussed the potential risks of liver and kidney damage, which could be permanent and could necessitate dialysis long-term if kidney failure developed. I discussed the emetic and/or diarrheal potential of the regimen and the potential need for use of antiemetic and anti-diarrheal medications. I discussed the risk for development of anaphylactic shock, bronchospasm, dysrhythmia, and respiratory/cardiovascular arrest and/or failure. I discussed the potential risks for development of alopecia, cold sensory issues, ringing in ears, vertigo, cataracts, glaucoma, and neuropathy, all of which could end up being chronic and life-long. Some chemotherpyI discussed the risks of hand-foot syndrome and rashes, and development of other autoimmune mediated processes  such as pneumonitis, hepatitis, and colitis which could be life threatening. I discussed the risks of the potential development of a rare but fatal viral mediated disease known as PML (Progressive Multifocal Leukoencephalopathy), and risk of future development of leukemia and/or lymphoma from use of certain chemotherapy agents. I discussed the need for neutropenic precautions, basic hygiene/sanitation behaviors and dietary restrictions.    The patient's consent has been obtained to proceed with the chemotherapy.The patient will be referred to [facility] for training and education on chemotherapy, use of antiemetics and/or anti-diarrheals, use of NSAID's, potential chemotherapy side-effects, and any specific recommendations and precautions with the particular chemotherapy agents.       Immunologic Therapy Discussion:    I discussed the available treatment option(s) in accordance with the latest/current national evidence-based guidelines (NCCN, UpToDate, NCI, ASCO, etc where applicable), their overall age/condition and their co-morbidities. I went over the risks and benefits of the immunotherapy with regard to their particular cancer type, their cancer stage, their age/condition, and their co-morbidities. I provided literature on the immunotherapy regimen and discussed the immunotherapy side-effect profiles of the drug(s). I discussed the importance of compliance with obtaining and monitoring weekly lab work, and went over the potential hematopathology issues and risks of hemato-pathologic issues with anemia, leucopenia and/or thrombocytopenia and effects on thyroid function that can occur with immunotherapy. The patient will most likely need to have there thyroid functions monitored by their PCP and may need to take thyroid medication while on the immunotherapy. I discussed the potential risks of liver and kidney damage, which could be permanent and could necessitate dialysis long-term if  kidney failure developed. I discussed the emetic and/or diarrheal potential of the regimen and the potential need for use of antiemetic and anti-diarrheal medications. I discussed the risk for development of anaphylactic shock, bronchospasm, dysrhythmia, and respiratory/cardiovascular arrest and/or failure. I discussed the potential risks for development of alopecia, cold sensory issues, ringing in ears, vertigo and neuropathy, all of which could end up being chronic and life-long. I discussed the risks of hand-foot syndrome and rashes, and development of other autoimmune mediated processes such as pneumonitis, hepatitis and colitis which could potentially be life threatening. I discussed the risks of the potential development of a rare but fatal viral mediated disease known as PML (Progressive Multifocal Leukoencephalopathy), and risk of future development of leukemia and/or lymphoma from use of certain immunotherapy agents. I discussed the possibility that immunologic therapy cold worsen or promote progression of any underlying autoimmune diseases such as sarcoidosis, ulcerative colitis, Crohn's disease, psoriasis, rheumatoid disorders, scleroderma, autoimmune nephritis disorders, Hashimoto's thyroiditis, and Lupus among others. I discussed the need for neutropenic precautions, basic hygiene/sanitation behaviors and dietary restrictions.    The patient's consent has been obtained to proceed with the immunotherapy.The patient will be referred to [facility] /[facility] for training and education on immunotherapy, use of antiemetics and/or anti-diarrheals, use of NSAID's, potential immunotherapy side-effects, and any specific recommendations and precautions with the particular immunotherapy agents.      I answered all of the patient's (and family's, if applicable) questions to the best of my ability and to their complete satisfaction. The patient acknowledged full understanding of the risks,  recommendations and plan(s).      Iron Infusion Therapy Discussion:     I provided literature/learning materials on the particular IV iron regimen and discussed the potential side-effect profiles of the drug(s). I discussed the importance of compliance with obtaining and monitoring requested lab work, and went over the potential risk for the development of anaphylactic shock, bronchospasm, dysrhythmia, liver and/or kidney damage, and respiratory/cardiovascular arrest and/or failure. I discussed the potential risks for development of alopecia, fevers, itching, chills and/or rigors, cold sensory issues, ringing in ears, vertigo and neuropathy, all of which are usually acute but sometimes could end up being chronic and life-long. I discussed the risks of hand-foot syndrome and rashes, and development of other autoimmune mediated processes such as pneumonitis and colitis which could be life threatening.     The patient's consent has been obtained to proceed with the IV iron therapy.The patient will be referred to Chemotherapy [facility] /[facility] for training and education on IV iron therapy, use of antiemetics and/or anti-diarrheals, use of NSAID's, potential IV iron therapy side-effects, and any specific recommendations and precautions with the particular IV iron agents.      I answered all of the patient's (and family's, if applicable) questions to the best of my ability and to their complete satisfaction. The patient acknowledged full understanding of the risks, recommendations and plan(s).          I answered all of the patient's (and family's, if applicable) questions to the best of my ability and to their complete satisfaction. The patient acknowledged full understanding of the risks, recommendations and plan(s).         Electronically signed by Marion Rizo, MSN,APRN,AGNP-C

## 2023-01-27 NOTE — TELEPHONE ENCOUNTER
Patient informed of new orders for Gazyva and Venetoclax.     Chemo school on 1/30/2023 at 3pm.

## 2023-01-30 NOTE — PROGRESS NOTES
[facility] HEMATOLGY ONCOLOGY     Subjective:       Patient ID: Conrad Kuhn is a 82 y.o. male presents today for chemotherapy education.      Chief Complaint CLL       HPI  Case discussed with Dr. Don and he will be starting treatment this week with Gazyva and Venetoclax.   He is here today with his wife.     Oncology History   CLL (chronic lymphocytic leukemia)   1/2/2019 Initial Diagnosis    CLL (chronic lymphocytic leukemia)     10/8/2019 - 3/17/2020 Chemotherapy    Treatment Summary   Plan Name: OP RITUXIMAB QW  Treatment Goal: Control  Status: Inactive  Start Date: 10/8/2019  End Date: 3/17/2020  Provider: Drew Bai MD  Chemotherapy: riTUXimab (RITUXAN) 375 mg/m2 = 833 mg in sodium chloride 0.9% 833 mL infusion (conc: 1 mg/mL), 375 mg/m2 = 833 mg, Intravenous, Clinic/HOD 1 time, 9 of 9 cycles  Dose modification: 500 mg/m2 (original dose 375 mg/m2, Cycle 6)  Administration: 833 mg (10/8/2019), 833 mg (10/15/2019), 833 mg (10/22/2019), 833 mg (10/29/2019), 833 mg (11/26/2019), 1,110 mg (12/26/2019), 1,110 mg (1/23/2020), 1,110 mg (2/20/2020), 1,110 mg (3/17/2020)       1/30/2023 -  Chemotherapy    Treatment Summary   Plan Name: OP CLL VENETOCLAX OBINUTUZUMAB Q4W  Treatment Goal: Control  Status: Active  Start Date: 1/30/2023 (Planned)  End Date: 6/19/2023 (Planned)  Provider: Drew Bai MD  Chemotherapy: oBINutuzumab (GAZYVA) 100 mg in sodium chloride 0.9% 104 mL chemo infusion, 100 mg, Intravenous, Clinic/HOD 1 time, 0 of 6 cycles           Past Medical History:   Diagnosis Date    Alcoholism     CLL (chronic lymphocytic leukemia) 01/02/2019    COPD (chronic obstructive pulmonary disease)     Diabetes mellitus     Non Insulin requiring    Difficult intubation     Encounter for blood transfusion     GI bleed due to NSAIDs     While on Eliquis and Imbruvica    Herpetic gingivostomatitis     History of lung cancer - ANDRES NSCLC 2004 01/03/2019    Hypertension     Iron deficiency 2017     Lymphoma, 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    MAYDA on CPAP     Pneumonia of both lungs due to infectious organism 6/29/2021    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     Right-sided Bell's palsy 2009    Spondylolisthesis     Varicose veins of legs     Venous insufficiency        Past Surgical History:   Procedure Laterality Date    Athroscopic surgery      On Knee    BIOPSY OF TISSUE OF NECK Left 08/2015    Recuurence CLL/small cell lyphoma    ESOPHAGEAL DILATION      ESOPHAGOGASTRODUODENOSCOPY N/A 4/15/2022    Procedure: EGD (ESOPHAGOGASTRODUODENOSCOPY);  Surgeon: Siddharth Garcia MD;  Location: [facility];  Service: Endoscopy;  Laterality: N/A;    ESOPHAGOGASTRODUODENOSCOPY N/A 4/16/2022    Procedure: EGD (ESOPHAGOGASTRODUODENOSCOPY);  Surgeon: Siddharth Garcia MD;  Location: [facility];  Service: Endoscopy;  Laterality: N/A;    ESOPHAGOGASTRODUODENOSCOPY N/A 6/23/2022    Procedure: EGD (ESOPHAGOGASTRODUODENOSCOPY);  Surgeon: Jefferson Hyman MD;  Location: [facility];  Service: Endoscopy;  Laterality: N/A;    ESOPHAGOGASTRODUODENOSCOPY W/ PEG N/A 4/16/2022    Procedure: EGD, WITH PEG TUBE INSERTION;  Surgeon: Siddharth Garcia MD;  Location: [facility];  Service: Endoscopy;  Laterality: N/A;    GASTROSTOMY TUBE PLACEMENT  04/16/2022    20 Fr (bumper initially at 3.5)    Hemorrhoid resection  1979    LUNG LOBECTOMY Left 2004    NECK SURGERY  04/08/2022    Anterior and Posterior C3-C7 fusion    OTHER SURGICAL HISTORY  2006    Chemotherapy s/p lyphoma        Portacath implantation and removal      reverse shoulder arthroplasty Right 03/2021       Social History     Socioeconomic History    Marital status:    Tobacco Use    Smoking status: Former    Smokeless tobacco: Never   Substance and Sexual Activity    Alcohol use: Yes    Drug use: No    Sexual activity: Not Currently     Social Determinants of Health     Financial Resource Strain: Unknown    Difficulty of Paying Living Expenses:  Patient refused   Food Insecurity: Unknown    Worried About Running Out of Food in the Last Year: Patient refused    Ran Out of Food in the Last Year: Patient refused   Transportation Needs: Unknown    Lack of Transportation (Medical): Patient refused    Lack of Transportation (Non-Medical): Patient refused   Physical Activity: Unknown    Days of Exercise per Week: Patient refused    Minutes of Exercise per Session: Patient refused   Stress: Unknown    Feeling of Stress : Patient refused   Social Connections: Unknown    Frequency of Communication with Friends and Family: Patient refused    Frequency of Social Gatherings with Friends and Family: Patient refused    Attends Adventism Services: Patient refused    Active Member of Clubs or Organizations: Patient refused    Attends Club or Organization Meetings: Patient refused    Marital Status: Patient refused   Housing Stability: Unknown    Unable to Pay for Housing in the Last Year: Patient refused    Number of Places Lived in the Last Year: 1    Unstable Housing in the Last Year: Patient refused       Family History   Problem Relation Age of Onset    Stomach cancer Mother 80         at 95    Colon cancer Father        Review of patient's allergies indicates:  No Known Allergies      Current Outpatient Medications:     azelastine (ASTELIN) 137 mcg (0.1 %) nasal spray, 1 spray by Nasal route 2 (two) times daily. , Disp: , Rfl:     blood sugar diagnostic Strp, 1 strip by Misc.(Non-Drug; Combo Route) route 2 (two) times a day., Disp: , Rfl:     dexAMETHasone (DECADRON) 4 MG Tab, Take 20mg the , Monday and Tuesday, Disp: 60 tablet, Rfl: 0    gabapentin (NEURONTIN) 600 MG tablet, Take 600 mg by mouth 2 (two) times daily., Disp: , Rfl:     glimepiride (AMARYL) 2 MG tablet, Take 2 mg by mouth once daily at 6am., Disp: , Rfl:     iron-vitamin C 100-250 mg, ICAR-C, (ICAR-C) 100-250 mg Tab, Take 1 tablet by mouth once daily., Disp: 30 each, Rfl: 6    levalbuterol  (XOPENEX) 1.25 mg/3 mL nebulizer solution, Take 3 mLs by nebulization every 4 to 6 hours as needed. , Disp: , Rfl:     promethazine (PHENERGAN) 12.5 MG Tab, Take 1 tablet (12.5 mg total) by mouth every 6 (six) hours as needed., Disp: 30 tablet, Rfl: 3    traZODone (DESYREL) 300 MG tablet, Take 300 mg by mouth every evening., Disp: , Rfl:     valACYclovir (VALTREX) 1000 MG tablet, Take 1,000 mg by mouth once daily., Disp: , Rfl:     allopurinoL (ZYLOPRIM) 300 MG tablet, Take 1 tablet (300 mg total) by mouth once daily., Disp: 30 tablet, Rfl: 6    blood-glucose meter kit, Use as instructed, Disp: , Rfl:     metoprolol tartrate (LOPRESSOR) 25 MG tablet, 1 tablet (25 mg total) by Per G Tube route 2 (two) times daily., Disp: 60 tablet, Rfl: 0    ondansetron (ZOFRAN) 8 MG tablet, Take 1 tablet (8 mg total) by mouth every 8 (eight) hours as needed for Nausea., Disp: 30 tablet, Rfl: 2    venetoclax (VENCLEXTA) 100 mg Tab, Take 400 mg by mouth once daily., Disp: 120 tablet, Rfl: 11    venetoclax 10 mg-50 mg- 100 mg DsPk, Take 20mg by mouth daily on days 22-28 of cycle 1. Take 50 mg by mouth daily on days 1-7 of cycle 2 Take 100 mg daily on days 8-14 of cycle 2 Take 200 mg daily on days 15-21 of cycle 2.  Take with food., Disp: 42 tablet, Rfl: 0    All medications and past history have been reviewed.    Review of Systems   Constitutional:  Positive for activity change, fatigue and unexpected weight change. Negative for appetite change and fever.   HENT:  Positive for trouble swallowing and voice change. Negative for congestion, mouth sores, postnasal drip, rhinorrhea, sinus pressure and sore throat.    Eyes:  Negative for photophobia and visual disturbance.   Respiratory:  Positive for stridor. Negative for cough, chest tightness, shortness of breath and wheezing.    Cardiovascular:  Negative for chest pain and leg swelling.   Gastrointestinal:  Negative for abdominal distention, abdominal pain, constipation, diarrhea, nausea  "and vomiting.   Endocrine: Negative for cold intolerance.   Genitourinary:  Negative for decreased urine volume, dysuria and frequency.   Musculoskeletal:  Negative for arthralgias and myalgias.   Skin:  Positive for pallor. Negative for rash.   Allergic/Immunologic: Negative for immunocompromised state.   Neurological:  Negative for dizziness, syncope, weakness, light-headedness, numbness and headaches.   Hematological:  Positive for adenopathy. Does not bruise/bleed easily.   Psychiatric/Behavioral:  Negative for sleep disturbance. The patient is not nervous/anxious.          Objective:        Physcial Examination  VITAL SIGNS:    Body surface area is 2.11 meters squared.   Pain Assessment  Vitals:    01/30/23 1502   BP: (!) 188/74   Pulse: 73   Resp: 20   Temp: 98 °F (36.7 °C)   Weight: 85.1 kg (187 lb 11.2 oz)   Height: 6' 2" (1.88 m)   PainSc: 0-No pain          Wt Readings from Last 5 Encounters:   01/30/23 85.1 kg (187 lb 11.2 oz)   01/27/23 86.5 kg (190 lb 9.6 oz)   01/26/23 85.8 kg (189 lb 3.2 oz)   01/20/23 86.2 kg (190 lb)   01/09/23 85.7 kg (189 lb)       Physical Exam  Constitutional:       Appearance: Normal appearance.   HENT:      Head: Normocephalic and atraumatic.      Mouth/Throat:      Mouth: Mucous membranes are moist.   Cardiovascular:      Rate and Rhythm: Normal rate and regular rhythm.      Pulses: Normal pulses.      Heart sounds: Normal heart sounds.   Pulmonary:      Effort: Pulmonary effort is normal. No respiratory distress.      Breath sounds: Normal breath sounds. Stridor (mild) present. No wheezing.   Abdominal:      General: There is no distension.      Palpations: Abdomen is soft. There is no mass.      Tenderness: There is no abdominal tenderness.   Musculoskeletal:         General: No swelling. Normal range of motion.      Cervical back: Tenderness present.      Right lower leg: No edema.      Left lower leg: No edema.   Lymphadenopathy:      Head:      Right side of head: " Submandibular adenopathy present.      Cervical: Cervical adenopathy present.      Right cervical: Superficial cervical adenopathy and deep cervical adenopathy present. No posterior cervical adenopathy.     Left cervical: Posterior cervical adenopathy present.      Upper Body:      Right upper body: Axillary adenopathy present.      Left upper body: Axillary adenopathy present.   Skin:     General: Skin is warm and dry.      Capillary Refill: Capillary refill takes 2 to 3 seconds.      Findings: No bruising or rash.   Neurological:      Mental Status: He is alert and oriented to person, place, and time. Mental status is at baseline.      Motor: No weakness.   Psychiatric:         Mood and Affect: Mood normal.         Behavior: Behavior normal.            Laboratory and Radiology   Lab Results   Component Value Date    WBC 19.75 (H) 01/30/2023    RBC 4.16 (L) 01/30/2023    HGB 12.9 (L) 01/30/2023    HCT 36.3 (L) 01/30/2023    MCV 87 01/30/2023    MCH 31.0 01/30/2023    MCHC 35.5 01/30/2023    RDW 13.9 01/30/2023    PLT 61 (L) 01/30/2023    MPV 11.8 01/30/2023    GRAN 4.2 01/30/2023    GRAN 21.5 (L) 01/30/2023    LYMPH 6.6 (H) 01/30/2023    LYMPH 33.5 01/30/2023    MONO 8.7 (H) 01/30/2023    MONO 43.9 (H) 01/30/2023    EOS 0.0 01/30/2023    BASO 0.02 01/30/2023    EOSINOPHIL 0.0 01/30/2023    BASOPHIL 0.1 01/30/2023     BMP  Lab Results   Component Value Date     (L) 01/30/2023    K 4.2 01/30/2023     01/30/2023    CO2 28 01/30/2023    BUN 21 01/30/2023    CREATININE 0.9 01/30/2023    CALCIUM 9.2 01/30/2023    ANIONGAP 6 (L) 01/30/2023    ESTGFRAFRICA >60.0 07/02/2022    EGFRNONAA >60.0 07/02/2022     Lab Results   Component Value Date    ALT 18 01/30/2023    AST 23 01/30/2023    ALKPHOS 51 (L) 01/30/2023    BILITOT 1.0 01/30/2023     No results found for this or any previous visit (from the past 2160 hour(s)).  No results found for this or any previous visit (from the past 2160 hour(s)).  No results found  for this or any previous visit (from the past 2160 hour(s)).    Pathology  Pathology Results  (Last 10 years)      None            All lab results and imaging results have been reviewed and discussed with the patient.        TITLE: PLAN OF CARE FOR THE CHEMOTHERAPY PATIENT / TEACHING PROTOCOL    PURPOSE: To involve the patient / significant other in the plan of care and to provide teaching to the significant other & patient receiving chemotherapy.    LEVEL: Independent.    CONTENT: The Plan of Care for the chemotherapy patient is individualized and appropriate to the patients needs, strengths, limitations, & goals.  Education includes information regarding chemotherapy side effects, the treatment itself, and self-care  Activities.    GOAL / OUTCOME STANDARDS    PHYSIOLOGIC: The client will remain free or experience minimal side effects or toxicities throughout the chemotherapy treatment period.     PSYCHOLOGIC: The client/significant others will demonstrate positive coping mechanisms in relation to chemotherapy and its side effects.      COGINITIVE: The client/significant others will verbalize understanding of self-care measure to avoid/minimize side effects of the chemotherapy regimen.    EVALUATION / COMMENT KEY:    V = Audiovisual/Video  S = Successfully meets outcome  N = Needs further instruction  NA = Not applicable to the patient  P = Previous knowledge  U = Unable to comprehend  * = See progress notes      PLAN OF CARE  INFORMATION TO BE DELIVERED / NURSING INTERVENTIONS DATE EVALUATION   Assessment of client/caregiver,         knowledge of cancer diagnosis,         and chemotherapy as a treatment. 1a. Evaluate patient/caregiver learning ability    b. Plan teaching sessions with patient/caregiver according to needs and present anxiety level/ability to learn.    c. Provide Chemotherapy Education Packet,        Mouth Care Protocol,         Specific Patient Education Sheets. 01/31/2023 S   Individual  chemotherapy treatment         plan. 2a. Review of Chemotherapy Education handout from [employer]            01/31/2023   S   Knowledge Deficit & Self-Management of general side effects common to all chemotherapy:  Nausea/Vomiting  b.   Diarrhea  Mouth Care  Dental care  Constipation  Hair Loss  Potential for infection  Fatigue   3a. Reinforce that the majority of side effects from chemotherapy are reversible and are  controlled both in the hospital and at home        (blood counts recover, hair grows back).   b.  Refer to the following for reinforcement of         information post-treatment:  Mouth Care Protocol.  Bowel Protocol for constipation or diarrhea.  3.  Drug Specific Chemotherapy Information Sheets for each medication patient receiving.    01/31/2023     S     PLAN OF CARE  INFORMATION TO BE DELIVERED / NURSING INTERVENTIONS DATE EVALUATION   h. Potential for bleeding         i. Potential anemia/fatigue         j. Potential sunburn         k. Birth control measures  l. Safety measures post treatment 4.  Chemotherapy Home Care Instruction  and Safety Information Sheet.  A. patient/caregivers to thoroughly cook shellfish (shrimp, crab, etc) to decrease the chance of infection.    B.  Use sunscreen and protective clothing while in the sun.   01/31/2023      Knowledge deficit & Self Management of Drug Specific  Side Effects.    BLADDER EFFECTS        (Hemorrhagic Cystitis)                  Preventable with adequate hydration; occurs 2-3 days or more post treatment.   1.  Instruct patient to:  a.   Void at least every 2 hours; increase intake.  b.   DO NOT hold urine; go when urge is felt.  c.    Empty bladder at bedtime and on         awakening.  d.   Observe for color changes (red to tea           colored), amount and frequency changes.  e.   Notify oncologist of any abnormalities           in urine or voiding or if you cannot               drink adequate fluids.   01/31/2023   S   b.   CHANGES IN URINE    COLOR:      1.   Instruct patient:  a.   Most evident in first 2-3 voidings after           administration.  Lasts less than 24 hours.  If urine is discolored 2 or more days post- treatment, notify oncologist.      01/31/2023 S   c.    KIDNEY EFFECTS           (Nephrotoxicity)   1.  Instruct patient to:  a.   Drink 8-16 glasses of fluid/day the day   pre-treatment and 3-4 days post-treatment to maintain hydration; the best way to minimize kidney problems.  b.   Notify oncologist immediately if unable to drink fluids or if changes are noted in urinary elimination.     01/31/2023   S   PULMONARY TOXICITY    Instruct patient to report symptoms such as shortness of breath, chest pain, shallow breathing, or chest wall discomfort to physician.  Reinforce preventative measures used by the health care team.  Baseline and periodic PFT and chest x-ray.   01/31/2023   S     PLAN OF CARE INFORMATION TO BE DELIVERED / NURSING INTERVENTIONS DATE EVALUATION   NERVE & MUSCLE EFFECTS (neurotoxocity; neuropathy, possible visual/hearing changes)        Instruct patient to:    Report numbness or tingling of the hands/feet, loss of fine motor movement (buttoning shirt, tying shoelaces), or gait changes to your oncologist.  If numbness/tingling are present:  protect feet with shoes at all times.  Use gloves for washing dishes/gardening & potholders in kitchen.       01/31/2023   S   CARDIOTOXICITY  Decreased effectiveness of             cardiac function. Effective are                  cumulative and irreversible.                                    CARDIAC ARRYTHMIAS              4   Instruct:  Heart function may be tested before treatment and perdiocally during treatment.  Notify oncologist of irregular pulse, palpitations, shortness of breath, or swelling in lower extremities/feet.          Taxol and Taxotere can cause arrhythmias on infusion that resolve once infusion discontinued. Instruct nurse if any irregularity felt.     01/31/2023   S   EXTRAVASTION  Occurs when vesicants leak outside of vein and cause damage to the skin and underlying tissues.   Reinforce preventive measures used to avoid complications.  Fresh IV site or central line monitored continuously with vesicant IVP.  Continuous infusion via central line site and blood return monitored periodically around the clock.  Instruct to:  Notify nurse of any discomfort, burning, stinging, etc. at IV site during chemotherapy administration.  Notify oncologist of any redness, pain, or swelling at IV site after discharge from hospital.   01/31/2023   S   HYPERSENSITIVITY can happen with any medication.   Instruct patient:  Nurse is with them during the initial part of treatment and will be close by to monitor.  Pre-medication ordered by the oncologist must be taken on time. If doses are missed, treatment will need to be re-scheduled.  Skin redness, itching, or hives appearing after discharge should be reported to oncologist. 01/31/2023   S       PLAN OF CARE INFORMATION TO BE DELIVERED / NURSING INTERVENTIONS DATE EVALUATION   FLU-LIKE SYNDROME      Instruct patient symptoms are hard to prevent and may include fever, shaking chills, muscle and body aches.  Taking prescribed medications from physician if needed.  Adequate fluids are important.    Reinforce the need to call if temperature is         elevated to 100.4 or more  01/31/2023   S   HAND-FOOT SYNDROME  causes painful, symmetric swelling and redness of palms and soles                  Instruct patient to report any numbness or tingling in the hands or feet.  Explain prevention techniques, such as     Use heavy moisturizers to lessen skin dryness and itching, but to avoid if skin is cracked or broken  Bathe in tepid water, use non-perfumed soap, and wash gently. Baths with oatmeal or diluted baking soda may be soothing.  Avoid tight fitting shoes and repetitive actions, such as rubbing hands or applying pressure to  hands/feet.  Review measures to take should syndrome occur:  Cold compresses and elevation for          edema  Pain medications and other measures as ordered by oncologist.   4.   Syndrome resolves few weeks after therapy. 01/31/2023   S   5. DISCHARGE PLANNING /        EDUCATION 1.    Explain importance of compliance with follow- up  tests (CBC, CMP).  2.    Verify patient/caregiver know:  a.    Oncologists office phone number.  b.    Dates of follow-up appointments.  c.    Prescriptions given for nausea  3.   Review side effects to monitor and notify          oncologist about.  4.   Reinforce the need for patient and caregivers to:  a.    Review information given.  b.    Call oncologists office with questions  or symptoms  5.   Provide [facility] [place] [facility]chure make referrals if needed for financial or .   01/31/2023   S     PROGRESS NOTES: I met with the patient Mr. Kuhn today for chemotherapy education. he will be starting treatment with Gazyva and Venetoclax  . We discussed the mechanism of action, potential side effects of this treatment as well as ways he can manage them at home. Some of these side effects include but or not limited to fever, nausea, vomiting, decreased appetite, fatigue, weakness, cytopenias, myalgia/arthralgia, constipation, diarrhea, bleeding, headache, shortness of breath, nail changes, taste change, hair thinning/loss, mood disturbances, or edema. We also discussed dietary modifications he should make although this will be discussed in more detail with the dietician. he was provided with anti-emetic medication, a copy of all of the information we discussed today as well as our contact information. he will be provided a schedule on his first day of treatment. We will obtain labs on a weekly basis and the patient will follow-up with the physician for toxicity monitoring throughout treatment. All questions were answered and an informed consent was obtained. he was  reminded to certainly contact us sooner if needed.  Attached to the patients folder and discussed with the patient the 24 hour/ 7 days a week after hours telephone number for the physician.  Patient notified to call anytime 24/7 because their is a physician on call for any problems that may arise.  Patient also notified to report to [facility] if they can not get in touch with a physician after hours.  Discussed the five wishes booklet with the patient and their family.           Assessment/Plan:       1. CLL (chronic lymphocytic leukemia)    2. Iron deficiency    3. Iron deficiency anemia, unspecified iron deficiency anemia type      CLL (chronic lymphocytic leukemia)  -     allopurinoL (ZYLOPRIM) 300 MG tablet; Take 1 tablet (300 mg total) by mouth once daily.  Dispense: 30 tablet; Refill: 6  -     venetoclax 10 mg-50 mg- 100 mg DsPk; Take 20mg by mouth daily on days 22-28 of cycle 1. Take 50 mg by mouth daily on days 1-7 of cycle 2 Take 100 mg daily on days 8-14 of cycle 2 Take 200 mg daily on days 15-21 of cycle 2.  Take with food.  Dispense: 42 tablet; Refill: 0  -     venetoclax (VENCLEXTA) 100 mg Tab; Take 400 mg by mouth once daily.  Dispense: 120 tablet; Refill: 11  -     Hepatitis B surface antibody; Future; Expected date: 01/30/2023  -     Hepatitis B core antibody, total; Future; Expected date: 01/30/2023  -     ondansetron (ZOFRAN) 8 MG tablet; Take 1 tablet (8 mg total) by mouth every 8 (eight) hours as needed for Nausea.  Dispense: 30 tablet; Refill: 2    Iron deficiency  -     ondansetron (ZOFRAN) 8 MG tablet; Take 1 tablet (8 mg total) by mouth every 8 (eight) hours as needed for Nausea.  Dispense: 30 tablet; Refill: 2    Iron deficiency anemia, unspecified iron deficiency anemia type  -     ondansetron (ZOFRAN) 8 MG tablet; Take 1 tablet (8 mg total) by mouth every 8 (eight) hours as needed for Nausea.  Dispense: 30 tablet; Refill: 2        Follow up in about 2 weeks (around 2/13/2023) for  with Dr. Bai.     I have explained and the patient understands all of  the current recommendation(s). I have answered all of their questions to the best of my ability and to their complete satisfaction.   The patient is to continue with the current management plan.          Medications Ordered:  Zofran 8mg 1 tab PO Q8h prn nausea  Phenergan 25mg PO Q4-6h prn nausea      Standing Labs Ordered:  See paper attached       Per Dr. Don's orders, need to make sure patient lymph nodes react to Gazyva, if not we will have to put in patient to initiate Venetoclax.      Total Face to Face Time: + 60  minutes face to face with the patient and their family discussing the chemotherapy side-effects and when to call our office.   Electronically signed by: Electronically signed by: Marion Rizo, VERN, APRN, AGNP-C

## 2023-02-01 NOTE — CARE UPDATE
04/18/22 0730   Patient Assessment/Suction   Level of Consciousness (AVPU) alert   Respiratory Effort Unlabored   Expansion/Accessory Muscles/Retractions no use of accessory muscles   All Lung Fields Breath Sounds clear;equal bilaterally;diminished   PRE-TX-O2   O2 Device (Oxygen Therapy) room air   SpO2 97 %   Pulse Oximetry Type Intermittent   $ Pulse Oximetry - Single Charge Pulse Oximetry - Single   Aerosol Therapy   $ Aerosol Therapy Charges PRN treatment not required   Respiratory Evaluation   $ Care Plan Tech Time 15 min      (2) Patient Placed in Bed

## 2023-02-01 NOTE — PLAN OF CARE
Problem: Fall Injury Risk  Goal: Absence of Fall and Fall-Related Injury  Outcome: Ongoing, Progressing  Intervention: Identify and Manage Contributors  Flowsheets (Taken 2/1/2023 0705)  Self-Care Promotion: independence encouraged  Medication Review/Management: medications reviewed  Intervention: Promote Injury-Free Environment  Flowsheets (Taken 2/1/2023 0705)  Safety Promotion/Fall Prevention: medications reviewed

## 2023-02-01 NOTE — NURSING
Pt started shivering and complained of a headache. Infusion was stopped, Demerol 12.5 given. Will restart in 30 minutes if resolved.

## 2023-02-01 NOTE — PROGRESS NOTES
CHEMO SCHOOL- NUTRITION    Conrad Kuhn is a 82 y.o. male with recurrent CLL. Pt will be receiving venetoclax and gazyva. I met with Mr. Kuhn for chemo school today. Pt reports good appetite but has bene having trouble swallowing tough foods such as meat due to cervical adenopathy. He has been eating soups and beans  and also supplements with protein shakes at least 2-3 times per day. He reports good hydration. He has good knowledge of nutrition related side effects of chemo from past treatment. He denies N/V/D/C. BMs normal. He denies having any nutrition related questions or concerns at the current time.     CW: 187#.     Plan:   Reviewed chemo school packet & provided copy to pt.   Discussed importance of maintaining wt & staying hydrated.   Reviewed food safety guidelines recommended during treatment.   Provided Easy to Chew and Swallow packet with tips/tricks to decrease dysphagia  Continue ONS at least TID to assist in increasing protein intake  Provided RD contact info & encouraged pt to call with any questions/concerns.   Will f/u as needed.       Electronically signed by: Bridgette Webber MBA, RDN, LDN

## 2023-02-02 PROBLEM — R00.1 BRADYCARDIA: Status: ACTIVE | Noted: 2023-01-01

## 2023-02-02 PROBLEM — J18.9 COMMUNITY ACQUIRED PNEUMONIA: Status: RESOLVED | Noted: 2021-06-29 | Resolved: 2023-01-01

## 2023-02-02 NOTE — ED PROVIDER NOTES
Encounter Date: 2/2/2023       History     Chief Complaint   Patient presents with    Hypertension     HTN and bradycardia after chemotherapy treatment today. Sent here by Dr. Bai.      82-year-old male presents with hypertension, bradycardia patient being given chemotherapy at infusion center today and noted to become hypertensive and bradycardic, they tried to manage it at infusion center and when patient was refractory patient was sent to the emergency department patient's oncologist is Dr. Barr.  Patient has a history of CLL, patient has a history of hypertension hyperlipidemia, diabetes patient reports that he takes metoprolol XL for hypertension patient reports he took his medication today patient denies chest pain patient denies shortness of breath patient has no other acute complaints at this time.    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     Right-sided Bell's palsy 2009    Spondylolisthesis     Varicose veins of legs     Venous insufficiency      Past Surgical History:   Procedure Laterality Date    Athroscopic surgery      On Knee    BIOPSY OF TISSUE OF NECK Left 08/2015    Recuurence CLL/small cell lyphoma    ESOPHAGEAL DILATION      ESOPHAGOGASTRODUODENOSCOPY N/A 4/15/2022    Procedure: EGD (ESOPHAGOGASTRODUODENOSCOPY);  Surgeon: Siddharth Garcia,  MD;  Location: [facility] ENDO;  Service: Endoscopy;  Laterality: N/A;    ESOPHAGOGASTRODUODENOSCOPY N/A 2022    Procedure: EGD (ESOPHAGOGASTRODUODENOSCOPY);  Surgeon: Siddharth Garcia MD;  Location: [facility] ENDO;  Service: Endoscopy;  Laterality: N/A;    ESOPHAGOGASTRODUODENOSCOPY N/A 2022    Procedure: EGD (ESOPHAGOGASTRODUODENOSCOPY);  Surgeon: Jefferson Hyman MD;  Location: [facility] ENDO;  Service: Endoscopy;  Laterality: N/A;    ESOPHAGOGASTRODUODENOSCOPY W/ PEG N/A 2022    Procedure: EGD, WITH PEG TUBE INSERTION;  Surgeon: Siddharth Garcia MD;  Location: [facility] ENDO;  Service: Endoscopy;  Laterality: N/A;    GASTROSTOMY TUBE PLACEMENT  2022    20 Fr (bumper initially at 3.5)    Hemorrhoid resection      LUNG LOBECTOMY Left     NECK SURGERY  2022    Anterior and Posterior C3-C7 fusion    OTHER SURGICAL HISTORY  2006    Chemotherapy s/p lyphoma        Portacath implantation and removal      reverse shoulder arthroplasty Right 2021     Family History   Problem Relation Age of Onset    Stomach cancer Mother 80         at 95    Colon cancer Father      Social History     Tobacco Use    Smoking status: Former    Smokeless tobacco: Never   Substance Use Topics    Alcohol use: Yes    Drug use: No     Review of Systems   Constitutional:  Negative for fever.   HENT:  Negative for congestion, rhinorrhea, sore throat and trouble swallowing.    Eyes:  Negative for visual disturbance.   Respiratory:  Negative for cough, chest tightness, shortness of breath and wheezing.    Cardiovascular:  Negative for chest pain, palpitations and leg swelling.   Gastrointestinal:  Negative for abdominal distention, abdominal pain, constipation, diarrhea, nausea and vomiting.   Genitourinary:  Negative for difficulty urinating, dysuria, flank pain and frequency.   Musculoskeletal:  Negative for arthralgias, back pain, joint swelling and neck pain.   Skin:  Negative for color change and rash.    Neurological:  Positive for headaches. Negative for dizziness, syncope, speech difficulty, weakness and numbness.   All other systems reviewed and are negative.    Physical Exam     Initial Vitals [02/02/23 1552]   BP Pulse Resp Temp SpO2   (!) 193/83 (!) 48 17 97.1 °F (36.2 °C) 99 %      MAP       --         Physical Exam    Nursing note and vitals reviewed.  Constitutional: He appears well-developed and well-nourished. He is not diaphoretic. No distress.   HENT:   Head: Normocephalic and atraumatic.   Right Ear: External ear normal.   Left Ear: External ear normal.   Nose: Nose normal.   Mouth/Throat: Oropharynx is clear and moist. No oropharyngeal exudate.   Eyes: Conjunctivae and EOM are normal. Pupils are equal, round, and reactive to light. Right eye exhibits no discharge. Left eye exhibits no discharge. No scleral icterus.   Neck: Neck supple. No thyromegaly present. No tracheal deviation present. No JVD present.   Normal range of motion.  Cardiovascular:  Normal rate, regular rhythm, normal heart sounds and intact distal pulses.     Exam reveals no gallop and no friction rub.       No murmur heard.  Pulmonary/Chest: Breath sounds normal. No stridor. No respiratory distress. He has no wheezes. He has no rhonchi. He has no rales. He exhibits no tenderness.   Abdominal: Abdomen is soft. Bowel sounds are normal. He exhibits no distension and no mass. There is no abdominal tenderness. There is no rebound and no guarding.   Musculoskeletal:         General: No tenderness or edema. Normal range of motion.      Cervical back: Normal range of motion and neck supple.     Lymphadenopathy:     He has no cervical adenopathy.   Neurological: He is alert and oriented to person, place, and time. He has normal strength. No cranial nerve deficit or sensory deficit.   Skin: Skin is warm and dry. No rash noted. No erythema.       ED Course   Procedures  Labs Reviewed   CBC W/ AUTO DIFFERENTIAL - Abnormal; Notable for the  following components:       Result Value    RBC 3.97 (*)     Hemoglobin 12.7 (*)     Hematocrit 35.1 (*)     MCH 32.0 (*)     MCHC 36.2 (*)     Platelets 35 (*)     Immature Granulocytes 5.2 (*)     Immature Grans (Abs) 0.22 (*)     Lymph # 0.6 (*)     Lymph % 15.2 (*)     Platelet Estimate Decreased (*)     All other components within normal limits    Narrative:     PLT critical result(s) repeated. Called and verbal readback obtained   from Desmond Cross RN/ED by JBLondon 02/02/2023 17:39   COMPREHENSIVE METABOLIC PANEL - Abnormal; Notable for the following components:    Sodium 132 (*)     Glucose 301 (*)     Calcium 8.4 (*)     Alkaline Phosphatase 42 (*)     Anion Gap 7 (*)     All other components within normal limits   B-TYPE NATRIURETIC PEPTIDE - Abnormal; Notable for the following components:     (*)     All other components within normal limits   URINALYSIS, REFLEX TO URINE CULTURE - Abnormal; Notable for the following components:    Glucose, UA 4+ (*)     All other components within normal limits    Narrative:     Specimen Source->Urine   TSH - Abnormal; Notable for the following components:    TSH 0.080 (*)     All other components within normal limits   CULTURE, BLOOD   CULTURE, BLOOD   MAGNESIUM   TROPONIN I HIGH SENSITIVITY   TROPONIN I HIGH SENSITIVITY   SARS-COV-2 RNA AMPLIFICATION, QUAL   LACTIC ACID, PLASMA   INFLUENZA A AND B ANTIGEN    Narrative:     Specimen Source->Nasopharyngeal Swab   TSH   T4, FREE   URINALYSIS MICROSCOPIC    Narrative:     Specimen Source->Urine          Imaging Results              X-Ray Chest AP Portable (Final result)  Result time 02/02/23 19:51:56      Final result by Jeniffer Arredondo MD (02/02/23 19:51:56)                   Narrative:    XR CHEST 1 VIEW    CLINICAL HISTORY:  82 years Male hypertension    COMPARISON: 1/20/2023    FINDINGS: Cardiomediastinal silhouette is within normal limits. Lungs are normally expanded with no airspace consolidation. No pleural  effusion or pneumothorax. There are old left rib fractures.    IMPRESSION: No acute pulmonary process.    Electronically signed by:  Jeniffer Arredondo MD  2/2/2023 7:51 PM CST Workstation: [id number]                                     CT Head Without Contrast (Final result)  Result time 02/02/23 18:26:16      Final result by Jeniffer Arredondo MD (02/02/23 18:26:16)                   Narrative:    CMS MANDATED QUALITY DATA - CT RADIATION  436    All CT scans at this facility utilize dose modulation, iterative reconstruction, and/or weight based dosing when appropriate to reduce radiation dose to as low as reasonably achievable.  CT head without contrast    Clinical data: Dizziness    COMPARISON: 7/2/2021    FINDINGS: Noninfusion images were obtained from the skull base to the vertex. There is no intracranial mass, hemorrhage, or midline shift. Ventricles and sulci are mildly prominent. There are no pathologic extra-axial fluid collections. There is no evidence of cortical ischemic change. Changes of mild chronic microvascular white matter disease are noted. Cerebellum and brainstem are normal. The calvarium is intact.    IMPRESSION:    1. No acute intracranial abnormalities. Chronic findings as discussed above.    Electronically signed by:  Jeniffer Arredondo MD  2/2/2023 6:26 PM CST Workstation: [id number]                                     Medications   niCARdipine 40 mg/200 mL (0.2 mg/mL) infusion (0 mg/hr Intravenous Not Given 2/2/23 2000)   hydrALAZINE injection 10 mg (10 mg Intravenous Given 2/2/23 1824)     Medical Decision Making:   History:   Old Medical Records: I decided to obtain old medical records.  Initial Assessment:   Emergent evaluation of an 82-year-old male presenting with hypertension and bradycardia differential diagnosis includes medication side effect, electrolyte abnormality, endocrine dysfunction, reaction to infusion, hypertensive urgency, hypertensive emergency          Attending  Attestation:         Attending Critical Care:   Critical Care Times:   Direct Patient Care (initial evaluation, reassessments, and time considering the case)................................................................10 minutes.   Additional History from reviewing old medical records or taking additional history from the family, EMS, PCP, etc.......................5 minutes.   Ordering, Reviewing, and Interpreting Diagnostic Studies...............................................................................................................5 minutes.   Documentation..................................................................................................................................................................................10 minutes.   Consultation with other Physicians. .................................................................................................................................................5 minutes.   ==============================================================  Total Critical Care Time - exclusive of procedural time: 35 minutes.  ==============================================================  Critical care was necessary to treat or prevent imminent or life-threatening deterioration of the following conditions: hypertensive urgency.   Critical care was time spent personally by me on the following activities: examination of patient, obtaining history from patient or relative, review of old charts, ordering lab, x-rays, and/or EKG, development of treatment plan with patient or relative, ordering and performing treatments and interventions, evaluation of patient's response to treatment, interpretation of cardiac measurements, re-evaluation of patient's conition and discussion with consultants.   Critical Care Condition: potentially life-threatening       Attending ED Notes:   Patient's blood pressure remains elevated patient requiring nicardipine drip for blood pressure  management patient also has persistent bradycardia patient consulted to Internal Medicine for further evaluation and management with Oncology to follow    MDM    MDM    Patient presents for emergent evaluation of acute complaint that poses a possible threat to life and/or bodily function.    I may have ordered labs and personally reviewed them. If applicable, Labs significant for abnormalities noted above.    I may have ordered X-rays and personally reviewed them and reviewed the radiologist interpretation.  If applicable, Xray significant for findings noted above.    I may have ordered EKG and personally reviewed it.  If applicable, EKG significant for findings noted above.    I may have ordered CT scan and personally reviewed it and reviewed the radiologist interpretation.  If applicable, CT significant for findings noted above.      Admission MDM  I discussed the patient presentation labs, ekg, X-rays, CT findings (if applicable) with the consultant(s)   Patient was managed in the ED with IV labs, meds, fluids (if applicable).    The response to treatment was noted with improved stabilization.    Patient required emergent consultation to Hospitalist for admission.    A dictation software program was used for this note.  Please expect some simple typographical  errors in this note.                      Clinical Impression:   Final diagnoses:  [R07.9] Chest pain  [I15.9] Secondary hypertension (Primary)        ED Disposition Condition    Admit Stable                Kalin Edmondson MD  02/02/23 2054       Kalin Edmondson MD  02/02/23 2055

## 2023-02-02 NOTE — PROGRESS NOTES
Conrad Baker is an 82 year old diagnosed with CLL.  Patient is here today for his chemo infusion.  I met with him to complete new patient orientation and to complete the NCCN Distress Screening; patient indicated a rating of 0.  Patient denied needing psychosocial support at this time.  I provided patient with the [facility] community events flyer and my contact information in the event supportive services arise in the future.

## 2023-02-02 NOTE — FIRST PROVIDER EVALUATION
"Medical screening examination initiated.  I have conducted a focused provider triage encounter, findings are as follows:    Brief history of present illness:  Patient presents to the ED for low heart rate and high blood pressure.  Patient states he was getting chemo today for his and during the infusion they noted his blood pressure was high in his heart rate was low Dr. Bai recommended he come to the ED.    Vitals:    02/02/23 1552   BP: (!) 193/83   BP Location: Right arm   Patient Position: Sitting   Pulse: (!) 48   Resp: 17   Temp: 97.1 °F (36.2 °C)   TempSrc: Oral   SpO2: 99%   Weight: 83.9 kg (185 lb)   Height: 6' 2" (1.88 m)       Pertinent physical exam:  Patient is awake and alert.    Brief workup plan:  Lab work, EKG    Preliminary workup initiated; this workup will be continued and followed by the physician or advanced practice provider that is assigned to the patient when roomed.  "

## 2023-02-02 NOTE — NURSING
Patient's blood pressure steadily vincenzo throughout his infusion. Once infusion was completed, the patient was observed for 30 additional minutes and blood pressure was re-checked. Still high; patient was given 0.1 mg clonidine and continued to monitor for an additional hour. Blood pressure was still high, another dose of 0.1 mg clonidine was administered. After 45 minutes, blood pressure remained high. Marion Brown NP came to assess the patient and conferred with Dr Bai.  Patient was told to go to the ED for further treatment and evaluation.     Merari Myers RN

## 2023-02-02 NOTE — Clinical Note
Diagnosis: Secondary hypertension [[id number]]   Admitting Provider:: CASEY SHANNON [[id number]]   Future Attending Provider: CASEY SHANNON [[id number]]   Reason for IP Medical Treatment  (Clinical interventions that can only be accomplished in the IP setting? ) :: Cardiac monitoring   Estimated Length of Stay:: 2 midnights   I certify that Inpatient services for greater than or equal to 2 midnights are medically necessary:: Yes   Plans for Post-Acute care--if anticipated (pick the single best option):: A. No post acute care anticipated at this time

## 2023-02-02 NOTE — PLAN OF CARE
Problem: Fall Injury Risk  Goal: Absence of Fall and Fall-Related Injury  Outcome: Ongoing, Progressing  Intervention: Identify and Manage Contributors  Flowsheets (Taken 2/2/2023 0710)  Self-Care Promotion: independence encouraged  Medication Review/Management: medications reviewed  Intervention: Promote Injury-Free Environment  Flowsheets (Taken 2/2/2023 0710)  Safety Promotion/Fall Prevention: medications reviewed

## 2023-02-03 NOTE — HPI
02/02/2023 ER NOTE 82-year-old male presents with hypertension, bradycardia patient being given chemotherapy at infusion center today and noted to become hypertensive and bradycardic, they tried to manage it at infusion center and when patient was refractory patient was sent to the emergency department patient's oncologist is Dr. Barr.  Patient has a history of CLL, patient has a history of hypertension hyperlipidemia, diabetes patient reports that he takes metoprolol XL for hypertension patient reports he took his medication today patient denies chest pain patient denies shortness of breath patient has no other acute complaints at this time.    02/02/2023 Patient seen today and he is a pleasant male with the above listed history. He reports getting Chemo today and becoming bradycardic and hypertensive today. He and his doctor became concerned and he was sent to the ER for evaluation. Since being in the ER he has still been bradycardic, but 40-50's which is slight improvement, and hypertensive. We reviewed his labs and feel it pertinent for him to stay for consultation and med management.

## 2023-02-03 NOTE — H&P
[facility] - Emergency Dept  Hospital Medicine  History & Physical    Patient Name: Conrad Kuhn  MRN: [id number]  Patient Class: Emergency  Admission Date: 2/2/2023  Attending Physician: Usman Young MD  Primary Care Provider: Johnson Erazo MD         Patient information was obtained from patient and ER records.     Subjective:     Principal Problem:<principal problem not specified>    Chief Complaint:   Chief Complaint   Patient presents with    Hypertension     HTN and bradycardia after chemotherapy treatment today. Sent here by Dr. Bai.         HPI: 02/02/2023 ER NOTE 82-year-old male presents with hypertension, bradycardia patient being given chemotherapy at infusion center today and noted to become hypertensive and bradycardic, they tried to manage it at infusion center and when patient was refractory patient was sent to the emergency department patient's oncologist is Dr. Barr.  Patient has a history of CLL, patient has a history of hypertension hyperlipidemia, diabetes patient reports that he takes metoprolol XL for hypertension patient reports he took his medication today patient denies chest pain patient denies shortness of breath patient has no other acute complaints at this time.    02/02/2023 Patient seen today and he is a pleasant male with the above listed history. He reports getting Chemo today and becoming bradycardic and hypertensive today. He and his doctor became concerned and he was sent to the ER for evaluation. Since being in the ER he has still been bradycardic, but 40-50's which is slight improvement, and hypertensive. We reviewed his labs and feel it pertinent for him to stay for consultation and med management.            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    MAYDA on CPAP     Pneumonia of both lungs due to infectious organism 6/29/2021    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     Right-sided Bell's palsy 2009    Spondylolisthesis     Varicose veins of legs     Venous insufficiency        Past Surgical History:   Procedure Laterality Date    Athroscopic surgery      On Knee    BIOPSY OF TISSUE OF NECK Left 08/2015    Recuurence CLL/small cell lyphoma    ESOPHAGEAL DILATION      ESOPHAGOGASTRODUODENOSCOPY N/A 4/15/2022    Procedure: EGD (ESOPHAGOGASTRODUODENOSCOPY);  Surgeon: Siddharth Garcia MD;  Location: [facility];  Service: Endoscopy;  Laterality: N/A;    ESOPHAGOGASTRODUODENOSCOPY N/A 4/16/2022    Procedure: EGD (ESOPHAGOGASTRODUODENOSCOPY);  Surgeon: Siddharth Garcia MD;  Location: [facility];  Service: Endoscopy;  Laterality: N/A;    ESOPHAGOGASTRODUODENOSCOPY N/A 6/23/2022    Procedure: EGD (ESOPHAGOGASTRODUODENOSCOPY);  Surgeon: Jefferson Hyman MD;  Location: [facility];  Service: Endoscopy;  Laterality: N/A;    ESOPHAGOGASTRODUODENOSCOPY W/ PEG N/A 4/16/2022    Procedure: EGD, WITH PEG TUBE INSERTION;  Surgeon: Siddharth Garcia MD;  Location: [facility];  Service: Endoscopy;  Laterality: N/A;    GASTROSTOMY TUBE PLACEMENT  04/16/2022    20 Fr (bumper initially at 3.5)    Hemorrhoid resection  1979    LUNG LOBECTOMY Left 2004    NECK SURGERY  04/08/2022    Anterior and Posterior C3-C7 fusion    OTHER SURGICAL HISTORY  2006    Chemotherapy s/p lyphoma        Portacath implantation and removal      reverse shoulder arthroplasty Right 03/2021       Review of patient's allergies indicates:  No Known Allergies    Current Facility-Administered Medications  on File Prior to Encounter   Medication    [COMPLETED] acetaminophen Susp 650 mg    [COMPLETED] cloNIDine tablet 0.1 mg    [COMPLETED] cloNIDine tablet 0.1 mg    [COMPLETED] dexAMETHasone (DECADRON) 20 mg in sodium chloride 0.9% 50 mL IVPB    [COMPLETED] diphenhydrAMINE (BENADRYL) 50 mg in NS 50 mL IVPB    [COMPLETED] oBINutuzumab (GAZYVA) 900 mg in sodium chloride 0.9% 321 mL chemo infusion    [COMPLETED] sodium chloride 0.9% 250 mL flush bag    [DISCONTINUED] diphenhydrAMINE injection 50 mg    [DISCONTINUED] EPINEPHrine (EPIPEN) 0.3 mg/0.3 mL pen injection 0.3 mg    [DISCONTINUED] hydrocortisone sodium succinate injection 100 mg    [DISCONTINUED] sodium chloride 0.9% flush 10 mL     Current Outpatient Medications on File Prior to Encounter   Medication Sig    allopurinoL (ZYLOPRIM) 300 MG tablet Take 1 tablet (300 mg total) by mouth once daily.    atorvastatin (LIPITOR) 20 MG tablet Take 20 mg by mouth once daily.    azelastine (ASTELIN) 137 mcg (0.1 %) nasal spray 1 spray by Nasal route 2 (two) times daily.     benzonatate (TESSALON) 100 MG capsule Take 100 mg by mouth 3 (three) times daily as needed.    ferrous sulfate (FEOSOL) 325 mg (65 mg iron) Tab tablet Take 325 mg by mouth daily with breakfast.    fluticasone propionate (FLONASE) 50 mcg/actuation nasal spray 1 spray by Each Nostril route once daily.    gabapentin (NEURONTIN) 600 MG tablet Take 600 mg by mouth 2 (two) times daily.    glimepiride (AMARYL) 2 MG tablet Take 2 mg by mouth once daily at 6am.    HYDROcodone-acetaminophen (NORCO)  mg per tablet Take 1 tablet by mouth every 8 (eight) hours as needed.    iron-vitamin C 100-250 mg, ICAR-C, (ICAR-C) 100-250 mg Tab Take 1 tablet by mouth once daily.    levalbuterol (XOPENEX) 1.25 mg/3 mL nebulizer solution Take 3 mLs by nebulization every 4 to 6 hours as needed.     metoprolol tartrate (LOPRESSOR) 25 MG tablet 1 tablet (25 mg total) by Per G Tube route 2 (two) times daily.     ondansetron (ZOFRAN) 8 MG tablet Take 1 tablet (8 mg total) by mouth every 8 (eight) hours as needed for Nausea.    promethazine (PHENERGAN) 12.5 MG Tab Take 1 tablet (12.5 mg total) by mouth every 6 (six) hours as needed.    traZODone (DESYREL) 300 MG tablet Take 300 mg by mouth every evening.    valACYclovir (VALTREX) 1000 MG tablet Take 1,000 mg by mouth once daily.    blood sugar diagnostic Strp 1 strip by Misc.(Non-Drug; Combo Route) route 2 (two) times a day.    blood-glucose meter kit Use as instructed    dexAMETHasone (DECADRON) 4 MG Tab Take 20mg the Sunday, Monday and Tuesday (Patient taking differently: Take 20 mg by mouth once daily. Take 20mg the Sunday, Monday and Tuesday)    levoFLOXacin (LEVAQUIN) 750 MG tablet Take 750 mg by mouth once daily.    ondansetron (ZOFRAN-ODT) 8 MG TbDL 1 tablet on the tongue and allow to dissolve  as needed Orally Once a day    venetoclax (VENCLEXTA) 100 mg Tab Take 400 mg by mouth once daily.    venetoclax 10 mg-50 mg- 100 mg DsPk Take 20mg by mouth daily on days 22-28 of cycle 1. Take 50 mg by mouth daily on days 1-7 of cycle 2 Take 100 mg daily on days 8-14 of cycle 2 Take 200 mg daily on days 15-21 of cycle 2.  Take with food. (Patient taking differently: Take by mouth Take 20mg by mouth daily on days 22-28 of cycle 1. Take 50 mg by mouth daily on days 1-7 of cycle 2 Take 100 mg daily on days 8-14 of cycle 2 Take 200 mg daily on days 15-21 of cycle 2.  Take with food.)    [DISCONTINUED] esomeprazole (NEXIUM) 40 MG capsule Take 1 capsule (40 mg total) by mouth 2 (two) times daily.    [DISCONTINUED] losartan (COZAAR) 25 MG tablet Take 25 mg by mouth every evening.    [DISCONTINUED] metFORMIN (GLUCOPHAGE) 500 MG tablet Take 500 mg by mouth 2 (two) times daily with meals.     [DISCONTINUED] metoprolol succinate (TOPROL-XL) 25 MG 24 hr tablet Take 25 mg by mouth 2 (two) times daily.      Family History       Problem Relation (Age of Onset)    Colon cancer  Father    Stomach cancer Mother (80)          Tobacco Use    Smoking status: Former    Smokeless tobacco: Never   Substance and Sexual Activity    Alcohol use: Yes    Drug use: No    Sexual activity: Not Currently     Review of Systems   Constitutional:  Positive for activity change. Negative for chills, diaphoresis and fatigue.   HENT:  Negative for congestion, ear discharge, ear pain, sinus pain and sore throat.    Eyes:  Negative for pain, discharge and visual disturbance.   Respiratory:  Negative for cough, choking, chest tightness and shortness of breath.    Cardiovascular:  Negative for chest pain, palpitations and leg swelling.   Gastrointestinal:  Negative for abdominal distention, abdominal pain, blood in stool, nausea and vomiting.   Endocrine: Negative for cold intolerance and heat intolerance.   Genitourinary:  Negative for dysuria, enuresis, flank pain and hematuria.   Skin:  Negative for color change, pallor and rash.   Neurological:  Positive for weakness and light-headedness. Negative for dizziness, facial asymmetry and headaches.   Hematological:  Negative for adenopathy. Does not bruise/bleed easily.   Psychiatric/Behavioral:  Negative for agitation, behavioral problems and confusion.    All other systems reviewed and are negative.  Objective:     Vital Signs (Most Recent):  Temp: 97.1 °F (36.2 °C) (02/02/23 1552)  Pulse: (!) 55 (02/02/23 2057)  Resp: 17 (02/02/23 1552)  BP: (!) 188/85 (02/02/23 2102)  SpO2: 96 % (02/02/23 2102)   Vital Signs (24h Range):  Temp:  [97.1 °F (36.2 °C)-97.9 °F (36.6 °C)] 97.1 °F (36.2 °C)  Pulse:  [47-63] 55  Resp:  [16-18] 17  SpO2:  [95 %-99 %] 96 %  BP: (142-233)/() 188/85     Weight: 83.9 kg (185 lb)  Body mass index is 23.75 kg/m².    Physical Exam  Vitals and nursing note reviewed.   Constitutional:       Appearance: Normal appearance.   HENT:      Head: Normocephalic and atraumatic.      Right Ear: Tympanic membrane, ear canal and external ear normal.       Left Ear: Tympanic membrane, ear canal and external ear normal.      Nose: Nose normal.      Mouth/Throat:      Mouth: Mucous membranes are dry.      Pharynx: Oropharynx is clear.   Eyes:      Extraocular Movements: Extraocular movements intact.      Conjunctiva/sclera: Conjunctivae normal.      Pupils: Pupils are equal, round, and reactive to light.   Cardiovascular:      Rate and Rhythm: Normal rate and regular rhythm.      Pulses: Normal pulses.      Heart sounds: Normal heart sounds.   Pulmonary:      Effort: Pulmonary effort is normal.      Breath sounds: Normal breath sounds.   Abdominal:      General: Abdomen is flat. Bowel sounds are normal.      Palpations: Abdomen is soft.   Genitourinary:     Rectum: Normal.   Musculoskeletal:         General: Normal range of motion.      Cervical back: Normal range of motion and neck supple.   Skin:     General: Skin is warm and dry.      Capillary Refill: Capillary refill takes less than 2 seconds.   Neurological:      General: No focal deficit present.      Mental Status: He is alert and oriented to person, place, and time.   Psychiatric:         Mood and Affect: Mood normal.         Behavior: Behavior normal.         Thought Content: Thought content normal.         Judgment: Judgment normal.         CRANIAL NERVES     CN III, IV, VI   Pupils are equal, round, and reactive to light.     Significant Labs: All pertinent labs within the past 24 hours have been reviewed.  Blood Culture: No results for input(s): LABBLOO in the last 48 hours.  CMP:   Recent Labs   Lab 02/02/23  1325 02/02/23  1640   * 132*   K 4.6 4.1    101   CO2 25 24   * 301*   BUN 27* 23   CREATININE 0.8 0.8   CALCIUM 8.5* 8.4*   PROT 6.4 6.4   ALBUMIN 3.9 3.6   BILITOT 0.9 0.9   ALKPHOS 50* 42*   AST 23 21   ALT 16 16   ANIONGAP 7* 7*     Cardiac Markers:   Recent Labs   Lab 02/02/23  1640   *     Lactic Acid:   Recent Labs   Lab 02/02/23  1820   LACTATE 1.9     Lipid  Panel: No results for input(s): CHOL, HDL, LDLCALC, TRIG, CHOLHDL in the last 48 hours.  Magnesium:   Recent Labs   Lab 02/02/23  1640   MG 1.8     POCT Glucose: No results for input(s): POCTGLUCOSE in the last 48 hours.  Troponin:   Recent Labs   Lab 02/02/23  1640 02/02/23  1820   TROPONINIHS 2.9 4.0     Urine Culture: No results for input(s): LABURIN in the last 48 hours.  Urine Studies:   Recent Labs   Lab 02/02/23  1837   COLORU Yellow   APPEARANCEUA Clear   PHUR 7.0   SPECGRAV 1.010   PROTEINUA Negative   GLUCUA 4+*   KETONESU Negative   BILIRUBINUA Negative   OCCULTUA Negative   NITRITE Negative   UROBILINOGEN Negative   LEUKOCYTESUR Negative   RBCUA 1   WBCUA 0   BACTERIA None       Significant Imaging: I have reviewed all pertinent imaging results/findings within the past 24 hours.    Assessment/Plan:     Bradycardia  Cardiac consult  Cardiac monitoring  Hematology/oncology consult      History of lung cancer - ANDRES NSCLC 2004  Consult hematology/oncology  Cardiac monitoring      CLL (chronic lymphocytic leukemia)  Admit inpatient  Cardiac monitoring  Cardiac consult          VTE Risk Mitigation (From admission, onward)    None             CLAUDIO Collins  Department of Hospital Medicine   [facility] - Emergency Dept

## 2023-02-03 NOTE — DISCHARGE SUMMARY
[facility] - Emergency Dept  Hospital Medicine  Discharge Summary      Patient Name: Conrad Kuhn  MRN: [id number]  SHAMAR: [id number]  Patient Class: IP- Inpatient  Admission Date: 2/2/2023  Hospital Length of Stay: 1 days  Discharge Date and Time:  02/03/2023 2:38 PM  Attending Physician: David Julien MD   Discharging Provider: David Julien MD  Primary Care Provider: Johnson Erazo MD    Primary Care Team: Networked reference to record PCT     HPI:   02/02/2023 ER NOTE 82-year-old male presents with hypertension, bradycardia patient being given chemotherapy at infusion center today and noted to become hypertensive and bradycardic, they tried to manage it at infusion center and when patient was refractory patient was sent to the emergency department patient's oncologist is Dr. Barr.  Patient has a history of CLL, patient has a history of hypertension hyperlipidemia, diabetes patient reports that he takes metoprolol XL for hypertension patient reports he took his medication today patient denies chest pain patient denies shortness of breath patient has no other acute complaints at this time.    02/02/2023 Patient seen today and he is a pleasant male with the above listed history. He reports getting Chemo today and becoming bradycardic and hypertensive today. He and his doctor became concerned and he was sent to the ER for evaluation. Since being in the ER he has still been bradycardic, but 40-50's which is slight improvement, and hypertensive. We reviewed his labs and feel it pertinent for him to stay for consultation and med management.            * No surgery found *      Hospital Course:    Cardiology evaluated patient and discontinued patient's metoprolol and started patient on losartan.  Once he was cleared by Cardiology, he was discharged to follow-up with them in a week.    Physical examination on discharge:  Constitutional: No distress.   HENT: NC  Head: Atraumatic.    Cardiovascular: Normal rate, regular rhythm and normal heart sounds.   Pulmonary/Chest: Effort normal. No wheezes.   Abdominal: Soft. Bowel sounds are normal. No distension and no mass. No tenderness  Neurological: Alert.   Skin: Skin is warm and dry.   Psych: Appropriate mood and affect    I have seen the patient on the day of discharge and reviewed the discharge instructions as outlined.    Goals of Care Treatment Preferences:  Code Status: Full Code      Consults:   Consults (From admission, onward)          Status Ordering Provider     Inpatient consult to Cardiology  Once        Provider:  Akbar Hernandez MD    Acknowledged DAT ASTORGA     Inpatient consult to Hematology/Oncology  Once        Provider:  Aurash Khoobehi, MD    Acknowledged ANTWAN LOWE     Inpatient consult to Internal Medicine  Once        Provider:  CLAUDIO Hopkins    Acknowledged ANTWAN LOWE            No new Assessment & Plan notes have been filed under this hospital service since the last note was generated.  Service: Hospital Medicine    Final Active Diagnoses:    Diagnosis Date Noted POA    PRINCIPAL PROBLEM:  Bradycardia [R00.1] 02/02/2023 Yes    History of lung cancer - ANDRES NSCLC 2004 [Z85.118] 01/03/2019 Not Applicable    CLL (chronic lymphocytic leukemia) [C91.10] 01/02/2019 Yes      Problems Resolved During this Admission:    Diagnosis Date Noted Date Resolved POA    Community acquired pneumonia [J18.9] 06/29/2021 02/02/2023 Yes       Discharged Condition: good    Disposition:     Follow Up:   Follow-up Information       Johnson Erazo MD Follow up in 1 week(s).    Specialty: Family Medicine  Contact information:  [address]  [place] [address]  [phone]               Serjio Harris MD Follow up in 1 week(s).    Specialties: Cardiology, Interventional Cardiology  Contact information:  [address]  [place] [address]  [phone]                           Patient Instructions:       Activity as tolerated       Significant Diagnostic Studies: Labs: BMP:   Recent Labs   Lab 02/02/23  1325 02/02/23  1640 02/03/23  0348   * 301* 232*   * 132* 135*   K 4.6 4.1 3.4*    101 101   CO2 25 24 26   BUN 27* 23 21   CREATININE 0.8 0.8 0.7   CALCIUM 8.5* 8.4* 8.5*   MG  --  1.8  --    , CMP   Recent Labs   Lab 02/02/23  1325 02/02/23  1640 02/03/23  0348   * 132* 135*   K 4.6 4.1 3.4*    101 101   CO2 25 24 26   * 301* 232*   BUN 27* 23 21   CREATININE 0.8 0.8 0.7   CALCIUM 8.5* 8.4* 8.5*   PROT 6.4 6.4  --    ALBUMIN 3.9 3.6  --    BILITOT 0.9 0.9  --    ALKPHOS 50* 42*  --    AST 23 21  --    ALT 16 16  --    ANIONGAP 7* 7* 8   , CBC   Recent Labs   Lab 02/02/23  1325 02/02/23  1640 02/03/23  0348   WBC 5.72 4.22 4.21   HGB 12.6* 12.7* 12.5*   HCT 35.7* 35.1* 34.7*   PLT 44* 35* 32*   , and All labs within the past 24 hours have been reviewed  Microbiology:  A B Full strep numbers with a have  Radiology: X-Ray: CXR: X-Ray Chest 1 View (CXR): No results found for this visit on 02/02/23. and X-Ray Chest PA and Lateral (CXR): No results found for this visit on 02/02/23.  CT scan: CT ABDOMEN PELVIS WITH CONTRAST: No results found for this visit on 02/02/23. and CT ABDOMEN PELVIS WITHOUT CONTRAST: No results found for this visit on 02/02/23.    Pending Diagnostic Studies:       None           Medications:  Reconciled Home Medications:      Medication List        START taking these medications      losartan 50 MG tablet  Commonly known as: COZAAR  Take 1 tablet (50 mg total) by mouth once daily.  Start taking on: February 4, 2023            CHANGE how you take these medications      dexAMETHasone 4 MG Tab  Commonly known as: DECADRON  Take 20mg the Sunday, Monday and Tuesday  What changed:   how much to take  how to take this  when to take this     * venetoclax 10 mg-50 mg- 100 mg Dspk  Take 20mg by mouth daily on days 22-28 of cycle 1. Take 50 mg by mouth daily on days  1-7 of cycle 2 Take 100 mg daily on days 8-14 of cycle 2 Take 200 mg daily on days 15-21 of cycle 2.  Take with food.  What changed: how to take this     * venetoclax 100 mg Tab  Commonly known as: VENCLEXTA  Take 400 mg by mouth once daily.  What changed: Another medication with the same name was changed. Make sure you understand how and when to take each.           * This list has 2 medication(s) that are the same as other medications prescribed for you. Read the directions carefully, and ask your doctor or other care provider to review them with you.                CONTINUE taking these medications      allopurinoL 300 MG tablet  Commonly known as: ZYLOPRIM  Take 1 tablet (300 mg total) by mouth once daily.     atorvastatin 20 MG tablet  Commonly known as: LIPITOR  Take 20 mg by mouth once daily.     azelastine 137 mcg (0.1 %) nasal spray  Commonly known as: ASTELIN  1 spray by Nasal route 2 (two) times daily.     benzonatate 100 MG capsule  Commonly known as: TESSALON  Take 100 mg by mouth 3 (three) times daily as needed.     blood sugar diagnostic Strp  1 strip by Misc.(Non-Drug; Combo Route) route 2 (two) times a day.     blood-glucose meter kit  Use as instructed     ferrous sulfate 325 mg (65 mg iron) Tab tablet  Commonly known as: FEOSOL  Take 325 mg by mouth daily with breakfast.     fluticasone propionate 50 mcg/actuation nasal spray  Commonly known as: FLONASE  1 spray by Each Nostril route once daily.     gabapentin 600 MG tablet  Commonly known as: NEURONTIN  Take 600 mg by mouth 2 (two) times daily.     glimepiride 2 MG tablet  Commonly known as: AMARYL  Take 2 mg by mouth once daily at 6am.     HYDROcodone-acetaminophen  mg per tablet  Commonly known as: NORCO  Take 1 tablet by mouth every 8 (eight) hours as needed.     iron-vitamin C 100-250 mg (ICAR-C) 100-250 mg Tab  Commonly known as: ICAR-C  Take 1 tablet by mouth once daily.     levalbuterol 1.25 mg/3 mL nebulizer solution  Commonly known  as: XOPENEX  Take 3 mLs by nebulization every 4 to 6 hours as needed.     levoFLOXacin 750 MG tablet  Commonly known as: LEVAQUIN  Take 750 mg by mouth once daily.     ondansetron 8 MG tablet  Commonly known as: ZOFRAN  Take 1 tablet (8 mg total) by mouth every 8 (eight) hours as needed for Nausea.     ondansetron 8 MG Tbdl  Commonly known as: ZOFRAN-ODT  1 tablet on the tongue and allow to dissolve  as needed Orally Once a day     promethazine 12.5 MG Tab  Commonly known as: PHENERGAN  Take 1 tablet (12.5 mg total) by mouth every 6 (six) hours as needed.     traZODone 300 MG tablet  Commonly known as: DESYREL  Take 300 mg by mouth every evening.     valACYclovir 1000 MG tablet  Commonly known as: VALTREX  Take 1,000 mg by mouth once daily.            STOP taking these medications      metoprolol tartrate 25 MG tablet  Commonly known as: LOPRESSOR              Indwelling Lines/Drains at time of discharge:   Lines/Drains/Airways       None                   Time spent on the discharge of patient: 35 minutes         David Julien MD  Department of Hospital Medicine  [facility] - Emergency Dept

## 2023-02-03 NOTE — NURSING
D/C patient with education and new medication information. All questions answered by patient and wife, both verbalized understanding.     PIV removed with catheter intact and no bleeding.     Pt refused wheelchair and wanted to ambulate to car with wife.

## 2023-02-03 NOTE — SUBJECTIVE & OBJECTIVE
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     Right-sided Bell's palsy 2009    Spondylolisthesis     Varicose veins of legs     Venous insufficiency        Past Surgical History:   Procedure Laterality Date    Athroscopic surgery      On Knee    BIOPSY OF TISSUE OF NECK Left 08/2015    Recuurence CLL/small cell lyphoma    ESOPHAGEAL DILATION      ESOPHAGOGASTRODUODENOSCOPY N/A 4/15/2022    Procedure: EGD (ESOPHAGOGASTRODUODENOSCOPY);  Surgeon: Siddharth Garcia MD;  Location: [facility];  Service: Endoscopy;  Laterality: N/A;    ESOPHAGOGASTRODUODENOSCOPY N/A 4/16/2022    Procedure: EGD (ESOPHAGOGASTRODUODENOSCOPY);  Surgeon: Siddharth Garcia MD;  Location: [facility];  Service: Endoscopy;  Laterality: N/A;    ESOPHAGOGASTRODUODENOSCOPY N/A 6/23/2022    Procedure: EGD (ESOPHAGOGASTRODUODENOSCOPY);  Surgeon: Jefferson Hyman MD;  Location: [facility];  Service: Endoscopy;  Laterality: N/A;    ESOPHAGOGASTRODUODENOSCOPY W/ PEG N/A 4/16/2022    Procedure: EGD, WITH PEG TUBE INSERTION;  Surgeon: Siddharth Garcia MD;  Location: [facility];  Service: Endoscopy;  Laterality: N/A;    GASTROSTOMY TUBE PLACEMENT  04/16/2022    20 Fr (bumper initially at 3.5)    Hemorrhoid resection  1979    LUNG LOBECTOMY Left 2004    NECK SURGERY  04/08/2022    Anterior and Posterior C3-C7 fusion    OTHER SURGICAL HISTORY  2006    Chemotherapy s/p lyphoma         Portacath implantation and removal      reverse shoulder arthroplasty Right 03/2021       Review of patient's allergies indicates:  No Known Allergies    Current Facility-Administered Medications on File Prior to Encounter   Medication    [COMPLETED] acetaminophen Susp 650 mg    [COMPLETED] cloNIDine tablet 0.1 mg    [COMPLETED] cloNIDine tablet 0.1 mg    [COMPLETED] dexAMETHasone (DECADRON) 20 mg in sodium chloride 0.9% 50 mL IVPB    [COMPLETED] diphenhydrAMINE (BENADRYL) 50 mg in NS 50 mL IVPB    [COMPLETED] oBINutuzumab (GAZYVA) 900 mg in sodium chloride 0.9% 321 mL chemo infusion    [COMPLETED] sodium chloride 0.9% 250 mL flush bag    [DISCONTINUED] diphenhydrAMINE injection 50 mg    [DISCONTINUED] EPINEPHrine (EPIPEN) 0.3 mg/0.3 mL pen injection 0.3 mg    [DISCONTINUED] hydrocortisone sodium succinate injection 100 mg    [DISCONTINUED] sodium chloride 0.9% flush 10 mL     Current Outpatient Medications on File Prior to Encounter   Medication Sig    allopurinoL (ZYLOPRIM) 300 MG tablet Take 1 tablet (300 mg total) by mouth once daily.    atorvastatin (LIPITOR) 20 MG tablet Take 20 mg by mouth once daily.    azelastine (ASTELIN) 137 mcg (0.1 %) nasal spray 1 spray by Nasal route 2 (two) times daily.     benzonatate (TESSALON) 100 MG capsule Take 100 mg by mouth 3 (three) times daily as needed.    ferrous sulfate (FEOSOL) 325 mg (65 mg iron) Tab tablet Take 325 mg by mouth daily with breakfast.    fluticasone propionate (FLONASE) 50 mcg/actuation nasal spray 1 spray by Each Nostril route once daily.    gabapentin (NEURONTIN) 600 MG tablet Take 600 mg by mouth 2 (two) times daily.    glimepiride (AMARYL) 2 MG tablet Take 2 mg by mouth once daily at 6am.    HYDROcodone-acetaminophen (NORCO)  mg per tablet Take 1 tablet by mouth every 8 (eight) hours as needed.    iron-vitamin C 100-250 mg, ICAR-C, (ICAR-C) 100-250 mg Tab Take 1 tablet by mouth once daily.    levalbuterol (XOPENEX) 1.25 mg/3 mL nebulizer  solution Take 3 mLs by nebulization every 4 to 6 hours as needed.     metoprolol tartrate (LOPRESSOR) 25 MG tablet 1 tablet (25 mg total) by Per G Tube route 2 (two) times daily.    ondansetron (ZOFRAN) 8 MG tablet Take 1 tablet (8 mg total) by mouth every 8 (eight) hours as needed for Nausea.    promethazine (PHENERGAN) 12.5 MG Tab Take 1 tablet (12.5 mg total) by mouth every 6 (six) hours as needed.    traZODone (DESYREL) 300 MG tablet Take 300 mg by mouth every evening.    valACYclovir (VALTREX) 1000 MG tablet Take 1,000 mg by mouth once daily.    blood sugar diagnostic Strp 1 strip by Misc.(Non-Drug; Combo Route) route 2 (two) times a day.    blood-glucose meter kit Use as instructed    dexAMETHasone (DECADRON) 4 MG Tab Take 20mg the Sunday, Monday and Tuesday (Patient taking differently: Take 20 mg by mouth once daily. Take 20mg the Sunday, Monday and Tuesday)    levoFLOXacin (LEVAQUIN) 750 MG tablet Take 750 mg by mouth once daily.    ondansetron (ZOFRAN-ODT) 8 MG TbDL 1 tablet on the tongue and allow to dissolve  as needed Orally Once a day    venetoclax (VENCLEXTA) 100 mg Tab Take 400 mg by mouth once daily.    venetoclax 10 mg-50 mg- 100 mg DsPk Take 20mg by mouth daily on days 22-28 of cycle 1. Take 50 mg by mouth daily on days 1-7 of cycle 2 Take 100 mg daily on days 8-14 of cycle 2 Take 200 mg daily on days 15-21 of cycle 2.  Take with food. (Patient taking differently: Take by mouth Take 20mg by mouth daily on days 22-28 of cycle 1. Take 50 mg by mouth daily on days 1-7 of cycle 2 Take 100 mg daily on days 8-14 of cycle 2 Take 200 mg daily on days 15-21 of cycle 2.  Take with food.)    [DISCONTINUED] esomeprazole (NEXIUM) 40 MG capsule Take 1 capsule (40 mg total) by mouth 2 (two) times daily.    [DISCONTINUED] losartan (COZAAR) 25 MG tablet Take 25 mg by mouth every evening.    [DISCONTINUED] metFORMIN (GLUCOPHAGE) 500 MG tablet Take 500 mg by mouth 2 (two) times daily with meals.     [DISCONTINUED]  metoprolol succinate (TOPROL-XL) 25 MG 24 hr tablet Take 25 mg by mouth 2 (two) times daily.      Family History       Problem Relation (Age of Onset)    Colon cancer Father    Stomach cancer Mother (80)          Tobacco Use    Smoking status: Former    Smokeless tobacco: Never   Substance and Sexual Activity    Alcohol use: Yes    Drug use: No    Sexual activity: Not Currently     Review of Systems   Constitutional:  Positive for activity change. Negative for chills, diaphoresis and fatigue.   HENT:  Negative for congestion, ear discharge, ear pain, sinus pain and sore throat.    Eyes:  Negative for pain, discharge and visual disturbance.   Respiratory:  Negative for cough, choking, chest tightness and shortness of breath.    Cardiovascular:  Negative for chest pain, palpitations and leg swelling.   Gastrointestinal:  Negative for abdominal distention, abdominal pain, blood in stool, nausea and vomiting.   Endocrine: Negative for cold intolerance and heat intolerance.   Genitourinary:  Negative for dysuria, enuresis, flank pain and hematuria.   Skin:  Negative for color change, pallor and rash.   Neurological:  Positive for weakness and light-headedness. Negative for dizziness, facial asymmetry and headaches.   Hematological:  Negative for adenopathy. Does not bruise/bleed easily.   Psychiatric/Behavioral:  Negative for agitation, behavioral problems and confusion.    All other systems reviewed and are negative.  Objective:     Vital Signs (Most Recent):  Temp: 97.1 °F (36.2 °C) (02/02/23 1552)  Pulse: (!) 55 (02/02/23 2057)  Resp: 17 (02/02/23 1552)  BP: (!) 188/85 (02/02/23 2102)  SpO2: 96 % (02/02/23 2102)   Vital Signs (24h Range):  Temp:  [97.1 °F (36.2 °C)-97.9 °F (36.6 °C)] 97.1 °F (36.2 °C)  Pulse:  [47-63] 55  Resp:  [16-18] 17  SpO2:  [95 %-99 %] 96 %  BP: (142-233)/() 188/85     Weight: 83.9 kg (185 lb)  Body mass index is 23.75 kg/m².    Physical Exam  Vitals and nursing note reviewed.    Constitutional:       Appearance: Normal appearance.   HENT:      Head: Normocephalic and atraumatic.      Right Ear: Tympanic membrane, ear canal and external ear normal.      Left Ear: Tympanic membrane, ear canal and external ear normal.      Nose: Nose normal.      Mouth/Throat:      Mouth: Mucous membranes are dry.      Pharynx: Oropharynx is clear.   Eyes:      Extraocular Movements: Extraocular movements intact.      Conjunctiva/sclera: Conjunctivae normal.      Pupils: Pupils are equal, round, and reactive to light.   Cardiovascular:      Rate and Rhythm: Normal rate and regular rhythm.      Pulses: Normal pulses.      Heart sounds: Normal heart sounds.   Pulmonary:      Effort: Pulmonary effort is normal.      Breath sounds: Normal breath sounds.   Abdominal:      General: Abdomen is flat. Bowel sounds are normal.      Palpations: Abdomen is soft.   Genitourinary:     Rectum: Normal.   Musculoskeletal:         General: Normal range of motion.      Cervical back: Normal range of motion and neck supple.   Skin:     General: Skin is warm and dry.      Capillary Refill: Capillary refill takes less than 2 seconds.   Neurological:      General: No focal deficit present.      Mental Status: He is alert and oriented to person, place, and time.   Psychiatric:         Mood and Affect: Mood normal.         Behavior: Behavior normal.         Thought Content: Thought content normal.         Judgment: Judgment normal.         CRANIAL NERVES     CN III, IV, VI   Pupils are equal, round, and reactive to light.     Significant Labs: All pertinent labs within the past 24 hours have been reviewed.  Blood Culture: No results for input(s): LABBLOO in the last 48 hours.  CMP:   Recent Labs   Lab 02/02/23  1325 02/02/23  1640   * 132*   K 4.6 4.1    101   CO2 25 24   * 301*   BUN 27* 23   CREATININE 0.8 0.8   CALCIUM 8.5* 8.4*   PROT 6.4 6.4   ALBUMIN 3.9 3.6   BILITOT 0.9 0.9   ALKPHOS 50* 42*   AST 23 21    ALT 16 16   ANIONGAP 7* 7*     Cardiac Markers:   Recent Labs   Lab 02/02/23  1640   *     Lactic Acid:   Recent Labs   Lab 02/02/23  1820   LACTATE 1.9     Lipid Panel: No results for input(s): CHOL, HDL, LDLCALC, TRIG, CHOLHDL in the last 48 hours.  Magnesium:   Recent Labs   Lab 02/02/23  1640   MG 1.8     POCT Glucose: No results for input(s): POCTGLUCOSE in the last 48 hours.  Troponin:   Recent Labs   Lab 02/02/23  1640 02/02/23  1820   TROPONINIHS 2.9 4.0     Urine Culture: No results for input(s): LABURIN in the last 48 hours.  Urine Studies:   Recent Labs   Lab 02/02/23  1837   COLORU Yellow   APPEARANCEUA Clear   PHUR 7.0   SPECGRAV 1.010   PROTEINUA Negative   GLUCUA 4+*   KETONESU Negative   BILIRUBINUA Negative   OCCULTUA Negative   NITRITE Negative   UROBILINOGEN Negative   LEUKOCYTESUR Negative   RBCUA 1   WBCUA 0   BACTERIA None       Significant Imaging: I have reviewed all pertinent imaging results/findings within the past 24 hours.

## 2023-02-03 NOTE — CONSULTS
[facility]   Cardiology Note    Consult Requested By: hospital medicine  Reason for Consult: bradycardia/HTN    SUBJECTIVE:     History of Present Illness: Pt is a 83 yo HTN who presented to the ED with bradycardia while receiving chemotherapy and was also hypertensive. He is on lopressor 25 mg BID at home. He was started on cardene drip which was discontinued this morning. BP is improved today. His heart rates are in the 50s-60s this morning. Lopressor discontinued. Labs reviewed. K 3.4 today. Cr normal. Magnesium normal. Troponins negative. T4 normal. CT head and CXR with no acute abnormality.     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     Right-sided Bell's palsy 2009    Spondylolisthesis     Varicose veins of legs     Venous insufficiency      Past Surgical History:   Procedure Laterality Date    Athroscopic surgery      On Knee    BIOPSY OF TISSUE OF NECK Left 08/2015    Recuurence CLL/small cell lyphoma    ESOPHAGEAL DILATION      ESOPHAGOGASTRODUODENOSCOPY N/A 4/15/2022    Procedure: EGD (ESOPHAGOGASTRODUODENOSCOPY);  Surgeon: Siddharth Garcia MD;  Location: [facility];  Service: Endoscopy;  Laterality: N/A;    ESOPHAGOGASTRODUODENOSCOPY N/A 4/16/2022    Procedure: EGD (ESOPHAGOGASTRODUODENOSCOPY);  Surgeon: Siddharth Garcia MD;  Location: [facility]  ENDO;  Service: Endoscopy;  Laterality: N/A;    ESOPHAGOGASTRODUODENOSCOPY N/A 2022    Procedure: EGD (ESOPHAGOGASTRODUODENOSCOPY);  Surgeon: Jefferson Hyman MD;  Location: [facility] ENDO;  Service: Endoscopy;  Laterality: N/A;    ESOPHAGOGASTRODUODENOSCOPY W/ PEG N/A 2022    Procedure: EGD, WITH PEG TUBE INSERTION;  Surgeon: Siddharth Garcia MD;  Location: [facility] ENDO;  Service: Endoscopy;  Laterality: N/A;    GASTROSTOMY TUBE PLACEMENT  2022    20 Fr (bumper initially at 3.5)    Hemorrhoid resection      LUNG LOBECTOMY Left     NECK SURGERY  2022    Anterior and Posterior C3-C7 fusion    OTHER SURGICAL HISTORY  2006    Chemotherapy s/p lyphoma        Portacath implantation and removal      reverse shoulder arthroplasty Right 2021     Family History   Problem Relation Age of Onset    Stomach cancer Mother 80         at 95    Colon cancer Father      Social History     Tobacco Use    Smoking status: Former    Smokeless tobacco: Never   Substance Use Topics    Alcohol use: Yes    Drug use: No       Review of Systems:  Review of Systems   Constitutional:  Negative for chills, diaphoresis and fever.   Respiratory:  Negative for shortness of breath.    Cardiovascular:  Negative for chest pain, palpitations and leg swelling.   Gastrointestinal:  Negative for nausea and vomiting.   All other systems reviewed and are negative.    OBJECTIVE:     Vital Signs (Most Recent)  Temp: 97.8 °F (36.6 °C) (23 07)  Pulse: 61 (23 08)  Resp: (!) 29 (23 08)  BP: (!) 140/63 (23 08)  SpO2: 96 % (23 08)    Vital Signs Range (Last 24H):  Temp:  [97.1 °F (36.2 °C)-97.9 °F (36.6 °C)]   Pulse:  [47-69]   Resp:  [16-29]   BP: (120-233)/()   SpO2:  [91 %-99 %]     I & O (Last 24H):    Intake/Output Summary (Last 24 hours) at 2/3/2023 1005  Last data filed at 2/3/2023 0404  Gross per 24 hour   Intake 400 ml   Output 2250 ml   Net -1850 ml       Current Diet:      Current Diet Order   Procedures    Diet diabetic [facility]; 1800 Calorie; Pureed     Order Specific Question:   Indicate patient location for additional diet options:     Answer:   [facility]     Order Specific Question:   Total calories:     Answer:   1800 Calorie     Order Specific Question:   Additional Diet Options:     Answer:   Pureed        Allergies:  Review of patient's allergies indicates:  No Known Allergies    Meds:  Scheduled Meds:   allopurinoL  300 mg Oral Daily    atorvastatin  20 mg Oral Daily    azelastine  1 spray Nasal BID    dexAMETHasone  20 mg Oral Daily    enoxaparin  40 mg Subcutaneous Daily    ferrous sulfate  1 tablet Oral Daily    fluticasone propionate  1 spray Each Nostril Daily    gabapentin  600 mg Oral BID    iron-vitamin C 100-250 mg (ICAR-C)  1 tablet Oral Daily    levalbuterol  3 mL Nebulization Q6H    losartan  50 mg Oral Daily    polyethylene glycol  17 g Oral Daily    sodium chloride 0.9%  3 mL Intravenous Q6H    traZODone  300 mg Oral QHS    valACYclovir  1,000 mg Oral Daily    venetoclax  400 mg Oral Daily     Continuous Infusions:   nicardipine Stopped (02/03/23 0715)     PRN Meds:acetaminophen, benzonatate, dextrose 10%, dextrose 10%, dextrose 10%, dextrose 10%, glucagon (human recombinant), glucagon (human recombinant), glucose, glucose, hydrALAZINE, insulin aspart U-100, insulin aspart U-100, magnesium oxide, magnesium oxide, melatonin, morphine, ondansetron, potassium bicarbonate, potassium bicarbonate, potassium bicarbonate, potassium, sodium phosphates, potassium, sodium phosphates, potassium, sodium phosphates, prochlorperazine    Oxygen/Ventilator Data (Last 24H):  (if applicable)        Hemodynamic Parameters (Last 24H):   (if applicable)        Laboratory and Radiology Data:  Recent Results (from the past 24 hour(s))   CBC Auto Differential    Collection Time: 02/02/23  1:25 PM   Result Value Ref Range    WBC 5.72 3.90 - 12.70 K/uL    RBC 4.07 (L) 4.60 - 6.20 M/uL     Hemoglobin 12.6 (L) 14.0 - 18.0 g/dL    Hematocrit 35.7 (L) 40.0 - 54.0 %    MCV 88 82 - 98 fL    MCH 31.0 27.0 - 31.0 pg    MCHC 35.3 32.0 - 36.0 g/dL    RDW 14.3 11.5 - 14.5 %    Platelets 44 (LL) 150 - 450 K/uL    MPV SEE COMMENT 9.2 - 12.9 fL    Immature Granulocytes 1.6 (H) 0.0 - 0.5 %    Gran # (ANC) 4.3 1.8 - 7.7 K/uL    Immature Grans (Abs) 0.09 (H) 0.00 - 0.04 K/uL    Lymph # 1.0 1.0 - 4.8 K/uL    Mono # 0.3 0.3 - 1.0 K/uL    Eos # 0.0 0.0 - 0.5 K/uL    Baso # 0.01 0.00 - 0.20 K/uL    nRBC 0 0 /100 WBC    Gran % 75.5 (H) 38.0 - 73.0 %    Lymph % 17.8 (L) 18.0 - 48.0 %    Mono % 4.9 4.0 - 15.0 %    Eosinophil % 0.0 0.0 - 8.0 %    Basophil % 0.2 0.0 - 1.9 %    Platelet Estimate Decreased (A)     Differential Method Automated    CMP    Collection Time: 02/02/23  1:25 PM   Result Value Ref Range    Sodium 133 (L) 136 - 145 mmol/L    Potassium 4.6 3.5 - 5.1 mmol/L    Chloride 101 95 - 110 mmol/L    CO2 25 23 - 29 mmol/L    Glucose 353 (H) 70 - 110 mg/dL    BUN 27 (H) 8 - 23 mg/dL    Creatinine 0.8 0.5 - 1.4 mg/dL    Calcium 8.5 (L) 8.7 - 10.5 mg/dL    Total Protein 6.4 6.0 - 8.4 g/dL    Albumin 3.9 3.5 - 5.2 g/dL    Total Bilirubin 0.9 0.1 - 1.0 mg/dL    Alkaline Phosphatase 50 (L) 55 - 135 U/L    AST 23 10 - 40 U/L    ALT 16 10 - 44 U/L    Anion Gap 7 (L) 8 - 16 mmol/L    eGFR >60.0 >60 mL/min/1.73 m^2   Iron and TIBC    Collection Time: 02/02/23  1:25 PM   Result Value Ref Range    Iron 59 45 - 160 ug/dL    Transferrin 244 200 - 375 mg/dL    TIBC 342 250 - 450 ug/dL    Saturated Iron 17 (L) 20 - 50 %   Ferritin    Collection Time: 02/02/23  1:25 PM   Result Value Ref Range    Ferritin 67 20.0 - 300.0 ng/mL   Uric acid    Collection Time: 02/02/23  1:25 PM   Result Value Ref Range    Uric Acid 2.8 (L) 3.4 - 7.0 mg/dL   Magnesium    Collection Time: 02/02/23  4:40 PM   Result Value Ref Range    Magnesium 1.8 1.6 - 2.6 mg/dL   CBC auto differential    Collection Time: 02/02/23  4:40 PM   Result Value Ref Range     WBC 4.22 3.90 - 12.70 K/uL    RBC 3.97 (L) 4.60 - 6.20 M/uL    Hemoglobin 12.7 (L) 14.0 - 18.0 g/dL    Hematocrit 35.1 (L) 40.0 - 54.0 %    MCV 88 82 - 98 fL    MCH 32.0 (H) 27.0 - 31.0 pg    MCHC 36.2 (H) 32.0 - 36.0 g/dL    RDW 14.0 11.5 - 14.5 %    Platelets 35 (LL) 150 - 450 K/uL    MPV 12.0 9.2 - 12.9 fL    Immature Granulocytes 5.2 (H) 0.0 - 0.5 %    Gran # (ANC) 3.0 1.8 - 7.7 K/uL    Immature Grans (Abs) 0.22 (H) 0.00 - 0.04 K/uL    Lymph # 0.6 (L) 1.0 - 4.8 K/uL    Mono # 0.4 0.3 - 1.0 K/uL    Eos # 0.0 0.0 - 0.5 K/uL    Baso # 0.00 0.00 - 0.20 K/uL    nRBC 0 0 /100 WBC    Gran % 70.4 38.0 - 73.0 %    Lymph % 15.2 (L) 18.0 - 48.0 %    Mono % 9.2 4.0 - 15.0 %    Eosinophil % 0.0 0.0 - 8.0 %    Basophil % 0.0 0.0 - 1.9 %    Platelet Estimate Decreased (A)     Differential Method Automated    Comprehensive metabolic panel    Collection Time: 02/02/23  4:40 PM   Result Value Ref Range    Sodium 132 (L) 136 - 145 mmol/L    Potassium 4.1 3.5 - 5.1 mmol/L    Chloride 101 95 - 110 mmol/L    CO2 24 23 - 29 mmol/L    Glucose 301 (H) 70 - 110 mg/dL    BUN 23 8 - 23 mg/dL    Creatinine 0.8 0.5 - 1.4 mg/dL    Calcium 8.4 (L) 8.7 - 10.5 mg/dL    Total Protein 6.4 6.0 - 8.4 g/dL    Albumin 3.6 3.5 - 5.2 g/dL    Total Bilirubin 0.9 0.1 - 1.0 mg/dL    Alkaline Phosphatase 42 (L) 55 - 135 U/L    AST 21 10 - 40 U/L    ALT 16 10 - 44 U/L    Anion Gap 7 (L) 8 - 16 mmol/L    eGFR >60.0 >60 mL/min/1.73 m^2   Troponin I High Sensitivity #1    Collection Time: 02/02/23  4:40 PM   Result Value Ref Range    Troponin I High Sensitivity 2.9 0.0 - 14.9 pg/mL   B-Type natriuretic peptide (BNP)    Collection Time: 02/02/23  4:40 PM   Result Value Ref Range     (H) 0 - 99 pg/mL   Blood culture #1 **CANNOT BE ORDERED STAT**    Collection Time: 02/02/23  4:40 PM    Specimen: Peripheral, Forearm, Right; Blood   Result Value Ref Range    Blood Culture, Routine No Growth to date    TSH    Collection Time: 02/02/23  4:40 PM   Result Value  Ref Range    TSH 0.080 (L) 0.340 - 5.600 uIU/mL   T4, Free    Collection Time: 02/02/23  4:40 PM   Result Value Ref Range    Free T4 0.74 0.71 - 1.51 ng/dL   Troponin I High Sensitivity #2    Collection Time: 02/02/23  6:20 PM   Result Value Ref Range    Troponin I High Sensitivity 4.0 0.0 - 14.9 pg/mL   Lactic acid, plasma    Collection Time: 02/02/23  6:20 PM   Result Value Ref Range    Lactate (Lactic Acid) 1.9 0.5 - 1.9 mmol/L   Blood culture #2 **CANNOT BE ORDERED STAT**    Collection Time: 02/02/23  6:20 PM    Specimen: Peripheral, Antecubital, Left; Blood   Result Value Ref Range    Blood Culture, Routine No Growth to date    COVID-19 Rapid Screening    Collection Time: 02/02/23  6:23 PM   Result Value Ref Range    SARS-CoV-2 RNA, Amplification, Qual Negative Negative   Influenza antigen Nasopharyngeal Swab    Collection Time: 02/02/23  6:23 PM   Result Value Ref Range    Influenza A, Molecular Negative Negative    Influenza B, Molecular Negative Negative    Flu A & B Source Nasal swab    Urinalysis, Reflex to Urine Culture Urine, Clean Catch    Collection Time: 02/02/23  6:37 PM    Specimen: Urine   Result Value Ref Range    Specimen UA Urine, Clean Catch     Color, UA Yellow Yellow, Straw, Caitlyn    Appearance, UA Clear Clear    pH, UA 7.0 5.0 - 8.0    Specific Gravity, UA 1.010 1.005 - 1.030    Protein, UA Negative Negative    Glucose, UA 4+ (A) Negative    Ketones, UA Negative Negative    Bilirubin (UA) Negative Negative    Occult Blood UA Negative Negative    Nitrite, UA Negative Negative    Urobilinogen, UA Negative Negative EU/dL    Leukocytes, UA Negative Negative   Urinalysis Microscopic    Collection Time: 02/02/23  6:37 PM   Result Value Ref Range    RBC, UA 1 0 - 4 /hpf    WBC, UA 0 0 - 5 /hpf    Bacteria None None-Occ /hpf    Yeast, UA None None    Microscopic Comment SEE COMMENT    POCT glucose    Collection Time: 02/02/23 10:39 PM   Result Value Ref Range    POC Glucose 220 (H) 70 - 110   POCT  glucose    Collection Time: 02/03/23  1:16 AM   Result Value Ref Range    POC Glucose 171 (H) 70 - 110   CBC auto differential    Collection Time: 02/03/23  3:48 AM   Result Value Ref Range    WBC 4.21 3.90 - 12.70 K/uL    RBC 3.95 (L) 4.60 - 6.20 M/uL    Hemoglobin 12.5 (L) 14.0 - 18.0 g/dL    Hematocrit 34.7 (L) 40.0 - 54.0 %    MCV 88 82 - 98 fL    MCH 31.6 (H) 27.0 - 31.0 pg    MCHC 36.0 32.0 - 36.0 g/dL    RDW 13.7 11.5 - 14.5 %    Platelets 32 (LL) 150 - 450 K/uL    MPV SEE COMMENT 9.2 - 12.9 fL    Immature Granulocytes 5.0 (H) 0.0 - 0.5 %    Gran # (ANC) 2.9 1.8 - 7.7 K/uL    Immature Grans (Abs) 0.21 (H) 0.00 - 0.04 K/uL    Lymph # 0.8 (L) 1.0 - 4.8 K/uL    Mono # 0.3 0.3 - 1.0 K/uL    Eos # 0.0 0.0 - 0.5 K/uL    Baso # 0.01 0.00 - 0.20 K/uL    nRBC 0 0 /100 WBC    Gran % 68.4 38.0 - 73.0 %    Lymph % 19.5 18.0 - 48.0 %    Mono % 6.9 4.0 - 15.0 %    Eosinophil % 0.0 0.0 - 8.0 %    Basophil % 0.2 0.0 - 1.9 %    Differential Method Automated    Basic metabolic panel    Collection Time: 02/03/23  3:48 AM   Result Value Ref Range    Sodium 135 (L) 136 - 145 mmol/L    Potassium 3.4 (L) 3.5 - 5.1 mmol/L    Chloride 101 95 - 110 mmol/L    CO2 26 23 - 29 mmol/L    Glucose 232 (H) 70 - 110 mg/dL    BUN 21 8 - 23 mg/dL    Creatinine 0.7 0.5 - 1.4 mg/dL    Calcium 8.5 (L) 8.7 - 10.5 mg/dL    Anion Gap 8 8 - 16 mmol/L    eGFR >60.0 >60 mL/min/1.73 m^2   POCT glucose    Collection Time: 02/03/23  7:08 AM   Result Value Ref Range    POC Glucose 167 (H) 70 - 110     Imaging Results              X-Ray Chest AP Portable (Final result)  Result time 02/02/23 19:51:56      Final result by Jeniffer Arredondo MD (02/02/23 19:51:56)                   Narrative:    XR CHEST 1 VIEW    CLINICAL HISTORY:  82 years Male hypertension    COMPARISON: 1/20/2023    FINDINGS: Cardiomediastinal silhouette is within normal limits. Lungs are normally expanded with no airspace consolidation. No pleural effusion or pneumothorax. There are old  left rib fractures.    IMPRESSION: No acute pulmonary process.    Electronically signed by:  Jeniffer Arredondo MD  2/2/2023 7:51 PM CST Workstation: [id number]                                     CT Head Without Contrast (Final result)  Result time 02/02/23 18:26:16      Final result by Jeniffer Arredondo MD (02/02/23 18:26:16)                   Narrative:    CMS MANDATED QUALITY DATA - CT RADIATION  436    All CT scans at this facility utilize dose modulation, iterative reconstruction, and/or weight based dosing when appropriate to reduce radiation dose to as low as reasonably achievable.  CT head without contrast    Clinical data: Dizziness    COMPARISON: 7/2/2021    FINDINGS: Noninfusion images were obtained from the skull base to the vertex. There is no intracranial mass, hemorrhage, or midline shift. Ventricles and sulci are mildly prominent. There are no pathologic extra-axial fluid collections. There is no evidence of cortical ischemic change. Changes of mild chronic microvascular white matter disease are noted. Cerebellum and brainstem are normal. The calvarium is intact.    IMPRESSION:    1. No acute intracranial abnormalities. Chronic findings as discussed above.    Electronically signed by:  Jeniffer Arredondo MD  2/2/2023 6:26 PM CST Workstation: [id number]                                    12-lead EKG interpretation:  (if applicable)      Current Cardiac Rhythm:   (if applicable)    Physical Exam:   Physical Exam  Vitals and nursing note reviewed.   Constitutional:       General: He is not in acute distress.     Appearance: Normal appearance. He is not ill-appearing.   Cardiovascular:      Rate and Rhythm: Normal rate and regular rhythm.      Pulses: Normal pulses.      Heart sounds: Normal heart sounds. No murmur heard.  Pulmonary:      Effort: Pulmonary effort is normal. No respiratory distress.      Breath sounds: Normal breath sounds.   Musculoskeletal:      Right lower leg: No edema.      Left  lower leg: No edema.   Skin:     General: Skin is warm and dry.   Neurological:      Mental Status: He is alert and oriented to person, place, and time.       ASSESSMENT/PLAN:   Assessment:   Bradycardia - HR 50-60s bpm today  Hypertension - cardene gtt stopped. BP improved.     Plan:   D/c lopressor.  Start losartan 50 mg.   Patient can be discharged from a cardiac standpoint. He will need to follow up in clinic in 1 week.     Thank you for your consult.

## 2023-02-03 NOTE — PROGRESS NOTES
Therapy with Vancomycin complete and / or consult / order discontinued by Dr Julien on 2/3/23 @ 0844     Pharmacy will sign off, please re-consult as needed.  Thank you for allowing us to participate in this patient's care.  Ihsan Pisano 2/3/2023 8:55 AM  Dept of Pharmacy  Ext [phone]

## 2023-02-03 NOTE — PROGRESS NOTES
Pharmacokinetic Initial Assessment: IV Vancomycin    Assessment/Plan:    Initiate intravenous vancomycin with loading dose of 1500 mg once followed by a maintenance dose of vancomycin 1500 mg IV every 12 hours  Desired empiric serum trough concentration is 10 to 15 mcg/mL  Draw vancomycin trough level 60 min prior to fourth dose on 02/04/23 at approximately 12:00  Pharmacy will continue to follow and monitor vancomycin.      Please contact pharmacy at extension [phone] with any questions regarding this assessment.     Thank you for the consult,   Silvestre Wiggins       Patient brief summary:  Conrad Kuhn is a 82 y.o. male initiated on antimicrobial therapy with IV Vancomycin for treatment of suspected  pneumonia    Drug Allergies:   Review of patient's allergies indicates:  No Known Allergies    Actual Body Weight:   83.9 kg    Renal Function:   Estimated Creatinine Clearance: 93 mL/min (based on SCr of 0.7 mg/dL).,     Dialysis Method (if applicable):  N/A    CBC (last 72 hours):  Recent Labs   Lab Result Units 02/02/23  1325 02/02/23  1640 02/03/23  0348   WBC K/uL 5.72 4.22 4.21   Hemoglobin g/dL 12.6* 12.7* 12.5*   Hematocrit % 35.7* 35.1* 34.7*   Platelets K/uL 44* 35* 32*   Gran % % 75.5* 70.4 68.4   Lymph % % 17.8* 15.2* 19.5   Mono % % 4.9 9.2 6.9   Eosinophil % % 0.0 0.0 0.0   Basophil % % 0.2 0.0 0.2   Differential Method  Automated Automated Automated       Metabolic Panel (last 72 hours):  Recent Labs   Lab Result Units 02/02/23  1325 02/02/23  1640 02/02/23  1837 02/03/23  0348   Sodium mmol/L 133* 132*  --  135*   Potassium mmol/L 4.6 4.1  --  3.4*   Chloride mmol/L 101 101  --  101   CO2 mmol/L 25 24  --  26   Glucose mg/dL 353* 301*  --  232*   Glucose, UA   --   --  4+*  --    BUN mg/dL 27* 23  --  21   Creatinine mg/dL 0.8 0.8  --  0.7   Albumin g/dL 3.9 3.6  --   --    Total Bilirubin mg/dL 0.9 0.9  --   --    Alkaline Phosphatase U/L 50* 42*  --   --    AST U/L 23 21  --   --    ALT U/L 16 16  --    --    Magnesium mg/dL  --  1.8  --   --        Drug levels (last 3 results):  No results for input(s): VANCOMYCINRA, VANCORANDOM, VANCOMYCINPE, VANCOPEAK, VANCOMYCINTR, VANCOTROUGH in the last 72 hours.    Microbiologic Results:  Microbiology Results (last 7 days)       Procedure Component Value Units Date/Time    Blood culture #1 **CANNOT BE ORDERED STAT** [[id number]] Collected: 02/02/23 1640    Order Status: Completed Specimen: Blood from Peripheral, Forearm, Right Updated: 02/03/23 0117     Blood Culture, Routine No Growth to date    Blood culture #2 **CANNOT BE ORDERED STAT** [[id number]] Collected: 02/02/23 1820    Order Status: Completed Specimen: Blood from Peripheral, Antecubital, Left Updated: 02/03/23 0117     Blood Culture, Routine No Growth to date

## 2023-02-03 NOTE — PLAN OF CARE
[facility] - Emergency Dept  Initial Discharge Assessment       Primary Care Provider: Johnson Erazo MD    Admission Diagnosis: Secondary hypertension [I15.9]    Admission Date: 2/2/2023  Expected Discharge Date: 2/3/2023    Discharge Barriers Identified: None    Discharge assessment completed by chart review. Information verified as correct on facesheet. Per last hospital stay, patient denies HD (dialysis) and coumadin. Patient reports being active with [facility] and would like to resume  services with their agency on discharge. Patient reports independence in ADLs with the assistance of his rollator. CM to follow for needs      Payor: MEDICARE / Plan: MEDICARE PART A & B / Product Type: Government /     Extended Emergency Contact Information  Primary Emergency Contact: Peri Kuhn  Address: [address] [place] [address] [id number] [place] [address]  Home Phone: [phone]  Mobile Phone: [phone]  Relation: Spouse    Discharge Plan A: Home, Home with family, Home Health  Discharge Plan B: Home, Home with family, Home Health      [id number] - [place], [place] - [address] AT [facility] [address]  [address]  [facility] [address]  Phone: [phone] Fax: [phone]    [facility] [place]  LUIS FERNANDO Re[place] - [address] [place]  [address] [facility] [address] [place] [address]  Phone: [phone] Fax: [phone]      Initial Assessment (most recent)       Adult Discharge Assessment - 02/03/23 0934          Discharge Assessment    Assessment Type Discharge Planning Assessment     Confirmed/corrected address, phone number and insurance Yes     Confirmed Demographics Correct on Facesheet     Source of Information health record     Communicated MARTELL with patient/caregiver Date not available/Unable to determine     Reason For Admission hypertension     People in Home spouse     Facility Arrived From: home     Do you  expect to return to your current living situation? Yes     Do you have help at home or someone to help you manage your care at home? Yes     Who are your caregiver(s) and their phone number(s)? Peri Kuhn (Spouse)   [phone]     Prior to hospitilization cognitive status: Unable to Assess     Current cognitive status: Unable to Assess     Walking or Climbing Stairs ambulation difficulty, requires equipment     Mobility Management rollator     Dressing/Bathing bathing difficulty, requires equipment     Dressing/Bathing Management grab bars     Home Accessibility wheelchair accessible     Home Layout Able to live on 1st floor     Equipment Currently Used at Home grab bar;nebulizer;glucometer;rollator     Readmission within 30 days? No     Patient currently being followed by outpatient case management? No     Do you currently have service(s) that help you manage your care at home? Yes     Name and Contact number of agency [facility]     Is the pt/caregiver preference to resume services with current agency Yes     Do you take prescription medications? Yes     Do you have prescription coverage? Yes     Coverage Medicare A&B     Do you have any problems affording any of your prescribed medications? No     Is the patient taking medications as prescribed? yes     Who is going to help you get home at discharge? Peri Kuhn (Spouse)   [phone]     How do you get to doctors appointments? family or friend will provide     Are you on dialysis? No     Do you take coumadin? No     Discharge Plan A Home;Home with family;Home Health     Discharge Plan B Home;Home with family;Home Health     DME Needed Upon Discharge  none     Discharge Barriers Identified None

## 2023-02-03 NOTE — TELEPHONE ENCOUNTER
Therapy appropriate, Venclexta + Gazyva for CLL. [place] not required. Copay $65 each (both starter pack and continuation)     Benefits investigation 2023:   Patient Coverage Type(s) Primary Insurance    Primary Plan - Coverage Line Number 4    Primary Plan - Name RX [employer]    Primary Plan - Payor Segment Commercial    Primary Plan - Annual Deductible Amount ($) 0    Primary Plan - Annual Out of Packet (OOP) Maximum ($) 6,000    Primary Plan - Estimated Copay ($) 65    Primary Plan - [facility] Status In network    Benefits Status Complete    NoGo Referral? No    Financial Assistance Status Not Required

## 2023-02-03 NOTE — PLAN OF CARE
Discharge orders and chart reviewed with no further post-acute discharge needs identified at this time.  At this time, patient is cleared for discharge from Case Management standpoint.        02/03/23 1159   Final Note   Assessment Type Final Discharge Note   Anticipated Discharge Disposition Home   Hospital Resources/Appts/Education Provided   (Patient instructed to make post hospital follow up appointment with their PCP in 7 to 10 days from discharge)   Post-Acute Status   Coverage Medicare A and B   Discharge Delays None known at this time

## 2023-02-06 PROBLEM — J96.01 ACUTE HYPOXEMIC RESPIRATORY FAILURE: Status: RESOLVED | Noted: 2022-04-10 | Resolved: 2023-01-01

## 2023-02-06 PROBLEM — A41.9 SEPSIS: Status: RESOLVED | Noted: 2021-06-29 | Resolved: 2023-01-01

## 2023-02-06 NOTE — TELEPHONE ENCOUNTER
Marion Fung. Mary confirmed patient will need venclexta on day 22 of cycle. Anticipated start date is 2/24. Pending initial consult to 1/16.

## 2023-02-07 NOTE — TELEPHONE ENCOUNTER
Plt 37 reported by Nila at  lab, Dr. Bai made aware and per his verbal orders I made Evelyn aware that OK to treat tomorrow as scheduled, verbalized understanding.

## 2023-02-08 NOTE — PROGRESS NOTES
Medical Nutrition Therapy Oncology Progress Note      Patient's PCP:Johnson Erazo MD  Referring Provider: No ref. provider found  Subjective:        Patient ID: Conrad Kuhn is a 82 y.o. male.    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     Right-sided Bell's palsy 2009    Spondylolisthesis     Varicose veins of legs     Venous insufficiency        Past Surgical History:   Procedure Laterality Date    Athroscopic surgery      On Knee    BIOPSY OF TISSUE OF NECK Left 08/2015    Recuurence CLL/small cell lyphoma    ESOPHAGEAL DILATION      ESOPHAGOGASTRODUODENOSCOPY N/A 4/15/2022    Procedure: EGD (ESOPHAGOGASTRODUODENOSCOPY);  Surgeon: Siddharth Garcia MD;  Location: [facility];  Service: Endoscopy;  Laterality: N/A;    ESOPHAGOGASTRODUODENOSCOPY N/A 4/16/2022    Procedure: EGD (ESOPHAGOGASTRODUODENOSCOPY);  Surgeon: Siddharth Garcia MD;  Location: [facility];  Service: Endoscopy;  Laterality: N/A;    ESOPHAGOGASTRODUODENOSCOPY N/A 6/23/2022    Procedure: EGD (ESOPHAGOGASTRODUODENOSCOPY);  Surgeon: Jefferson Hyman MD;  Location: [facility];  Service: Endoscopy;  Laterality: N/A;    ESOPHAGOGASTRODUODENOSCOPY W/ PEG N/A 4/16/2022    Procedure: EGD, WITH PEG TUBE INSERTION;  Surgeon: Siddharth Garcia MD;  Location: [facility];  Service: Endoscopy;  Laterality:  N/A;    GASTROSTOMY TUBE PLACEMENT  2022    20 Fr (bumper initially at 3.5)    Hemorrhoid resection  1979    LUNG LOBECTOMY Left 2004    NECK SURGERY  2022    Anterior and Posterior C3-C7 fusion    OTHER SURGICAL HISTORY  2006    Chemotherapy s/p lyphoma        Portacath implantation and removal      reverse shoulder arthroplasty Right 2021       Social History     Socioeconomic History    Marital status:    Tobacco Use    Smoking status: Former    Smokeless tobacco: Never   Substance and Sexual Activity    Alcohol use: Yes    Drug use: No    Sexual activity: Not Currently     Social Determinants of Health     Financial Resource Strain: Unknown    Difficulty of Paying Living Expenses: Patient refused   Food Insecurity: Unknown    Worried About Running Out of Food in the Last Year: Patient refused    Ran Out of Food in the Last Year: Patient refused   Transportation Needs: Unknown    Lack of Transportation (Medical): Patient refused    Lack of Transportation (Non-Medical): Patient refused   Physical Activity: Unknown    Days of Exercise per Week: Patient refused    Minutes of Exercise per Session: Patient refused   Stress: Unknown    Feeling of Stress : Patient refused   Social Connections: Unknown    Frequency of Communication with Friends and Family: Patient refused    Frequency of Social Gatherings with Friends and Family: Patient refused    Attends Mandaen Services: Patient refused    Active Member of Clubs or Organizations: Patient refused    Attends Club or Organization Meetings: Patient refused    Marital Status: Patient refused   Housing Stability: Unknown    Unable to Pay for Housing in the Last Year: Patient refused    Number of Places Lived in the Last Year: 1    Unstable Housing in the Last Year: Patient refused       Family History   Problem Relation Age of Onset    Stomach cancer Mother 80         at 95    Colon cancer Father        Review of patient's allergies  indicates:  No Known Allergies    Current Outpatient Medications:     allopurinoL (ZYLOPRIM) 300 MG tablet, Take 1 tablet (300 mg total) by mouth once daily., Disp: 30 tablet, Rfl: 6    atorvastatin (LIPITOR) 20 MG tablet, Take 20 mg by mouth once daily., Disp: , Rfl:     azelastine (ASTELIN) 137 mcg (0.1 %) nasal spray, 1 spray by Nasal route 2 (two) times daily. , Disp: , Rfl:     benzonatate (TESSALON) 100 MG capsule, Take 100 mg by mouth 3 (three) times daily as needed., Disp: , Rfl:     blood sugar diagnostic Strp, 1 strip by Misc.(Non-Drug; Combo Route) route 2 (two) times a day., Disp: , Rfl:     blood-glucose meter kit, Use as instructed, Disp: , Rfl:     dexAMETHasone (DECADRON) 4 MG Tab, Take 20mg the Sunday, Monday and Tuesday (Patient taking differently: Take 20 mg by mouth once daily. Take 20mg the Sunday, Monday and Tuesday), Disp: 60 tablet, Rfl: 0    ferrous sulfate (FEOSOL) 325 mg (65 mg iron) Tab tablet, Take 325 mg by mouth daily with breakfast., Disp: , Rfl:     fluticasone propionate (FLONASE) 50 mcg/actuation nasal spray, 1 spray by Each Nostril route once daily., Disp: , Rfl:     gabapentin (NEURONTIN) 600 MG tablet, Take 600 mg by mouth 2 (two) times daily., Disp: , Rfl:     glimepiride (AMARYL) 2 MG tablet, Take 2 mg by mouth once daily at 6am., Disp: , Rfl:     HYDROcodone-acetaminophen (NORCO)  mg per tablet, Take 1 tablet by mouth every 8 (eight) hours as needed., Disp: , Rfl:     iron-vitamin C 100-250 mg, ICAR-C, (ICAR-C) 100-250 mg Tab, Take 1 tablet by mouth once daily., Disp: 30 each, Rfl: 6    levalbuterol (XOPENEX) 1.25 mg/3 mL nebulizer solution, Take 3 mLs by nebulization every 4 to 6 hours as needed. , Disp: , Rfl:     levoFLOXacin (LEVAQUIN) 750 MG tablet, Take 750 mg by mouth once daily., Disp: , Rfl:     losartan (COZAAR) 50 MG tablet, Take 1 tablet (50 mg total) by mouth once daily., Disp: 90 tablet, Rfl: 0    ondansetron (ZOFRAN) 8 MG tablet, Take 1 tablet (8 mg  total) by mouth every 8 (eight) hours as needed for Nausea., Disp: 30 tablet, Rfl: 2    ondansetron (ZOFRAN-ODT) 8 MG TbDL, 1 tablet on the tongue and allow to dissolve  as needed Orally Once a day, Disp: , Rfl:     promethazine (PHENERGAN) 12.5 MG Tab, Take 1 tablet (12.5 mg total) by mouth every 6 (six) hours as needed., Disp: 30 tablet, Rfl: 3    traZODone (DESYREL) 300 MG tablet, Take 300 mg by mouth every evening., Disp: , Rfl:     valACYclovir (VALTREX) 1000 MG tablet, Take 1,000 mg by mouth once daily., Disp: , Rfl:     venetoclax (VENCLEXTA) 100 mg Tab, Take 400 mg by mouth once daily., Disp: 120 tablet, Rfl: 11    venetoclax 10 mg-50 mg- 100 mg DsPk, Take 20mg by mouth daily on days 22-28 of cycle 1. Take 50 mg by mouth daily on days 1-7 of cycle 2 Take 100 mg daily on days 8-14 of cycle 2 Take 200 mg daily on days 15-21 of cycle 2.  Take with food. (Patient taking differently: Take by mouth Take 20mg by mouth daily on days 22-28 of cycle 1. Take 50 mg by mouth daily on days 1-7 of cycle 2 Take 100 mg daily on days 8-14 of cycle 2 Take 200 mg daily on days 15-21 of cycle 2.  Take with food.), Disp: 42 tablet, Rfl: 0  No current facility-administered medications for this visit.    Facility-Administered Medications Ordered in Other Visits:     sodium chloride 0.9% 250 mL flush bag, , Intravenous, 1 time in Clinic/HOD, Drew Bai MD    sodium chloride 0.9% flush 10 mL, 10 mL, Intravenous, PRN, Drew Bai MD    All medications and past history have been reviewed.    OP CLL VENETOCLAX OBINUTUZUMAB Q4W      Treatment Goal:   Control      Status:   Active      Start Date:   2/1/2023      End Date:   6/21/2023 (Planned)      Provider:   Drew Bai MD      Chemotherapy:   oBINutuzumab (GAZYVA) 100 mg in sodium chloride 0.9% 119 mL chemo infusion, 100 mg, Intravenous, Clinic/HOD 1 time, 1 of 6 cycles    Administration: 100 mg (2/1/2023), 900 mg (2/2/2023), 1,000 mg  (2/8/2023)      Objective:      Wt Readings from Last 20 Encounters:   02/08/23 81 kg (178 lb 8 oz)   02/02/23 83.9 kg (185 lb)   02/02/23 84.7 kg (186 lb 11.2 oz)   02/01/23 85 kg (187 lb 6.4 oz)   01/30/23 85.1 kg (187 lb 11.2 oz)   01/27/23 86.5 kg (190 lb 9.6 oz)   01/26/23 85.8 kg (189 lb 3.2 oz)   01/20/23 86.2 kg (190 lb)   01/09/23 85.7 kg (189 lb)   12/08/22 90.7 kg (200 lb)   12/02/22 88.9 kg (196 lb)   11/23/22 88 kg (194 lb 1.6 oz)   11/09/22 84.8 kg (186 lb 14.4 oz)   11/04/22 89.8 kg (198 lb)   09/29/22 89.8 kg (198 lb)   08/29/22 89.8 kg (198 lb)   08/11/22 88 kg (194 lb)   08/03/22 87.6 kg (193 lb 2 oz)   07/11/22 89.9 kg (198 lb 4.8 oz)   07/02/22 90.7 kg (200 lb)       Last Labs:  Last Labs:  Glucose   Date Value Ref Range Status   02/07/2023 218 (H) 70 - 110 mg/dL Final   02/03/2023 232 (H) 70 - 110 mg/dL Final   06/12/2019 179 (H) 70 - 99 mg/dL    04/11/2019 181 (H) 70 - 99 mg/dL      BUN   Date Value Ref Range Status   02/07/2023 29 (H) 8 - 23 mg/dL Final   02/03/2023 21 8 - 23 mg/dL Final     Creatinine   Date Value Ref Range Status   02/07/2023 0.9 0.5 - 1.4 mg/dL Final   02/03/2023 0.7 0.5 - 1.4 mg/dL Final   06/12/2019 0.95 0.60 - 1.40 mg/dL    04/11/2019 1.04 0.60 - 1.40 mg/dL      Sodium   Date Value Ref Range Status   02/07/2023 133 (L) 136 - 145 mmol/L Final   02/03/2023 135 (L) 136 - 145 mmol/L Final   06/12/2019 138 134 - 144 mmol/L    04/11/2019 139 134 - 144 mmol/L      Potassium   Date Value Ref Range Status   02/07/2023 4.4 3.5 - 5.1 mmol/L Final   02/03/2023 3.4 (L) 3.5 - 5.1 mmol/L Final     Phosphorus   Date Value Ref Range Status   11/09/2022 2.3 (L) 2.7 - 4.5 mg/dL Final   04/18/2022 2.4 (L) 2.7 - 4.5 mg/dL Final     Calcium   Date Value Ref Range Status   02/07/2023 9.0 8.7 - 10.5 mg/dL Final   02/03/2023 8.5 (L) 8.7 - 10.5 mg/dL Final     No results found for: PREALBUMIN  Total Protein   Date Value Ref Range Status   02/07/2023 6.5 6.0 - 8.4 g/dL Final   02/02/2023 6.4 6.0 -  8.4 g/dL Final     Cholesterol   Date Value Ref Range Status   12/09/2019 164 120 - 199 mg/dL Final     Comment:     The National Cholesterol Education Program (NCEP) has set the  following guidelines (reference ranges) for Cholesterol:  Optimal.....................<200 mg/dL  Borderline High.............200-239 mg/dL  High........................> or = 240 mg/dL       Hemoglobin A1C   Date Value Ref Range Status   12/22/2020 6.6 (H) 4.5 - 6.2 % Final     Comment:     According to ADA guidelines, hemoglobin A1C <7.0% represents  optimal control in non-pregnant diabetic patients.  Different  metrics may apply to specific populations.   Standards of Medical Care in Diabetes - 2016.  For the purpose of screening for the presence of diabetes:  <5.7%     Consistent with the absence of diabetes  5.7-6.4%  Consistent with increasing risk for diabetes   (prediabetes)  >or=6.5%  Consistent with diabetes  Currently no consensus exists for use of hemoglobin A1C  for diagnosis of diabetes for children.     03/03/2020 6.3 (H) 4.5 - 6.2 % Final     Comment:     According to ADA guidelines, hemoglobin A1C <7.0% represents  optimal control in non-pregnant diabetic patients.  Different  metrics may apply to specific populations.   Standards of Medical Care in Diabetes - 2016.  For the purpose of screening for the presence of diabetes:  <5.7%     Consistent with the absence of diabetes  5.7-6.4%  Consistent with increasing risk for diabetes   (prediabetes)  >or=6.5%  Consistent with diabetes  Currently no consensus exists for use of hemoglobin A1C  for diagnosis of diabetes for children.       Hemoglobin   Date Value Ref Range Status   02/07/2023 13.5 (L) 14.0 - 18.0 g/dL Final   02/03/2023 12.5 (L) 14.0 - 18.0 g/dL Final     POC Hematocrit   Date Value Ref Range Status   04/11/2022 28 (L) 36 - 54 %PCV Final   04/10/2022 29 (L) 36 - 54 %PCV Final     Hematocrit   Date Value Ref Range Status   02/07/2023 37.7 (L) 40.0 - 54.0 % Final    02/03/2023 34.7 (L) 40.0 - 54.0 % Final     Iron   Date Value Ref Range Status   02/02/2023 59 45 - 160 ug/dL Final   11/22/2022 82 45 - 160 ug/dL Final     No components found for: FROLATE  No results found for: NQUMTBSK51KD  WBC   Date Value Ref Range Status   02/07/2023 4.69 3.90 - 12.70 K/uL Final   02/03/2023 4.21 3.90 - 12.70 K/uL Final   04/11/2019 5.9 5.0 - 10.0 K/ul    03/13/2019 4.2 (L) 5.0 - 10.0 K/ul        Assessment:     Nutrition/Diet History     Patient Reported Diet/Restrictions/Preferences: regular diet  Food Allergies: NKFA  Factors Affecting Nutritional Intake: early satiety    Estimated/Assessed Needs     Weight Used For Calorie Calculations: 81 kg (178 lb)  Energy Calorie Requirements (kcal): 3012-0636 kcal/day   Energy Need Method: 25-30 Kcal/kg  Protein Requirements:  g/day   Protein Need Method: 1.2-1.5 g/kg  Fluid Requirements: 2000 ml/day  Estimated Fluid Requirement Method: 1ml/kcal      Nutrition Support  N/A    Evaluation of Received Nutrient/Fluid Intake     Calorie Intake: not meeting needs  Protein Intake: not meeting needs  Fluid Intake: meeting needs  Tolerance: tolerating  % Intake of Estimated Energy Needs: 75 %      Nutrition Diagnosis Related to (Etiology) As Evidenced By (Signs/Symptoms)   Inadequate energy intake Decreased ability to consume sufficient energy Early satiety, unintentional weight loss     RD Notes  Mr Conrad Kuhn is an 83y/o male with CLL currently receiving gazyva. Pt reports good appetite and intake since his first cycle last week but that he thinks his portion sizes are getting smaller. He supplements his intake with at least one Glucerna or Premier Protein shake daily. He does eat some snacks during the day. He reports good hydration. He had some dysphagia due to his neck mass but reports improvement since first cycle of chemo. Denies N/V/D/C. BMs normal. CW: 178# Noted 7# weight loss in 1 week (LW: 185# on 2/2)    Nutrition Intervention:       Nutrition Intervention Energy-modified diet, Protein-modified diet, and Schedule of food/fluids   Goals/Expected Outcomes Initiate small, frequent meals and snacks with high protein/calorie food choices to meet estimated nutritional needs   Progress Initial     Nutrition Intervention Medical food supplement: Commercial beverage   Goals/Expected Outcomes Initiate ONS at least BID to assist in meeting estimated nutrition needs and prevent unintentional weight loss   Progress Initial     Plan  Discussed importance of weight and hydration maintenance during chemotherapy  Recommended high protein/calorie food choices  Recommended smaller meals more frequently during the day to combat early satiety  Reinforced importance of aggressive hydration during chemotherapy  Continue ONS at least BID  Pt has RD contact info. Encouraged pt to call with questions or concerns    Monitoring/Evaluation:     Monitor: po intake, weight, BMs     Next Visit: f/u as needed      I have explained and the patient understands all of  the current recommendation(s). I have answered all of their questions to the best of my ability and to their complete satisfaction.   The patient is to continue with the current management plan.    Electronically signed by: Bridgette Webber MBA, RDN, LDN

## 2023-02-09 NOTE — TELEPHONE ENCOUNTER
[facility], Peri. Patient changes mind about having port placed because his need is short term. Talked that referral valid one year if appointment needed. She voices understanding and thanks for call.

## 2023-02-14 NOTE — PROGRESS NOTES
[facility] Hematology/Oncology  PROGRESS NOTE - Follow-up Visit      Subjective:       Patient ID:   NAME: Conrad Kuhn : 1940     82 y.o. male    Referring Doc: Julien  Other Physicians: Ced Erazo Joubert, Srinivas, Pinsky    Chief Complaint:  CLL f/u    History of Present Illness:     Patient is being seen today in person in clinic for a regular follow-up viasit. He is here by himself.  The patient is on today to go over the results of the recently ordered labs, tests and studies. He is here by himself today    He has since started Gazyva per Dr Don's direction; he is feeling better overall and the LAD is shrinking down in the neck      His swallowing ok now  He is breathing ok currently. He denies any CP, SOB, or N/V.           He had PEt on 2022 with some mild enlargement of the LN's  He had bone marrow biopsy on 2022       He was a little dizzy this am and had his BP meds recently adjusted for HBP. He saw Dr Rivero and had injection for his shoulder recently      Discussed Covid19 precautions; he had his vaccinations              ROS:   GEN: normal without any fever, night sweats or weight loss  HEENT: normal with no HA's, sore throat, stiff neck, changes in vision; LAD much better; swallowing better  CV: normal with no CP, SOB, PND, MAYER or orthopnea  PULM: normal with no SOB, cough, hemoptysis, sputum or pleuritic pain  GI: normal with no abdominal pain, nausea, vomiting, constipation, diarrhea, melanotic stools, BRBPR, or hematemesis; no dysphagia; peg tube in place  : urine frequency fine  BREAST: normal with no mass, discharge, pain  SKIN: normal with no rash, erythema, bruising, or swelling    Allergies:  Review of patient's allergies indicates:  No Known Allergies    Medications:    Current Outpatient Medications:     allopurinoL (ZYLOPRIM) 300 MG tablet, Take 1 tablet (300 mg total) by mouth once daily., Disp: 30 tablet, Rfl: 6    atorvastatin (LIPITOR) 20 MG tablet, Take 20 mg  by mouth once daily., Disp: , Rfl:     azelastine (ASTELIN) 137 mcg (0.1 %) nasal spray, 1 spray by Nasal route 2 (two) times daily. , Disp: , Rfl:     blood sugar diagnostic Strp, 1 strip by Misc.(Non-Drug; Combo Route) route 2 (two) times a day., Disp: , Rfl:     blood-glucose meter kit, Use as instructed, Disp: , Rfl:     dexAMETHasone (DECADRON) 4 MG Tab, Take 20mg the Sunday, Monday and Tuesday (Patient taking differently: Take 20 mg by mouth once daily. Take 20mg the Sunday, Monday and Tuesday), Disp: 60 tablet, Rfl: 0    ferrous sulfate (FEOSOL) 325 mg (65 mg iron) Tab tablet, Take 325 mg by mouth daily with breakfast., Disp: , Rfl:     fluticasone propionate (FLONASE) 50 mcg/actuation nasal spray, 1 spray by Each Nostril route once daily., Disp: , Rfl:     gabapentin (NEURONTIN) 600 MG tablet, Take 600 mg by mouth 2 (two) times daily., Disp: , Rfl:     glimepiride (AMARYL) 2 MG tablet, Take 2 mg by mouth once daily at 6am., Disp: , Rfl:     HYDROcodone-acetaminophen (NORCO)  mg per tablet, Take 1 tablet by mouth every 8 (eight) hours as needed., Disp: , Rfl:     iron-vitamin C 100-250 mg, ICAR-C, (ICAR-C) 100-250 mg Tab, Take 1 tablet by mouth once daily., Disp: 30 each, Rfl: 6    levalbuterol (XOPENEX) 1.25 mg/3 mL nebulizer solution, Take 3 mLs by nebulization every 4 to 6 hours as needed. , Disp: , Rfl:     levoFLOXacin (LEVAQUIN) 750 MG tablet, Take 750 mg by mouth once daily., Disp: , Rfl:     losartan (COZAAR) 50 MG tablet, Take 1 tablet (50 mg total) by mouth once daily., Disp: 90 tablet, Rfl: 0    ondansetron (ZOFRAN) 8 MG tablet, Take 1 tablet (8 mg total) by mouth every 8 (eight) hours as needed for Nausea., Disp: 30 tablet, Rfl: 2    ondansetron (ZOFRAN-ODT) 8 MG TbDL, 1 tablet on the tongue and allow to dissolve  as needed Orally Once a day, Disp: , Rfl:     promethazine (PHENERGAN) 12.5 MG Tab, Take 1 tablet (12.5 mg total) by mouth every 6 (six) hours as needed., Disp: 30 tablet, Rfl: 3    " traZODone (DESYREL) 300 MG tablet, Take 300 mg by mouth every evening., Disp: , Rfl:     valACYclovir (VALTREX) 1000 MG tablet, Take 1,000 mg by mouth once daily., Disp: , Rfl:     venetoclax (VENCLEXTA) 100 mg Tab, Take 400 mg by mouth once daily., Disp: 120 tablet, Rfl: 11    venetoclax 10 mg-50 mg- 100 mg DsPk, Take 20mg by mouth daily on days 22-28 of cycle 1. Take 50 mg by mouth daily on days 1-7 of cycle 2 Take 100 mg daily on days 8-14 of cycle 2 Take 200 mg daily on days 15-21 of cycle 2.  Take with food. (Patient taking differently: Take by mouth Take 20mg by mouth daily on days 22-28 of cycle 1. Take 50 mg by mouth daily on days 1-7 of cycle 2 Take 100 mg daily on days 8-14 of cycle 2 Take 200 mg daily on days 15-21 of cycle 2.  Take with food.), Disp: 42 tablet, Rfl: 0  No current facility-administered medications for this visit.    Facility-Administered Medications Ordered in Other Visits:     diphenhydrAMINE injection 50 mg, 50 mg, Intravenous, Once PRN, Drew Bai MD    EPINEPHrine (EPIPEN) 0.3 mg/0.3 mL pen injection 0.3 mg, 0.3 mg, Intramuscular, Once PRN, Drew Bai MD    hydrocortisone sodium succinate injection 100 mg, 100 mg, Intravenous, Once PRN, Drew Bai MD    sodium chloride 0.9% flush 10 mL, 10 mL, Intravenous, PRN, Drew Bai MD    PMHx/PSHx Updates:  See patient's last visit with me on 1/9/2023  See H&P on 1/3/2019        Pathology:  Cancer Staging  No matching staging information was found for the patient.          Objective:     Vitals:  Blood pressure (!) 96/41, pulse 81, temperature 98.1 °F (36.7 °C), resp. rate 18, height 6' 2" (1.88 m), weight 81.2 kg (179 lb), SpO2 95 %.        Physical Examination:   GEN: no apparent distress, comfortable; AAOx3  HEAD: atraumatic and normocephalic  EYES: no conjunctival pallor or muddiness, no icterus; normal pupil reaction to ambient light  ENT: OMM, no pharyngeal erythema, external bilateral ears WNL; no " visible thrush or ulcers  NECK: no masses or swelling, trachea midline, no visible LAD/LN's ; LAD and LN's with some slight increase in size  CV: no palpitations; no pedal edema; no noticeable JVD or neck vein distension  CHEST: Normal respiratory effort; chest wall breath movements symmetrical; no audible wheezing  ABDOM: non-distended; no bloating;  peg tube since removed  MUSC/Skeletal: ROM normal; joints visibly normal; no deformities or arthropathy  EXTREM: no clubbing, cyanosis, inflammation or swelling; right arm with fair ROM  SKIN: no rashes, lesions, ulcers, petechiae or subcutaneous nodules  : no keith  NEURO: moving all 4 extremities; AAOx3; no tremors  PSYCH: normal mood, affect and behavior  LYMPH: no visible LN's or LAD; LAD and LN's on right neck reduced in size              Labs:   Lab Results   Component Value Date    WBC 5.50 02/14/2023    HGB 11.4 (L) 02/14/2023    HCT 31.9 (L) 02/14/2023    MCV 88 02/14/2023    PLT 36 (LL) 02/14/2023     CMP  Sodium   Date Value Ref Range Status   02/14/2023 132 (L) 136 - 145 mmol/L Final   06/12/2019 138 134 - 144 mmol/L      Potassium   Date Value Ref Range Status   02/14/2023 3.8 3.5 - 5.1 mmol/L Final     Chloride   Date Value Ref Range Status   02/14/2023 98 95 - 110 mmol/L Final   06/12/2019 99 98 - 110 mmol/L      CO2   Date Value Ref Range Status   02/14/2023 28 23 - 29 mmol/L Final     Glucose   Date Value Ref Range Status   02/14/2023 171 (H) 70 - 110 mg/dL Final   06/12/2019 179 (H) 70 - 99 mg/dL      BUN   Date Value Ref Range Status   02/14/2023 19 8 - 23 mg/dL Final     Creatinine   Date Value Ref Range Status   02/14/2023 0.9 0.5 - 1.4 mg/dL Final   06/12/2019 0.95 0.60 - 1.40 mg/dL      Calcium   Date Value Ref Range Status   02/14/2023 8.5 (L) 8.7 - 10.5 mg/dL Final     Total Protein   Date Value Ref Range Status   02/14/2023 6.1 6.0 - 8.4 g/dL Final     Albumin   Date Value Ref Range Status   02/14/2023 3.6 3.5 - 5.2 g/dL Final   06/12/2019  3.9 3.1 - 4.7 g/dL      Total Bilirubin   Date Value Ref Range Status   02/14/2023 1.8 (H) 0.1 - 1.0 mg/dL Final     Comment:     For infants and newborns, interpretation of results should be based  on gestational age, weight and in agreement with clinical  observations.    Premature Infant recommended reference ranges:  Up to 24 hours.............<8.0 mg/dL  Up to 48 hours............<12.0 mg/dL  3-5 days..................<15.0 mg/dL  6-29 days.................<15.0 mg/dL       Alkaline Phosphatase   Date Value Ref Range Status   02/14/2023 36 (L) 55 - 135 U/L Final     AST   Date Value Ref Range Status   02/14/2023 16 10 - 40 U/L Final     ALT   Date Value Ref Range Status   02/14/2023 15 10 - 44 U/L Final     Anion Gap   Date Value Ref Range Status   02/14/2023 6 (L) 8 - 16 mmol/L Final     eGFR if    Date Value Ref Range Status   07/02/2022 >60.0 >60 mL/min/1.73 m^2 Final     eGFR if non    Date Value Ref Range Status   07/02/2022 >60.0 >60 mL/min/1.73 m^2 Final     Comment:     Calculation used to obtain the estimated glomerular filtration  rate (eGFR) is the CKD-EPI equation.              Lab Results   Component Value Date    IRON 59 02/02/2023    TIBC 342 02/02/2023    FERRITIN 67 02/02/2023         Radiology/Diagnostic Studies:    PET  12/2/2022:    IMPRESSION: Mild enlargement of the extensive adenopathy within the neck, chest, abdomen and pelvis with faint increased FDG activity     Stable splenomegaly         CT 11/8/2022:  IMPRESSION:     Worsening splenomegaly with diffuse increase in size and number of essentially all of the main lymph node chains throughout the chest, abdomen and pelvis.           PET 8/29/2022:  IMPRESSION:  1. Numerous enlarged lymph nodes throughout the neck, chest, abdomen and pelvis are non hypermetabolic, and stable compared to CT of 07/02/2022.  2. Stable splenomegaly, with no abnormal focal splenic FDG hypermetabolism.  3. Additional  observations as described.         CT 7/2/2022:    IMPRESSION:  1. Interval worsening in numerous enlarged lymph nodes in the chest, abdomen and pelvis, as well as development of splenomegaly, compatible with lymphoma and or worsening CLL, given patient's history  2. Mild bilateral hydroureteronephrosis, with markedly enlarged prostate gland and appearance of the urinary bladder suggesting chronic bladder outlet obstruction. Consider correlation with urinalysis.  3. Infrarenal abdominal aortic aneurysm measuring 33 mm in diameter. Follow-up imaging in 3 years is recommended per best practice guidelines.  4. Additional observations as described.           PET  3/14/2022:    IMPRESSION:  Left upper lobectomy without evidence of recurrent or metastatic disease      MRI cervical spine  10/1/2021:    IMPRESSION:     Loss of normal cervical lordosis with mild kyphotic curvature.     Advanced multilevel cervical spondylosis, as outlined above. Spinal canal narrowing and foraminal narrowing is most severe at C5-6. 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     Congenital narrowing of cervical spinal canal, which exacerbates cervical spondylosis        CT head  8/27/2021:  IMPRESSION:     No CT evidence of acute intracranial pathology.     Stable senescent changes as above.         PET  7/19/2021:    IMPRESSION:  Prior left upper lobectomy without evidence of recurrent or metastatic disease  - 3 cm infrarenal abdominal aortic aneurysm      CT head 6/29/2021:  IMPRESSION:     No CT evidence of acute intracranial pathology      PET  1/18/2021:    Impression:     No evidence of FDG avid lymphoproliferative disease.     Aneurysmal dilation of infrarenal abdominal aorta (3.4 cm) as well as aneurysmal dilation of right common iliac artery.     Additional incidental findings as above        PET  6/24/2020:    Impression:     1. No findings of active lymphoproliferative  disease.  2. Additional observations as above.      PET  1/24/2020:    Impression       Moderate decrease in size of the adenopathy within the neck, chest, abdomen and pelvis.  The spleen is smaller in size.  There is no significant FDG activity.    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 Multifocal lymphadenopathy in the chest, abdomen, and pelvis compatible with provided clinical history of lymphocytic leukemia.  There is mixed interval change when compared to prior studies.  Pathologic lymphadenopathy in the chest has increased significantly when compared to a CTA of the chest from May 2018.  Pathologic retroperitoneal lymphadenopathy has improved slightly when compared to a previous abdominal CT from February 2017.  Please see above details.  2. Mild aneurysmal dilation of the infrarenal abdominal aorta, with maximal transverse diameter of 3.7 cm.  Maximal transverse diameter on a prior study from 2017 was 2.5 cm.  3. Additional findings as above           [facility] Unknown Rad Eap    Result Date: 1/14/2019  CMS MANDATED QUALITY DATA - CT RADIATION - 436 All CT scans at this facility utilize dose modulation, iterative reconstruction, and/or weight based dosing when appropriate to reduce radiation dose to as low as reasonably achievable. Reason:Lymphoid leukemia TECHNIQUE: CT thorax with, CT abdomen  with, and CT pelvis with 100 mL Omnipaque 350. COMPARISON: CT chest dated 05/19/2018 and CT abdomen and pelvis dated 02/08/2017 CT THORAX: There is stable mediastinal, hilar and axillary adenopathy. There is coronary artery calcification. There is resolution of the groundglass opacities throughout the lungs. There are no confluent infiltrates, pulmonary nodules or pleural effusions. There are stable subcentimeter nodules in the left lobe of the thyroid gland. There are degenerative changes of the spine. CT ABDOMEN: The liver, pancreas,  adrenal glands and gallbladder are normal. The spleen is enlarged. There is mesenteric and retroperitoneal adenopathy which is slightly smaller in size. -There is a portacaval node measuring 37 x 21 mm and previously measured 38 x 27 mm. -Periportal lymph node measuring 41 x 18 mm, previously measured 48 x 29 mm. -Left periaortic adenopathy measuring 34 x 23 mm and previously measured 40 x 42 mm- The kidneys enhance symmetrically without hydronephrosis or calculi. There are no thick-walled or dilated bowel loops. There is vascular calcification of aorta. There is a 3.0 x 3.0 cm infrarenal abdominal aortic aneurysm. CT PELVIS: There is adenopathy along the pelvic sidewalls bilaterally which has decreased in size. -The left common iliac adenopathy measuring 36 x 11 mm, previously measured 46 x 14 mm -the right common iliac adenopathy measuring 46 x 10 mm. Previously measured 53 x 15 mm. The bladder is normal. The prostate gland is enlarged. There are degenerative changes of the spine. There is grade 1 anterolisthesis of L5 on S1 with bilateral pars defects.     IMPRESSION: Stable mediastinal, hilar and axillary adenopathy with resolution of the airspace disease within the lungs Decrease in size of the mesenteric, retroperitoneal and pelvic adenopathy. Stable infrarenal abdominal aortic aneurysm Splenomegaly     Read and electronically signed by: Jeniffer Zhang MD on 1/14/2019 9:14 AM CST JENIFFER ZHANG MD      I have reviewed all available lab results and radiology reports.    Assessment/Plan:   (1) 82 y.o. male with  diagnosis of CLL who has been referred by Dr Rony Victoria for continuation of care by medical hematology/oncology.   - BM biopsy  12/4/2015 with CLL  - diagnosis of SLL in 2004 and s/p FCR x6 in 2007  - s/p imbruvica in past (stopped in May 2018)  - latest wbc at 4.8; lymph 56%  - latest CT on 8/7/2019 showing increase in the LAD  - platelets are 111,000  - He was recently hospitalized at [facility] and  seen by Dr Wheeler. Dr Wheeler has recommended Rituximab. He is starting tomorrow.   - discussed the rituximab regimen and the potential side-effect profile; he is getting chemotherapy school today with Rosemary [facility]  - he saw Dr Don on Oct 21st 2019  - he has had 3 of the 4 weeks of rituximab so far; Dr Don recommends him to eventually get rituximab monthly x6 with daily oral Venetoclax for two years total.   - completed rituximab (4th) week of rituximab and  S/p 6 monthly rituximab with Venetoclax oral but is taking only 2 pills daily due to the counts  - he is now just on the venetclax  - he saw Dr Don again on Dec 23rd 2019 and sees him again in March 16th 2020  - latest PEt on 1/24/2020 looks really good    8/27/2020:  - he saw Dr Don last month at [facility]  - latest PEt from 6/24/2020 looks good  - he remains on just the oral venetoclax at 2 pills per day  - he sees Dr Don again in about 3 months (Oct 2020)    11/18/2020:  - he is doing well with the Venetoclax  - due for repeat PEt in Dec 2020  - he sees Dr Don again on Dec 23rd 2020    1/20/2021:  - he is doing well with the Venetoclax  - He saw Dr Don at [facility] in Dec 2020. He is now off rituximab monthly and remains only on the oral Venetoclax but at 2 pills daily . He sees Dr Don again in feb 2021  - Recent PEt on  1/18/2021 looks stable except for incidental aneurysm in aorta; so we are referring him to Dr Kapadia with vascualr    4/20/2021:  - he saw Dr Don in Feb 2021 and sees him again in Nov 2021  - repeat PEt in June/july 2021  - continued on Venetoclax and regular blood checks    7/20/2021:  - he continues on the Venetoclax  - mild leucopenia from the medication  - PEt on 7/19/2021 seems to be stable    9/21/2021:  - doing ok overall with some occasional HA's and general lack of energy  - he sees Dr Don again on nov 1st and hopefully he can discontinue the venetoclax thereafter  - Head CT on 8/27/2021 was relatively negative    2/17/2022:  -  He last  showed for oncology appointment in Sept 2021  - He had covid in Jan 2022 and he received the infusion but did not require hospitalization  - He saw Dr Erazo couple of weeks ago  - He is planning to have cervical spine surgery with Dr Mai in the near future.  - He sees Dr Don at [facility] this coming Monday. He is now off rituximab monthly and he is also now off the oral Venetoclax (expect he will be getting a repeat bone marrow biopsy)    3/31/2022:  - He had recent telemed visit Dr Don at [facility] and they were pleased with the latest bone marrow biopsy. He is now off rituximab monthly and he is also now off the oral Venetoclax      6/6/2022:  - He had surgery on his cervical spine with Dr Mai on April 8th 2022 and had postoperative problems related to the intubation and anesthesia and was in the hospital for about 3 weeks. He had aspiration pneumonia and bout of respiratory failure requiring mech vent support.  He has residual swallowing issues and has a peg tube. He was seen Dr Garcia with GI while in hospital. He was discharged on 4/18. He has had home health coming to house couple times of week. He reports that he is doing better    - he is due to see Dr Don again at [facility] in the near future and he has since been off all therapy      8/11/2022:  - He was recently hospitalized with fever and pneumonia. He is feeling better  - He is swallowing and eating better; peg tube since removed  - He previously had bout of oral mucosal ulcerations for which he had biopsies at [facility] which were negative for any malignancy. This has since resolved.  - He previously had telemed visit Dr Don at [facility] but does not know when he sees him again.. He had previous bone marrow biopsy with good report. He has been off rituximab and the oral Venetoclax since last Nov 2021.  - CT scans on 7/2/2022 with some progression of the LAD in his body   - will get PEt and aslk Dr Don to see him again    9/29/2022:  - He saw Dr Don again  at [facility] and they are considering giving him a regimen of chemotherapy (some program or clinical trial0 over at [facility] but he reports that they have since decided against proceeding in that direction.  - he was supposed to see Dr Don on 9/12 but that appointment was cancelled per patient   - he had PET on 8/29/2022 which was stable    11/23/2022:  - He was recently hospitalized at [facility]; he is doing better and feeling better overall.  - He was supposed to see Dr Don again at [facility] after discharge but does not see him till Dec 2022  - CT on 11/8 with some increase in speel size and LAD  - will resume venetoclax in meantime and he needs to f/u with Dr Don for further directives  - get PEt    1/9/2023:  - He talked to Dr Don via telemed in Dec 2022 and was supposed to start venetoclax but has not done so as of yet. He reports that the specialty pharmacy had called him but he has not received the drug as of yet  - Dr Don was evaluating him for clinical trial but patient is not inclined to proceed in that direction  - Need Dr Don's last notes  - he had PEt on 12/2/2022 with some mild enlargement of the LN's  - he had bone marrow biopsy on 12/8/2022 with pathology report still unavailable but per review looks like he has about 14-17% involvement with NHL  - will reach out to Dr Don and the speciality pharmacy  - need the assistance of the patient navigator  - WBC has increased to 18.39    2/15/2023:  - He has since started Gazyva per Dr Don's direction; he is feeling better overall and the LAD is shrinking down in the neck  - hgb at 11.4 and plats 36,000              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  - followed  By Dr Kee  - CEA 1.4 on 4/8/2021  - CT scans on 1/14/2019 stable  - PET done in jan 2021 on chart    3/31/2022:  - CEA at 0.8  - PET on 3/14/2022 negative for recurrence    9/29/2022:  - CEA 0.9  - PET on chart     11/23/2022:  - latest CEA at 1.2    2/15/2023:  - he  had PET on 12/2/2022     (3) Iron deficiency anemia s/p IV iron couple weeks ago  - s/p periodic IV iron therapies         (4) HTN - on BP meds     (6) Chronic neck and back issues - followed by Dr Ryan Rivero     (7) DM - currently off all meds     (8) Hypercholesterolemia - on meds    (9)  - followed by Dr Whitney    (10) Chronic Leucopenia and thrombocytopenia - due to chemotherapy agents and lymphoma/leukemia          VISIT DIAGNOSES:      Encounter Diagnoses   Name Primary?    Thrombocytopenia Yes    Pancytopenia     Lymphoma, unspecified body region, unspecified lymphoma type     CLL (chronic lymphocytic leukemia)     History of lung cancer - ANDRES NSCLC 2004     Iron deficiency     Iron deficiency anemia, unspecified iron deficiency anemia type     Lymphadenopathy, generalized     Lymphadenopathy, cervical     Splenomegaly            PLAN:  Continue with Gazyva; check labs every monthy; resume IV iron as needed; encouraged resumption of oral iron  2.  F/u with Dr oDn as directed  3.  F/u with PCP, Pulm, , etc  4.  Monitor labs as directed  5.   F/u with Dr Kapadia with vascular for the aortic aneurysm as directed          RTC in  2 weeks with Whit; 4 weeks with myself  Fax note to Kcaey Kee, Ryan Rivero, Chelo, Karla, Florencia/Liam, Kang; Primo; Radha        Discussion:       Total Time spent on patient:    I spent over 25 mins of time with the patient. Reviewed results of the recently ordered labs, tests, reports and studies; made directives with regards to the results. Over half of this time was spent couseling and coordinating care, making treatment and analytical decisions; ordering necessary labs, tests and studies; and discussing treatment options and setting up treatment plan(s) if indicated.        COVID-19 Discussion:    I had long discussion with patient and any applicable family about the COVID-19 coronavirus epidemic and the recommended precautions with regard to cancer and/or hematology  patients. I have re-iterated the CDC recommendations for adequate hand washing, use of hand -like products, and coughing into elbow, etc. In addition, especially for our patients who are on chemotherapy and/or our otherwise immunocompromised patients, I have recommended avoidance of crowds, including movie theaters, restaurants, churches, etc. I have recommended avoidance of any sick or symptomatic family members and/or friends. I have also recommended avoidance of any raw and unwashed food products, and general avoidance of food items that have not been prepared by themselves. The patient has been asked to call us immediately with any symptom developments, issues, questions or other general concerns.          I have explained all of the above in detail and the patient understands all of the current recommendation(s). I have answered all of their questions to the best of my ability and to their complete satisfaction.   The patient is to continue with the current management plan.        Chemotherapy Discussion:      I discussed the available treatment option(s) in accordance with the latest/current national evidence-based guidelines (NCCN, UpToDate, NCI, ASCO, etc where applicable), their overall age/condition and their co-morbidities. I also went over the risks and benefits of the chemotherapy with regard to their particular cancer type, their cancer stage, their age/condition, and their co-morbidities. I provided literature on the chemotherapy regimen and discussed the chemotherapy side-effect profiles of the drug(s). I discussed the importance of compliance with obtaining and monitoring weekly lab work, and went over the potential hematopathology issues and risks with anemia, leucopenia and thrombocytopenia that can occur with chemotherapy. I discussed the potential risks of liver and kidney damage, which could be permanent and could necessitate dialysis long-term if kidney failure developed. I discussed  the emetic and/or diarrheal potential of the regimen and the potential need for use of antiemetic and anti-diarrheal medications. I discussed the risk for development of anaphylactic shock, bronchospasm, dysrhythmia, and respiratory/cardiovascular arrest and/or failure. I discussed the potential risks for development of alopecia, cold sensory issues, ringing in ears, vertigo, cataracts, glaucoma, and neuropathy, all of which could end up being chronic and life-long. Some chemotherpyI discussed the risks of hand-foot syndrome and rashes, and development of other autoimmune mediated processes such as pneumonitis, hepatitis, and colitis which could be life threatening. I discussed the risks of the potential development of a rare but fatal viral mediated disease known as PML (Progressive Multifocal Leukoencephalopathy), and risk of future development of leukemia and/or lymphoma from use of certain chemotherapy agents. I discussed the need for neutropenic precautions, basic hygiene/sanitation behaviors and dietary restrictions.    The patient's consent has been obtained to proceed with the chemotherapy.The patient will be referred to [facility] for training and education on chemotherapy, use of antiemetics and/or anti-diarrheals, use of NSAID's, potential chemotherapy side-effects, and any specific recommendations and precautions with the particular chemotherapy agents.       Immunologic Therapy Discussion:    I discussed the available treatment option(s) in accordance with the latest/current national evidence-based guidelines (NCCN, UpToDate, NCI, ASCO, etc where applicable), their overall age/condition and their co-morbidities. I went over the risks and benefits of the immunotherapy with regard to their particular cancer type, their cancer stage, their age/condition, and their co-morbidities. I provided literature on the immunotherapy regimen and discussed the immunotherapy side-effect  profiles of the drug(s). I discussed the importance of compliance with obtaining and monitoring weekly lab work, and went over the potential hematopathology issues and risks of hemato-pathologic issues with anemia, leucopenia and/or thrombocytopenia and effects on thyroid function that can occur with immunotherapy. The patient will most likely need to have there thyroid functions monitored by their PCP and may need to take thyroid medication while on the immunotherapy. I discussed the potential risks of liver and kidney damage, which could be permanent and could necessitate dialysis long-term if kidney failure developed. I discussed the emetic and/or diarrheal potential of the regimen and the potential need for use of antiemetic and anti-diarrheal medications. I discussed the risk for development of anaphylactic shock, bronchospasm, dysrhythmia, and respiratory/cardiovascular arrest and/or failure. I discussed the potential risks for development of alopecia, cold sensory issues, ringing in ears, vertigo and neuropathy, all of which could end up being chronic and life-long. I discussed the risks of hand-foot syndrome and rashes, and development of other autoimmune mediated processes such as pneumonitis, hepatitis and colitis which could potentially be life threatening. I discussed the risks of the potential development of a rare but fatal viral mediated disease known as PML (Progressive Multifocal Leukoencephalopathy), and risk of future development of leukemia and/or lymphoma from use of certain immunotherapy agents. I discussed the possibility that immunologic therapy cold worsen or promote progression of any underlying autoimmune diseases such as sarcoidosis, ulcerative colitis, Crohn's disease, psoriasis, rheumatoid disorders, scleroderma, autoimmune nephritis disorders, Hashimoto's thyroiditis, and Lupus among others. I discussed the need for neutropenic precautions, basic hygiene/sanitation behaviors and  dietary restrictions.    The patient's consent has been obtained to proceed with the immunotherapy.The patient will be referred to [facility] for training and education on immunotherapy, use of antiemetics and/or anti-diarrheals, use of NSAID's, potential immunotherapy side-effects, and any specific recommendations and precautions with the particular immunotherapy agents.      I answered all of the patient's (and family's, if applicable) questions to the best of my ability and to their complete satisfaction. The patient acknowledged full understanding of the risks, recommendations and plan(s).      Iron Infusion Therapy Discussion:     I provided literature/learning materials on the particular IV iron regimen and discussed the potential side-effect profiles of the drug(s). I discussed the importance of compliance with obtaining and monitoring requested lab work, and went over the potential risk for the development of anaphylactic shock, bronchospasm, dysrhythmia, liver and/or kidney damage, and respiratory/cardiovascular arrest and/or failure. I discussed the potential risks for development of alopecia, fevers, itching, chills and/or rigors, cold sensory issues, ringing in ears, vertigo and neuropathy, all of which are usually acute but sometimes could end up being chronic and life-long. I discussed the risks of hand-foot syndrome and rashes, and development of other autoimmune mediated processes such as pneumonitis and colitis which could be life threatening.     The patient's consent has been obtained to proceed with the IV iron therapy.The patient will be referred to [facility] for training and education on IV iron therapy, use of antiemetics and/or anti-diarrheals, use of NSAID's, potential IV iron therapy side-effects, and any specific recommendations and precautions with the particular IV iron agents.      I answered all of the patient's (and family's,  if applicable) questions to the best of my ability and to their complete satisfaction. The patient acknowledged full understanding of the risks, recommendations and plan(s).          I answered all of the patient's (and family's, if applicable) questions to the best of my ability and to their complete satisfaction. The patient acknowledged full understanding of the risks, recommendations and plan(s).         Electronically signed by Drew Bai MD

## 2023-02-15 NOTE — PLAN OF CARE
Problem: Fatigue  Goal: Improved Activity Tolerance  Outcome: Ongoing, Progressing  Intervention: Promote Improved Energy  Flowsheets (Taken 2/15/2023 0812)  Fatigue Management: frequent rest breaks encouraged  Sleep/Rest Enhancement:   regular sleep/rest pattern promoted   relaxation techniques promoted  Activity Management: Ambulated -L4

## 2023-02-20 NOTE — TELEPHONE ENCOUNTER
Outgoing call to pt for venclexta initial consult. LVM.     We will need to speak with him before 2pm in order to have his medication sent by 2/22.

## 2023-02-20 NOTE — TELEPHONE ENCOUNTER
Incoming call from patient's wife to clarify what insurance the Venclexta is running through. She said it should be going through [employer]. Informed pt wife that is the specific plan [facility] is using currently to run medication as she was questioning high copay. Additionally informed the cash price of medication is $4,245.36 so the plan is covering a substantial amount. They are able to afford copay of $65.00 and no further questions from pt's wife.

## 2023-02-20 NOTE — TELEPHONE ENCOUNTER
Specialty Pharmacy - Initial Clinical Assessment    Specialty Medication Orders Linked to Encounter      Flowsheet Row Most Recent Value   Medication #1 venetoclax 10 mg-50 mg- 100 mg DsPk (Order#[id number], Rx#[id number])          Patient Diagnosis   C91.10 - CLL (chronic lymphocytic leukemia)    Subjective    Conrad Kuhn is a 82 y.o. male, who is followed by the specialty pharmacy service for management and education.    Recent Encounters       Date Type Provider Description    02/06/2023 Specialty Pharmacy NIYAH MENARD PharmD Initial Clinical Assessment    02/03/2023 Specialty Pharmacy NIYAH MENARD PharmD Referral Authorization    11/23/2022 Specialty Pharmacy Nora Jean PharmD Referral Authorization          Clinical call attempts since last clinical assessment   2/6/2023 10:44 PM - Specialty Pharmacy - Clinical Assessment by Niyah Menard PharmD     Current Outpatient Medications   Medication Sig Note    metFORMIN (GLUCOPHAGE) 500 MG tablet Take 500 mg by mouth 2 (two) times daily with meals.     allopurinoL (ZYLOPRIM) 300 MG tablet Take 1 tablet (300 mg total) by mouth once daily.     atorvastatin (LIPITOR) 20 MG tablet Take 20 mg by mouth once daily.     azelastine (ASTELIN) 137 mcg (0.1 %) nasal spray 1 spray by Nasal route 2 (two) times daily.      blood sugar diagnostic Strp 1 strip by Misc.(Non-Drug; Combo Route) route 2 (two) times a day.     blood-glucose meter kit Use as instructed     dexAMETHasone (DECADRON) 4 MG Tab Take 20mg the Sunday, Monday and Tuesday (Patient taking differently: Take 20 mg by mouth once daily. Take 20mg the Sunday, Monday and Tuesday) 2/20/2023: As need for infusion    ferrous sulfate (FEOSOL) 325 mg (65 mg iron) Tab tablet Take 325 mg by mouth daily with breakfast.     fluticasone propionate (FLONASE) 50 mcg/actuation nasal spray 1 spray by Each Nostril route once daily.     gabapentin (NEURONTIN) 600 MG tablet Take 600 mg by mouth 2 (two) times daily.      glimepiride (AMARYL) 2 MG tablet Take 2 mg by mouth once daily at 6am.     HYDROcodone-acetaminophen (NORCO)  mg per tablet Take 1 tablet by mouth every 8 (eight) hours as needed.     iron-vitamin C 100-250 mg, ICAR-C, (ICAR-C) 100-250 mg Tab Take 1 tablet by mouth once daily. 2/20/2023: No longer taking    levalbuterol (XOPENEX) 1.25 mg/3 mL nebulizer solution Take 3 mLs by nebulization every 4 to 6 hours as needed.      levoFLOXacin (LEVAQUIN) 750 MG tablet Take 750 mg by mouth once daily. 2/20/2023: No longer taking    losartan (COZAAR) 50 MG tablet Take 1 tablet (50 mg total) by mouth once daily.     ondansetron (ZOFRAN) 8 MG tablet Take 1 tablet (8 mg total) by mouth every 8 (eight) hours as needed for Nausea.     ondansetron (ZOFRAN-ODT) 8 MG TbDL 1 tablet on the tongue and allow to dissolve  as needed Orally Once a day     promethazine (PHENERGAN) 12.5 MG Tab Take 1 tablet (12.5 mg total) by mouth every 6 (six) hours as needed.     traZODone (DESYREL) 300 MG tablet Take 300 mg by mouth every evening.     valACYclovir (VALTREX) 1000 MG tablet Take 1,000 mg by mouth once daily.     venetoclax (VENCLEXTA) 100 mg Tab Take 400 mg by mouth once daily.     venetoclax 10 mg-50 mg- 100 mg DsPk Take 20mg by mouth daily on days 22-28 of cycle 1. Take 50 mg by mouth daily on days 1-7 of cycle 2 Take 100 mg daily on days 8-14 of cycle 2 Take 200 mg daily on days 15-21 of cycle 2.  Take with food. (Patient taking differently: Take by mouth Take 20mg by mouth daily on days 22-28 of cycle 1. Take 50 mg by mouth daily on days 1-7 of cycle 2 Take 100 mg daily on days 8-14 of cycle 2 Take 200 mg daily on days 15-21 of cycle 2.  Take with food.)    Last reviewed on 2/20/2023 12:57 PM by Vivien Pandey PharmD    Review of patient's allergies indicates:  No Known AllergiesLast reviewed on  2/20/2023 12:52 PM by Vivien Pandey    Drug Interactions    Drug interactions evaluated: yes  Clinically relevant drug interactions  identified: no  Provided the patient with educational material regarding drug interactions: not applicable         Adverse Effects    *All other systems reviewed and are negative       Assessment Questions - Documented Responses      Flowsheet Row Most Recent Value   Assessment    Goals of Therapy Status Discussed (new start)   Status of the patients ability to self-administer: Is Able   All education points have been covered with patient? No, patient declined- printed education provided   Welcome packet contents reviewed and discussed with patient? Yes   Assesment completed? Yes   Plan Therapy being initiated   Do you need to open a clinical intervention (i-vent)? No   Do you want to schedule first shipment? Yes   Medication #1 Assessment Info    Patient status New medication, New to [facility]   Is this medication appropriate for the patient? Yes   Is this medication effective? Not yet started          Refill Questions - Documented Responses      Flowsheet Row Most Recent Value   Patient Availability and HIPAA Verification    Does patient want to proceed with activity? Yes   HIPAA/medical authority confirmed? Yes   Relationship to patient of person spoken to? Self   Refill Screening Questions    When does the patient need to receive the medication? 02/22/23   Refill Delivery Questions    How will the patient receive the medication? MED[facility]   When does the patient need to receive the medication? 02/22/23   Shipping Address Home   Address in [facility] confirmed and updated if neccessary? Yes   Expected Copay ($) 65   Is the patient able to afford the medication copay? Yes   Payment Method new CC added to file   Days supply of Refill 28   Supplies needed? No supplies needed   Refill activity completed? 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    Tried/failed medications: none    BP Readings from Last 4 Encounters:   02/15/23 (!) 96/41   02/15/23 (!) 116/47   02/08/23 114/66   02/03/23 136/66     Ht Readings from Last 4 Encounters:   02/15/23 6' 2" (1.88 m)   02/15/23 6' 2" (1.88 m)   02/08/23 6' 2" (1.88 m)   02/02/23 6' 2" (1.88 m)     Wt Readings from Last 4 Encounters:   02/15/23 81.2 kg (179 lb)   02/15/23 81.2 kg (179 lb)   02/08/23 81 kg (178 lb 8 oz)   02/02/23 83.9 kg (185 lb)     Recent Labs   Lab Result Units 02/20/23  1203 02/14/23  1046 02/07/23  1047 02/03/23  0348 02/02/23  1640 02/02/23  1325   RBC M/uL 3.15 L 3.64 L 4.30 L 3.95 L 3.97 L 4.07 L   Hemoglobin g/dL 9.8 L 11.4 L 13.5 L 12.5 L 12.7 L 12.6 L   Hematocrit % 27.9 L 31.9 L 37.7 L 34.7 L 35.1 L 35.7 L   WBC K/uL 3.39 L 5.50 4.69 4.21 4.22 5.72   Gran # (ANC) K/uL 2.2  --  3.9 2.9 3.0 4.3   Gran % % 65.8 81.0 H 83.6 H 68.4 70.4 75.5 H   Platelets K/uL 25 LL 36 LL 37 LL 32 LL 35 LL 44 LL   Sodium mmol/L  --  132 L 133 L 135 L 132 L 133 L   Potassium mmol/L  --  3.8 4.4 3.4 L 4.1 4.6   Chloride mmol/L  --  98 96 101 101 101   Glucose mg/dL  --  171 H 218 H 232 H 301 H 353 H   BUN mg/dL  --  19 29 H 21 23 27 H   Creatinine mg/dL  --  0.9 0.9 0.7 0.8 0.8   Calcium mg/dL  --  8.5 L 9.0 8.5 L 8.4 L 8.5 L   Total Protein g/dL  --  6.1 6.5  --  6.4 6.4   Albumin g/dL  --  3.6 3.9  --  3.6 3.9   Total Bilirubin mg/dL  --  1.8 H 2.3 H  --  0.9 0.9   Alkaline Phosphatase U/L  " --  36 L 40 L  --  42 L 50 L   AST U/L  --  16 16  --  21 23   ALT U/L  --  15 16  --  16 16     The goals of cancer treatment include:  Achieving remission of cancer, if possible  Reducing tumor size and spread of cancer, if remission is not possible  Minimizing pain and symptoms of the cancer  Preventing infection and other complications of treatment  Promoting adequate nutrition  Encouraging proper hydration  Improving or maintaining quality of life  Maintaining optimal therapy adherence  Minimizing and managing side effects    Goals of Therapy Status: Discussed (new start)    Assessment/Plan  Patient plans to start therapy on 02/22/23      Indication, dosage, appropriateness, effectiveness, safety and convenience of his specialty medication(s) were reviewed today.     Patient Education   Pharmacist offer to  patient was declined. Printed educational materials will be provided with medication.  Patient did accept verbal education on the following topics:  · Expectations and possible outcomes of therapy  · Proper use, timely administration, and missed dose management  · Duration of therapy  · Side effects, including prevention, minimization, and management  · Contraindications and safety precautions  · New or changed medications, including prescribe and over the counter medications and supplements  · Storage, safe handling, and disposal    Patient will start on 2/22. He requested [employer] card assistance but does not qualify because he has medicare part B    Tasks added this encounter   No tasks added.   Tasks due within next 3 months   2/17/2023 - Clinical - Initial Assessment     NIYAH MENARD, PharmD  [facility]  [facility] [address]  [facility] [address]  Phone: [phone]  Fax: [phone]

## 2023-03-01 NOTE — PROGRESS NOTES
"  [facility] Hematology/Oncology  PROGRESS NOTE - Follow-up Visit      Subjective:       Patient ID:   NAME: Conrad Kuhn : 1940     82 y.o. male    Referring Doc: Julien  Other Physicians: Ced Erazo Joubert, Srinivas, Pinsky    Chief Complaint:  CLL f/u    History of Present Illness:     Patient is being seen today in person in clinic for a regular follow-up viasit. He is here by himself.  The patient is on today to go over the results of the recently ordered labs, tests and studies. He is here by himself today    He has since started Gazyva and Venetoclax per Dr Don's direction; he is feeling better overall and the LAD is shrinking down in the neck      His swallowing ok now. He is due to increase to 50 mg of Venetoclax tomorrow (week 2) of Venetoclax.   He is breathing ok currently. He denies any CP, SOB, or N/V.      Labs are stable.      He is feeling "good"    Discussed Covid19 precautions; he had his vaccinations              ROS:   GEN: normal without any fever, night sweats or weight loss  HEENT: normal with no HA's, sore throat, stiff neck, changes in vision; LAD much better; swallowing better  CV: normal with no CP, SOB, PND, MAYER or orthopnea  PULM: normal with no SOB, cough, hemoptysis, sputum or pleuritic pain  GI: normal with no abdominal pain, nausea, vomiting, constipation, diarrhea, melanotic stools, BRBPR, or hematemesis; no dysphagia;   : urine frequency fine  BREAST: normal with no mass, discharge, pain  SKIN: normal with no rash, erythema, bruising, or swelling    Allergies:  Review of patient's allergies indicates:  No Known Allergies    Medications:    Current Outpatient Medications:     allopurinoL (ZYLOPRIM) 300 MG tablet, Take 1 tablet (300 mg total) by mouth once daily., Disp: 30 tablet, Rfl: 6    amoxicillin (AMOXIL) 875 MG tablet, Take 1 tablet (875 mg total) by mouth every 12 (twelve) hours., Disp: 14 tablet, Rfl: 0    atorvastatin (LIPITOR) 20 MG tablet, Take 20 mg by " mouth once daily., Disp: , Rfl:     azelastine (ASTELIN) 137 mcg (0.1 %) nasal spray, 1 spray by Nasal route 2 (two) times daily. , Disp: , Rfl:     blood sugar diagnostic Strp, 1 strip by Misc.(Non-Drug; Combo Route) route 2 (two) times a day., Disp: , Rfl:     blood-glucose meter kit, Use as instructed, Disp: , Rfl:     dexAMETHasone (DECADRON) 4 MG Tab, Take 20mg the Sunday, Monday and Tuesday (Patient taking differently: Take 20 mg by mouth once daily. Take 20mg the Sunday, Monday and Tuesday), Disp: 60 tablet, Rfl: 0    duke's soln (benadryl 30 mL, mylanta 30 mL, LIDOcaine 30 mL, nystatin 30 mL) 120mL, Take 10 mLs by mouth 4 (four) times daily., Disp: 240 mL, Rfl: 0    ferrous sulfate (FEOSOL) 325 mg (65 mg iron) Tab tablet, Take 325 mg by mouth daily with breakfast., Disp: , Rfl:     fluticasone propionate (FLONASE) 50 mcg/actuation nasal spray, 1 spray by Each Nostril route once daily., Disp: , Rfl:     gabapentin (NEURONTIN) 600 MG tablet, Take 600 mg by mouth 2 (two) times daily., Disp: , Rfl:     glimepiride (AMARYL) 2 MG tablet, Take 2 mg by mouth once daily at 6am., Disp: , Rfl:     HYDROcodone-acetaminophen (NORCO)  mg per tablet, Take 1 tablet by mouth every 8 (eight) hours as needed., Disp: , Rfl:     iron-vitamin C 100-250 mg, ICAR-C, (ICAR-C) 100-250 mg Tab, Take 1 tablet by mouth once daily., Disp: 30 each, Rfl: 6    levalbuterol (XOPENEX) 1.25 mg/3 mL nebulizer solution, Take 3 mLs by nebulization every 4 to 6 hours as needed. , Disp: , Rfl:     levoFLOXacin (LEVAQUIN) 750 MG tablet, Take 750 mg by mouth once daily., Disp: , Rfl:     losartan (COZAAR) 50 MG tablet, Take 1 tablet (50 mg total) by mouth once daily., Disp: 90 tablet, Rfl: 0    metFORMIN (GLUCOPHAGE) 500 MG tablet, Take 500 mg by mouth 2 (two) times daily with meals., Disp: , Rfl:     ondansetron (ZOFRAN) 8 MG tablet, Take 1 tablet (8 mg total) by mouth every 8 (eight) hours as needed for Nausea., Disp: 30 tablet, Rfl: 2     "ondansetron (ZOFRAN-ODT) 8 MG TbDL, 1 tablet on the tongue and allow to dissolve  as needed Orally Once a day, Disp: , Rfl:     promethazine (PHENERGAN) 12.5 MG Tab, Take 1 tablet (12.5 mg total) by mouth every 6 (six) hours as needed., Disp: 30 tablet, Rfl: 3    traZODone (DESYREL) 300 MG tablet, Take 300 mg by mouth every evening., Disp: , Rfl:     valACYclovir (VALTREX) 1000 MG tablet, Take 1,000 mg by mouth once daily., Disp: , Rfl:     venetoclax (VENCLEXTA) 100 mg Tab, Take 400 mg by mouth once daily., Disp: 120 tablet, Rfl: 11    venetoclax 10 mg-50 mg- 100 mg DsPk, Take 20mg by mouth daily on days 22-28 of cycle 1. Take 50 mg by mouth daily on days 1-7 of cycle 2 Take 100 mg daily on days 8-14 of cycle 2 Take 200 mg daily on days 15-21 of cycle 2.  Take with food. (Patient taking differently: Take by mouth Take 20mg by mouth daily on days 22-28 of cycle 1. Take 50 mg by mouth daily on days 1-7 of cycle 2 Take 100 mg daily on days 8-14 of cycle 2 Take 200 mg daily on days 15-21 of cycle 2.  Take with food.), Disp: 42 tablet, Rfl: 0  No current facility-administered medications for this visit.    Facility-Administered Medications Ordered in Other Visits:     oBINutuzumab (GAZYVA) 1,000 mg in sodium chloride 0.9% 325 mL chemo infusion, 1,000 mg, Intravenous, 1 time in Clinic/HOD, Drew Bai MD    sodium chloride 0.9% 250 mL flush bag, , Intravenous, 1 time in Clinic/HOD, Drew Bai MD    sodium chloride 0.9% flush 10 mL, 10 mL, Intravenous, PRN, Drew Bai MD    PMHx/PSHx Updates:  See patient's last visit with Dr. Bai 2/15/2023  See H&P on 1/3/2019        Pathology:  Cancer Staging  No matching staging information was found for the patient.          Objective:     Vitals:  BP   128/64 (Patient Position: Sitting)       Pulse   75       Temp   98.3 °F (36.8 °C)       Resp   18       Ht   6' 2" (1.88 m)         Wt   82 kg (180 lb 11.2 oz)          Physical Examination:   GEN: no " apparent distress, comfortable; AAOx3  HEAD: atraumatic and normocephalic  EYES: no conjunctival pallor or muddiness, no icterus; normal pupil reaction to ambient light  ENT: OMM, no pharyngeal erythema, external bilateral ears WNL; no visible thrush or ulcers  NECK: no masses or swelling, trachea midline, no visible LAD/LN's ; LAD and LN's with decrease in size   CV: no palpitations; no pedal edema; no noticeable JVD or neck vein distension  CHEST: Normal respiratory effort; chest wall breath movements symmetrical; no audible wheezing  ABDOM: non-distended; no bloating;  peg tube since removed  MUSC/Skeletal: ROM normal; joints visibly normal; no deformities or arthropathy  EXTREM: no clubbing, cyanosis, inflammation or swelling; right arm with fair ROM  SKIN: no rashes, lesions, ulcers, petechiae or subcutaneous nodules  : no keith  NEURO: moving all 4 extremities; AAOx3; no tremors  PSYCH: normal mood, affect and behavior  LYMPH: no visible LN's or LAD; LAD and LN's on right neck reduced in size              Labs:   Lab Results   Component Value Date    WBC 3.14 (L) 02/28/2023    HGB 8.6 (L) 02/28/2023    HCT 25.0 (L) 02/28/2023    MCV 91 02/28/2023    PLT 54 (L) 02/28/2023     CMP  Sodium   Date Value Ref Range Status   02/28/2023 137 136 - 145 mmol/L Final   06/12/2019 138 134 - 144 mmol/L      Potassium   Date Value Ref Range Status   02/28/2023 4.0 3.5 - 5.1 mmol/L Final     Chloride   Date Value Ref Range Status   02/28/2023 103 95 - 110 mmol/L Final   06/12/2019 99 98 - 110 mmol/L      CO2   Date Value Ref Range Status   02/28/2023 28 23 - 29 mmol/L Final     Glucose   Date Value Ref Range Status   02/28/2023 171 (H) 70 - 110 mg/dL Final   06/12/2019 179 (H) 70 - 99 mg/dL      BUN   Date Value Ref Range Status   02/28/2023 12 8 - 23 mg/dL Final     Creatinine   Date Value Ref Range Status   02/28/2023 0.8 0.5 - 1.4 mg/dL Final   06/12/2019 0.95 0.60 - 1.40 mg/dL      Calcium   Date Value Ref Range Status    02/28/2023 8.5 (L) 8.7 - 10.5 mg/dL Final     Total Protein   Date Value Ref Range Status   02/28/2023 6.2 6.0 - 8.4 g/dL Final     Albumin   Date Value Ref Range Status   02/28/2023 3.0 (L) 3.5 - 5.2 g/dL Final   06/12/2019 3.9 3.1 - 4.7 g/dL      Total Bilirubin   Date Value Ref Range Status   02/28/2023 0.5 0.1 - 1.0 mg/dL Final     Comment:     For infants and newborns, interpretation of results should be based  on gestational age, weight and in agreement with clinical  observations.    Premature Infant recommended reference ranges:  Up to 24 hours.............<8.0 mg/dL  Up to 48 hours............<12.0 mg/dL  3-5 days..................<15.0 mg/dL  6-29 days.................<15.0 mg/dL       Alkaline Phosphatase   Date Value Ref Range Status   02/28/2023 43 (L) 55 - 135 U/L Final     AST   Date Value Ref Range Status   02/28/2023 15 10 - 40 U/L Final     ALT   Date Value Ref Range Status   02/28/2023 12 10 - 44 U/L Final     Anion Gap   Date Value Ref Range Status   02/28/2023 6 (L) 8 - 16 mmol/L Final     eGFR if    Date Value Ref Range Status   07/02/2022 >60.0 >60 mL/min/1.73 m^2 Final     eGFR if non    Date Value Ref Range Status   07/02/2022 >60.0 >60 mL/min/1.73 m^2 Final     Comment:     Calculation used to obtain the estimated glomerular filtration  rate (eGFR) is the CKD-EPI equation.              Lab Results   Component Value Date    IRON 59 02/02/2023    TIBC 342 02/02/2023    FERRITIN 67 02/02/2023         Radiology/Diagnostic Studies:    PET  12/2/2022:    IMPRESSION: Mild enlargement of the extensive adenopathy within the neck, chest, abdomen and pelvis with faint increased FDG activity     Stable splenomegaly         CT 11/8/2022:  IMPRESSION:     Worsening splenomegaly with diffuse increase in size and number of essentially all of the main lymph node chains throughout the chest, abdomen and pelvis.           PET 8/29/2022:  IMPRESSION:  1. Numerous enlarged  lymph nodes throughout the neck, chest, abdomen and pelvis are non hypermetabolic, and stable compared to CT of 07/02/2022.  2. Stable splenomegaly, with no abnormal focal splenic FDG hypermetabolism.  3. Additional observations as described.         CT 7/2/2022:    IMPRESSION:  1. Interval worsening in numerous enlarged lymph nodes in the chest, abdomen and pelvis, as well as development of splenomegaly, compatible with lymphoma and or worsening CLL, given patient's history  2. Mild bilateral hydroureteronephrosis, with markedly enlarged prostate gland and appearance of the urinary bladder suggesting chronic bladder outlet obstruction. Consider correlation with urinalysis.  3. Infrarenal abdominal aortic aneurysm measuring 33 mm in diameter. Follow-up imaging in 3 years is recommended per best practice guidelines.  4. Additional observations as described.           PET  3/14/2022:    IMPRESSION:  Left upper lobectomy without evidence of recurrent or metastatic disease      MRI cervical spine  10/1/2021:    IMPRESSION:     Loss of normal cervical lordosis with mild kyphotic curvature.     Advanced multilevel cervical spondylosis, as outlined above. Spinal canal narrowing and foraminal narrowing is most severe at C5-6. 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     Congenital narrowing of cervical spinal canal, which exacerbates cervical spondylosis        CT head  8/27/2021:  IMPRESSION:     No CT evidence of acute intracranial pathology.     Stable senescent changes as above.         PET  7/19/2021:    IMPRESSION:  Prior left upper lobectomy without evidence of recurrent or metastatic disease  - 3 cm infrarenal abdominal aortic aneurysm      CT head 6/29/2021:  IMPRESSION:     No CT evidence of acute intracranial pathology      PET  1/18/2021:    Impression:     No evidence of FDG avid lymphoproliferative disease.     Aneurysmal dilation of infrarenal  abdominal aorta (3.4 cm) as well as aneurysmal dilation of right common iliac artery.     Additional incidental findings as above        PET  6/24/2020:    Impression:     1. No findings of active lymphoproliferative disease.  2. Additional observations as above.      PET  1/24/2020:    Impression       Moderate decrease in size of the adenopathy within the neck, chest, abdomen and pelvis.  The spleen is smaller in size.  There is no significant FDG activity.    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 Multifocal lymphadenopathy in the chest, abdomen, and pelvis compatible with provided clinical history of lymphocytic leukemia.  There is mixed interval change when compared to prior studies.  Pathologic lymphadenopathy in the chest has increased significantly when compared to a CTA of the chest from May 2018.  Pathologic retroperitoneal lymphadenopathy has improved slightly when compared to a previous abdominal CT from February 2017.  Please see above details.  2. Mild aneurysmal dilation of the infrarenal abdominal aorta, with maximal transverse diameter of 3.7 cm.  Maximal transverse diameter on a prior study from 2017 was 2.5 cm.  3. Additional findings as above           [facility] Unknown Rad Eap    Result Date: 1/14/2019  CMS MANDATED QUALITY DATA - CT RADIATION - 436 All CT scans at this facility utilize dose modulation, iterative reconstruction, and/or weight based dosing when appropriate to reduce radiation dose to as low as reasonably achievable. Reason:Lymphoid leukemia TECHNIQUE: CT thorax with, CT abdomen  with, and CT pelvis with 100 mL Omnipaque 350. COMPARISON: CT chest dated 05/19/2018 and CT abdomen and pelvis dated 02/08/2017 CT THORAX: There is stable mediastinal, hilar and axillary adenopathy. There is coronary artery calcification. There is resolution of the groundglass opacities throughout the lungs. There are no  confluent infiltrates, pulmonary nodules or pleural effusions. There are stable subcentimeter nodules in the left lobe of the thyroid gland. There are degenerative changes of the spine. CT ABDOMEN: The liver, pancreas, adrenal glands and gallbladder are normal. The spleen is enlarged. There is mesenteric and retroperitoneal adenopathy which is slightly smaller in size. -There is a portacaval node measuring 37 x 21 mm and previously measured 38 x 27 mm. -Periportal lymph node measuring 41 x 18 mm, previously measured 48 x 29 mm. -Left periaortic adenopathy measuring 34 x 23 mm and previously measured 40 x 42 mm- The kidneys enhance symmetrically without hydronephrosis or calculi. There are no thick-walled or dilated bowel loops. There is vascular calcification of aorta. There is a 3.0 x 3.0 cm infrarenal abdominal aortic aneurysm. CT PELVIS: There is adenopathy along the pelvic sidewalls bilaterally which has decreased in size. -The left common iliac adenopathy measuring 36 x 11 mm, previously measured 46 x 14 mm -the right common iliac adenopathy measuring 46 x 10 mm. Previously measured 53 x 15 mm. The bladder is normal. The prostate gland is enlarged. There are degenerative changes of the spine. There is grade 1 anterolisthesis of L5 on S1 with bilateral pars defects.     IMPRESSION: Stable mediastinal, hilar and axillary adenopathy with resolution of the airspace disease within the lungs Decrease in size of the mesenteric, retroperitoneal and pelvic adenopathy. Stable infrarenal abdominal aortic aneurysm Splenomegaly     Read and electronically signed by: Jeniffer Zhang MD on 1/14/2019 9:14 AM CST JENIFFER HZANG MD      I have reviewed all available lab results and radiology reports.    Assessment/Plan:   (1) 82 y.o. male with  diagnosis of CLL who has been referred by Dr Rony Victoria for continuation of care by medical hematology/oncology.   - BM biopsy  12/4/2015 with CLL  - diagnosis of SLL in 2004 and  s/p FCR x6 in 2007  - s/p imbruvica in past (stopped in May 2018)  - latest wbc at 4.8; lymph 56%  - latest CT on 8/7/2019 showing increase in the LAD  - platelets are 111,000  - He was recently hospitalized at [facility] and seen by Dr Wheeler. Dr Wheeler has recommended Rituximab. He is starting tomorrow.   - discussed the rituximab regimen and the potential side-effect profile; he is getting chemotherapy school today with Rose[facility]  - he saw Dr Don on Oct 21st 2019  - he has had 3 of the 4 weeks of rituximab so far; Dr Don recommends him to eventually get rituximab monthly x6 with daily oral Venetoclax for two years total.   - completed rituximab (4th) week of rituximab and  S/p 6 monthly rituximab with Venetoclax oral but is taking only 2 pills daily due to the counts  - he is now just on the venetclax  - he saw Dr Don again on Dec 23rd 2019 and sees him again in March 16th 2020  - latest PEt on 1/24/2020 looks really good    8/27/2020:  - he saw Dr Don last month at [facility]  - latest PEt from 6/24/2020 looks good  - he remains on just the oral venetoclax at 2 pills per day  - he sees Dr Don again in about 3 months (Oct 2020)    11/18/2020:  - he is doing well with the Venetoclax  - due for repeat PEt in Dec 2020  - he sees Dr Don again on Dec 23rd 2020    1/20/2021:  - he is doing well with the Venetoclax  - He saw Dr Don at [facility] in Dec 2020. He is now off rituximab monthly and remains only on the oral Venetoclax but at 2 pills daily . He sees Dr Don again in feb 2021  - Recent PEt on  1/18/2021 looks stable except for incidental aneurysm in aorta; so we are referring him to Dr Kapadia with vascualr    4/20/2021:  - he saw Dr Don in Feb 2021 and sees him again in Nov 2021  - repeat PEt in June/july 2021  - continued on Venetoclax and regular blood checks    7/20/2021:  - he continues on the Venetoclax  - mild leucopenia from the medication  - PEt on 7/19/2021 seems to be stable    9/21/2021:  - doing ok overall  with some occasional HA's and general lack of energy  - he sees Dr Don again on nov 1st and hopefully he can discontinue the venetoclax thereafter  - Head CT on 8/27/2021 was relatively negative    2/17/2022:  -  He last showed for oncology appointment in Sept 2021  - He had covid in Jan 2022 and he received the infusion but did not require hospitalization  - He saw Dr Erazo couple of weeks ago  - He is planning to have cervical spine surgery with Dr Mai in the near future.  - He sees Dr Don at [facility] this coming Monday. He is now off rituximab monthly and he is also now off the oral Venetoclax (expect he will be getting a repeat bone marrow biopsy)    3/31/2022:  - He had recent telemed visit Dr Don at [facility] and they were pleased with the latest bone marrow biopsy. He is now off rituximab monthly and he is also now off the oral Venetoclax      6/6/2022:  - He had surgery on his cervical spine with Dr Mai on April 8th 2022 and had postoperative problems related to the intubation and anesthesia and was in the hospital for about 3 weeks. He had aspiration pneumonia and bout of respiratory failure requiring mech vent support.  He has residual swallowing issues and has a peg tube. He was seen Dr Garcia with GI while in hospital. He was discharged on 4/18. He has had home health coming to house couple times of week. He reports that he is doing better    - he is due to see Dr Don again at [facility] in the near future and he has since been off all therapy      8/11/2022:  - He was recently hospitalized with fever and pneumonia. He is feeling better  - He is swallowing and eating better; peg tube since removed  - He previously had bout of oral mucosal ulcerations for which he had biopsies at [facility] which were negative for any malignancy. This has since resolved.  - He previously had telemed visit Dr Don at [facility] but does not know when he sees him again.. He had previous bone marrow biopsy with good report. He has  been off rituximab and the oral Venetoclax since last Nov 2021.  - CT scans on 7/2/2022 with some progression of the LAD in his body   - will get PEt and aslk Dr Don to see him again    9/29/2022:  - He saw Dr Don again at [facility] and they are considering giving him a regimen of chemotherapy (some program or clinical trial0 over at [facility] but he reports that they have since decided against proceeding in that direction.  - he was supposed to see Dr Don on 9/12 but that appointment was cancelled per patient   - he had PET on 8/29/2022 which was stable    11/23/2022:  - He was recently hospitalized at [facility]; he is doing better and feeling better overall.  - He was supposed to see Dr Don again at [facility] after discharge but does not see him till Dec 2022  - CT on 11/8 with some increase in speel size and LAD  - will resume venetoclax in meantime and he needs to f/u with Dr Don for further directives  - get PEt    1/9/2023:  - He talked to Dr Don via telemed in Dec 2022 and was supposed to start venetoclax but has not done so as of yet. He reports that the specialty pharmacy had called him but he has not received the drug as of yet  - Dr Don was evaluating him for clinical trial but patient is not inclined to proceed in that direction  - Need Dr Don's last notes  - he had PEt on 12/2/2022 with some mild enlargement of the LN's  - he had bone marrow biopsy on 12/8/2022 with pathology report still unavailable but per review looks like he has about 14-17% involvement with NHL  - will reach out to Dr Don and the speciality pharmacy  - need the assistance of the patient navigator  - WBC has increased to 18.39    2/15/2023:  - He has since started Gazyva per Dr Don's direction; he is feeling better overall and the LAD is shrinking down in the neck  - hgb at 11.4 and plats 36,000    3/1/2023:   - receiving Gazyva today and due for week 2 of Venetoclax tomorrow   - doing well, labs stable   - lymphadenopathy improving   -  glucerna and ensure for supplementation              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  - followed  By Dr Kee  - CEA 1.4 on 4/8/2021  - CT scans on 1/14/2019 stable  - PET done in jan 2021 on chart    3/31/2022:  - CEA at 0.8  - PET on 3/14/2022 negative for recurrence    9/29/2022:  - CEA 0.9  - PET on chart     11/23/2022:  - latest CEA at 1.2    2/15/2023:  - he had PET on 12/2/2022     (3) Iron deficiency anemia s/p IV iron couple weeks ago  - s/p periodic IV iron therapies         (4) HTN - on BP meds     (6) Chronic neck and back issues - followed by Dr Ryan Rivero     (7) DM - currently off all meds     (8) Hypercholesterolemia - on meds    (9)  - followed by Dr Whitney    (10) Chronic Leucopenia and thrombocytopenia - due to chemotherapy agents and lymphoma/leukemia          VISIT DIAGNOSES:      Encounter Diagnoses   Name Primary?    CLL (chronic lymphocytic leukemia) Yes    Lymphadenopathy, cervical     Mouth sores              PLAN:  Continue with Gazyva and Venetoclax - check labs week and 6 hours after a dose change of venetoclax   2.  F/u with Dr Don as directed  3.  F/u with PCP, Pulm, , etc  4.  Monitor labs as directed  5.   F/u with Dr Kapadia with vascular for the aortic aneurysm as directed          RTC in  2 weeks with Dr. Bai and 4 weeks with me           Discussion:       Total Time spent on patient:    I spent over 25 mins of time with the patient. Reviewed results of the recently ordered labs, tests, reports and studies; made directives with regards to the results. Over half of this time was spent couseling and coordinating care, making treatment and analytical decisions; ordering necessary labs, tests and studies; and discussing treatment options and setting up treatment plan(s) if indicated.        COVID-19 Discussion:    I had long discussion with patient and any applicable family about the COVID-19 coronavirus epidemic and the recommended  precautions with regard to cancer and/or hematology patients. I have re-iterated the CDC recommendations for adequate hand washing, use of hand -like products, and coughing into elbow, etc. In addition, especially for our patients who are on chemotherapy and/or our otherwise immunocompromised patients, I have recommended avoidance of crowds, including movie theaters, restaurants, churches, etc. I have recommended avoidance of any sick or symptomatic family members and/or friends. I have also recommended avoidance of any raw and unwashed food products, and general avoidance of food items that have not been prepared by themselves. The patient has been asked to call us immediately with any symptom developments, issues, questions or other general concerns.          I have explained all of the above in detail and the patient understands all of the current recommendation(s). I have answered all of their questions to the best of my ability and to their complete satisfaction.   The patient is to continue with the current management plan.        Chemotherapy Discussion:      I discussed the available treatment option(s) in accordance with the latest/current national evidence-based guidelines (NCCN, UpToDate, NCI, ASCO, etc where applicable), their overall age/condition and their co-morbidities. I also went over the risks and benefits of the chemotherapy with regard to their particular cancer type, their cancer stage, their age/condition, and their co-morbidities. I provided literature on the chemotherapy regimen and discussed the chemotherapy side-effect profiles of the drug(s). I discussed the importance of compliance with obtaining and monitoring weekly lab work, and went over the potential hematopathology issues and risks with anemia, leucopenia and thrombocytopenia that can occur with chemotherapy. I discussed the potential risks of liver and kidney damage, which could be permanent and could necessitate dialysis  long-term if kidney failure developed. I discussed the emetic and/or diarrheal potential of the regimen and the potential need for use of antiemetic and anti-diarrheal medications. I discussed the risk for development of anaphylactic shock, bronchospasm, dysrhythmia, and respiratory/cardiovascular arrest and/or failure. I discussed the potential risks for development of alopecia, cold sensory issues, ringing in ears, vertigo, cataracts, glaucoma, and neuropathy, all of which could end up being chronic and life-long. Some chemotherpyI discussed the risks of hand-foot syndrome and rashes, and development of other autoimmune mediated processes such as pneumonitis, hepatitis, and colitis which could be life threatening. I discussed the risks of the potential development of a rare but fatal viral mediated disease known as PML (Progressive Multifocal Leukoencephalopathy), and risk of future development of leukemia and/or lymphoma from use of certain chemotherapy agents. I discussed the need for neutropenic precautions, basic hygiene/sanitation behaviors and dietary restrictions.    The patient's consent has been obtained to proceed with the chemotherapy.The patient will be referred to [facility] for training and education on chemotherapy, use of antiemetics and/or anti-diarrheals, use of NSAID's, potential chemotherapy side-effects, and any specific recommendations and precautions with the particular chemotherapy agents.       Immunologic Therapy Discussion:    I discussed the available treatment option(s) in accordance with the latest/current national evidence-based guidelines (NCCN, UpToDate, NCI, ASCO, etc where applicable), their overall age/condition and their co-morbidities. I went over the risks and benefits of the immunotherapy with regard to their particular cancer type, their cancer stage, their age/condition, and their co-morbidities. I provided literature on the immunotherapy  regimen and discussed the immunotherapy side-effect profiles of the drug(s). I discussed the importance of compliance with obtaining and monitoring weekly lab work, and went over the potential hematopathology issues and risks of hemato-pathologic issues with anemia, leucopenia and/or thrombocytopenia and effects on thyroid function that can occur with immunotherapy. The patient will most likely need to have there thyroid functions monitored by their PCP and may need to take thyroid medication while on the immunotherapy. I discussed the potential risks of liver and kidney damage, which could be permanent and could necessitate dialysis long-term if kidney failure developed. I discussed the emetic and/or diarrheal potential of the regimen and the potential need for use of antiemetic and anti-diarrheal medications. I discussed the risk for development of anaphylactic shock, bronchospasm, dysrhythmia, and respiratory/cardiovascular arrest and/or failure. I discussed the potential risks for development of alopecia, cold sensory issues, ringing in ears, vertigo and neuropathy, all of which could end up being chronic and life-long. I discussed the risks of hand-foot syndrome and rashes, and development of other autoimmune mediated processes such as pneumonitis, hepatitis and colitis which could potentially be life threatening. I discussed the risks of the potential development of a rare but fatal viral mediated disease known as PML (Progressive Multifocal Leukoencephalopathy), and risk of future development of leukemia and/or lymphoma from use of certain immunotherapy agents. I discussed the possibility that immunologic therapy cold worsen or promote progression of any underlying autoimmune diseases such as sarcoidosis, ulcerative colitis, Crohn's disease, psoriasis, rheumatoid disorders, scleroderma, autoimmune nephritis disorders, Hashimoto's thyroiditis, and Lupus among others. I discussed the need for neutropenic  precautions, basic hygiene/sanitation behaviors and dietary restrictions.    The patient's consent has been obtained to proceed with the immunotherapy.The patient will be referred to [facility] for training and education on immunotherapy, use of antiemetics and/or anti-diarrheals, use of NSAID's, potential immunotherapy side-effects, and any specific recommendations and precautions with the particular immunotherapy agents.      I answered all of the patient's (and family's, if applicable) questions to the best of my ability and to their complete satisfaction. The patient acknowledged full understanding of the risks, recommendations and plan(s).      Iron Infusion Therapy Discussion:     I provided literature/learning materials on the particular IV iron regimen and discussed the potential side-effect profiles of the drug(s). I discussed the importance of compliance with obtaining and monitoring requested lab work, and went over the potential risk for the development of anaphylactic shock, bronchospasm, dysrhythmia, liver and/or kidney damage, and respiratory/cardiovascular arrest and/or failure. I discussed the potential risks for development of alopecia, fevers, itching, chills and/or rigors, cold sensory issues, ringing in ears, vertigo and neuropathy, all of which are usually acute but sometimes could end up being chronic and life-long. I discussed the risks of hand-foot syndrome and rashes, and development of other autoimmune mediated processes such as pneumonitis and colitis which could be life threatening.     The patient's consent has been obtained to proceed with the IV iron therapy.The patient will be referred to [facility] for training and education on IV iron therapy, use of antiemetics and/or anti-diarrheals, use of NSAID's, potential IV iron therapy side-effects, and any specific recommendations and precautions with the particular IV iron agents.       I answered all of the patient's (and family's, if applicable) questions to the best of my ability and to their complete satisfaction. The patient acknowledged full understanding of the risks, recommendations and plan(s).          I answered all of the patient's (and family's, if applicable) questions to the best of my ability and to their complete satisfaction. The patient acknowledged full understanding of the risks, recommendations and plan(s).         Electronically signed by Marion Rizo, MSN,APRN,AGNP-C

## 2023-03-15 NOTE — TELEPHONE ENCOUNTER
Specialty Pharmacy - Refill Coordination    Specialty Medication Orders Linked to Encounter      Flowsheet Row Most Recent Value   Medication #1 venetoclax (VENCLEXTA) 100 mg Tab (Order#[id number], Rx#[id number])            Refill Questions - Documented Responses      Flowsheet Row Most Recent Value   Patient Availability and HIPAA Verification    Does patient want to proceed with activity? Yes   HIPAA/medical authority confirmed? Yes   Relationship to patient of person spoken to? Self   Refill Screening Questions    Changes to allergies? No   Changes to medications? No   New conditions since last clinic visit? No   Unplanned office visit, urgent care, ED, or hospital admission in the last 4 weeks? No   How does patient/caregiver feel medication is working? Good   Financial problems or insurance changes? No   How many doses of your specialty medications were missed in the last 4 weeks? 0   Would patient like to speak to a pharmacist? No   When does the patient need to receive the medication? 03/22/23   Refill Delivery Questions    How will the patient receive the medication? [facility]   When does the patient need to receive the medication? 03/22/23   Shipping Address Home   Address in [facility] confirmed and updated if neccessary? Yes   Expected Copay ($) 65   Is the patient able to afford the medication copay? Yes   Payment Method CC on file   Days supply of Refill 30   Supplies needed? No supplies needed   Refill activity completed? Yes   Refill activity plan Refill scheduled   Shipment/Pickup Date: 03/17/23            Current Outpatient Medications   Medication Sig    allopurinoL (ZYLOPRIM) 300 MG tablet Take 1 tablet (300 mg total) by mouth once daily.    amoxicillin (AMOXIL) 875 MG tablet Take 1 tablet (875 mg total) by mouth every 12 (twelve) hours.    atorvastatin (LIPITOR) 20 MG tablet Take 20 mg by mouth once daily.    azelastine (ASTELIN) 137 mcg (0.1 %) nasal spray 1 spray by Nasal route 2 (two) times  daily.     blood sugar diagnostic Strp 1 strip by Misc.(Non-Drug; Combo Route) route 2 (two) times a day.    blood-glucose meter kit Use as instructed    dexAMETHasone (DECADRON) 4 MG Tab Take 20mg the Sunday, Monday and Tuesday (Patient taking differently: Take 20 mg by mouth once daily. Take 20mg the Sunday, Monday and Tuesday)    duke's soln (benadryl 30 mL, mylanta 30 mL, LIDOcaine 30 mL, nystatin 30 mL) 120mL Take 10 mLs by mouth 4 (four) times daily.    ferrous sulfate (FEOSOL) 325 mg (65 mg iron) Tab tablet Take 325 mg by mouth daily with breakfast.    fluticasone propionate (FLONASE) 50 mcg/actuation nasal spray 1 spray by Each Nostril route once daily.    gabapentin (NEURONTIN) 600 MG tablet Take 600 mg by mouth 2 (two) times daily.    glimepiride (AMARYL) 2 MG tablet Take 2 mg by mouth once daily at 6am.    HYDROcodone-acetaminophen (NORCO)  mg per tablet Take 1 tablet by mouth every 8 (eight) hours as needed.    iron-vitamin C 100-250 mg, ICAR-C, (ICAR-C) 100-250 mg Tab Take 1 tablet by mouth once daily.    levalbuterol (XOPENEX) 1.25 mg/3 mL nebulizer solution Take 3 mLs by nebulization every 4 to 6 hours as needed.     levoFLOXacin (LEVAQUIN) 750 MG tablet Take 750 mg by mouth once daily.    losartan (COZAAR) 50 MG tablet Take 1 tablet (50 mg total) by mouth once daily.    metFORMIN (GLUCOPHAGE) 500 MG tablet Take 500 mg by mouth 2 (two) times daily with meals.    ondansetron (ZOFRAN) 8 MG tablet Take 1 tablet (8 mg total) by mouth every 8 (eight) hours as needed for Nausea.    ondansetron (ZOFRAN-ODT) 8 MG TbDL 1 tablet on the tongue and allow to dissolve  as needed Orally Once a day    promethazine (PHENERGAN) 12.5 MG Tab Take 1 tablet (12.5 mg total) by mouth every 6 (six) hours as needed.    traZODone (DESYREL) 300 MG tablet Take 300 mg by mouth every evening.    valACYclovir (VALTREX) 1000 MG tablet Take 1,000 mg by mouth once daily.    venetoclax (VENCLEXTA) 100 mg Tab Take 400 mg by mouth  once daily.    venetoclax 10 mg-50 mg- 100 mg DsPk Take 20mg by mouth daily on days 22-28 of cycle 1. Take 50 mg by mouth daily on days 1-7 of cycle 2 Take 100 mg daily on days 8-14 of cycle 2 Take 200 mg daily on days 15-21 of cycle 2.  Take with food. (Patient taking differently: Take by mouth Take 20mg by mouth daily on days 22-28 of cycle 1. Take 50 mg by mouth daily on days 1-7 of cycle 2 Take 100 mg daily on days 8-14 of cycle 2 Take 200 mg daily on days 15-21 of cycle 2.  Take with food.)   Last reviewed on 3/1/2023  8:16 AM by Emerita Houser RN    Review of patient's allergies indicates:  No Known Allergies Last reviewed on  3/1/2023 8:15 AM by Emerita Houser      Tasks added this encounter   4/14/2023 - Refill Call (Auto Added)   Tasks due within next 3 months   No tasks due.     Edith Renner [facility]  [address] [place] [id number]  Phone: [phone]  Fax: [phone]

## 2023-03-21 NOTE — TELEPHONE ENCOUNTER
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 He called on call Sat and Dr. Perez sent an antibiotic.      [phone]

## 2023-03-29 NOTE — PROGRESS NOTES
"    [facility] Hematology/Oncology  PROGRESS NOTE - Follow-up Visit      Subjective:       Patient ID:   NAME: Conrad Kuhn : 1940     82 y.o. male    Referring Doc: Julien  Other Physicians: Ced Erazo Joubert, Srinivas, Pinsky    Chief Complaint:  CLL f/u    History of Present Illness:     Patient is being seen today in person in clinic for a regular follow-up viasit. He is here by himself.  The patient is on today to go over the results of the recently ordered labs, tests and studies. He is here with his wife today.     He has since started Gazyva and Venetoclax per Dr Don's direction; he is feeling better overall and the LAD is shrinking down in the neck.     His swallowing ok now. He is currently on 400mg of venetoclax daily and Gazyva every 28 days.  Labs are stable.      He is feeling "good"    Discussed Covid19 precautions; he had his vaccinations              ROS:   GEN: normal without any fever, night sweats or weight loss  HEENT: normal with no HA's, sore throat, stiff neck, changes in vision; LAD much better; swallowing better+ mouth sore  CV: normal with no CP, SOB, PND, MAYER or orthopnea  PULM: normal with no SOB,hemoptysis, sputum or pleuritic pain + cough   GI: normal with no abdominal pain, nausea, vomiting, constipation, diarrhea, melanotic stools, BRBPR, or hematemesis; no dysphagia;   : urine frequency fine  BREAST: normal with no mass, discharge, pain  SKIN: normal with no rash, erythema, bruising, or swelling    Allergies:  Review of patient's allergies indicates:  No Known Allergies    Medications:    Current Outpatient Medications:     allopurinoL (ZYLOPRIM) 300 MG tablet, Take 1 tablet (300 mg total) by mouth once daily., Disp: 30 tablet, Rfl: 6    amoxicillin (AMOXIL) 875 MG tablet, Take 1 tablet (875 mg total) by mouth every 12 (twelve) hours., Disp: 14 tablet, Rfl: 0    atorvastatin (LIPITOR) 20 MG tablet, Take 20 mg by mouth once daily., Disp: , Rfl:     azelastine " (ASTELIN) 137 mcg (0.1 %) nasal spray, 1 spray by Nasal route 2 (two) times daily. , Disp: , Rfl:     blood sugar diagnostic Strp, 1 strip by Misc.(Non-Drug; Combo Route) route 2 (two) times a day., Disp: , Rfl:     blood-glucose meter kit, Use as instructed, Disp: , Rfl:     dexAMETHasone (DECADRON) 4 MG Tab, Take 20mg the Sunday, Monday and Tuesday (Patient taking differently: Take 20 mg by mouth once daily. Take 20mg the Sunday, Monday and Tuesday), Disp: 60 tablet, Rfl: 0    duke's soln (benadryl 30 mL, mylanta 30 mL, LIDOcaine 30 mL, nystatin 30 mL) 120mL, Take 10 mLs by mouth 4 (four) times daily., Disp: 240 mL, Rfl: 0    ferrous sulfate (FEOSOL) 325 mg (65 mg iron) Tab tablet, Take 325 mg by mouth daily with breakfast., Disp: , Rfl:     fluticasone propionate (FLONASE) 50 mcg/actuation nasal spray, 1 spray by Each Nostril route once daily., Disp: , Rfl:     gabapentin (NEURONTIN) 600 MG tablet, Take 600 mg by mouth 2 (two) times daily., Disp: , Rfl:     glimepiride (AMARYL) 2 MG tablet, Take 2 mg by mouth once daily at 6am., Disp: , Rfl:     HYDROcodone-acetaminophen (NORCO)  mg per tablet, Take 1 tablet by mouth every 8 (eight) hours as needed., Disp: , Rfl:     iron-vitamin C 100-250 mg, ICAR-C, (ICAR-C) 100-250 mg Tab, Take 1 tablet by mouth once daily., Disp: 30 each, Rfl: 6    levalbuterol (XOPENEX) 1.25 mg/3 mL nebulizer solution, Take 3 mLs by nebulization every 4 to 6 hours as needed. , Disp: , Rfl:     levoFLOXacin (LEVAQUIN) 750 MG tablet, Take 750 mg by mouth once daily., Disp: , Rfl:     levoFLOXacin (LEVAQUIN) 750 MG tablet, Take 1 tablet (750 mg total) by mouth once daily. for 7 days, Disp: 7 tablet, Rfl: 0    losartan (COZAAR) 50 MG tablet, Take 1 tablet (50 mg total) by mouth once daily., Disp: 90 tablet, Rfl: 0    metFORMIN (GLUCOPHAGE) 500 MG tablet, Take 500 mg by mouth 2 (two) times daily with meals., Disp: , Rfl:     ondansetron (ZOFRAN) 8 MG tablet, Take 1 tablet (8 mg total) by  "mouth every 8 (eight) hours as needed for Nausea., Disp: 30 tablet, Rfl: 2    ondansetron (ZOFRAN-ODT) 8 MG TbDL, 1 tablet on the tongue and allow to dissolve  as needed Orally Once a day, Disp: , Rfl:     promethazine (PHENERGAN) 12.5 MG Tab, Take 1 tablet (12.5 mg total) by mouth every 6 (six) hours as needed., Disp: 30 tablet, Rfl: 3    traZODone (DESYREL) 300 MG tablet, Take 300 mg by mouth every evening., Disp: , Rfl:     valACYclovir (VALTREX) 1000 MG tablet, Take 1,000 mg by mouth once daily., Disp: , Rfl:     venetoclax (VENCLEXTA) 100 mg Tab, Take 400 mg by mouth once daily., Disp: 120 tablet, Rfl: 11    venetoclax 10 mg-50 mg- 100 mg DsPk, Take 20mg by mouth daily on days 22-28 of cycle 1. Take 50 mg by mouth daily on days 1-7 of cycle 2 Take 100 mg daily on days 8-14 of cycle 2 Take 200 mg daily on days 15-21 of cycle 2.  Take with food. (Patient taking differently: Take by mouth Take 20mg by mouth daily on days 22-28 of cycle 1. Take 50 mg by mouth daily on days 1-7 of cycle 2 Take 100 mg daily on days 8-14 of cycle 2 Take 200 mg daily on days 15-21 of cycle 2.  Take with food.), Disp: 42 tablet, Rfl: 0  No current facility-administered medications for this visit.    Facility-Administered Medications Ordered in Other Visits:     diphenhydrAMINE injection 50 mg, 50 mg, Intravenous, Once PRN, Drew Bai MD    EPINEPHrine (EPIPEN) 0.3 mg/0.3 mL pen injection 0.3 mg, 0.3 mg, Intramuscular, Once PRN, Drew Bai MD    hydrocortisone sodium succinate injection 100 mg, 100 mg, Intravenous, Once PRN, Drew Bai MD    PMHx/PSHx Updates:  See patient's last visit with me on 3/1/2023  See H&P on 1/3/2019        Pathology:  Cancer Staging  No matching staging information was found for the patient.          Objective:     Vitals:  BP   128/64 (Patient Position: Sitting)       Pulse   75       Temp   98.3 °F (36.8 °C)       Resp   18       Ht   6' 2" (1.88 m)         Wt   82 kg (180 lb 11.2 " oz)          Physical Examination:   GEN: no apparent distress, comfortable; AAOx3  HEAD: atraumatic and normocephalic  EYES: no conjunctival pallor or muddiness, no icterus; normal pupil reaction to ambient light  ENT: OMM, no pharyngeal erythema, external bilateral ears WNL; no visible thrush or ulcers  NECK: no masses or swelling, trachea midline, no visible LAD/LN's ; LAD and LN's with decrease in size   CV: no palpitations; no pedal edema; no noticeable JVD or neck vein distension  CHEST: Normal respiratory effort; chest wall breath movements symmetrical; no audible wheezing  ABDOM: non-distended; no bloating;  peg tube since removed  MUSC/Skeletal: ROM normal; joints visibly normal; no deformities or arthropathy  EXTREM: no clubbing, cyanosis, inflammation or swelling; right arm with fair ROM  SKIN: no rashes, lesions, ulcers, petechiae or subcutaneous nodules  : no keith  NEURO: moving all 4 extremities; AAOx3; no tremors  PSYCH: normal mood, affect and behavior  LYMPH: no visible LN's or LAD; LAD and LN's on right neck reduced in size              Labs:   Lab Results   Component Value Date    WBC 5.46 03/28/2023    HGB 10.3 (L) 03/28/2023    HCT 30.5 (L) 03/28/2023    MCV 91 03/28/2023     (L) 03/28/2023     CMP  Sodium   Date Value Ref Range Status   03/28/2023 137 136 - 145 mmol/L Final   06/12/2019 138 134 - 144 mmol/L      Potassium   Date Value Ref Range Status   03/28/2023 4.1 3.5 - 5.1 mmol/L Final     Chloride   Date Value Ref Range Status   03/28/2023 101 95 - 110 mmol/L Final   06/12/2019 99 98 - 110 mmol/L      CO2   Date Value Ref Range Status   03/28/2023 29 23 - 29 mmol/L Final     Glucose   Date Value Ref Range Status   03/28/2023 158 (H) 70 - 110 mg/dL Final   06/12/2019 179 (H) 70 - 99 mg/dL      BUN   Date Value Ref Range Status   03/28/2023 22 8 - 23 mg/dL Final     Creatinine   Date Value Ref Range Status   03/28/2023 0.9 0.5 - 1.4 mg/dL Final   06/12/2019 0.95 0.60 - 1.40 mg/dL       Calcium   Date Value Ref Range Status   03/28/2023 8.9 8.7 - 10.5 mg/dL Final     Total Protein   Date Value Ref Range Status   03/28/2023 6.3 6.0 - 8.4 g/dL Final     Albumin   Date Value Ref Range Status   03/28/2023 3.7 3.5 - 5.2 g/dL Final   06/12/2019 3.9 3.1 - 4.7 g/dL      Total Bilirubin   Date Value Ref Range Status   03/28/2023 0.6 0.1 - 1.0 mg/dL Final     Comment:     For infants and newborns, interpretation of results should be based  on gestational age, weight and in agreement with clinical  observations.    Premature Infant recommended reference ranges:  Up to 24 hours.............<8.0 mg/dL  Up to 48 hours............<12.0 mg/dL  3-5 days..................<15.0 mg/dL  6-29 days.................<15.0 mg/dL       Alkaline Phosphatase   Date Value Ref Range Status   03/28/2023 47 (L) 55 - 135 U/L Final     AST   Date Value Ref Range Status   03/28/2023 16 10 - 40 U/L Final     ALT   Date Value Ref Range Status   03/28/2023 11 10 - 44 U/L Final     Anion Gap   Date Value Ref Range Status   03/28/2023 7 (L) 8 - 16 mmol/L Final     eGFR if    Date Value Ref Range Status   07/02/2022 >60.0 >60 mL/min/1.73 m^2 Final     eGFR if non    Date Value Ref Range Status   07/02/2022 >60.0 >60 mL/min/1.73 m^2 Final     Comment:     Calculation used to obtain the estimated glomerular filtration  rate (eGFR) is the CKD-EPI equation.              Lab Results   Component Value Date    IRON 59 02/02/2023    TIBC 342 02/02/2023    FERRITIN 67 02/02/2023         Radiology/Diagnostic Studies:    PET  12/2/2022:    IMPRESSION: Mild enlargement of the extensive adenopathy within the neck, chest, abdomen and pelvis with faint increased FDG activity     Stable splenomegaly         CT 11/8/2022:  IMPRESSION:     Worsening splenomegaly with diffuse increase in size and number of essentially all of the main lymph node chains throughout the chest, abdomen and pelvis.           PET  8/29/2022:  IMPRESSION:  1. Numerous enlarged lymph nodes throughout the neck, chest, abdomen and pelvis are non hypermetabolic, and stable compared to CT of 07/02/2022.  2. Stable splenomegaly, with no abnormal focal splenic FDG hypermetabolism.  3. Additional observations as described.         CT 7/2/2022:    IMPRESSION:  1. Interval worsening in numerous enlarged lymph nodes in the chest, abdomen and pelvis, as well as development of splenomegaly, compatible with lymphoma and or worsening CLL, given patient's history  2. Mild bilateral hydroureteronephrosis, with markedly enlarged prostate gland and appearance of the urinary bladder suggesting chronic bladder outlet obstruction. Consider correlation with urinalysis.  3. Infrarenal abdominal aortic aneurysm measuring 33 mm in diameter. Follow-up imaging in 3 years is recommended per best practice guidelines.  4. Additional observations as described.           PET  3/14/2022:    IMPRESSION:  Left upper lobectomy without evidence of recurrent or metastatic disease      MRI cervical spine  10/1/2021:    IMPRESSION:     Loss of normal cervical lordosis with mild kyphotic curvature.     Advanced multilevel cervical spondylosis, as outlined above. Spinal canal narrowing and foraminal narrowing is most severe at C5-6. 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     Congenital narrowing of cervical spinal canal, which exacerbates cervical spondylosis        CT head  8/27/2021:  IMPRESSION:     No CT evidence of acute intracranial pathology.     Stable senescent changes as above.         PET  7/19/2021:    IMPRESSION:  Prior left upper lobectomy without evidence of recurrent or metastatic disease  - 3 cm infrarenal abdominal aortic aneurysm      CT head 6/29/2021:  IMPRESSION:     No CT evidence of acute intracranial pathology      PET  1/18/2021:    Impression:     No evidence of FDG avid lymphoproliferative  disease.     Aneurysmal dilation of infrarenal abdominal aorta (3.4 cm) as well as aneurysmal dilation of right common iliac artery.     Additional incidental findings as above        PET  6/24/2020:    Impression:     1. No findings of active lymphoproliferative disease.  2. Additional observations as above.      PET  1/24/2020:    Impression       Moderate decrease in size of the adenopathy within the neck, chest, abdomen and pelvis.  The spleen is smaller in size.  There is no significant FDG activity.    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 Multifocal lymphadenopathy in the chest, abdomen, and pelvis compatible with provided clinical history of lymphocytic leukemia.  There is mixed interval change when compared to prior studies.  Pathologic lymphadenopathy in the chest has increased significantly when compared to a CTA of the chest from May 2018.  Pathologic retroperitoneal lymphadenopathy has improved slightly when compared to a previous abdominal CT from February 2017.  Please see above details.  2. Mild aneurysmal dilation of the infrarenal abdominal aorta, with maximal transverse diameter of 3.7 cm.  Maximal transverse diameter on a prior study from 2017 was 2.5 cm.  3. Additional findings as above           [facility] Unknown Rad Eap    Result Date: 1/14/2019  CMS MANDATED QUALITY DATA - CT RADIATION - 436 All CT scans at this facility utilize dose modulation, iterative reconstruction, and/or weight based dosing when appropriate to reduce radiation dose to as low as reasonably achievable. Reason:Lymphoid leukemia TECHNIQUE: CT thorax with, CT abdomen  with, and CT pelvis with 100 mL Omnipaque 350. COMPARISON: CT chest dated 05/19/2018 and CT abdomen and pelvis dated 02/08/2017 CT THORAX: There is stable mediastinal, hilar and axillary adenopathy. There is coronary artery calcification. There is resolution of the groundglass  opacities throughout the lungs. There are no confluent infiltrates, pulmonary nodules or pleural effusions. There are stable subcentimeter nodules in the left lobe of the thyroid gland. There are degenerative changes of the spine. CT ABDOMEN: The liver, pancreas, adrenal glands and gallbladder are normal. The spleen is enlarged. There is mesenteric and retroperitoneal adenopathy which is slightly smaller in size. -There is a portacaval node measuring 37 x 21 mm and previously measured 38 x 27 mm. -Periportal lymph node measuring 41 x 18 mm, previously measured 48 x 29 mm. -Left periaortic adenopathy measuring 34 x 23 mm and previously measured 40 x 42 mm- The kidneys enhance symmetrically without hydronephrosis or calculi. There are no thick-walled or dilated bowel loops. There is vascular calcification of aorta. There is a 3.0 x 3.0 cm infrarenal abdominal aortic aneurysm. CT PELVIS: There is adenopathy along the pelvic sidewalls bilaterally which has decreased in size. -The left common iliac adenopathy measuring 36 x 11 mm, previously measured 46 x 14 mm -the right common iliac adenopathy measuring 46 x 10 mm. Previously measured 53 x 15 mm. The bladder is normal. The prostate gland is enlarged. There are degenerative changes of the spine. There is grade 1 anterolisthesis of L5 on S1 with bilateral pars defects.     IMPRESSION: Stable mediastinal, hilar and axillary adenopathy with resolution of the airspace disease within the lungs Decrease in size of the mesenteric, retroperitoneal and pelvic adenopathy. Stable infrarenal abdominal aortic aneurysm Splenomegaly     Read and electronically signed by: Jeniffer Zhang MD on 1/14/2019 9:14 AM CST JENIFFER ZHANG MD      I have reviewed all available lab results and radiology reports.    Assessment/Plan:   (1) 82 y.o. male with  diagnosis of CLL who has been referred by Dr Rony Victoria for continuation of care by medical hematology/oncology.   - BM biopsy   12/4/2015 with CLL  - diagnosis of SLL in 2004 and s/p FCR x6 in 2007  - s/p imbruvica in past (stopped in May 2018)  - latest wbc at 4.8; lymph 56%  - latest CT on 8/7/2019 showing increase in the LAD  - platelets are 111,000  - He was recently hospitalized at [facility] and seen by Dr Wheeler. Dr Wheeler has recommended Rituximab. He is starting tomorrow.   - discussed the rituximab regimen and the potential side-effect profile; he is getting chemotherapy school today with Rose[facility]  - he saw Dr Don on Oct 21st 2019  - he has had 3 of the 4 weeks of rituximab so far; Dr Don recommends him to eventually get rituximab monthly x6 with daily oral Venetoclax for two years total.   - completed rituximab (4th) week of rituximab and  S/p 6 monthly rituximab with Venetoclax oral but is taking only 2 pills daily due to the counts  - he is now just on the venetclax  - he saw Dr Don again on Dec 23rd 2019 and sees him again in March 16th 2020  - latest PEt on 1/24/2020 looks really good    8/27/2020:  - he saw Dr Don last month at [facility]  - latest PEt from 6/24/2020 looks good  - he remains on just the oral venetoclax at 2 pills per day  - he sees Dr Don again in about 3 months (Oct 2020)    11/18/2020:  - he is doing well with the Venetoclax  - due for repeat PEt in Dec 2020  - he sees Dr Don again on Dec 23rd 2020    1/20/2021:  - he is doing well with the Venetoclax  - He saw Dr Don at [facility] in Dec 2020. He is now off rituximab monthly and remains only on the oral Venetoclax but at 2 pills daily . He sees Dr Don again in feb 2021  - Recent PEt on  1/18/2021 looks stable except for incidental aneurysm in aorta; so we are referring him to Dr Kapadia with vascualr    4/20/2021:  - he saw Dr Don in Feb 2021 and sees him again in Nov 2021  - repeat PEt in June/july 2021  - continued on Venetoclax and regular blood checks    7/20/2021:  - he continues on the Venetoclax  - mild leucopenia from the medication  - PEt on 7/19/2021  seems to be stable    9/21/2021:  - doing ok overall with some occasional HA's and general lack of energy  - he sees Dr Don again on nov 1st and hopefully he can discontinue the venetoclax thereafter  - Head CT on 8/27/2021 was relatively negative    2/17/2022:  -  He last showed for oncology appointment in Sept 2021  - He had covid in Jan 2022 and he received the infusion but did not require hospitalization  - He saw Dr Erazo couple of weeks ago  - He is planning to have cervical spine surgery with Dr Mai in the near future.  - He sees Dr Don at [facility] this coming Monday. He is now off rituximab monthly and he is also now off the oral Venetoclax (expect he will be getting a repeat bone marrow biopsy)    3/31/2022:  - He had recent telemed visit Dr Don at [facility] and they were pleased with the latest bone marrow biopsy. He is now off rituximab monthly and he is also now off the oral Venetoclax      6/6/2022:  - He had surgery on his cervical spine with Dr Mai on April 8th 2022 and had postoperative problems related to the intubation and anesthesia and was in the hospital for about 3 weeks. He had aspiration pneumonia and bout of respiratory failure requiring mech vent support.  He has residual swallowing issues and has a peg tube. He was seen Dr Garcia with GI while in hospital. He was discharged on 4/18. He has had home health coming to house couple times of week. He reports that he is doing better    - he is due to see Dr Don again at [facility] in the near future and he has since been off all therapy      8/11/2022:  - He was recently hospitalized with fever and pneumonia. He is feeling better  - He is swallowing and eating better; peg tube since removed  - He previously had bout of oral mucosal ulcerations for which he had biopsies at [facility] which were negative for any malignancy. This has since resolved.  - He previously had telemed visit Dr Don at [facility] but does not know when he sees him again.. He had  previous bone marrow biopsy with good report. He has been off rituximab and the oral Venetoclax since last Nov 2021.  - CT scans on 7/2/2022 with some progression of the LAD in his body   - will get PEt and aslk Dr Don to see him again    9/29/2022:  - He saw Dr Don again at [facility] and they are considering giving him a regimen of chemotherapy (some program or clinical trial0 over at [facility] but he reports that they have since decided against proceeding in that direction.  - he was supposed to see Dr Don on 9/12 but that appointment was cancelled per patient   - he had PET on 8/29/2022 which was stable    11/23/2022:  - He was recently hospitalized at [facility]; he is doing better and feeling better overall.  - He was supposed to see Dr Don again at [facility] after discharge but does not see him till Dec 2022  - CT on 11/8 with some increase in speel size and LAD  - will resume venetoclax in meantime and he needs to f/u with Dr Don for further directives  - get PEt    1/9/2023:  - He talked to Dr Don via telemed in Dec 2022 and was supposed to start venetoclax but has not done so as of yet. He reports that the specialty pharmacy had called him but he has not received the drug as of yet  - Dr Don was evaluating him for clinical trial but patient is not inclined to proceed in that direction  - Need Dr Don's last notes  - he had PEt on 12/2/2022 with some mild enlargement of the LN's  - he had bone marrow biopsy on 12/8/2022 with pathology report still unavailable but per review looks like he has about 14-17% involvement with NHL  - will reach out to Dr Don and the speciality pharmacy  - need the assistance of the patient navigator  - WBC has increased to 18.39    2/15/2023:  - He has since started Gazyva per Dr Don's direction; he is feeling better overall and the LAD is shrinking down in the neck  - hgb at 11.4 and plats 36,000    3/1/2023:   - receiving Gazyva today and due for week 2 of Venetoclax tomorrow   - doing  well, labs stable   - lymphadenopathy improving   - glucerna and ensure for supplementation    3/29/2023:   - receiving Gazyva today and continues on 400mg of Venetoclax daily   - ENT referral for sustained mouth sore   - CXR for cough   - levaquin for cough             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  - followed  By Dr Kee  - CEA 1.4 on 4/8/2021  - CT scans on 1/14/2019 stable  - PET done in jan 2021 on chart    3/31/2022:  - CEA at 0.8  - PET on 3/14/2022 negative for recurrence    9/29/2022:  - CEA 0.9  - PET on chart     11/23/2022:  - latest CEA at 1.2    2/15/2023:  - he had PET on 12/2/2022     (3) Iron deficiency anemia s/p IV iron couple weeks ago  - s/p periodic IV iron therapies         (4) HTN - on BP meds     (6) Chronic neck and back issues - followed by Dr Ryan Rivero     (7) DM - currently off all meds     (8) Hypercholesterolemia - on meds    (9)  - followed by Dr Whitney    (10) Chronic Leucopenia and thrombocytopenia - due to chemotherapy agents and lymphoma/leukemia          VISIT DIAGNOSES:      Encounter Diagnoses   Name Primary?    CLL (chronic lymphocytic leukemia) Yes    Cough, unspecified type        PLAN:  Continue with Gazyva and Venetoclax - check labs weekly  2.  F/u with Dr Don as directed  3.  F/u with PCP, Pulm, , etc  4.  Monitor labs as directed  5.   F/u with Dr Kapadia with vascular for the aortic aneurysm as directed  6. CXR for cough   7. Levaquin for cough   8. ENT referral for non healing mouth sore          RTC in  2 weeks with Dr. Bai and 4 weeks with me           Discussion:           COVID-19 Discussion:    I had long discussion with patient and any applicable family about the COVID-19 coronavirus epidemic and the recommended precautions with regard to cancer and/or hematology patients. I have re-iterated the CDC recommendations for adequate hand washing, use of hand -like products, and coughing into elbow, etc. In  addition, especially for our patients who are on chemotherapy and/or our otherwise immunocompromised patients, I have recommended avoidance of crowds, including movie theaters, restaurants, churches, etc. I have recommended avoidance of any sick or symptomatic family members and/or friends. I have also recommended avoidance of any raw and unwashed food products, and general avoidance of food items that have not been prepared by themselves. The patient has been asked to call us immediately with any symptom developments, issues, questions or other general concerns.          I have explained all of the above in detail and the patient understands all of the current recommendation(s). I have answered all of their questions to the best of my ability and to their complete satisfaction.   The patient is to continue with the current management plan.        Chemotherapy Discussion:      I discussed the available treatment option(s) in accordance with the latest/current national evidence-based guidelines (NCCN, UpToDate, NCI, ASCO, etc where applicable), their overall age/condition and their co-morbidities. I also went over the risks and benefits of the chemotherapy with regard to their particular cancer type, their cancer stage, their age/condition, and their co-morbidities. I provided literature on the chemotherapy regimen and discussed the chemotherapy side-effect profiles of the drug(s). I discussed the importance of compliance with obtaining and monitoring weekly lab work, and went over the potential hematopathology issues and risks with anemia, leucopenia and thrombocytopenia that can occur with chemotherapy. I discussed the potential risks of liver and kidney damage, which could be permanent and could necessitate dialysis long-term if kidney failure developed. I discussed the emetic and/or diarrheal potential of the regimen and the potential need for use of antiemetic and anti-diarrheal medications. I discussed the risk  for development of anaphylactic shock, bronchospasm, dysrhythmia, and respiratory/cardiovascular arrest and/or failure. I discussed the potential risks for development of alopecia, cold sensory issues, ringing in ears, vertigo, cataracts, glaucoma, and neuropathy, all of which could end up being chronic and life-long. Some chemotherpyI discussed the risks of hand-foot syndrome and rashes, and development of other autoimmune mediated processes such as pneumonitis, hepatitis, and colitis which could be life threatening. I discussed the risks of the potential development of a rare but fatal viral mediated disease known as PML (Progressive Multifocal Leukoencephalopathy), and risk of future development of leukemia and/or lymphoma from use of certain chemotherapy agents. I discussed the need for neutropenic precautions, basic hygiene/sanitation behaviors and dietary restrictions.    The patient's consent has been obtained to proceed with the chemotherapy.The patient will be referred to [facility] for training and education on chemotherapy, use of antiemetics and/or anti-diarrheals, use of NSAID's, potential chemotherapy side-effects, and any specific recommendations and precautions with the particular chemotherapy agents.       Immunologic Therapy Discussion:    I discussed the available treatment option(s) in accordance with the latest/current national evidence-based guidelines (NCCN, UpToDate, NCI, ASCO, etc where applicable), their overall age/condition and their co-morbidities. I went over the risks and benefits of the immunotherapy with regard to their particular cancer type, their cancer stage, their age/condition, and their co-morbidities. I provided literature on the immunotherapy regimen and discussed the immunotherapy side-effect profiles of the drug(s). I discussed the importance of compliance with obtaining and monitoring weekly lab work, and went over the potential  hematopathology issues and risks of hemato-pathologic issues with anemia, leucopenia and/or thrombocytopenia and effects on thyroid function that can occur with immunotherapy. The patient will most likely need to have there thyroid functions monitored by their PCP and may need to take thyroid medication while on the immunotherapy. I discussed the potential risks of liver and kidney damage, which could be permanent and could necessitate dialysis long-term if kidney failure developed. I discussed the emetic and/or diarrheal potential of the regimen and the potential need for use of antiemetic and anti-diarrheal medications. I discussed the risk for development of anaphylactic shock, bronchospasm, dysrhythmia, and respiratory/cardiovascular arrest and/or failure. I discussed the potential risks for development of alopecia, cold sensory issues, ringing in ears, vertigo and neuropathy, all of which could end up being chronic and life-long. I discussed the risks of hand-foot syndrome and rashes, and development of other autoimmune mediated processes such as pneumonitis, hepatitis and colitis which could potentially be life threatening. I discussed the risks of the potential development of a rare but fatal viral mediated disease known as PML (Progressive Multifocal Leukoencephalopathy), and risk of future development of leukemia and/or lymphoma from use of certain immunotherapy agents. I discussed the possibility that immunologic therapy cold worsen or promote progression of any underlying autoimmune diseases such as sarcoidosis, ulcerative colitis, Crohn's disease, psoriasis, rheumatoid disorders, scleroderma, autoimmune nephritis disorders, Hashimoto's thyroiditis, and Lupus among others. I discussed the need for neutropenic precautions, basic hygiene/sanitation behaviors and dietary restrictions.    The patient's consent has been obtained to proceed with the immunotherapy.The patient will be referred to Immunotherapy  [facility] for training and education on immunotherapy, use of antiemetics and/or anti-diarrheals, use of NSAID's, potential immunotherapy side-effects, and any specific recommendations and precautions with the particular immunotherapy agents.      I answered all of the patient's (and family's, if applicable) questions to the best of my ability and to their complete satisfaction. The patient acknowledged full understanding of the risks, recommendations and plan(s).      Iron Infusion Therapy Discussion:     I provided literature/learning materials on the particular IV iron regimen and discussed the potential side-effect profiles of the drug(s). I discussed the importance of compliance with obtaining and monitoring requested lab work, and went over the potential risk for the development of anaphylactic shock, bronchospasm, dysrhythmia, liver and/or kidney damage, and respiratory/cardiovascular arrest and/or failure. I discussed the potential risks for development of alopecia, fevers, itching, chills and/or rigors, cold sensory issues, ringing in ears, vertigo and neuropathy, all of which are usually acute but sometimes could end up being chronic and life-long. I discussed the risks of hand-foot syndrome and rashes, and development of other autoimmune mediated processes such as pneumonitis and colitis which could be life threatening.     The patient's consent has been obtained to proceed with the IV iron therapy.The patient will be referred to Chemotherapy [facility] for training and education on IV iron therapy, use of antiemetics and/or anti-diarrheals, use of NSAID's, potential IV iron therapy side-effects, and any specific recommendations and precautions with the particular IV iron agents.      I answered all of the patient's (and family's, if applicable) questions to the best of my ability and to their complete satisfaction. The patient acknowledged full understanding of the risks,  recommendations and plan(s).          I answered all of the patient's (and family's, if applicable) questions to the best of my ability and to their complete satisfaction. The patient acknowledged full understanding of the risks, recommendations and plan(s).         Electronically signed by Marion Rizo, MSN,APRN,AGNP-C

## 2023-03-29 NOTE — PLAN OF CARE
Problem: Fatigue  Goal: Improved Activity Tolerance  Outcome: Met     Problem: Fall Injury Risk  Goal: Absence of Fall and Fall-Related Injury  Outcome: Met     Problem: Oral Mucous Membrane Integrity Impairment  Goal: Improved Oral Health  Outcome: Met

## 2023-04-11 NOTE — PROGRESS NOTES
[facility] Hematology/Oncology  PROGRESS NOTE - Follow-up Visit      Subjective:       Patient ID:   NAME: Conrad Kuhn : 1940     82 y.o. male    Referring Doc: Julien  Other Physicians: Ced Erazo Joubert, Srinivas, Pinsky    Chief Complaint:  CLL f/u    History of Present Illness:     Patient is being seen today in person in clinic for a regular follow-up viasit. He is here by himself.  The patient is on today to go over the results of the recently ordered labs, tests and studies. He is here by himself today    He has since been on Gazyva per Dr Don's direction; he is feeling better overall and the LAD has shruncken down in the neck; His swallowing ok now    He is breathing ok currently. He denies any CP, SOB, or N/V. BP has been good per patient    Using walker to ambulate today       He saw Marion on 3/29/2023    He had last PEt on 2022 with some mild enlargement of the LN's  He had bone marrow biopsy on 2022            Discussed Covid19 precautions; he had his vaccinations              ROS:   GEN: normal without any fever, night sweats or weight loss  HEENT: normal with no HA's, sore throat, stiff neck, changes in vision; LAD much better; swallowing better  CV: normal with no CP, SOB, PND, MAYER or orthopnea  PULM: normal with no SOB, cough, hemoptysis, sputum or pleuritic pain  GI: normal with no abdominal pain, nausea, vomiting, constipation, diarrhea, melanotic stools, BRBPR, or hematemesis; no dysphagia; peg tube in place  : urine frequency fine  BREAST: normal with no mass, discharge, pain  SKIN: normal with no rash, erythema, bruising, or swelling    Allergies:  Review of patient's allergies indicates:  No Known Allergies    Medications:    Current Outpatient Medications:     allopurinoL (ZYLOPRIM) 300 MG tablet, Take 1 tablet (300 mg total) by mouth once daily., Disp: 30 tablet, Rfl: 6    atorvastatin (LIPITOR) 20 MG tablet, Take 20 mg by mouth once daily., Disp: , Rfl:      azelastine (ASTELIN) 137 mcg (0.1 %) nasal spray, 1 spray by Nasal route 2 (two) times daily. , Disp: , Rfl:     benzonatate (TESSALON PERLES) 100 MG capsule, Take 1 capsule (100 mg total) by mouth every 6 (six) hours as needed for Cough., Disp: 30 capsule, Rfl: 1    blood sugar diagnostic Strp, 1 strip by Misc.(Non-Drug; Combo Route) route 2 (two) times a day., Disp: , Rfl:     dexAMETHasone (DECADRON) 4 MG Tab, Take 20mg the Sunday, Monday and Tuesday (Patient taking differently: Take 20 mg by mouth once daily. Take 20mg the Sunday, Monday and Tuesday), Disp: 60 tablet, Rfl: 0    duke's soln (benadryl 30 mL, mylanta 30 mL, LIDOcaine 30 mL, nystatin 30 mL) 120mL, Take 10 mLs by mouth 4 (four) times daily., Disp: 240 mL, Rfl: 0    ferrous sulfate (FEOSOL) 325 mg (65 mg iron) Tab tablet, Take 325 mg by mouth daily with breakfast., Disp: , Rfl:     fluticasone propionate (FLONASE) 50 mcg/actuation nasal spray, 1 spray by Each Nostril route once daily., Disp: , Rfl:     gabapentin (NEURONTIN) 600 MG tablet, Take 600 mg by mouth 2 (two) times daily., Disp: , Rfl:     glimepiride (AMARYL) 2 MG tablet, Take 2 mg by mouth once daily at 6am., Disp: , Rfl:     HYDROcodone-acetaminophen (NORCO)  mg per tablet, Take 1 tablet by mouth every 8 (eight) hours as needed., Disp: , Rfl:     levalbuterol (XOPENEX) 1.25 mg/3 mL nebulizer solution, Take 3 mLs by nebulization every 4 to 6 hours as needed. , Disp: , Rfl:     losartan (COZAAR) 50 MG tablet, Take 1 tablet (50 mg total) by mouth once daily., Disp: 90 tablet, Rfl: 0    ondansetron (ZOFRAN) 8 MG tablet, Take 1 tablet (8 mg total) by mouth every 8 (eight) hours as needed for Nausea., Disp: 30 tablet, Rfl: 2    ondansetron (ZOFRAN-ODT) 8 MG TbDL, 1 tablet on the tongue and allow to dissolve  as needed Orally Once a day, Disp: , Rfl:     promethazine (PHENERGAN) 12.5 MG Tab, Take 1 tablet (12.5 mg total) by mouth every 6 (six) hours as needed., Disp: 30 tablet, Rfl: 3     "traZODone (DESYREL) 300 MG tablet, Take 300 mg by mouth every evening., Disp: , Rfl:     valACYclovir (VALTREX) 1000 MG tablet, Take 1,000 mg by mouth once daily., Disp: , Rfl:     venetoclax (VENCLEXTA) 100 mg Tab, Take 400 mg by mouth once daily., Disp: 120 tablet, Rfl: 11    venetoclax 10 mg-50 mg- 100 mg DsPk, Take 20mg by mouth daily on days 22-28 of cycle 1. Take 50 mg by mouth daily on days 1-7 of cycle 2 Take 100 mg daily on days 8-14 of cycle 2 Take 200 mg daily on days 15-21 of cycle 2.  Take with food. (Patient taking differently: Take by mouth Take 20mg by mouth daily on days 22-28 of cycle 1. Take 50 mg by mouth daily on days 1-7 of cycle 2 Take 100 mg daily on days 8-14 of cycle 2 Take 200 mg daily on days 15-21 of cycle 2.  Take with food.), Disp: 42 tablet, Rfl: 0    amoxicillin (AMOXIL) 875 MG tablet, Take 1 tablet (875 mg total) by mouth every 12 (twelve) hours. (Patient not taking: Reported on 4/12/2023), Disp: 14 tablet, Rfl: 0    blood-glucose meter kit, Use as instructed, Disp: , Rfl:     iron-vitamin C 100-250 mg, ICAR-C, (ICAR-C) 100-250 mg Tab, Take 1 tablet by mouth once daily. (Patient not taking: Reported on 4/12/2023), Disp: 30 each, Rfl: 6    levoFLOXacin (LEVAQUIN) 750 MG tablet, Take 750 mg by mouth once daily., Disp: , Rfl:     metFORMIN (GLUCOPHAGE) 500 MG tablet, Take 500 mg by mouth 2 (two) times daily with meals., Disp: , Rfl:     PMHx/PSHx Updates:  See patient's last visit with me on 2/15/2023  See H&P on 1/3/2019        Pathology:  Cancer Staging  No matching staging information was found for the patient.          Objective:     Vitals:  Blood pressure (!) 128/59, pulse 77, temperature 97.3 °F (36.3 °C), resp. rate 18, height 6' 2" (1.88 m), weight 82.6 kg (182 lb 1.6 oz).        Physical Examination:   GEN: no apparent distress, comfortable; AAOx3  HEAD: atraumatic and normocephalic  EYES: no conjunctival pallor or muddiness, no icterus; normal pupil reaction to ambient " light  ENT: OMM, no pharyngeal erythema, external bilateral ears WNL; no visible thrush or ulcers  NECK: no masses or swelling, trachea midline, no visible LAD/LN's ; LAD and LN's with some slight increase in size  CV: no palpitations; no pedal edema; no noticeable JVD or neck vein distension  CHEST: Normal respiratory effort; chest wall breath movements symmetrical; no audible wheezing  ABDOM: non-distended; no bloating;  peg tube since removed  MUSC/Skeletal: ROM normal; joints visibly normal; no deformities or arthropathy  EXTREM: no clubbing, cyanosis, inflammation or swelling; right arm with fair ROM  SKIN: no rashes, lesions, ulcers, petechiae or subcutaneous nodules  : no keith  NEURO: moving all 4 extremities; AAOx3; no tremors  PSYCH: normal mood, affect and behavior  LYMPH: no visible LN's or LAD; LAD and LN's on right neck reduced in size              Labs:   Lab Results   Component Value Date    WBC 2.71 (L) 04/11/2023    HGB 10.4 (L) 04/11/2023    HCT 29.7 (L) 04/11/2023    MCV 89 04/11/2023    PLT 57 (L) 04/11/2023     CMP  Sodium   Date Value Ref Range Status   04/11/2023 138 136 - 145 mmol/L Final   06/12/2019 138 134 - 144 mmol/L      Potassium   Date Value Ref Range Status   04/11/2023 4.1 3.5 - 5.1 mmol/L Final     Chloride   Date Value Ref Range Status   04/11/2023 103 95 - 110 mmol/L Final   06/12/2019 99 98 - 110 mmol/L      CO2   Date Value Ref Range Status   04/11/2023 30 (H) 23 - 29 mmol/L Final     Glucose   Date Value Ref Range Status   04/11/2023 89 70 - 110 mg/dL Final   06/12/2019 179 (H) 70 - 99 mg/dL      BUN   Date Value Ref Range Status   04/11/2023 15 8 - 23 mg/dL Final     Creatinine   Date Value Ref Range Status   04/11/2023 0.8 0.5 - 1.4 mg/dL Final   06/12/2019 0.95 0.60 - 1.40 mg/dL      Calcium   Date Value Ref Range Status   04/11/2023 9.1 8.7 - 10.5 mg/dL Final     Total Protein   Date Value Ref Range Status   04/11/2023 6.5 6.0 - 8.4 g/dL Final     Albumin   Date Value  Ref Range Status   04/11/2023 3.7 3.5 - 5.2 g/dL Final   06/12/2019 3.9 3.1 - 4.7 g/dL      Total Bilirubin   Date Value Ref Range Status   04/11/2023 0.5 0.1 - 1.0 mg/dL Final     Comment:     For infants and newborns, interpretation of results should be based  on gestational age, weight and in agreement with clinical  observations.    Premature Infant recommended reference ranges:  Up to 24 hours.............<8.0 mg/dL  Up to 48 hours............<12.0 mg/dL  3-5 days..................<15.0 mg/dL  6-29 days.................<15.0 mg/dL       Alkaline Phosphatase   Date Value Ref Range Status   04/11/2023 40 (L) 55 - 135 U/L Final     AST   Date Value Ref Range Status   04/11/2023 14 10 - 40 U/L Final     ALT   Date Value Ref Range Status   04/11/2023 10 10 - 44 U/L Final     Anion Gap   Date Value Ref Range Status   04/11/2023 5 (L) 8 - 16 mmol/L Final     eGFR if    Date Value Ref Range Status   07/02/2022 >60.0 >60 mL/min/1.73 m^2 Final     eGFR if non    Date Value Ref Range Status   07/02/2022 >60.0 >60 mL/min/1.73 m^2 Final     Comment:     Calculation used to obtain the estimated glomerular filtration  rate (eGFR) is the CKD-EPI equation.              Lab Results   Component Value Date    IRON 59 02/02/2023    TIBC 342 02/02/2023    FERRITIN 67 02/02/2023         Radiology/Diagnostic Studies:    PET  12/2/2022:    IMPRESSION: Mild enlargement of the extensive adenopathy within the neck, chest, abdomen and pelvis with faint increased FDG activity     Stable splenomegaly         CT 11/8/2022:  IMPRESSION:     Worsening splenomegaly with diffuse increase in size and number of essentially all of the main lymph node chains throughout the chest, abdomen and pelvis.           PET 8/29/2022:  IMPRESSION:  1. Numerous enlarged lymph nodes throughout the neck, chest, abdomen and pelvis are non hypermetabolic, and stable compared to CT of 07/02/2022.  2. Stable splenomegaly, with no  abnormal focal splenic FDG hypermetabolism.  3. Additional observations as described.         CT 7/2/2022:    IMPRESSION:  1. Interval worsening in numerous enlarged lymph nodes in the chest, abdomen and pelvis, as well as development of splenomegaly, compatible with lymphoma and or worsening CLL, given patient's history  2. Mild bilateral hydroureteronephrosis, with markedly enlarged prostate gland and appearance of the urinary bladder suggesting chronic bladder outlet obstruction. Consider correlation with urinalysis.  3. Infrarenal abdominal aortic aneurysm measuring 33 mm in diameter. Follow-up imaging in 3 years is recommended per best practice guidelines.  4. Additional observations as described.           PET  3/14/2022:    IMPRESSION:  Left upper lobectomy without evidence of recurrent or metastatic disease      MRI cervical spine  10/1/2021:    IMPRESSION:     Loss of normal cervical lordosis with mild kyphotic curvature.     Advanced multilevel cervical spondylosis, as outlined above. Spinal canal narrowing and foraminal narrowing is most severe at C5-6. 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     Congenital narrowing of cervical spinal canal, which exacerbates cervical spondylosis        CT head  8/27/2021:  IMPRESSION:     No CT evidence of acute intracranial pathology.     Stable senescent changes as above.         PET  7/19/2021:    IMPRESSION:  Prior left upper lobectomy without evidence of recurrent or metastatic disease  - 3 cm infrarenal abdominal aortic aneurysm      CT head 6/29/2021:  IMPRESSION:     No CT evidence of acute intracranial pathology      PET  1/18/2021:    Impression:     No evidence of FDG avid lymphoproliferative disease.     Aneurysmal dilation of infrarenal abdominal aorta (3.4 cm) as well as aneurysmal dilation of right common iliac artery.     Additional incidental findings as above        PET   6/24/2020:    Impression:     1. No findings of active lymphoproliferative disease.  2. Additional observations as above.      PET  1/24/2020:    Impression       Moderate decrease in size of the adenopathy within the neck, chest, abdomen and pelvis.  The spleen is smaller in size.  There is no significant FDG activity.    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 Multifocal lymphadenopathy in the chest, abdomen, and pelvis compatible with provided clinical history of lymphocytic leukemia.  There is mixed interval change when compared to prior studies.  Pathologic lymphadenopathy in the chest has increased significantly when compared to a CTA of the chest from May 2018.  Pathologic retroperitoneal lymphadenopathy has improved slightly when compared to a previous abdominal CT from February 2017.  Please see above details.  2. Mild aneurysmal dilation of the infrarenal abdominal aorta, with maximal transverse diameter of 3.7 cm.  Maximal transverse diameter on a prior study from 2017 was 2.5 cm.  3. Additional findings as above           [facility] Unknown Rad Eap    Result Date: 1/14/2019  CMS MANDATED QUALITY DATA - CT RADIATION - 436 All CT scans at this facility utilize dose modulation, iterative reconstruction, and/or weight based dosing when appropriate to reduce radiation dose to as low as reasonably achievable. Reason:Lymphoid leukemia TECHNIQUE: CT thorax with, CT abdomen  with, and CT pelvis with 100 mL Omnipaque 350. COMPARISON: CT chest dated 05/19/2018 and CT abdomen and pelvis dated 02/08/2017 CT THORAX: There is stable mediastinal, hilar and axillary adenopathy. There is coronary artery calcification. There is resolution of the groundglass opacities throughout the lungs. There are no confluent infiltrates, pulmonary nodules or pleural effusions. There are stable subcentimeter nodules in the left lobe of the thyroid gland. There  are degenerative changes of the spine. CT ABDOMEN: The liver, pancreas, adrenal glands and gallbladder are normal. The spleen is enlarged. There is mesenteric and retroperitoneal adenopathy which is slightly smaller in size. -There is a portacaval node measuring 37 x 21 mm and previously measured 38 x 27 mm. -Periportal lymph node measuring 41 x 18 mm, previously measured 48 x 29 mm. -Left periaortic adenopathy measuring 34 x 23 mm and previously measured 40 x 42 mm- The kidneys enhance symmetrically without hydronephrosis or calculi. There are no thick-walled or dilated bowel loops. There is vascular calcification of aorta. There is a 3.0 x 3.0 cm infrarenal abdominal aortic aneurysm. CT PELVIS: There is adenopathy along the pelvic sidewalls bilaterally which has decreased in size. -The left common iliac adenopathy measuring 36 x 11 mm, previously measured 46 x 14 mm -the right common iliac adenopathy measuring 46 x 10 mm. Previously measured 53 x 15 mm. The bladder is normal. The prostate gland is enlarged. There are degenerative changes of the spine. There is grade 1 anterolisthesis of L5 on S1 with bilateral pars defects.     IMPRESSION: Stable mediastinal, hilar and axillary adenopathy with resolution of the airspace disease within the lungs Decrease in size of the mesenteric, retroperitoneal and pelvic adenopathy. Stable infrarenal abdominal aortic aneurysm Splenomegaly     Read and electronically signed by: Jeniffer Zhagn MD on 1/14/2019 9:14 AM CST JENIFFER ZHANG MD      I have reviewed all available lab results and radiology reports.    Assessment/Plan:   (1) 82 y.o. male with  diagnosis of CLL who has been referred by Dr Rony Victoria for continuation of care by medical hematology/oncology.   - BM biopsy  12/4/2015 with CLL  - diagnosis of SLL in 2004 and s/p FCR x6 in 2007  - s/p imbruvica in past (stopped in May 2018)  - latest wbc at 4.8; lymph 56%  - latest CT on 8/7/2019 showing increase in the  LAD  - platelets are 111,000  - He was recently hospitalized at [facility] and seen by Dr Wheeler. Dr Wheeler has recommended Rituximab. He is starting tomorrow.   - discussed the rituximab regimen and the potential side-effect profile; he is getting chemotherapy school today with Rose[facility]  - he saw Dr Don on Oct 21st 2019  - he has had 3 of the 4 weeks of rituximab so far; Dr Don recommends him to eventually get rituximab monthly x6 with daily oral Venetoclax for two years total.   - completed rituximab (4th) week of rituximab and  S/p 6 monthly rituximab with Venetoclax oral but is taking only 2 pills daily due to the counts  - he is now just on the venetclax  - he saw Dr Don again on Dec 23rd 2019 and sees him again in March 16th 2020  - latest PEt on 1/24/2020 looks really good    8/27/2020:  - he saw Dr Don last month at [facility]  - latest PEt from 6/24/2020 looks good  - he remains on just the oral venetoclax at 2 pills per day  - he sees Dr Don again in about 3 months (Oct 2020)    11/18/2020:  - he is doing well with the Venetoclax  - due for repeat PEt in Dec 2020  - he sees Dr Don again on Dec 23rd 2020    1/20/2021:  - he is doing well with the Venetoclax  - He saw Dr Don at [facility] in Dec 2020. He is now off rituximab monthly and remains only on the oral Venetoclax but at 2 pills daily . He sees Dr Don again in feb 2021  - Recent PEt on  1/18/2021 looks stable except for incidental aneurysm in aorta; so we are referring him to Dr Kapadia with vascualr    4/20/2021:  - he saw Dr Don in Feb 2021 and sees him again in Nov 2021  - repeat PEt in June/july 2021  - continued on Venetoclax and regular blood checks    7/20/2021:  - he continues on the Venetoclax  - mild leucopenia from the medication  - PEt on 7/19/2021 seems to be stable    9/21/2021:  - doing ok overall with some occasional HA's and general lack of energy  - he sees Dr Don again on nov 1st and hopefully he can discontinue the venetoclax  thereafter  - Head CT on 8/27/2021 was relatively negative    2/17/2022:  -  He last showed for oncology appointment in Sept 2021  - He had covid in Jan 2022 and he received the infusion but did not require hospitalization  - He saw Dr Erazo couple of weeks ago  - He is planning to have cervical spine surgery with Dr Mai in the near future.  - He sees Dr Don at [facility] this coming Monday. He is now off rituximab monthly and he is also now off the oral Venetoclax (expect he will be getting a repeat bone marrow biopsy)    3/31/2022:  - He had recent telemed visit Dr Don at [facility] and they were pleased with the latest bone marrow biopsy. He is now off rituximab monthly and he is also now off the oral Venetoclax      6/6/2022:  - He had surgery on his cervical spine with Dr Mai on April 8th 2022 and had postoperative problems related to the intubation and anesthesia and was in the hospital for about 3 weeks. He had aspiration pneumonia and bout of respiratory failure requiring mech vent support.  He has residual swallowing issues and has a peg tube. He was seen Dr Garcia with GI while in hospital. He was discharged on 4/18. He has had home health coming to house couple times of week. He reports that he is doing better    - he is due to see Dr Don again at [facility] in the near future and he has since been off all therapy      8/11/2022:  - He was recently hospitalized with fever and pneumonia. He is feeling better  - He is swallowing and eating better; peg tube since removed  - He previously had bout of oral mucosal ulcerations for which he had biopsies at [facility] which were negative for any malignancy. This has since resolved.  - He previously had telemed visit Dr Don at [facility] but does not know when he sees him again.. He had previous bone marrow biopsy with good report. He has been off rituximab and the oral Venetoclax since last Nov 2021.  - CT scans on 7/2/2022 with some progression of the LAD in his body   -  will get PEt and aslk Dr Don to see him again    9/29/2022:  - He saw Dr Don again at [facility] and they are considering giving him a regimen of chemotherapy (some program or clinical trial0 over at [facility] but he reports that they have since decided against proceeding in that direction.  - he was supposed to see Dr Don on 9/12 but that appointment was cancelled per patient   - he had PET on 8/29/2022 which was stable    11/23/2022:  - He was recently hospitalized at [facility]; he is doing better and feeling better overall.  - He was supposed to see Dr Don again at [facility] after discharge but does not see him till Dec 2022  - CT on 11/8 with some increase in speel size and LAD  - will resume venetoclax in meantime and he needs to f/u with Dr Don for further directives  - get PEt    1/9/2023:  - He talked to Dr Don via telemed in Dec 2022 and was supposed to start venetoclax but has not done so as of yet. He reports that the specialty pharmacy had called him but he has not received the drug as of yet  - Dr Don was evaluating him for clinical trial but patient is not inclined to proceed in that direction  - Need Dr Don's last notes  - he had PEt on 12/2/2022 with some mild enlargement of the LN's  - he had bone marrow biopsy on 12/8/2022 with pathology report still unavailable but per review looks like he has about 14-17% involvement with NHL  - will reach out to Dr Don and the speciality pharmacy  - need the assistance of the patient navigator  - WBC has increased to 18.39    2/15/2023:  - He has since started Gazyva per Dr Don's direction; he is feeling better overall and the LAD is shrinking down in the neck  - hgb at 11.4 and plats 36,000    4/12/2023:  - continued on Gazyva  - sees Dr Don again within the next month  - wbc at 2.71, hgb 10.4 and plats 57,000              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  - followed  By Dr Kee  - CEA 1.4 on 4/8/2021  - CT scans on 1/14/2019  stable  - PET done in jan 2021 on chart    3/31/2022:  - CEA at 0.8  - PET on 3/14/2022 negative for recurrence    9/29/2022:  - CEA 0.9  - PET on chart     11/23/2022:  - latest CEA at 1.2    2/15/2023:  - he had PET on 12/2/2022     (3) Iron deficiency anemia s/p IV iron couple weeks ago  - s/p periodic IV iron therapies         (4) HTN - on BP meds     (6) Chronic neck and back issues - followed by Dr Ryan Rivero     (7) DM - currently off all meds     (8) Hypercholesterolemia - on meds    (9)  - followed by Dr Whitney    (10) Chronic Leucopenia and thrombocytopenia - due to chemotherapy agents and lymphoma/leukemia          VISIT DIAGNOSES:      Encounter Diagnoses   Name Primary?    Thrombocytopenia Yes    CLL (chronic lymphocytic leukemia)     History of lung cancer - ANDRES NSCLC 2004     Iron deficiency     Iron deficiency anemia, unspecified iron deficiency anemia type     Pancytopenia     Lymphoma, unspecified body region, unspecified lymphoma type     Splenomegaly     Lymphadenopathy, cervical     Lymphadenopathy, generalized            PLAN:  Continue with Gazyva; check labs every monthy; resume IV iron as needed; encouraged resumption of oral iron  2.  F/u with Dr Don as directed  3.  F/u with PCP, Pulm, , etc  4.  Monitor labs as directed  5.   F/u with Dr Kapadia with vascular for the aortic aneurysm as directed          RTC in  4 weeks with Whit; 8 weeks with myself  Fax note to Kacey Kee, Ryan Rivero, Chelo, Florencia Acosta/Kang Wheeler; Primo; Radha        Discussion:       Total Time spent on patient:    I spent over 25 mins of time with the patient. Reviewed results of the recently ordered labs, tests, reports and studies; made directives with regards to the results. Over half of this time was spent couseling and coordinating care, making treatment and analytical decisions; ordering necessary labs, tests and studies; and discussing treatment options and setting up treatment plan(s) if  indicated.        COVID-19 Discussion:    I had long discussion with patient and any applicable family about the COVID-19 coronavirus epidemic and the recommended precautions with regard to cancer and/or hematology patients. I have re-iterated the CDC recommendations for adequate hand washing, use of hand -like products, and coughing into elbow, etc. In addition, especially for our patients who are on chemotherapy and/or our otherwise immunocompromised patients, I have recommended avoidance of crowds, including movie theaters, restaurants, churches, etc. I have recommended avoidance of any sick or symptomatic family members and/or friends. I have also recommended avoidance of any raw and unwashed food products, and general avoidance of food items that have not been prepared by themselves. The patient has been asked to call us immediately with any symptom developments, issues, questions or other general concerns.          I have explained all of the above in detail and the patient understands all of the current recommendation(s). I have answered all of their questions to the best of my ability and to their complete satisfaction.   The patient is to continue with the current management plan.        Chemotherapy Discussion:      I discussed the available treatment option(s) in accordance with the latest/current national evidence-based guidelines (NCCN, UpToDate, NCI, ASCO, etc where applicable), their overall age/condition and their co-morbidities. I also went over the risks and benefits of the chemotherapy with regard to their particular cancer type, their cancer stage, their age/condition, and their co-morbidities. I provided literature on the chemotherapy regimen and discussed the chemotherapy side-effect profiles of the drug(s). I discussed the importance of compliance with obtaining and monitoring weekly lab work, and went over the potential hematopathology issues and risks with anemia, leucopenia and  thrombocytopenia that can occur with chemotherapy. I discussed the potential risks of liver and kidney damage, which could be permanent and could necessitate dialysis long-term if kidney failure developed. I discussed the emetic and/or diarrheal potential of the regimen and the potential need for use of antiemetic and anti-diarrheal medications. I discussed the risk for development of anaphylactic shock, bronchospasm, dysrhythmia, and respiratory/cardiovascular arrest and/or failure. I discussed the potential risks for development of alopecia, cold sensory issues, ringing in ears, vertigo, cataracts, glaucoma, and neuropathy, all of which could end up being chronic and life-long. Some chemotherpyI discussed the risks of hand-foot syndrome and rashes, and development of other autoimmune mediated processes such as pneumonitis, hepatitis, and colitis which could be life threatening. I discussed the risks of the potential development of a rare but fatal viral mediated disease known as PML (Progressive Multifocal Leukoencephalopathy), and risk of future development of leukemia and/or lymphoma from use of certain chemotherapy agents. I discussed the need for neutropenic precautions, basic hygiene/sanitation behaviors and dietary restrictions.    The patient's consent has been obtained to proceed with the chemotherapy.The patient will be referred to [facility]otherapy [facility] /[facility] for training and education on chemotherapy, use of antiemetics and/or anti-diarrheals, use of NSAID's, potential chemotherapy side-effects, and any specific recommendations and precautions with the particular chemotherapy agents.       Immunologic Therapy Discussion:    I discussed the available treatment option(s) in accordance with the latest/current national evidence-based guidelines (NCCN, UpToDate, NCI, ASCO, etc where applicable), their overall age/condition and their co-morbidities. I went over the risks and benefits of the  immunotherapy with regard to their particular cancer type, their cancer stage, their age/condition, and their co-morbidities. I provided literature on the immunotherapy regimen and discussed the immunotherapy side-effect profiles of the drug(s). I discussed the importance of compliance with obtaining and monitoring weekly lab work, and went over the potential hematopathology issues and risks of hemato-pathologic issues with anemia, leucopenia and/or thrombocytopenia and effects on thyroid function that can occur with immunotherapy. The patient will most likely need to have there thyroid functions monitored by their PCP and may need to take thyroid medication while on the immunotherapy. I discussed the potential risks of liver and kidney damage, which could be permanent and could necessitate dialysis long-term if kidney failure developed. I discussed the emetic and/or diarrheal potential of the regimen and the potential need for use of antiemetic and anti-diarrheal medications. I discussed the risk for development of anaphylactic shock, bronchospasm, dysrhythmia, and respiratory/cardiovascular arrest and/or failure. I discussed the potential risks for development of alopecia, cold sensory issues, ringing in ears, vertigo and neuropathy, all of which could end up being chronic and life-long. I discussed the risks of hand-foot syndrome and rashes, and development of other autoimmune mediated processes such as pneumonitis, hepatitis and colitis which could potentially be life threatening. I discussed the risks of the potential development of a rare but fatal viral mediated disease known as PML (Progressive Multifocal Leukoencephalopathy), and risk of future development of leukemia and/or lymphoma from use of certain immunotherapy agents. I discussed the possibility that immunologic therapy cold worsen or promote progression of any underlying autoimmune diseases such as sarcoidosis, ulcerative colitis, Crohn's disease,  psoriasis, rheumatoid disorders, scleroderma, autoimmune nephritis disorders, Hashimoto's thyroiditis, and Lupus among others. I discussed the need for neutropenic precautions, basic hygiene/sanitation behaviors and dietary restrictions.    The patient's consent has been obtained to proceed with the immunotherapy.The patient will be referred to [facility] for training and education on immunotherapy, use of antiemetics and/or anti-diarrheals, use of NSAID's, potential immunotherapy side-effects, and any specific recommendations and precautions with the particular immunotherapy agents.      I answered all of the patient's (and family's, if applicable) questions to the best of my ability and to their complete satisfaction. The patient acknowledged full understanding of the risks, recommendations and plan(s).      Iron Infusion Therapy Discussion:     I provided literature/learning materials on the particular IV iron regimen and discussed the potential side-effect profiles of the drug(s). I discussed the importance of compliance with obtaining and monitoring requested lab work, and went over the potential risk for the development of anaphylactic shock, bronchospasm, dysrhythmia, liver and/or kidney damage, and respiratory/cardiovascular arrest and/or failure. I discussed the potential risks for development of alopecia, fevers, itching, chills and/or rigors, cold sensory issues, ringing in ears, vertigo and neuropathy, all of which are usually acute but sometimes could end up being chronic and life-long. I discussed the risks of hand-foot syndrome and rashes, and development of other autoimmune mediated processes such as pneumonitis and colitis which could be life threatening.     The patient's consent has been obtained to proceed with the IV iron therapy.The patient will be referred to [facility] for training and education on IV iron therapy, use of antiemetics  and/or anti-diarrheals, use of NSAID's, potential IV iron therapy side-effects, and any specific recommendations and precautions with the particular IV iron agents.      I answered all of the patient's (and family's, if applicable) questions to the best of my ability and to their complete satisfaction. The patient acknowledged full understanding of the risks, recommendations and plan(s).          I answered all of the patient's (and family's, if applicable) questions to the best of my ability and to their complete satisfaction. The patient acknowledged full understanding of the risks, recommendations and plan(s).         Electronically signed by Drew Bai MD

## 2023-04-14 NOTE — TELEPHONE ENCOUNTER
Specialty Pharmacy - Refill Coordination    Specialty Medication Orders Linked to Encounter      Flowsheet Row Most Recent Value   Medication #1 venetoclax (VENCLEXTA) 100 mg Tab (Order#[id number], Rx#[id number])            Refill Questions - Documented Responses      Flowsheet Row Most Recent Value   Patient Availability and HIPAA Verification    Does patient want to proceed with activity? Yes   HIPAA/medical authority confirmed? Yes   Relationship to patient of person spoken to? Spouse/Significant Other   Refill Screening Questions    Changes to allergies? No   Changes to medications? No   New conditions since last clinic visit? No   Unplanned office visit, urgent care, ED, or hospital admission in the last 4 weeks? No   How does patient/caregiver feel medication is working? Good   Financial problems or insurance changes? No   How many doses of your specialty medications were missed in the last 4 weeks? 0   Would patient like to speak to a pharmacist? No   When does the patient need to receive the medication? 04/21/23   Refill Delivery Questions    How will the patient receive the medication? MEDRx   When does the patient need to receive the medication? 04/21/23   Shipping Address Home   Address in [facility] confirmed and updated if neccessary? Yes   Expected Copay ($) 65   Is the patient able to afford the medication copay? Yes   Payment Method CC on file   Days supply of Refill 30   Supplies needed? No supplies needed   Refill activity completed? Yes   Refill activity plan Refill scheduled   Shipment/Pickup Date: 04/14/23            Current Outpatient Medications   Medication Sig    allopurinoL (ZYLOPRIM) 300 MG tablet Take 1 tablet (300 mg total) by mouth once daily.    amoxicillin (AMOXIL) 875 MG tablet Take 1 tablet (875 mg total) by mouth every 12 (twelve) hours. (Patient not taking: Reported on 4/12/2023)    atorvastatin (LIPITOR) 20 MG tablet Take 20 mg by mouth once daily.    azelastine (ASTELIN)  137 mcg (0.1 %) nasal spray 1 spray by Nasal route 2 (two) times daily.     benzonatate (TESSALON PERLES) 100 MG capsule Take 1 capsule (100 mg total) by mouth every 6 (six) hours as needed for Cough.    blood sugar diagnostic Strp 1 strip by Misc.(Non-Drug; Combo Route) route 2 (two) times a day.    blood-glucose meter kit Use as instructed    dexAMETHasone (DECADRON) 4 MG Tab Take 20mg the Sunday, Monday and Tuesday (Patient taking differently: Take 20 mg by mouth once daily. Take 20mg the Sunday, Monday and Tuesday)    duke's soln (benadryl 30 mL, mylanta 30 mL, LIDOcaine 30 mL, nystatin 30 mL) 120mL Take 10 mLs by mouth 4 (four) times daily.    ferrous sulfate (FEOSOL) 325 mg (65 mg iron) Tab tablet Take 325 mg by mouth daily with breakfast.    fluticasone propionate (FLONASE) 50 mcg/actuation nasal spray 1 spray by Each Nostril route once daily.    gabapentin (NEURONTIN) 600 MG tablet Take 600 mg by mouth 2 (two) times daily.    glimepiride (AMARYL) 2 MG tablet Take 2 mg by mouth once daily at 6am.    HYDROcodone-acetaminophen (NORCO)  mg per tablet Take 1 tablet by mouth every 8 (eight) hours as needed.    iron-vitamin C 100-250 mg, ICAR-C, (ICAR-C) 100-250 mg Tab Take 1 tablet by mouth once daily. (Patient not taking: Reported on 4/12/2023)    levalbuterol (XOPENEX) 1.25 mg/3 mL nebulizer solution Take 3 mLs by nebulization every 4 to 6 hours as needed.     levoFLOXacin (LEVAQUIN) 750 MG tablet Take 750 mg by mouth once daily.    losartan (COZAAR) 50 MG tablet Take 1 tablet (50 mg total) by mouth once daily.    metFORMIN (GLUCOPHAGE) 500 MG tablet Take 500 mg by mouth 2 (two) times daily with meals.    ondansetron (ZOFRAN) 8 MG tablet Take 1 tablet (8 mg total) by mouth every 8 (eight) hours as needed for Nausea.    ondansetron (ZOFRAN-ODT) 8 MG TbDL 1 tablet on the tongue and allow to dissolve  as needed Orally Once a day    promethazine (PHENERGAN) 12.5 MG Tab Take 1 tablet (12.5 mg total) by mouth  every 6 (six) hours as needed.    traZODone (DESYREL) 300 MG tablet Take 300 mg by mouth every evening.    valACYclovir (VALTREX) 1000 MG tablet Take 1,000 mg by mouth once daily.    venetoclax (VENCLEXTA) 100 mg Tab Take 400 mg by mouth once daily.    venetoclax 10 mg-50 mg- 100 mg DsPk Take 20mg by mouth daily on days 22-28 of cycle 1. Take 50 mg by mouth daily on days 1-7 of cycle 2 Take 100 mg daily on days 8-14 of cycle 2 Take 200 mg daily on days 15-21 of cycle 2.  Take with food. (Patient taking differently: Take by mouth Take 20mg by mouth daily on days 22-28 of cycle 1. Take 50 mg by mouth daily on days 1-7 of cycle 2 Take 100 mg daily on days 8-14 of cycle 2 Take 200 mg daily on days 15-21 of cycle 2.  Take with food.)   Last reviewed on 4/12/2023 12:21 PM by Drew Bai MD    Review of patient's allergies indicates:  No Known Allergies Last reviewed on  4/12/2023 12:21 PM by Drew Bai      Tasks added this encounter   5/14/2023 - Refill Call (Auto Added)   Tasks due within next 3 months   No tasks due.     Christiane Renner [facility]  [address] [place] [id number]  Phone: [phone]  Fax: [phone]

## 2023-04-18 NOTE — SUBJECTIVE & OBJECTIVE
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     Right-sided Bell's palsy 2009    Spondylolisthesis     Varicose veins of legs     Venous insufficiency        Past Surgical History:   Procedure Laterality Date    Athroscopic surgery      On Knee    BIOPSY OF TISSUE OF NECK Left 08/2015    Recuurence CLL/small cell lyphoma    ESOPHAGEAL DILATION      ESOPHAGOGASTRODUODENOSCOPY N/A 4/15/2022    Procedure: EGD (ESOPHAGOGASTRODUODENOSCOPY);  Surgeon: Siddharth Garcia MD;  Location: [facility];  Service: Endoscopy;  Laterality: N/A;    ESOPHAGOGASTRODUODENOSCOPY N/A 4/16/2022    Procedure: EGD (ESOPHAGOGASTRODUODENOSCOPY);  Surgeon: Siddharth Garcia MD;  Location: [facility];  Service: Endoscopy;  Laterality: N/A;    ESOPHAGOGASTRODUODENOSCOPY N/A 6/23/2022    Procedure: EGD (ESOPHAGOGASTRODUODENOSCOPY);  Surgeon: Jefferson Hyman MD;  Location: [facility];  Service: Endoscopy;  Laterality: N/A;    ESOPHAGOGASTRODUODENOSCOPY W/ PEG N/A 4/16/2022    Procedure: EGD, WITH PEG TUBE INSERTION;  Surgeon: Siddharth Garcia MD;  Location: [facility];  Service: Endoscopy;  Laterality: N/A;    GASTROSTOMY TUBE PLACEMENT  04/16/2022    20 Fr (bumper initially at 3.5)    Hemorrhoid resection  1979    LUNG LOBECTOMY Left 2004    NECK SURGERY  04/08/2022    Anterior and Posterior C3-C7 fusion    OTHER SURGICAL HISTORY  2006    Chemotherapy s/p lyphoma         Portacath implantation and removal      reverse shoulder arthroplasty Right 03/2021       Review of patient's allergies indicates:  No Known Allergies    No current facility-administered medications on file prior to encounter.     Current Outpatient Medications on File Prior to Encounter   Medication Sig    allopurinoL (ZYLOPRIM) 300 MG tablet Take 1 tablet (300 mg total) by mouth once daily.    amoxicillin (AMOXIL) 875 MG tablet Take 1 tablet (875 mg total) by mouth every 12 (twelve) hours. (Patient not taking: Reported on 4/12/2023)    atorvastatin (LIPITOR) 20 MG tablet Take 20 mg by mouth once daily.    azelastine (ASTELIN) 137 mcg (0.1 %) nasal spray 1 spray by Nasal route 2 (two) times daily.     benzonatate (TESSALON PERLES) 100 MG capsule Take 1 capsule (100 mg total) by mouth every 6 (six) hours as needed for Cough.    blood sugar diagnostic Strp 1 strip by Misc.(Non-Drug; Combo Route) route 2 (two) times a day.    blood-glucose meter kit Use as instructed    dexAMETHasone (DECADRON) 4 MG Tab Take 20mg the Sunday, Monday and Tuesday (Patient taking differently: Take 20 mg by mouth once daily. Take 20mg the Sunday, Monday and Tuesday)    duke's soln (benadryl 30 mL, mylanta 30 mL, LIDOcaine 30 mL, nystatin 30 mL) 120mL Take 10 mLs by mouth 4 (four) times daily.    ferrous sulfate (FEOSOL) 325 mg (65 mg iron) Tab tablet Take 325 mg by mouth daily with breakfast.    fluticasone propionate (FLONASE) 50 mcg/actuation nasal spray 1 spray by Each Nostril route once daily.    gabapentin (NEURONTIN) 600 MG tablet Take 600 mg by mouth 2 (two) times daily.    glimepiride (AMARYL) 2 MG tablet Take 2 mg by mouth once daily at 6am.    HYDROcodone-acetaminophen (NORCO)  mg per tablet Take 1 tablet by mouth every 8 (eight) hours as needed.    iron-vitamin C 100-250 mg, ICAR-C, (ICAR-C) 100-250 mg Tab Take 1 tablet by mouth once daily. (Patient not taking: Reported on 4/12/2023)    levalbuterol (XOPENEX) 1.25 mg/3  mL nebulizer solution Take 3 mLs by nebulization every 4 to 6 hours as needed.     levoFLOXacin (LEVAQUIN) 750 MG tablet Take 750 mg by mouth once daily.    losartan (COZAAR) 50 MG tablet Take 1 tablet (50 mg total) by mouth once daily.    metFORMIN (GLUCOPHAGE) 500 MG tablet Take 500 mg by mouth 2 (two) times daily with meals.    ondansetron (ZOFRAN) 8 MG tablet Take 1 tablet (8 mg total) by mouth every 8 (eight) hours as needed for Nausea.    ondansetron (ZOFRAN-ODT) 8 MG TbDL 1 tablet on the tongue and allow to dissolve  as needed Orally Once a day    promethazine (PHENERGAN) 12.5 MG Tab Take 1 tablet (12.5 mg total) by mouth every 6 (six) hours as needed.    traZODone (DESYREL) 300 MG tablet Take 300 mg by mouth every evening.    valACYclovir (VALTREX) 1000 MG tablet Take 1,000 mg by mouth once daily.    venetoclax (VENCLEXTA) 100 mg Tab Take 4 tablets (400 mg) by mouth once daily.    venetoclax 10 mg-50 mg- 100 mg DsPk Take 20mg by mouth daily on days 22-28 of cycle 1. Take 50 mg by mouth daily on days 1-7 of cycle 2 Take 100 mg daily on days 8-14 of cycle 2 Take 200 mg daily on days 15-21 of cycle 2.  Take with food. (Patient taking differently: Take by mouth Take 20mg by mouth daily on days 22-28 of cycle 1. Take 50 mg by mouth daily on days 1-7 of cycle 2 Take 100 mg daily on days 8-14 of cycle 2 Take 200 mg daily on days 15-21 of cycle 2.  Take with food.)    [DISCONTINUED] esomeprazole (NEXIUM) 40 MG capsule Take 1 capsule (40 mg total) by mouth 2 (two) times daily.    [DISCONTINUED] metoprolol succinate (TOPROL-XL) 25 MG 24 hr tablet Take 25 mg by mouth 2 (two) times daily.      Family History       Problem Relation (Age of Onset)    Colon cancer Father    Stomach cancer Mother (80)          Tobacco Use    Smoking status: Former    Smokeless tobacco: Never   Substance and Sexual Activity    Alcohol use: Yes    Drug use: No    Sexual activity: Not Currently     Review of Systems Complete ROS otherwise  negative other than stated in HPI.   Objective:     Vital Signs (Most Recent):  Temp: 97.3 °F (36.3 °C) (04/18/23 0518)  Pulse: 81 (04/18/23 0518)  Resp: 20 (04/18/23 0518)  BP: (!) 173/78 (04/18/23 0518)  SpO2: 95 % (04/18/23 0518)   Vital Signs (24h Range):  Temp:  [97.3 °F (36.3 °C)-101.9 °F (38.8 °C)] 97.3 °F (36.3 °C)  Pulse:  [73-98] 81  Resp:  [20-41] 20  SpO2:  [93 %-96 %] 95 %  BP: (140-174)/(66-78) 173/78     Weight: 82.6 kg (182 lb 1.6 oz)  Body mass index is 23.38 kg/m².    Physical Exam  GENERAL:  Chronically ill-appearing.  No respiratory distress.  Nontoxic    HEENT:  EOMI. Conjunctivae intact.  No oral lesions  NECK:  Supple   LUNGS:  No respiratory distress.  Expiratory rales in right middle lung fields, otherwise lungs are clear to auscultation bilaterally with good air movement  CARDIAC:  RRR without murmur, rub or gallop  ABDOMEN:  Soft, nontender, nondistended, bowel sounds present  EXTREMITIES:  Peripheral pulses are 2+. Hands and feet are warm. Good capillary refill in fingers (< 2 seconds). No clubbing, cyanosis or edema  SKIN:  Skin color, texture and turgor normal. No rashes, ulcerations or nodules noted      Significant Labs: All pertinent labs within the past 24 hours have been reviewed.    Significant Imaging: I have reviewed all pertinent imaging results/findings within the past 24 hours.

## 2023-04-18 NOTE — NURSING
Nurses Note -- 4 Eyes      4/18/2023   6:17 AM      Skin assessed during: Admit      [x] No Pressure Injuries Present    []Prevention Measures Documented      [] Yes- Altered Skin Integrity Present or Discovered   [] LDA Added if Not in Epic (Describe Wound)   [] New Altered Skin Integrity was Present on Admit and Documented in LDA   [] Wound Image Taken    Wound Care Consulted? No    Attending Nurse:  Amy Russell RN     Second RN/Staff Member:  [id number]

## 2023-04-18 NOTE — ED PROVIDER NOTES
Encounter Date: 4/17/2023       History     Chief Complaint   Patient presents with    Fever     Reported 104 at home.      Chief complaint is fever.  The patient denies any other untoward events.  He is only been febrile for several hours.  According to EMTs had 104.5 fever at home.  He does have a history of lymphoma.  Does get chemo every month.  His next infusion is due the 29th of April.  He also gets a daily medicine 40 mg as well.  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  He denies any lung disease.  He does use a walker to get around.  At the present time he is awake alert cooperative.  EMS initially thought he was a little confused but is perfectly alert oriented the present time GCS 15.  EKG reveals a ventricular rate 106 sinus tach IL interval normal no ST elevation      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     Right-sided Bell's palsy 2009    Spondylolisthesis     Varicose veins of legs     Venous insufficiency      Past Surgical History:   Procedure Laterality Date    Athroscopic surgery      On Knee    BIOPSY OF TISSUE OF NECK Left 08/2015    Recuurence CLL/small cell lyphoma    ESOPHAGEAL DILATION      ESOPHAGOGASTRODUODENOSCOPY N/A 4/15/2022    Procedure: EGD  (ESOPHAGOGASTRODUODENOSCOPY);  Surgeon: Siddharth Garcia MD;  Location: [facility] ENDO;  Service: Endoscopy;  Laterality: N/A;    ESOPHAGOGASTRODUODENOSCOPY N/A 2022    Procedure: EGD (ESOPHAGOGASTRODUODENOSCOPY);  Surgeon: Siddharth Garcia MD;  Location: [facility] ENDO;  Service: Endoscopy;  Laterality: N/A;    ESOPHAGOGASTRODUODENOSCOPY N/A 2022    Procedure: EGD (ESOPHAGOGASTRODUODENOSCOPY);  Surgeon: Jefferson Hyman MD;  Location: [facility] ENDO;  Service: Endoscopy;  Laterality: N/A;    ESOPHAGOGASTRODUODENOSCOPY W/ PEG N/A 2022    Procedure: EGD, WITH PEG TUBE INSERTION;  Surgeon: Siddharth Garcia MD;  Location: [facility] ENDO;  Service: Endoscopy;  Laterality: N/A;    GASTROSTOMY TUBE PLACEMENT  2022    20 Fr (bumper initially at 3.5)    Hemorrhoid resection  1979    LUNG LOBECTOMY Left     NECK SURGERY  2022    Anterior and Posterior C3-C7 fusion    OTHER SURGICAL HISTORY  2006    Chemotherapy s/p lyphoma        Portacath implantation and removal      reverse shoulder arthroplasty Right 2021     Family History   Problem Relation Age of Onset    Stomach cancer Mother 80         at 95    Colon cancer Father      Social History     Tobacco Use    Smoking status: Former    Smokeless tobacco: Never   Substance Use Topics    Alcohol use: Yes    Drug use: No     Review of Systems   Constitutional:  Positive for fever. Negative for chills.   HENT:  Negative for ear pain, rhinorrhea and sore throat.    Eyes:  Negative for pain and visual disturbance.   Respiratory:  Positive for cough. Negative for shortness of breath.    Cardiovascular:  Negative for chest pain and palpitations.   Gastrointestinal:  Negative for abdominal pain, constipation, diarrhea, nausea and vomiting.   Genitourinary:  Negative for dysuria, frequency, hematuria and urgency.   Musculoskeletal:  Negative for back pain, joint swelling and myalgias.   Skin:  Negative for rash.   Neurological:  Negative for dizziness,  seizures, weakness and headaches.   Psychiatric/Behavioral:  Negative for dysphoric mood. The patient is not nervous/anxious.      Physical Exam     Initial Vitals [04/18/23 0000]   BP Pulse Resp Temp SpO2   (!) 148/66 98 (!) 24 (!) 101.9 °F (38.8 °C) (!) 93 %      MAP       --         Physical Exam    Nursing note and vitals reviewed.  Constitutional: He appears well-developed and well-nourished.   HENT:   Head: Normocephalic and atraumatic.   Eyes: Conjunctivae, EOM and lids are normal. Pupils are equal, round, and reactive to light.   Neck: Trachea normal. Neck supple. No thyroid mass present.   Cardiovascular:  Normal rate, regular rhythm and normal heart sounds.           Pulmonary/Chest: Breath sounds normal. No respiratory distress.   Abdominal: Abdomen is soft. There is no abdominal tenderness.   Musculoskeletal:         General: Normal range of motion.      Cervical back: Neck supple.     Neurological: He is alert and oriented to person, place, and time. He has normal strength and normal reflexes. No cranial nerve deficit or sensory deficit.   Skin: Skin is warm and dry.   Psychiatric: He has a normal mood and affect. His speech is normal and behavior is normal. Judgment and thought content normal.       ED Course   Procedures  Labs Reviewed   CBC W/ AUTO DIFFERENTIAL - Abnormal; Notable for the following components:       Result Value    WBC 3.18 (*)     RBC 3.35 (*)     Hemoglobin 10.1 (*)     Hematocrit 29.1 (*)     Platelets 81 (*)     Immature Granulocytes 1.3 (*)     Lymph # 0.3 (*)     Gran % 74.2 (*)     Lymph % 10.7 (*)     All other components within normal limits   COMPREHENSIVE METABOLIC PANEL - Abnormal; Notable for the following components:    Sodium 132 (*)     Glucose 132 (*)     Alkaline Phosphatase 42 (*)     All other components within normal limits   CULTURE, BLOOD   CULTURE, BLOOD   LACTIC ACID, PLASMA   URINALYSIS, REFLEX TO URINE CULTURE    Narrative:     Specimen Source->Urine    INFLUENZA A AND B ANTIGEN    Narrative:     Specimen Source->Nasopharyngeal Swab   TROPONIN I HIGH SENSITIVITY   PROCALCITONIN   B-TYPE NATRIURETIC PEPTIDE   PROTIME-INR   APTT   SARS-COV-2 RNA AMPLIFICATION, QUAL          Imaging Results              X-Ray Chest AP Portable (In process)                      Medications   lactated ringers bolus 2,478 mL (0 mLs Intravenous Stopped 4/18/23 0143)   cefTRIAXone (ROCEPHIN) 1 g in dextrose 5 % 100 mL IVPB (ready to mix) (0 g Intravenous Stopped 4/18/23 0040)   azithromycin 500 mg in dextrose 5 % 250 mL IVPB (ready to mix system) (0 mg Intravenous Stopped 4/18/23 0154)   acetaminophen tablet 650 mg (650 mg Oral Given 4/18/23 0046)   traZODone tablet 150 mg (150 mg Oral Given 4/18/23 0242)   albuterol-ipratropium 2.5 mg-0.5 mg/3 mL nebulizer solution 3 mL (3 mLs Nebulization Given 4/18/23 0317)     Medical Decision Making:   ED Management:  Chief complaint is fever.  Etiology could be anything from a pulmonary infection kidney infection intra-abdominal infection infection ears nose and throat.  Fever could be secondary to cellulitis abscess among others.  In this case the patient has a cough possible right lower lobe pneumonia.  He is also getting chemotherapy on a monthly basis.  When he arrived he had a respiratory rate of 22 elevated temperature and was treated in with sepsis protocol.                        Clinical Impression:   Final diagnoses:  [A41.9] Sepsis        ED Disposition Condition    Admit                 Marbella Hernandez MD  04/18/23 4310

## 2023-04-18 NOTE — H&P
formerly [facility] Medicine  History & Physical    Patient Name: Conrad Kuhn  MRN: [id number]  Patient Class: IP- Inpatient  Admission Date: 4/17/2023  Attending Physician: Jesenia Adams MD   Primary Care Provider: Johnson Erazo MD       Patient seen at 5:15 a.m. on 04/18/2023  Patient information was obtained from patient and ER records.     Subjective:     Principal Problem:Fever    Chief Complaint:   Chief Complaint   Patient presents with    Fever     Reported 104 at home.         HPI: 82-year-old man with CLL on Gazyva, history of remote tobacco use, history of partial left lung resection, diabetes, hypertension, hyperlipidemia, presenting with fever.  Patient came to the ER for a high fever of 104 at home today. He had his chemo injection yesterday. In the ER, his temperature was up to 102.  He was found tachypneic and endorsed a dry cough which has been ongoing for a few months ever since his new chemo started.  He denies nausea, vomiting, diarrhea, rash, headache, urinary symptoms, extremity swelling.        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    MAYDA on CPAP     Pneumonia of both lungs due to infectious organism 6/29/2021    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     Right-sided Bell's palsy 2009    Spondylolisthesis     Varicose veins of legs     Venous insufficiency        Past Surgical History:   Procedure Laterality Date    Athroscopic surgery      On Knee    BIOPSY OF TISSUE OF NECK Left 08/2015     Recuurence CLL/small cell lyphoma    ESOPHAGEAL DILATION      ESOPHAGOGASTRODUODENOSCOPY N/A 4/15/2022    Procedure: EGD (ESOPHAGOGASTRODUODENOSCOPY);  Surgeon: Siddharth Garcia MD;  Location: [facility] ENDO;  Service: Endoscopy;  Laterality: N/A;    ESOPHAGOGASTRODUODENOSCOPY N/A 4/16/2022    Procedure: EGD (ESOPHAGOGASTRODUODENOSCOPY);  Surgeon: Siddharth Garcia MD;  Location: [facility] ENDO;  Service: Endoscopy;  Laterality: N/A;    ESOPHAGOGASTRODUODENOSCOPY N/A 6/23/2022    Procedure: EGD (ESOPHAGOGASTRODUODENOSCOPY);  Surgeon: Jefferson Hyman MD;  Location: [facility] ENDO;  Service: Endoscopy;  Laterality: N/A;    ESOPHAGOGASTRODUODENOSCOPY W/ PEG N/A 4/16/2022    Procedure: EGD, WITH PEG TUBE INSERTION;  Surgeon: Siddharth Garcia MD;  Location: [facility] ENDO;  Service: Endoscopy;  Laterality: N/A;    GASTROSTOMY TUBE PLACEMENT  04/16/2022    20 Fr (bumper initially at 3.5)    Hemorrhoid resection  1979    LUNG LOBECTOMY Left 2004    NECK SURGERY  04/08/2022    Anterior and Posterior C3-C7 fusion    OTHER SURGICAL HISTORY  2006    Chemotherapy s/p lyphoma        Portacath implantation and removal      reverse shoulder arthroplasty Right 03/2021       Review of patient's allergies indicates:  No Known Allergies    No current facility-administered medications on file prior to encounter.     Current Outpatient Medications on File Prior to Encounter   Medication Sig    allopurinoL (ZYLOPRIM) 300 MG tablet Take 1 tablet (300 mg total) by mouth once daily.    amoxicillin (AMOXIL) 875 MG tablet Take 1 tablet (875 mg total) by mouth every 12 (twelve) hours. (Patient not taking: Reported on 4/12/2023)    atorvastatin (LIPITOR) 20 MG tablet Take 20 mg by mouth once daily.    azelastine (ASTELIN) 137 mcg (0.1 %) nasal spray 1 spray by Nasal route 2 (two) times daily.     benzonatate (TESSALON PERLES) 100 MG capsule Take 1 capsule (100 mg total) by mouth every 6 (six) hours as needed for Cough.    blood sugar  diagnostic Strp 1 strip by Misc.(Non-Drug; Combo Route) route 2 (two) times a day.    blood-glucose meter kit Use as instructed    dexAMETHasone (DECADRON) 4 MG Tab Take 20mg the Sunday, Monday and Tuesday (Patient taking differently: Take 20 mg by mouth once daily. Take 20mg the Sunday, Monday and Tuesday)    duke's soln (benadryl 30 mL, mylanta 30 mL, LIDOcaine 30 mL, nystatin 30 mL) 120mL Take 10 mLs by mouth 4 (four) times daily.    ferrous sulfate (FEOSOL) 325 mg (65 mg iron) Tab tablet Take 325 mg by mouth daily with breakfast.    fluticasone propionate (FLONASE) 50 mcg/actuation nasal spray 1 spray by Each Nostril route once daily.    gabapentin (NEURONTIN) 600 MG tablet Take 600 mg by mouth 2 (two) times daily.    glimepiride (AMARYL) 2 MG tablet Take 2 mg by mouth once daily at 6am.    HYDROcodone-acetaminophen (NORCO)  mg per tablet Take 1 tablet by mouth every 8 (eight) hours as needed.    iron-vitamin C 100-250 mg, ICAR-C, (ICAR-C) 100-250 mg Tab Take 1 tablet by mouth once daily. (Patient not taking: Reported on 4/12/2023)    levalbuterol (XOPENEX) 1.25 mg/3 mL nebulizer solution Take 3 mLs by nebulization every 4 to 6 hours as needed.     levoFLOXacin (LEVAQUIN) 750 MG tablet Take 750 mg by mouth once daily.    losartan (COZAAR) 50 MG tablet Take 1 tablet (50 mg total) by mouth once daily.    metFORMIN (GLUCOPHAGE) 500 MG tablet Take 500 mg by mouth 2 (two) times daily with meals.    ondansetron (ZOFRAN) 8 MG tablet Take 1 tablet (8 mg total) by mouth every 8 (eight) hours as needed for Nausea.    ondansetron (ZOFRAN-ODT) 8 MG TbDL 1 tablet on the tongue and allow to dissolve  as needed Orally Once a day    promethazine (PHENERGAN) 12.5 MG Tab Take 1 tablet (12.5 mg total) by mouth every 6 (six) hours as needed.    traZODone (DESYREL) 300 MG tablet Take 300 mg by mouth every evening.    valACYclovir (VALTREX) 1000 MG tablet Take 1,000 mg by mouth once daily.    venetoclax  (VENCLEXTA) 100 mg Tab Take 4 tablets (400 mg) by mouth once daily.    venetoclax 10 mg-50 mg- 100 mg DsPk Take 20mg by mouth daily on days 22-28 of cycle 1. Take 50 mg by mouth daily on days 1-7 of cycle 2 Take 100 mg daily on days 8-14 of cycle 2 Take 200 mg daily on days 15-21 of cycle 2.  Take with food. (Patient taking differently: Take by mouth Take 20mg by mouth daily on days 22-28 of cycle 1. Take 50 mg by mouth daily on days 1-7 of cycle 2 Take 100 mg daily on days 8-14 of cycle 2 Take 200 mg daily on days 15-21 of cycle 2.  Take with food.)    [DISCONTINUED] esomeprazole (NEXIUM) 40 MG capsule Take 1 capsule (40 mg total) by mouth 2 (two) times daily.    [DISCONTINUED] metoprolol succinate (TOPROL-XL) 25 MG 24 hr tablet Take 25 mg by mouth 2 (two) times daily.      Family History       Problem Relation (Age of Onset)    Colon cancer Father    Stomach cancer Mother (80)          Tobacco Use    Smoking status: Former    Smokeless tobacco: Never   Substance and Sexual Activity    Alcohol use: Yes    Drug use: No    Sexual activity: Not Currently     Review of Systems Complete ROS otherwise negative other than stated in HPI.   Objective:     Vital Signs (Most Recent):  Temp: 97.3 °F (36.3 °C) (04/18/23 0518)  Pulse: 81 (04/18/23 0518)  Resp: 20 (04/18/23 0518)  BP: (!) 173/78 (04/18/23 0518)  SpO2: 95 % (04/18/23 0518)   Vital Signs (24h Range):  Temp:  [97.3 °F (36.3 °C)-101.9 °F (38.8 °C)] 97.3 °F (36.3 °C)  Pulse:  [73-98] 81  Resp:  [20-41] 20  SpO2:  [93 %-96 %] 95 %  BP: (140-174)/(66-78) 173/78     Weight: 82.6 kg (182 lb 1.6 oz)  Body mass index is 23.38 kg/m².    Physical Exam  GENERAL:  Chronically ill-appearing.  No respiratory distress.  Nontoxic    HEENT:  EOMI. Conjunctivae intact.  No oral lesions  NECK:  Supple   LUNGS:  No respiratory distress.  Expiratory rales in right middle lung fields, otherwise lungs are clear to auscultation bilaterally with good air movement  CARDIAC:  RRR  without murmur, rub or gallop  ABDOMEN:  Soft, nontender, nondistended, bowel sounds present  EXTREMITIES:  Peripheral pulses are 2+. Hands and feet are warm. Good capillary refill in fingers (< 2 seconds). No clubbing, cyanosis or edema  SKIN:  Skin color, texture and turgor normal. No rashes, ulcerations or nodules noted      Significant Labs: All pertinent labs within the past 24 hours have been reviewed.    Significant Imaging: I have reviewed all pertinent imaging results/findings within the past 24 hours.    Assessment/Plan:     * Fever  Presumed sepsis With tachypnea, leukopenia with left shift, cough, immunocompromised on chemo for CLL.  Most likely source of fever is pulmonary.  Treat with antibiotics to cover for pneumonia with azithromycin and Rocephin, prn nebulizers.  Check Legionella.  Flu and COVID negative.  UA normal.  Procalcitonin is low.  Received IVF for sepsis, continue IVF resuscitation.  Follow cultures.      Pancytopenia  Monitor CBC daily while admitted      CLL (chronic lymphocytic leukemia)  Followed by Oncology, on chemotherapy      VTE Risk Mitigation (From admission, onward)         Ordered     enoxaparin injection 40 mg  Daily         04/18/23 0533     IP VTE HIGH RISK PATIENT  Once         04/18/23 0533     Place sequential compression device  Until discontinued         04/18/23 0533                           Jesenia Adams MD  Department of Hospital Medicine  [facility]

## 2023-04-18 NOTE — PLAN OF CARE
[facility]  Initial Discharge Assessment       Primary Care Provider: Johnson Erazo MD    Admission Diagnosis: Sepsis [A41.9]    Admission Date: 4/17/2023  Expected Discharge Date: 4/19/2023    Discharge Barriers Identified: None    Initial assessment completed at bedside with patient. Patient is currently an established patient of [facility] and would like to resume home health at discharge.Patient denies any further discharge needs.    Payor: MEDICARE / Plan: MEDICARE PART A & B / Product Type: Government /     Extended Emergency Contact Information  Primary Emergency Contact: Peri Kuhn  Address: [address] [place] [address] [id number] [place] [address]  Home Phone: [phone]  Mobile Phone: [phone]  Relation: Spouse    Discharge Plan A: Home Health  Discharge Plan B: Home Health      [id number] [place], [place] - [address] AT [facility] & [address] to [facility] [address]  [address]  [facility] [address]  Phone: [phone] Fax: [phone]    [facility]  [place] - [address]  [address] [facility] [place] [address]  Phone: [phone] Fax: [phone]      Initial Assessment (most recent)       Adult Discharge Assessment - 04/18/23 1150          Discharge Assessment    Assessment Type Discharge Planning Assessment     Confirmed/corrected address, phone number and insurance Yes     Confirmed Demographics Correct on Facesheet     Source of Information patient     When was your last doctors appointment? 04/11/23     Reason For Admission Sepsis     People in Home spouse     Facility Arrived From: home     Do you expect to return to your current living situation? Yes     Do you have help at home or someone to help you manage your care at home? Yes     Who are your caregiver(s) and their phone number(s)? Peri (spouse) [phone]     Prior to hospitilization cognitive status: Alert/Oriented      Current cognitive status: Alert/Oriented     Walking or Climbing Stairs ambulation difficulty, requires equipment     Mobility Management rollator     Dressing/Bathing bathing difficulty, requires equipment     Dressing/Bathing Management shower chair, grab bars     Do you have any problems with: Needs other help     Specify other help Peri (spouse) [phone]     Home Accessibility wheelchair accessible     Home Layout Able to live on 1st floor     Equipment Currently Used at Home rollator;cane, straight;nebulizer;glucometer;grab bar;shower chair     Readmission within 30 days? No     Patient currently being followed by outpatient case management? No     Do you currently have service(s) that help you manage your care at home? Yes     Name and Contact number of agency [facility]     Is the pt/caregiver preference to resume services with current agency Yes     Do you take prescription medications? Yes     Do you have prescription coverage? Yes     Coverage BCBS     Do you have any problems affording any of your prescribed medications? No     Is the patient taking medications as prescribed? yes     Who is going to help you get home at discharge? Peri (spouse) [phone]     How do you get to doctors appointments? family or friend will provide     Are you on dialysis? No     Do you take coumadin? No     Discharge Plan A Home Health     Discharge Plan B Home Health     DME Needed Upon Discharge  none     Discharge Plan discussed with: Patient     Discharge Barriers Identified None        Physical Activity    On average, how many days per week do you engage in moderate to strenuous exercise (like a brisk walk)? 3 days     On average, how many minutes do you engage in exercise at this level? 30 min        Financial Resource Strain    How hard is it for you to pay for the very basics like food, housing, medical care, and heating? Not very hard        Housing Stability    In the last 12 months, was there a time  when you were not able to pay the mortgage or rent on time? No     In the last 12 months, how many places have you lived? 1     In the last 12 months, was there a time when you did not have a steady place to sleep or slept in a shelter (including now)? No        Transportation Needs    In the past 12 months, has lack of transportation kept you from medical appointments or from getting medications? No     In the past 12 months, has lack of transportation kept you from meetings, work, or from getting things needed for daily living? No        Food Insecurity    Within the past 12 months, you worried that your food would run out before you got the money to buy more. Never true     Within the past 12 months, the food you bought just didn't last and you didn't have money to get more. Never true        Stress    Do you feel stress - tense, restless, nervous, or anxious, or unable to sleep at night because your mind is troubled all the time - these days? To some extent        Social Connections    In a typical week, how many times do you talk on the phone with family, friends, or neighbors? More than three times a week     How often do you get together with friends or relatives? More than three times a week     How often do you attend Adventist or Yazidi services? Never     Do you belong to any clubs or organizations such as Adventist groups, unions, fraternal or athletic groups, or school groups? No     How often do you attend meetings of the clubs or organizations you belong to? Never     Are you , , , , never , or living with a partner?         Alcohol Use    Q1: How often do you have a drink containing alcohol? Monthly or less     Q2: How many drinks containing alcohol do you have on a typical day when you are drinking? 1 or 2     Q3: How often do you have six or more drinks on one occasion? Never        OTHER    Name(s) of People in Home Peri (spouse) [phone]

## 2023-04-18 NOTE — PROGRESS NOTES
VANCOMYCIN PHARMACOKINETIC NOTE:  Vancomycin Day # 1    Objective/Assessment:    Diagnosis/Indication for Vancomycin:Neutropenic Fever      82 y.o., male; Actual Body Weight = 82.6 kg (182 lb 1.6 oz).    The patient has the following labs:  4/18/2023 Estimated Creatinine Clearance: 72.3 mL/min (based on SCr of 0.9 mg/dL). Lab Results   Component Value Date    BUN 19 04/18/2023     Lab Results   Component Value Date    WBC 3.18 (L) 04/18/2023          Plan:  Adjust vancomycin dose and/or frequency based on the patient's actual weight and renal function:  Initiate Vancomycin 1500 mg IV every 12 hours.  Orders have been entered into patient's chart.        Vancomycin trough level has been ordered for 4/20/23 @0500    Pharmacy will manage vancomycin therapy, monitor serum vancomycin levels, monitor renal function and adjust regimen as necessary.    Thank you for allowing us to participate in this patient's care.     TAMIE FIELD 4/18/2023   Department of Pharmacy  Ext [phone]

## 2023-04-18 NOTE — HPI
82-year-old man with CLL on Gazyva, history of remote tobacco use, history of partial left lung resection, diabetes, hypertension, hyperlipidemia, presenting with fever.  Patient came to the ER for a high fever of 104 at home today. He had his chemo injection yesterday. In the ER, his temperature was up to 102.  He was found tachypneic and endorsed a dry cough which has been ongoing for a few months ever since his new chemo started.  He denies nausea, vomiting, diarrhea, rash, headache, urinary symptoms, extremity swelling.

## 2023-04-18 NOTE — ASSESSMENT & PLAN NOTE
Presumed sepsis With tachypnea, leukopenia with left shift, cough, immunocompromised on chemo for CLL.  Most likely source of fever is pulmonary.  Treat with antibiotics to cover for pneumonia with azithromycin and Rocephin, prn nebulizers.  Check Legionella.  Flu and COVID negative.  UA normal.  Procalcitonin is low.  Received IVF for sepsis, continue IVF resuscitation.  Follow cultures.

## 2023-04-19 PROBLEM — R53.81 DEBILITY: Status: ACTIVE | Noted: 2023-01-01

## 2023-04-19 NOTE — PROGRESS NOTES
[facility] Medicine  Progress Note    Patient Name: Conrad Kuhn  MRN: [id number]  Patient Class: IP- Inpatient   Admission Date: 4/17/2023  Length of Stay: 1 days  Attending Physician: Wilfredo Rendon MD  Primary Care Provider: Johnson Erazo MD        Subjective:     Principal Problem:Fever        HPI:  82-year-old man with CLL on Gazyva, history of remote tobacco use, history of partial left lung resection, diabetes, hypertension, hyperlipidemia, presenting with fever.  Patient came to the ER for a high fever of 104 at home today. He had his chemo injection yesterday. In the ER, his temperature was up to 102.  He was found tachypneic and endorsed a dry cough which has been ongoing for a few months ever since his new chemo started.  He denies nausea, vomiting, diarrhea, rash, headache, urinary symptoms, extremity swelling.        Overview/Hospital Course:  No notes on file    Interval History:  No acute events overnight.  Last fever was yesterday afternoon.  Feels that his strength is returning.  Appreciate Dr. Barr's recommendations    Review of Systems   All other systems reviewed and are negative.  Objective:     Vital Signs (Most Recent):  Temp: 98.5 °F (36.9 °C) (04/19/23 0715)  Pulse: 80 (04/19/23 0715)  Resp: 19 (04/19/23 0715)  BP: (!) 160/69 (04/19/23 0715)  SpO2: 96 % (04/19/23 0715)   Vital Signs (24h Range):  Temp:  [97.8 °F (36.6 °C)-101.3 °F (38.5 °C)] 98.5 °F (36.9 °C)  Pulse:  [74-97] 80  Resp:  [18-19] 19  SpO2:  [91 %-98 %] 96 %  BP: (151-184)/(65-82) 160/69     Weight: 82.6 kg (182 lb 1.6 oz)  Body mass index is 23.38 kg/m².    Intake/Output Summary (Last 24 hours) at 4/19/2023 1014  Last data filed at 4/19/2023 0315  Gross per 24 hour   Intake 240 ml   Output 2820 ml   Net -2580 ml      Physical Exam  Vitals reviewed.   Constitutional:       Appearance: Normal appearance.   HENT:      Head: Normocephalic and atraumatic.      Nose: Nose normal.       Mouth/Throat:      Mouth: Mucous membranes are moist.   Eyes:      Pupils: Pupils are equal, round, and reactive to light.   Cardiovascular:      Rate and Rhythm: Normal rate and regular rhythm.      Pulses: Normal pulses.      Heart sounds: Normal heart sounds. No murmur heard.    No friction rub. No gallop.   Pulmonary:      Effort: Pulmonary effort is normal. No respiratory distress.      Breath sounds: Normal breath sounds.   Abdominal:      General: Abdomen is flat. Bowel sounds are normal. There is no distension.      Palpations: Abdomen is soft.      Tenderness: There is no abdominal tenderness.   Musculoskeletal:         General: No swelling. Normal range of motion.      Cervical back: Normal range of motion and neck supple.   Skin:     General: Skin is warm and dry.   Neurological:      General: No focal deficit present.      Mental Status: He is alert and oriented to person, place, and time.   Psychiatric:         Mood and Affect: Mood normal.         Behavior: Behavior normal.         Thought Content: Thought content normal.         Judgment: Judgment normal.       Significant Labs: All pertinent labs within the past 24 hours have been reviewed.    Significant Imaging: I have reviewed all pertinent imaging results/findings within the past 24 hours.      Assessment/Plan:      * Fever  Presumed sepsis With tachypnea, leukopenia with left shift, cough, immunocompromised on chemo for CLL.  Most likely source of fever is pulmonary.  Treat with antibiotics to cover for pneumonia with azithromycin and cefepime  - Appreciate Dr Barr's recommendations  - No clear source at this time.   - CT sinuses  - he is improving  - continue current abx    Iron deficiency anemia  Trend hemoglobin  Iron supplementation      CLL (chronic lymphocytic leukemia)  Followed by Oncology, on chemotherapy      History of lung cancer - ANDRES NSCLC 2004  Follow with Oncology as an outpatient      Debility  PT  eval      Pancytopenia  Monitor CBC daily while admitted        VTE Risk Mitigation (From admission, onward)         Ordered     enoxaparin injection 40 mg  Daily         04/18/23 0533     IP VTE HIGH RISK PATIENT  Once         04/18/23 0533     Place sequential compression device  Until discontinued         04/18/23 0533                Discharge Planning   MARTELL: 4/19/2023     Code Status: Full Code   Is the patient medically ready for discharge?:     Reason for patient still in hospital (select all that apply): Treatment  Discharge Plan A: Home Health                  Wilfredo Rendon MD  Department of Hospital Medicine   [facility]

## 2023-04-19 NOTE — CONSULTS
Consult Note  Infectious Disease    Reason for Consult:      HPI: Conrad Kuhn is a 82 y.o. male Well known to me, last seen in the hospital November 2022 for fever due to current aspiration pneumonia, sinusitis and herpetic stomatitis.  At that time he also tested positive for enterovirus/rhinovirus.  At that time he had advancing lymphadenopathy, a normal total IgG and since then he has begun on Gazyva under the direction of Dr. Don at [facility].  He presented to the emergency room late on 04/17 with a fever of 104 0.5 reported by EMS.  In the emergency room he was 101.9 with a sat of 93%, clear lungs though he did recall producing some discolored sputum.  His white blood cells were 3.1.  Last goes IV infusion was 4/16.  He was begun empirically on ceftriaxone and azithromycin and then switched to vanc cefepime and azithromycin.  COVID and influenza tests were negative, chest x-ray was unchanged from baseline.  Temperature has declined, blood cultures are negative, MRSA screen is negative, urine culture is negative so far.  White blood cells 4.9 platelets are 60,000, CRP was 5.8, procalcitonin negative.    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     Right-sided Bell's palsy 2009    Spondylolisthesis     Varicose veins of legs     Venous insufficiency      Past Surgical  History:   Procedure Laterality Date    Athroscopic surgery      On Knee    BIOPSY OF TISSUE OF NECK Left 08/2015    Recuurence CLL/small cell lyphoma    ESOPHAGEAL DILATION      ESOPHAGOGASTRODUODENOSCOPY N/A 4/15/2022    Procedure: EGD (ESOPHAGOGASTRODUODENOSCOPY);  Surgeon: Siddharth Garcia MD;  Location: [facility] ENDO;  Service: Endoscopy;  Laterality: N/A;    ESOPHAGOGASTRODUODENOSCOPY N/A 4/16/2022    Procedure: EGD (ESOPHAGOGASTRODUODENOSCOPY);  Surgeon: Siddharth Garcia MD;  Location: [facility] ENDO;  Service: Endoscopy;  Laterality: N/A;    ESOPHAGOGASTRODUODENOSCOPY N/A 6/23/2022    Procedure: EGD (ESOPHAGOGASTRODUODENOSCOPY);  Surgeon: Jefferson Hyman MD;  Location: [facility] ENDO;  Service: Endoscopy;  Laterality: N/A;    ESOPHAGOGASTRODUODENOSCOPY W/ PEG N/A 4/16/2022    Procedure: EGD, WITH PEG TUBE INSERTION;  Surgeon: Siddharth Garcia MD;  Location: [facility];  Service: Endoscopy;  Laterality: N/A;    GASTROSTOMY TUBE PLACEMENT  04/16/2022    20 Fr (bumper initially at 3.5)    Hemorrhoid resection  1979    LUNG LOBECTOMY Left 2004    NECK SURGERY  04/08/2022    Anterior and Posterior C3-C7 fusion    OTHER SURGICAL HISTORY  2006    Chemotherapy s/p lyphoma        Portacath implantation and removal      reverse shoulder arthroplasty Right 03/2021     Social History     Socioeconomic History    Marital status:    Tobacco Use    Smoking status: Former    Smokeless tobacco: Never   Substance and Sexual Activity    Alcohol use: Yes    Drug use: No    Sexual activity: Not Currently     Social Determinants of Health     Financial Resource Strain: Low Risk     Difficulty of Paying Living Expenses: Not very hard   Food Insecurity: No Food Insecurity    Worried About Running Out of Food in the Last Year: Never true    Ran Out of Food in the Last Year: Never true   Transportation Needs: No Transportation Needs    Lack of Transportation (Medical): No    Lack of Transportation (Non-Medical): No   Physical  Activity: Insufficiently Active    Days of Exercise per Week: 3 days    Minutes of Exercise per Session: 30 min   Stress: Stress Concern Present    Feeling of Stress : To some extent   Social Connections: Moderately Isolated    Frequency of Communication with Friends and Family: More than three times a week    Frequency of Social Gatherings with Friends and Family: More than three times a week    Attends Mandaeism Services: Never    Active Member of Clubs or Organizations: No    Attends Club or Organization Meetings: Never    Marital Status:    Housing Stability: Low Risk     Unable to Pay for Housing in the Last Year: No    Number of Places Lived in the Last Year: 1    Unstable Housing in the Last Year: No     Family History   Problem Relation Age of Onset    Stomach cancer Mother 80         at 95    Colon cancer Father          Review of Systems:   No chills, fever, sweats, until the day of admission.  But he has had a 40 lb weight loss over the last few months since he began the new treatment regimen of gazyva plus high-dose venetoclax.  Denies ill exposures  No change in vision, loss of vision or diplopia  Chronic mild sinus congestion, no obvious purulent nasal discharge, no recognized post nasal drip or facial pain  Compliant with Valtrex once daily, had a flare-up a week ago with a large ulcer on the right lower alveolar ridge which is improved.  No facial cellulitis.  No chest pain, palpitations, syncope  Mild increase in cough, for the last month, small bits of brown sputum production, no shortness of breath, but his endurance is much worse and he feels generally weak.  He has had some nausea, without vomiting, diarrhea, and his stools have been a little harder lately.  He did take some of his wife's MiraLax? .  No blood in stool, or focal abd pain,  No dysphagia, odynophagia or aspiration events but he does still have some swallowing difficulty since his cervical spine surgery  No dysuria,  hematuria, but does have frequency and prostatism.  He sees Urology but hospital Medicine has just started him on tamsulosin.     No swelling of joints, redness of joints,  or new focal pain.  He did have a fall from his walker in the garden last week and has a bruise on his right flank and unexplained bruises on his left thigh.  No unusual headaches, dizziness, vertigo, numbness, paresthesias, neuropathy   He always has a measure of anxiety, depression, but has not been drinking.  He is compliant with the CPAP.  Compliantwith diabetes mellitus  medication  No bleeding,  lymphadenopathy has been shrinking on current treatment, but feels that this is wearing him down.    No new rashes,       No recent/prior steroids and denies taking dexamethasone though this is on his medication list  Outdoor activities:  Vegetable garden  Travel:   Implants:  Right total shoulder, cervical hardware  Antibiotic History:  Valtrex daily    EXAM & DIAGNOSTICS REVIEWED:   Vitals:     Temp:  [97.8 °F (36.6 °C)-101.3 °F (38.5 °C)]   Temp: 98.5 °F (36.9 °C) (04/19/23 0715)  Pulse: 80 (04/19/23 0715)  Resp: 19 (04/19/23 0715)  BP: (!) 160/69 (04/19/23 0715)  SpO2: 96 % (04/19/23 0715)    Intake/Output Summary (Last 24 hours) at 4/19/2023 0812  Last data filed at 4/19/2023 0315  Gross per 24 hour   Intake 360 ml   Output 3520 ml   Net -3160 ml       General:  In NAD. Alert and attentive, cooperative, comfortable, thinner, nontoxic  Eyes:  Anicteric, PERRL, EOMI  ENT:  Healing ulceration right lower alveolar ridge/gumline.  Edentulous, no thrush   Neck:  supple, no masses or adenopathy appreciated  Lungs: Clear, no consolidation, rales, wheezes, rub  Heart:  RRR, no gallop, 3/6 aortic systolic murmur.  Abd:  Soft, NT, ND, normal BS, no masses or organomegaly appreciated.  :  Voids , no flank tenderness  Musc:  Joints without effusion, swelling, erythema, synovitis, muscle wasting.   Skin:  No rashes. No palmar or plantar lesions. No  subungual petechiae.  Bruise right flank in 3 bruised his left thigh  Neuro:             Alert, attentive, speech fluent, face symmetric, moves all extremities, no focal weakness. Ambulatory with a walker  Psych: Calm, cooperative  Lymphatic:     No cervical, supraclavicular, axillary, nodes  Extrem: No edema, erythema, phlebitis, cellulitis, warm and well perfused  VAD:  Peripheral     Isolation:  None  Wound:       Lines/Tubes/Drains:    General Labs reviewed:  Recent Labs   Lab 04/18/23 0013 04/19/23  0410   WBC 3.18* 4.97   HGB 10.1* 9.4*   HCT 29.1* 27.3*   PLT 81* 60*       Recent Labs   Lab 04/18/23 0013 04/19/23  0410   * 137   K 4.0 3.3*   CL 96 104   CO2 26 25   BUN 19 10   CREATININE 0.9 0.6   CALCIUM 9.5 8.2*   PROT 6.4  --    BILITOT 0.7  --    ALKPHOS 42*  --    ALT 12  --    AST 16  --      Recent Labs   Lab 04/18/23  0921   CRP 5.88*         Micro:  Microbiology Results (last 7 days)       Procedure Component Value Units Date/Time    Urine Culture High Risk [[id number]] Collected: 04/18/23 1136    Order Status: Completed Specimen: Urine, Clean Catch Updated: 04/19/23 0738     Urine Culture, Routine No growth to date    Narrative:      Indicated criteria for high risk culture:->Other  Indicated criteria for high risk culture:->Neutropenic  patient  Other (specify):->on chemo, neutropenic fever    Urine Culture High Risk [[id number]] Collected: 04/18/23 1657    Order Status: Completed Specimen: Urine, Clean Catch Updated: 04/19/23 0738     Urine Culture, Routine No growth to date    Narrative:      Indicated criteria for high risk culture:->Neutropenic  patient    Blood culture x two cultures. Draw prior to antibiotics. [[id number]] Collected: 04/18/23 0041    Order Status: Completed Specimen: Blood from Peripheral, Antecubital, Left Updated: 04/19/23 0232     Blood Culture, Routine No Growth to date      No Growth to date    Narrative:      Aerobic and anaerobic    Blood culture x two cultures.  Draw prior to antibiotics. [[id number]] Collected: 04/18/23 0014    Order Status: Completed Specimen: Blood from Peripheral, Antecubital, Right Updated: 04/19/23 0232     Blood Culture, Routine No Growth to date      No Growth to date    Narrative:      Aerobic and anaerobic    MRSA Screen by PCR [[id number]] Collected: 04/18/23 1656    Order Status: Completed Specimen: Nasopharyngeal Swab from Nasal Updated: 04/18/23 1827     MRSA SCREEN BY PCR Negative            Imaging Reviewed:   CXR    PET-CT December 2022    Cardiology:    IMPRESSION & PLAN   1.  Fever, resolving, source unclear   He has mild sinus symptoms in some small amount of brown sputum over the last month    2.  CLL/non-Hodgkin's lymphoma, on treatment, with declining endurance though a good response to treatment    3.  Recurrent herpetic stomatitis, improving at this time  4.  Immunocompromised secondary to 2.  5.  Prostatism    Recommendations:  Stop vancomycin, change Zithromax to p.o.  Sinus CT  Continue Valtrex  Check postvoid residual  Will follow-up, thank you    Medical Decision Making during this encounter was  [_] Low Complexity  [_] Moderate Complexity  [xxx  ] High Complexity

## 2023-04-19 NOTE — SUBJECTIVE & OBJECTIVE
Interval History:  No acute events overnight.  Last fever was yesterday afternoon.  Feels that his strength is returning.  Appreciate Dr. Barr's recommendations    Review of Systems   All other systems reviewed and are negative.  Objective:     Vital Signs (Most Recent):  Temp: 98.5 °F (36.9 °C) (04/19/23 0715)  Pulse: 80 (04/19/23 0715)  Resp: 19 (04/19/23 0715)  BP: (!) 160/69 (04/19/23 0715)  SpO2: 96 % (04/19/23 0715)   Vital Signs (24h Range):  Temp:  [97.8 °F (36.6 °C)-101.3 °F (38.5 °C)] 98.5 °F (36.9 °C)  Pulse:  [74-97] 80  Resp:  [18-19] 19  SpO2:  [91 %-98 %] 96 %  BP: (151-184)/(65-82) 160/69     Weight: 82.6 kg (182 lb 1.6 oz)  Body mass index is 23.38 kg/m².    Intake/Output Summary (Last 24 hours) at 4/19/2023 1014  Last data filed at 4/19/2023 0315  Gross per 24 hour   Intake 240 ml   Output 2820 ml   Net -2580 ml      Physical Exam  Vitals reviewed.   Constitutional:       Appearance: Normal appearance.   HENT:      Head: Normocephalic and atraumatic.      Nose: Nose normal.      Mouth/Throat:      Mouth: Mucous membranes are moist.   Eyes:      Pupils: Pupils are equal, round, and reactive to light.   Cardiovascular:      Rate and Rhythm: Normal rate and regular rhythm.      Pulses: Normal pulses.      Heart sounds: Normal heart sounds. No murmur heard.    No friction rub. No gallop.   Pulmonary:      Effort: Pulmonary effort is normal. No respiratory distress.      Breath sounds: Normal breath sounds.   Abdominal:      General: Abdomen is flat. Bowel sounds are normal. There is no distension.      Palpations: Abdomen is soft.      Tenderness: There is no abdominal tenderness.   Musculoskeletal:         General: No swelling. Normal range of motion.      Cervical back: Normal range of motion and neck supple.   Skin:     General: Skin is warm and dry.   Neurological:      General: No focal deficit present.      Mental Status: He is alert and oriented to person, place, and time.   Psychiatric:          Mood and Affect: Mood normal.         Behavior: Behavior normal.         Thought Content: Thought content normal.         Judgment: Judgment normal.       Significant Labs: All pertinent labs within the past 24 hours have been reviewed.    Significant Imaging: I have reviewed all pertinent imaging results/findings within the past 24 hours.

## 2023-04-19 NOTE — PLAN OF CARE
[facility] to verify home services. Patient is active with [facility] and will need a resumption of care order when discharged. Patient would like to resume [facility] as home health patient choice.       04/19/23 1012   Post-Acute Status   Post-Acute Authorization Home Health   Home Health Status Set-up Complete/Auth obtained   Discharge Plan   Discharge Plan A Home Health   Discharge Plan B Home Health

## 2023-04-19 NOTE — ASSESSMENT & PLAN NOTE
Presumed sepsis With tachypnea, leukopenia with left shift, cough, immunocompromised on chemo for CLL.  Most likely source of fever is pulmonary.  Treat with antibiotics to cover for pneumonia with azithromycin and cefepime  - Appreciate Dr Barr's recommendations  - No clear source at this time.   - CT sinuses  - he is improving  - continue current abx

## 2023-04-19 NOTE — PROGRESS NOTES
Pharmacy Consult Discontinuation: Vancomycin    Conrad Kuhn [id number] is a 82 y.o. male was consulted for vancomycin pharmacotherapy management by pharmacy.    Pharmacy consult for vancomycin dosing is no longer required.  Vancomycin was discontinued 04/19/2023 @ 0839 by Dr. Barr.    Thank you for allowing us to participate in this patient's care. Should you have any questions or concerns please feel free to contact the pharmacy department at [phone].    Carlos Henry RPH

## 2023-04-19 NOTE — CONSULTS
[facility]   Hematology/Oncology  Inpatient Consult Note          Patient Name: Conrad Kuhn  MRN: [id number]  Admission Date: 4/17/2023  Hospital Length of Stay: 1 days  Code Status: Full Code   Attending Provider: Wilfredo Rendon MD  Referring Provider: Errol  Consulting Provider: Drew Bai MD  Primary Care Physician: Johnson Erazo MD  Principal Problem:Fever    Consults  Subjective:     Chief Complaint: Fever (Reported 104 at home. )          History Present Illness:  82 y.o. male known to my oncology service with diagnosis of CLL/SLL who has been treated in conjunction with Dr Monik Don at [facility] and has been on regimen of Gazyva chemotherapy. He presented to the ED at [facility] with some generalized malaise and fever. He has been admitted to the hospitalist service. He is laying in bed. He denies any current CP, SOB, HA's or N/V. He reports that he is feeling better and is anxious to go home. His wife is at bedside.        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     Right-sided Bell's palsy 2009    Spondylolisthesis     Varicose veins of legs     Venous insufficiency      Past Surgical History:   Procedure Laterality Date    Athroscopic surgery      On Knee    BIOPSY OF TISSUE OF NECK Left 08/2015    Recuurence CLL/small cell lyphoma    ESOPHAGEAL DILATION       ESOPHAGOGASTRODUODENOSCOPY N/A 4/15/2022    Procedure: EGD (ESOPHAGOGASTRODUODENOSCOPY);  Surgeon: Siddharth Garcia MD;  Location: [facility] ENDO;  Service: Endoscopy;  Laterality: N/A;    ESOPHAGOGASTRODUODENOSCOPY N/A 4/16/2022    Procedure: EGD (ESOPHAGOGASTRODUODENOSCOPY);  Surgeon: Siddharth Garcia MD;  Location: [facility] ENDO;  Service: Endoscopy;  Laterality: N/A;    ESOPHAGOGASTRODUODENOSCOPY N/A 6/23/2022    Procedure: EGD (ESOPHAGOGASTRODUODENOSCOPY);  Surgeon: Jefferson Hyman MD;  Location: [facility] ENDO;  Service: Endoscopy;  Laterality: N/A;    ESOPHAGOGASTRODUODENOSCOPY W/ PEG N/A 4/16/2022    Procedure: EGD, WITH PEG TUBE INSERTION;  Surgeon: Siddharth Garcia MD;  Location: [facility] ENDO;  Service: Endoscopy;  Laterality: N/A;    GASTROSTOMY TUBE PLACEMENT  04/16/2022    20 Fr (bumper initially at 3.5)    Hemorrhoid resection  1979    LUNG LOBECTOMY Left 2004    NECK SURGERY  04/08/2022    Anterior and Posterior C3-C7 fusion    OTHER SURGICAL HISTORY  2006    Chemotherapy s/p lyphoma        Portacath implantation and removal      reverse shoulder arthroplasty Right 03/2021         Allergies:  Review of patient's allergies indicates:  No Known Allergies    Social/Family History:  Social History     Socioeconomic History    Marital status:    Tobacco Use    Smoking status: Former    Smokeless tobacco: Never   Substance and Sexual Activity    Alcohol use: Yes    Drug use: No    Sexual activity: Not Currently     Social Determinants of Health     Financial Resource Strain: Low Risk     Difficulty of Paying Living Expenses: Not very hard   Food Insecurity: No Food Insecurity    Worried About Running Out of Food in the Last Year: Never true    Ran Out of Food in the Last Year: Never true   Transportation Needs: No Transportation Needs    Lack of Transportation (Medical): No    Lack of Transportation (Non-Medical): No   Physical Activity: Insufficiently Active    Days of Exercise per Week: 3 days     Minutes of Exercise per Session: 30 min   Stress: Stress Concern Present    Feeling of Stress : To some extent   Social Connections: Moderately Isolated    Frequency of Communication with Friends and Family: More than three times a week    Frequency of Social Gatherings with Friends and Family: More than three times a week    Attends Orthodox Services: Never    Active Member of Clubs or Organizations: No    Attends Club or Organization Meetings: Never    Marital Status:    Housing Stability: Low Risk     Unable to Pay for Housing in the Last Year: No    Number of Places Lived in the Last Year: 1    Unstable Housing in the Last Year: No     Family History   Problem Relation Age of Onset    Stomach cancer Mother 80         at 95    Colon cancer Father          ROS:    GEN:  fever and general malaise, no night sweats or weight loss  HEENT: normal with no HA's, sore throat, stiff neck, changes in vision  CV: normal with no CP, SOB, PND, MAYER or orthopnea  PULM: normal with no SOB, hemoptysis, sputum or pleuritic pain; mild  cough,  GI: normal with no abdominal pain, nausea, vomiting, constipation, diarrhea, melanotic stools, BRBPR, or hematemesis  : normal with no hematuria, dysuria  BREAST: normal with no mass, discharge, pain  SKIN: normal with no rash, erythema, bruising, or swelling        Medications:  Continuous Infusions:   sodium chloride 0.9% 125 mL/hr at 23 0454     Scheduled Meds:   allopurinoL  300 mg Oral Daily    atorvastatin  20 mg Oral Daily    azelastine  1 spray Nasal BID    azithromycin  500 mg Intravenous Q24H    ceFEPime (MAXIPIME) IVPB  2 g Intravenous Q8H    duke's soln (benadryl 30 mL, mylanta 30 mL, LIDOcaine 30 mL, nystatin 30 mL) 120 mL  10 mL Oral QID    enoxaparin  40 mg Subcutaneous Daily    fluticasone propionate  1 spray Each Nostril Daily    gabapentin  600 mg Oral BID    losartan  50 mg Oral Daily    tamsulosin  0.4 mg Oral Daily    traZODone  300 mg Oral QHS     "valACYclovir  1,000 mg Oral Daily    vancomycin (VANCOCIN) IVPB  1,500 mg Intravenous Q12H     PRN Meds:acetaminophen, acetaminophen, albuterol-ipratropium, benzonatate, dextrose 50%, dextrose 50%, glucagon (human recombinant), glucose, glucose, hydrALAZINE, HYDROcodone-acetaminophen, HYDROmorphone, insulin aspart U-100, melatonin, ondansetron, promethazine, sodium chloride 0.9%, Pharmacy to dose Vancomycin consult **AND** vancomycin - pharmacy to dose         Objective:       Physical Exam:    Vitals:  Blood pressure (!) 160/69, pulse 80, temperature 98.5 °F (36.9 °C), temperature source Oral, resp. rate 19, height 6' 2" (1.88 m), weight 82.6 kg (182 lb 1.6 oz), SpO2 96 %.    GEN: no apparent distress, comfortable; AAOx3  HEAD: atraumatic and normocephalic  EYES: no conjunctival pallor or muddiness, no icterus; normal pupil reaction to ambient light  ENT: OMM, no pharyngeal erythema, external bilateral ears WNL; no visible thrush or ulcers  NECK: no masses or swelling, trachea midline, no visible LAD/LN's ; LAD and LN's since decreased in size  CV: no palpitations; no pedal edema; no noticeable JVD or neck vein distension  CHEST: Normal respiratory effort; chest wall breath movements symmetrical; no audible wheezing  ABDOM: non-distended; no bloating;  peg tube since removed  MUSC/Skeletal: ROM normal; joints visibly normal; no deformities or arthropathy  EXTREM: no clubbing, cyanosis, inflammation or swelling; right arm with fair ROM  SKIN: no rashes, lesions, ulcers, petechiae or subcutaneous nodules  : no keith  NEURO: moving all 4 extremities; AAOx3; no tremors  PSYCH: normal mood, affect and behavior  LYMPH: no visible LN's or LAD; LAD and LN's on right neck reduced in size             Lab Review:   Lab Results   Component Value Date    WBC 4.97 04/19/2023    HGB 9.4 (L) 04/19/2023    HCT 27.3 (L) 04/19/2023    MCV 86 04/19/2023    PLT 60 (L) 04/19/2023     CMP  Sodium   Date Value Ref Range Status "   04/19/2023 137 136 - 145 mmol/L Final   06/12/2019 138 134 - 144 mmol/L      Potassium   Date Value Ref Range Status   04/19/2023 3.3 (L) 3.5 - 5.1 mmol/L Final     Chloride   Date Value Ref Range Status   04/19/2023 104 95 - 110 mmol/L Final   06/12/2019 99 98 - 110 mmol/L      CO2   Date Value Ref Range Status   04/19/2023 25 23 - 29 mmol/L Final     Glucose   Date Value Ref Range Status   04/19/2023 154 (H) 70 - 110 mg/dL Final   06/12/2019 179 (H) 70 - 99 mg/dL      BUN   Date Value Ref Range Status   04/19/2023 10 8 - 23 mg/dL Final     Creatinine   Date Value Ref Range Status   04/19/2023 0.6 0.5 - 1.4 mg/dL Final   06/12/2019 0.95 0.60 - 1.40 mg/dL      Calcium   Date Value Ref Range Status   04/19/2023 8.2 (L) 8.7 - 10.5 mg/dL Final     Total Protein   Date Value Ref Range Status   04/18/2023 6.4 6.0 - 8.4 g/dL Final     Albumin   Date Value Ref Range Status   04/18/2023 3.7 3.5 - 5.2 g/dL Final   06/12/2019 3.9 3.1 - 4.7 g/dL      Total Bilirubin   Date Value Ref Range Status   04/18/2023 0.7 0.1 - 1.0 mg/dL Final     Comment:     For infants and newborns, interpretation of results should be based  on gestational age, weight and in agreement with clinical  observations.    Premature Infant recommended reference ranges:  Up to 24 hours.............<8.0 mg/dL  Up to 48 hours............<12.0 mg/dL  3-5 days..................<15.0 mg/dL  6-29 days.................<15.0 mg/dL       Alkaline Phosphatase   Date Value Ref Range Status   04/18/2023 42 (L) 55 - 135 U/L Final     AST   Date Value Ref Range Status   04/18/2023 16 10 - 40 U/L Final     ALT   Date Value Ref Range Status   04/18/2023 12 10 - 44 U/L Final     Anion Gap   Date Value Ref Range Status   04/19/2023 8 8 - 16 mmol/L Final     eGFR   Date Value Ref Range Status   04/19/2023 >60.0 >60 mL/min/1.73 m^2 Final           Diagnostic Results:  X-Ray Chest AP Portable [[id number]] Collected: 04/17/23 7779   Order Status: Completed Updated: 04/18/23 0720    Narrative:     XR CHEST 1 VIEW     CLINICAL HISTORY:   82 years Male Sepsis     COMPARISON: March 29, 2023     FINDINGS: Cardiac silhouette size is within normal limits patient is status post cervical ACDF and reverse right shoulder arthroplasty. No acute osseous abnormality.     No pulmonary edema. No alveolar consolidation. No pleural fluid or pneumothorax.     IMPRESSION:     Stable chest radiograph. No acute pulmonary pathology.        Assessment/Plan:     IMPRESSION:  (1) 82 y.o. male known to my oncology service with diagnosis of CLL/SLL who has been treated in conjunction with Dr Monik Don at [facility] and has been on regimen of Gazyva chemotherapy. He presented to the ED at [facility] with some generalized malaise and fever. He has been admitted to the hospitalist service.     4/19/2023:  - wbc at 4.97  - hgb 9.4  - Plats 60,000  - hold oral venetoclax for now  - consult ID    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  - followed by Dr Kee  - latest CEA at 1.2    (3) Iron deficiency anemia s/p IV iron couple weeks ago  - s/p periodic IV iron therapies        (4) HTN - on BP meds     (6) Chronic neck and back issues - followed by Dr Ryan Rivero     (7) DM - currently off all meds     (8) Hypercholesterolemia - on meds     (9)  - followed by Dr Whitney     (10) Chronic Leucopenia and thrombocytopenia - due to chemotherapy agents and lymphoma/leukemia          Active Diagnoses:    Diagnosis Date Noted POA    PRINCIPAL PROBLEM:  Fever [R50.9] 11/08/2022 Yes    Pancytopenia [D61.818] 06/29/2021 Yes    CLL (chronic lymphocytic leukemia) [C91.10] 01/02/2019 Yes      Problems Resolved During this Admission:           PLAN:   Monitor labs and transfuse as needed  Infection, culture workup as per primary team   Recommend consideration for ID consult and evaluation given the immunosuppressive nature of Gazyva  Hold the oral Venetoclax and Gazyva for now and will re-evaluate next week to  resume  Will follow with you           Thank you for your consult.      Drew Bai MD  Hematology/Oncology  [facility]

## 2023-04-19 NOTE — CARE UPDATE
04/19/23 0737   Patient Assessment/Suction   Level of Consciousness (AVPU) alert   Respiratory Effort Normal;Unlabored   Expansion/Accessory Muscles/Retractions no retractions;expansion symmetric;no use of accessory muscles   Rhythm/Pattern, Respiratory unlabored;pattern regular;depth regular;no shortness of breath reported   PRE-TX-O2   Device (Oxygen Therapy) room air   $ Pulse Oximetry - Multiple Charge Pulse Oximetry - Multiple   Aerosol Therapy   $ Aerosol Therapy Charges PRN treatment not required   Respiratory Evaluation   $ Care Plan Tech Time 15 min   $ Eval/Re-eval Charges Re-evaluation

## 2023-04-20 PROBLEM — J40 BRONCHITIS: Status: ACTIVE | Noted: 2023-01-01

## 2023-04-20 NOTE — PROGRESS NOTES
Consult Note  Infectious Disease    Reason for Consult:  fever    HPI: Conrad Kuhn is a 82 y.o. male Well known to me, last seen in the hospital November 2022 for fever due to current aspiration pneumonia, sinusitis and herpetic stomatitis.  At that time he also tested positive for enterovirus/rhinovirus.  At that time he had advancing lymphadenopathy, a normal total IgG and since then he has begun on Gazyva under the direction of Dr. Don at [facility].  He presented to the emergency room late on 04/17 with a fever of 104 0.5 reported by EMS.  In the emergency room he was 101.9 with a sat of 93%, clear lungs though he did recall producing some discolored sputum.  His white blood cells were 3.1.  Last goes IV infusion was 4/16.  He was begun empirically on ceftriaxone and azithromycin and then switched to vanc cefepime and azithromycin.  COVID and influenza tests were negative, chest x-ray was unchanged from baseline.  Temperature has declined, blood cultures are negative, MRSA screen is negative, urine culture is negative so far.  White blood cells 4.9 platelets are 60,000, CRP was 5.8, procalcitonin negative.    4/20: interim reviewed. Afebrile. Sinus CT negative. He has a mild cough. No new complaints. Still has some prostatism symptoms. PVR was less than 100 cc. Lungs a little coarse today, no SOB, clear after a few coughs. Eating pretty well. No diarrhea, rash or med side effect.     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    MAYDA on CPAP     Pneumonia of both lungs due to infectious organism 6/29/2021    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     Right-sided Bell's palsy 2009    Spondylolisthesis     Varicose veins of legs     Venous insufficiency      Past Surgical History:   Procedure Laterality Date    Athroscopic surgery      On Knee    BIOPSY OF TISSUE OF NECK Left 08/2015    Recuurence CLL/small cell lyphoma    ESOPHAGEAL DILATION      ESOPHAGOGASTRODUODENOSCOPY N/A 4/15/2022    Procedure: EGD (ESOPHAGOGASTRODUODENOSCOPY);  Surgeon: Siddharth Garcia MD;  Location: [facility];  Service: Endoscopy;  Laterality: N/A;    ESOPHAGOGASTRODUODENOSCOPY N/A 4/16/2022    Procedure: EGD (ESOPHAGOGASTRODUODENOSCOPY);  Surgeon: Siddharth Garcia MD;  Location: [facility];  Service: Endoscopy;  Laterality: N/A;    ESOPHAGOGASTRODUODENOSCOPY N/A 6/23/2022    Procedure: EGD (ESOPHAGOGASTRODUODENOSCOPY);  Surgeon: Jefferson Hyman MD;  Location: [facility];  Service: Endoscopy;  Laterality: N/A;    ESOPHAGOGASTRODUODENOSCOPY W/ PEG N/A 4/16/2022    Procedure: EGD, WITH PEG TUBE INSERTION;  Surgeon: Siddharth Garcia MD;  Location: [facility];  Service: Endoscopy;  Laterality: N/A;    GASTROSTOMY TUBE PLACEMENT  04/16/2022    20 Fr (bumper initially at 3.5)    Hemorrhoid resection  1979    LUNG LOBECTOMY Left 2004    NECK SURGERY  04/08/2022    Anterior and Posterior C3-C7 fusion    OTHER SURGICAL HISTORY  2006    Chemotherapy s/p lyphoma        Portacath implantation and removal      reverse shoulder arthroplasty Right 03/2021     Social History     Socioeconomic History    Marital status:    Tobacco Use    Smoking status: Former    Smokeless tobacco: Never   Substance and Sexual Activity    Alcohol use: Yes    Drug use: No    Sexual activity: Not Currently     Social Determinants of Health     Financial Resource Strain: Low Risk     Difficulty of Paying Living Expenses: Not very hard   Food  Insecurity: No Food Insecurity    Worried About Running Out of Food in the Last Year: Never true    Ran Out of Food in the Last Year: Never true   Transportation Needs: No Transportation Needs    Lack of Transportation (Medical): No    Lack of Transportation (Non-Medical): No   Physical Activity: Insufficiently Active    Days of Exercise per Week: 3 days    Minutes of Exercise per Session: 30 min   Stress: Stress Concern Present    Feeling of Stress : To some extent   Social Connections: Moderately Isolated    Frequency of Communication with Friends and Family: More than three times a week    Frequency of Social Gatherings with Friends and Family: More than three times a week    Attends Jewish Services: Never    Active Member of Clubs or Organizations: No    Attends Club or Organization Meetings: Never    Marital Status:    Housing Stability: Low Risk     Unable to Pay for Housing in the Last Year: No    Number of Places Lived in the Last Year: 1    Unstable Housing in the Last Year: No     Family History   Problem Relation Age of Onset    Stomach cancer Mother 80         at 95    Colon cancer Father          Review of Systems:   No chills, fever, sweats, until the day of admission.  But he has had a 40 lb weight loss over the last few months since he began the new treatment regimen of gazyva plus high-dose venetoclax.  Denies ill exposures  No change in vision, loss of vision or diplopia  Chronic mild sinus congestion, no obvious purulent nasal discharge, no recognized post nasal drip or facial pain  Compliant with Valtrex once daily, had a flare-up a week ago with a large ulcer on the right lower alveolar ridge which is improved.  No facial cellulitis.  No chest pain, palpitations, syncope  Mild increase in cough, for the last month, small bits of brown sputum production, no shortness of breath, but his endurance is much worse and he feels generally weak.  He has had some nausea, without vomiting,  diarrhea, and his stools have been a little harder lately.  He did take some of his wife's MiraLax? .  No blood in stool, or focal abd pain,  No dysphagia, odynophagia or aspiration events but he does still have some swallowing difficulty since his cervical spine surgery  No dysuria, hematuria, but does have frequency and prostatism.  He sees Urology but hospital Medicine has just started him on tamsulosin.     No swelling of joints, redness of joints,  or new focal pain.  He did have a fall from his walker in the garden last week and has a bruise on his right flank and unexplained bruises on his left thigh.  No unusual headaches, dizziness, vertigo, numbness, paresthesias, neuropathy   He always has a measure of anxiety, depression, but has not been drinking.  He is compliant with the CPAP.  Compliantwith diabetes mellitus  medication  No bleeding,  lymphadenopathy has been shrinking on current treatment, but feels that this is wearing him down.    No new rashes,       No recent/prior steroids and denies taking dexamethasone though this is on his medication list  Outdoor activities:  Vegetable garden  Travel:   Implants:  Right total shoulder, cervical hardware  Antibiotic History:  Valtrex daily    EXAM & DIAGNOSTICS REVIEWED:   Vitals:     Temp:  [97.7 °F (36.5 °C)-98.2 °F (36.8 °C)]   Temp: 98.2 °F (36.8 °C) (04/20/23 0721)  Pulse: 72 (04/20/23 0721)  Resp: 18 (04/20/23 0721)  BP: (!) 178/77 (notified nurse hesham) (04/20/23 0721)  SpO2: 97 % (04/20/23 0721)    Intake/Output Summary (Last 24 hours) at 4/20/2023 0862  Last data filed at 4/20/2023 0453  Gross per 24 hour   Intake 240 ml   Output 2150 ml   Net -1910 ml       General:  In NAD. Alert and attentive, cooperative, comfortable, thinner, nontoxic  Eyes:  Anicteric,  , EOMI  ENT:  Healing ulceration right lower alveolar ridge/gumline.  Edentulous, no thrush   Neck:  supple, no masses or adenopathy appreciated  Lungs: Clear, after a few coughs. no  consolidation, rales, wheezes, rub  Heart:  RRR, no gallop, 3/6 aortic systolic murmur.  Abd:  Soft, NT, ND, normal BS, no masses or organomegaly appreciated.  :  Voids , no flank tenderness  Musc:  Joints without effusion, swelling, erythema, synovitis, muscle wasting.   Skin:  No rashes. No palmar or plantar lesions. No subungual petechiae.  Bruise right flank in 3 bruised his left thigh  Neuro:             Alert, attentive, speech fluent, face symmetric, moves all extremities, no focal weakness. Ambulatory with a walker  Psych: Calm, cooperative  Lymphatic:        Extrem: No edema, erythema, phlebitis, cellulitis, warm and well perfused  VAD:  Peripheral     Isolation:  None  Wound:       Lines/Tubes/Drains:    General Labs reviewed:  Recent Labs   Lab 04/18/23 0013 04/19/23 0410 04/20/23  0420   WBC 3.18* 4.97 2.56*   HGB 10.1* 9.4* 8.8*   HCT 29.1* 27.3* 25.2*   PLT 81* 60* 62*       Recent Labs   Lab 04/18/23 0013 04/19/23  0410 04/20/23  0420   * 137 138   K 4.0 3.3* 3.6   CL 96 104 103   CO2 26 25 25   BUN 19 10 11   CREATININE 0.9 0.6 0.6   CALCIUM 9.5 8.2* 8.4*   PROT 6.4  --   --    BILITOT 0.7  --   --    ALKPHOS 42*  --   --    ALT 12  --   --    AST 16  --   --      Recent Labs   Lab 04/18/23  0921   CRP 5.88*         Micro:  Microbiology Results (last 7 days)       Procedure Component Value Units Date/Time    Urine Culture High Risk [[id number]] Collected: 04/18/23 1657    Order Status: Completed Specimen: Urine, Clean Catch Updated: 04/20/23 0735     Urine Culture, Routine Multiple organisms isolated. None in predominance.  Repeat if      clinically necessary.    Narrative:      Indicated criteria for high risk culture:->Neutropenic  patient    Urine Culture High Risk [[id number]] Collected: 04/18/23 1136    Order Status: Completed Specimen: Urine, Clean Catch Updated: 04/20/23 0734     Urine Culture, Routine No growth to date    Narrative:      Indicated criteria for high risk  culture:->Other  Indicated criteria for high risk culture:->Neutropenic  patient  Other (specify):->on chemo, neutropenic fever    Blood culture x two cultures. Draw prior to antibiotics. [[id number]] Collected: 04/18/23 0041    Order Status: Completed Specimen: Blood from Peripheral, Antecubital, Left Updated: 04/20/23 0232     Blood Culture, Routine No Growth to date      No Growth to date      No Growth to date    Narrative:      Aerobic and anaerobic    Blood culture x two cultures. Draw prior to antibiotics. [[id number]] Collected: 04/18/23 0014    Order Status: Completed Specimen: Blood from Peripheral, Antecubital, Right Updated: 04/20/23 0232     Blood Culture, Routine No Growth to date      No Growth to date      No Growth to date    Narrative:      Aerobic and anaerobic    Respiratory Infection Panel (PCR), Nasopharyngeal [[id number]] Collected: 04/19/23 1428    Order Status: Completed Specimen: Nasopharyngeal Swab Updated: 04/19/23 1551     Respiratory Infection Panel Source NP swab     Adenovirus Not Detected     Coronavirus 229E, Common Cold Virus Not Detected     Coronavirus HKU1, Common Cold Virus Not Detected     Coronavirus NL63, Common Cold Virus Not Detected     Coronavirus OC43, Common Cold Virus Not Detected     Comment: Coronavirus strains 229E, HKU1, NL63, and OC43 can cause the common   cold   and are not associated with the respiratory disease outbreak caused   by  the COVID-19 (SARS-CoV-2 novel Coronavirus) strain.           SARS-CoV2 (COVID-19) Qualitative PCR Not Detected     Human Metapneumovirus Not Detected     Human Rhinovirus/Enterovirus Not Detected     Influenza A (subtypes H1, H1-2009,H3) Not Detected     Influenza B Not Detected     Parainfluenza Virus 1 Not Detected     Parainfluenza Virus 2 Not Detected     Parainfluenza Virus 3 Not Detected     Parainfluenza Virus 4 Not Detected     Respiratory Syncytial Virus Not Detected     Bordetella Parapertussis (QH6828) Not Detected      Bordetella pertussis (ptxP) Not Detected     Chlamydia pneumoniae Not Detected     Mycoplasma pneumoniae Not Detected     Comment: Respiratory Infection Panel testing performed by Multiplex PCR.       Narrative:      Respiratory Infection Panel source->NP Swab    MRSA Screen by PCR [[id number]] Collected: 04/18/23 1656    Order Status: Completed Specimen: Nasopharyngeal Swab from Nasal Updated: 04/18/23 1827     MRSA SCREEN BY PCR Negative            Imaging Reviewed:   CXR    PET-CT December 2022    Sinus CT  IMPRESSION: Paranasal sinus mucosal thickening as described, with no sinus air-fluid levels to suggest acute sinusitis.  Cardiology:    IMPRESSION & PLAN   1.  Fever, resolving, source unclear   He has mild sinus symptoms in some small amount of brown sputum over the last month. Mucosal thickening on sinus CT    2.  CLL/non-Hodgkin's lymphoma, on treatment, with declining endurance though a good response to treatment    3.  Recurrent herpetic stomatitis, improving at this time  4.  Immunocompromised secondary to 2.  5.  Prostatism    Recommendations:   Ok to discharge on a week of cefdinir and 5 days of zithromax  Continue Valtrex indefinitely  Continue flomax       Medical Decision Making during this encounter was  [_] Low Complexity  [_] Moderate Complexity  [xxx  ] High Complexity

## 2023-04-20 NOTE — NURSING
Pt resting in bed with eyes closed. Respirations even and unlabored. Skin warm and dry to touch. NO c/o pain and discomfort. Bed locked lowered with call light in reach.

## 2023-04-20 NOTE — DISCHARGE SUMMARY
[facility]  Discharge Summary      Patient Name: Conrad Kuhn  MRN: [id number]  SHAMAR: [id number]  Patient Class: IP- Inpatient  Admission Date: 4/17/2023  Hospital Length of Stay: 2 days  Discharge Date and Time:  04/20/2023 11:47 AM  Attending Physician: Wilfredo Rendon MD   Discharging Provider: Wilfredo Rendon MD  Primary Care Provider: Johnson Erazo MD    Primary Care Team: Networked reference to record PCT     HPI:   82-year-old man with CLL on Gazyva, history of remote tobacco use, history of partial left lung resection, diabetes, hypertension, hyperlipidemia, presenting with fever.  Patient came to the ER for a high fever of 104 at home today. He had his chemo injection yesterday. In the ER, his temperature was up to 102.  He was found tachypneic and endorsed a dry cough which has been ongoing for a few months ever since his new chemo started.  He denies nausea, vomiting, diarrhea, rash, headache, urinary symptoms, extremity swelling.        * No surgery found *      Hospital Course:   Patient is an 82 year old gentleman with a history of CLL on Gazyva, history of remote tobacco use, history of partial left lung resection, diabetes, hypertension, hyperlipidemia, presenting with fever.  He is relatively immunosuppressed due to his chemotherapy agents.  Infectious Disease was consulted.  Full workup was performed and no clear etiology of his infectious source was identified other than mild thickening of his sinuses on sinus CT.  Will complete a course of antibiotics with azithromycin and cefdinir at the recommendations of Infectious Disease.  He is back to his baseline and is not had fever for over 24 hours at this time.  He was found to be safe for discharge home with plans for close follow-up with Oncology as an outpatient.  He will hold his Venclexta and Gazvya dosing until follow-up with Oncology.  I reviewed the discharge plan of care instructions with the  patient on the day of discharge.  He was discharged in good condition with plans for close outpatient follow-up.       Goals of Care Treatment Preferences:  Code Status: Full Code      Consults:   Consults (From admission, onward)        Status Ordering Provider     Inpatient consult to   Once        Provider:  (Not yet assigned)    Acknowledged URIEL HENSON     Inpatient consult to Infectious Diseases  Once        Provider:  Nila Barr MD    Completed RASHAAD BARRERA     Inpatient consult to Hematology Oncology  Once        Provider:  Drew Bai MD    Acknowledged RASHAAD BARRERA     Inpatient consult to Hospitalist  Once        Provider:  Rashaad Barrera MD    Acknowledged VENTURA KENNEY          No new Assessment & Plan notes have been filed under this hospital service since the last note was generated.  Service: Hospital Medicine    Final Active Diagnoses:    Diagnosis Date Noted POA    PRINCIPAL PROBLEM:  Fever [R50.9] 11/08/2022 Yes    Iron deficiency anemia [D50.9] 01/03/2019 Yes    CLL (chronic lymphocytic leukemia) [C91.10] 01/02/2019 Yes    History of lung cancer - ANDRES NSCLC 2004 [Z85.118] 01/03/2019 Not Applicable    Debility [R53.81] 04/19/2023 Yes    Pancytopenia [D61.818] 06/29/2021 Yes      Problems Resolved During this Admission:       Discharged Condition: good    Disposition: Home or Self Care    Follow Up:   Follow-up Information     Johnson Erazo MD. Go on 4/24/2023.    Specialty: Family Medicine  Why: 10:30 am  Contact information:  [address] [facility] [address]  [phone]             [facility] Follow up.    Contact information:  [address] [place] [id number] [phone]           Drew Bai MD. Schedule an appointment as soon as possible for a visit in 1 week(s).    Specialties: Hematology, Hematology and Oncology, Oncology  Why: Office will call patient with hospital  follow up appointment.  Contact information:  [address] [facility]  [address] [place] [address]  [phone]                       Patient Instructions:      Ambulatory referral/consult to Outpatient Case Management   Referral Priority: Routine Referral Type: Consultation   Referral Reason: Specialty Services Required   Number of Visits Requested: 1     Ambulatory referral/consult to [facility]   Standing Status: Future   Referral Priority: Routine Referral Type: Consultation   Referral Reason: Specialty Services Required   Number of Visits Requested: 1     Ambulatory referral/consult to Home Health   Standing Status: Future Standing Exp. Date: 05/20/24   Referral Type: Home Health   Referral Reason: Specialty Services Required   Requested Specialty: Home Health Services   Number of Visits Requested: 1     Diet Adult Regular     No dressing needed     Activity as tolerated       Significant Diagnostic Studies: Labs:   BMP:   Recent Labs   Lab 04/19/23 0410 04/20/23  0420   * 123*    138   K 3.3* 3.6    103   CO2 25 25   BUN 10 11   CREATININE 0.6 0.6   CALCIUM 8.2* 8.4*   MG 1.6 1.7   , CBC   Recent Labs   Lab 04/19/23 0410 04/20/23 0420   WBC 4.97 2.56*   HGB 9.4* 8.8*   HCT 27.3* 25.2*   PLT 60* 62*    and All labs within the past 24 hours have been reviewed    Pending Diagnostic Studies:     Procedure Component Value Units Date/Time    Urinalysis [[id number]] Collected: 04/18/23 1725    Order Status: Sent Lab Status: In process Updated: 04/18/23 1726    Specimen: Urine, Clean Catch          Medications:  Reconciled Home Medications:      Medication List      START taking these medications    azithromycin 250 MG tablet  Commonly known as: Z-XIMENA  Take 1 tablet (250 mg total) by mouth once daily. for 4 days  Start taking on: April 21, 2023     cefdinir 300 MG capsule  Commonly known as: OMNICEF  Take 1 capsule (300 mg total) by mouth 2 (two) times daily. for 7 days     tamsulosin  0.4 mg Cap  Commonly known as: FLOMAX  Take 1 capsule (0.4 mg total) by mouth once daily.  Start taking on: April 21, 2023        CHANGE how you take these medications    dexAMETHasone 4 MG Tab  Commonly known as: DECADRON  Take 20mg the Sunday, Monday and Tuesday  What changed:   · how much to take  · how to take this  · when to take this     * VENCLEXTA STARTING PACK 10 mg-50 mg- 100 mg Dspk  Generic drug: venetoclax  Take 20mg by mouth daily on days 22-28 of cycle 1. Take 50 mg by mouth daily on days 1-7 of cycle 2 Take 100 mg daily on days 8-14 of cycle 2 Take 200 mg daily on days 15-21 of cycle 2.  Take with food.  What changed: how to take this     * VENCLEXTA 100 mg Tab  Generic drug: venetoclax  Take 4 tablets (400 mg) by mouth once daily.  What changed: Another medication with the same name was changed. Make sure you understand how and when to take each.         * This list has 2 medication(s) that are the same as other medications prescribed for you. Read the directions carefully, and ask your doctor or other care provider to review them with you.            CONTINUE taking these medications    allopurinoL 300 MG tablet  Commonly known as: ZYLOPRIM  Take 1 tablet (300 mg total) by mouth once daily.     atenoloL 25 MG tablet  Commonly known as: TENORMIN  Take 25 mg by mouth once daily.     atorvastatin 20 MG tablet  Commonly known as: LIPITOR  Take 20 mg by mouth once daily.     azelastine 137 mcg (0.1 %) nasal spray  Commonly known as: ASTELIN  1 spray by Nasal route 2 (two) times daily.     benzonatate 100 MG capsule  Commonly known as: TESSALON PERLES  Take 1 capsule (100 mg total) by mouth every 6 (six) hours as needed for Cough.     blood sugar diagnostic Strp  1 strip by Misc.(Non-Drug; Combo Route) route 2 (two) times a day.     blood-glucose meter kit  Use as instructed     budesonide-formoterol 160-4.5 mcg 160-4.5 mcg/actuation Hfaa  Commonly known as: SYMBICORT  Inhale 2 puffs into the lungs in  the morning and 2 puffs before bedtime.     DUKE'S SOLUTION (BENADRYL 30 ML, MYLANTA 30 ML, LIDOCAINE 30 ML, NYSTATIN 30 ML)  Take 10 mLs by mouth 4 (four) times daily.     ferrous sulfate 325 mg (65 mg iron) Tab tablet  Commonly known as: FEOSOL  Take 325 mg by mouth daily with breakfast.     fluticasone propionate 50 mcg/actuation nasal spray  Commonly known as: FLONASE  1 spray by Each Nostril route once daily.     gabapentin 600 MG tablet  Commonly known as: NEURONTIN  Take 600 mg by mouth 2 (two) times daily.     glimepiride 2 MG tablet  Commonly known as: AMARYL  Take 2 mg by mouth once daily at 6am.     HYDROcodone-acetaminophen  mg per tablet  Commonly known as: NORCO  Take 1 tablet by mouth every 8 (eight) hours as needed.     levalbuterol 1.25 mg/3 mL nebulizer solution  Commonly known as: XOPENEX  Take 3 mLs by nebulization every 4 to 6 hours as needed.     LIDOcaine VISCOUS 2 % Soln  Generic drug: LIDOcaine HCl 2%  Take 15 mLs by mouth every 4 (four) hours as needed.     losartan 50 MG tablet  Commonly known as: COZAAR  Take 1 tablet (50 mg total) by mouth once daily.     metFORMIN 500 MG tablet  Commonly known as: GLUCOPHAGE  Take 500 mg by mouth 2 (two) times daily with meals.     ondansetron 8 MG tablet  Commonly known as: ZOFRAN  Take 1 tablet (8 mg total) by mouth every 8 (eight) hours as needed for Nausea.     pantoprazole 40 MG tablet  Commonly known as: PROTONIX  40 mg.     promethazine 12.5 MG Tab  Commonly known as: PHENERGAN  Take 1 tablet (12.5 mg total) by mouth every 6 (six) hours as needed.     traZODone 300 MG tablet  Commonly known as: DESYREL  Take 300 mg by mouth every evening.     valACYclovir 1000 MG tablet  Commonly known as: VALTREX  Take 1,000 mg by mouth once daily.        STOP taking these medications    amoxicillin 875 MG tablet  Commonly known as: AMOXIL     iron-vitamin C 100-250 mg (ICAR-C) 100-250 mg Tab  Commonly known as: ICAR-C     levoFLOXacin 750 MG  tablet  Commonly known as: LEVAQUIN     ondansetron 8 MG Tbdl  Commonly known as: ZOFRAN-ODT            Indwelling Lines/Drains at time of discharge:   Lines/Drains/Airways     None                 Time spent on the discharge of patient: 55 minutes         Wilfredo Rendon MD  Department of Hospital Medicine  [facility]

## 2023-04-20 NOTE — PLAN OF CARE
Patient cleared for discharge from case management standpoint.    Follow up appointments scheduled and added to AVS.    Chart and discharge orders reviewed.  Patient discharged home with no further case management needs.       04/20/23 1209   Final Note   Assessment Type Final Discharge Note   Anticipated Discharge Disposition [facility] Resources/Appts/Education Provided Provided patient/caregiver with written discharge plan information;Appointments scheduled and added to AVS   Post-Acute Status   Home Health Status Set-up Complete/Auth obtained   Discharge Delays None known at this time

## 2023-04-20 NOTE — PROGRESS NOTES
"[facility]   Hematology/Oncology  Inpatient Progress Note          Patient Name: Conrad Kuhn  MRN: [id number]  Admission Date: 4/17/2023  Hospital Length of Stay: 2 days  Code Status: Full Code   Attending Provider: Wilfredo Rendon MD  Consulting Provider: Drew Bai MD  Primary Care Physician: Johnson Erazo MD  Principal Problem:Fever      Subjective:       Patient ID: Conrad Kuhn is a 82 y.o. male.    Chief Complaint: Fever (Reported 104 at home. )        History Present Illness:    Patient was discharged priro to being seen by myself today      Review of Systems:  GEN:  fever and general malaise, no night sweats or weight loss  HEENT: normal with no HA's, sore throat, stiff neck, changes in vision  CV: normal with no CP, SOB, PND, MAYER or orthopnea  PULM: normal with no SOB, hemoptysis, sputum or pleuritic pain; mild  cough,  GI: normal with no abdominal pain, nausea, vomiting, constipation, diarrhea, melanotic stools, BRBPR, or hematemesis  : normal with no hematuria, dysuria  BREAST: normal with no mass, discharge, pain  SKIN: normal with no rash, erythema, bruising, or swelling      Objective:     Vitals:  Blood pressure (!) 178/77, pulse 72, temperature 98.2 °F (36.8 °C), temperature source Oral, resp. rate 18, height 6' 2" (1.88 m), weight 82.6 kg (182 lb 1.6 oz), SpO2 97 %.    Physical Exam:  deferred            Lab Review:        Lab Results   Component Value Date    WBC 2.56 (L) 04/20/2023    HGB 8.8 (L) 04/20/2023    HCT 25.2 (L) 04/20/2023    MCV 86 04/20/2023    PLT 62 (L) 04/20/2023       CMP  Sodium   Date Value Ref Range Status   04/20/2023 138 136 - 145 mmol/L Final   06/12/2019 138 134 - 144 mmol/L      Potassium   Date Value Ref Range Status   04/20/2023 3.6 3.5 - 5.1 mmol/L Final     Chloride   Date Value Ref Range Status   04/20/2023 103 95 - 110 mmol/L Final   06/12/2019 99 98 - 110 mmol/L      CO2   Date Value Ref Range Status   04/20/2023 25 23 - 29 " mmol/L Final     Glucose   Date Value Ref Range Status   04/20/2023 123 (H) 70 - 110 mg/dL Final   06/12/2019 179 (H) 70 - 99 mg/dL      BUN   Date Value Ref Range Status   04/20/2023 11 8 - 23 mg/dL Final     Creatinine   Date Value Ref Range Status   04/20/2023 0.6 0.5 - 1.4 mg/dL Final   06/12/2019 0.95 0.60 - 1.40 mg/dL      Calcium   Date Value Ref Range Status   04/20/2023 8.4 (L) 8.7 - 10.5 mg/dL Final     Total Protein   Date Value Ref Range Status   04/18/2023 6.4 6.0 - 8.4 g/dL Final     Albumin   Date Value Ref Range Status   04/18/2023 3.7 3.5 - 5.2 g/dL Final   06/12/2019 3.9 3.1 - 4.7 g/dL      Total Bilirubin   Date Value Ref Range Status   04/18/2023 0.7 0.1 - 1.0 mg/dL Final     Comment:     For infants and newborns, interpretation of results should be based  on gestational age, weight and in agreement with clinical  observations.    Premature Infant recommended reference ranges:  Up to 24 hours.............<8.0 mg/dL  Up to 48 hours............<12.0 mg/dL  3-5 days..................<15.0 mg/dL  6-29 days.................<15.0 mg/dL       Alkaline Phosphatase   Date Value Ref Range Status   04/18/2023 42 (L) 55 - 135 U/L Final     AST   Date Value Ref Range Status   04/18/2023 16 10 - 40 U/L Final     ALT   Date Value Ref Range Status   04/18/2023 12 10 - 44 U/L Final     Anion Gap   Date Value Ref Range Status   04/20/2023 10 8 - 16 mmol/L Final     eGFR   Date Value Ref Range Status   04/20/2023 >60.0 >60 mL/min/1.73 m^2 Final           Radiology Diagnostic Studies:     X-Ray Chest AP Portable [[id number]] Collected: 04/17/23 0937   Order Status: Completed Updated: 04/18/23 0736   Narrative:     XR CHEST 1 VIEW     CLINICAL HISTORY:   82 years Male Sepsis     COMPARISON: March 29, 2023     FINDINGS: Cardiac silhouette size is within normal limits patient is status post cervical ACDF and reverse right shoulder arthroplasty. No acute osseous abnormality.     No pulmonary edema. No alveolar  consolidation. No pleural fluid or pneumothorax.     IMPRESSION:     Stable chest radiograph. No acute pulmonary pathology.         Assessment:     IMPRESSION:    (1) 82 y.o. male  known to my oncology service with diagnosis of CLL/SLL who has been treated in conjunction with Dr Monik Don at [facility] and has been on regimen of Gazyva chemotherapy. He presented to the ED at [facility] with some generalized malaise and fever. He has been admitted to the hospitalist service.      4/19/2023:  - wbc at 4.97  - hgb 9.4  - Plats 60,000  - hold oral venetoclax for now  - consult ID    4/20/2023:  - wbc at 2.56  - hgb at 8.8  - plats at 62,000  - ID on case and following  - Venetoclax and Gazyva on hold     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  - followed by Dr Kee  - latest CEA at 1.2     (3) Iron deficiency anemia s/p IV iron couple weeks ago  - s/p periodic IV iron therapies        (4) HTN - on BP meds     (6) Chronic neck and back issues - followed by Dr Ryan Rivero     (7) DM - currently off all meds     (8) Hypercholesterolemia - on meds     (9)  - followed by Dr Whitney     (10) Chronic Leucopenia and thrombocytopenia - due to chemotherapy agents and lymphoma/leukemia                    1. Fever    2. Sepsis    3. Fever, unspecified fever cause    4. CLL (chronic lymphocytic leukemia)    5. Herpes stomatitis    6. Thrombocytopenia    7. Debility           Plan:     PLAN:          Monitor labs and transfuse as needed  Infection, culture workup, abx as per ID  Hold the oral Venetoclax and Gazyva for now and will re-evaluate next week as outpatient to resume                     Drew Bai MD  Hematology/Oncology  [facility]

## 2023-04-20 NOTE — HOSPITAL COURSE
Patient is an 82 year old gentleman with a history of CLL on Gazyva, history of remote tobacco use, history of partial left lung resection, diabetes, hypertension, hyperlipidemia, presenting with fever.  He is relatively immunosuppressed due to his chemotherapy agents.  Infectious Disease was consulted.  Full workup was performed and no clear etiology of his infectious source was identified other than mild thickening of his sinuses on sinus CT.  Will complete a course of antibiotics with azithromycin and cefdinir at the recommendations of Infectious Disease.  He is back to his baseline and is not had fever for over 24 hours at this time.  He was found to be safe for discharge home with plans for close follow-up with Oncology as an outpatient.  He will hold his Venclexta and Gazvya dosing until follow-up with Oncology.  I reviewed the discharge plan of care instructions with the patient on the day of discharge.  He was discharged in good condition with plans for close outpatient follow-up.

## 2023-04-25 PROBLEM — T45.1X5A CHEMOTHERAPY INDUCED NEUTROPENIA: Status: ACTIVE | Noted: 2023-01-01

## 2023-04-25 PROBLEM — D70.1 CHEMOTHERAPY INDUCED NEUTROPENIA: Status: ACTIVE | Noted: 2023-01-01

## 2023-04-26 NOTE — PROGRESS NOTES
[facility] Hematology/Oncology  PROGRESS NOTE - Follow-up Visit      Subjective:       Patient ID:   NAME: Conrad Kuhn : 1940     82 y.o. male    Referring Doc: Julien  Other Physicians: Ced Erazo Joubert, Srinivas, Pinsky    Chief Complaint:  CLL f/u    History of Present Illness:     Patient is being seen today in person in clinic for a regular follow-up viasit. He is here by himself.  The patient is on today to go over the results of the recently ordered labs, tests and studies. He is here with his wife today.    He has since been on Gazyva per Dr Don's direction; he is feeling better overall and the LAD has shruncken down in the neck; His swallowing ok now    He did have a hospital stay last week due to neutropenic fever with no signs of infection.     He is breathing ok currently. He denies any CP, SOB, or N/V. BP has been good per patient    Using walker to ambulate today    Talked to Dr. Don, ANC 0.8 he will need zarxio injections x 3 days and recheck labs Monday.   Gazyva and venetoclax on hold until next week.     Discussed Covid19 precautions; he had his vaccinations        ROS:   GEN: normal without any fever, night sweats or weight loss  HEENT: normal with no HA's, sore throat, stiff neck, changes in vision; LAD much better; swallowing better  CV: normal with no CP, SOB, PND, MAYER or orthopnea  PULM: normal with no SOB, cough, hemoptysis, sputum or pleuritic pain  GI: normal with no abdominal pain, nausea, vomiting, constipation, diarrhea, melanotic stools, BRBPR, or hematemesis; no dysphagia;   BREAST: normal with no mass, discharge, pain  SKIN: normal with no rash, erythema, bruising, or swelling    Allergies:  Review of patient's allergies indicates:  No Known Allergies    Medications:    Current Outpatient Medications:     allopurinoL (ZYLOPRIM) 300 MG tablet, Take 1 tablet (300 mg total) by mouth once daily., Disp: 30 tablet, Rfl: 6    atenoloL (TENORMIN) 25 MG tablet, Take 25 mg by  mouth once daily., Disp: , Rfl:     atorvastatin (LIPITOR) 20 MG tablet, Take 20 mg by mouth once daily., Disp: , Rfl:     azelastine (ASTELIN) 137 mcg (0.1 %) nasal spray, 1 spray by Nasal route 2 (two) times daily. , Disp: , Rfl:     benzonatate (TESSALON PERLES) 100 MG capsule, Take 1 capsule (100 mg total) by mouth every 6 (six) hours as needed for Cough., Disp: 30 capsule, Rfl: 1    blood sugar diagnostic Strp, 1 strip by Misc.(Non-Drug; Combo Route) route 2 (two) times a day., Disp: , Rfl:     budesonide-formoterol 160-4.5 mcg (SYMBICORT) 160-4.5 mcg/actuation HFAA, Inhale 2 puffs into the lungs in the morning and 2 puffs before bedtime., Disp: , Rfl:     cefdinir (OMNICEF) 300 MG capsule, Take 1 capsule (300 mg total) by mouth 2 (two) times daily. for 7 days, Disp: 14 capsule, Rfl: 0    dexAMETHasone (DECADRON) 4 MG Tab, Take 20mg the Sunday, Monday and Tuesday (Patient taking differently: Take 20 mg by mouth once daily. Take 20mg the Sunday, Monday and Tuesday), Disp: 60 tablet, Rfl: 0    duke's soln (benadryl 30 mL, mylanta 30 mL, LIDOcaine 30 mL, nystatin 30 mL) 120mL, Take 10 mLs by mouth 4 (four) times daily., Disp: 240 mL, Rfl: 0    ferrous sulfate (FEOSOL) 325 mg (65 mg iron) Tab tablet, Take 325 mg by mouth daily with breakfast., Disp: , Rfl:     fluticasone propionate (FLONASE) 50 mcg/actuation nasal spray, 1 spray by Each Nostril route once daily., Disp: , Rfl:     gabapentin (NEURONTIN) 600 MG tablet, Take 600 mg by mouth 2 (two) times daily., Disp: , Rfl:     glimepiride (AMARYL) 2 MG tablet, Take 2 mg by mouth once daily at 6am., Disp: , Rfl:     HYDROcodone-acetaminophen (NORCO)  mg per tablet, Take 1 tablet by mouth every 8 (eight) hours as needed., Disp: , Rfl:     levalbuterol (XOPENEX) 1.25 mg/3 mL nebulizer solution, Take 3 mLs by nebulization every 4 to 6 hours as needed. , Disp: , Rfl:     LIDOCAINE VISCOUS 2 % solution, Take 15 mLs by mouth every 4 (four) hours as needed., Disp: ,  "Rfl:     losartan (COZAAR) 50 MG tablet, Take 1 tablet (50 mg total) by mouth once daily., Disp: 90 tablet, Rfl: 0    metFORMIN (GLUCOPHAGE) 500 MG tablet, Take 500 mg by mouth 2 (two) times daily with meals., Disp: , Rfl:     ondansetron (ZOFRAN) 8 MG tablet, Take 1 tablet (8 mg total) by mouth every 8 (eight) hours as needed for Nausea., Disp: 30 tablet, Rfl: 2    pantoprazole (PROTONIX) 40 MG tablet, 40 mg., Disp: , Rfl:     promethazine (PHENERGAN) 12.5 MG Tab, Take 1 tablet (12.5 mg total) by mouth every 6 (six) hours as needed., Disp: 30 tablet, Rfl: 3    tamsulosin (FLOMAX) 0.4 mg Cap, Take 1 capsule (0.4 mg total) by mouth once daily., Disp: 90 capsule, Rfl: 0    traZODone (DESYREL) 300 MG tablet, Take 300 mg by mouth every evening., Disp: , Rfl:     valACYclovir (VALTREX) 1000 MG tablet, Take 1,000 mg by mouth once daily., Disp: , Rfl:     venetoclax (VENCLEXTA) 100 mg Tab, Take 4 tablets (400 mg) by mouth once daily., Disp: 120 tablet, Rfl: 11    venetoclax 10 mg-50 mg- 100 mg DsPk, Take 20mg by mouth daily on days 22-28 of cycle 1. Take 50 mg by mouth daily on days 1-7 of cycle 2 Take 100 mg daily on days 8-14 of cycle 2 Take 200 mg daily on days 15-21 of cycle 2.  Take with food. (Patient taking differently: Take by mouth Take 20mg by mouth daily on days 22-28 of cycle 1. Take 50 mg by mouth daily on days 1-7 of cycle 2 Take 100 mg daily on days 8-14 of cycle 2 Take 200 mg daily on days 15-21 of cycle 2.  Take with food.), Disp: 42 tablet, Rfl: 0    blood-glucose meter kit, Use as instructed, Disp: , Rfl:     PMHx/PSHx Updates:  See patient's last visit with Dr. Bai on 4/12/2023  See H&P on 1/3/2019        Pathology:  Cancer Staging  No matching staging information was found for the patient.          Objective:     Vitals:  Blood pressure (!) 165/66, pulse 61, temperature 97.6 °F (36.4 °C), resp. rate 18, height 6' 2" (1.88 m), weight 85.2 kg (187 lb 14.4 oz).        Physical Examination:   GEN: no " apparent distress, comfortable; AAOx3  HEAD: atraumatic and normocephalic  EYES: no conjunctival pallor or muddiness, no icterus; normal pupil reaction to ambient light  ENT: OMM, no pharyngeal erythema, external bilateral ears WNL; no visible thrush or ulcers  NECK: no masses or swelling, trachea midline, no visible LAD/LN's ;   CV: no palpitations; no pedal edema; no noticeable JVD or neck vein distension  CHEST: Normal respiratory effort; chest wall breath movements symmetrical; no audible wheezing  ABDOM: non-distended; no bloating;  peg tube since removed  MUSC/Skeletal: ROM normal; joints visibly normal; no deformities or arthropathy  EXTREM: no clubbing, cyanosis, inflammation or swelling; right arm with fair ROM  SKIN: no rashes, lesions, ulcers, petechiae or subcutaneous nodules  : no keith  NEURO: moving all 4 extremities; AAOx3; no tremors  PSYCH: normal mood, affect and behavior  LYMPH: no visible LN's or LAD; LAD and LN's on right neck reduced in size  Labs:   Lab Results   Component Value Date    WBC 2.05 (L) 04/25/2023    HGB 9.2 (L) 04/25/2023    HCT 27.0 (L) 04/25/2023    MCV 85 04/25/2023    PLT 77 (L) 04/25/2023     CMP  Sodium   Date Value Ref Range Status   04/25/2023 137 136 - 145 mmol/L Final   06/12/2019 138 134 - 144 mmol/L      Potassium   Date Value Ref Range Status   04/25/2023 4.1 3.5 - 5.1 mmol/L Final     Chloride   Date Value Ref Range Status   04/25/2023 102 95 - 110 mmol/L Final   06/12/2019 99 98 - 110 mmol/L      CO2   Date Value Ref Range Status   04/25/2023 28 23 - 29 mmol/L Final     Glucose   Date Value Ref Range Status   04/25/2023 114 (H) 70 - 110 mg/dL Final   06/12/2019 179 (H) 70 - 99 mg/dL      BUN   Date Value Ref Range Status   04/25/2023 18 8 - 23 mg/dL Final     Creatinine   Date Value Ref Range Status   04/25/2023 0.8 0.5 - 1.4 mg/dL Final   06/12/2019 0.95 0.60 - 1.40 mg/dL      Calcium   Date Value Ref Range Status   04/25/2023 9.0 8.7 - 10.5 mg/dL Final      Total Protein   Date Value Ref Range Status   04/25/2023 6.2 6.0 - 8.4 g/dL Final     Albumin   Date Value Ref Range Status   04/25/2023 3.5 3.5 - 5.2 g/dL Final   06/12/2019 3.9 3.1 - 4.7 g/dL      Total Bilirubin   Date Value Ref Range Status   04/25/2023 0.2 0.1 - 1.0 mg/dL Final     Comment:     For infants and newborns, interpretation of results should be based  on gestational age, weight and in agreement with clinical  observations.    Premature Infant recommended reference ranges:  Up to 24 hours.............<8.0 mg/dL  Up to 48 hours............<12.0 mg/dL  3-5 days..................<15.0 mg/dL  6-29 days.................<15.0 mg/dL       Alkaline Phosphatase   Date Value Ref Range Status   04/25/2023 45 (L) 55 - 135 U/L Final     AST   Date Value Ref Range Status   04/25/2023 13 10 - 40 U/L Final     ALT   Date Value Ref Range Status   04/25/2023 11 10 - 44 U/L Final     Anion Gap   Date Value Ref Range Status   04/25/2023 7 (L) 8 - 16 mmol/L Final     eGFR if    Date Value Ref Range Status   07/02/2022 >60.0 >60 mL/min/1.73 m^2 Final     eGFR if non    Date Value Ref Range Status   07/02/2022 >60.0 >60 mL/min/1.73 m^2 Final     Comment:     Calculation used to obtain the estimated glomerular filtration  rate (eGFR) is the CKD-EPI equation.              Lab Results   Component Value Date    IRON 59 02/02/2023    TIBC 342 02/02/2023    FERRITIN 67 02/02/2023         Radiology/Diagnostic Studies:    PET  12/2/2022:    IMPRESSION: Mild enlargement of the extensive adenopathy within the neck, chest, abdomen and pelvis with faint increased FDG activity     Stable splenomegaly         CT 11/8/2022:  IMPRESSION:     Worsening splenomegaly with diffuse increase in size and number of essentially all of the main lymph node chains throughout the chest, abdomen and pelvis.           PET 8/29/2022:  IMPRESSION:  1. Numerous enlarged lymph nodes throughout the neck, chest, abdomen and  pelvis are non hypermetabolic, and stable compared to CT of 07/02/2022.  2. Stable splenomegaly, with no abnormal focal splenic FDG hypermetabolism.  3. Additional observations as described.         CT 7/2/2022:    IMPRESSION:  1. Interval worsening in numerous enlarged lymph nodes in the chest, abdomen and pelvis, as well as development of splenomegaly, compatible with lymphoma and or worsening CLL, given patient's history  2. Mild bilateral hydroureteronephrosis, with markedly enlarged prostate gland and appearance of the urinary bladder suggesting chronic bladder outlet obstruction. Consider correlation with urinalysis.  3. Infrarenal abdominal aortic aneurysm measuring 33 mm in diameter. Follow-up imaging in 3 years is recommended per best practice guidelines.  4. Additional observations as described.           PET  3/14/2022:    IMPRESSION:  Left upper lobectomy without evidence of recurrent or metastatic disease      MRI cervical spine  10/1/2021:    IMPRESSION:     Loss of normal cervical lordosis with mild kyphotic curvature.     Advanced multilevel cervical spondylosis, as outlined above. Spinal canal narrowing and foraminal narrowing is most severe at C5-6. 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     Congenital narrowing of cervical spinal canal, which exacerbates cervical spondylosis        CT head  8/27/2021:  IMPRESSION:     No CT evidence of acute intracranial pathology.     Stable senescent changes as above.         PET  7/19/2021:    IMPRESSION:  Prior left upper lobectomy without evidence of recurrent or metastatic disease  - 3 cm infrarenal abdominal aortic aneurysm      CT head 6/29/2021:  IMPRESSION:     No CT evidence of acute intracranial pathology      PET  1/18/2021:    Impression:     No evidence of FDG avid lymphoproliferative disease.     Aneurysmal dilation of infrarenal abdominal aorta (3.4 cm) as well as aneurysmal dilation  of right common iliac artery.     Additional incidental findings as above        PET  6/24/2020:    Impression:     1. No findings of active lymphoproliferative disease.  2. Additional observations as above.      PET  1/24/2020:    Impression       Moderate decrease in size of the adenopathy within the neck, chest, abdomen and pelvis.  The spleen is smaller in size.  There is no significant FDG activity.    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 Multifocal lymphadenopathy in the chest, abdomen, and pelvis compatible with provided clinical history of lymphocytic leukemia.  There is mixed interval change when compared to prior studies.  Pathologic lymphadenopathy in the chest has increased significantly when compared to a CTA of the chest from May 2018.  Pathologic retroperitoneal lymphadenopathy has improved slightly when compared to a previous abdominal CT from February 2017.  Please see above details.  2. Mild aneurysmal dilation of the infrarenal abdominal aorta, with maximal transverse diameter of 3.7 cm.  Maximal transverse diameter on a prior study from 2017 was 2.5 cm.  3. Additional findings as above           [facility] Unknown Rad Eap    Result Date: 1/14/2019  CMS MANDATED QUALITY DATA - CT RADIATION - 436 All CT scans at this facility utilize dose modulation, iterative reconstruction, and/or weight based dosing when appropriate to reduce radiation dose to as low as reasonably achievable. Reason:Lymphoid leukemia TECHNIQUE: CT thorax with, CT abdomen  with, and CT pelvis with 100 mL Omnipaque 350. COMPARISON: CT chest dated 05/19/2018 and CT abdomen and pelvis dated 02/08/2017 CT THORAX: There is stable mediastinal, hilar and axillary adenopathy. There is coronary artery calcification. There is resolution of the groundglass opacities throughout the lungs. There are no confluent infiltrates, pulmonary nodules or pleural effusions.  There are stable subcentimeter nodules in the left lobe of the thyroid gland. There are degenerative changes of the spine. CT ABDOMEN: The liver, pancreas, adrenal glands and gallbladder are normal. The spleen is enlarged. There is mesenteric and retroperitoneal adenopathy which is slightly smaller in size. -There is a portacaval node measuring 37 x 21 mm and previously measured 38 x 27 mm. -Periportal lymph node measuring 41 x 18 mm, previously measured 48 x 29 mm. -Left periaortic adenopathy measuring 34 x 23 mm and previously measured 40 x 42 mm- The kidneys enhance symmetrically without hydronephrosis or calculi. There are no thick-walled or dilated bowel loops. There is vascular calcification of aorta. There is a 3.0 x 3.0 cm infrarenal abdominal aortic aneurysm. CT PELVIS: There is adenopathy along the pelvic sidewalls bilaterally which has decreased in size. -The left common iliac adenopathy measuring 36 x 11 mm, previously measured 46 x 14 mm -the right common iliac adenopathy measuring 46 x 10 mm. Previously measured 53 x 15 mm. The bladder is normal. The prostate gland is enlarged. There are degenerative changes of the spine. There is grade 1 anterolisthesis of L5 on S1 with bilateral pars defects.     IMPRESSION: Stable mediastinal, hilar and axillary adenopathy with resolution of the airspace disease within the lungs Decrease in size of the mesenteric, retroperitoneal and pelvic adenopathy. Stable infrarenal abdominal aortic aneurysm Splenomegaly     Read and electronically signed by: Jeniffer Zhang MD on 1/14/2019 9:14 AM CST JENIFFER ZHANG MD      I have reviewed all available lab results and radiology reports.    Assessment/Plan:   (1) 82 y.o. male with  diagnosis of CLL who has been referred by Dr Rony Victoria for continuation of care by medical hematology/oncology.   - BM biopsy  12/4/2015 with CLL  - diagnosis of SLL in 2004 and s/p FCR x6 in 2007  - s/p imbruvica in past (stopped in May  2018)  - latest wbc at 4.8; lymph 56%  - latest CT on 8/7/2019 showing increase in the LAD  - platelets are 111,000  - He was recently hospitalized at [facility] and seen by Dr Wheeler. Dr Wheeler has recommended Rituximab. He is starting tomorrow.   - discussed the rituximab regimen and the potential side-effect profile; he is getting chemotherapy school today with [facility]  - he saw Dr Don on Oct 21st 2019  - he has had 3 of the 4 weeks of rituximab so far; Dr Don recommends him to eventually get rituximab monthly x6 with daily oral Venetoclax for two years total.   - completed rituximab (4th) week of rituximab and  S/p 6 monthly rituximab with Venetoclax oral but is taking only 2 pills daily due to the counts  - he is now just on the venetclax  - he saw Dr Don again on Dec 23rd 2019 and sees him again in March 16th 2020  - latest PEt on 1/24/2020 looks really good    8/27/2020:  - he saw Dr Don last month at [facility]  - latest PEt from 6/24/2020 looks good  - he remains on just the oral venetoclax at 2 pills per day  - he sees Dr Don again in about 3 months (Oct 2020)    11/18/2020:  - he is doing well with the Venetoclax  - due for repeat PEt in Dec 2020  - he sees Dr Don again on Dec 23rd 2020    1/20/2021:  - he is doing well with the Venetoclax  - He saw Dr Don at [facility] in Dec 2020. He is now off rituximab monthly and remains only on the oral Venetoclax but at 2 pills daily . He sees Dr Don again in feb 2021  - Recent PEt on  1/18/2021 looks stable except for incidental aneurysm in aorta; so we are referring him to Dr Kapadia with vascualr    4/20/2021:  - he saw Dr Don in Feb 2021 and sees him again in Nov 2021  - repeat PEt in June/july 2021  - continued on Venetoclax and regular blood checks    7/20/2021:  - he continues on the Venetoclax  - mild leucopenia from the medication  - PEt on 7/19/2021 seems to be stable    9/21/2021:  - doing ok overall with some occasional HA's and general lack of energy  - he  sees Dr Don again on nov 1st and hopefully he can discontinue the venetoclax thereafter  - Head CT on 8/27/2021 was relatively negative    2/17/2022:  -  He last showed for oncology appointment in Sept 2021  - He had covid in Jan 2022 and he received the infusion but did not require hospitalization  - He saw Dr Erazo couple of weeks ago  - He is planning to have cervical spine surgery with Dr Mai in the near future.  - He sees Dr Don at [facility] this coming Monday. He is now off rituximab monthly and he is also now off the oral Venetoclax (expect he will be getting a repeat bone marrow biopsy)    3/31/2022:  - He had recent telemed visit Dr Don at [facility] and they were pleased with the latest bone marrow biopsy. He is now off rituximab monthly and he is also now off the oral Venetoclax      6/6/2022:  - He had surgery on his cervical spine with Dr Mai on April 8th 2022 and had postoperative problems related to the intubation and anesthesia and was in the hospital for about 3 weeks. He had aspiration pneumonia and bout of respiratory failure requiring [facility] vent support.  He has residual swallowing issues and has a peg tube. He was seen Dr Garcia with GI while in hospital. He was discharged on 4/18. He has had home health coming to house couple times of week. He reports that he is doing better    - he is due to see Dr Don again at [facility] in the near future and he has since been off all therapy      8/11/2022:  - He was recently hospitalized with fever and pneumonia. He is feeling better  - He is swallowing and eating better; peg tube since removed  - He previously had bout of oral mucosal ulcerations for which he had biopsies at [facility] which were negative for any malignancy. This has since resolved.  - He previously had telemed visit Dr Don at [facility] but does not know when he sees him again.. He had previous bone marrow biopsy with good report. He has been off rituximab and the oral Venetoclax since last Nov  2021.  - CT scans on 7/2/2022 with some progression of the LAD in his body   - will get PEt and aslk Dr Don to see him again    9/29/2022:  - He saw Dr Don again at [facility] and they are considering giving him a regimen of chemotherapy (some program or clinical trial0 over at [facility] but he reports that they have since decided against proceeding in that direction.  - he was supposed to see Dr Don on 9/12 but that appointment was cancelled per patient   - he had PET on 8/29/2022 which was stable    11/23/2022:  - He was recently hospitalized at [facility]; he is doing better and feeling better overall.  - He was supposed to see Dr Don again at [facility] after discharge but does not see him till Dec 2022  - CT on 11/8 with some increase in speel size and LAD  - will resume venetoclax in meantime and he needs to f/u with Dr Don for further directives  - get PEt    1/9/2023:  - He talked to Dr Don via telemed in Dec 2022 and was supposed to start venetoclax but has not done so as of yet. He reports that the specialty pharmacy had called him but he has not received the drug as of yet  - Dr Don was evaluating him for clinical trial but patient is not inclined to proceed in that direction  - Need Dr Don's last notes  - he had PEt on 12/2/2022 with some mild enlargement of the LN's  - he had bone marrow biopsy on 12/8/2022 with pathology report still unavailable but per review looks like he has about 14-17% involvement with NHL  - will reach out to Dr Don and the speciality pharmacy  - need the assistance of the patient navigator  - WBC has increased to 18.39    2/15/2023:  - He has since started Gazyva per Dr Don's direction; he is feeling better overall and the LAD is shrinking down in the neck  - hgb at 11.4 and plats 36,000    4/12/2023:  - continued on Gazyva  - sees Dr Don again within the next month  - wbc at 2.71, hgb 10.4 and plats 57,000    4/26/2023:   - hold tx this week   - zarxio x 3 and recheck labs Monday   -  talked to ANNELIESE will need to decrease Venetoclax when patient starts back   - Gazyva should be good to go next week   - continue with esophagram this Friday               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  - followed  By Dr Kee  - CEA 1.4 on 4/8/2021  - CT scans on 1/14/2019 stable  - PET done in jan 2021 on chart    3/31/2022:  - CEA at 0.8  - PET on 3/14/2022 negative for recurrence    9/29/2022:  - CEA 0.9  - PET on chart     11/23/2022:  - latest CEA at 1.2    2/15/2023:  - he had PET on 12/2/2022     (3) Iron deficiency anemia s/p IV iron couple weeks ago  - s/p periodic IV iron therapies         (4) HTN - on BP meds     (6) Chronic neck and back issues - followed by Dr Ryan Rivero     (7) DM - currently off all meds     (8) Hypercholesterolemia - on meds    (9)  - followed by Dr Whitney    (10) Chronic Leucopenia and thrombocytopenia - due to chemotherapy agents and lymphoma/leukemia          VISIT DIAGNOSES:      Encounter Diagnosis   Name Primary?    CLL (chronic lymphocytic leukemia) Yes             PLAN:  Move Gazyva to next week; check labs weekly; zarxio x 3 due to ANC  2.  F/u with Dr Don as directed  3.  F/u with PCP, Pulm, , etc  4.  Monitor labs as directed  5.   F/u with Dr Kapadia with vascular for the aortic aneurysm as directed          RTC in  4 weeks with Dr. Bai and 8 weeks with me             Discussion:       Total Time spent on patient:    I spent over 25 mins of time with the patient. Reviewed results of the recently ordered labs, tests, reports and studies; made directives with regards to the results. Over half of this time was spent couseling and coordinating care, making treatment and analytical decisions; ordering necessary labs, tests and studies; and discussing treatment options and setting up treatment plan(s) if indicated.        COVID-19 Discussion:    I had long discussion with patient and any applicable family about the COVID-19  coronavirus epidemic and the recommended precautions with regard to cancer and/or hematology patients. I have re-iterated the CDC recommendations for adequate hand washing, use of hand -like products, and coughing into elbow, etc. In addition, especially for our patients who are on chemotherapy and/or our otherwise immunocompromised patients, I have recommended avoidance of crowds, including movie theaters, restaurants, churches, etc. I have recommended avoidance of any sick or symptomatic family members and/or friends. I have also recommended avoidance of any raw and unwashed food products, and general avoidance of food items that have not been prepared by themselves. The patient has been asked to call us immediately with any symptom developments, issues, questions or other general concerns.          I have explained all of the above in detail and the patient understands all of the current recommendation(s). I have answered all of their questions to the best of my ability and to their complete satisfaction.   The patient is to continue with the current management plan.        Chemotherapy Discussion:      I discussed the available treatment option(s) in accordance with the latest/current national evidence-based guidelines (NCCN, UpToDate, NCI, ASCO, etc where applicable), their overall age/condition and their co-morbidities. I also went over the risks and benefits of the chemotherapy with regard to their particular cancer type, their cancer stage, their age/condition, and their co-morbidities. I provided literature on the chemotherapy regimen and discussed the chemotherapy side-effect profiles of the drug(s). I discussed the importance of compliance with obtaining and monitoring weekly lab work, and went over the potential hematopathology issues and risks with anemia, leucopenia and thrombocytopenia that can occur with chemotherapy. I discussed the potential risks of liver and kidney damage, which could be  permanent and could necessitate dialysis long-term if kidney failure developed. I discussed the emetic and/or diarrheal potential of the regimen and the potential need for use of antiemetic and anti-diarrheal medications. I discussed the risk for development of anaphylactic shock, bronchospasm, dysrhythmia, and respiratory/cardiovascular arrest and/or failure. I discussed the potential risks for development of alopecia, cold sensory issues, ringing in ears, vertigo, cataracts, glaucoma, and neuropathy, all of which could end up being chronic and life-long. Some chemotherpyI discussed the risks of hand-foot syndrome and rashes, and development of other autoimmune mediated processes such as pneumonitis, hepatitis, and colitis which could be life threatening. I discussed the risks of the potential development of a rare but fatal viral mediated disease known as PML (Progressive Multifocal Leukoencephalopathy), and risk of future development of leukemia and/or lymphoma from use of certain chemotherapy agents. I discussed the need for neutropenic precautions, basic hygiene/sanitation behaviors and dietary restrictions.    The patient's consent has been obtained to proceed with the chemotherapy.The patient will be referred to [facility] for training and education on chemotherapy, use of antiemetics and/or anti-diarrheals, use of NSAID's, potential chemotherapy side-effects, and any specific recommendations and precautions with the particular chemotherapy agents.       Immunologic Therapy Discussion:    I discussed the available treatment option(s) in accordance with the latest/current national evidence-based guidelines (NCCN, UpToDate, NCI, ASCO, etc where applicable), their overall age/condition and their co-morbidities. I went over the risks and benefits of the immunotherapy with regard to their particular cancer type, their cancer stage, their age/condition, and their co-morbidities. I  provided literature on the immunotherapy regimen and discussed the immunotherapy side-effect profiles of the drug(s). I discussed the importance of compliance with obtaining and monitoring weekly lab work, and went over the potential hematopathology issues and risks of hemato-pathologic issues with anemia, leucopenia and/or thrombocytopenia and effects on thyroid function that can occur with immunotherapy. The patient will most likely need to have there thyroid functions monitored by their PCP and may need to take thyroid medication while on the immunotherapy. I discussed the potential risks of liver and kidney damage, which could be permanent and could necessitate dialysis long-term if kidney failure developed. I discussed the emetic and/or diarrheal potential of the regimen and the potential need for use of antiemetic and anti-diarrheal medications. I discussed the risk for development of anaphylactic shock, bronchospasm, dysrhythmia, and respiratory/cardiovascular arrest and/or failure. I discussed the potential risks for development of alopecia, cold sensory issues, ringing in ears, vertigo and neuropathy, all of which could end up being chronic and life-long. I discussed the risks of hand-foot syndrome and rashes, and development of other autoimmune mediated processes such as pneumonitis, hepatitis and colitis which could potentially be life threatening. I discussed the risks of the potential development of a rare but fatal viral mediated disease known as PML (Progressive Multifocal Leukoencephalopathy), and risk of future development of leukemia and/or lymphoma from use of certain immunotherapy agents. I discussed the possibility that immunologic therapy cold worsen or promote progression of any underlying autoimmune diseases such as sarcoidosis, ulcerative colitis, Crohn's disease, psoriasis, rheumatoid disorders, scleroderma, autoimmune nephritis disorders, Hashimoto's thyroiditis, and Lupus among others. I  discussed the need for neutropenic precautions, basic hygiene/sanitation behaviors and dietary restrictions.    The patient's consent has been obtained to proceed with the immunotherapy.The patient will be referred to [facility] for training and education on immunotherapy, use of antiemetics and/or anti-diarrheals, use of NSAID's, potential immunotherapy side-effects, and any specific recommendations and precautions with the particular immunotherapy agents.      I answered all of the patient's (and family's, if applicable) questions to the best of my ability and to their complete satisfaction. The patient acknowledged full understanding of the risks, recommendations and plan(s).      Iron Infusion Therapy Discussion:     I provided literature/learning materials on the particular IV iron regimen and discussed the potential side-effect profiles of the drug(s). I discussed the importance of compliance with obtaining and monitoring requested lab work, and went over the potential risk for the development of anaphylactic shock, bronchospasm, dysrhythmia, liver and/or kidney damage, and respiratory/cardiovascular arrest and/or failure. I discussed the potential risks for development of alopecia, fevers, itching, chills and/or rigors, cold sensory issues, ringing in ears, vertigo and neuropathy, all of which are usually acute but sometimes could end up being chronic and life-long. I discussed the risks of hand-foot syndrome and rashes, and development of other autoimmune mediated processes such as pneumonitis and colitis which could be life threatening.     The patient's consent has been obtained to proceed with the IV iron therapy.The patient will be referred to [facility] for training and education on IV iron therapy, use of antiemetics and/or anti-diarrheals, use of NSAID's, potential IV iron therapy side-effects, and any specific recommendations and precautions  with the particular IV iron agents.      I answered all of the patient's (and family's, if applicable) questions to the best of my ability and to their complete satisfaction. The patient acknowledged full understanding of the risks, recommendations and plan(s).          I answered all of the patient's (and family's, if applicable) questions to the best of my ability and to their complete satisfaction. The patient acknowledged full understanding of the risks, recommendations and plan(s).         Electronically signed by Marion Rizo, MSN,APRN,AGNP-C                                   Clothing Patient

## 2023-04-27 NOTE — PLAN OF CARE
Problem: Fall Injury Risk  Goal: Absence of Fall and Fall-Related Injury  Outcome: Ongoing, Progressing  Intervention: Identify and Manage Contributors  Flowsheets (Taken 4/27/2023 1041)  Self-Care Promotion: independence encouraged  Medication Review/Management: medications reviewed  Intervention: Promote Injury-Free Environment  Flowsheets (Taken 4/27/2023 1041)  Safety Promotion/Fall Prevention: medications reviewed

## 2023-04-28 NOTE — PLAN OF CARE
Problem: Fall Injury Risk  Goal: Absence of Fall and Fall-Related Injury  Outcome: Ongoing, Progressing  Intervention: Identify and Manage Contributors  Flowsheets (Taken 4/28/2023 8345)  Self-Care Promotion: safe use of adaptive equipment encouraged  Intervention: Promote Injury-Free Environment  Flowsheets (Taken 4/28/2023 7618)  Safety Promotion/Fall Prevention: assistive device/personal item within reach

## 2023-05-01 PROBLEM — J69.0 ASPIRATION PNEUMONIA OF RIGHT LOWER LOBE: Status: RESOLVED | Noted: 2023-01-01 | Resolved: 2023-01-01

## 2023-05-01 NOTE — TELEPHONE ENCOUNTER
Per Florencia resume Gazyva with neulasta and restart venetoclax at 300mg daily. After all the Gazyva infusions can consider increasing to 400mg.     Informed patient wife of the new changes.

## 2023-05-03 NOTE — PLAN OF CARE
Problem: Fatigue  Goal: Improved Activity Tolerance  Outcome: Ongoing, Progressing  Intervention: Promote Improved Energy  Flowsheets (Taken 5/3/2023 0806)  Fatigue Management:   fatigue-related activity identified   paced activity encouraged   frequent rest breaks encouraged  Sleep/Rest Enhancement:   noise level reduced   relaxation techniques promoted   regular sleep/rest pattern promoted  Activity Management:   Up in chair - L3   Ambulated -L4      HTN (hypertension)    Hyperlipidemia    Mouth cancer

## 2023-05-10 NOTE — TELEPHONE ENCOUNTER
Patient platelets down again to 33, patient will redraw next week.     Edcuated him on bleeding precautions, no active bleeding at this time.

## 2023-05-30 NOTE — TELEPHONE ENCOUNTER
Per Marion, patient to have Gazyva infusion held one week. Informed patient's wife of change to treatment plan. Wife verbalized understanding. Scheduling notified of change. Reminded wife for patient to get labs rechecked on 6/1/23 per a previous phone message.

## 2023-05-30 NOTE — TELEPHONE ENCOUNTER
WBC 1.93, ANC 0.77 reported by Nila at  lab, nirmala made aware and per her verbal orders, I spoke to wife, instructed that Nirmala would like patient to hold venetaclax and recheck labs on Thursday so that she can see where his levels are at and if able to restart med. Verbalized understanding, lab order placed.

## 2023-05-31 NOTE — PROGRESS NOTES
Orders placed for Zarxio 480mg x 3 doses to start today per Marion's verbal orders. Esther made aware of need for auth, infusion and scheduling made aware of need to schedule. Patient made aware of need for treatment and that scheduling will call with appt via portal message.

## 2023-05-31 NOTE — PLAN OF CARE
Problem: Fatigue  Goal: Improved Activity Tolerance  Intervention: Promote Improved Energy  Flowsheets (Taken 5/31/2023 3979)  Fatigue Management: fatigue-related activity identified  Activity Management: Ambulated -L4

## 2023-06-01 NOTE — PLAN OF CARE
Problem: Fatigue  Goal: Improved Activity Tolerance  Outcome: Ongoing, Progressing  Intervention: Promote Improved Energy  Flowsheets (Taken 6/1/2023 4879)  Fatigue Management: fatigue-related activity identified  Sleep/Rest Enhancement: noise level reduced

## 2023-06-02 NOTE — PLAN OF CARE
Problem: Fatigue  Goal: Improved Activity Tolerance  Outcome: Ongoing, Progressing  Intervention: Promote Improved Energy  Flowsheets (Taken 6/2/2023 9248)  Fatigue Management:   fatigue-related activity identified   frequent rest breaks encouraged   paced activity encouraged  Sleep/Rest Enhancement:   regular sleep/rest pattern promoted   relaxation techniques promoted

## 2023-06-08 NOTE — PROGRESS NOTES
[facility] Hematology/Oncology  PROGRESS NOTE - Follow-up Visit      Subjective:       Patient ID:   NAME: Conrad Kuhn : 1940     82 y.o. male    Referring Doc: Julien  Other Physicians: Ced Erazo Joubert, Srinivas, Pinsky    Chief Complaint:  CLL f/u    History of Present Illness:     Patient is being seen today in person in clinic for a regular follow-up viasit. He is here by himself.  The patient is on today to go over the results of the recently ordered labs, tests and studies. He is here with his wife today.    He has since been on Gazyva per Dr Don's direction; he is feeling better overall and the LAD has resolved. He was still having issues swallowing and had an esophogram with ENT. It did show some narrowing, and he has not followed up with ENT.       He is breathing ok currently. He denies any CP, SOB, or N/V. BP has been good per patient    After the 4th cycle of Gazyva and drop to 300mg of venetoclax, patient still developed neutropenia. We had to push back the start of cycle 5 and give zarxio injections.    He is now dropped down to 200mg of venetoclax and restarting Gazyva cycle 5 today.     Discussed Covid19 precautions; he had his vaccinations        ROS:   GEN: normal without any fever, night sweats or weight loss  HEENT: normal with no HA's, sore throat, stiff neck, changes in vision; LAD much better; swallowing  still mildly impaired  CV: normal with no CP, SOB, PND, MAYER or orthopnea  PULM: normal with no SOB, cough, hemoptysis, sputum or pleuritic pain  GI: normal with no abdominal pain, nausea, vomiting, constipation, diarrhea, melanotic stools, BRBPR, or hematemesis; mild dysphagia  BREAST: normal with no mass, discharge, pain  SKIN: normal with no rash, erythema, bruising, or swelling    Allergies:  Review of patient's allergies indicates:  No Known Allergies    Medications:    Current Outpatient Medications:     allopurinoL (ZYLOPRIM) 300 MG tablet, Take 1 tablet (300 mg  total) by mouth once daily., Disp: 30 tablet, Rfl: 6    atenoloL (TENORMIN) 25 MG tablet, Take 25 mg by mouth once daily., Disp: , Rfl:     atorvastatin (LIPITOR) 20 MG tablet, Take 20 mg by mouth once daily., Disp: , Rfl:     azelastine (ASTELIN) 137 mcg (0.1 %) nasal spray, 1 spray by Nasal route 2 (two) times daily. , Disp: , Rfl:     benzonatate (TESSALON PERLES) 100 MG capsule, Take 1 capsule (100 mg total) by mouth every 6 (six) hours as needed for Cough., Disp: 30 capsule, Rfl: 1    blood sugar diagnostic Strp, 1 strip by Misc.(Non-Drug; Combo Route) route 2 (two) times a day., Disp: , Rfl:     blood-glucose meter kit, Use as instructed, Disp: , Rfl:     budesonide-formoterol 160-4.5 mcg (SYMBICORT) 160-4.5 mcg/actuation HFAA, Inhale 2 puffs into the lungs in the morning and 2 puffs before bedtime., Disp: , Rfl:     dexAMETHasone (DECADRON) 4 MG Tab, Take 20mg the Sunday, Monday and Tuesday (Patient taking differently: Take 20 mg by mouth once daily. Take 20mg the Sunday, Monday and Tuesday), Disp: 60 tablet, Rfl: 0    duke's soln (benadryl 30 mL, mylanta 30 mL, LIDOcaine 30 mL, nystatin 30 mL) 120mL, Take 10 mLs by mouth 4 (four) times daily., Disp: 240 mL, Rfl: 0    ferrous sulfate (FEOSOL) 325 mg (65 mg iron) Tab tablet, Take 325 mg by mouth daily with breakfast., Disp: , Rfl:     fluticasone propionate (FLONASE) 50 mcg/actuation nasal spray, 1 spray by Each Nostril route once daily., Disp: , Rfl:     gabapentin (NEURONTIN) 600 MG tablet, Take 600 mg by mouth 2 (two) times daily., Disp: , Rfl:     glimepiride (AMARYL) 2 MG tablet, Take 2 mg by mouth once daily at 6am., Disp: , Rfl:     HYDROcodone-acetaminophen (NORCO)  mg per tablet, Take 1 tablet by mouth every 8 (eight) hours as needed., Disp: , Rfl:     levalbuterol (XOPENEX) 1.25 mg/3 mL nebulizer solution, Take 3 mLs by nebulization every 4 to 6 hours as needed. , Disp: , Rfl:     LIDOCAINE VISCOUS 2 % solution, Take 15 mLs by mouth every 4  (four) hours as needed., Disp: , Rfl:     losartan (COZAAR) 50 MG tablet, Take 1 tablet (50 mg total) by mouth once daily., Disp: 90 tablet, Rfl: 0    metFORMIN (GLUCOPHAGE) 500 MG tablet, Take 500 mg by mouth 2 (two) times daily with meals., Disp: , Rfl:     ondansetron (ZOFRAN) 8 MG tablet, Take 1 tablet (8 mg total) by mouth every 8 (eight) hours as needed for Nausea., Disp: 30 tablet, Rfl: 2    pantoprazole (PROTONIX) 40 MG tablet, 40 mg., Disp: , Rfl:     promethazine (PHENERGAN) 12.5 MG Tab, Take 1 tablet (12.5 mg total) by mouth every 6 (six) hours as needed., Disp: 30 tablet, Rfl: 3    tamsulosin (FLOMAX) 0.4 mg Cap, Take 1 capsule (0.4 mg total) by mouth once daily., Disp: 90 capsule, Rfl: 0    traZODone (DESYREL) 300 MG tablet, Take 300 mg by mouth every evening., Disp: , Rfl:     valACYclovir (VALTREX) 1000 MG tablet, Take 1,000 mg by mouth once daily., Disp: , Rfl:     venetoclax (VENCLEXTA) 100 mg Tab, Take 4 tablets (400 mg) by mouth once daily., Disp: 120 tablet, Rfl: 11    venetoclax 10 mg-50 mg- 100 mg DsPk, Take 20mg by mouth daily on days 22-28 of cycle 1. Take 50 mg by mouth daily on days 1-7 of cycle 2 Take 100 mg daily on days 8-14 of cycle 2 Take 200 mg daily on days 15-21 of cycle 2.  Take with food. (Patient taking differently: Take by mouth Take 20mg by mouth daily on days 22-28 of cycle 1. Take 50 mg by mouth daily on days 1-7 of cycle 2 Take 100 mg daily on days 8-14 of cycle 2 Take 200 mg daily on days 15-21 of cycle 2.  Take with food.), Disp: 42 tablet, Rfl: 0  No current facility-administered medications for this visit.    Facility-Administered Medications Ordered in Other Visits:     diphenhydrAMINE injection 50 mg, 50 mg, Intravenous, Once PRN, Drew Bai MD    EPINEPHrine (EPIPEN) 0.3 mg/0.3 mL pen injection 0.3 mg, 0.3 mg, Intramuscular, Once PRN, Drew Bai MD    hydrocortisone sodium succinate injection 100 mg, 100 mg, Intravenous, Once PRN, Drew Bai,  "MD    sodium chloride 0.9% flush 10 mL, 10 mL, Intravenous, PRN, Drew Bai MD    PMHx/PSHx Updates:  See patient's last visit with Dr. Bai on 4/12/2023  See H&P on 1/3/2019        Pathology:  Cancer Staging  No matching staging information was found for the patient.          Objective:     Vitals:  Blood pressure (!) 120/58, pulse 72, temperature 97.8 °F (36.6 °C), resp. rate 16, height 6' 2" (1.88 m), weight 83.9 kg (185 lb), SpO2 96 %.        Physical Examination:   GEN: no apparent distress, comfortable; AAOx3  HEAD: atraumatic and normocephalic  EYES: no conjunctival pallor or muddiness, no icterus; normal pupil reaction to ambient light  ENT: OMM, no pharyngeal erythema, external bilateral ears WNL; no visible thrush or ulcers  NECK: no masses or swelling, trachea midline, no visible LAD/LN's ;   CV: no palpitations; no pedal edema; no noticeable JVD or neck vein distension  CHEST: Normal respiratory effort; chest wall breath movements symmetrical; no audible wheezing  ABDOM: non-distended; no bloating;    MUSC/Skeletal: ROM normal; joints visibly normal; no deformities or arthropathy  EXTREM: no clubbing, cyanosis, inflammation or swelling; right arm with fair ROM  SKIN: no rashes, lesions, ulcers, petechiae or subcutaneous nodules  : no keith  NEURO: moving all 4 extremities; AAOx3; no tremors  PSYCH: normal mood, affect and behavior  LYMPH: no visible LN's or LAD; LAD and LN's on right neck reduced in size  Labs:   Lab Results   Component Value Date    WBC 4.31 06/06/2023    HGB 11.1 (L) 06/06/2023    HCT 32.2 (L) 06/06/2023    MCV 86 06/06/2023    PLT 98 (L) 06/06/2023     CMP  Sodium   Date Value Ref Range Status   06/06/2023 138 136 - 145 mmol/L Final   06/12/2019 138 134 - 144 mmol/L      Potassium   Date Value Ref Range Status   06/06/2023 3.9 3.5 - 5.1 mmol/L Final     Chloride   Date Value Ref Range Status   06/06/2023 103 95 - 110 mmol/L Final   06/12/2019 99 98 - 110 mmol/L  "     CO2   Date Value Ref Range Status   06/06/2023 28 23 - 29 mmol/L Final     Glucose   Date Value Ref Range Status   06/06/2023 137 (H) 70 - 110 mg/dL Final   06/12/2019 179 (H) 70 - 99 mg/dL      BUN   Date Value Ref Range Status   06/06/2023 16 8 - 23 mg/dL Final     Creatinine   Date Value Ref Range Status   06/06/2023 0.8 0.5 - 1.4 mg/dL Final   06/12/2019 0.95 0.60 - 1.40 mg/dL      Calcium   Date Value Ref Range Status   06/06/2023 8.9 8.7 - 10.5 mg/dL Final     Total Protein   Date Value Ref Range Status   06/06/2023 6.3 6.0 - 8.4 g/dL Final     Albumin   Date Value Ref Range Status   06/06/2023 4.2 3.5 - 5.2 g/dL Final   06/12/2019 3.9 3.1 - 4.7 g/dL      Total Bilirubin   Date Value Ref Range Status   06/06/2023 0.8 0.1 - 1.0 mg/dL Final     Comment:     For infants and newborns, interpretation of results should be based  on gestational age, weight and in agreement with clinical  observations.    Premature Infant recommended reference ranges:  Up to 24 hours.............<8.0 mg/dL  Up to 48 hours............<12.0 mg/dL  3-5 days..................<15.0 mg/dL  6-29 days.................<15.0 mg/dL       Alkaline Phosphatase   Date Value Ref Range Status   06/06/2023 69 55 - 135 U/L Final     AST   Date Value Ref Range Status   06/06/2023 15 10 - 40 U/L Final     ALT   Date Value Ref Range Status   06/06/2023 11 10 - 44 U/L Final     Anion Gap   Date Value Ref Range Status   06/06/2023 7 (L) 8 - 16 mmol/L Final     eGFR if    Date Value Ref Range Status   07/02/2022 >60.0 >60 mL/min/1.73 m^2 Final     eGFR if non    Date Value Ref Range Status   07/02/2022 >60.0 >60 mL/min/1.73 m^2 Final     Comment:     Calculation used to obtain the estimated glomerular filtration  rate (eGFR) is the CKD-EPI equation.              Lab Results   Component Value Date    IRON 59 02/02/2023    TIBC 342 02/02/2023    FERRITIN 67 02/02/2023         Radiology/Diagnostic Studies:      Esophagram  4/27/2023:   Impression:     1. Narrowing of the pharyngo esophageal junction secondary to prominent cricopharyngeus muscle.  Estimate 50% AP narrowing of the lumen, without narrowing of the transverse diameter.  2. Laryngeal penetration noted during the swallowing phase.        Electronically signed by: Norman Bledsoe  Date:                                            04/28/2023  Time:                                           14:01    PET  12/2/2022:    IMPRESSION: Mild enlargement of the extensive adenopathy within the neck, chest, abdomen and pelvis with faint increased FDG activity     Stable splenomegaly         CT 11/8/2022:  IMPRESSION:     Worsening splenomegaly with diffuse increase in size and number of essentially all of the main lymph node chains throughout the chest, abdomen and pelvis.           PET 8/29/2022:  IMPRESSION:  1. Numerous enlarged lymph nodes throughout the neck, chest, abdomen and pelvis are non hypermetabolic, and stable compared to CT of 07/02/2022.  2. Stable splenomegaly, with no abnormal focal splenic FDG hypermetabolism.  3. Additional observations as described.         CT 7/2/2022:    IMPRESSION:  1. Interval worsening in numerous enlarged lymph nodes in the chest, abdomen and pelvis, as well as development of splenomegaly, compatible with lymphoma and or worsening CLL, given patient's history  2. Mild bilateral hydroureteronephrosis, with markedly enlarged prostate gland and appearance of the urinary bladder suggesting chronic bladder outlet obstruction. Consider correlation with urinalysis.  3. Infrarenal abdominal aortic aneurysm measuring 33 mm in diameter. Follow-up imaging in 3 years is recommended per best practice guidelines.  4. Additional observations as described.           PET  3/14/2022:    IMPRESSION:  Left upper lobectomy without evidence of recurrent or metastatic disease      MRI cervical spine  10/1/2021:    IMPRESSION:     Loss of normal cervical lordosis with  mild kyphotic curvature.     Advanced multilevel cervical spondylosis, as outlined above. Spinal canal narrowing and foraminal narrowing is most severe at C5-6. 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     Congenital narrowing of cervical spinal canal, which exacerbates cervical spondylosis        CT head  8/27/2021:  IMPRESSION:     No CT evidence of acute intracranial pathology.     Stable senescent changes as above.         PET  7/19/2021:    IMPRESSION:  Prior left upper lobectomy without evidence of recurrent or metastatic disease  - 3 cm infrarenal abdominal aortic aneurysm      CT head 6/29/2021:  IMPRESSION:     No CT evidence of acute intracranial pathology      PET  1/18/2021:    Impression:     No evidence of FDG avid lymphoproliferative disease.     Aneurysmal dilation of infrarenal abdominal aorta (3.4 cm) as well as aneurysmal dilation of right common iliac artery.     Additional incidental findings as above        PET  6/24/2020:    Impression:     1. No findings of active lymphoproliferative disease.  2. Additional observations as above.      PET  1/24/2020:    Impression       Moderate decrease in size of the adenopathy within the neck, chest, abdomen and pelvis.  The spleen is smaller in size.  There is no significant FDG activity.    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 Multifocal lymphadenopathy in the chest, abdomen, and pelvis compatible with provided clinical history of lymphocytic leukemia.  There is mixed interval change when compared to prior studies.  Pathologic lymphadenopathy in the chest has increased significantly when compared to a CTA of the chest from May 2018.  Pathologic retroperitoneal lymphadenopathy has improved slightly when compared to a previous abdominal CT from February 2017.  Please see above details.  2. Mild aneurysmal  dilation of the infrarenal abdominal aorta, with maximal transverse diameter of 3.7 cm.  Maximal transverse diameter on a prior study from 2017 was 2.5 cm.  3. Additional findings as above           [facility] Unknown Rad Eap    Result Date: 1/14/2019  CMS MANDATED QUALITY DATA - CT RADIATION - 436 All CT scans at this facility utilize dose modulation, iterative reconstruction, and/or weight based dosing when appropriate to reduce radiation dose to as low as reasonably achievable. Reason:Lymphoid leukemia TECHNIQUE: CT thorax with, CT abdomen  with, and CT pelvis with 100 mL Omnipaque 350. COMPARISON: CT chest dated 05/19/2018 and CT abdomen and pelvis dated 02/08/2017 CT THORAX: There is stable mediastinal, hilar and axillary adenopathy. There is coronary artery calcification. There is resolution of the groundglass opacities throughout the lungs. There are no confluent infiltrates, pulmonary nodules or pleural effusions. There are stable subcentimeter nodules in the left lobe of the thyroid gland. There are degenerative changes of the spine. CT ABDOMEN: The liver, pancreas, adrenal glands and gallbladder are normal. The spleen is enlarged. There is mesenteric and retroperitoneal adenopathy which is slightly smaller in size. -There is a portacaval node measuring 37 x 21 mm and previously measured 38 x 27 mm. -Periportal lymph node measuring 41 x 18 mm, previously measured 48 x 29 mm. -Left periaortic adenopathy measuring 34 x 23 mm and previously measured 40 x 42 mm- The kidneys enhance symmetrically without hydronephrosis or calculi. There are no thick-walled or dilated bowel loops. There is vascular calcification of aorta. There is a 3.0 x 3.0 cm infrarenal abdominal aortic aneurysm. CT PELVIS: There is adenopathy along the pelvic sidewalls bilaterally which has decreased in size. -The left common iliac adenopathy measuring 36 x 11 mm, previously measured 46 x 14 mm -the right common iliac adenopathy measuring 46 x 10  mm. Previously measured 53 x 15 mm. The bladder is normal. The prostate gland is enlarged. There are degenerative changes of the spine. There is grade 1 anterolisthesis of L5 on S1 with bilateral pars defects.     IMPRESSION: Stable mediastinal, hilar and axillary adenopathy with resolution of the airspace disease within the lungs Decrease in size of the mesenteric, retroperitoneal and pelvic adenopathy. Stable infrarenal abdominal aortic aneurysm Splenomegaly     Read and electronically signed by: Jeniffer Arredondo MD on 1/14/2019 9:14 AM CST JENIFFER ARREDONDO MD      I have reviewed all available lab results and radiology reports.    Assessment/Plan:   (1) 82 y.o. male with  diagnosis of CLL who has been referred by Dr Rony Victoria for continuation of care by medical hematology/oncology.   - BM biopsy  12/4/2015 with CLL  - diagnosis of SLL in 2004 and s/p FCR x6 in 2007  - s/p imbruvica in past (stopped in May 2018)  - latest wbc at 4.8; lymph 56%  - latest CT on 8/7/2019 showing increase in the LAD  - platelets are 111,000  - He was recently hospitalized at [facility] and seen by Dr Wheeler. Dr Wheeler has recommended Rituximab. He is starting tomorrow.   - discussed the rituximab regimen and the potential side-effect profile; he is getting chemotherapy school today with Rosemary [facility]  - he saw Dr Don on Oct 21st 2019  - he has had 3 of the 4 weeks of rituximab so far; Dr Don recommends him to eventually get rituximab monthly x6 with daily oral Venetoclax for two years total.   - completed rituximab (4th) week of rituximab and  S/p 6 monthly rituximab with Venetoclax oral but is taking only 2 pills daily due to the counts  - he is now just on the venetclax  - he saw Dr Don again on Dec 23rd 2019 and sees him again in March 16th 2020  - latest PEt on 1/24/2020 looks really good    8/27/2020:  - he saw Dr Don last month at [facility]  - latest PEt from 6/24/2020 looks good  - he remains on just the oral venetoclax at 2 pills  per day  - he sees Dr Don again in about 3 months (Oct 2020)    11/18/2020:  - he is doing well with the Venetoclax  - due for repeat PEt in Dec 2020  - he sees Dr Don again on Dec 23rd 2020    1/20/2021:  - he is doing well with the Venetoclax  - He saw Dr Don at [facility] in Dec 2020. He is now off rituximab monthly and remains only on the oral Venetoclax but at 2 pills daily . He sees Dr Don again in feb 2021  - Recent PEt on  1/18/2021 looks stable except for incidental aneurysm in aorta; so we are referring him to Dr Kapadia with vascualr    4/20/2021:  - he saw Dr Don in Feb 2021 and sees him again in Nov 2021  - repeat PEt in June/july 2021  - continued on Venetoclax and regular blood checks    7/20/2021:  - he continues on the Venetoclax  - mild leucopenia from the medication  - PEt on 7/19/2021 seems to be stable    9/21/2021:  - doing ok overall with some occasional HA's and general lack of energy  - he sees Dr Don again on nov 1st and hopefully he can discontinue the venetoclax thereafter  - Head CT on 8/27/2021 was relatively negative    2/17/2022:  -  He last showed for oncology appointment in Sept 2021  - He had covid in Jan 2022 and he received the infusion but did not require hospitalization  - He saw Dr Erazo couple of weeks ago  - He is planning to have cervical spine surgery with Dr Mai in the near future.  - He sees Dr Don at [facility] this coming Monday. He is now off rituximab monthly and he is also now off the oral Venetoclax (expect he will be getting a repeat bone marrow biopsy)    3/31/2022:  - He had recent telemed visit Dr Don at [facility] and they were pleased with the latest bone marrow biopsy. He is now off rituximab monthly and he is also now off the oral Venetoclax      6/6/2022:  - He had surgery on his cervical spine with Dr Mai on April 8th 2022 and had postoperative problems related to the intubation and anesthesia and was in the hospital for about 3 weeks. He had aspiration  pneumonia and bout of respiratory failure [facility].  He has residual swallowing issues and has a peg tube. He was seen Dr Garcia with GI while in hospital. He was discharged on 4/18. He has had home health coming to house couple times of week. He reports that he is doing better    - he is due to see Dr Don again at [facility] in the near future and he has since been off all therapy      8/11/2022:  - He was recently hospitalized with fever and pneumonia. He is feeling better  - He is swallowing and eating better; peg tube since removed  - He previously had bout of oral mucosal ulcerations for which he had biopsies at [facility] which were negative for any malignancy. This has since resolved.  - He previously had telemed visit Dr Don at [facility] but does not know when he sees him again.. He had previous bone marrow biopsy with good report. He has been off rituximab and the oral Venetoclax since last Nov 2021.  - CT scans on 7/2/2022 with some progression of the LAD in his body   - will get PEt and aslk Dr Don to see him again    9/29/2022:  - He saw Dr Don again at [facility] and they are considering giving him a regimen of chemotherapy (some program or clinical trial0 over at [facility] but he reports that they have since decided against proceeding in that direction.  - he was supposed to see Dr Don on 9/12 but that appointment was cancelled per patient   - he had PET on 8/29/2022 which was stable    11/23/2022:  - He was recently hospitalized at [facility]; he is doing better and feeling better overall.  - He was supposed to see Dr Don again at [facility] after discharge but does not see him till Dec 2022  - CT on 11/8 with some increase in speel size and LAD  - will resume venetoclax in meantime and he needs to f/u with Dr Don for further directives  - get PEt    1/9/2023:  - He talked to Dr Don via telemed in Dec 2022 and was supposed to start venetoclax but has not done so as of yet. He reports that the specialty  pharmacy had called him but he has not received the drug as of yet  - Dr Don was evaluating him for clinical trial but patient is not inclined to proceed in that direction  - Need Dr Don's last notes  - he had PEt on 12/2/2022 with some mild enlargement of the LN's  - he had bone marrow biopsy on 12/8/2022 with pathology report still unavailable but per review looks like he has about 14-17% involvement with NHL  - will reach out to Dr Don and the speciality pharmacy  - need the assistance of the patient navigator  - WBC has increased to 18.39    2/15/2023:  - He has since started Gazyva per Dr Don's direction; he is feeling better overall and the LAD is shrinking down in the neck  - hgb at 11.4 and plats 36,000    4/12/2023:  - continued on Gazyva  - sees Dr Don again within the next month  - wbc at 2.71, hgb 10.4 and plats 57,000    4/26/2023:   - hold tx this week   - zarxio x 3 and recheck labs Monday   - talked to ANNELIESE will need to decrease Venetoclax when patient starts back   - Gazyva should be good to go next week   - continue with esophagram this Friday 6/8/2023:   - resuming Gazyva cycle 5 today and venetoclax at 200mg daily due to drop in WBC and need for GCSF last week   - needs follow up with Dr. Don   - labs weekly   - reviewed esophogram and needs follow up with ENT     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  - followed  By Dr Kee  - CEA 1.4 on 4/8/2021  - CT scans on 1/14/2019 stable  - PET done in jan 2021 on chart    3/31/2022:  - CEA at 0.8  - PET on 3/14/2022 negative for recurrence    9/29/2022:  - CEA 0.9  - PET on chart     11/23/2022:  - latest CEA at 1.2    2/15/2023:  - he had PET on 12/2/2022     (3) Iron deficiency anemia s/p IV iron couple weeks ago  - s/p periodic IV iron therapies      (4) HTN - on BP meds     (6) Chronic neck and back issues - followed by Dr Ryan Rivero     (7) DM - currently off all meds     (8) Hypercholesterolemia - on  meds    (9)  - followed by Dr Whitney    (10) Chronic Leucopenia and thrombocytopenia - due to chemotherapy agents and lymphoma/leukemia      VISIT DIAGNOSES:      Encounter Diagnoses   Name Primary?    CLL (chronic lymphocytic leukemia) Yes    Tracheal stenosis          PLAN:  Restart Gazyva today cycle 5, after GCSF last week, resume venetoclax at 200mg dosing   2.  F/u with Dr Don as directed  3.  F/u with PCP, Pulm, , etc  4.  Monitor labs as directed  5.   F/u with ENT about latest esophagram          RTC in  4 weeks with Dr. Bai and 8 weeks with me             Discussion:       Total Time spent on patient:    I spent over 25 mins of time with the patient. Reviewed results of the recently ordered labs, tests, reports and studies; made directives with regards to the results. Over half of this time was spent couseling and coordinating care, making treatment and analytical decisions; ordering necessary labs, tests and studies; and discussing treatment options and setting up treatment plan(s) if indicated.        COVID-19 Discussion:    I had long discussion with patient and any applicable family about the COVID-19 coronavirus epidemic and the recommended precautions with regard to cancer and/or hematology patients. I have re-iterated the CDC recommendations for adequate hand washing, use of hand -like products, and coughing into elbow, etc. In addition, especially for our patients who are on chemotherapy and/or our otherwise immunocompromised patients, I have recommended avoidance of crowds, including movie theaters, restaurants, churches, etc. I have recommended avoidance of any sick or symptomatic family members and/or friends. I have also recommended avoidance of any raw and unwashed food products, and general avoidance of food items that have not been prepared by themselves. The patient has been asked to call us immediately with any symptom developments, issues, questions or other general  concerns.          I have explained all of the above in detail and the patient understands all of the current recommendation(s). I have answered all of their questions to the best of my ability and to their complete satisfaction.   The patient is to continue with the current management plan.        Chemotherapy Discussion:      I discussed the available treatment option(s) in accordance with the latest/current national evidence-based guidelines (NCCN, UpToDate, NCI, ASCO, etc where applicable), their overall age/condition and their co-morbidities. I also went over the risks and benefits of the chemotherapy with regard to their particular cancer type, their cancer stage, their age/condition, and their co-morbidities. I provided literature on the chemotherapy regimen and discussed the chemotherapy side-effect profiles of the drug(s). I discussed the importance of compliance with obtaining and monitoring weekly lab work, and went over the potential hematopathology issues and risks with anemia, leucopenia and thrombocytopenia that can occur with chemotherapy. I discussed the potential risks of liver and kidney damage, which could be permanent and could necessitate dialysis long-term if kidney failure developed. I discussed the emetic and/or diarrheal potential of the regimen and the potential need for use of antiemetic and anti-diarrheal medications. I discussed the risk for development of anaphylactic shock, bronchospasm, dysrhythmia, and respiratory/cardiovascular arrest and/or failure. I discussed the potential risks for development of alopecia, cold sensory issues, ringing in ears, vertigo, cataracts, glaucoma, and neuropathy, all of which could end up being chronic and life-long. Some chemotherpyI discussed the risks of hand-foot syndrome and rashes, and development of other autoimmune mediated processes such as pneumonitis, hepatitis, and colitis which could be life threatening. I discussed the risks of the  potential development of a rare but fatal viral mediated disease known as PML (Progressive Multifocal Leukoencephalopathy), and risk of future development of leukemia and/or lymphoma from use of certain chemotherapy agents. I discussed the need for neutropenic precautions, basic hygiene/sanitation behaviors and dietary restrictions.    The patient's consent has been obtained to proceed with the chemotherapy.The patient will be referred to [facility] for training and education on chemotherapy, use of antiemetics and/or anti-diarrheals, use of NSAID's, potential chemotherapy side-effects, and any specific recommendations and precautions with the particular chemotherapy agents.       Immunologic Therapy Discussion:    I discussed the available treatment option(s) in accordance with the latest/current national evidence-based guidelines (NCCN, UpToDate, NCI, ASCO, etc where applicable), their overall age/condition and their co-morbidities. I went over the risks and benefits of the immunotherapy with regard to their particular cancer type, their cancer stage, their age/condition, and their co-morbidities. I provided literature on the immunotherapy regimen and discussed the immunotherapy side-effect profiles of the drug(s). I discussed the importance of compliance with obtaining and monitoring weekly lab work, and went over the potential hematopathology issues and risks of hemato-pathologic issues with anemia, leucopenia and/or thrombocytopenia and effects on thyroid function that can occur with immunotherapy. The patient will most likely need to have there thyroid functions monitored by their PCP and may need to take thyroid medication while on the immunotherapy. I discussed the potential risks of liver and kidney damage, which could be permanent and could necessitate dialysis long-term if kidney failure developed. I discussed the emetic and/or diarrheal potential of the regimen and the  potential need for use of antiemetic and anti-diarrheal medications. I discussed the risk for development of anaphylactic shock, bronchospasm, dysrhythmia, and respiratory/cardiovascular arrest and/or failure. I discussed the potential risks for development of alopecia, cold sensory issues, ringing in ears, vertigo and neuropathy, all of which could end up being chronic and life-long. I discussed the risks of hand-foot syndrome and rashes, and development of other autoimmune mediated processes such as pneumonitis, hepatitis and colitis which could potentially be life threatening. I discussed the risks of the potential development of a rare but fatal viral mediated disease known as PML (Progressive Multifocal Leukoencephalopathy), and risk of future development of leukemia and/or lymphoma from use of certain immunotherapy agents. I discussed the possibility that immunologic therapy cold worsen or promote progression of any underlying autoimmune diseases such as sarcoidosis, ulcerative colitis, Crohn's disease, psoriasis, rheumatoid disorders, scleroderma, autoimmune nephritis disorders, Hashimoto's thyroiditis, and Lupus among others. I discussed the need for neutropenic precautions, basic hygiene/sanitation behaviors and dietary restrictions.    The patient's consent has been obtained to proceed with the immunotherapy.The patient will be referred to [facility] for training and education on immunotherapy, use of antiemetics and/or anti-diarrheals, use of NSAID's, potential immunotherapy side-effects, and any specific recommendations and precautions with the particular immunotherapy agents.      I answered all of the patient's (and family's, if applicable) questions to the best of my ability and to their complete satisfaction. The patient acknowledged full understanding of the risks, recommendations and plan(s).      Iron Infusion Therapy Discussion:     I provided literature/learning  materials on the particular IV iron regimen and discussed the potential side-effect profiles of the drug(s). I discussed the importance of compliance with obtaining and monitoring requested lab work, and went over the potential risk for the development of anaphylactic shock, bronchospasm, dysrhythmia, liver and/or kidney damage, and respiratory/cardiovascular arrest and/or failure. I discussed the potential risks for development of alopecia, fevers, itching, chills and/or rigors, cold sensory issues, ringing in ears, vertigo and neuropathy, all of which are usually acute but sometimes could end up being chronic and life-long. I discussed the risks of hand-foot syndrome and rashes, and development of other autoimmune mediated processes such as pneumonitis and colitis which could be life threatening.     The patient's consent has been obtained to proceed with the IV iron therapy.The patient will be referred to [facility] /[facility] for training and education on IV iron therapy, use of antiemetics and/or anti-diarrheals, use of NSAID's, potential IV iron therapy side-effects, and any specific recommendations and precautions with the particular IV iron agents.      I answered all of the patient's (and family's, if applicable) questions to the best of my ability and to their complete satisfaction. The patient acknowledged full understanding of the risks, recommendations and plan(s).          I answered all of the patient's (and family's, if applicable) questions to the best of my ability and to their complete satisfaction. The patient acknowledged full understanding of the risks, recommendations and plan(s).         Electronically signed by Marion Rizo, MSN,APRN,AGNP-C

## 2023-06-08 NOTE — TELEPHONE ENCOUNTER
LVM with pt to obtain on hand. It looks like he was seen today and instructed to continue at lower dose, venclexta at 200 mg daily. If patient is out then we will need a new script reflective of dose reduction

## 2023-06-08 NOTE — PLAN OF CARE
Problem: Fatigue  Goal: Improved Activity Tolerance  Outcome: Ongoing, Progressing  Intervention: Promote Improved Energy  Flowsheets (Taken 6/8/2023 1210)  Fatigue Management:   activity assistance provided   paced activity encouraged   fatigue-related activity identified   frequent rest breaks encouraged   activity schedule adjusted  Activity Management: Ambulated -L4

## 2023-06-09 NOTE — PLAN OF CARE
Problem: Fall Injury Risk  Goal: Absence of Fall and Fall-Related Injury  Outcome: Ongoing, Progressing  Intervention: Identify and Manage Contributors  Flowsheets (Taken 6/9/2023 1000)  Self-Care Promotion: safe use of adaptive equipment encouraged  Medication Review/Management: medications reviewed  Intervention: Promote Injury-Free Environment  Flowsheets (Taken 6/9/2023 1000)  Safety Promotion/Fall Prevention: assistive device/personal item within reach

## 2023-06-13 NOTE — TELEPHONE ENCOUNTER
Per Marion Ahmadi, Pt is currently holding Venclexta and [facility] will send a new script once he restart.. I will follow up after his 6/20 lab appt.

## 2023-06-16 NOTE — TELEPHONE ENCOUNTER
Marion Ahmadi, ENOC-C, reviewed patient's labs and per her verbal order, patient to restart his venetoclaw at 200mg daily. Reviewed with wife, Peri, who verbalized understanding of above.

## 2023-06-21 NOTE — TELEPHONE ENCOUNTER
Messaged [facility] to request a new script reflective of dose decrease to Venclexta 200 mg QD. Patient's wife states he has restarted and has 25 tablets on hand ( ~12.5 days)

## 2023-06-26 NOTE — TELEPHONE ENCOUNTER
Specialty Pharmacy - Refill Coordination    Wife is aware patient's dose is venclexta 200 mg qd    Specialty Medication Orders Linked to Encounter      Flowsheet Row Most Recent Value   Medication #1 venetoclax (VENCLEXTA) 100 mg Tab (Order#[id number], Rx#[id number])            Refill Questions - Documented Responses      Flowsheet Row Most Recent Value   Patient Availability and HIPAA Verification    Does patient want to proceed with activity? Yes   HIPAA/medical authority confirmed? Yes   Relationship to patient of person spoken to? Spouse/Significant Other   Refill Screening Questions    Changes to allergies? No   Changes to medications? No   New conditions since last clinic visit? No   Unplanned office visit, urgent care, ED, or hospital admission in the last 4 weeks? No   How does patient/caregiver feel medication is working? Good   Financial problems or insurance changes? No   How many doses of your specialty medications were missed in the last 4 weeks? 0  [Pt held venclexta and restarted on 6/19 at 200 mg QD.]   Would patient like to speak to a pharmacist? No   When does the patient need to receive the medication? 07/03/23   Refill Delivery Questions    How will the patient receive the medication? [facility]   When does the patient need to receive the medication? 07/03/23   Shipping Address Home   Address in [facility] confirmed and updated if neccessary? Yes   Expected Copay ($) 65   Is the patient able to afford the medication copay? Yes   Payment Method CC on file   Days supply of Refill 28   Supplies needed? No supplies needed   Refill activity completed? Yes   Refill activity plan Refill scheduled   Shipment/Pickup Date: 06/29/23            Current Outpatient Medications   Medication Sig    allopurinoL (ZYLOPRIM) 300 MG tablet Take 1 tablet (300 mg total) by mouth once daily.    atenoloL (TENORMIN) 25 MG tablet Take 25 mg by mouth once daily.    atorvastatin (LIPITOR) 20 MG tablet Take 20 mg by mouth  once daily.    azelastine (ASTELIN) 137 mcg (0.1 %) nasal spray 1 spray by Nasal route 2 (two) times daily.     benzonatate (TESSALON PERLES) 100 MG capsule Take 1 capsule (100 mg total) by mouth every 6 (six) hours as needed for Cough.    blood sugar diagnostic Strp 1 strip by Misc.(Non-Drug; Combo Route) route 2 (two) times a day.    blood-glucose meter kit Use as instructed    budesonide-formoterol 160-4.5 mcg (SYMBICORT) 160-4.5 mcg/actuation HFAA Inhale 2 puffs into the lungs in the morning and 2 puffs before bedtime.    dexAMETHasone (DECADRON) 4 MG Tab Take 20mg the Sunday, Monday and Tuesday (Patient taking differently: Take 20 mg by mouth once daily. Take 20mg the Sunday, Monday and Tuesday)    duke's soln (benadryl 30 mL, mylanta 30 mL, LIDOcaine 30 mL, nystatin 30 mL) 120mL Take 10 mLs by mouth 4 (four) times daily.    ferrous sulfate (FEOSOL) 325 mg (65 mg iron) Tab tablet Take 325 mg by mouth daily with breakfast.    fluticasone propionate (FLONASE) 50 mcg/actuation nasal spray 1 spray by Each Nostril route once daily.    gabapentin (NEURONTIN) 600 MG tablet Take 600 mg by mouth 2 (two) times daily.    glimepiride (AMARYL) 2 MG tablet Take 2 mg by mouth once daily at 6am.    HYDROcodone-acetaminophen (NORCO)  mg per tablet Take 1 tablet by mouth every 8 (eight) hours as needed.    levalbuterol (XOPENEX) 1.25 mg/3 mL nebulizer solution Take 3 mLs by nebulization every 4 to 6 hours as needed.     LIDOCAINE VISCOUS 2 % solution Take 15 mLs by mouth every 4 (four) hours as needed.    losartan (COZAAR) 50 MG tablet Take 1 tablet (50 mg total) by mouth once daily.    metFORMIN (GLUCOPHAGE) 500 MG tablet Take 500 mg by mouth 2 (two) times daily with meals.    ondansetron (ZOFRAN) 8 MG tablet Take 1 tablet (8 mg total) by mouth every 8 (eight) hours as needed for Nausea.    pantoprazole (PROTONIX) 40 MG tablet 40 mg.    promethazine (PHENERGAN) 12.5 MG Tab Take 1 tablet (12.5 mg total) by mouth every 6  (six) hours as needed.    tamsulosin (FLOMAX) 0.4 mg Cap Take 1 capsule (0.4 mg total) by mouth once daily.    traZODone (DESYREL) 300 MG tablet Take 300 mg by mouth every evening.    valACYclovir (VALTREX) 1000 MG tablet Take 1,000 mg by mouth once daily.    venetoclax (VENCLEXTA) 100 mg Tab Take 200 mg by mouth once daily.   Last reviewed on 6/1/2023  1:35 PM by Malia Cross RN    Review of patient's allergies indicates:  No Known Allergies Last reviewed on  6/21/2023 9:33 AM by Marion Rizo      Tasks added this encounter   No tasks added.   Tasks due within next 3 months   6/24/2023 - Refill Coordination Outreach (1 time occurrence)  8/12/2023 - Clinical Assessment (6 month recurrence)     NIYAH MENARD, PharmD  [facility]  [address] [place] [id number]  Phone: [phone]  Fax: [phone]

## 2023-07-04 NOTE — PROGRESS NOTES
"[facility] Hematology/Oncology  PROGRESS NOTE - Follow-up Visit      Subjective:       Patient ID:   NAME: Conrad Kuhn : 1940     82 y.o. male    Referring Doc: Julien  Other Physicians: Ced Erazo Joubert, Srinivas, Pinsky    Chief Complaint:  CLL f/u    History of Present Illness:     Patient is being seen today in person in clinic for a regular follow-up viasit. He is here by himself.  The patient is on today to go over the results of the recently ordered labs, tests and studies. He is here with his daughter today    He has since been on Gazyva per Dr Don's direction and is due for the 6th cycle; he is continued on venetoclax oral meds;    He feels a little fatigued; swallowing is improved overall; seeing Dr Hyman for esophageal dilation tomorrow    He is breathing ok currently. He denies any CP, SOB, or N/V. BP has been "beautiful" per patient    Walking without use of walker; eating a lot better    He saw Marion Ahmadi NP in 2023          Discussed Covid19 precautions; he had his vaccinations              ROS:   GEN: normal without any fever, night sweats or weight loss  HEENT: normal with no HA's, sore throat, stiff neck, changes in vision; LAD much better; swallowing better  CV: normal with no CP, SOB, PND, MAYER or orthopnea  PULM: normal with no SOB, cough, hemoptysis, sputum or pleuritic pain  GI: normal with no abdominal pain, nausea, vomiting, constipation, diarrhea, melanotic stools, BRBPR, or hematemesis; no dysphagia; peg tube in place  : urine frequency fine  BREAST: normal with no mass, discharge, pain  SKIN: normal with no rash, erythema, bruising, or swelling    Allergies:  Review of patient's allergies indicates:  No Known Allergies    Medications:    Current Outpatient Medications:     allopurinoL (ZYLOPRIM) 300 MG tablet, Take 1 tablet (300 mg total) by mouth once daily., Disp: 30 tablet, Rfl: 6    allopurinoL (ZYLOPRIM) 300 MG tablet, Take 1 tablet by mouth once daily., " Disp: , Rfl:     atorvastatin (LIPITOR) 20 MG tablet, Take 20 mg by mouth once daily., Disp: , Rfl:     azelastine (ASTELIN) 137 mcg (0.1 %) nasal spray, 1 spray by Nasal route 2 (two) times daily. , Disp: , Rfl:     blood sugar diagnostic Strp, 1 strip by Misc.(Non-Drug; Combo Route) route 2 (two) times a day., Disp: , Rfl:     duke's soln (benadryl 30 mL, mylanta 30 mL, LIDOcaine 30 mL, nystatin 30 mL) 120mL, Take 10 mLs by mouth 4 (four) times daily., Disp: 240 mL, Rfl: 0    ferrous sulfate (FEOSOL) 325 mg (65 mg iron) Tab tablet, Take 325 mg by mouth daily with breakfast., Disp: , Rfl:     fluticasone propionate (FLONASE) 50 mcg/actuation nasal spray, 1 spray by Each Nostril route once daily., Disp: , Rfl:     gabapentin (NEURONTIN) 600 MG tablet, Take 600 mg by mouth 2 (two) times daily., Disp: , Rfl:     glimepiride (AMARYL) 2 MG tablet, Take 2 mg by mouth once daily at 6am., Disp: , Rfl:     HYDROcodone-acetaminophen (NORCO)  mg per tablet, Take 1 tablet by mouth every 8 (eight) hours as needed., Disp: , Rfl:     levalbuterol (XOPENEX) 1.25 mg/3 mL nebulizer solution, Take 3 mLs by nebulization every 4 to 6 hours as needed. , Disp: , Rfl:     LIDOCAINE VISCOUS 2 % solution, Take 15 mLs by mouth every 4 (four) hours as needed., Disp: , Rfl:     ondansetron (ZOFRAN) 8 MG tablet, Take 1 tablet (8 mg total) by mouth every 8 (eight) hours as needed for Nausea., Disp: 30 tablet, Rfl: 2    promethazine (PHENERGAN) 12.5 MG Tab, Take 1 tablet (12.5 mg total) by mouth every 6 (six) hours as needed., Disp: 30 tablet, Rfl: 3    tamsulosin (FLOMAX) 0.4 mg Cap, Take 1 capsule (0.4 mg total) by mouth once daily., Disp: 90 capsule, Rfl: 0    traZODone (DESYREL) 300 MG tablet, Take 300 mg by mouth every evening., Disp: , Rfl:     valACYclovir (VALTREX) 1000 MG tablet, Take 1,000 mg by mouth once daily., Disp: , Rfl:     venetoclax (VENCLEXTA) 100 mg Tab, Take 2 tablets (200 mg) by mouth once daily., Disp: 60 tablet,  "Rfl: 11    blood-glucose meter kit, Use as instructed, Disp: , Rfl:     losartan (COZAAR) 50 MG tablet, Take 1 tablet (50 mg total) by mouth once daily., Disp: 90 tablet, Rfl: 0    PMHx/PSHx Updates:  See patient's last visit with me on 4/21/2023  See H&P on 1/3/2019        Pathology:  Cancer Staging  No matching staging information was found for the patient.          Objective:     Vitals:  Blood pressure 128/60, pulse 65, temperature 97.9 °F (36.6 °C), resp. rate 18, height 6' 2" (1.88 m), weight 86.4 kg (190 lb 8 oz).        Physical Examination:   GEN: no apparent distress, comfortable; AAOx3  HEAD: atraumatic and normocephalic  EYES: no conjunctival pallor or muddiness, no icterus; normal pupil reaction to ambient light  ENT: OMM, no pharyngeal erythema, external bilateral ears WNL; no visible thrush or ulcers  NECK: no masses or swelling, trachea midline, no visible LAD/LN's ; LAD and LN's much better  CV: no palpitations; no pedal edema; no noticeable JVD or neck vein distension  CHEST: Normal respiratory effort; chest wall breath movements symmetrical; no audible wheezing  ABDOM: non-distended; no bloating;  peg tube since removed  MUSC/Skeletal: ROM normal; joints visibly normal; no deformities or arthropathy  EXTREM: no clubbing, cyanosis, inflammation or swelling; right arm with fair ROM  SKIN: no rashes, lesions, ulcers, petechiae or subcutaneous nodules  : no keith  NEURO: moving all 4 extremities; AAOx3; no tremors  PSYCH: normal mood, affect and behavior  LYMPH: no visible LN's or LAD; LAD and LN's on right neck reduced in size              Labs:   Lab Results   Component Value Date    WBC 2.06 (L) 07/05/2023    HGB 11.4 (L) 07/05/2023    HCT 32.8 (L) 07/05/2023    MCV 88 07/05/2023    PLT 79 (L) 07/05/2023     CMP  Sodium   Date Value Ref Range Status   06/27/2023 140 136 - 145 mmol/L Final   06/12/2019 138 134 - 144 mmol/L      Potassium   Date Value Ref Range Status   06/27/2023 3.9 3.5 - 5.1 " mmol/L Final     Chloride   Date Value Ref Range Status   06/27/2023 106 95 - 110 mmol/L Final   06/12/2019 99 98 - 110 mmol/L      CO2   Date Value Ref Range Status   06/27/2023 30 (H) 23 - 29 mmol/L Final     Glucose   Date Value Ref Range Status   06/27/2023 73 70 - 110 mg/dL Final   06/12/2019 179 (H) 70 - 99 mg/dL      BUN   Date Value Ref Range Status   06/27/2023 16 8 - 23 mg/dL Final     Creatinine   Date Value Ref Range Status   06/27/2023 0.9 0.5 - 1.4 mg/dL Final   06/12/2019 0.95 0.60 - 1.40 mg/dL      Calcium   Date Value Ref Range Status   06/27/2023 9.0 8.7 - 10.5 mg/dL Final     Total Protein   Date Value Ref Range Status   06/27/2023 6.3 6.0 - 8.4 g/dL Final     Albumin   Date Value Ref Range Status   06/27/2023 4.0 3.5 - 5.2 g/dL Final   06/12/2019 3.9 3.1 - 4.7 g/dL      Total Bilirubin   Date Value Ref Range Status   06/27/2023 0.9 0.1 - 1.0 mg/dL Final     Comment:     For infants and newborns, interpretation of results should be based  on gestational age, weight and in agreement with clinical  observations.    Premature Infant recommended reference ranges:  Up to 24 hours.............<8.0 mg/dL  Up to 48 hours............<12.0 mg/dL  3-5 days..................<15.0 mg/dL  6-29 days.................<15.0 mg/dL       Alkaline Phosphatase   Date Value Ref Range Status   06/27/2023 61 55 - 135 U/L Final     AST   Date Value Ref Range Status   06/27/2023 13 10 - 40 U/L Final     ALT   Date Value Ref Range Status   06/27/2023 11 10 - 44 U/L Final     Anion Gap   Date Value Ref Range Status   06/27/2023 4 (L) 8 - 16 mmol/L Final     eGFR if    Date Value Ref Range Status   07/02/2022 >60.0 >60 mL/min/1.73 m^2 Final     eGFR if non    Date Value Ref Range Status   07/02/2022 >60.0 >60 mL/min/1.73 m^2 Final     Comment:     Calculation used to obtain the estimated glomerular filtration  rate (eGFR) is the CKD-EPI equation.              Lab Results   Component Value Date     IRON 59 02/02/2023    TIBC 342 02/02/2023    FERRITIN 67 02/02/2023         Radiology/Diagnostic Studies:    PET  12/2/2022:    IMPRESSION: Mild enlargement of the extensive adenopathy within the neck, chest, abdomen and pelvis with faint increased FDG activity     Stable splenomegaly         CT 11/8/2022:  IMPRESSION:     Worsening splenomegaly with diffuse increase in size and number of essentially all of the main lymph node chains throughout the chest, abdomen and pelvis.           PET 8/29/2022:  IMPRESSION:  1. Numerous enlarged lymph nodes throughout the neck, chest, abdomen and pelvis are non hypermetabolic, and stable compared to CT of 07/02/2022.  2. Stable splenomegaly, with no abnormal focal splenic FDG hypermetabolism.  3. Additional observations as described.         CT 7/2/2022:    IMPRESSION:  1. Interval worsening in numerous enlarged lymph nodes in the chest, abdomen and pelvis, as well as development of splenomegaly, compatible with lymphoma and or worsening CLL, given patient's history  2. Mild bilateral hydroureteronephrosis, with markedly enlarged prostate gland and appearance of the urinary bladder suggesting chronic bladder outlet obstruction. Consider correlation with urinalysis.  3. Infrarenal abdominal aortic aneurysm measuring 33 mm in diameter. Follow-up imaging in 3 years is recommended per best practice guidelines.  4. Additional observations as described.           PET  3/14/2022:    IMPRESSION:  Left upper lobectomy without evidence of recurrent or metastatic disease      MRI cervical spine  10/1/2021:    IMPRESSION:     Loss of normal cervical lordosis with mild kyphotic curvature.     Advanced multilevel cervical spondylosis, as outlined above. Spinal canal narrowing and foraminal narrowing is most severe at C5-6. 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     Congenital narrowing of cervical spinal canal, which  exacerbates cervical spondylosis        CT head  8/27/2021:  IMPRESSION:     No CT evidence of acute intracranial pathology.     Stable senescent changes as above.         PET  7/19/2021:    IMPRESSION:  Prior left upper lobectomy without evidence of recurrent or metastatic disease  - 3 cm infrarenal abdominal aortic aneurysm      CT head 6/29/2021:  IMPRESSION:     No CT evidence of acute intracranial pathology      PET  1/18/2021:    Impression:     No evidence of FDG avid lymphoproliferative disease.     Aneurysmal dilation of infrarenal abdominal aorta (3.4 cm) as well as aneurysmal dilation of right common iliac artery.     Additional incidental findings as above        PET  6/24/2020:    Impression:     1. No findings of active lymphoproliferative disease.  2. Additional observations as above.      PET  1/24/2020:    Impression       Moderate decrease in size of the adenopathy within the neck, chest, abdomen and pelvis.  The spleen is smaller in size.  There is no significant FDG activity.    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 Multifocal lymphadenopathy in the chest, abdomen, and pelvis compatible with provided clinical history of lymphocytic leukemia.  There is mixed interval change when compared to prior studies.  Pathologic lymphadenopathy in the chest has increased significantly when compared to a CTA of the chest from May 2018.  Pathologic retroperitoneal lymphadenopathy has improved slightly when compared to a previous abdominal CT from February 2017.  Please see above details.  2. Mild aneurysmal dilation of the infrarenal abdominal aorta, with maximal transverse diameter of 3.7 cm.  Maximal transverse diameter on a prior study from 2017 was 2.5 cm.  3. Additional findings as above           [facility] Unknown Rad Eap    Result Date: 1/14/2019  CMS MANDATED QUALITY DATA - CT RADIATION - 436 All CT scans at this facility  utilize dose modulation, iterative reconstruction, and/or weight based dosing when appropriate to reduce radiation dose to as low as reasonably achievable. Reason:Lymphoid leukemia TECHNIQUE: CT thorax with, CT abdomen  with, and CT pelvis with 100 mL Omnipaque 350. COMPARISON: CT chest dated 05/19/2018 and CT abdomen and pelvis dated 02/08/2017 CT THORAX: There is stable mediastinal, hilar and axillary adenopathy. There is coronary artery calcification. There is resolution of the groundglass opacities throughout the lungs. There are no confluent infiltrates, pulmonary nodules or pleural effusions. There are stable subcentimeter nodules in the left lobe of the thyroid gland. There are degenerative changes of the spine. CT ABDOMEN: The liver, pancreas, adrenal glands and gallbladder are normal. The spleen is enlarged. There is mesenteric and retroperitoneal adenopathy which is slightly smaller in size. -There is a portacaval node measuring 37 x 21 mm and previously measured 38 x 27 mm. -Periportal lymph node measuring 41 x 18 mm, previously measured 48 x 29 mm. -Left periaortic adenopathy measuring 34 x 23 mm and previously measured 40 x 42 mm- The kidneys enhance symmetrically without hydronephrosis or calculi. There are no thick-walled or dilated bowel loops. There is vascular calcification of aorta. There is a 3.0 x 3.0 cm infrarenal abdominal aortic aneurysm. CT PELVIS: There is adenopathy along the pelvic sidewalls bilaterally which has decreased in size. -The left common iliac adenopathy measuring 36 x 11 mm, previously measured 46 x 14 mm -the right common iliac adenopathy measuring 46 x 10 mm. Previously measured 53 x 15 mm. The bladder is normal. The prostate gland is enlarged. There are degenerative changes of the spine. There is grade 1 anterolisthesis of L5 on S1 with bilateral pars defects.     IMPRESSION: Stable mediastinal, hilar and axillary adenopathy with resolution of the airspace disease within  the lungs Decrease in size of the mesenteric, retroperitoneal and pelvic adenopathy. Stable infrarenal abdominal aortic aneurysm Splenomegaly     Read and electronically signed by: Jeniffer Arredondo MD on 1/14/2019 9:14 AM CST JENIFFER ARREDONDO MD      I have reviewed all available lab results and radiology reports.    Assessment/Plan:   (1) 82 y.o. male with  diagnosis of CLL who has been referred by Dr Rony Victoria for continuation of care by medical hematology/oncology.   - BM biopsy  12/4/2015 with CLL  - diagnosis of SLL in 2004 and s/p FCR x6 in 2007  - s/p imbruvica in past (stopped in May 2018)  - latest wbc at 4.8; lymph 56%  - latest CT on 8/7/2019 showing increase in the LAD  - platelets are 111,000  - He was recently hospitalized at [facility] and seen by Dr Wheeler. Dr Wheeler has recommended Rituximab. He is starting tomorrow.   - discussed the rituximab regimen and the potential side-effect profile; he is getting chemotherapy school today with Rosemary [facility]  - he saw Dr Don on Oct 21st 2019  - he has had 3 of the 4 weeks of rituximab so far; Dr Don recommends him to eventually get rituximab monthly x6 with daily oral Venetoclax for two years total.   - completed rituximab (4th) week of rituximab and  S/p 6 monthly rituximab with Venetoclax oral but is taking only 2 pills daily due to the counts  - he is now just on the venetclax  - he saw Dr Don again on Dec 23rd 2019 and sees him again in March 16th 2020  - latest PEt on 1/24/2020 looks really good    8/27/2020:  - he saw Dr Don last month at [facility]  - latest PEt from 6/24/2020 looks good  - he remains on just the oral venetoclax at 2 pills per day  - he sees Dr Don again in about 3 months (Oct 2020)    11/18/2020:  - he is doing well with the Venetoclax  - due for repeat PEt in Dec 2020  - he sees Dr Don again on Dec 23rd 2020    1/20/2021:  - he is doing well with the Venetoclax  - He saw Dr Don at [facility] in Dec 2020. He is now off rituximab monthly and  remains only on the oral Venetoclax but at 2 pills daily . He sees Dr Don again in feb 2021  - Recent PEt on  1/18/2021 looks stable except for incidental aneurysm in aorta; so we are referring him to Dr Kapadia with vascualr    4/20/2021:  - he saw Dr Don in Feb 2021 and sees him again in Nov 2021  - repeat PEt in June/july 2021  - continued on Venetoclax and regular blood checks    7/20/2021:  - he continues on the Venetoclax  - mild leucopenia from the medication  - PEt on 7/19/2021 seems to be stable    9/21/2021:  - doing ok overall with some occasional HA's and general lack of energy  - he sees Dr Don again on nov 1st and hopefully he can discontinue the venetoclax thereafter  - Head CT on 8/27/2021 was relatively negative    2/17/2022:  -  He last showed for oncology appointment in Sept 2021  - He had covid in Jan 2022 and he received the infusion but did not require hospitalization  - He saw Dr Erazo couple of weeks ago  - He is planning to have cervical spine surgery with Dr Mai in the near future.  - He sees Dr Don at [facility] this coming Monday. He is now off rituximab monthly and he is also now off the oral Venetoclax (expect he will be getting a repeat bone marrow biopsy)    3/31/2022:  - He had recent telemed visit Dr Don at [facility] and they were pleased with the latest bone marrow biopsy. He is now off rituximab monthly and he is also now off the oral Venetoclax      6/6/2022:  - He had surgery on his cervical spine with Dr Mai on April 8th 2022 and had postoperative problems related to the intubation and anesthesia and was in the hospital for about 3 weeks. He had aspiration pneumonia and bout of respiratory failure requiring mech vent support.  He has residual swallowing issues and has a peg tube. He was seen Dr Garcia with GI while in hospital. He was discharged on 4/18. He has had home health coming to house couple times of week. He reports that he is doing better    - he is due to see   Florencia again at [facility] in the near future and he has since been off all therapy      8/11/2022:  - He was recently hospitalized with fever and pneumonia. He is feeling better  - He is swallowing and eating better; peg tube since removed  - He previously had bout of oral mucosal ulcerations for which he had biopsies at [facility] which were negative for any malignancy. This has since resolved.  - He previously had telemed visit Dr Don at [facility] but does not know when he sees him again.. He had previous bone marrow biopsy with good report. He has been off rituximab and the oral Venetoclax since last Nov 2021.  - CT scans on 7/2/2022 with some progression of the LAD in his body   - will get PEt and aslk Dr Don to see him again    9/29/2022:  - He saw Dr Don again at [facility] and they are considering giving him a regimen of chemotherapy (some program or clinical trial0 over at [facility] but he reports that they have since decided against proceeding in that direction.  - he was supposed to see Dr Don on 9/12 but that appointment was cancelled per patient   - he had PET on 8/29/2022 which was stable    11/23/2022:  - He was recently hospitalized at [facility]; he is doing better and feeling better overall.  - He was supposed to see Dr Don again at [facility] after discharge but does not see him till Dec 2022  - CT on 11/8 with some increase in speel size and LAD  - will resume venetoclax in meantime and he needs to f/u with Dr Don for further directives  - get PEt    1/9/2023:  - He talked to Dr Don via telemed in Dec 2022 and was supposed to start venetoclax but has not done so as of yet. He reports that the specialty pharmacy had called him but he has not received the drug as of yet  - Dr Don was evaluating him for clinical trial but patient is not inclined to proceed in that direction  - Need Dr Don's last notes  - he had PEt on 12/2/2022 with some mild enlargement of the LN's  - he had bone marrow biopsy on 12/8/2022 with  pathology report still unavailable but per review looks like he has about 14-17% involvement with NHL  - will reach out to Dr Don and the speciality pharmacy  - need the assistance of the patient navigator  - WBC has increased to 18.39    2/15/2023:  - He has since started Gazyva per Dr Don's direction; he is feeling better overall and the LAD is shrinking down in the neck  - hgb at 11.4 and plats 36,000    4/12/2023:  - continued on Gazyva  - sees Dr Don again within the next month  - wbc at 2.71, hgb 10.4 and plats 57,000     7/5/2023:  - he has one more Gazyva left and is continued on the venetoclax  - latest wbc at 2.06  - hgb at 11.4 and plat 79,000         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  - followed  By Dr Kee  - CEA 1.4 on 4/8/2021  - CT scans on 1/14/2019 stable  - PET done in jan 2021 on chart    3/31/2022:  - CEA at 0.8  - PET on 3/14/2022 negative for recurrence    9/29/2022:  - CEA 0.9  - PET on chart     11/23/2022:  - latest CEA at 1.2    2/15/2023:  - he had PET on 12/2/2022     (3) Iron deficiency anemia s/p IV iron couple weeks ago  - s/p periodic IV iron therapies         (4) HTN - on BP meds     (6) Chronic neck and back issues - followed by Dr Ryan Rivero     (7) DM - currently off all meds     (8) Hypercholesterolemia - on meds    (9)  - followed by Dr Whitney    (10) Chronic Leucopenia and thrombocytopenia - due to chemotherapy agents and lymphoma/leukemia          VISIT DIAGNOSES:      Encounter Diagnoses   Name Primary?    CLL (chronic lymphocytic leukemia) Yes    Chemotherapy induced neutropenia     History of lung cancer - ANDRES NSCLC 2004     Iron deficiency     Iron deficiency anemia, unspecified iron deficiency anemia type     Lymphoma, unspecified body region, unspecified lymphoma type     Pancytopenia     Thrombocytopenia     Lymphadenopathy, generalized     Lymphadenopathy, cervical            PLAN:  Continue with last cycle of Gazyva; oral  venetoclax continued; check labs every monthy; resume IV iron as needed; encouraged resumption of oral iron  2.  F/u with Dr Don as directed  3.  F/u with PCP, Pulm, , etc  4.  Monitor labs as directed  5.   F/u with Dr Kapadia with vascular for the aortic aneurysm as directed  6.  Seeing Dr Hyman tomorrow for possible esophageal dilation           RTC in  4 weeks with Whit; 8 weeks with myself  Fax note to Kacey Kee, Ryan Rivero, Karla Whitney, Florencia/Liam, Kang; Primo; Radha        Discussion:       Total Time spent on patient:    I spent over 25 mins of time with the patient. Reviewed results of the recently ordered labs, tests, reports and studies; made directives with regards to the results. Over half of this time was spent couseling and coordinating care, making treatment and analytical decisions; ordering necessary labs, tests and studies; and discussing treatment options and setting up treatment plan(s) if indicated.        COVID-19 Discussion:    I had long discussion with patient and any applicable family about the COVID-19 coronavirus epidemic and the recommended precautions with regard to cancer and/or hematology patients. I have re-iterated the CDC recommendations for adequate hand washing, use of hand -like products, and coughing into elbow, etc. In addition, especially for our patients who are on chemotherapy and/or our otherwise immunocompromised patients, I have recommended avoidance of crowds, including movie theaters, restaurants, churches, etc. I have recommended avoidance of any sick or symptomatic family members and/or friends. I have also recommended avoidance of any raw and unwashed food products, and general avoidance of food items that have not been prepared by themselves. The patient has been asked to call us immediately with any symptom developments, issues, questions or other general concerns.          I have explained all of the above in detail and the patient  understands all of the current recommendation(s). I have answered all of their questions to the best of my ability and to their complete satisfaction.   The patient is to continue with the current management plan.        Chemotherapy Discussion:      I discussed the available treatment option(s) in accordance with the latest/current national evidence-based guidelines (NCCN, UpToDate, NCI, ASCO, etc where applicable), their overall age/condition and their co-morbidities. I also went over the risks and benefits of the chemotherapy with regard to their particular cancer type, their cancer stage, their age/condition, and their co-morbidities. I provided literature on the chemotherapy regimen and discussed the chemotherapy side-effect profiles of the drug(s). I discussed the importance of compliance with obtaining and monitoring weekly lab work, and went over the potential hematopathology issues and risks with anemia, leucopenia and thrombocytopenia that can occur with chemotherapy. I discussed the potential risks of liver and kidney damage, which could be permanent and could necessitate dialysis long-term if kidney failure developed. I discussed the emetic and/or diarrheal potential of the regimen and the potential need for use of antiemetic and anti-diarrheal medications. I discussed the risk for development of anaphylactic shock, bronchospasm, dysrhythmia, and respiratory/cardiovascular arrest and/or failure. I discussed the potential risks for development of alopecia, cold sensory issues, ringing in ears, vertigo, cataracts, glaucoma, and neuropathy, all of which could end up being chronic and life-long. Some chemotherpyI discussed the risks of hand-foot syndrome and rashes, and development of other autoimmune mediated processes such as pneumonitis, hepatitis, and colitis which could be life threatening. I discussed the risks of the potential development of a rare but fatal viral mediated disease known as PML  (Progressive Multifocal Leukoencephalopathy), and risk of future development of leukemia and/or lymphoma from use of certain chemotherapy agents. I discussed the need for neutropenic precautions, basic hygiene/sanitation behaviors and dietary restrictions.    The patient's consent has been obtained to proceed with the chemotherapy.The patient will be referred to [facility] for training and education on chemotherapy, use of antiemetics and/or anti-diarrheals, use of NSAID's, potential chemotherapy side-effects, and any specific recommendations and precautions with the particular chemotherapy agents.       Immunologic Therapy Discussion:    I discussed the available treatment option(s) in accordance with the latest/current national evidence-based guidelines (NCCN, UpToDate, NCI, ASCO, etc where applicable), their overall age/condition and their co-morbidities. I went over the risks and benefits of the immunotherapy with regard to their particular cancer type, their cancer stage, their age/condition, and their co-morbidities. I provided literature on the immunotherapy regimen and discussed the immunotherapy side-effect profiles of the drug(s). I discussed the importance of compliance with obtaining and monitoring weekly lab work, and went over the potential hematopathology issues and risks of hemato-pathologic issues with anemia, leucopenia and/or thrombocytopenia and effects on thyroid function that can occur with immunotherapy. The patient will most likely need to have there thyroid functions monitored by their PCP and may need to take thyroid medication while on the immunotherapy. I discussed the potential risks of liver and kidney damage, which could be permanent and could necessitate dialysis long-term if kidney failure developed. I discussed the emetic and/or diarrheal potential of the regimen and the potential need for use of antiemetic and anti-diarrheal medications. I discussed the  risk for development of anaphylactic shock, bronchospasm, dysrhythmia, and respiratory/cardiovascular arrest and/or failure. I discussed the potential risks for development of alopecia, cold sensory issues, ringing in ears, vertigo and neuropathy, all of which could end up being chronic and life-long. I discussed the risks of hand-foot syndrome and rashes, and development of other autoimmune mediated processes such as pneumonitis, hepatitis and colitis which could potentially be life threatening. I discussed the risks of the potential development of a rare but fatal viral mediated disease known as PML (Progressive Multifocal Leukoencephalopathy), and risk of future development of leukemia and/or lymphoma from use of certain immunotherapy agents. I discussed the possibility that immunologic therapy cold worsen or promote progression of any underlying autoimmune diseases such as sarcoidosis, ulcerative colitis, Crohn's disease, psoriasis, rheumatoid disorders, scleroderma, autoimmune nephritis disorders, Hashimoto's thyroiditis, and Lupus among others. I discussed the need for neutropenic precautions, basic hygiene/sanitation behaviors and dietary restrictions.    The patient's consent has been obtained to proceed with the immunotherapy.The patient will be referred to [facility] for training and education on immunotherapy, use of antiemetics and/or anti-diarrheals, use of NSAID's, potential immunotherapy side-effects, and any specific recommendations and precautions with the particular immunotherapy agents.      I answered all of the patient's (and family's, if applicable) questions to the best of my ability and to their complete satisfaction. The patient acknowledged full understanding of the risks, recommendations and plan(s).      Iron Infusion Therapy Discussion:     I provided literature/learning materials on the particular IV iron regimen and discussed the potential side-effect  profiles of the drug(s). I discussed the importance of compliance with obtaining and monitoring requested lab work, and went over the potential risk for the development of anaphylactic shock, bronchospasm, dysrhythmia, liver and/or kidney damage, and respiratory/cardiovascular arrest and/or failure. I discussed the potential risks for development of alopecia, fevers, itching, chills and/or rigors, cold sensory issues, ringing in ears, vertigo and neuropathy, all of which are usually acute but sometimes could end up being chronic and life-long. I discussed the risks of hand-foot syndrome and rashes, and development of other autoimmune mediated processes such as pneumonitis and colitis which could be life threatening.     The patient's consent has been obtained to proceed with the IV iron therapy.The patient will be referred to [facility] for training and education on IV iron therapy, use of antiemetics and/or anti-diarrheals, use of NSAID's, potential IV iron therapy side-effects, and any specific recommendations and precautions with the particular IV iron agents.      I answered all of the patient's (and family's, if applicable) questions to the best of my ability and to their complete satisfaction. The patient acknowledged full understanding of the risks, recommendations and plan(s).          I answered all of the patient's (and family's, if applicable) questions to the best of my ability and to their complete satisfaction. The patient acknowledged full understanding of the risks, recommendations and plan(s).         Electronically signed by Drew Bai MD

## 2023-07-06 NOTE — [employer] PATIENT INSTRUCTIONS
Discharge Summary/Instructions after an Endoscopic Procedure  Patient Name: Conrad Kuhn  Patient MRN: [id number]  Patient YOB: 1940  Thursday, July 6, 2023  Jefferson Hyman MD  RESTRICTIONS:  During your procedure today, you received medications for sedation.  These   medications may affect your judgment, balance and coordination.  Therefore,   for 24 hours, you have the following restrictions:   - DO NOT drive a car, operate machinery, make legal/financial decisions,   sign important papers or drink alcohol.    ACTIVITY:  Today: no heavy lifting, straining or running due to procedural   sedation/anesthesia.  The following day: return to full activity including work.  DIET:  Eat and drink normally unless instructed otherwise.     TREATMENT FOR COMMON SIDE EFFECTS:  - Mild abdominal pain, nausea, belching, bloating or excessive gas:  rest,   eat lightly and use a heating pad.  - Sore Throat: treat with throat lozenges and/or gargle with warm salt   water.  - Because air was used during the procedure, expelling large amounts of air   from your rectum or belching is normal.  - If a bowel prep was taken, you may not have a bowel movement for 1-3 days.    This is normal.  SYMPTOMS TO WATCH FOR AND REPORT TO YOUR PHYSICIAN:  1. Abdominal pain or bloating, other than gas cramps.  2. Chest pain.  3. Back pain.  4. Signs of infection such as: chills or fever occurring within 24 hours   after the procedure.  5. Rectal bleeding, which would show as bright red, maroon, or black stools.   (A tablespoon of blood from the rectum is not serious, especially if   hemorrhoids are present.)  6. Vomiting.  7. Weakness or dizziness.  GO DIRECTLY TO THE NEAREST EMERGENCY ROOM IF YOU HAVE ANY OF THE FOLLOWING:      Difficulty breathing              Chills and/or fever over 101 F   Persistent vomiting and/or vomiting blood   Severe abdominal pain   Severe chest pain   Black, tarry stools   Bleeding- more than one  tablespoon   Any other symptom or condition that you feel may need urgent attention  Your doctor recommends these additional instructions:  If any biopsies were taken, your doctors clinic will contact you in 1 to 2   weeks with any results.  - Patient has a contact number available for emergencies.  The signs and   symptoms of potential delayed complications were discussed with the   patient.  Return to normal activities tomorrow.  Written discharge   instructions were provided to the patient.   - Resume previous diet.   - Continue present medications.   - Consider addition of omeprazole 20 mg daily if symptoms of reflux   present.  - Discharge patient to home (with escort).  For questions, problems or results please call your physician - Jefferson Hyman MD at Work:  [phone].  [facility], EMERGENCY ROOM PHONE NUMBER: [phone]  IF A COMPLICATION OR EMERGENCY SITUATION ARISES AND YOU ARE UNABLE TO REACH   YOUR PHYSICIAN - GO DIRECTLY TO THE EMERGENCY ROOM.  MD Jefferson Parra MD  7/6/2023 8:24:39 AM  This report has been verified and signed electronically.  Dear patient,  As a result of recent federal legislation (The Federal Cures Act), you may   receive lab or pathology results from your procedure in your [facility]   account before your physician is able to contact you. Your physician or   their representative will relay the results to you with their   recommendations at their soonest availability.  Thank you,  [employer]

## 2023-07-06 NOTE — ANESTHESIA POSTPROCEDURE EVALUATION
Anesthesia Post Evaluation    Patient: Conrad Kuhn    Procedure(s) Performed: Procedure(s) (LRB):  EGD (ESOPHAGOGASTRODUODENOSCOPY) (N/A)    Final Anesthesia Type: general      Patient location during evaluation: GI PACU  Patient participation: Yes- Able to Participate  Level of consciousness: awake and alert and oriented  Post-procedure vital signs: reviewed and stable  Pain management: adequate  Airway patency: patent    PONV status at discharge: No PONV  Anesthetic complications: no      Cardiovascular status: blood pressure returned to baseline, hemodynamically stable and stable  Respiratory status: unassisted, spontaneous ventilation and room air  Hydration status: euvolemic  Follow-up not needed.          Vitals Value Taken Time   /78 07/06/23 0840   Temp 37.3 °C (99.1 °F) 07/06/23 0825   Pulse 62 07/06/23 0846   Resp 14 07/06/23 0835   SpO2 98 % 07/06/23 0846   Vitals shown include unvalidated device data.      No case tracking events are documented in the log.      Pain/Hannah Score: No data recorded

## 2023-07-06 NOTE — TRANSFER OF CARE
Anesthesia Transfer of Care Note    Patient: Conrad Kuhn    Procedure(s) Performed: Procedure(s) (LRB):  EGD (ESOPHAGOGASTRODUODENOSCOPY) (N/A)    Patient location: PACU    Anesthesia Type: general    Transport from OR: Transported from OR on room air with adequate spontaneous ventilation    Post pain: adequate analgesia    Post assessment: no apparent anesthetic complications    Post vital signs: stable    Level of consciousness: awake and alert    Nausea/Vomiting: no nausea/vomiting    Complications: none    Transfer of care protocol was followed      Last vitals:   Visit Vitals  BP (!) 174/73   Pulse (!) 59   Temp 37 °C (98.6 °F)   Resp 12   SpO2 98%     
yes

## 2023-07-06 NOTE — H&P
GASTROENTEROLOGY PRE-PROCEDURE H&P NOTE  Patient Name: Conrad Kuhn  Patient MRN: [id number]  Patient : 1940    Service date: 2023    PCP: Johnson Erazo MD    No chief complaint on file.      HPI: Patient is a 82 y.o. male with PMHx as below here for evaluation of     rwnia Kuhn  is a 82 year old   male  who is seen today for a follow-up visit. Has ongoing intermittent solid food dysphagia.  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      Chart Review    EGD - PEG removal.     HPI  Conrad Kuhn is a 81 year old male who is seen today for a follow-up visit. pt with chronic sternal notch dysphagia with prior dilation in years past. had cervical fusion complicated by dysphagia a few months ago. had peg placement. pt tolerating po and wants peg out but wants dilation. prior dilation  with [address].     Past Medical History:  Past Medical History:   Diagnosis Date    Alcoholism     CLL (chronic lymphocytic leukemia) 2019    COPD (chronic obstructive pulmonary disease)     Diabetes mellitus     Non Insulin requiring    Difficult intubation     Encounter for blood transfusion     GI bleed due to NSAIDs     While on Eliquis and Imbruvica    Herpetic gingivostomatitis     History of lung cancer - ANDRES NSCLC 2004 2019    2004    Hypertension     Iron deficiency 2017    Lymphoma - Small Lymphocytic Lymphoma (SLL) 2023    Lymphoma, small lymphocytic (2004) 2019    MAYDA on CPAP     Pneumonia of both lungs due to infectious organism 2021    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     Right-sided Bell's palsy 2009    Spondylolisthesis     Varicose veins of legs     Venous insufficiency         Past Surgical History:  Past Surgical History:   Procedure Laterality Date    Athroscopic surgery      On Knee    BIOPSY OF TISSUE OF NECK Left 2015    Recuurence CLL/small cell lyphoma    CERVICAL FUSION      ESOPHAGEAL DILATION       ESOPHAGOGASTRODUODENOSCOPY N/A 04/15/2022    Procedure: EGD (ESOPHAGOGASTRODUODENOSCOPY);  Surgeon: Siddharth Garcia MD;  Location: [facility] ENDO;  Service: Endoscopy;  Laterality: N/A;    ESOPHAGOGASTRODUODENOSCOPY N/A 04/16/2022    Procedure: EGD (ESOPHAGOGASTRODUODENOSCOPY);  Surgeon: Siddharth Garcia MD;  Location: [facility] ENDO;  Service: Endoscopy;  Laterality: N/A;    ESOPHAGOGASTRODUODENOSCOPY N/A 06/23/2022    Procedure: EGD (ESOPHAGOGASTRODUODENOSCOPY);  Surgeon: Jefferson Hyman MD;  Location: [facility] ENDO;  Service: Endoscopy;  Laterality: N/A;    ESOPHAGOGASTRODUODENOSCOPY W/ PEG N/A 04/16/2022    Procedure: EGD, WITH PEG TUBE INSERTION;  Surgeon: Siddharth Garcia MD;  Location: [facility] ENDO;  Service: Endoscopy;  Laterality: N/A;    GASTROSTOMY TUBE PLACEMENT  04/16/2022    20 Fr (bumper initially at 3.5)    Hemorrhoid resection  1979    LUNG LOBECTOMY Left 2004    NECK SURGERY  04/08/2022    Anterior and Posterior C3-C7 fusion    OTHER SURGICAL HISTORY  2006    Chemotherapy s/p lyphoma        Portacath implantation and removal      reverse shoulder arthroplasty Right 03/2021        Home Medications:  Medications Prior to Admission   Medication Sig Dispense Refill Last Dose    allopurinoL (ZYLOPRIM) 300 MG tablet Take 1 tablet (300 mg total) by mouth once daily. 30 tablet 6     allopurinoL (ZYLOPRIM) 300 MG tablet Take 1 tablet by mouth once daily.       atorvastatin (LIPITOR) 20 MG tablet Take 20 mg by mouth once daily.       azelastine (ASTELIN) 137 mcg (0.1 %) nasal spray 1 spray by Nasal route 2 (two) times daily.        blood sugar diagnostic Strp 1 strip by Misc.(Non-Drug; Combo Route) route 2 (two) times a day.       blood-glucose meter kit Use as instructed       duke's soln (benadryl 30 mL, mylanta 30 mL, LIDOcaine 30 mL, nystatin 30 mL) 120mL Take 10 mLs by mouth 4 (four) times daily. 240 mL 0     ferrous sulfate (FEOSOL) 325 mg (65 mg iron) Tab tablet Take 325 mg by mouth daily with breakfast.        fluticasone propionate (FLONASE) 50 mcg/actuation nasal spray 1 spray by Each Nostril route once daily.       gabapentin (NEURONTIN) 600 MG tablet Take 600 mg by mouth 2 (two) times daily.       glimepiride (AMARYL) 2 MG tablet Take 2 mg by mouth once daily at 6am.       HYDROcodone-acetaminophen (NORCO)  mg per tablet Take 1 tablet by mouth every 8 (eight) hours as needed.       levalbuterol (XOPENEX) 1.25 mg/3 mL nebulizer solution Take 3 mLs by nebulization every 4 to 6 hours as needed.        LIDOCAINE VISCOUS 2 % solution Take 15 mLs by mouth every 4 (four) hours as needed.       losartan (COZAAR) 50 MG tablet Take 1 tablet (50 mg total) by mouth once daily. 90 tablet 0     ondansetron (ZOFRAN) 8 MG tablet Take 1 tablet (8 mg total) by mouth every 8 (eight) hours as needed for Nausea. 30 tablet 2     promethazine (PHENERGAN) 12.5 MG Tab Take 1 tablet (12.5 mg total) by mouth every 6 (six) hours as needed. 30 tablet 3     tamsulosin (FLOMAX) 0.4 mg Cap Take 1 capsule (0.4 mg total) by mouth once daily. 90 capsule 0     traZODone (DESYREL) 300 MG tablet Take 300 mg by mouth every evening.       valACYclovir (VALTREX) 1000 MG tablet Take 1,000 mg by mouth once daily.       venetoclax (VENCLEXTA) 100 mg Tab Take 2 tablets (200 mg) by mouth once daily. 60 tablet 11                Review of patient's allergies indicates:  No Known Allergies    Social History:   Social History     Occupational History    Not on file   Tobacco Use    Smoking status: Former    Smokeless tobacco: Never   Substance and Sexual Activity    Alcohol use: Yes    Drug use: No    Sexual activity: Not Currently       Family History:   Family History   Problem Relation Age of Onset    Stomach cancer Mother 80         at 95    Colon cancer Father        Review of Systems:  A 10 point review of systems was performed and was normal, except as mentioned in the HPI, including constitutional, HEENT, heme, lymph, cardiovascular,  respiratory, gastrointestinal, genitourinary, neurologic, endocrine, psychiatric and musculoskeletal.      OBJECTIVE:    Physical Exam:  24 Hour Vital Sign Ranges: Temp:  [97.9 °F (36.6 °C)-98.6 °F (37 °C)] 98.6 °F (37 °C)  Pulse:  [59-65] 59  Resp:  [12-18] 12  SpO2:  [98 %] 98 %  BP: (128-174)/(60-73) 174/73  Most recent vitals: BP (!) 174/73   Pulse (!) 59   Temp 98.6 °F (37 °C)   Resp 12   SpO2 98%    GEN: well-developed, well-nourished, awake and alert, non-toxic appearing adult  HEENT: PERRL, sclera anicteric, oral mucosa pink and moist without lesion  NECK: trachea midline; Good ROM  CV: regular rate and rhythm, no murmurs or gallops  RESP: clear to auscultation bilaterally, no wheezes, rhonci or rales  ABD: soft, non-tender, non-distended, normal bowel sounds  EXT: no swelling or edema, 2+ pulses distally  SKIN: no rashes or jaundice  PSYCH: normal affect    Labs:   Recent Labs     07/05/23  0801   WBC 2.06*   MCV 88   PLT 79*     Recent Labs     07/05/23  0801      K 4.1      CO2 30*   BUN 19   *     No results for input(s): ALB in the last 72 hours.    Invalid input(s): ALKP, SGOT, SGPT, TBIL, DBIL, TPRO  No results for input(s): PT, INR, PTT in the last 72 hours.      IMPRESSION / RECOMMENDATIONS:  Dysphagia  EGD  with interventions as warranted.   RIsks, benefits, alternatives discussed in detail regarding upcoming procedures and sedation. Some of the more common endoscopic complications include but not limited to immediate or delayed perforation, bleeding, infections, pain, inadvertent injury to surrounding tissue / organs and possible need for surgical evaluation. Patient expressed understanding, all questions answered and will proceed with procedure as planned.     Jefferson Hyman  7/6/2023  8:00 AM

## 2023-07-06 NOTE — ANESTHESIA PREPROCEDURE EVALUATION
07/06/2023  Conrad Kuhn is a 82 y.o., male.      Patient Active Problem List   Diagnosis    CLL (chronic lymphocytic leukemia)    History of lung cancer - ANDRES NSCLC 2004    Iron deficiency anemia    Pain in joint of right shoulder    Pancytopenia    Herpes stomatitis    Stridor    Thrombocytopenia    Troponin level elevated    S/P cervical spinal fusion    Iron deficiency    Fever    Confusion    Lymphadenopathy, cervical    Lymphadenopathy, generalized    Splenomegaly    Tracheal stenosis    Chronic obstructive pulmonary disease    Lymphoma - Small Lymphocytic Lymphoma (SLL)    Bradycardia    Debility    Bronchitis    Chemotherapy induced neutropenia       Past Surgical History:   Procedure Laterality Date    Athroscopic surgery      On Knee    BIOPSY OF TISSUE OF NECK Left 08/2015    Recuurence CLL/small cell lyphoma    CERVICAL FUSION  2022    ESOPHAGEAL DILATION      ESOPHAGOGASTRODUODENOSCOPY N/A 04/15/2022    Procedure: EGD (ESOPHAGOGASTRODUODENOSCOPY);  Surgeon: Siddharth Garcia MD;  [facility];  Service: Endoscopy;  Laterality: N/A;    ESOPHAGOGASTRODUODENOSCOPY N/A 04/16/2022    Procedure: EGD (ESOPHAGOGASTRODUODENOSCOPY);  Surgeon: Siddharth Garcia MD;  [facility];  Service: Endoscopy;  Laterality: N/A;    ESOPHAGOGASTRODUODENOSCOPY N/A 06/23/2022    Procedure: EGD (ESOPHAGOGASTRODUODENOSCOPY);  Surgeon: Jefferson Hyman MD;  [facility];  Service: Endoscopy;  Laterality: N/A;    ESOPHAGOGASTRODUODENOSCOPY W/ PEG N/A 04/16/2022    Procedure: EGD, WITH PEG TUBE INSERTION;  Surgeon: Siddharth Garcia MD;  [facility];  Service: Endoscopy;  Laterality: N/A;    GASTROSTOMY TUBE PLACEMENT  04/16/2022    20 Fr (bumper initially at 3.5)    Hemorrhoid resection  1979    LUNG LOBECTOMY Left 2004    NECK SURGERY  04/08/2022     Anterior and Posterior C3-C7 fusion    OTHER SURGICAL HISTORY  2006    Chemotherapy s/p lyphoma        Portacath implantation and removal      reverse shoulder arthroplasty Right 03/2021        Tobacco Use:  The patient  reports that he has quit smoking. He has never used smokeless tobacco.     Results for orders placed or performed during the hospital encounter of 04/17/23   EKG 12-lead    Collection Time: 04/17/23 11:54 PM    Narrative    Test Reason : A41.9,    Vent. Rate : 106 BPM     Atrial Rate : 106 BPM     P-R Int : 196 ms          QRS Dur : 096 ms      QT Int : 328 ms       P-R-T Axes : 080 016 080 degrees     QTc Int : 435 ms    Sinus tachycardia  Septal infarct (cited on or before 02-FEB-2023)  Abnormal ECG  When compared with ECG of 02-FEB-2023 15:58,  Vent. rate has increased BY  58 BPM  QRS duration has decreased  T wave amplitude has decreased in Lateral leads  Confirmed by Carlin Maria MD ([id number]) on 4/18/2023 8:33:31 PM    Referred By: [email or web address]   SELF           Confirmed By:Carlin Maria MD             Lab Results   Component Value Date    WBC 2.06 (L) 07/05/2023    HGB 11.4 (L) 07/05/2023    HCT 32.8 (L) 07/05/2023    MCV 88 07/05/2023    PLT 79 (L) 07/05/2023     BMP  Lab Results   Component Value Date     07/05/2023    K 4.1 07/05/2023     07/05/2023    CO2 30 (H) 07/05/2023    BUN 19 07/05/2023    CREATININE 1.0 07/05/2023    CALCIUM 8.9 07/05/2023    ANIONGAP 3 (L) 07/05/2023     (H) 07/05/2023    GLU 73 06/27/2023     (H) 06/20/2023       No results found for this or any previous visit.              Pre-op Assessment    I have reviewed the Patient Summary Reports.     I have reviewed the Nursing Notes. I have reviewed the NPO Status.   I have reviewed the Medications.     Review of Systems  Anesthesia Hx:  Personal Hx of Anesthesia complications  Difficult Intubation   Social:  Former Smoker    Hematology/Oncology:         -- Anemia (Iron def anemia):  Hematology Comments: CLL  Thrombocytopenia  --  Cancer in past history (hx lung CA, s/p surgical resection, Left partial pneumonectomy):    Cardiovascular:   Hypertension, well controlled ECG has been reviewed.    Pulmonary:   Pneumonia (hx recent aspiration pneumonia, resolving,) COPD, moderate Sleep Apnea, CPAP    Education provided regarding risk of obstructive sleep apnea     Hepatic/GI:   Esophageal dysphagia  Hx of GI bleeding in past   Musculoskeletal:   C3-7 anterior posterior cervical fusion  Spine Disorders: cervical    Neurological:   Neuromuscular Disease, (hx right sided Bell's palsy)    Endocrine:   Diabetes, type 2    Psych:   Psychiatric History (hx EtOH abuse)          Physical Exam  General: Well nourished, Cooperative, Alert and Oriented    Airway:  Mallampati: III / II  Mouth Opening: Normal  TM Distance: Normal  Tongue: Normal  Neck ROM: Extension Decreased    Chest/Lungs:  Clear to auscultation    Heart:  Rate: Normal  Rhythm: Regular Rhythm  Sounds: Normal    Abdomen:  Normal, Soft, Nontender        Anesthesia Plan  Type of Anesthesia, risks & benefits discussed:    Anesthesia Type: Gen Natural Airway  Intra-op Monitoring Plan: Standard ASA Monitors  Post Op Pain Control Plan:   (medical reason for not using multimodal pain management)  Induction:  IV  Informed Consent: Informed consent signed with the Patient and all parties understand the risks and agree with anesthesia plan.  All questions answered.   ASA Score: 3  Anesthesia Plan Notes:   POM  VL and LMA available    Ready For Surgery From Anesthesia Perspective.     .

## 2023-07-13 NOTE — PLAN OF CARE
Problem: Fall Injury Risk  Goal: Absence of Fall and Fall-Related Injury  Outcome: Met     Problem: Fatigue  Goal: Improved Activity Tolerance  Outcome: Met     Problem: Nausea and Vomiting  Goal: Fluid and Electrolyte Balance  Outcome: Met

## 2023-07-14 NOTE — PLAN OF CARE
Problem: Fatigue  Goal: Improved Activity Tolerance  Outcome: Ongoing, Progressing  Intervention: Promote Improved Energy  Flowsheets (Taken 7/14/2023 4421)  Fatigue Management: fatigue-related activity identified  Sleep/Rest Enhancement: noise level reduced

## 2023-07-18 NOTE — TELEPHONE ENCOUNTER
Plt 37 reported by Nila at  lab. Marion made aware per her verbal orders I attempted to call patient with instructions to hold Venetaclax and recheck labs on Thursday, no answer, LVM.

## 2023-07-25 NOTE — TELEPHONE ENCOUNTER
----- Message from Katelyn Lopez sent at 7/25/2023  9:11 AM CDT -----  Pt returning your call from 07/18.      [phone] call first    Cell [phone]

## 2023-07-26 NOTE — TELEPHONE ENCOUNTER
Per Marion's orders I attempted to call to make aware that after review of labs patient should continue venetaclax. No answer, LVM along with sending portal message to make aware.

## 2023-07-26 NOTE — TELEPHONE ENCOUNTER
----- Message from Marion Rizo NP sent at 7/25/2023 11:01 AM CDT -----  Continue venetoclax.     ----- Message -----  From: [facility]  Sent: 7/25/2023  10:56 AM CDT  To: Marion Rizo NP

## 2023-07-27 NOTE — TELEPHONE ENCOUNTER
Specialty Pharmacy - Refill Coordination    Specialty Medication Orders Linked to Encounter      Flowsheet Row Most Recent Value   Medication #1 venetoclax (VENCLEXTA) 100 mg Tab (Order#[id number], Rx#[id number])            Refill Questions - Documented Responses      Flowsheet Row Most Recent Value   Patient Availability and HIPAA Verification    Does patient want to proceed with activity? Yes   HIPAA/medical authority confirmed? Yes   Relationship to patient of person spoken to? Self   Refill Screening Questions    Changes to allergies? No   Changes to medications? No   New conditions since last clinic visit? No   Unplanned office visit, urgent care, ED, or hospital admission in the last 4 weeks? No   How does patient/caregiver feel medication is working? Good   Financial problems or insurance changes? No   How many doses of your specialty medications were missed in the last 4 weeks? 0   Would patient like to speak to a pharmacist? No   When does the patient need to receive the medication? 08/04/23   Refill Delivery Questions    How will the patient receive the medication? [facility]   When does the patient need to receive the medication? 08/04/23   Shipping Address Home   Address in [facility] confirmed and updated if neccessary? Yes   Expected Copay ($) 65   Is the patient able to afford the medication copay? Yes   Payment Method CC on file   Days supply of Refill 28   Supplies needed? No supplies needed   Refill activity completed? Yes   Refill activity plan Refill scheduled   Shipment/Pickup Date: 08/01/23            Current Outpatient Medications   Medication Sig    allopurinoL (ZYLOPRIM) 300 MG tablet Take 1 tablet (300 mg total) by mouth once daily.    allopurinoL (ZYLOPRIM) 300 MG tablet Take 1 tablet by mouth once daily.    atorvastatin (LIPITOR) 20 MG tablet Take 20 mg by mouth once daily.    azelastine (ASTELIN) 137 mcg (0.1 %) nasal spray 1 spray by Nasal route 2 (two) times daily.     blood sugar  diagnostic Strp 1 strip by Misc.(Non-Drug; Combo Route) route 2 (two) times a day.    blood-glucose meter kit Use as instructed    duke's soln (benadryl 30 mL, mylanta 30 mL, LIDOcaine 30 mL, nystatin 30 mL) 120mL Take 10 mLs by mouth 4 (four) times daily.    ferrous sulfate (FEOSOL) 325 mg (65 mg iron) Tab tablet Take 325 mg by mouth daily with breakfast.    fluticasone propionate (FLONASE) 50 mcg/actuation nasal spray 1 spray by Each Nostril route once daily.    gabapentin (NEURONTIN) 600 MG tablet Take 600 mg by mouth 2 (two) times daily.    glimepiride (AMARYL) 2 MG tablet Take 2 mg by mouth once daily at 6am.    HYDROcodone-acetaminophen (NORCO)  mg per tablet Take 1 tablet by mouth every 8 (eight) hours as needed.    levalbuterol (XOPENEX) 1.25 mg/3 mL nebulizer solution Take 3 mLs by nebulization every 4 to 6 hours as needed.     LIDOCAINE VISCOUS 2 % solution Take 15 mLs by mouth every 4 (four) hours as needed.    losartan (COZAAR) 50 MG tablet Take 1 tablet (50 mg total) by mouth once daily.    ondansetron (ZOFRAN) 8 MG tablet Take 1 tablet (8 mg total) by mouth every 8 (eight) hours as needed for Nausea.    promethazine (PHENERGAN) 12.5 MG Tab Take 1 tablet (12.5 mg total) by mouth every 6 (six) hours as needed.    tamsulosin (FLOMAX) 0.4 mg Cap Take 1 capsule (0.4 mg total) by mouth once daily.    traZODone (DESYREL) 300 MG tablet Take 300 mg by mouth every evening.    valACYclovir (VALTREX) 1000 MG tablet Take 1,000 mg by mouth once daily.    venetoclax (VENCLEXTA) 100 mg Tab Take 2 tablets (200 mg) by mouth once daily.   Last reviewed on 7/13/2023  8:04 AM by Gabriela Banks RN    Review of patient's allergies indicates:  No Known Allergies Last reviewed on  7/14/2023 9:47 AM by Malia Cross      Tasks added this encounter   No tasks added.   Tasks due within next 3 months   8/12/2023 - Clinical Assessment (6 month recurrence)  8/3/2023 - Refill Coordination Outreach (1 time occurrence)     NIYAH  DERIAN, PharmD  [facility]  [address]  [facility] [address]  Phone: [phone]  Fax: [phone]

## 2023-08-03 NOTE — PROGRESS NOTES
[facility] Hematology/Oncology  PROGRESS NOTE - Follow-up Visit      Subjective:       Patient ID:   NAME: Conrad Kuhn : 1940     82 y.o. male    Referring Doc: Julien  Other Physicians: Ced Erazo Joubert, Srinivas, Pinsky    Chief Complaint:  CLL f/u    History of Present Illness:     Patient is being seen today in person in clinic for a regular follow-up viasit. He is here by himself.  The patient is on today to go over the results of the recently ordered labs, tests and studies. He is here  with his wife today     He has since been on Gazyva and finished 6 cycles. Now he continues on venetoclax on decreased dose due to platelet count.     He is due to see Dr. ANNELIESE ferrer next week.     He states he is not swallowing better post dilation. He said he is having a lot of dry throat. I do recommend he follow up with ENT.     He is breathing ok currently. He denies any CP, SOB, or N/V. BP has been good.   Walking without use of walker; eating a lot better        Discussed Covid19 precautions; he had his vaccinations        ROS:   GEN: normal without any fever, night sweats or weight loss  HEENT: normal with no HA's, sore throat, stiff neck, changes in vision; No LAD   CV: normal with no CP, SOB, PND, MAYER or orthopnea  PULM: normal with no SOB, cough, hemoptysis, sputum or pleuritic pain  GI: normal with no abdominal pain, nausea, vomiting, constipation, diarrhea, melanotic stools, BRBPR, or hematemesis; + some issues swallowing  : urine frequency fine  BREAST: normal with no mass, discharge, pain  SKIN: normal with no rash, erythema, bruising, or swelling    Allergies:  Review of patient's allergies indicates:  No Known Allergies    Medications:    Current Outpatient Medications:     atorvastatin (LIPITOR) 20 MG tablet, Take 20 mg by mouth once daily., Disp: , Rfl:     azelastine (ASTELIN) 137 mcg (0.1 %) nasal spray, 1 spray by Nasal route 2 (two) times daily. , Disp: , Rfl:     blood sugar diagnostic  Strp, 1 strip by Misc.(Non-Drug; Combo Route) route 2 (two) times a day., Disp: , Rfl:     duke's soln (benadryl 30 mL, mylanta 30 mL, LIDOcaine 30 mL, nystatin 30 mL) 120mL, Take 10 mLs by mouth 4 (four) times daily., Disp: 240 mL, Rfl: 0    ferrous sulfate (FEOSOL) 325 mg (65 mg iron) Tab tablet, Take 325 mg by mouth daily with breakfast., Disp: , Rfl:     fluticasone propionate (FLONASE) 50 mcg/actuation nasal spray, 1 spray by Each Nostril route once daily., Disp: , Rfl:     gabapentin (NEURONTIN) 600 MG tablet, Take 600 mg by mouth 2 (two) times daily., Disp: , Rfl:     glimepiride (AMARYL) 2 MG tablet, Take 2 mg by mouth once daily at 6am., Disp: , Rfl:     HYDROcodone-acetaminophen (NORCO)  mg per tablet, Take 1 tablet by mouth every 8 (eight) hours as needed., Disp: , Rfl:     levalbuterol (XOPENEX) 1.25 mg/3 mL nebulizer solution, Take 3 mLs by nebulization every 4 to 6 hours as needed. , Disp: , Rfl:     LIDOCAINE VISCOUS 2 % solution, Take 15 mLs by mouth every 4 (four) hours as needed., Disp: , Rfl:     ondansetron (ZOFRAN) 8 MG tablet, Take 1 tablet (8 mg total) by mouth every 8 (eight) hours as needed for Nausea., Disp: 30 tablet, Rfl: 2    promethazine (PHENERGAN) 12.5 MG Tab, Take 1 tablet (12.5 mg total) by mouth every 6 (six) hours as needed., Disp: 30 tablet, Rfl: 3    tamsulosin (FLOMAX) 0.4 mg Cap, Take 1 capsule (0.4 mg total) by mouth once daily., Disp: 90 capsule, Rfl: 0    traZODone (DESYREL) 300 MG tablet, Take 300 mg by mouth every evening., Disp: , Rfl:     valACYclovir (VALTREX) 1000 MG tablet, Take 1,000 mg by mouth once daily., Disp: , Rfl:     venetoclax (VENCLEXTA) 100 mg Tab, Take 2 tablets (200 mg) by mouth once daily., Disp: 60 tablet, Rfl: 11    blood-glucose meter kit, Use as instructed, Disp: , Rfl:     losartan (COZAAR) 50 MG tablet, Take 1 tablet (50 mg total) by mouth once daily., Disp: 90 tablet, Rfl: 0    PMHx/PSHx Updates:  See patient's last visit with   "Bhumika on 7/5/2023  See H&P on 1/3/2019        Pathology:  Cancer Staging  No matching staging information was found for the patient.          Objective:     Vitals:  Blood pressure (!) 111/56, pulse 71, temperature 98 °F (36.7 °C), resp. rate 20, height 6' 2" (1.88 m), weight 88 kg (194 lb).        Physical Examination:   GEN: no apparent distress, comfortable; AAOx3  HEAD: atraumatic and normocephalic  EYES: no conjunctival pallor or muddiness, no icterus; normal pupil reaction to ambient light  ENT: OMM, no pharyngeal erythema, external bilateral ears WNL; no visible thrush or ulcers  NECK: no masses or swelling, trachea midline, no visible LAD/LN's ; LAD and LN's much better  CV: no palpitations; no pedal edema; no noticeable JVD or neck vein distension  CHEST: Normal respiratory effort; chest wall breath movements symmetrical; no audible wheezing  ABDOM: non-distended; no bloating;  peg tube since removed  MUSC/Skeletal: ROM normal; joints visibly normal; no deformities or arthropathy  EXTREM: no clubbing, cyanosis, inflammation or swelling; right arm with fair ROM  SKIN: no rashes, lesions, ulcers, petechiae or subcutaneous nodules  : no keith  NEURO: moving all 4 extremities; AAOx3; no tremors  PSYCH: normal mood, affect and behavior  LYMPH: no visible LN's or LAD; LAD and LN's on right neck reduced in size              Labs:   Lab Results   Component Value Date    WBC 3.96 08/01/2023    HGB 11.3 (L) 08/01/2023    HCT 32.5 (L) 08/01/2023    MCV 89 08/01/2023    PLT 77 (L) 08/01/2023     CMP  Sodium   Date Value Ref Range Status   08/01/2023 139 136 - 145 mmol/L Final   06/12/2019 138 134 - 144 mmol/L      Potassium   Date Value Ref Range Status   08/01/2023 4.0 3.5 - 5.1 mmol/L Final     Chloride   Date Value Ref Range Status   08/01/2023 103 95 - 110 mmol/L Final   06/12/2019 99 98 - 110 mmol/L      CO2   Date Value Ref Range Status   08/01/2023 30 (H) 23 - 29 mmol/L Final     Glucose   Date Value Ref " Range Status   08/01/2023 148 (H) 70 - 110 mg/dL Final   06/12/2019 179 (H) 70 - 99 mg/dL      BUN   Date Value Ref Range Status   08/01/2023 14 8 - 23 mg/dL Final     Creatinine   Date Value Ref Range Status   08/01/2023 1.0 0.5 - 1.4 mg/dL Final   06/12/2019 0.95 0.60 - 1.40 mg/dL      Calcium   Date Value Ref Range Status   08/01/2023 8.9 8.7 - 10.5 mg/dL Final     Total Protein   Date Value Ref Range Status   08/01/2023 6.0 6.0 - 8.4 g/dL Final     Albumin   Date Value Ref Range Status   08/01/2023 3.7 3.5 - 5.2 g/dL Final   06/12/2019 3.9 3.1 - 4.7 g/dL      Total Bilirubin   Date Value Ref Range Status   08/01/2023 0.8 0.1 - 1.0 mg/dL Final     Comment:     For infants and newborns, interpretation of results should be based  on gestational age, weight and in agreement with clinical  observations.    Premature Infant recommended reference ranges:  Up to 24 hours.............<8.0 mg/dL  Up to 48 hours............<12.0 mg/dL  3-5 days..................<15.0 mg/dL  6-29 days.................<15.0 mg/dL       Alkaline Phosphatase   Date Value Ref Range Status   08/01/2023 55 55 - 135 U/L Final     AST   Date Value Ref Range Status   08/01/2023 16 10 - 40 U/L Final     ALT   Date Value Ref Range Status   08/01/2023 13 10 - 44 U/L Final     Anion Gap   Date Value Ref Range Status   08/01/2023 6 (L) 8 - 16 mmol/L Final     eGFR if    Date Value Ref Range Status   07/02/2022 >60.0 >60 mL/min/1.73 m^2 Final     eGFR if non    Date Value Ref Range Status   07/02/2022 >60.0 >60 mL/min/1.73 m^2 Final     Comment:     Calculation used to obtain the estimated glomerular filtration  rate (eGFR) is the CKD-EPI equation.              Lab Results   Component Value Date    IRON 59 02/02/2023    TIBC 342 02/02/2023    FERRITIN 67 02/02/2023         Radiology/Diagnostic Studies:    PET  12/2/2022:    IMPRESSION: Mild enlargement of the extensive adenopathy within the neck, chest, abdomen and pelvis  with faint increased FDG activity     Stable splenomegaly         CT 11/8/2022:  IMPRESSION:     Worsening splenomegaly with diffuse increase in size and number of essentially all of the main lymph node chains throughout the chest, abdomen and pelvis.           PET 8/29/2022:  IMPRESSION:  1. Numerous enlarged lymph nodes throughout the neck, chest, abdomen and pelvis are non hypermetabolic, and stable compared to CT of 07/02/2022.  2. Stable splenomegaly, with no abnormal focal splenic FDG hypermetabolism.  3. Additional observations as described.         CT 7/2/2022:    IMPRESSION:  1. Interval worsening in numerous enlarged lymph nodes in the chest, abdomen and pelvis, as well as development of splenomegaly, compatible with lymphoma and or worsening CLL, given patient's history  2. Mild bilateral hydroureteronephrosis, with markedly enlarged prostate gland and appearance of the urinary bladder suggesting chronic bladder outlet obstruction. Consider correlation with urinalysis.  3. Infrarenal abdominal aortic aneurysm measuring 33 mm in diameter. Follow-up imaging in 3 years is recommended per best practice guidelines.  4. Additional observations as described.           PET  3/14/2022:    IMPRESSION:  Left upper lobectomy without evidence of recurrent or metastatic disease      MRI cervical spine  10/1/2021:    IMPRESSION:     Loss of normal cervical lordosis with mild kyphotic curvature.     Advanced multilevel cervical spondylosis, as outlined above. Spinal canal narrowing and foraminal narrowing is most severe at C5-6. 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     Congenital narrowing of cervical spinal canal, which exacerbates cervical spondylosis        CT head  8/27/2021:  IMPRESSION:     No CT evidence of acute intracranial pathology.     Stable senescent changes as above.         PET  7/19/2021:    IMPRESSION:  Prior left upper lobectomy without  evidence of recurrent or metastatic disease  - 3 cm infrarenal abdominal aortic aneurysm      CT head 6/29/2021:  IMPRESSION:     No CT evidence of acute intracranial pathology      PET  1/18/2021:    Impression:     No evidence of FDG avid lymphoproliferative disease.     Aneurysmal dilation of infrarenal abdominal aorta (3.4 cm) as well as aneurysmal dilation of right common iliac artery.     Additional incidental findings as above        PET  6/24/2020:    Impression:     1. No findings of active lymphoproliferative disease.  2. Additional observations as above.      PET  1/24/2020:    Impression       Moderate decrease in size of the adenopathy within the neck, chest, abdomen and pelvis.  The spleen is smaller in size.  There is no significant FDG activity.    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 Multifocal lymphadenopathy in the chest, abdomen, and pelvis compatible with provided clinical history of lymphocytic leukemia.  There is mixed interval change when compared to prior studies.  Pathologic lymphadenopathy in the chest has increased significantly when compared to a CTA of the chest from May 2018.  Pathologic retroperitoneal lymphadenopathy has improved slightly when compared to a previous abdominal CT from February 2017.  Please see above details.  2. Mild aneurysmal dilation of the infrarenal abdominal aorta, with maximal transverse diameter of 3.7 cm.  Maximal transverse diameter on a prior study from 2017 was 2.5 cm.  3. Additional findings as above           [facility] Unknown Rad Eap    Result Date: 1/14/2019  CMS MANDATED QUALITY DATA - CT RADIATION - 436 All CT scans at this facility utilize dose modulation, iterative reconstruction, and/or weight based dosing when appropriate to reduce radiation dose to as low as reasonably achievable. Reason:Lymphoid leukemia TECHNIQUE: CT thorax with, CT abdomen  with, and CT pelvis  with 100 mL Omnipaque 350. COMPARISON: CT chest dated 05/19/2018 and CT abdomen and pelvis dated 02/08/2017 CT THORAX: There is stable mediastinal, hilar and axillary adenopathy. There is coronary artery calcification. There is resolution of the groundglass opacities throughout the lungs. There are no confluent infiltrates, pulmonary nodules or pleural effusions. There are stable subcentimeter nodules in the left lobe of the thyroid gland. There are degenerative changes of the spine. CT ABDOMEN: The liver, pancreas, adrenal glands and gallbladder are normal. The spleen is enlarged. There is mesenteric and retroperitoneal adenopathy which is slightly smaller in size. -There is a portacaval node measuring 37 x 21 mm and previously measured 38 x 27 mm. -Periportal lymph node measuring 41 x 18 mm, previously measured 48 x 29 mm. -Left periaortic adenopathy measuring 34 x 23 mm and previously measured 40 x 42 mm- The kidneys enhance symmetrically without hydronephrosis or calculi. There are no thick-walled or dilated bowel loops. There is vascular calcification of aorta. There is a 3.0 x 3.0 cm infrarenal abdominal aortic aneurysm. CT PELVIS: There is adenopathy along the pelvic sidewalls bilaterally which has decreased in size. -The left common iliac adenopathy measuring 36 x 11 mm, previously measured 46 x 14 mm -the right common iliac adenopathy measuring 46 x 10 mm. Previously measured 53 x 15 mm. The bladder is normal. The prostate gland is enlarged. There are degenerative changes of the spine. There is grade 1 anterolisthesis of L5 on S1 with bilateral pars defects.     IMPRESSION: Stable mediastinal, hilar and axillary adenopathy with resolution of the airspace disease within the lungs Decrease in size of the mesenteric, retroperitoneal and pelvic adenopathy. Stable infrarenal abdominal aortic aneurysm Splenomegaly     Read and electronically signed by: Jeniffer Arredondo MD on 1/14/2019 9:14 AM RADHA GONSALVES  VICENTE EMERSON      I have reviewed all available lab results and radiology reports.    Assessment/Plan:   (1) 82 y.o. male with  diagnosis of CLL who has been referred by Dr Rony Victoria for continuation of care by medical hematology/oncology.   - BM biopsy  12/4/2015 with CLL  - diagnosis of SLL in 2004 and s/p FCR x6 in 2007  - s/p imbruvica in past (stopped in May 2018)  - latest wbc at 4.8; lymph 56%  - latest CT on 8/7/2019 showing increase in the LAD  - platelets are 111,000  - He was recently hospitalized at [facility] and seen by Dr Wheeler. Dr Wheeler has recommended Rituximab. He is starting tomorrow.   - discussed the rituximab regimen and the potential side-effect profile; he is getting chemotherapy school today with Rose[facility]  - he saw Dr Don on Oct 21st 2019  - he has had 3 of the 4 weeks of rituximab so far; Dr Don recommends him to eventually get rituximab monthly x6 with daily oral Venetoclax for two years total.   - completed rituximab (4th) week of rituximab and  S/p 6 monthly rituximab with Venetoclax oral but is taking only 2 pills daily due to the counts  - he is now just on the venetclax  - he saw Dr Don again on Dec 23rd 2019 and sees him again in March 16th 2020  - latest PEt on 1/24/2020 looks really good    8/27/2020:  - he saw Dr Don last month at [facility]  - latest PEt from 6/24/2020 looks good  - he remains on just the oral venetoclax at 2 pills per day  - he sees Dr Don again in about 3 months (Oct 2020)    11/18/2020:  - he is doing well with the Venetoclax  - due for repeat PEt in Dec 2020  - he sees Dr Don again on Dec 23rd 2020    1/20/2021:  - he is doing well with the Venetoclax  - He saw Dr Don at [facility] in Dec 2020. He is now off rituximab monthly and remains only on the oral Venetoclax but at 2 pills daily . He sees Dr Don again in feb 2021  - Recent PEt on  1/18/2021 looks stable except for incidental aneurysm in aorta; so we are referring him to Dr Kapadia with  prosper    4/20/2021:  - he saw Dr Don in Feb 2021 and sees him again in Nov 2021  - repeat PEt in June/july 2021  - continued on Venetoclax and regular blood checks    7/20/2021:  - he continues on the Venetoclax  - mild leucopenia from the medication  - PEt on 7/19/2021 seems to be stable    9/21/2021:  - doing ok overall with some occasional HA's and general lack of energy  - he sees Dr Don again on nov 1st and hopefully he can discontinue the venetoclax thereafter  - Head CT on 8/27/2021 was relatively negative    2/17/2022:  -  He last showed for oncology appointment in Sept 2021  - He had covid in Jan 2022 and he received the infusion but did not require hospitalization  - He saw Dr Erazo couple of weeks ago  - He is planning to have cervical spine surgery with Dr Mai in the near future.  - He sees Dr Don at [facility] this coming Monday. He is now off rituximab monthly and he is also now off the oral Venetoclax (expect he will be getting a repeat bone marrow biopsy)    3/31/2022:  - He had recent telemed visit Dr Don at [facility] and they were pleased with the latest bone marrow biopsy. He is now off rituximab monthly and he is also now off the oral Venetoclax      6/6/2022:  - He had surgery on his cervical spine with Dr Mai on April 8th 2022 and had postoperative problems related to the intubation and anesthesia and was in the hospital for about 3 weeks. He had aspiration pneumonia and bout of respiratory failure requiring mech vent support.  He has residual swallowing issues and has a peg tube. He was seen Dr Garcia with GI while in hospital. He was discharged on 4/18. He has had home health coming to house couple times of week. He reports that he is doing better    - he is due to see Dr Don again at [facility] in the near future and he has since been off all therapy      8/11/2022:  - He was recently hospitalized with fever and pneumonia. He is feeling better  - He is swallowing and eating better; peg  tube since removed  - He previously had bout of oral mucosal ulcerations for which he had biopsies at [facility] which were negative for any malignancy. This has since resolved.  - He previously had telemed visit Dr Don at [facility] but does not know when he sees him again.. He had previous bone marrow biopsy with good report. He has been off rituximab and the oral Venetoclax since last Nov 2021.  - CT scans on 7/2/2022 with some progression of the LAD in his body   - will get PEt and aslk Dr Don to see him again    9/29/2022:  - He saw Dr Don again at [facility] and they are considering giving him a regimen of chemotherapy (some program or clinical trial0 over at [facility] but he reports that they have since decided against proceeding in that direction.  - he was supposed to see Dr Don on 9/12 but that appointment was cancelled per patient   - he had PET on 8/29/2022 which was stable    11/23/2022:  - He was recently hospitalized at [facility]; he is doing better and feeling better overall.  - He was supposed to see Dr Don again at [facility] after discharge but does not see him till Dec 2022  - CT on 11/8 with some increase in speel size and LAD  - will resume venetoclax in meantime and he needs to f/u with Dr Don for further directives  - get PEt    1/9/2023:  - He talked to Dr Don via telemed in Dec 2022 and was supposed to start venetoclax but has not done so as of yet. He reports that the specialty pharmacy had called him but he has not received the drug as of yet  - Dr Don was evaluating him for clinical trial but patient is not inclined to proceed in that direction  - Need Dr Don's last notes  - he had PEt on 12/2/2022 with some mild enlargement of the LN's  - he had bone marrow biopsy on 12/8/2022 with pathology report still unavailable but per review looks like he has about 14-17% involvement with NHL  - will reach out to Dr Don and the speciality pharmacy  - need the assistance of the patient navigator  - WBC has  increased to 18.39    2/15/2023:  - He has since started Gazyva per Dr Don's direction; he is feeling better overall and the LAD is shrinking down in the neck  - hgb at 11.4 and plats 36,000    4/12/2023:  - continued on Gazyva  - sees Dr Don again within the next month  - wbc at 2.71, hgb 10.4 and plats 57,000     7/5/2023:  - he has one more Gazyva left and is continued on the venetoclax  - latest wbc at 2.06  - hgb at 11.4 and plat 79,000    8/3/2023;   - finished Gazyva, now on ventetoclax 200mg daily   - labs weekly for now          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  - followed  By Dr Kee  - CEA 1.4 on 4/8/2021  - CT scans on 1/14/2019 stable  - PET done in jan 2021 on chart    3/31/2022:  - CEA at 0.8  - PET on 3/14/2022 negative for recurrence    9/29/2022:  - CEA 0.9  - PET on chart     11/23/2022:  - latest CEA at 1.2    2/15/2023:  - he had PET on 12/2/2022     (3) Iron deficiency anemia s/p IV iron couple weeks ago  - s/p periodic IV iron therapies         (4) HTN - on BP meds     (6) Chronic neck and back issues - followed by Dr Ryan Rivero     (7) DM - currently off all meds     (8) Hypercholesterolemia - on meds    (9)  - followed by Dr Whitney    (10) Chronic Leucopenia and thrombocytopenia - due to chemotherapy agents and lymphoma/leukemia          VISIT DIAGNOSES:      Encounter Diagnoses   Name Primary?    CLL (chronic lymphocytic leukemia) Yes    Dysphagia, unspecified type        PLAN:  Completed Gazyva,  oral venetoclax continued he is at 200mg dose   2.  F/u with Dr Don as directed - soon   3.  F/u with PCP, Pulm, , etc  4.  Monitor labs as directed  5.   F/u with Dr Kapadia with vascular for the aortic aneurysm as directed  6.  New referral to ENT for continued dysphagia        RTC 4 weeks with Dr. Bai       Discussion:       Total Time spent on patient:    I spent over 25 mins of time with the patient. Reviewed results of the recently ordered  labs, tests, reports and studies; made directives with regards to the results. Over half of this time was spent couseling and coordinating care, making treatment and analytical decisions; ordering necessary labs, tests and studies; and discussing treatment options and setting up treatment plan(s) if indicated.        COVID-19 Discussion:    I had long discussion with patient and any applicable family about the COVID-19 coronavirus epidemic and the recommended precautions with regard to cancer and/or hematology patients. I have re-iterated the CDC recommendations for adequate hand washing, use of hand -like products, and coughing into elbow, etc. In addition, especially for our patients who are on chemotherapy and/or our otherwise immunocompromised patients, I have recommended avoidance of crowds, including movie theaters, restaurants, churches, etc. I have recommended avoidance of any sick or symptomatic family members and/or friends. I have also recommended avoidance of any raw and unwashed food products, and general avoidance of food items that have not been prepared by themselves. The patient has been asked to call us immediately with any symptom developments, issues, questions or other general concerns.          I have explained all of the above in detail and the patient understands all of the current recommendation(s). I have answered all of their questions to the best of my ability and to their complete satisfaction.   The patient is to continue with the current management plan.        Chemotherapy Discussion:      I discussed the available treatment option(s) in accordance with the latest/current national evidence-based guidelines (NCCN, UpToDate, NCI, ASCO, etc where applicable), their overall age/condition and their co-morbidities. I also went over the risks and benefits of the chemotherapy with regard to their particular cancer type, their cancer stage, their age/condition, and their  co-morbidities. I provided literature on the chemotherapy regimen and discussed the chemotherapy side-effect profiles of the drug(s). I discussed the importance of compliance with obtaining and monitoring weekly lab work, and went over the potential hematopathology issues and risks with anemia, leucopenia and thrombocytopenia that can occur with chemotherapy. I discussed the potential risks of liver and kidney damage, which could be permanent and could necessitate dialysis long-term if kidney failure developed. I discussed the emetic and/or diarrheal potential of the regimen and the potential need for use of antiemetic and anti-diarrheal medications. I discussed the risk for development of anaphylactic shock, bronchospasm, dysrhythmia, and respiratory/cardiovascular arrest and/or failure. I discussed the potential risks for development of alopecia, cold sensory issues, ringing in ears, vertigo, cataracts, glaucoma, and neuropathy, all of which could end up being chronic and life-long. Some chemotherpyI discussed the risks of hand-foot syndrome and rashes, and development of other autoimmune mediated processes such as pneumonitis, hepatitis, and colitis which could be life threatening. I discussed the risks of the potential development of a rare but fatal viral mediated disease known as PML (Progressive Multifocal Leukoencephalopathy), and risk of future development of leukemia and/or lymphoma from use of certain chemotherapy agents. I discussed the need for neutropenic precautions, basic hygiene/sanitation behaviors and dietary restrictions.    The patient's consent has been obtained to proceed with the chemotherapy.The patient will be referred to [facility]otherapy [facility] /[facility] for training and education on chemotherapy, use of antiemetics and/or anti-diarrheals, use of NSAID's, potential chemotherapy side-effects, and any specific recommendations and precautions with the particular chemotherapy agents.        Immunologic Therapy Discussion:    I discussed the available treatment option(s) in accordance with the latest/current national evidence-based guidelines (NCCN, UpToDate, NCI, ASCO, etc where applicable), their overall age/condition and their co-morbidities. I went over the risks and benefits of the immunotherapy with regard to their particular cancer type, their cancer stage, their age/condition, and their co-morbidities. I provided literature on the immunotherapy regimen and discussed the immunotherapy side-effect profiles of the drug(s). I discussed the importance of compliance with obtaining and monitoring weekly lab work, and went over the potential hematopathology issues and risks of hemato-pathologic issues with anemia, leucopenia and/or thrombocytopenia and effects on thyroid function that can occur with immunotherapy. The patient will most likely need to have there thyroid functions monitored by their PCP and may need to take thyroid medication while on the immunotherapy. I discussed the potential risks of liver and kidney damage, which could be permanent and could necessitate dialysis long-term if kidney failure developed. I discussed the emetic and/or diarrheal potential of the regimen and the potential need for use of antiemetic and anti-diarrheal medications. I discussed the risk for development of anaphylactic shock, bronchospasm, dysrhythmia, and respiratory/cardiovascular arrest and/or failure. I discussed the potential risks for development of alopecia, cold sensory issues, ringing in ears, vertigo and neuropathy, all of which could end up being chronic and life-long. I discussed the risks of hand-foot syndrome and rashes, and development of other autoimmune mediated processes such as pneumonitis, hepatitis and colitis which could potentially be life threatening. I discussed the risks of the potential development of a rare but fatal viral mediated disease known as PML (Progressive Multifocal  Leukoencephalopathy), and risk of future development of leukemia and/or lymphoma from use of certain immunotherapy agents. I discussed the possibility that immunologic therapy cold worsen or promote progression of any underlying autoimmune diseases such as sarcoidosis, ulcerative colitis, Crohn's disease, psoriasis, rheumatoid disorders, scleroderma, autoimmune nephritis disorders, Hashimoto's thyroiditis, and Lupus among others. I discussed the need for neutropenic precautions, basic hygiene/sanitation behaviors and dietary restrictions.    The patient's consent has been obtained to proceed with the immunotherapy.The patient will be referred to Immunotherapy School /[facility] for training and education on immunotherapy, use of antiemetics and/or anti-diarrheals, use of NSAID's, potential immunotherapy side-effects, and any specific recommendations and precautions with the particular immunotherapy agents.      I answered all of the patient's (and family's, if applicable) questions to the best of my ability and to their complete satisfaction. The patient acknowledged full understanding of the risks, recommendations and plan(s).      Iron Infusion Therapy Discussion:     I provided literature/learning materials on the particular IV iron regimen and discussed the potential side-effect profiles of the drug(s). I discussed the importance of compliance with obtaining and monitoring requested lab work, and went over the potential risk for the development of anaphylactic shock, bronchospasm, dysrhythmia, liver and/or kidney damage, and respiratory/cardiovascular arrest and/or failure. I discussed the potential risks for development of alopecia, fevers, itching, chills and/or rigors, cold sensory issues, ringing in ears, vertigo and neuropathy, all of which are usually acute but sometimes could end up being chronic and life-long. I discussed the risks of hand-foot syndrome and rashes, and development of other  autoimmune mediated processes such as pneumonitis and colitis which could be life threatening.     The patient's consent has been obtained to proceed with the IV iron therapy.The patient will be referred to Chemotherapy [facility] /[facility] for training and education on IV iron therapy, use of antiemetics and/or anti-diarrheals, use of NSAID's, potential IV iron therapy side-effects, and any specific recommendations and precautions with the particular IV iron agents.      I answered all of the patient's (and family's, if applicable) questions to the best of my ability and to their complete satisfaction. The patient acknowledged full understanding of the risks, recommendations and plan(s).          I answered all of the patient's (and family's, if applicable) questions to the best of my ability and to their complete satisfaction. The patient acknowledged full understanding of the risks, recommendations and plan(s).         Electronically signed by Marion Rizo, MSN,APRN,AGNP-C

## 2023-08-15 NOTE — TELEPHONE ENCOUNTER
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 male, who is followed by the specialty pharmacy service for management and education of his Venclexta.  He has been on therapy with Venclexta since ~10/2023. He has completed gavyza and is currently on venclexta 200 mg daily due to plts.   I have reviewed his electronic medical record and current medication list and determined that specialty medication adjustment Is not needed at this time.    Patient has not experienced adverse events.  He Is adherent reporting 0 missed doses since last review.  Adherence has been encouraged with the following mechanism(s): directed education, spousal help.  He is meeting goals of therapy and will continue treatment.        7/27/2023 6/26/2023 4/14/2023 3/15/2023   Follow Up Review   # of missed doses 0 0 0 0   New Medications? No No No No   New Conditions? No No No No   New Allergies? No No No No   Med Effective? Good Good Good Good   Urgent Care? No No No No   Requested Pharmacist? No No No No         Therapy is appropriate to continue.    Therapy is effective: Yes  On scale of 1 to 10, how does patient rank quality of life? (10 - Best): Unable to Assess  Recommendations: none at this time.  Review Method: Chart Review    Pt is on ventetoclax 200mg daily due to Plt count. 8/9/2023-CBC review and appropriate to continue venclexta. Uric acid , mildly low( stable) CMP unremarkable.   Tasks added this encounter   No tasks added.   Tasks due within next 3 months   8/12/2023 - Clinical Assessment (6 month recurrence)  8/22/2023 - Refill Coordination Outreach (1 time occurrence)     NIYAH MENARD, PharmD  [facility]  [address]  [address]  [place]  [place] [address]  Phone: [phone]  Fax: [phone]

## 2023-08-16 NOTE — TELEPHONE ENCOUNTER
WBC 1.64, ANC 0.85 reported by Nila at  lab. Marion made aware and per her verbal orders I spoke to wife to inquire as to if patient having any fever, which wife states he is not, instructed her that since he is not having any fever he should continue on same med dose. Instructed that if develops fever that patient should go to ER as he is at greater risk for infection due to low WBC. Verbalized understanding.

## 2023-08-30 NOTE — TELEPHONE ENCOUNTER
----- Message from Drew Bai MD sent at 8/30/2023 12:27 PM CDT -----  Hold venetoclax for a week then resume

## 2023-09-12 NOTE — TELEPHONE ENCOUNTER
WBC 1.81 reported by Dr. Bhumika Chavez made aware of WBC and that patient on venetaclax, states this is ok, no new orders received.

## 2023-09-27 NOTE — PROGRESS NOTES
[facility] Hematology/Oncology  PROGRESS NOTE - Follow-up Visit      Subjective:       Patient ID:   NAME: Conrad Kuhn : 1940     82 y.o. male    Referring Doc: Julien  Other Physicians: Ced Erazo Joubert, Srinivas, Pinsky    Chief Complaint:  CLL f/u    History of Present Illness:     Patient is being seen today in person in clinic for a regular follow-up viasit. He is here by himself.  The patient is on today to go over the results of the recently ordered labs, tests and studies. He is here with his daughter today    He has since completed the Gazyva per Dr Don's direction  and has just continued on venetoclax oral meds; he is also now off the allopurinol    He feels a little fatigued; swallowing is improved overall; he saw Dr Hyman since last visit for esophageal dilation and is swallowing much better    He is breathing ok currently. He denies any CP, SOB, or N/V.     He sees Dr Don on 10/12/2023              Discussed Covid19 precautions; he had his vaccinations              ROS:   GEN: normal without any fever, night sweats or weight loss  HEENT: normal with no HA's, sore throat, stiff neck, changes in vision; LAD much better; swallowing better  CV: normal with no CP, SOB, PND, MAYER or orthopnea  PULM: normal with no SOB, cough, hemoptysis, sputum or pleuritic pain  GI: normal with no abdominal pain, nausea, vomiting, constipation, diarrhea, melanotic stools, BRBPR, or hematemesis; no dysphagia; peg tube removed  : urine frequency fine  BREAST: normal with no mass, discharge, pain  SKIN: normal with no rash, erythema, bruising, or swelling    Allergies:  Review of patient's allergies indicates:  No Known Allergies    Medications:    Current Outpatient Medications:     atorvastatin (LIPITOR) 20 MG tablet, Take 20 mg by mouth once daily., Disp: , Rfl:     azelastine (ASTELIN) 137 mcg (0.1 %) nasal spray, 1 spray by Nasal route 2 (two) times daily. , Disp: , Rfl:     blood sugar diagnostic Strp,  1 strip by Misc.(Non-Drug; Combo Route) route 2 (two) times a day., Disp: , Rfl:     duke's soln (benadryl 30 mL, mylanta 30 mL, LIDOcaine 30 mL, nystatin 30 mL) 120mL, Take 10 mLs by mouth 4 (four) times daily., Disp: 240 mL, Rfl: 0    ferrous sulfate (FEOSOL) 325 mg (65 mg iron) Tab tablet, Take 325 mg by mouth daily with breakfast., Disp: , Rfl:     fluticasone propionate (FLONASE) 50 mcg/actuation nasal spray, 1 spray by Each Nostril route once daily., Disp: , Rfl:     gabapentin (NEURONTIN) 600 MG tablet, Take 600 mg by mouth 2 (two) times daily., Disp: , Rfl:     glimepiride (AMARYL) 2 MG tablet, Take 2 mg by mouth once daily at 6am., Disp: , Rfl:     HYDROcodone-acetaminophen (NORCO)  mg per tablet, Take 1 tablet by mouth every 8 (eight) hours as needed., Disp: , Rfl:     levalbuterol (XOPENEX) 1.25 mg/3 mL nebulizer solution, Take 3 mLs by nebulization every 4 to 6 hours as needed. , Disp: , Rfl:     LIDOCAINE VISCOUS 2 % solution, Take 15 mLs by mouth every 4 (four) hours as needed., Disp: , Rfl:     tamsulosin (FLOMAX) 0.4 mg Cap, Take 1 capsule (0.4 mg total) by mouth once daily., Disp: 90 capsule, Rfl: 0    traZODone (DESYREL) 300 MG tablet, Take 300 mg by mouth every evening., Disp: , Rfl:     valACYclovir (VALTREX) 1000 MG tablet, Take 1,000 mg by mouth once daily., Disp: , Rfl:     venetoclax (VENCLEXTA) 100 mg Tab, Take 2 tablets (200 mg) by mouth once daily., Disp: 60 tablet, Rfl: 11    blood-glucose meter kit, Use as instructed, Disp: , Rfl:     losartan (COZAAR) 50 MG tablet, Take 1 tablet (50 mg total) by mouth once daily., Disp: 90 tablet, Rfl: 0    ondansetron (ZOFRAN) 8 MG tablet, Take 1 tablet (8 mg total) by mouth every 8 (eight) hours as needed for Nausea., Disp: 30 tablet, Rfl: 2    promethazine (PHENERGAN) 12.5 MG Tab, TAKE 1 TABLET(12.5 MG) BY MOUTH EVERY 6 HOURS AS NEEDED, Disp: 30 tablet, Rfl: 3    PMHx/PSHx Updates:  See patient's last visit with me on  8/30/2023  See H&P on  1/3/2019        Pathology:  Cancer Staging  No matching staging information was found for the patient.          Objective:     Vitals:  Blood pressure (!) 122/58, pulse 63, temperature 98.2 °F (36.8 °C), resp. rate 18, weight 88.9 kg (196 lb).        Physical Examination:   GEN: no apparent distress, comfortable; AAOx3  HEAD: atraumatic and normocephalic  EYES: no conjunctival pallor or muddiness, no icterus; normal pupil reaction to ambient light  ENT: OMM, no pharyngeal erythema, external bilateral ears WNL; no visible thrush or ulcers  NECK: no masses or swelling, trachea midline, no visible LAD/LN's ; LAD and LN's much better  CV: no palpitations; no pedal edema; no noticeable JVD or neck vein distension  CHEST: Normal respiratory effort; chest wall breath movements symmetrical; no audible wheezing  ABDOM: non-distended; no bloating;  peg tube since removed  MUSC/Skeletal: ROM normal; joints visibly normal; no deformities or arthropathy  EXTREM: no clubbing, cyanosis, inflammation or swelling; right arm with fair ROM  SKIN: no rashes, lesions, ulcers, petechiae or subcutaneous nodules  : no keith  NEURO: moving all 4 extremities; AAOx3; no tremors  PSYCH: normal mood, affect and behavior  LYMPH: no visible LN's or LAD; LAD and LN's on right neck reduced in size              Labs:   Lab Results   Component Value Date    WBC 1.50 (LL) 09/26/2023    HGB 12.0 (L) 09/26/2023    HCT 33.7 (L) 09/26/2023    MCV 91 09/26/2023    PLT 63 (L) 09/26/2023     CMP  Sodium   Date Value Ref Range Status   09/26/2023 138 136 - 145 mmol/L Final   06/12/2019 138 134 - 144 mmol/L      Potassium   Date Value Ref Range Status   09/26/2023 4.4 3.5 - 5.1 mmol/L Final     Chloride   Date Value Ref Range Status   09/26/2023 102 95 - 110 mmol/L Final   06/12/2019 99 98 - 110 mmol/L      CO2   Date Value Ref Range Status   09/26/2023 30 (H) 23 - 29 mmol/L Final     Glucose   Date Value Ref Range Status   09/26/2023 149 (H) 70 - 110 mg/dL  Final   06/12/2019 179 (H) 70 - 99 mg/dL      BUN   Date Value Ref Range Status   09/26/2023 19 8 - 23 mg/dL Final     Creatinine   Date Value Ref Range Status   09/26/2023 1.4 0.5 - 1.4 mg/dL Final   06/12/2019 0.95 0.60 - 1.40 mg/dL      Calcium   Date Value Ref Range Status   09/26/2023 9.0 8.7 - 10.5 mg/dL Final     Total Protein   Date Value Ref Range Status   09/26/2023 6.2 6.0 - 8.4 g/dL Final     Albumin   Date Value Ref Range Status   09/26/2023 4.2 3.5 - 5.2 g/dL Final   06/12/2019 3.9 3.1 - 4.7 g/dL      Total Bilirubin   Date Value Ref Range Status   09/26/2023 0.8 0.1 - 1.0 mg/dL Final     Comment:     For infants and newborns, interpretation of results should be based  on gestational age, weight and in agreement with clinical  observations.    Premature Infant recommended reference ranges:  Up to 24 hours.............<8.0 mg/dL  Up to 48 hours............<12.0 mg/dL  3-5 days..................<15.0 mg/dL  6-29 days.................<15.0 mg/dL       Alkaline Phosphatase   Date Value Ref Range Status   09/26/2023 47 (L) 55 - 135 U/L Final     AST   Date Value Ref Range Status   09/26/2023 15 10 - 40 U/L Final     ALT   Date Value Ref Range Status   09/26/2023 14 10 - 44 U/L Final     Anion Gap   Date Value Ref Range Status   09/26/2023 6 (L) 8 - 16 mmol/L Final     eGFR if    Date Value Ref Range Status   07/02/2022 >60.0 >60 mL/min/1.73 m^2 Final     eGFR    Date Value Ref Range Status   07/03/2022 97 >89 mL/min Final     eGFR if non    Date Value Ref Range Status   07/02/2022 >60.0 >60 mL/min/1.73 m^2 Final     Comment:     Calculation used to obtain the estimated glomerular filtration  rate (eGFR) is the CKD-EPI equation.              Lab Results   Component Value Date    IRON 129 09/12/2023    TIBC 353 09/12/2023    FERRITIN 28 09/12/2023         Radiology/Diagnostic Studies:    PET 9/13/2023:  IMPRESSION:  1. Near complete resolution of the FDG avid  lymphadenopathy, with only small borderline retroperitoneal and pelvic lymph nodes remaining.  2. Interval size of the spleen.       PET  12/2/2022:    IMPRESSION: Mild enlargement of the extensive adenopathy within the neck, chest, abdomen and pelvis with faint increased FDG activity     Stable splenomegaly         CT 11/8/2022:  IMPRESSION:     Worsening splenomegaly with diffuse increase in size and number of essentially all of the main lymph node chains throughout the chest, abdomen and pelvis.           PET 8/29/2022:  IMPRESSION:  1. Numerous enlarged lymph nodes throughout the neck, chest, abdomen and pelvis are non hypermetabolic, and stable compared to CT of 07/02/2022.  2. Stable splenomegaly, with no abnormal focal splenic FDG hypermetabolism.  3. Additional observations as described.         CT 7/2/2022:    IMPRESSION:  1. Interval worsening in numerous enlarged lymph nodes in the chest, abdomen and pelvis, as well as development of splenomegaly, compatible with lymphoma and or worsening CLL, given patient's history  2. Mild bilateral hydroureteronephrosis, with markedly enlarged prostate gland and appearance of the urinary bladder suggesting chronic bladder outlet obstruction. Consider correlation with urinalysis.  3. Infrarenal abdominal aortic aneurysm measuring 33 mm in diameter. Follow-up imaging in 3 years is recommended per best practice guidelines.  4. Additional observations as described.           PET  3/14/2022:    IMPRESSION:  Left upper lobectomy without evidence of recurrent or metastatic disease      MRI cervical spine  10/1/2021:    IMPRESSION:     Loss of normal cervical lordosis with mild kyphotic curvature.     Advanced multilevel cervical spondylosis, as outlined above. Spinal canal narrowing and foraminal narrowing is most severe at C5-6. 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     Congenital narrowing of cervical spinal  canal, which exacerbates cervical spondylosis        CT head  8/27/2021:  IMPRESSION:     No CT evidence of acute intracranial pathology.     Stable senescent changes as above.         PET  7/19/2021:    IMPRESSION:  Prior left upper lobectomy without evidence of recurrent or metastatic disease  - 3 cm infrarenal abdominal aortic aneurysm      CT head 6/29/2021:  IMPRESSION:     No CT evidence of acute intracranial pathology      PET  1/18/2021:    Impression:     No evidence of FDG avid lymphoproliferative disease.     Aneurysmal dilation of infrarenal abdominal aorta (3.4 cm) as well as aneurysmal dilation of right common iliac artery.     Additional incidental findings as above        PET  6/24/2020:    Impression:     1. No findings of active lymphoproliferative disease.  2. Additional observations as above.      PET  1/24/2020:    Impression       Moderate decrease in size of the adenopathy within the neck, chest, abdomen and pelvis.  The spleen is smaller in size.  There is no significant FDG activity.    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 Multifocal lymphadenopathy in the chest, abdomen, and pelvis compatible with provided clinical history of lymphocytic leukemia.  There is mixed interval change when compared to prior studies.  Pathologic lymphadenopathy in the chest has increased significantly when compared to a CTA of the chest from May 2018.  Pathologic retroperitoneal lymphadenopathy has improved slightly when compared to a previous abdominal CT from February 2017.  Please see above details.  2. Mild aneurysmal dilation of the infrarenal abdominal aorta, with maximal transverse diameter of 3.7 cm.  Maximal transverse diameter on a prior study from 2017 was 2.5 cm.  3. Additional findings as above           [facility] Unknown Rad Eap    Result Date: 1/14/2019  CMS MANDATED QUALITY DATA - CT RADIATION - 436 All CT scans at this  facility utilize dose modulation, iterative reconstruction, and/or weight based dosing when appropriate to reduce radiation dose to as low as reasonably achievable. Reason:Lymphoid leukemia TECHNIQUE: CT thorax with, CT abdomen  with, and CT pelvis with 100 mL Omnipaque 350. COMPARISON: CT chest dated 05/19/2018 and CT abdomen and pelvis dated 02/08/2017 CT THORAX: There is stable mediastinal, hilar and axillary adenopathy. There is coronary artery calcification. There is resolution of the groundglass opacities throughout the lungs. There are no confluent infiltrates, pulmonary nodules or pleural effusions. There are stable subcentimeter nodules in the left lobe of the thyroid gland. There are degenerative changes of the spine. CT ABDOMEN: The liver, pancreas, adrenal glands and gallbladder are normal. The spleen is enlarged. There is mesenteric and retroperitoneal adenopathy which is slightly smaller in size. -There is a portacaval node measuring 37 x 21 mm and previously measured 38 x 27 mm. -Periportal lymph node measuring 41 x 18 mm, previously measured 48 x 29 mm. -Left periaortic adenopathy measuring 34 x 23 mm and previously measured 40 x 42 mm- The kidneys enhance symmetrically without hydronephrosis or calculi. There are no thick-walled or dilated bowel loops. There is vascular calcification of aorta. There is a 3.0 x 3.0 cm infrarenal abdominal aortic aneurysm. CT PELVIS: There is adenopathy along the pelvic sidewalls bilaterally which has decreased in size. -The left common iliac adenopathy measuring 36 x 11 mm, previously measured 46 x 14 mm -the right common iliac adenopathy measuring 46 x 10 mm. Previously measured 53 x 15 mm. The bladder is normal. The prostate gland is enlarged. There are degenerative changes of the spine. There is grade 1 anterolisthesis of L5 on S1 with bilateral pars defects.     IMPRESSION: Stable mediastinal, hilar and axillary adenopathy with resolution of the airspace disease  within the lungs Decrease in size of the mesenteric, retroperitoneal and pelvic adenopathy. Stable infrarenal abdominal aortic aneurysm Splenomegaly     Read and electronically signed by: Jeniffer Arredondo MD on 1/14/2019 9:14 AM CST JENIFFER ARREDONDO MD      I have reviewed all available lab results and radiology reports.    Assessment/Plan:   (1) 82 y.o. male with  diagnosis of CLL who has been referred by Dr Rony Victoria for continuation of care by medical hematology/oncology.   - BM biopsy  12/4/2015 with CLL  - diagnosis of SLL in 2004 and s/p FCR x6 in 2007  - s/p imbruvica in past (stopped in May 2018)  - latest wbc at 4.8; lymph 56%  - latest CT on 8/7/2019 showing increase in the LAD  - platelets are 111,000  - He was recently hospitalized at [facility] and seen by Dr Wheeler. Dr Wheeler has recommended Rituximab. He is starting tomorrow.   - discussed the rituximab regimen and the potential side-effect profile; he is getting chemotherapy school today with Rosemary [facility]  - he saw Dr Don on Oct 21st 2019  - he has had 3 of the 4 weeks of rituximab so far; Dr Don recommends him to eventually get rituximab monthly x6 with daily oral Venetoclax for two years total.   - completed rituximab (4th) week of rituximab and  S/p 6 monthly rituximab with Venetoclax oral but is taking only 2 pills daily due to the counts  - he is now just on the venetclax  - he saw Dr Don again on Dec 23rd 2019 and sees him again in March 16th 2020  - latest PEt on 1/24/2020 looks really good    8/27/2020:  - he saw Dr Don last month at [facility]  - latest PEt from 6/24/2020 looks good  - he remains on just the oral venetoclax at 2 pills per day  - he sees Dr Don again in about 3 months (Oct 2020)    11/18/2020:  - he is doing well with the Venetoclax  - due for repeat PEt in Dec 2020  - he sees Dr Don again on Dec 23rd 2020    1/20/2021:  - he is doing well with the Venetoclax  - He saw Dr Don at [facility] in Dec 2020. He is now off rituximab  monthly and remains only on the oral Venetoclax but at 2 pills daily . He sees Dr Don again in feb 2021  - Recent PEt on  1/18/2021 looks stable except for incidental aneurysm in aorta; so we are referring him to Dr Kapadia with vascualr    4/20/2021:  - he saw Dr Don in Feb 2021 and sees him again in Nov 2021  - repeat PEt in June/july 2021  - continued on Venetoclax and regular blood checks    7/20/2021:  - he continues on the Venetoclax  - mild leucopenia from the medication  - PEt on 7/19/2021 seems to be stable    9/21/2021:  - doing ok overall with some occasional HA's and general lack of energy  - he sees Dr Don again on nov 1st and hopefully he can discontinue the venetoclax thereafter  - Head CT on 8/27/2021 was relatively negative    2/17/2022:  -  He last showed for oncology appointment in Sept 2021  - He had covid in Jan 2022 and he received the infusion but did not require hospitalization  - He saw Dr Erazo couple of weeks ago  - He is planning to have cervical spine surgery with Dr Mai in the near future.  - He sees Dr Don at [facility] this coming Monday. He is now off rituximab monthly and he is also now off the oral Venetoclax (expect he will be getting a repeat bone marrow biopsy)    3/31/2022:  - He had recent telemed visit Dr Don at [facility] and they were pleased with the latest bone marrow biopsy. He is now off rituximab monthly and he is also now off the oral Venetoclax      6/6/2022:  - He had surgery on his cervical spine with Dr Mai on April 8th 2022 and had postoperative problems related to the intubation and anesthesia and was in the hospital for about 3 weeks. He had aspiration pneumonia and bout of respiratory failure requiring mech vent support.  He has residual swallowing issues and has a peg tube. He was seen Dr Garcia with GI while in hospital. He was discharged on 4/18. He has had home health coming to house couple times of week. He reports that he is doing better    - he is due  to see Dr Don again at [facility] in the near future and he has since been off all therapy      8/11/2022:  - He was recently hospitalized with fever and pneumonia. He is feeling better  - He is swallowing and eating better; peg tube since removed  - He previously had bout of oral mucosal ulcerations for which he had biopsies at [facility] which were negative for any malignancy. This has since resolved.  - He previously had telemed visit Dr Don at [facility] but does not know when he sees him again.. He had previous bone marrow biopsy with good report. He has been off rituximab and the oral Venetoclax since last Nov 2021.  - CT scans on 7/2/2022 with some progression of the LAD in his body   - will get PEt and aslk Dr Don to see him again    9/29/2022:  - He saw Dr Don again at [facility] and they are considering giving him a regimen of chemotherapy (some program or clinical trial0 over at [facility] but he reports that they have since decided against proceeding in that direction.  - he was supposed to see Dr Don on 9/12 but that appointment was cancelled per patient   - he had PET on 8/29/2022 which was stable    11/23/2022:  - He was recently hospitalized at [facility]; he is doing better and feeling better overall.  - He was supposed to see Dr Don again at [facility] after discharge but does not see him till Dec 2022  - CT on 11/8 with some increase in speel size and LAD  - will resume venetoclax in meantime and he needs to f/u with Dr Don for further directives  - get PEt    1/9/2023:  - He talked to Dr Don via telemed in Dec 2022 and was supposed to start venetoclax but has not done so as of yet. He reports that the specialty pharmacy had called him but he has not received the drug as of yet  - Dr Don was evaluating him for clinical trial but patient is not inclined to proceed in that direction  - Need Dr Don's last notes  - he had PEt on 12/2/2022 with some mild enlargement of the LN's  - he had bone marrow biopsy on 12/8/2022  with pathology report still unavailable but per review looks like he has about 14-17% involvement with NHL  - will reach out to Dr Don and the speciality pharmacy  - need the assistance of the patient navigator  - WBC has increased to 18.39    2/15/2023:  - He has since started Gazyva per Dr Don's direction; he is feeling better overall and the LAD is shrinking down in the neck  - hgb at 11.4 and plats 36,000    4/12/2023:  - continued on Gazyva  - sees Dr Don again within the next month  - wbc at 2.71, hgb 10.4 and plats 57,000     7/5/2023:  - he has one more Gazyva left and is continued on the venetoclax  - latest wbc at 2.06  - hgb at 11.4 and plat 79,000    8/30/2023:  - he has completed the Gazyva and has now just continued on Venetoclax  - counts are a little low, so will hold therapy for one week  - continue to check labs weekly  - he saw Dr Don couple of weeks ago and sees him again in a couple of months  - due for repeat PET    9/27/2023:  - starting Neupogen today  - check labs again Monday morning and can get therapy next week if the labs are better  - refill antiemetics  - he had PEt on 9/13/2023  - seeing Dr Don again on 10/12         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  - followed  By Dr Kee  - CEA 1.4 on 4/8/2021  - CT scans on 1/14/2019 stable  - PET done in jan 2021 on chart    3/31/2022:  - CEA at 0.8  - PET on 3/14/2022 negative for recurrence    9/29/2022:  - CEA 0.9  - PET on chart     11/23/2022:  - latest CEA at 1.2    2/15/2023:  - he had PET on 12/2/2022 9/27/2023:  - CEA 0.8     (3) Iron deficiency anemia s/p IV iron couple weeks ago  - s/p periodic IV iron therapies         (4) HTN - on BP meds     (6) Chronic neck and back issues - followed by Dr Ryan Rivero     (7) DM - currently off all meds     (8) Hypercholesterolemia - on meds    (9)  - followed by Dr Whitney    (10) Chronic Leucopenia and thrombocytopenia - due to chemotherapy agents and  lymphoma/leukemia          VISIT DIAGNOSES:      Encounter Diagnoses   Name Primary?    CLL (chronic lymphocytic leukemia) Yes    History of lung cancer - ANDRES NSCLC 2004     Iron deficiency     Iron deficiency anemia, unspecified iron deficiency anemia type     Lymphoma, unspecified body region, unspecified lymphoma type     Thrombocytopenia     Splenomegaly     Chemotherapy induced neutropenia     Lymphadenopathy, generalized            PLAN:  Continued with just the oral oral venetoclax (but hold x 1 week due to the counts); check labs weekly; resume IV iron as needed; encouraged continuation of oral iron (starting neupogen today)  2.  F/u with Dr Don as directed - 10/12  3.  F/u with PCP, Pulm, , etc  4.  Monitor labs as directed  5.   F/u with Dr Kapadia with vascular for the aortic aneurysm as directed  6.  F/u with Dr Hyman as directed             RTC in  4 weeks  with myself  Fax note to Kacey Kee, Ryan Rivero, Chelo, Karla, Florencia/Kang Wheeler; Primo; Radha        Discussion:       Total Time spent on patient:    I spent over 25 mins of time with the patient. Reviewed results of the recently ordered labs, tests, reports and studies; made directives with regards to the results. Over half of this time was spent couseling and coordinating care, making treatment and analytical decisions; ordering necessary labs, tests and studies; and discussing treatment options and setting up treatment plan(s) if indicated.        COVID-19 Discussion:    I had long discussion with patient and any applicable family about the COVID-19 coronavirus epidemic and the recommended precautions with regard to cancer and/or hematology patients. I have re-iterated the CDC recommendations for adequate hand washing, use of hand -like products, and coughing into elbow, etc. In addition, especially for our patients who are on chemotherapy and/or our otherwise immunocompromised patients, I have recommended avoidance of  crowds, including movie theaters, restaurants, churches, etc. I have recommended avoidance of any sick or symptomatic family members and/or friends. I have also recommended avoidance of any raw and unwashed food products, and general avoidance of food items that have not been prepared by themselves. The patient has been asked to call us immediately with any symptom developments, issues, questions or other general concerns.          I have explained all of the above in detail and the patient understands all of the current recommendation(s). I have answered all of their questions to the best of my ability and to their complete satisfaction.   The patient is to continue with the current management plan.        Chemotherapy Discussion:      I discussed the available treatment option(s) in accordance with the latest/current national evidence-based guidelines (NCCN, UpToDate, NCI, ASCO, etc where applicable), their overall age/condition and their co-morbidities. I also went over the risks and benefits of the chemotherapy with regard to their particular cancer type, their cancer stage, their age/condition, and their co-morbidities. I provided literature on the chemotherapy regimen and discussed the chemotherapy side-effect profiles of the drug(s). I discussed the importance of compliance with obtaining and monitoring weekly lab work, and went over the potential hematopathology issues and risks with anemia, leucopenia and thrombocytopenia that can occur with chemotherapy. I discussed the potential risks of liver and kidney damage, which could be permanent and could necessitate dialysis long-term if kidney failure developed. I discussed the emetic and/or diarrheal potential of the regimen and the potential need for use of antiemetic and anti-diarrheal medications. I discussed the risk for development of anaphylactic shock, bronchospasm, dysrhythmia, and respiratory/cardiovascular arrest and/or failure. I discussed the  potential risks for development of alopecia, cold sensory issues, ringing in ears, vertigo, cataracts, glaucoma, and neuropathy, all of which could end up being chronic and life-long. Some chemotherpyI discussed the risks of hand-foot syndrome and rashes, and development of other autoimmune mediated processes such as pneumonitis, hepatitis, and colitis which could be life threatening. I discussed the risks of the potential development of a rare but fatal viral mediated disease known as PML (Progressive Multifocal Leukoencephalopathy), and risk of future development of leukemia and/or lymphoma from use of certain chemotherapy agents. I discussed the need for neutropenic precautions, basic hygiene/sanitation behaviors and dietary restrictions.    The patient's consent has been obtained to proceed with the chemotherapy.The patient will be referred to Chemotherapy School /[facility] for training and education on chemotherapy, use of antiemetics and/or anti-diarrheals, use of NSAID's, potential chemotherapy side-effects, and any specific recommendations and precautions with the particular chemotherapy agents.       Immunologic Therapy Discussion:    I discussed the available treatment option(s) in accordance with the latest/current national evidence-based guidelines (NCCN, UpToDate, NCI, ASCO, etc where applicable), their overall age/condition and their co-morbidities. I went over the risks and benefits of the immunotherapy with regard to their particular cancer type, their cancer stage, their age/condition, and their co-morbidities. I provided literature on the immunotherapy regimen and discussed the immunotherapy side-effect profiles of the drug(s). I discussed the importance of compliance with obtaining and monitoring weekly lab work, and went over the potential hematopathology issues and risks of hemato-pathologic issues with anemia, leucopenia and/or thrombocytopenia and effects on thyroid function that can  occur with immunotherapy. The patient will most likely need to have there thyroid functions monitored by their PCP and may need to take thyroid medication while on the immunotherapy. I discussed the potential risks of liver and kidney damage, which could be permanent and could necessitate dialysis long-term if kidney failure developed. I discussed the emetic and/or diarrheal potential of the regimen and the potential need for use of antiemetic and anti-diarrheal medications. I discussed the risk for development of anaphylactic shock, bronchospasm, dysrhythmia, and respiratory/cardiovascular arrest and/or failure. I discussed the potential risks for development of alopecia, cold sensory issues, ringing in ears, vertigo and neuropathy, all of which could end up being chronic and life-long. I discussed the risks of hand-foot syndrome and rashes, and development of other autoimmune mediated processes such as pneumonitis, hepatitis and colitis which could potentially be life threatening. I discussed the risks of the potential development of a rare but fatal viral mediated disease known as PML (Progressive Multifocal Leukoencephalopathy), and risk of future development of leukemia and/or lymphoma from use of certain immunotherapy agents. I discussed the possibility that immunologic therapy cold worsen or promote progression of any underlying autoimmune diseases such as sarcoidosis, ulcerative colitis, Crohn's disease, psoriasis, rheumatoid disorders, scleroderma, autoimmune nephritis disorders, Hashimoto's thyroiditis, and Lupus among others. I discussed the need for neutropenic precautions, basic hygiene/sanitation behaviors and dietary restrictions.    The patient's consent has been obtained to proceed with the immunotherapy.The patient will be referred to Immunotherapy School /[facility] for training and education on immunotherapy, use of antiemetics and/or anti-diarrheals, use of NSAID's, potential  immunotherapy side-effects, and any specific recommendations and precautions with the particular immunotherapy agents.      I answered all of the patient's (and family's, if applicable) questions to the best of my ability and to their complete satisfaction. The patient acknowledged full understanding of the risks, recommendations and plan(s).      Iron Infusion Therapy Discussion:     I provided literature/learning materials on the particular IV iron regimen and discussed the potential side-effect profiles of the drug(s). I discussed the importance of compliance with obtaining and monitoring requested lab work, and went over the potential risk for the development of anaphylactic shock, bronchospasm, dysrhythmia, liver and/or kidney damage, and respiratory/cardiovascular arrest and/or failure. I discussed the potential risks for development of alopecia, fevers, itching, chills and/or rigors, cold sensory issues, ringing in ears, vertigo and neuropathy, all of which are usually acute but sometimes could end up being chronic and life-long. I discussed the risks of hand-foot syndrome and rashes, and development of other autoimmune mediated processes such as pneumonitis and colitis which could be life threatening.     The patient's consent has been obtained to proceed with the IV iron therapy.The patient will be referred to Chemotherapy School /[facility] for training and education on IV iron therapy, use of antiemetics and/or anti-diarrheals, use of NSAID's, potential IV iron therapy side-effects, and any specific recommendations and precautions with the particular IV iron agents.      I answered all of the patient's (and family's, if applicable) questions to the best of my ability and to their complete satisfaction. The patient acknowledged full understanding of the risks, recommendations and plan(s).          I answered all of the patient's (and family's, if applicable) questions to the best of my ability and  to their complete satisfaction. The patient acknowledged full understanding of the risks, recommendations and plan(s).         Electronically signed by Drew Bai MD

## 2023-09-27 NOTE — TELEPHONE ENCOUNTER
Spoke to Dr. Don. Directions to hold venetoclax when the ANC drops below 500.     Okay to give neupogen with venetoclax when the ANC drops below 1000.     Will contact scheduling.

## 2023-09-29 NOTE — PLAN OF CARE
Problem: Fatigue  Goal: Improved Activity Tolerance  Outcome: Ongoing, Progressing  Intervention: Promote Improved Energy  Flowsheets (Taken 9/29/2023 1134)  Fatigue Management: activity schedule adjusted  Sleep/Rest Enhancement: relaxation techniques promoted  Activity Management: Ambulated -L4

## 2023-10-12 NOTE — TELEPHONE ENCOUNTER
----- Message from Drew Bai MD sent at 10/10/2023 11:05 AM CDT -----  Hold his oral chemotherapy for one week please

## 2023-10-12 NOTE — TELEPHONE ENCOUNTER
Per Dr. Bai's verbal orders, I placed orders for TSH, Folate and B12 labs as Dr. Don would like patient to have these labs draw. Patient made aware via portal message.

## 2023-10-17 NOTE — PROGRESS NOTES
WBC 1.16, ANC 0.30, Plt 46 reported by Malaika at [facility]. Dr. Bai made aware and per his verbal orders I placed orders for neupogen x 3 doses, made Evelyn Santana and Shanel aware of need to start today. Spoke to to wife who confirmed patient still holding Venclexta. Per Dr Bai's orders I instructed to continue to hold this med and that scheduling will call to schedule ordered neupogen x 3 doses to start today. Verbalized understanding.

## 2023-10-18 NOTE — PLAN OF CARE
Problem: Fall Injury Risk  Goal: Absence of Fall and Fall-Related Injury  Outcome: Ongoing, Progressing  Intervention: Identify and Manage Contributors  Flowsheets (Taken 10/18/2023 1414)  Self-Care Promotion: safe use of adaptive equipment encouraged  Medication Review/Management: medications reviewed  Intervention: Promote Injury-Free Environment  Flowsheets (Taken 10/18/2023 1414)  Safety Promotion/Fall Prevention: assistive device/personal item within reach

## 2023-10-19 NOTE — PLAN OF CARE
Problem: Fatigue  Goal: Improved Activity Tolerance  Intervention: Promote Improved Energy  Flowsheets (Taken 10/19/2023 1416)  Activity Management:   Ambulated -L4   Ambulated in room - L4

## 2023-10-24 NOTE — TELEPHONE ENCOUNTER
Plt 42, reported by cally at [facility]. Dr. Bai made aware and per his verbal orders I instructed patient to continue to hold Venclexta at this time via portal message. I have also faxed labs for review to Dr. Ibanez.

## 2023-11-02 NOTE — TELEPHONE ENCOUNTER
Late entry-10/31- mariya from [facility] called with plt 31. Dr. Bai made aware and per his verbal orders I made wife aware of need to continue to hold Venclexta and to continue checking labs weekly via portal message.

## 2023-11-07 NOTE — PROGRESS NOTES
[facility] Hematology/Oncology  PROGRESS NOTE - Follow-up Visit      Subjective:       Patient ID:   NAME: Conrad Kuhn : 1940     83 y.o. male    Referring Doc: Julien  Other Physicians: Ced Erazo Joubert, Srinivas, Pinsky    Chief Complaint:  CLL f/u    History of Present Illness:     Patient is being seen today in person in clinic for a regular follow-up viasit. He is here by himself.  The patient is on today to go over the results of the recently ordered labs, tests and studies. He is here with his wife today    He has since completed the Gazyva per Dr Don's direction and has just continued on venetoclax oral meds; but the Venetoclax currently on hold due to his blood counts    He feels a little fatigued; swallowing is improved overall;     He had bone marrow biopsy at [facility] on 10/16; he sees Dr Don again next Wednesday     He is breathing ok currently. He denies any CP, SOB, or N/V.                    Discussed Covid19 precautions; he had his vaccinations              ROS:   GEN: normal without any fever, night sweats or weight loss  HEENT: normal with no HA's, sore throat, stiff neck, changes in vision; LAD much better; swallowing better  CV: normal with no CP, SOB, PND, MAYER or orthopnea  PULM: normal with no SOB, cough, hemoptysis, sputum or pleuritic pain  GI: normal with no abdominal pain, nausea, vomiting, constipation, diarrhea, melanotic stools, BRBPR, or hematemesis; no dysphagia; peg tube removed  : urine frequency fine  BREAST: normal with no mass, discharge, pain  SKIN: normal with no rash, erythema, bruising, or swelling    Allergies:  Review of patient's allergies indicates:  No Known Allergies    Medications:    Current Outpatient Medications:     atorvastatin (LIPITOR) 20 MG tablet, Take 20 mg by mouth once daily., Disp: , Rfl:     azelastine (ASTELIN) 137 mcg (0.1 %) nasal spray, 1 spray by Nasal route 2 (two) times daily. , Disp: , Rfl:     blood sugar diagnostic Strp, 1 strip  by Misc.(Non-Drug; Combo Route) route 2 (two) times a day., Disp: , Rfl:     duke's soln (benadryl 30 mL, mylanta 30 mL, LIDOcaine 30 mL, nystatin 30 mL) 120mL, Take 10 mLs by mouth 4 (four) times daily., Disp: 240 mL, Rfl: 0    ferrous sulfate (FEOSOL) 325 mg (65 mg iron) Tab tablet, Take 325 mg by mouth daily with breakfast., Disp: , Rfl:     fluticasone propionate (FLONASE) 50 mcg/actuation nasal spray, 1 spray by Each Nostril route once daily., Disp: , Rfl:     gabapentin (NEURONTIN) 600 MG tablet, Take 600 mg by mouth 2 (two) times daily., Disp: , Rfl:     glimepiride (AMARYL) 2 MG tablet, Take 2 mg by mouth once daily at 6am., Disp: , Rfl:     HYDROcodone-acetaminophen (NORCO)  mg per tablet, Take 1 tablet by mouth every 8 (eight) hours as needed., Disp: , Rfl:     levalbuterol (XOPENEX) 1.25 mg/3 mL nebulizer solution, Take 3 mLs by nebulization every 4 to 6 hours as needed. , Disp: , Rfl:     LIDOCAINE VISCOUS 2 % solution, Take 15 mLs by mouth every 4 (four) hours as needed., Disp: , Rfl:     ondansetron (ZOFRAN) 8 MG tablet, Take 1 tablet (8 mg total) by mouth every 8 (eight) hours as needed for Nausea., Disp: 30 tablet, Rfl: 2    promethazine (PHENERGAN) 12.5 MG Tab, TAKE 1 TABLET(12.5 MG) BY MOUTH EVERY 6 HOURS AS NEEDED, Disp: 30 tablet, Rfl: 3    tamsulosin (FLOMAX) 0.4 mg Cap, Take 1 capsule (0.4 mg total) by mouth once daily., Disp: 90 capsule, Rfl: 0    traZODone (DESYREL) 300 MG tablet, Take 300 mg by mouth every evening., Disp: , Rfl:     valACYclovir (VALTREX) 1000 MG tablet, Take 1,000 mg by mouth once daily., Disp: , Rfl:     venetoclax (VENCLEXTA) 100 mg Tab, Take 2 tablets (200 mg) by mouth once daily., Disp: 60 tablet, Rfl: 11    blood-glucose meter kit, Use as instructed, Disp: , Rfl:     losartan (COZAAR) 50 MG tablet, Take 1 tablet (50 mg total) by mouth once daily., Disp: 90 tablet, Rfl: 0    PMHx/PSHx Updates:  See patient's last visit with me on  9/27/2023  See H&P on  "1/3/2019        Pathology:  Cancer Staging  No matching staging information was found for the patient.          Objective:     Vitals:  Blood pressure (!) 103/50, pulse 80, temperature 97.8 °F (36.6 °C), resp. rate 16, height 6' 2" (1.88 m), weight 91.6 kg (201 lb 14.4 oz).        Physical Examination:   GEN: no apparent distress, comfortable; AAOx3; elderly  HEAD: atraumatic and normocephalic  EYES: no conjunctival pallor or muddiness, no icterus; normal pupil reaction to ambient light  ENT: OMM, no pharyngeal erythema, external bilateral ears WNL; no visible thrush or ulcers  NECK: no masses or swelling, trachea midline, no visible LAD/LN's ; LAD and LN's much better  CV: no palpitations; no pedal edema; no noticeable JVD or neck vein distension  CHEST: Normal respiratory effort; chest wall breath movements symmetrical; no audible wheezing  ABDOM: non-distended; no bloating;  peg tube since removed  MUSC/Skeletal: ROM normal; joints visibly normal; no deformities or arthropathy  EXTREM: no clubbing, cyanosis, inflammation or swelling; right arm with fair ROM  SKIN: no rashes, lesions, ulcers, petechiae or subcutaneous nodules  : no keith  NEURO: moving all 4 extremities; AAOx3; no tremors  PSYCH: normal mood, affect and behavior  LYMPH: no visible LN's or LAD; LAD and LN's on right neck reduced in size              Labs:   Lab Results   Component Value Date    WBC 1.60 (LL) 11/07/2023    HGB 9.9 (L) 11/07/2023    HCT 28.3 (L) 11/07/2023    MCV 98 11/07/2023    PLT 22 (LL) 11/07/2023     CMP  Sodium   Date Value Ref Range Status   11/07/2023 140 136 - 145 mmol/L Final   06/12/2019 138 134 - 144 mmol/L      Potassium   Date Value Ref Range Status   11/07/2023 4.4 3.5 - 5.1 mmol/L Final     Chloride   Date Value Ref Range Status   11/07/2023 105 95 - 110 mmol/L Final   06/12/2019 99 98 - 110 mmol/L      CO2   Date Value Ref Range Status   11/07/2023 28 23 - 29 mmol/L Final     Glucose   Date Value Ref Range Status "   11/07/2023 124 (H) 70 - 110 mg/dL Final   06/12/2019 179 (H) 70 - 99 mg/dL      BUN   Date Value Ref Range Status   11/07/2023 25 (H) 8 - 23 mg/dL Final     Creatinine   Date Value Ref Range Status   11/07/2023 1.2 0.5 - 1.4 mg/dL Final   06/12/2019 0.95 0.60 - 1.40 mg/dL      Calcium   Date Value Ref Range Status   11/07/2023 9.1 8.7 - 10.5 mg/dL Final     Total Protein   Date Value Ref Range Status   11/07/2023 5.9 (L) 6.0 - 8.4 g/dL Final     Albumin   Date Value Ref Range Status   11/07/2023 4.0 3.5 - 5.2 g/dL Final   06/12/2019 3.9 3.1 - 4.7 g/dL      Total Bilirubin   Date Value Ref Range Status   11/07/2023 0.6 0.1 - 1.0 mg/dL Final     Comment:     For infants and newborns, interpretation of results should be based  on gestational age, weight and in agreement with clinical  observations.    Premature Infant recommended reference ranges:  Up to 24 hours.............<8.0 mg/dL  Up to 48 hours............<12.0 mg/dL  3-5 days..................<15.0 mg/dL  6-29 days.................<15.0 mg/dL       Alkaline Phosphatase   Date Value Ref Range Status   11/07/2023 43 (L) 55 - 135 U/L Final     AST   Date Value Ref Range Status   11/07/2023 13 10 - 40 U/L Final     ALT   Date Value Ref Range Status   11/07/2023 11 10 - 44 U/L Final     Anion Gap   Date Value Ref Range Status   11/07/2023 7 (L) 8 - 16 mmol/L Final     eGFR if    Date Value Ref Range Status   07/02/2022 >60.0 >60 mL/min/1.73 m^2 Final     eGFR    Date Value Ref Range Status   07/03/2022 97 >89 mL/min Final     eGFR if non    Date Value Ref Range Status   07/02/2022 >60.0 >60 mL/min/1.73 m^2 Final     Comment:     Calculation used to obtain the estimated glomerular filtration  rate (eGFR) is the CKD-EPI equation.              Lab Results   Component Value Date    IRON 103 11/07/2023    TIBC 321 11/07/2023    FERRITIN 59.4 11/07/2023         Radiology/Diagnostic Studies:    PET  9/13/2023:  IMPRESSION:  1. Near complete resolution of the FDG avid lymphadenopathy, with only small borderline retroperitoneal and pelvic lymph nodes remaining.  2. Interval size of the spleen.       PET  12/2/2022:    IMPRESSION: Mild enlargement of the extensive adenopathy within the neck, chest, abdomen and pelvis with faint increased FDG activity     Stable splenomegaly         CT 11/8/2022:  IMPRESSION:     Worsening splenomegaly with diffuse increase in size and number of essentially all of the main lymph node chains throughout the chest, abdomen and pelvis.           PET 8/29/2022:  IMPRESSION:  1. Numerous enlarged lymph nodes throughout the neck, chest, abdomen and pelvis are non hypermetabolic, and stable compared to CT of 07/02/2022.  2. Stable splenomegaly, with no abnormal focal splenic FDG hypermetabolism.  3. Additional observations as described.         CT 7/2/2022:    IMPRESSION:  1. Interval worsening in numerous enlarged lymph nodes in the chest, abdomen and pelvis, as well as development of splenomegaly, compatible with lymphoma and or worsening CLL, given patient's history  2. Mild bilateral hydroureteronephrosis, with markedly enlarged prostate gland and appearance of the urinary bladder suggesting chronic bladder outlet obstruction. Consider correlation with urinalysis.  3. Infrarenal abdominal aortic aneurysm measuring 33 mm in diameter. Follow-up imaging in 3 years is recommended per best practice guidelines.  4. Additional observations as described.           PET  3/14/2022:    IMPRESSION:  Left upper lobectomy without evidence of recurrent or metastatic disease      MRI cervical spine  10/1/2021:    IMPRESSION:     Loss of normal cervical lordosis with mild kyphotic curvature.     Advanced multilevel cervical spondylosis, as outlined above. Spinal canal narrowing and foraminal narrowing is most severe at C5-6. 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     Congenital narrowing of cervical spinal canal, which exacerbates cervical spondylosis        CT head  8/27/2021:  IMPRESSION:     No CT evidence of acute intracranial pathology.     Stable senescent changes as above.         PET  7/19/2021:    IMPRESSION:  Prior left upper lobectomy without evidence of recurrent or metastatic disease  - 3 cm infrarenal abdominal aortic aneurysm      CT head 6/29/2021:  IMPRESSION:     No CT evidence of acute intracranial pathology      PET  1/18/2021:    Impression:     No evidence of FDG avid lymphoproliferative disease.     Aneurysmal dilation of infrarenal abdominal aorta (3.4 cm) as well as aneurysmal dilation of right common iliac artery.     Additional incidental findings as above        PET  6/24/2020:    Impression:     1. No findings of active lymphoproliferative disease.  2. Additional observations as above.      PET  1/24/2020:    Impression       Moderate decrease in size of the adenopathy within the neck, chest, abdomen and pelvis.  The spleen is smaller in size.  There is no significant FDG activity.    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 Multifocal lymphadenopathy in the chest, abdomen, and pelvis compatible with provided clinical history of lymphocytic leukemia.  There is mixed interval change when compared to prior studies.  Pathologic lymphadenopathy in the chest has increased significantly when compared to a CTA of the chest from May 2018.  Pathologic retroperitoneal lymphadenopathy has improved slightly when compared to a previous abdominal CT from February 2017.  Please see above details.  2. Mild aneurysmal dilation of the infrarenal abdominal aorta, with maximal transverse diameter of 3.7 cm.  Maximal transverse diameter on a prior study from 2017 was 2.5 cm.  3. Additional findings as above           [facility] Unknown Rad Eap    Result Date:  1/14/2019  CMS MANDATED QUALITY DATA - CT RADIATION - 436 All CT scans at this facility utilize dose modulation, iterative reconstruction, and/or weight based dosing when appropriate to reduce radiation dose to as low as reasonably achievable. Reason:Lymphoid leukemia TECHNIQUE: CT thorax with, CT abdomen  with, and CT pelvis with 100 mL Omnipaque 350. COMPARISON: CT chest dated 05/19/2018 and CT abdomen and pelvis dated 02/08/2017 CT THORAX: There is stable mediastinal, hilar and axillary adenopathy. There is coronary artery calcification. There is resolution of the groundglass opacities throughout the lungs. There are no confluent infiltrates, pulmonary nodules or pleural effusions. There are stable subcentimeter nodules in the left lobe of the thyroid gland. There are degenerative changes of the spine. CT ABDOMEN: The liver, pancreas, adrenal glands and gallbladder are normal. The spleen is enlarged. There is mesenteric and retroperitoneal adenopathy which is slightly smaller in size. -There is a portacaval node measuring 37 x 21 mm and previously measured 38 x 27 mm. -Periportal lymph node measuring 41 x 18 mm, previously measured 48 x 29 mm. -Left periaortic adenopathy measuring 34 x 23 mm and previously measured 40 x 42 mm- The kidneys enhance symmetrically without hydronephrosis or calculi. There are no thick-walled or dilated bowel loops. There is vascular calcification of aorta. There is a 3.0 x 3.0 cm infrarenal abdominal aortic aneurysm. CT PELVIS: There is adenopathy along the pelvic sidewalls bilaterally which has decreased in size. -The left common iliac adenopathy measuring 36 x 11 mm, previously measured 46 x 14 mm -the right common iliac adenopathy measuring 46 x 10 mm. Previously measured 53 x 15 mm. The bladder is normal. The prostate gland is enlarged. There are degenerative changes of the spine. There is grade 1 anterolisthesis of L5 on S1 with bilateral pars defects.     IMPRESSION: Stable  mediastinal, hilar and axillary adenopathy with resolution of the airspace disease within the lungs Decrease in size of the mesenteric, retroperitoneal and pelvic adenopathy. Stable infrarenal abdominal aortic aneurysm Splenomegaly     Read and electronically signed by: Jeniffer Arredondo MD on 1/14/2019 9:14 AM CST JENIFFER ARREDONDO MD      I have reviewed all available lab results and radiology reports.    Assessment/Plan:   (1) 83 y.o. male with  diagnosis of CLL who has been referred by Dr Rony Victoria for continuation of care by medical hematology/oncology.   - BM biopsy  12/4/2015 with CLL  - diagnosis of SLL in 2004 and s/p FCR x6 in 2007  - s/p imbruvica in past (stopped in May 2018)  - latest wbc at 4.8; lymph 56%  - latest CT on 8/7/2019 showing increase in the LAD  - platelets are 111,000  - He was recently hospitalized at [facility] and seen by Dr Wheeler. Dr Wheeler has recommended Rituximab. He is starting tomorrow.   - discussed the rituximab regimen and the potential side-effect profile; he is getting chemotherapy school today with Rosemary [facility]  - he saw Dr Don on Oct 21st 2019  - he has had 3 of the 4 weeks of rituximab so far; Dr Don recommends him to eventually get rituximab monthly x6 with daily oral Venetoclax for two years total.   - completed rituximab (4th) week of rituximab and  S/p 6 monthly rituximab with Venetoclax oral but is taking only 2 pills daily due to the counts  - he is now just on the venetclax  - he saw Dr Don again on Dec 23rd 2019 and sees him again in March 16th 2020  - latest PEt on 1/24/2020 looks really good    8/27/2020:  - he saw Dr Don last month at [facility]  - latest PEt from 6/24/2020 looks good  - he remains on just the oral venetoclax at 2 pills per day  - he sees Dr Don again in about 3 months (Oct 2020)    11/18/2020:  - he is doing well with the Venetoclax  - due for repeat PEt in Dec 2020  - he sees Dr Don again on Dec 23rd 2020    1/20/2021:  - he is doing well with the  Venetoclax  - He saw Dr Don at [facility] in Dec 2020. He is now off rituximab monthly and remains only on the oral Venetoclax but at 2 pills daily . He sees Dr Don again in feb 2021  - Recent PEt on  1/18/2021 looks stable except for incidental aneurysm in aorta; so we are referring him to Dr Kapadia with vascualr    4/20/2021:  - he saw Dr Don in Feb 2021 and sees him again in Nov 2021  - repeat PEt in June/july 2021  - continued on Venetoclax and regular blood checks    7/20/2021:  - he continues on the Venetoclax  - mild leucopenia from the medication  - PEt on 7/19/2021 seems to be stable    9/21/2021:  - doing ok overall with some occasional HA's and general lack of energy  - he sees Dr Don again on nov 1st and hopefully he can discontinue the venetoclax thereafter  - Head CT on 8/27/2021 was relatively negative    2/17/2022:  -  He last showed for oncology appointment in Sept 2021  - He had covid in Jan 2022 and he received the infusion but did not require hospitalization  - He saw Dr Erazo couple of weeks ago  - He is planning to have cervical spine surgery with Dr Mai in the near future.  - He sees Dr Don at [facility] this coming Monday. He is now off rituximab monthly and he is also now off the oral Venetoclax (expect he will be getting a repeat bone marrow biopsy)    3/31/2022:  - He had recent telemed visit Dr Don at [facility] and they were pleased with the latest bone marrow biopsy. He is now off rituximab monthly and he is also now off the oral Venetoclax      6/6/2022:  - He had surgery on his cervical spine with Dr Mai on April 8th 2022 and had postoperative problems related to the intubation and anesthesia and was in the hospital for about 3 weeks. He had aspiration pneumonia and bout of respiratory failure requiring mech vent support.  He has residual swallowing issues and has a peg tube. He was seen Dr Garcia with GI while in hospital. He was discharged on 4/18. He has had home health coming to  house couple times of week. He reports that he is doing better    - he is due to see Dr Don again at [facility] in the near future and he has since been off all therapy      8/11/2022:  - He was recently hospitalized with fever and pneumonia. He is feeling better  - He is swallowing and eating better; peg tube since removed  - He previously had bout of oral mucosal ulcerations for which he had biopsies at [facility] which were negative for any malignancy. This has since resolved.  - He previously had telemed visit Dr Don at [facility] but does not know when he sees him again.. He had previous bone marrow biopsy with good report. He has been off rituximab and the oral Venetoclax since last Nov 2021.  - CT scans on 7/2/2022 with some progression of the LAD in his body   - will get PEt and aslk Dr Don to see him again    9/29/2022:  - He saw Dr Don again at [facility] and they are considering giving him a regimen of chemotherapy (some program or clinical trial0 over at [facility] but he reports that they have since decided against proceeding in that direction.  - he was supposed to see Dr Don on 9/12 but that appointment was cancelled per patient   - he had PET on 8/29/2022 which was stable    11/23/2022:  - He was recently hospitalized at [facility]; he is doing better and feeling better overall.  - He was supposed to see Dr Don again at [facility] after discharge but does not see him till Dec 2022  - CT on 11/8 with some increase in speel size and LAD  - will resume venetoclax in meantime and he needs to f/u with Dr Don for further directives  - get PEt    1/9/2023:  - He talked to Dr Don via telemed in Dec 2022 and was supposed to start venetoclax but has not done so as of yet. He reports that the specialty pharmacy had called him but he has not received the drug as of yet  - Dr Don was evaluating him for clinical trial but patient is not inclined to proceed in that direction  - Need Dr Don's last notes  - he had PEt on 12/2/2022 with  some mild enlargement of the LN's  - he had bone marrow biopsy on 12/8/2022 with pathology report still unavailable but per review looks like he has about 14-17% involvement with NHL  - will reach out to Dr Don and the speciality pharmacy  - need the assistance of the patient navigator  - WBC has increased to 18.39    2/15/2023:  - He has since started Gazyva per Dr Don's direction; he is feeling better overall and the LAD is shrinking down in the neck  - hgb at 11.4 and plats 36,000    4/12/2023:  - continued on Gazyva  - sees Dr Don again within the next month  - wbc at 2.71, hgb 10.4 and plats 57,000     7/5/2023:  - he has one more Gazyva left and is continued on the venetoclax  - latest wbc at 2.06  - hgb at 11.4 and plat 79,000    8/30/2023:  - he has completed the Gazyva and has now just continued on Venetoclax  - counts are a little low, so will hold therapy for one week  - continue to check labs weekly  - he saw Dr Don couple of weeks ago and sees him again in a couple of months  - due for repeat PET    9/27/2023:  - starting Neupogen today  - check labs again Monday morning and can get therapy next week if the labs are better  - refill antiemetics  - he had PEt on 9/13/2023  - seeing Dr Don again on 10/12    11/8/2023:  - latest wbc at 1.6; hgb 9.9 and plats 22,000  - the Venetoclax currently on hold due to his blood counts  - He feels a little fatigued; swallowing is improved overall;   - He had bone marrow biopsy at [facility] on 10/16; he sees Dr Don again next Wednesday          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  - followed  By Dr Kee  - CEA 1.4 on 4/8/2021  - CT scans on 1/14/2019 stable  - PET done in jan 2021 on chart    3/31/2022:  - CEA at 0.8  - PET on 3/14/2022 negative for recurrence    9/29/2022:  - CEA 0.9  - PET on chart     11/23/2022:  - latest CEA at 1.2    2/15/2023:  - he had PET on 12/2/2022 9/27/2023:  - CEA 0.8     (3) Iron deficiency  anemia s/p IV iron couple weeks ago  - s/p periodic IV iron therapies         (4) HTN - on BP meds     (6) Chronic neck and back issues - followed by Dr Ryan Rivero     (7) DM - currently off all meds     (8) Hypercholesterolemia - on meds    (9)  - followed by Dr Whitney    (10) Chronic Leucopenia and thrombocytopenia - due to chemotherapy agents and lymphoma/leukemia          VISIT DIAGNOSES:      Encounter Diagnoses   Name Primary?    CLL (chronic lymphocytic leukemia) Yes    History of lung cancer - ANDRES NSCLC 2004     Iron deficiency     Iron deficiency anemia, unspecified iron deficiency anemia type     Lymphoma, unspecified body region, unspecified lymphoma type     Pancytopenia     Thrombocytopenia     Chemotherapy induced neutropenia     Splenomegaly     Lymphadenopathy, cervical     Fever, unspecified fever cause            PLAN:  Hold the oral venetoclax due to the counts; check labs weekly; resume IV iron as needed; encouraged continuation of oral iron (starting neupogen today)  2.  F/u with Dr Don next Wednesday and check on the bone marrow path report from [facility]  3.  F/u with PCP, Pulm, , etc  4.  Monitor labs as directed  5.   F/u with Dr Kapadia with vascular for the aortic aneurysm as directed  6.  F/u with Dr Hyman as directed  7.  Resume neupogen shots             RTC in  4 weeks  with myself  Fax note to Kacey Kee, Ryan Rivero, [place], Florencia Acosta/Kang Wheeler; Gonzalo Garcia        Discussion:       Total Time spent on patient:    I spent over 25 mins of time with the patient. Reviewed results of the recently ordered labs, tests, reports and studies; made directives with regards to the results. Over half of this time was spent couseling and coordinating care, making treatment and analytical decisions; ordering necessary labs, tests and studies; and discussing treatment options and setting up treatment plan(s) if indicated.        COVID-19 Discussion:    I had long discussion with  patient and any applicable family about the COVID-19 coronavirus epidemic and the recommended precautions with regard to cancer and/or hematology patients. I have re-iterated the CDC recommendations for adequate hand washing, use of hand -like products, and coughing into elbow, etc. In addition, especially for our patients who are on chemotherapy and/or our otherwise immunocompromised patients, I have recommended avoidance of crowds, including movie theaters, restaurants, churches, etc. I have recommended avoidance of any sick or symptomatic family members and/or friends. I have also recommended avoidance of any raw and unwashed food products, and general avoidance of food items that have not been prepared by themselves. The patient has been asked to call us immediately with any symptom developments, issues, questions or other general concerns.          I have explained all of the above in detail and the patient understands all of the current recommendation(s). I have answered all of their questions to the best of my ability and to their complete satisfaction.   The patient is to continue with the current management plan.        Chemotherapy Discussion:      I discussed the available treatment option(s) in accordance with the latest/current national evidence-based guidelines (NCCN, UpToDate, NCI, ASCO, etc where applicable), their overall age/condition and their co-morbidities. I also went over the risks and benefits of the chemotherapy with regard to their particular cancer type, their cancer stage, their age/condition, and their co-morbidities. I provided literature on the chemotherapy regimen and discussed the chemotherapy side-effect profiles of the drug(s). I discussed the importance of compliance with obtaining and monitoring weekly lab work, and went over the potential hematopathology issues and risks with anemia, leucopenia and thrombocytopenia that can occur with chemotherapy. I discussed the  potential risks of liver and kidney damage, which could be permanent and could necessitate dialysis long-term if kidney failure developed. I discussed the emetic and/or diarrheal potential of the regimen and the potential need for use of antiemetic and anti-diarrheal medications. I discussed the risk for development of anaphylactic shock, bronchospasm, dysrhythmia, and respiratory/cardiovascular arrest and/or failure. I discussed the potential risks for development of alopecia, cold sensory issues, ringing in ears, vertigo, cataracts, glaucoma, and neuropathy, all of which could end up being chronic and life-long. Some chemotherpyI discussed the risks of hand-foot syndrome and rashes, and development of other autoimmune mediated processes such as pneumonitis, hepatitis, and colitis which could be life threatening. I discussed the risks of the potential development of a rare but fatal viral mediated disease known as PML (Progressive Multifocal Leukoencephalopathy), and risk of future development of leukemia and/or lymphoma from use of certain chemotherapy agents. I discussed the need for neutropenic precautions, basic hygiene/sanitation behaviors and dietary restrictions.    The patient's consent has been obtained to proceed with the chemotherapy.The patient will be referred to Chemotherapy School /[facility] for training and education on chemotherapy, use of antiemetics and/or anti-diarrheals, use of NSAID's, potential chemotherapy side-effects, and any specific recommendations and precautions with the particular chemotherapy agents.       Immunologic Therapy Discussion:    I discussed the available treatment option(s) in accordance with the latest/current national evidence-based guidelines (NCCN, UpToDate, NCI, ASCO, etc where applicable), their overall age/condition and their co-morbidities. I went over the risks and benefits of the immunotherapy with regard to their particular cancer type, their cancer  stage, their age/condition, and their co-morbidities. I provided literature on the immunotherapy regimen and discussed the immunotherapy side-effect profiles of the drug(s). I discussed the importance of compliance with obtaining and monitoring weekly lab work, and went over the potential hematopathology issues and risks of hemato-pathologic issues with anemia, leucopenia and/or thrombocytopenia and effects on thyroid function that can occur with immunotherapy. The patient will most likely need to have there thyroid functions monitored by their PCP and may need to take thyroid medication while on the immunotherapy. I discussed the potential risks of liver and kidney damage, which could be permanent and could necessitate dialysis long-term if kidney failure developed. I discussed the emetic and/or diarrheal potential of the regimen and the potential need for use of antiemetic and anti-diarrheal medications. I discussed the risk for development of anaphylactic shock, bronchospasm, dysrhythmia, and respiratory/cardiovascular arrest and/or failure. I discussed the potential risks for development of alopecia, cold sensory issues, ringing in ears, vertigo and neuropathy, all of which could end up being chronic and life-long. I discussed the risks of hand-foot syndrome and rashes, and development of other autoimmune mediated processes such as pneumonitis, hepatitis and colitis which could potentially be life threatening. I discussed the risks of the potential development of a rare but fatal viral mediated disease known as PML (Progressive Multifocal Leukoencephalopathy), and risk of future development of leukemia and/or lymphoma from use of certain immunotherapy agents. I discussed the possibility that immunologic therapy cold worsen or promote progression of any underlying autoimmune diseases such as sarcoidosis, ulcerative colitis, Crohn's disease, psoriasis, rheumatoid disorders, scleroderma, autoimmune nephritis  disorders, Hashimoto's thyroiditis, and Lupus among others. I discussed the need for neutropenic precautions, basic hygiene/sanitation behaviors and dietary restrictions.    The patient's consent has been obtained to proceed with the immunotherapy.The patient will be referred to [facility] for training and education on immunotherapy, use of antiemetics and/or anti-diarrheals, use of NSAID's, potential immunotherapy side-effects, and any specific recommendations and precautions with the particular immunotherapy agents.      I answered all of the patient's (and family's, if applicable) questions to the best of my ability and to their complete satisfaction. The patient acknowledged full understanding of the risks, recommendations and plan(s).      Iron Infusion Therapy Discussion:     I provided literature/learning materials on the particular IV iron regimen and discussed the potential side-effect profiles of the drug(s). I discussed the importance of compliance with obtaining and monitoring requested lab work, and went over the potential risk for the development of anaphylactic shock, bronchospasm, dysrhythmia, liver and/or kidney damage, and respiratory/cardiovascular arrest and/or failure. I discussed the potential risks for development of alopecia, fevers, itching, chills and/or rigors, cold sensory issues, ringing in ears, vertigo and neuropathy, all of which are usually acute but sometimes could end up being chronic and life-long. I discussed the risks of hand-foot syndrome and rashes, and development of other autoimmune mediated processes such as pneumonitis and colitis which could be life threatening.     The patient's consent has been obtained to proceed with the IV iron therapy.The patient will be referred to [facility] for training and education on IV iron therapy, use of antiemetics and/or anti-diarrheals, use of NSAID's, potential IV iron therapy  side-effects, and any specific recommendations and precautions with the particular IV iron agents.      I answered all of the patient's (and family's, if applicable) questions to the best of my ability and to their complete satisfaction. The patient acknowledged full understanding of the risks, recommendations and plan(s).          I answered all of the patient's (and family's, if applicable) questions to the best of my ability and to their complete satisfaction. The patient acknowledged full understanding of the risks, recommendations and plan(s).         Electronically signed by Derw Bai MD

## 2023-11-07 NOTE — TELEPHONE ENCOUNTER
Critical WBC of 1.60 and platelet of 22 called from [facility] at [facility]. Dr Bai made aware of above. No new orders at this time.

## 2023-11-09 NOTE — ED NOTES
Noted pt has exacerbated SOB on swallowing order po medications. Pt has to take 4-7 repetitive breaths after taking a few sips of water. Pt Also stated that they will be requiring a puree diet edgar to esophogeal narrowing.

## 2023-11-09 NOTE — ED PROVIDER NOTES
Encounter Date: 11/9/2023       History     Chief Complaint   Patient presents with    Fever     Weak feeling since yesterday, fever this morning. Hx of lyphoma and lung cancer. Stopped chemo 2 weeks ago for neutropenia and leukopenia.      83-year-old male presents emergency room accompanied by his wife with a history that he began running fever at approximately 1:00 a.m. this morning and there was some questionable altered mental status.  The patient's temp was 102°.  He does have a history of COPD, diabetes, CLL, lymphoma for which she is currently receiving chemo but not within last 2 months, hypertension.  The patient denies any vomiting but has had some nausea.  No abdominal pain.  He is had some urinary frequency without dysuria.  No rashes.  No complaint of any headache earache or sore throat.  He does complain of some slight shortness of breath.  He has had portable lung remove years ago due to a lung cancer.  Patient has not had influenza vaccine but has had COVID vaccines.  Patient did see his oncologist yesterday.      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    MAYDA on CPAP     Pneumonia of both lungs due to infectious organism 06/29/2021    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     Right-sided Bell's palsy 2009    Spondylolisthesis     Varicose veins of legs     Venous insufficiency      Past Surgical History:    Procedure Laterality Date    Athroscopic surgery      On Knee    BIOPSY OF TISSUE OF NECK Left 2015    Recuurence CLL/small cell lyphoma    CERVICAL FUSION      ESOPHAGEAL DILATION      ESOPHAGOGASTRODUODENOSCOPY N/A 04/15/2022    Procedure: EGD (ESOPHAGOGASTRODUODENOSCOPY);  Surgeon: Siddharth Garcia MD;  Location: [facility] END[facility];  Service: Endoscopy;  Laterality: N/A;    ESOPHAGOGASTRODUODENOSCOPY N/A 2022    Procedure: EGD (ESOPHAGOGASTRODUODENOSCOPY);  Surgeon: Siddharth Garcia MD;  Location: [facility] END[facility];  Service: Endoscopy;  Laterality: N/A;    ESOPHAGOGASTRODUODENOSCOPY N/A 2022    Procedure: EGD (ESOPHAGOGASTRODUODENOSCOPY);  Surgeon: Jefferson Hyman MD;  Location: [facility] END[facility];  Service: Endoscopy;  Laterality: N/A;    ESOPHAGOGASTRODUODENOSCOPY N/A 2023    Procedure: EGD (ESOPHAGOGASTRODUODENOSCOPY);  Surgeon: Jefferson Hyman MD;  Location: [facility];  Service: Endoscopy;  Laterality: N/A;    ESOPHAGOGASTRODUODENOSCOPY W/ PEG N/A 2022    Procedure: EGD, WITH PEG TUBE INSERTION;  Surgeon: Siddharth Garcia MD;  Location: [facility];  Service: Endoscopy;  Laterality: N/A;    GASTROSTOMY TUBE PLACEMENT  2022    20 Fr (bumper initially at 3.5)    Hemorrhoid resection  1979    LUNG LOBECTOMY Left     NECK SURGERY  2022    Anterior and Posterior C3-C7 fusion    OTHER SURGICAL HISTORY  2006    Chemotherapy s/p lyphoma        Portacath implantation and removal      reverse shoulder arthroplasty Right 2021     Family History   Problem Relation Age of Onset    Stomach cancer Mother 80         at 95    Colon cancer Father      Social History     Tobacco Use    Smoking status: Former    Smokeless tobacco: Never   Substance Use Topics    Alcohol use: Yes    Drug use: No     Review of Systems   Constitutional:  Positive for fever. Negative for diaphoresis.   HENT:  Negative for congestion, ear pain, postnasal drip, rhinorrhea, sinus pain, sore throat and trouble  swallowing.    Respiratory:  Positive for shortness of breath. Negative for cough.    Cardiovascular:  Negative for chest pain.   Gastrointestinal:  Negative for abdominal pain, diarrhea, nausea and vomiting.   Genitourinary:  Positive for frequency. Negative for difficulty urinating, dysuria, flank pain and hematuria.   Musculoskeletal:  Negative for arthralgias, back pain, myalgias and neck stiffness.   Skin:  Negative for color change, pallor and rash.   Neurological:  Positive for weakness. Negative for headaches.   Hematological:  Negative for adenopathy. Does not bruise/bleed easily.   Psychiatric/Behavioral:  Negative for confusion.    All other systems reviewed and are negative.      Physical Exam     Initial Vitals [11/09/23 0648]   BP Pulse Resp Temp SpO2   138/74 (!) 118 19 (!) 102 °F (38.9 °C) (!) 94 %      MAP       --         Physical Exam    Vitals reviewed.  Constitutional: He appears well-developed and well-nourished. He is not diaphoretic. No distress.   Elderly male who appears acutely ill   HENT:   Head: Normocephalic and atraumatic.   Right Ear: External ear normal.   Left Ear: External ear normal.   Nose: Nose normal.   Mouth/Throat: Oropharynx is clear and moist.   Eyes: Conjunctivae and EOM are normal. Pupils are equal, round, and reactive to light.   Neck: Neck supple. No JVD present.   Normal range of motion.  Cardiovascular:  Normal rate, regular rhythm, normal heart sounds and intact distal pulses.     Exam reveals no gallop and no friction rub.       No murmur heard.  Pulmonary/Chest: Breath sounds normal. No respiratory distress. He has no wheezes. He has no rhonchi. He has no rales. He exhibits no tenderness.   Abdominal: Abdomen is soft. Bowel sounds are normal. He exhibits no distension. There is no abdominal tenderness. There is no rebound and no guarding.   Musculoskeletal:         General: No tenderness or edema. Normal range of motion.      Cervical back: Normal range of motion  and neck supple.     Lymphadenopathy:     He has no cervical adenopathy.   Neurological: He is alert and oriented to person, place, and time. He has normal strength. GCS score is 15. GCS eye subscore is 4. GCS verbal subscore is 5. GCS motor subscore is 6.   Skin: Skin is warm and dry. Capillary refill takes less than 2 seconds. No rash noted. No erythema. No pallor.   Psychiatric: He has a normal mood and affect. His behavior is normal. Judgment and thought content normal.         ED Course   Procedures  Labs Reviewed   CBC W/ AUTO DIFFERENTIAL - Abnormal; Notable for the following components:       Result Value    WBC 0.76 (*)     RBC 2.78 (*)     Hemoglobin 9.5 (*)     Hematocrit 26.4 (*)     MCH 34.2 (*)     RDW 15.9 (*)     Platelets 8 (*)     Gran # (ANC) 0.2 (*)     Lymph # 0.4 (*)     Mono # 0.1 (*)     Gran % 26.3 (*)     Lymph % 57.9 (*)     Mono % 15.8 (*)     Platelet Estimate Decreased (*)     All other components within normal limits    Narrative:     WBC, PLT critical result(s) repeated. Called and verbal readback   obtained from Carlin Rodriguez RN/ED by JBLondon 11/09/2023 07:37   COMPREHENSIVE METABOLIC PANEL - Abnormal; Notable for the following components:    Glucose 156 (*)     Alkaline Phosphatase 39 (*)     Anion Gap 4 (*)     All other components within normal limits   IRON AND TIBC - Abnormal; Notable for the following components:    Saturated Iron 18 (*)     All other components within normal limits   RESPIRATORY INFECTION PANEL (PCR), NASOPHARYNGEAL    Narrative:     Respiratory Infection Panel source->NP Swab   CULTURE, BLOOD   CULTURE, BLOOD   MRSA SCREEN BY PCR   CULTURE, URINE   URINALYSIS, REFLEX TO URINE CULTURE    Narrative:     Specimen Source->Urine   MAGNESIUM   B-TYPE NATRIURETIC PEPTIDE   TROPONIN I HIGH SENSITIVITY   PROCALCITONIN   LACTIC ACID, PLASMA   SARS-COV-2 RNA AMPLIFICATION, QUAL   INFLUENZA A AND B ANTIGEN   FERRITIN   PROTIME-INR   LACTIC ACID, PLASMA   POCT GLUCOSE,  HAND-HELD DEVICE   TYPE & SCREEN   PREPARE PLATELETS (DOSE) SOFT        ECG Results              EKG 12-lead (In process)  Result time 11/09/23 07:28:02      In process by Interface, Lab In [facility] (11/09/23 07:28:02)                   Narrative:    Test Reason : R50.9,    Vent. Rate : 106 BPM     Atrial Rate : 106 BPM     P-R Int : 206 ms          QRS Dur : 098 ms      QT Int : 324 ms       P-R-T Axes : 081 064 074 degrees     QTc Int : 430 ms    Sinus tachycardia  Otherwise normal ECG  When compared with ECG of 17-APR-2023 23:54,  No significant change was found    Referred By: [email or web address]   SELF           Confirmed By:                                   Imaging Results              X-Ray Chest AP Portable (Final result)  Result time 11/09/23 07:45:34      Final result by Altaf Cross MD (11/09/23 07:45:34)                   Narrative:    XR CHEST 1 VIEW    CLINICAL HISTORY:  83 years Male Sepsis    COMPARISON: Chest radiography April 18, 2023. PET/CT September 13, 2023    FINDINGS: Cardiac silhouette size is within normal limits. No airspace consolidation, pleural fluid, or pneumothorax. No pleural effusion or pneumothorax. No acute osseous abnormality.    IMPRESSION:    Stable chest radiograph. No acute pulmonary pathology.    Electronically signed by:  Altaf Cross MD  11/09/2023 07:45 AM CST Workstation: [id number]                                     Medications   0.9%  NaCl infusion (1,000 mLs Intravenous New Bag 11/9/23 9111)   piperacillin-tazobactam 3.375 g in dextrose 5 % 100 mL IVPB (ready to mix) (0 g Intravenous Stopped 11/9/23 1157)   atorvastatin tablet 20 mg (20 mg Oral Given 11/9/23 1235)   ferrous sulfate tablet 1 each (1 each Oral Given 11/9/23 1235)   gabapentin capsule 600 mg (600 mg Oral Given 11/9/23 1235)   losartan tablet 50 mg (50 mg Oral Given 11/9/23 1234)   tamsulosin 24 hr capsule 0.4 mg (has no administration in time range)   traZODone tablet 300 mg (has no administration in time  range)   valACYclovir tablet 1,000 mg (1,000 mg Oral Given 11/9/23 1318)   sodium chloride 0.9% flush 10 mL (has no administration in time range)   naloxone 0.4 mg/mL injection 0.02 mg (has no administration in time range)   glucose chewable tablet 16 g (has no administration in time range)   glucose chewable tablet 24 g (has no administration in time range)   dextrose 50% injection 12.5 g (has no administration in time range)   dextrose 50% injection 25 g (has no administration in time range)   glucagon (human recombinant) injection 1 mg (has no administration in time range)   potassium bicarbonate disintegrating tablet 50 mEq (has no administration in time range)   potassium bicarbonate disintegrating tablet 35 mEq (has no administration in time range)   potassium bicarbonate disintegrating tablet 60 mEq (has no administration in time range)   magnesium oxide tablet 800 mg (has no administration in time range)   magnesium oxide tablet 800 mg (has no administration in time range)   potassium, sodium phosphates 280-160-250 mg packet 2 packet (has no administration in time range)   potassium, sodium phosphates 280-160-250 mg packet 2 packet (has no administration in time range)   potassium, sodium phosphates 280-160-250 mg packet 2 packet (has no administration in time range)   acetaminophen tablet 650 mg (650 mg Oral Given 11/9/23 1318)   HYDROcodone-acetaminophen 5-325 mg per tablet 1 tablet (has no administration in time range)   HYDROcodone-acetaminophen  mg per tablet 1 tablet (1 tablet Oral Given 11/9/23 1318)   ondansetron injection 4 mg (has no administration in time range)   promethazine tablet 25 mg (has no administration in time range)   insulin aspart U-100 pen 0-10 Units (has no administration in time range)   acetaminophen tablet 650 mg (has no administration in time range)   aluminum-magnesium hydroxide-simethicone 200-200-20 mg/5 mL suspension 30 mL (has no administration in time range)    vancomycin - pharmacy to dose (has no administration in time range)   filgrastim-sndz (ZARXIO) injection 300 mcg/0.5 mL (Preferred Regimen) (300 mcg Subcutaneous Given 11/9/23 1056)   0.9%  NaCl infusion (for blood administration) (has no administration in time range)   acetaminophen tablet 1,000 mg (1,000 mg Oral Given 11/9/23 2373)   vancomycin (VANCOCIN) 2,000 mg in dextrose 5 % (D5W) 500 mL IVPB (2,000 mg Intravenous New Bag 11/9/23 1232)     Medical Decision Making  Amount and/or Complexity of Data Reviewed  Labs: ordered.  Radiology: ordered.    Risk  OTC drugs.  Prescription drug management.  Decision regarding hospitalization.              Attending Attestation:             Attending ED Notes:   This 83-year-old male who presented with a history of fever this morning and who has a history of being on chemo but none for 2 months and history of CLL, was febrile upon presentation to the ED despite his antipyretics at home.  The patient's workup shows unremarkable chest x-ray.  His CBC showed evidence of neutropenia and thrombocytopenia.  Cultures are obtained.  Initial lactate is normal.  The patient's chest x-ray showed a sinus tachycardia but otherwise normal axis intervals and no evidence of any ischemia ectopy.  The patient was empirically started on Zosyn.  Hospital Medicine consulted and Dr. Hernandez will be admitting.  The patient will have ordered Neupogen as well as unit of platelets as requested by Dr. Bai.  Hospital Medicine is messaged to accomplish this.                      Clinical Impression:   Final diagnoses:  [R50.9] Fever  [D70.9] Neutropenia, unspecified type  [Z85.6] Personal history of CLL (chronic lymphocytic leukemia)  [D69.6] Thrombocytopenia        ED Disposition Condition    Admit                 Braulio Joshi Jr., MD  11/09/23 0409       Braulio Joshi Jr., MD  11/09/23 [id number]

## 2023-11-09 NOTE — CARE UPDATE
11/09/23 1010   Patient Assessment/Suction   Level of Consciousness (AVPU) alert   Respiratory Effort Shallow   Rhythm/Pattern, Respiratory tachypneic   Cough Frequency no cough   PRE-TX-O2   SpO2 96 %   Pulse 83   Resp (!) 32   Incentive Spirometer   $ Incentive Spirometer Charges proper technique demonstrated;done with encouragement   Incentive Spirometer Predicted Level (mL) 1000   Administration (IS) instruction provided, initial;proper technique demonstrated   Number of Repetitions (IS) 10   Level Incentive Spirometer (mL) 1200   Patient Tolerance (IS) good   Education   $ Education Other (see comment);15 min  (is instruct)

## 2023-11-09 NOTE — TELEPHONE ENCOUNTER
Dr. Bai made aware that patient currently in ER with fever, low WBC and plt 8. Per Dr. Bai's verbal orders, I spoke to Arabella, ER charge nurse, and have made her aware of Dr Bai's verbal orders for Neupogen 300mg once daily x 3 days and to transfuse 1 unit of platelets. Arabella verbalized understanding and states she will relay orders to ER MD.

## 2023-11-09 NOTE — HPI
82 yo male with PMH NSCLC (2004), diabetes, COPD, MAYDA on cpap, iron deficiency anemia, HTN and CLL presented to ED with fever 102 at 1am. The patient was seen by hematology yesterday in clinic and was aware of low WBC and platelets. He was waiting on neupogen administration if approved by insurance per spouse report. The patient denies bleeding, dysuria, skin lesions/rashes,diarrhea, N/V and productive cough. He has mild SOB. No chest pain.      On work up, he was found to have WBC 0.76 and platelets 8; H/H stable at 9.5/26.4.  Lactate 0.5; procal 0.094. Negative for flu and covid. CXR - no obvious pneumonia. Temp 102F, , RR 26, /72.  He stopped chemotherapy 2 weeks ago.   Blood and urine culture sent. Started on vanc and zosyn.     Hospital medicine will admit for neutropenic fever. 1 unit platelets ordered and neupogen daily x 3 days planned.   Heme/onc consulted.

## 2023-11-09 NOTE — PROGRESS NOTES
"11/9/2023 Pharmacokinetic Assessment: IV Vancomycin  Vanco DAY 1 - Conrad Kuhn is a 83 y.o. male being treated for neutropenic fever. Goal 10 to 15 mcg/mL.     Dialysis Method (if applicable):N/A     Vancomycin serum concentration assessment(s) (last 3 results):  No results for input(s): "VANCOMYCINRA", "VANCOMYCINPE", "VANCOMYCINTR", "VANCOTROUGH" in the last 72 hours.    Vancomycin Regimen Plan: 2000 mg Q24H. Trough/Random before 4th dose on 11/12 @ 11:00.    Day of Thx Date Current Weight (kg) Laboratory  Doses    Vancomycin Level      Time SCr CrCl Scheduled Time Time Dose Dosage (mcg/ml) Peak,  Random,  Trough?         Begin Vancomycin 2g q24h     1 11/9 88.4   1.0 63.9 - 12:32 1 2000       2 11/10         12:00  2 2000       3 11/11     12:00   3 2000       4 11/12     11:00  - -  Trough       Rationale for Plan: per protocol (calculations copied from [facility])    Labs:  Estimated Creatinine Clearance: 63.9 mL/min (based on SCr of 1 mg/dL).  Recent Labs   Lab Result Units 11/07/23  1156 11/09/23  0717   WBC K/uL 1.60* 0.76*   Creatinine mg/dL 1.2 1.0       Cxs:   Microbiology Results (last 7 days)       Procedure Component Value Units Date/Time    MRSA Screen by PCR [[id number]] Collected: 11/09/23 1309    Order Status: Completed Specimen: Nasopharyngeal Swab from Nasal Updated: 11/09/23 1557     MRSA SCREEN BY PCR Negative    Respiratory Infection Panel (PCR), Nasopharyngeal [[id number]] Collected: 11/09/23 1026    Order Status: Completed Specimen: Nasopharyngeal Swab Updated: 11/09/23 1208     Respiratory Infection Panel Source NP swab     Adenovirus Not Detected     Coronavirus 229E, Common Cold Virus Not Detected     Coronavirus HKU1, Common Cold Virus Not Detected     Coronavirus NL63, Common Cold Virus Not Detected     Coronavirus OC43, Common Cold Virus Not Detected     Comment: Coronavirus strains 229E, HKU1, NL63, and OC43 can cause the common   cold   and are not associated with the respiratory " disease outbreak caused   by  the COVID-19 (SARS-CoV-2 novel Coronavirus) strain.           SARS-CoV2 (COVID-19) Qualitative PCR Not Detected     Human Metapneumovirus Not Detected     Human Rhinovirus/Enterovirus Not Detected     Influenza A (subtypes H1, H1-2009,H3) Not Detected     Influenza B Not Detected     Parainfluenza Virus 1 Not Detected     Parainfluenza Virus 2 Not Detected     Parainfluenza Virus 3 Not Detected     Parainfluenza Virus 4 Not Detected     Respiratory Syncytial Virus Not Detected     Bordetella Parapertussis (EU7911) Not Detected     Bordetella pertussis (ptxP) Not Detected     Chlamydia pneumoniae Not Detected     Mycoplasma pneumoniae Not Detected     Comment: Respiratory Infection Panel testing performed by Multiplex PCR.       Narrative:      Respiratory Infection Panel source->NP Swab    Urine Culture High Risk [[id number]] Collected: 11/09/23 0905    Order Status: Sent Specimen: Urine, Catheterized Updated: 11/09/23 1028    Blood culture x two cultures. Draw prior to antibiotics. [[id number]] Collected: 11/09/23 0725    Order Status: Sent Specimen: Blood Updated: 11/09/23 0732    Blood culture x two cultures. Draw prior to antibiotics. [[id number]] Collected: 11/09/23 0726    Order Status: Sent Specimen: Blood Updated: 11/09/23 0732            Pharmacy will continue to follow and monitor vancomycin.   Please contact pharmacy at extension [phone] with any questions regarding this assessment.     Thank you for the consult,   Estuardo Suggs

## 2023-11-09 NOTE — CONSULTS
[facility]   Hematology/Oncology  Inpatient Consult Note          Patient Name: Conrad Kuhn  MRN: [id number]  Admission Date: 11/9/2023  Hospital Length of Stay: 0 days  Code Status: Full Code   Attending Provider: Rochelle Arteaga MD  Referring Provider: Jenni  Consulting Provider: Drew Bai MD  Primary Care Physician: Johnson Erazo MD  Principal Problem:Neutropenic fever    Consults  Subjective:     Chief Complaint: Fever (Weak feeling since yesterday, fever this morning. Hx of lyphoma and lung cancer. Stopped chemo 2 weeks ago for neutropenia and leukopenia. )          History Present Illness:  83 y.o. male known to my oncology service with diagnosis of CLL/NHL for which he has been followed and treated in conjunction with Dr Monik Don at [facility] in [place]. He was just seen in oncology clinic yesterday and his oral chemotherapy has been on hold due to low blood counts. He presented to ED today at [facility] after experiencing some fever with febrile temp, and progressive fatigue and malaise. Labs done in ER showed WBC lower at 0.76 and platelet count lower at 8,000. He has been started on antibiotics and is being admitted to the hospitalist service. He will be started on neupogen and I have recommended a single donor platelet product transfusion. He is awake and alert and sitting up in bed. He denies any current CP, SOB, HA's or N/V. No dysuria , cough or sputum. I discussed with floor nursing staff.         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    Lymphoma, 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    MAYDA on CPAP     Pneumonia of both lungs due to infectious organism 06/29/2021    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     Right-sided Bell's palsy 2009    Spondylolisthesis     Varicose veins of legs     Venous insufficiency      Past Surgical History:   Procedure Laterality Date    Athroscopic surgery      On Knee    BIOPSY OF TISSUE OF NECK Left 08/2015    Recuurence CLL/small cell lyphoma    CERVICAL FUSION  2022    ESOPHAGEAL DILATION      ESOPHAGOGASTRODUODENOSCOPY N/A 04/15/2022    Procedure: EGD (ESOPHAGOGASTRODUODENOSCOPY);  Surgeon: Siddharth Garcia MD;  Location: [facility];  Service: Endoscopy;  Laterality: N/A;    ESOPHAGOGASTRODUODENOSCOPY N/A 04/16/2022    Procedure: EGD (ESOPHAGOGASTRODUODENOSCOPY);  Surgeon: Siddharth Garcia MD;  Location: [facility];  Service: Endoscopy;  Laterality: N/A;    ESOPHAGOGASTRODUODENOSCOPY N/A 06/23/2022    Procedure: EGD (ESOPHAGOGASTRODUODENOSCOPY);  Surgeon: Jefferson Hyman MD;  Location: [facility];  Service: Endoscopy;  Laterality: N/A;    ESOPHAGOGASTRODUODENOSCOPY N/A 7/6/2023    Procedure: EGD (ESOPHAGOGASTRODUODENOSCOPY);  Surgeon: Jefferson Hyman MD;  Location: [facility];  Service: Endoscopy;  Laterality: N/A;    ESOPHAGOGASTRODUODENOSCOPY W/ PEG N/A 04/16/2022    Procedure: EGD, WITH PEG TUBE INSERTION;  Surgeon: Siddharth Garcia MD;  Location: [facility];  Service: Endoscopy;  Laterality: N/A;    GASTROSTOMY TUBE PLACEMENT  04/16/2022    20 Fr (bumper initially at 3.5)    Hemorrhoid resection  1979    LUNG LOBECTOMY Left 2004    NECK SURGERY  04/08/2022    Anterior and Posterior C3-C7 fusion    OTHER SURGICAL HISTORY  2006    Chemotherapy s/p lyphoma        Portacath implantation and removal      reverse shoulder arthroplasty Right 03/2021         Allergies:  Review of patient's allergies indicates:  No Known  Allergies    Social/Family History:  Social History     Socioeconomic History    Marital status:    Tobacco Use    Smoking status: Former    Smokeless tobacco: Never   Substance and Sexual Activity    Alcohol use: Yes    Drug use: No    Sexual activity: Not Currently     Social Determinants of Health     Financial Resource Strain: Low Risk  (2023)    Overall Financial Resource Strain (CARDIA)     Difficulty of Paying Living Expenses: Not very hard   Food Insecurity: No Food Insecurity (2023)    Hunger Vital Sign     Worried About Running Out of Food in the Last Year: Never true     Ran Out of Food in the Last Year: Never true   Transportation Needs: No Transportation Needs (2023)    PRAPARE - Transportation     Lack of Transportation (Medical): No     Lack of Transportation (Non-Medical): No   Physical Activity: Insufficiently Active (2023)    Exercise Vital Sign     Days of Exercise per Week: 3 days     Minutes of Exercise per Session: 30 min   Stress: Stress Concern Present (2023)    [employer] - Occupational Stress Questionnaire     Feeling of Stress : To some extent   Social Connections: Moderately Isolated (2023)    Social Connection and Isolation Panel [NHANES]     Frequency of Communication with Friends and Family: More than three times a week     Frequency of Social Gatherings with Friends and Family: More than three times a week     Attends Oriental orthodox Services: Never     Active Member of Clubs or Organizations: No     Attends Club or Organization Meetings: Never     Marital Status:    Housing Stability: Low Risk  (2023)    Housing Stability Vital Sign     Unable to Pay for Housing in the Last Year: No     Number of Places Lived in the Last Year: 1     Unstable Housing in the Last Year: No     Family History   Problem Relation Age of Onset    Stomach cancer Mother 80         at 95    Colon cancer Father          ROS:    GEN:  "fever this am; general malaise, fatigue and weakness  HEENT: normal with no HA's, sore throat, stiff neck, changes in vision  CV: normal with no CP, SOB, PND, MAYER or orthopnea  PULM: normal with no SOB, cough, hemoptysis, sputum or pleuritic pain  GI: normal with no abdominal pain, nausea, vomiting, constipation, diarrhea, melanotic stools, BRBPR, or hematemesis  : normal with no hematuria, dysuria  BREAST: normal with no mass, discharge, pain  SKIN: normal with no rash, erythema, bruising, or swelling        Medications:  Continuous Infusions:   sodium chloride 0.9% 1,000 mL (11/09/23 8034)     Scheduled Meds:   atorvastatin  20 mg Oral Daily    ferrous sulfate  1 tablet Oral Daily    filgrastim-sndz  300 mcg Subcutaneous Daily    gabapentin  600 mg Oral BID    losartan  50 mg Oral Daily    piperacillin-tazobactam (Zosyn) IV (PEDS and ADULTS) (extended infusion is not appropriate)  3.375 g Intravenous Q8H    tamsulosin  0.4 mg Oral QHS    traZODone  300 mg Oral QHS    valACYclovir  1,000 mg Oral Daily    vancomycin (VANCOCIN) IV (PEDS and ADULTS)  2,000 mg Intravenous Once     PRN Meds:0.9%  NaCl infusion (for blood administration), acetaminophen, acetaminophen, aluminum-magnesium hydroxide-simethicone, dextrose 50%, dextrose 50%, glucagon (human recombinant), glucose, glucose, HYDROcodone-acetaminophen, HYDROcodone-acetaminophen, insulin aspart U-100, magnesium oxide, magnesium oxide, naloxone, ondansetron, potassium bicarbonate, potassium bicarbonate, potassium bicarbonate, potassium, sodium phosphates, potassium, sodium phosphates, potassium, sodium phosphates, promethazine, sodium chloride 0.9%, Pharmacy to dose Vancomycin consult **AND** vancomycin - pharmacy to dose         Objective:       Physical Exam:    Vitals:  Blood pressure (!) 118/57, pulse 83, temperature (!) 101.3 °F (38.5 °C), temperature source Oral, resp. rate (!) 32, height 6' 2" (1.88 m), weight 93 kg (205 lb), SpO2 96 %.    HEAD: atraumatic " and normocephalic  EYES: no conjunctival pallor or muddiness, no icterus; normal pupil reaction to ambient light  ENT: OMM, no pharyngeal erythema, external bilateral ears WNL; no visible thrush or ulcers  NECK: no masses or swelling, trachea midline, no visible LAD/LN's ; LAD and LN's much better  CV: no palpitations; no pedal edema; no noticeable JVD or neck vein distension  CHEST: Normal respiratory effort; chest wall breath movements symmetrical; no audible wheezing  ABDOM: non-distended; no bloating;  peg tube since removed  MUSC/Skeletal: ROM normal; joints visibly normal; no deformities or arthropathy  EXTREM: no clubbing, cyanosis, inflammation or swelling; right arm with fair ROM  SKIN: no rashes, lesions, ulcers, petechiae or subcutaneous nodules  : no keith  NEURO: moving all 4 extremities; AAOx3; no tremors  PSYCH: normal mood, affect and behavior  LYMPH: no visible LN's or LAD; LAD and LN's on right neck since reduced in size      Lab Review:   Lab Results   Component Value Date    WBC 0.76 (LL) 11/09/2023    HGB 9.5 (L) 11/09/2023    HCT 26.4 (L) 11/09/2023    MCV 95 11/09/2023    PLT 8 (LL) 11/09/2023       CMP  Sodium   Date Value Ref Range Status   11/09/2023 136 136 - 145 mmol/L Final   06/12/2019 138 134 - 144 mmol/L      Potassium   Date Value Ref Range Status   11/09/2023 4.2 3.5 - 5.1 mmol/L Final     Chloride   Date Value Ref Range Status   11/09/2023 103 95 - 110 mmol/L Final   06/12/2019 99 98 - 110 mmol/L      CO2   Date Value Ref Range Status   11/09/2023 29 23 - 29 mmol/L Final     Glucose   Date Value Ref Range Status   11/09/2023 156 (H) 70 - 110 mg/dL Final   06/12/2019 179 (H) 70 - 99 mg/dL      BUN   Date Value Ref Range Status   11/09/2023 22 8 - 23 mg/dL Final     Creatinine   Date Value Ref Range Status   11/09/2023 1.0 0.5 - 1.4 mg/dL Final   06/12/2019 0.95 0.60 - 1.40 mg/dL      Calcium   Date Value Ref Range Status   11/09/2023 9.0 8.7 - 10.5 mg/dL Final     Total Protein    Date Value Ref Range Status   11/09/2023 6.0 6.0 - 8.4 g/dL Final     Albumin   Date Value Ref Range Status   11/09/2023 4.2 3.5 - 5.2 g/dL Final   06/12/2019 3.9 3.1 - 4.7 g/dL      Total Bilirubin   Date Value Ref Range Status   11/09/2023 0.9 0.1 - 1.0 mg/dL Final     Comment:     For infants and newborns, interpretation of results should be based  on gestational age, weight and in agreement with clinical  observations.    Premature Infant recommended reference ranges:  Up to 24 hours.............<8.0 mg/dL  Up to 48 hours............<12.0 mg/dL  3-5 days..................<15.0 mg/dL  6-29 days.................<15.0 mg/dL       Alkaline Phosphatase   Date Value Ref Range Status   11/09/2023 39 (L) 55 - 135 U/L Final     AST   Date Value Ref Range Status   11/09/2023 13 10 - 40 U/L Final     ALT   Date Value Ref Range Status   11/09/2023 12 10 - 44 U/L Final     Anion Gap   Date Value Ref Range Status   11/09/2023 4 (L) 8 - 16 mmol/L Final     eGFR   Date Value Ref Range Status   11/09/2023 >60.0 >60 mL/min/1.73 m^2 Final         Diagnostic Results:  X-Ray Chest AP Portable [[id number]] Collected: 11/09/23 0714   Order Status: Completed Updated: 11/09/23 0747   Narrative:     XR CHEST 1 VIEW     CLINICAL HISTORY:   83 years Male Sepsis     COMPARISON: Chest radiography April 18, 2023. PET/CT September 13, 2023     FINDINGS: Cardiac silhouette size is within normal limits. No airspace consolidation, pleural fluid, or pneumothorax. No pleural effusion or pneumothorax. No acute osseous abnormality.     IMPRESSION:     Stable chest radiograph. No acute pulmonary pathology.        Assessment/Plan:     IMPRESSION:  (1) 83 y.o. male known to my oncology service with diagnosis of CLL/NHL for which he has been followed and treated in conjunction with Dr Monik Don at [facility] in [place]. He was just seen in oncology clinic yesterday and his oral chemotherapy has been on hold due to low blood counts. He presented to ED  today at [facility] after experiencing some fever with febrile temp, and progressive fatigue and malaise. Labs done in ER showed WBC lower at 0.76 and platelet count lower at 8,000. He has been started on antibiotics and is being admitted to the hospitalist service. He will be started on neupogen and I have recommended a single donor platelet product transfusion.     He had recent bone marrow biopsy done at [facility] on 10/16/2023 and had PET scan on 9/13/2023. He is due to see Dr Don again next Wednesday. His oral Venetoclax therapy is on hold.     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  - followed  By Dr eKe  - CEA 0.8    (3) Iron deficiency/chronic anemia    - s/p periodic IV iron therapies  - hx/of GIB in past with gastritis due to NSAID use         (4) HTN and Hypercholesterolemia       (5) Chronic neck and back issues - followed by Dr Ryan Rivero with pain management in past     (6) NIDDM         (7) COPD    (8) MAYDA    (9) Hx/of alcoholism        Active Diagnoses:    Diagnosis Date Noted POA    PRINCIPAL PROBLEM:  Neutropenic fever [D70.9, R50.81] 06/30/2021 Yes    Pancytopenia [D61.818] 06/29/2021 Yes    Iron deficiency anemia [D50.9] 01/03/2019 Yes    CLL (chronic lymphocytic leukemia) [C91.10] 01/02/2019 Yes      Problems Resolved During this Admission:           PLAN:   Start neupogen daily and  - recommended consideration for a single donor platelet product transfusion  Monitor labs daily and transfuse as needed  IVF's and antibiotics as per primary team  Will follow with you           Thank you for your consult.      Drew Bai MD  Hematology/Oncology  On license of [facility] - Emergency Dept

## 2023-11-09 NOTE — Clinical Note
Diagnosis: Neutropenia, unspecified type [[id number]]   Future Attending Provider: KERLINE LOWE [[id number]]   Place in Observation: [facility] [[id number]]   Admitting Provider:: KERLINE LOWE [[id number]]   Special Needs:: Isolation [20]

## 2023-11-09 NOTE — ASSESSMENT & PLAN NOTE
Chemotherapy discontinued 2 weeks ago  Had BM biopsy by Dr. Don and plan to get results next week visit

## 2023-11-09 NOTE — SUBJECTIVE & OBJECTIVE
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    Lymphoma, 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    MAYDA on CPAP     Pneumonia of both lungs due to infectious organism 06/29/2021    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     Right-sided Bell's palsy 2009    Spondylolisthesis     Varicose veins of legs     Venous insufficiency        Past Surgical History:   Procedure Laterality Date    Athroscopic surgery      On Knee    BIOPSY OF TISSUE OF NECK Left 08/2015    Recuurence CLL/small cell lyphoma    CERVICAL FUSION  2022    ESOPHAGEAL DILATION      ESOPHAGOGASTRODUODENOSCOPY N/A 04/15/2022    Procedure: EGD (ESOPHAGOGASTRODUODENOSCOPY);  Surgeon: Siddharth Garcia MD;  Location: [facility];  Service: Endoscopy;  Laterality: N/A;    ESOPHAGOGASTRODUODENOSCOPY N/A 04/16/2022    Procedure: EGD (ESOPHAGOGASTRODUODENOSCOPY);  Surgeon: Siddharth Garcia MD;  Location: [facility];  Service: Endoscopy;  Laterality: N/A;    ESOPHAGOGASTRODUODENOSCOPY N/A 06/23/2022    Procedure: EGD (ESOPHAGOGASTRODUODENOSCOPY);  Surgeon: Jefferson Hyman MD;  Location: [facility];  Service: Endoscopy;  Laterality: N/A;    ESOPHAGOGASTRODUODENOSCOPY N/A 7/6/2023    Procedure: EGD (ESOPHAGOGASTRODUODENOSCOPY);  Surgeon: Jefferson Hyman MD;  Location: [facility] ENDO;  Service: Endoscopy;  Laterality: N/A;    ESOPHAGOGASTRODUODENOSCOPY W/ PEG N/A 04/16/2022    Procedure: EGD, WITH PEG TUBE INSERTION;  Surgeon: Siddharth Garcia MD;  Location: [facility];  Service: Endoscopy;   Laterality: N/A;    GASTROSTOMY TUBE PLACEMENT  04/16/2022    20 Fr (bumper initially at 3.5)    Hemorrhoid resection  1979    LUNG LOBECTOMY Left 2004    NECK SURGERY  04/08/2022    Anterior and Posterior C3-C7 fusion    OTHER SURGICAL HISTORY  2006    Chemotherapy s/p lyphoma        Portacath implantation and removal      reverse shoulder arthroplasty Right 03/2021       Review of patient's allergies indicates:  No Known Allergies    No current facility-administered medications on file prior to encounter.     Current Outpatient Medications on File Prior to Encounter   Medication Sig    atorvastatin (LIPITOR) 20 MG tablet Take 20 mg by mouth once daily.    azelastine (ASTELIN) 137 mcg (0.1 %) nasal spray 1 spray by Nasal route 2 (two) times daily.     ferrous sulfate (FEOSOL) 325 mg (65 mg iron) Tab tablet Take 325 mg by mouth daily with breakfast.    fluticasone propionate (FLONASE) 50 mcg/actuation nasal spray 1 spray by Each Nostril route once daily.    gabapentin (NEURONTIN) 600 MG tablet Take 600 mg by mouth 2 (two) times daily.    glimepiride (AMARYL) 2 MG tablet Take 2 mg by mouth once daily at 6am.    HYDROcodone-acetaminophen (NORCO)  mg per tablet Take 1 tablet by mouth every 8 (eight) hours as needed for Pain.    ondansetron (ZOFRAN) 8 MG tablet Take 1 tablet (8 mg total) by mouth every 8 (eight) hours as needed for Nausea.    promethazine (PHENERGAN) 12.5 MG Tab TAKE 1 TABLET(12.5 MG) BY MOUTH EVERY 6 HOURS AS NEEDED (Patient taking differently: Take 12.5 mg by mouth every 6 (six) hours as needed (nausea). TAKE 1 TABLET(12.5 MG) BY MOUTH EVERY 6 HOURS AS NEEDED)    tamsulosin (FLOMAX) 0.4 mg Cap Take 1 capsule (0.4 mg total) by mouth once daily. (Patient taking differently: Take 0.4 mg by mouth every evening.)    traZODone (DESYREL) 300 MG tablet Take 300 mg by mouth every evening.    valACYclovir (VALTREX) 500 MG tablet Take 1,000 mg by mouth once daily.    blood sugar diagnostic Strp 1 strip by  Misc.(Non-Drug; Combo Route) route 2 (two) times a day.    blood-glucose meter kit Use as instructed    duke's soln (benadryl 30 mL, mylanta 30 mL, LIDOcaine 30 mL, nystatin 30 mL) 120mL Take 10 mLs by mouth 4 (four) times daily.    losartan (COZAAR) 50 MG tablet Take 1 tablet (50 mg total) by mouth once daily.    venetoclax (VENCLEXTA) 100 mg Tab Take 2 tablets (200 mg) by mouth once daily.    [DISCONTINUED] esomeprazole (NEXIUM) 40 MG capsule Take 1 capsule (40 mg total) by mouth 2 (two) times daily.    [DISCONTINUED] levalbuterol (XOPENEX) 1.25 mg/3 mL nebulizer solution Take 3 mLs by nebulization every 4 to 6 hours as needed.     [DISCONTINUED] LIDOCAINE VISCOUS 2 % solution Take 15 mLs by mouth every 4 (four) hours as needed.    [DISCONTINUED] metoprolol succinate (TOPROL-XL) 25 MG 24 hr tablet Take 25 mg by mouth 2 (two) times daily.     [DISCONTINUED] valACYclovir (VALTREX) 1000 MG tablet Take 1,000 mg by mouth once daily.     Family History       Problem Relation (Age of Onset)    Colon cancer Father    Stomach cancer Mother (80)          Tobacco Use    Smoking status: Former    Smokeless tobacco: Never   Substance and Sexual Activity    Alcohol use: Yes    Drug use: No    Sexual activity: Not Currently     Review of Systems   Constitutional:  Positive for chills, fatigue and fever. Negative for appetite change.   HENT:  Positive for trouble swallowing.    Respiratory:  Positive for shortness of breath. Negative for cough.    Cardiovascular:  Positive for leg swelling.   Gastrointestinal: Negative.    Genitourinary: Negative.    Musculoskeletal: Negative.    Skin: Negative.    Neurological: Negative.    Hematological:  Bruises/bleeds easily.   Psychiatric/Behavioral: Negative.       Objective:     Vital Signs (Most Recent):  Temp: 99.8 °F (37.7 °C) (11/09/23 1456)  Pulse: 89 (11/09/23 1456)  Resp: 20 (11/09/23 1456)  BP: (!) 140/49 (11/09/23 1456)  SpO2: 96 % (11/09/23 1456) Vital Signs (24h Range):  Temp:   [99.8 °F (37.7 °C)-102.7 °F (39.3 °C)] 99.8 °F (37.7 °C)  Pulse:  [] 89  Resp:  [19-32] 20  SpO2:  [92 %-97 %] 96 %  BP: (112-173)/(49-84) 140/49     Weight: 88.4 kg (194 lb 14.2 oz)  Body mass index is 25.02 kg/m².     Physical Exam  Constitutional:       General: He is not in acute distress.     Appearance: He is not diaphoretic.   HENT:      Head: Normocephalic and atraumatic.      Nose: Nose normal.      Mouth/Throat:      Mouth: Mucous membranes are moist.      Pharynx: Oropharynx is clear. No oropharyngeal exudate.   Eyes:      General: No scleral icterus.     Extraocular Movements: Extraocular movements intact.   Cardiovascular:      Rate and Rhythm: Normal rate and regular rhythm.      Pulses: Normal pulses.      Heart sounds: No murmur heard.  Pulmonary:      Effort: Pulmonary effort is normal. No respiratory distress.      Breath sounds: No wheezing or rales.   Abdominal:      General: Bowel sounds are normal.      Tenderness: There is no abdominal tenderness. There is no right CVA tenderness or left CVA tenderness.   Musculoskeletal:      Cervical back: Normal range of motion and neck supple.      Right lower leg: Edema present.      Left lower leg: Edema present.   Lymphadenopathy:      Cervical: No cervical adenopathy.   Skin:     General: Skin is warm and dry.      Capillary Refill: Capillary refill takes less than 2 seconds.      Findings: Bruising present.   Neurological:      General: No focal deficit present.      Mental Status: He is alert and oriented to person, place, and time. Mental status is at baseline.   Psychiatric:         Mood and Affect: Mood normal.         Behavior: Behavior normal.                Significant Labs: All pertinent labs within the past 24 hours have been reviewed.  Recent Lab Results  (Last 5 results in the past 24 hours)        11/09/23  1057   11/09/23  1026   11/09/23  1020   11/09/23  0827   11/09/23  0750        Influenza A, Molecular         Negative        Influenza B, Molecular         Negative       Respiratory Infection Panel Source   NP swab             Adeno Test   Not Detected             Coronavirus 229E, Common Cold Virus   Not Detected             Coronavirus HKU1, Common Cold Virus   Not Detected             Coronavirus NL63, Common Cold Virus   Not Detected             Coronavirus OC43, Common Cold Virus   Not Detected  Comment: Coronavirus strains 229E, HKU1, NL63, and OC43 can cause the common   cold   and are not associated with the respiratory disease outbreak caused   by  the COVID-19 (SARS-CoV-2 novel Coronavirus) strain.                Human Metapneumovirus   Not Detected             Human Rhinovirus/Enterovirus   Not Detected             Influenza A (subtypes H1, H1-2009,H3)   Not Detected             Influenza B   Not Detected             Parainfluenza Virus 1   Not Detected             Parainfluenza Virus 2   Not Detected             Parainfluenza Virus 3   Not Detected             Parainfluenza Virus 4   Not Detected             Respiratory Syncytial Virus   Not Detected             Bordetella Parapertussis (FW7955)   Not Detected             Bordetella pertussis (ptxP)   Not Detected             Chlamydia pneumoniae   Not Detected             Mycoplasma pneumoniae   Not Detected  Comment: Respiratory Infection Panel testing performed by Multiplex PCR.             Unit Blood Type Code     6200  [P]           Unit Expiration     [id number]  [P]           Unit Blood Type     A POS  [P]           Appearance, UA       Clear         Bilirubin (UA)       Negative         CODING SYSTEM     LEGX721  [P]           Color, UA       Yellow         Crossmatch Interpretation     Not Required  [P]           DISPENSE STATUS     ISSUED  [P]           Ferritin     52.9           Flu A & B Source         Nasal swab       Glucose, UA       Negative         Group & Rh     O POS           INDIRECT CHIDI     NEG           INR     0.9  Comment: Coumadin Therapy:  2.0  - 3.0 for INR for all indicators except mechanical heart valves  and antiphospholipid syndromes which should use 2.5 - 3.5.             Iron     51           Ketones, UA       Negative         Lactate, Ramiro 0.5  Comment: Falsely low lactic acid results can be found in samples   containing >=13.0 mg/dL total bilirubin and/or >=3.5 mg/dL   direct bilirubin.                 Leukocytes, UA       Negative         NITRITE UA       Negative         Occult Blood UA       Negative         pH, UA       6.0         Product Code     [id number]  [P]           Protein, UA       Negative  Comment: Recommend a 24 hour urine protein or a urine   protein/creatinine ratio if globulin induced proteinuria is  clinically suspected.           Protime     10.6           SARS-CoV2 (COVID-19) Qualitative PCR   Not Detected             Saturated Iron     18           Specific [place], UA       1.025         Specimen Outdate     11/12/2023 23:59           Specimen UA       Urine, Clean Catch         TIBC     290           Transferrin     207           UNIT NUMBER     [id number]  [P]           UROBILINOGEN UA       Negative                                 [P] - Preliminary Result               Significant Imaging: I have reviewed all pertinent imaging results/findings within the past 24 hours.

## 2023-11-09 NOTE — ED NOTES
Pt. noted having increased in SOB and states he is having more difficulty breathing. Pt states he takes CPAP at night.   Nasal cannula applied and pt placed on 2L O2 for comfort.

## 2023-11-09 NOTE — ASSESSMENT & PLAN NOTE
Neutropenic precautions  Blood and urine culture  MRSA screen  Viral nasal swab   covid and flu negative  Vanc and Zosyn   On valtrex for oral lesions- chronic  Heme/onc consulted

## 2023-11-09 NOTE — PHARMACY MED REC
"Admission Medication History     The home medication history was taken by Isak Villavicencio.    You may go to "Admission" then "Reconcile Home Medications" tabs to review and/or act upon these items.     The home medication list has been updated by the Pharmacy department.   Please read ALL comments highlighted in yellow.   Please address this information as you see fit.    Feel free to contact us if you have any questions or require assistance.      The medications listed below were removed from the home medication list. Please reorder if appropriate:  Patient reports no longer taking the following medication(s):  Xopenex 1.25 mg / 3 ml  Lidocaine Viscous    Medications listed below were obtained from: Patient/family and Analytic software- [employer]  No current facility-administered medications on file prior to encounter.     Current Outpatient Medications on File Prior to Encounter   Medication Sig Dispense Refill    atorvastatin (LIPITOR) 20 MG tablet Take 20 mg by mouth once daily.      azelastine (ASTELIN) 137 mcg (0.1 %) nasal spray 1 spray by Nasal route 2 (two) times daily.       ferrous sulfate (FEOSOL) 325 mg (65 mg iron) Tab tablet Take 325 mg by mouth daily with breakfast.      fluticasone propionate (FLONASE) 50 mcg/actuation nasal spray 1 spray by Each Nostril route once daily.      gabapentin (NEURONTIN) 600 MG tablet Take 600 mg by mouth 2 (two) times daily.      glimepiride (AMARYL) 2 MG tablet Take 2 mg by mouth once daily at 6am.      HYDROcodone-acetaminophen (NORCO)  mg per tablet Take 1 tablet by mouth every 8 (eight) hours as needed for Pain.      ondansetron (ZOFRAN) 8 MG tablet Take 1 tablet (8 mg total) by mouth every 8 (eight) hours as needed for Nausea. 30 tablet 2    promethazine (PHENERGAN) 12.5 MG Tab TAKE 1 TABLET(12.5 MG) BY MOUTH EVERY 6 HOURS AS NEEDED (Patient taking differently: Take 12.5 mg by mouth every 6 (six) hours as needed (nausea). TAKE 1 TABLET(12.5 MG) BY MOUTH EVERY 6 " HOURS AS NEEDED) 30 tablet 3    tamsulosin (FLOMAX) 0.4 mg Cap Take 1 capsule (0.4 mg total) by mouth once daily. (Patient taking differently: Take 0.4 mg by mouth every evening.) 90 capsule 0    traZODone (DESYREL) 300 MG tablet Take 300 mg by mouth every evening.      valACYclovir (VALTREX) 500 MG tablet Take 1,000 mg by mouth once daily.      blood sugar diagnostic Strp 1 strip by Misc.(Non-Drug; Combo Route) route 2 (two) times a day.      blood-glucose meter kit Use as instructed      duke's soln (benadryl 30 mL, mylanta 30 mL, LIDOcaine 30 mL, nystatin 30 mL) 120mL Take 10 mLs by mouth 4 (four) times daily. 240 mL 0    losartan (COZAAR) 50 MG tablet Take 1 tablet (50 mg total) by mouth once daily. 90 tablet 0    venetoclax (VENCLEXTA) 100 mg Tab Take 2 tablets (200 mg) by mouth once daily. 60 tablet 11    [DISCONTINUED] esomeprazole (NEXIUM) 40 MG capsule Take 1 capsule (40 mg total) by mouth 2 (two) times daily. 60 capsule 0    [DISCONTINUED] levalbuterol (XOPENEX) 1.25 mg/3 mL nebulizer solution Take 3 mLs by nebulization every 4 to 6 hours as needed.       [DISCONTINUED] LIDOCAINE VISCOUS 2 % solution Take 15 mLs by mouth every 4 (four) hours as needed.      [DISCONTINUED] metoprolol succinate (TOPROL-XL) 25 MG 24 hr tablet Take 25 mg by mouth 2 (two) times daily.       [DISCONTINUED] valACYclovir (VALTREX) 1000 MG tablet Take 1,000 mg by mouth once daily.             Isak Villavicencio  EXT 1925                 .

## 2023-11-09 NOTE — ASSESSMENT & PLAN NOTE
Patient's anemia is currently controlled. Has not received any PRBCs to date. Etiology likely d/t Iron deficiency  Current CBC reviewed-   Lab Results   Component Value Date    HGB 9.5 (L) 11/09/2023    HCT 26.4 (L) 11/09/2023     Monitor serial CBC and transfuse if patient becomes hemodynamically unstable, symptomatic or H/H drops below 7/21.

## 2023-11-09 NOTE — H&P
Formerly [facility]  History & Physical    Patient Name: Conrad Kuhn  MRN: [id number]  Patient Class: IP- Inpatient  Admission Date: 11/9/2023  Attending Physician: Rochelle Arteaga MD   Primary Care Provider: Johnson Erazo MD         Patient information was obtained from patient, spouse/SO, past medical records and ER records.     Subjective:     Principal Problem:Neutropenic fever    Chief Complaint:   Chief Complaint   Patient presents with    Fever     Weak feeling since yesterday, fever this morning. Hx of lyphoma and lung cancer. Stopped chemo 2 weeks ago for neutropenia and leukopenia.         HPI: 82 yo male with PMH NSCLC (2004), diabetes, COPD, MAYDA on cpap, iron deficiency anemia, HTN and CLL presented to ED with fever 102 at 1am. The patient was seen by hematology yesterday in clinic and was aware of low WBC and platelets. He was waiting on neupogen administration if approved by insurance per spouse report. The patient denies bleeding, dysuria, skin lesions/rashes,diarrhea, N/V and productive cough. He has mild SOB. No chest pain.      On work up, he was found to have WBC 0.76 and platelets 8; H/H stable at 9.5/26.4.  Lactate 0.5; procal 0.094. Negative for flu and covid. CXR - no obvious pneumonia. Temp 102F, , RR 26, /72.  He stopped chemotherapy 2 weeks ago.   Blood and urine culture sent. Started on vanc and zosyn.     Hospital medicine will admit for neutropenic fever. 1 unit platelets ordered and neupogen daily x 3 days planned.   Heme/onc consulted.       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    Lymphoma, 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    MAYDA on CPAP     Pneumonia of both lungs due to infectious organism 06/29/2021    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     Right-sided Bell's palsy 2009    Spondylolisthesis     Varicose veins of legs     Venous insufficiency        Past Surgical History:   Procedure Laterality Date    Athroscopic surgery      On Knee    BIOPSY OF TISSUE OF NECK Left 08/2015    Recuurence CLL/small cell lyphoma    CERVICAL FUSION  2022    ESOPHAGEAL DILATION      ESOPHAGOGASTRODUODENOSCOPY N/A 04/15/2022    Procedure: EGD (ESOPHAGOGASTRODUODENOSCOPY);  Surgeon: Siddharth Garcia MD;  Location: [facility];  Service: Endoscopy;  Laterality: N/A;    ESOPHAGOGASTRODUODENOSCOPY N/A 04/16/2022    Procedure: EGD (ESOPHAGOGASTRODUODENOSCOPY);  Surgeon: Siddharth Garcia MD;  Location: [facility];  Service: Endoscopy;  Laterality: N/A;    ESOPHAGOGASTRODUODENOSCOPY N/A 06/23/2022    Procedure: EGD (ESOPHAGOGASTRODUODENOSCOPY);  Surgeon: Jefferson Hyman MD;  Location: [facility];  Service: Endoscopy;  Laterality: N/A;    ESOPHAGOGASTRODUODENOSCOPY N/A 7/6/2023    Procedure: EGD (ESOPHAGOGASTRODUODENOSCOPY);  Surgeon: Jefferson Hyman MD;  Location: [facility];  Service: Endoscopy;  Laterality: N/A;    ESOPHAGOGASTRODUODENOSCOPY W/ PEG N/A 04/16/2022    Procedure: EGD, WITH PEG TUBE INSERTION;  Surgeon: Siddharth Garcia MD;  Location: [facility];  Service: Endoscopy;  Laterality: N/A;    GASTROSTOMY TUBE PLACEMENT  04/16/2022    20 Fr (bumper initially at 3.5)    Hemorrhoid resection  1979    LUNG LOBECTOMY Left 2004    NECK SURGERY  04/08/2022    Anterior and Posterior C3-C7 fusion    OTHER SURGICAL HISTORY  2006    Chemotherapy s/p lyphoma        Portacath implantation and removal      reverse shoulder arthroplasty Right 03/2021        Review of patient's allergies indicates:  No Known Allergies    No current facility-administered medications on file prior to encounter.     Current Outpatient Medications on File Prior to Encounter   Medication Sig    atorvastatin (LIPITOR) 20 MG tablet Take 20 mg by mouth once daily.    azelastine (ASTELIN) 137 mcg (0.1 %) nasal spray 1 spray by Nasal route 2 (two) times daily.     ferrous sulfate (FEOSOL) 325 mg (65 mg iron) Tab tablet Take 325 mg by mouth daily with breakfast.    fluticasone propionate (FLONASE) 50 mcg/actuation nasal spray 1 spray by Each Nostril route once daily.    gabapentin (NEURONTIN) 600 MG tablet Take 600 mg by mouth 2 (two) times daily.    glimepiride (AMARYL) 2 MG tablet Take 2 mg by mouth once daily at 6am.    HYDROcodone-acetaminophen (NORCO)  mg per tablet Take 1 tablet by mouth every 8 (eight) hours as needed for Pain.    ondansetron (ZOFRAN) 8 MG tablet Take 1 tablet (8 mg total) by mouth every 8 (eight) hours as needed for Nausea.    promethazine (PHENERGAN) 12.5 MG Tab TAKE 1 TABLET(12.5 MG) BY MOUTH EVERY 6 HOURS AS NEEDED (Patient taking differently: Take 12.5 mg by mouth every 6 (six) hours as needed (nausea). TAKE 1 TABLET(12.5 MG) BY MOUTH EVERY 6 HOURS AS NEEDED)    tamsulosin (FLOMAX) 0.4 mg Cap Take 1 capsule (0.4 mg total) by mouth once daily. (Patient taking differently: Take 0.4 mg by mouth every evening.)    traZODone (DESYREL) 300 MG tablet Take 300 mg by mouth every evening.    valACYclovir (VALTREX) 500 MG tablet Take 1,000 mg by mouth once daily.    blood sugar diagnostic Strp 1 strip by Misc.(Non-Drug; Combo Route) route 2 (two) times a day.    blood-glucose meter kit Use as instructed    duke's soln (benadryl 30 mL, mylanta 30 mL, LIDOcaine 30 mL, nystatin 30 mL) 120mL Take 10 mLs by mouth 4 (four) times daily.    losartan (COZAAR) 50 MG tablet Take 1 tablet (50 mg total) by mouth once daily.    venetoclax (VENCLEXTA) 100 mg Tab  Take 2 tablets (200 mg) by mouth once daily.    [DISCONTINUED] esomeprazole (NEXIUM) 40 MG capsule Take 1 capsule (40 mg total) by mouth 2 (two) times daily.    [DISCONTINUED] levalbuterol (XOPENEX) 1.25 mg/3 mL nebulizer solution Take 3 mLs by nebulization every 4 to 6 hours as needed.     [DISCONTINUED] LIDOCAINE VISCOUS 2 % solution Take 15 mLs by mouth every 4 (four) hours as needed.    [DISCONTINUED] metoprolol succinate (TOPROL-XL) 25 MG 24 hr tablet Take 25 mg by mouth 2 (two) times daily.     [DISCONTINUED] valACYclovir (VALTREX) 1000 MG tablet Take 1,000 mg by mouth once daily.     Family History       Problem Relation (Age of Onset)    Colon cancer Father    Stomach cancer Mother (80)          Tobacco Use    Smoking status: Former    Smokeless tobacco: Never   Substance and Sexual Activity    Alcohol use: Yes    Drug use: No    Sexual activity: Not Currently     Review of Systems   Constitutional:  Positive for chills, fatigue and fever. Negative for appetite change.   HENT:  Positive for trouble swallowing.    Respiratory:  Positive for shortness of breath. Negative for cough.    Cardiovascular:  Positive for leg swelling.   Gastrointestinal: Negative.    Genitourinary: Negative.    Musculoskeletal: Negative.    Skin: Negative.    Neurological: Negative.    Hematological:  Bruises/bleeds easily.   Psychiatric/Behavioral: Negative.       Objective:     Vital Signs (Most Recent):  Temp: 99.8 °F (37.7 °C) (11/09/23 1456)  Pulse: 89 (11/09/23 1456)  Resp: 20 (11/09/23 1456)  BP: (!) 140/49 (11/09/23 1456)  SpO2: 96 % (11/09/23 1456) Vital Signs (24h Range):  Temp:  [99.8 °F (37.7 °C)-102.7 °F (39.3 °C)] 99.8 °F (37.7 °C)  Pulse:  [] 89  Resp:  [19-32] 20  SpO2:  [92 %-97 %] 96 %  BP: (112-173)/(49-84) 140/49     Weight: 88.4 kg (194 lb 14.2 oz)  Body mass index is 25.02 kg/m².     Physical Exam  Constitutional:       General: He is not in acute distress.     Appearance: He is not diaphoretic.    HENT:      Head: Normocephalic and atraumatic.      Nose: Nose normal.      Mouth/Throat:      Mouth: Mucous membranes are moist.      Pharynx: Oropharynx is clear. No oropharyngeal exudate.   Eyes:      General: No scleral icterus.     Extraocular Movements: Extraocular movements intact.   Cardiovascular:      Rate and Rhythm: Normal rate and regular rhythm.      Pulses: Normal pulses.      Heart sounds: No murmur heard.  Pulmonary:      Effort: Pulmonary effort is normal. No respiratory distress.      Breath sounds: No wheezing or rales.   Abdominal:      General: Bowel sounds are normal.      Tenderness: There is no abdominal tenderness. There is no right CVA tenderness or left CVA tenderness.   Musculoskeletal:      Cervical back: Normal range of motion and neck supple.      Right lower leg: Edema present.      Left lower leg: Edema present.   Lymphadenopathy:      Cervical: No cervical adenopathy.   Skin:     General: Skin is warm and dry.      Capillary Refill: Capillary refill takes less than 2 seconds.      Findings: Bruising present.   Neurological:      General: No focal deficit present.      Mental Status: He is alert and oriented to person, place, and time. Mental status is at baseline.   Psychiatric:         Mood and Affect: Mood normal.         Behavior: Behavior normal.                Significant Labs: All pertinent labs within the past 24 hours have been reviewed.  Recent Lab Results  (Last 5 results in the past 24 hours)        11/09/23  1057   11/09/23  1026   11/09/23  1020   11/09/23  0827   11/09/23  0750        Influenza A, Molecular         Negative       Influenza B, Molecular         Negative       Respiratory Infection Panel Source   NP swab             Adeno Test   Not Detected             Coronavirus 229E, Common Cold Virus   Not Detected             Coronavirus HKU1, Common Cold Virus   Not Detected             Coronavirus NL63, Common Cold Virus   Not Detected             Coronavirus  OC43, Common Cold Virus   Not Detected  Comment: Coronavirus strains 229E, HKU1, NL63, and OC43 can cause the common   cold   and are not associated with the respiratory disease outbreak caused   by  the COVID-19 (SARS-CoV-2 novel Coronavirus) strain.                Human Metapneumovirus   Not Detected             Human Rhinovirus/Enterovirus   Not Detected             Influenza A (subtypes H1, H1-2009,H3)   Not Detected             Influenza B   Not Detected             Parainfluenza Virus 1   Not Detected             Parainfluenza Virus 2   Not Detected             Parainfluenza Virus 3   Not Detected             Parainfluenza Virus 4   Not Detected             Respiratory Syncytial Virus   Not Detected             Bordetella Parapertussis (EY1460)   Not Detected             Bordetella pertussis (ptxP)   Not Detected             Chlamydia pneumoniae   Not Detected             Mycoplasma pneumoniae   Not Detected  Comment: Respiratory Infection Panel testing performed by Multiplex PCR.             Unit Blood Type Code     6200  [P]           Unit Expiration     [id number]  [P]           Unit Blood Type     A POS  [P]           Appearance, UA       Clear         Bilirubin (UA)       Negative         CODING SYSTEM     HYTW689  [P]           Color, UA       Yellow         Crossmatch Interpretation     Not Required  [P]           DISPENSE STATUS     ISSUED  [P]           Ferritin     52.9           Flu A & B Source         Nasal swab       Glucose, UA       Negative         Group & Rh     O POS           INDIRECT CHIDI     NEG           INR     0.9  Comment: Coumadin Therapy:  2.0 - 3.0 for INR for all indicators except mechanical heart valves  and antiphospholipid syndromes which should use 2.5 - 3.5.             Iron     51           Ketones, UA       Negative         Lactate, Ramiro 0.5  Comment: Falsely low lactic acid results can be found in samples   containing >=13.0 mg/dL total bilirubin and/or >=3.5 mg/dL    direct bilirubin.                 Leukocytes, UA       Negative         NITRITE UA       Negative         Occult Blood UA       Negative         pH, UA       6.0         Product Code     [id number]  [P]           Protein, UA       Negative  Comment: Recommend a 24 hour urine protein or a urine   protein/creatinine ratio if globulin induced proteinuria is  clinically suspected.           Protime     10.6           SARS-CoV2 (COVID-19) Qualitative PCR   Not Detected             Saturated Iron     18           Specific Hamilton, UA       1.025         Specimen Outdate     11/12/2023 23:59           Specimen UA       Urine, Clean Catch         TIBC     290           Transferrin     207           UNIT NUMBER     [id number]  [P]           UROBILINOGEN UA       Negative                                 [P] - Preliminary Result               Significant Imaging: I have reviewed all pertinent imaging results/findings within the past 24 hours.    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 Will transfuse red blood cells if the hemoglobin is <7g/dL (or <8 in the setting of ACS). Will transfuse platelets if platelet count is <10k. Hold DVT prophylaxis if platelets are <50k. The patient's hemoglobin, white blood cell count, and platelet count results have been reviewed and are listed below.  Recent Labs   Lab 11/09/23  0717   HGB 9.5*   WBC 0.76*   PLT 8*     1 unit platelet transfusion  neupogen daily x 3 days         Iron deficiency anemia  Patient's anemia is currently controlled. Has not received any PRBCs to date. Etiology likely d/t Iron deficiency  Current CBC reviewed-   Lab Results   Component Value Date    HGB 9.5 (L)  11/09/2023    HCT 26.4 (L) 11/09/2023     Monitor serial CBC and transfuse if patient becomes hemodynamically unstable, symptomatic or H/H drops below 7/21.    CLL (chronic lymphocytic leukemia)  Chemotherapy discontinued 2 weeks ago  Had BM biopsy by Dr. Don and plan to get results next week visit        VTE Risk Mitigation (From admission, onward)         Ordered     IP VTE HIGH RISK PATIENT  Once         11/09/23 0947     Place sequential compression device  Until discontinued         11/09/23 0947     Reason for No Pharmacological VTE Prophylaxis  Once        Question:  Reasons:  Answer:  Thrombocytopenia    11/09/23 0947                               Rochelle Arteaga MD  Department of Hospital Medicine  [facility]    COMPLETED  Family history is reviewed and has not changed   Pertinent information:

## 2023-11-09 NOTE — NURSING
Nurses Note -- 4 Eyes      11/9/2023   3:40 PM      Skin assessed during: Admit      [x] No Altered Skin Integrity Present    [x]Prevention Measures Documented      [] Yes- Altered Skin Integrity Present or Discovered   [] LDA Added if Not in Epic (Describe Wound)   [] New Altered Skin Integrity was Present on Admit and Documented in LDA   [] Wound Image Taken    Wound Care Consulted? No    Attending Nurse:  [id number]     Second RN/Staff Member:   [id number]

## 2023-11-09 NOTE — ASSESSMENT & PLAN NOTE
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 Will transfuse red blood cells if the hemoglobin is <7g/dL (or <8 in the setting of ACS). Will transfuse platelets if platelet count is <10k. Hold DVT prophylaxis if platelets are <50k. The patient's hemoglobin, white blood cell count, and platelet count results have been reviewed and are listed below.  Recent Labs   Lab 11/09/23  0717   HGB 9.5*   WBC 0.76*   PLT 8*     1 unit platelet transfusion  neupogen daily x 3 days

## 2023-11-10 NOTE — PLAN OF CARE
Pt AAOx 4. Slept well this shift. SOB managed with 2L NC or Oxymask. Refused CPAP this shift. No acute events overnight. No signs, symptoms, or complaints of distress at this time. Care ongoing.    Problem: Adult Inpatient Plan of Care  Goal: Plan of Care Review  Outcome: Ongoing, Progressing  Goal: Patient-Specific Goal (Individualized)  Outcome: Ongoing, Progressing  Goal: Absence of Hospital-Acquired Illness or Injury  Outcome: Ongoing, Progressing  Goal: Optimal Comfort and Wellbeing  Outcome: Ongoing, Progressing  Goal: Readiness for Transition of Care  Outcome: Ongoing, Progressing     Problem: Skin Injury Risk Increased  Goal: Skin Health and Integrity  Outcome: Ongoing, Progressing

## 2023-11-10 NOTE — PLAN OF CARE
Formerly [facility]  Initial Discharge Assessment       Primary Care Provider: Johnson Erazo MD    Admission Diagnosis: Neutropenia, unspecified type [D70.9]  Fever [R50.9]    Admission Date: 11/9/2023  Expected Discharge Date: 11/12/2023    Transition of Care Barriers: None    Assessment completed at bedside.  Advanced directives not addressed at this time.  Pt intends to discharge home where he lives with his wife and uses a RW, Cpap, cane, and rollator to help complete adl;s.  Pt would like to resume his [facility]    Payor: MEDICARE / Plan: MEDICARE PART A & B / Product Type: Government /     Extended Emergency Contact Information  Primary Emergency Contact: Peri Kuhn  Address: [address] [place] [address] [id number] [place] [address]  Home Phone: [phone]  Mobile Phone: [phone]  Relation: Spouse    Discharge Plan A: Home Health  Discharge Plan B: Home Health      [id number] - [place], [place] - [address] AT [facility] [address]  [address]  [facility] [address]  Phone: [phone] Fax: [phone]    [facility] - [address]  [address] [facility] [place] [address]  Phone: [phone] Fax: [phone]      Initial Assessment (most recent)       Adult Discharge Assessment - 11/10/23 1019          Discharge Assessment    Assessment Type Discharge Planning Assessment     Confirmed/corrected address, phone number and insurance Yes     Confirmed Demographics Correct on Facesheet     Source of Information patient     Communicated MARTELL with patient/caregiver Yes     Reason For Admission fever     People in Home spouse     Facility Arrived From: home     Do you expect to return to your current living situation? Yes     Do you have help at home or someone to help you manage your care at home? Yes     Who are your caregiver(s) and their phone number(s)? Peri Kuhn (Spouse)    [phone]     Prior to hospitilization cognitive status: Alert/Oriented     Current cognitive status: Alert/Oriented     Walking or Climbing Stairs ambulation difficulty, requires equipment     Equipment Currently Used at Home rollator;cane, straight;nebulizer;glucometer;shower chair;grab bar     Readmission within 30 days? No     Patient currently being followed by outpatient case management? No     Do you currently have service(s) that help you manage your care at home? Yes     Name and Contact number of agency [facility] ok to take back     Is the pt/caregiver preference to resume services with current agency Yes     Do you take prescription medications? Yes     Do you have prescription coverage? Yes     Coverage BCBS     Do you have any problems affording any of your prescribed medications? No     Is the patient taking medications as prescribed? yes     Who is going to help you get home at discharge? Peri Kuhn (Spouse)   [phone]     How do you get to doctors appointments? car, drives self     Are you on dialysis? No     Do you take coumadin? No     DME Needed Upon Discharge  none     Discharge Plan discussed with: Patient     Transition of Care Barriers None     Discharge Plan A Home Health     Discharge Plan B Home Health

## 2023-11-10 NOTE — PROGRESS NOTES
"[facility]   Hematology/Oncology  Inpatient Progress Note          Patient Name: Conrad Kuhn  MRN: [id number]  Admission Date: 11/9/2023  Hospital Length of Stay: 1 days  Code Status: Full Code   Attending Provider: David Julien MD  Consulting Provider: Drew Bai MD  Primary Care Physician: Johnson Erazo MD  Principal Problem:Neutropenic fever      Subjective:       Patient ID: Conrad Kuhn is a 83 y.o. male.    Chief Complaint: Fever (Weak feeling since yesterday, fever this morning. Hx of lyphoma and lung cancer. Stopped chemo 2 weeks ago for neutropenia and leukopenia. )        History Present Illness:    He is laying in bed; he has had some diarrhea this am with some associated GI upset. He denies any current CP, SOB, HA's or N/V. No dysuria , cough or sputum. I discussed with floor nursing staff.     Review of Systems:  GEN: fever this am; general malaise, fatigue and weakness  HEENT: normal with no HA's, sore throat, stiff neck, changes in vision  CV: normal with no CP, SOB, PND, MAYER or orthopnea  PULM: normal with no SOB, cough, hemoptysis, sputum or pleuritic pain  GI: GI upset and diarrhea this am  : normal with no hematuria, dysuria  BREAST: normal with no mass, discharge, pain  SKIN: normal with no rash, erythema, bruising, or swelling      Objective:     Vitals:  Blood pressure (!) 116/58, pulse 77, temperature 98.5 °F (36.9 °C), temperature source Oral, resp. rate 18, height 6' 2" (1.88 m), weight 86.2 kg (190 lb 0.6 oz), SpO2 99 %.    Physical Exam:  HEAD: atraumatic and normocephalic  EYES: no conjunctival pallor or muddiness, no icterus; normal pupil reaction to ambient light  ENT: OMM, no pharyngeal erythema, external bilateral ears WNL; no visible thrush or ulcers  NECK: no masses or swelling, trachea midline, no visible LAD/LN's ; LAD and LN's much better  CV: no palpitations; no pedal edema; no noticeable JVD or neck vein distension  CHEST: Normal " respiratory effort; chest wall breath movements symmetrical; no audible wheezing  ABDOM: non-distended; no bloating;  peg tube since removed  MUSC/Skeletal: ROM normal; joints visibly normal; no deformities or arthropathy  EXTREM: no clubbing, cyanosis, inflammation or swelling; right arm with fair ROM  SKIN: no rashes, lesions, ulcers, petechiae or subcutaneous nodules  : no keith  NEURO: moving all 4 extremities; AAOx3; no tremors  PSYCH: normal mood, affect and behavior  LYMPH: no visible LN's or LAD; LAD and LN's on right neck since reduced in size             Lab Review:        Lab Results   Component Value Date    WBC 1.95 (LL) 11/10/2023    HGB 9.4 (L) 11/10/2023    HCT 26.3 (L) 11/10/2023    MCV 95 11/10/2023    PLT 28 (LL) 11/10/2023       CMP  Sodium   Date Value Ref Range Status   11/10/2023 135 (L) 136 - 145 mmol/L Final   06/12/2019 138 134 - 144 mmol/L      Potassium   Date Value Ref Range Status   11/10/2023 3.1 (L) 3.5 - 5.1 mmol/L Final     Chloride   Date Value Ref Range Status   11/10/2023 102 95 - 110 mmol/L Final   06/12/2019 99 98 - 110 mmol/L      CO2   Date Value Ref Range Status   11/10/2023 25 23 - 29 mmol/L Final     Glucose   Date Value Ref Range Status   11/10/2023 174 (H) 70 - 110 mg/dL Final   06/12/2019 179 (H) 70 - 99 mg/dL      BUN   Date Value Ref Range Status   11/10/2023 14 8 - 23 mg/dL Final     Creatinine   Date Value Ref Range Status   11/10/2023 0.9 0.5 - 1.4 mg/dL Final   06/12/2019 0.95 0.60 - 1.40 mg/dL      Calcium   Date Value Ref Range Status   11/10/2023 8.4 (L) 8.7 - 10.5 mg/dL Final     Total Protein   Date Value Ref Range Status   11/09/2023 6.0 6.0 - 8.4 g/dL Final     Albumin   Date Value Ref Range Status   11/09/2023 4.2 3.5 - 5.2 g/dL Final   06/12/2019 3.9 3.1 - 4.7 g/dL      Total Bilirubin   Date Value Ref Range Status   11/09/2023 0.9 0.1 - 1.0 mg/dL Final     Comment:     For infants and newborns, interpretation of results should be based  on gestational  age, weight and in agreement with clinical  observations.    Premature Infant recommended reference ranges:  Up to 24 hours.............<8.0 mg/dL  Up to 48 hours............<12.0 mg/dL  3-5 days..................<15.0 mg/dL  6-29 days.................<15.0 mg/dL       Alkaline Phosphatase   Date Value Ref Range Status   11/09/2023 39 (L) 55 - 135 U/L Final     AST   Date Value Ref Range Status   11/09/2023 13 10 - 40 U/L Final     ALT   Date Value Ref Range Status   11/09/2023 12 10 - 44 U/L Final     Anion Gap   Date Value Ref Range Status   11/10/2023 8 8 - 16 mmol/L Final     eGFR   Date Value Ref Range Status   11/10/2023 >60.0 >60 mL/min/1.73 m^2 Final           Radiology Diagnostic Studies:       X-Ray Chest AP Portable [[id number]] Collected: 11/09/23 0714   Order Status: Completed Updated: 11/09/23 0747   Narrative:     XR CHEST 1 VIEW     CLINICAL HISTORY:   83 years Male Sepsis     COMPARISON: Chest radiography April 18, 2023. PET/CT September 13, 2023     FINDINGS: Cardiac silhouette size is within normal limits. No airspace consolidation, pleural fluid, or pneumothorax. No pleural effusion or pneumothorax. No acute osseous abnormality.     IMPRESSION:     Stable chest radiograph. No acute pulmonary pathology.              Assessment:     IMPRESSION:    (1) 83 y.o. male known to my oncology service with diagnosis of CLL/NHL for which he has been followed and treated in conjunction with Dr Monik Don at [facility] in [place]. He was just seen in oncology clinic yesterday and his oral chemotherapy has been on hold due to low blood counts. He presented to ED today at [facility] after experiencing some fever with febrile temp, and progressive fatigue and malaise. Labs done in ER showed WBC lower at 0.76 and platelet count lower at 8,000. He has been started on antibiotics and is being admitted to the hospitalist service. He will be started on neupogen and I have recommended a single donor platelet product  transfusion.      He had recent bone marrow biopsy done at [facility] on 10/16/2023 and had PET scan on 9/13/2023. He is due to see Dr Don again next Wednesday. His oral Venetoclax therapy is on hold.     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 Neutropenic fever    2. Fever    3. Neutropenia, unspecified type    4. Personal history of CLL (chronic lymphocytic leukemia)    5. Thrombocytopenia    6. Chest pain           Plan:     PLAN:          Continue neupogen daily for now   Monitor labs daily and transfuse as needed  IVF's and antibiotics as per primary team  Check stool cultures and c.diff  Will follow with you               Drew Bai MD  Hematology/Oncology  [facility]

## 2023-11-10 NOTE — PROGRESS NOTES
[facility] Medicine    Progress Note    Patient Name: Conrad Kuhn  MRN: [id number]  Patient Class: IP- Inpatient   Admission Date: 11/9/2023  6:43 AM  Length of Stay: 1  Attending Physician: David Julien MD  Primary Care Provider: Johnson Erazo MD  Face-to-Face encounter date: 11/10/2023  Code status:  Chief Complaint: Fever (Weak feeling since yesterday, fever this morning. Hx of lyphoma and lung cancer. Stopped chemo 2 weeks ago for neutropenia and leukopenia. )        Subjective:    HPI:  84 yo male with PMH NSCLC (2004), diabetes, COPD, MAYDA on cpap, iron deficiency anemia, HTN and CLL presented to ED with fever 102 at 1am. The patient was seen by hematology yesterday in clinic and was aware of low WBC and platelets. He was waiting on neupogen administration if approved by insurance per spouse report. The patient denies bleeding, dysuria, skin lesions/rashes,diarrhea, N/V and productive cough. He has mild SOB. No chest pain.    On work up, he was found to have WBC 0.76 and platelets 8; H/H stable at 9.5/26.4.  Lactate 0.5; procal 0.094. Negative for flu and covid. CXR - no obvious pneumonia. Temp 102F, , RR 26, /72.  He stopped chemotherapy 2 weeks ago.   Blood and urine culture sent. Started on vanc and zosyn.   Hospital medicine will admit for neutropenic fever. 1 unit platelets ordered and neupogen daily x 3 days planned.   Heme/onc consulted.      Interval History:   11/10: Patient currently fever free and doing well.  Currently on Neupogen and WBC improved to 1.95.  Patient received a unit of platelets and platelet improved from 8-28.  Blood cultures and urine cultures currently pending .  Potassium low and currently being replaced.  Patient currently on IV Zosyn and vancomycin No concerns/issues overnight reported by the patient or the nursing staff.    Review of Systems All other Review of Systems were found to be negative expect for that mentioned already  in HPI.     Objective:     Vitals:    11/09/23 2354 11/10/23 0400 11/10/23 0416 11/10/23 0728   BP: (!) 162/63  (!) 159/67 (!) 116/58   BP Location:       Patient Position:       Pulse: 98  88 77   Resp:   (!) 23 18   Temp: 98.8 °F (37.1 °C)  98.9 °F (37.2 °C) 98.5 °F (36.9 °C)   TempSrc:    Oral   SpO2: 95%  96% 99%   Weight:  86.2 kg (190 lb 0.6 oz)     Height:            Vitals reviewed.  Constitutional: No distress.   HENT: NC  Head: Atraumatic.   Cardiovascular: Normal rate, regular rhythm and normal heart sounds.   Pulmonary/Chest: Effort normal. No wheezes.   Abdominal: Soft. Bowel sounds are normal. No distension and no mass. No tenderness  Neurological: Alert.   Skin: Skin is warm and dry.   Psych: Appropriate mood and affect    Following labs were Reviewed   CBC:  Recent Labs   Lab 11/10/23  0456   WBC 1.95*   HGB 9.4*   HCT 26.3*   PLT 28*     CMP:  Recent Labs   Lab 11/10/23  0456   CALCIUM 8.4*   *   K 3.1*   CO2 25      BUN 14   CREATININE 0.9       Micro Results  Microbiology Results (last 7 days)       Procedure Component Value Units Date/Time    Blood culture x two cultures. Draw prior to antibiotics. [[id number]] Collected: 11/09/23 0725    Order Status: Completed Specimen: Blood Updated: 11/10/23 1032     Blood Culture, Routine No Growth to date      No Growth to date    Narrative:      Aerobic and anaerobic    Blood culture x two cultures. Draw prior to antibiotics. [[id number]] Collected: 11/09/23 0726    Order Status: Completed Specimen: Blood Updated: 11/10/23 1032     Blood Culture, Routine No Growth to date      No Growth to date    Narrative:      Aerobic and anaerobic    Urine Culture High Risk [[id number]] Collected: 11/09/23 0905    Order Status: Completed Specimen: Urine, Catheterized Updated: 11/10/23 0747     Urine Culture, Routine No growth to date    Narrative:      Indicated criteria for high risk culture:->Neutropenic  patient    MRSA Screen by PCR [[id number]]  Collected: 11/09/23 1309    Order Status: Completed Specimen: Nasopharyngeal Swab from Nasal Updated: 11/09/23 1557     MRSA SCREEN BY PCR Negative    Respiratory Infection Panel (PCR), Nasopharyngeal [[id number]] Collected: 11/09/23 1026    Order Status: Completed Specimen: Nasopharyngeal Swab Updated: 11/09/23 1208     Respiratory Infection Panel Source NP swab     Adenovirus Not Detected     Coronavirus 229E, Common Cold Virus Not Detected     Coronavirus HKU1, Common Cold Virus Not Detected     Coronavirus NL63, Common Cold Virus Not Detected     Coronavirus OC43, Common Cold Virus Not Detected     Comment: Coronavirus strains 229E, HKU1, NL63, and OC43 can cause the common   cold   and are not associated with the respiratory disease outbreak caused   by  the COVID-19 (SARS-CoV-2 novel Coronavirus) strain.           SARS-CoV2 (COVID-19) Qualitative PCR Not Detected     Human Metapneumovirus Not Detected     Human Rhinovirus/Enterovirus Not Detected     Influenza A (subtypes H1, H1-2009,H3) Not Detected     Influenza B Not Detected     Parainfluenza Virus 1 Not Detected     Parainfluenza Virus 2 Not Detected     Parainfluenza Virus 3 Not Detected     Parainfluenza Virus 4 Not Detected     Respiratory Syncytial Virus Not Detected     Bordetella Parapertussis (LF2302) Not Detected     Bordetella pertussis (ptxP) Not Detected     Chlamydia pneumoniae Not Detected     Mycoplasma pneumoniae Not Detected     Comment: Respiratory Infection Panel testing performed by Multiplex PCR.       Narrative:      Respiratory Infection Panel source->NP Swab             Radiology Reports  X-Ray Chest AP Portable    Result Date: 11/9/2023  XR CHEST 1 VIEW CLINICAL HISTORY: 83 years Male Sepsis COMPARISON: Chest radiography April 18, 2023. PET/CT September 13, 2023 FINDINGS: Cardiac silhouette size is within normal limits. No airspace consolidation, pleural fluid, or pneumothorax. No pleural effusion or pneumothorax. No acute osseous  abnormality. IMPRESSION: Stable chest radiograph. No acute pulmonary pathology. Electronically signed by:  Altaf Cross MD  11/09/2023 07:45 AM CST Workstation: [id number]       Meds  Scheduled Meds:   atorvastatin  20 mg Oral Daily    ferrous sulfate  1 tablet Oral Daily    filgrastim-sndz  300 mcg Subcutaneous Daily    gabapentin  600 mg Oral BID    losartan  50 mg Oral Daily    piperacillin-tazobactam (Zosyn) IV (PEDS and ADULTS) (extended infusion is not appropriate)  3.375 g Intravenous Q8H    tamsulosin  0.4 mg Oral QHS    traZODone  300 mg Oral QHS    valACYclovir  1,000 mg Oral Daily    vancomycin (VANCOCIN) IV (PEDS and ADULTS)  2,000 mg Intravenous Q24H     Continuous Infusions:  PRN Meds:.0.9%  NaCl infusion (for blood administration), acetaminophen, acetaminophen, aluminum-magnesium hydroxide-simethicone, dextrose 50%, dextrose 50%, glucagon (human recombinant), glucose, glucose, HYDROcodone-acetaminophen, HYDROcodone-acetaminophen, insulin aspart U-100, magnesium oxide, magnesium oxide, naloxone, ondansetron, potassium bicarbonate, potassium bicarbonate, potassium bicarbonate, potassium, sodium phosphates, potassium, sodium phosphates, potassium, sodium phosphates, promethazine, sodium chloride 0.9%, Pharmacy to dose Vancomycin consult **AND** vancomycin - pharmacy to dose.    Active PT: No  Active OT: No  Active SLP: No  Assessment & Plan:   * Neutropenic fever  Neutropenic precautions  Blood and urine culture pending  MRSA screen  Viral nasal swab, covid and flu negative  Vanc and Zosyn   On valtrex for oral lesions- chronic  Heme/onc on board           Pancytopenia  Improving  1 unit platelet transfusion given 11/09/2023  neupogen daily x 3 days   Monitor serial CBC and transfuse if patient becomes hemodynamically unstable, symptomatic or H/H drops below 7/21.     CLL (chronic lymphocytic leukemia)  Chemotherapy discontinued 2 weeks ago  Had BM biopsy by Dr. Don and plan to get results next week  visit               Discharge Planning:   Is the patient medically ready for discharge?: no    Reason for patient still in hospital (select all that apply): Patient trending condition and Treatment    Above encounter included review of the medical records, interviewing and examining the patient face-to-face, discussion with family and other health care providers, ordering and interpreting lab/test results and formulating a plan of care.     Medical Decision Making:      [_] Low Complexity  [_] Moderate Complexity  [x] High Complexity      David Julien MD  Department of Hospital Medicine   [facility]

## 2023-11-10 NOTE — RESPIRATORY THERAPY
11/09/23 2002   Patient Assessment/Suction   Level of Consciousness (AVPU) alert   Respiratory Effort Shallow   Expansion/Accessory Muscles/Retractions no use of accessory muscles   All Lung Fields Breath Sounds clear   Rhythm/Pattern, Respiratory shortness of breath;tachypneic  (SOB on exertion)   Cough Frequency no cough   PRE-TX-O2   Device (Oxygen Therapy) nasal cannula   $ Is the patient on Low Flow Oxygen? Yes   Flow (L/min) 2   SpO2 96 %   Pulse Oximetry Type Intermittent   $ Pulse Oximetry - Multiple Charge Pulse Oximetry - Multiple   Pulse 100   Resp (!) 25   Positioning   Head of Bed (HOB) Positioning HOB at 30-45 degrees   Incentive Spirometer   $ Incentive Spirometer Charges refused;unable to perform  (pt too SOB. will wait until shortness of breath has resolved.)   Preset CPAP/BiPAP Settings   Mode Of Delivery CPAP;Standby  (home unit)   Education   $ Education Other (see comment);15 min  (sats)   Respiratory Evaluation   $ Care Plan Tech Time 15 min   $ Eval/Re-eval Charges Evaluation

## 2023-11-10 NOTE — CARE UPDATE
11/10/23 6852   Patient Assessment/Suction   Level of Consciousness (AVPU) alert   Respiratory Effort Normal   Expansion/Accessory Muscles/Retractions no use of accessory muscles   All Lung Fields Breath Sounds diminished   PRE-TX-O2   Device (Oxygen Therapy) CPAP  (home CPAP)   $ Is the patient on Low Flow Oxygen? Yes   SpO2 95 %   Pulse Oximetry Type Intermittent   $ Pulse Oximetry - Multiple Charge Pulse Oximetry - Multiple   Pulse 72   Resp 18   Preset CPAP/BiPAP Settings   Mode Of Delivery CPAP  (home cpap)   Education   $ Education Other (see comment);15 min   Respiratory Evaluation   $ Care Plan Tech Time 15 min   $ Eval/Re-eval Charges Re-evaluation

## 2023-11-11 NOTE — CARE UPDATE
11/10/23 2300   Patient Assessment/Suction   Level of Consciousness (AVPU) alert   Respiratory Effort Normal;Unlabored   Expansion/Accessory Muscles/Retractions no use of accessory muscles   PRE-TX-O2   Device (Oxygen Therapy) CPAP   Flow (L/min) 2   SpO2 (!) 92 %   Pulse Oximetry Type Intermittent   $ Pulse Oximetry - Single Charge Pulse Oximetry - Single   $ Pulse Oximetry - Multiple Charge Pulse Oximetry - Multiple   Pulse 85   Resp 16   Positioning HOB elevated 30 degrees   Preset CPAP/BiPAP Settings   Mode Of Delivery CPAP  (pts home cpap)   Education   $ Education Other (see comment);15 min   Respiratory Evaluation   $ Care Plan Tech Time 15 min   $ Eval/Re-eval Charges Re-evaluation

## 2023-11-11 NOTE — PROGRESS NOTES
[facility] Medicine    Progress Note    Patient Name: Conrda Kuhn  MRN: [id number]  Patient Class: IP- Inpatient   Admission Date: 11/9/2023  6:43 AM  Length of Stay: 2  Attending Physician: David Julien MD  Primary Care Provider: Johnson Erazo MD  Face-to-Face encounter date: 11/11/2023  Code status:  Chief Complaint: Fever (Weak feeling since yesterday, fever this morning. Hx of lyphoma and lung cancer. Stopped chemo 2 weeks ago for neutropenia and leukopenia. )        Subjective:    HPI:  82 yo male with PMH NSCLC (2004), diabetes, COPD, MAYDA on cpap, iron deficiency anemia, HTN and CLL presented to ED with fever 102 at 1am. The patient was seen by hematology yesterday in clinic and was aware of low WBC and platelets. He was waiting on neupogen administration if approved by insurance per spouse report. The patient denies bleeding, dysuria, skin lesions/rashes,diarrhea, N/V and productive cough. He has mild SOB. No chest pain.    On work up, he was found to have WBC 0.76 and platelets 8; H/H stable at 9.5/26.4.  Lactate 0.5; procal 0.094. Negative for flu and covid. CXR - no obvious pneumonia. Temp 102F, , RR 26, /72.  He stopped chemotherapy 2 weeks ago.   Blood and urine culture sent. Started on vanc and zosyn.   Hospital medicine will admit for neutropenic fever. 1 unit platelets ordered and neupogen daily x 3 days planned.   Heme/onc consulted.      Interval History:   11/10: Patient currently fever free and doing well.  Currently on Neupogen and WBC improved to 1.95.  Patient received a unit of platelets and platelet improved from 8-28.  Blood cultures and urine cultures currently pending .  Potassium low and currently being replaced.  Patient currently on IV Zosyn and vancomycin No concerns/issues overnight reported by the patient or the nursing staff.    11/11:  Patient doing well and no fevers overnight.  Lab notified me that 20 blasts and alert sherlyn in 1  blast was seen on smear.  Case discussed with Dr. Bai who stated that patient is currently on Neupogen and we would discontinue Neupogen after 3rd dose today.  Patient is to follow-up with his oncologist outpatient.    Review of Systems All other Review of Systems were found to be negative expect for that mentioned already in HPI.     Objective:     Vitals:    11/11/23 0837 11/11/23 0900 11/11/23 0929 11/11/23 1130   BP:  (!) 150/68  (!) 125/59   Pulse: 98   100   Resp: 18 18 18   Temp:    98.2 °F (36.8 °C)   TempSrc:    Oral   SpO2: 96%   95%   Weight:       Height:            Vitals reviewed.  Constitutional: No distress.   HENT: NC  Head: Atraumatic.   Cardiovascular: Normal rate, regular rhythm and normal heart sounds.   Pulmonary/Chest: Effort normal. No wheezes.   Abdominal: Soft. Bowel sounds are normal. No distension and no mass. No tenderness  Neurological: Alert.   Skin: Skin is warm and dry.   Psych: Appropriate mood and affect    Following labs were Reviewed   CBC:  Recent Labs   Lab 11/11/23 0522   WBC 3.01*   HGB 8.0*   HCT 22.4*   PLT 27*     CMP:  Recent Labs   Lab 11/11/23 0522   CALCIUM 8.4*   *   K 3.1*   CO2 26      BUN 22   CREATININE 0.9       Micro Results  Microbiology Results (last 7 days)       Procedure Component Value Units Date/Time    Blood culture x two cultures. Draw prior to antibiotics. [[id number]] Collected: 11/09/23 0725    Order Status: Completed Specimen: Blood Updated: 11/11/23 1032     Blood Culture, Routine No Growth to date      No Growth to date      No Growth to date    Narrative:      Aerobic and anaerobic    Blood culture x two cultures. Draw prior to antibiotics. [[id number]] Collected: 11/09/23 0726    Order Status: Completed Specimen: Blood Updated: 11/11/23 1032     Blood Culture, Routine No Growth to date      No Growth to date      No Growth to date    Narrative:      Aerobic and anaerobic    Urine Culture High Risk [[id number]] Collected:  11/09/23 0905    Order Status: Completed Specimen: Urine, Catheterized Updated: 11/11/23 0915     Urine Culture, Routine No growth to date      No growth to date    Narrative:      Indicated criteria for high risk culture:->Neutropenic  patient    Clostridium difficile EIA [[id number]]     Order Status: No result Specimen: Stool     MRSA Screen by PCR [[id number]] Collected: 11/09/23 1309    Order Status: Completed Specimen: Nasopharyngeal Swab from Nasal Updated: 11/09/23 1557     MRSA SCREEN BY PCR Negative    Respiratory Infection Panel (PCR), Nasopharyngeal [[id number]] Collected: 11/09/23 1026    Order Status: Completed Specimen: Nasopharyngeal Swab Updated: 11/09/23 1208     Respiratory Infection Panel Source NP swab     Adenovirus Not Detected     Coronavirus 229E, Common Cold Virus Not Detected     Coronavirus HKU1, Common Cold Virus Not Detected     Coronavirus NL63, Common Cold Virus Not Detected     Coronavirus OC43, Common Cold Virus Not Detected     Comment: Coronavirus strains 229E, HKU1, NL63, and OC43 can cause the common   cold   and are not associated with the respiratory disease outbreak caused   by  the COVID-19 (SARS-CoV-2 novel Coronavirus) strain.           SARS-CoV2 (COVID-19) Qualitative PCR Not Detected     Human Metapneumovirus Not Detected     Human Rhinovirus/Enterovirus Not Detected     Influenza A (subtypes H1, H1-2009,H3) Not Detected     Influenza B Not Detected     Parainfluenza Virus 1 Not Detected     Parainfluenza Virus 2 Not Detected     Parainfluenza Virus 3 Not Detected     Parainfluenza Virus 4 Not Detected     Respiratory Syncytial Virus Not Detected     Bordetella Parapertussis (JT3506) Not Detected     Bordetella pertussis (ptxP) Not Detected     Chlamydia pneumoniae Not Detected     Mycoplasma pneumoniae Not Detected     Comment: Respiratory Infection Panel testing performed by Multiplex PCR.       Narrative:      Respiratory Infection Panel source->NP Swab              Radiology Reports  X-Ray Chest AP Portable    Result Date: 11/9/2023  XR CHEST 1 VIEW CLINICAL HISTORY: 83 years Male Sepsis COMPARISON: Chest radiography April 18, 2023. PET/CT September 13, 2023 FINDINGS: Cardiac silhouette size is within normal limits. No airspace consolidation, pleural fluid, or pneumothorax. No pleural effusion or pneumothorax. No acute osseous abnormality. IMPRESSION: Stable chest radiograph. No acute pulmonary pathology. Electronically signed by:  Altaf Cross MD  11/09/2023 07:45 AM CST Workstation: [id number]       Meds  Scheduled Meds:   atorvastatin  20 mg Oral Daily    ferrous sulfate  1 tablet Oral Daily    gabapentin  600 mg Oral BID    losartan  50 mg Oral Daily    piperacillin-tazobactam (Zosyn) IV (PEDS and ADULTS) (extended infusion is not appropriate)  3.375 g Intravenous Q8H    tamsulosin  0.4 mg Oral QHS    traZODone  300 mg Oral QHS    valACYclovir  1,000 mg Oral Daily    vancomycin (VANCOCIN) IV (PEDS and ADULTS)  2,000 mg Intravenous Q24H     Continuous Infusions:  PRN Meds:.0.9%  NaCl infusion (for blood administration), acetaminophen, acetaminophen, aluminum-magnesium hydroxide-simethicone, dextrose 50%, dextrose 50%, glucagon (human recombinant), glucose, glucose, HYDROcodone-acetaminophen, HYDROcodone-acetaminophen, insulin aspart U-100, magnesium oxide, magnesium oxide, naloxone, ondansetron, potassium bicarbonate, potassium bicarbonate, potassium bicarbonate, potassium, sodium phosphates, potassium, sodium phosphates, potassium, sodium phosphates, promethazine, sodium chloride 0.9%, Pharmacy to dose Vancomycin consult **AND** vancomycin - pharmacy to dose.    Active PT: No  Active OT: No  Active SLP: No  Assessment & Plan:   * Neutropenic fever  Neutropenic precautions  Blood and urine culture negative  MRSA screen negative  Viral nasal swab, covid and flu negative  IV antibiotics discontinued  On valtrex for oral lesions- chronic  Heme/onc on board            Pancytopenia  Improving  1 unit platelet transfusion given 11/09/2023  neupogen daily x 3 days completed  Monitor serial CBC and transfuse if patient becomes hemodynamically unstable, symptomatic or H/H drops below 7/21.     CLL (chronic lymphocytic leukemia)  Chemotherapy discontinued 2 weeks ago  Had BM biopsy by Dr. Don and plan to get results next week visit               Discharge Planning:   Is the patient medically ready for discharge?: no    Reason for patient still in hospital (select all that apply): Patient trending condition and Treatment    Above encounter included review of the medical records, interviewing and examining the patient face-to-face, discussion with family and other health care providers, ordering and interpreting lab/test results and formulating a plan of care.     Medical Decision Making:      [_] Low Complexity  [_] Moderate Complexity  [x] High Complexity      David Julien MD  Department of Hospital Medicine   [facility]

## 2023-11-11 NOTE — PROGRESS NOTES
"[facility]   Hematology/Oncology  Inpatient Progress Note          Patient Name: Conrad Kuhn  MRN: [id number]  Admission Date: 11/9/2023  Hospital Length of Stay: 2 days  Code Status: Full Code   Attending Provider: David Julien MD  Consulting Provider: Drew Bai MD  Primary Care Physician: Johnson Erazo MD  Principal Problem:Neutropenic fever      Subjective:       Patient ID: Conrad Kuhn is a 83 y.o. male.    Chief Complaint: Fever (Weak feeling since yesterday, fever this morning. Hx of lyphoma and lung cancer. Stopped chemo 2 weeks ago for neutropenia and leukopenia. )        History Present Illness:      Patient sitting up in chair; he is feeling better and is anxious to go home; his diarrhea has since resolved and has not had any fevers; he is breathing ok; no CP< SOB, HA's or N/V; I discussed with his floor nurse      Review of Systems:  GEN: no further fever sx; some residual general malaise, fatigue and weakness  HEENT: normal with no HA's, sore throat, stiff neck, changes in vision  CV: normal with no CP, SOB, PND, MAYER or orthopnea  PULM: normal with no SOB, cough, hemoptysis, sputum or pleuritic pain  GI: GI upset and diarrhea resolved  : normal with no hematuria, dysuria  BREAST: normal with no mass, discharge, pain  SKIN: normal with no rash, erythema, bruising, or swelling      Objective:     Vitals:  Blood pressure (!) 150/68, pulse 96, temperature 98.2 °F (36.8 °C), temperature source Oral, resp. rate 18, height 6' 2" (1.88 m), weight 86.2 kg (190 lb 0.6 oz), SpO2 (!) 94 %.    Physical Exam:  HEAD: atraumatic and normocephalic  EYES: no conjunctival pallor or muddiness, no icterus; normal pupil reaction to ambient light  ENT: OMM, no pharyngeal erythema, external bilateral ears WNL; no visible thrush or ulcers  NECK: no masses or swelling, trachea midline, no visible LAD/LN's ; LAD and LN's much better  CV: no palpitations; no pedal edema; no noticeable " JVD or neck vein distension  CHEST: Normal respiratory effort; chest wall breath movements symmetrical; no audible wheezing  ABDOM: non-distended; no bloating;  peg tube since removed  MUSC/Skeletal: ROM normal; joints visibly normal; no deformities or arthropathy  EXTREM: no clubbing, cyanosis, inflammation or swelling; right arm with fair ROM  SKIN: no rashes, lesions, ulcers, petechiae or subcutaneous nodules  : no keith  NEURO: moving all 4 extremities; AAOx3; no tremors  PSYCH: normal mood, affect and behavior  LYMPH: no visible LN's or LAD; LAD and LN's on right neck since reduced in size             Lab Review:        Lab Results   Component Value Date    WBC 3.01 (L) 11/11/2023    HGB 8.0 (L) 11/11/2023    HCT 22.4 (L) 11/11/2023    MCV 94 11/11/2023    PLT 27 (LL) 11/11/2023       CMP  Sodium   Date Value Ref Range Status   11/11/2023 134 (L) 136 - 145 mmol/L Final   06/12/2019 138 134 - 144 mmol/L      Potassium   Date Value Ref Range Status   11/11/2023 3.1 (L) 3.5 - 5.1 mmol/L Final     Chloride   Date Value Ref Range Status   11/11/2023 102 95 - 110 mmol/L Final   06/12/2019 99 98 - 110 mmol/L      CO2   Date Value Ref Range Status   11/11/2023 26 23 - 29 mmol/L Final     Glucose   Date Value Ref Range Status   11/11/2023 159 (H) 70 - 110 mg/dL Final   06/12/2019 179 (H) 70 - 99 mg/dL      BUN   Date Value Ref Range Status   11/11/2023 22 8 - 23 mg/dL Final     Creatinine   Date Value Ref Range Status   11/11/2023 0.9 0.5 - 1.4 mg/dL Final   06/12/2019 0.95 0.60 - 1.40 mg/dL      Calcium   Date Value Ref Range Status   11/11/2023 8.4 (L) 8.7 - 10.5 mg/dL Final     Total Protein   Date Value Ref Range Status   11/09/2023 6.0 6.0 - 8.4 g/dL Final     Albumin   Date Value Ref Range Status   11/09/2023 4.2 3.5 - 5.2 g/dL Final   06/12/2019 3.9 3.1 - 4.7 g/dL      Total Bilirubin   Date Value Ref Range Status   11/09/2023 0.9 0.1 - 1.0 mg/dL Final     Comment:     For infants and newborns, interpretation  of results should be based  on gestational age, weight and in agreement with clinical  observations.    Premature Infant recommended reference ranges:  Up to 24 hours.............<8.0 mg/dL  Up to 48 hours............<12.0 mg/dL  3-5 days..................<15.0 mg/dL  6-29 days.................<15.0 mg/dL       Alkaline Phosphatase   Date Value Ref Range Status   11/09/2023 39 (L) 55 - 135 U/L Final     AST   Date Value Ref Range Status   11/09/2023 13 10 - 40 U/L Final     ALT   Date Value Ref Range Status   11/09/2023 12 10 - 44 U/L Final     Anion Gap   Date Value Ref Range Status   11/11/2023 6 (L) 8 - 16 mmol/L Final     eGFR   Date Value Ref Range Status   11/11/2023 >60.0 >60 mL/min/1.73 m^2 Final           Radiology Diagnostic Studies:       X-Ray Chest AP Portable [[id number]] Collected: 11/09/23 0714   Order Status: Completed Updated: 11/09/23 0747   Narrative:     XR CHEST 1 VIEW     CLINICAL HISTORY:   83 years Male Sepsis     COMPARISON: Chest radiography April 18, 2023. PET/CT September 13, 2023     FINDINGS: Cardiac silhouette size is within normal limits. No airspace consolidation, pleural fluid, or pneumothorax. No pleural effusion or pneumothorax. No acute osseous abnormality.     IMPRESSION:     Stable chest radiograph. No acute pulmonary pathology.              Assessment:     IMPRESSION:    (1) 83 y.o. male known to my oncology service with diagnosis of CLL/NHL for which he has been followed and treated in conjunction with Dr Monik Don at [facility] in [place]. He was just seen in oncology clinic yesterday and his oral chemotherapy has been on hold due to low blood counts. He presented to ED today at [facility] after experiencing some fever with febrile temp, and progressive fatigue and malaise. Labs done in ER showed WBC lower at 0.76 and platelet count lower at 8,000. He has been started on antibiotics and is being admitted to the hospitalist service. He will be started on neupogen and I have  recommended a single donor platelet product transfusion.      He had recent bone marrow biopsy done at [facility] on 10/16/2023 and had PET scan on 9/13/2023. He is due to see Dr Don again next Wednesday. His oral Venetoclax therapy is on hold.     11/10/2023:  - wbc increased to 1.95 on neupogen  - platelets at 28,000 after getting platelet transfusion yesterday  - hgb stable  - some diarrhea this am - will send off stool cultures and check c.diff    11/11/2023:  - wbc at 3.01,  hgb at 8.0, plats 27,000 this am; grans at 42%  - stop Neupogen after today's dose  - he is anxious to go home - will defer to primary  - discussed with floor nursing staff      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 Neutropenic fever    2. Fever    3. Neutropenia, unspecified type    4. Personal history of CLL (chronic lymphocytic leukemia)    5. Thrombocytopenia    6. Chest pain           Plan:     PLAN:          discontinue neupogen after today's dose  Monitor labs daily and transfuse as needed  IVF's, antidiarrheals, antiemetics and antibiotics as per primary team  ordered stool cultures and c.diff  Will follow with you - he is anxious to go home               Drew Bai MD  Hematology/Oncology  [facility]

## 2023-11-11 NOTE — PROGRESS NOTES
Pharmacy Consult Discontinuation: Vancomycin    Conrad Kuhn [id number] is a 83 y.o. male was consulted for vancomycin pharmacotherapy management by pharmacy.    Pharmacy consult for vancomycin dosing is no longer required.  Vancomycin was discontinued 11/11/2023.    Thank you for allowing us to participate in this patient's care. Should you have any questions or concerns please feel free to contact the pharmacy department at [phone].    Fahad Castillo, KristinaD

## 2023-11-11 NOTE — NURSING
11/11/2023      Assumed care of patient.   Assessment and vital signs assessed.   Labs and meds reviewed.  Will continue to monitor this shift.    Pt awake in the bed, looking at tv, call bell in reach, bed in lowest position, no pain or concerns at this time.

## 2023-11-12 NOTE — PLAN OF CARE
11/12/23 0925   Patient Assessment/Suction   Level of Consciousness (AVPU) alert   Respiratory Effort Normal;Unlabored   Expansion/Accessory Muscles/Retractions no use of accessory muscles   All Lung Fields Breath Sounds diminished   Rhythm/Pattern, Respiratory pattern regular   Cough Frequency no cough   PRE-TX-O2   Device (Oxygen Therapy) room air   SpO2 97 %   Pulse Oximetry Type Intermittent   $ Pulse Oximetry - Single Charge Pulse Oximetry - Single   Pulse 82   Resp 19   Incentive Spirometer   $ Incentive Spirometer Charges done with encouragement   Administration (IS) proper technique demonstrated   Number of Repetitions (IS) 10   Level Incentive Spirometer (mL) 1500   Patient Tolerance (IS) good   Education   $ Education 15 min;Other (see comment)

## 2023-11-12 NOTE — PROGRESS NOTES
"[facility]   Hematology/Oncology  Inpatient Progress Note          Patient Name: Conrad Kuhn  MRN: [id number]  Admission Date: 11/9/2023  Hospital Length of Stay: 3 days  Code Status: Full Code   Attending Provider: David Julien MD  Consulting Provider: Drew Bai MD  Primary Care Physician: Johnson Erazo MD  Principal Problem:Neutropenic fever      Subjective:       Patient ID: Conrad Kuhn is a 83 y.o. male.    Chief Complaint: Fever (Weak feeling since yesterday, fever this morning. Hx of lyphoma and lung cancer. Stopped chemo 2 weeks ago for neutropenia and leukopenia. )        History Present Illness:      Patient sitting up in chair; he reports that he is feeling better overall; some residual diarrhea; he denies any CP, SOB, HA's or N/V; he is anxious to go home; I discussed with floor nursing staff about antidiarrheals      Review of Systems:  GEN: no further fever sx; some residual general malaise, fatigue and weakness  HEENT: normal with no HA's, sore throat, stiff neck, changes in vision  CV: normal with no CP, SOB, PND, MAYER or orthopnea  PULM: normal with no SOB, cough, hemoptysis, sputum or pleuritic pain  GI: GI upset resolved but he has some residual diarrhea   : normal with no hematuria, dysuria  BREAST: normal with no mass, discharge, pain  SKIN: normal with no rash, erythema, bruising, or swelling      Objective:     Vitals:  Blood pressure (!) 146/63, pulse 82, temperature 98.4 °F (36.9 °C), temperature source Oral, resp. rate 19, height 6' 2" (1.88 m), weight 86.2 kg (190 lb 0.6 oz), SpO2 95 %.    Physical Exam:  HEAD: atraumatic and normocephalic  EYES: no conjunctival pallor or muddiness, no icterus; normal pupil reaction to ambient light  ENT: OMM, no pharyngeal erythema, external bilateral ears WNL; no visible thrush or ulcers  NECK: no masses or swelling, trachea midline, no visible LAD/LN's ; LAD and LN's much better  CV: no palpitations; no pedal " edema; no noticeable JVD or neck vein distension  CHEST: Normal respiratory effort; chest wall breath movements symmetrical; no audible wheezing  ABDOM: non-distended; no bloating;  peg tube since removed  MUSC/Skeletal: ROM normal; joints visibly normal; no deformities or arthropathy  EXTREM: no clubbing, cyanosis, inflammation or swelling; right arm with fair ROM  SKIN: no rashes, lesions, ulcers, petechiae or subcutaneous nodules  : no keith  NEURO: moving all 4 extremities; AAOx3; no tremors  PSYCH: normal mood, affect and behavior  LYMPH: no visible LN's or LAD; LAD and LN's on right neck since reduced in size             Lab Review:        Lab Results   Component Value Date    WBC 4.99 11/12/2023    HGB 7.8 (L) 11/12/2023    HCT 21.5 (L) 11/12/2023    MCV 95 11/12/2023    PLT 26 (LL) 11/12/2023       CMP  Sodium   Date Value Ref Range Status   11/12/2023 140 136 - 145 mmol/L Final   06/12/2019 138 134 - 144 mmol/L      Potassium   Date Value Ref Range Status   11/12/2023 3.4 (L) 3.5 - 5.1 mmol/L Final     Chloride   Date Value Ref Range Status   11/12/2023 106 95 - 110 mmol/L Final   06/12/2019 99 98 - 110 mmol/L      CO2   Date Value Ref Range Status   11/12/2023 28 23 - 29 mmol/L Final     Glucose   Date Value Ref Range Status   11/12/2023 174 (H) 70 - 110 mg/dL Final   06/12/2019 179 (H) 70 - 99 mg/dL      BUN   Date Value Ref Range Status   11/12/2023 19 8 - 23 mg/dL Final     Creatinine   Date Value Ref Range Status   11/12/2023 1.0 0.5 - 1.4 mg/dL Final   06/12/2019 0.95 0.60 - 1.40 mg/dL      Calcium   Date Value Ref Range Status   11/12/2023 8.9 8.7 - 10.5 mg/dL Final     Total Protein   Date Value Ref Range Status   11/09/2023 6.0 6.0 - 8.4 g/dL Final     Albumin   Date Value Ref Range Status   11/09/2023 4.2 3.5 - 5.2 g/dL Final   06/12/2019 3.9 3.1 - 4.7 g/dL      Total Bilirubin   Date Value Ref Range Status   11/09/2023 0.9 0.1 - 1.0 mg/dL Final     Comment:     For infants and newborns,  interpretation of results should be based  on gestational age, weight and in agreement with clinical  observations.    Premature Infant recommended reference ranges:  Up to 24 hours.............<8.0 mg/dL  Up to 48 hours............<12.0 mg/dL  3-5 days..................<15.0 mg/dL  6-29 days.................<15.0 mg/dL       Alkaline Phosphatase   Date Value Ref Range Status   11/09/2023 39 (L) 55 - 135 U/L Final     AST   Date Value Ref Range Status   11/09/2023 13 10 - 40 U/L Final     ALT   Date Value Ref Range Status   11/09/2023 12 10 - 44 U/L Final     Anion Gap   Date Value Ref Range Status   11/12/2023 6 (L) 8 - 16 mmol/L Final     eGFR   Date Value Ref Range Status   11/12/2023 >60.0 >60 mL/min/1.73 m^2 Final             Radiology Diagnostic Studies:       X-Ray Chest AP Portable [[id number]] Collected: 11/09/23 0714   Order Status: Completed Updated: 11/09/23 0747   Narrative:     XR CHEST 1 VIEW     CLINICAL HISTORY:   83 years Male Sepsis     COMPARISON: Chest radiography April 18, 2023. PET/CT September 13, 2023     FINDINGS: Cardiac silhouette size is within normal limits. No airspace consolidation, pleural fluid, or pneumothorax. No pleural effusion or pneumothorax. No acute osseous abnormality.     IMPRESSION:     Stable chest radiograph. No acute pulmonary pathology.              Assessment:     IMPRESSION:    (1) 83 y.o. male known to my oncology service with diagnosis of CLL/NHL for which he has been followed and treated in conjunction with Dr Monik Don at [facility] in [place]. He was just seen in oncology clinic yesterday and his oral chemotherapy has been on hold due to low blood counts. He presented to ED today at [facility] after experiencing some fever with febrile temp, and progressive fatigue and malaise. Labs done in ER showed WBC lower at 0.76 and platelet count lower at 8,000. He has been started on antibiotics and is being admitted to the hospitalist service. He will be started on neupogen  and I have recommended a single donor platelet product transfusion.      He had recent bone marrow biopsy done at [facility] on 10/16/2023 and had PET scan on 9/13/2023. He is due to see Dr Don again next Wednesday. His oral Venetoclax therapy is on hold.     11/10/2023:  - wbc increased to 1.95 on neupogen  - platelets at 28,000 after getting platelet transfusion yesterday  - hgb stable  - some diarrhea this am - will send off stool cultures and check c.diff    11/11/2023:  - wbc at 3.01,  hgb at 8.0, plats 27,000 this am; grans at 42%  - stop Neupogen after today's dose  - he is anxious to go home - will defer to primary  - discussed with floor nursing staff     11/12/2023:  - discussed with Dr Julien by phone yesterday about the presence of the immature granulocytes seen on the differential and its probable association with use of GCSF  - WBC at 4.99; hgb done to 7.8; plats 26,000  - recommend consideration for a unit of blood prior to discharge     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 Neutropenic fever    2. Fever    3. Neutropenia, unspecified type    4. Personal history of CLL (chronic lymphocytic leukemia)    5. Thrombocytopenia    6. Chest pain           Plan:     PLAN:          discontinued neupogen after yesterday's dose  Monitor labs daily and transfuse as needed - recommend consideration for a unit of blood prior to discharge today  IVF's, antidiarrheals, antiemetics and antibiotics as per primary team  ordered stool cultures and c.diff  Will follow with you - he is anxious to go home                Drew Bai MD  Hematology/Oncology  [facility]

## 2023-11-12 NOTE — DISCHARGE SUMMARY
[facility] Medicine  Discharge Summary      Patient Name: Conrad Kuhn  MRN: [id number]  SHAMAR: [id number]  Patient Class: IP- Inpatient  Admission Date: 11/9/2023  Hospital Length of Stay: 3 days  Discharge Date and Time:  11/12/2023 12:21 PM  Attending Physician: No att. providers found   Discharging Provider: David Julien MD  Primary Care Provider: Johnson Erazo MD    Primary Care Team: Networked reference to record PCT     HPI:   84 yo male with PMH NSCLC (2004), diabetes, COPD, MAYDA on cpap, iron deficiency anemia, HTN and CLL presented to ED with fever 102 at 1am. The patient was seen by hematology yesterday in clinic and was aware of low WBC and platelets. He was waiting on neupogen administration if approved by insurance per spouse report. The patient denies bleeding, dysuria, skin lesions/rashes,diarrhea, N/V and productive cough. He has mild SOB. No chest pain.      On work up, he was found to have WBC 0.76 and platelets 8; H/H stable at 9.5/26.4.  Lactate 0.5; procal 0.094. Negative for flu and covid. CXR - no obvious pneumonia. Temp 102F, , RR 26, /72.  He stopped chemotherapy 2 weeks ago.   Blood and urine culture sent. Started on vanc and zosyn.     Hospital medicine will admit for neutropenic fever. 1 unit platelets ordered and neupogen daily x 3 days planned.   Heme/onc consulted.       * No surgery found *      Hospital Course:   11/10: Patient currently fever free and doing well.  Currently on Neupogen and WBC improved to 1.95.  Patient received a unit of platelets and platelet improved from 8-28.  Blood cultures and urine cultures currently pending .  Potassium low and currently being replaced.  Patient currently on IV Zosyn and vancomycin No concerns/issues overnight reported by the patient or the nursing staff.     11/11:  Patient doing well and no fevers overnight.  Lab notified me that 20 blasts and alert sherlyn in 1 blast was seen on smear.  Case  discussed with Dr. Bai who stated that patient is currently on Neupogen and we would discontinue Neupogen after 3rd dose today.  Patient is to follow-up with his oncologist outpatient.     11/12:  Patient doing significantly better and cleared for discharge.  Oncology recommendation would transfuse 1 PRBC prior to discharge and would also replace electrolytes.    Physical examination on discharge:  Constitutional: No distress.   HENT: NC  Head: Atraumatic.   Cardiovascular: Normal rate, regular rhythm and normal heart sounds.   Pulmonary/Chest: Effort normal. No wheezes.   Abdominal: Soft. Bowel sounds are normal. No distension and no mass. No tenderness  Neurological: Alert.   Skin: Skin is warm and dry.   Psych: Appropriate mood and affect    I have seen the patient on the day of discharge and reviewed the discharge instructions as outlined.           Goals of Care Treatment Preferences:  Code Status: Full Code      Consults:   Consults (From admission, onward)          Status Ordering Provider     Inpatient consult to Social Work/Case Management  Once        Provider:  (Not yet assigned)    Completed KERLINE LOWE            No new Assessment & Plan notes have been filed under this hospital service since the last note was generated.  Service: Hospital Medicine    Final Active Diagnoses:    Diagnosis Date Noted POA    PRINCIPAL PROBLEM:  Neutropenic fever [D70.9, R50.81] 06/30/2021 Yes    Pancytopenia [D61.818] 06/29/2021 Yes    Iron deficiency anemia [D50.9] 01/03/2019 Yes    CLL (chronic lymphocytic leukemia) [C91.10] 01/02/2019 Yes      Problems Resolved During this Admission:       Discharged Condition: good    Disposition: Home or Self Care    Follow Up:   Contact information for after-discharge care       Home Medical Care       SHIRLEY Rocha    Services: Home Rehabilitation, Home Nursing  Contact information:  [address] [facility], [address] [address] [phone]                                  Patient Instructions:      CBC auto differential   Standing Status: Future Standing Exp. Date: 01/10/25     Activity as tolerated       Significant Diagnostic Studies: Labs: BMP:   Recent Labs   Lab 11/12/23  0706 11/12/23  1012   *  --      --    K 3.4* 4.2     --    CO2 28  --    BUN 19  --    CREATININE 1.0  --    CALCIUM 8.9  --    MG 1.7  --    , CMP   Recent Labs   Lab 11/12/23  0706 11/12/23  1012     --    K 3.4* 4.2     --    CO2 28  --    *  --    BUN 19  --    CREATININE 1.0  --    CALCIUM 8.9  --    ANIONGAP 6*  --    , CBC   Recent Labs   Lab 11/12/23  0707   WBC 4.99   HGB 7.8*   HCT 21.5*   PLT 26*   , and All labs within the past 24 hours have been reviewed  Microbiology: Blood Culture   Lab Results   Component Value Date    LABBLOO No Growth to date 11/09/2023    LABBLOO No Growth to date 11/09/2023    LABBLOO No Growth to date 11/09/2023    LABBLOO No Growth to date 11/09/2023    LABBLOO No Growth to date 11/09/2023     Radiology: X-Ray: CXR: X-Ray Chest 1 View (CXR): No results found for this visit on 11/09/23. and X-Ray Chest PA and Lateral (CXR): No results found for this visit on 11/09/23.  CT scan: CT ABDOMEN PELVIS WITH CONTRAST: No results found for this visit on 11/09/23. and CT ABDOMEN PELVIS WITHOUT CONTRAST: No results found for this visit on 11/09/23.  Cardiac Graphics: Echocardiogram: 2D echo with color flow doppler: No results found for this or any previous visit. and Transthoracic echo (TTE) complete (Cupid Only): No results found for this or any previous visit.    Pending Diagnostic Studies:       Procedure Component Value Units Date/Time    Hematocrit [[id number]] Collected: 11/12/23 1821    Order Status: Sent Lab Status: In process Updated: 11/12/23 1821    Specimen: Blood     Hemoglobin [[id number]] Collected: 11/12/23 1821    Order Status: Sent Lab Status: In process Updated: 11/12/23 1821    Specimen: Blood             Medications:  Reconciled Home Medications:      Medication List        START taking these medications      loperamide 2 mg capsule  Commonly known as: IMODIUM  Take 1 capsule (2 mg total) by mouth 4 (four) times daily as needed for Diarrhea.            CHANGE how you take these medications      promethazine 12.5 MG Tab  Commonly known as: PHENERGAN  TAKE 1 TABLET(12.5 MG) BY MOUTH EVERY 6 HOURS AS NEEDED  What changed:   how much to take  how to take this  when to take this  reasons to take this     tamsulosin 0.4 mg Cap  Commonly known as: FLOMAX  Take 1 capsule (0.4 mg total) by mouth once daily.  What changed: when to take this            CONTINUE taking these medications      atorvastatin 20 MG tablet  Commonly known as: LIPITOR  Take 20 mg by mouth once daily.     azelastine 137 mcg (0.1 %) nasal spray  Commonly known as: ASTELIN  1 spray by Nasal route 2 (two) times daily.     blood sugar diagnostic Strp  1 strip by Misc.(Non-Drug; Combo Route) route 2 (two) times a day.     blood-glucose meter kit  Use as instructed     DUKE'S SOLUTION (BENADRYL 30 ML, MYLANTA 30 ML, LIDOCAINE 30 ML, NYSTATIN 30 ML)  Take 10 mLs by mouth 4 (four) times daily.     ferrous sulfate 325 mg (65 mg iron) Tab tablet  Commonly known as: FEOSOL  Take 325 mg by mouth daily with breakfast.     fluticasone propionate 50 mcg/actuation nasal spray  Commonly known as: FLONASE  1 spray by Each Nostril route once daily.     gabapentin 600 MG tablet  Commonly known as: NEURONTIN  Take 600 mg by mouth 2 (two) times daily.     glimepiride 2 MG tablet  Commonly known as: AMARYL  Take 2 mg by mouth once daily at 6am.     HYDROcodone-acetaminophen  mg per tablet  Commonly known as: NORCO  Take 1 tablet by mouth every 8 (eight) hours as needed for Pain.     losartan 50 MG tablet  Commonly known as: COZAAR  Take 1 tablet (50 mg total) by mouth once daily.     ondansetron 8 MG tablet  Commonly known as: ZOFRAN  Take 1 tablet (8 mg total) by  mouth every 8 (eight) hours as needed for Nausea.     traZODone 300 MG tablet  Commonly known as: DESYREL  Take 300 mg by mouth every evening.     valACYclovir 500 MG tablet  Commonly known as: VALTREX  Take 1,000 mg by mouth once daily.     VENCLEXTA 100 mg Tab  Generic drug: venetoclax  Take 2 tablets (200 mg) by mouth once daily.              Indwelling Lines/Drains at time of discharge:   Lines/Drains/Airways       None                   Time spent on the discharge of patient: 35 minutes         David Julien MD  Department of Hospital Medicine  [facility]

## 2023-11-12 NOTE — CARE UPDATE
11/11/23 2050   Patient Assessment/Suction   Level of Consciousness (AVPU) alert   Respiratory Effort Normal;Unlabored   Expansion/Accessory Muscles/Retractions no use of accessory muscles   PRE-TX-O2   Device (Oxygen Therapy) room air   SpO2 (!) 94 %   Pulse Oximetry Type Intermittent   $ Pulse Oximetry - Single Charge Pulse Oximetry - Single   $ Pulse Oximetry - Multiple Charge Pulse Oximetry - Multiple   Pulse 82   Resp 16   Positioning HOB elevated 30 degrees   Preset CPAP/BiPAP Settings   Mode Of Delivery CPAP  (pts home cpap)   $ Is patient using? Yes   Education   $ Education 15 min;Other (see comment)   Respiratory Evaluation   $ Care Plan Tech Time 15 min   $ Eval/Re-eval Charges Re-evaluation

## 2023-11-13 NOTE — NURSING
Patient discharged via wheel chair,patients spouse present for discharge education and instructions.2 PIV removed, as per order.

## 2023-11-17 NOTE — TELEPHONE ENCOUNTER
Beth at [facility] lab called to report Plt 30,000, with diff they counted 60% blasts and aye rods present. Kathy made aware, per her verbal orders I faxed results to Dr Don for review. No further orders received.

## 2023-11-20 PROBLEM — R06.02 SHORTNESS OF BREATH: Status: ACTIVE | Noted: 2023-01-01

## 2023-11-20 NOTE — SUBJECTIVE & OBJECTIVE
Interval History:  T-max 103.5 F. Patient with pancytopenia.  New chemotherapy started on October 27, 2023.  Patient under care by Dr. Bai and Dr. Don.  Using CPAP therapy.  No bleeding complications present.  Patient receiving 1 unit of packed red blood cells transfusion.  On neutropenic precautions.  Patient denies any shortness a breath, chest pain or expectoration.  No abdominal pain or nausea or vomiting.    Review of Systems   Constitutional:  Positive for chills, fatigue and fever.   HENT:  Negative for dental problem and ear pain.    Respiratory:  Positive for shortness of breath. Negative for cough and chest tightness.    Cardiovascular:  Negative for chest pain and palpitations.   Gastrointestinal:  Negative for abdominal distention, abdominal pain and diarrhea.   Genitourinary:  Negative for difficulty urinating and dysuria.   Musculoskeletal:  Negative for back pain.   Neurological:  Negative for dizziness and headaches.   Psychiatric/Behavioral:  Negative for agitation and behavioral problems.      Objective:     Vital Signs (Most Recent):  Temp: 99.6 °F (37.6 °C) (11/20/23 0500)  Pulse: 76 (11/20/23 0805)  Resp: (!) 25 (11/20/23 0805)  BP: 134/62 (11/20/23 0500)  SpO2: 95 % (11/20/23 0805) Vital Signs (24h Range):  Temp:  [99.6 °F (37.6 °C)-103.5 °F (39.7 °C)] 99.6 °F (37.6 °C)  Pulse:  [] 76  Resp:  [21-34] 25  SpO2:  [89 %-100 %] 95 %  BP: ()/(50-71) 134/62     Weight: 92 kg (202 lb 14.4 oz)  Body mass index is 26.05 kg/m².    Intake/Output Summary (Last 24 hours) at 11/20/2023 0901  Last data filed at 11/20/2023 0531  Gross per 24 hour   Intake 3086.1 ml   Output 200 ml   Net 2886.1 ml         Physical Exam  Constitutional:       General: He is in acute distress.   HENT:      Head: Normocephalic and atraumatic.      Right Ear: External ear normal.      Left Ear: External ear normal.      Mouth/Throat:      Mouth: Mucous membranes are dry.   Cardiovascular:      Rate and Rhythm:  "Regular rhythm. Tachycardia present.   Pulmonary:      Effort: Respiratory distress present.      Breath sounds: No wheezing.   Abdominal:      General: Abdomen is flat.      Palpations: Abdomen is soft.   Musculoskeletal:         General: Normal range of motion.   Skin:     General: Skin is warm and dry.   Neurological:      General: No focal deficit present.      Mental Status: He is alert and oriented to person, place, and time.             Significant Labs: All pertinent labs within the past 24 hours have been reviewed.  CBC:   Recent Labs   Lab 11/20/23 0203   WBC 1.65*   HGB 6.4*   HCT 18.4*   PLT 19*     CMP:   Recent Labs   Lab 11/20/23 0203      K 4.7      CO2 25   *   BUN 38*   CREATININE 1.3   CALCIUM 8.3*   PROT 5.3*   ALBUMIN 3.4*   BILITOT 0.5   ALKPHOS 32*   AST 19   ALT 17   ANIONGAP 8     Lactic Acid:   Recent Labs   Lab 11/20/23 0203 11/20/23  0618   LACTATE 2.3* 1.2     Troponin:   Recent Labs   Lab 11/20/23 0203   TROPONINIHS 60.2*     TSH:   Recent Labs   Lab 10/17/23  0947   TSH 0.741     Urine Culture: No results for input(s): "LABURIN" in the last 48 hours.  Urine Studies:   Recent Labs   Lab 11/20/23  0355   COLORU Yellow   APPEARANCEUA Clear   PHUR 5.0   SPECGRAV 1.020   PROTEINUA Negative   GLUCUA Negative   KETONESU Trace*   BILIRUBINUA Negative   OCCULTUA Negative   NITRITE Negative   UROBILINOGEN Negative   LEUKOCYTESUR Negative     Microbiology Results (last 7 days)       Procedure Component Value Units Date/Time    Respiratory Infection Panel (PCR), Nasopharyngeal [[id number]] Collected: 11/20/23 0208    Order Status: Completed Specimen: Nasopharyngeal Swab Updated: 11/20/23 0302     Respiratory Infection Panel Source NP swab     Adenovirus Not Detected     Coronavirus 229E, Common Cold Virus Not Detected     Coronavirus HKU1, Common Cold Virus Not Detected     Coronavirus NL63, Common Cold Virus Not Detected     Coronavirus OC43, Common Cold Virus Not Detected "     Comment: Coronavirus strains 229E, HKU1, NL63, and OC43 can cause the common   cold   and are not associated with the respiratory disease outbreak caused   by  the COVID-19 (SARS-CoV-2 novel Coronavirus) strain.           SARS-CoV2 (COVID-19) Qualitative PCR Not Detected     Human Metapneumovirus Not Detected     Human Rhinovirus/Enterovirus Not Detected     Influenza A (subtypes H1, H1-2009,H3) Not Detected     Influenza B Not Detected     Parainfluenza Virus 1 Not Detected     Parainfluenza Virus 2 Not Detected     Parainfluenza Virus 3 Not Detected     Parainfluenza Virus 4 Not Detected     Respiratory Syncytial Virus Not Detected     Bordetella Parapertussis (VD5472) Not Detected     Bordetella pertussis (ptxP) Not Detected     Chlamydia pneumoniae Not Detected     Mycoplasma pneumoniae Not Detected     Comment: Respiratory Infection Panel testing performed by Multiplex PCR.       Narrative:      Respiratory Infection Panel source->NP Swab    Blood culture x two cultures. Draw prior to antibiotics. [[id number]] Collected: 11/20/23 0233    Order Status: Sent Specimen: Blood from Antecubital, Right Hand Updated: 11/20/23 0240    Blood culture x two cultures. Draw prior to antibiotics. [[id number]] Collected: 11/20/23 0233    Order Status: Sent Specimen: Blood from Antecubital, Right Arm Updated: 11/20/23 0240          Significant Imaging:   CXR: Chronic findings as above. No acute radiographic abnormalities.

## 2023-11-20 NOTE — ASSESSMENT & PLAN NOTE
Follows up with Dr. Bai and Dr. Don outpatient.  Dr. Bai to evaluate patient today.  Not on therapy currently   New regimen started on 10/27/23.

## 2023-11-20 NOTE — HOSPITAL COURSE
Patient was admitted to intensive care unit.  He was placed on neutropenic precautions while cell counts were closely monitored.  Patient required packed red blood cell transfusion for symptomatic anemia.  No bleeding complications noted.  Patient was placed on prophylactic antibiotic therapy including vancomycin and cefepime.  Patient's oncologist Dr. Bai was consulted.  Microbiology results were closely followed.  Troponin levels were trended which suggested ongoing non ST elevation MI. Due to low platelet count patient was unable to receive antiplatelet therapy and anticoagulation therapy.  Cardiologist was consulted.  Patient underwent transthoracic echocardiogram.  Cardiology clarified the diagnosis as being type 2 MI (or demand ischemia) rather than type 1 MI.  Patient received units of packed red blood cells and of platelets.  Lactic acid, although elevated at first, came down to normal.  The white count increased on its own, and he was no longer neutropenic.  He had no further need of hospital services.  Was discharged home in good condition.  Home health resumed.      Physical Exam  Vitals reviewed.   Constitutional:       General: He is not in acute distress.     Appearance: He is ill-appearing. He is not diaphoretic.   HENT:      Mouth/Throat:      Mouth: Mucous membranes are moist.   Eyes:      General: No scleral icterus.        Right eye: No discharge.         Left eye: No discharge.   Neck:      Vascular: No JVD.   Cardiovascular:      Rate and Rhythm: Normal rate and regular rhythm.   Pulmonary:      Effort: Pulmonary effort is normal.     Breath sounds: Normal breath sounds.

## 2023-11-20 NOTE — PLAN OF CARE
Unable to meet with patient at bedside so spoke with Peri Glezeau (Spouse) [phone] (Mobile) on phone to complete BELL. Explained Medicare Outpatient Observation Notice (MOON). MOON form scanned into media manager.        11/20/23 0845   BELL Message   Medicare Outpatient and Observation Notification regarding financial responsibility Explained to patient/caregiver   Date BELL was signed 11/20/23   Time BELL was signed 0840        11/20/23 0845   BELL Message   Medicare Outpatient and Observation Notification regarding financial responsibility Explained to patient/caregiver   Date BELL was signed 11/20/23   Time BELL was signed 0840

## 2023-11-20 NOTE — PLAN OF CARE
11/20/23 0805   Patient Assessment/Suction   Level of Consciousness (AVPU) alert   Respiratory Effort Normal;Unlabored   Expansion/Accessory Muscles/Retractions no retractions;no use of accessory muscles   All Lung Fields Breath Sounds coarse   Rhythm/Pattern, Respiratory unlabored;pattern regular;depth regular   PRE-TX-O2   Device (Oxygen Therapy) room air   SpO2 95 %   Pulse Oximetry Type Continuous   $ Pulse Oximetry - Multiple Charge Pulse Oximetry - Multiple   Pulse 76   Resp (!) 25   Aerosol Therapy   $ Aerosol Therapy Charges PRN treatment not required   Education   $ Education Bronchodilator;BiPAP;15 min

## 2023-11-20 NOTE — PLAN OF CARE
Unable to meet with patient at bedside so spoke with  Peri Kuhn (Spouse) [phone] (Mobile) on phone to complete initial assessment. Patient / family reports patient DOES have a living will and NO ONE is medical POA.   Patient is active on service with [facility] and receives skilled nursing and PT per Laila at  [phone].    [facility]  Initial Discharge Assessment       Primary Care Provider: Johnson Erazo MD    Admission Diagnosis: Pneumonia due to aerobic bacteria [J15.8]  Pneumonia due to aerobic bacteria [J15.8]    Admission Date: 11/20/2023  Expected Discharge Date:     Transition of Care Barriers: (P) None    Payor: MEDICARE / Plan: MEDICARE PART A & B / Product Type: Government /     Extended Emergency Contact Information  Primary Emergency Contact: Peri Kuhn  Address: [address] [place] [address]  Home Phone: [phone]  Mobile Phone: [phone]  Relation: Spouse    Discharge Plan A: (P) Home Health  Discharge Plan B: (P) Home Health      [id number] - [place], [place] - [address] AT [facility] [address]  [address]  [facility] [address]  Phone: [phone] Fax: [phone]    [facility] [place] - [address] [place]  [address] [place] [address]  Phone: [phone] Fax: [phone]      Initial Assessment (most recent)       Adult Discharge Assessment - 11/20/23 0847          Discharge Assessment    Assessment Type Discharge Planning Assessment (P)      Confirmed/corrected address, phone number and insurance Yes (P)      Confirmed Demographics Correct on Facesheet (P)      Source of Information family (P)      Does patient/caregiver understand observation status Yes (P)      Communicated MARTELL with patient/caregiver No (P)      Reason For Admission neutropenic fever (P)      People in Home spouse (P)      Facility Arrived  From: home (P)      Do you expect to return to your current living situation? Yes (P)      Do you have help at home or someone to help you manage your care at home? Yes (P)      Who are your caregiver(s) and their phone number(s)? Peri Kuhn (Spouse) [phone] (Mobile) (P)      Current cognitive status: Unable to Assess (P)      Walking or Climbing Stairs ambulation difficulty, requires equipment (P)      Equipment Currently Used at Home CPAP;walker, rolling (P)      Readmission within 30 days? No (P)      Patient currently being followed by outpatient case management? No (P)      Do you currently have service(s) that help you manage your care at home? Yes (P)      Name and Contact number of agency Perri Stearns [facility] [phone] (P)      Is the pt/caregiver preference to resume services with current agency Yes (P)      Do you have prescription coverage? Yes (P)      Coverage Medicare A and B, BCBS Federal (P)      Who is going to help you get home at discharge? Peri Kuhn (Spouse) [phone] (Mobile) (P)      How do you get to doctors appointments? car, drives self (P)      Are you on dialysis? No (P)      Do you take coumadin? No (P)      DME Needed Upon Discharge  none (P)      Discharge Plan discussed with: Spouse/sig other (P)      Name(s) and Number(s) Peri Kuhn (Spouse) [phone] (Mobile) (P)      Transition of Care Barriers None (P)      Discharge Plan A Home Health (P)      Discharge Plan B Home Health (P)         OTHER    Name(s) of People in Home Peri Kuhn (Spouse) [phone] (Mobile) (P)

## 2023-11-20 NOTE — PROGRESS NOTES
Pharmacist Renal Adjustment Note    Conrad Kuhn is a 83 y.o. male being treated with Cefepime.     Patient Data:    Vital Signs (Most Recent):  Temp: (!) 103.5 °F (39.7 °C) (11/20/23 0158)  Pulse: (!) 117 (11/20/23 0245)  Resp: (!) 28 (11/20/23 0245)  BP: 132/62 (11/20/23 0158)  SpO2: 96 % (11/20/23 0245) Vital Signs (72h Range):  Temp:  [103.5 °F (39.7 °C)]   Pulse:  [117-134]   Resp:  [28-32]   BP: (132)/(62)   SpO2:  [96 %-100 %]      Recent Labs   Lab 11/17/23  1149 11/20/23  0203   CREATININE 1.2 1.3     Serum creatinine: 1.3 mg/dL 11/20/23 0203  Estimated creatinine clearance: 49.2 mL/min    Cefepime 2 g every 8 hours will be changed to Cefepime 2 g every 12 hours due to CrCl 30-60 per Renal Dose Adjustment protocol.    Pharmacist's Name: Angela Wiggins  Pharmacist's Extension: [phone]

## 2023-11-20 NOTE — ASSESSMENT & PLAN NOTE
Follows up with Dr. Bai and Dr. Don outpatient  Not on therapy currently   Wife reports starting new regimen on the 27th

## 2023-11-20 NOTE — CARE UPDATE
11/20/23 0211   PRE-TX-O2   Device (Oxygen Therapy) BIPAP   Oxygen Concentration (%) 40   SpO2 100 %   Pulse (!) 123   Resp (!) 32   Ready to Wean/Extubation Screen   FIO2<=50 (chart decimal) 0.4   Preset CPAP/BiPAP Settings   Mode Of Delivery BiPAP S/T   $ CPAP/BiPAP Daily Charge BiPAP/CPAP Daily   $ Initial CPAP/BiPAP Setup? Yes   $ Is patient using? Yes   Size of Mask Medium/Large   Sized Appropriately? Yes   Equipment Type V60   Airway Device Type medium full face mask   Ipap 16   EPAP (cm H2O) 6   Pressure Support (cm H2O) 10   Set Rate (Breaths/Min) 20   ITime (sec) 1   Rise Time (sec) 3   Patient CPAP/BiPAP Settings   CPAP/BIPAP ID 08   Timed Inspiration (Sec) 1   IPAP Rise Time (sec) 3   RR Total (Breaths/Min) 32   Tidal Volume (mL) 808   VE Minute Ventilation (L/min) 16.5 L/min   Peak Inspiratory Pressure (cm H2O) 17   TiTOT (%) 33   Total Leak (L/Min) 0   Patient Trigger - ST Mode Only (%) 96   Labs   $ Was an ABG obtained? Arterial Puncture;ISTAT - Blood gas   $ Labs Tech Time 15 min     Weaned Fi02 to 30% due to ABG results.

## 2023-11-20 NOTE — ED PROVIDER NOTES
Encounter Date: 11/20/2023       History     Chief Complaint   Patient presents with    Shortness of Breath     BIB EMS s/p SOB and fever. Wife got a temp at home of 103.1, per EMS pt initially satting at 85% on a NRB, now satting 100% on CPAP. Initial respirations at 44 now at 28 on CPAP. Recently dx with lymphoma started chemo 2 months ago. HX of lung cx. A&O at this time.      83-year-old male presented emergency department with fever and generalized malaise and fatigue and cough and shortness of breath.  Patient had a fever of 103.5.  Patient was short of breath and having difficulty breathing and was tachypneic so started on CPAP and transported here.  Patient currently getting chemotherapy.  Patient had history of lung cancer and patient also has lymphoma and currently getting chemotherapy.  Denies chest pain.  Denies abdominal pain or any focal weakness or numbness.  Patient complains of generalized malaise and fatigue.      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    MAYDA on CPAP     Pneumonia of both lungs due to infectious organism 06/29/2021    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     Right-sided Bell's palsy 2009    Spondylolisthesis     Varicose veins of legs     Venous insufficiency      Past Surgical History:   Procedure Laterality Date    Athroscopic surgery      On Knee    BIOPSY OF TISSUE OF NECK Left 08/2015     Recuurence CLL/small cell lyphoma    CERVICAL FUSION      ESOPHAGEAL DILATION      ESOPHAGOGASTRODUODENOSCOPY N/A 04/15/2022    Procedure: EGD (ESOPHAGOGASTRODUODENOSCOPY);  Surgeon: Siddharth Garcia MD;  Location: [facility];  Service: Endoscopy;  Laterality: N/A;    ESOPHAGOGASTRODUODENOSCOPY N/A 2022    Procedure: EGD (ESOPHAGOGASTRODUODENOSCOPY);  Surgeon: Siddharth Garcia MD;  Location: [facility];  Service: Endoscopy;  Laterality: N/A;    ESOPHAGOGASTRODUODENOSCOPY N/A 2022    Procedure: EGD (ESOPHAGOGASTRODUODENOSCOPY);  Surgeon: Jefferson Hyman MD;  Location: [facility];  Service: Endoscopy;  Laterality: N/A;    ESOPHAGOGASTRODUODENOSCOPY N/A 2023    Procedure: EGD (ESOPHAGOGASTRODUODENOSCOPY);  Surgeon: Jefferson Hyman MD;  Location: [facility];  Service: Endoscopy;  Laterality: N/A;    ESOPHAGOGASTRODUODENOSCOPY W/ PEG N/A 2022    Procedure: EGD, WITH PEG TUBE INSERTION;  Surgeon: Siddharth Garcia MD;  Location: [facility];  Service: Endoscopy;  Laterality: N/A;    GASTROSTOMY TUBE PLACEMENT  2022    20 Fr (bumper initially at 3.5)    Hemorrhoid resection      LUNG LOBECTOMY Left     NECK SURGERY  2022    Anterior and Posterior C3-C7 fusion    OTHER SURGICAL HISTORY  2006    Chemotherapy s/p lyphoma        Portacath implantation and removal      reverse shoulder arthroplasty Right 2021     Family History   Problem Relation Age of Onset    Stomach cancer Mother 80         at 95    Colon cancer Father      Social History     Tobacco Use    Smoking status: Former    Smokeless tobacco: Never   Substance Use Topics    Alcohol use: Yes    Drug use: No     Review of Systems   Constitutional:  Positive for fatigue and fever.   HENT: Negative.     Eyes: Negative.    Respiratory:  Positive for shortness of breath.    Cardiovascular:  Negative for chest pain.   Gastrointestinal: Negative.    Endocrine: Negative.    Genitourinary: Negative.     Musculoskeletal:  Positive for myalgias.   Skin: Negative.    Allergic/Immunologic: Negative.    Neurological: Negative.    Hematological: Negative.    Psychiatric/Behavioral: Negative.     All other systems reviewed and are negative.      Physical Exam     Initial Vitals [11/20/23 0158]   BP Pulse Resp Temp SpO2   132/62 (!) 134 (!) 28 (!) 103.5 °F (39.7 °C) 100 %      MAP       --         Physical Exam    Nursing note and vitals reviewed.  Constitutional: He appears well-developed and well-nourished.   HENT:   Head: Normocephalic and atraumatic.   Nose: Nose normal.   Eyes: Conjunctivae and EOM are normal.   Neck: No tracheal deviation present.   Normal range of motion.  Cardiovascular:  Regular rhythm, normal heart sounds and intact distal pulses.     Exam reveals no friction rub.       No murmur heard.  Tachycardic   Pulmonary/Chest: Breath sounds normal. No respiratory distress. He has no wheezes. He has no rales. He exhibits no tenderness.   Tachypneic with mild difficulty breathing   Abdominal: Abdomen is soft. He exhibits no distension. There is no abdominal tenderness.   Musculoskeletal:         General: Normal range of motion.      Cervical back: Normal range of motion.     Neurological: He is alert and oriented to person, place, and time. He has normal strength. GCS score is 15. GCS eye subscore is 4. GCS verbal subscore is 5. GCS motor subscore is 6.   Skin: Skin is warm and dry. Capillary refill takes less than 2 seconds.   Psychiatric: He has a normal mood and affect. Thought content normal.         ED Course   Critical Care    Date/Time: 11/20/2023 2:10 AM    Performed by: Nicole Craig MD  Authorized by: Nicole Craig MD  Direct patient critical care time: 20 minutes  Ordering / reviewing critical care time: 5 minutes  Documentation critical care time: 5 minutes  Total critical care time (exclusive of procedural time) : 30 minutes        Labs Reviewed   COMPREHENSIVE METABOLIC PANEL - Abnormal;  Notable for the following components:       Result Value    Glucose 208 (*)     BUN 38 (*)     Calcium 8.3 (*)     Total Protein 5.3 (*)     Albumin 3.4 (*)     Alkaline Phosphatase 32 (*)     eGFR 54.2 (*)     All other components within normal limits   TROPONIN I HIGH SENSITIVITY - Abnormal; Notable for the following components:    Troponin I High Sensitivity 60.2 (*)     All other components within normal limits   LACTIC ACID, PLASMA - Abnormal; Notable for the following components:    Lactate (Lactic Acid) 2.3 (*)     All other components within normal limits   PROCALCITONIN - Abnormal; Notable for the following components:    Procalcitonin 0.611 (*)     All other components within normal limits   ISTAT PROCEDURE - Abnormal; Notable for the following components:    POC PCO2 34.6 (*)     POC PO2 145 (*)     POC HCO3 23.3 (*)     All other components within normal limits   RESPIRATORY INFECTION PANEL (PCR), NASOPHARYNGEAL    Narrative:     Respiratory Infection Panel source->NP Swab   CULTURE, BLOOD   CULTURE, BLOOD   B-TYPE NATRIURETIC PEPTIDE   MAGNESIUM   CBC W/ AUTO DIFFERENTIAL   LACTIC ACID, PLASMA   URINALYSIS, REFLEX TO URINE CULTURE   PROTIME-INR   APTT   PROCALCITONIN   PROCALCITONIN     EKG Readings: (Independently Interpreted)   Initial Reading: No STEMI. Rhythm: Sinus Tachycardia. Heart Rate: 129. Ectopy: No Ectopy. Conduction: Normal.   Other EKG Interpretations: Repeat EKG shows sinus tachycardia with improved heart rate       Imaging Results              X-Ray Chest AP Portable (In process)                   X-Rays:   Independently Interpreted Readings:   Other Readings:  Chest x-ray shows right parahilar infiltrate    Medications   lactated ringers bolus 2,586 mL (2,586 mLs Intravenous New Bag 11/20/23 0226)   cefepime 2 g in dextrose 5% 100 mL IVPB (ready to mix) (2 g Intravenous New Bag 11/20/23 0241)   vancomycin - pharmacy to dose (has no administration in time range)   vancomycin in dextrose  5 % 1 gram/250 mL IVPB 1,000 mg (has no administration in time range)     And   vancomycin in dextrose 5 % 1 gram/250 mL IVPB 1,000 mg (has no administration in time range)   acetaminophen suppository 325 mg ( Rectal Canceled Entry 11/20/23 0230)   acetaminophen suppository 650 mg (650 mg Rectal Given 11/20/23 0205)   albuterol-ipratropium 2.5 mg-0.5 mg/3 mL nebulizer solution 3 mL (3 mLs Nebulization Given by Other 11/20/23 0245)   methylPREDNISolone sodium succinate injection 125 mg (125 mg Intravenous Given 11/20/23 0224)     Medical Decision Making  83-year-old male presented emergency department with fever and shortness of breath and generalized malaise and fatigue.  Patient also has cough.  Patient was short of breath and tachycardic and tachypneic.  Fever treated and tachycardia and tachypnea improved.  Patient's oxygenation is fairly unremarkable.  Given patient undergoing chemotherapy broad-spectrum antibiotics started.  Screening labs and workup reviewed.  Patient hydrated.  Screening test for COVID and flu done.  Hospital Medicine consulted for evaluation for admission given patient's symptoms improved however given patient's presentation and currently under chemotherapy is considered immunocompromised and Hospital Medicine to evaluate for further management and IV antibiotics and treatment.  Patient's lactic acid is elevated.  Patient hydrated and reperfusion did improve after treatment.    Amount and/or Complexity of Data Reviewed  Labs: ordered. Decision-making details documented in ED Course.  Radiology: ordered and independent interpretation performed. Decision-making details documented in ED Course.  ECG/medicine tests: ordered and independent interpretation performed. Decision-making details documented in ED Course.    Risk  OTC drugs.  Prescription drug management.  Decision regarding hospitalization.      Additional MDM:   Sepsis:   This patient does have evidence of infective focus  My overall  impression is sepsis.  Source: Respiratory  Antibiotics given- Antibiotics     Patient Encounter Information Not Found      Latest lactate reviewed- yes  Organ dysfunction indicated by Acute respiratory failure    Fluid challenge Ideal Body Weight- The patient's ideal body weight is [unfilled] which will be used to calculate fluid bolus of 30 ml/kg for treatment of septic shock.      Post- resuscitation assessment Yes Perfusion exam was performed within 6 hours of septic shock presentation after bolus shows Adequate tissue perfusion assessed by non-invasive monitoring       Will Not start Pressors- Levophed for MAP of 65  Source control achieved by:  Antibiotic                         Medical Decision Making:   Clinical Tests:   Sepsis Perfusion Assessment: "I attest a sepsis perfusion exam was performed within 6 hours of sepsis, severe sepsis, or septic shock presentation, following fluid resuscitation."          Clinical Impression:  Final diagnoses:  [R06.02] Shortness of breath  [J15.8] Pneumonia due to aerobic bacteria (Primary)  [R50.9] Fever, unspecified fever cause          ED Disposition Condition    Admit Serious                Nicole Craig MD  11/20/23 0252

## 2023-11-20 NOTE — ASSESSMENT & PLAN NOTE
"Patient was found to have thrombocytopenia, the likely etiology is secondary to medications- chemotherapy , will monitor the platelets Daily. Will transfuse if platelet count is <10k. Hold DVT prophylaxis if platelets are <50k. The patient's platelet results have been reviewed and are listed below.  No results for input(s): "PLT" in the last 24 hours.    "

## 2023-11-20 NOTE — HPI
Patient is a 84 yo male with PMH NSCLC (2004), diabetes, asthma, MAYDA on cpap, iron deficiency anemia, HTN and CLL presents to the emergency room with complaints fever and shortness of breath.  Wife at bedside able to supplement history.  Wife states today patient felt feverish and fatigue.  Wife states temperature was measured at home and it was around 103.  Patient continued to feel short of breath, was placed on CPAP and brought to the emergency room.  Patient was recently hospitalized at [facility] 2 weeks ago for similar symptoms, blood and urine cultures at that time were negative.  Patient follows with Dr. Bai outpatient.  Wife states that patient has not been taking any chemotherapy regimen given pancytopenia for the last month.  States that they have follow up with Dr. Dno on the 27th to starting a new regimen.  Temperature in emergency room was 103.5, patient tachycardic.  CBC pending.  Troponin elevated 60.2, lactate 2.3, procalcitonin 0.611.  Patient was placed on BiPAP in the emergency room given tachypnea and shortness of breath, ABG showed pH 7.436, CO2 34.6, O2 145.  Wife states that patient was found to have saturation in the 80s when EMS arrived.  Patient given DuoNebs , IV fluids, and started on empiric IV antibiotics in the emergency room.

## 2023-11-20 NOTE — PROGRESS NOTES
VANCOMYCIN PHARMACOKINETIC NOTE:  Vancomycin Day # 1    Objective/Assessment:    Diagnosis/Indication for Vancomycin:Neutropenic Fever      83 y.o., male; Actual Body Weight = 86.2 kg (190 lb).    The patient has the following labs:  11/20/2023 Estimated Creatinine Clearance: 49.2 mL/min (based on SCr of 1.3 mg/dL). Lab Results   Component Value Date    BUN 38 (H) 11/20/2023     Lab Results   Component Value Date    WBC 1.65 (LL) 11/20/2023          Plan:  Adjust vancomycin dose and/or frequency based on the patient's actual weight and renal function:  Initiate Vancomycin 1250 mg IV every 24 hours following 2000 mg loading dose.  Orders have been entered into patient's chart.        Vancomycin trough level has been ordered for 11/22 at 03:00.    Pharmacy will manage vancomycin therapy, monitor serum vancomycin levels, monitor renal function and adjust regimen as necessary.    Thank you for allowing us to participate in this patient's care.     Angela Wiggins 11/20/2023   Department of Pharmacy  Ext [phone]

## 2023-11-20 NOTE — RESPIRATORY THERAPY
11/20/23 0424   Patient Assessment/Suction   Level of Consciousness (AVPU) alert   Respiratory Effort Normal;Unlabored   Expansion/Accessory Muscles/Retractions no use of accessory muscles   Rhythm/Pattern, Respiratory assisted mechanically   PRE-TX-O2   Device (Oxygen Therapy) BIPAP   Oxygen Concentration (%) 30   Pulse Oximetry Type Continuous   $ Pulse Oximetry - Multiple Charge Pulse Oximetry - Multiple   Ready to Wean/Extubation Screen   FIO2<=50 (chart decimal) 0.3   Preset CPAP/BiPAP Settings   Mode Of Delivery BiPAP S/T   $ Is patient using? Yes   Size of Mask Medium/Large   Sized Appropriately? Yes   Equipment Type V60   Airway Device Type medium full face mask   Ipap 16   EPAP (cm H2O) 6   Pressure Support (cm H2O) 10   Set Rate (Breaths/Min) 20   ITime (sec) 1   Rise Time (sec) 3   Patient CPAP/BiPAP Settings   CPAP/BIPAP ID 8   Timed Inspiration (Sec) 1   IPAP Rise Time (sec) 3   RR Total (Breaths/Min) 28   Tidal Volume (mL) 563   VE Minute Ventilation (L/min) 15.9 L/min   Peak Inspiratory Pressure (cm H2O) 17   TiTOT (%) 39   Total Leak (L/Min) 0   Patient Trigger - ST Mode Only (%) 95   CPAP/BiPAP Alarms   High Pressure (cm H2O) 45   Low Pressure (cm H2O) 5   Minute Ventilation (L/Min) 3   High RR (breaths/min) 45   Low RR (breaths/min) 8   Apnea (Sec) 20   Education   $ Education DME BiPAP;DME Oxygen;15 min   Transport Patient   $ Transport Tech Time Charge 30 min   Oxygen Method BiPAP   Transport to ICU 3   Toleration Good   ETT Secure Other (see comment)   Ambu and mask at bedside Yes

## 2023-11-20 NOTE — PROGRESS NOTES
[facility]  Progress Note    Patient Name: Conrad Kuhn  MRN: [id number]  Patient Class: IP- Inpatient   Admission Date: 11/20/2023  Length of Stay: 0 days  Attending Physician: Cal Bragg MD  Primary Care Provider: Johnson Erazo MD        Subjective:     Principal Problem:Neutropenic fever        HPI:  Patient is a 82 yo male with PMH NSCLC (2004), diabetes, asthma, MAYDA on cpap, iron deficiency anemia, HTN and CLL presents to the emergency room with complaints fever and shortness of breath.  Wife at bedside able to supplement history.  Wife states today patient felt feverish and fatigue.  Wife states temperature was measured at home and it was around 103.  Patient continued to feel short of breath, was placed on CPAP and brought to the emergency room.  Patient was recently hospitalized at [facility] 2 weeks ago for similar symptoms, blood and urine cultures at that time were negative.  Patient follows with Dr. Bai outpatient.  Wife states that patient has not been taking any chemotherapy regimen given pancytopenia for the last month.  States that they have follow up with Dr. Don on the 27th to starting a new regimen.  Temperature in emergency room was 103.5, patient tachycardic.  CBC pending.  Troponin elevated 60.2, lactate 2.3, procalcitonin 0.611.  Patient was placed on BiPAP in the emergency room given tachypnea and shortness of breath, ABG showed pH 7.436, CO2 34.6, O2 145.  Wife states that patient was found to have saturation in the 80s when EMS arrived.  Patient given DuoNebs , IV fluids, and started on empiric IV antibiotics in the emergency room.    Overview/Hospital Course:  No notes on file    Interval History:  T-max 103.5 F. Patient with pancytopenia.  New chemotherapy started on October 27, 2023.  Patient under care by Dr. Bai and Dr. Don.  Using CPAP therapy.  No bleeding complications present.  Patient receiving 1 unit of packed red blood cells  transfusion.  On neutropenic precautions.  Patient denies any shortness a breath, chest pain or expectoration.  No abdominal pain or nausea or vomiting.    Review of Systems   Constitutional:  Positive for chills, fatigue and fever.   HENT:  Negative for dental problem and ear pain.    Respiratory:  Positive for shortness of breath. Negative for cough and chest tightness.    Cardiovascular:  Negative for chest pain and palpitations.   Gastrointestinal:  Negative for abdominal distention, abdominal pain and diarrhea.   Genitourinary:  Negative for difficulty urinating and dysuria.   Musculoskeletal:  Negative for back pain.   Neurological:  Negative for dizziness and headaches.   Psychiatric/Behavioral:  Negative for agitation and behavioral problems.      Objective:     Vital Signs (Most Recent):  Temp: 99.6 °F (37.6 °C) (11/20/23 0500)  Pulse: 76 (11/20/23 0805)  Resp: (!) 25 (11/20/23 0805)  BP: 134/62 (11/20/23 0500)  SpO2: 95 % (11/20/23 0805) Vital Signs (24h Range):  Temp:  [99.6 °F (37.6 °C)-103.5 °F (39.7 °C)] 99.6 °F (37.6 °C)  Pulse:  [] 76  Resp:  [21-34] 25  SpO2:  [89 %-100 %] 95 %  BP: ()/(50-71) 134/62     Weight: 92 kg (202 lb 14.4 oz)  Body mass index is 26.05 kg/m².    Intake/Output Summary (Last 24 hours) at 11/20/2023 0901  Last data filed at 11/20/2023 0531  Gross per 24 hour   Intake 3086.1 ml   Output 200 ml   Net 2886.1 ml         Physical Exam  Constitutional:       General: He is in acute distress.   HENT:      Head: Normocephalic and atraumatic.      Right Ear: External ear normal.      Left Ear: External ear normal.      Mouth/Throat:      Mouth: Mucous membranes are dry.   Cardiovascular:      Rate and Rhythm: Regular rhythm. Tachycardia present.   Pulmonary:      Effort: Respiratory distress present.      Breath sounds: No wheezing.   Abdominal:      General: Abdomen is flat.      Palpations: Abdomen is soft.   Musculoskeletal:         General: Normal range of motion.  "  Skin:     General: Skin is warm and dry.   Neurological:      General: No focal deficit present.      Mental Status: He is alert and oriented to person, place, and time.             Significant Labs: All pertinent labs within the past 24 hours have been reviewed.  CBC:   Recent Labs   Lab 11/20/23 0203   WBC 1.65*   HGB 6.4*   HCT 18.4*   PLT 19*     CMP:   Recent Labs   Lab 11/20/23 0203      K 4.7      CO2 25   *   BUN 38*   CREATININE 1.3   CALCIUM 8.3*   PROT 5.3*   ALBUMIN 3.4*   BILITOT 0.5   ALKPHOS 32*   AST 19   ALT 17   ANIONGAP 8     Lactic Acid:   Recent Labs   Lab 11/20/23 0203 11/20/23  0618   LACTATE 2.3* 1.2     Troponin:   Recent Labs   Lab 11/20/23 0203   TROPONINIHS 60.2*     TSH:   Recent Labs   Lab 10/17/23  0947   TSH 0.741     Urine Culture: No results for input(s): "LABURIN" in the last 48 hours.  Urine Studies:   Recent Labs   Lab 11/20/23  0355   COLORU Yellow   APPEARANCEUA Clear   PHUR 5.0   SPECGRAV 1.020   PROTEINUA Negative   GLUCUA Negative   KETONESU Trace*   BILIRUBINUA Negative   OCCULTUA Negative   NITRITE Negative   UROBILINOGEN Negative   LEUKOCYTESUR Negative     Microbiology Results (last 7 days)       Procedure Component Value Units Date/Time    Respiratory Infection Panel (PCR), Nasopharyngeal [[id number]] Collected: 11/20/23 0208    Order Status: Completed Specimen: Nasopharyngeal Swab Updated: 11/20/23 0302     Respiratory Infection Panel Source NP swab     Adenovirus Not Detected     Coronavirus 229E, Common Cold Virus Not Detected     Coronavirus HKU1, Common Cold Virus Not Detected     Coronavirus NL63, Common Cold Virus Not Detected     Coronavirus OC43, Common Cold Virus Not Detected     Comment: Coronavirus strains 229E, HKU1, NL63, and OC43 can cause the common   cold   and are not associated with the respiratory disease outbreak caused   by  the COVID-19 (SARS-CoV-2 novel Coronavirus) strain.           SARS-CoV2 (COVID-19) Qualitative PCR " Not Detected     Human Metapneumovirus Not Detected     Human Rhinovirus/Enterovirus Not Detected     Influenza A (subtypes H1, H1-2009,H3) Not Detected     Influenza B Not Detected     Parainfluenza Virus 1 Not Detected     Parainfluenza Virus 2 Not Detected     Parainfluenza Virus 3 Not Detected     Parainfluenza Virus 4 Not Detected     Respiratory Syncytial Virus Not Detected     Bordetella Parapertussis (NY7220) Not Detected     Bordetella pertussis (ptxP) Not Detected     Chlamydia pneumoniae Not Detected     Mycoplasma pneumoniae Not Detected     Comment: Respiratory Infection Panel testing performed by Multiplex PCR.       Narrative:      Respiratory Infection Panel source->NP Swab    Blood culture x two cultures. Draw prior to antibiotics. [[id number]] Collected: 11/20/23 0233    Order Status: Sent Specimen: Blood from Antecubital, Right Hand Updated: 11/20/23 0240    Blood culture x two cultures. Draw prior to antibiotics. [[id number]] Collected: 11/20/23 0233    Order Status: Sent Specimen: Blood from Antecubital, Right Arm Updated: 11/20/23 0240          Significant Imaging:   CXR: Chronic findings as above. No acute radiographic abnormalities.     Assessment/Plan:      * Neutropenic fever  Start neutropenic precautions.  Temperature in the emergency room on 103.5 patient given Tylenol.  Continue to monitor vitals   Patient reports being off chemotherapy for the last month given pancytopenia  Reviewed chest x-ray, no obvious consolidation noted  Continue intravenous cefepime and vancomycin.  Pharmacist to dose vancomycin.   Continue with IV fluids  Follow up blood cultures and urine cultures   Respiratory infectious panel negative  Repeat lactate        Shortness of breath  Likely fever induced, oxygen saturations stable on room air.  Patient feels tachypneic  Continue with BiPAP, wean as tolerated  NPO while on BiPAP    Troponin level elevated  Likely demand, will trend troponin.  Patient without chest  pain.  EKG without ST elevations.  Obtain transthoracic echocardiogram.      Thrombocytopenia  Patient was found to have thrombocytopenia, the likely etiology is secondary to medications- chemotherapy , will monitor the platelets Daily. Will transfuse if platelet count is <10k. Hold DVT prophylaxis if platelets are <50k. The patient's platelet results have been reviewed and are listed below.  Recent Labs   Lab 11/20/23  0203   PLT 19*         Herpes stomatitis  Continue with outpatient valacyclovir    CLL (chronic lymphocytic leukemia)  Follows up with Dr. Bai and Dr. Don outpatient.  Dr. Bai to evaluate patient today.  Not on therapy currently   New regimen started on 10/27/23.      Repeating a.m. labs.  Decision about further packed red blood cell transfusion and platelet transfusion depends upon those lab results.   Dr. Bai to see the patient.    VTE Risk Mitigation (From admission, onward)           Ordered     IP VTE HIGH RISK PATIENT  Once         11/20/23 0318     Place sequential compression device  Until discontinued         11/20/23 0318                    Discharge Planning   MARTELL: November 24, 2023    Code Status: Full Code   Is the patient medically ready for discharge?:     Reason for patient still in hospital (select all that apply): Patient trending condition and Consult recommendations  Discharge Plan A: Home Health            Critical care time spent on the evaluation and treatment of severe organ dysfunction, review of pertinent labs and imaging studies, discussions with consulting providers and discussions with patient/family: 33 minutes.      Cal Bragg MD  Department of Hospital Medicine   [facility], [facility]    Addendum 10:47 a.m.:   Second set of troponin markedly elevated suggesting ongoing non ST-elevation MI.  Patient denies any chest pain.  Unable to initiate heparin/Lovenox anticoagulation therapy due to low platelet count.  Also unable to give aspirin due to  low platelet count.  Increase statin therapy Lipitor 80 mg q.h.s..  Start metoprolol 20 mg b.i.d..  Continue angiotensin receptor blocker as before.  Obtain 12 lead EKG and follow 2D echocardiogram.  Cardiologist is being consulted.  Discussed with bedside nurse.

## 2023-11-20 NOTE — H&P
[facility] - Emergency Dept  Hospital Medicine  History & Physical    Patient Name: Conrad Kuhn  MRN: [id number]  Patient Class: OP- Observation  Admission Date: 11/20/2023  Attending Physician: Apollo Flanagan MD   Primary Care Provider: Johnson Erazo MD         Patient information was obtained from patient and ER records.     Subjective:     Principal Problem:Neutropenic fever    Chief Complaint:   Chief Complaint   Patient presents with    Shortness of Breath     BIB EMS s/p SOB and fever. Wife got a temp at home of 103.1, per EMS pt initially satting at 85% on a NRB, now satting 100% on CPAP. Initial respirations at 44 now at 28 on CPAP. Recently dx with lymphoma started chemo 2 months ago. HX of lung cx. A&O at this time.         HPI: Patient is a 82 yo male with PMH NSCLC (2004), diabetes, asthma, MAYDA on cpap, iron deficiency anemia, HTN and CLL presents to the emergency room with complaints fever and shortness of breath.  Wife at bedside able to supplement history.  Wife states today patient felt feverish and fatigue.  Wife states temperature was measured at home and it was around 103.  Patient continued to feel short of breath, was placed on CPAP and brought to the emergency room.  Patient was recently hospitalized at [facility] 2 weeks ago for similar symptoms, blood and urine cultures at that time were negative.  Patient follows with Dr. Bai outpatient.  Wife states that patient has not been taking any chemotherapy regimen given pancytopenia for the last month.  States that they have follow up with Dr. Don on the 27th to starting a new regimen.  Temperature in emergency room was 103.5, patient tachycardic.  CBC pending.  Troponin elevated 60.2, lactate 2.3, procalcitonin 0.611.  Patient was placed on BiPAP in the emergency room given tachypnea and shortness of breath, ABG showed pH 7.436, CO2 34.6, O2 145.  Wife states that patient was found to have saturation in the 80s when EMS  arrived.  Patient given DuoNebs , IV fluids, and started on empiric IV antibiotics in the emergency room.    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    Lymphoma, 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    MAYDA on CPAP     Pneumonia of both lungs due to infectious organism 06/29/2021    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;  Service: Endoscopy;  Laterality: N/A;    ESOPHAGOGASTRODUODENOSCOPY N/A 04/16/2022    Procedure: EGD (ESOPHAGOGASTRODUODENOSCOPY);  Surgeon: Siddharth Garcia MD;  Location: [facility];  Service: Endoscopy;  Laterality: N/A;    ESOPHAGOGASTRODUODENOSCOPY N/A 06/23/2022    Procedure: EGD (ESOPHAGOGASTRODUODENOSCOPY);  Surgeon: Jefferson Hyman MD;  Location: [facility];  Service: Endoscopy;  Laterality: N/A;    ESOPHAGOGASTRODUODENOSCOPY N/A 7/6/2023    Procedure: EGD (ESOPHAGOGASTRODUODENOSCOPY);  Surgeon: Jefferson Hyman MD;  Location: [facility];  Service: Endoscopy;  Laterality: N/A;    ESOPHAGOGASTRODUODENOSCOPY W/ PEG N/A 04/16/2022    Procedure: EGD,  WITH PEG TUBE INSERTION;  Surgeon: Siddharth Garcia MD;  Location: [facility];  Service: Endoscopy;  Laterality: N/A;    GASTROSTOMY TUBE PLACEMENT  04/16/2022    20 Fr (bumper initially at 3.5)    Hemorrhoid resection  1979    LUNG LOBECTOMY Left 2004    NECK SURGERY  04/08/2022    Anterior and Posterior C3-C7 fusion    OTHER SURGICAL HISTORY  2006    Chemotherapy s/p lyphoma        Portacath implantation and removal      reverse shoulder arthroplasty Right 03/2021       Review of patient's allergies indicates:  No Known Allergies    No current facility-administered medications on file prior to encounter.     Current Outpatient Medications on File Prior to Encounter   Medication Sig    atorvastatin (LIPITOR) 20 MG tablet Take 20 mg by mouth once daily.    azelastine (ASTELIN) 137 mcg (0.1 %) nasal spray 1 spray by Nasal route 2 (two) times daily.     blood sugar diagnostic Strp 1 strip by Misc.(Non-Drug; Combo Route) route 2 (two) times a day.    blood-glucose meter kit Use as instructed    duke's soln (benadryl 30 mL, mylanta 30 mL, LIDOcaine 30 mL, nystatin 30 mL) 120mL Take 10 mLs by mouth 4 (four) times daily.    ferrous sulfate (FEOSOL) 325 mg (65 mg iron) Tab tablet Take 325 mg by mouth daily with breakfast.    fluticasone propionate (FLONASE) 50 mcg/actuation nasal spray 1 spray by Each Nostril route once daily.    gabapentin (NEURONTIN) 600 MG tablet Take 600 mg by mouth 2 (two) times daily.    glimepiride (AMARYL) 2 MG tablet Take 2 mg by mouth once daily at 6am.    HYDROcodone-acetaminophen (NORCO)  mg per tablet Take 1 tablet by mouth every 8 (eight) hours as needed for Pain.    loperamide (IMODIUM) 2 mg capsule Take 1 capsule (2 mg total) by mouth 4 (four) times daily as needed for Diarrhea.    losartan (COZAAR) 50 MG tablet Take 1 tablet (50 mg total) by mouth once daily.    ondansetron (ZOFRAN) 8 MG tablet Take 1 tablet (8 mg total) by mouth every 8 (eight) hours as needed for Nausea.     promethazine (PHENERGAN) 12.5 MG Tab TAKE 1 TABLET(12.5 MG) BY MOUTH EVERY 6 HOURS AS NEEDED (Patient taking differently: Take 12.5 mg by mouth every 6 (six) hours as needed (nausea). TAKE 1 TABLET(12.5 MG) BY MOUTH EVERY 6 HOURS AS NEEDED)    tamsulosin (FLOMAX) 0.4 mg Cap Take 1 capsule (0.4 mg total) by mouth once daily. (Patient taking differently: Take 0.4 mg by mouth every evening.)    traZODone (DESYREL) 300 MG tablet Take 300 mg by mouth every evening.    valACYclovir (VALTREX) 500 MG tablet Take 1,000 mg by mouth once daily.    venetoclax (VENCLEXTA) 100 mg Tab Take 2 tablets (200 mg) by mouth once daily.    [DISCONTINUED] esomeprazole (NEXIUM) 40 MG capsule Take 1 capsule (40 mg total) by mouth 2 (two) times daily.    [DISCONTINUED] metoprolol succinate (TOPROL-XL) 25 MG 24 hr tablet Take 25 mg by mouth 2 (two) times daily.      Family History       Problem Relation (Age of Onset)    Colon cancer Father    Stomach cancer Mother (80)          Tobacco Use    Smoking status: Former    Smokeless tobacco: Never   Substance and Sexual Activity    Alcohol use: Yes    Drug use: No    Sexual activity: Not Currently     Review of Systems   Constitutional:  Positive for chills, fatigue and fever.   HENT:  Negative for dental problem and ear pain.    Respiratory:  Positive for shortness of breath. Negative for cough and chest tightness.    Cardiovascular:  Negative for chest pain and palpitations.   Gastrointestinal:  Negative for abdominal distention, abdominal pain and diarrhea.   Genitourinary:  Negative for difficulty urinating and dysuria.   Musculoskeletal:  Negative for back pain.   Neurological:  Negative for dizziness and headaches.   Psychiatric/Behavioral:  Negative for agitation and behavioral problems.      Objective:     Vital Signs (Most Recent):  Temp: (!) 103.5 °F (39.7 °C) (11/20/23 0158)  Pulse: (!) 117 (11/20/23 0245)  Resp: (!) 28 (11/20/23 0245)  BP: 132/62 (11/20/23 0158)  SpO2: 96 % (11/20/23  "0245) Vital Signs (24h Range):  Temp:  [103.5 °F (39.7 °C)] 103.5 °F (39.7 °C)  Pulse:  [117-134] 117  Resp:  [28-32] 28  SpO2:  [96 %-100 %] 96 %  BP: (132)/(62) 132/62     Weight: 86.2 kg (190 lb)  Body mass index is 24.39 kg/m².     Physical Exam  Constitutional:       General: He is in acute distress.   HENT:      Head: Normocephalic and atraumatic.      Right Ear: External ear normal.      Left Ear: External ear normal.      Mouth/Throat:      Mouth: Mucous membranes are dry.   Cardiovascular:      Rate and Rhythm: Regular rhythm. Tachycardia present.   Pulmonary:      Effort: Respiratory distress present.      Breath sounds: No wheezing.   Abdominal:      General: Abdomen is flat.      Palpations: Abdomen is soft.   Musculoskeletal:         General: Normal range of motion.   Skin:     General: Skin is warm and dry.   Neurological:      General: No focal deficit present.      Mental Status: He is alert and oriented to person, place, and time.                Significant Labs: All pertinent labs within the past 24 hours have been reviewed.  CBC: No results for input(s): "WBC", "HGB", "HCT", "PLT" in the last 48 hours.  CMP:   Recent Labs   Lab 11/20/23  0203      K 4.7      CO2 25   *   BUN 38*   CREATININE 1.3   CALCIUM 8.3*   PROT 5.3*   ALBUMIN 3.4*   BILITOT 0.5   ALKPHOS 32*   AST 19   ALT 17   ANIONGAP 8       Significant Imaging: I have reviewed all pertinent imaging results/findings within the past 24 hours.  Assessment/Plan:     * Neutropenic fever  Temperature in the emergency room on 103.5 patient given Tylenol.  Continue to monitor vitals   Patient reports being off chemotherapy for the last month given pancytopenia  CBC pending, last white blood count 3 days ago was 3.5.  Procalcitonin mildly elevated 0.611.  Reviewed chest x-ray, no obvious consolidation noted  We will start patient on IV antibiotics empirically, deescalate pending clinical course   Continue with IV fluids  Follow " "up blood cultures and urine cultures   Respiratory infectious panel negative  Repeat lactate        Shortness of breath  Likely fever induced, oxygen saturations stable on room air.  Patient feels tachypneic  Continue with BiPAP, wean as tolerated  NPO while on BiPAP    Troponin level elevated  Likely demand, will trend troponin.  Patient without chest pain.  EKG without ST elevations      Thrombocytopenia  Patient was found to have thrombocytopenia, the likely etiology is secondary to medications- chemotherapy , will monitor the platelets Daily. Will transfuse if platelet count is <10k. Hold DVT prophylaxis if platelets are <50k. The patient's platelet results have been reviewed and are listed below.  No results for input(s): "PLT" in the last 24 hours.      Herpes stomatitis  Continue with outpatient valacyclovir    CLL (chronic lymphocytic leukemia)  Follows up with Dr. Bai and Dr. Don outpatient  Not on therapy currently   Wife reports starting new regimen on the 27th        VTE Risk Mitigation (From admission, onward)           Ordered     IP VTE HIGH RISK PATIENT  Once         11/20/23 0318     Place sequential compression device  Until discontinued         11/20/23 0318                       On 11/20/2023, patient should be placed in hospital observation services under my care.        Pharmacist Renal Adjustment Note    Conrad Kuhn is a 83 y.o. male being treated with Cefepime.     Patient Data:    Vital Signs (Most Recent):  Temp: (!) 103.5 °F (39.7 °C) (11/20/23 0158)  Pulse: (!) 117 (11/20/23 0245)  Resp: (!) 28 (11/20/23 0245)  BP: 132/62 (11/20/23 0158)  SpO2: 96 % (11/20/23 0245) Vital Signs (72h Range):  Temp:  [103.5 °F (39.7 °C)]   Pulse:  [117-134]   Resp:  [28-32]   BP: (132)/(62)   SpO2:  [96 %-100 %]      Recent Labs   Lab 11/17/23  1149 11/20/23 0203   CREATININE 1.2 1.3     Serum creatinine: 1.3 mg/dL 11/20/23 0203  Estimated creatinine clearance: 49.2 mL/min    Cefepime 2 g every " 8 hours will be changed to Cefepime 2 g every 12 hours due to CrCl 30-60 per Renal Dose Adjustment protocol.    Pharmacist's Name: Angela Wiggins  Pharmacist's Extension: [phone]      Apollo Flanagan MD  Department of Hospital Medicine  [facility] - Emergency Dept    COMPLETED  Family history is reviewed and has not changed   Pertinent information:

## 2023-11-20 NOTE — ASSESSMENT & PLAN NOTE
Start neutropenic precautions.  Temperature in the emergency room on 103.5 patient given Tylenol.  Continue to monitor vitals   Patient reports being off chemotherapy for the last month given pancytopenia  Reviewed chest x-ray, no obvious consolidation noted  Continue intravenous cefepime and vancomycin.  Pharmacist to dose vancomycin.   Continue with IV fluids  Follow up blood cultures and urine cultures   Respiratory infectious panel negative  Repeat lactate

## 2023-11-20 NOTE — CONSULTS
[facility]   Hematology/Oncology  Inpatient Consult Note          Patient Name: Conrad Kuhn  MRN: [id number]  Admission Date: 11/20/2023  Hospital Length of Stay: 0 days  Code Status: Full Code   Attending Provider: Cal Bragg MD  Referring Provider: Self, Aaareferral  Consulting Provider: Drew Bai MD  Primary Care Physician: Johnson Erazo MD  Principal Problem:Neutropenic fever    Consults  Subjective:     Chief Complaint: Shortness of Breath (BIB EMS s/p SOB and fever. Wife got a temp at home of 103.1, per EMS pt initially satting at 85% on a NRB, now satting 100% on CPAP. Initial respirations at 44 now at 28 on CPAP. Recently dx with lymphoma started chemo 2 months ago. HX of lung cx. A&O at this time. )          History Present Illness:  83 y.o. male known to my oncology service with diagnosis of CLL/NHL for which he has been followed and treated in conjunction with Dr Monik Don at [facility] in [place]. He was recently hospitalized and discharged on 11/13. 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 He presented to ED at [facility] with SOB and febrile temp. He has been admitted to the hospitalist service and is currently in the ICU. He is awake and alert and laying in bed. He denies any current CP, SOB, HA's or N/V. Unit of blood currently being transfused, Wife is at bedside. Discussed the recent diagnosis of AML and the planned f/u with Dr Don again next Monday on 11/27. Discussed with ICU nursing staff.         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    Lymphoma, 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    MAYDA on CPAP     Pneumonia of both lungs due to infectious organism 06/29/2021    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     Right-sided Bell's palsy 2009    Spondylolisthesis     Varicose veins of legs     Venous insufficiency      Past Surgical History:   Procedure Laterality Date    Athroscopic surgery      On Knee    BIOPSY OF TISSUE OF NECK Left 08/2015    Recuurence CLL/small cell lyphoma    CERVICAL FUSION  2022    ESOPHAGEAL DILATION      ESOPHAGOGASTRODUODENOSCOPY N/A 04/15/2022    Procedure: EGD (ESOPHAGOGASTRODUODENOSCOPY);  Surgeon: Siddharth Garcia MD;  Location: [facility];  Service: Endoscopy;  Laterality: N/A;    ESOPHAGOGASTRODUODENOSCOPY N/A 04/16/2022    Procedure: EGD (ESOPHAGOGASTRODUODENOSCOPY);  Surgeon: Siddharth Garcia MD;  Location: [facility];  Service: Endoscopy;  Laterality: N/A;    ESOPHAGOGASTRODUODENOSCOPY N/A 06/23/2022    Procedure: EGD (ESOPHAGOGASTRODUODENOSCOPY);  Surgeon: Jefferson Hyman MD;  Location: [facility];  Service: Endoscopy;  Laterality: N/A;    ESOPHAGOGASTRODUODENOSCOPY N/A 7/6/2023    Procedure: EGD (ESOPHAGOGASTRODUODENOSCOPY);  Surgeon: Jefferson Hyman MD;  Location: [facility];  Service: Endoscopy;  Laterality: N/A;    ESOPHAGOGASTRODUODENOSCOPY W/ PEG N/A 04/16/2022    Procedure: EGD, WITH PEG TUBE INSERTION;  Surgeon: Siddharth Garcia MD;  Location: [facility] ENDO;  Service: Endoscopy;  Laterality: N/A;    GASTROSTOMY TUBE PLACEMENT  04/16/2022    20 Fr (bumper initially at 3.5)    Hemorrhoid resection  1979    LUNG LOBECTOMY Left 2004    NECK SURGERY  04/08/2022    Anterior and Posterior C3-C7 fusion    OTHER SURGICAL HISTORY  2006    Chemotherapy s/p lyphoma        Portacath implantation and removal      reverse shoulder arthroplasty Right 03/2021          Allergies:  Review of patient's allergies indicates:  No Known Allergies    Social/Family History:  Social History     Socioeconomic History    Marital status:    Tobacco Use    Smoking status: Former    Smokeless tobacco: Never   Substance and Sexual Activity    Alcohol use: Yes    Drug use: No    Sexual activity: Not Currently     Social Determinants of Health     Financial Resource Strain: Low Risk  (4/18/2023)    Overall Financial Resource Strain (CARDIA)     Difficulty of Paying Living Expenses: Not very hard   Food Insecurity: No Food Insecurity (4/18/2023)    Hunger Vital Sign     Worried About Running Out of Food in the Last Year: Never true     Ran Out of Food in the Last Year: Never true   Transportation Needs: No Transportation Needs (4/18/2023)    PRAPARE - Transportation     Lack of Transportation (Medical): No     Lack of Transportation (Non-Medical): No   Physical Activity: Insufficiently Active (4/18/2023)    Exercise Vital Sign     Days of Exercise per Week: 3 days     Minutes of Exercise per Session: 30 min   Stress: Stress Concern Present (4/18/2023)    [employer] - Occupational Stress Questionnaire     Feeling of Stress : To some extent   Social Connections: Moderately Isolated (4/18/2023)    Social Connection and Isolation Panel [NHANES]     Frequency of Communication with Friends and Family: More than three times a week     Frequency of Social Gatherings with Friends and Family: More than three times a week     Attends Congregational Services: Never     Active Member of Clubs or Organizations: No     Attends Club or Organization Meetings: Never     Marital Status:    Housing Stability: Low Risk  (4/18/2023)    Housing Stability Vital Sign     Unable to Pay for Housing in the Last Year: No     Number of Places Lived in the Last Year: 1     Unstable Housing in the Last Year: No     Family History   Problem Relation Age of Onset    Stomach cancer  "Mother 80         at 95    Colon cancer Father          ROS:    GEN: : fever; general malaise, fatigue and weakness   HEENT: normal with no HA's, sore throat, stiff neck, changes in vision  CV: normal with no CP, SOB, PND, MAYER or orthopnea  PULM: normal with no SOB, cough, hemoptysis, sputum or pleuritic pain  GI: normal with no abdominal pain, nausea, vomiting, constipation, diarrhea, melanotic stools, BRBPR, or hematemesis  : normal with no hematuria, dysuria  BREAST: normal with no mass, discharge, pain  SKIN: normal with no rash, erythema, bruising, or swelling        Medications:  Continuous Infusions:   sodium chloride 0.9% 75 mL/hr at 23 1231     Scheduled Meds:   acetaminophen  325 mg Rectal ED 1 Time    atorvastatin  80 mg Oral Daily    ceFEPime (MAXIPIME) IVPB  2 g Intravenous Q12H    famotidine  20 mg Oral Daily    ferrous sulfate  1 tablet Oral Daily    gabapentin  600 mg Oral BID    losartan  50 mg Oral Daily    metoprolol tartrate  25 mg Oral BID    mupirocin   Nasal BID    tamsulosin  0.4 mg Oral Daily    traZODone  300 mg Oral QHS    valACYclovir  1,000 mg Oral Daily    [START ON 2023] vancomycin (VANCOCIN) IV (PEDS and ADULTS)  1,250 mg Intravenous Q24H     PRN Meds:0.9%  NaCl infusion (for blood administration), acetaminophen, albuterol-ipratropium, dextrose 50%, dextrose 50%, glucagon (human recombinant), glucose, glucose, HYDROcodone-acetaminophen, insulin aspart U-100, magnesium oxide, magnesium oxide, melatonin, ondansetron, potassium bicarbonate, potassium bicarbonate, potassium bicarbonate, potassium, sodium phosphates, potassium, sodium phosphates, potassium, sodium phosphates, sodium chloride 0.9%, Pharmacy to dose Vancomycin consult **AND** vancomycin - pharmacy to dose         Objective:       Physical Exam:    Vitals:  Blood pressure (!) 114/53, pulse 72, temperature 98.3 °F (36.8 °C), temperature source Oral, resp. rate (!) 25, height 6' 2" (1.88 m), weight 92 kg (202 " lb 14.4 oz), SpO2 97 %.    GEN: no apparent distress, comfortable; AAOx3; elderly/deconditioned looking  HEAD: atraumatic and normocephalic  EYES: no pallor, no icterus, PERRLA  ENT: OMM, no pharyngeal erythema, external ears WNL; no nasal discharge; no thrush  NECK: no masses, thyroid normal, trachea midline, no LAD/LN's, supple  CV: RRR with no murmur; normal pulse; normal S1 and S2; no pedal edema  CHEST: Normal respiratory effort; CTAB; normal breath sounds; no wheeze or crackles; O2 per NC  ABDOM: nontender and nondistended; soft; normal bowel sounds; no rebound/guarding  MUSC/Skeletal: ROM normal; no crepitus; joints normal; no deformities or arthropathy  EXTREM: no clubbing, cyanosis, inflammation or swelling; IV RUE  SKIN: no rashes, lesions, ulcers, petechiae or subcutaneous nodules  : no keith  NEURO: grossly intact; motor/sensory WNL; AAOx3; no tremors  PSYCH: normal mood, affect and behavior  LYMPH: normal cervical, supraclavicular, axillary and groin LN's      Lab Review:   Lab Results   Component Value Date    WBC 1.65 (LL) 11/20/2023    HGB 6.4 (LL) 11/20/2023    HCT 18.4 (LL) 11/20/2023    MCV 97 11/20/2023    PLT 19 (LL) 11/20/2023     CMP  Sodium   Date Value Ref Range Status   11/20/2023 137 136 - 145 mmol/L Final   06/12/2019 138 134 - 144 mmol/L      Potassium   Date Value Ref Range Status   11/20/2023 4.7 3.5 - 5.1 mmol/L Final     Chloride   Date Value Ref Range Status   11/20/2023 104 95 - 110 mmol/L Final   06/12/2019 99 98 - 110 mmol/L      CO2   Date Value Ref Range Status   11/20/2023 25 23 - 29 mmol/L Final     Glucose   Date Value Ref Range Status   11/20/2023 208 (H) 70 - 110 mg/dL Final   06/12/2019 179 (H) 70 - 99 mg/dL      BUN   Date Value Ref Range Status   11/20/2023 38 (H) 8 - 23 mg/dL Final     Creatinine   Date Value Ref Range Status   11/20/2023 1.3 0.5 - 1.4 mg/dL Final   06/12/2019 0.95 0.60 - 1.40 mg/dL      Calcium   Date Value Ref Range Status   11/20/2023 8.3 (L) 8.7  - 10.5 mg/dL Final     Total Protein   Date Value Ref Range Status   11/20/2023 5.3 (L) 6.0 - 8.4 g/dL Final     Albumin   Date Value Ref Range Status   11/20/2023 3.4 (L) 3.5 - 5.2 g/dL Final   06/12/2019 3.9 3.1 - 4.7 g/dL      Total Bilirubin   Date Value Ref Range Status   11/20/2023 0.5 0.1 - 1.0 mg/dL Final     Comment:     For infants and newborns, interpretation of results should be based  on gestational age, weight and in agreement with clinical  observations.    Premature Infant recommended reference ranges:  Up to 24 hours.............<8.0 mg/dL  Up to 48 hours............<12.0 mg/dL  3-5 days..................<15.0 mg/dL  6-29 days.................<15.0 mg/dL       Alkaline Phosphatase   Date Value Ref Range Status   11/20/2023 32 (L) 55 - 135 U/L Final     AST   Date Value Ref Range Status   11/20/2023 19 10 - 40 U/L Final     ALT   Date Value Ref Range Status   11/20/2023 17 10 - 44 U/L Final     Anion Gap   Date Value Ref Range Status   11/20/2023 8 8 - 16 mmol/L Final     eGFR   Date Value Ref Range Status   11/20/2023 54.2 (A) >60 mL/min/1.73 m^2 Final           Diagnostic Results:  Procedure Component Value Units Date/Time   X-Ray Chest AP Portable [[id number]] Collected: 11/20/23 0201   Order Status: Completed Updated: 11/20/23 0734   Narrative:     Chest single view    CLINICAL DATA: Shortness of breath    FINDINGS: 2 AP views are compared to November 9. Heart size is normal. The mediastinum is unremarkable. Mild bilateral interstitial prominence is unchanged and likely chronic, with mild chronic blunting of the lateral costophrenic angle on the left. No active infiltrate or significant effusion is identified.    Healed fracture deformity of the left seventh rib is noted.    IMPRESSION:  1. Chronic findings as above. No acute radiographic abnormalities.       Assessment/Plan:     IMPRESSION:  (1) 83 y.o. male known to my oncology service with diagnosis of CLL/NHL for which he has been followed  and treated in conjunction with Dr Monik Don at [facility] in [place]. He was recently hospitalized and discharged on 11/13. He saw Dr Don last week in clinic at 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 He presented to ED at [facility] with SOB and febrile temp. He has been admitted to the hospitalist service and is currently in the ICU.     11/20/2023:  - wbc 1.65, hgb 6.4 and plats 19,000  - Unit of blood currently being transfused,   - Wife is at bedside. Discussed the recent diagnosis of AML and the planned f/u with Dr Don again next Monday on 11/27.  -  Discussed with ICU nursing staff.     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  - followed  By Dr Kee  - CEA 0.8     (3) Iron deficiency/chronic anemia    - s/p periodic IV iron therapies  - hx/of GIB in past with gastritis due to NSAID use         (4) HTN and Hypercholesterolemia       (5) Chronic neck and back issues - followed by Dr Ryan Rivero with pain management in past     (6) NIDDM         (7) COPD     (8) MAYDA     (9) Hx/of alcoholism          Active Diagnoses:    Diagnosis Date Noted POA    PRINCIPAL PROBLEM:  Neutropenic fever [D70.9, R50.81] 06/30/2021 Yes    Shortness of breath [R06.02] 11/20/2023 Yes    Thrombocytopenia [D69.6] 04/10/2022 Yes    Troponin level elevated [R79.89] 04/10/2022 Yes    Herpes stomatitis [B00.2]  Yes    CLL (chronic lymphocytic leukemia) [C91.10] 01/02/2019 Yes      Problems Resolved During this Admission:           PLAN:   Monitor labs daily and transfuse as needed - recommend a unit of blood and a single donor platelet today  IVF's and antibiotics as per primary team   He has another f/u with Dr Don at [facility] planned for 11/27  Recommend consideration for ID and pulmonary consults if need be  Will follow with you           Thank you for your consult.      Drew Bai MD  Hematology/Oncology  [facility]

## 2023-11-20 NOTE — ASSESSMENT & PLAN NOTE
Likely fever induced, oxygen saturations stable on room air.  Patient feels tachypneic  Continue with BiPAP, wean as tolerated  NPO while on BiPAP

## 2023-11-20 NOTE — SUBJECTIVE & OBJECTIVE
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    Lymphoma, 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    MAYDA on CPAP     Pneumonia of both lungs due to infectious organism 06/29/2021    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     Right-sided Bell's palsy 2009    Spondylolisthesis     Varicose veins of legs     Venous insufficiency        Past Surgical History:   Procedure Laterality Date    Athroscopic surgery      On Knee    BIOPSY OF TISSUE OF NECK Left 08/2015    Recuurence CLL/small cell lyphoma    CERVICAL FUSION  2022    ESOPHAGEAL DILATION      ESOPHAGOGASTRODUODENOSCOPY N/A 04/15/2022    Procedure: EGD (ESOPHAGOGASTRODUODENOSCOPY);  Surgeon: Siddharth Garcia MD;  Location: [facility];  Service: Endoscopy;  Laterality: N/A;    ESOPHAGOGASTRODUODENOSCOPY N/A 04/16/2022    Procedure: EGD (ESOPHAGOGASTRODUODENOSCOPY);  Surgeon: Siddharth Garcia MD;  Location: [facility];  Service: Endoscopy;  Laterality: N/A;    ESOPHAGOGASTRODUODENOSCOPY N/A 06/23/2022    Procedure: EGD (ESOPHAGOGASTRODUODENOSCOPY);  Surgeon: Jefferson Hyman MD;  Location: [facility];  Service: Endoscopy;  Laterality: N/A;    ESOPHAGOGASTRODUODENOSCOPY N/A 7/6/2023    Procedure: EGD (ESOPHAGOGASTRODUODENOSCOPY);  Surgeon: Jefferson Hyman MD;  Location: [facility] ENDO;  Service: Endoscopy;  Laterality: N/A;    ESOPHAGOGASTRODUODENOSCOPY W/ PEG N/A 04/16/2022    Procedure: EGD, WITH PEG TUBE INSERTION;  Surgeon: Siddharth Garcia MD;  Location: [facility];  Service: Endoscopy;   Laterality: N/A;    GASTROSTOMY TUBE PLACEMENT  04/16/2022    20 Fr (bumper initially at 3.5)    Hemorrhoid resection  1979    LUNG LOBECTOMY Left 2004    NECK SURGERY  04/08/2022    Anterior and Posterior C3-C7 fusion    OTHER SURGICAL HISTORY  2006    Chemotherapy s/p lyphoma        Portacath implantation and removal      reverse shoulder arthroplasty Right 03/2021       Review of patient's allergies indicates:  No Known Allergies    No current facility-administered medications on file prior to encounter.     Current Outpatient Medications on File Prior to Encounter   Medication Sig    atorvastatin (LIPITOR) 20 MG tablet Take 20 mg by mouth once daily.    azelastine (ASTELIN) 137 mcg (0.1 %) nasal spray 1 spray by Nasal route 2 (two) times daily.     blood sugar diagnostic Strp 1 strip by Misc.(Non-Drug; Combo Route) route 2 (two) times a day.    blood-glucose meter kit Use as instructed    duke's soln (benadryl 30 mL, mylanta 30 mL, LIDOcaine 30 mL, nystatin 30 mL) 120mL Take 10 mLs by mouth 4 (four) times daily.    ferrous sulfate (FEOSOL) 325 mg (65 mg iron) Tab tablet Take 325 mg by mouth daily with breakfast.    fluticasone propionate (FLONASE) 50 mcg/actuation nasal spray 1 spray by Each Nostril route once daily.    gabapentin (NEURONTIN) 600 MG tablet Take 600 mg by mouth 2 (two) times daily.    glimepiride (AMARYL) 2 MG tablet Take 2 mg by mouth once daily at 6am.    HYDROcodone-acetaminophen (NORCO)  mg per tablet Take 1 tablet by mouth every 8 (eight) hours as needed for Pain.    loperamide (IMODIUM) 2 mg capsule Take 1 capsule (2 mg total) by mouth 4 (four) times daily as needed for Diarrhea.    losartan (COZAAR) 50 MG tablet Take 1 tablet (50 mg total) by mouth once daily.    ondansetron (ZOFRAN) 8 MG tablet Take 1 tablet (8 mg total) by mouth every 8 (eight) hours as needed for Nausea.    promethazine (PHENERGAN) 12.5 MG Tab TAKE 1 TABLET(12.5 MG) BY MOUTH EVERY 6 HOURS AS NEEDED (Patient taking  differently: Take 12.5 mg by mouth every 6 (six) hours as needed (nausea). TAKE 1 TABLET(12.5 MG) BY MOUTH EVERY 6 HOURS AS NEEDED)    tamsulosin (FLOMAX) 0.4 mg Cap Take 1 capsule (0.4 mg total) by mouth once daily. (Patient taking differently: Take 0.4 mg by mouth every evening.)    traZODone (DESYREL) 300 MG tablet Take 300 mg by mouth every evening.    valACYclovir (VALTREX) 500 MG tablet Take 1,000 mg by mouth once daily.    venetoclax (VENCLEXTA) 100 mg Tab Take 2 tablets (200 mg) by mouth once daily.    [DISCONTINUED] esomeprazole (NEXIUM) 40 MG capsule Take 1 capsule (40 mg total) by mouth 2 (two) times daily.    [DISCONTINUED] metoprolol succinate (TOPROL-XL) 25 MG 24 hr tablet Take 25 mg by mouth 2 (two) times daily.      Family History       Problem Relation (Age of Onset)    Colon cancer Father    Stomach cancer Mother (80)          Tobacco Use    Smoking status: Former    Smokeless tobacco: Never   Substance and Sexual Activity    Alcohol use: Yes    Drug use: No    Sexual activity: Not Currently     Review of Systems   Constitutional:  Positive for chills, fatigue and fever.   HENT:  Negative for dental problem and ear pain.    Respiratory:  Positive for shortness of breath. Negative for cough and chest tightness.    Cardiovascular:  Negative for chest pain and palpitations.   Gastrointestinal:  Negative for abdominal distention, abdominal pain and diarrhea.   Genitourinary:  Negative for difficulty urinating and dysuria.   Musculoskeletal:  Negative for back pain.   Neurological:  Negative for dizziness and headaches.   Psychiatric/Behavioral:  Negative for agitation and behavioral problems.      Objective:     Vital Signs (Most Recent):  Temp: (!) 103.5 °F (39.7 °C) (11/20/23 0158)  Pulse: (!) 117 (11/20/23 0245)  Resp: (!) 28 (11/20/23 0245)  BP: 132/62 (11/20/23 0158)  SpO2: 96 % (11/20/23 0245) Vital Signs (24h Range):  Temp:  [103.5 °F (39.7 °C)] 103.5 °F (39.7 °C)  Pulse:  [117-134] 117  Resp:   "[28-32] 28  SpO2:  [96 %-100 %] 96 %  BP: (132)/(62) 132/62     Weight: 86.2 kg (190 lb)  Body mass index is 24.39 kg/m².     Physical Exam  Constitutional:       General: He is in acute distress.   HENT:      Head: Normocephalic and atraumatic.      Right Ear: External ear normal.      Left Ear: External ear normal.      Mouth/Throat:      Mouth: Mucous membranes are dry.   Cardiovascular:      Rate and Rhythm: Regular rhythm. Tachycardia present.   Pulmonary:      Effort: Respiratory distress present.      Breath sounds: No wheezing.   Abdominal:      General: Abdomen is flat.      Palpations: Abdomen is soft.   Musculoskeletal:         General: Normal range of motion.   Skin:     General: Skin is warm and dry.   Neurological:      General: No focal deficit present.      Mental Status: He is alert and oriented to person, place, and time.                Significant Labs: All pertinent labs within the past 24 hours have been reviewed.  CBC: No results for input(s): "WBC", "HGB", "HCT", "PLT" in the last 48 hours.  CMP:   Recent Labs   Lab 11/20/23  0203      K 4.7      CO2 25   *   BUN 38*   CREATININE 1.3   CALCIUM 8.3*   PROT 5.3*   ALBUMIN 3.4*   BILITOT 0.5   ALKPHOS 32*   AST 19   ALT 17   ANIONGAP 8       Significant Imaging: I have reviewed all pertinent imaging results/findings within the past 24 hours.  "

## 2023-11-20 NOTE — ASSESSMENT & PLAN NOTE
Likely fever induced, oxygen saturations stable on room air.  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 use disorder, severe  F11.20 Opioid use disorder, severe      # in attendance of this Day Program: 9    Recovery Preparedness/ Recovery Prevention-Part I  Start Time: 1300    End Time: 1335    Method: Group  Attendance: Present  Participation: Moderate  Response/Communication: Passive/quiet  Mood/Affect: Appropriate  Behavior Socialization: Appropriate to group    Recovery Preparedness /Recovery Prevention-Part II  Start Time: 1535    End Time: 1600    Method: Group and Audio/Visual  Attendance: Present  Participation: Active  Response/Communication: Appropriate Topic  Mood/Affect: Appropriate  Behavior Socialization: Appropriate to group    Recovery Skills/ Skills Training   Discussed emotions and how they effect recovery. Discussed how anger and resentment have the ability to cause a relapse. Discussed irrational thoughts and how they influence negative emotions.   Start Time: 1335    End Time: 1435   Topic:  emotional recovery  Method: Group  Attendance: Present  Participation: Active  Response/Communication: Appropriate Topic  Mood/Affect: Appropriate  Behavior Socialization: Appropriate to group  Plan: Improve readiness    Process Group Notes   Start Time: 1435  End Time: 1535  Pt talked about how she is interested in \"cutting down\" her marijuana consumption, but she is not interested in quitting 100%. Pt talked about how she is interested in smoking 2 days a week instead of every day. Pt was asked to configure as to how she plans to cut down on her weed intake. Patient reported their last use was on 12.12.19 of percocet. Patient reported sleeping 4 hours last night. Patient reported difficulty falling asleep and staying asleep, and denied using dreams. Patient rated appetite as poor and ate 0 meals within the last 24 hours. Patient rated depression at a 4/10 (10 being the worst) today.  Patient **reported* anxiety. Patient rated cravings at a 1/10 (10 being the worst) today. Patient attended 0 recovery meetings within the last week. Patient denied any SI, HI, AVH. The patient identified a recovery goal for the week. The goal is \"attend 2 more meetings by Saturday.\"       Safety Concerns:   None    Drug of Choice: Opioids  Assessment of Stages of Change:Contemplation     Use / Relapse of Street Drugs or Alcohol:  No    Taking Medications as Prescribed:  Yes  Comments: Met with Pt. In one-on-one meeting to review initial ITP. Pt and therapist agreed and signed BH Treatment Plan Acknowledgement form.         Treatment Plan: Continue current Treatment Plan    Initial Note to Include  Post Discharge Plan: OP at [facility]  Anticipated Discharge Date: 2.19.19    Callie Sinha LPC, [facility]    1/24/2019

## 2023-11-20 NOTE — CARE UPDATE
11/20/23 0245   Patient Assessment/Suction   Level of Consciousness (AVPU) alert   Respiratory Effort Moderate;Short of breath   Expansion/Accessory Muscles/Retractions accessory muscle use   All Lung Fields Breath Sounds equal bilaterally;crackles, fine   Rhythm/Pattern, Respiratory assisted mechanically   Cough Frequency no cough   PRE-TX-O2   Device (Oxygen Therapy) BIPAP   Oxygen Concentration (%) 30   SpO2 96 %   Pulse Oximetry Type Continuous   $ Pulse Oximetry - Multiple Charge Pulse Oximetry - Multiple   Pulse (!) 117   Resp (!) 28   Aerosol Therapy   $ Aerosol Therapy Charges Aerosol Treatment   Daily Review of Necessity (SVN) completed   Respiratory Treatment Status (SVN) given   Treatment Route (SVN) in-line;mask   Patient Position (SVN) HOB elevated   Post Treatment Assessment (SVN) breath sounds unchanged   Signs of Intolerance (SVN) none   Breath Sounds Post-Respiratory Treatment   Throughout All Fields Post-Treatment All Fields   Throughout All Fields Post-Treatment no change   Post-treatment Heart Rate (beats/min) 118   Post-treatment Resp Rate (breaths/min) 28   Ready to Wean/Extubation Screen   FIO2<=50 (chart decimal) 0.3

## 2023-11-20 NOTE — ASSESSMENT & PLAN NOTE
Likely demand, will trend troponin.  Patient without chest pain.  EKG without ST elevations.  Obtain transthoracic echocardiogram.

## 2023-11-20 NOTE — PLAN OF CARE
Problem: Anemia  Goal: Anemia Symptom Improvement  Outcome: Ongoing, Progressing     Problem: Cardiac Output Decreased  Goal: Effective Cardiac Output  Outcome: Ongoing, Progressing     Problem: Adjustment to Illness (Acute Coronary Syndrome)  Goal: Optimal Adaptation to Illness  Outcome: Ongoing, Progressing     Problem: Dysrhythmia (Acute Coronary Syndrome)  Goal: Normalized Cardiac Rhythm  Outcome: Ongoing, Progressing   Pt not able to eat very much. Only able to get a few bites of food in. Wife at bedside for supper. She is helping him eat/drink his pudding and ensure chocolate drink. Too weak to get out of bed. Changed external purick after shaving.

## 2023-11-20 NOTE — ASSESSMENT & PLAN NOTE
Patient was found to have thrombocytopenia, the likely etiology is secondary to medications- chemotherapy , will monitor the platelets Daily. Will transfuse if platelet count is <10k. Hold DVT prophylaxis if platelets are <50k. The patient's platelet results have been reviewed and are listed below.  Recent Labs   Lab 11/20/23  0203   PLT 19*

## 2023-11-20 NOTE — PROGRESS NOTES
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  Wife at bedside able to supplement history.  Wife states today patient felt feverish and fatigue.  Wife states temperature was measured at home and it was around 103.  Patient continued to feel short of breath, was placed on CPAP and brought to the emergency room.  Patient was recently hospitalized at [facility] 2 weeks ago for similar symptoms, blood and urine cultures at that time were negative.  Patient follows with Dr. Bai outpatient.  Wife states that patient has not been taking any chemotherapy regimen given pancytopenia for the last month.  States that they have follow up with Dr. Don on the 27th to starting a new regimen.  Temperature in emergency room was 103.5, patient tachycardic.  CBC pending.  Troponin elevated 60.2, lactate 2.3, procalcitonin 0.611.  Patient was placed on BiPAP in the emergency room given tachypnea and shortness of breath, ABG showed pH 7.436, CO2 34.6, O2 145.  Wife states that patient was found to have saturation in the 80s when EMS arrived.  Patient given DuoNebs , IV fluids, and started on empiric IV antibiotics in the emergency room.    Overview/Hospital Course:  No notes on file    Interval History:     Review of Systems   Constitutional:  Positive for chills, fatigue and fever.   HENT:  Negative for dental problem and ear pain.    Respiratory:  Positive for shortness of breath. Negative for cough and chest tightness.    Cardiovascular:  Negative  for chest pain and palpitations.   Gastrointestinal:  Negative for abdominal distention, abdominal pain and diarrhea.   Genitourinary:  Negative for difficulty urinating and dysuria.   Musculoskeletal:  Negative for back pain.   Neurological:  Negative for dizziness and headaches.   Psychiatric/Behavioral:  Negative for agitation and behavioral problems.      Objective:     Vital Signs (Most Recent):  Temp: 99.6 °F (37.6 °C) (11/20/23 0500)  Pulse: 76 (11/20/23 0805)  Resp: (!) 25 (11/20/23 0805)  BP: 134/62 (11/20/23 0500)  SpO2: 95 % (11/20/23 0805) Vital Signs (24h Range):  Temp:  [99.6 °F (37.6 °C)-103.5 °F (39.7 °C)] 99.6 °F (37.6 °C)  Pulse:  [] 76  Resp:  [21-34] 25  SpO2:  [89 %-100 %] 95 %  BP: ()/(50-71) 134/62     Weight: 92 kg (202 lb 14.4 oz)  Body mass index is 26.05 kg/m².    Intake/Output Summary (Last 24 hours) at 11/20/2023 0901  Last data filed at 11/20/2023 0531  Gross per 24 hour   Intake 3086.1 ml   Output 200 ml   Net 2886.1 ml         Physical Exam  Constitutional:       General: He is in acute distress.   HENT:      Head: Normocephalic and atraumatic.      Right Ear: External ear normal.      Left Ear: External ear normal.      Mouth/Throat:      Mouth: Mucous membranes are dry.   Cardiovascular:      Rate and Rhythm: Regular rhythm. Tachycardia present.   Pulmonary:      Effort: Respiratory distress present.      Breath sounds: No wheezing.   Abdominal:      General: Abdomen is flat.      Palpations: Abdomen is soft.   Musculoskeletal:         General: Normal range of motion.   Skin:     General: Skin is warm and dry.   Neurological:      General: No focal deficit present.      Mental Status: He is alert and oriented to person, place, and time.             Significant Labs: All pertinent labs within the past 24 hours have been reviewed.  CBC:   Recent Labs   Lab 11/20/23 0203   WBC 1.65*   HGB 6.4*   HCT 18.4*   PLT 19*     CMP:   Recent Labs   Lab 11/20/23 0203      K  "4.7      CO2 25   *   BUN 38*   CREATININE 1.3   CALCIUM 8.3*   PROT 5.3*   ALBUMIN 3.4*   BILITOT 0.5   ALKPHOS 32*   AST 19   ALT 17   ANIONGAP 8     Lactic Acid:   Recent Labs   Lab 11/20/23  0203 11/20/23  0618   LACTATE 2.3* 1.2     Troponin:   Recent Labs   Lab 11/20/23  0203   TROPONINIHS 60.2*     TSH:   Recent Labs   Lab 10/17/23  0947   TSH 0.741     Urine Culture: No results for input(s): "LABURIN" in the last 48 hours.  Urine Studies:   Recent Labs   Lab 11/20/23  0355   COLORU Yellow   APPEARANCEUA Clear   PHUR 5.0   SPECGRAV 1.020   PROTEINUA Negative   GLUCUA Negative   KETONESU Trace*   BILIRUBINUA Negative   OCCULTUA Negative   NITRITE Negative   UROBILINOGEN Negative   LEUKOCYTESUR Negative     Microbiology Results (last 7 days)       Procedure Component Value Units Date/Time    Respiratory Infection Panel (PCR), Nasopharyngeal [[id number]] Collected: 11/20/23 0208    Order Status: Completed Specimen: Nasopharyngeal Swab Updated: 11/20/23 0302     Respiratory Infection Panel Source NP swab     Adenovirus Not Detected     Coronavirus 229E, Common Cold Virus Not Detected     Coronavirus HKU1, Common Cold Virus Not Detected     Coronavirus NL63, Common Cold Virus Not Detected     Coronavirus OC43, Common Cold Virus Not Detected     Comment: Coronavirus strains 229E, HKU1, NL63, and OC43 can cause the common   cold   and are not associated with the respiratory disease outbreak caused   by  the COVID-19 (SARS-CoV-2 novel Coronavirus) strain.           SARS-CoV2 (COVID-19) Qualitative PCR Not Detected     Human Metapneumovirus Not Detected     Human Rhinovirus/Enterovirus Not Detected     Influenza A (subtypes H1, H1-2009,H3) Not Detected     Influenza B Not Detected     Parainfluenza Virus 1 Not Detected     Parainfluenza Virus 2 Not Detected     Parainfluenza Virus 3 Not Detected     Parainfluenza Virus 4 Not Detected     Respiratory Syncytial Virus Not Detected     Bordetella " Parapertussis (FN1958) Not Detected     Bordetella pertussis (ptxP) Not Detected     Chlamydia pneumoniae Not Detected     Mycoplasma pneumoniae Not Detected     Comment: Respiratory Infection Panel testing performed by Multiplex PCR.       Narrative:      Respiratory Infection Panel source->NP Swab    Blood culture x two cultures. Draw prior to antibiotics. [[id number]] Collected: 11/20/23 0233    Order Status: Sent Specimen: Blood from Antecubital, Right Hand Updated: 11/20/23 0240    Blood culture x two cultures. Draw prior to antibiotics. [[id number]] Collected: 11/20/23 0233    Order Status: Sent Specimen: Blood from Antecubital, Right Arm Updated: 11/20/23 0240          Significant Imaging:   CXR: Chronic findings as above. No acute radiographic abnormalities.     Assessment/Plan:      * Neutropenic fever  Temperature in the emergency room on 103.5 patient given Tylenol.  Continue to monitor vitals   Patient reports being off chemotherapy for the last month given pancytopenia  CBC pending, last white blood count 3 days ago was 3.5.  Procalcitonin mildly elevated 0.611.  Reviewed chest x-ray, no obvious consolidation noted  We will start patient on IV antibiotics empirically, deescalate pending clinical course   Continue with IV fluids  Follow up blood cultures and urine cultures   Respiratory infectious panel negative  Repeat lactate        Shortness of breath  Likely fever induced, oxygen saturations stable on room air.  Patient feels tachypneic  Continue with BiPAP, wean as tolerated  NPO while on BiPAP    Troponin level elevated  Likely demand, will trend troponin.  Patient without chest pain.  EKG without ST elevations      Thrombocytopenia  Patient was found to have thrombocytopenia, the likely etiology is secondary to medications- chemotherapy , will monitor the platelets Daily. Will transfuse if platelet count is <10k. Hold DVT prophylaxis if platelets are <50k. The patient's platelet results have been  reviewed and are listed below.  Recent Labs   Lab 11/20/23  0203   PLT 19*         Herpes stomatitis  Continue with outpatient valacyclovir    CLL (chronic lymphocytic leukemia)  Follows up with Dr. Bai and Dr. Don outpatient  Not on therapy currently   New regimen started on 10/27/23.        VTE Risk Mitigation (From admission, onward)           Ordered     IP VTE HIGH RISK PATIENT  Once         11/20/23 0318     Place sequential compression device  Until discontinued         11/20/23 0318                    Discharge Planning   MARTELL:      Code Status: Full Code   Is the patient medically ready for discharge?:     Reason for patient still in hospital (select all that apply): {HMREASONPATIENTINHOSP:93011}  Discharge Plan A: Home Health            Critical care time spent on the evaluation and treatment of severe organ dysfunction, review of pertinent labs and imaging studies, discussions with consulting providers and discussions with patient/family: *** minutes.      Cal Bragg MD  Department of Hospital Medicine   [facility]

## 2023-11-20 NOTE — ASSESSMENT & PLAN NOTE
Temperature in the emergency room on 103.5 patient given Tylenol.  Continue to monitor vitals   Patient reports being off chemotherapy for the last month given pancytopenia  CBC pending, last white blood count 3 days ago was 3.5.  Procalcitonin mildly elevated 0.611.  Reviewed chest x-ray, no obvious consolidation noted  We will start patient on IV antibiotics empirically, deescalate pending clinical course   Continue with IV fluids  Follow up blood cultures and urine cultures   Respiratory infectious panel negative  Repeat lactate

## 2023-11-21 PROBLEM — Z71.89 GOALS OF CARE, COUNSELING/DISCUSSION: Status: ACTIVE | Noted: 2023-01-01

## 2023-11-21 PROBLEM — D64.9 SYMPTOMATIC ANEMIA: Status: ACTIVE | Noted: 2023-01-01

## 2023-11-21 NOTE — TELEPHONE ENCOUNTER
Per Dr. Bai's verbal orders I spoke to Vanesa, nurse caring for patient in ICU, to inquire as to if patient received a unit plt yesterday and that he would like patient to get 1 unit of blood today. Per Vanesa, patient did receive 1 unit of plt yesterday and hospitalist plans to order 2 unit of blood today. Dr. Bai made aware per his verbal orders I informed that he is in agreement with 2 units of blood today and would like patient to get 1 unit of plt today also. Verbalized understanding and states she will make hospitalist aware.

## 2023-11-21 NOTE — ASSESSMENT & PLAN NOTE
I reviewed his chart and discussed his case with his team.      I examined Mr. Kuhn at bedside.  He maintains capacity for complex medical decision-making.  His daughter joined us at bedside as well.      I introduced myself and my role as palliative care physician.  He was agreeable to speaking.    Frankly, he was not interested in a prolonged discussion.  He simply wanted to speak about hospice and when that type of care might be appropriate.    Nonetheless, I did take a moment to assess his understanding of his current condition.  He has an excellent in very accurate understanding.  He understands he is admitted and being treated for anemia and thrombocytopenia, and he understands that he was having a heart attack.  I affirmed his understanding.  He also understands that he is underlying leukemia.  He discussed the plans for treatment early next week.  Again, simply affirmed his understanding.    We discussed prognosis.  He understands that he was at high risk of dying.  He understands treatment for leukemia could result in his death.    He wishes to do everything he can to live.  He is hopeful that he will stop bleeding.  He is hopeful that his blood counts will stabilize.  He is hopeful that he will get to spend some time at home prior to his treatment for leukemia.      We did discuss hospice.  I discussed the philosophy of hospice in detail.  He was specifically asked to [facility] is hospice appropriate.  I let him know that hospice is appropriate when he no longer desires hospitalization and treatment for his underlying medical issues.  I also let him know that hospice is also appropriate when all other os options have been exhausted.  He asked how to get hospice involved.  I let him know we simply have to send a referral in hospice congenitally be set up with an a day.  He was very appreciative of this conversation.      Given his critically ill condition I did take a moment to address his code status.  He  desires a DNR code status.  He asked that I place this order.  I have done this.      He has my card.  He knows that he in his family can contact me at any time.  I will be happy to walk along with them to get him the care that they need.    I appreciate being involved.  I hope I have been helpful.

## 2023-11-21 NOTE — PROGRESS NOTES
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  Wife at bedside able to supplement history.  Wife states today patient felt feverish and fatigue.  Wife states temperature was measured at home and it was around 103.  Patient continued to feel short of breath, was placed on CPAP and brought to the emergency room.  Patient was recently hospitalized at [facility] 2 weeks ago for similar symptoms, blood and urine cultures at that time were negative.  Patient follows with Dr. Bai outpatient.  Wife states that patient has not been taking any chemotherapy regimen given pancytopenia for the last month.  States that they have follow up with Dr. Don on the 27th to starting a new regimen.  Temperature in emergency room was 103.5, patient tachycardic.  CBC pending.  Troponin elevated 60.2, lactate 2.3, procalcitonin 0.611.  Patient was placed on BiPAP in the emergency room given tachypnea and shortness of breath, ABG showed pH 7.436, CO2 34.6, O2 145.  Wife states that patient was found to have saturation in the 80s when EMS arrived.  Patient given DuoNebs , IV fluids, and started on empiric IV antibiotics in the emergency room.    Overview/Hospital Course:  Patient was admitted to intensive care unit patient was placed on neutropenic precautions while patient's cell counts were closely monitored.  Patient required packed red blood cell transfusion for symptomatic anemia.  No bleeding complications noted.  Patient was placed on prophylactic  antibiotic therapy including vancomycin and cefepime.  Patient's oncologist Dr. Bai was consulted.  Microbiology results were closely followed.  Troponin levels were trended which suggested ongoing non ST elevation MI. due to low platelet count patient was unable to receive antiplatelet therapy and anticoagulation therapy.  Cardiologist was consulted.  Patient underwent transthoracic echocardiogram.  Lactic acid was trended.  Acute kidney injury improved with IV fluid hydration.    Interval History:  T-max 103.5 F. Patient with pancytopenia.  New chemotherapy started on October 27, 2023.  Patient under care by Dr. Bai and Dr. Don.  Using CPAP therapy.  No bleeding complications present.  Patient receiving 1 unit of packed red blood cells transfusion.  On neutropenic precautions.  Patient denies any shortness a breath, chest pain or expectoration.  No abdominal pain or nausea or vomiting.    Review of Systems   Constitutional:  Positive for chills, fatigue and fever.   HENT:  Negative for dental problem and ear pain.    Respiratory:  Positive for shortness of breath. Negative for cough and chest tightness.    Cardiovascular:  Negative for chest pain and palpitations.   Gastrointestinal:  Negative for abdominal distention, abdominal pain and diarrhea.   Genitourinary:  Negative for difficulty urinating and dysuria.   Musculoskeletal:  Negative for back pain.   Neurological:  Negative for dizziness and headaches.   Psychiatric/Behavioral:  Negative for agitation and behavioral problems.      Objective:     Vital Signs (Most Recent):  Temp: 99.4 °F (37.4 °C) (11/21/23 0715)  Pulse: 93 (11/21/23 0901)  Resp: (!) 27 (11/21/23 0900)  BP: (!) 149/65 (11/21/23 0901)  SpO2: 99 % (11/21/23 0900) Vital Signs (24h Range):  Temp:  [98.3 °F (36.8 °C)-100.7 °F (38.2 °C)] 99.4 °F (37.4 °C)  Pulse:  [] 93  Resp:  [18-38] 27  SpO2:  [76 %-100 %] 99 %  BP: ()/(40-90) 149/65     Weight: 92 kg (202 lb 14.4  "oz)  Body mass index is 26.05 kg/m².    Intake/Output Summary (Last 24 hours) at 11/21/2023 0923  Last data filed at 11/21/2023 0606  Gross per 24 hour   Intake 2938.46 ml   Output 3600 ml   Net -661.54 ml           Physical Exam  Constitutional:       General: He is in acute distress.   HENT:      Head: Normocephalic and atraumatic.      Right Ear: External ear normal.      Left Ear: External ear normal.      Mouth/Throat:      Mouth: Mucous membranes are dry.   Cardiovascular:      Rate and Rhythm: Regular rhythm. Tachycardia present.   Pulmonary:      Effort: Respiratory distress present.      Breath sounds: No wheezing.   Abdominal:      General: Abdomen is flat.      Palpations: Abdomen is soft.   Musculoskeletal:         General: Normal range of motion.   Skin:     General: Skin is warm and dry.   Neurological:      General: No focal deficit present.      Mental Status: He is alert and oriented to person, place, and time.             Significant Labs: All pertinent labs within the past 24 hours have been reviewed.  CBC:   Recent Labs   Lab 11/20/23  0203 11/20/23  1311 11/20/23  2307 11/21/23  0355   WBC 1.65* 0.87*  --  1.56*   HGB 6.4* 6.7* 7.2* 7.0*   HCT 18.4* 19.8* 20.7* 20.2*   PLT 19* 18*  --  20*       CMP:   Recent Labs   Lab 11/20/23  0203 11/20/23  1308 11/21/23  0355    135* 136   K 4.7 4.7 4.5    106 105   CO2 25 23 25   * 223* 209*   BUN 38* 34* 29*   CREATININE 1.3 1.1 1.0   CALCIUM 8.3* 8.6* 8.2*   PROT 5.3* 5.6* 5.2*   ALBUMIN 3.4* 3.7 3.3*   BILITOT 0.5 0.4 0.6   ALKPHOS 32* 29* 26*   AST 19 19 13   ALT 17 17 14   ANIONGAP 8 6* 6*       Lactic Acid:   Recent Labs   Lab 11/20/23  1744 11/20/23  2307 11/21/23  0355   LACTATE 1.4 0.6 1.0       Troponin:   Recent Labs   Lab 11/20/23  1744 11/20/23  2307 11/21/23  0355   TROPONINIHS 318.2* 233.6* 145.5*       TSH:   Recent Labs   Lab 10/17/23  0947   TSH 0.741       Urine Culture: No results for input(s): "LABURIN" in the last " 48 hours.  Urine Studies:   Recent Labs   Lab 11/20/23  0350   COLORU Yellow   APPEARANCEUA Clear   PHUR 5.0   SPECGRAV 1.020   PROTEINUA Negative   GLUCUA Negative   KETONESU Trace*   BILIRUBINUA Negative   OCCULTUA Negative   NITRITE Negative   UROBILINOGEN Negative   LEUKOCYTESUR Negative       Microbiology Results (last 7 days)       Procedure Component Value Units Date/Time    Blood culture x two cultures. Draw prior to antibiotics. [[id number]] Collected: 11/20/23 0233    Order Status: Completed Specimen: Blood from Antecubital, Right Hand Updated: 11/21/23 0432     Blood Culture, Routine No Growth to date      No Growth to date    Narrative:      Aerobic and anaerobic    Blood culture x two cultures. Draw prior to antibiotics. [[id number]] Collected: 11/20/23 0233    Order Status: Completed Specimen: Blood from Antecubital, Right Arm Updated: 11/21/23 0432     Blood Culture, Routine No Growth to date      No Growth to date    Narrative:      Aerobic and anaerobic    Respiratory Infection Panel (PCR), Nasopharyngeal [[id number]] Collected: 11/20/23 0208    Order Status: Completed Specimen: Nasopharyngeal Swab Updated: 11/20/23 0302     Respiratory Infection Panel Source NP swab     Adenovirus Not Detected     Coronavirus 229E, Common Cold Virus Not Detected     Coronavirus HKU1, Common Cold Virus Not Detected     Coronavirus NL63, Common Cold Virus Not Detected     Coronavirus OC43, Common Cold Virus Not Detected     Comment: Coronavirus strains 229E, HKU1, NL63, and OC43 can cause the common   cold   and are not associated with the respiratory disease outbreak caused   by  the COVID-19 (SARS-CoV-2 novel Coronavirus) strain.           SARS-CoV2 (COVID-19) Qualitative PCR Not Detected     Human Metapneumovirus Not Detected     Human Rhinovirus/Enterovirus Not Detected     Influenza A (subtypes H1, H1-2009,H3) Not Detected     Influenza B Not Detected     Parainfluenza Virus 1 Not Detected     Parainfluenza  Virus 2 Not Detected     Parainfluenza Virus 3 Not Detected     Parainfluenza Virus 4 Not Detected     Respiratory Syncytial Virus Not Detected     Bordetella Parapertussis ([id number]) Not Detected     Bordetella pertussis (ptxP) Not Detected     Chlamydia pneumoniae Not Detected     Mycoplasma pneumoniae Not Detected     Comment: Respiratory Infection Panel testing performed by Multiplex PCR.       Narrative:      Respiratory Infection Panel source->NP Swab          Significant Imaging:   CXR: Chronic findings as above. No acute radiographic abnormalities.     ECHO:    Left Ventricle: The left ventricle is normal in size. Normal wall thickness. Normal wall motion. There is normal systolic function with a visually estimated ejection fraction of 60 - 65%. There is normal diastolic function. E/A ratio is 0.87.    Right Ventricle: Normal right ventricular cavity size. Wall thickness is normal. Right ventricle wall motion  is normal. Systolic function is normal.    Left Atrium: Left atrium is moderately dilated.    Aortic Valve: There is mild aortic valve sclerosis.    Tricuspid Valve: There is moderate regurgitation. There is pulmonary hypertension. The estimated PA systolic pressure is at least 38 mmHg.    IVC/SVC: Normal venous pressure at 3 mmHg.    Overall the study quality was technically difficult. The study was difficult due to patient's clinical status.    Assessment/Plan:      * Neutropenic fever  Start neutropenic precautions.  Temperature in the emergency room on 103.5 patient given Tylenol.  Continue to monitor vitals   Patient reports being off chemotherapy for the last month given pancytopenia  Reviewed chest x-ray, no obvious consolidation noted  Continue intravenous cefepime and vancomycin.  Pharmacist to dose vancomycin.   Continue with IV fluids  Follow up blood cultures and urine cultures   Respiratory infectious panel negative  Repeat lactate        Shortness of breath  Likely fever induced, oxygen  saturations stable on room air.  Patient feels tachypneic  Continue with BiPAP, wean as tolerated  NPO while on BiPAP    Troponin level elevated  Likely demand, will trend troponin.  Patient without chest pain.  EKG without ST elevations.  Obtain transthoracic echocardiogram.      Thrombocytopenia  Patient was found to have thrombocytopenia, the likely etiology is secondary to medications- chemotherapy , will monitor the platelets Daily. Will transfuse if platelet count is <10k. Hold DVT prophylaxis if platelets are <50k. The patient's platelet results have been reviewed and are listed below.  Recent Labs   Lab 11/20/23  0203   PLT 19*         Herpes stomatitis  Continue with outpatient valacyclovir    CLL (chronic lymphocytic leukemia)  Follows up with Dr. Bai and Dr. Don outpatient.  Dr. Bai to evaluate patient today.  Not on therapy currently   New regimen started on 10/27/23.        VTE Risk Mitigation (From admission, onward)           Ordered     IP VTE HIGH RISK PATIENT  Once         11/20/23 0318     Place sequential compression device  Until discontinued         11/20/23 0318                    Discharge Planning   MARTELL:      Code Status: Full Code   Is the patient medically ready for discharge?:     Reason for patient still in hospital (select all that apply): {HMREASONPATIENTINHOSP:57625}  Discharge Plan A: Home Health            Critical care time spent on the evaluation and treatment of severe organ dysfunction, review of pertinent labs and imaging studies, discussions with consulting providers and discussions with patient/family: *** minutes.      Cal Bragg MD  Department of Hospital Medicine   [facility]

## 2023-11-21 NOTE — ASSESSMENT & PLAN NOTE
Likely demand, will trend troponin.  Patient without chest pain. NSTEMI.   EKG without ST elevations.  Follow transthoracic echocardiogram.  Follow Cardiology recommendations.

## 2023-11-21 NOTE — SUBJECTIVE & OBJECTIVE
Interval History:  Patient sitting in chair, feeling better.  Patient received 2 units of packed red blood cells and 1 unit of platelet transfusion yesterday.  Denies any chest pain or shortness of breath.  Continues to have pancytopenia.  On neutropenic precautions.  Hematology oncology service closely following.  Cardiologist evaluation pending.  No bleeding complications noted. No abdominal pain or nausea or vomiting.    Review of Systems   Constitutional:  Positive for chills, fatigue and fever.   HENT:  Negative for dental problem and ear pain.    Respiratory:  Positive for shortness of breath. Negative for cough and chest tightness.    Cardiovascular:  Negative for chest pain and palpitations.   Gastrointestinal:  Negative for abdominal distention, abdominal pain and diarrhea.   Genitourinary:  Negative for difficulty urinating and dysuria.   Musculoskeletal:  Negative for back pain.   Neurological:  Negative for dizziness and headaches.   Psychiatric/Behavioral:  Negative for agitation and behavioral problems.      Objective:     Vital Signs (Most Recent):  Temp: 99.4 °F (37.4 °C) (11/21/23 0715)  Pulse: 93 (11/21/23 0901)  Resp: (!) 27 (11/21/23 0900)  BP: (!) 149/65 (11/21/23 0901)  SpO2: 99 % (11/21/23 0900) Vital Signs (24h Range):  Temp:  [98.3 °F (36.8 °C)-100.7 °F (38.2 °C)] 99.4 °F (37.4 °C)  Pulse:  [] 93  Resp:  [18-38] 27  SpO2:  [76 %-100 %] 99 %  BP: ()/(40-90) 149/65     Weight: 92 kg (202 lb 14.4 oz)  Body mass index is 26.05 kg/m².    Intake/Output Summary (Last 24 hours) at 11/21/2023 0923  Last data filed at 11/21/2023 0606  Gross per 24 hour   Intake 2938.46 ml   Output 3600 ml   Net -661.54 ml           Physical Exam  Constitutional:       General: He is in acute distress.   HENT:      Head: Normocephalic and atraumatic.      Right Ear: External ear normal.      Left Ear: External ear normal.      Mouth/Throat:      Mouth: Mucous membranes are dry.   Cardiovascular:      Rate  "and Rhythm: Regular rhythm. Tachycardia present.   Pulmonary:      Effort: Respiratory distress present.      Breath sounds: No wheezing.   Abdominal:      General: Abdomen is flat.      Palpations: Abdomen is soft.   Musculoskeletal:         General: Normal range of motion.   Skin:     General: Skin is warm and dry.   Neurological:      General: No focal deficit present.      Mental Status: He is alert and oriented to person, place, and time.             Significant Labs: All pertinent labs within the past 24 hours have been reviewed.  CBC:   Recent Labs   Lab 11/20/23  0203 11/20/23  1311 11/20/23  2307 11/21/23  0355   WBC 1.65* 0.87*  --  1.56*   HGB 6.4* 6.7* 7.2* 7.0*   HCT 18.4* 19.8* 20.7* 20.2*   PLT 19* 18*  --  20*       CMP:   Recent Labs   Lab 11/20/23  0203 11/20/23  1308 11/21/23  0355    135* 136   K 4.7 4.7 4.5    106 105   CO2 25 23 25   * 223* 209*   BUN 38* 34* 29*   CREATININE 1.3 1.1 1.0   CALCIUM 8.3* 8.6* 8.2*   PROT 5.3* 5.6* 5.2*   ALBUMIN 3.4* 3.7 3.3*   BILITOT 0.5 0.4 0.6   ALKPHOS 32* 29* 26*   AST 19 19 13   ALT 17 17 14   ANIONGAP 8 6* 6*       Lactic Acid:   Recent Labs   Lab 11/20/23  1744 11/20/23  2307 11/21/23  0355   LACTATE 1.4 0.6 1.0       Troponin:   Recent Labs   Lab 11/20/23  1744 11/20/23  2307 11/21/23  0355   TROPONINIHS 318.2* 233.6* 145.5*       TSH:   Recent Labs   Lab 10/17/23  0947   TSH 0.741       Urine Culture: No results for input(s): "LABURIN" in the last 48 hours.  Urine Studies:   Recent Labs   Lab 11/20/23  0355   COLORU Yellow   APPEARANCEUA Clear   PHUR 5.0   SPECGRAV 1.020   PROTEINUA Negative   GLUCUA Negative   KETONESU Trace*   BILIRUBINUA Negative   OCCULTUA Negative   NITRITE Negative   UROBILINOGEN Negative   LEUKOCYTESUR Negative       Microbiology Results (last 7 days)       Procedure Component Value Units Date/Time    Blood culture x two cultures. Draw prior to antibiotics. [[id number]] Collected: 11/20/23 0233    Order " Status: Completed Specimen: Blood from Antecubital, Right Hand Updated: 11/21/23 0432     Blood Culture, Routine No Growth to date      No Growth to date    Narrative:      Aerobic and anaerobic    Blood culture x two cultures. Draw prior to antibiotics. [[id number]] Collected: 11/20/23 0233    Order Status: Completed Specimen: Blood from Antecubital, Right Arm Updated: 11/21/23 0432     Blood Culture, Routine No Growth to date      No Growth to date    Narrative:      Aerobic and anaerobic    Respiratory Infection Panel (PCR), Nasopharyngeal [[id number]] Collected: 11/20/23 0208    Order Status: Completed Specimen: Nasopharyngeal Swab Updated: 11/20/23 0302     Respiratory Infection Panel Source NP swab     Adenovirus Not Detected     Coronavirus 229E, Common Cold Virus Not Detected     Coronavirus HKU1, Common Cold Virus Not Detected     Coronavirus NL63, Common Cold Virus Not Detected     Coronavirus OC43, Common Cold Virus Not Detected     Comment: Coronavirus strains 229E, HKU1, NL63, and OC43 can cause the common   cold   and are not associated with the respiratory disease outbreak caused   by  the COVID-19 (SARS-CoV-2 novel Coronavirus) strain.           SARS-CoV2 (COVID-19) Qualitative PCR Not Detected     Human Metapneumovirus Not Detected     Human Rhinovirus/Enterovirus Not Detected     Influenza A (subtypes H1, H1-2009,H3) Not Detected     Influenza B Not Detected     Parainfluenza Virus 1 Not Detected     Parainfluenza Virus 2 Not Detected     Parainfluenza Virus 3 Not Detected     Parainfluenza Virus 4 Not Detected     Respiratory Syncytial Virus Not Detected     Bordetella Parapertussis ([id number]) Not Detected     Bordetella pertussis (ptxP) Not Detected     Chlamydia pneumoniae Not Detected     Mycoplasma pneumoniae Not Detected     Comment: Respiratory Infection Panel testing performed by Multiplex PCR.       Narrative:      Respiratory Infection Panel source->NP Swab          Significant Imaging:    CXR: Chronic findings as above. No acute radiographic abnormalities.     ECHO:    Left Ventricle: The left ventricle is normal in size. Normal wall thickness. Normal wall motion. There is normal systolic function with a visually estimated ejection fraction of 60 - 65%. There is normal diastolic function. E/A ratio is 0.87.    Right Ventricle: Normal right ventricular cavity size. Wall thickness is normal. Right ventricle wall motion  is normal. Systolic function is normal.    Left Atrium: Left atrium is moderately dilated.    Aortic Valve: There is mild aortic valve sclerosis.    Tricuspid Valve: There is moderate regurgitation. There is pulmonary hypertension. The estimated PA systolic pressure is at least 38 mmHg.    IVC/SVC: Normal venous pressure at 3 mmHg.    Overall the study quality was technically difficult. The study was difficult due to patient's clinical status.

## 2023-11-21 NOTE — ASSESSMENT & PLAN NOTE
Patient was found to have thrombocytopenia, the likely etiology is secondary to medications- chemotherapy , will monitor the platelets Daily.  Transfuse another unit of platelet transfusion today as per Hematology recommendations.  Earlier patient received on November 20, 2023.  The patient's platelet results have been reviewed and are listed below.  Recent Labs   Lab 11/21/23  0355   PLT 20*

## 2023-11-21 NOTE — NURSING
Pt sat up in chair for a few hours. Had a visitor that stayed too long. Pt had an incontinent episode of liquid black stool. Specimen collected and sent to lab. Primary MD notified. Orders received. Pt's other two BM's today were not as dark or liquid.      NP for Dr Whitaker came this am. No intervention at this time. See her notes.     Dr Bai came in. He is made aware of  black liquid stool.

## 2023-11-21 NOTE — CONSULTS
[facility]   Cardiology Note    Consult Requested By: hospital medicine  Reason for Consult: troponin elevation    SUBJECTIVE:     History of Present Illness: Pt is a 82 yo male with PMHx of NSCLC, diabetes, asthma, MAYDA, HTN, iron deficinecy anemia and CLL who presented to the ED on 11/20 with fevers and shortnes of breath. He was hospitalized 2 weeks ago for similar symptoms. His temperature in the ED was 103.5, patient was tachycardic. He is anemic and neutropenic at this time. Chemo has been on hold due to this. He is followed by Dr. Sandhu. We were consulted due to troponin elevation. Initial troponin was 60, it elevated to 346 and has trended down to 145. He denies chest pain or shortness of breath today. Denies any recent chest pain at all. EKG showed SR with no acute ST segment changes. Echo showed EF 60-65%, moderate TR, PASP 38 mmHg. He denies a history of MI/stroke.     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    Lymphoma, 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    MAYDA on CPAP     Pneumonia of both lungs due to infectious organism 06/29/2021    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     Right-sided Bell's palsy 2009    Spondylolisthesis     Varicose veins of legs     Venous insufficiency      Past Surgical History:   Procedure Laterality Date    Athroscopic surgery      On Knee    BIOPSY OF TISSUE OF NECK Left 08/2015    Recuurence CLL/small cell lyphoma     CERVICAL FUSION      ESOPHAGEAL DILATION      ESOPHAGOGASTRODUODENOSCOPY N/A 04/15/2022    Procedure: EGD (ESOPHAGOGASTRODUODENOSCOPY);  Surgeon: Siddharth Garcia MD;  Location: [facility] ENDO;  Service: Endoscopy;  Laterality: N/A;    ESOPHAGOGASTRODUODENOSCOPY N/A 2022    Procedure: EGD (ESOPHAGOGASTRODUODENOSCOPY);  Surgeon: Siddharth Garcia MD;  Location: [facility] ENDO;  Service: Endoscopy;  Laterality: N/A;    ESOPHAGOGASTRODUODENOSCOPY N/A 2022    Procedure: EGD (ESOPHAGOGASTRODUODENOSCOPY);  Surgeon: Jefferson Hyman MD;  Location: [facility] ENDO;  Service: Endoscopy;  Laterality: N/A;    ESOPHAGOGASTRODUODENOSCOPY N/A 2023    Procedure: EGD (ESOPHAGOGASTRODUODENOSCOPY);  Surgeon: Jefferson Hyman MD;  Location: [facility] ENDO;  Service: Endoscopy;  Laterality: N/A;    ESOPHAGOGASTRODUODENOSCOPY W/ PEG N/A 2022    Procedure: EGD, WITH PEG TUBE INSERTION;  Surgeon: Siddharth Garcia MD;  Location: [facility];  Service: Endoscopy;  Laterality: N/A;    GASTROSTOMY TUBE PLACEMENT  2022    20 Fr (bumper initially at 3.5)    Hemorrhoid resection  1979    LUNG LOBECTOMY Left     NECK SURGERY  2022    Anterior and Posterior C3-C7 fusion    OTHER SURGICAL HISTORY  2006    Chemotherapy s/p lyphoma        Portacath implantation and removal      reverse shoulder arthroplasty Right 2021     Family History   Problem Relation Age of Onset    Stomach cancer Mother 80         at 95    Colon cancer Father      Social History     Tobacco Use    Smoking status: Former    Smokeless tobacco: Never   Substance Use Topics    Alcohol use: Yes    Drug use: No       Review of Systems:  Review of Systems   Constitutional:  Positive for fever and malaise/fatigue.   Respiratory:  Negative for shortness of breath.    Cardiovascular:  Negative for chest pain, palpitations, orthopnea and leg swelling.   Gastrointestinal:  Negative for nausea and vomiting.   Neurological:  Negative for dizziness.   All  other systems reviewed and are negative.      OBJECTIVE:     Vital Signs (Most Recent)  Temp: 99.4 °F (37.4 °C) (11/21/23 0715)  Pulse: 93 (11/21/23 0901)  Resp: (!) 27 (11/21/23 0900)  BP: (!) 149/65 (11/21/23 0901)  SpO2: 99 % (11/21/23 0900)    Vital Signs Range (Last 24H):  Temp:  [98.3 °F (36.8 °C)-100.7 °F (38.2 °C)]   Pulse:  []   Resp:  [18-38]   BP: ()/(40-90)   SpO2:  [76 %-100 %]     I & O (Last 24H):    Intake/Output Summary (Last 24 hours) at 11/21/2023 0932  Last data filed at 11/21/2023 0931  Gross per 24 hour   Intake 3418.46 ml   Output 3600 ml   Net -181.54 ml       Current Diet:     Current Diet Order   Procedures    Diet Dysphagia Mechanical Soft (IDDSI Level 5)        Allergies:  Review of patient's allergies indicates:  No Known Allergies    Meds:  Scheduled Meds:   atorvastatin  80 mg Oral Daily    ceFEPime (MAXIPIME) IVPB  2 g Intravenous Q12H    famotidine  20 mg Oral Daily    ferrous sulfate  1 tablet Oral Daily    gabapentin  600 mg Oral BID    losartan  50 mg Oral Daily    metoprolol tartrate  25 mg Oral BID    mupirocin   Nasal BID    tamsulosin  0.4 mg Oral Daily    traZODone  300 mg Oral QHS    valACYclovir  1,000 mg Oral Daily    vancomycin (VANCOCIN) IV (PEDS and ADULTS)  1,250 mg Intravenous Q24H     Continuous Infusions:   sodium chloride 0.9% Stopped (11/20/23 1352)     PRN Meds:0.9%  NaCl infusion (for blood administration), 0.9%  NaCl infusion (for blood administration), acetaminophen, albuterol-ipratropium, dextrose 50%, dextrose 50%, furosemide (LASIX) injection, glucagon (human recombinant), glucose, glucose, HYDROcodone-acetaminophen, insulin aspart U-100, magnesium oxide, magnesium oxide, melatonin, ondansetron, potassium bicarbonate, potassium bicarbonate, potassium bicarbonate, potassium, sodium phosphates, potassium, sodium phosphates, potassium, sodium phosphates, sodium chloride 0.9%, Pharmacy to dose Vancomycin consult **AND** vancomycin - pharmacy to  dose    Oxygen/Ventilator Data (Last 24H):  (if applicable)   Oxygen Concentration (%):  [30] 30        Hemodynamic Parameters (Last 24H):   (if applicable)        Laboratory and Radiology Data:  Recent Results (from the past 24 hour(s))   Echo    Collection Time: 11/20/23 10:53 AM   Result Value Ref Range    LVOT stroke volume 101.09 cm3    LVIDd 3.93 3.5 - 6.0 cm    LV Systolic Volume 24.40 mL    LV Systolic Volume Index 11.1 mL/m2    LVIDs 2.59 2.1 - 4.0 cm    LV Diastolic Volume 67.10 mL    LV Diastolic Volume Index 30.64 mL/m2    IVS 1.48 (A) 0.6 - 1.1 cm    LVOT diameter 2.10 cm    LVOT area 3.5 cm2    FS 34 28 - 44 %    Left Ventricle Relative Wall Thickness 0.62 cm    Posterior Wall 1.21 (A) 0.6 - 1.1 cm    LV mass 191.48 g    LV Mass Index 87 g/m2    MV Peak E Kaiser 0.79 m/s    TDI LATERAL 0.10 m/s    TDI SEPTAL 0.09 m/s    E/E' ratio 8.32 m/s    MV Peak A Kaiser 0.91 m/s    TR Max Kaiser 3.10 m/s    E/A ratio 0.87     E wave deceleration time 225.00 msec    LV SEPTAL E/E' RATIO 8.78 m/s    LV LATERAL E/E' RATIO 7.90 m/s    LVOT peak kaiser 1.47 m/s    Left Ventricular Outflow Tract Mean Velocity 0.96 cm/s    Left Ventricular Outflow Tract Mean Gradient 4.00 mmHg    RVDD 3.33 cm    RV S' 14.40 cm/s    TAPSE 2.79 cm    LA size 4.00 cm    AV mean gradient 7 mmHg    AV peak gradient 14 mmHg    Ao peak kaiser 1.84 m/s    Ao VTI 35.50 cm    LVOT peak VTI 29.20 cm    AV valve area 2.85 cm²    AV Velocity Ratio 0.80     AV index (prosthetic) 0.82     MAYA by Velocity Ratio 2.77 cm²    Mr max kaiser 5.16 m/s    MV mean gradient 3 mmHg    MV peak gradient 6 mmHg    MV stenosis pressure 1/2 time 57.00 ms    MV valve area p 1/2 method 3.86 cm2    MV valve area by continuity eq 3.28 cm2    MV VTI 30.8 cm    Triscuspid Valve Regurgitation Peak Gradient 38 mmHg    PV PEAK VELOCITY 1.33 m/s    PV peak gradient 7 mmHg    Pulmonary Valve Mean Velocity 0.90 m/s    Ao root annulus 3.40 cm    Ascending aorta 4.00 cm    IVC diameter 2.63 cm     Mean e' 0.10 m/s    ZLVIDS -4.36     ZLVIDD -6.33     AORTIC VALVE CUSP SEPERATION 1.80 cm    BSA 2.19 m2    TV resting pulmonary artery pressure 41 mmHg    RV TB RVSP 6 mmHg    Est. RA pres 3 mmHg   POCT glucose    Collection Time: 11/20/23 11:04 AM   Result Value Ref Range    POC Glucose 284 (H) 70 - 110   Magnesium    Collection Time: 11/20/23  1:08 PM   Result Value Ref Range    Magnesium 1.7 1.6 - 2.6 mg/dL   Phosphorus    Collection Time: 11/20/23  1:08 PM   Result Value Ref Range    Phosphorus 3.6 2.7 - 4.5 mg/dL   Comprehensive metabolic panel    Collection Time: 11/20/23  1:08 PM   Result Value Ref Range    Sodium 135 (L) 136 - 145 mmol/L    Potassium 4.7 3.5 - 5.1 mmol/L    Chloride 106 95 - 110 mmol/L    CO2 23 23 - 29 mmol/L    Glucose 223 (H) 70 - 110 mg/dL    BUN 34 (H) 8 - 23 mg/dL    Creatinine 1.1 0.5 - 1.4 mg/dL    Calcium 8.6 (L) 8.7 - 10.5 mg/dL    Total Protein 5.6 (L) 6.0 - 8.4 g/dL    Albumin 3.7 3.5 - 5.2 g/dL    Total Bilirubin 0.4 0.1 - 1.0 mg/dL    Alkaline Phosphatase 29 (L) 55 - 135 U/L    AST 19 10 - 40 U/L    ALT 17 10 - 44 U/L    eGFR >60.0 >60 mL/min/1.73 m^2    Anion Gap 6 (L) 8 - 16 mmol/L   Troponin I High Sensitivity    Collection Time: 11/20/23  1:08 PM   Result Value Ref Range    Troponin I High Sensitivity 346.2 (HH) 0.0 - 14.9 pg/mL   CBC auto differential    Collection Time: 11/20/23  1:11 PM   Result Value Ref Range    WBC 0.87 (LL) 3.90 - 12.70 K/uL    RBC 1.95 (L) 4.60 - 6.20 M/uL    Hemoglobin 6.7 (LL) 14.0 - 18.0 g/dL    Hematocrit 19.8 (LL) 40.0 - 54.0 %     (H) 82 - 98 fL    MCH 34.4 (H) 27.0 - 31.0 pg    MCHC 33.8 32.0 - 36.0 g/dL    RDW 16.4 (H) 11.5 - 14.5 %    Platelets 18 (LL) 150 - 450 K/uL    MPV SEE COMMENT 9.2 - 12.9 fL    Immature Granulocytes CANCELED 0.0 - 0.5 %    Immature Grans (Abs) CANCELED 0.00 - 0.04 K/uL    nRBC 0 0 /100 WBC    Gran % 38.0 38.0 - 73.0 %    Lymph % 27.0 18.0 - 48.0 %    Mono % 10.0 4.0 - 15.0 %    Eosinophil % 0.0 0.0 - 8.0 %     Basophil % 3.0 (H) 0.0 - 1.9 %    Bands 6.0 %    Blasts 16.0 (A) 0 %    Platelet Estimate Decreased (A)     Aniso Slight     Tear Drop Cells Occasional     Schistocytes Present     Differential Method Manual    Lactic acid, plasma    Collection Time: 11/20/23  1:11 PM   Result Value Ref Range    Lactate (Lactic Acid) 1.5 0.5 - 1.9 mmol/L   POCT glucose    Collection Time: 11/20/23  3:53 PM   Result Value Ref Range    POC Glucose 211 (H) 70 - 110   Lactic acid, plasma    Collection Time: 11/20/23  5:44 PM   Result Value Ref Range    Lactate (Lactic Acid) 1.4 0.5 - 1.9 mmol/L   Troponin I High Sensitivity    Collection Time: 11/20/23  5:44 PM   Result Value Ref Range    Troponin I High Sensitivity 318.2 (HH) 0.0 - 14.9 pg/mL   POCT glucose    Collection Time: 11/20/23 10:19 PM   Result Value Ref Range    POC Glucose 153 (H) 70 - 110   Troponin I High Sensitivity    Collection Time: 11/20/23 11:07 PM   Result Value Ref Range    Troponin I High Sensitivity 233.6 (HH) 0.0 - 14.9 pg/mL   Lactic acid, plasma    Collection Time: 11/20/23 11:07 PM   Result Value Ref Range    Lactate (Lactic Acid) 0.6 0.5 - 1.9 mmol/L   Hemoglobin    Collection Time: 11/20/23 11:07 PM   Result Value Ref Range    Hemoglobin 7.2 (L) 14.0 - 18.0 g/dL   Hematocrit    Collection Time: 11/20/23 11:07 PM   Result Value Ref Range    Hematocrit 20.7 (LL) 40.0 - 54.0 %   CBC auto differential    Collection Time: 11/21/23  3:55 AM   Result Value Ref Range    WBC 1.56 (LL) 3.90 - 12.70 K/uL    RBC 2.10 (L) 4.60 - 6.20 M/uL    Hemoglobin 7.0 (L) 14.0 - 18.0 g/dL    Hematocrit 20.2 (LL) 40.0 - 54.0 %    MCV 96 82 - 98 fL    MCH 33.3 (H) 27.0 - 31.0 pg    MCHC 34.7 32.0 - 36.0 g/dL    RDW 16.1 (H) 11.5 - 14.5 %    Platelets 20 (LL) 150 - 450 K/uL    MPV SEE COMMENT 9.2 - 12.9 fL    Immature Granulocytes Test Not Performed 0.0 - 0.5 %    Immature Grans (Abs) Test Not Performed 0.00 - 0.04 K/uL    nRBC 0 0 /100 WBC    Gran % 29.0 (L) 38.0 - 73.0 %    Lymph  % 35.0 18.0 - 48.0 %    Mono % 9.0 4.0 - 15.0 %    Eosinophil % 0.0 0.0 - 8.0 %    Basophil % 0.0 0.0 - 1.9 %    Bands 10.0 %    Blasts 17.0 (A) 0 %    Platelet Estimate Decreased (A)     Aniso Slight     Poik Slight     Poly Occasional     Hypo Occasional     Ovalocytes Occasional     Tear Drop Cells Occasional     Schistocytes Present     Differential Method Manual    Comprehensive metabolic panel    Collection Time: 11/21/23  3:55 AM   Result Value Ref Range    Sodium 136 136 - 145 mmol/L    Potassium 4.5 3.5 - 5.1 mmol/L    Chloride 105 95 - 110 mmol/L    CO2 25 23 - 29 mmol/L    Glucose 209 (H) 70 - 110 mg/dL    BUN 29 (H) 8 - 23 mg/dL    Creatinine 1.0 0.5 - 1.4 mg/dL    Calcium 8.2 (L) 8.7 - 10.5 mg/dL    Total Protein 5.2 (L) 6.0 - 8.4 g/dL    Albumin 3.3 (L) 3.5 - 5.2 g/dL    Total Bilirubin 0.6 0.1 - 1.0 mg/dL    Alkaline Phosphatase 26 (L) 55 - 135 U/L    AST 13 10 - 40 U/L    ALT 14 10 - 44 U/L    eGFR >60.0 >60 mL/min/1.73 m^2    Anion Gap 6 (L) 8 - 16 mmol/L   Magnesium    Collection Time: 11/21/23  3:55 AM   Result Value Ref Range    Magnesium 1.7 1.6 - 2.6 mg/dL   Troponin I High Sensitivity    Collection Time: 11/21/23  3:55 AM   Result Value Ref Range    Troponin I High Sensitivity 145.5 (HH) 0.0 - 14.9 pg/mL   Lactic acid, plasma    Collection Time: 11/21/23  3:55 AM   Result Value Ref Range    Lactate (Lactic Acid) 1.0 0.5 - 1.9 mmol/L     Imaging Results              X-Ray Chest AP Portable (Final result)  Result time 11/20/23 07:32:25      Final result by Bright Santos MD (11/20/23 07:32:25)                   Narrative:    Chest single view    CLINICAL DATA: Shortness of breath    FINDINGS: 2 AP views are compared to November 9. Heart size is normal. The mediastinum is unremarkable. Mild bilateral interstitial prominence is unchanged and likely chronic, with mild chronic blunting of the lateral costophrenic angle on the left. No active infiltrate or significant effusion is  identified.    Healed fracture deformity of the left seventh rib is noted.    IMPRESSION:  1. Chronic findings as above. No acute radiographic abnormalities.    Electronically signed by:  Bright Santos MD  11/20/2023 07:32 AM [facility], [place] Workstation: [id number]                                    12-lead EKG interpretation:  (if applicable)      Current Cardiac Rhythm:   (if applicable)    Physical Exam:   Physical Exam  Vitals and nursing note reviewed.   Constitutional:       General: He is not in acute distress.     Appearance: Normal appearance. He is not ill-appearing.   Cardiovascular:      Rate and Rhythm: Normal rate and regular rhythm.   Musculoskeletal:      Right lower leg: No edema.      Left lower leg: No edema.   Skin:     General: Skin is warm and dry.   Neurological:      Mental Status: He is alert and oriented to person, place, and time.         ASSESSMENT/PLAN:   Assessment:   Troponin Elevation - originally 60, elevated to 346 and trended down to 145. EKG with no acute changes. Denies chest pain or shortness of breath. No history of CAD or MI. He is new to our practice.   Neutropenic Fever  Thrombocytopenia  CLL - hematology following  MAYDA - CPAP  Hypertension - BP stable           Plan:   Troponin elevation is possible demand ischemia.   EKG with no acute changes.   Patient is asymptomatic.  Will not proceed with ischemic workup at this time unless patient develops ACS.  He will likely need an ischemic workup once he has clinically improved.     Thank you for your consult!

## 2023-11-21 NOTE — CONSULTS
GASTROENTEROLOGY INPATIENT CONSULT NOTE  Patient Name: Conrad Kuhn  Patient MRN: [id number]  Patient : 1940    Admit Date: 2023  Service date: 2023    Reason for Consult: pancytopenia w/ suspected melena.     PCP: Johnson Erazo MD    Chief Complaint   Patient presents with    Shortness of Breath     BIB EMS s/p SOB and fever. Wife got a temp at home of 103.1, per EMS pt initially satting at 85% on a NRB, now satting 100% on CPAP. Initial respirations at 44 now at 28 on CPAP. Recently dx with lymphoma started chemo 2 months ago. HX of lung cx. A&O at this time.        HPI: Patient is a 83 y.o. male with PMHx  chemo induced pancytopenia, splenomegaly, past lung Ca s/p lobectomy, CLL/NHL on chemo, neck surgery, DM, COPD, MAYDA, past EtOh use, GEJ ring, duodenal AVMs, past PEG presents for evaluation of fever/weakness. Acute onset, intermittent, progressive on admission. Being seen by onc / IM for neutropenic fever but also states intermittent dark stools over past week. Denies rectal bleeding and states last colonoscopy was years ago w/ Dr. Yee.     CHART REVIEW:   Labs  - Hg 7; WBC 1.5; Plts 20  EGD  - 54 Fr dilation of GEJ ring; Duodenal AVM s/p ablation; Nml stomach.   CT ABd '22 - splenomegaly; LAD; vascular dz; Nml GB / liver / panc / bowels.   PEG     Past Medical History:  Past Medical History:   Diagnosis Date    Alcoholism     CLL (chronic lymphocytic leukemia) 2019    COPD (chronic obstructive pulmonary disease)     Diabetes mellitus     Non Insulin requiring    Difficult intubation     Encounter for blood transfusion     GI bleed due to NSAIDs     While on Eliquis and Imbruvica    Herpetic gingivostomatitis     History of lung cancer - ANDRES NSCLC 2019    2004    Hypertension     Iron deficiency 2017    Lymphoma - Small Lymphocytic Lymphoma (SLL) 2023    Lymphoma, small lymphocytic (2004) 2019    MAYDA on CPAP     Pneumonia of both lungs  due to infectious organism 06/29/2021    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     Right-sided Bell's palsy 2009    Spondylolisthesis     Varicose veins of legs     Venous insufficiency         Past Surgical History:  Past Surgical History:   Procedure Laterality Date    Athroscopic surgery      On Knee    BIOPSY OF TISSUE OF NECK Left 08/2015    Recuurence CLL/small cell lyphoma    CERVICAL FUSION  2022    ESOPHAGEAL DILATION      ESOPHAGOGASTRODUODENOSCOPY N/A 04/15/2022    Procedure: EGD (ESOPHAGOGASTRODUODENOSCOPY);  Surgeon: Siddharth Garcia MD;  Location: [facility];  Service: Endoscopy;  Laterality: N/A;    ESOPHAGOGASTRODUODENOSCOPY N/A 04/16/2022    Procedure: EGD (ESOPHAGOGASTRODUODENOSCOPY);  Surgeon: Siddharth Garcia MD;  Location: [facility];  Service: Endoscopy;  Laterality: N/A;    ESOPHAGOGASTRODUODENOSCOPY N/A 06/23/2022    Procedure: EGD (ESOPHAGOGASTRODUODENOSCOPY);  Surgeon: Jefferson Hyman MD;  Location: [facility];  Service: Endoscopy;  Laterality: N/A;    ESOPHAGOGASTRODUODENOSCOPY N/A 7/6/2023    Procedure: EGD (ESOPHAGOGASTRODUODENOSCOPY);  Surgeon: Jefferson Hyman MD;  Location: [facility];  Service: Endoscopy;  Laterality: N/A;    ESOPHAGOGASTRODUODENOSCOPY W/ PEG N/A 04/16/2022    Procedure: EGD, WITH PEG TUBE INSERTION;  Surgeon: Siddharth Garcia MD;  Location: [facility];  Service: Endoscopy;  Laterality: N/A;    GASTROSTOMY TUBE PLACEMENT  04/16/2022    20 Fr (bumper initially at 3.5)    Hemorrhoid resection  1979    LUNG LOBECTOMY Left 2004    NECK SURGERY  04/08/2022    Anterior and Posterior C3-C7 fusion    OTHER SURGICAL HISTORY  2006    Chemotherapy s/p lyphoma        Portacath implantation and removal      reverse shoulder arthroplasty Right 03/2021        Home Medications:  Medications Prior to Admission   Medication Sig Dispense Refill Last Dose    atorvastatin (LIPITOR) 20 MG tablet Take 20 mg by mouth once daily.   Unknown    azelastine (ASTELIN) 137 mcg  (0.1 %) nasal spray 1 spray by Nasal route 2 (two) times daily.        blood sugar diagnostic Strp 1 strip by Misc.(Non-Drug; Combo Route) route 2 (two) times a day.       blood-glucose meter kit Use as instructed       duke's soln (benadryl 30 mL, mylanta 30 mL, LIDOcaine 30 mL, nystatin 30 mL) 120mL Take 10 mLs by mouth 4 (four) times daily. 240 mL 0     ferrous sulfate (FEOSOL) 325 mg (65 mg iron) Tab tablet Take 325 mg by mouth daily with breakfast.       fluticasone propionate (FLONASE) 50 mcg/actuation nasal spray 1 spray by Each Nostril route once daily.       gabapentin (NEURONTIN) 600 MG tablet Take 600 mg by mouth 2 (two) times daily.   Unknown    glimepiride (AMARYL) 2 MG tablet Take 2 mg by mouth once daily at 6am.   Unknown    HYDROcodone-acetaminophen (NORCO)  mg per tablet Take 1 tablet by mouth every 8 (eight) hours as needed for Pain.   Unknown    iron-vitamin C 100-250 mg, ICAR-C, 100-250 mg Tab Take 1 tablet by mouth once daily.   Unknown    loperamide (IMODIUM) 2 mg capsule Take 1 capsule (2 mg total) by mouth 4 (four) times daily as needed for Diarrhea. 20 capsule 0 Unknown    loratadine (CLARITIN) 10 mg tablet Take 10 mg by mouth once daily.       losartan (COZAAR) 50 MG tablet Take 1 tablet (50 mg total) by mouth once daily. 90 tablet 0 Unknown    ondansetron (ZOFRAN) 8 MG tablet Take 1 tablet (8 mg total) by mouth every 8 (eight) hours as needed for Nausea. 30 tablet 2 Unknown    promethazine (PHENERGAN) 12.5 MG Tab TAKE 1 TABLET(12.5 MG) BY MOUTH EVERY 6 HOURS AS NEEDED (Patient taking differently: Take 12.5 mg by mouth every 6 (six) hours as needed (nausea). TAKE 1 TABLET(12.5 MG) BY MOUTH EVERY 6 HOURS AS NEEDED) 30 tablet 3 Unknown    tamsulosin (FLOMAX) 0.4 mg Cap Take 1 capsule (0.4 mg total) by mouth once daily. (Patient taking differently: Take 0.4 mg by mouth every evening.) 90 capsule 0 Unknown    traZODone (DESYREL) 300 MG tablet Take 300 mg by mouth every evening.   Unknown     valACYclovir (VALTREX) 500 MG tablet Take 1,000 mg by mouth once daily.   Unknown    venetoclax (VENCLEXTA) 100 mg Tab Take 2 tablets (200 mg) by mouth once daily. 60 tablet 11 Unknown    voriconazole (VFEND) 200 MG Tab Take 200 mg by mouth 2 (two) times daily.   Unknown       Inpatient Medications:   atorvastatin  80 mg Oral Daily    ceFEPime (MAXIPIME) IVPB  2 g Intravenous Q12H    famotidine  20 mg Oral Daily    ferrous sulfate  1 tablet Oral Daily    furosemide (LASIX) injection  20 mg Intravenous Once    gabapentin  600 mg Oral BID    losartan  50 mg Oral Daily    metoprolol tartrate  25 mg Oral BID    mupirocin   Nasal BID    tamsulosin  0.4 mg Oral Daily    traZODone  300 mg Oral QHS    valACYclovir  1,000 mg Oral Daily    vancomycin (VANCOCIN) IV (PEDS and ADULTS)  1,250 mg Intravenous Q24H     0.9%  NaCl infusion (for blood administration), 0.9%  NaCl infusion (for blood administration), 0.9%  NaCl infusion (for blood administration), 0.9%  NaCl infusion (for blood administration), acetaminophen, albuterol-ipratropium, dextrose 50%, dextrose 50%, furosemide (LASIX) injection, glucagon (human recombinant), glucose, glucose, HYDROcodone-acetaminophen, insulin aspart U-100, magnesium oxide, magnesium oxide, melatonin, ondansetron, potassium bicarbonate, potassium bicarbonate, potassium bicarbonate, potassium, sodium phosphates, potassium, sodium phosphates, potassium, sodium phosphates, sodium chloride 0.9%, Pharmacy to dose Vancomycin consult **AND** vancomycin - pharmacy to dose    Review of patient's allergies indicates:  No Known Allergies    Social History:   Social History     Occupational History    Not on file   Tobacco Use    Smoking status: Former    Smokeless tobacco: Never   Substance and Sexual Activity    Alcohol use: Yes    Drug use: No    Sexual activity: Not Currently       Family History:   Family History   Problem Relation Age of Onset    Stomach cancer Mother 80         at 95     "Colon cancer Father        Review of Systems:  A 10 point review of systems was performed and was normal, except as mentioned in the HPI, including constitutional, HEENT, heme, lymph, cardiovascular, respiratory, gastrointestinal, genitourinary, neurologic, endocrine, psychiatric and musculoskeletal.      OBJECTIVE:    Physical Exam:  24 Hour Vital Sign Ranges: Temp:  [98.3 °F (36.8 °C)-100.7 °F (38.2 °C)] 99.2 °F (37.3 °C)  Pulse:  [] 84  Resp:  [18-55] 24  SpO2:  [74 %-99 %] 97 %  BP: ()/(40-90) 129/60  Most recent vitals: /60   Pulse 84   Temp 99.2 °F (37.3 °C) (Oral)   Resp (!) 24   Ht 6' 2" (1.88 m)   Wt 92 kg (202 lb 14.4 oz)   SpO2 97%   BMI 26.05 kg/m²    GEN: well-developed, well-nourished, awake and alert, non-toxic appearing elderly WM  HEENT: PERRL, sclera anicteric, oral mucosa pink and moist without lesion  NECK: trachea midline; Good ROM  CV: regular rate and rhythm, no murmurs or gallops  RESP: clear to auscultation bilaterally, no wheezes, rhonci or rales  ABD: soft, non-tender, non-distended, normal bowel sounds  EXT: no swelling or edema, 2+ pulses distally  SKIN: no rashes or jaundice  PSYCH: normal affect    Labs:   Recent Labs     11/20/23  0203 11/20/23  1311 11/21/23  0355   WBC 1.65* 0.87* 1.56*   MCV 97 102* 96   PLT 19* 18* 20*     Recent Labs     11/20/23  0203 11/20/23  1308 11/21/23  0355    135* 136   K 4.7 4.7 4.5    106 105   CO2 25 23 25   BUN 38* 34* 29*   * 223* 209*     No results for input(s): "ALB" in the last 72 hours.    Invalid input(s): "ALKP", "SGOT", "SGPT", "TBIL", "DBIL", "TPRO"  Recent Labs     11/20/23  0209   INR 0.9         Radiology Review:  X-Ray Chest AP Portable   Final Result            IMPRESSION / RECOMMENDATIONS:  83 y.o. male with PMHx  chemo induced pancytopenia, splenomegaly, past lung Ca s/p lobectomy, CLL/NHL on chemo, neck surgery, DM, COPD, MAYDA, past EtOh use, GEJ ring, duodenal AVMs, past PEG presents for " evaluation of fever/weakness w/ reported intermittent dark stools over past week.RIsks, benefits, alternatives discussed in detail with patient / family regarding upcoming procedures and sedation and possible complications. Some of the more common endoscopic complications include but not limited to immediate or delayed perforation, bleeding, infections, pain, inadvertent injury to surrounding tissue / organs and possible need for surgical evaluation. All questions answered and will proceed with procedure as planned.     -Push enteroscopy w/ limited interventions tomorrow; Colon discussed but deferred.     Thank you for this consult.    John LANGE Dakeniaive III  11/21/2023  3:27 PM

## 2023-11-21 NOTE — PROGRESS NOTES
"[facility]  Adult Nutrition   Progress Note (Initial Assessment)     SUMMARY     Recommendations  Recommendation/Intervention:   1. Continue diet and ensure HP with meals.   2. Provided menu, honor food prefs as diet allows.   3. RD to monitor and provide recommendations prn.  Goals: Maintain po intake > 50% at meals and supplement intake. Maintain weight status.  Nutrition Goal Status: progressing towards goal    Dietitian Rounds Brief  ICU x 2 days. Pt seen sitting up in chair. He states he has poor appetite but tries to eat as much as he can. He ate his eggs and grits this morning. He also has his milk mixed with ensure. Pt states there are some foods he has trouble eating, provided menu so he can see meals and make changes when needs to help improve intake.  Tolerance: tolerating     Diet order:   Current Diet Order: IDDSI level 5, Ensure HP                 Evaluation of Received Nutrient/Fluid Intake  Energy Calories Required: not meeting needs  Protein Required: not meeting needs  Fluid Required: not meeting needs      % Intake of Estimated Energy Needs: 50 - 75 %  % Meal Intake: 50 - 75 %      Intake/Output Summary (Last 24 hours) at 11/21/2023 1632  Last data filed at 11/21/2023 1419  Gross per 24 hour   Intake 3417.21 ml   Output 2253 ml   Net 1164.21 ml        Anthropometrics  Temp: 99.2 °F (37.3 °C)  Height Method: Stated  Height: 6' 2" (188 cm)  Height (inches): 74 in  Weight Method: Bed Scale  Weight: 92 kg (202 lb 14.4 oz)  Weight (lb): 202.9 lb  Ideal Body Weight (IBW), Male: 190 lb  % Ideal Body Weight, Male (lb): 106.79 %  BMI (Calculated): 26  BMI Grade: 25 - 29.9 - overweight       Estimated/Assessed Needs  Weight Used For Calorie Calculations: 92 kg (202 lb 13.2 oz)  Energy Calorie Requirements (kcal): 4035-4660 (20-25 kcal/ kg bw)  Energy Need Method: Kcal/kg  Protein Requirements:  (1.0-1.5gm/ kg bw)  Weight Used For Protein Calculations: 92 kg (202 lb 13.2 oz)   "   Estimated Fluid Requirement Method: RDA Method  RDA Method (mL): 1840       Reason for Assessment  Reason For Assessment: identified at risk by screening criteria  Relevant Medical History: MHx  chemo induced pancytopenia, splenomegaly, past lung Ca s/p lobectomy, CLL/NHL on chemo, neck surgery, DM, COPD, MAYDA, past EtOh use, GEJ ring, duodenal AVMs, past PEG presents for evaluation of fever/weakness.  Interdisciplinary Rounds: did not attend    Nutrition/Diet History  Patient Reported Diet/Restrictions/Preferences: general  Food Allergies: NKFA  Factors Affecting Nutritional Intake: chewing difficulties/inability to chew food, difficulty/impaired swallowing    Nutrition Risk Screen  Nutrition Risk Screen: no indicators present     MST Score: 0  Have you recently lost weight without trying?: No  Weight loss score: 0  Have you been eating poorly because of a decreased appetite?: No  Appetite score: 0       Weight History:  Wt Readings from Last 10 Encounters:   11/20/23 92 kg (202 lb 14.4 oz)   11/20/23 92 kg (202 lb 13.2 oz)   11/10/23 86.2 kg (190 lb 0.6 oz)   11/08/23 91.6 kg (201 lb 14.4 oz)   10/19/23 90.7 kg (200 lb)   10/18/23 91.3 kg (201 lb 4.8 oz)   10/17/23 91.1 kg (200 lb 14.4 oz)   09/29/23 89 kg (196 lb 1.6 oz)   09/28/23 88.6 kg (195 lb 6.4 oz)   09/27/23 89.3 kg (196 lb 12.8 oz)        Lab/Procedures/Meds: Pertinent Labs/Meds Reviewed    Medications:Pertinent Medications Reviewed  Scheduled Meds:   atorvastatin  80 mg Oral Daily    ceFEPime (MAXIPIME) IVPB  2 g Intravenous Q12H    famotidine  20 mg Oral Daily    ferrous sulfate  1 tablet Oral Daily    furosemide (LASIX) injection  20 mg Intravenous Once    gabapentin  600 mg Oral BID    losartan  50 mg Oral Daily    metoprolol tartrate  25 mg Oral BID    mupirocin   Nasal BID    tamsulosin  0.4 mg Oral Daily    traZODone  300 mg Oral QHS    valACYclovir  1,000 mg Oral Daily    vancomycin (VANCOCIN) IV (PEDS and ADULTS)  1,250 mg Intravenous Q24H      Continuous Infusions:   sodium chloride 0.9% Stopped (11/21/23 1150)     PRN Meds:.0.9%  NaCl infusion (for blood administration), 0.9%  NaCl infusion (for blood administration), 0.9%  NaCl infusion (for blood administration), 0.9%  NaCl infusion (for blood administration), acetaminophen, albuterol-ipratropium, dextrose 50%, dextrose 50%, furosemide (LASIX) injection, glucagon (human recombinant), glucose, glucose, HYDROcodone-acetaminophen, insulin aspart U-100, magnesium oxide, magnesium oxide, melatonin, ondansetron, potassium bicarbonate, potassium bicarbonate, potassium bicarbonate, potassium, sodium phosphates, potassium, sodium phosphates, potassium, sodium phosphates, sodium chloride 0.9%, Pharmacy to dose Vancomycin consult **AND** vancomycin - pharmacy to dose    Labs: Pertinent Labs Reviewed  Clinical Chemistry:  Recent Labs   Lab 11/20/23  0203 11/20/23  1308 11/21/23  0355    135* 136   K 4.7 4.7 4.5    106 105   CO2 25 23 25   * 223* 209*   BUN 38* 34* 29*   CREATININE 1.3 1.1 1.0   CALCIUM 8.3* 8.6* 8.2*   PROT 5.3* 5.6* 5.2*   ALBUMIN 3.4* 3.7 3.3*   BILITOT 0.5 0.4 0.6   ALKPHOS 32* 29* 26*   AST 19 19 13   ALT 17 17 14   ANIONGAP 8 6* 6*   MG 1.6 1.7 1.7   PHOS  --  3.6  --      CBC:   Recent Labs   Lab 11/21/23  0355   WBC 1.56*   RBC 2.10*   HGB 7.0*   HCT 20.2*   PLT 20*   MCV 96   MCH 33.3*   MCHC 34.7       Recent Labs   Lab 11/20/23  0203   BNP 76         Monitor and Evaluation  Food and Nutrient Intake: energy intake, food and beverage intake  Food and Nutrient Adminstration: diet order  Knowledge/Beliefs/Attitudes: food and nutrition knowledge/skill  Physical Activity and Function: nutrition-related ADLs and IADLs  Anthropometric Measurements: weight, weight change  Biochemical Data, Medical Tests and Procedures: electrolyte and renal panel  Nutrition-Focused Physical Findings: overall appearance     Nutrition Risk  Level of Risk/Frequency of Follow-up: moderate -  high     Nutrition Follow-Up  RD Follow-up?: Yes      Kavita Rhoades RD 11/21/2023 4:32 PM

## 2023-11-21 NOTE — HPI
"Per admit H&P: "Patient is a 84 yo male with PMH NSCLC (2004), diabetes, asthma, MAYDA on cpap, iron deficiency anemia, HTN and CLL presents to the emergency room with complaints fever and shortness of breath.  Wife at bedside able to supplement history.  Wife states today patient felt feverish and fatigue.  Wife states temperature was measured at home and it was around 103.  Patient continued to feel short of breath, was placed on CPAP and brought to the emergency room.  Patient was recently hospitalized at [facility] 2 weeks ago for similar symptoms, blood and urine cultures at that time were negative.  Patient follows with Dr. Bai outpatient.  Wife states that patient has not been taking any chemotherapy regimen given pancytopenia for the last month.  States that they have follow up with Dr. Don on the 27th to starting a new regimen.  Temperature in emergency room was 103.5, patient tachycardic.  CBC pending.  Troponin elevated 60.2, lactate 2.3, procalcitonin 0.611.  Patient was placed on BiPAP in the emergency room given tachypnea and shortness of breath, ABG showed pH 7.436, CO2 34.6, O2 145.  Wife states that patient was found to have saturation in the 80s when EMS arrived.  Patient given DuoNebs , IV fluids, and started on empiric IV antibiotics in the emergency room."    Patient requested a visit with me in order to prepare for when hospice might be appropriate.  "

## 2023-11-21 NOTE — CONSULTS
[facility]  Palliative Medicine  Consult Note    Patient Name: Conrad Kuhn  MRN: [id number]  Admission Date: 11/20/2023  Hospital Length of Stay: 1 days  Code Status: DNR   Attending Provider: Cal Bragg MD  Consulting Provider: Jacobo Alonzo MD  Primary Care Physician: Johnson Erazo MD  Principal Problem:Neutropenic fever    Patient information was obtained from patient, relative(s), past medical records, and primary team.      Inpatient consult to Palliative Care  Consult performed by: Jacobo Alonzo MD  Consult ordered by: Cal Bragg MD        Assessment/Plan:     Palliative Care  Goals of care, counseling/discussion  I reviewed his chart and discussed his case with his team.      I examined Mr. Kuhn at bedside.  He maintains capacity for complex medical decision-making.  His daughter joined us at bedside as well.      I introduced myself and my role as palliative care physician.  He was agreeable to speaking.    Frankly, he was not interested in a prolonged discussion.  He simply wanted to speak about hospice and when that type of care might be appropriate.    Nonetheless, I did take a moment to assess his understanding of his current condition.  He has an excellent in very accurate understanding.  He understands he is admitted and being treated for anemia and thrombocytopenia, and he understands that he was having a heart attack.  I affirmed his understanding.  He also understands that he is underlying leukemia.  He discussed the plans for treatment early next week.  Again, simply affirmed his understanding.    We discussed prognosis.  He understands that he was at high risk of dying.  He understands treatment for leukemia could result in his death.    He wishes to do everything he can to live.  He is hopeful that he will stop bleeding.  He is hopeful that his blood counts will stabilize.  He is hopeful that he will get to spend some time at home prior to his treatment  "for leukemia.      We did discuss hospice.  I discussed the philosophy of hospice in detail.  He was specifically asked to [facility] is hospice appropriate.  I let him know that hospice is appropriate when he no longer desires hospitalization and treatment for his underlying medical issues.  I also let him know that hospice is also appropriate when all other os options have been exhausted.  He asked how to get hospice involved.  I let him know we simply have to send a referral in hospice congenitally be set up with an a day.  He was very appreciative of this conversation.      Given his critically ill condition I did take a moment to address his code status.  He desires a DNR code status.  He asked that I place this order.  I have done this.      He has my card.  He knows that he in his family can contact me at any time.  I will be happy to walk along with them to get him the care that they need.    I appreciate being involved.  I hope I have been helpful.          Thank you for your consult. I will follow-up with patient. Please contact us if you have any additional questions.    Subjective:     HPI:   Per admit H&P: "Patient is a 82 yo male with PMH NSCLC (2004), diabetes, asthma, MAYDA on cpap, iron deficiency anemia, HTN and CLL presents to the emergency room with complaints fever and shortness of breath.  Wife at bedside able to supplement history.  Wife states today patient felt feverish and fatigue.  Wife states temperature was measured at home and it was around 103.  Patient continued to feel short of breath, was placed on CPAP and brought to the emergency room.  Patient was recently hospitalized at [facility] 2 weeks ago for similar symptoms, blood and urine cultures at that time were negative.  Patient follows with Dr. Bai outpatient.  Wife states that patient has not been taking any chemotherapy regimen given pancytopenia for the last month.  States that they have follow up with Dr. Don on the 27th to starting " "a new regimen.  Temperature in emergency room was 103.5, patient tachycardic.  CBC pending.  Troponin elevated 60.2, lactate 2.3, procalcitonin 0.611.  Patient was placed on BiPAP in the emergency room given tachypnea and shortness of breath, ABG showed pH 7.436, CO2 34.6, O2 145.  Wife states that patient was found to have saturation in the 80s when EMS arrived.  Patient given DuoNebs , IV fluids, and started on empiric IV antibiotics in the emergency room."    Patient requested a visit with me in order to prepare for when hospice might be appropriate.    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    Lymphoma, 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    MAYDA on CPAP     Pneumonia of both lungs due to infectious organism 06/29/2021    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;  Service: Endoscopy;  Laterality: N/A;    ESOPHAGOGASTRODUODENOSCOPY N/A 04/16/2022    Procedure: EGD (ESOPHAGOGASTRODUODENOSCOPY);  Surgeon: " Siddharth Garcia MD;  Location: [facility] ENDO;  Service: Endoscopy;  Laterality: N/A;    ESOPHAGOGASTRODUODENOSCOPY N/A 06/23/2022    Procedure: EGD (ESOPHAGOGASTRODUODENOSCOPY);  Surgeon: Jefferson Hyman MD;  Location: [facility] ENDO;  Service: Endoscopy;  Laterality: N/A;    ESOPHAGOGASTRODUODENOSCOPY N/A 7/6/2023    Procedure: EGD (ESOPHAGOGASTRODUODENOSCOPY);  Surgeon: Jefferson Hyman MD;  Location: [facility] ENDO;  Service: Endoscopy;  Laterality: N/A;    ESOPHAGOGASTRODUODENOSCOPY W/ PEG N/A 04/16/2022    Procedure: EGD, WITH PEG TUBE INSERTION;  Surgeon: Siddharth Garcia MD;  Location: [facility] ENDO;  Service: Endoscopy;  Laterality: N/A;    GASTROSTOMY TUBE PLACEMENT  04/16/2022    20 Fr (bumper initially at 3.5)    Hemorrhoid resection  1979    LUNG LOBECTOMY Left 2004    NECK SURGERY  04/08/2022    Anterior and Posterior C3-C7 fusion    OTHER SURGICAL HISTORY  2006    Chemotherapy s/p lyphoma        Portacath implantation and removal      reverse shoulder arthroplasty Right 03/2021       Review of patient's allergies indicates:  No Known Allergies    Medications:  Continuous Infusions:   sodium chloride 0.9% Stopped (11/21/23 1150)     Scheduled Meds:   atorvastatin  80 mg Oral Daily    ceFEPime (MAXIPIME) IVPB  2 g Intravenous Q12H    famotidine  20 mg Oral Daily    ferrous sulfate  1 tablet Oral Daily    furosemide (LASIX) injection  20 mg Intravenous Once    gabapentin  600 mg Oral BID    losartan  50 mg Oral Daily    metoprolol tartrate  25 mg Oral BID    mupirocin   Nasal BID    tamsulosin  0.4 mg Oral Daily    traZODone  300 mg Oral QHS    valACYclovir  1,000 mg Oral Daily    vancomycin (VANCOCIN) IV (PEDS and ADULTS)  1,250 mg Intravenous Q24H     PRN Meds:0.9%  NaCl infusion (for blood administration), 0.9%  NaCl infusion (for blood administration), 0.9%  NaCl infusion (for blood administration), 0.9%  NaCl infusion (for blood administration), acetaminophen, albuterol-ipratropium, dextrose 50%, dextrose 50%,  furosemide (LASIX) injection, glucagon (human recombinant), glucose, glucose, HYDROcodone-acetaminophen, insulin aspart U-100, magnesium oxide, magnesium oxide, melatonin, ondansetron, potassium bicarbonate, potassium bicarbonate, potassium bicarbonate, potassium, sodium phosphates, potassium, sodium phosphates, potassium, sodium phosphates, sodium chloride 0.9%, Pharmacy to dose Vancomycin consult **AND** vancomycin - pharmacy to dose    Family History       Problem Relation (Age of Onset)    Colon cancer Father    Stomach cancer Mother (80)          Tobacco Use    Smoking status: Former    Smokeless tobacco: Never   Substance and Sexual Activity    Alcohol use: Yes    Drug use: No    Sexual activity: Not Currently       Review of Systems  Objective:     Vital Signs (Most Recent):  Temp: (!) 101.5 °F (38.6 °C) (11/21/23 1644)  Pulse: 84 (11/21/23 1502)  Resp: (!) 24 (11/21/23 1502)  BP: 129/60 (11/21/23 1502)  SpO2: 97 % (11/21/23 1502) Vital Signs (24h Range):  Temp:  [98.3 °F (36.8 °C)-101.5 °F (38.6 °C)] 101.5 °F (38.6 °C)  Pulse:  [] 84  Resp:  [18-55] 24  SpO2:  [74 %-99 %] 97 %  BP: ()/(40-79) 129/60     Weight: 92 kg (202 lb 14.4 oz)  Body mass index is 26.05 kg/m².       Physical Exam  Vitals reviewed.   Constitutional:       General: He is not in acute distress.     Appearance: He is ill-appearing. He is not toxic-appearing.   HENT:      Head: Normocephalic and atraumatic.      Right Ear: External ear normal.      Left Ear: External ear normal.      Nose: Nose normal. No congestion.      Mouth/Throat:      Mouth: Mucous membranes are moist.      Pharynx: No oropharyngeal exudate.   Eyes:      General:         Right eye: No discharge.         Left eye: No discharge.      Extraocular Movements: Extraocular movements intact.   Cardiovascular:      Rate and Rhythm: Normal rate.   Pulmonary:      Effort: Pulmonary effort is normal. No respiratory distress.   Abdominal:      General: There is no  distension.      Palpations: Abdomen is soft.      Tenderness: There is no abdominal tenderness.   Skin:     General: Skin is warm.   Neurological:      General: No focal deficit present.      Mental Status: He is alert and oriented to person, place, and time.   Psychiatric:         Mood and Affect: Mood normal.         Behavior: Behavior normal.         Thought Content: Thought content normal.         Judgment: Judgment normal.            Review of Symptoms      Symptom Assessment (ESAS 0-10 Scale)  Pain:  0  Dyspnea:  2  Anxiety:  0  Nausea:  0  Depression:  0  Anorexia:  3  Fatigue:  5  Insomnia:  0  Restlessness:  0  Agitation:  0         Performance Status:  50    Living Arrangements:  Lives with spouse and Lives in home    Psychosocial/Cultural:   See Palliative Psychosocial Note: No  **Primary  to Follow**  Palliative Care  Consult: No        Advance Care Planning  Advance Directives:   Do Not Resuscitate Status: Yes      Decision Making:  Patient answered questions and Family answered questions  Goals of Care: The family endorses that what is most important right now is to focus on spending time at home, avoiding the hospital, remaining as independent as possible, and symptom/pain control    Accordingly, we have decided that the best plan to meet the patient's goals includes continuing with treatment         Significant Labs: BMP:   Recent Labs   Lab 11/21/23 0355   *      K 4.5      CO2 25   BUN 29*   CREATININE 1.0   CALCIUM 8.2*   MG 1.7     CBC:   Recent Labs   Lab 11/20/23  0203 11/20/23  1311 11/20/23  2307 11/21/23  0355   WBC 1.65* 0.87*  --  1.56*   HGB 6.4* 6.7* 7.2* 7.0*   HCT 18.4* 19.8* 20.7* 20.2*   PLT 19* 18*  --  20*     CBC:   Recent Labs   Lab 11/21/23 0355   WBC 1.56*   HGB 7.0*   HCT 20.2*   MCV 96   PLT 20*     BMP:  Recent Labs   Lab 11/21/23 0355   *      K 4.5      CO2 25   BUN 29*   CREATININE 1.0   CALCIUM 8.2*   MG  1.7     LFT:  Lab Results   Component Value Date    AST 13 11/21/2023    ALKPHOS 26 (L) 11/21/2023    BILITOT 0.6 11/21/2023     Albumin:   Albumin   Date Value Ref Range Status   11/21/2023 3.3 (L) 3.5 - 5.2 g/dL Final   06/12/2019 3.9 3.1 - 4.7 g/dL      Protein:   Total Protein   Date Value Ref Range Status   11/21/2023 5.2 (L) 6.0 - 8.4 g/dL Final     Lactic acid:   Lab Results   Component Value Date    LACTATE 2.5 (HH) 11/21/2023    LACTATE 1.0 11/21/2023       Significant Imaging: I have reviewed all pertinent imaging results/findings within the past 24 hours.      I spent a total of 75 minutes on the day of the visit. This includes face to face time in discussion of goals of care, symptom assessment, coordination of care and emotional support.  This also includes non-face to face time preparing to see the patient (eg, review of tests/imaging), obtaining and/or reviewing separately obtained history, documenting clinical information in the electronic or other health record, independently interpreting results and communicating results to the patient/family/caregiver, or care coordinator.    Jacobo Alonzo MD  Palliative Medicine  [facility]

## 2023-11-21 NOTE — PHYSICIAN QUERY
PT Name: Conrad Kuhn  MR #: [id number]    DOCUMENTATION CLARIFICATION      CDS/: Alicia Fung Pe               Contact information: [phone]     This form is a permanent document in the medical record.      Query Date: November 21, 2023    By submitting this query, we are merely seeking further clarification of documentation. Please utilize your independent clinical judgment when addressing the question(s) below.       Indicators Supporting Clinical Findings Location in Medical Record    Anemia, Thrombocytopenia, Neutropenia, Pancytopenia documented Patient admitted w/ neutropenic fever, Thrombocytopenia likely due to Chemotherapy,   Pancytopenia  Per 11/20 Progress note    H&H 16.4/18.4 s/p PRBC > 7/20.2 Per Lab report     WBC 1.65>1.95>2.10  Per Lab report     Platelets Plts 19>18>20 Per Lab Report     Transfusion(s) S/p 2 units PRBC and 1 dose of Platelets  Per Transfusion record     Treatments: Chemotherapy has been on hold for 1 month due to pancytopenia  Per Chart review     Acute/ Chronic illness Recent diagnosis of CLL/NHL - unfortunately it appears he may have transformed into an acute leukemia/AML   Per Heme/Onc Consult note    Pancytopenia is defined by:  Hb < 12g/dL (non-pregnant women) or <13g/dL (men) + ANC < 1800/microL + Platelets < 150,000/microL    The clinical guidelines noted are only a system guideline. It does not replace the providers clinical judgment.      Provider, please clarify the cause of the patient's pancytopenia associated with above clinical findings.    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    Reference:    KIAN Stock (n.d.). Approach to the adult with pancytopenia. UpToDate. Retrieved September 7, 2022, from  https://[email or web address]-to-the-adult-[email or web address]-pancytopenia?search=pancytopenia&source=search_result&selectedTitle=1~150&usage_type=default&display_rank=1    Form No. 92777

## 2023-11-21 NOTE — CARE UPDATE
11/20/23 2120   Patient Assessment/Suction   Level of Consciousness (AVPU) alert   Respiratory Effort Normal   Expansion/Accessory Muscles/Retractions no use of accessory muscles   All Lung Fields Breath Sounds coarse   Rhythm/Pattern, Respiratory assisted mechanically  (BIPAP)   Skin Integrity   $ Wound Care Tech Time 15 min   Area Observed Bridge of nose   Skin Appearance without discoloration   Barrier used? Transparent Film   PRE-TX-O2   Device (Oxygen Therapy) BIPAP   $ Is the patient on Low Flow Oxygen? Yes   Oxygen Concentration (%) 30   Pulse Oximetry Type Continuous   $ Pulse Oximetry - Multiple Charge Pulse Oximetry - Multiple   Aerosol Therapy   $ Aerosol Therapy Charges PRN treatment not required   Ready to Wean/Extubation Screen   FIO2<=50 (chart decimal) 0.3   Preset CPAP/BiPAP Settings   Mode Of Delivery BiPAP   $ Initial CPAP/BiPAP Setup? No   $ Is patient using? Yes   Size of Mask Medium/Large   Sized Appropriately? Yes   Equipment Type V60   Airway Device Type medium full face mask   Ipap 12   EPAP (cm H2O) 6   Pressure Support (cm H2O) 6   Set Rate (Breaths/Min) 12   ITime (sec) 1   Rise Time (sec) 3   Patient CPAP/BiPAP Settings   Timed Inspiration (Sec) 1   IPAP Rise Time (sec) 3   RR Total (Breaths/Min) 25   Tidal Volume (mL) 564   VE Minute Ventilation (L/min) 14.4 L/min   Peak Inspiratory Pressure (cm H2O) 12   TiTOT (%) 30   Total Leak (L/Min) 27   Patient Trigger - ST Mode Only (%) 67   CPAP/BiPAP Alarms   High Pressure (cm H2O) 45   Low Pressure (cm H2O) 5   Minute Ventilation (L/Min) 3   High RR (breaths/min) 45   Low RR (breaths/min) 8   Apnea (Sec) 20   Education   $ Education BiPAP;Bronchodilator;15 min   Respiratory Evaluation   $ Care Plan Tech Time 15 min

## 2023-11-21 NOTE — PROGRESS NOTES
"[facility]   Hematology/Oncology  Inpatient Progress Note          Patient Name: Conrad Kuhn  MRN: [id number]  Admission Date: 11/20/2023  Hospital Length of Stay: 1 days  Code Status: Full Code   Attending Provider: Cal Bragg MD  Consulting Provider: Drew Bai MD  Primary Care Physician: Johnson Erazo MD  Principal Problem:Neutropenic fever      Subjective:       Patient ID: Conrad Kuhn is a 83 y.o. male.    Chief Complaint: Shortness of Breath (BIB EMS s/p SOB and fever. Wife got a temp at home of 103.1, per EMS pt initially satting at 85% on a NRB, now satting 100% on CPAP. Initial respirations at 44 now at 28 on CPAP. Recently dx with lymphoma started chemo 2 months ago. HX of lung cx. A&O at this time. )        History Present Illness:      Patient remains in ICU;  laying in bed, awake and alert; he had some dark black tarry stools this am; getting plats again and another two units blood; GI consulted; discussed with ICU nursing staff    Review of Systems:  GEN: : fever; general malaise, fatigue and weakness   HEENT: normal with no HA's, sore throat, stiff neck, changes in vision  CV: normal with no CP, SOB, PND, MAYER or orthopnea  PULM: normal with no SOB, cough, hemoptysis, sputum or pleuritic pain  GI: normal with no abdominal pain, nausea, vomiting, constipation, diarrhea, melanotic stools, BRBPR, or hematemesis  : normal with no hematuria, dysuria  BREAST: normal with no mass, discharge, pain  SKIN: normal with no rash, erythema, bruising, or swelling      Objective:     Vitals:  Blood pressure (!) 98/50, pulse 69, temperature 98.3 °F (36.8 °C), temperature source Oral, resp. rate (!) 24, height 6' 2" (1.88 m), weight 92 kg (202 lb 14.4 oz), SpO2 (!) 93 %.    Physical Exam:  GEN: no apparent distress, comfortable; AAOx3; elderly/deconditioned looking  HEAD: atraumatic and normocephalic  EYES: no pallor, no icterus, PERRLA  ENT: OMM, no pharyngeal erythema, external " ears WNL; no nasal discharge; no thrush  NECK: no masses, thyroid normal, trachea midline, no LAD/LN's, supple  CV: RRR with no murmur; normal pulse; normal S1 and S2; no pedal edema  CHEST: Normal respiratory effort; CTAB; normal breath sounds; no wheeze or crackles; O2 per NC  ABDOM: nontender and nondistended; soft; normal bowel sounds; no rebound/guarding  MUSC/Skeletal: ROM normal; no crepitus; joints normal; no deformities or arthropathy  EXTREM: no clubbing, cyanosis, inflammation or swelling; IV RUE  SKIN: no rashes, lesions, ulcers, petechiae or subcutaneous nodules  : no keith  NEURO: grossly intact; motor/sensory WNL; AAOx3; no tremors  PSYCH: normal mood, affect and behavior  LYMPH: normal cervical, supraclavicular, axillary and groin LN's            Lab Review:        Lab Results   Component Value Date    WBC 1.56 (LL) 11/21/2023    HGB 7.0 (L) 11/21/2023    HCT 20.2 (LL) 11/21/2023    MCV 96 11/21/2023    PLT 20 (LL) 11/21/2023       CMP  Sodium   Date Value Ref Range Status   11/21/2023 136 136 - 145 mmol/L Final   06/12/2019 138 134 - 144 mmol/L      Potassium   Date Value Ref Range Status   11/21/2023 4.5 3.5 - 5.1 mmol/L Final     Chloride   Date Value Ref Range Status   11/21/2023 105 95 - 110 mmol/L Final   06/12/2019 99 98 - 110 mmol/L      CO2   Date Value Ref Range Status   11/21/2023 25 23 - 29 mmol/L Final     Glucose   Date Value Ref Range Status   11/21/2023 209 (H) 70 - 110 mg/dL Final   06/12/2019 179 (H) 70 - 99 mg/dL      BUN   Date Value Ref Range Status   11/21/2023 29 (H) 8 - 23 mg/dL Final     Creatinine   Date Value Ref Range Status   11/21/2023 1.0 0.5 - 1.4 mg/dL Final   06/12/2019 0.95 0.60 - 1.40 mg/dL      Calcium   Date Value Ref Range Status   11/21/2023 8.2 (L) 8.7 - 10.5 mg/dL Final     Total Protein   Date Value Ref Range Status   11/21/2023 5.2 (L) 6.0 - 8.4 g/dL Final     Albumin   Date Value Ref Range Status   11/21/2023 3.3 (L) 3.5 - 5.2 g/dL Final   06/12/2019 3.9  3.1 - 4.7 g/dL      Total Bilirubin   Date Value Ref Range Status   11/21/2023 0.6 0.1 - 1.0 mg/dL Final     Comment:     For infants and newborns, interpretation of results should be based  on gestational age, weight and in agreement with clinical  observations.    Premature Infant recommended reference ranges:  Up to 24 hours.............<8.0 mg/dL  Up to 48 hours............<12.0 mg/dL  3-5 days..................<15.0 mg/dL  6-29 days.................<15.0 mg/dL       Alkaline Phosphatase   Date Value Ref Range Status   11/21/2023 26 (L) 55 - 135 U/L Final     AST   Date Value Ref Range Status   11/21/2023 13 10 - 40 U/L Final     ALT   Date Value Ref Range Status   11/21/2023 14 10 - 44 U/L Final     Anion Gap   Date Value Ref Range Status   11/21/2023 6 (L) 8 - 16 mmol/L Final     eGFR   Date Value Ref Range Status   11/21/2023 >60.0 >60 mL/min/1.73 m^2 Final           Radiology Diagnostic Studies:     X-Ray Chest AP Portable [[id number]] Collected: 11/20/23 0201   Order Status: Completed Updated: 11/20/23 0734   Narrative:     Chest single view    CLINICAL DATA: Shortness of breath    FINDINGS: 2 AP views are compared to November 9. Heart size is normal. The mediastinum is unremarkable. Mild bilateral interstitial prominence is unchanged and likely chronic, with mild chronic blunting of the lateral costophrenic angle on the left. No active infiltrate or significant effusion is identified.    Healed fracture deformity of the left seventh rib is noted.    IMPRESSION:  1. Chronic findings as above. No acute radiographic abnormalities.         Assessment:     IMPRESSION:    (1) 83 y.o. male known to my oncology service with diagnosis of CLL/NHL for which he has been followed and treated in conjunction with Dr Monik Don at [facility] in [place]. He was recently hospitalized and discharged on 11/13. 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 He presented to ED at [facility] with SOB and febrile temp. He has been admitted to the hospitalist service and is currently in the ICU.      11/20/2023:  - wbc 1.65, hgb 6.4 and plats 19,000  - Unit of blood currently being transfused,   - Wife is at bedside. Discussed the recent diagnosis of AML and the planned f/u with Dr Don again next Monday on 11/27.  -  Discussed with ICU nursing staff.     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 Pneumonia due to aerobic bacteria    2. Shortness of breath    3. Fever, unspecified fever cause    4. Elevated troponin    5. Ruled out for myocardial infarction    6. Neutropenic fever           Plan:     PLAN:          Monitor labs daily and transfuse as needed - recommend another unit or 2 of blood today and a single donor platelet   IVF's and antibiotics as per primary team   He has another f/u with Dr Don at [facility] planned for 11/27  Cardiac as per cardiology directives  GI consulted for his suspected GIB which is exacerbating his anemia  Recommend consideration for ID and pulmonary consults if need be  Will follow with you               Drew Bai MD  Hematology/Oncology  [facility]

## 2023-11-21 NOTE — ASSESSMENT & PLAN NOTE
Patient's anemia is currently uncontrolled. Has received 2 units of PRBCs on 11/20/23 .  Patient received another 2 units of packed red blood cell in the setting of non ST elevation MI. Etiology likely d/t  blood disorder and chemotherapy  Current CBC reviewed-   Lab Results   Component Value Date    HGB 7.0 (L) 11/21/2023    HCT 20.2 (LL) 11/21/2023     Monitor serial CBC and transfuse if patient becomes hemodynamically unstable, symptomatic or H/H drops below 7/21.

## 2023-11-21 NOTE — SUBJECTIVE & OBJECTIVE
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    Lymphoma, 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    MAYDA on CPAP     Pneumonia of both lungs due to infectious organism 06/29/2021    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     Right-sided Bell's palsy 2009    Spondylolisthesis     Varicose veins of legs     Venous insufficiency        Past Surgical History:   Procedure Laterality Date    Athroscopic surgery      On Knee    BIOPSY OF TISSUE OF NECK Left 08/2015    Recuurence CLL/small cell lyphoma    CERVICAL FUSION  2022    ESOPHAGEAL DILATION      ESOPHAGOGASTRODUODENOSCOPY N/A 04/15/2022    Procedure: EGD (ESOPHAGOGASTRODUODENOSCOPY);  Surgeon: Siddharth Garcia MD;  Location: [facility];  Service: Endoscopy;  Laterality: N/A;    ESOPHAGOGASTRODUODENOSCOPY N/A 04/16/2022    Procedure: EGD (ESOPHAGOGASTRODUODENOSCOPY);  Surgeon: Siddharth Garcia MD;  Location: [facility];  Service: Endoscopy;  Laterality: N/A;    ESOPHAGOGASTRODUODENOSCOPY N/A 06/23/2022    Procedure: EGD (ESOPHAGOGASTRODUODENOSCOPY);  Surgeon: Jefferson Hyman MD;  Location: [facility];  Service: Endoscopy;  Laterality: N/A;    ESOPHAGOGASTRODUODENOSCOPY N/A 7/6/2023    Procedure: EGD (ESOPHAGOGASTRODUODENOSCOPY);  Surgeon: Jefferson Hyman MD;  Location: [facility];  Service: Endoscopy;  Laterality: N/A;    ESOPHAGOGASTRODUODENOSCOPY W/ PEG N/A 04/16/2022    Procedure: EGD, WITH PEG TUBE INSERTION;  Surgeon: Siddharth Garcia MD;  Location: [facility];   Service: Endoscopy;  Laterality: N/A;    GASTROSTOMY TUBE PLACEMENT  04/16/2022    20 Fr (bumper initially at 3.5)    Hemorrhoid resection  1979    LUNG LOBECTOMY Left 2004    NECK SURGERY  04/08/2022    Anterior and Posterior C3-C7 fusion    OTHER SURGICAL HISTORY  2006    Chemotherapy s/p lyphoma        Portacath implantation and removal      reverse shoulder arthroplasty Right 03/2021       Review of patient's allergies indicates:  No Known Allergies    Medications:  Continuous Infusions:   sodium chloride 0.9% Stopped (11/21/23 1150)     Scheduled Meds:   atorvastatin  80 mg Oral Daily    ceFEPime (MAXIPIME) IVPB  2 g Intravenous Q12H    famotidine  20 mg Oral Daily    ferrous sulfate  1 tablet Oral Daily    furosemide (LASIX) injection  20 mg Intravenous Once    gabapentin  600 mg Oral BID    losartan  50 mg Oral Daily    metoprolol tartrate  25 mg Oral BID    mupirocin   Nasal BID    tamsulosin  0.4 mg Oral Daily    traZODone  300 mg Oral QHS    valACYclovir  1,000 mg Oral Daily    vancomycin (VANCOCIN) IV (PEDS and ADULTS)  1,250 mg Intravenous Q24H     PRN Meds:0.9%  NaCl infusion (for blood administration), 0.9%  NaCl infusion (for blood administration), 0.9%  NaCl infusion (for blood administration), 0.9%  NaCl infusion (for blood administration), acetaminophen, albuterol-ipratropium, dextrose 50%, dextrose 50%, furosemide (LASIX) injection, glucagon (human recombinant), glucose, glucose, HYDROcodone-acetaminophen, insulin aspart U-100, magnesium oxide, magnesium oxide, melatonin, ondansetron, potassium bicarbonate, potassium bicarbonate, potassium bicarbonate, potassium, sodium phosphates, potassium, sodium phosphates, potassium, sodium phosphates, sodium chloride 0.9%, Pharmacy to dose Vancomycin consult **AND** vancomycin - pharmacy to dose    Family History       Problem Relation (Age of Onset)    Colon cancer Father    Stomach cancer Mother (80)          Tobacco Use    Smoking status: Former     Smokeless tobacco: Never   Substance and Sexual Activity    Alcohol use: Yes    Drug use: No    Sexual activity: Not Currently       Review of Systems  Objective:     Vital Signs (Most Recent):  Temp: (!) 101.5 °F (38.6 °C) (11/21/23 1644)  Pulse: 84 (11/21/23 1502)  Resp: (!) 24 (11/21/23 1502)  BP: 129/60 (11/21/23 1502)  SpO2: 97 % (11/21/23 1502) Vital Signs (24h Range):  Temp:  [98.3 °F (36.8 °C)-101.5 °F (38.6 °C)] 101.5 °F (38.6 °C)  Pulse:  [] 84  Resp:  [18-55] 24  SpO2:  [74 %-99 %] 97 %  BP: ()/(40-79) 129/60     Weight: 92 kg (202 lb 14.4 oz)  Body mass index is 26.05 kg/m².       Physical Exam  Vitals reviewed.   Constitutional:       General: He is not in acute distress.     Appearance: He is ill-appearing. He is not toxic-appearing.   HENT:      Head: Normocephalic and atraumatic.      Right Ear: External ear normal.      Left Ear: External ear normal.      Nose: Nose normal. No congestion.      Mouth/Throat:      Mouth: Mucous membranes are moist.      Pharynx: No oropharyngeal exudate.   Eyes:      General:         Right eye: No discharge.         Left eye: No discharge.      Extraocular Movements: Extraocular movements intact.   Cardiovascular:      Rate and Rhythm: Normal rate.   Pulmonary:      Effort: Pulmonary effort is normal. No respiratory distress.   Abdominal:      General: There is no distension.      Palpations: Abdomen is soft.      Tenderness: There is no abdominal tenderness.   Skin:     General: Skin is warm.   Neurological:      General: No focal deficit present.      Mental Status: He is alert and oriented to person, place, and time.   Psychiatric:         Mood and Affect: Mood normal.         Behavior: Behavior normal.         Thought Content: Thought content normal.         Judgment: Judgment normal.            Review of Symptoms      Symptom Assessment (ESAS 0-10 Scale)  Pain:  0  Dyspnea:  2  Anxiety:  0  Nausea:  0  Depression:  0  Anorexia:  3  Fatigue:   5  Insomnia:  0  Restlessness:  0  Agitation:  0         Performance Status:  50    Living Arrangements:  Lives with spouse and Lives in home    Psychosocial/Cultural:   See Palliative Psychosocial Note: No  **Primary  to Follow**  Palliative Care  Consult: No        Advance Care Planning   Advance Directives:   Do Not Resuscitate Status: Yes      Decision Making:  Patient answered questions and Family answered questions  Goals of Care: The family endorses that what is most important right now is to focus on spending time at home, avoiding the hospital, remaining as independent as possible, and symptom/pain control    Accordingly, we have decided that the best plan to meet the patient's goals includes continuing with treatment         Significant Labs: BMP:   Recent Labs   Lab 11/21/23  0355   *      K 4.5      CO2 25   BUN 29*   CREATININE 1.0   CALCIUM 8.2*   MG 1.7     CBC:   Recent Labs   Lab 11/20/23  0203 11/20/23  1311 11/20/23  2307 11/21/23  0355   WBC 1.65* 0.87*  --  1.56*   HGB 6.4* 6.7* 7.2* 7.0*   HCT 18.4* 19.8* 20.7* 20.2*   PLT 19* 18*  --  20*     CBC:   Recent Labs   Lab 11/21/23  0355   WBC 1.56*   HGB 7.0*   HCT 20.2*   MCV 96   PLT 20*     BMP:  Recent Labs   Lab 11/21/23  0355   *      K 4.5      CO2 25   BUN 29*   CREATININE 1.0   CALCIUM 8.2*   MG 1.7     LFT:  Lab Results   Component Value Date    AST 13 11/21/2023    ALKPHOS 26 (L) 11/21/2023    BILITOT 0.6 11/21/2023     Albumin:   Albumin   Date Value Ref Range Status   11/21/2023 3.3 (L) 3.5 - 5.2 g/dL Final   06/12/2019 3.9 3.1 - 4.7 g/dL      Protein:   Total Protein   Date Value Ref Range Status   11/21/2023 5.2 (L) 6.0 - 8.4 g/dL Final     Lactic acid:   Lab Results   Component Value Date    LACTATE 2.5 (HH) 11/21/2023    LACTATE 1.0 11/21/2023       Significant Imaging: I have reviewed all pertinent imaging results/findings within the past 24 hours.

## 2023-11-21 NOTE — PHYSICIAN QUERY
PT Name: Conrad Kuhn  MR #: [id number]     DOCUMENTATION CLARIFICATION     CDS/: Alicia Fung Pe               Contact information:[phone]   This form is a permanent document in the medical record.     Query Date: November 21, 2023    By submitting this query, we are merely seeking further clarification of documentation.  Please utilize your independent clinical judgment when addressing the question(s) below.  The Medical Record contains the following   Indicators   Supporting Clinical Findings Location in Medical Record    Respiratory failure Acute respiratory failure  Per ED documentation     Subjective Respiratory Signs/Symptoms: SOB, MAYER, Cough, etc. Patient w/ cough, SOB, difficulty breathing,  Per ER report from EMS,      Objective Respiratory Signs/Symptoms: Respiratory distress, Accessory muscle use, tachypnea, wheezing, etc. Tachypnea, respiratory distress  Per H&P     RR         O2 sat         O2 use Per EMS RR 44 spO2 80% > 85% NRB> started on Cpap   Per ER RR 34, spO2 100% Cpap> 89%-97% Bipap Per ER report    Blood Gas (ABG or VBG)  ABG showed pH 7.436, CO2 34.6, O2 145 FiO2 40 on Bipap Per Lab report    BiPAP/Intubation/Mechanical Ventilation Bipap Per progress notes and nursing flow sheets    Home O2, Oxygen Dependence None documented         The clinical guidelines noted are only a system guideline. It does not replace the providers clinical judgment.      [facility] Approved Diagnostic Criteria      Acute Respiratory Failure    Hypoxic: ABG pO2<60 mmHg or O2 sat of <91% on RA   AND/OR   Hypercapnic: ABG pCO2>50 mmHg with pH <7.35   AND   Respiratory symptoms documented (Subjective: SOB; Objective: Tachypnea, respiratory distress, increased work of breathing, unable to speak in complete sentences, labored breathing, use of accessory muscles, RR>26, cyanosis, dyspnea, wheezing, stridor, lethargy)      Chronic Respiratory Failure   Hypoxic: Continuous home oxygen    AND/OR   Hypercapnic:  Normal pH with high CO2 (ex. COPD)      Acute on Chronic Respiratory Failure   Hypoxic: ABG pO2>10mmHg below baseline OR ABG pO2<60 mmHg OR SpO2<91% on usual home O2  OR O2>2L/min over baseline home O2   AND/OR   Hypercapnic: ABG pCO2>50 mmHg OR pCO2>10mmHg over baseline and pH <7.35   AND   Respiratory symptoms documented      Acute Respiratory Distress Syndrome (ARDS) - an acute, diffuse, inflammatory form of lung injury. Suspect with progressive dyspnea, hypoxemic respiratory failure, and bilateral alveolar infiltrates on chest imaging within 6 to 72 hours of an inciting event.   Acute Respiratory Distress - Generally describes less severe respiratory symptoms (tachypnea, in respiratory distress, increased work of breathing, unable to speak in complete sentences, labored breathing, use of accessory muscles, RR> 24, cyanosis, dyspnea, wheezing, stridor, lethargy) without sufficient measurements (pO2, SpO2, pH, and pCO2) to meet criteria for respiratory failure)   Acute Respiratory Insufficiency - Generally describes less severe respiratory symptoms and measurements (pO2, SpO2, pH, and pCO2) not meeting criteria for respiratory failure         Provider, please specify the diagnosis or diagnoses associated with above clinical findings.   [    ] Acute Respiratory Failure with Hypoxia   [    ] Acute Respiratory Failure with Hypercapnia   [    ] Acute Respiratory Failure with Hypoxia and Hypercapnia   [ x   ] Acute Respiratory Distress   [    ] Acute Respiratory Insufficiency   [    ] Other Respiratory Diagnosis (please specify): _________________     Please document in your progress notes daily for the duration of treatment until resolved and include in your discharge summary.     Reference:    LAWSON Barraaz MD. (2020, March 13). Acute respiratory distress syndrome: Clinical features, diagnosis, and complications in adults ([id number] RENETTA Duque MD & [id number] KRYSTA Javier MD, Eds.).  Retrieved November 13, 2020, from [email or web address]_[email or web address]&usage_type=default&display_rank=1  Form No. [id number]

## 2023-11-21 NOTE — PROGRESS NOTES
Formerly [facility], [facility] Medicine  Progress Note    Patient Name: Conrad Kuhn  MRN: [id number]  Patient Class: IP- Inpatient   Admission Date: 11/20/2023  Length of Stay: 1 days  Attending Physician: Cal Bragg MD  Primary Care Provider: Johnson Erazo MD        Subjective:     Principal Problem:Neutropenic fever        HPI:  Patient is a 84 yo male with PMH NSCLC (2004), diabetes, asthma, MAYDA on cpap, iron deficiency anemia, HTN and CLL presents to the emergency room with complaints fever and shortness of breath.  Wife at bedside able to supplement history.  Wife states today patient felt feverish and fatigue.  Wife states temperature was measured at home and it was around 103.  Patient continued to feel short of breath, was placed on CPAP and brought to the emergency room.  Patient was recently hospitalized at [facility] 2 weeks ago for similar symptoms, blood and urine cultures at that time were negative.  Patient follows with Dr. Bai outpatient.  Wife states that patient has not been taking any chemotherapy regimen given pancytopenia for the last month.  States that they have follow up with Dr. Don on the 27th to starting a new regimen.  Temperature in emergency room was 103.5, patient tachycardic.  CBC pending.  Troponin elevated 60.2, lactate 2.3, procalcitonin 0.611.  Patient was placed on BiPAP in the emergency room given tachypnea and shortness of breath, ABG showed pH 7.436, CO2 34.6, O2 145.  Wife states that patient was found to have saturation in the 80s when EMS arrived.  Patient given DuoNebs , IV fluids, and started on empiric IV antibiotics in the emergency room.    Overview/Hospital Course:  Patient was admitted to intensive care unit patient was placed on neutropenic precautions while patient's cell counts were closely monitored.  Patient required packed red blood cell transfusion for symptomatic anemia.  No bleeding complications noted.  Patient was placed on prophylactic  antibiotic therapy including vancomycin and cefepime.  Patient's oncologist Dr. Bai was consulted.  Microbiology results were closely followed.  Troponin levels were trended which suggested ongoing non ST elevation MI. due to low platelet count patient was unable to receive antiplatelet therapy and anticoagulation therapy.  Cardiologist was consulted.  Patient underwent transthoracic echocardiogram.  Patient has received a total of 4 units of packed red blood cells and 2 units of platelets.  Lactic acid was trended.  Acute kidney injury improved with IV fluid hydration.    Interval History:  Patient sitting in chair, feeling better.  Patient received 2 units of packed red blood cells and 1 unit of platelet transfusion yesterday.  Denies any chest pain or shortness of breath.  Continues to have pancytopenia.  On neutropenic precautions.  Hematology oncology service closely following.  Cardiologist evaluation pending.  No bleeding complications noted. No abdominal pain or nausea or vomiting.    Review of Systems   Constitutional:  Positive for chills, fatigue and fever.   HENT:  Negative for dental problem and ear pain.    Respiratory:  Positive for shortness of breath. Negative for cough and chest tightness.    Cardiovascular:  Negative for chest pain and palpitations.   Gastrointestinal:  Negative for abdominal distention, abdominal pain and diarrhea.   Genitourinary:  Negative for difficulty urinating and dysuria.   Musculoskeletal:  Negative for back pain.   Neurological:  Negative for dizziness and headaches.   Psychiatric/Behavioral:  Negative for agitation and behavioral problems.      Objective:     Vital Signs (Most Recent):  Temp: 99.4 °F (37.4 °C) (11/21/23 0715)  Pulse: 93 (11/21/23 0901)  Resp: (!) 27 (11/21/23 0900)  BP: (!) 149/65 (11/21/23 0901)  SpO2: 99 % (11/21/23 0900) Vital Signs (24h Range):  Temp:  [98.3 °F (36.8 °C)-100.7 °F (38.2 °C)] 99.4 °F (37.4 °C)  Pulse:  [] 93  Resp:   [18-38] 27  SpO2:  [76 %-100 %] 99 %  BP: ()/(40-90) 149/65     Weight: 92 kg (202 lb 14.4 oz)  Body mass index is 26.05 kg/m².    Intake/Output Summary (Last 24 hours) at 11/21/2023 0923  Last data filed at 11/21/2023 0606  Gross per 24 hour   Intake 2938.46 ml   Output 3600 ml   Net -661.54 ml           Physical Exam  Constitutional:       General: He is in acute distress.   HENT:      Head: Normocephalic and atraumatic.      Right Ear: External ear normal.      Left Ear: External ear normal.      Mouth/Throat:      Mouth: Mucous membranes are dry.   Cardiovascular:      Rate and Rhythm: Regular rhythm. Tachycardia present.   Pulmonary:      Effort: Respiratory distress present.      Breath sounds: No wheezing.   Abdominal:      General: Abdomen is flat.      Palpations: Abdomen is soft.   Musculoskeletal:         General: Normal range of motion.   Skin:     General: Skin is warm and dry.   Neurological:      General: No focal deficit present.      Mental Status: He is alert and oriented to person, place, and time.             Significant Labs: All pertinent labs within the past 24 hours have been reviewed.  CBC:   Recent Labs   Lab 11/20/23  0203 11/20/23  1311 11/20/23  2307 11/21/23  0355   WBC 1.65* 0.87*  --  1.56*   HGB 6.4* 6.7* 7.2* 7.0*   HCT 18.4* 19.8* 20.7* 20.2*   PLT 19* 18*  --  20*       CMP:   Recent Labs   Lab 11/20/23  0203 11/20/23  1308 11/21/23  0355    135* 136   K 4.7 4.7 4.5    106 105   CO2 25 23 25   * 223* 209*   BUN 38* 34* 29*   CREATININE 1.3 1.1 1.0   CALCIUM 8.3* 8.6* 8.2*   PROT 5.3* 5.6* 5.2*   ALBUMIN 3.4* 3.7 3.3*   BILITOT 0.5 0.4 0.6   ALKPHOS 32* 29* 26*   AST 19 19 13   ALT 17 17 14   ANIONGAP 8 6* 6*       Lactic Acid:   Recent Labs   Lab 11/20/23  1744 11/20/23  2307 11/21/23  0355   LACTATE 1.4 0.6 1.0       Troponin:   Recent Labs   Lab 11/20/23  1744 11/20/23 2307 11/21/23  0355   TROPONINIHS 318.2* 233.6* 145.5*       TSH:   Recent Labs   Lab  "10/17/23  0947   TSH 0.741       Urine Culture: No results for input(s): "LABURIN" in the last 48 hours.  Urine Studies:   Recent Labs   Lab 11/20/23  0355   COLORU Yellow   APPEARANCEUA Clear   PHUR 5.0   SPECGRAV 1.020   PROTEINUA Negative   GLUCUA Negative   KETONESU Trace*   BILIRUBINUA Negative   OCCULTUA Negative   NITRITE Negative   UROBILINOGEN Negative   LEUKOCYTESUR Negative       Microbiology Results (last 7 days)       Procedure Component Value Units Date/Time    Blood culture x two cultures. Draw prior to antibiotics. [[id number]] Collected: 11/20/23 0233    Order Status: Completed Specimen: Blood from Antecubital, Right Hand Updated: 11/21/23 0432     Blood Culture, Routine No Growth to date      No Growth to date    Narrative:      Aerobic and anaerobic    Blood culture x two cultures. Draw prior to antibiotics. [[id number]] Collected: 11/20/23 0233    Order Status: Completed Specimen: Blood from Antecubital, Right Arm Updated: 11/21/23 0432     Blood Culture, Routine No Growth to date      No Growth to date    Narrative:      Aerobic and anaerobic    Respiratory Infection Panel (PCR), Nasopharyngeal [[id number]] Collected: 11/20/23 0208    Order Status: Completed Specimen: Nasopharyngeal Swab Updated: 11/20/23 0302     Respiratory Infection Panel Source NP swab     Adenovirus Not Detected     Coronavirus 229E, Common Cold Virus Not Detected     Coronavirus HKU1, Common Cold Virus Not Detected     Coronavirus NL63, Common Cold Virus Not Detected     Coronavirus OC43, Common Cold Virus Not Detected     Comment: Coronavirus strains 229E, HKU1, NL63, and OC43 can cause the common   cold   and are not associated with the respiratory disease outbreak caused   by  the COVID-19 (SARS-CoV-2 novel Coronavirus) strain.           SARS-CoV2 (COVID-19) Qualitative PCR Not Detected     Human Metapneumovirus Not Detected     Human Rhinovirus/Enterovirus Not Detected     Influenza A (subtypes H1, H1-2009,H3) Not " Detected     Influenza B Not Detected     Parainfluenza Virus 1 Not Detected     Parainfluenza Virus 2 Not Detected     Parainfluenza Virus 3 Not Detected     Parainfluenza Virus 4 Not Detected     Respiratory Syncytial Virus Not Detected     Bordetella Parapertussis (BK1899) Not Detected     Bordetella pertussis (ptxP) Not Detected     Chlamydia pneumoniae Not Detected     Mycoplasma pneumoniae Not Detected     Comment: Respiratory Infection Panel testing performed by Multiplex PCR.       Narrative:      Respiratory Infection Panel source->NP Swab          Significant Imaging:   CXR: Chronic findings as above. No acute radiographic abnormalities.     ECHO:    Left Ventricle: The left ventricle is normal in size. Normal wall thickness. Normal wall motion. There is normal systolic function with a visually estimated ejection fraction of 60 - 65%. There is normal diastolic function. E/A ratio is 0.87.    Right Ventricle: Normal right ventricular cavity size. Wall thickness is normal. Right ventricle wall motion  is normal. Systolic function is normal.    Left Atrium: Left atrium is moderately dilated.    Aortic Valve: There is mild aortic valve sclerosis.    Tricuspid Valve: There is moderate regurgitation. There is pulmonary hypertension. The estimated PA systolic pressure is at least 38 mmHg.    IVC/SVC: Normal venous pressure at 3 mmHg.    Overall the study quality was technically difficult. The study was difficult due to patient's clinical status.    Assessment/Plan:      * Neutropenic fever  Start neutropenic precautions.  Temperature in the emergency room on 103.5 patient given Tylenol.  Continue to monitor vitals   Patient reports being off chemotherapy for the last month given pancytopenia  Reviewed chest x-ray, no obvious consolidation noted  Continue intravenous cefepime and vancomycin.  Pharmacist to dose vancomycin.   Continue with IV fluids  Follow up blood cultures and urine cultures   Respiratory  infectious panel negative  Repeat lactate        Symptomatic anemia  Patient's anemia is currently uncontrolled. Has received 2 units of PRBCs on 11/20/23 .  Patient received another 2 units of packed red blood cell in the setting of non ST elevation MI. Etiology likely d/t  blood disorder and chemotherapy  Current CBC reviewed-   Lab Results   Component Value Date    HGB 7.0 (L) 11/21/2023    HCT 20.2 (LL) 11/21/2023     Monitor serial CBC and transfuse if patient becomes hemodynamically unstable, symptomatic or H/H drops below 7/21.    Shortness of breath  Likely fever induced, oxygen saturations stable on room air.  Patient feels tachypneic  Continue with BiPAP, wean as tolerated  NPO while on BiPAP    Troponin level elevated  Likely demand, will trend troponin.  Patient without chest pain. NSTEMI.   EKG without ST elevations.  Follow transthoracic echocardiogram.  Follow Cardiology recommendations.      Thrombocytopenia  Patient was found to have thrombocytopenia, the likely etiology is secondary to medications- chemotherapy , will monitor the platelets Daily.  Transfuse another unit of platelet transfusion today as per Hematology recommendations.  Earlier patient received on November 20, 2023.  The patient's platelet results have been reviewed and are listed below.  Recent Labs   Lab 11/21/23  0355   PLT 20*           Herpes stomatitis  Continue with outpatient valacyclovir    CLL (chronic lymphocytic leukemia)  Follows up with Dr. Bai and Dr. Don outpatient.  Dr. Bai to evaluate patient today.  Not on therapy currently   New regimen started on 10/27/23.        VTE Risk Mitigation (From admission, onward)           Ordered     IP VTE HIGH RISK PATIENT  Once         11/20/23 0318     Place sequential compression device  Until discontinued         11/20/23 0318                    Discharge Planning   MARTELL: 11/25/2023     Code Status: Full Code   Is the patient medically ready for discharge?:      Reason for patient still in hospital (select all that apply): Patient trending condition and Consult recommendations  Discharge Plan A: Home Health            Cal Bragg MD  Department of Hospital Medicine   [facility]

## 2023-11-22 PROBLEM — K92.2 GI BLEEDING: Status: ACTIVE | Noted: 2023-01-01

## 2023-11-22 NOTE — PLAN OF CARE
Safety precautions remain in effect. Patient able to properly demonstrate use of call light.  Vital signs stable with no fever. Cardiac monitor showing SR with first degree AV block.  Received two units of blood today.  Patient moving well, slightly unsteady on feet when going to the bathroom.

## 2023-11-22 NOTE — NURSING
Nurses Note -- 4 Eyes      11/22/2023   5:55 PM      Skin assessed during: Daily Assessment      [x] No Altered Skin Integrity Present    []Prevention Measures Documented      [] Yes- Altered Skin Integrity Present or Discovered   [] LDA Added if Not in Epic (Describe Wound)   [] New Altered Skin Integrity was Present on Admit and Documented in LDA   [] Wound Image Taken    Wound Care Consulted? No    Attending Nurse:  Amelia Lowe RN/Staff Member:  EMELINA Winslow

## 2023-11-22 NOTE — ASSESSMENT & PLAN NOTE
Patient's anemia is currently uncontrolled.  Status post 4 units of packed red blood cell transfusion.  Patient also received 2 units of platelets.  Patient will be transfused another unit of packed red blood cells today.  Etiology likely d/t  blood disorder and chemotherapy  Current CBC reviewed-   Lab Results   Component Value Date    HGB 8.0 (L) 11/22/2023    HCT 24.2 (L) 11/22/2023     Monitor serial CBC and transfuse if patient becomes hemodynamically unstable, symptomatic or H/H drops below 7/21.

## 2023-11-22 NOTE — PLAN OF CARE
Problem: Adult Inpatient Plan of Care  Goal: Plan of Care Review  Outcome: Ongoing, Progressing     Problem: Fall Injury Risk  Goal: Absence of Fall and Fall-Related Injury  Outcome: Ongoing, Progressing     Problem: Cardiac Output Decreased  Goal: Effective Cardiac Output  Outcome: Ongoing, Progressing     Problem: Adjustment to Illness (Acute Coronary Syndrome)  Goal: Optimal Adaptation to Illness  Outcome: Ongoing, Progressing     Problem: Hemodynamic Instability (Acute Coronary Syndrome)  Goal: Effective Cardiac Pump Function  Outcome: Ongoing, Progressing     Problem: Tissue Perfusion (Acute Coronary Syndrome)  Goal: Adequate Tissue Perfusion  Outcome: Ongoing, Progressing   Pt sat up in chair from breakfast til lunch. Started having BM's. Had total of 5 this shift. Able to get up to toilet with stand by assist. On bipap at present.

## 2023-11-22 NOTE — PROGRESS NOTES
Pharmacist Renal Dose Adjustment Note    Conrad Kuhn is a 83 y.o. male being treated with the medication Famotidine     Patient Data:    Vital Signs (Most Recent):  Temp: 97.8 °F (36.6 °C) (11/22/23 0300)  Pulse: 61 (11/22/23 0600)  Resp: (!) 23 (11/22/23 0600)  BP: 132/61 (11/22/23 0600)  SpO2: 98 % (11/22/23 0600) Vital Signs (72h Range):  Temp:  [97.5 °F (36.4 °C)-103.5 °F (39.7 °C)]   Pulse:  []   Resp:  [18-55]   BP: ()/()   SpO2:  [74 %-100 %]      Recent Labs   Lab 11/20/23  1308 11/21/23  0355 11/22/23  0259   CREATININE 1.1 1.0 0.8     Serum creatinine: 0.8 mg/dL 11/22/23 0259  Estimated creatinine clearance: 79.9 mL/min    Medication:Famotidine 20 mg daily will be changed to 20 mg every 12 hours per Renal Dose Adjustment by Pharmacy Policy     Pharmacist's Name: Sera Ariza  Pharmacist's Extension: [phone]

## 2023-11-22 NOTE — PROGRESS NOTES
[facility] Medicine  Progress Note    Patient Name: Conrad Kuhn  MRN: [id number]  Patient Class: IP- Inpatient   Admission Date: 11/20/2023  Length of Stay: 2 days  Attending Physician: Cal Bragg MD  Primary Care Provider: Johnson Erazo MD        Subjective:     Principal Problem:Neutropenic fever        HPI:  Patient is a 82 yo male with PMH NSCLC (2004), diabetes, asthma, MAYDA on cpap, iron deficiency anemia, HTN and CLL presents to the emergency room with complaints fever and shortness of breath.  Wife at bedside able to supplement history.  Wife states today patient felt feverish and fatigue.  Wife states temperature was measured at home and it was around 103.  Patient continued to feel short of breath, was placed on CPAP and brought to the emergency room.  Patient was recently hospitalized at [facility] 2 weeks ago for similar symptoms, blood and urine cultures at that time were negative.  Patient follows with Dr. Bai outpatient.  Wife states that patient has not been taking any chemotherapy regimen given pancytopenia for the last month.  States that they have follow up with Dr. Don on the 27th to starting a new regimen.  Temperature in emergency room was 103.5, patient tachycardic.  CBC pending.  Troponin elevated 60.2, lactate 2.3, procalcitonin 0.611.  Patient was placed on BiPAP in the emergency room given tachypnea and shortness of breath, ABG showed pH 7.436, CO2 34.6, O2 145.  Wife states that patient was found to have saturation in the 80s when EMS arrived.  Patient given DuoNebs , IV fluids, and started on empiric IV antibiotics in the emergency room.    Overview/Hospital Course:  Patient was admitted to intensive care unit patient was placed on neutropenic precautions while patient's cell counts were closely monitored.  Patient required packed red blood cell transfusion for symptomatic anemia.  No bleeding complications noted.  Patient was placed on prophylactic  antibiotic therapy including vancomycin and cefepime.  Patient's oncologist Dr. Bai was consulted.  Microbiology results were closely followed.  Troponin levels were trended which suggested ongoing non ST elevation MI. due to low platelet count patient was unable to receive antiplatelet therapy and anticoagulation therapy.  Cardiologist was consulted.  Patient underwent transthoracic echocardiogram.  Patient has received a total of 4 units of packed red blood cells and 2 units of platelets.  Lactic acid was trended.  Acute kidney injury improved with IV fluid hydration.    Interval History: Patient is scheduled for Push Enteroscopy today. Patient is s/p 4 units of PRBC and 2 units of Platelets. Tamx 101.5F, microbiology results are negative. Denies any chest pain or shortness of breath.  Continues to have pancytopenia.  On neutropenic precautions.  Hematology oncology service closely following. No abdominal pain or nausea or vomiting.  Significant other at bedside.    Review of Systems   Constitutional:  Positive for chills, fatigue and fever.   HENT:  Negative for dental problem and ear pain.    Respiratory:  Positive for shortness of breath. Negative for cough and chest tightness.    Cardiovascular:  Negative for chest pain and palpitations.   Gastrointestinal:  Negative for abdominal distention, abdominal pain and diarrhea.   Genitourinary:  Negative for difficulty urinating and dysuria.   Musculoskeletal:  Negative for back pain.   Neurological:  Negative for dizziness and headaches.   Psychiatric/Behavioral:  Negative for agitation and behavioral problems.      Objective:     Vital Signs (Most Recent):  Temp: 97.8 °F (36.6 °C) (11/22/23 0701)  Pulse: 62 (11/22/23 0826)  Resp: 20 (11/22/23 0826)  BP: (!) 175/93 (11/22/23 0800)  SpO2: 100 % (11/22/23 0826) Vital Signs (24h Range):  Temp:  [97.5 °F (36.4 °C)-101.5 °F (38.6 °C)] 97.8 °F (36.6 °C)  Pulse:  [59-90] 62  Resp:  [18-55] 20  SpO2:  [74 %-100 %] 100  %  BP: ()/() 175/93     Weight: 90.2 kg (198 lb 13.7 oz)  Body mass index is 25.53 kg/m².    Intake/Output Summary (Last 24 hours) at 11/22/2023 0953  Last data filed at 11/22/2023 0633  Gross per 24 hour   Intake 3535.05 ml   Output 4100 ml   Net -564.95 ml           Physical Exam  Constitutional:       General: He is in acute distress.   HENT:      Head: Normocephalic and atraumatic.      Right Ear: External ear normal.      Left Ear: External ear normal.      Mouth/Throat:      Mouth: Mucous membranes are dry.   Cardiovascular:      Rate and Rhythm: Regular rhythm. Tachycardia present.   Pulmonary:      Effort: Respiratory distress present.      Breath sounds: No wheezing.   Abdominal:      General: Abdomen is flat.      Palpations: Abdomen is soft.   Musculoskeletal:         General: Normal range of motion.   Skin:     General: Skin is warm and dry.   Neurological:      General: No focal deficit present.      Mental Status: He is alert and oriented to person, place, and time.             Significant Labs: All pertinent labs within the past 24 hours have been reviewed.  CBC:   Recent Labs   Lab 11/20/23  1311 11/20/23  2307 11/21/23  0355 11/22/23  0259   WBC 0.87*  --  1.56* 2.23*   HGB 6.7* 7.2* 7.0* 8.0*   HCT 19.8* 20.7* 20.2* 24.2*   PLT 18*  --  20* 36*       CMP:   Recent Labs   Lab 11/20/23  1308 11/21/23  0355 11/22/23  0259   * 136 140   K 4.7 4.5 3.8    105 106   CO2 23 25 24   * 209* 141*   BUN 34* 29* 24*   CREATININE 1.1 1.0 0.8   CALCIUM 8.6* 8.2* 8.3*   PROT 5.6* 5.2* 5.3*   ALBUMIN 3.7 3.3* 3.3*   BILITOT 0.4 0.6 0.7   ALKPHOS 29* 26* 28*   AST 19 13 14   ALT 17 14 16   ANIONGAP 6* 6* 10       Lactic Acid:   Recent Labs   Lab 11/21/23  1016 11/21/23  1426 11/21/23 2014   LACTATE 2.5* 2.6* 0.7       Troponin:   Recent Labs   Lab 11/20/23  2307 11/21/23  0355 11/21/23  1016   TROPONINIHS 233.6* 145.5* 90.5*       TSH:   Recent Labs   Lab 10/17/23  0947   TSH 0.741  "      Urine Culture: No results for input(s): "LABURIN" in the last 48 hours.  Urine Studies:   No results for input(s): "COLORU", "APPEARANCEUA", "PHUR", "SPECGRAV", "PROTEINUA", "GLUCUA", "KETONESU", "BILIRUBINUA", "OCCULTUA", "NITRITE", "UROBILINOGEN", "LEUKOCYTESUR", "RBCUA", "WBCUA", "BACTERIA", "SQUAMEPITHEL", "HYALINECASTS" in the last 48 hours.    Invalid input(s): "WRIGHTSUR"    Microbiology Results (last 7 days)       Procedure Component Value Units Date/Time    Blood culture x two cultures. Draw prior to antibiotics. [[id number]] Collected: 11/20/23 0233    Order Status: Completed Specimen: Blood from Antecubital, Right Hand Updated: 11/22/23 0432     Blood Culture, Routine No Growth to date      No Growth to date      No Growth to date    Narrative:      Aerobic and anaerobic    Blood culture x two cultures. Draw prior to antibiotics. [[id number]] Collected: 11/20/23 0233    Order Status: Completed Specimen: Blood from Antecubital, Right Arm Updated: 11/22/23 0432     Blood Culture, Routine No Growth to date      No Growth to date      No Growth to date    Narrative:      Aerobic and anaerobic    Respiratory Infection Panel (PCR), Nasopharyngeal [[id number]] Collected: 11/20/23 0208    Order Status: Completed Specimen: Nasopharyngeal Swab Updated: 11/20/23 0302     Respiratory Infection Panel Source NP swab     Adenovirus Not Detected     Coronavirus 229E, Common Cold Virus Not Detected     Coronavirus HKU1, Common Cold Virus Not Detected     Coronavirus NL63, Common Cold Virus Not Detected     Coronavirus OC43, Common Cold Virus Not Detected     Comment: Coronavirus strains 229E, HKU1, NL63, and OC43 can cause the common   cold   and are not associated with the respiratory disease outbreak caused   by  the COVID-19 (SARS-CoV-2 novel Coronavirus) strain.           SARS-CoV2 (COVID-19) Qualitative PCR Not Detected     Human Metapneumovirus Not Detected     Human Rhinovirus/Enterovirus Not Detected     " Influenza A (subtypes H1, H1-2009,H3) Not Detected     Influenza B Not Detected     Parainfluenza Virus 1 Not Detected     Parainfluenza Virus 2 Not Detected     Parainfluenza Virus 3 Not Detected     Parainfluenza Virus 4 Not Detected     Respiratory Syncytial Virus Not Detected     Bordetella Parapertussis (YF1861) Not Detected     Bordetella pertussis (ptxP) Not Detected     Chlamydia pneumoniae Not Detected     Mycoplasma pneumoniae Not Detected     Comment: Respiratory Infection Panel testing performed by Multiplex PCR.       Narrative:      Respiratory Infection Panel source->NP Swab          Significant Imaging:   CXR: Chronic findings as above. No acute radiographic abnormalities.     ECHO:    Left Ventricle: The left ventricle is normal in size. Normal wall thickness. Normal wall motion. There is normal systolic function with a visually estimated ejection fraction of 60 - 65%. There is normal diastolic function. E/A ratio is 0.87.    Right Ventricle: Normal right ventricular cavity size. Wall thickness is normal. Right ventricle wall motion  is normal. Systolic function is normal.    Left Atrium: Left atrium is moderately dilated.    Aortic Valve: There is mild aortic valve sclerosis.    Tricuspid Valve: There is moderate regurgitation. There is pulmonary hypertension. The estimated PA systolic pressure is at least 38 mmHg.    IVC/SVC: Normal venous pressure at 3 mmHg.    Overall the study quality was technically difficult. The study was difficult due to patient's clinical status.    Assessment/Plan:      * Neutropenic fever  Start neutropenic precautions.  Temperature in the emergency room on 103.5 patient given Tylenol.  Continue to monitor vitals   Patient reports being off chemotherapy for the last month given pancytopenia  Reviewed chest x-ray, no obvious consolidation noted  Continue intravenous cefepime and vancomycin.  Pharmacist to dose vancomycin.   Continue with IV fluids  Follow up blood  cultures and urine cultures   Respiratory infectious panel negative  Repeat lactate        GI bleeding  Likely contributed by thrombocytopenia.  Patient to undergo push enteroscopy today.  Follow GI recommendations.  Patient to receive 1 unit of packed red blood cell.  Continue proton pump inhibitor use.      Symptomatic anemia  Patient's anemia is currently uncontrolled.  Status post 4 units of packed red blood cell transfusion.  Patient also received 2 units of platelets.  Patient will be transfused another unit of packed red blood cells today.  Etiology likely d/t  blood disorder and chemotherapy  Current CBC reviewed-   Lab Results   Component Value Date    HGB 8.0 (L) 11/22/2023    HCT 24.2 (L) 11/22/2023     Monitor serial CBC and transfuse if patient becomes hemodynamically unstable, symptomatic or H/H drops below 7/21.    Shortness of breath  Likely fever induced, oxygen saturations stable on room air.  Patient feels tachypneic  Continue with BiPAP, wean as tolerated  NPO while on BiPAP    Troponin level elevated  Likely demand, will trend troponin.  Patient without chest pain. NSTEMI.   EKG without ST elevations.  Follow transthoracic echocardiogram.  Follow Cardiology recommendations.      Thrombocytopenia  Patient was found to have thrombocytopenia, the likely etiology is secondary to medications- chemotherapy , will monitor the platelets Daily.  Transfuse another unit of platelet transfusion today as per Hematology recommendations.  Earlier patient received on November 20, 2023.  The patient's platelet results have been reviewed and are listed below.  Recent Labs   Lab 11/21/23  0355   PLT 20*           Herpes stomatitis  Continue with outpatient valacyclovir    CLL (chronic lymphocytic leukemia)  Follows up with Dr. Bai and Dr. Don outpatient.  Dr. Bai to evaluate patient today.  Not on therapy currently   New regimen started on 10/27/23.      Palliative Medicine following.  Patient has  made himself DNR.  Discussed with significant other at bedside, answered all questions.  VTE Risk Mitigation (From admission, onward)           Ordered     IP VTE HIGH RISK PATIENT  Once         11/20/23 0318     Place sequential compression device  Until discontinued         11/20/23 0318                    Discharge Planning   MARTELL: 11/25/2023     Code Status: DNR   Is the patient medically ready for discharge?:     Reason for patient still in hospital (select all that apply): Patient trending condition and Consult recommendations  Discharge Plan A: Home Health            Cal Bragg MD  Department of Hospital Medicine   [facility]

## 2023-11-22 NOTE — PROGRESS NOTES
Pharmacokinetic Assessment Follow Up: IV Vancomycin    Patient brief summary:  Conrad Kuhn is a 83 y.o. male initiated on antimicrobial therapy with IV Vancomycin for treatment of Neutropenic Fever    The patient's current regimen is Vancomycin 1250 mg every 24 hours.       Actual Body Weight = 92 kg (202 lb 14.4 oz).    Renal Function:   Estimated Creatinine Clearance: 63.9 mL/min (based on SCr of 1 mg/dL).      Vancomycin serum concentration assessment(s):    The trough level was drawn correctly and can be used to guide therapy at this time. The measurement is below the desired definitive target range of 15 to 20 mcg/mL.    Vancomycin Regimen Plan:    Change regimen to Vancomycin 1250 mg IV every 16 hours with next serum trough concentration measured at 12:00 prior to 5th dose on 11/23.    Drug levels (last 3 results):  Recent Labs   Lab Result Units 11/22/23  0259   Vancomycin-Trough ug/mL 7.6*       Pharmacy will continue to follow and monitor vancomycin.    Please contact pharmacy at extension [phone] for questions regarding this assessment.    Thank you for the consult,   Angela Wiggins

## 2023-11-22 NOTE — CARE UPDATE
11/22/23 0826   Patient Assessment/Suction   Respiratory Effort Unlabored   Expansion/Accessory Muscles/Retractions no retractions;expansion symmetric;no use of accessory muscles   All Lung Fields Breath Sounds Anterior:;Lateral:;diminished   Rhythm/Pattern, Respiratory depth regular;pattern regular;unlabored   Skin Integrity   $ Wound Care Tech Time 15 min   Area Observed Bridge of nose   Skin Appearance without discoloration   PRE-TX-O2   Device (Oxygen Therapy) nasal cannula   $ Is the patient on Low Flow Oxygen? Yes   Flow (L/min) 2   SpO2 100 %   Pulse Oximetry Type Continuous   $ Pulse Oximetry - Multiple Charge Pulse Oximetry - Multiple   Pulse 62   Resp 20   Aerosol Therapy   $ Aerosol Therapy Charges PRN treatment not required   Education   $ Education 15 min;BiPAP;Bronchodilator   Respiratory Evaluation   $ Care Plan Tech Time 15 min

## 2023-11-22 NOTE — CARE UPDATE
11/21/23 2044   Patient Assessment/Suction   Level of Consciousness (AVPU) alert   Respiratory Effort Normal   Expansion/Accessory Muscles/Retractions no use of accessory muscles   All Lung Fields Breath Sounds coarse;diminished   Rhythm/Pattern, Respiratory unlabored   Cough Frequency no cough   Skin Integrity   $ Wound Care Tech Time 15 min   Area Observed Bridge of nose   Skin Appearance without discoloration   PRE-TX-O2   Device (Oxygen Therapy) nasal cannula   $ Is the patient on Low Flow Oxygen? Yes   Flow (L/min) 4   Pulse Oximetry Type Continuous   $ Pulse Oximetry - Multiple Charge Pulse Oximetry - Multiple   Aerosol Therapy   $ Aerosol Therapy Charges PRN treatment not required   Preset CPAP/BiPAP Settings   Mode Of Delivery BiPAP;Standby   $ Is patient using? No/refused   Education   $ Education 15 min;BiPAP   Respiratory Evaluation   $ Care Plan Tech Time 15 min

## 2023-11-22 NOTE — [employer]OVATION PATIENT INSTRUCTIONS
Discharge Summary/Instructions after an Endoscopic Procedure  Patient Name: Conrad Kuhn  Patient MRN: [id number]  Patient YOB: 1940  Wednesday, November 22, 2023  John Finney III, MD  RESTRICTIONS:  During your procedure today, you received medications for sedation.  These   medications may affect your judgment, balance and coordination.  Therefore,   for 24 hours, you have the following restrictions:   - DO NOT drive a car, operate machinery, make legal/financial decisions,   sign important papers or drink alcohol.    ACTIVITY:  Today: no heavy lifting, straining or running due to procedural   sedation/anesthesia.  The following day: return to full activity including work.  DIET:  Eat and drink normally unless instructed otherwise.     TREATMENT FOR COMMON SIDE EFFECTS:  - Mild abdominal pain, nausea, belching, bloating or excessive gas:  rest,   eat lightly and use a heating pad.  - Sore Throat: treat with throat lozenges and/or gargle with warm salt   water.  - Because air was used during the procedure, expelling large amounts of air   from your rectum or belching is normal.  - If a bowel prep was taken, you may not have a bowel movement for 1-3 days.    This is normal.  SYMPTOMS TO WATCH FOR AND REPORT TO YOUR PHYSICIAN:  1. Abdominal pain or bloating, other than gas cramps.  2. Chest pain.  3. Back pain.  4. Signs of infection such as: chills or fever occurring within 24 hours   after the procedure.  5. Rectal bleeding, which would show as bright red, maroon, or black stools.   (A tablespoon of blood from the rectum is not serious, especially if   hemorrhoids are present.)  6. Vomiting.  7. Weakness or dizziness.  GO DIRECTLY TO THE NEAREST EMERGENCY ROOM IF YOU HAVE ANY OF THE FOLLOWING:      Difficulty breathing              Chills and/or fever over 101 F   Persistent vomiting and/or vomiting blood   Severe abdominal pain   Severe chest pain   Black, tarry stools   Bleeding- more than  one tablespoon   Any other symptom or condition that you feel may need urgent attention  Your doctor recommends these additional instructions:  If any biopsies were taken, your doctors clinic will contact you in 1 to 2   weeks with any results.  - Return patient to hospital [facility] for ongoing care.   - PPI daily indefinitely, NSAID avoidance, and continue to tranfuse   pRBCs/Plts to keep Plts at least >25 and Hg >8  For questions, problems or results please call your physician - John Finney III, MD at Work:  [phone].  [facility], EMERGENCY ROOM PHONE NUMBER: [phone]  IF A COMPLICATION OR EMERGENCY SITUATION ARISES AND YOU ARE UNABLE TO REACH   YOUR PHYSICIAN - GO DIRECTLY TO THE EMERGENCY ROOM.  John Finney III, MD  11/22/2023 3:03:33 PM  This report has been verified and signed electronically.  Dear patient,  As a result of recent federal legislation (The Federal Cures Act), you may   receive lab or pathology results from your procedure in your [facility]   account before your physician is able to contact you. Your physician or   their representative will relay the results to you with their   recommendations at their soonest availability.  Thank you,  [employer]

## 2023-11-22 NOTE — ASSESSMENT & PLAN NOTE
Likely contributed by thrombocytopenia.  Patient to undergo push enteroscopy today.  Follow GI recommendations.  Patient to receive 1 unit of packed red blood cell.  Continue proton pump inhibitor use.

## 2023-11-22 NOTE — NURSING
Nurses Note -- 4 Eyes      11/22/2023   11:17 AM      Skin assessed during: Admit      [x] No Altered Skin Integrity Present    []Prevention Measures Documented      [] Yes- Altered Skin Integrity Present or Discovered   [] LDA Added if Not in Epic (Describe Wound)   [] New Altered Skin Integrity was Present on Admit and Documented in LDA   [] Wound Image Taken    Wound Care Consulted? No  Assessed by Vanesa Burr and Radha Muñoz

## 2023-11-22 NOTE — ANESTHESIA POSTPROCEDURE EVALUATION
Anesthesia Post Evaluation    Patient: Conrad Kuhn    Procedure(s) Performed: Procedure(s) (LRB):  ENTEROSCOPY (N/A)    Final Anesthesia Type: general      Patient location during evaluation: ICU  Patient participation: Yes- Able to Participate  Level of consciousness: awake and alert, oriented and awake  Post-procedure vital signs: reviewed and stable  Pain management: adequate  Airway patency: patent    PONV status at discharge: No PONV  Anesthetic complications: no      Cardiovascular status: blood pressure returned to baseline, hemodynamically stable and stable  Respiratory status: BIPAP  Hydration status: euvolemic  Follow-up not needed.          Vitals Value Taken Time   /62 11/22/23 1701   Temp 35.9 °C (96.7 °F) 11/22/23 1645   Pulse 61 11/22/23 1715   Resp 18 11/22/23 1715   SpO2 95 % 11/22/23 1715   Vitals shown include unvalidated device data.      No case tracking events are documented in the log.      Pain/Hannah Score: Pain Rating Prior to Med Admin: 6 (11/22/2023 11:55 AM)  Pain Rating Post Med Admin: 0 (11/22/2023 12:55 PM)

## 2023-11-22 NOTE — PROGRESS NOTES
Pharmacist Renal Dose Adjustment Note    Conrad Kuhn is a 83 y.o. male being treated with the medication Cefepime     Patient Data:    Vital Signs (Most Recent):  Temp: 97.8 °F (36.6 °C) (11/22/23 0300)  Pulse: 61 (11/22/23 0600)  Resp: (!) 23 (11/22/23 0600)  BP: 132/61 (11/22/23 0600)  SpO2: 98 % (11/22/23 0600) Vital Signs (72h Range):  Temp:  [97.5 °F (36.4 °C)-103.5 °F (39.7 °C)]   Pulse:  []   Resp:  [18-55]   BP: ()/()   SpO2:  [74 %-100 %]      Recent Labs   Lab 11/20/23  1308 11/21/23  0355 11/22/23  0259   CREATININE 1.1 1.0 0.8     Serum creatinine: 0.8 mg/dL 11/22/23 0259  Estimated creatinine clearance: 79.9 mL/min    Medication:Cefepime 2 g every 12 hours will be changed to 2 g every 8 hours per Renal Dose Adjustment by Pharmacy Policy     Pharmacist's Name: Sera Ariza  Pharmacist's Extension: 1030

## 2023-11-22 NOTE — SUBJECTIVE & OBJECTIVE
Interval History: Patient is scheduled for Push Enteroscopy today. Patient is s/p 4 units of PRBC and 2 units of Platelets. Tamx 101.5F, microbiology results are negative. Denies any chest pain or shortness of breath.  Continues to have pancytopenia.  On neutropenic precautions.  Hematology oncology service closely following. No abdominal pain or nausea or vomiting.  Significant other at bedside.    Review of Systems   Constitutional:  Positive for chills, fatigue and fever.   HENT:  Negative for dental problem and ear pain.    Respiratory:  Positive for shortness of breath. Negative for cough and chest tightness.    Cardiovascular:  Negative for chest pain and palpitations.   Gastrointestinal:  Negative for abdominal distention, abdominal pain and diarrhea.   Genitourinary:  Negative for difficulty urinating and dysuria.   Musculoskeletal:  Negative for back pain.   Neurological:  Negative for dizziness and headaches.   Psychiatric/Behavioral:  Negative for agitation and behavioral problems.      Objective:     Vital Signs (Most Recent):  Temp: 97.8 °F (36.6 °C) (11/22/23 0701)  Pulse: 62 (11/22/23 0826)  Resp: 20 (11/22/23 0826)  BP: (!) 175/93 (11/22/23 0800)  SpO2: 100 % (11/22/23 0826) Vital Signs (24h Range):  Temp:  [97.5 °F (36.4 °C)-101.5 °F (38.6 °C)] 97.8 °F (36.6 °C)  Pulse:  [59-90] 62  Resp:  [18-55] 20  SpO2:  [74 %-100 %] 100 %  BP: ()/() 175/93     Weight: 90.2 kg (198 lb 13.7 oz)  Body mass index is 25.53 kg/m².    Intake/Output Summary (Last 24 hours) at 11/22/2023 0953  Last data filed at 11/22/2023 0633  Gross per 24 hour   Intake 3535.05 ml   Output 4100 ml   Net -564.95 ml           Physical Exam  Constitutional:       General: He is in acute distress.   HENT:      Head: Normocephalic and atraumatic.      Right Ear: External ear normal.      Left Ear: External ear normal.      Mouth/Throat:      Mouth: Mucous membranes are dry.   Cardiovascular:      Rate and Rhythm: Regular  "rhythm. Tachycardia present.   Pulmonary:      Effort: Respiratory distress present.      Breath sounds: No wheezing.   Abdominal:      General: Abdomen is flat.      Palpations: Abdomen is soft.   Musculoskeletal:         General: Normal range of motion.   Skin:     General: Skin is warm and dry.   Neurological:      General: No focal deficit present.      Mental Status: He is alert and oriented to person, place, and time.             Significant Labs: All pertinent labs within the past 24 hours have been reviewed.  CBC:   Recent Labs   Lab 11/20/23  1311 11/20/23  2307 11/21/23  0355 11/22/23  0259   WBC 0.87*  --  1.56* 2.23*   HGB 6.7* 7.2* 7.0* 8.0*   HCT 19.8* 20.7* 20.2* 24.2*   PLT 18*  --  20* 36*       CMP:   Recent Labs   Lab 11/20/23  1308 11/21/23  0355 11/22/23  0259   * 136 140   K 4.7 4.5 3.8    105 106   CO2 23 25 24   * 209* 141*   BUN 34* 29* 24*   CREATININE 1.1 1.0 0.8   CALCIUM 8.6* 8.2* 8.3*   PROT 5.6* 5.2* 5.3*   ALBUMIN 3.7 3.3* 3.3*   BILITOT 0.4 0.6 0.7   ALKPHOS 29* 26* 28*   AST 19 13 14   ALT 17 14 16   ANIONGAP 6* 6* 10       Lactic Acid:   Recent Labs   Lab 11/21/23  1016 11/21/23  1426 11/21/23 2014   LACTATE 2.5* 2.6* 0.7       Troponin:   Recent Labs   Lab 11/20/23  2307 11/21/23  0355 11/21/23  1016   TROPONINIHS 233.6* 145.5* 90.5*       TSH:   Recent Labs   Lab 10/17/23  0947   TSH 0.741       Urine Culture: No results for input(s): "LABURIN" in the last 48 hours.  Urine Studies:   No results for input(s): "COLORU", "APPEARANCEUA", "PHUR", "SPECGRAV", "PROTEINUA", "GLUCUA", "KETONESU", "BILIRUBINUA", "OCCULTUA", "NITRITE", "UROBILINOGEN", "LEUKOCYTESUR", "RBCUA", "WBCUA", "BACTERIA", "SQUAMEPITHEL", "HYALINECASTS" in the last 48 hours.    Invalid input(s): "KATTYSUR"    Microbiology Results (last 7 days)       Procedure Component Value Units Date/Time    Blood culture x two cultures. Draw prior to antibiotics. [[id number]] Collected: 11/20/23 0233    Order " Status: Completed Specimen: Blood from Antecubital, Right Hand Updated: 11/22/23 0432     Blood Culture, Routine No Growth to date      No Growth to date      No Growth to date    Narrative:      Aerobic and anaerobic    Blood culture x two cultures. Draw prior to antibiotics. [[id number]] Collected: 11/20/23 0233    Order Status: Completed Specimen: Blood from Antecubital, Right Arm Updated: 11/22/23 0432     Blood Culture, Routine No Growth to date      No Growth to date      No Growth to date    Narrative:      Aerobic and anaerobic    Respiratory Infection Panel (PCR), Nasopharyngeal [[id number]] Collected: 11/20/23 0208    Order Status: Completed Specimen: Nasopharyngeal Swab Updated: 11/20/23 0302     Respiratory Infection Panel Source NP swab     Adenovirus Not Detected     Coronavirus 229E, Common Cold Virus Not Detected     Coronavirus HKU1, Common Cold Virus Not Detected     Coronavirus NL63, Common Cold Virus Not Detected     Coronavirus OC43, Common Cold Virus Not Detected     Comment: Coronavirus strains 229E, HKU1, NL63, and OC43 can cause the common   cold   and are not associated with the respiratory disease outbreak caused   by  the COVID-19 (SARS-CoV-2 novel Coronavirus) strain.           SARS-CoV2 (COVID-19) Qualitative PCR Not Detected     Human Metapneumovirus Not Detected     Human Rhinovirus/Enterovirus Not Detected     Influenza A (subtypes H1, H1-2009,H3) Not Detected     Influenza B Not Detected     Parainfluenza Virus 1 Not Detected     Parainfluenza Virus 2 Not Detected     Parainfluenza Virus 3 Not Detected     Parainfluenza Virus 4 Not Detected     Respiratory Syncytial Virus Not Detected     Bordetella Parapertussis (PU0340) Not Detected     Bordetella pertussis (ptxP) Not Detected     Chlamydia pneumoniae Not Detected     Mycoplasma pneumoniae Not Detected     Comment: Respiratory Infection Panel testing performed by Multiplex PCR.       Narrative:      Respiratory Infection Panel  source->NP Swab          Significant Imaging:   CXR: Chronic findings as above. No acute radiographic abnormalities.     ECHO:    Left Ventricle: The left ventricle is normal in size. Normal wall thickness. Normal wall motion. There is normal systolic function with a visually estimated ejection fraction of 60 - 65%. There is normal diastolic function. E/A ratio is 0.87.    Right Ventricle: Normal right ventricular cavity size. Wall thickness is normal. Right ventricle wall motion  is normal. Systolic function is normal.    Left Atrium: Left atrium is moderately dilated.    Aortic Valve: There is mild aortic valve sclerosis.    Tricuspid Valve: There is moderate regurgitation. There is pulmonary hypertension. The estimated PA systolic pressure is at least 38 mmHg.    IVC/SVC: Normal venous pressure at 3 mmHg.    Overall the study quality was technically difficult. The study was difficult due to patient's clinical status.

## 2023-11-22 NOTE — PROGRESS NOTES
"[facility]   Hematology/Oncology  Inpatient Progress Note          Patient Name: Conrad Kuhn  MRN: [id number]  Admission Date: 11/20/2023  Hospital Length of Stay: 2 days  Code Status: DNR   Attending Provider: Cal Bragg MD  Consulting Provider: Drew Bai MD  Primary Care Physician: Johnson Erazo MD  Principal Problem:Neutropenic fever      Subjective:       Patient ID: Conrad Kuhn is a 83 y.o. male.    Chief Complaint: Shortness of Breath (BIB EMS s/p SOB and fever. Wife got a temp at home of 103.1, per EMS pt initially satting at 85% on a NRB, now satting 100% on CPAP. Initial respirations at 44 now at 28 on CPAP. Recently dx with lymphoma started chemo 2 months ago. HX of lung cx. A&O at this time. )        History Present Illness:      Patient remains in ICU;  he was seen by Dr Finney with GI and is going for scope this afternoon; he is awake and alert; he denies any current CP, SOB, HA's or N/V; wife and grand-daughter are at bedside; discussed with ICU nurse in person    Review of Systems:  GEN: : fever; general malaise, fatigue and weakness   HEENT: normal with no HA's, sore throat, stiff neck, changes in vision  CV: normal with no CP, SOB, PND, MAYER or orthopnea  PULM: normal with no SOB, cough, hemoptysis, sputum or pleuritic pain  GI: normal with no abdominal pain, nausea, vomiting, constipation, diarrhea, melanotic stools   : normal with no hematuria, dysuria  BREAST: normal with no mass, discharge, pain  SKIN: normal with no rash, erythema, bruising, or swelling      Objective:     Vitals:  Blood pressure (!) 175/93, pulse 62, temperature 97.8 °F (36.6 °C), temperature source Oral, resp. rate 20, height 6' 2" (1.88 m), weight 90.2 kg (198 lb 13.7 oz), SpO2 100 %.    Physical Exam:  GEN: no apparent distress, comfortable; AAOx3; elderly/deconditioned looking  HEAD: atraumatic and normocephalic  EYES: no pallor, no icterus, PERRLA  ENT: OMM, no pharyngeal " erythema, external ears WNL; no nasal discharge; no thrush  NECK: no masses, thyroid normal, trachea midline, no LAD/LN's, supple  CV: RRR with no murmur; normal pulse; normal S1 and S2; no pedal edema  CHEST: Normal respiratory effort; CTAB; normal breath sounds; no wheeze or crackles; O2 per NC  ABDOM: nontender and nondistended; soft; normal bowel sounds; no rebound/guarding  MUSC/Skeletal: ROM normal; no crepitus; joints normal; no deformities or arthropathy  EXTREM: no clubbing, cyanosis, inflammation or swelling; IV RUE  SKIN: no rashes, lesions, ulcers, petechiae or subcutaneous nodules  : no keith  NEURO: grossly intact; motor/sensory WNL; AAOx3; no tremors  PSYCH: normal mood, affect and behavior  LYMPH: normal cervical, supraclavicular, axillary and groin LN's            Lab Review:        Lab Results   Component Value Date    WBC 2.23 (L) 11/22/2023    HGB 8.0 (L) 11/22/2023    HCT 24.2 (L) 11/22/2023    MCV 93 11/22/2023    PLT 36 (LL) 11/22/2023       CMP  Sodium   Date Value Ref Range Status   11/22/2023 140 136 - 145 mmol/L Final   06/12/2019 138 134 - 144 mmol/L      Potassium   Date Value Ref Range Status   11/22/2023 3.8 3.5 - 5.1 mmol/L Final     Chloride   Date Value Ref Range Status   11/22/2023 106 95 - 110 mmol/L Final   06/12/2019 99 98 - 110 mmol/L      CO2   Date Value Ref Range Status   11/22/2023 24 23 - 29 mmol/L Final     Glucose   Date Value Ref Range Status   11/22/2023 141 (H) 70 - 110 mg/dL Final   06/12/2019 179 (H) 70 - 99 mg/dL      BUN   Date Value Ref Range Status   11/22/2023 24 (H) 8 - 23 mg/dL Final     Creatinine   Date Value Ref Range Status   11/22/2023 0.8 0.5 - 1.4 mg/dL Final   06/12/2019 0.95 0.60 - 1.40 mg/dL      Calcium   Date Value Ref Range Status   11/22/2023 8.3 (L) 8.7 - 10.5 mg/dL Final     Total Protein   Date Value Ref Range Status   11/22/2023 5.3 (L) 6.0 - 8.4 g/dL Final     Albumin   Date Value Ref Range Status   11/22/2023 3.3 (L) 3.5 - 5.2 g/dL Final    06/12/2019 3.9 3.1 - 4.7 g/dL      Total Bilirubin   Date Value Ref Range Status   11/22/2023 0.7 0.1 - 1.0 mg/dL Final     Comment:     For infants and newborns, interpretation of results should be based  on gestational age, weight and in agreement with clinical  observations.    Premature Infant recommended reference ranges:  Up to 24 hours.............<8.0 mg/dL  Up to 48 hours............<12.0 mg/dL  3-5 days..................<15.0 mg/dL  6-29 days.................<15.0 mg/dL       Alkaline Phosphatase   Date Value Ref Range Status   11/22/2023 28 (L) 55 - 135 U/L Final     AST   Date Value Ref Range Status   11/22/2023 14 10 - 40 U/L Final     ALT   Date Value Ref Range Status   11/22/2023 16 10 - 44 U/L Final     Anion Gap   Date Value Ref Range Status   11/22/2023 10 8 - 16 mmol/L Final     eGFR   Date Value Ref Range Status   11/22/2023 >60.0 >60 mL/min/1.73 m^2 Final           Radiology Diagnostic Studies:     X-Ray Chest AP Portable [[id number]] Collected: 11/20/23 0201   Order Status: Completed Updated: 11/20/23 0734   Narrative:     Chest single view    CLINICAL DATA: Shortness of breath    FINDINGS: 2 AP views are compared to November 9. Heart size is normal. The mediastinum is unremarkable. Mild bilateral interstitial prominence is unchanged and likely chronic, with mild chronic blunting of the lateral costophrenic angle on the left. No active infiltrate or significant effusion is identified.    Healed fracture deformity of the left seventh rib is noted.    IMPRESSION:  1. Chronic findings as above. No acute radiographic abnormalities.         Assessment:     IMPRESSION:    (1) 83 y.o. male known to my oncology service with diagnosis of CLL/NHL for which he has been followed and treated in conjunction with Dr Monik Don at [facility] in [place]. He was recently hospitalized and discharged on 11/13. He saw Dr Don last week in clinic at 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 He presented to ED at [facility] with SOB and febrile temp. He has been admitted to the hospitalist service and is currently in the ICU.      11/20/2023:  - wbc 1.65, hgb 6.4 and plats 19,000  - Unit of blood currently being transfused,   - Wife is at bedside. Discussed the recent diagnosis of AML and the planned f/u with Dr Don again next Monday on 11/27.  -  Discussed with ICU nursing staff.     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 Symptomatic anemia    2. Shortness of breath    3. Pneumonia due to aerobic bacteria    4. Fever, unspecified fever cause    5. Elevated troponin    6. Ruled out for myocardial infarction    7. Neutropenic fever           Plan:     PLAN:          Monitor labs daily and transfuse as needed   IVF's and antibiotics as per primary team   He has another f/u with Dr Don at [facility] planned for Monday 11/27  Cardiac as per cardiology directives  Appreciate GI consult  - planned scope this afternoon  Recommended consideration for ID and pulmonary consults if need be  Will follow with you  - Dr Edgardo Perez is on call for me through the extended holiday weekend               Drew Bai MD  Hematology/Oncology  [facility]

## 2023-11-22 NOTE — TRANSFER OF CARE
"Anesthesia Transfer of Care Note    Patient: Conrad Kuhn    Procedure(s) Performed: Procedure(s) (LRB):  ENTEROSCOPY (N/A)    Patient location: ICU    Anesthesia Type: general    Transport from OR: Transported from OR on 6-10 L/min O2 by face mask with adequate spontaneous ventilation. Continuous ECG monitoring in transport. Continuous SpO2 monitoring in transport. Continuos invasive BP monitoring in transport    Post pain: adequate analgesia    Post assessment: no apparent anesthetic complications and tolerated procedure well    Post vital signs: stable    Level of consciousness: awake, alert and oriented    Nausea/Vomiting: no nausea/vomiting    Complications: none    Transfer of care protocol was followed      Last vitals: Visit Vitals  /61   Pulse 60   Temp 36.6 °C (97.8 °F) (Oral)   Resp (!) 24   Ht 6' 2" (1.88 m)   Wt 90.2 kg (198 lb 13.7 oz)   SpO2 95%   BMI 25.53 kg/m²     "

## 2023-11-22 NOTE — PROGRESS NOTES
[facility]    Cardiology Progress Note    Subjective:  Patient with CLL that was started on chemo two months ago, that is admitted with Pancytopenia and febrile neutropenia. On Abx. Cardiology consulted for elevated troponins. He denies any chest pain, PND, or palpitations. SOB improving. Telemetry reviewed. NSR. Troponins minimally elevated yesterday in the setting of  significant anemia and acute febrile illness.       Review of systems:  Review of Systems   Constitutional:  Negative for chills and fever.   HENT:  Negative for nosebleeds and sore throat.    Respiratory:  Negative for shortness of breath and wheezing.    Cardiovascular:  Negative for chest pain, palpitations, orthopnea and leg swelling.   Gastrointestinal:  Negative for nausea and vomiting.   Neurological:  Negative for dizziness and loss of consciousness.       Objective:  Vital Signs (Most Recent)  Temp: 97.8 °F (36.6 °C) (11/22/23 0701)  Pulse: 62 (11/22/23 0826)  Resp: 20 (11/22/23 1155)  BP: (!) 175/93 (11/22/23 0800)  SpO2: 100 % (11/22/23 0826)    Vital Signs Range (Last 24H):  Temp:  [97.5 °F (36.4 °C)-101.5 °F (38.6 °C)]   Pulse:  [59-90]   Resp:  [18-44]   BP: ()/(53-93)   SpO2:  [81 %-100 %]     I & O (Last 24H):    Intake/Output Summary (Last 24 hours) at 11/22/2023 1343  Last data filed at 11/22/2023 0633  Gross per 24 hour   Intake 2615.05 ml   Output 4100 ml   Net -1484.95 ml       Current Diet:     Current Diet Order   Procedures    Diet NPO        Allergies:  Review of patient's allergies indicates:  No Known Allergies    Meds:  Scheduled Meds:   atorvastatin  80 mg Oral Daily    ceFEPime (MAXIPIME) IVPB  2 g Intravenous Q8H    famotidine  20 mg Oral BID    ferrous sulfate  1 tablet Oral Daily    gabapentin  600 mg Oral BID    losartan  50 mg Oral Daily    metoprolol tartrate  25 mg Oral BID    mupirocin   Nasal BID    tamsulosin  0.4 mg Oral Daily    traZODone  300 mg Oral QHS    valACYclovir  1,000 mg Oral Daily     vancomycin (VANCOCIN) IV (PEDS and ADULTS)  1,250 mg Intravenous Q16H     Continuous Infusions:   sodium chloride 0.9% 75 mL/hr at 11/22/23 0846     PRN Meds:0.9%  NaCl infusion (for blood administration), 0.9%  NaCl infusion (for blood administration), 0.9%  NaCl infusion (for blood administration), 0.9%  NaCl infusion (for blood administration), acetaminophen, albuterol-ipratropium, dextrose 50%, dextrose 50%, furosemide (LASIX) injection, glucagon (human recombinant), glucose, glucose, HYDROcodone-acetaminophen, insulin aspart U-100, magnesium oxide, magnesium oxide, melatonin, ondansetron, potassium bicarbonate, potassium bicarbonate, potassium bicarbonate, potassium, sodium phosphates, potassium, sodium phosphates, potassium, sodium phosphates, sodium chloride 0.9%, Pharmacy to dose Vancomycin consult **AND** vancomycin - pharmacy to dose    Lab Results :  Recent Results (from the past 24 hour(s))   Occult blood x 1, stool    Collection Time: 11/21/23  2:25 PM    Specimen: Stool   Result Value Ref Range    Occult Blood Positive (A) Negative   Lactic acid, plasma    Collection Time: 11/21/23  2:26 PM   Result Value Ref Range    Lactate (Lactic Acid) 2.6 (HH) 0.5 - 1.9 mmol/L   POCT glucose    Collection Time: 11/21/23  4:30 PM   Result Value Ref Range    POC Glucose 203 (H) 70 - 110   Lactic acid, plasma    Collection Time: 11/21/23  8:14 PM   Result Value Ref Range    Lactate (Lactic Acid) 0.7 0.5 - 1.9 mmol/L   POCT glucose    Collection Time: 11/21/23  9:04 PM   Result Value Ref Range    POC Glucose 151 (H) 70 - 110   CBC auto differential    Collection Time: 11/22/23  2:59 AM   Result Value Ref Range    WBC 2.23 (L) 3.90 - 12.70 K/uL    RBC 2.60 (L) 4.60 - 6.20 M/uL    Hemoglobin 8.0 (L) 14.0 - 18.0 g/dL    Hematocrit 24.2 (L) 40.0 - 54.0 %    MCV 93 82 - 98 fL    MCH 30.8 27.0 - 31.0 pg    MCHC 33.1 32.0 - 36.0 g/dL    RDW 16.5 (H) 11.5 - 14.5 %    Platelets 36 (LL) 150 - 450 K/uL    MPV 11.7 9.2 - 12.9  fL    Immature Granulocytes Test Not Performed 0.0 - 0.5 %    Immature Grans (Abs) Test Not Performed 0.00 - 0.04 K/uL    nRBC 1 (A) 0 /100 WBC    Gran % 26.0 (L) 38.0 - 73.0 %    Lymph % 57.0 (H) 18.0 - 48.0 %    Mono % 1.0 (L) 4.0 - 15.0 %    Eosinophil % 0.0 0.0 - 8.0 %    Basophil % 0.0 0.0 - 1.9 %    Bands 2.0 %    Blasts 14.0 (A) 0 %    Platelet Estimate Decreased (A)     Aniso Slight     Poik Slight     Poly Occasional     Hypo Occasional     Ovalocytes Occasional     Tear Drop Cells Occasional     Schistocytes Present     Differential Method Manual    Comprehensive metabolic panel    Collection Time: 11/22/23  2:59 AM   Result Value Ref Range    Sodium 140 136 - 145 mmol/L    Potassium 3.8 3.5 - 5.1 mmol/L    Chloride 106 95 - 110 mmol/L    CO2 24 23 - 29 mmol/L    Glucose 141 (H) 70 - 110 mg/dL    BUN 24 (H) 8 - 23 mg/dL    Creatinine 0.8 0.5 - 1.4 mg/dL    Calcium 8.3 (L) 8.7 - 10.5 mg/dL    Total Protein 5.3 (L) 6.0 - 8.4 g/dL    Albumin 3.3 (L) 3.5 - 5.2 g/dL    Total Bilirubin 0.7 0.1 - 1.0 mg/dL    Alkaline Phosphatase 28 (L) 55 - 135 U/L    AST 14 10 - 40 U/L    ALT 16 10 - 44 U/L    eGFR >60.0 >60 mL/min/1.73 m^2    Anion Gap 10 8 - 16 mmol/L   Magnesium    Collection Time: 11/22/23  2:59 AM   Result Value Ref Range    Magnesium 1.8 1.6 - 2.6 mg/dL   VANCOMYCIN, TROUGH    Collection Time: 11/22/23  2:59 AM   Result Value Ref Range    Vancomycin-Trough 7.6 (L) ug/mL   POCT glucose    Collection Time: 11/22/23 11:54 AM   Result Value Ref Range    POC Glucose 170 (H) 70 - 110       Diagnostic Results:  Imaging Results              X-Ray Chest AP Portable (Final result)  Result time 11/20/23 07:32:25      Final result by Bright Santos MD (11/20/23 07:32:25)                   Narrative:    Chest single view    CLINICAL DATA: Shortness of breath    FINDINGS: 2 AP views are compared to November 9. Heart size is normal. The mediastinum is unremarkable. Mild bilateral interstitial prominence is  unchanged and likely chronic, with mild chronic blunting of the lateral costophrenic angle on the left. No active infiltrate or significant effusion is identified.    Healed fracture deformity of the left seventh rib is noted.    IMPRESSION:  1. Chronic findings as above. No acute radiographic abnormalities.    Electronically signed by:  Bright Santos MD  11/20/2023 07:32 AM CST Workstation: [id number]                                    Recent Cardiac Rhythm   (if applicable)      Physical Exam:  Physical Exam  Constitutional:       Appearance: He is ill-appearing. He is not diaphoretic.   HENT:      Head: Normocephalic and atraumatic.   Eyes:      General: No scleral icterus.     Comments: Pale conjunctivae.    Cardiovascular:      Rate and Rhythm: Normal rate and regular rhythm.      Pulses: Normal pulses.      Heart sounds:      No gallop.   Pulmonary:      Effort: Pulmonary effort is normal.      Breath sounds: Normal breath sounds. No rales.   Abdominal:      General: There is no distension.      Palpations: Abdomen is soft.   Musculoskeletal:         General: Normal range of motion.      Cervical back: Normal range of motion and neck supple.      Right lower leg: No edema.      Left lower leg: No edema.   Skin:     General: Skin is warm and dry.      Findings: No rash.   Neurological:      General: No focal deficit present.      Mental Status: He is alert and oriented to person, place, and time. Mental status is at baseline.   Psychiatric:         Mood and Affect: Mood normal.         Behavior: Behavior normal.         Thought Content: Thought content normal.         Judgment: Judgment normal.         Current Consults:  PHARMACY TO DOSE VANCOMYCIN CONSULT  IP CONSULT TO HEM/ONC  IP CONSULT TO CARDIOLOGY  IP CONSULT TO PALLIATIVE CARE  IP CONSULT TO GASTROENTEROLOGY    Assessment/Plan:  Assessment:   Cardiac injury  ACS ruled out  HTN  CLL  Pancytopenia, s/p 4 units pRBCs.   Febrile  illness  DM  Asthma  MAYDA  Anemia    Plan:    -troponin elevation minimal in the setting of febrile illness. No chest pain. ECG without evidence of ischemia. Therefore, ACS ruled out. Troponin elevation likely demand ischemia in the setting of an acute febrile illness and significant anemia. (Pancytopenia.) Defer abx to primary team.   - pancytopenia. Being evaluated by Hematology. Follow Heme recs. S/p 4 units pRBCs.  - GI bleed.   - No plans for ischemic eval at this point. We can consider further evaluation for ischemia as an outpatient.  - echo reviewed.   - VS stable. Had another febrile episode yesterday afternoon.   - monitor on telemetry  - continue Metoprolol tartrate 25 mg po daily for now. This will need to be discontinued should his acute illness worsen.   - continue Losartan for now.   - continue Lipitor.     Nate Huggins MD

## 2023-11-22 NOTE — NURSING
Pt still appears to be having some trouble with his breathing on CPAP, Dr Bragg notified and orders for chest xray and BIPAP received.

## 2023-11-22 NOTE — PROGRESS NOTES
[facility] Medicine  Progress Note    Patient Name: Conrad Kuhn  MRN: [id number]  Patient Class: IP- Inpatient   Admission Date: 11/20/2023  Length of Stay: 2 days  Attending Physician: Cal Bragg MD  Primary Care Provider: Johnson Erazo MD        Subjective:     Principal Problem:Neutropenic fever    HPI:  Patient is a 82 yo male with PMH NSCLC (2004), diabetes, asthma, MAYDA on cpap, iron deficiency anemia, HTN and CLL presents to the emergency room with complaints fever and shortness of breath.  Wife at bedside able to supplement history.  Wife states today patient felt feverish and fatigue.  Wife states temperature was measured at home and it was around 103.  Patient continued to feel short of breath, was placed on CPAP and brought to the emergency room.  Patient was recently hospitalized at [facility] 2 weeks ago for similar symptoms, blood and urine cultures at that time were negative.  Patient follows with Dr. Bai outpatient.  Wife states that patient has not been taking any chemotherapy regimen given pancytopenia for the last month.  States that they have follow up with Dr. Don on the 27th to starting a new regimen.  Temperature in emergency room was 103.5, patient tachycardic.  CBC pending.  Troponin elevated 60.2, lactate 2.3, procalcitonin 0.611.  Patient was placed on BiPAP in the emergency room given tachypnea and shortness of breath, ABG showed pH 7.436, CO2 34.6, O2 145.  Wife states that patient was found to have saturation in the 80s when EMS arrived.  Patient given DuoNebs , IV fluids, and started on empiric IV antibiotics in the emergency room.    Overview/Hospital Course:  Patient was admitted to intensive care unit patient was placed on neutropenic precautions while patient's cell counts were closely monitored.  Patient required packed red blood cell transfusion for symptomatic anemia.  No bleeding complications noted.  Patient was placed on prophylactic  antibiotic therapy including vancomycin and cefepime.  Patient's oncologist Dr. Bai was consulted.  Microbiology results were closely followed.  Troponin levels were trended which suggested ongoing non ST elevation MI. due to low platelet count patient was unable to receive antiplatelet therapy and anticoagulation therapy.  Cardiologist was consulted.  Patient underwent transthoracic echocardiogram.  Patient has received a total of 4 units of packed red blood cells and 2 units of platelets.  Lactic acid was trended.  Acute kidney injury improved with IV fluid hydration.    Interval History: Patient is scheduled for Push Enteroscopy today. Patient is s/p 4 units of PRBC and 2 units of Platelets. Tamx 101.5F, microbiology results are negative. Denies any chest pain or shortness of breath.  Continues to have pancytopenia.  On neutropenic precautions.  Hematology oncology service closely following. No abdominal pain or nausea or vomiting.    Review of Systems   Constitutional:  Positive for chills, fatigue and fever.   HENT:  Negative for dental problem and ear pain.    Respiratory:  Positive for shortness of breath. Negative for cough and chest tightness.    Cardiovascular:  Negative for chest pain and palpitations.   Gastrointestinal:  Negative for abdominal distention, abdominal pain and diarrhea.   Genitourinary:  Negative for difficulty urinating and dysuria.   Musculoskeletal:  Negative for back pain.   Neurological:  Negative for dizziness and headaches.   Psychiatric/Behavioral:  Negative for agitation and behavioral problems.      Objective:     Vital Signs (Most Recent):  Temp: 97.8 °F (36.6 °C) (11/22/23 0701)  Pulse: 62 (11/22/23 0826)  Resp: 20 (11/22/23 0826)  BP: (!) 175/93 (11/22/23 0800)  SpO2: 100 % (11/22/23 0826) Vital Signs (24h Range):  Temp:  [97.5 °F (36.4 °C)-101.5 °F (38.6 °C)] 97.8 °F (36.6 °C)  Pulse:  [59-90] 62  Resp:  [18-55] 20  SpO2:  [74 %-100 %] 100 %  BP: ()/()  175/93     Weight: 90.2 kg (198 lb 13.7 oz)  Body mass index is 25.53 kg/m².    Intake/Output Summary (Last 24 hours) at 11/22/2023 0953  Last data filed at 11/22/2023 0633  Gross per 24 hour   Intake 3535.05 ml   Output 4100 ml   Net -564.95 ml           Physical Exam  Constitutional:       General: He is in acute distress.   HENT:      Head: Normocephalic and atraumatic.      Right Ear: External ear normal.      Left Ear: External ear normal.      Mouth/Throat:      Mouth: Mucous membranes are dry.   Cardiovascular:      Rate and Rhythm: Regular rhythm. Tachycardia present.   Pulmonary:      Effort: Respiratory distress present.      Breath sounds: No wheezing.   Abdominal:      General: Abdomen is flat.      Palpations: Abdomen is soft.   Musculoskeletal:         General: Normal range of motion.   Skin:     General: Skin is warm and dry.   Neurological:      General: No focal deficit present.      Mental Status: He is alert and oriented to person, place, and time.             Significant Labs: All pertinent labs within the past 24 hours have been reviewed.  CBC:   Recent Labs   Lab 11/20/23  1311 11/20/23  2307 11/21/23  0355 11/22/23  0259   WBC 0.87*  --  1.56* 2.23*   HGB 6.7* 7.2* 7.0* 8.0*   HCT 19.8* 20.7* 20.2* 24.2*   PLT 18*  --  20* 36*       CMP:   Recent Labs   Lab 11/20/23  1308 11/21/23  0355 11/22/23  0259   * 136 140   K 4.7 4.5 3.8    105 106   CO2 23 25 24   * 209* 141*   BUN 34* 29* 24*   CREATININE 1.1 1.0 0.8   CALCIUM 8.6* 8.2* 8.3*   PROT 5.6* 5.2* 5.3*   ALBUMIN 3.7 3.3* 3.3*   BILITOT 0.4 0.6 0.7   ALKPHOS 29* 26* 28*   AST 19 13 14   ALT 17 14 16   ANIONGAP 6* 6* 10       Lactic Acid:   Recent Labs   Lab 11/21/23  1016 11/21/23  1426 11/21/23 2014   LACTATE 2.5* 2.6* 0.7       Troponin:   Recent Labs   Lab 11/20/23  2307 11/21/23  0355 11/21/23  1016   TROPONINIHS 233.6* 145.5* 90.5*       TSH:   Recent Labs   Lab 10/17/23  0947   TSH 0.741       Urine Culture: No  "results for input(s): "LABURIN" in the last 48 hours.  Urine Studies:   No results for input(s): "COLORU", "APPEARANCEUA", "PHUR", "SPECGRAV", "PROTEINUA", "GLUCUA", "KETONESU", "BILIRUBINUA", "OCCULTUA", "NITRITE", "UROBILINOGEN", "LEUKOCYTESUR", "RBCUA", "WBCUA", "BACTERIA", "SQUAMEPITHEL", "HYALINECASTS" in the last 48 hours.    Invalid input(s): "WRIGHTSUR"    Microbiology Results (last 7 days)       Procedure Component Value Units Date/Time    Blood culture x two cultures. Draw prior to antibiotics. [[id number]] Collected: 11/20/23 0233    Order Status: Completed Specimen: Blood from Antecubital, Right Hand Updated: 11/22/23 0432     Blood Culture, Routine No Growth to date      No Growth to date      No Growth to date    Narrative:      Aerobic and anaerobic    Blood culture x two cultures. Draw prior to antibiotics. [[id number]] Collected: 11/20/23 0233    Order Status: Completed Specimen: Blood from Antecubital, Right Arm Updated: 11/22/23 0432     Blood Culture, Routine No Growth to date      No Growth to date      No Growth to date    Narrative:      Aerobic and anaerobic    Respiratory Infection Panel (PCR), Nasopharyngeal [[id number]] Collected: 11/20/23 0208    Order Status: Completed Specimen: Nasopharyngeal Swab Updated: 11/20/23 0302     Respiratory Infection Panel Source NP swab     Adenovirus Not Detected     Coronavirus 229E, Common Cold Virus Not Detected     Coronavirus HKU1, Common Cold Virus Not Detected     Coronavirus NL63, Common Cold Virus Not Detected     Coronavirus OC43, Common Cold Virus Not Detected     Comment: Coronavirus strains 229E, HKU1, NL63, and OC43 can cause the common   cold   and are not associated with the respiratory disease outbreak caused   by  the COVID-19 (SARS-CoV-2 novel Coronavirus) strain.           SARS-CoV2 (COVID-19) Qualitative PCR Not Detected     Human Metapneumovirus Not Detected     Human Rhinovirus/Enterovirus Not Detected     Influenza A (subtypes H1, " H1-2009,H3) Not Detected     Influenza B Not Detected     Parainfluenza Virus 1 Not Detected     Parainfluenza Virus 2 Not Detected     Parainfluenza Virus 3 Not Detected     Parainfluenza Virus 4 Not Detected     Respiratory Syncytial Virus Not Detected     Bordetella Parapertussis (WD7831) Not Detected     Bordetella pertussis (ptxP) Not Detected     Chlamydia pneumoniae Not Detected     Mycoplasma pneumoniae Not Detected     Comment: Respiratory Infection Panel testing performed by Multiplex PCR.       Narrative:      Respiratory Infection Panel source->NP Swab          Significant Imaging:   CXR: Chronic findings as above. No acute radiographic abnormalities.     ECHO:    Left Ventricle: The left ventricle is normal in size. Normal wall thickness. Normal wall motion. There is normal systolic function with a visually estimated ejection fraction of 60 - 65%. There is normal diastolic function. E/A ratio is 0.87.    Right Ventricle: Normal right ventricular cavity size. Wall thickness is normal. Right ventricle wall motion  is normal. Systolic function is normal.    Left Atrium: Left atrium is moderately dilated.    Aortic Valve: There is mild aortic valve sclerosis.    Tricuspid Valve: There is moderate regurgitation. There is pulmonary hypertension. The estimated PA systolic pressure is at least 38 mmHg.    IVC/SVC: Normal venous pressure at 3 mmHg.    Overall the study quality was technically difficult. The study was difficult due to patient's clinical status.    Assessment/Plan:      * Neutropenic fever  Start neutropenic precautions.  Temperature in the emergency room on 103.5 patient given Tylenol.  Continue to monitor vitals   Patient reports being off chemotherapy for the last month given pancytopenia  Reviewed chest x-ray, no obvious consolidation noted  Continue intravenous cefepime and vancomycin.  Pharmacist to dose vancomycin.   Continue with IV fluids  Follow up blood cultures and urine cultures    Respiratory infectious panel negative  Repeat lactate        Symptomatic anemia  Patient's anemia is currently uncontrolled. Has received 2 units of PRBCs on 11/20/23 .  Patient received another 2 units of packed red blood cell in the setting of non ST elevation MI. Etiology likely d/t  blood disorder and chemotherapy  Current CBC reviewed-   Lab Results   Component Value Date    HGB 7.0 (L) 11/21/2023    HCT 20.2 (LL) 11/21/2023     Monitor serial CBC and transfuse if patient becomes hemodynamically unstable, symptomatic or H/H drops below 7/21.    Shortness of breath  Likely fever induced, oxygen saturations stable on room air.  Patient feels tachypneic  Continue with BiPAP, wean as tolerated  NPO while on BiPAP    Troponin level elevated  Likely demand, will trend troponin.  Patient without chest pain. NSTEMI.   EKG without ST elevations.  Follow transthoracic echocardiogram.  Follow Cardiology recommendations.      Thrombocytopenia  Patient was found to have thrombocytopenia, the likely etiology is secondary to medications- chemotherapy , will monitor the platelets Daily.  Transfuse another unit of platelet transfusion today as per Hematology recommendations.  Earlier patient received on November 20, 2023.  The patient's platelet results have been reviewed and are listed below.  Recent Labs   Lab 11/21/23  0355   PLT 20*           Herpes stomatitis  Continue with outpatient valacyclovir    CLL (chronic lymphocytic leukemia)  Follows up with Dr. Bai and Dr. Don outpatient.  Dr. Bai to evaluate patient today.  Not on therapy currently   New regimen started on 10/27/23.        VTE Risk Mitigation (From admission, onward)           Ordered     IP VTE HIGH RISK PATIENT  Once         11/20/23 0318     Place sequential compression device  Until discontinued         11/20/23 0318                    Discharge Planning   MARTELL: 11/25/2023     Code Status: DNR   Is the patient medically ready for discharge?:      Reason for patient still in hospital (select all that apply): {HMREASONPATIENTINHOSP:84603}  Discharge Plan A: Home Health            Critical care time spent on the evaluation and treatment of severe organ dysfunction, review of pertinent labs and imaging studies, discussions with consulting providers and discussions with patient/family: *** minutes.      Cal Bragg MD  Department of Hospital Medicine   [facility]

## 2023-11-22 NOTE — ANESTHESIA PREPROCEDURE EVALUATION
11/22/2023  Conrad Kuhn is a 83 y.o., male.           Tobacco Use:  The patient  reports that he has quit smoking. He has never used smokeless tobacco.     Results for orders placed or performed during the hospital encounter of 11/20/23   EKG 12-lead    Collection Time: 11/20/23 11:07 AM    Narrative    Test Reason : Z03.89,    Vent. Rate : 076 BPM     Atrial Rate : 076 BPM     P-R Int : 210 ms          QRS Dur : 102 ms      QT Int : 372 ms       P-R-T Axes : 063 013 067 degrees     QTc Int : 418 ms    Sinus rhythm with 1st degree A-V block  Otherwise normal ECG  When compared with ECG of 20-NOV-2023 02:26,  KS interval has increased  Vent. rate has decreased BY  49 BPM  Questionable change in The axis  ST no longer depressed in Lateral leads  T wave inversion no longer evident in Lateral leads    Referred By: [email or web address]   SELF           Confirmed By:         Imaging Results              X-Ray Chest AP Portable (Final result)  Result time 11/20/23 07:32:25      Final result by Bright Santos MD (11/20/23 07:32:25)                   Narrative:    Chest single view    CLINICAL DATA: Shortness of breath    FINDINGS: 2 AP views are compared to November 9. Heart size is normal. The mediastinum is unremarkable. Mild bilateral interstitial prominence is unchanged and likely chronic, with mild chronic blunting of the lateral costophrenic angle on the left. No active infiltrate or significant effusion is identified.    Healed fracture deformity of the left seventh rib is noted.    IMPRESSION:  1. Chronic findings as above. No acute radiographic abnormalities.    Electronically signed by:  Bright Santos MD  11/20/2023 07:32 AM [facility] Workstation: [id number]                                     Lab Results   Component Value Date    WBC 2.23 (L) 11/22/2023    HGB 8.0 (L) 11/22/2023    HCT 24.2 (L) 11/22/2023     MCV 93 11/22/2023    PLT 36 (LL) 11/22/2023     BMP  Lab Results   Component Value Date     11/22/2023    K 3.8 11/22/2023     11/22/2023    CO2 24 11/22/2023    BUN 24 (H) 11/22/2023    CREATININE 0.8 11/22/2023    CALCIUM 8.3 (L) 11/22/2023    ANIONGAP 10 11/22/2023     (H) 11/22/2023     (H) 11/21/2023     (H) 11/20/2023       Results for orders placed during the hospital encounter of 11/20/23    Echo    Interpretation Summary    Left Ventricle: The left ventricle is normal in size. Normal wall thickness. Normal wall motion. There is normal systolic function with a visually estimated ejection fraction of 60 - 65%. There is normal diastolic function. E/A ratio is 0.87.    Right Ventricle: Normal right ventricular cavity size. Wall thickness is normal. Right ventricle wall motion  is normal. Systolic function is normal.    Left Atrium: Left atrium is moderately dilated.    Aortic Valve: There is mild aortic valve sclerosis.    Tricuspid Valve: There is moderate regurgitation. There is pulmonary hypertension. The estimated PA systolic pressure is at least 38 mmHg.    IVC/SVC: Normal venous pressure at 3 mmHg.    Overall the study quality was technically difficult. The study was difficult due to patient's clinical status.         Pre-op Assessment    I have reviewed the Patient Summary Reports.     I have reviewed the Nursing Notes. I have reviewed the NPO Status.   I have reviewed the Medications.     Review of Systems  Anesthesia Hx:   History of prior surgery of interest to airway management or planning: cervical fusion, lung surgery.         Denies Family Hx of Anesthesia complications.   Personal Hx of Anesthesia complications   Difficult Intubation (Patient was told this only once, after back surgery about 4 years ago.)                  Social:  Alcohol Use, Former Smoker    Alcohol Use:   Alcohol Abuse:   Hematology/Oncology:       -- Anemia:               Hematology Comments:  Chemotherapy induced neutropenia  Patient transfused this admission for hemoglobin of 6 and platelets of 19.  Thrombocytopenia Plts 36K today.                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 Recent echo shows normal EF, moderate TR and mild to moderate pulmonary hypertension.      Shortness of Breath                    Hypertension         Pulmonary:  Pneumonia COPD   Shortness of breath  Sleep Apnea, CPAP Left partial pneumonectomy   Chronic Obstructive Pulmonary Disease (COPD):           Obstructive Sleep Apnea (MAYDA).      Education provided regarding risk of obstructive sleep apnea    Chest Tumor/Mass:   S/P Surgical Resection of Lung Cancer     Pulmonary Infection:  Pneumonia.     Hepatic/GI:        History of GI bleed due to NSAIDs.  Patient now has melena but no nausea or vomiting.    Patient post gastrostomy tube placement   Esophageal / Stomach Disorders Esophageal Disorder, dysphagia          Musculoskeletal:     Spondylolisthesis            Neurological:    Neuromuscular Disease,       Right-sided Bell's palsy, with slight residual                          Neuromuscular Disease   Endocrine:  Diabetes, poorly controlled, type 2    Diabetes                      Psych:  Psychiatric History                  Physical Exam  General: Well nourished, Cooperative, Alert and Oriented    Airway:  Mallampati: II / I  Mouth Opening: Normal  TM Distance: > 6 cm  Tongue: Normal  Neck ROM: Normal ROM    Dental:  Edentulous    Chest/Lungs:  Clear to auscultation, Normal Respiratory Rate    Heart:  Rate: Normal  Rhythm: Regular Rhythm  Sounds: Normal  Murmur: Systolic;        Anesthesia Plan  Type of Anesthesia, risks & benefits  discussed:    Anesthesia Type: Gen Natural Airway  Intra-op Monitoring Plan: Standard ASA Monitors  Induction:  IV  Informed Consent: Informed consent signed with the Patient and all parties understand the risks and agree with anesthesia plan.  All questions answered. Patient consented to blood products? Yes  ASA Score: 4  Anesthesia Plan Notes: GNA  POM  Propofol     Ready For Surgery From Anesthesia Perspective.     .

## 2023-11-23 PROBLEM — G47.33 OBSTRUCTIVE SLEEP APNEA: Chronic | Status: ACTIVE | Noted: 2023-01-01

## 2023-11-23 PROBLEM — D70.9 FEVER AND NEUTROPENIA: Status: ACTIVE | Noted: 2023-01-01

## 2023-11-23 PROBLEM — K31.811 ANGIODYSPLASIA OF DUODENUM WITH HEMORRHAGE: Status: ACTIVE | Noted: 2023-01-01

## 2023-11-23 PROBLEM — I24.89 DEMAND ISCHEMIA OF MYOCARDIUM: Status: ACTIVE | Noted: 2022-04-10

## 2023-11-23 PROBLEM — C91.10 CLL (CHRONIC LYMPHOCYTIC LEUKEMIA): Chronic | Status: ACTIVE | Noted: 2019-01-02

## 2023-11-23 PROBLEM — R50.81 FEVER AND NEUTROPENIA: Status: ACTIVE | Noted: 2023-01-01

## 2023-11-23 NOTE — ASSESSMENT & PLAN NOTE
Patient without chest pain.  He is not having an acute coronary syndrome.  Cardiology consultation note reviewed.  No need to keep measuring the hs-cTn.

## 2023-11-23 NOTE — ASSESSMENT & PLAN NOTE
Gastroenterologist's consultation and endoscopic treatment appreciated.  Continue IV pantoprazole.  Advance diet today to IDDSI level V.  Monitor HGB level.  Avoid NSAID's.

## 2023-11-23 NOTE — CARE UPDATE
11/22/23 1942   Patient Assessment/Suction   Level of Consciousness (AVPU) alert   Respiratory Effort Unlabored   Expansion/Accessory Muscles/Retractions expansion symmetric;abdominal muscle use   All Lung Fields Breath Sounds diminished;clear   Rhythm/Pattern, Respiratory unlabored;pattern regular   Cough Frequency infrequent   Skin Integrity   $ Wound Care Tech Time 15 min   Area Observed Bridge of nose   Skin Appearance without discoloration   PRE-TX-O2   Device (Oxygen Therapy) BIPAP   $ Is the patient on Low Flow Oxygen? Yes   SpO2 100 %   Pulse Oximetry Type Continuous   $ Pulse Oximetry - Multiple Charge Pulse Oximetry - Multiple   Pulse 75   Resp (!) 32   Positioning   Head of Bed (HOB) Positioning HOB at 15 degrees   Aerosol Therapy   $ Aerosol Therapy Charges Aerosol Treatment   Daily Review of Necessity (SVN) completed   Respiratory Treatment Status (SVN) given   Treatment Route (SVN) in-line   Patient Position (SVN) HOB elevated   Post Treatment Assessment (SVN) increased aeration   Signs of Intolerance (SVN) none   Breath Sounds Post-Respiratory Treatment   Throughout All Fields Post-Treatment All Fields   Throughout All Fields Post-Treatment aeration increased   Post-treatment Heart Rate (beats/min) 75   Post-treatment Resp Rate (breaths/min) 26   Preset CPAP/BiPAP Settings   Mode Of Delivery BiPAP S/T   $ Is patient using? Yes   Size of Mask Large   Sized Appropriately? Yes   Equipment Type V60   Airway Device Type large full face mask   Ipap 10   EPAP (cm H2O) 5   Pressure Support (cm H2O) 5   Set Rate (Breaths/Min) 16   ITime (sec) 1   Rise Time (sec) 3   Patient CPAP/BiPAP Settings   CPAP/BIPAP ID 8   Timed Inspiration (Sec) 1   IPAP Rise Time (sec) 3   RR Total (Breaths/Min) 26   Tidal Volume (mL) 744   VE Minute Ventilation (L/min) 16 L/min   Peak Inspiratory Pressure (cm H2O) 13   TiTOT (%) 36   Patient Trigger - ST Mode Only (%) 99   CPAP/BiPAP Alarms   High Pressure (cm H2O) 45   Low  Pressure (cm H2O) 5   Minute Ventilation (L/Min) 3   High RR (breaths/min) 45   Low RR (breaths/min) 8   Apnea (Sec) 20   Education   $ Education 15 min;BiPAP   Respiratory Evaluation   $ Care Plan Tech Time 15 min   $ Eval/Re-eval Charges Re-evaluation   Evaluation For New Orders

## 2023-11-23 NOTE — ASSESSMENT & PLAN NOTE
Dr. Alonzo with palliative care service having discussions with patient and next of kin.  Please refer to his note.

## 2023-11-23 NOTE — PROGRESS NOTES
"[facility]  Gastroenterology  Progress Note    Patient Name: Conrad Kuhn  MRN: [id number]  Admission Date: 11/20/2023  Hospital Length of Stay: 3 days  Code Status: DNR   Attending Provider: Hunter Sotomayor MD  Consulting Provider: JULIA Szymanski MD  Primary Care Physician: Johnson Erazo MD  Principal Problem: Neutropenic fever    Subjective:     Chief Complaint: Chronic GI blood loss    Interval History: EGD and enteroscopy yesterday with the ablation of AVMs and polypectomy.  Pt had a small amount of dark stool.  No BRBPR.      Review of Systems:  Pt on BiPAP    Objective:     Vital Signs (Most Recent):  Temp: (!) 100.8 °F (38.2 °C) (11/23/23 1334)  Pulse: 73 (11/23/23 1130)  Resp: (!) 24 (11/23/23 1307)  BP: 137/63 (11/23/23 1130)  SpO2: 100 % (11/23/23 1300) Vital Signs (24h Range):  Temp:  [96.3 °F (35.7 °C)-102.2 °F (39 °C)] 100.8 °F (38.2 °C)  Pulse:  [] 73  Resp:  [18-55] 24  SpO2:  [83 %-100 %] 100 %  BP: (107-195)/() 137/63     Weight: 90.2 kg (198 lb 13.7 oz) (11/22/23 0503)      Intake/Output Summary (Last 24 hours) at 11/23/2023 1433  Last data filed at 11/23/2023 0547  Gross per 24 hour   Intake 2773.8 ml   Output 1950 ml   Net 823.8 ml       Lines/Drains/Airways       Peripheral Intravenous Line  Duration                  Peripheral IV - Single Lumen 11/20/23 0200 18 G Right Antecubital 3 days                    Physical Exam:  NAD  Anicteric, EOMI  MMM, NC  RRR; no walker  Tachypnea.  On BiPAP  Soft, nondistended, nontender, +ABS  No c/c  No rash, no ulcer        Significant Labs:  CBC:   Recent Labs   Lab 11/22/23  0259 11/23/23  0334   WBC 2.23* 1.49*   HGB 8.0* 9.5*   HCT 24.2* 27.5*   PLT 36* 26*     CMP:   Recent Labs   Lab 11/23/23  0334   *   CALCIUM 8.4*   ALBUMIN 3.4*   PROT 5.6*      K 3.9   CO2 24      BUN 14   CREATININE 0.8   ALKPHOS 32*   ALT 16   AST 13   BILITOT 1.0     Coagulation: No results for input(s): "PT", "INR", " ""APTT" in the last 48 hours.      Significant Imaging:  Imaging reviewed in Epic.      Assessment/Plan:     Neuropenic fever - management per Heme and DHM    Anemia/GI bleeding - pt had and EGD with enteroscopy yesterday.  AVMs ablated and a bleeding polyp removed.  Hbg has improved with transfusion.  Continue to closely monitor.  Keep hbg > 8 and platelets > 25.  PPI BID IV        JULIA Szymanski MD  Gastroenterology  [facility]   "

## 2023-11-23 NOTE — ASSESSMENT & PLAN NOTE
Daily Note     Today's date: 2021  Patient name: Ginny Francisco  : 1966  MRN: [id number]  Referring provider: Keara Izquierdo MD  Dx:   Encounter Diagnosis     ICD-10-CM    1  Status post total knee replacement, right  Z96 [id number]    2  Primary osteoarthritis of right knee  M17 11                   Subjective: Pt reports that she has pain later in the day but overall is feeling better  Notes some tightness in her quad with stretching  Objective: See treatment diary below      Assessment: Tolerated treatment well  Patient demonstrated fatigue post treatment and would benefit from continued PT  Quad control is improving but she continues to have fasciculations with LAQ and SAQ  Plan: Continue per plan of care  Progress treatment as tolerated         Precautions: vertigo, HTN, HLD, GERD, asthma, anxiety, DM     Daily Treatment Diary      Assessment                   Eval/Reval                       FOTO         **         **   Manuals   Knee PROM    JG  NT                 Patellar ROM  JG NT                       Exercise Diary     Bike - AROM  5' 5' 6'           Gastroc Stretch - strap  30"x3  30"x3 30"x3                  Heel Slides with Strap  10"x10  10"x10  10"x10                  Hamstring Stretch - strap  30"x3 30"x3 30"x4           Towel Crush  5"x10  5" x 10  5"x10                  Quad Set  5"x10  5"x 10  5"x10                  LAQ  5"x10  5"x10  5"x10                  SAQ  5"x10  5"x10  5"x15                                                                                                                                                                                                                                                                 Modalities    CP R Knee   Extension Stretch  Post-Tx  10'  10'  10' Follows up with Dr. Bai and Dr. Don outpatient.   Not on therapy currently.  The CLL recently progressed to an acute leukemia.  Patient has an appointment with Florencia on Monday 11/27, provided patient is able to be discharged before then.

## 2023-11-23 NOTE — SUBJECTIVE & OBJECTIVE
Interval History:  continues to have high fever.  Blood count reviewed this morning.  All cell types are low.  Underwent EGD and duodenoscopy yesterday; polyp and angiodysplastic lesions treated.    Review of Systems   Constitutional:  Positive for fever.   Respiratory:  Positive for shortness of breath. Negative for cough.    Cardiovascular:  Negative for chest pain.   Gastrointestinal:  Negative for nausea.     Objective:     Vital Signs (Most Recent):  Temp: (!) 100.8 °F (38.2 °C) (11/23/23 1334)  Pulse: 73 (11/23/23 1130)  Resp: (!) 24 (11/23/23 1307)  BP: 137/63 (11/23/23 1130)  SpO2: (!) 94 % (11/23/23 1454) Vital Signs (24h Range):  Temp:  [96.3 °F (35.7 °C)-102.2 °F (39 °C)] 100.8 °F (38.2 °C)  Pulse:  [63-96] 73  Resp:  [18-55] 24  SpO2:  [83 %-100 %] 94 %  BP: (107-195)/() 137/63     Weight: 90.2 kg (198 lb 13.7 oz)  Body mass index is 25.53 kg/m².    Intake/Output Summary (Last 24 hours) at 11/23/2023 1537  Last data filed at 11/23/2023 0547  Gross per 24 hour   Intake 2273.8 ml   Output 1950 ml   Net 323.8 ml         Physical Exam  Vitals reviewed.   Constitutional:       General: He is not in acute distress.     Appearance: He is ill-appearing. He is not diaphoretic.   HENT:      Mouth/Throat:      Mouth: Mucous membranes are moist.   Eyes:      General: No scleral icterus.        Right eye: No discharge.         Left eye: No discharge.   Neck:      Vascular: No JVD.   Cardiovascular:      Rate and Rhythm: Normal rate and regular rhythm.   Pulmonary:      Effort: Pulmonary effort is normal. Tachypnea present.      Breath sounds: Normal breath sounds.   Abdominal:      General: Bowel sounds are normal. There is no distension.      Palpations: Abdomen is soft.      Tenderness: There is no abdominal tenderness.   Skin:     General: Skin is warm.      Coloration: Skin is pale.      Findings: No rash.   Neurological:      Mental Status: He is alert.             Significant Labs: All pertinent labs  within the past 24 hours have been reviewed.    Significant Imaging: I have reviewed all pertinent imaging results/findings within the past 24 hours.

## 2023-11-23 NOTE — ASSESSMENT & PLAN NOTE
This patient is found to have pancytopenia, the likely etiology is Malignancy which is CLL , will monitor CBC Daily. Will transfuse red blood cells if the hemoglobin is <7g/dL (or <8 in the setting of ACS). Will transfuse platelets if platelet count is <20k. Hold DVT prophylaxis if platelets are <50k. The patient's hemoglobin, white blood cell count, and platelet count results have been reviewed and are listed below.  Recent Labs   Lab 11/23/23  0334   HGB 9.5*   WBC 1.49*   PLT 26*

## 2023-11-23 NOTE — ASSESSMENT & PLAN NOTE
Cultures have no growth.  No laboratory or radiologic evidence of a specific infection.  Continue vancomycin and cefepime as part of the treatment of neutropenic fever.  Continue neutropenic precautions.

## 2023-11-23 NOTE — NURSING
Pt Ox4. VS stable. Pt remains on bipap throughout shift Pt NSR on tele. NS 75ml/hr. Scheduled meds given. IV clean dry intact. I and Os recorded. External catheter replaced.x1 black stool noted during shift(watery). During shift pt had multiple episodes of reoccurring fever which is accompanied by tachypnea and agitation. Pt reports no pain or discomfort but appears so. Prn tylenol given for control. Dr. Flanagan contacted related to issue, recommended continuing prn tylenol regiment and place cooling blanket if temp increases over 103. Continuing to regularly check temperatures.Continuing monitoring.   2200  Pt appearing to have tachypnea, RR 30s, while in room currently on cpap with 3L. Respiratory brought to bedside. Pt reported SOB, no pain, and no anxiety. Pt currently refusing BIPAP. Educated on the need for bipap to improve respiratory status. Pt reports understanding but continuous to only want his cpap on at this time. Will continue monitoring for changes.   2300  Pt encourage and educated again for the need for bipap. Pt agreeable at this moment.

## 2023-11-23 NOTE — PROGRESS NOTES
[facility]  Progress Note    Patient Name: Conrad Kuhn  MRN: [id number]  Patient Class: IP- Inpatient   Admission Date: 11/20/2023  Length of Stay: 3 days  Attending Physician: Hunter Sotomayor MD  Primary Care Provider: Johnson Erazo MD        Subjective:     Principal Problem:Pancytopenia        HPI:  Patient is a 82 yo male with PMH NSCLC (2004), diabetes, asthma, MAYDA on cpap, iron deficiency anemia, HTN and CLL presents to the emergency room with complaints fever and shortness of breath.  Wife at bedside able to supplement history.  Wife states today patient felt feverish and fatigue.  Wife states temperature was measured at home and it was around 103.  Patient continued to feel short of breath, was placed on CPAP and brought to the emergency room.  Patient was recently hospitalized at [facility] 2 weeks ago for similar symptoms, blood and urine cultures at that time were negative.  Patient follows with Dr. Bai outpatient.  Wife states that patient has not been taking any chemotherapy regimen given pancytopenia for the last month.  States that they have follow up with Dr. Don on the 27th to starting a new regimen.  Temperature in emergency room was 103.5, patient tachycardic.  CBC pending.  Troponin elevated 60.2, lactate 2.3, procalcitonin 0.611.  Patient was placed on BiPAP in the emergency room given tachypnea and shortness of breath, ABG showed pH 7.436, CO2 34.6, O2 145.  Wife states that patient was found to have saturation in the 80s when EMS arrived.  Patient given DuoNebs , IV fluids, and started on empiric IV antibiotics in the emergency room.    Overview/Hospital Course:  Patient was admitted to intensive care unit patient was placed on neutropenic precautions while patient's cell counts were closely monitored.  Patient required packed red blood cell transfusion for symptomatic anemia.  No bleeding complications noted.  Patient was placed on prophylactic  antibiotic therapy including vancomycin and cefepime.  Patient's oncologist Dr. Bai was consulted.  Microbiology results were closely followed.  Troponin levels were trended which suggested ongoing non ST elevation MI. due to low platelet count patient was unable to receive antiplatelet therapy and anticoagulation therapy.  Cardiologist was consulted.  Patient underwent transthoracic echocardiogram.  Patient has received a total of 4 units of packed red blood cells and 2 units of platelets.  Lactic acid was trended.  Acute kidney injury improved with IV fluid hydration.    Interval History:  continues to have high fever.  Blood count reviewed this morning.  All cell types are low.  Underwent EGD and duodenoscopy yesterday; polyp and angiodysplastic lesions treated.    Review of Systems   Constitutional:  Positive for fever.   Respiratory:  Positive for shortness of breath. Negative for cough.    Cardiovascular:  Negative for chest pain.   Gastrointestinal:  Negative for nausea.     Objective:     Vital Signs (Most Recent):  Temp: (!) 100.8 °F (38.2 °C) (11/23/23 1334)  Pulse: 73 (11/23/23 1130)  Resp: (!) 24 (11/23/23 1307)  BP: 137/63 (11/23/23 1130)  SpO2: (!) 94 % (11/23/23 1454) Vital Signs (24h Range):  Temp:  [96.3 °F (35.7 °C)-102.2 °F (39 °C)] 100.8 °F (38.2 °C)  Pulse:  [63-96] 73  Resp:  [18-55] 24  SpO2:  [83 %-100 %] 94 %  BP: (107-195)/() 137/63     Weight: 90.2 kg (198 lb 13.7 oz)  Body mass index is 25.53 kg/m².    Intake/Output Summary (Last 24 hours) at 11/23/2023 1537  Last data filed at 11/23/2023 0547  Gross per 24 hour   Intake 2273.8 ml   Output 1950 ml   Net 323.8 ml         Physical Exam  Vitals reviewed.   Constitutional:       General: He is not in acute distress.     Appearance: He is ill-appearing. He is not diaphoretic.   HENT:      Mouth/Throat:      Mouth: Mucous membranes are moist.   Eyes:      General: No scleral icterus.        Right eye: No discharge.         Left eye:  No discharge.   Neck:      Vascular: No JVD.   Cardiovascular:      Rate and Rhythm: Normal rate and regular rhythm.   Pulmonary:      Effort: Pulmonary effort is normal. Tachypnea present.      Breath sounds: Normal breath sounds.   Abdominal:      General: Bowel sounds are normal. There is no distension.      Palpations: Abdomen is soft.      Tenderness: There is no abdominal tenderness.   Skin:     General: Skin is warm.      Coloration: Skin is pale.      Findings: No rash.   Neurological:      Mental Status: He is alert.             Significant Labs: All pertinent labs within the past 24 hours have been reviewed.    Significant Imaging: I have reviewed all pertinent imaging results/findings within the past 24 hours.    Assessment/Plan:      * Pancytopenia  This patient is found to have pancytopenia, the likely etiology is Malignancy which is CLL , will monitor CBC Daily. Will transfuse red blood cells if the hemoglobin is <7g/dL (or <8 in the setting of ACS). Will transfuse platelets if platelet count is <20k. Hold DVT prophylaxis if platelets are <50k. The patient's hemoglobin, white blood cell count, and platelet count results have been reviewed and are listed below.  Recent Labs   Lab 11/23/23  0334   HGB 9.5*   WBC 1.49*   PLT 26*             Obstructive sleep apnea  Had this prior to admission.  Chronic.  He's using his own CPAP.  Reports good compliance.      Fever and neutropenia  Cultures have no growth.  No laboratory or radiologic evidence of a specific infection.  Continue vancomycin and cefepime as part of the treatment of neutropenic fever.  Continue neutropenic precautions.      Angiodysplasia of duodenum with hemorrhage  Gastroenterologist's consultation and endoscopic treatment appreciated.  Continue IV pantoprazole.  Advance diet today to IDDSI level V.  Monitor HGB level.  Avoid NSAID's.    Goals of care, counseling/discussion  Dr. Alonzo with palliative care service having discussions with  patient and next of kin.  Please refer to his note.         Demand ischemia of myocardium  Patient without chest pain.  He is not having an acute coronary syndrome.  Cardiology consultation note reviewed.  No need to keep measuring the hs-cTn.    Herpes stomatitis  Continue with valacyclovir 1000 mg daily.    CLL (chronic lymphocytic leukemia)  Follows up with Dr. Bai and Dr. Don outpatient.   Not on therapy currently.  The CLL recently progressed to an acute leukemia.  Patient has an appointment with Florencia on Monday 11/27, provided patient is able to be discharged before then.          VTE Risk Mitigation (From admission, onward)           Ordered     IP VTE HIGH RISK PATIENT  Once         11/20/23 0318     Place sequential compression device  Until discontinued         11/20/23 0318                    Discharge Planning   MARTELL: 11/25/2023     Code Status: DNR   Is the patient medically ready for discharge?:     Reason for patient still in hospital (select all that apply): Patient trending condition, Laboratory test, Treatment, and Consult recommendations  Discharge Plan A: Home Health          Hunter Sotomayor MD  Department of Hospital Medicine   [facility]     Negative

## 2023-11-23 NOTE — PROGRESS NOTES
Pharmacokinetic Assessment Follow Up: IV Vancomycin    Vancomycin serum concentration assessment(s):    The trough level was drawn correctly and can be used to guide therapy at this time. The measurement is below the desired definitive target range of 15 to 20 mcg/mL.    Vancomycin Regimen Plan:    Change regimen to Vancomycin 1250 mg IV every 12 hours with next serum trough concentration measured at 0000 prior to 4th dose on 11/25    Drug levels (last 3 results):  Recent Labs   Lab Result Units 11/22/23  0259 11/23/23  1210   Vancomycin-Trough ug/mL 7.6* 9.7*       Pharmacy will continue to follow and monitor vancomycin.    Please contact pharmacy at extension [phone] for questions regarding this assessment.    Thank you for the consult,   Sera Ariza, PharmD       Patient brief summary:  Conrad Kuhn is a 83 y.o. male initiated on antimicrobial therapy with IV Vancomycin for treatment of neutropenic fever    The patient's current regimen is vancomycin 1250 mg every 16 hours.    Drug Allergies:   Review of patient's allergies indicates:  No Known Allergies    Actual Body Weight:   90.2 kg (198 lb 13.7 oz)    Renal Function:   Estimated Creatinine Clearance: 79.9 mL/min (based on SCr of 0.8 mg/dL).,     Dialysis Method (if applicable):  N/A    CBC (last 72 hours):  Recent Labs   Lab Result Units 11/20/23  2307 11/21/23 0355 11/22/23 0259 11/23/23  0334   WBC K/uL  --  1.56* 2.23* 1.49*   Hemoglobin g/dL 7.2* 7.0* 8.0* 9.5*   Hematocrit % 20.7* 20.2* 24.2* 27.5*   Platelets K/uL  --  20* 36* 26*   Gran % %  --  29.0* 26.0* 35.0*   Lymph % %  --  35.0 57.0* 37.0   Mono % %  --  9.0 1.0* 2.0*   Eosinophil % %  --  0.0 0.0 0.0   Basophil % %  --  0.0 0.0 0.0   Differential Method   --  Manual Manual Manual       Metabolic Panel (last 72 hours):  Recent Labs   Lab Result Units 11/21/23  0355 11/22/23 0259 11/23/23  0334   Sodium mmol/L 136 140 137   Potassium mmol/L 4.5 3.8 3.9   Chloride mmol/L 105 106 104   CO2  mmol/L 25 24 24   Glucose mg/dL 209* 141* 153*   BUN mg/dL 29* 24* 14   Creatinine mg/dL 1.0 0.8 0.8   Albumin g/dL 3.3* 3.3* 3.4*   Total Bilirubin mg/dL 0.6 0.7 1.0   Alkaline Phosphatase U/L 26* 28* 32*   AST U/L 13 14 13   ALT U/L 14 16 16   Magnesium mg/dL 1.7 1.8 1.8       Vancomycin Administrations:  vancomycin given in the last 96 hours                     vancomycin 1.25 g in dextrose 5% 250 mL IVPB (ready to mix) (mg) 1,250 mg New Bag 11/23/23 1307     1,250 mg New Bag 11/22/23 2107    vancomycin 1.25 g in dextrose 5% 250 mL IVPB (ready to mix) (mg) 1,250 mg New Bag 11/22/23 0503     1,250 mg New Bag 11/21/23 0352    vancomycin in dextrose 5 % 1 gram/250 mL IVPB 1,000 mg (mg) 1,000 mg New Bag 11/20/23 0401    vancomycin in dextrose 5 % 1 gram/250 mL IVPB 1,000 mg (mg) 1,000 mg New Bag 11/20/23 0325                    Microbiologic Results:  Microbiology Results (last 7 days)       Procedure Component Value Units Date/Time    Blood culture x two cultures. Draw prior to antibiotics. [[id number]] Collected: 11/20/23 0233    Order Status: Completed Specimen: Blood from Antecubital, Right Hand Updated: 11/23/23 0432     Blood Culture, Routine No Growth to date      No Growth to date      No Growth to date      No Growth to date    Narrative:      Aerobic and anaerobic    Blood culture x two cultures. Draw prior to antibiotics. [[id number]] Collected: 11/20/23 0233    Order Status: Completed Specimen: Blood from Antecubital, Right Arm Updated: 11/23/23 0432     Blood Culture, Routine No Growth to date      No Growth to date      No Growth to date      No Growth to date    Narrative:      Aerobic and anaerobic    Respiratory Infection Panel (PCR), Nasopharyngeal [[id number]] Collected: 11/20/23 0208    Order Status: Completed Specimen: Nasopharyngeal Swab Updated: 11/20/23 0302     Respiratory Infection Panel Source NP swab     Adenovirus Not Detected     Coronavirus 229E, Common Cold Virus Not Detected      Coronavirus HKU1, Common Cold Virus Not Detected     Coronavirus NL63, Common Cold Virus Not Detected     Coronavirus OC43, Common Cold Virus Not Detected     Comment: Coronavirus strains 229E, HKU1, NL63, and OC43 can cause the common   cold   and are not associated with the respiratory disease outbreak caused   by  the COVID-19 (SARS-CoV-2 novel Coronavirus) strain.           SARS-CoV2 (COVID-19) Qualitative PCR Not Detected     Human Metapneumovirus Not Detected     Human Rhinovirus/Enterovirus Not Detected     Influenza A (subtypes H1, H1-2009,H3) Not Detected     Influenza B Not Detected     Parainfluenza Virus 1 Not Detected     Parainfluenza Virus 2 Not Detected     Parainfluenza Virus 3 Not Detected     Parainfluenza Virus 4 Not Detected     Respiratory Syncytial Virus Not Detected     Bordetella Parapertussis (YK1457) Not Detected     Bordetella pertussis (ptxP) Not Detected     Chlamydia pneumoniae Not Detected     Mycoplasma pneumoniae Not Detected     Comment: Respiratory Infection Panel testing performed by Multiplex PCR.       Narrative:      Respiratory Infection Panel source->NP Swab

## 2023-11-23 NOTE — CARE UPDATE
11/23/23 0901   Patient Assessment/Suction   Expansion/Accessory Muscles/Retractions expansion symmetric   Skin Integrity   $ Wound Care Tech Time 15 min   Area Observed Bridge of nose   Skin Appearance without discoloration   PRE-TX-O2   Device (Oxygen Therapy) nasal cannula   $ Is the patient on Low Flow Oxygen? Yes   Flow (L/min) 2   Resp (!) 22   Preset CPAP/BiPAP Settings   Mode Of Delivery Standby   $ CPAP/BiPAP Daily Charge BiPAP/CPAP Daily   Education   $ Education BiPAP;15 min   Respiratory Evaluation   $ Care Plan Tech Time 15 min

## 2023-11-24 NOTE — ASSESSMENT & PLAN NOTE
Patient counts appear stable today.  Will continue to monitor this situation during this hospital stay.

## 2023-11-24 NOTE — SUBJECTIVE & OBJECTIVE
Interval History:     Oncology Treatment Plan:   OP CLL VENETOCLAX OBINUTUZUMAB Q4W    Medications:  Continuous Infusions:  Scheduled Meds:   atorvastatin  80 mg Oral Daily    azelastine  1 spray Nasal BID    ceFEPime (MAXIPIME) IVPB  2 g Intravenous Q8H    clonazePAM  1 mg Oral QHS    famotidine  20 mg Oral BID    ferrous sulfate  1 tablet Oral Daily    gabapentin  600 mg Oral BID    losartan  50 mg Oral Daily    metoprolol tartrate  25 mg Oral BID    mupirocin   Nasal BID    pantoprazole  40 mg Intravenous BID    tamsulosin  0.4 mg Oral Daily    traZODone  300 mg Oral QHS    valACYclovir  1,000 mg Oral Daily    vancomycin (VANCOCIN) IV (PEDS and ADULTS)  1,250 mg Intravenous Q12H     PRN Meds:acetaminophen, albuterol-ipratropium, dextrose 50%, dextrose 50%, furosemide (LASIX) injection, glucagon (human recombinant), glucose, glucose, HYDROcodone-acetaminophen, HYDROmorphone, insulin aspart U-100, magnesium oxide, magnesium oxide, melatonin, ondansetron, potassium bicarbonate, potassium bicarbonate, potassium bicarbonate, potassium, sodium phosphates, potassium, sodium phosphates, potassium, sodium phosphates, sodium chloride 0.9%, Pharmacy to dose Vancomycin consult **AND** vancomycin - pharmacy to dose       Objective:     Vital Signs (Most Recent):  Temp: (P) 97 °F (36.1 °C) (11/24/23 0701)  Pulse: 86 (11/24/23 0801)  Resp: (!) 37 (11/24/23 0937)  BP: (!) 166/70 (11/24/23 0801)  SpO2: 98 % (11/24/23 0801) Vital Signs (24h Range):  Temp:  [97 °F (36.1 °C)-101.4 °F (38.6 °C)] (P) 97 °F (36.1 °C)  Pulse:  [] 86  Resp:  [20-46] 37  SpO2:  [94 %-100 %] 98 %  BP: (120-170)/(56-77) 166/70     Weight: 93.2 kg (205 lb 7.5 oz)  Body mass index is 26.38 kg/m².  Body surface area is 2.21 meters squared.      Intake/Output Summary (Last 24 hours) at 11/24/2023 1125  Last data filed at 11/24/2023 0600  Gross per 24 hour   Intake 2645 ml   Output 2400 ml   Net 245 ml        Physical Exam  Constitutional:        Appearance: Normal appearance.   HENT:      Head: Normocephalic and atraumatic.   Eyes:      General: No scleral icterus.     Conjunctiva/sclera: Conjunctivae normal.   Cardiovascular:      Rate and Rhythm: Normal rate and regular rhythm.   Pulmonary:      Effort: Pulmonary effort is normal.      Breath sounds: Normal breath sounds.   Abdominal:      General: Abdomen is flat.   Neurological:      General: No focal deficit present.      Mental Status: He is alert and oriented to person, place, and time.   Psychiatric:         Mood and Affect: Mood normal.         Behavior: Behavior normal.          Significant Labs:       Diagnostic Results:

## 2023-11-24 NOTE — PROGRESS NOTES
[facility]  Hematology/Oncology  Progress Note    Patient Name: Conrad Kuhn  Admission Date: 11/20/2023  Hospital Length of Stay: 4 days  Code Status: DNR     Subjective:     HPI:  Patient had a difficult time last night.  Was sob and struggled on bipap.  Doing better now and denies pain.  Feels hungry this am.      Interval History:     Oncology Treatment Plan:   OP CLL VENETOCLAX OBINUTUZUMAB Q4W    Medications:  Continuous Infusions:  Scheduled Meds:   atorvastatin  80 mg Oral Daily    azelastine  1 spray Nasal BID    ceFEPime (MAXIPIME) IVPB  2 g Intravenous Q8H    clonazePAM  1 mg Oral QHS    famotidine  20 mg Oral BID    ferrous sulfate  1 tablet Oral Daily    gabapentin  600 mg Oral BID    losartan  50 mg Oral Daily    metoprolol tartrate  25 mg Oral BID    mupirocin   Nasal BID    pantoprazole  40 mg Intravenous BID    tamsulosin  0.4 mg Oral Daily    traZODone  300 mg Oral QHS    valACYclovir  1,000 mg Oral Daily    vancomycin (VANCOCIN) IV (PEDS and ADULTS)  1,250 mg Intravenous Q12H     PRN Meds:acetaminophen, albuterol-ipratropium, dextrose 50%, dextrose 50%, furosemide (LASIX) injection, glucagon (human recombinant), glucose, glucose, HYDROcodone-acetaminophen, HYDROmorphone, insulin aspart U-100, magnesium oxide, magnesium oxide, melatonin, ondansetron, potassium bicarbonate, potassium bicarbonate, potassium bicarbonate, potassium, sodium phosphates, potassium, sodium phosphates, potassium, sodium phosphates, sodium chloride 0.9%, Pharmacy to dose Vancomycin consult **AND** vancomycin - pharmacy to dose       Objective:     Vital Signs (Most Recent):  Temp: (P) 97 °F (36.1 °C) (11/24/23 0701)  Pulse: 86 (11/24/23 0801)  Resp: (!) 37 (11/24/23 0937)  BP: (!) 166/70 (11/24/23 0801)  SpO2: 98 % (11/24/23 0801) Vital Signs (24h Range):  Temp:  [97 °F (36.1 °C)-101.4 °F (38.6 °C)] (P) 97 °F (36.1 °C)  Pulse:  [] 86  Resp:  [20-46] 37  SpO2:  [94 %-100 %] 98 %  BP: (120-170)/(56-77)  166/70     Weight: 93.2 kg (205 lb 7.5 oz)  Body mass index is 26.38 kg/m².  Body surface area is 2.21 meters squared.      Intake/Output Summary (Last 24 hours) at 11/24/2023 1125  Last data filed at 11/24/2023 0600  Gross per 24 hour   Intake 2645 ml   Output 2400 ml   Net 245 ml        Physical Exam  Constitutional:       Appearance: Normal appearance.   HENT:      Head: Normocephalic and atraumatic.   Eyes:      General: No scleral icterus.     Conjunctiva/sclera: Conjunctivae normal.   Cardiovascular:      Rate and Rhythm: Normal rate and regular rhythm.   Pulmonary:      Effort: Pulmonary effort is normal.      Breath sounds: Normal breath sounds.   Abdominal:      General: Abdomen is flat.   Neurological:      General: No focal deficit present.      Mental Status: He is alert and oriented to person, place, and time.   Psychiatric:         Mood and Affect: Mood normal.         Behavior: Behavior normal.          Significant Labs:       Diagnostic Results:    Assessment/Plan:     Angiodysplasia of duodenum with hemorrhage  Hb was 9.5 and stable today.  Hemodynamically stable as well.  Continue daily monitoring of labs and supports as needed.  Platelets also stable today.  Will continue to follow.     CLL (chronic lymphocytic leukemia)  Patient counts appear stable today.  Will continue to monitor this situation during this hospital stay.          Thank you for your consult. I will follow-up with patient. Please contact us if you have any additional questions.     Norman Barrios MD  Hematology/Oncology  [facility]

## 2023-11-24 NOTE — RESPIRATORY THERAPY
11/23/23 2005   Patient Assessment/Suction   Level of Consciousness (AVPU) alert   Respiratory Effort Normal;Unlabored   Expansion/Accessory Muscles/Retractions no retractions;no use of accessory muscles   All Lung Fields Breath Sounds clear;diminished   Skin Integrity   $ Wound Care Tech Time 15 min   Area Observed Bridge of nose   Skin Appearance without discoloration   PRE-TX-O2   Device (Oxygen Therapy) CPAP   $ Is the patient on Low Flow Oxygen? Yes   Flow (L/min) 2   SpO2 100 %   Pulse Oximetry Type Continuous   $ Pulse Oximetry - Multiple Charge Pulse Oximetry - Multiple   Pulse 82   Resp (!) 36   Aerosol Therapy   $ Aerosol Therapy Charges PRN treatment not required   Respiratory Treatment Status (SVN) PRN treatment not required   Preset CPAP/BiPAP Settings   Mode Of Delivery CPAP   Equipment Type Other (see comment)  (Home cpap)   Education   $ Education BiPAP;Bronchodilator;15 min

## 2023-11-24 NOTE — PLAN OF CARE
Problem: Adult Inpatient Plan of Care  Goal: Plan of Care Review  Outcome: Ongoing, Progressing  Goal: Patient-Specific Goal (Individualized)  Outcome: Ongoing, Progressing  Goal: Absence of Hospital-Acquired Illness or Injury  Outcome: Ongoing, Progressing  Goal: Optimal Comfort and Wellbeing  Outcome: Ongoing, Progressing  Goal: Readiness for Transition of Care  Outcome: Ongoing, Progressing     Problem: Fall Injury Risk  Goal: Absence of Fall and Fall-Related Injury  Outcome: Ongoing, Progressing     Problem: Skin Injury Risk Increased  Goal: Skin Health and Integrity  Outcome: Ongoing, Progressing     Problem: Anemia  Goal: Anemia Symptom Improvement  Outcome: Ongoing, Progressing     Problem: Coping Ineffective (Oncology Care)  Goal: Effective Coping  Outcome: Ongoing, Progressing     Problem: Fatigue (Oncology Care)  Goal: Improved Activity Tolerance  Outcome: Ongoing, Progressing     Problem: Oral Intake Altered (Oncology Care)  Goal: Optimal Oral Intake  Outcome: Ongoing, Progressing     Problem: Oral Mucositis (Oncology Care)  Goal: Improved Oral Mucous Membrane Integrity  Outcome: Ongoing, Progressing     Problem: Pain Acute (Oncology Care)  Goal: Optimal Pain Control  Outcome: Ongoing, Progressing     Problem: Cardiac Output Decreased  Goal: Effective Cardiac Output  Outcome: Ongoing, Progressing     Problem: Adjustment to Illness (Acute Coronary Syndrome)  Goal: Optimal Adaptation to Illness  Outcome: Ongoing, Progressing     Problem: Dysrhythmia (Acute Coronary Syndrome)  Goal: Normalized Cardiac Rhythm  Outcome: Ongoing, Progressing     Problem: Cardiac-Related Pain (Acute Coronary Syndrome)  Goal: Absence of Cardiac-Related Pain  Outcome: Ongoing, Progressing     Problem: Hemodynamic Instability (Acute Coronary Syndrome)  Goal: Effective Cardiac Pump Function  Outcome: Ongoing, Progressing     Problem: Tissue Perfusion (Acute Coronary Syndrome)  Goal: Adequate Tissue Perfusion  Outcome: Ongoing,  Progressing     Problem: Coping Ineffective  Goal: Effective Coping  Outcome: Ongoing, Progressing

## 2023-11-24 NOTE — ASSESSMENT & PLAN NOTE
Hb was 9.5 and stable today.  Hemodynamically stable as well.  Continue daily monitoring of labs and supports as needed.  Platelets also stable today.  Will continue to follow.

## 2023-11-24 NOTE — HPI
Mr. Kuhn is feeling better today.  Breathing better and has no complaint of melena or bleeding at this time.  He wants to go home.

## 2023-11-24 NOTE — NURSING
Pt lying in bed in no acute distress. Repositioned pt. See ongoing assessment and charted vital signs in epic. See telemetry strip in chart. Pt has own CPAP on and is tolerating well. Family present in room. IV drip infusing per md order. External male catheter in place and is intact and functional. Call light within reach and bed alarm on. Will continue current treatment plan.     used

## 2023-11-25 PROBLEM — D70.9 FEVER AND NEUTROPENIA: Status: RESOLVED | Noted: 2023-01-01 | Resolved: 2023-01-01

## 2023-11-25 PROBLEM — R50.81 FEVER AND NEUTROPENIA: Status: RESOLVED | Noted: 2023-01-01 | Resolved: 2023-01-01

## 2023-11-25 PROBLEM — I24.89 DEMAND ISCHEMIA OF MYOCARDIUM: Status: RESOLVED | Noted: 2022-04-10 | Resolved: 2023-01-01

## 2023-11-25 NOTE — PLAN OF CARE
Patient cleared for discharge from case management standpoint. Patient will resume services at [facility]. Spoke with [place] nurse on call, and verified that patient can resume home health services on Monday 11/27/2023. ESTHELA faxed records.    Chart and discharge orders reviewed.  Patient discharged home with no further case management needs.                  11/25/23 1324   Final Note   Assessment Type Final Discharge Note   Anticipated Discharge Disposition [facility]   What phone number can be called within the next 1-3 days to see how you are doing after discharge? [phone]   Post-Acute Status   Discharge Delays None known at this time

## 2023-11-25 NOTE — PROGRESS NOTES
Therapy with Vancomycin complete and / or consult / order VANCOMYCIN D/C'd @ 10:06 on 11-25-23 by Hunter Sotomayor     will sign off, please re-consult as needed.  Thank you for allowing us to participate in this patient's care.  Riley Barrett 11/25/2023 10:34 AM  Dept of Pharmacy  Ext [phone]

## 2023-11-25 NOTE — ASSESSMENT & PLAN NOTE
This patient is found to have pancytopenia, the likely etiology is Malignancy which is CLL , will monitor CBC Daily. Will transfuse red blood cells if the hemoglobin is <7g/dL (or <8 in the setting of ACS). Will transfuse platelets if platelet count is <20k. Hold DVT prophylaxis if platelets are <50k. The patient's hemoglobin, white blood cell count, and platelet count results have been reviewed and are listed below.  Recent Labs   Lab 11/24/23  0312   HGB 9.5*   WBC 4.28   PLT 21*

## 2023-11-25 NOTE — SUBJECTIVE & OBJECTIVE
Interval History:     Oncology Treatment Plan:   OP CLL VENETOCLAX OBINUTUZUMAB Q4W    Medications:  Continuous Infusions:  Scheduled Meds:   atorvastatin  80 mg Oral Daily    azelastine  1 spray Nasal BID    ceFEPime (MAXIPIME) IVPB  2 g Intravenous Q8H    clonazePAM  1 mg Oral QHS    famotidine  20 mg Oral BID    ferrous sulfate  1 tablet Oral Daily    gabapentin  600 mg Oral BID    losartan  50 mg Oral Daily    metoprolol tartrate  25 mg Oral BID    pantoprazole  40 mg Intravenous BID    tamsulosin  0.4 mg Oral Daily    traZODone  300 mg Oral QHS    valACYclovir  1,000 mg Oral Daily    vancomycin (VANCOCIN) IV (PEDS and ADULTS)  1,250 mg Intravenous Q12H     PRN Meds:acetaminophen, albuterol-ipratropium, dextrose 50%, dextrose 50%, furosemide (LASIX) injection, glucagon (human recombinant), glucose, glucose, HYDROcodone-acetaminophen, HYDROmorphone, insulin aspart U-100, magnesium oxide, magnesium oxide, melatonin, ondansetron, potassium bicarbonate, potassium bicarbonate, potassium bicarbonate, potassium, sodium phosphates, potassium, sodium phosphates, potassium, sodium phosphates, sodium chloride 0.9%, Pharmacy to dose Vancomycin consult **AND** vancomycin - pharmacy to dose       Objective:     Vital Signs (Most Recent):  Temp: 99.7 °F (37.6 °C) (11/24/23 2124)  Pulse: 78 (11/25/23 0801)  Resp: (!) 41 (11/25/23 0801)  BP: 129/60 (11/25/23 0801)  SpO2: 100 % (11/25/23 0801) Vital Signs (24h Range):  Temp:  [98.7 °F (37.1 °C)-100.4 °F (38 °C)] 99.7 °F (37.6 °C)  Pulse:  [70-91] 78  Resp:  [22-41] 41  SpO2:  [97 %-100 %] 100 %  BP: (103-165)/(51-78) 129/60     Weight: 93.2 kg (205 lb 7.5 oz)  Body mass index is 26.38 kg/m².  Body surface area is 2.21 meters squared.      Intake/Output Summary (Last 24 hours) at 11/25/2023 0907  Last data filed at 11/25/2023 0903  Gross per 24 hour   Intake 1654.1 ml   Output 1825 ml   Net -170.9 ml          Physical Exam  Constitutional:       Appearance: Normal appearance.    HENT:      Head: Normocephalic and atraumatic.   Eyes:      General: No scleral icterus.     Conjunctiva/sclera: Conjunctivae normal.   Cardiovascular:      Rate and Rhythm: Normal rate and regular rhythm.   Pulmonary:      Effort: Pulmonary effort is normal.      Breath sounds: Normal breath sounds.   Abdominal:      General: Abdomen is flat.   Neurological:      General: No focal deficit present.      Mental Status: He is alert and oriented to person, place, and time.   Psychiatric:         Mood and Affect: Mood normal.         Behavior: Behavior normal.          Significant Labs:       Diagnostic Results:    Review of Systems

## 2023-11-25 NOTE — PROGRESS NOTES
[facility] Medicine  Progress Note    Patient Name: Conrad Kuhn  MRN: [id number]  Patient Class: IP- Inpatient   Admission Date: 11/20/2023  Length of Stay: 4 days  Attending Physician: Hunter Sotomayor MD  Primary Care Provider: Johnson Erazo MD        Subjective:     Principal Problem:Pancytopenia        HPI:  Patient is a 84 yo male with PMH NSCLC (2004), diabetes, asthma, MAYDA on cpap, iron deficiency anemia, HTN and CLL presents to the emergency room with complaints fever and shortness of breath.  Wife at bedside able to supplement history.  Wife states today patient felt feverish and fatigue.  Wife states temperature was measured at home and it was around 103.  Patient continued to feel short of breath, was placed on CPAP and brought to the emergency room.  Patient was recently hospitalized at [facility] 2 weeks ago for similar symptoms, blood and urine cultures at that time were negative.  Patient follows with Dr. Bai outpatient.  Wife states that patient has not been taking any chemotherapy regimen given pancytopenia for the last month.  States that they have follow up with Dr. Don on the 27th to starting a new regimen.  Temperature in emergency room was 103.5, patient tachycardic.  CBC pending.  Troponin elevated 60.2, lactate 2.3, procalcitonin 0.611.  Patient was placed on BiPAP in the emergency room given tachypnea and shortness of breath, ABG showed pH 7.436, CO2 34.6, O2 145.  Wife states that patient was found to have saturation in the 80s when EMS arrived.  Patient given DuoNebs , IV fluids, and started on empiric IV antibiotics in the emergency room.    Overview/Hospital Course:  Patient was admitted to intensive care unit patient was placed on neutropenic precautions while patient's cell counts were closely monitored.  Patient required packed red blood cell transfusion for symptomatic anemia.  No bleeding complications noted.  Patient was placed on prophylactic  antibiotic therapy including vancomycin and cefepime.  Patient's oncologist Dr. Bai was consulted.  Microbiology results were closely followed.  Troponin levels were trended which suggested ongoing non ST elevation MI. due to low platelet count patient was unable to receive antiplatelet therapy and anticoagulation therapy.  Cardiologist was consulted.  Patient underwent transthoracic echocardiogram.  Patient has received a total of 4 units of packed red blood cells and 2 units of platelets.  Lactic acid was trended.  Acute kidney injury improved with IV fluid hydration.    Interval History:  continues to have fever.  The WBC is higher this morning on labs.  HGB stable.  He had better sleep last night, thanks to the addition of Klonopin.    Review of Systems   Constitutional:  Positive for fever.   Respiratory:  Positive for shortness of breath. Negative for cough.    Cardiovascular:  Negative for chest pain.   Gastrointestinal:  Negative for nausea.     Objective:     Vital Signs (Most Recent):  Temp: (!) 100.4 °F (38 °C) (11/24/23 2021)  Pulse: 90 (11/24/23 2021)  Resp: (!) 41 (11/24/23 1955)  BP: 135/62 (11/24/23 2021)  SpO2: 100 % (11/24/23 1801) Vital Signs (24h Range):  Temp:  [97 °F (36.1 °C)-101 °F (38.3 °C)] 100.4 °F (38 °C)  Pulse:  [] 90  Resp:  [22-46] 41  SpO2:  [97 %-100 %] 100 %  BP: (103-166)/(51-78) 135/62     Weight: 93.2 kg (205 lb 7.5 oz)  Body mass index is 26.38 kg/m².    Intake/Output Summary (Last 24 hours) at 11/24/2023 2108  Last data filed at 11/24/2023 1800  Gross per 24 hour   Intake 3425.05 ml   Output 1825 ml   Net 1600.05 ml           Physical Exam  Vitals reviewed.   Constitutional:       General: He is not in acute distress.     Appearance: He is ill-appearing. He is not diaphoretic.   HENT:      Mouth/Throat:      Mouth: Mucous membranes are moist.   Eyes:      General: No scleral icterus.        Right eye: No discharge.         Left eye: No discharge.   Neck:       Vascular: No JVD.   Cardiovascular:      Rate and Rhythm: Normal rate and regular rhythm.   Pulmonary:      Effort: Pulmonary effort is normal. Tachypnea present.      Breath sounds: Normal breath sounds.   Abdominal:      General: Bowel sounds are normal. There is no distension.      Palpations: Abdomen is soft.      Tenderness: There is no abdominal tenderness.   Skin:     General: Skin is warm.      Coloration: Skin is pale.      Findings: No rash.   Neurological:      Mental Status: He is alert.             Significant Labs: All pertinent labs within the past 24 hours have been reviewed.    Significant Imaging:  no new imaging    Assessment/Plan:      * Pancytopenia  This patient is found to have pancytopenia, the likely etiology is Malignancy which is CLL , will monitor CBC Daily. Will transfuse red blood cells if the hemoglobin is <7g/dL (or <8 in the setting of ACS). Will transfuse platelets if platelet count is <20k. Hold DVT prophylaxis if platelets are <50k. The patient's hemoglobin, white blood cell count, and platelet count results have been reviewed and are listed below.  Recent Labs   Lab 11/24/23  0312   HGB 9.5*   WBC 4.28   PLT 21*               Obstructive sleep apnea  Had this prior to admission.  Chronic.  He's using his own CPAP.  Reports good compliance.      Fever and neutropenia  Cultures have no growth.  No laboratory or radiologic evidence of a specific infection.  Continue vancomycin and cefepime as part of the treatment of neutropenic fever.  Continue neutropenic precautions.      Angiodysplasia of duodenum with hemorrhage  Gastroenterologist's consultation and endoscopic treatment appreciated.  Continue IV pantoprazole.  Continue diet IDDSI level V.  Monitor HGB level.  Avoid NSAID's.    Goals of care, counseling/discussion  Dr. Alonzo with palliative care service having discussions with patient and next of kin.  Please refer to his note.         Demand ischemia of myocardium  Patient  without chest pain.  He is not having an acute coronary syndrome.  Cardiology consultation note reviewed.  No need to keep measuring the hs-cTn.    Herpes stomatitis  Continue with valacyclovir 1000 mg daily.    CLL (chronic lymphocytic leukemia)  Follows up with Dr. Bai and Dr. Don outpatient.   Not on therapy currently.  The CLL recently progressed to an acute leukemia.  Patient has an appointment with Florencia on Monday 11/27, provided patient is able to be discharged before then.          VTE Risk Mitigation (From admission, onward)           Ordered     IP VTE HIGH RISK PATIENT  Once         11/20/23 0318     Place sequential compression device  Until discontinued         11/20/23 0318                    Discharge Planning   MARTELL: 11/27/2023     Code Status: DNR   Is the patient medically ready for discharge?:     Reason for patient still in hospital (select all that apply): Patient trending condition, Laboratory test, Treatment, and Consult recommendations  Discharge Plan A: Home Health          Hunter Sotomayor MD  Department of Hospital Medicine   [facility]

## 2023-11-25 NOTE — ASSESSMENT & PLAN NOTE
Hb remains stable at this time and there is no clear sign of bleeding.  Continue conservative monitoring but I feel he is able to go home today.

## 2023-11-25 NOTE — ASSESSMENT & PLAN NOTE
Follows up with Dr. Bai and Dr. Don outpatient.   Not on therapy currently.  The CLL recently progressed to an acute leukemia.  Patient has an appointment with Florencia on Monday 11/27, provided patient is able to be discharged before then.

## 2023-11-25 NOTE — PLAN OF CARE
Treatment plan discussed with patients' family with time allowed for questions and answers    Problem: Hemodynamic Instability (Acute Coronary Syndrome)  Goal: Effective Cardiac Pump Function  Outcome: Ongoing, Progressing-Vital signs stable     Problem: Fall Injury Risk  Goal: Absence of Fall and Fall-Related Injury  Outcome: Ongoing, Progressing-Safety maintained     Problem: Pain Acute (Oncology Care)  Goal: Optimal Pain Control  Outcome: Ongoing, Progressing- Denies pain, Temp covered with tylenol, rested well.

## 2023-11-25 NOTE — DISCHARGE SUMMARY
[facility] Medicine  Discharge Summary      Patient Name: Conrad Kuhn  MRN: [id number]  SHAMAR: [id number]  Patient Class: IP- Inpatient  Admission Date: 11/20/2023  Hospital Length of Stay: 5 days  Discharge Date and Time: 11/25/2023 12:34 PM  Attending Physician: No att. providers found   Discharging Provider: Hunter Sotomayor MD  Primary Care Provider: Johnson Erazo MD    Primary Care Team: Networked reference to record PCT     HPI:   Patient is a 82 yo male with PMH NSCLC (2004), diabetes, asthma, MAYDA on cpap, iron deficiency anemia, HTN and CLL presents to the emergency room with complaints fever and shortness of breath.  Wife at bedside able to supplement history.  Wife states today patient felt feverish and fatigue.  Wife states temperature was measured at home and it was around 103.  Patient continued to feel short of breath, was placed on CPAP and brought to the emergency room.  Patient was recently hospitalized at [facility] 2 weeks ago for similar symptoms, blood and urine cultures at that time were negative.  Patient follows with Dr. Bai outpatient.  Wife states that patient has not been taking any chemotherapy regimen given pancytopenia for the last month.  States that they have follow up with Dr. Don on the 27th to starting a new regimen.  Temperature in emergency room was 103.5, patient tachycardic.  CBC pending.  Troponin elevated 60.2, lactate 2.3, procalcitonin 0.611.  Patient was placed on BiPAP in the emergency room given tachypnea and shortness of breath, ABG showed pH 7.436, CO2 34.6, O2 145.  Wife states that patient was found to have saturation in the 80s when EMS arrived.  Patient given DuoNebs , IV fluids, and started on empiric IV antibiotics in the emergency room.    Procedure(s) (LRB):  ENTEROSCOPY (N/A)      Hospital Course:   Patient was admitted to intensive care unit.  He was placed on neutropenic precautions while cell counts were closely monitored.   Patient required packed red blood cell transfusion for symptomatic anemia.  No bleeding complications noted.  Patient was placed on prophylactic antibiotic therapy including vancomycin and cefepime.  Patient's oncologist Dr. Bai was consulted.  Microbiology results were closely followed.  Troponin levels were trended which suggested ongoing non ST elevation MI. Due to low platelet count patient was unable to receive antiplatelet therapy and anticoagulation therapy.  Cardiologist was consulted.  Patient underwent transthoracic echocardiogram.  Cardiology clarified the diagnosis as being type 2 MI (or demand ischemia) rather than type 1 MI.  Patient received units of packed red blood cells and of platelets.  Lactic acid, although elevated at first, came down to normal.  The white count increased on its own, and he was no longer neutropenic.  He had no further need of hospital services.  Was discharged home in good condition.  Home health resumed.      Physical Exam  Vitals reviewed.   Constitutional:       General: He is not in acute distress.     Appearance: He is ill-appearing. He is not diaphoretic.   HENT:      Mouth/Throat:      Mouth: Mucous membranes are moist.   Eyes:      General: No scleral icterus.        Right eye: No discharge.         Left eye: No discharge.   Neck:      Vascular: No JVD.   Cardiovascular:      Rate and Rhythm: Normal rate and regular rhythm.   Pulmonary:      Effort: Pulmonary effort is normal.     Breath sounds: Normal breath sounds.      Goals of Care Treatment Preferences:  Code Status: DNR          What is most important right now is to focus on spending time at home, avoiding the hospital, remaining as independent as possible, symptom/pain control.  Accordingly, we have decided that the best plan to meet the patient's goals includes continuing with treatment.      Consults:   Consults (From admission, onward)          Status Ordering Provider     Inpatient consult to  Hematology Oncology  Once        Provider:  Drew Bai MD    Acknowledged YUAN PARK     Inpatient consult to Gastroenterology  Once        Provider:  John Finney III, MD    Completed SULTAN, MARSHA     Inpatient consult to Palliative Care  Once        Provider:  Jacobo Alonzo MD    Completed SULTAN, AQIB     Inpatient consult to Cardiology  Once        Provider:  Serjio Harris MD    Completed SULTAN, AQIB            No new Assessment & Plan notes have been filed under this hospital service since the last note was generated.  Service: Hospital Medicine    Final Active Diagnoses:    Diagnosis Date Noted POA    PRINCIPAL PROBLEM:  Pancytopenia [D61.818] 06/29/2021 Yes    Angiodysplasia of duodenum with hemorrhage [K31.811] 11/23/2023 Yes    Obstructive sleep apnea [G47.33] 11/23/2023 Yes     Chronic    Goals of care, counseling/discussion [Z71.89] 11/21/2023 Not Applicable    Herpes stomatitis [B00.2]  Yes    CLL (chronic lymphocytic leukemia) [C91.10] 01/02/2019 Yes     Chronic      Problems Resolved During this Admission:    Diagnosis Date Noted Date Resolved POA    Fever and neutropenia [D70.9, R50.81] 11/23/2023 11/25/2023 Yes    Demand ischemia of myocardium [I24.89] 04/10/2022 11/25/2023 Yes       Discharged Condition: good    [place] [facility]    Follow Up:   Follow-up Information       Johnson Erazo MD Follow up in 1 week(s).    Specialty: Family Medicine  Contact information:  [address]  [place] [address]  [phone]               John iFnney III, MD Follow up in 4 week(s).    Specialty: Gastroenterology  Why: to follow up on the bleeding in the intestine  Contact information:  [address] [facility]  [place] [address] [phone]               Dr. Don at [facility] on Monday November 27, as scheduled Follow up.                           Patient Instructions:      Diet diabetic     SUBSEQUENT HOME HEALTH ORDERS   Order Comments: Continue  skilled nurse and physical therapy treatments as prior to hospital admission.     Order Specific Question Answer Comments   What Home Health Agency is the patient currently using? Other/External      Activity as tolerated       Significant Diagnostic Studies: Labs: CBC   Recent Labs   Lab 11/24/23 0312 11/25/23 0310   WBC 4.28 7.11   HGB 9.5* 9.2*   HCT 27.1* 26.9*   PLT 21* 16*            Medications:  Reconciled Home Medications:      Medication List        START taking these medications      clonazePAM 1 MG tablet  Commonly known as: KlonoPIN  Take 1 tablet (1 mg total) by mouth nightly as needed (Insomnia).     metoprolol tartrate 25 MG tablet  Commonly known as: LOPRESSOR  Take 1 tablet (25 mg total) by mouth 2 (two) times daily.     pantoprazole 40 MG tablet  Commonly known as: PROTONIX  Take 1 tablet (40 mg total) by mouth 2 (two) times daily.            CHANGE how you take these medications      promethazine 12.5 MG Tab  Commonly known as: PHENERGAN  TAKE 1 TABLET(12.5 MG) BY MOUTH EVERY 6 HOURS AS NEEDED  What changed:   how much to take  how to take this  when to take this  reasons to take this     tamsulosin 0.4 mg Cap  Commonly known as: FLOMAX  Take 1 capsule (0.4 mg total) by mouth once daily.  What changed: when to take this            CONTINUE taking these medications      atorvastatin 20 MG tablet  Commonly known as: LIPITOR  Take 20 mg by mouth once daily.     azelastine 137 mcg (0.1 %) nasal spray  Commonly known as: ASTELIN  1 spray by Nasal route 2 (two) times daily.     blood sugar diagnostic Strp  1 strip by Misc.(Non-Drug; Combo Route) route 2 (two) times a day.     blood-glucose meter kit  Use as instructed     CLARITIN 10 mg tablet  Generic drug: loratadine  Take 10 mg by mouth once daily.     DUKE'S SOLUTION (BENADRYL 30 ML, MYLANTA 30 ML, LIDOCAINE 30 ML, NYSTATIN 30 ML)  Take 10 mLs by mouth 4 (four) times daily.     ferrous sulfate 325 mg (65 mg iron) Tab tablet  Commonly known as:  FEOSOL  Take 325 mg by mouth daily with breakfast.     fluticasone propionate 50 mcg/actuation nasal spray  Commonly known as: FLONASE  1 spray by Each Nostril route once daily.     gabapentin 600 MG tablet  Commonly known as: NEURONTIN  Take 600 mg by mouth 2 (two) times daily.     glimepiride 2 MG tablet  Commonly known as: AMARYL  Take 2 mg by mouth once daily at 6am.     HYDROcodone-acetaminophen  mg per tablet  Commonly known as: NORCO  Take 1 tablet by mouth every 8 (eight) hours as needed for Pain.     iron-vitamin C 100-250 mg (ICAR-C) 100-250 mg Tab  Take 1 tablet by mouth once daily.     losartan 50 MG tablet  Commonly known as: COZAAR  Take 1 tablet (50 mg total) by mouth once daily.     ondansetron 8 MG tablet  Commonly known as: ZOFRAN  Take 1 tablet (8 mg total) by mouth every 8 (eight) hours as needed for Nausea.     traZODone 300 MG tablet  Commonly known as: DESYREL  Take 300 mg by mouth every evening.     valACYclovir 500 MG tablet  Commonly known as: VALTREX  Take 1,000 mg by mouth once daily.     VENCLEXTA 100 mg Tab  Generic drug: venetoclax  Take 2 tablets (200 mg) by mouth once daily.     voriconazole 200 MG Tab  Commonly known as: VFEND  Take 200 mg by mouth 2 (two) times daily.            STOP taking these medications      loperamide 2 mg capsule  Commonly known as: IMODIUM              Indwelling Lines/Drains at time of discharge:   Lines/Drains/Airways        Time spent on the discharge of patient: 33 minutes        Hunter Sotomayor MD  Department of Hospital Medicine  [facility]

## 2023-11-25 NOTE — PROGRESS NOTES
Pharmacokinetic Assessment Follow Up: IV Vancomycin    Vancomycin serum concentration assessment(s):    The trough level was drawn correctly and can be used to guide therapy at this time. The measurement is within the desired definitive target range of 15 to 20 mcg/mL.    Vancomycin Regimen Plan:    Continue regimen to Vancomycin 1250 mg IV every 12 hours with next serum trough concentration measured at 1200 prior to 11th dose on 11/26    Drug levels (last 3 results):  Recent Labs   Lab Result Units 11/22/23  0259 11/23/23  1210 11/24/23  2358   Vancomycin-Trough ug/mL 7.6* 9.7* 19.9       Pharmacy will continue to follow and monitor vancomycin.    Please contact pharmacy at extension [phone] for questions regarding this assessment.    Thank you for the consult,   Manolo Sheppard       Patient brief summary:  Conrad Kuhn is a 83 y.o. male initiated on antimicrobial therapy with IV Vancomycin for treatment of neutropenic fever    Drug Allergies:   Review of patient's allergies indicates:  No Known Allergies    Actual Body Weight:   93.2 kg    Renal Function:   Estimated Creatinine Clearance: 91.3 mL/min (based on SCr of 0.7 mg/dL).,     CBC (last 72 hours):  Recent Labs   Lab Result Units 11/22/23 0259 11/23/23  0334 11/24/23  0312   WBC K/uL 2.23* 1.49* 4.28   Hemoglobin g/dL 8.0* 9.5* 9.5*   Hematocrit % 24.2* 27.5* 27.1*   Platelets K/uL 36* 26* 21*   Gran % % 26.0* 35.0* 36.0*   Lymph % % 57.0* 37.0 16.0*   Mono % % 1.0* 2.0* 18.0*   Eosinophil % % 0.0 0.0 0.0   Basophil % % 0.0 0.0 4.0*   Differential Method  Manual Manual Manual       Metabolic Panel (last 72 hours):  Recent Labs   Lab Result Units 11/22/23  0259 11/23/23  0334 11/24/23  0312 11/24/23  1030   Sodium mmol/L 140 137 133* 135*   Potassium mmol/L 3.8 3.9 3.4* 3.9   Chloride mmol/L 106 104 100  --    CO2 mmol/L 24 24 25  --    Glucose mg/dL 141* 153* 180*  --    BUN mg/dL 24* 14 12  --    Creatinine mg/dL 0.8 0.8 0.7  --    Albumin g/dL 3.3* 3.4* 3.2*   --    Total Bilirubin mg/dL 0.7 1.0 1.0  --    Alkaline Phosphatase U/L 28* 32* 32*  --    AST U/L 14 13 11  --    ALT U/L 16 16 13  --    Magnesium mg/dL 1.8 1.8 1.7 1.9       Vancomycin Administrations:  vancomycin given in the last 96 hours                     vancomycin 1.25 g in dextrose 5% 250 mL IVPB (ready to mix) (mg) 1,250 mg New Bag 11/24/23 1527     1,250 mg New Bag  0030    vancomycin 1.25 g in dextrose 5% 250 mL IVPB (ready to mix) (mg) 1,250 mg New Bag 11/23/23 1307     1,250 mg New Bag 11/22/23 2107    vancomycin 1.25 g in dextrose 5% 250 mL IVPB (ready to mix) (mg) 1,250 mg New Bag 11/22/23 0503     1,250 mg New Bag 11/21/23 0352                    Microbiologic Results:  Microbiology Results (last 7 days)       Procedure Component Value Units Date/Time    Blood culture x two cultures. Draw prior to antibiotics. [[id number]] Collected: 11/20/23 0233    Order Status: Completed Specimen: Blood from Antecubital, Right Hand Updated: 11/24/23 0432     Blood Culture, Routine No Growth to date      No Growth to date      No Growth to date      No Growth to date      No Growth to date    Narrative:      Aerobic and anaerobic    Blood culture x two cultures. Draw prior to antibiotics. [[id number]] Collected: 11/20/23 0233    Order Status: Completed Specimen: Blood from Antecubital, Right Arm Updated: 11/24/23 0432     Blood Culture, Routine No Growth to date      No Growth to date      No Growth to date      No Growth to date      No Growth to date    Narrative:      Aerobic and anaerobic    Respiratory Infection Panel (PCR), Nasopharyngeal [[id number]] Collected: 11/20/23 0208    Order Status: Completed Specimen: Nasopharyngeal Swab Updated: 11/20/23 0302     Respiratory Infection Panel Source NP swab     Adenovirus Not Detected     Coronavirus 229E, Common Cold Virus Not Detected     Coronavirus HKU1, Common Cold Virus Not Detected     Coronavirus NL63, Common Cold Virus Not Detected     Coronavirus OC43,  Common Cold Virus Not Detected     Comment: Coronavirus strains 229E, HKU1, NL63, and OC43 can cause the common   cold   and are not associated with the respiratory disease outbreak caused   by  the COVID-19 (SARS-CoV-2 novel Coronavirus) strain.           SARS-CoV2 (COVID-19) Qualitative PCR Not Detected     Human Metapneumovirus Not Detected     Human Rhinovirus/Enterovirus Not Detected     Influenza A (subtypes H1, H1-2009,H3) Not Detected     Influenza B Not Detected     Parainfluenza Virus 1 Not Detected     Parainfluenza Virus 2 Not Detected     Parainfluenza Virus 3 Not Detected     Parainfluenza Virus 4 Not Detected     Respiratory Syncytial Virus Not Detected     Bordetella Parapertussis (AC5791) Not Detected     Bordetella pertussis (ptxP) Not Detected     Chlamydia pneumoniae Not Detected     Mycoplasma pneumoniae Not Detected     Comment: Respiratory Infection Panel testing performed by Multiplex PCR.       Narrative:      Respiratory Infection Panel source->NP Swab

## 2023-11-25 NOTE — PLAN OF CARE
Patient cleared for discharge from case management standpoint.Patient will resume services at [facility]. Spoke with Bethanie and verified that home health services will resume on Monday 11/27/2023. Records faxed to [facility].    Chart and discharge orders reviewed.  Patient discharged home with no further case management needs.               11/25/23 1324   Final Note   Assessment Type Final Discharge Note   Anticipated Discharge Disposition Home-Health   What phone number can be called within the next 1-3 days to see how you are doing after discharge? [phone]   Post-Acute Status   Discharge Delays None known at this time

## 2023-11-25 NOTE — RESPIRATORY THERAPY
11/24/23 1955   Patient Assessment/Suction   Level of Consciousness (AVPU) alert   Respiratory Effort Unlabored   Expansion/Accessory Muscles/Retractions no retractions;no use of accessory muscles   All Lung Fields Breath Sounds clear;diminished   PRE-TX-O2   Device (Oxygen Therapy) nasal cannula   $ Is the patient on Low Flow Oxygen? Yes   Flow (L/min) 2   Pulse Oximetry Type Continuous   $ Pulse Oximetry - Multiple Charge Pulse Oximetry - Multiple   Pulse 86   Resp (!) 41   /62   Preset CPAP/BiPAP Settings   Mode Of Delivery Standby;CPAP   Education   $ Education BiPAP;Bronchodilator;15 min   Respiratory Evaluation   $ Care Plan Tech Time 15 min   $ Eval/Re-eval Charges Re-evaluation

## 2023-11-25 NOTE — SUBJECTIVE & OBJECTIVE
Interval History:  continues to have fever.  The WBC is higher this morning on labs.  HGB stable.  He had better sleep last night, thanks to the addition of Klonopin.    Review of Systems   Constitutional:  Positive for fever.   Respiratory:  Positive for shortness of breath. Negative for cough.    Cardiovascular:  Negative for chest pain.   Gastrointestinal:  Negative for nausea.     Objective:     Vital Signs (Most Recent):  Temp: (!) 100.4 °F (38 °C) (11/24/23 2021)  Pulse: 90 (11/24/23 2021)  Resp: (!) 41 (11/24/23 1955)  BP: 135/62 (11/24/23 2021)  SpO2: 100 % (11/24/23 1801) Vital Signs (24h Range):  Temp:  [97 °F (36.1 °C)-101 °F (38.3 °C)] 100.4 °F (38 °C)  Pulse:  [] 90  Resp:  [22-46] 41  SpO2:  [97 %-100 %] 100 %  BP: (103-166)/(51-78) 135/62     Weight: 93.2 kg (205 lb 7.5 oz)  Body mass index is 26.38 kg/m².    Intake/Output Summary (Last 24 hours) at 11/24/2023 2108  Last data filed at 11/24/2023 1800  Gross per 24 hour   Intake 3425.05 ml   Output 1825 ml   Net 1600.05 ml           Physical Exam  Vitals reviewed.   Constitutional:       General: He is not in acute distress.     Appearance: He is ill-appearing. He is not diaphoretic.   HENT:      Mouth/Throat:      Mouth: Mucous membranes are moist.   Eyes:      General: No scleral icterus.        Right eye: No discharge.         Left eye: No discharge.   Neck:      Vascular: No JVD.   Cardiovascular:      Rate and Rhythm: Normal rate and regular rhythm.   Pulmonary:      Effort: Pulmonary effort is normal. Tachypnea present.      Breath sounds: Normal breath sounds.   Abdominal:      General: Bowel sounds are normal. There is no distension.      Palpations: Abdomen is soft.      Tenderness: There is no abdominal tenderness.   Skin:     General: Skin is warm.      Coloration: Skin is pale.      Findings: No rash.   Neurological:      Mental Status: He is alert.             Significant Labs: All pertinent labs within the past 24 hours have been  reviewed.    Significant Imaging:  no new imaging

## 2023-11-25 NOTE — PROGRESS NOTES
[facility]  Hematology/Oncology  Progress Note    Patient Name: Conrad Kuhn  Admission Date: 11/20/2023  Hospital Length of Stay: 5 days  Code Status: DNR     Subjective:     HPI:  Mr. Kuhn is feeling better today.  Breathing better and has no complaint of melena or bleeding at this time.  He wants to go home.     Interval History:     Oncology Treatment Plan:   OP CLL VENETOCLAX OBINUTUZUMAB Q4W    Medications:  Continuous Infusions:  Scheduled Meds:   atorvastatin  80 mg Oral Daily    azelastine  1 spray Nasal BID    ceFEPime (MAXIPIME) IVPB  2 g Intravenous Q8H    clonazePAM  1 mg Oral QHS    famotidine  20 mg Oral BID    ferrous sulfate  1 tablet Oral Daily    gabapentin  600 mg Oral BID    losartan  50 mg Oral Daily    metoprolol tartrate  25 mg Oral BID    pantoprazole  40 mg Intravenous BID    tamsulosin  0.4 mg Oral Daily    traZODone  300 mg Oral QHS    valACYclovir  1,000 mg Oral Daily    vancomycin (VANCOCIN) IV (PEDS and ADULTS)  1,250 mg Intravenous Q12H     PRN Meds:acetaminophen, albuterol-ipratropium, dextrose 50%, dextrose 50%, furosemide (LASIX) injection, glucagon (human recombinant), glucose, glucose, HYDROcodone-acetaminophen, HYDROmorphone, insulin aspart U-100, magnesium oxide, magnesium oxide, melatonin, ondansetron, potassium bicarbonate, potassium bicarbonate, potassium bicarbonate, potassium, sodium phosphates, potassium, sodium phosphates, potassium, sodium phosphates, sodium chloride 0.9%, Pharmacy to dose Vancomycin consult **AND** vancomycin - pharmacy to dose       Objective:     Vital Signs (Most Recent):  Temp: 99.7 °F (37.6 °C) (11/24/23 2124)  Pulse: 78 (11/25/23 0801)  Resp: (!) 41 (11/25/23 0801)  BP: 129/60 (11/25/23 0801)  SpO2: 100 % (11/25/23 0801) Vital Signs (24h Range):  Temp:  [98.7 °F (37.1 °C)-100.4 °F (38 °C)] 99.7 °F (37.6 °C)  Pulse:  [70-91] 78  Resp:  [22-41] 41  SpO2:  [97 %-100 %] 100 %  BP: (103-165)/(51-78) 129/60     Weight: 93.2 kg  (205 lb 7.5 oz)  Body mass index is 26.38 kg/m².  Body surface area is 2.21 meters squared.      Intake/Output Summary (Last 24 hours) at 11/25/2023 0907  Last data filed at 11/25/2023 0903  Gross per 24 hour   Intake 1654.1 ml   Output 1825 ml   Net -170.9 ml          Physical Exam  Constitutional:       Appearance: Normal appearance.   HENT:      Head: Normocephalic and atraumatic.   Eyes:      General: No scleral icterus.     Conjunctiva/sclera: Conjunctivae normal.   Cardiovascular:      Rate and Rhythm: Normal rate and regular rhythm.   Pulmonary:      Effort: Pulmonary effort is normal.      Breath sounds: Normal breath sounds.   Abdominal:      General: Abdomen is flat.   Neurological:      General: No focal deficit present.      Mental Status: He is alert and oriented to person, place, and time.   Psychiatric:         Mood and Affect: Mood normal.         Behavior: Behavior normal.          Significant Labs:       Diagnostic Results:    Review of Systems  Assessment/Plan:     Angiodysplasia of duodenum with hemorrhage  Hb remains stable at this time and there is no clear sign of bleeding.  Continue conservative monitoring but I feel he is able to go home today.     CLL (chronic lymphocytic leukemia)  Platelets are down to 16k.  No bleeding.  Would transfuse one unit of platelets and discussed this with patient today.  WBC up.  Likely able to go home after platelets given.         Thank you for your consult. I will follow-up with patient. Please contact us if you have any additional questions.     Norman Barrios MD  Hematology/Oncology  [facility]

## 2023-11-25 NOTE — NURSING
Patient discharged home with home health. Discharge instructions reviewed at bedside. Instructions given to daughter. All belongings taken with patient including cell phone and home CPAP machine. Patient brought down in wheelchair and loaded into family vehicle.

## 2023-11-25 NOTE — CARE UPDATE
11/25/23 0909   Patient Assessment/Suction   Level of Consciousness (AVPU) alert   Respiratory Effort Normal;Unlabored   Expansion/Accessory Muscles/Retractions no use of accessory muscles   Rhythm/Pattern, Respiratory tachypneic   PRE-TX-O2   Device (Oxygen Therapy) room air   SpO2 95 %   Pulse Oximetry Type Continuous   $ Pulse Oximetry - Multiple Charge Pulse Oximetry - Multiple   Positioning Sitting in chair   Respiratory Evaluation   $ Care Plan Tech Time 15 min     PT WAS WEARING 2LPM NC, REMOVED FOR RA SAT CHECKS BY PORTABLE HANDHELD POX -  SATS 94-96% RA VIA EAR PROBE. BEDSIDE POX CABLE NOT WORKING - RN MADE AWARE

## 2023-11-25 NOTE — ASSESSMENT & PLAN NOTE
Gastroenterologist's consultation and endoscopic treatment appreciated.  Continue IV pantoprazole.  Continue diet IDDSI level V.  Monitor HGB level.  Avoid NSAID's.

## 2023-11-25 NOTE — ASSESSMENT & PLAN NOTE
Platelets are down to 16k.  No bleeding.  Would transfuse one unit of platelets and discussed this with patient today.  WBC up.  Likely able to go home after platelets given.

## 2023-12-13 NOTE — TELEPHONE ENCOUNTER
WBC 0.56, Plt 24, ANC 0.13 reported by María from [facility]. Dr. Bai made aware and per his verbal orders I spoke to wife, made aware of labs but that Hgb stable and does not need transfusion at this time, but that Dr. Bai would like patient to have labs drawn again on mon/thurs. Instructed that if begins running fever or has signs of infection will need to go to ER. Verbalized understanding.

## 2023-12-14 NOTE — TELEPHONE ENCOUNTER
Mrs Kuhn came in this morning to sign  A Records release for patient   She also booked an appointment with   Dr Oacsio (Pulmonology ) and an appointment with Dr Barr on 12/28/23  At 11:30 am   [facility]   12/14/23

## 2023-12-15 NOTE — TELEPHONE ENCOUNTER
Patient called requesting to be placed on hospice. Referral sent to [facility]. Kathy Strong NP-C, made aware.

## 2023-12-15 NOTE — TELEPHONE ENCOUNTER
MELLO Page clarified with Dr Don that patient is no longer taking the voriconazole prescription and is now on the cresemba prescription. Patient is also now taking venclexta at 200mg daily. Prescription pended to MELLO Page to review and sign.

## 2023-12-17 NOTE — PLAN OF CARE
Patient cleared for discharge from case management standpoint.  Patient sent referral to [facility]. Parag, on call nurse, met with patient. Patient was accepted with a start of care date as 12/18/2023.    Chart and discharge orders reviewed.  Patient discharged with home hospice with no further case management needs.                 12/17/23 1429   Final Note   Assessment Type Final Discharge Note   Anticipated Discharge Disposition HospiceHome   What phone number can be called within the next 1-3 days to see how you are doing after discharge? [phone]   Post-Acute Status   Discharge Delays None known at this time

## 2023-12-17 NOTE — H&P
Formerly [facility] - Emergency Dept  Hospital Medicine  History & Physical    Patient Name: Conrad Kuhn  MRN: [id number]  Patient Class: IP- Inpatient  Admission Date: 12/16/2023  Attending Physician: Lucy Lewis MD  Primary Care Provider: Johnson Erazo MD         Patient information was obtained from patient, spouse/SO, past medical records, and ER records.     Subjective:     Principal Problem:Pancytopenia    Chief Complaint:   Chief Complaint   Patient presents with    Fatigue     Pt arrived by ems from  home, pt family reports pt hx ca with recent hospice consult. Increased generalized weakness.         HPI:  84 yo male with PMH NSCLC (2004), diabetes, asthma, MAYDA on cpap, iron deficiency anemia, HTN, Hx of Duodenal AVMs, CLL presents to the ED with weakness and rapid decline at home.  He was in the process of being placed on hospice in the outpatient setting. He asks me to help him and tells me he would like to go to sleep and never wake up. Family is at bediside.  Hg is 3.8, Mg 1.4, LA 2.6, Procal 1.15.  He does not wish for any further aggressive care and prefers to be made comfortable until he passes away.      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    Lymphoma, 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    MAYDA on CPAP     Pneumonia of both lungs due to infectious organism 06/29/2021    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     Right-sided Bell's palsy 2009    Spondylolisthesis     Varicose veins of legs     Venous  insufficiency        Past Surgical History:   Procedure Laterality Date    Athroscopic surgery      On Knee    BIOPSY OF TISSUE OF NECK Left 08/2015    Recuurence CLL/small cell lyphoma    CERVICAL FUSION  2022    ESOPHAGEAL DILATION      ESOPHAGOGASTRODUODENOSCOPY N/A 04/15/2022    Procedure: EGD (ESOPHAGOGASTRODUODENOSCOPY);  Surgeon: Siddharth Garcia MD;  Location: [facility];  Service: Endoscopy;  Laterality: N/A;    ESOPHAGOGASTRODUODENOSCOPY N/A 04/16/2022    Procedure: EGD (ESOPHAGOGASTRODUODENOSCOPY);  Surgeon: Siddharth Garcia MD;  Location: [facility];  Service: Endoscopy;  Laterality: N/A;    ESOPHAGOGASTRODUODENOSCOPY N/A 06/23/2022    Procedure: EGD (ESOPHAGOGASTRODUODENOSCOPY);  Surgeon: Jefferson Hyman MD;  Location: [facility];  Service: Endoscopy;  Laterality: N/A;    ESOPHAGOGASTRODUODENOSCOPY N/A 7/6/2023    Procedure: EGD (ESOPHAGOGASTRODUODENOSCOPY);  Surgeon: Jefferson Hyman MD;  Location: [facility];  Service: Endoscopy;  Laterality: N/A;    ESOPHAGOGASTRODUODENOSCOPY W/ PEG N/A 04/16/2022    Procedure: EGD, WITH PEG TUBE INSERTION;  Surgeon: Siddharth Garcia MD;  Location: [facility];  Service: Endoscopy;  Laterality: N/A;    GASTROSTOMY TUBE PLACEMENT  04/16/2022    20 Fr (bumper initially at 3.5)    Hemorrhoid resection  1979    LUNG LOBECTOMY Left 2004    NECK SURGERY  04/08/2022    Anterior and Posterior C3-C7 fusion    OTHER SURGICAL HISTORY  2006    Chemotherapy s/p lyphoma        Portacath implantation and removal      reverse shoulder arthroplasty Right 03/2021    SMALL BOWEL ENTEROSCOPY N/A 11/22/2023    Procedure: ENTEROSCOPY;  Surgeon: Jonh Finney III, MD;  Location: [facility];  Service: Endoscopy;  Laterality: N/A;       Review of patient's allergies indicates:  No Known Allergies    No current facility-administered medications on file prior to encounter.     Current Outpatient Medications on File Prior to Encounter   Medication Sig    atorvastatin (LIPITOR) 20 MG  tablet Take 20 mg by mouth once daily.    azelastine (ASTELIN) 137 mcg (0.1 %) nasal spray 1 spray by Nasal route 2 (two) times daily.     blood sugar diagnostic Strp 1 strip by Misc.(Non-Drug; Combo Route) route 2 (two) times a day.    blood-glucose meter kit Use as instructed    clonazePAM (KLONOPIN) 1 MG tablet Take 1 tablet (1 mg total) by mouth nightly as needed (Insomnia).    CRESEMBA 186 mg Cap TAKE TWO TABLETS BY MOUTH EVERY 8 HOURS FOR 2 DAYS, THEN 2 CAPSULE ONCE A DAY THERE AFTER    duke's soln (benadryl 30 mL, mylanta 30 mL, LIDOcaine 30 mL, nystatin 30 mL) 120mL Take 10 mLs by mouth 4 (four) times daily.    ferrous sulfate (FEOSOL) 325 mg (65 mg iron) Tab tablet Take 325 mg by mouth daily with breakfast.    fluticasone propionate (FLONASE) 50 mcg/actuation nasal spray 1 spray by Each Nostril route once daily.    gabapentin (NEURONTIN) 600 MG tablet Take 600 mg by mouth 2 (two) times daily.    glimepiride (AMARYL) 2 MG tablet Take 2 mg by mouth once daily at 6am.    HYDROcodone-acetaminophen (NORCO)  mg per tablet Take 1 tablet by mouth every 8 (eight) hours as needed for Pain.    iron-vitamin C 100-250 mg, ICAR-C, 100-250 mg Tab Take 1 tablet by mouth once daily.    loratadine (CLARITIN) 10 mg tablet Take 10 mg by mouth once daily.    losartan (COZAAR) 50 MG tablet Take 1 tablet (50 mg total) by mouth once daily.    metoprolol tartrate (LOPRESSOR) 25 MG tablet Take 1 tablet (25 mg total) by mouth 2 (two) times daily.    ondansetron (ZOFRAN) 8 MG tablet Take 1 tablet (8 mg total) by mouth every 8 (eight) hours as needed for Nausea.    pantoprazole (PROTONIX) 40 MG tablet Take 1 tablet (40 mg total) by mouth 2 (two) times daily.    promethazine (PHENERGAN) 12.5 MG Tab TAKE 1 TABLET(12.5 MG) BY MOUTH EVERY 6 HOURS AS NEEDED (Patient taking differently: Take 12.5 mg by mouth every 6 (six) hours as needed (nausea). TAKE 1 TABLET(12.5 MG) BY MOUTH EVERY 6 HOURS AS NEEDED)    tamsulosin (FLOMAX) 0.4 mg  Cap Take 1 capsule (0.4 mg total) by mouth once daily. (Patient taking differently: Take 0.4 mg by mouth every evening.)    traZODone (DESYREL) 300 MG tablet Take 300 mg by mouth every evening.    valACYclovir (VALTREX) 500 MG tablet Take 1,000 mg by mouth once daily.    venetoclax (VENCLEXTA) 100 mg Tab Take 2 tablets (200 mg) by mouth once daily.    [DISCONTINUED] esomeprazole (NEXIUM) 40 MG capsule Take 1 capsule (40 mg total) by mouth 2 (two) times daily.    [DISCONTINUED] metoprolol succinate (TOPROL-XL) 25 MG 24 hr tablet Take 25 mg by mouth 2 (two) times daily.      Family History       Problem Relation (Age of Onset)    Colon cancer Father    Stomach cancer Mother (80)          Tobacco Use    Smoking status: Former    Smokeless tobacco: Never   Substance and Sexual Activity    Alcohol use: Yes    Drug use: No    Sexual activity: Not Currently     Review of Systems   Constitutional: Negative.    HENT: Negative.     Eyes: Negative.    Respiratory: Negative.     Cardiovascular: Negative.    Gastrointestinal: Negative.    Endocrine: Negative.    Genitourinary: Negative.    Musculoskeletal: Negative.    Skin: Negative.    Allergic/Immunologic: Negative.    Neurological:  Positive for weakness.   Hematological: Negative.    Psychiatric/Behavioral: Negative.       Objective:     Vital Signs (Most Recent):  Temp: 99.4 °F (37.4 °C) (12/16/23 2309)  Pulse: 110 (12/17/23 0438)  Resp: 16 (12/17/23 0256)  BP: (!) 113/57 (12/17/23 0428)  SpO2: 97 % (12/17/23 0438) Vital Signs (24h Range):  Temp:  [99.4 °F (37.4 °C)] 99.4 °F (37.4 °C)  Pulse:  [103-110] 110  Resp:  [16-18] 16  SpO2:  [95 %-100 %] 97 %  BP: (112-137)/(33-72) 113/57     Weight: 72.6 kg (160 lb)  Body mass index is 20.54 kg/m².     Physical Exam  Vitals and nursing note reviewed.   Constitutional:       General: He is not in acute distress.     Appearance: He is well-developed.      Comments: Pale, Fatigued   HENT:      Head: Normocephalic and atraumatic.  "  Eyes:      Pupils: Pupils are equal, round, and reactive to light.   Cardiovascular:      Rate and Rhythm: Regular rhythm.      Heart sounds: No murmur heard.  Pulmonary:      Effort: Pulmonary effort is normal.      Breath sounds: Normal breath sounds. No wheezing.      Comments: Cpap in place   Abdominal:      General: There is no distension.      Palpations: Abdomen is soft.      Tenderness: There is no abdominal tenderness. There is no guarding or rebound.   Musculoskeletal:         General: Normal range of motion.      Cervical back: Normal range of motion.   Skin:     Findings: No rash.   Neurological:      Mental Status: He is alert and oriented to person, place, and time.      Cranial Nerves: No cranial nerve deficit.      Sensory: No sensory deficit.              CRANIAL NERVES     CN III, IV, VI   Pupils are equal, round, and reactive to light.       Significant Labs: All pertinent labs within the past 24 hours have been reviewed.  CBC:   Recent Labs   Lab 12/16/23  2342   WBC 0.78*   HGB 3.8*   HCT 10.8*   PLT 5*     CMP:   Recent Labs   Lab 12/16/23  2342      K 4.2      CO2 28   *   BUN 56*   CREATININE 1.2   CALCIUM 8.2*   PROT 4.4*   ALBUMIN 2.8*   BILITOT 0.4   ALKPHOS 30*   AST 11   ALT 10   ANIONGAP 2*     Lactic Acid:   Recent Labs   Lab 12/16/23  2342   LACTATE 2.6*     Troponin:   Recent Labs   Lab 12/16/23  2342   TROPONINIHS 7.9     Urine Studies: No results for input(s): "COLORU", "APPEARANCEUA", "PHUR", "SPECGRAV", "PROTEINUA", "GLUCUA", "KETONESU", "BILIRUBINUA", "OCCULTUA", "NITRITE", "UROBILINOGEN", "LEUKOCYTESUR", "RBCUA", "WBCUA", "BACTERIA", "SQUAMEPITHEL", "HYALINECASTS" in the last 48 hours.    Invalid input(s): "WRIGHTSUR"    Significant Imaging: I have reviewed all pertinent imaging results/findings within the past 24 hours.  Assessment/Plan:     Active Hospital Problems    Diagnosis    *Pancytopenia    Chronic obstructive pulmonary disease    Lymphoma - Small " Lymphocytic Lymphoma (SLL)    CLL (chronic lymphocytic leukemia)     Plan:    -Rapid decline at home with large in Hg from 8.4 on 12/13 to 3.8 now.  Platelets are now 5 (previously 24 on 12/13).  He does not wish for any further transfusions or workup and prefers to be made comfortable. Family at bedside supports this decision.  -He is DNR  -Comfort care measures.Prn pain and anxiety medication ordered.   -Case management consulted for hospice arrangement    VTE Risk Mitigation (From admission, onward)           Ordered     Place TAB hose  Until discontinued         12/17/23 0222     IP VTE HIGH RISK PATIENT  Once         12/17/23 0222                                    Lucy Lewis MD  Department of Hospital Medicine  [facility] - Emergency Dept

## 2023-12-17 NOTE — UM SECONDARY REVIEW
Other (see comment)  front end UR    Level of Care Issue    Denied Observation  Pt has CLL and SLL.  PLT count of 5, WBC of 0.78.  Rapid decline.  Comes to ER seeking help for just comfort measures. He was admitted as IP per Dr. Lewis at 02:17 on 12/17/23.  Consult for case management for hospice.  He has straight Medicare.

## 2023-12-17 NOTE — NURSING
Nurses Note -- 4 Eyes      12/17/2023   6:03 AM      Skin assessed during: Admit      [] No Altered Skin Integrity Present    []Prevention Measures Documented      [x] Yes- Altered Skin Integrity Present or Discovered   [x] LDA Added if Not in Epic (Describe Wound)   [x] New Altered Skin Integrity was Present on Admit and Documented in LDA   [x] Wound Image Taken    Wound Care Consulted? Yes    Attending Nurse:  Aleta Lowe RN/Staff Member:   [id number]

## 2023-12-17 NOTE — PLAN OF CARE
[facility]  Initial Discharge Assessment       Primary Care Provider: Johnson Erazo MD    Admission Diagnosis: End of life care [Z51.5]    Admission Date: 12/16/2023  Expected Discharge Date: 12/17/2023    Transition of Care Barriers: None  Spoke with patient's wife via phone to complete assessment. No POA. Has living will on file. No advance directive. No HD or Coumadin. See DME. Patient was receiving services from [facility] prior to admission. Patient will return home with home hospice through [facility]. No needs identified at this time.    Payor: MEDICARE / Plan: MEDICARE PART A & B / Product Type: Government /     Extended Emergency Contact Information  Primary Emergency Contact: Peri Kuhn  Address: [address] [place] [address]  Home Phone: [phone]  Mobile Phone: [phone]  Relation: Spouse    Discharge Plan A: Hospice/home  Discharge Plan B: Hospice/home      [id number] - [place], [place] - [address] AT [facility] [address]  [address]  [facility] [address]  Phone: [phone] Fax: [phone]    [facility] [place] - [address]  [address] [facility] [place] [address]  Phone: [phone] Fax: [phone]      Initial Assessment (most recent)       Adult Discharge Assessment - 12/17/23 0600          Discharge Assessment    Assessment Type Discharge Planning Assessment     Confirmed/corrected address, phone number and insurance Yes     Confirmed Demographics Correct on Facesheet     Source of Information family     When was your last doctors appointment? --   one to two months ago    Communicated MARTELL with patient/caregiver Yes     Reason For Admission Pancytopenia     People in Home spouse     Facility Arrived From: home     Do you expect to return to your current living situation? Yes     Do you have help at home or someone to help you manage your care at  home? Yes     Who are your caregiver(s) and their phone number(s)? Peri Kuhn/spouse [phone]     Prior to hospitilization cognitive status: Alert/Oriented     Current cognitive status: Alert/Oriented     Walking or Climbing Stairs Difficulty yes     Walking or Climbing Stairs ambulation difficulty, requires equipment;stair climbing difficulty, requires equipment     Mobility Management rollator     Dressing/Bathing Difficulty yes     Dressing/Bathing bathing difficulty, assistance 1 person     Dressing/Bathing Management wife assists     Equipment Currently Used at Home CPAP;glucometer;blood pressure machine     Readmission within 30 days? No     Patient currently being followed by outpatient case management? No     Do you currently have service(s) that help you manage your care at home? Yes     Name and Contact number of agency [facility]     Is the pt/caregiver preference to resume services with current agency No     Do you take prescription medications? Yes     Do you have prescription coverage? Yes     Coverage [employer] Federal     Do you have any problems affording any of your prescribed medications? No     Is the patient taking medications as prescribed? yes     Who is going to help you get home at discharge? Peri Kuhn/spouse [phone]     How do you get to doctors appointments? family or friend will provide     Are you on dialysis? No     Do you take coumadin? No     Discharge Plan A Hospice/home     Discharge Plan B Hospice/home     DME Needed Upon Discharge  none     Discharge Plan discussed with: Spouse/sig other     Name(s) and Number(s) Peri Kuhn/spouse [phone]     Transition of Care Barriers None        OTHER    Name(s) of People in Home Peri Hattie/spouse [phone]

## 2023-12-17 NOTE — HPI
82 yo male with PMH NSCLC (2004), diabetes, asthma, MAYDA on cpap, iron deficiency anemia, HTN, Hx of Duodenal AVMs, CLL presents to the ED with weakness and rapid decline at home.  He was in the process of being placed on hospice in the outpatient setting. He asks me to help him and tells me he would like to go to sleep and never wake up. Family is at bediside.  Hg is 3.8, Mg 1.4, LA 2.6, Procal 1.15.  He does not wish for any further aggressive care and prefers to be made comfortable until he passes away.

## 2023-12-17 NOTE — HOSPITAL COURSE
84 yo male with PMH NSCLC (2004), diabetes, asthma, MAYDA on cpap, iron deficiency anemia, HTN, Hx of Duodenal AVMs, CLL presents to the ED with weakness and rapid decline at home.  He was in the process of being placed on hospice in the outpatient setting.  Hg is 3.8, Mg 1.4, LA 2.6, Procal 1.15.  He does not wish for any further aggressive care and prefers to be made comfortable until he passes away.    Comfort care placed and later patient passed away .

## 2023-12-17 NOTE — SUBJECTIVE & OBJECTIVE
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    Lymphoma, 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    MAYDA on CPAP     Pneumonia of both lungs due to infectious organism 06/29/2021    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     Right-sided Bell's palsy 2009    Spondylolisthesis     Varicose veins of legs     Venous insufficiency        Past Surgical History:   Procedure Laterality Date    Athroscopic surgery      On Knee    BIOPSY OF TISSUE OF NECK Left 08/2015    Recuurence CLL/small cell lyphoma    CERVICAL FUSION  2022    ESOPHAGEAL DILATION      ESOPHAGOGASTRODUODENOSCOPY N/A 04/15/2022    Procedure: EGD (ESOPHAGOGASTRODUODENOSCOPY);  Surgeon: Siddharth Garcia MD;  Location: [facility];  Service: Endoscopy;  Laterality: N/A;    ESOPHAGOGASTRODUODENOSCOPY N/A 04/16/2022    Procedure: EGD (ESOPHAGOGASTRODUODENOSCOPY);  Surgeon: Siddharth Garcia MD;  Location: [facility];  Service: Endoscopy;  Laterality: N/A;    ESOPHAGOGASTRODUODENOSCOPY N/A 06/23/2022    Procedure: EGD (ESOPHAGOGASTRODUODENOSCOPY);  Surgeon: Jefferson Hyman MD;  Location: [facility];  Service: Endoscopy;  Laterality: N/A;    ESOPHAGOGASTRODUODENOSCOPY N/A 7/6/2023    Procedure: EGD (ESOPHAGOGASTRODUODENOSCOPY);  Surgeon: Jefferson Hyman MD;  Location: [facility];  Service: Endoscopy;  Laterality: N/A;    ESOPHAGOGASTRODUODENOSCOPY W/ PEG N/A 04/16/2022    Procedure: EGD, WITH PEG TUBE INSERTION;  Surgeon: Siddharth Garcia MD;  Location: [facility];  Service: Endoscopy;   Laterality: N/A;    GASTROSTOMY TUBE PLACEMENT  04/16/2022    20 Fr (bumper initially at 3.5)    Hemorrhoid resection  1979    LUNG LOBECTOMY Left 2004    NECK SURGERY  04/08/2022    Anterior and Posterior C3-C7 fusion    OTHER SURGICAL HISTORY  2006    Chemotherapy s/p lyphoma        Portacath implantation and removal      reverse shoulder arthroplasty Right 03/2021    SMALL BOWEL ENTEROSCOPY N/A 11/22/2023    Procedure: ENTEROSCOPY;  Surgeon: John Finney III, MD;  Location: [facility];  Service: Endoscopy;  Laterality: N/A;       Review of patient's allergies indicates:  No Known Allergies    No current facility-administered medications on file prior to encounter.     Current Outpatient Medications on File Prior to Encounter   Medication Sig    atorvastatin (LIPITOR) 20 MG tablet Take 20 mg by mouth once daily.    azelastine (ASTELIN) 137 mcg (0.1 %) nasal spray 1 spray by Nasal route 2 (two) times daily.     blood sugar diagnostic Strp 1 strip by Misc.(Non-Drug; Combo Route) route 2 (two) times a day.    blood-glucose meter kit Use as instructed    clonazePAM (KLONOPIN) 1 MG tablet Take 1 tablet (1 mg total) by mouth nightly as needed (Insomnia).    CRESEMBA 186 mg Cap TAKE TWO TABLETS BY MOUTH EVERY 8 HOURS FOR 2 DAYS, THEN 2 CAPSULE ONCE A DAY THERE AFTER    duke's soln (benadryl 30 mL, mylanta 30 mL, LIDOcaine 30 mL, nystatin 30 mL) 120mL Take 10 mLs by mouth 4 (four) times daily.    ferrous sulfate (FEOSOL) 325 mg (65 mg iron) Tab tablet Take 325 mg by mouth daily with breakfast.    fluticasone propionate (FLONASE) 50 mcg/actuation nasal spray 1 spray by Each Nostril route once daily.    gabapentin (NEURONTIN) 600 MG tablet Take 600 mg by mouth 2 (two) times daily.    glimepiride (AMARYL) 2 MG tablet Take 2 mg by mouth once daily at 6am.    HYDROcodone-acetaminophen (NORCO)  mg per tablet Take 1 tablet by mouth every 8 (eight) hours as needed for Pain.    iron-vitamin C 100-250 mg, ICAR-C,  100-250 mg Tab Take 1 tablet by mouth once daily.    loratadine (CLARITIN) 10 mg tablet Take 10 mg by mouth once daily.    losartan (COZAAR) 50 MG tablet Take 1 tablet (50 mg total) by mouth once daily.    metoprolol tartrate (LOPRESSOR) 25 MG tablet Take 1 tablet (25 mg total) by mouth 2 (two) times daily.    ondansetron (ZOFRAN) 8 MG tablet Take 1 tablet (8 mg total) by mouth every 8 (eight) hours as needed for Nausea.    pantoprazole (PROTONIX) 40 MG tablet Take 1 tablet (40 mg total) by mouth 2 (two) times daily.    promethazine (PHENERGAN) 12.5 MG Tab TAKE 1 TABLET(12.5 MG) BY MOUTH EVERY 6 HOURS AS NEEDED (Patient taking differently: Take 12.5 mg by mouth every 6 (six) hours as needed (nausea). TAKE 1 TABLET(12.5 MG) BY MOUTH EVERY 6 HOURS AS NEEDED)    tamsulosin (FLOMAX) 0.4 mg Cap Take 1 capsule (0.4 mg total) by mouth once daily. (Patient taking differently: Take 0.4 mg by mouth every evening.)    traZODone (DESYREL) 300 MG tablet Take 300 mg by mouth every evening.    valACYclovir (VALTREX) 500 MG tablet Take 1,000 mg by mouth once daily.    venetoclax (VENCLEXTA) 100 mg Tab Take 2 tablets (200 mg) by mouth once daily.    [DISCONTINUED] esomeprazole (NEXIUM) 40 MG capsule Take 1 capsule (40 mg total) by mouth 2 (two) times daily.    [DISCONTINUED] metoprolol succinate (TOPROL-XL) 25 MG 24 hr tablet Take 25 mg by mouth 2 (two) times daily.      Family History       Problem Relation (Age of Onset)    Colon cancer Father    Stomach cancer Mother (80)          Tobacco Use    Smoking status: Former    Smokeless tobacco: Never   Substance and Sexual Activity    Alcohol use: Yes    Drug use: No    Sexual activity: Not Currently     Review of Systems   Constitutional: Negative.    HENT: Negative.     Eyes: Negative.    Respiratory: Negative.     Cardiovascular: Negative.    Gastrointestinal: Negative.    Endocrine: Negative.    Genitourinary: Negative.    Musculoskeletal: Negative.    Skin: Negative.     Allergic/Immunologic: Negative.    Neurological:  Positive for weakness.   Hematological: Negative.    Psychiatric/Behavioral: Negative.       Objective:     Vital Signs (Most Recent):  Temp: 99.4 °F (37.4 °C) (12/16/23 2309)  Pulse: 110 (12/17/23 0438)  Resp: 16 (12/17/23 0256)  BP: (!) 113/57 (12/17/23 0428)  SpO2: 97 % (12/17/23 0438) Vital Signs (24h Range):  Temp:  [99.4 °F (37.4 °C)] 99.4 °F (37.4 °C)  Pulse:  [103-110] 110  Resp:  [16-18] 16  SpO2:  [95 %-100 %] 97 %  BP: (112-137)/(33-72) 113/57     Weight: 72.6 kg (160 lb)  Body mass index is 20.54 kg/m².     Physical Exam  Vitals and nursing note reviewed.   Constitutional:       General: He is not in acute distress.     Appearance: He is well-developed.      Comments: Pale, Fatigued   HENT:      Head: Normocephalic and atraumatic.   Eyes:      Pupils: Pupils are equal, round, and reactive to light.   Cardiovascular:      Rate and Rhythm: Regular rhythm.      Heart sounds: No murmur heard.  Pulmonary:      Effort: Pulmonary effort is normal.      Breath sounds: Normal breath sounds. No wheezing.      Comments: Cpap in place   Abdominal:      General: There is no distension.      Palpations: Abdomen is soft.      Tenderness: There is no abdominal tenderness. There is no guarding or rebound.   Musculoskeletal:         General: Normal range of motion.      Cervical back: Normal range of motion.   Skin:     Findings: No rash.   Neurological:      Mental Status: He is alert and oriented to person, place, and time.      Cranial Nerves: No cranial nerve deficit.      Sensory: No sensory deficit.              CRANIAL NERVES     CN III, IV, VI   Pupils are equal, round, and reactive to light.       Significant Labs: All pertinent labs within the past 24 hours have been reviewed.  CBC:   Recent Labs   Lab 12/16/23  2342   WBC 0.78*   HGB 3.8*   HCT 10.8*   PLT 5*     CMP:   Recent Labs   Lab 12/16/23  2342      K 4.2      CO2 28   *   BUN 56*  "  CREATININE 1.2   CALCIUM 8.2*   PROT 4.4*   ALBUMIN 2.8*   BILITOT 0.4   ALKPHOS 30*   AST 11   ALT 10   ANIONGAP 2*     Lactic Acid:   Recent Labs   Lab 12/16/23  2342   LACTATE 2.6*     Troponin:   Recent Labs   Lab 12/16/23  2342   TROPONINIHS 7.9     Urine Studies: No results for input(s): "COLORU", "APPEARANCEUA", "PHUR", "SPECGRAV", "PROTEINUA", "GLUCUA", "KETONESU", "BILIRUBINUA", "OCCULTUA", "NITRITE", "UROBILINOGEN", "LEUKOCYTESUR", "RBCUA", "WBCUA", "BACTERIA", "SQUAMEPITHEL", "HYALINECASTS" in the last 48 hours.    Invalid input(s): "WRIGHTSUR"    Significant Imaging: I have reviewed all pertinent imaging results/findings within the past 24 hours.  "

## 2023-12-17 NOTE — PROGRESS NOTES
Count includes the [facility] Medicine  Progress Note    Patient Name: Conrad Kuhn  MRN: [id number]  Patient Class: IP- Inpatient   Admission Date: 12/16/2023  Length of Stay: 0 days  Attending Physician: Dimitri Ignacio MD  Primary Care Provider: Johnson Erazo MD        Subjective:     Principal Problem:Pancytopenia        HPI:   82 yo male with PMH NSCLC (2004), diabetes, asthma, MAYDA on cpap, iron deficiency anemia, HTN, Hx of Duodenal AVMs, CLL presents to the ED with weakness and rapid decline at home.  He was in the process of being placed on hospice in the outpatient setting. He asks me to help him and tells me he would like to go to sleep and never wake up. Family is at bediside.  Hg is 3.8, Mg 1.4, LA 2.6, Procal 1.15.  He does not wish for any further aggressive care and prefers to be made comfortable until he passes away.      Overview/Hospital Course:  Seen. He does not want to do anything . Hospice will come see    Interval History:     Review of Systems  Objective:     Vital Signs (Most Recent):  Temp: 98.6 °F (37 °C) (12/17/23 0841)  Pulse: 100 (12/17/23 0841)  Resp: 16 (12/17/23 1310)  BP: (!) 121/59 (12/17/23 0841)  SpO2: 99 % (12/17/23 0841) Vital Signs (24h Range):  Temp:  [98.4 °F (36.9 °C)-99.4 °F (37.4 °C)] 98.6 °F (37 °C)  Pulse:  [100-112] 100  Resp:  [16-18] 16  SpO2:  [95 %-100 %] 99 %  BP: (112-137)/(33-72) 121/59     Weight: 84.1 kg (185 lb 6.5 oz)  Body mass index is 23.8 kg/m².    Intake/Output Summary (Last 24 hours) at 12/17/2023 1415  Last data filed at 12/17/2023 1150  Gross per 24 hour   Intake 1000 ml   Output --   Net 1000 ml         Physical Exam  Constitutional:       General: He is not in acute distress.     Appearance: He is well-developed.   HENT:      Head: Normocephalic.   Eyes:      Pupils: Pupils are equal, round, and reactive to light.   Cardiovascular:      Rate and Rhythm: Normal rate and regular rhythm.   Pulmonary:      Effort: Pulmonary effort is  normal. No respiratory distress.   Abdominal:      General: Abdomen is flat. There is no distension.      Tenderness: There is no abdominal tenderness.   Musculoskeletal:         General: Normal range of motion.      Cervical back: Neck supple.   Skin:     General: Skin is warm.      Findings: No rash.   Neurological:      Mental Status: He is alert. He is disoriented.             Significant Labs: All pertinent labs within the past 24 hours have been reviewed.  CBC:   Recent Labs   Lab 12/16/23 2342   WBC 0.78*   HGB 3.8*   HCT 10.8*   PLT 5*     CMP:   Recent Labs   Lab 12/16/23 2342      K 4.2      CO2 28   *   BUN 56*   CREATININE 1.2   CALCIUM 8.2*   PROT 4.4*   ALBUMIN 2.8*   BILITOT 0.4   ALKPHOS 30*   AST 11   ALT 10   ANIONGAP 2*     Cardiac Markers:   Recent Labs   Lab 12/16/23 2342   BNP 29       Significant Imaging: I have reviewed all pertinent imaging results/findings within the past 24 hours.    Assessment/Plan:      Active Hospital Problems    Diagnosis    *Pancytopenia    Chronic obstructive pulmonary disease    Lymphoma - Small Lymphocytic Lymphoma (SLL)    CLL (chronic lymphocytic leukemia)      Plan:     -Rapid decline at home with large in Hg from 8.4 on 12/13 to 3.8 now.  Platelets are now 5 (previously 24 on 12/13).  He does not wish for any further transfusions or workup and prefers to be made comfortable. Family at bedside supports this decision.  -He is DNR  -Comfort care measures.Prn pain and anxiety medication ordered.   hospice arrangement. Will come and see tomorrow       VTE Risk Mitigation (From admission, onward)           Ordered     Place TAB hose  Until discontinued         12/17/23 0222     IP VTE HIGH RISK PATIENT  Once         12/17/23 0222                    Discharge Planning   MARTELL:      Code Status: DNR   Is the patient medically ready for discharge?:     Reason for patient still in hospital (select all that apply): Treatment                     Dimitri LANGE  MD Sandee  Department of Hospital Medicine   [facility]

## 2023-12-17 NOTE — ED PROVIDER NOTES
"Encounter Date: 12/16/2023       History     Chief Complaint   Patient presents with    Fatigue     Pt arrived by ems from  home, pt family reports pt hx ca with recent hospice consult. Increased generalized weakness.      83-year-old male with past medical history CLL, COPD, GI bleeding, lymphoma, CLL, presents emergency department with weakness, fatigue and malaise.  It is reported that the patient was receiving chemotherapy for his cancer but requested to be transitioned to hospice and withdraw care.  Here tonight with generalized weakness fatigue.  Patient reports no energy pain all over mostly in his neck is back.  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    AMYDA on CPAP     Pneumonia of both lungs due to infectious organism 06/29/2021    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;  Service: Endoscopy;  " Laterality: N/A;    ESOPHAGOGASTRODUODENOSCOPY N/A 2022    Procedure: EGD (ESOPHAGOGASTRODUODENOSCOPY);  Surgeon: Siddharth Garcia MD;  Location: [facility];  Service: Endoscopy;  Laterality: N/A;    ESOPHAGOGASTRODUODENOSCOPY N/A 2022    Procedure: EGD (ESOPHAGOGASTRODUODENOSCOPY);  Surgeon: Jefferson Hyman MD;  Location: [facility];  Service: Endoscopy;  Laterality: N/A;    ESOPHAGOGASTRODUODENOSCOPY N/A 2023    Procedure: EGD (ESOPHAGOGASTRODUODENOSCOPY);  Surgeon: Jefferson Hyman MD;  Location: [facility];  Service: Endoscopy;  Laterality: N/A;    ESOPHAGOGASTRODUODENOSCOPY W/ PEG N/A 2022    Procedure: EGD, WITH PEG TUBE INSERTION;  Surgeon: Siddharth Garcia MD;  Location: [facility];  Service: Endoscopy;  Laterality: N/A;    GASTROSTOMY TUBE PLACEMENT  2022    20 Fr (bumper initially at 3.5)    Hemorrhoid resection  1979    LUNG LOBECTOMY Left 2004    NECK SURGERY  2022    Anterior and Posterior C3-C7 fusion    OTHER SURGICAL HISTORY  2006    Chemotherapy s/p lyphoma        Portacath implantation and removal      reverse shoulder arthroplasty Right 2021    SMALL BOWEL ENTEROSCOPY N/A 2023    Procedure: ENTEROSCOPY;  Surgeon: John Finney III, MD;  Location: [facility];  Service: Endoscopy;  Laterality: N/A;     Family History   Problem Relation Age of Onset    Stomach cancer Mother 80         at 95    Colon cancer Father      Social History     Tobacco Use    Smoking status: Former    Smokeless tobacco: Never   Substance Use Topics    Alcohol use: Yes    Drug use: No     Review of Systems   Constitutional:  Positive for activity change, appetite change, fatigue and unexpected weight change. Negative for fever.   HENT:  Negative for sore throat.    Respiratory:  Negative for shortness of breath.    Cardiovascular:  Negative for chest pain.   Gastrointestinal:  Positive for nausea. Negative for vomiting.   Genitourinary:  Negative for dysuria.    Musculoskeletal:  Positive for arthralgias, back pain, myalgias and neck pain.   Skin:  Negative for rash.   Neurological:  Negative for weakness.   Hematological:  Does not bruise/bleed easily.   All other systems reviewed and are negative.      Physical Exam     Initial Vitals   BP Pulse Resp Temp SpO2   12/16/23 2248 12/16/23 2248 12/16/23 2248 12/16/23 2309 12/16/23 2248   (!) 112/33 108 18 99.4 °F (37.4 °C) 100 %      MAP       --                Physical Exam    Nursing note and vitals reviewed.  Constitutional: He appears well-developed and well-nourished. He is not diaphoretic. No distress.   HENT:   Head: Normocephalic and atraumatic.   Mouth/Throat: Oropharynx is clear and moist. No oropharyngeal exudate.   Eyes: Conjunctivae and EOM are normal. Pupils are equal, round, and reactive to light.   Neck: Neck supple. No tracheal deviation present.   Cardiovascular:  Regular rhythm, normal heart sounds and intact distal pulses.           No murmur heard.  Tachycardic   Pulmonary/Chest: Breath sounds normal. No stridor. No respiratory distress. He has no wheezes. He has no rhonchi. He has no rales.   Abdominal: Abdomen is soft. Bowel sounds are normal. He exhibits no distension. There is no abdominal tenderness. There is no rebound and no guarding.   Musculoskeletal:         General: No tenderness or edema. Normal range of motion.      Cervical back: Neck supple.     Neurological: He is alert and oriented to person, place, and time. He has normal strength. No cranial nerve deficit or sensory deficit.   Skin: Skin is warm and dry. Capillary refill takes less than 2 seconds. No rash noted. No erythema. There is pallor.   Psychiatric: He has a normal mood and affect. His behavior is normal. Thought content normal.         ED Course   Procedures  Labs Reviewed   CBC W/ AUTO DIFFERENTIAL - Abnormal; Notable for the following components:       Result Value    WBC 0.78 (*)     RBC 1.29 (*)     Hemoglobin 3.8 (*)      Hematocrit 10.8 (*)     RDW 14.8 (*)     Platelets 5 (*)     All other components within normal limits    Narrative:     WBC, Hgb, Hct, & Platelet count critical result(s) called and verbal   readback obtained from Zhanna Mckoy Paramedic (ER)  by VANNA   12/17/2023 00:06   COMPREHENSIVE METABOLIC PANEL - Abnormal; Notable for the following components:    Glucose 182 (*)     BUN 56 (*)     Calcium 8.2 (*)     Total Protein 4.4 (*)     Albumin 2.8 (*)     Alkaline Phosphatase 30 (*)     eGFR 59.6 (*)     Anion Gap 2 (*)     All other components within normal limits   LACTIC ACID, PLASMA - Abnormal; Notable for the following components:    Lactate (Lactic Acid) 2.6 (*)     All other components within normal limits   MAGNESIUM - Abnormal; Notable for the following components:    Magnesium 1.4 (*)     All other components within normal limits   PROCALCITONIN - Abnormal; Notable for the following components:    Procalcitonin 1.159 (*)     All other components within normal limits   CULTURE, BLOOD   CULTURE, BLOOD   PROTIME-INR   B-TYPE NATRIURETIC PEPTIDE   TROPONIN I HIGH SENSITIVITY          Results for orders placed or performed during the hospital encounter of 12/16/23   CBC auto differential   Result Value Ref Range    WBC 0.78 (LL) 3.90 - 12.70 K/uL    RBC 1.29 (L) 4.60 - 6.20 M/uL    Hemoglobin 3.8 (LL) 14.0 - 18.0 g/dL    Hematocrit 10.8 (LL) 40.0 - 54.0 %    MCV 84 82 - 98 fL    MCH 29.5 27.0 - 31.0 pg    MCHC 35.2 32.0 - 36.0 g/dL    RDW 14.8 (H) 11.5 - 14.5 %    Platelets 5 (LL) 150 - 450 K/uL    MPV SEE COMMENT 9.2 - 12.9 fL   Comprehensive metabolic panel   Result Value Ref Range    Sodium 138 136 - 145 mmol/L    Potassium 4.2 3.5 - 5.1 mmol/L    Chloride 108 95 - 110 mmol/L    CO2 28 23 - 29 mmol/L    Glucose 182 (H) 70 - 110 mg/dL    BUN 56 (H) 8 - 23 mg/dL    Creatinine 1.2 0.5 - 1.4 mg/dL    Calcium 8.2 (L) 8.7 - 10.5 mg/dL    Total Protein 4.4 (L) 6.0 - 8.4 g/dL    Albumin 2.8 (L) 3.5 - 5.2 g/dL    Total  Bilirubin 0.4 0.1 - 1.0 mg/dL    Alkaline Phosphatase 30 (L) 55 - 135 U/L    AST 11 10 - 40 U/L    ALT 10 10 - 44 U/L    eGFR 59.6 (A) >60 mL/min/1.73 m^2    Anion Gap 2 (L) 8 - 16 mmol/L   Lactic acid, plasma #1   Result Value Ref Range    Lactate (Lactic Acid) 2.6 (HH) 0.5 - 1.9 mmol/L   Magnesium   Result Value Ref Range    Magnesium 1.4 (L) 1.6 - 2.6 mg/dL   Protime-INR   Result Value Ref Range    Prothrombin Time 11.2 9.0 - 12.5 sec    INR 1.0 0.8 - 1.2   Brain natriuretic peptide   Result Value Ref Range    BNP 29 0 - 99 pg/mL   Troponin I High Sensitivity   Result Value Ref Range    Troponin I High Sensitivity 7.9 0.0 - 14.9 pg/mL   Procalcitonin   Result Value Ref Range    Procalcitonin 1.159 (H) 0.000 - 0.500 ng/mL       Imaging Results              X-Ray Chest AP Portable (Preliminary result)  Result time 12/17/23 01:03:21      Wet Read by Imtiaz Almeida DO (12/17/23 01:03:21, [facility] - Emergency Dept, Emergency Medicine)    No acute process                                     Medications   sodium chloride 0.9% flush 10 mL (has no administration in time range)   naloxone 0.4 mg/mL injection 0.02 mg (has no administration in time range)   glucose chewable tablet 16 g (has no administration in time range)   glucose chewable tablet 24 g (has no administration in time range)   dextrose 50% injection 12.5 g (has no administration in time range)   dextrose 50% injection 25 g (has no administration in time range)   glucagon (human recombinant) injection 1 mg (has no administration in time range)   acetaminophen tablet 650 mg (has no administration in time range)   polyethylene glycol packet 17 g (has no administration in time range)   ondansetron injection 4 mg (4 mg Intravenous Given 12/17/23 0256)   melatonin tablet 6 mg (has no administration in time range)   morphine injection 4 mg (4 mg Intravenous Given 12/17/23 0256)   oxyCODONE-acetaminophen  mg per tablet 1 tablet (has no  "administration in time range)   LORazepam injection 1 mg (has no administration in time range)   mupirocin 2 % ointment (has no administration in time range)   lactated ringers bolus 2,178 mL (0 mLs Intravenous Stopped 12/17/23 0110)   ondansetron injection 4 mg (4 mg Intravenous Given 12/17/23 0025)   morphine injection 4 mg (4 mg Intravenous Given 12/17/23 0025)     Medical Decision Making  Differential includes but not limited to anemia, UTI, malaise, fatigue, end of life    83-year-old male presents emergency department with weakness fatigue.  Patient found to be significantly anemic, significantly thrombocytopenic.  Patient likely dehydrated as well.  Patient stated that he "wants to go to sleep and not want to wake up".  Patient case discussed in detail with the patient's wife who is present at bedside.  Patient will be transitioned to comfort care.  Patient has been given IV morphine IV Zofran.  Case discussed with the hospitalist who will admit the patient to the hospital to provide comfort care and palliative care medicine consultation.    A dictation software program was used for this note.  Please expect some simple typographical  errors in this note.     Amount and/or Complexity of Data Reviewed  Radiology: independent interpretation performed.    Risk  Prescription drug management.  Decision regarding hospitalization.               ED Course as of 12/17/23 0343   Sun Dec 17, 2023   0220 I discussed the case with Dr. Lewis from Hospital Medicine who will admit the patient. [JR]      ED Course User Index  [JR] Imtiaz Almeida DO                           Clinical Impression:  Final diagnoses:  [R53.1] Weakness  [Z51.5] End of life care (Primary)          ED Disposition Condition    Admit Stable                Imtiaz Almeida DO  12/17/23 0344    "

## 2023-12-17 NOTE — SUBJECTIVE & OBJECTIVE
Interval History:     Review of Systems  Objective:     Vital Signs (Most Recent):  Temp: 98.6 °F (37 °C) (12/17/23 0841)  Pulse: 100 (12/17/23 0841)  Resp: 16 (12/17/23 1310)  BP: (!) 121/59 (12/17/23 0841)  SpO2: 99 % (12/17/23 0841) Vital Signs (24h Range):  Temp:  [98.4 °F (36.9 °C)-99.4 °F (37.4 °C)] 98.6 °F (37 °C)  Pulse:  [100-112] 100  Resp:  [16-18] 16  SpO2:  [95 %-100 %] 99 %  BP: (112-137)/(33-72) 121/59     Weight: 84.1 kg (185 lb 6.5 oz)  Body mass index is 23.8 kg/m².    Intake/Output Summary (Last 24 hours) at 12/17/2023 1415  Last data filed at 12/17/2023 1150  Gross per 24 hour   Intake 1000 ml   Output --   Net 1000 ml         Physical Exam  Constitutional:       General: He is not in acute distress.     Appearance: He is well-developed.   HENT:      Head: Normocephalic.   Eyes:      Pupils: Pupils are equal, round, and reactive to light.   Cardiovascular:      Rate and Rhythm: Normal rate and regular rhythm.   Pulmonary:      Effort: Pulmonary effort is normal. No respiratory distress.   Abdominal:      General: Abdomen is flat. There is no distension.      Tenderness: There is no abdominal tenderness.   Musculoskeletal:         General: Normal range of motion.      Cervical back: Neck supple.   Skin:     General: Skin is warm.      Findings: No rash.   Neurological:      Mental Status: He is alert. He is disoriented.             Significant Labs: All pertinent labs within the past 24 hours have been reviewed.  CBC:   Recent Labs   Lab 12/16/23  2342   WBC 0.78*   HGB 3.8*   HCT 10.8*   PLT 5*     CMP:   Recent Labs   Lab 12/16/23 2342      K 4.2      CO2 28   *   BUN 56*   CREATININE 1.2   CALCIUM 8.2*   PROT 4.4*   ALBUMIN 2.8*   BILITOT 0.4   ALKPHOS 30*   AST 11   ALT 10   ANIONGAP 2*     Cardiac Markers:   Recent Labs   Lab 12/16/23  2342   BNP 29       Significant Imaging: I have reviewed all pertinent imaging results/findings within the past 24 hours.

## 2023-12-18 ENCOUNTER — TELEPHONE (OUTPATIENT)
Dept: HEMATOLOGY/ONCOLOGY | Facility: CLINIC | Age: 83
End: 2023-12-18

## 2023-12-18 NOTE — TELEPHONE ENCOUNTER
Called [facility] at  and spoke with Radhika. Informed her that patient passed away yesterday. Stated would inform the on call nurse.

## 2023-12-18 NOTE — SIGNIFICANT EVENT
Called to bedside by patient's nurse. Nursing supervisor notified.     Patient is not responding to verbal or tactile stimuli.   Patient's pupils are fixed and dilated.   No heart or breath sounds on auscultation.   No respirations.   No palpable pulses.     Time of death 6:14 PM, 12/17/2023.       Ino Shahid MD

## 2023-12-18 NOTE — DISCHARGE SUMMARY
[facility]  Discharge Summary      Patient Name: Conrad Kuhn  MRN: [id number]  SHAMAR: [id number]  Patient Class: IP- Inpatient  Admission Date: 12/16/2023  Hospital Length of Stay: 0 days  Discharge Date and Time:  12/17/2023 7:59 PM  Attending Physician: Dimitri Ignacio MD   Discharging Provider: Dimitri Ignacio MD  Primary Care Provider: Johnson Erazo MD    Primary Care Team: Networked reference to record PCT     HPI:    82 yo male with PMH NSCLC (2004), diabetes, asthma, MAYDA on cpap, iron deficiency anemia, HTN, Hx of Duodenal AVMs, CLL presents to the ED with weakness and rapid decline at home.  He was in the process of being placed on hospice in the outpatient setting. He asks me to help him and tells me he would like to go to sleep and never wake up. Family is at bediside.  Hg is 3.8, Mg 1.4, LA 2.6, Procal 1.15.  He does not wish for any further aggressive care and prefers to be made comfortable until he passes away.      * No surgery found *      Hospital Course:   82 yo male with PMH NSCLC (2004), diabetes, asthma, MAYDA on cpap, iron deficiency anemia, HTN, Hx of Duodenal AVMs, CLL presents to the ED with weakness and rapid decline at home.  He was in the process of being placed on hospice in the outpatient setting.  Hg is 3.8, Mg 1.4, LA 2.6, Procal 1.15.  He does not wish for any further aggressive care and prefers to be made comfortable until he passes away.    Comfort care placed and later patient passed away .        Goals of Care Treatment Preferences:  Code Status: DNR          What is most important right now is to focus on spending time at home, avoiding the hospital, remaining as independent as possible, symptom/pain control.  Accordingly, we have decided that the best plan to meet the patient's goals includes continuing with treatment.      Consults:   Consults (From admission, onward)          Status Ordering Provider     Inpatient consult to Social Work/Case  Management  Once        Provider:  (Not yet assigned)    Acknowledged SUDHA GALVAN            No new Assessment & Plan notes have been filed under this hospital service since the last note was generated.  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 Yes    CLL (chronic lymphocytic leukemia) [C91.10] 2019 Yes     Chronic      Problems Resolved During this Admission:       Discharged Condition:     Disposition: Hospice/Home    Follow Up:    Patient Instructions:      Diet Adult Regular     Activity as tolerated       Significant Diagnostic Studies: Labs: CMP   Recent Labs   Lab 23  2342      K 4.2      CO2 28   *   BUN 56*   CREATININE 1.2   CALCIUM 8.2*   PROT 4.4*   ALBUMIN 2.8*   BILITOT 0.4   ALKPHOS 30*   AST 11   ALT 10   ANIONGAP 2*    and CBC   Recent Labs   Lab 23  2342   WBC 0.78*   HGB 3.8*   HCT 10.8*   PLT 5*       Pending Diagnostic Studies:       None           Medications:  Reconciled Home Medications:      Medication List        STOP taking these medications      atorvastatin 20 MG tablet  Commonly known as: LIPITOR     azelastine 137 mcg (0.1 %) nasal spray  Commonly known as: ASTELIN     blood sugar diagnostic Strp     blood-glucose meter kit     CLARITIN 10 mg tablet  Generic drug: loratadine     clonazePAM 1 MG tablet  Commonly known as: KlonoPIN     CRESEMBA 186 mg Cap  Generic drug: isavuconazonium sulfate     DUKE'S SOLUTION (BENADRYL 30 ML, MYLANTA 30 ML, LIDOCAINE 30 ML, NYSTATIN 30 ML)     ferrous sulfate 325 mg (65 mg iron) Tab tablet  Commonly known as: FEOSOL     fluticasone propionate 50 mcg/actuation nasal spray  Commonly known as: FLONASE     gabapentin 600 MG tablet  Commonly known as: NEURONTIN     glimepiride 2  MG tablet  Commonly known as: AMARYL     HYDROcodone-acetaminophen  mg per tablet  Commonly known as: NORCO     iron-vitamin C 100-250 mg (ICAR-C) 100-250 mg Tab     losartan 50 MG tablet  Commonly known as: COZAAR     ondansetron 8 MG tablet  Commonly known as: ZOFRAN     pantoprazole 40 MG tablet  Commonly known as: PROTONIX     promethazine 12.5 MG Tab  Commonly known as: PHENERGAN     tamsulosin 0.4 mg Cap  Commonly known as: FLOMAX     traZODone 300 MG tablet  Commonly known as: DESYREL     valACYclovir 500 MG tablet  Commonly known as: VALTREX     venetoclax 100 mg Tab  Commonly known as: VENCLEXTA            ASK your doctor about these medications      metoprolol tartrate 25 MG tablet  Commonly known as: LOPRESSOR  Take 1 tablet (25 mg total) by mouth 2 (two) times daily.              Indwelling Lines/Drains at time of discharge:   Lines/Drains/Airways       Peripherally Inserted Central Catheter Line  Duration             PICC Double Lumen 12/17/23 0010 <1 day                Patient is not responding to verbal or tactile stimuli.   Patient's pupils are fixed and dilated.   No heart or breath sounds on auscultation.   No respirations.   No palpable pulses.      Time of death 6:14 PM, 12/17/2023.     Time spent on the discharge of patient: 22 minutes         Dimitri Ignacio MD  Department of Hospital Medicine  [facility]

## 2023-12-22 LAB
BACTERIA BLD CULT: NORMAL
BACTERIA BLD CULT: NORMAL
